# Patient Record
Sex: MALE | Race: WHITE | NOT HISPANIC OR LATINO | Employment: OTHER | URBAN - METROPOLITAN AREA
[De-identification: names, ages, dates, MRNs, and addresses within clinical notes are randomized per-mention and may not be internally consistent; named-entity substitution may affect disease eponyms.]

---

## 2017-01-18 ENCOUNTER — COMMUNICATION - HEALTHEAST (OUTPATIENT)
Dept: NURSING | Facility: CLINIC | Age: 82
End: 2017-01-18

## 2017-01-18 DIAGNOSIS — E11.9 TYPE 2 DIABETES MELLITUS (H): ICD-10-CM

## 2017-01-23 ENCOUNTER — OFFICE VISIT - HEALTHEAST (OUTPATIENT)
Dept: INTERNAL MEDICINE | Facility: CLINIC | Age: 82
End: 2017-01-23

## 2017-01-23 DIAGNOSIS — E78.5 HYPERLIPIDEMIA: ICD-10-CM

## 2017-01-23 DIAGNOSIS — Z00.00 HEALTHCARE MAINTENANCE: ICD-10-CM

## 2017-01-23 DIAGNOSIS — M79.645 THUMB PAIN, LEFT: ICD-10-CM

## 2017-01-23 DIAGNOSIS — E11.9 TYPE 2 DIABETES MELLITUS (H): ICD-10-CM

## 2017-01-23 DIAGNOSIS — I10 ESSENTIAL HYPERTENSION WITH GOAL BLOOD PRESSURE LESS THAN 140/90: ICD-10-CM

## 2017-01-23 LAB
HBA1C MFR BLD: 12.1 % (ref 3.5–6)
LDLC SERPL CALC-MCNC: 127 MG/DL
PSA SERPL-MCNC: 5.8 NG/ML (ref 0–6.5)

## 2017-01-23 ASSESSMENT — MIFFLIN-ST. JEOR: SCORE: 1639.73

## 2017-01-25 ENCOUNTER — COMMUNICATION - HEALTHEAST (OUTPATIENT)
Dept: INTERNAL MEDICINE | Facility: CLINIC | Age: 82
End: 2017-01-25

## 2017-01-30 ENCOUNTER — RECORDS - HEALTHEAST (OUTPATIENT)
Dept: ADMINISTRATIVE | Facility: OTHER | Age: 82
End: 2017-01-30

## 2017-02-17 ENCOUNTER — COMMUNICATION - HEALTHEAST (OUTPATIENT)
Dept: NURSING | Facility: CLINIC | Age: 82
End: 2017-02-17

## 2017-02-27 ENCOUNTER — OFFICE VISIT - HEALTHEAST (OUTPATIENT)
Dept: INTERNAL MEDICINE | Facility: CLINIC | Age: 82
End: 2017-02-27

## 2017-02-27 DIAGNOSIS — L02.91 ABSCESS: ICD-10-CM

## 2017-02-27 DIAGNOSIS — L98.499: ICD-10-CM

## 2017-02-27 ASSESSMENT — MIFFLIN-ST. JEOR: SCORE: 1598.91

## 2017-03-06 ENCOUNTER — RECORDS - HEALTHEAST (OUTPATIENT)
Dept: ADMINISTRATIVE | Facility: OTHER | Age: 82
End: 2017-03-06

## 2017-03-06 ENCOUNTER — OFFICE VISIT - HEALTHEAST (OUTPATIENT)
Dept: VASCULAR SURGERY | Facility: CLINIC | Age: 82
End: 2017-03-06

## 2017-03-06 DIAGNOSIS — E11.622 TYPE 2 DIABETES MELLITUS WITH OTHER SKIN ULCER (H): ICD-10-CM

## 2017-03-06 DIAGNOSIS — R60.0 LOCALIZED EDEMA: ICD-10-CM

## 2017-03-06 DIAGNOSIS — L98.499 TYPE 2 DIABETES MELLITUS WITH OTHER SKIN ULCER (H): ICD-10-CM

## 2017-03-06 DIAGNOSIS — L97.812 ULCER OF RIGHT SHIN WITH FAT LAYER EXPOSED (H): ICD-10-CM

## 2017-03-06 ASSESSMENT — MIFFLIN-ST. JEOR: SCORE: 1598.91

## 2017-03-13 ENCOUNTER — COMMUNICATION - HEALTHEAST (OUTPATIENT)
Dept: NURSING | Facility: CLINIC | Age: 82
End: 2017-03-13

## 2017-03-13 DIAGNOSIS — E11.9 TYPE 2 DIABETES MELLITUS (H): ICD-10-CM

## 2017-03-15 ENCOUNTER — COMMUNICATION - HEALTHEAST (OUTPATIENT)
Dept: INTERNAL MEDICINE | Facility: CLINIC | Age: 82
End: 2017-03-15

## 2017-03-15 DIAGNOSIS — I10 ESSENTIAL HYPERTENSION: ICD-10-CM

## 2017-03-20 ENCOUNTER — OFFICE VISIT - HEALTHEAST (OUTPATIENT)
Dept: VASCULAR SURGERY | Facility: CLINIC | Age: 82
End: 2017-03-20

## 2017-03-20 DIAGNOSIS — L98.499 TYPE 2 DIABETES MELLITUS WITH OTHER SKIN ULCER (H): ICD-10-CM

## 2017-03-20 DIAGNOSIS — L97.812 ULCER OF RIGHT SHIN WITH FAT LAYER EXPOSED (H): ICD-10-CM

## 2017-03-20 DIAGNOSIS — E11.622 TYPE 2 DIABETES MELLITUS WITH OTHER SKIN ULCER (H): ICD-10-CM

## 2017-03-20 DIAGNOSIS — R60.0 LOCALIZED EDEMA: ICD-10-CM

## 2017-03-21 ENCOUNTER — COMMUNICATION - HEALTHEAST (OUTPATIENT)
Dept: INTERNAL MEDICINE | Facility: CLINIC | Age: 82
End: 2017-03-21

## 2017-03-22 ENCOUNTER — COMMUNICATION - HEALTHEAST (OUTPATIENT)
Dept: INTERNAL MEDICINE | Facility: CLINIC | Age: 82
End: 2017-03-22

## 2017-04-03 ENCOUNTER — OFFICE VISIT - HEALTHEAST (OUTPATIENT)
Dept: VASCULAR SURGERY | Facility: CLINIC | Age: 82
End: 2017-04-03

## 2017-04-03 DIAGNOSIS — L98.499 TYPE 2 DIABETES MELLITUS WITH OTHER SKIN ULCER (H): ICD-10-CM

## 2017-04-03 DIAGNOSIS — R60.0 LOCALIZED EDEMA: ICD-10-CM

## 2017-04-03 DIAGNOSIS — L97.812 ULCER OF RIGHT SHIN WITH FAT LAYER EXPOSED (H): ICD-10-CM

## 2017-04-03 DIAGNOSIS — E11.622 TYPE 2 DIABETES MELLITUS WITH OTHER SKIN ULCER (H): ICD-10-CM

## 2017-04-04 ENCOUNTER — COMMUNICATION - HEALTHEAST (OUTPATIENT)
Dept: INTERNAL MEDICINE | Facility: CLINIC | Age: 82
End: 2017-04-04

## 2017-04-05 ENCOUNTER — COMMUNICATION - HEALTHEAST (OUTPATIENT)
Dept: INTERNAL MEDICINE | Facility: CLINIC | Age: 82
End: 2017-04-05

## 2017-04-17 ENCOUNTER — OFFICE VISIT - HEALTHEAST (OUTPATIENT)
Dept: VASCULAR SURGERY | Facility: CLINIC | Age: 82
End: 2017-04-17

## 2017-04-17 DIAGNOSIS — E11.622 TYPE 2 DIABETES MELLITUS WITH OTHER SKIN ULCER (H): ICD-10-CM

## 2017-04-17 DIAGNOSIS — L98.499 TYPE 2 DIABETES MELLITUS WITH OTHER SKIN ULCER (H): ICD-10-CM

## 2017-04-17 DIAGNOSIS — L97.812 ULCER OF RIGHT SHIN WITH FAT LAYER EXPOSED (H): ICD-10-CM

## 2017-04-17 DIAGNOSIS — R60.0 LOCALIZED EDEMA: ICD-10-CM

## 2017-04-17 ASSESSMENT — MIFFLIN-ST. JEOR: SCORE: 1598.91

## 2017-05-01 ENCOUNTER — OFFICE VISIT - HEALTHEAST (OUTPATIENT)
Dept: VASCULAR SURGERY | Facility: CLINIC | Age: 82
End: 2017-05-01

## 2017-05-01 DIAGNOSIS — R60.0 LOCALIZED EDEMA: ICD-10-CM

## 2017-05-01 DIAGNOSIS — E11.622 TYPE 2 DIABETES MELLITUS WITH OTHER SKIN ULCER (H): ICD-10-CM

## 2017-05-01 DIAGNOSIS — L97.812 ULCER OF RIGHT SHIN WITH FAT LAYER EXPOSED (H): ICD-10-CM

## 2017-05-01 DIAGNOSIS — L98.499 TYPE 2 DIABETES MELLITUS WITH OTHER SKIN ULCER (H): ICD-10-CM

## 2017-05-01 ASSESSMENT — MIFFLIN-ST. JEOR: SCORE: 1598.91

## 2017-05-02 ENCOUNTER — COMMUNICATION - HEALTHEAST (OUTPATIENT)
Dept: INTERNAL MEDICINE | Facility: CLINIC | Age: 82
End: 2017-05-02

## 2017-05-03 ENCOUNTER — COMMUNICATION - HEALTHEAST (OUTPATIENT)
Dept: INTERNAL MEDICINE | Facility: CLINIC | Age: 82
End: 2017-05-03

## 2017-05-09 ENCOUNTER — AMBULATORY - HEALTHEAST (OUTPATIENT)
Dept: VASCULAR SURGERY | Facility: CLINIC | Age: 82
End: 2017-05-09

## 2017-05-16 ENCOUNTER — OFFICE VISIT - HEALTHEAST (OUTPATIENT)
Dept: VASCULAR SURGERY | Facility: CLINIC | Age: 82
End: 2017-05-16

## 2017-05-16 ENCOUNTER — RECORDS - HEALTHEAST (OUTPATIENT)
Dept: ADMINISTRATIVE | Facility: OTHER | Age: 82
End: 2017-05-16

## 2017-05-16 DIAGNOSIS — R60.0 LOCALIZED EDEMA: ICD-10-CM

## 2017-05-16 DIAGNOSIS — E11.622 TYPE 2 DIABETES MELLITUS WITH OTHER SKIN ULCER (H): ICD-10-CM

## 2017-05-16 DIAGNOSIS — L98.499 TYPE 2 DIABETES MELLITUS WITH OTHER SKIN ULCER (H): ICD-10-CM

## 2017-05-16 DIAGNOSIS — L97.812 ULCER OF RIGHT SHIN WITH FAT LAYER EXPOSED (H): ICD-10-CM

## 2017-05-17 ENCOUNTER — RECORDS - HEALTHEAST (OUTPATIENT)
Dept: ADMINISTRATIVE | Facility: OTHER | Age: 82
End: 2017-05-17

## 2017-05-17 ENCOUNTER — COMMUNICATION - HEALTHEAST (OUTPATIENT)
Dept: SCHEDULING | Facility: CLINIC | Age: 82
End: 2017-05-17

## 2017-05-18 ENCOUNTER — AMBULATORY - HEALTHEAST (OUTPATIENT)
Dept: VASCULAR SURGERY | Facility: CLINIC | Age: 82
End: 2017-05-18

## 2017-05-18 DIAGNOSIS — L08.9 WOUND INFECTION: ICD-10-CM

## 2017-05-18 DIAGNOSIS — T14.8XXA WOUND INFECTION: ICD-10-CM

## 2017-05-19 ENCOUNTER — COMMUNICATION - HEALTHEAST (OUTPATIENT)
Dept: INTERNAL MEDICINE | Facility: CLINIC | Age: 82
End: 2017-05-19

## 2017-05-19 ENCOUNTER — OFFICE VISIT - HEALTHEAST (OUTPATIENT)
Dept: INTERNAL MEDICINE | Facility: CLINIC | Age: 82
End: 2017-05-19

## 2017-05-19 ENCOUNTER — COMMUNICATION - HEALTHEAST (OUTPATIENT)
Dept: VASCULAR SURGERY | Facility: CLINIC | Age: 82
End: 2017-05-19

## 2017-05-19 DIAGNOSIS — E11.622 TYPE 2 DIABETES MELLITUS WITH OTHER SKIN ULCER (H): ICD-10-CM

## 2017-05-19 DIAGNOSIS — L08.9 TOE INFECTION: ICD-10-CM

## 2017-05-19 DIAGNOSIS — L98.499 TYPE 2 DIABETES MELLITUS WITH OTHER SKIN ULCER (H): ICD-10-CM

## 2017-05-19 LAB — HBA1C MFR BLD: 9.3 % (ref 3.5–6)

## 2017-05-19 ASSESSMENT — MIFFLIN-ST. JEOR: SCORE: 1571.69

## 2017-05-26 ENCOUNTER — OFFICE VISIT - HEALTHEAST (OUTPATIENT)
Dept: INTERNAL MEDICINE | Facility: CLINIC | Age: 82
End: 2017-05-26

## 2017-05-26 DIAGNOSIS — L98.499 TYPE 2 DIABETES MELLITUS WITH OTHER SKIN ULCER (H): ICD-10-CM

## 2017-05-26 DIAGNOSIS — L08.9 TOE INFECTION: ICD-10-CM

## 2017-05-26 DIAGNOSIS — E11.622 TYPE 2 DIABETES MELLITUS WITH OTHER SKIN ULCER (H): ICD-10-CM

## 2017-05-26 ASSESSMENT — MIFFLIN-ST. JEOR: SCORE: 1580.77

## 2017-06-02 ENCOUNTER — OFFICE VISIT - HEALTHEAST (OUTPATIENT)
Dept: VASCULAR SURGERY | Facility: CLINIC | Age: 82
End: 2017-06-02

## 2017-06-02 DIAGNOSIS — L98.499 TYPE 2 DIABETES MELLITUS WITH OTHER SKIN ULCER (H): ICD-10-CM

## 2017-06-02 DIAGNOSIS — L97.812 ULCER OF RIGHT SHIN WITH FAT LAYER EXPOSED (H): ICD-10-CM

## 2017-06-02 DIAGNOSIS — R60.0 LOCALIZED EDEMA: ICD-10-CM

## 2017-06-02 DIAGNOSIS — E11.622 TYPE 2 DIABETES MELLITUS WITH OTHER SKIN ULCER (H): ICD-10-CM

## 2017-06-08 ENCOUNTER — COMMUNICATION - HEALTHEAST (OUTPATIENT)
Dept: INTERNAL MEDICINE | Facility: CLINIC | Age: 82
End: 2017-06-08

## 2017-06-12 ENCOUNTER — COMMUNICATION - HEALTHEAST (OUTPATIENT)
Dept: EDUCATION SERVICES | Facility: CLINIC | Age: 82
End: 2017-06-12

## 2017-06-13 ENCOUNTER — COMMUNICATION - HEALTHEAST (OUTPATIENT)
Dept: EDUCATION SERVICES | Facility: CLINIC | Age: 82
End: 2017-06-13

## 2017-06-19 ENCOUNTER — OFFICE VISIT - HEALTHEAST (OUTPATIENT)
Dept: VASCULAR SURGERY | Facility: CLINIC | Age: 82
End: 2017-06-19

## 2017-06-19 DIAGNOSIS — E11.622 TYPE 2 DIABETES MELLITUS WITH OTHER SKIN ULCER (H): ICD-10-CM

## 2017-06-19 DIAGNOSIS — R60.0 LOCALIZED EDEMA: ICD-10-CM

## 2017-06-19 DIAGNOSIS — L98.499 TYPE 2 DIABETES MELLITUS WITH OTHER SKIN ULCER (H): ICD-10-CM

## 2017-06-19 DIAGNOSIS — L97.812 ULCER OF RIGHT SHIN WITH FAT LAYER EXPOSED (H): ICD-10-CM

## 2017-06-19 ASSESSMENT — MIFFLIN-ST. JEOR: SCORE: 1580.77

## 2017-06-22 ENCOUNTER — COMMUNICATION - HEALTHEAST (OUTPATIENT)
Dept: INTERNAL MEDICINE | Facility: CLINIC | Age: 82
End: 2017-06-22

## 2017-06-22 DIAGNOSIS — E11.9 DIABETES MELLITUS, TYPE 2 (H): ICD-10-CM

## 2017-06-26 ENCOUNTER — OFFICE VISIT - HEALTHEAST (OUTPATIENT)
Dept: VASCULAR SURGERY | Facility: CLINIC | Age: 82
End: 2017-06-26

## 2017-06-26 DIAGNOSIS — E11.622 TYPE 2 DIABETES MELLITUS WITH OTHER SKIN ULCER (H): ICD-10-CM

## 2017-06-26 DIAGNOSIS — R60.0 LOCALIZED EDEMA: ICD-10-CM

## 2017-06-26 DIAGNOSIS — I10 ESSENTIAL HYPERTENSION WITH GOAL BLOOD PRESSURE LESS THAN 140/90: ICD-10-CM

## 2017-06-26 DIAGNOSIS — L08.9 WOUND INFECTION: ICD-10-CM

## 2017-06-26 DIAGNOSIS — L98.499 TYPE 2 DIABETES MELLITUS WITH OTHER SKIN ULCER (H): ICD-10-CM

## 2017-06-26 DIAGNOSIS — L97.812 ULCER OF RIGHT SHIN WITH FAT LAYER EXPOSED (H): ICD-10-CM

## 2017-06-26 DIAGNOSIS — T14.8XXA WOUND INFECTION: ICD-10-CM

## 2017-07-05 ENCOUNTER — OFFICE VISIT - HEALTHEAST (OUTPATIENT)
Dept: VASCULAR SURGERY | Facility: CLINIC | Age: 82
End: 2017-07-05

## 2017-07-05 DIAGNOSIS — L97.812 ULCER OF RIGHT SHIN WITH FAT LAYER EXPOSED (H): ICD-10-CM

## 2017-07-05 DIAGNOSIS — L98.499 TYPE 2 DIABETES MELLITUS WITH OTHER SKIN ULCER (H): ICD-10-CM

## 2017-07-05 DIAGNOSIS — R60.0 LOCALIZED EDEMA: ICD-10-CM

## 2017-07-05 DIAGNOSIS — E11.622 TYPE 2 DIABETES MELLITUS WITH OTHER SKIN ULCER (H): ICD-10-CM

## 2017-07-05 ASSESSMENT — MIFFLIN-ST. JEOR: SCORE: 1580.77

## 2017-07-07 ENCOUNTER — AMBULATORY - HEALTHEAST (OUTPATIENT)
Dept: VASCULAR SURGERY | Facility: CLINIC | Age: 82
End: 2017-07-07

## 2017-07-10 ENCOUNTER — OFFICE VISIT - HEALTHEAST (OUTPATIENT)
Dept: VASCULAR SURGERY | Facility: CLINIC | Age: 82
End: 2017-07-10

## 2017-07-10 DIAGNOSIS — L98.499 TYPE 2 DIABETES MELLITUS WITH OTHER SKIN ULCER (H): ICD-10-CM

## 2017-07-10 DIAGNOSIS — L97.812 ULCER OF RIGHT SHIN WITH FAT LAYER EXPOSED (H): ICD-10-CM

## 2017-07-10 DIAGNOSIS — E11.622 TYPE 2 DIABETES MELLITUS WITH OTHER SKIN ULCER (H): ICD-10-CM

## 2017-07-10 DIAGNOSIS — R60.0 LOCALIZED EDEMA: ICD-10-CM

## 2017-07-10 ASSESSMENT — MIFFLIN-ST. JEOR: SCORE: 1580.77

## 2017-07-13 ENCOUNTER — COMMUNICATION - HEALTHEAST (OUTPATIENT)
Dept: INTERNAL MEDICINE | Facility: CLINIC | Age: 82
End: 2017-07-13

## 2017-07-13 DIAGNOSIS — I10 ESSENTIAL HYPERTENSION: ICD-10-CM

## 2017-07-17 ENCOUNTER — OFFICE VISIT - HEALTHEAST (OUTPATIENT)
Dept: VASCULAR SURGERY | Facility: CLINIC | Age: 82
End: 2017-07-17

## 2017-07-17 DIAGNOSIS — L97.812 ULCER OF RIGHT SHIN WITH FAT LAYER EXPOSED (H): ICD-10-CM

## 2017-07-17 DIAGNOSIS — E11.622 TYPE 2 DIABETES MELLITUS WITH OTHER SKIN ULCER (H): ICD-10-CM

## 2017-07-17 DIAGNOSIS — L98.499 TYPE 2 DIABETES MELLITUS WITH OTHER SKIN ULCER (H): ICD-10-CM

## 2017-07-17 DIAGNOSIS — R60.0 LOCALIZED EDEMA: ICD-10-CM

## 2017-07-17 ASSESSMENT — MIFFLIN-ST. JEOR: SCORE: 1580.77

## 2017-07-24 ENCOUNTER — RECORDS - HEALTHEAST (OUTPATIENT)
Dept: ADMINISTRATIVE | Facility: OTHER | Age: 82
End: 2017-07-24

## 2017-07-24 ENCOUNTER — RECORDS - HEALTHEAST (OUTPATIENT)
Dept: GENERAL RADIOLOGY | Facility: CLINIC | Age: 82
End: 2017-07-24

## 2017-07-24 ENCOUNTER — COMMUNICATION - HEALTHEAST (OUTPATIENT)
Dept: VASCULAR SURGERY | Facility: CLINIC | Age: 82
End: 2017-07-24

## 2017-07-24 ENCOUNTER — OFFICE VISIT - HEALTHEAST (OUTPATIENT)
Dept: VASCULAR SURGERY | Facility: CLINIC | Age: 82
End: 2017-07-24

## 2017-07-24 ENCOUNTER — RECORDS - HEALTHEAST (OUTPATIENT)
Dept: VASCULAR ULTRASOUND | Facility: CLINIC | Age: 82
End: 2017-07-24

## 2017-07-24 DIAGNOSIS — T14.8XXA WOUND INFECTION: ICD-10-CM

## 2017-07-24 DIAGNOSIS — R60.0 LOCALIZED EDEMA: ICD-10-CM

## 2017-07-24 DIAGNOSIS — G58.9 MONONEUROPATHY, UNSPECIFIED: ICD-10-CM

## 2017-07-24 DIAGNOSIS — T14.8XXA OTHER INJURY OF UNSPECIFIED BODY REGION: ICD-10-CM

## 2017-07-24 DIAGNOSIS — G58.9 MONONEUROPATHY: ICD-10-CM

## 2017-07-24 DIAGNOSIS — E11.621 TYPE 2 DIABETES MELLITUS WITH FOOT ULCER (CODE) (H): ICD-10-CM

## 2017-07-24 DIAGNOSIS — L08.9 LOCAL INFECTION OF THE SKIN AND SUBCUTANEOUS TISSUE, UNSPECIFIED: ICD-10-CM

## 2017-07-24 DIAGNOSIS — E11.621 DIABETIC ULCER OF RIGHT FOOT ASSOCIATED WITH TYPE 2 DIABETES MELLITUS (H): ICD-10-CM

## 2017-07-24 DIAGNOSIS — L08.9 WOUND INFECTION: ICD-10-CM

## 2017-07-24 DIAGNOSIS — L97.519 DIABETIC ULCER OF RIGHT FOOT ASSOCIATED WITH TYPE 2 DIABETES MELLITUS (H): ICD-10-CM

## 2017-07-24 DIAGNOSIS — L97.519 NON-PRESSURE CHRONIC ULCER OF OTHER PART OF RIGHT FOOT WITH UNSPECIFIED SEVERITY (H): ICD-10-CM

## 2017-07-24 ASSESSMENT — MIFFLIN-ST. JEOR: SCORE: 1580.77

## 2017-07-28 ENCOUNTER — COMMUNICATION - HEALTHEAST (OUTPATIENT)
Dept: INTERNAL MEDICINE | Facility: CLINIC | Age: 82
End: 2017-07-28

## 2017-07-31 ENCOUNTER — OFFICE VISIT - HEALTHEAST (OUTPATIENT)
Dept: VASCULAR SURGERY | Facility: CLINIC | Age: 82
End: 2017-07-31

## 2017-07-31 DIAGNOSIS — T14.8XXA WOUND INFECTION: ICD-10-CM

## 2017-07-31 DIAGNOSIS — L08.9 WOUND INFECTION: ICD-10-CM

## 2017-07-31 DIAGNOSIS — L97.519 DIABETIC ULCER OF RIGHT FOOT ASSOCIATED WITH TYPE 2 DIABETES MELLITUS (H): ICD-10-CM

## 2017-07-31 DIAGNOSIS — G58.9 MONONEUROPATHY: ICD-10-CM

## 2017-07-31 DIAGNOSIS — E11.621 DIABETIC ULCER OF RIGHT FOOT ASSOCIATED WITH TYPE 2 DIABETES MELLITUS (H): ICD-10-CM

## 2017-07-31 DIAGNOSIS — R60.0 LOCALIZED EDEMA: ICD-10-CM

## 2017-07-31 ASSESSMENT — MIFFLIN-ST. JEOR: SCORE: 1580.77

## 2017-08-07 ENCOUNTER — OFFICE VISIT - HEALTHEAST (OUTPATIENT)
Dept: VASCULAR SURGERY | Facility: CLINIC | Age: 82
End: 2017-08-07

## 2017-08-07 ENCOUNTER — COMMUNICATION - HEALTHEAST (OUTPATIENT)
Dept: VASCULAR SURGERY | Facility: CLINIC | Age: 82
End: 2017-08-07

## 2017-08-07 DIAGNOSIS — T14.8XXA WOUND INFECTION: ICD-10-CM

## 2017-08-07 DIAGNOSIS — G58.9 MONONEUROPATHY: ICD-10-CM

## 2017-08-07 DIAGNOSIS — E11.621 DIABETIC ULCER OF RIGHT FOOT ASSOCIATED WITH TYPE 2 DIABETES MELLITUS (H): ICD-10-CM

## 2017-08-07 DIAGNOSIS — L08.9 WOUND INFECTION: ICD-10-CM

## 2017-08-07 DIAGNOSIS — L97.519 DIABETIC ULCER OF RIGHT FOOT ASSOCIATED WITH TYPE 2 DIABETES MELLITUS (H): ICD-10-CM

## 2017-08-07 DIAGNOSIS — R60.0 LOCALIZED EDEMA: ICD-10-CM

## 2017-08-07 ASSESSMENT — MIFFLIN-ST. JEOR: SCORE: 1580.77

## 2017-08-08 ENCOUNTER — COMMUNICATION - HEALTHEAST (OUTPATIENT)
Dept: INTERNAL MEDICINE | Facility: CLINIC | Age: 82
End: 2017-08-08

## 2017-08-09 ENCOUNTER — COMMUNICATION - HEALTHEAST (OUTPATIENT)
Dept: EDUCATION SERVICES | Facility: CLINIC | Age: 82
End: 2017-08-09

## 2017-08-09 DIAGNOSIS — E11.9 DIABETES (H): ICD-10-CM

## 2017-08-10 ENCOUNTER — OFFICE VISIT - HEALTHEAST (OUTPATIENT)
Dept: VASCULAR SURGERY | Facility: CLINIC | Age: 82
End: 2017-08-10

## 2017-08-10 ENCOUNTER — COMMUNICATION - HEALTHEAST (OUTPATIENT)
Dept: INTERNAL MEDICINE | Facility: CLINIC | Age: 82
End: 2017-08-10

## 2017-08-10 DIAGNOSIS — E11.621 DIABETIC ULCER OF RIGHT FOOT ASSOCIATED WITH TYPE 2 DIABETES MELLITUS (H): ICD-10-CM

## 2017-08-10 DIAGNOSIS — L97.519 DIABETIC ULCER OF RIGHT FOOT ASSOCIATED WITH TYPE 2 DIABETES MELLITUS (H): ICD-10-CM

## 2017-08-21 ENCOUNTER — OFFICE VISIT - HEALTHEAST (OUTPATIENT)
Dept: VASCULAR SURGERY | Facility: CLINIC | Age: 82
End: 2017-08-21

## 2017-08-21 DIAGNOSIS — E11.622 TYPE 2 DIABETES MELLITUS WITH OTHER SKIN ULCER (H): ICD-10-CM

## 2017-08-21 DIAGNOSIS — L98.499 TYPE 2 DIABETES MELLITUS WITH OTHER SKIN ULCER (H): ICD-10-CM

## 2017-08-21 DIAGNOSIS — G58.9 MONONEUROPATHY: ICD-10-CM

## 2017-08-21 DIAGNOSIS — L97.519 DIABETIC ULCER OF RIGHT FOOT ASSOCIATED WITH TYPE 2 DIABETES MELLITUS (H): ICD-10-CM

## 2017-08-21 DIAGNOSIS — R60.0 LOCALIZED EDEMA: ICD-10-CM

## 2017-08-21 DIAGNOSIS — E11.621 DIABETIC ULCER OF RIGHT FOOT ASSOCIATED WITH TYPE 2 DIABETES MELLITUS (H): ICD-10-CM

## 2017-08-21 ASSESSMENT — MIFFLIN-ST. JEOR: SCORE: 1580.77

## 2017-08-28 ENCOUNTER — OFFICE VISIT - HEALTHEAST (OUTPATIENT)
Dept: VASCULAR SURGERY | Facility: CLINIC | Age: 82
End: 2017-08-28

## 2017-08-28 DIAGNOSIS — L97.519 DIABETIC ULCER OF RIGHT FOOT ASSOCIATED WITH TYPE 2 DIABETES MELLITUS (H): ICD-10-CM

## 2017-08-28 DIAGNOSIS — L98.499 TYPE 2 DIABETES MELLITUS WITH OTHER SKIN ULCER (H): ICD-10-CM

## 2017-08-28 DIAGNOSIS — E11.621 DIABETIC ULCER OF RIGHT FOOT ASSOCIATED WITH TYPE 2 DIABETES MELLITUS (H): ICD-10-CM

## 2017-08-28 DIAGNOSIS — E11.622 TYPE 2 DIABETES MELLITUS WITH OTHER SKIN ULCER (H): ICD-10-CM

## 2017-08-28 DIAGNOSIS — R60.0 LOCALIZED EDEMA: ICD-10-CM

## 2017-08-28 DIAGNOSIS — G58.9 MONONEUROPATHY: ICD-10-CM

## 2017-08-28 ASSESSMENT — MIFFLIN-ST. JEOR: SCORE: 1580.77

## 2017-09-05 ENCOUNTER — ANESTHESIA - HEALTHEAST (OUTPATIENT)
Dept: SURGERY | Facility: HOSPITAL | Age: 82
End: 2017-09-05

## 2017-09-05 ENCOUNTER — SURGERY - HEALTHEAST (OUTPATIENT)
Dept: SURGERY | Facility: HOSPITAL | Age: 82
End: 2017-09-05

## 2017-09-05 ASSESSMENT — MIFFLIN-ST. JEOR: SCORE: 1558.09

## 2017-09-07 ENCOUNTER — COMMUNICATION - HEALTHEAST (OUTPATIENT)
Dept: INTERNAL MEDICINE | Facility: CLINIC | Age: 82
End: 2017-09-07

## 2017-09-08 ENCOUNTER — OFFICE VISIT - HEALTHEAST (OUTPATIENT)
Dept: VASCULAR SURGERY | Facility: CLINIC | Age: 82
End: 2017-09-08

## 2017-09-08 ENCOUNTER — HOSPITAL ENCOUNTER (OUTPATIENT)
Dept: ADMINISTRATIVE | Age: 82
Discharge: HOME | End: 2017-09-08

## 2017-09-08 DIAGNOSIS — E11.622 TYPE 2 DIABETES MELLITUS WITH OTHER SKIN ULCER (H): ICD-10-CM

## 2017-09-08 DIAGNOSIS — E11.621 DIABETIC ULCER OF RIGHT FOOT ASSOCIATED WITH TYPE 2 DIABETES MELLITUS (H): ICD-10-CM

## 2017-09-08 DIAGNOSIS — L97.519 DIABETIC ULCER OF RIGHT FOOT ASSOCIATED WITH TYPE 2 DIABETES MELLITUS (H): ICD-10-CM

## 2017-09-08 DIAGNOSIS — R60.0 LOCALIZED EDEMA: ICD-10-CM

## 2017-09-08 DIAGNOSIS — L98.499 TYPE 2 DIABETES MELLITUS WITH OTHER SKIN ULCER (H): ICD-10-CM

## 2017-09-08 DIAGNOSIS — G58.9 MONONEUROPATHY: ICD-10-CM

## 2017-09-18 ENCOUNTER — OFFICE VISIT - HEALTHEAST (OUTPATIENT)
Dept: VASCULAR SURGERY | Facility: CLINIC | Age: 82
End: 2017-09-18

## 2017-09-18 DIAGNOSIS — G58.9 MONONEUROPATHY: ICD-10-CM

## 2017-09-18 DIAGNOSIS — R60.0 LOCALIZED EDEMA: ICD-10-CM

## 2017-09-18 DIAGNOSIS — E11.621 DIABETIC ULCER OF RIGHT FOOT ASSOCIATED WITH TYPE 2 DIABETES MELLITUS (H): ICD-10-CM

## 2017-09-18 DIAGNOSIS — L97.519 DIABETIC ULCER OF RIGHT FOOT ASSOCIATED WITH TYPE 2 DIABETES MELLITUS (H): ICD-10-CM

## 2017-09-18 ASSESSMENT — MIFFLIN-ST. JEOR: SCORE: 1555.82

## 2017-09-21 ENCOUNTER — OFFICE VISIT - HEALTHEAST (OUTPATIENT)
Dept: INTERNAL MEDICINE | Facility: CLINIC | Age: 82
End: 2017-09-21

## 2017-09-21 DIAGNOSIS — E11.622 TYPE 2 DIABETES MELLITUS WITH OTHER SKIN ULCER (H): ICD-10-CM

## 2017-09-21 DIAGNOSIS — K80.50 CHOLEDOCHOLITHIASIS: ICD-10-CM

## 2017-09-21 DIAGNOSIS — L97.519 DIABETIC ULCER OF RIGHT FOOT ASSOCIATED WITH TYPE 2 DIABETES MELLITUS (H): ICD-10-CM

## 2017-09-21 DIAGNOSIS — I10 ESSENTIAL HYPERTENSION WITH GOAL BLOOD PRESSURE LESS THAN 140/90: ICD-10-CM

## 2017-09-21 DIAGNOSIS — L98.499 TYPE 2 DIABETES MELLITUS WITH OTHER SKIN ULCER (H): ICD-10-CM

## 2017-09-21 DIAGNOSIS — E11.621 DIABETIC ULCER OF RIGHT FOOT ASSOCIATED WITH TYPE 2 DIABETES MELLITUS (H): ICD-10-CM

## 2017-09-21 ASSESSMENT — MIFFLIN-ST. JEOR: SCORE: 1573.97

## 2017-09-22 ENCOUNTER — AMBULATORY - HEALTHEAST (OUTPATIENT)
Dept: EDUCATION SERVICES | Facility: CLINIC | Age: 82
End: 2017-09-22

## 2017-09-25 ENCOUNTER — OFFICE VISIT - HEALTHEAST (OUTPATIENT)
Dept: VASCULAR SURGERY | Facility: CLINIC | Age: 82
End: 2017-09-25

## 2017-09-25 DIAGNOSIS — E11.622 TYPE 2 DIABETES MELLITUS WITH OTHER SKIN ULCER (H): ICD-10-CM

## 2017-09-25 DIAGNOSIS — L98.499 TYPE 2 DIABETES MELLITUS WITH OTHER SKIN ULCER (H): ICD-10-CM

## 2017-09-25 DIAGNOSIS — E11.621 DIABETIC ULCER OF RIGHT FOOT ASSOCIATED WITH TYPE 2 DIABETES MELLITUS (H): ICD-10-CM

## 2017-09-25 DIAGNOSIS — L97.519 DIABETIC ULCER OF RIGHT FOOT ASSOCIATED WITH TYPE 2 DIABETES MELLITUS (H): ICD-10-CM

## 2017-09-25 DIAGNOSIS — R60.0 LOCALIZED EDEMA: ICD-10-CM

## 2017-09-25 DIAGNOSIS — G58.9 MONONEUROPATHY: ICD-10-CM

## 2017-09-25 ASSESSMENT — MIFFLIN-ST. JEOR: SCORE: 1560.36

## 2017-10-03 ENCOUNTER — OFFICE VISIT - HEALTHEAST (OUTPATIENT)
Dept: VASCULAR SURGERY | Facility: CLINIC | Age: 82
End: 2017-10-03

## 2017-10-03 DIAGNOSIS — L97.412 DIABETIC ULCER OF RIGHT MIDFOOT ASSOCIATED WITH TYPE 2 DIABETES MELLITUS, WITH FAT LAYER EXPOSED (H): ICD-10-CM

## 2017-10-03 DIAGNOSIS — E11.621 DIABETIC ULCER OF RIGHT MIDFOOT ASSOCIATED WITH TYPE 2 DIABETES MELLITUS, WITH FAT LAYER EXPOSED (H): ICD-10-CM

## 2017-10-03 DIAGNOSIS — G58.9 MONONEUROPATHY: ICD-10-CM

## 2017-10-03 DIAGNOSIS — R60.0 LOCALIZED EDEMA: ICD-10-CM

## 2017-10-06 ENCOUNTER — AMBULATORY - HEALTHEAST (OUTPATIENT)
Dept: EDUCATION SERVICES | Facility: CLINIC | Age: 82
End: 2017-10-06

## 2017-10-09 ENCOUNTER — OFFICE VISIT - HEALTHEAST (OUTPATIENT)
Dept: VASCULAR SURGERY | Facility: CLINIC | Age: 82
End: 2017-10-09

## 2017-10-09 DIAGNOSIS — L97.412 DIABETIC ULCER OF RIGHT MIDFOOT ASSOCIATED WITH TYPE 2 DIABETES MELLITUS, WITH FAT LAYER EXPOSED (H): ICD-10-CM

## 2017-10-09 DIAGNOSIS — E11.621 DIABETIC ULCER OF RIGHT MIDFOOT ASSOCIATED WITH TYPE 2 DIABETES MELLITUS, WITH FAT LAYER EXPOSED (H): ICD-10-CM

## 2017-10-09 DIAGNOSIS — R60.0 LOCALIZED EDEMA: ICD-10-CM

## 2017-10-09 DIAGNOSIS — G58.9 MONONEUROPATHY: ICD-10-CM

## 2017-10-09 ASSESSMENT — MIFFLIN-ST. JEOR: SCORE: 1583.04

## 2017-10-20 ENCOUNTER — AMBULATORY - HEALTHEAST (OUTPATIENT)
Dept: EDUCATION SERVICES | Facility: CLINIC | Age: 82
End: 2017-10-20

## 2017-10-23 ENCOUNTER — OFFICE VISIT - HEALTHEAST (OUTPATIENT)
Dept: VASCULAR SURGERY | Facility: CLINIC | Age: 82
End: 2017-10-23

## 2017-10-23 DIAGNOSIS — E11.622 TYPE 2 DIABETES MELLITUS WITH OTHER SKIN ULCER, WITH LONG-TERM CURRENT USE OF INSULIN (H): ICD-10-CM

## 2017-10-23 DIAGNOSIS — Z79.4 TYPE 2 DIABETES MELLITUS WITH OTHER SKIN ULCER, WITH LONG-TERM CURRENT USE OF INSULIN (H): ICD-10-CM

## 2017-10-23 DIAGNOSIS — E11.621 DIABETIC ULCER OF RIGHT MIDFOOT ASSOCIATED WITH TYPE 2 DIABETES MELLITUS, WITH FAT LAYER EXPOSED (H): ICD-10-CM

## 2017-10-23 DIAGNOSIS — L97.412 DIABETIC ULCER OF RIGHT MIDFOOT ASSOCIATED WITH TYPE 2 DIABETES MELLITUS, WITH FAT LAYER EXPOSED (H): ICD-10-CM

## 2017-10-23 DIAGNOSIS — R60.0 LOCALIZED EDEMA: ICD-10-CM

## 2017-10-23 DIAGNOSIS — G58.9 MONONEUROPATHY: ICD-10-CM

## 2017-10-23 ASSESSMENT — MIFFLIN-ST. JEOR: SCORE: 1610.25

## 2017-10-28 ENCOUNTER — OFFICE VISIT - HEALTHEAST (OUTPATIENT)
Dept: FAMILY MEDICINE | Facility: CLINIC | Age: 82
End: 2017-10-28

## 2017-10-28 DIAGNOSIS — L97.419 DIABETIC ULCER OF RIGHT MIDFOOT ASSOCIATED WITH TYPE 2 DIABETES MELLITUS, UNSPECIFIED ULCER STAGE (H): ICD-10-CM

## 2017-10-28 DIAGNOSIS — E11.621 DIABETIC ULCER OF RIGHT MIDFOOT ASSOCIATED WITH TYPE 2 DIABETES MELLITUS, UNSPECIFIED ULCER STAGE (H): ICD-10-CM

## 2017-10-31 ENCOUNTER — AMBULATORY - HEALTHEAST (OUTPATIENT)
Dept: EDUCATION SERVICES | Facility: CLINIC | Age: 82
End: 2017-10-31

## 2017-11-08 ENCOUNTER — OFFICE VISIT - HEALTHEAST (OUTPATIENT)
Dept: VASCULAR SURGERY | Facility: CLINIC | Age: 82
End: 2017-11-08

## 2017-11-08 DIAGNOSIS — E11.622 TYPE 2 DIABETES MELLITUS WITH OTHER SKIN ULCER, WITH LONG-TERM CURRENT USE OF INSULIN (H): ICD-10-CM

## 2017-11-08 DIAGNOSIS — S90.424A TOE BLISTER WITHOUT INFECTION, RIGHT, INITIAL ENCOUNTER: ICD-10-CM

## 2017-11-08 DIAGNOSIS — R60.0 LOCALIZED EDEMA: ICD-10-CM

## 2017-11-08 DIAGNOSIS — L97.412 DIABETIC ULCER OF RIGHT MIDFOOT ASSOCIATED WITH TYPE 2 DIABETES MELLITUS, WITH FAT LAYER EXPOSED (H): ICD-10-CM

## 2017-11-08 DIAGNOSIS — Z79.4 TYPE 2 DIABETES MELLITUS WITH OTHER SKIN ULCER, WITH LONG-TERM CURRENT USE OF INSULIN (H): ICD-10-CM

## 2017-11-08 DIAGNOSIS — G58.9 MONONEUROPATHY: ICD-10-CM

## 2017-11-08 DIAGNOSIS — E11.621 DIABETIC ULCER OF RIGHT MIDFOOT ASSOCIATED WITH TYPE 2 DIABETES MELLITUS, WITH FAT LAYER EXPOSED (H): ICD-10-CM

## 2017-11-14 ENCOUNTER — OFFICE VISIT - HEALTHEAST (OUTPATIENT)
Dept: VASCULAR SURGERY | Facility: CLINIC | Age: 82
End: 2017-11-14

## 2017-11-14 DIAGNOSIS — E11.621 DIABETIC ULCER OF RIGHT MIDFOOT ASSOCIATED WITH TYPE 2 DIABETES MELLITUS, LIMITED TO BREAKDOWN OF SKIN (H): ICD-10-CM

## 2017-11-14 DIAGNOSIS — L97.411 DIABETIC ULCER OF RIGHT MIDFOOT ASSOCIATED WITH TYPE 2 DIABETES MELLITUS, LIMITED TO BREAKDOWN OF SKIN (H): ICD-10-CM

## 2017-11-15 ENCOUNTER — AMBULATORY - HEALTHEAST (OUTPATIENT)
Dept: OTHER | Facility: CLINIC | Age: 82
End: 2017-11-15

## 2017-11-16 ENCOUNTER — COMMUNICATION - HEALTHEAST (OUTPATIENT)
Dept: OTHER | Facility: CLINIC | Age: 82
End: 2017-11-16

## 2017-11-20 ENCOUNTER — AMBULATORY - HEALTHEAST (OUTPATIENT)
Dept: EDUCATION SERVICES | Facility: CLINIC | Age: 82
End: 2017-11-20

## 2017-11-20 ENCOUNTER — OFFICE VISIT - HEALTHEAST (OUTPATIENT)
Dept: VASCULAR SURGERY | Facility: CLINIC | Age: 82
End: 2017-11-20

## 2017-11-20 DIAGNOSIS — E11.622 TYPE 2 DIABETES MELLITUS WITH OTHER SKIN ULCER, WITH LONG-TERM CURRENT USE OF INSULIN (H): ICD-10-CM

## 2017-11-20 DIAGNOSIS — E11.621 DIABETIC ULCER OF RIGHT MIDFOOT ASSOCIATED WITH TYPE 2 DIABETES MELLITUS, LIMITED TO BREAKDOWN OF SKIN (H): ICD-10-CM

## 2017-11-20 DIAGNOSIS — L97.411 DIABETIC ULCER OF RIGHT MIDFOOT ASSOCIATED WITH TYPE 2 DIABETES MELLITUS, LIMITED TO BREAKDOWN OF SKIN (H): ICD-10-CM

## 2017-11-20 DIAGNOSIS — R60.0 LOCALIZED EDEMA: ICD-10-CM

## 2017-11-20 DIAGNOSIS — Z79.4 TYPE 2 DIABETES MELLITUS WITH OTHER SKIN ULCER, WITH LONG-TERM CURRENT USE OF INSULIN (H): ICD-10-CM

## 2017-11-20 ASSESSMENT — MIFFLIN-ST. JEOR: SCORE: 1605.72

## 2017-11-24 ENCOUNTER — AMBULATORY - HEALTHEAST (OUTPATIENT)
Dept: OTHER | Facility: CLINIC | Age: 82
End: 2017-11-24

## 2017-11-27 ENCOUNTER — OFFICE VISIT - HEALTHEAST (OUTPATIENT)
Dept: VASCULAR SURGERY | Facility: CLINIC | Age: 82
End: 2017-11-27

## 2017-11-27 DIAGNOSIS — G58.9 MONONEUROPATHY: ICD-10-CM

## 2017-11-27 DIAGNOSIS — Z79.4 TYPE 2 DIABETES MELLITUS WITH OTHER SKIN ULCER, WITH LONG-TERM CURRENT USE OF INSULIN (H): ICD-10-CM

## 2017-11-27 DIAGNOSIS — E11.622 TYPE 2 DIABETES MELLITUS WITH OTHER SKIN ULCER, WITH LONG-TERM CURRENT USE OF INSULIN (H): ICD-10-CM

## 2017-11-27 DIAGNOSIS — E11.621 DIABETIC ULCER OF RIGHT MIDFOOT ASSOCIATED WITH TYPE 2 DIABETES MELLITUS, LIMITED TO BREAKDOWN OF SKIN (H): ICD-10-CM

## 2017-11-27 DIAGNOSIS — L97.411 DIABETIC ULCER OF RIGHT MIDFOOT ASSOCIATED WITH TYPE 2 DIABETES MELLITUS, LIMITED TO BREAKDOWN OF SKIN (H): ICD-10-CM

## 2017-11-27 DIAGNOSIS — R60.0 LOCALIZED EDEMA: ICD-10-CM

## 2017-11-27 ASSESSMENT — MIFFLIN-ST. JEOR: SCORE: 1605.72

## 2017-12-04 ENCOUNTER — OFFICE VISIT - HEALTHEAST (OUTPATIENT)
Dept: VASCULAR SURGERY | Facility: CLINIC | Age: 82
End: 2017-12-04

## 2017-12-04 DIAGNOSIS — L97.411 DIABETIC ULCER OF RIGHT MIDFOOT ASSOCIATED WITH TYPE 2 DIABETES MELLITUS, LIMITED TO BREAKDOWN OF SKIN (H): ICD-10-CM

## 2017-12-04 DIAGNOSIS — R60.0 LOCALIZED EDEMA: ICD-10-CM

## 2017-12-04 DIAGNOSIS — Z79.4 TYPE 2 DIABETES MELLITUS WITH OTHER SKIN ULCER, WITH LONG-TERM CURRENT USE OF INSULIN (H): ICD-10-CM

## 2017-12-04 DIAGNOSIS — E11.622 TYPE 2 DIABETES MELLITUS WITH OTHER SKIN ULCER, WITH LONG-TERM CURRENT USE OF INSULIN (H): ICD-10-CM

## 2017-12-04 DIAGNOSIS — E11.621 DIABETIC ULCER OF RIGHT MIDFOOT ASSOCIATED WITH TYPE 2 DIABETES MELLITUS, LIMITED TO BREAKDOWN OF SKIN (H): ICD-10-CM

## 2017-12-04 DIAGNOSIS — G58.9 MONONEUROPATHY: ICD-10-CM

## 2017-12-11 ENCOUNTER — OFFICE VISIT - HEALTHEAST (OUTPATIENT)
Dept: VASCULAR SURGERY | Facility: CLINIC | Age: 82
End: 2017-12-11

## 2017-12-11 DIAGNOSIS — E11.621 DIABETIC ULCER OF RIGHT MIDFOOT ASSOCIATED WITH TYPE 2 DIABETES MELLITUS, LIMITED TO BREAKDOWN OF SKIN (H): ICD-10-CM

## 2017-12-11 DIAGNOSIS — R60.0 LOCALIZED EDEMA: ICD-10-CM

## 2017-12-11 DIAGNOSIS — Z79.4 TYPE 2 DIABETES MELLITUS WITH OTHER SKIN ULCER, WITH LONG-TERM CURRENT USE OF INSULIN (H): ICD-10-CM

## 2017-12-11 DIAGNOSIS — L97.411 DIABETIC ULCER OF RIGHT MIDFOOT ASSOCIATED WITH TYPE 2 DIABETES MELLITUS, LIMITED TO BREAKDOWN OF SKIN (H): ICD-10-CM

## 2017-12-11 DIAGNOSIS — E11.622 TYPE 2 DIABETES MELLITUS WITH OTHER SKIN ULCER, WITH LONG-TERM CURRENT USE OF INSULIN (H): ICD-10-CM

## 2017-12-11 DIAGNOSIS — G58.9 MONONEUROPATHY: ICD-10-CM

## 2017-12-11 ASSESSMENT — MIFFLIN-ST. JEOR: SCORE: 1605.72

## 2018-01-02 ENCOUNTER — OFFICE VISIT - HEALTHEAST (OUTPATIENT)
Dept: VASCULAR SURGERY | Facility: CLINIC | Age: 83
End: 2018-01-02

## 2018-01-02 DIAGNOSIS — E11.621 DIABETIC ULCER OF RIGHT MIDFOOT ASSOCIATED WITH TYPE 2 DIABETES MELLITUS, LIMITED TO BREAKDOWN OF SKIN (H): ICD-10-CM

## 2018-01-02 DIAGNOSIS — L97.411 DIABETIC ULCER OF RIGHT MIDFOOT ASSOCIATED WITH TYPE 2 DIABETES MELLITUS, LIMITED TO BREAKDOWN OF SKIN (H): ICD-10-CM

## 2018-01-09 ENCOUNTER — COMMUNICATION - HEALTHEAST (OUTPATIENT)
Dept: INTERNAL MEDICINE | Facility: CLINIC | Age: 83
End: 2018-01-09

## 2018-01-10 ENCOUNTER — COMMUNICATION - HEALTHEAST (OUTPATIENT)
Dept: INTERNAL MEDICINE | Facility: CLINIC | Age: 83
End: 2018-01-10

## 2018-01-15 ENCOUNTER — OFFICE VISIT - HEALTHEAST (OUTPATIENT)
Dept: VASCULAR SURGERY | Facility: CLINIC | Age: 83
End: 2018-01-15

## 2018-01-15 ENCOUNTER — RECORDS - HEALTHEAST (OUTPATIENT)
Dept: ADMINISTRATIVE | Facility: OTHER | Age: 83
End: 2018-01-15

## 2018-01-15 DIAGNOSIS — E11.621 DIABETIC ULCER OF RIGHT MIDFOOT ASSOCIATED WITH TYPE 2 DIABETES MELLITUS, LIMITED TO BREAKDOWN OF SKIN (H): ICD-10-CM

## 2018-01-15 DIAGNOSIS — Z79.4 TYPE 2 DIABETES MELLITUS WITH OTHER SKIN ULCER, WITH LONG-TERM CURRENT USE OF INSULIN (H): ICD-10-CM

## 2018-01-15 DIAGNOSIS — L97.411 DIABETIC ULCER OF RIGHT MIDFOOT ASSOCIATED WITH TYPE 2 DIABETES MELLITUS, LIMITED TO BREAKDOWN OF SKIN (H): ICD-10-CM

## 2018-01-15 DIAGNOSIS — E11.622 TYPE 2 DIABETES MELLITUS WITH OTHER SKIN ULCER, WITH LONG-TERM CURRENT USE OF INSULIN (H): ICD-10-CM

## 2018-01-15 DIAGNOSIS — G58.9 MONONEUROPATHY: ICD-10-CM

## 2018-01-15 DIAGNOSIS — R60.0 LOCALIZED EDEMA: ICD-10-CM

## 2018-02-01 ENCOUNTER — OFFICE VISIT - HEALTHEAST (OUTPATIENT)
Dept: FAMILY MEDICINE | Facility: CLINIC | Age: 83
End: 2018-02-01

## 2018-02-01 DIAGNOSIS — R94.31 ABNORMAL EKG: ICD-10-CM

## 2018-02-01 DIAGNOSIS — R42 DIZZINESS: ICD-10-CM

## 2018-02-02 LAB
ATRIAL RATE - MUSE: 82 BPM
DIASTOLIC BLOOD PRESSURE - MUSE: NORMAL MMHG
INTERPRETATION ECG - MUSE: NORMAL
P AXIS - MUSE: 40 DEGREES
PR INTERVAL - MUSE: 146 MS
QRS DURATION - MUSE: 84 MS
QT - MUSE: 348 MS
QTC - MUSE: 406 MS
R AXIS - MUSE: 8 DEGREES
SYSTOLIC BLOOD PRESSURE - MUSE: NORMAL MMHG
T AXIS - MUSE: 32 DEGREES
VENTRICULAR RATE- MUSE: 82 BPM

## 2018-02-05 ENCOUNTER — OFFICE VISIT - HEALTHEAST (OUTPATIENT)
Dept: INTERNAL MEDICINE | Facility: CLINIC | Age: 83
End: 2018-02-05

## 2018-02-05 DIAGNOSIS — J18.9 CAP (COMMUNITY ACQUIRED PNEUMONIA): ICD-10-CM

## 2018-02-05 DIAGNOSIS — E11.9 TYPE 2 DIABETES MELLITUS (H): ICD-10-CM

## 2018-02-05 ASSESSMENT — MIFFLIN-ST. JEOR: SCORE: 1583.04

## 2018-02-06 ENCOUNTER — COMMUNICATION - HEALTHEAST (OUTPATIENT)
Dept: INTERNAL MEDICINE | Facility: CLINIC | Age: 83
End: 2018-02-06

## 2018-02-06 DIAGNOSIS — E11.9 TYPE 2 DIABETES MELLITUS (H): ICD-10-CM

## 2018-02-07 ENCOUNTER — COMMUNICATION - HEALTHEAST (OUTPATIENT)
Dept: INTERNAL MEDICINE | Facility: CLINIC | Age: 83
End: 2018-02-07

## 2018-02-12 ENCOUNTER — OFFICE VISIT - HEALTHEAST (OUTPATIENT)
Dept: VASCULAR SURGERY | Facility: CLINIC | Age: 83
End: 2018-02-12

## 2018-02-12 DIAGNOSIS — L97.411 DIABETIC ULCER OF RIGHT MIDFOOT ASSOCIATED WITH TYPE 2 DIABETES MELLITUS, LIMITED TO BREAKDOWN OF SKIN (H): ICD-10-CM

## 2018-02-12 DIAGNOSIS — E11.621 DIABETIC ULCER OF RIGHT MIDFOOT ASSOCIATED WITH TYPE 2 DIABETES MELLITUS, LIMITED TO BREAKDOWN OF SKIN (H): ICD-10-CM

## 2018-02-12 DIAGNOSIS — G58.9 MONONEUROPATHY: ICD-10-CM

## 2018-02-12 DIAGNOSIS — E11.622 TYPE 2 DIABETES MELLITUS WITH OTHER SKIN ULCER, WITH LONG-TERM CURRENT USE OF INSULIN (H): ICD-10-CM

## 2018-02-12 DIAGNOSIS — L84 PRE-ULCERATIVE CORN OR CALLOUS: ICD-10-CM

## 2018-02-12 DIAGNOSIS — R60.0 LOCALIZED EDEMA: ICD-10-CM

## 2018-02-12 DIAGNOSIS — Z79.4 TYPE 2 DIABETES MELLITUS WITH OTHER SKIN ULCER, WITH LONG-TERM CURRENT USE OF INSULIN (H): ICD-10-CM

## 2018-02-19 ENCOUNTER — COMMUNICATION - HEALTHEAST (OUTPATIENT)
Dept: VASCULAR SURGERY | Facility: CLINIC | Age: 83
End: 2018-02-19

## 2018-03-12 ENCOUNTER — COMMUNICATION - HEALTHEAST (OUTPATIENT)
Dept: INTERNAL MEDICINE | Facility: CLINIC | Age: 83
End: 2018-03-12

## 2018-03-12 ENCOUNTER — OFFICE VISIT - HEALTHEAST (OUTPATIENT)
Dept: VASCULAR SURGERY | Facility: CLINIC | Age: 83
End: 2018-03-12

## 2018-03-12 DIAGNOSIS — E11.621 DIABETIC ULCER OF RIGHT MIDFOOT ASSOCIATED WITH TYPE 2 DIABETES MELLITUS, LIMITED TO BREAKDOWN OF SKIN (H): ICD-10-CM

## 2018-03-12 DIAGNOSIS — G58.9 MONONEUROPATHY: ICD-10-CM

## 2018-03-12 DIAGNOSIS — Z79.4 TYPE 2 DIABETES MELLITUS WITH OTHER SKIN ULCER, WITH LONG-TERM CURRENT USE OF INSULIN (H): ICD-10-CM

## 2018-03-12 DIAGNOSIS — L97.411 DIABETIC ULCER OF RIGHT MIDFOOT ASSOCIATED WITH TYPE 2 DIABETES MELLITUS, LIMITED TO BREAKDOWN OF SKIN (H): ICD-10-CM

## 2018-03-12 DIAGNOSIS — L84 PRE-ULCERATIVE CORN OR CALLOUS: ICD-10-CM

## 2018-03-12 DIAGNOSIS — E11.622 TYPE 2 DIABETES MELLITUS WITH OTHER SKIN ULCER, WITH LONG-TERM CURRENT USE OF INSULIN (H): ICD-10-CM

## 2018-03-12 DIAGNOSIS — R60.0 LOCALIZED EDEMA: ICD-10-CM

## 2018-03-12 DIAGNOSIS — E11.9 TYPE 2 DIABETES MELLITUS (H): ICD-10-CM

## 2018-03-19 ENCOUNTER — COMMUNICATION - HEALTHEAST (OUTPATIENT)
Dept: INTERNAL MEDICINE | Facility: CLINIC | Age: 83
End: 2018-03-19

## 2018-04-02 ENCOUNTER — OFFICE VISIT - HEALTHEAST (OUTPATIENT)
Dept: VASCULAR SURGERY | Facility: CLINIC | Age: 83
End: 2018-04-02

## 2018-04-02 DIAGNOSIS — E11.622 TYPE 2 DIABETES MELLITUS WITH OTHER SKIN ULCER, WITH LONG-TERM CURRENT USE OF INSULIN (H): ICD-10-CM

## 2018-04-02 DIAGNOSIS — R60.0 LOCALIZED EDEMA: ICD-10-CM

## 2018-04-02 DIAGNOSIS — G58.9 MONONEUROPATHY: ICD-10-CM

## 2018-04-02 DIAGNOSIS — Z79.4 TYPE 2 DIABETES MELLITUS WITH OTHER SKIN ULCER, WITH LONG-TERM CURRENT USE OF INSULIN (H): ICD-10-CM

## 2018-04-02 DIAGNOSIS — L84 PRE-ULCERATIVE CORN OR CALLOUS: ICD-10-CM

## 2018-05-14 ENCOUNTER — OFFICE VISIT - HEALTHEAST (OUTPATIENT)
Dept: VASCULAR SURGERY | Facility: CLINIC | Age: 83
End: 2018-05-14

## 2018-05-14 ENCOUNTER — RECORDS - HEALTHEAST (OUTPATIENT)
Dept: ADMINISTRATIVE | Facility: OTHER | Age: 83
End: 2018-05-14

## 2018-05-14 DIAGNOSIS — G58.9 MONONEUROPATHY: ICD-10-CM

## 2018-05-14 DIAGNOSIS — R60.0 LOCALIZED EDEMA: ICD-10-CM

## 2018-05-14 DIAGNOSIS — L84 PRE-ULCERATIVE CORN OR CALLOUS: ICD-10-CM

## 2018-05-14 DIAGNOSIS — E11.622 TYPE 2 DIABETES MELLITUS WITH OTHER SKIN ULCER, WITH LONG-TERM CURRENT USE OF INSULIN (H): ICD-10-CM

## 2018-05-14 DIAGNOSIS — Z79.4 TYPE 2 DIABETES MELLITUS WITH OTHER SKIN ULCER, WITH LONG-TERM CURRENT USE OF INSULIN (H): ICD-10-CM

## 2018-05-16 ENCOUNTER — COMMUNICATION - HEALTHEAST (OUTPATIENT)
Dept: INTERNAL MEDICINE | Facility: CLINIC | Age: 83
End: 2018-05-16

## 2018-05-16 DIAGNOSIS — E11.9 TYPE 2 DIABETES MELLITUS (H): ICD-10-CM

## 2018-05-21 ENCOUNTER — OFFICE VISIT - HEALTHEAST (OUTPATIENT)
Dept: VASCULAR SURGERY | Facility: CLINIC | Age: 83
End: 2018-05-21

## 2018-05-21 DIAGNOSIS — R60.0 LOCALIZED EDEMA: ICD-10-CM

## 2018-05-21 DIAGNOSIS — L97.411 DIABETIC ULCER OF RIGHT MIDFOOT ASSOCIATED WITH TYPE 2 DIABETES MELLITUS, LIMITED TO BREAKDOWN OF SKIN (H): ICD-10-CM

## 2018-05-21 DIAGNOSIS — E11.621 DIABETIC ULCER OF RIGHT MIDFOOT ASSOCIATED WITH TYPE 2 DIABETES MELLITUS, LIMITED TO BREAKDOWN OF SKIN (H): ICD-10-CM

## 2018-05-21 DIAGNOSIS — E11.622 TYPE 2 DIABETES MELLITUS WITH OTHER SKIN ULCER, WITH LONG-TERM CURRENT USE OF INSULIN (H): ICD-10-CM

## 2018-05-21 DIAGNOSIS — Z79.4 TYPE 2 DIABETES MELLITUS WITH OTHER SKIN ULCER, WITH LONG-TERM CURRENT USE OF INSULIN (H): ICD-10-CM

## 2018-05-21 DIAGNOSIS — G58.9 MONONEUROPATHY: ICD-10-CM

## 2018-05-21 DIAGNOSIS — L84 PRE-ULCERATIVE CORN OR CALLOUS: ICD-10-CM

## 2018-05-21 DIAGNOSIS — I10 ESSENTIAL HYPERTENSION: ICD-10-CM

## 2018-05-22 ENCOUNTER — COMMUNICATION - HEALTHEAST (OUTPATIENT)
Dept: INTERNAL MEDICINE | Facility: CLINIC | Age: 83
End: 2018-05-22

## 2018-05-22 ENCOUNTER — COMMUNICATION - HEALTHEAST (OUTPATIENT)
Dept: ADMINISTRATIVE | Facility: CLINIC | Age: 83
End: 2018-05-22

## 2018-05-23 ENCOUNTER — COMMUNICATION - HEALTHEAST (OUTPATIENT)
Dept: INTERNAL MEDICINE | Facility: CLINIC | Age: 83
End: 2018-05-23

## 2018-05-23 ENCOUNTER — OFFICE VISIT - HEALTHEAST (OUTPATIENT)
Dept: INTERNAL MEDICINE | Facility: CLINIC | Age: 83
End: 2018-05-23

## 2018-05-23 DIAGNOSIS — Z00.00 HEALTHCARE MAINTENANCE: ICD-10-CM

## 2018-05-23 DIAGNOSIS — I10 ESSENTIAL HYPERTENSION: ICD-10-CM

## 2018-05-23 DIAGNOSIS — E11.9 TYPE 2 DIABETES MELLITUS (H): ICD-10-CM

## 2018-05-23 DIAGNOSIS — L97.411 DIABETIC ULCER OF RIGHT MIDFOOT ASSOCIATED WITH TYPE 2 DIABETES MELLITUS, LIMITED TO BREAKDOWN OF SKIN (H): ICD-10-CM

## 2018-05-23 DIAGNOSIS — E11.621 DIABETIC ULCER OF RIGHT MIDFOOT ASSOCIATED WITH TYPE 2 DIABETES MELLITUS, LIMITED TO BREAKDOWN OF SKIN (H): ICD-10-CM

## 2018-05-23 LAB
ALBUMIN SERPL-MCNC: 3.7 G/DL (ref 3.5–5)
ALP SERPL-CCNC: 88 U/L (ref 45–120)
ALT SERPL W P-5'-P-CCNC: 19 U/L (ref 0–45)
ANION GAP SERPL CALCULATED.3IONS-SCNC: 11 MMOL/L (ref 5–18)
AST SERPL W P-5'-P-CCNC: 19 U/L (ref 0–40)
BILIRUB SERPL-MCNC: 0.8 MG/DL (ref 0–1)
BUN SERPL-MCNC: 23 MG/DL (ref 8–28)
CALCIUM SERPL-MCNC: 9.7 MG/DL (ref 8.5–10.5)
CHLORIDE BLD-SCNC: 100 MMOL/L (ref 98–107)
CHOLEST SERPL-MCNC: 180 MG/DL
CO2 SERPL-SCNC: 26 MMOL/L (ref 22–31)
CREAT SERPL-MCNC: 1.05 MG/DL (ref 0.7–1.3)
FASTING STATUS PATIENT QL REPORTED: YES
GFR SERPL CREATININE-BSD FRML MDRD: >60 ML/MIN/1.73M2
GLUCOSE BLD-MCNC: 223 MG/DL (ref 70–125)
HBA1C MFR BLD: 8.8 % (ref 3.5–6)
HDLC SERPL-MCNC: 35 MG/DL
LDLC SERPL CALC-MCNC: 132 MG/DL
POTASSIUM BLD-SCNC: 5.7 MMOL/L (ref 3.5–5)
PROT SERPL-MCNC: 7.1 G/DL (ref 6–8)
SODIUM SERPL-SCNC: 137 MMOL/L (ref 136–145)
TRIGL SERPL-MCNC: 67 MG/DL

## 2018-05-23 ASSESSMENT — MIFFLIN-ST. JEOR: SCORE: 1583.04

## 2018-05-25 ENCOUNTER — COMMUNICATION - HEALTHEAST (OUTPATIENT)
Dept: ADMINISTRATIVE | Facility: CLINIC | Age: 83
End: 2018-05-25

## 2018-06-04 ENCOUNTER — OFFICE VISIT - HEALTHEAST (OUTPATIENT)
Dept: VASCULAR SURGERY | Facility: CLINIC | Age: 83
End: 2018-06-04

## 2018-06-04 DIAGNOSIS — L84 PRE-ULCERATIVE CORN OR CALLOUS: ICD-10-CM

## 2018-06-04 DIAGNOSIS — E11.622 TYPE 2 DIABETES MELLITUS WITH OTHER SKIN ULCER, WITH LONG-TERM CURRENT USE OF INSULIN (H): ICD-10-CM

## 2018-06-04 DIAGNOSIS — G58.9 MONONEUROPATHY: ICD-10-CM

## 2018-06-04 DIAGNOSIS — Z79.4 TYPE 2 DIABETES MELLITUS WITH OTHER SKIN ULCER, WITH LONG-TERM CURRENT USE OF INSULIN (H): ICD-10-CM

## 2018-06-04 DIAGNOSIS — R60.0 LOCALIZED EDEMA: ICD-10-CM

## 2018-06-18 ENCOUNTER — OFFICE VISIT - HEALTHEAST (OUTPATIENT)
Dept: VASCULAR SURGERY | Facility: CLINIC | Age: 83
End: 2018-06-18

## 2018-06-18 DIAGNOSIS — G58.9 MONONEUROPATHY: ICD-10-CM

## 2018-06-18 DIAGNOSIS — L84 PRE-ULCERATIVE CORN OR CALLOUS: ICD-10-CM

## 2018-06-18 DIAGNOSIS — L97.411 DIABETIC ULCER OF RIGHT MIDFOOT ASSOCIATED WITH TYPE 2 DIABETES MELLITUS, LIMITED TO BREAKDOWN OF SKIN (H): ICD-10-CM

## 2018-06-18 DIAGNOSIS — E11.621 DIABETIC ULCER OF RIGHT MIDFOOT ASSOCIATED WITH TYPE 2 DIABETES MELLITUS, LIMITED TO BREAKDOWN OF SKIN (H): ICD-10-CM

## 2018-06-18 DIAGNOSIS — E11.622 TYPE 2 DIABETES MELLITUS WITH OTHER SKIN ULCER, WITH LONG-TERM CURRENT USE OF INSULIN (H): ICD-10-CM

## 2018-06-18 DIAGNOSIS — Z79.4 TYPE 2 DIABETES MELLITUS WITH OTHER SKIN ULCER, WITH LONG-TERM CURRENT USE OF INSULIN (H): ICD-10-CM

## 2018-06-18 DIAGNOSIS — R60.0 LOCALIZED EDEMA: ICD-10-CM

## 2018-06-25 ENCOUNTER — COMMUNICATION - HEALTHEAST (OUTPATIENT)
Dept: INTERNAL MEDICINE | Facility: CLINIC | Age: 83
End: 2018-06-25

## 2018-06-25 DIAGNOSIS — E11.9 TYPE 2 DIABETES MELLITUS (H): ICD-10-CM

## 2018-07-12 ENCOUNTER — COMMUNICATION - HEALTHEAST (OUTPATIENT)
Dept: INTERNAL MEDICINE | Facility: CLINIC | Age: 83
End: 2018-07-12

## 2018-07-16 ENCOUNTER — COMMUNICATION - HEALTHEAST (OUTPATIENT)
Dept: EDUCATION SERVICES | Facility: CLINIC | Age: 83
End: 2018-07-16

## 2018-07-23 ENCOUNTER — OFFICE VISIT - HEALTHEAST (OUTPATIENT)
Dept: VASCULAR SURGERY | Facility: CLINIC | Age: 83
End: 2018-07-23

## 2018-07-23 DIAGNOSIS — G58.9 MONONEUROPATHY: ICD-10-CM

## 2018-07-23 DIAGNOSIS — I10 ESSENTIAL HYPERTENSION: ICD-10-CM

## 2018-07-23 DIAGNOSIS — L84 PRE-ULCERATIVE CORN OR CALLOUS: ICD-10-CM

## 2018-07-23 DIAGNOSIS — Z79.4 TYPE 2 DIABETES MELLITUS WITH OTHER SKIN ULCER, WITH LONG-TERM CURRENT USE OF INSULIN (H): ICD-10-CM

## 2018-07-23 DIAGNOSIS — E11.622 TYPE 2 DIABETES MELLITUS WITH OTHER SKIN ULCER, WITH LONG-TERM CURRENT USE OF INSULIN (H): ICD-10-CM

## 2018-07-23 DIAGNOSIS — R60.0 LOCALIZED EDEMA: ICD-10-CM

## 2018-08-20 ENCOUNTER — OFFICE VISIT - HEALTHEAST (OUTPATIENT)
Dept: VASCULAR SURGERY | Facility: CLINIC | Age: 83
End: 2018-08-20

## 2018-08-20 ENCOUNTER — COMMUNICATION - HEALTHEAST (OUTPATIENT)
Dept: INTERNAL MEDICINE | Facility: CLINIC | Age: 83
End: 2018-08-20

## 2018-08-20 DIAGNOSIS — E11.622 TYPE 2 DIABETES MELLITUS WITH OTHER SKIN ULCER, WITH LONG-TERM CURRENT USE OF INSULIN (H): ICD-10-CM

## 2018-08-20 DIAGNOSIS — L84 PRE-ULCERATIVE CORN OR CALLOUS: ICD-10-CM

## 2018-08-20 DIAGNOSIS — T78.40XA ALLERGY: ICD-10-CM

## 2018-08-20 DIAGNOSIS — R60.0 LOCALIZED EDEMA: ICD-10-CM

## 2018-08-20 DIAGNOSIS — Z79.4 TYPE 2 DIABETES MELLITUS WITH OTHER SKIN ULCER, WITH LONG-TERM CURRENT USE OF INSULIN (H): ICD-10-CM

## 2018-08-20 DIAGNOSIS — G58.9 MONONEUROPATHY: ICD-10-CM

## 2018-08-20 ASSESSMENT — MIFFLIN-ST. JEOR: SCORE: 1578.5

## 2018-09-24 ENCOUNTER — OFFICE VISIT - HEALTHEAST (OUTPATIENT)
Dept: VASCULAR SURGERY | Facility: CLINIC | Age: 83
End: 2018-09-24

## 2018-09-24 DIAGNOSIS — E11.621 DIABETIC ULCER OF RIGHT MIDFOOT ASSOCIATED WITH TYPE 2 DIABETES MELLITUS, LIMITED TO BREAKDOWN OF SKIN (H): ICD-10-CM

## 2018-09-24 DIAGNOSIS — Z79.4 TYPE 2 DIABETES MELLITUS WITH OTHER SKIN ULCER, WITH LONG-TERM CURRENT USE OF INSULIN (H): ICD-10-CM

## 2018-09-24 DIAGNOSIS — R60.0 LOCALIZED EDEMA: ICD-10-CM

## 2018-09-24 DIAGNOSIS — L97.411 DIABETIC ULCER OF RIGHT MIDFOOT ASSOCIATED WITH TYPE 2 DIABETES MELLITUS, LIMITED TO BREAKDOWN OF SKIN (H): ICD-10-CM

## 2018-09-24 DIAGNOSIS — E11.622 TYPE 2 DIABETES MELLITUS WITH OTHER SKIN ULCER, WITH LONG-TERM CURRENT USE OF INSULIN (H): ICD-10-CM

## 2018-09-24 DIAGNOSIS — G58.9 MONONEUROPATHY: ICD-10-CM

## 2018-09-24 DIAGNOSIS — L84 PRE-ULCERATIVE CORN OR CALLOUS: ICD-10-CM

## 2018-09-24 DIAGNOSIS — I10 ESSENTIAL HYPERTENSION: ICD-10-CM

## 2018-09-24 ASSESSMENT — MIFFLIN-ST. JEOR: SCORE: 1578.5

## 2018-10-08 ENCOUNTER — OFFICE VISIT - HEALTHEAST (OUTPATIENT)
Dept: VASCULAR SURGERY | Facility: CLINIC | Age: 83
End: 2018-10-08

## 2018-10-08 DIAGNOSIS — R60.0 LOCALIZED EDEMA: ICD-10-CM

## 2018-10-08 DIAGNOSIS — L84 PRE-ULCERATIVE CORN OR CALLOUS: ICD-10-CM

## 2018-10-08 DIAGNOSIS — E11.622 TYPE 2 DIABETES MELLITUS WITH OTHER SKIN ULCER, WITH LONG-TERM CURRENT USE OF INSULIN (H): ICD-10-CM

## 2018-10-08 DIAGNOSIS — E11.59 TYPE 2 DIABETES MELLITUS WITH OTHER CIRCULATORY COMPLICATIONS (H): ICD-10-CM

## 2018-10-08 DIAGNOSIS — G58.9 MONONEUROPATHY: ICD-10-CM

## 2018-10-08 DIAGNOSIS — Z79.4 TYPE 2 DIABETES MELLITUS WITH OTHER SKIN ULCER, WITH LONG-TERM CURRENT USE OF INSULIN (H): ICD-10-CM

## 2018-10-11 ENCOUNTER — RECORDS - HEALTHEAST (OUTPATIENT)
Dept: VASCULAR ULTRASOUND | Facility: CLINIC | Age: 83
End: 2018-10-11

## 2018-10-11 DIAGNOSIS — E11.622 TYPE 2 DIABETES MELLITUS WITH OTHER SKIN ULCER (CODE) (H): ICD-10-CM

## 2018-10-11 DIAGNOSIS — R60.0 LOCALIZED EDEMA: ICD-10-CM

## 2018-10-11 DIAGNOSIS — L84 CORNS AND CALLOSITIES: ICD-10-CM

## 2018-10-11 DIAGNOSIS — G58.9 MONONEUROPATHY, UNSPECIFIED: ICD-10-CM

## 2018-10-11 DIAGNOSIS — Z79.4 LONG TERM (CURRENT) USE OF INSULIN (H): ICD-10-CM

## 2018-10-11 DIAGNOSIS — E11.59 TYPE 2 DIABETES MELLITUS WITH OTHER CIRCULATORY COMPLICATIONS (H): ICD-10-CM

## 2018-10-12 ENCOUNTER — COMMUNICATION - HEALTHEAST (OUTPATIENT)
Dept: VASCULAR SURGERY | Facility: CLINIC | Age: 83
End: 2018-10-12

## 2018-10-13 ENCOUNTER — COMMUNICATION - HEALTHEAST (OUTPATIENT)
Dept: INTERNAL MEDICINE | Facility: CLINIC | Age: 83
End: 2018-10-13

## 2018-10-13 DIAGNOSIS — I10 HTN (HYPERTENSION): ICD-10-CM

## 2018-10-15 ENCOUNTER — COMMUNICATION - HEALTHEAST (OUTPATIENT)
Dept: INTERNAL MEDICINE | Facility: CLINIC | Age: 83
End: 2018-10-15

## 2018-10-15 DIAGNOSIS — E11.9 TYPE 2 DIABETES MELLITUS (H): ICD-10-CM

## 2018-10-16 ENCOUNTER — COMMUNICATION - HEALTHEAST (OUTPATIENT)
Dept: INTERNAL MEDICINE | Facility: CLINIC | Age: 83
End: 2018-10-16

## 2018-11-01 ENCOUNTER — COMMUNICATION - HEALTHEAST (OUTPATIENT)
Dept: INTERNAL MEDICINE | Facility: CLINIC | Age: 83
End: 2018-11-01

## 2018-11-02 ENCOUNTER — COMMUNICATION - HEALTHEAST (OUTPATIENT)
Dept: INTERNAL MEDICINE | Facility: CLINIC | Age: 83
End: 2018-11-02

## 2018-11-05 ENCOUNTER — OFFICE VISIT - HEALTHEAST (OUTPATIENT)
Dept: VASCULAR SURGERY | Facility: CLINIC | Age: 83
End: 2018-11-05

## 2018-11-05 DIAGNOSIS — E11.622 TYPE 2 DIABETES MELLITUS WITH OTHER SKIN ULCER, WITH LONG-TERM CURRENT USE OF INSULIN (H): ICD-10-CM

## 2018-11-05 DIAGNOSIS — G58.9 MONONEUROPATHY: ICD-10-CM

## 2018-11-05 DIAGNOSIS — E11.59 TYPE 2 DIABETES MELLITUS WITH OTHER CIRCULATORY COMPLICATIONS (H): ICD-10-CM

## 2018-11-05 DIAGNOSIS — L84 PRE-ULCERATIVE CORN OR CALLOUS: ICD-10-CM

## 2018-11-05 DIAGNOSIS — R60.0 LOCALIZED EDEMA: ICD-10-CM

## 2018-11-05 DIAGNOSIS — Z79.4 TYPE 2 DIABETES MELLITUS WITH OTHER SKIN ULCER, WITH LONG-TERM CURRENT USE OF INSULIN (H): ICD-10-CM

## 2018-11-21 ENCOUNTER — RECORDS - HEALTHEAST (OUTPATIENT)
Dept: ADMINISTRATIVE | Facility: OTHER | Age: 83
End: 2018-11-21

## 2018-11-21 ENCOUNTER — COMMUNICATION - HEALTHEAST (OUTPATIENT)
Dept: INTERNAL MEDICINE | Facility: CLINIC | Age: 83
End: 2018-11-21

## 2018-11-21 ENCOUNTER — OFFICE VISIT - HEALTHEAST (OUTPATIENT)
Dept: INTERNAL MEDICINE | Facility: CLINIC | Age: 83
End: 2018-11-21

## 2018-11-21 DIAGNOSIS — Z79.4 TYPE 2 DIABETES MELLITUS WITH OTHER SKIN ULCER, WITH LONG-TERM CURRENT USE OF INSULIN (H): ICD-10-CM

## 2018-11-21 DIAGNOSIS — I10 ESSENTIAL HYPERTENSION: ICD-10-CM

## 2018-11-21 DIAGNOSIS — E11.622 TYPE 2 DIABETES MELLITUS WITH OTHER SKIN ULCER, WITH LONG-TERM CURRENT USE OF INSULIN (H): ICD-10-CM

## 2018-11-21 DIAGNOSIS — L97.411 DIABETIC ULCER OF RIGHT MIDFOOT ASSOCIATED WITH TYPE 2 DIABETES MELLITUS, LIMITED TO BREAKDOWN OF SKIN (H): ICD-10-CM

## 2018-11-21 DIAGNOSIS — E11.621 DIABETIC ULCER OF RIGHT MIDFOOT ASSOCIATED WITH TYPE 2 DIABETES MELLITUS, LIMITED TO BREAKDOWN OF SKIN (H): ICD-10-CM

## 2018-11-21 LAB
ANION GAP SERPL CALCULATED.3IONS-SCNC: 9 MMOL/L (ref 5–18)
BUN SERPL-MCNC: 21 MG/DL (ref 8–28)
CALCIUM SERPL-MCNC: 9.6 MG/DL (ref 8.5–10.5)
CHLORIDE BLD-SCNC: 103 MMOL/L (ref 98–107)
CO2 SERPL-SCNC: 26 MMOL/L (ref 22–31)
CREAT SERPL-MCNC: 1.32 MG/DL (ref 0.7–1.3)
GFR SERPL CREATININE-BSD FRML MDRD: 52 ML/MIN/1.73M2
GLUCOSE BLD-MCNC: 265 MG/DL (ref 70–125)
HBA1C MFR BLD: 9.3 % (ref 3.5–6)
POTASSIUM BLD-SCNC: 4.6 MMOL/L (ref 3.5–5)
SODIUM SERPL-SCNC: 138 MMOL/L (ref 136–145)

## 2018-11-21 ASSESSMENT — MIFFLIN-ST. JEOR: SCORE: 1596.65

## 2018-11-23 ENCOUNTER — COMMUNICATION - HEALTHEAST (OUTPATIENT)
Dept: INTERNAL MEDICINE | Facility: CLINIC | Age: 83
End: 2018-11-23

## 2018-12-03 ENCOUNTER — OFFICE VISIT - HEALTHEAST (OUTPATIENT)
Dept: VASCULAR SURGERY | Facility: CLINIC | Age: 83
End: 2018-12-03

## 2018-12-03 ENCOUNTER — RECORDS - HEALTHEAST (OUTPATIENT)
Dept: ADMINISTRATIVE | Facility: OTHER | Age: 83
End: 2018-12-03

## 2018-12-03 DIAGNOSIS — I10 ESSENTIAL HYPERTENSION: ICD-10-CM

## 2018-12-03 DIAGNOSIS — R60.0 LOCALIZED EDEMA: ICD-10-CM

## 2018-12-03 DIAGNOSIS — G58.9 MONONEUROPATHY: ICD-10-CM

## 2018-12-03 DIAGNOSIS — L84 PRE-ULCERATIVE CORN OR CALLOUS: ICD-10-CM

## 2018-12-03 DIAGNOSIS — E11.59 TYPE 2 DIABETES MELLITUS WITH OTHER CIRCULATORY COMPLICATIONS (H): ICD-10-CM

## 2018-12-03 DIAGNOSIS — R23.4 FISSURE IN SKIN: ICD-10-CM

## 2018-12-03 DIAGNOSIS — E11.622 TYPE 2 DIABETES MELLITUS WITH OTHER SKIN ULCER, WITH LONG-TERM CURRENT USE OF INSULIN (H): ICD-10-CM

## 2018-12-03 DIAGNOSIS — Z79.4 TYPE 2 DIABETES MELLITUS WITH OTHER SKIN ULCER, WITH LONG-TERM CURRENT USE OF INSULIN (H): ICD-10-CM

## 2018-12-18 ENCOUNTER — COMMUNICATION - HEALTHEAST (OUTPATIENT)
Dept: SCHEDULING | Facility: CLINIC | Age: 83
End: 2018-12-18

## 2018-12-18 ENCOUNTER — COMMUNICATION - HEALTHEAST (OUTPATIENT)
Dept: INTERNAL MEDICINE | Facility: CLINIC | Age: 83
End: 2018-12-18

## 2018-12-20 ENCOUNTER — COMMUNICATION - HEALTHEAST (OUTPATIENT)
Dept: INTERNAL MEDICINE | Facility: CLINIC | Age: 83
End: 2018-12-20

## 2018-12-20 ENCOUNTER — OFFICE VISIT - HEALTHEAST (OUTPATIENT)
Dept: INTERNAL MEDICINE | Facility: CLINIC | Age: 83
End: 2018-12-20

## 2018-12-20 DIAGNOSIS — Z79.4 TYPE 2 DIABETES MELLITUS WITH OTHER SKIN ULCER, WITH LONG-TERM CURRENT USE OF INSULIN (H): ICD-10-CM

## 2018-12-20 DIAGNOSIS — E11.622 TYPE 2 DIABETES MELLITUS WITH OTHER SKIN ULCER, WITH LONG-TERM CURRENT USE OF INSULIN (H): ICD-10-CM

## 2018-12-20 LAB
FASTING STATUS PATIENT QL REPORTED: YES
GLUCOSE BLD-MCNC: 153 MG/DL (ref 70–99)
HBA1C MFR BLD: 9.7 % (ref 3.5–6)

## 2018-12-20 ASSESSMENT — MIFFLIN-ST. JEOR: SCORE: 1592.11

## 2018-12-26 ENCOUNTER — COMMUNICATION - HEALTHEAST (OUTPATIENT)
Dept: EDUCATION SERVICES | Facility: CLINIC | Age: 83
End: 2018-12-26

## 2019-01-07 ENCOUNTER — OFFICE VISIT - HEALTHEAST (OUTPATIENT)
Dept: VASCULAR SURGERY | Facility: CLINIC | Age: 84
End: 2019-01-07

## 2019-01-07 DIAGNOSIS — Z79.4 TYPE 2 DIABETES MELLITUS WITH OTHER SKIN ULCER, WITH LONG-TERM CURRENT USE OF INSULIN (H): ICD-10-CM

## 2019-01-07 DIAGNOSIS — R60.0 LOCALIZED EDEMA: ICD-10-CM

## 2019-01-07 DIAGNOSIS — L84 PRE-ULCERATIVE CORN OR CALLOUS: ICD-10-CM

## 2019-01-07 DIAGNOSIS — I10 ESSENTIAL HYPERTENSION: ICD-10-CM

## 2019-01-07 DIAGNOSIS — G58.9 MONONEUROPATHY: ICD-10-CM

## 2019-01-07 DIAGNOSIS — E11.622 TYPE 2 DIABETES MELLITUS WITH OTHER SKIN ULCER, WITH LONG-TERM CURRENT USE OF INSULIN (H): ICD-10-CM

## 2019-01-28 ENCOUNTER — COMMUNICATION - HEALTHEAST (OUTPATIENT)
Dept: INTERNAL MEDICINE | Facility: CLINIC | Age: 84
End: 2019-01-28

## 2019-01-28 ENCOUNTER — COMMUNICATION - HEALTHEAST (OUTPATIENT)
Dept: EDUCATION SERVICES | Facility: CLINIC | Age: 84
End: 2019-01-28

## 2019-02-04 ENCOUNTER — OFFICE VISIT - HEALTHEAST (OUTPATIENT)
Dept: VASCULAR SURGERY | Facility: CLINIC | Age: 84
End: 2019-02-04

## 2019-02-04 DIAGNOSIS — R60.0 LOCALIZED EDEMA: ICD-10-CM

## 2019-02-04 DIAGNOSIS — Z79.4 TYPE 2 DIABETES MELLITUS WITH OTHER SKIN ULCER, WITH LONG-TERM CURRENT USE OF INSULIN (H): ICD-10-CM

## 2019-02-04 DIAGNOSIS — G58.9 MONONEUROPATHY: ICD-10-CM

## 2019-02-04 DIAGNOSIS — L84 PRE-ULCERATIVE CORN OR CALLOUS: ICD-10-CM

## 2019-02-04 DIAGNOSIS — E11.622 TYPE 2 DIABETES MELLITUS WITH OTHER SKIN ULCER, WITH LONG-TERM CURRENT USE OF INSULIN (H): ICD-10-CM

## 2019-02-04 DIAGNOSIS — I10 ESSENTIAL HYPERTENSION: ICD-10-CM

## 2019-02-05 ENCOUNTER — RECORDS - HEALTHEAST (OUTPATIENT)
Dept: ADMINISTRATIVE | Facility: OTHER | Age: 84
End: 2019-02-05

## 2019-02-25 ENCOUNTER — OFFICE VISIT - HEALTHEAST (OUTPATIENT)
Dept: VASCULAR SURGERY | Facility: CLINIC | Age: 84
End: 2019-02-25

## 2019-02-25 DIAGNOSIS — Z79.4 TYPE 2 DIABETES MELLITUS WITH OTHER SKIN ULCER, WITH LONG-TERM CURRENT USE OF INSULIN (H): ICD-10-CM

## 2019-02-25 DIAGNOSIS — R60.0 LOCALIZED EDEMA: ICD-10-CM

## 2019-02-25 DIAGNOSIS — G58.9 MONONEUROPATHY: ICD-10-CM

## 2019-02-25 DIAGNOSIS — E11.622 TYPE 2 DIABETES MELLITUS WITH OTHER SKIN ULCER, WITH LONG-TERM CURRENT USE OF INSULIN (H): ICD-10-CM

## 2019-02-25 DIAGNOSIS — L84 PRE-ULCERATIVE CORN OR CALLOUS: ICD-10-CM

## 2019-03-01 ENCOUNTER — COMMUNICATION - HEALTHEAST (OUTPATIENT)
Dept: INTERNAL MEDICINE | Facility: CLINIC | Age: 84
End: 2019-03-01

## 2019-03-01 DIAGNOSIS — E11.9 TYPE 2 DIABETES MELLITUS (H): ICD-10-CM

## 2019-03-05 ENCOUNTER — OFFICE VISIT - HEALTHEAST (OUTPATIENT)
Dept: VASCULAR SURGERY | Facility: CLINIC | Age: 84
End: 2019-03-05

## 2019-03-05 DIAGNOSIS — L97.411 DIABETIC ULCER OF RIGHT MIDFOOT ASSOCIATED WITH TYPE 2 DIABETES MELLITUS, LIMITED TO BREAKDOWN OF SKIN (H): ICD-10-CM

## 2019-03-05 DIAGNOSIS — E11.621 DIABETIC ULCER OF RIGHT MIDFOOT ASSOCIATED WITH TYPE 2 DIABETES MELLITUS, LIMITED TO BREAKDOWN OF SKIN (H): ICD-10-CM

## 2019-03-05 ASSESSMENT — MIFFLIN-ST. JEOR: SCORE: 1592.11

## 2019-03-18 ENCOUNTER — COMMUNICATION - HEALTHEAST (OUTPATIENT)
Dept: VASCULAR SURGERY | Facility: CLINIC | Age: 84
End: 2019-03-18

## 2019-04-08 ENCOUNTER — OFFICE VISIT - HEALTHEAST (OUTPATIENT)
Dept: VASCULAR SURGERY | Facility: CLINIC | Age: 84
End: 2019-04-08

## 2019-04-08 DIAGNOSIS — L84 PRE-ULCERATIVE CORN OR CALLOUS: ICD-10-CM

## 2019-04-08 DIAGNOSIS — G58.9 MONONEUROPATHY: ICD-10-CM

## 2019-04-08 DIAGNOSIS — L97.411 DIABETIC ULCER OF RIGHT MIDFOOT ASSOCIATED WITH TYPE 2 DIABETES MELLITUS, LIMITED TO BREAKDOWN OF SKIN (H): ICD-10-CM

## 2019-04-08 DIAGNOSIS — Z79.4 TYPE 2 DIABETES MELLITUS WITH OTHER SKIN ULCER, WITH LONG-TERM CURRENT USE OF INSULIN (H): ICD-10-CM

## 2019-04-08 DIAGNOSIS — R60.0 LOCALIZED EDEMA: ICD-10-CM

## 2019-04-08 DIAGNOSIS — E11.621 DIABETIC ULCER OF RIGHT MIDFOOT ASSOCIATED WITH TYPE 2 DIABETES MELLITUS, LIMITED TO BREAKDOWN OF SKIN (H): ICD-10-CM

## 2019-04-08 DIAGNOSIS — E11.622 TYPE 2 DIABETES MELLITUS WITH OTHER SKIN ULCER, WITH LONG-TERM CURRENT USE OF INSULIN (H): ICD-10-CM

## 2019-04-15 ENCOUNTER — COMMUNICATION - HEALTHEAST (OUTPATIENT)
Dept: INTERNAL MEDICINE | Facility: CLINIC | Age: 84
End: 2019-04-15

## 2019-04-15 DIAGNOSIS — I10 HTN (HYPERTENSION): ICD-10-CM

## 2019-05-06 ENCOUNTER — OFFICE VISIT - HEALTHEAST (OUTPATIENT)
Dept: VASCULAR SURGERY | Facility: CLINIC | Age: 84
End: 2019-05-06

## 2019-05-06 DIAGNOSIS — E11.622 TYPE 2 DIABETES MELLITUS WITH OTHER SKIN ULCER, WITH LONG-TERM CURRENT USE OF INSULIN (H): ICD-10-CM

## 2019-05-06 DIAGNOSIS — G58.9 MONONEUROPATHY: ICD-10-CM

## 2019-05-06 DIAGNOSIS — L84 PRE-ULCERATIVE CORN OR CALLOUS: ICD-10-CM

## 2019-05-06 DIAGNOSIS — R60.0 LOCALIZED EDEMA: ICD-10-CM

## 2019-05-06 DIAGNOSIS — Z79.4 TYPE 2 DIABETES MELLITUS WITH OTHER SKIN ULCER, WITH LONG-TERM CURRENT USE OF INSULIN (H): ICD-10-CM

## 2019-05-06 DIAGNOSIS — L97.411 DIABETIC ULCER OF RIGHT MIDFOOT ASSOCIATED WITH TYPE 2 DIABETES MELLITUS, LIMITED TO BREAKDOWN OF SKIN (H): ICD-10-CM

## 2019-05-06 DIAGNOSIS — E11.621 DIABETIC ULCER OF RIGHT MIDFOOT ASSOCIATED WITH TYPE 2 DIABETES MELLITUS, LIMITED TO BREAKDOWN OF SKIN (H): ICD-10-CM

## 2019-05-06 ASSESSMENT — MIFFLIN-ST. JEOR: SCORE: 1610.25

## 2019-05-28 ENCOUNTER — OFFICE VISIT - HEALTHEAST (OUTPATIENT)
Dept: VASCULAR SURGERY | Facility: CLINIC | Age: 84
End: 2019-05-28

## 2019-05-28 DIAGNOSIS — L97.411 DIABETIC ULCER OF RIGHT MIDFOOT ASSOCIATED WITH TYPE 2 DIABETES MELLITUS, LIMITED TO BREAKDOWN OF SKIN (H): ICD-10-CM

## 2019-05-28 DIAGNOSIS — E11.622 TYPE 2 DIABETES MELLITUS WITH OTHER SKIN ULCER, WITH LONG-TERM CURRENT USE OF INSULIN (H): ICD-10-CM

## 2019-05-28 DIAGNOSIS — Z79.4 TYPE 2 DIABETES MELLITUS WITH OTHER SKIN ULCER, WITH LONG-TERM CURRENT USE OF INSULIN (H): ICD-10-CM

## 2019-05-28 DIAGNOSIS — I10 ESSENTIAL HYPERTENSION: ICD-10-CM

## 2019-05-28 DIAGNOSIS — E11.621 DIABETIC ULCER OF RIGHT MIDFOOT ASSOCIATED WITH TYPE 2 DIABETES MELLITUS, LIMITED TO BREAKDOWN OF SKIN (H): ICD-10-CM

## 2019-05-28 DIAGNOSIS — R60.0 LOCALIZED EDEMA: ICD-10-CM

## 2019-05-28 DIAGNOSIS — G58.9 MONONEUROPATHY: ICD-10-CM

## 2019-05-28 DIAGNOSIS — L84 PRE-ULCERATIVE CORN OR CALLOUS: ICD-10-CM

## 2019-05-28 ASSESSMENT — MIFFLIN-ST. JEOR: SCORE: 1598.91

## 2019-06-21 ENCOUNTER — COMMUNICATION - HEALTHEAST (OUTPATIENT)
Dept: INTERNAL MEDICINE | Facility: CLINIC | Age: 84
End: 2019-06-21

## 2019-06-21 DIAGNOSIS — E11.622 TYPE 2 DIABETES MELLITUS WITH OTHER SKIN ULCER, WITH LONG-TERM CURRENT USE OF INSULIN (H): ICD-10-CM

## 2019-06-21 DIAGNOSIS — Z79.4 TYPE 2 DIABETES MELLITUS WITH OTHER SKIN ULCER, WITH LONG-TERM CURRENT USE OF INSULIN (H): ICD-10-CM

## 2019-06-24 ENCOUNTER — OFFICE VISIT - HEALTHEAST (OUTPATIENT)
Dept: VASCULAR SURGERY | Facility: CLINIC | Age: 84
End: 2019-06-24

## 2019-06-24 DIAGNOSIS — L84 PRE-ULCERATIVE CORN OR CALLOUS: ICD-10-CM

## 2019-06-24 DIAGNOSIS — E11.621 DIABETIC ULCER OF RIGHT MIDFOOT ASSOCIATED WITH TYPE 2 DIABETES MELLITUS, LIMITED TO BREAKDOWN OF SKIN (H): ICD-10-CM

## 2019-06-24 DIAGNOSIS — R60.0 LOCALIZED EDEMA: ICD-10-CM

## 2019-06-24 DIAGNOSIS — E11.622 TYPE 2 DIABETES MELLITUS WITH OTHER SKIN ULCER, WITH LONG-TERM CURRENT USE OF INSULIN (H): ICD-10-CM

## 2019-06-24 DIAGNOSIS — G58.9 MONONEUROPATHY: ICD-10-CM

## 2019-06-24 DIAGNOSIS — I10 ESSENTIAL HYPERTENSION: ICD-10-CM

## 2019-06-24 DIAGNOSIS — Z79.4 TYPE 2 DIABETES MELLITUS WITH OTHER SKIN ULCER, WITH LONG-TERM CURRENT USE OF INSULIN (H): ICD-10-CM

## 2019-06-24 DIAGNOSIS — L97.411 DIABETIC ULCER OF RIGHT MIDFOOT ASSOCIATED WITH TYPE 2 DIABETES MELLITUS, LIMITED TO BREAKDOWN OF SKIN (H): ICD-10-CM

## 2019-07-03 ENCOUNTER — COMMUNICATION - HEALTHEAST (OUTPATIENT)
Dept: INTERNAL MEDICINE | Facility: CLINIC | Age: 84
End: 2019-07-03

## 2019-07-03 DIAGNOSIS — E11.9 TYPE 2 DIABETES MELLITUS (H): ICD-10-CM

## 2019-08-04 ENCOUNTER — COMMUNICATION - HEALTHEAST (OUTPATIENT)
Dept: INTERNAL MEDICINE | Facility: CLINIC | Age: 84
End: 2019-08-04

## 2019-08-04 DIAGNOSIS — E11.9 TYPE 2 DIABETES MELLITUS (H): ICD-10-CM

## 2019-09-09 ENCOUNTER — OFFICE VISIT - HEALTHEAST (OUTPATIENT)
Dept: VASCULAR SURGERY | Facility: CLINIC | Age: 84
End: 2019-09-09

## 2019-09-09 DIAGNOSIS — G58.9 MONONEUROPATHY: ICD-10-CM

## 2019-09-09 DIAGNOSIS — E11.621 DIABETIC ULCER OF RIGHT MIDFOOT ASSOCIATED WITH TYPE 2 DIABETES MELLITUS, LIMITED TO BREAKDOWN OF SKIN (H): ICD-10-CM

## 2019-09-09 DIAGNOSIS — L97.411 DIABETIC ULCER OF RIGHT MIDFOOT ASSOCIATED WITH TYPE 2 DIABETES MELLITUS, LIMITED TO BREAKDOWN OF SKIN (H): ICD-10-CM

## 2019-09-09 DIAGNOSIS — I10 ESSENTIAL HYPERTENSION: ICD-10-CM

## 2019-09-09 DIAGNOSIS — L84 PRE-ULCERATIVE CORN OR CALLOUS: ICD-10-CM

## 2019-09-09 DIAGNOSIS — Z79.4 TYPE 2 DIABETES MELLITUS WITH OTHER SKIN ULCER, WITH LONG-TERM CURRENT USE OF INSULIN (H): ICD-10-CM

## 2019-09-09 DIAGNOSIS — R60.0 LOCALIZED EDEMA: ICD-10-CM

## 2019-09-09 DIAGNOSIS — E11.622 TYPE 2 DIABETES MELLITUS WITH OTHER SKIN ULCER, WITH LONG-TERM CURRENT USE OF INSULIN (H): ICD-10-CM

## 2019-09-26 ENCOUNTER — COMMUNICATION - HEALTHEAST (OUTPATIENT)
Dept: INTERNAL MEDICINE | Facility: CLINIC | Age: 84
End: 2019-09-26

## 2019-09-26 DIAGNOSIS — E11.9 TYPE 2 DIABETES MELLITUS (H): ICD-10-CM

## 2019-10-12 ENCOUNTER — COMMUNICATION - HEALTHEAST (OUTPATIENT)
Dept: INTERNAL MEDICINE | Facility: CLINIC | Age: 84
End: 2019-10-12

## 2019-10-12 DIAGNOSIS — I10 HTN (HYPERTENSION): ICD-10-CM

## 2019-10-25 ENCOUNTER — COMMUNICATION - HEALTHEAST (OUTPATIENT)
Dept: INTERNAL MEDICINE | Facility: CLINIC | Age: 84
End: 2019-10-25

## 2019-10-25 ENCOUNTER — OFFICE VISIT - HEALTHEAST (OUTPATIENT)
Dept: INTERNAL MEDICINE | Facility: CLINIC | Age: 84
End: 2019-10-25

## 2019-10-25 DIAGNOSIS — I10 ESSENTIAL HYPERTENSION: ICD-10-CM

## 2019-10-25 DIAGNOSIS — Z79.4 TYPE 2 DIABETES MELLITUS WITH OTHER SKIN ULCER, WITH LONG-TERM CURRENT USE OF INSULIN (H): ICD-10-CM

## 2019-10-25 DIAGNOSIS — Z00.00 HEALTHCARE MAINTENANCE: ICD-10-CM

## 2019-10-25 DIAGNOSIS — E11.622 TYPE 2 DIABETES MELLITUS WITH OTHER SKIN ULCER, WITH LONG-TERM CURRENT USE OF INSULIN (H): ICD-10-CM

## 2019-10-25 LAB
ALBUMIN SERPL-MCNC: 3.8 G/DL (ref 3.5–5)
ALP SERPL-CCNC: 80 U/L (ref 45–120)
ALT SERPL W P-5'-P-CCNC: 16 U/L (ref 0–45)
ANION GAP SERPL CALCULATED.3IONS-SCNC: 10 MMOL/L (ref 5–18)
AST SERPL W P-5'-P-CCNC: 18 U/L (ref 0–40)
BILIRUB SERPL-MCNC: 0.9 MG/DL (ref 0–1)
BUN SERPL-MCNC: 19 MG/DL (ref 8–28)
CALCIUM SERPL-MCNC: 9.6 MG/DL (ref 8.5–10.5)
CHLORIDE BLD-SCNC: 101 MMOL/L (ref 98–107)
CHOLEST SERPL-MCNC: 200 MG/DL
CO2 SERPL-SCNC: 28 MMOL/L (ref 22–31)
CREAT SERPL-MCNC: 1.14 MG/DL (ref 0.7–1.3)
FASTING STATUS PATIENT QL REPORTED: YES
GFR SERPL CREATININE-BSD FRML MDRD: >60 ML/MIN/1.73M2
GLUCOSE BLD-MCNC: 180 MG/DL (ref 70–125)
HBA1C MFR BLD: 12 % (ref 3.5–6)
HDLC SERPL-MCNC: 43 MG/DL
LDLC SERPL CALC-MCNC: 139 MG/DL
POTASSIUM BLD-SCNC: 4.4 MMOL/L (ref 3.5–5)
PROT SERPL-MCNC: 7.3 G/DL (ref 6–8)
SODIUM SERPL-SCNC: 139 MMOL/L (ref 136–145)
TRIGL SERPL-MCNC: 90 MG/DL

## 2019-10-25 ASSESSMENT — MIFFLIN-ST. JEOR: SCORE: 1628.4

## 2019-12-26 ENCOUNTER — COMMUNICATION - HEALTHEAST (OUTPATIENT)
Dept: SCHEDULING | Facility: CLINIC | Age: 84
End: 2019-12-26

## 2019-12-26 DIAGNOSIS — E11.9 TYPE 2 DIABETES MELLITUS WITHOUT COMPLICATION, WITHOUT LONG-TERM CURRENT USE OF INSULIN (H): ICD-10-CM

## 2019-12-27 ENCOUNTER — COMMUNICATION - HEALTHEAST (OUTPATIENT)
Dept: INTERNAL MEDICINE | Facility: CLINIC | Age: 84
End: 2019-12-27

## 2019-12-27 ENCOUNTER — OFFICE VISIT - HEALTHEAST (OUTPATIENT)
Dept: INTERNAL MEDICINE | Facility: CLINIC | Age: 84
End: 2019-12-27

## 2019-12-27 DIAGNOSIS — I10 ESSENTIAL HYPERTENSION: ICD-10-CM

## 2019-12-27 DIAGNOSIS — E11.622 TYPE 2 DIABETES MELLITUS WITH OTHER SKIN ULCER, WITH LONG-TERM CURRENT USE OF INSULIN (H): ICD-10-CM

## 2019-12-27 DIAGNOSIS — M19.90 OSTEOARTHRITIS, UNSPECIFIED OSTEOARTHRITIS TYPE, UNSPECIFIED SITE: ICD-10-CM

## 2019-12-27 DIAGNOSIS — M79.645 THUMB PAIN, LEFT: ICD-10-CM

## 2019-12-27 DIAGNOSIS — Z79.4 TYPE 2 DIABETES MELLITUS WITH OTHER SKIN ULCER, WITH LONG-TERM CURRENT USE OF INSULIN (H): ICD-10-CM

## 2019-12-27 LAB
ANION GAP SERPL CALCULATED.3IONS-SCNC: 11 MMOL/L (ref 5–18)
BUN SERPL-MCNC: 19 MG/DL (ref 8–28)
CALCIUM SERPL-MCNC: 9.5 MG/DL (ref 8.5–10.5)
CHLORIDE BLD-SCNC: 104 MMOL/L (ref 98–107)
CO2 SERPL-SCNC: 25 MMOL/L (ref 22–31)
CREAT SERPL-MCNC: 1.01 MG/DL (ref 0.7–1.3)
GFR SERPL CREATININE-BSD FRML MDRD: >60 ML/MIN/1.73M2
GLUCOSE BLD-MCNC: 205 MG/DL (ref 70–125)
HBA1C MFR BLD: 12 % (ref 3.5–6)
POTASSIUM BLD-SCNC: 4.3 MMOL/L (ref 3.5–5)
SODIUM SERPL-SCNC: 140 MMOL/L (ref 136–145)

## 2020-01-08 ENCOUNTER — COMMUNICATION - HEALTHEAST (OUTPATIENT)
Dept: INTERNAL MEDICINE | Facility: CLINIC | Age: 85
End: 2020-01-08

## 2020-01-08 DIAGNOSIS — I10 HTN (HYPERTENSION): ICD-10-CM

## 2020-01-13 ENCOUNTER — OFFICE VISIT - HEALTHEAST (OUTPATIENT)
Dept: VASCULAR SURGERY | Facility: CLINIC | Age: 85
End: 2020-01-13

## 2020-01-13 ENCOUNTER — RECORDS - HEALTHEAST (OUTPATIENT)
Dept: VASCULAR ULTRASOUND | Facility: CLINIC | Age: 85
End: 2020-01-13

## 2020-01-13 DIAGNOSIS — G58.9 MONONEUROPATHY, UNSPECIFIED: ICD-10-CM

## 2020-01-13 DIAGNOSIS — G58.9 MONONEUROPATHY: ICD-10-CM

## 2020-01-13 DIAGNOSIS — Z79.4 LONG TERM (CURRENT) USE OF INSULIN (H): ICD-10-CM

## 2020-01-13 DIAGNOSIS — E11.59 TYPE 2 DIABETES MELLITUS WITH OTHER CIRCULATORY COMPLICATIONS (H): ICD-10-CM

## 2020-01-13 DIAGNOSIS — L84 CORNS AND CALLOSITIES: ICD-10-CM

## 2020-01-13 DIAGNOSIS — I10 ESSENTIAL (PRIMARY) HYPERTENSION: ICD-10-CM

## 2020-01-13 DIAGNOSIS — Z79.4 TYPE 2 DIABETES MELLITUS WITH OTHER SKIN ULCER, WITH LONG-TERM CURRENT USE OF INSULIN (H): ICD-10-CM

## 2020-01-13 DIAGNOSIS — E11.622 TYPE 2 DIABETES MELLITUS WITH OTHER SKIN ULCER (CODE) (H): ICD-10-CM

## 2020-01-13 DIAGNOSIS — L84 PRE-ULCERATIVE CORN OR CALLOUS: ICD-10-CM

## 2020-01-13 DIAGNOSIS — R60.0 LOCALIZED EDEMA: ICD-10-CM

## 2020-01-13 DIAGNOSIS — I10 ESSENTIAL HYPERTENSION: ICD-10-CM

## 2020-01-13 DIAGNOSIS — E11.622 TYPE 2 DIABETES MELLITUS WITH OTHER SKIN ULCER, WITH LONG-TERM CURRENT USE OF INSULIN (H): ICD-10-CM

## 2020-01-14 ENCOUNTER — COMMUNICATION - HEALTHEAST (OUTPATIENT)
Dept: VASCULAR SURGERY | Facility: CLINIC | Age: 85
End: 2020-01-14

## 2020-01-15 ENCOUNTER — COMMUNICATION - HEALTHEAST (OUTPATIENT)
Dept: INTERNAL MEDICINE | Facility: CLINIC | Age: 85
End: 2020-01-15

## 2020-01-15 DIAGNOSIS — E11.9 TYPE 2 DIABETES MELLITUS (H): ICD-10-CM

## 2020-01-23 ENCOUNTER — AMBULATORY - HEALTHEAST (OUTPATIENT)
Dept: EDUCATION SERVICES | Facility: CLINIC | Age: 85
End: 2020-01-23

## 2020-01-23 ENCOUNTER — RECORDS - HEALTHEAST (OUTPATIENT)
Dept: ADMINISTRATIVE | Facility: OTHER | Age: 85
End: 2020-01-23

## 2020-01-23 DIAGNOSIS — E11.622 TYPE 2 DIABETES MELLITUS WITH OTHER SKIN ULCER, WITH LONG-TERM CURRENT USE OF INSULIN (H): ICD-10-CM

## 2020-01-23 DIAGNOSIS — Z79.4 TYPE 2 DIABETES MELLITUS WITH OTHER SKIN ULCER, WITH LONG-TERM CURRENT USE OF INSULIN (H): ICD-10-CM

## 2020-01-30 ENCOUNTER — COMMUNICATION - HEALTHEAST (OUTPATIENT)
Dept: EDUCATION SERVICES | Facility: CLINIC | Age: 85
End: 2020-01-30

## 2020-02-05 ENCOUNTER — RECORDS - HEALTHEAST (OUTPATIENT)
Dept: ADMINISTRATIVE | Facility: OTHER | Age: 85
End: 2020-02-05

## 2020-02-10 ENCOUNTER — OFFICE VISIT - HEALTHEAST (OUTPATIENT)
Dept: VASCULAR SURGERY | Facility: CLINIC | Age: 85
End: 2020-02-10

## 2020-02-10 ENCOUNTER — COMMUNICATION - HEALTHEAST (OUTPATIENT)
Dept: VASCULAR SURGERY | Facility: CLINIC | Age: 85
End: 2020-02-10

## 2020-02-10 DIAGNOSIS — G58.9 MONONEUROPATHY: ICD-10-CM

## 2020-02-10 DIAGNOSIS — L84 PRE-ULCERATIVE CORN OR CALLOUS: ICD-10-CM

## 2020-02-10 DIAGNOSIS — Z79.4 TYPE 2 DIABETES MELLITUS WITH OTHER SKIN ULCER, WITH LONG-TERM CURRENT USE OF INSULIN (H): ICD-10-CM

## 2020-02-10 DIAGNOSIS — E11.622 TYPE 2 DIABETES MELLITUS WITH OTHER SKIN ULCER, WITH LONG-TERM CURRENT USE OF INSULIN (H): ICD-10-CM

## 2020-02-11 ENCOUNTER — AMBULATORY - HEALTHEAST (OUTPATIENT)
Dept: VASCULAR SURGERY | Facility: CLINIC | Age: 85
End: 2020-02-11

## 2020-02-11 ENCOUNTER — RECORDS - HEALTHEAST (OUTPATIENT)
Dept: ADMINISTRATIVE | Facility: OTHER | Age: 85
End: 2020-02-11

## 2020-02-24 ENCOUNTER — HOSPITAL ENCOUNTER (OUTPATIENT)
Dept: RESPIRATORY THERAPY | Facility: HOSPITAL | Age: 85
Discharge: HOME | End: 2020-02-24
Attending: NURSE PRACTITIONER

## 2020-02-24 DIAGNOSIS — G58.9 MONONEUROPATHY: ICD-10-CM

## 2020-02-24 DIAGNOSIS — E11.622 TYPE 2 DIABETES MELLITUS WITH OTHER SKIN ULCER, WITH LONG-TERM CURRENT USE OF INSULIN (H): ICD-10-CM

## 2020-02-24 DIAGNOSIS — Z79.4 TYPE 2 DIABETES MELLITUS WITH OTHER SKIN ULCER, WITH LONG-TERM CURRENT USE OF INSULIN (H): ICD-10-CM

## 2020-02-24 DIAGNOSIS — L84 PRE-ULCERATIVE CORN OR CALLOUS: ICD-10-CM

## 2020-03-05 ENCOUNTER — COMMUNICATION - HEALTHEAST (OUTPATIENT)
Dept: INTERNAL MEDICINE | Facility: CLINIC | Age: 85
End: 2020-03-05

## 2020-03-05 DIAGNOSIS — Z79.4 TYPE 2 DIABETES MELLITUS WITH OTHER SKIN ULCER, WITH LONG-TERM CURRENT USE OF INSULIN (H): ICD-10-CM

## 2020-03-05 DIAGNOSIS — E11.622 TYPE 2 DIABETES MELLITUS WITH OTHER SKIN ULCER, WITH LONG-TERM CURRENT USE OF INSULIN (H): ICD-10-CM

## 2020-03-09 ENCOUNTER — OFFICE VISIT - HEALTHEAST (OUTPATIENT)
Dept: VASCULAR SURGERY | Facility: CLINIC | Age: 85
End: 2020-03-09

## 2020-03-09 DIAGNOSIS — Z79.4 TYPE 2 DIABETES MELLITUS WITH OTHER SKIN ULCER, WITH LONG-TERM CURRENT USE OF INSULIN (H): ICD-10-CM

## 2020-03-09 DIAGNOSIS — E11.622 TYPE 2 DIABETES MELLITUS WITH OTHER SKIN ULCER, WITH LONG-TERM CURRENT USE OF INSULIN (H): ICD-10-CM

## 2020-03-09 DIAGNOSIS — E11.621 DIABETIC ULCER OF RIGHT MIDFOOT ASSOCIATED WITH TYPE 2 DIABETES MELLITUS, LIMITED TO BREAKDOWN OF SKIN (H): ICD-10-CM

## 2020-03-09 DIAGNOSIS — L84 PRE-ULCERATIVE CORN OR CALLOUS: ICD-10-CM

## 2020-03-09 DIAGNOSIS — G58.9 MONONEUROPATHY: ICD-10-CM

## 2020-03-09 DIAGNOSIS — L97.411 DIABETIC ULCER OF RIGHT MIDFOOT ASSOCIATED WITH TYPE 2 DIABETES MELLITUS, LIMITED TO BREAKDOWN OF SKIN (H): ICD-10-CM

## 2020-03-25 ENCOUNTER — AMBULATORY - HEALTHEAST (OUTPATIENT)
Dept: EDUCATION SERVICES | Facility: CLINIC | Age: 85
End: 2020-03-25

## 2020-03-25 DIAGNOSIS — E11.622 TYPE 2 DIABETES MELLITUS WITH OTHER SKIN ULCER, WITH LONG-TERM CURRENT USE OF INSULIN (H): ICD-10-CM

## 2020-03-25 DIAGNOSIS — Z79.4 TYPE 2 DIABETES MELLITUS WITH OTHER SKIN ULCER, WITH LONG-TERM CURRENT USE OF INSULIN (H): ICD-10-CM

## 2020-04-02 ENCOUNTER — OFFICE VISIT - HEALTHEAST (OUTPATIENT)
Dept: EDUCATION SERVICES | Facility: CLINIC | Age: 85
End: 2020-04-02

## 2020-04-02 DIAGNOSIS — E11.622 TYPE 2 DIABETES MELLITUS WITH OTHER SKIN ULCER, WITH LONG-TERM CURRENT USE OF INSULIN (H): ICD-10-CM

## 2020-04-02 DIAGNOSIS — Z79.4 TYPE 2 DIABETES MELLITUS WITH OTHER SKIN ULCER, WITH LONG-TERM CURRENT USE OF INSULIN (H): ICD-10-CM

## 2020-04-04 ENCOUNTER — COMMUNICATION - HEALTHEAST (OUTPATIENT)
Dept: EDUCATION SERVICES | Facility: CLINIC | Age: 85
End: 2020-04-04

## 2020-04-04 DIAGNOSIS — Z79.4 TYPE 2 DIABETES MELLITUS WITH OTHER SKIN ULCER, WITH LONG-TERM CURRENT USE OF INSULIN (H): ICD-10-CM

## 2020-04-04 DIAGNOSIS — E11.622 TYPE 2 DIABETES MELLITUS WITH OTHER SKIN ULCER, WITH LONG-TERM CURRENT USE OF INSULIN (H): ICD-10-CM

## 2020-05-07 ENCOUNTER — COMMUNICATION - HEALTHEAST (OUTPATIENT)
Dept: VASCULAR SURGERY | Facility: CLINIC | Age: 85
End: 2020-05-07

## 2020-05-22 ENCOUNTER — COMMUNICATION - HEALTHEAST (OUTPATIENT)
Dept: VASCULAR SURGERY | Facility: CLINIC | Age: 85
End: 2020-05-22

## 2020-05-23 ENCOUNTER — RECORDS - HEALTHEAST (OUTPATIENT)
Dept: ADMINISTRATIVE | Facility: OTHER | Age: 85
End: 2020-05-23

## 2020-05-26 ENCOUNTER — RECORDS - HEALTHEAST (OUTPATIENT)
Dept: ADMINISTRATIVE | Facility: OTHER | Age: 85
End: 2020-05-26

## 2020-05-29 ENCOUNTER — RECORDS - HEALTHEAST (OUTPATIENT)
Dept: ADMINISTRATIVE | Facility: OTHER | Age: 85
End: 2020-05-29

## 2020-05-29 ENCOUNTER — COMMUNICATION - HEALTHEAST (OUTPATIENT)
Dept: INTERNAL MEDICINE | Facility: CLINIC | Age: 85
End: 2020-05-29

## 2020-05-29 ENCOUNTER — OFFICE VISIT - HEALTHEAST (OUTPATIENT)
Dept: INTERNAL MEDICINE | Facility: CLINIC | Age: 85
End: 2020-05-29

## 2020-05-29 DIAGNOSIS — Z79.4 TYPE 2 DIABETES MELLITUS WITH OTHER SKIN ULCER, WITH LONG-TERM CURRENT USE OF INSULIN (H): ICD-10-CM

## 2020-05-29 DIAGNOSIS — I10 ESSENTIAL HYPERTENSION: ICD-10-CM

## 2020-05-29 DIAGNOSIS — I26.99 ACUTE PULMONARY EMBOLISM WITHOUT ACUTE COR PULMONALE, UNSPECIFIED PULMONARY EMBOLISM TYPE (H): ICD-10-CM

## 2020-05-29 DIAGNOSIS — E11.622 TYPE 2 DIABETES MELLITUS WITH OTHER SKIN ULCER, WITH LONG-TERM CURRENT USE OF INSULIN (H): ICD-10-CM

## 2020-06-03 ENCOUNTER — OFFICE VISIT - HEALTHEAST (OUTPATIENT)
Dept: VASCULAR SURGERY | Facility: CLINIC | Age: 85
End: 2020-06-03

## 2020-06-03 ENCOUNTER — COMMUNICATION - HEALTHEAST (OUTPATIENT)
Dept: PODIATRY | Facility: CLINIC | Age: 85
End: 2020-06-03

## 2020-06-03 DIAGNOSIS — M25.371 ANKLE INSTABILITY, RIGHT: ICD-10-CM

## 2020-06-03 DIAGNOSIS — L84 PRE-ULCERATIVE CORN OR CALLOUS: ICD-10-CM

## 2020-06-03 DIAGNOSIS — E11.621 DIABETIC ULCER OF RIGHT MIDFOOT ASSOCIATED WITH TYPE 2 DIABETES MELLITUS, LIMITED TO BREAKDOWN OF SKIN (H): ICD-10-CM

## 2020-06-03 DIAGNOSIS — R60.0 LOCALIZED EDEMA: ICD-10-CM

## 2020-06-03 DIAGNOSIS — L97.411 DIABETIC ULCER OF RIGHT MIDFOOT ASSOCIATED WITH TYPE 2 DIABETES MELLITUS, LIMITED TO BREAKDOWN OF SKIN (H): ICD-10-CM

## 2020-06-12 ENCOUNTER — RECORDS - HEALTHEAST (OUTPATIENT)
Dept: ADMINISTRATIVE | Facility: OTHER | Age: 85
End: 2020-06-12

## 2020-06-17 ENCOUNTER — OFFICE VISIT - HEALTHEAST (OUTPATIENT)
Dept: VASCULAR SURGERY | Facility: CLINIC | Age: 85
End: 2020-06-17

## 2020-06-17 DIAGNOSIS — E11.621 DIABETIC ULCER OF RIGHT MIDFOOT ASSOCIATED WITH TYPE 2 DIABETES MELLITUS, LIMITED TO BREAKDOWN OF SKIN (H): ICD-10-CM

## 2020-06-17 DIAGNOSIS — R60.0 LOCALIZED EDEMA: ICD-10-CM

## 2020-06-17 DIAGNOSIS — L84 PRE-ULCERATIVE CORN OR CALLOUS: ICD-10-CM

## 2020-06-17 DIAGNOSIS — M25.371 ANKLE INSTABILITY, RIGHT: ICD-10-CM

## 2020-06-17 DIAGNOSIS — L97.411 DIABETIC ULCER OF RIGHT MIDFOOT ASSOCIATED WITH TYPE 2 DIABETES MELLITUS, LIMITED TO BREAKDOWN OF SKIN (H): ICD-10-CM

## 2020-06-22 ENCOUNTER — COMMUNICATION - HEALTHEAST (OUTPATIENT)
Dept: INTERNAL MEDICINE | Facility: CLINIC | Age: 85
End: 2020-06-22

## 2020-06-22 DIAGNOSIS — I10 HTN (HYPERTENSION): ICD-10-CM

## 2020-06-26 ENCOUNTER — OFFICE VISIT - HEALTHEAST (OUTPATIENT)
Dept: INTERNAL MEDICINE | Facility: CLINIC | Age: 85
End: 2020-06-26

## 2020-06-26 DIAGNOSIS — E11.621 DIABETIC ULCER OF RIGHT MIDFOOT ASSOCIATED WITH TYPE 2 DIABETES MELLITUS, LIMITED TO BREAKDOWN OF SKIN (H): ICD-10-CM

## 2020-06-26 DIAGNOSIS — L97.411 DIABETIC ULCER OF RIGHT MIDFOOT ASSOCIATED WITH TYPE 2 DIABETES MELLITUS, LIMITED TO BREAKDOWN OF SKIN (H): ICD-10-CM

## 2020-06-26 DIAGNOSIS — E11.622 TYPE 2 DIABETES MELLITUS WITH OTHER SKIN ULCER, WITH LONG-TERM CURRENT USE OF INSULIN (H): ICD-10-CM

## 2020-06-26 DIAGNOSIS — I26.99 PULMONARY EMBOLISM WITHOUT ACUTE COR PULMONALE, UNSPECIFIED CHRONICITY, UNSPECIFIED PULMONARY EMBOLISM TYPE (H): ICD-10-CM

## 2020-06-26 DIAGNOSIS — Z79.4 TYPE 2 DIABETES MELLITUS WITH OTHER SKIN ULCER, WITH LONG-TERM CURRENT USE OF INSULIN (H): ICD-10-CM

## 2020-06-26 DIAGNOSIS — I10 ESSENTIAL HYPERTENSION: ICD-10-CM

## 2020-07-03 ENCOUNTER — OFFICE VISIT - HEALTHEAST (OUTPATIENT)
Dept: VASCULAR SURGERY | Facility: CLINIC | Age: 85
End: 2020-07-03

## 2020-07-03 DIAGNOSIS — M25.371 ANKLE INSTABILITY, RIGHT: ICD-10-CM

## 2020-07-03 DIAGNOSIS — R60.0 LOCALIZED EDEMA: ICD-10-CM

## 2020-07-03 DIAGNOSIS — E11.621 DIABETIC ULCER OF RIGHT MIDFOOT ASSOCIATED WITH TYPE 2 DIABETES MELLITUS, LIMITED TO BREAKDOWN OF SKIN (H): ICD-10-CM

## 2020-07-03 DIAGNOSIS — L84 PRE-ULCERATIVE CORN OR CALLOUS: ICD-10-CM

## 2020-07-03 DIAGNOSIS — L97.411 DIABETIC ULCER OF RIGHT MIDFOOT ASSOCIATED WITH TYPE 2 DIABETES MELLITUS, LIMITED TO BREAKDOWN OF SKIN (H): ICD-10-CM

## 2020-07-17 ENCOUNTER — OFFICE VISIT - HEALTHEAST (OUTPATIENT)
Dept: VASCULAR SURGERY | Facility: CLINIC | Age: 85
End: 2020-07-17

## 2020-07-17 DIAGNOSIS — L84 PRE-ULCERATIVE CORN OR CALLOUS: ICD-10-CM

## 2020-07-17 DIAGNOSIS — L97.411 DIABETIC ULCER OF RIGHT MIDFOOT ASSOCIATED WITH TYPE 2 DIABETES MELLITUS, LIMITED TO BREAKDOWN OF SKIN (H): ICD-10-CM

## 2020-07-17 DIAGNOSIS — E11.621 DIABETIC ULCER OF RIGHT MIDFOOT ASSOCIATED WITH TYPE 2 DIABETES MELLITUS, LIMITED TO BREAKDOWN OF SKIN (H): ICD-10-CM

## 2020-07-17 DIAGNOSIS — M25.371 ANKLE INSTABILITY, RIGHT: ICD-10-CM

## 2020-07-17 DIAGNOSIS — R60.0 LOCALIZED EDEMA: ICD-10-CM

## 2020-07-22 ENCOUNTER — OFFICE VISIT - HEALTHEAST (OUTPATIENT)
Dept: INTERNAL MEDICINE | Facility: CLINIC | Age: 85
End: 2020-07-22

## 2020-07-22 ENCOUNTER — COMMUNICATION - HEALTHEAST (OUTPATIENT)
Dept: INTERNAL MEDICINE | Facility: CLINIC | Age: 85
End: 2020-07-22

## 2020-07-22 DIAGNOSIS — E11.622 TYPE 2 DIABETES MELLITUS WITH OTHER SKIN ULCER, WITH LONG-TERM CURRENT USE OF INSULIN (H): ICD-10-CM

## 2020-07-22 DIAGNOSIS — E78.5 HYPERLIPIDEMIA LDL GOAL <130: ICD-10-CM

## 2020-07-22 DIAGNOSIS — I10 ESSENTIAL HYPERTENSION: ICD-10-CM

## 2020-07-22 DIAGNOSIS — M79.609: ICD-10-CM

## 2020-07-22 DIAGNOSIS — Z79.4 TYPE 2 DIABETES MELLITUS WITH OTHER SKIN ULCER, WITH LONG-TERM CURRENT USE OF INSULIN (H): ICD-10-CM

## 2020-07-22 LAB
ALBUMIN SERPL-MCNC: 3.8 G/DL (ref 3.5–5)
ALP SERPL-CCNC: 88 U/L (ref 45–120)
ALT SERPL W P-5'-P-CCNC: 12 U/L (ref 0–45)
ANION GAP SERPL CALCULATED.3IONS-SCNC: 9 MMOL/L (ref 5–18)
AST SERPL W P-5'-P-CCNC: 17 U/L (ref 0–40)
BILIRUB SERPL-MCNC: 0.8 MG/DL (ref 0–1)
BUN SERPL-MCNC: 19 MG/DL (ref 8–28)
CALCIUM SERPL-MCNC: 9.5 MG/DL (ref 8.5–10.5)
CHLORIDE BLD-SCNC: 99 MMOL/L (ref 98–107)
CHOLEST SERPL-MCNC: 178 MG/DL
CO2 SERPL-SCNC: 28 MMOL/L (ref 22–31)
CREAT SERPL-MCNC: 0.97 MG/DL (ref 0.7–1.3)
FASTING STATUS PATIENT QL REPORTED: NO
GFR SERPL CREATININE-BSD FRML MDRD: >60 ML/MIN/1.73M2
GLUCOSE BLD-MCNC: 121 MG/DL (ref 70–125)
HBA1C MFR BLD: 7.7 % (ref 3.5–6)
HDLC SERPL-MCNC: 40 MG/DL
LDLC SERPL CALC-MCNC: 126 MG/DL
POTASSIUM BLD-SCNC: 4.8 MMOL/L (ref 3.5–5)
PROT SERPL-MCNC: 6.7 G/DL (ref 6–8)
SODIUM SERPL-SCNC: 136 MMOL/L (ref 136–145)
TRIGL SERPL-MCNC: 60 MG/DL

## 2020-07-24 ENCOUNTER — COMMUNICATION - HEALTHEAST (OUTPATIENT)
Dept: INTERNAL MEDICINE | Facility: CLINIC | Age: 85
End: 2020-07-24

## 2020-07-24 ENCOUNTER — COMMUNICATION - HEALTHEAST (OUTPATIENT)
Dept: SCHEDULING | Facility: CLINIC | Age: 85
End: 2020-07-24

## 2020-07-27 ENCOUNTER — OFFICE VISIT - HEALTHEAST (OUTPATIENT)
Dept: INTERNAL MEDICINE | Facility: CLINIC | Age: 85
End: 2020-07-27

## 2020-07-27 DIAGNOSIS — R10.9 FLANK PAIN: ICD-10-CM

## 2020-07-27 LAB
ALBUMIN UR-MCNC: ABNORMAL MG/DL
APPEARANCE UR: ABNORMAL
BACTERIA #/AREA URNS HPF: ABNORMAL HPF
BILIRUB UR QL STRIP: NEGATIVE
COLOR UR AUTO: YELLOW
ERYTHROCYTE [DISTWIDTH] IN BLOOD BY AUTOMATED COUNT: 11.6 % (ref 11–14.5)
GLUCOSE UR STRIP-MCNC: ABNORMAL MG/DL
HCT VFR BLD AUTO: 48.3 % (ref 40–54)
HGB BLD-MCNC: 16.6 G/DL (ref 14–18)
HGB UR QL STRIP: ABNORMAL
KETONES UR STRIP-MCNC: NEGATIVE MG/DL
LEUKOCYTE ESTERASE UR QL STRIP: ABNORMAL
MCH RBC QN AUTO: 32.8 PG (ref 27–34)
MCHC RBC AUTO-ENTMCNC: 34.3 G/DL (ref 32–36)
MCV RBC AUTO: 96 FL (ref 80–100)
MUCOUS THREADS #/AREA URNS LPF: ABNORMAL LPF
NITRATE UR QL: NEGATIVE
PH UR STRIP: 5.5 [PH] (ref 5–8)
PLATELET # BLD AUTO: 280 THOU/UL (ref 140–440)
PMV BLD AUTO: 7.3 FL (ref 7–10)
RBC # BLD AUTO: 5.05 MILL/UL (ref 4.4–6.2)
RBC #/AREA URNS AUTO: ABNORMAL HPF
SP GR UR STRIP: 1.02 (ref 1–1.03)
SQUAMOUS #/AREA URNS AUTO: ABNORMAL LPF
UROBILINOGEN UR STRIP-ACNC: ABNORMAL
WBC #/AREA URNS AUTO: ABNORMAL HPF
WBC: 8.4 THOU/UL (ref 4–11)

## 2020-07-29 ENCOUNTER — RECORDS - HEALTHEAST (OUTPATIENT)
Dept: ADMINISTRATIVE | Facility: OTHER | Age: 85
End: 2020-07-29

## 2020-07-29 LAB — RETINOPATHY: POSITIVE

## 2020-07-30 ENCOUNTER — AMBULATORY - HEALTHEAST (OUTPATIENT)
Dept: INTERNAL MEDICINE | Facility: CLINIC | Age: 85
End: 2020-07-30

## 2020-07-30 DIAGNOSIS — N30.00 ACUTE CYSTITIS WITHOUT HEMATURIA: ICD-10-CM

## 2020-07-30 DIAGNOSIS — N30.01 ACUTE CYSTITIS WITH HEMATURIA: ICD-10-CM

## 2020-07-30 LAB — BACTERIA SPEC CULT: ABNORMAL

## 2020-07-31 ENCOUNTER — OFFICE VISIT - HEALTHEAST (OUTPATIENT)
Dept: VASCULAR SURGERY | Facility: CLINIC | Age: 85
End: 2020-07-31

## 2020-07-31 DIAGNOSIS — L97.411 DIABETIC ULCER OF RIGHT MIDFOOT ASSOCIATED WITH TYPE 2 DIABETES MELLITUS, LIMITED TO BREAKDOWN OF SKIN (H): ICD-10-CM

## 2020-07-31 DIAGNOSIS — E11.621 DIABETIC ULCER OF RIGHT MIDFOOT ASSOCIATED WITH TYPE 2 DIABETES MELLITUS, LIMITED TO BREAKDOWN OF SKIN (H): ICD-10-CM

## 2020-08-07 ENCOUNTER — RECORDS - HEALTHEAST (OUTPATIENT)
Dept: HEALTH INFORMATION MANAGEMENT | Facility: CLINIC | Age: 85
End: 2020-08-07

## 2020-08-13 ENCOUNTER — COMMUNICATION - HEALTHEAST (OUTPATIENT)
Dept: VASCULAR SURGERY | Facility: CLINIC | Age: 85
End: 2020-08-13

## 2020-08-14 ENCOUNTER — OFFICE VISIT - HEALTHEAST (OUTPATIENT)
Dept: VASCULAR SURGERY | Facility: CLINIC | Age: 85
End: 2020-08-14

## 2020-08-14 DIAGNOSIS — E11.621 DIABETIC ULCER OF RIGHT MIDFOOT ASSOCIATED WITH TYPE 2 DIABETES MELLITUS, LIMITED TO BREAKDOWN OF SKIN (H): ICD-10-CM

## 2020-08-14 DIAGNOSIS — L97.411 DIABETIC ULCER OF RIGHT MIDFOOT ASSOCIATED WITH TYPE 2 DIABETES MELLITUS, LIMITED TO BREAKDOWN OF SKIN (H): ICD-10-CM

## 2020-09-03 ENCOUNTER — COMMUNICATION - HEALTHEAST (OUTPATIENT)
Dept: VASCULAR SURGERY | Facility: CLINIC | Age: 85
End: 2020-09-03

## 2020-09-04 ENCOUNTER — OFFICE VISIT - HEALTHEAST (OUTPATIENT)
Dept: VASCULAR SURGERY | Facility: CLINIC | Age: 85
End: 2020-09-04

## 2020-09-04 ENCOUNTER — AMBULATORY - HEALTHEAST (OUTPATIENT)
Dept: LAB | Facility: CLINIC | Age: 85
End: 2020-09-04

## 2020-09-04 DIAGNOSIS — E11.621 DIABETIC ULCER OF RIGHT MIDFOOT ASSOCIATED WITH TYPE 2 DIABETES MELLITUS, LIMITED TO BREAKDOWN OF SKIN (H): ICD-10-CM

## 2020-09-04 DIAGNOSIS — L97.411 DIABETIC ULCER OF RIGHT MIDFOOT ASSOCIATED WITH TYPE 2 DIABETES MELLITUS, LIMITED TO BREAKDOWN OF SKIN (H): ICD-10-CM

## 2020-09-04 LAB — HBA1C MFR BLD: 7.9 %

## 2020-09-18 ENCOUNTER — OFFICE VISIT - HEALTHEAST (OUTPATIENT)
Dept: VASCULAR SURGERY | Facility: CLINIC | Age: 85
End: 2020-09-18

## 2020-09-18 DIAGNOSIS — G58.9 MONONEUROPATHY: ICD-10-CM

## 2020-09-18 DIAGNOSIS — L84 PRE-ULCERATIVE CORN OR CALLOUS: ICD-10-CM

## 2020-09-18 DIAGNOSIS — E11.621 DIABETIC ULCER OF RIGHT MIDFOOT ASSOCIATED WITH TYPE 2 DIABETES MELLITUS, LIMITED TO BREAKDOWN OF SKIN (H): ICD-10-CM

## 2020-09-18 DIAGNOSIS — M25.371 ANKLE INSTABILITY, RIGHT: ICD-10-CM

## 2020-09-18 DIAGNOSIS — L97.411 DIABETIC ULCER OF RIGHT MIDFOOT ASSOCIATED WITH TYPE 2 DIABETES MELLITUS, LIMITED TO BREAKDOWN OF SKIN (H): ICD-10-CM

## 2020-09-18 DIAGNOSIS — Z79.4 TYPE 2 DIABETES MELLITUS WITH OTHER SKIN ULCER, WITH LONG-TERM CURRENT USE OF INSULIN (H): ICD-10-CM

## 2020-09-18 DIAGNOSIS — R60.0 LOCALIZED EDEMA: ICD-10-CM

## 2020-09-18 DIAGNOSIS — E11.622 TYPE 2 DIABETES MELLITUS WITH OTHER SKIN ULCER, WITH LONG-TERM CURRENT USE OF INSULIN (H): ICD-10-CM

## 2020-09-21 ENCOUNTER — AMBULATORY - HEALTHEAST (OUTPATIENT)
Dept: VASCULAR SURGERY | Facility: CLINIC | Age: 85
End: 2020-09-21

## 2020-09-21 DIAGNOSIS — L97.411 DIABETIC ULCER OF RIGHT MIDFOOT ASSOCIATED WITH TYPE 2 DIABETES MELLITUS, LIMITED TO BREAKDOWN OF SKIN (H): ICD-10-CM

## 2020-09-21 DIAGNOSIS — E11.621 DIABETIC ULCER OF RIGHT MIDFOOT ASSOCIATED WITH TYPE 2 DIABETES MELLITUS, LIMITED TO BREAKDOWN OF SKIN (H): ICD-10-CM

## 2020-09-25 ENCOUNTER — OFFICE VISIT - HEALTHEAST (OUTPATIENT)
Dept: VASCULAR SURGERY | Facility: CLINIC | Age: 85
End: 2020-09-25

## 2020-09-25 DIAGNOSIS — E11.621 DIABETIC ULCER OF RIGHT MIDFOOT ASSOCIATED WITH TYPE 2 DIABETES MELLITUS, LIMITED TO BREAKDOWN OF SKIN (H): ICD-10-CM

## 2020-09-25 DIAGNOSIS — L97.411 DIABETIC ULCER OF RIGHT MIDFOOT ASSOCIATED WITH TYPE 2 DIABETES MELLITUS, LIMITED TO BREAKDOWN OF SKIN (H): ICD-10-CM

## 2020-09-28 ENCOUNTER — AMBULATORY - HEALTHEAST (OUTPATIENT)
Dept: VASCULAR SURGERY | Facility: CLINIC | Age: 85
End: 2020-09-28

## 2020-09-28 ENCOUNTER — COMMUNICATION - HEALTHEAST (OUTPATIENT)
Dept: INTERNAL MEDICINE | Facility: CLINIC | Age: 85
End: 2020-09-28

## 2020-09-28 DIAGNOSIS — Z79.4 TYPE 2 DIABETES MELLITUS WITH OTHER SKIN ULCER, WITH LONG-TERM CURRENT USE OF INSULIN (H): ICD-10-CM

## 2020-09-28 DIAGNOSIS — E11.622 TYPE 2 DIABETES MELLITUS WITH OTHER SKIN ULCER, WITH LONG-TERM CURRENT USE OF INSULIN (H): ICD-10-CM

## 2020-10-02 ENCOUNTER — AMBULATORY - HEALTHEAST (OUTPATIENT)
Dept: VASCULAR SURGERY | Facility: CLINIC | Age: 85
End: 2020-10-02

## 2020-10-02 DIAGNOSIS — L97.411 DIABETIC ULCER OF RIGHT MIDFOOT ASSOCIATED WITH TYPE 2 DIABETES MELLITUS, LIMITED TO BREAKDOWN OF SKIN (H): ICD-10-CM

## 2020-10-02 DIAGNOSIS — R60.0 LOCALIZED EDEMA: ICD-10-CM

## 2020-10-02 DIAGNOSIS — M25.371 ANKLE INSTABILITY, RIGHT: ICD-10-CM

## 2020-10-02 DIAGNOSIS — E11.621 DIABETIC ULCER OF RIGHT MIDFOOT ASSOCIATED WITH TYPE 2 DIABETES MELLITUS, LIMITED TO BREAKDOWN OF SKIN (H): ICD-10-CM

## 2020-10-05 ENCOUNTER — AMBULATORY - HEALTHEAST (OUTPATIENT)
Dept: VASCULAR SURGERY | Facility: CLINIC | Age: 85
End: 2020-10-05

## 2020-10-05 DIAGNOSIS — E11.621 DIABETIC ULCER OF RIGHT MIDFOOT ASSOCIATED WITH TYPE 2 DIABETES MELLITUS, LIMITED TO BREAKDOWN OF SKIN (H): ICD-10-CM

## 2020-10-05 DIAGNOSIS — L97.411 DIABETIC ULCER OF RIGHT MIDFOOT ASSOCIATED WITH TYPE 2 DIABETES MELLITUS, LIMITED TO BREAKDOWN OF SKIN (H): ICD-10-CM

## 2020-10-06 ENCOUNTER — COMMUNICATION - HEALTHEAST (OUTPATIENT)
Dept: INTERNAL MEDICINE | Facility: CLINIC | Age: 85
End: 2020-10-06

## 2020-10-09 ENCOUNTER — AMBULATORY - HEALTHEAST (OUTPATIENT)
Dept: VASCULAR SURGERY | Facility: CLINIC | Age: 85
End: 2020-10-09

## 2020-10-13 ENCOUNTER — AMBULATORY - HEALTHEAST (OUTPATIENT)
Dept: LAB | Facility: CLINIC | Age: 85
End: 2020-10-13

## 2020-10-13 ENCOUNTER — RECORDS - HEALTHEAST (OUTPATIENT)
Dept: VASCULAR ULTRASOUND | Facility: CLINIC | Age: 85
End: 2020-10-13

## 2020-10-13 ENCOUNTER — OFFICE VISIT - HEALTHEAST (OUTPATIENT)
Dept: VASCULAR SURGERY | Facility: CLINIC | Age: 85
End: 2020-10-13

## 2020-10-13 ENCOUNTER — AMBULATORY - HEALTHEAST (OUTPATIENT)
Dept: VASCULAR SURGERY | Facility: CLINIC | Age: 85
End: 2020-10-13

## 2020-10-13 DIAGNOSIS — E11.621 TYPE 2 DIABETES MELLITUS WITH FOOT ULCER (CODE) (H): ICD-10-CM

## 2020-10-13 DIAGNOSIS — E11.621 DIABETIC ULCER OF RIGHT MIDFOOT ASSOCIATED WITH TYPE 2 DIABETES MELLITUS, LIMITED TO BREAKDOWN OF SKIN (H): ICD-10-CM

## 2020-10-13 DIAGNOSIS — L97.411 DIABETIC ULCER OF RIGHT MIDFOOT ASSOCIATED WITH TYPE 2 DIABETES MELLITUS, LIMITED TO BREAKDOWN OF SKIN (H): ICD-10-CM

## 2020-10-13 DIAGNOSIS — L97.411 NON-PRESSURE CHRONIC ULCER OF RIGHT HEEL AND MIDFOOT LIMITED TO BREAKDOWN OF SKIN (H): ICD-10-CM

## 2020-10-13 LAB
C REACTIVE PROTEIN LHE: 0.9 MG/DL (ref 0–0.8)
ERYTHROCYTE [SEDIMENTATION RATE] IN BLOOD BY WESTERGREN METHOD: 23 MM/HR (ref 0–15)

## 2020-10-16 ENCOUNTER — AMBULATORY - HEALTHEAST (OUTPATIENT)
Dept: VASCULAR SURGERY | Facility: CLINIC | Age: 85
End: 2020-10-16

## 2020-10-19 ENCOUNTER — AMBULATORY - HEALTHEAST (OUTPATIENT)
Dept: VASCULAR SURGERY | Facility: CLINIC | Age: 85
End: 2020-10-19

## 2020-10-19 DIAGNOSIS — E11.621 DIABETIC ULCER OF RIGHT MIDFOOT ASSOCIATED WITH TYPE 2 DIABETES MELLITUS, LIMITED TO BREAKDOWN OF SKIN (H): ICD-10-CM

## 2020-10-19 DIAGNOSIS — L97.411 DIABETIC ULCER OF RIGHT MIDFOOT ASSOCIATED WITH TYPE 2 DIABETES MELLITUS, LIMITED TO BREAKDOWN OF SKIN (H): ICD-10-CM

## 2020-10-21 ENCOUNTER — AMBULATORY - HEALTHEAST (OUTPATIENT)
Dept: VASCULAR SURGERY | Facility: CLINIC | Age: 85
End: 2020-10-21

## 2020-10-21 ENCOUNTER — OFFICE VISIT - HEALTHEAST (OUTPATIENT)
Dept: VASCULAR SURGERY | Facility: CLINIC | Age: 85
End: 2020-10-21

## 2020-10-21 DIAGNOSIS — L97.413 DIABETIC ULCER OF RIGHT MIDFOOT ASSOCIATED WITH TYPE 2 DIABETES MELLITUS, WITH NECROSIS OF MUSCLE (H): ICD-10-CM

## 2020-10-21 DIAGNOSIS — E11.621 DIABETIC ULCER OF RIGHT MIDFOOT ASSOCIATED WITH TYPE 2 DIABETES MELLITUS, WITH NECROSIS OF MUSCLE (H): ICD-10-CM

## 2020-10-22 ENCOUNTER — AMBULATORY - HEALTHEAST (OUTPATIENT)
Dept: VASCULAR SURGERY | Facility: CLINIC | Age: 85
End: 2020-10-22

## 2020-10-22 ENCOUNTER — OFFICE VISIT - HEALTHEAST (OUTPATIENT)
Dept: INTERNAL MEDICINE | Facility: CLINIC | Age: 85
End: 2020-10-22

## 2020-10-22 DIAGNOSIS — Z78.9 STATIN INTOLERANCE: ICD-10-CM

## 2020-10-22 DIAGNOSIS — Z86.711 PERSONAL HISTORY OF PE (PULMONARY EMBOLISM): ICD-10-CM

## 2020-10-22 DIAGNOSIS — Z79.01 CHRONIC ANTICOAGULATION: ICD-10-CM

## 2020-10-22 DIAGNOSIS — L97.413 DIABETIC ULCER OF RIGHT MIDFOOT ASSOCIATED WITH TYPE 2 DIABETES MELLITUS, WITH NECROSIS OF MUSCLE (H): ICD-10-CM

## 2020-10-22 DIAGNOSIS — E11.621 DIABETIC ULCER OF RIGHT MIDFOOT ASSOCIATED WITH TYPE 2 DIABETES MELLITUS, WITH NECROSIS OF MUSCLE (H): ICD-10-CM

## 2020-10-22 DIAGNOSIS — E66.3 OVERWEIGHT (BMI 25.0-29.9): ICD-10-CM

## 2020-10-22 DIAGNOSIS — I73.9 PERIPHERAL ARTERIAL DISEASE (H): ICD-10-CM

## 2020-10-22 DIAGNOSIS — Z79.4 TYPE 2 DIABETES MELLITUS WITH OTHER SKIN ULCER, WITH LONG-TERM CURRENT USE OF INSULIN (H): ICD-10-CM

## 2020-10-22 DIAGNOSIS — E11.622 TYPE 2 DIABETES MELLITUS WITH OTHER SKIN ULCER, WITH LONG-TERM CURRENT USE OF INSULIN (H): ICD-10-CM

## 2020-10-23 ENCOUNTER — AMBULATORY - HEALTHEAST (OUTPATIENT)
Dept: VASCULAR SURGERY | Facility: CLINIC | Age: 85
End: 2020-10-23

## 2020-10-23 ENCOUNTER — SURGERY - HEALTHEAST (OUTPATIENT)
Dept: VASCULAR SURGERY | Facility: CLINIC | Age: 85
End: 2020-10-23

## 2020-10-23 ENCOUNTER — AMBULATORY - HEALTHEAST (OUTPATIENT)
Dept: SURGERY | Facility: HOSPITAL | Age: 85
End: 2020-10-23

## 2020-10-23 DIAGNOSIS — Z11.59 ENCOUNTER FOR SCREENING FOR OTHER VIRAL DISEASES: ICD-10-CM

## 2020-10-23 DIAGNOSIS — M86.9 OSTEOMYELITIS OF ANKLE AND FOOT (H): ICD-10-CM

## 2020-10-28 ENCOUNTER — OFFICE VISIT - HEALTHEAST (OUTPATIENT)
Dept: INTERNAL MEDICINE | Facility: CLINIC | Age: 85
End: 2020-10-28

## 2020-10-28 DIAGNOSIS — E66.3 OVERWEIGHT (BMI 25.0-29.9): ICD-10-CM

## 2020-10-28 DIAGNOSIS — M86.9 OSTEOMYELITIS OF ANKLE AND FOOT (H): ICD-10-CM

## 2020-10-28 DIAGNOSIS — Z86.711 PERSONAL HISTORY OF PE (PULMONARY EMBOLISM): ICD-10-CM

## 2020-10-28 DIAGNOSIS — Z01.818 PREOP GENERAL PHYSICAL EXAM: ICD-10-CM

## 2020-10-28 DIAGNOSIS — E11.9 TYPE 2 DIABETES MELLITUS (H): ICD-10-CM

## 2020-10-28 DIAGNOSIS — E11.621 DIABETIC ULCER OF RIGHT MIDFOOT ASSOCIATED WITH TYPE 2 DIABETES MELLITUS, WITH NECROSIS OF MUSCLE (H): ICD-10-CM

## 2020-10-28 DIAGNOSIS — E11.622 TYPE 2 DIABETES MELLITUS WITH OTHER SKIN ULCER, WITH LONG-TERM CURRENT USE OF INSULIN (H): ICD-10-CM

## 2020-10-28 DIAGNOSIS — L97.413 DIABETIC ULCER OF RIGHT MIDFOOT ASSOCIATED WITH TYPE 2 DIABETES MELLITUS, WITH NECROSIS OF MUSCLE (H): ICD-10-CM

## 2020-10-28 DIAGNOSIS — Z79.01 CHRONIC ANTICOAGULATION: ICD-10-CM

## 2020-10-28 DIAGNOSIS — I10 ESSENTIAL HYPERTENSION: ICD-10-CM

## 2020-10-28 DIAGNOSIS — Z79.4 TYPE 2 DIABETES MELLITUS WITH OTHER SKIN ULCER, WITH LONG-TERM CURRENT USE OF INSULIN (H): ICD-10-CM

## 2020-10-28 LAB
ANION GAP SERPL CALCULATED.3IONS-SCNC: 12 MMOL/L (ref 5–18)
BUN SERPL-MCNC: 21 MG/DL (ref 8–28)
CALCIUM SERPL-MCNC: 9.4 MG/DL (ref 8.5–10.5)
CHLORIDE BLD-SCNC: 100 MMOL/L (ref 98–107)
CO2 SERPL-SCNC: 26 MMOL/L (ref 22–31)
CREAT SERPL-MCNC: 1.08 MG/DL (ref 0.7–1.3)
ERYTHROCYTE [DISTWIDTH] IN BLOOD BY AUTOMATED COUNT: 11.2 % (ref 11–14.5)
GFR SERPL CREATININE-BSD FRML MDRD: >60 ML/MIN/1.73M2
GLUCOSE BLD-MCNC: 180 MG/DL (ref 70–125)
HCT VFR BLD AUTO: 47.8 % (ref 40–54)
HGB BLD-MCNC: 15.9 G/DL (ref 14–18)
MCH RBC QN AUTO: 31.9 PG (ref 27–34)
MCHC RBC AUTO-ENTMCNC: 33.3 G/DL (ref 32–36)
MCV RBC AUTO: 96 FL (ref 80–100)
PLATELET # BLD AUTO: 292 THOU/UL (ref 140–440)
PMV BLD AUTO: 7.4 FL (ref 7–10)
POTASSIUM BLD-SCNC: 5.7 MMOL/L (ref 3.5–5)
RBC # BLD AUTO: 4.99 MILL/UL (ref 4.4–6.2)
SODIUM SERPL-SCNC: 138 MMOL/L (ref 136–145)
WBC: 11.6 THOU/UL (ref 4–11)

## 2020-10-29 ENCOUNTER — COMMUNICATION - HEALTHEAST (OUTPATIENT)
Dept: VASCULAR SURGERY | Facility: CLINIC | Age: 85
End: 2020-10-29

## 2020-10-29 ENCOUNTER — COMMUNICATION - HEALTHEAST (OUTPATIENT)
Dept: INTERNAL MEDICINE | Facility: CLINIC | Age: 85
End: 2020-10-29

## 2020-10-29 LAB
ATRIAL RATE - MUSE: 73 BPM
DIASTOLIC BLOOD PRESSURE - MUSE: NORMAL
INTERPRETATION ECG - MUSE: NORMAL
P AXIS - MUSE: 34 DEGREES
PR INTERVAL - MUSE: 164 MS
QRS DURATION - MUSE: 88 MS
QT - MUSE: 366 MS
QTC - MUSE: 403 MS
R AXIS - MUSE: -12 DEGREES
SYSTOLIC BLOOD PRESSURE - MUSE: NORMAL
T AXIS - MUSE: 27 DEGREES
VENTRICULAR RATE- MUSE: 73 BPM

## 2020-10-29 ASSESSMENT — MIFFLIN-ST. JEOR: SCORE: 1612.52

## 2020-10-30 ENCOUNTER — AMBULATORY - HEALTHEAST (OUTPATIENT)
Dept: LAB | Facility: CLINIC | Age: 85
End: 2020-10-30

## 2020-10-30 ENCOUNTER — COMMUNICATION - HEALTHEAST (OUTPATIENT)
Dept: INTERNAL MEDICINE | Facility: CLINIC | Age: 85
End: 2020-10-30

## 2020-10-30 DIAGNOSIS — Z11.59 ENCOUNTER FOR SCREENING FOR OTHER VIRAL DISEASES: ICD-10-CM

## 2020-10-30 DIAGNOSIS — I10 HTN (HYPERTENSION): ICD-10-CM

## 2020-10-31 LAB
SARS-COV-2 PCR COMMENT: NORMAL
SARS-COV-2 RNA SPEC QL NAA+PROBE: NEGATIVE
SARS-COV-2 VIRUS SPECIMEN SOURCE: NORMAL

## 2020-11-01 ENCOUNTER — COMMUNICATION - HEALTHEAST (OUTPATIENT)
Dept: SCHEDULING | Facility: CLINIC | Age: 85
End: 2020-11-01

## 2020-11-02 ENCOUNTER — ANESTHESIA - HEALTHEAST (OUTPATIENT)
Dept: SURGERY | Facility: HOSPITAL | Age: 85
End: 2020-11-02

## 2020-11-02 ENCOUNTER — AMBULATORY - HEALTHEAST (OUTPATIENT)
Dept: PODIATRY | Facility: CLINIC | Age: 85
End: 2020-11-02

## 2020-11-02 ENCOUNTER — SURGERY - HEALTHEAST (OUTPATIENT)
Dept: SURGERY | Facility: HOSPITAL | Age: 85
End: 2020-11-02

## 2020-11-02 DIAGNOSIS — M86.9 OSTEOMYELITIS OF ANKLE AND FOOT (H): ICD-10-CM

## 2020-11-03 ENCOUNTER — COMMUNICATION - HEALTHEAST (OUTPATIENT)
Dept: HOME HEALTH SERVICES | Facility: HOME HEALTH | Age: 85
End: 2020-11-03

## 2020-11-03 ENCOUNTER — COMMUNICATION - HEALTHEAST (OUTPATIENT)
Dept: ADMINISTRATIVE | Facility: CLINIC | Age: 85
End: 2020-11-03

## 2020-11-03 ENCOUNTER — COMMUNICATION - HEALTHEAST (OUTPATIENT)
Dept: VASCULAR SURGERY | Facility: CLINIC | Age: 85
End: 2020-11-03

## 2020-11-03 ENCOUNTER — HOME CARE/HOSPICE - HEALTHEAST (OUTPATIENT)
Dept: HOME HEALTH SERVICES | Facility: HOME HEALTH | Age: 85
End: 2020-11-03

## 2020-11-11 ENCOUNTER — SURGERY - HEALTHEAST (OUTPATIENT)
Dept: VASCULAR SURGERY | Facility: CLINIC | Age: 85
End: 2020-11-11

## 2020-11-11 ENCOUNTER — RECORDS - HEALTHEAST (OUTPATIENT)
Dept: ADMINISTRATIVE | Facility: OTHER | Age: 85
End: 2020-11-11

## 2020-11-11 ENCOUNTER — COMMUNICATION - HEALTHEAST (OUTPATIENT)
Dept: VASCULAR SURGERY | Facility: CLINIC | Age: 85
End: 2020-11-11

## 2020-11-11 ENCOUNTER — OFFICE VISIT - HEALTHEAST (OUTPATIENT)
Dept: VASCULAR SURGERY | Facility: CLINIC | Age: 85
End: 2020-11-11

## 2020-11-11 DIAGNOSIS — M86.9 OSTEOMYELITIS OF ANKLE AND FOOT (H): ICD-10-CM

## 2020-11-11 DIAGNOSIS — L97.414 DIABETIC ULCER OF RIGHT MIDFOOT ASSOCIATED WITH TYPE 2 DIABETES MELLITUS, WITH NECROSIS OF BONE (H): ICD-10-CM

## 2020-11-11 DIAGNOSIS — E11.621 DIABETIC ULCER OF RIGHT MIDFOOT ASSOCIATED WITH TYPE 2 DIABETES MELLITUS, WITH NECROSIS OF BONE (H): ICD-10-CM

## 2020-11-12 ENCOUNTER — AMBULATORY - HEALTHEAST (OUTPATIENT)
Dept: LAB | Facility: CLINIC | Age: 85
End: 2020-11-12

## 2020-11-12 DIAGNOSIS — E11.621 DIABETIC ULCER OF RIGHT MIDFOOT ASSOCIATED WITH TYPE 2 DIABETES MELLITUS, WITH NECROSIS OF BONE (H): ICD-10-CM

## 2020-11-12 DIAGNOSIS — L97.414 DIABETIC ULCER OF RIGHT MIDFOOT ASSOCIATED WITH TYPE 2 DIABETES MELLITUS, WITH NECROSIS OF BONE (H): ICD-10-CM

## 2020-11-12 ASSESSMENT — MIFFLIN-ST. JEOR: SCORE: 1612.52

## 2020-11-13 ENCOUNTER — COMMUNICATION - HEALTHEAST (OUTPATIENT)
Dept: VASCULAR SURGERY | Facility: CLINIC | Age: 85
End: 2020-11-13

## 2020-11-15 ENCOUNTER — COMMUNICATION - HEALTHEAST (OUTPATIENT)
Dept: SCHEDULING | Facility: CLINIC | Age: 85
End: 2020-11-15

## 2020-11-16 ENCOUNTER — ANESTHESIA - HEALTHEAST (OUTPATIENT)
Dept: SURGERY | Facility: CLINIC | Age: 85
End: 2020-11-16

## 2020-11-16 ENCOUNTER — COMMUNICATION - HEALTHEAST (OUTPATIENT)
Dept: VASCULAR SURGERY | Facility: CLINIC | Age: 85
End: 2020-11-16

## 2020-11-16 ENCOUNTER — SURGERY - HEALTHEAST (OUTPATIENT)
Dept: SURGERY | Facility: CLINIC | Age: 85
End: 2020-11-16

## 2020-11-16 ASSESSMENT — MIFFLIN-ST. JEOR: SCORE: 1544.48

## 2020-11-18 ENCOUNTER — COMMUNICATION - HEALTHEAST (OUTPATIENT)
Dept: VASCULAR SURGERY | Facility: CLINIC | Age: 85
End: 2020-11-18

## 2020-11-18 ENCOUNTER — AMBULATORY - HEALTHEAST (OUTPATIENT)
Dept: VASCULAR SURGERY | Facility: CLINIC | Age: 85
End: 2020-11-18

## 2020-11-18 DIAGNOSIS — E11.621 DIABETIC ULCER OF RIGHT MIDFOOT ASSOCIATED WITH TYPE 2 DIABETES MELLITUS, WITH NECROSIS OF BONE (H): ICD-10-CM

## 2020-11-18 DIAGNOSIS — L97.414 DIABETIC ULCER OF RIGHT MIDFOOT ASSOCIATED WITH TYPE 2 DIABETES MELLITUS, WITH NECROSIS OF BONE (H): ICD-10-CM

## 2020-11-18 DIAGNOSIS — M86.9 OSTEOMYELITIS OF ANKLE AND FOOT (H): ICD-10-CM

## 2020-11-23 ENCOUNTER — AMBULATORY - HEALTHEAST (OUTPATIENT)
Dept: VASCULAR SURGERY | Facility: CLINIC | Age: 85
End: 2020-11-23

## 2020-11-23 DIAGNOSIS — E11.621 DIABETIC ULCER OF RIGHT MIDFOOT ASSOCIATED WITH TYPE 2 DIABETES MELLITUS, WITH NECROSIS OF BONE (H): ICD-10-CM

## 2020-11-23 DIAGNOSIS — L97.414 DIABETIC ULCER OF RIGHT MIDFOOT ASSOCIATED WITH TYPE 2 DIABETES MELLITUS, WITH NECROSIS OF BONE (H): ICD-10-CM

## 2020-11-23 ASSESSMENT — MIFFLIN-ST. JEOR: SCORE: 1553.09

## 2020-11-24 ENCOUNTER — AMBULATORY - HEALTHEAST (OUTPATIENT)
Dept: VASCULAR SURGERY | Facility: CLINIC | Age: 85
End: 2020-11-24

## 2020-11-24 ENCOUNTER — COMMUNICATION - HEALTHEAST (OUTPATIENT)
Dept: SCHEDULING | Facility: CLINIC | Age: 85
End: 2020-11-24

## 2020-11-24 DIAGNOSIS — E11.59 TYPE 2 DIABETES MELLITUS WITH OTHER CIRCULATORY COMPLICATIONS (H): ICD-10-CM

## 2020-11-25 ENCOUNTER — AMBULATORY - HEALTHEAST (OUTPATIENT)
Dept: OTHER | Facility: CLINIC | Age: 85
End: 2020-11-25

## 2020-11-27 ENCOUNTER — SURGERY - HEALTHEAST (OUTPATIENT)
Dept: PODIATRY | Facility: CLINIC | Age: 85
End: 2020-11-27

## 2020-11-27 ENCOUNTER — SURGERY - HEALTHEAST (OUTPATIENT)
Dept: SURGERY | Facility: HOSPITAL | Age: 85
End: 2020-11-27

## 2020-11-27 ENCOUNTER — ANESTHESIA - HEALTHEAST (OUTPATIENT)
Dept: SURGERY | Facility: HOSPITAL | Age: 85
End: 2020-11-27

## 2020-11-27 DIAGNOSIS — L02.611 ABSCESS OF RIGHT FOOT: ICD-10-CM

## 2020-12-03 ENCOUNTER — COMMUNICATION - HEALTHEAST (OUTPATIENT)
Dept: SCHEDULING | Facility: CLINIC | Age: 85
End: 2020-12-03

## 2020-12-04 ENCOUNTER — COMMUNICATION - HEALTHEAST (OUTPATIENT)
Dept: VASCULAR SURGERY | Facility: CLINIC | Age: 85
End: 2020-12-04

## 2020-12-04 ENCOUNTER — COMMUNICATION - HEALTHEAST (OUTPATIENT)
Dept: GERIATRICS | Facility: CLINIC | Age: 85
End: 2020-12-04

## 2020-12-06 ENCOUNTER — RECORDS - HEALTHEAST (OUTPATIENT)
Dept: LAB | Facility: CLINIC | Age: 85
End: 2020-12-06

## 2020-12-07 ENCOUNTER — RECORDS - HEALTHEAST (OUTPATIENT)
Dept: ADMINISTRATIVE | Facility: OTHER | Age: 85
End: 2020-12-07

## 2020-12-07 ENCOUNTER — COMMUNICATION - HEALTHEAST (OUTPATIENT)
Dept: GERIATRICS | Facility: CLINIC | Age: 85
End: 2020-12-07

## 2020-12-07 ENCOUNTER — COMMUNICATION - HEALTHEAST (OUTPATIENT)
Dept: VASCULAR SURGERY | Facility: CLINIC | Age: 85
End: 2020-12-07

## 2020-12-07 ENCOUNTER — OFFICE VISIT - HEALTHEAST (OUTPATIENT)
Dept: GERIATRICS | Facility: CLINIC | Age: 85
End: 2020-12-07

## 2020-12-07 DIAGNOSIS — Z71.89 ACP (ADVANCE CARE PLANNING): ICD-10-CM

## 2020-12-07 DIAGNOSIS — M86.9 OSTEOMYELITIS OF RIGHT FOOT, UNSPECIFIED TYPE (H): ICD-10-CM

## 2020-12-07 DIAGNOSIS — Z79.4 TYPE 2 DIABETES MELLITUS WITH OTHER SKIN ULCER, WITH LONG-TERM CURRENT USE OF INSULIN (H): ICD-10-CM

## 2020-12-07 DIAGNOSIS — E11.622 TYPE 2 DIABETES MELLITUS WITH OTHER SKIN ULCER, WITH LONG-TERM CURRENT USE OF INSULIN (H): ICD-10-CM

## 2020-12-07 DIAGNOSIS — I48.0 PAROXYSMAL ATRIAL FIBRILLATION (H): ICD-10-CM

## 2020-12-07 LAB
ALBUMIN SERPL-MCNC: 2.3 G/DL (ref 3.5–5)
ALP SERPL-CCNC: 98 U/L (ref 45–120)
ALT SERPL W P-5'-P-CCNC: 20 U/L (ref 0–45)
ANION GAP SERPL CALCULATED.3IONS-SCNC: 9 MMOL/L (ref 5–18)
AST SERPL W P-5'-P-CCNC: 21 U/L (ref 0–40)
BASOPHILS # BLD AUTO: 0.1 THOU/UL (ref 0–0.2)
BASOPHILS NFR BLD AUTO: 1 % (ref 0–2)
BILIRUB SERPL-MCNC: 0.7 MG/DL (ref 0–1)
BUN SERPL-MCNC: 24 MG/DL (ref 8–28)
C REACTIVE PROTEIN LHE: 1.9 MG/DL (ref 0–0.8)
CALCIUM SERPL-MCNC: 9.2 MG/DL (ref 8.5–10.5)
CHLORIDE BLD-SCNC: 99 MMOL/L (ref 98–107)
CO2 SERPL-SCNC: 27 MMOL/L (ref 22–31)
CREAT SERPL-MCNC: 0.71 MG/DL (ref 0.7–1.3)
EOSINOPHIL # BLD AUTO: 0.4 THOU/UL (ref 0–0.4)
EOSINOPHIL NFR BLD AUTO: 5 % (ref 0–6)
ERYTHROCYTE [DISTWIDTH] IN BLOOD BY AUTOMATED COUNT: 12.1 % (ref 11–14.5)
GFR SERPL CREATININE-BSD FRML MDRD: >60 ML/MIN/1.73M2
GLUCOSE BLD-MCNC: 47 MG/DL (ref 70–125)
HCT VFR BLD AUTO: 41.8 % (ref 40–54)
HGB BLD-MCNC: 13.6 G/DL (ref 14–18)
IMM GRANULOCYTES # BLD: 0.1 THOU/UL
IMM GRANULOCYTES NFR BLD: 1 %
LYMPHOCYTES # BLD AUTO: 2 THOU/UL (ref 0.8–4.4)
LYMPHOCYTES NFR BLD AUTO: 25 % (ref 20–40)
MCH RBC QN AUTO: 29.8 PG (ref 27–34)
MCHC RBC AUTO-ENTMCNC: 32.5 G/DL (ref 32–36)
MCV RBC AUTO: 92 FL (ref 80–100)
MONOCYTES # BLD AUTO: 1 THOU/UL (ref 0–0.9)
MONOCYTES NFR BLD AUTO: 12 % (ref 2–10)
NEUTROPHILS # BLD AUTO: 4.6 THOU/UL (ref 2–7.7)
NEUTROPHILS NFR BLD AUTO: 57 % (ref 50–70)
PLATELET # BLD AUTO: 430 THOU/UL (ref 140–440)
PMV BLD AUTO: 9.7 FL (ref 8.5–12.5)
POTASSIUM BLD-SCNC: 4.1 MMOL/L (ref 3.5–5)
PROT SERPL-MCNC: 6.6 G/DL (ref 6–8)
RBC # BLD AUTO: 4.57 MILL/UL (ref 4.4–6.2)
SODIUM SERPL-SCNC: 135 MMOL/L (ref 136–145)
WBC: 8.2 THOU/UL (ref 4–11)

## 2020-12-08 ENCOUNTER — OFFICE VISIT - HEALTHEAST (OUTPATIENT)
Dept: GERIATRICS | Facility: CLINIC | Age: 85
End: 2020-12-08

## 2020-12-08 ENCOUNTER — RECORDS - HEALTHEAST (OUTPATIENT)
Dept: LAB | Facility: CLINIC | Age: 85
End: 2020-12-08

## 2020-12-08 DIAGNOSIS — L97.414 DIABETIC ULCER OF RIGHT MIDFOOT ASSOCIATED WITH TYPE 2 DIABETES MELLITUS, WITH NECROSIS OF BONE (H): ICD-10-CM

## 2020-12-08 DIAGNOSIS — Z79.4 TYPE 2 DIABETES MELLITUS WITH OTHER SKIN ULCER, WITH LONG-TERM CURRENT USE OF INSULIN (H): ICD-10-CM

## 2020-12-08 DIAGNOSIS — I73.9 PVD (PERIPHERAL VASCULAR DISEASE) (H): ICD-10-CM

## 2020-12-08 DIAGNOSIS — L02.611 ABSCESS OF RIGHT FOOT: ICD-10-CM

## 2020-12-08 DIAGNOSIS — R31.0 GROSS HEMATURIA: ICD-10-CM

## 2020-12-08 DIAGNOSIS — E11.621 DIABETIC ULCER OF RIGHT MIDFOOT ASSOCIATED WITH TYPE 2 DIABETES MELLITUS, WITH NECROSIS OF BONE (H): ICD-10-CM

## 2020-12-08 DIAGNOSIS — E11.622 TYPE 2 DIABETES MELLITUS WITH OTHER SKIN ULCER, WITH LONG-TERM CURRENT USE OF INSULIN (H): ICD-10-CM

## 2020-12-08 DIAGNOSIS — M86.9 OSTEOMYELITIS OF RIGHT FOOT, UNSPECIFIED TYPE (H): ICD-10-CM

## 2020-12-09 ENCOUNTER — RECORDS - HEALTHEAST (OUTPATIENT)
Dept: VASCULAR ULTRASOUND | Facility: CLINIC | Age: 85
End: 2020-12-09

## 2020-12-09 ENCOUNTER — OFFICE VISIT - HEALTHEAST (OUTPATIENT)
Dept: VASCULAR SURGERY | Facility: CLINIC | Age: 85
End: 2020-12-09

## 2020-12-09 ENCOUNTER — SURGERY - HEALTHEAST (OUTPATIENT)
Dept: PODIATRY | Facility: CLINIC | Age: 85
End: 2020-12-09

## 2020-12-09 ENCOUNTER — AMBULATORY - HEALTHEAST (OUTPATIENT)
Dept: SURGERY | Facility: HOSPITAL | Age: 85
End: 2020-12-09

## 2020-12-09 ENCOUNTER — COMMUNICATION - HEALTHEAST (OUTPATIENT)
Dept: GERIATRICS | Facility: CLINIC | Age: 85
End: 2020-12-09

## 2020-12-09 ENCOUNTER — OFFICE VISIT - HEALTHEAST (OUTPATIENT)
Dept: GERIATRICS | Facility: CLINIC | Age: 85
End: 2020-12-09

## 2020-12-09 DIAGNOSIS — Z20.822 COVID-19 RULED OUT: ICD-10-CM

## 2020-12-09 DIAGNOSIS — E11.621 DIABETIC ULCER OF RIGHT MIDFOOT ASSOCIATED WITH TYPE 2 DIABETES MELLITUS, WITH NECROSIS OF MUSCLE (H): ICD-10-CM

## 2020-12-09 DIAGNOSIS — E11.59 TYPE 2 DIABETES MELLITUS WITH OTHER CIRCULATORY COMPLICATIONS (H): ICD-10-CM

## 2020-12-09 DIAGNOSIS — L97.413 DIABETIC ULCER OF RIGHT MIDFOOT ASSOCIATED WITH TYPE 2 DIABETES MELLITUS, WITH NECROSIS OF MUSCLE (H): ICD-10-CM

## 2020-12-09 DIAGNOSIS — R31.0 GROSS HEMATURIA: ICD-10-CM

## 2020-12-09 DIAGNOSIS — R33.9 URINARY RETENTION: ICD-10-CM

## 2020-12-09 LAB — HGB BLD-MCNC: 13.4 G/DL (ref 14–18)

## 2020-12-10 ENCOUNTER — COMMUNICATION - HEALTHEAST (OUTPATIENT)
Dept: VASCULAR SURGERY | Facility: CLINIC | Age: 85
End: 2020-12-10

## 2020-12-10 ENCOUNTER — OFFICE VISIT - HEALTHEAST (OUTPATIENT)
Dept: GERIATRICS | Facility: CLINIC | Age: 85
End: 2020-12-10

## 2020-12-10 ENCOUNTER — RECORDS - HEALTHEAST (OUTPATIENT)
Dept: LAB | Facility: CLINIC | Age: 85
End: 2020-12-10

## 2020-12-10 ENCOUNTER — RECORDS - HEALTHEAST (OUTPATIENT)
Dept: ADMINISTRATIVE | Facility: OTHER | Age: 85
End: 2020-12-10

## 2020-12-10 ENCOUNTER — OFFICE VISIT - HEALTHEAST (OUTPATIENT)
Dept: VASCULAR SURGERY | Facility: CLINIC | Age: 85
End: 2020-12-10

## 2020-12-10 DIAGNOSIS — L97.413 DIABETIC ULCER OF RIGHT MIDFOOT ASSOCIATED WITH TYPE 2 DIABETES MELLITUS, WITH NECROSIS OF MUSCLE (H): ICD-10-CM

## 2020-12-10 DIAGNOSIS — I48.0 PAROXYSMAL ATRIAL FIBRILLATION (H): ICD-10-CM

## 2020-12-10 DIAGNOSIS — E11.622 TYPE 2 DIABETES MELLITUS WITH OTHER SKIN ULCER, WITH LONG-TERM CURRENT USE OF INSULIN (H): ICD-10-CM

## 2020-12-10 DIAGNOSIS — Z79.4 TYPE 2 DIABETES MELLITUS WITH OTHER SKIN ULCER, WITH LONG-TERM CURRENT USE OF INSULIN (H): ICD-10-CM

## 2020-12-10 DIAGNOSIS — I10 ESSENTIAL HYPERTENSION: ICD-10-CM

## 2020-12-10 DIAGNOSIS — E11.621 DIABETIC ULCER OF RIGHT MIDFOOT ASSOCIATED WITH TYPE 2 DIABETES MELLITUS, WITH NECROSIS OF MUSCLE (H): ICD-10-CM

## 2020-12-10 DIAGNOSIS — I73.9 PVD (PERIPHERAL VASCULAR DISEASE) (H): ICD-10-CM

## 2020-12-10 LAB
ALBUMIN UR-MCNC: ABNORMAL MG/DL
AMORPH CRY #/AREA URNS HPF: ABNORMAL /[HPF]
APPEARANCE UR: ABNORMAL
BACTERIA #/AREA URNS HPF: ABNORMAL HPF
BILIRUB UR QL STRIP: NEGATIVE
COLOR UR AUTO: ABNORMAL
GLUCOSE UR STRIP-MCNC: NEGATIVE MG/DL
HGB UR QL STRIP: ABNORMAL
HYALINE CASTS #/AREA URNS LPF: ABNORMAL LPF
KETONES UR STRIP-MCNC: NEGATIVE MG/DL
LEUKOCYTE ESTERASE UR QL STRIP: ABNORMAL
MUCOUS THREADS #/AREA URNS LPF: ABNORMAL LPF
NITRATE UR QL: NEGATIVE
PH UR STRIP: 5.5 [PH] (ref 4.5–8)
RBC #/AREA URNS AUTO: >100 HPF
SP GR UR STRIP: 1.03 (ref 1–1.03)
SQUAMOUS #/AREA URNS AUTO: ABNORMAL LPF
UROBILINOGEN UR STRIP-ACNC: ABNORMAL
WBC #/AREA URNS AUTO: ABNORMAL HPF
YEAST #/AREA URNS HPF: ABNORMAL HPF

## 2020-12-13 ENCOUNTER — RECORDS - HEALTHEAST (OUTPATIENT)
Dept: LAB | Facility: CLINIC | Age: 85
End: 2020-12-13

## 2020-12-13 LAB — BACTERIA SPEC CULT: ABNORMAL

## 2020-12-14 ENCOUNTER — COMMUNICATION - HEALTHEAST (OUTPATIENT)
Dept: GERIATRICS | Facility: CLINIC | Age: 85
End: 2020-12-14

## 2020-12-14 ENCOUNTER — OFFICE VISIT - HEALTHEAST (OUTPATIENT)
Dept: GERIATRICS | Facility: CLINIC | Age: 85
End: 2020-12-14

## 2020-12-14 DIAGNOSIS — E11.621 DIABETIC ULCER OF RIGHT MIDFOOT ASSOCIATED WITH TYPE 2 DIABETES MELLITUS, WITH NECROSIS OF MUSCLE (H): ICD-10-CM

## 2020-12-14 DIAGNOSIS — I10 ESSENTIAL HYPERTENSION: ICD-10-CM

## 2020-12-14 DIAGNOSIS — L97.413 DIABETIC ULCER OF RIGHT MIDFOOT ASSOCIATED WITH TYPE 2 DIABETES MELLITUS, WITH NECROSIS OF MUSCLE (H): ICD-10-CM

## 2020-12-14 DIAGNOSIS — I26.09 ACUTE PULMONARY EMBOLISM WITH ACUTE COR PULMONALE, UNSPECIFIED PULMONARY EMBOLISM TYPE (H): ICD-10-CM

## 2020-12-14 LAB
ALBUMIN SERPL-MCNC: 2.5 G/DL (ref 3.5–5)
ALP SERPL-CCNC: 103 U/L (ref 45–120)
ALT SERPL W P-5'-P-CCNC: 16 U/L (ref 0–45)
ANION GAP SERPL CALCULATED.3IONS-SCNC: 10 MMOL/L (ref 5–18)
AST SERPL W P-5'-P-CCNC: 24 U/L (ref 0–40)
BILIRUB SERPL-MCNC: 0.7 MG/DL (ref 0–1)
BUN SERPL-MCNC: 18 MG/DL (ref 8–28)
C REACTIVE PROTEIN LHE: 5.5 MG/DL (ref 0–0.8)
CALCIUM SERPL-MCNC: 9.2 MG/DL (ref 8.5–10.5)
CHLORIDE BLD-SCNC: 100 MMOL/L (ref 98–107)
CO2 SERPL-SCNC: 27 MMOL/L (ref 22–31)
CREAT SERPL-MCNC: 0.93 MG/DL (ref 0.7–1.3)
ERYTHROCYTE [DISTWIDTH] IN BLOOD BY AUTOMATED COUNT: 12.6 % (ref 11–14.5)
GFR SERPL CREATININE-BSD FRML MDRD: >60 ML/MIN/1.73M2
GLUCOSE BLD-MCNC: 118 MG/DL (ref 70–125)
HCT VFR BLD AUTO: 36 % (ref 40–54)
HGB BLD-MCNC: 11.8 G/DL (ref 14–18)
MCH RBC QN AUTO: 29.9 PG (ref 27–34)
MCHC RBC AUTO-ENTMCNC: 32.8 G/DL (ref 32–36)
MCV RBC AUTO: 91 FL (ref 80–100)
PLATELET # BLD AUTO: 317 THOU/UL (ref 140–440)
PMV BLD AUTO: 10.2 FL (ref 8.5–12.5)
POTASSIUM BLD-SCNC: 4.6 MMOL/L (ref 3.5–5)
PROT SERPL-MCNC: 6.4 G/DL (ref 6–8)
RBC # BLD AUTO: 3.95 MILL/UL (ref 4.4–6.2)
SODIUM SERPL-SCNC: 137 MMOL/L (ref 136–145)
WBC: 8.3 THOU/UL (ref 4–11)

## 2020-12-15 ENCOUNTER — RECORDS - HEALTHEAST (OUTPATIENT)
Dept: ADMINISTRATIVE | Facility: OTHER | Age: 85
End: 2020-12-15

## 2020-12-16 ENCOUNTER — COMMUNICATION - HEALTHEAST (OUTPATIENT)
Dept: INFECTIOUS DISEASES | Facility: CLINIC | Age: 85
End: 2020-12-16

## 2020-12-16 ENCOUNTER — COMMUNICATION - HEALTHEAST (OUTPATIENT)
Dept: GERIATRICS | Facility: CLINIC | Age: 85
End: 2020-12-16

## 2020-12-17 ENCOUNTER — SURGERY - HEALTHEAST (OUTPATIENT)
Dept: SURGERY | Facility: HOSPITAL | Age: 85
End: 2020-12-17

## 2020-12-17 ENCOUNTER — ANESTHESIA - HEALTHEAST (OUTPATIENT)
Dept: SURGERY | Facility: HOSPITAL | Age: 85
End: 2020-12-17

## 2020-12-18 ENCOUNTER — OFFICE VISIT - HEALTHEAST (OUTPATIENT)
Dept: GERIATRICS | Facility: CLINIC | Age: 85
End: 2020-12-18

## 2020-12-18 DIAGNOSIS — E11.51 TYPE 2 DIABETES MELLITUS WITH DIABETIC PERIPHERAL ANGIOPATHY WITHOUT GANGRENE, WITH LONG-TERM CURRENT USE OF INSULIN (H): ICD-10-CM

## 2020-12-18 DIAGNOSIS — E11.621 DIABETIC ULCER OF RIGHT MIDFOOT ASSOCIATED WITH TYPE 2 DIABETES MELLITUS, WITH NECROSIS OF MUSCLE (H): ICD-10-CM

## 2020-12-18 DIAGNOSIS — Z79.4 TYPE 2 DIABETES MELLITUS WITH DIABETIC PERIPHERAL ANGIOPATHY WITHOUT GANGRENE, WITH LONG-TERM CURRENT USE OF INSULIN (H): ICD-10-CM

## 2020-12-18 DIAGNOSIS — L97.413 DIABETIC ULCER OF RIGHT MIDFOOT ASSOCIATED WITH TYPE 2 DIABETES MELLITUS, WITH NECROSIS OF MUSCLE (H): ICD-10-CM

## 2020-12-20 ENCOUNTER — RECORDS - HEALTHEAST (OUTPATIENT)
Dept: LAB | Facility: CLINIC | Age: 85
End: 2020-12-20

## 2020-12-21 ENCOUNTER — OFFICE VISIT - HEALTHEAST (OUTPATIENT)
Dept: GERIATRICS | Facility: CLINIC | Age: 85
End: 2020-12-21

## 2020-12-21 DIAGNOSIS — L02.611 ABSCESS OF RIGHT FOOT: ICD-10-CM

## 2020-12-21 DIAGNOSIS — R33.9 URINARY RETENTION: ICD-10-CM

## 2020-12-21 DIAGNOSIS — R31.0 GROSS HEMATURIA: ICD-10-CM

## 2020-12-21 LAB
ANION GAP SERPL CALCULATED.3IONS-SCNC: 7 MMOL/L (ref 5–18)
BUN SERPL-MCNC: 11 MG/DL (ref 8–28)
C REACTIVE PROTEIN LHE: 4.7 MG/DL (ref 0–0.8)
CALCIUM SERPL-MCNC: 8.3 MG/DL (ref 8.5–10.5)
CHLORIDE BLD-SCNC: 104 MMOL/L (ref 98–107)
CO2 SERPL-SCNC: 29 MMOL/L (ref 22–31)
CREAT SERPL-MCNC: 0.77 MG/DL (ref 0.7–1.3)
GFR SERPL CREATININE-BSD FRML MDRD: >60 ML/MIN/1.73M2
GLUCOSE BLD-MCNC: 84 MG/DL (ref 70–125)
POTASSIUM BLD-SCNC: 4.2 MMOL/L (ref 3.5–5)
SODIUM SERPL-SCNC: 140 MMOL/L (ref 136–145)

## 2020-12-23 ENCOUNTER — RECORDS - HEALTHEAST (OUTPATIENT)
Dept: LAB | Facility: CLINIC | Age: 85
End: 2020-12-23

## 2020-12-23 ENCOUNTER — OFFICE VISIT - HEALTHEAST (OUTPATIENT)
Dept: VASCULAR SURGERY | Facility: CLINIC | Age: 85
End: 2020-12-23

## 2020-12-23 DIAGNOSIS — L97.413 DIABETIC ULCER OF RIGHT MIDFOOT ASSOCIATED WITH TYPE 2 DIABETES MELLITUS, WITH NECROSIS OF MUSCLE (H): ICD-10-CM

## 2020-12-23 DIAGNOSIS — E11.621 DIABETIC ULCER OF RIGHT MIDFOOT ASSOCIATED WITH TYPE 2 DIABETES MELLITUS, WITH NECROSIS OF MUSCLE (H): ICD-10-CM

## 2020-12-23 LAB
ALBUMIN UR-MCNC: ABNORMAL MG/DL
APPEARANCE UR: ABNORMAL
BACTERIA #/AREA URNS HPF: ABNORMAL HPF
BILIRUB UR QL STRIP: NEGATIVE
COLOR UR AUTO: YELLOW
GLUCOSE UR STRIP-MCNC: NEGATIVE MG/DL
HGB UR QL STRIP: ABNORMAL
KETONES UR STRIP-MCNC: NEGATIVE MG/DL
LEUKOCYTE ESTERASE UR QL STRIP: ABNORMAL
NITRATE UR QL: NEGATIVE
PH UR STRIP: 7 [PH] (ref 4.5–8)
RBC #/AREA URNS AUTO: ABNORMAL HPF
SP GR UR STRIP: 1.02 (ref 1–1.03)
SQUAMOUS #/AREA URNS AUTO: ABNORMAL LPF
UROBILINOGEN UR STRIP-ACNC: ABNORMAL
WBC #/AREA URNS AUTO: ABNORMAL HPF
WBC CLUMPS #/AREA URNS HPF: PRESENT /[HPF]

## 2020-12-24 LAB — BACTERIA SPEC CULT: NO GROWTH

## 2020-12-27 ENCOUNTER — RECORDS - HEALTHEAST (OUTPATIENT)
Dept: LAB | Facility: CLINIC | Age: 85
End: 2020-12-27

## 2020-12-28 ENCOUNTER — OFFICE VISIT - HEALTHEAST (OUTPATIENT)
Dept: INFECTIOUS DISEASES | Facility: CLINIC | Age: 85
End: 2020-12-28

## 2020-12-28 ENCOUNTER — RECORDS - HEALTHEAST (OUTPATIENT)
Dept: LAB | Facility: CLINIC | Age: 85
End: 2020-12-28

## 2020-12-28 DIAGNOSIS — M86.9 OSTEOMYELITIS OF RIGHT FOOT, UNSPECIFIED TYPE (H): ICD-10-CM

## 2020-12-28 LAB
ALBUMIN SERPL-MCNC: 2.6 G/DL (ref 3.5–5)
ALP SERPL-CCNC: 98 U/L (ref 45–120)
ALT SERPL W P-5'-P-CCNC: 103 U/L (ref 0–45)
ANION GAP SERPL CALCULATED.3IONS-SCNC: 9 MMOL/L (ref 5–18)
AST SERPL W P-5'-P-CCNC: 75 U/L (ref 0–40)
BASOPHILS # BLD AUTO: 0.1 THOU/UL (ref 0–0.2)
BASOPHILS NFR BLD AUTO: 1 % (ref 0–2)
BILIRUB SERPL-MCNC: 0.5 MG/DL (ref 0–1)
BUN SERPL-MCNC: 15 MG/DL (ref 8–28)
C REACTIVE PROTEIN LHE: 4.1 MG/DL (ref 0–0.8)
CALCIUM SERPL-MCNC: 8.8 MG/DL (ref 8.5–10.5)
CHLORIDE BLD-SCNC: 104 MMOL/L (ref 98–107)
CO2 SERPL-SCNC: 27 MMOL/L (ref 22–31)
CREAT SERPL-MCNC: 0.88 MG/DL (ref 0.7–1.3)
EOSINOPHIL # BLD AUTO: 0.4 THOU/UL (ref 0–0.4)
EOSINOPHIL NFR BLD AUTO: 7 % (ref 0–6)
ERYTHROCYTE [DISTWIDTH] IN BLOOD BY AUTOMATED COUNT: 13.5 % (ref 11–14.5)
GFR SERPL CREATININE-BSD FRML MDRD: >60 ML/MIN/1.73M2
GLUCOSE BLD-MCNC: 193 MG/DL (ref 70–125)
HCT VFR BLD AUTO: 40.3 % (ref 40–54)
HGB BLD-MCNC: 12.9 G/DL (ref 14–18)
IMM GRANULOCYTES # BLD: 0 THOU/UL
IMM GRANULOCYTES NFR BLD: 1 %
LYMPHOCYTES # BLD AUTO: 0.9 THOU/UL (ref 0.8–4.4)
LYMPHOCYTES NFR BLD AUTO: 15 % (ref 20–40)
MCH RBC QN AUTO: 29.6 PG (ref 27–34)
MCHC RBC AUTO-ENTMCNC: 32 G/DL (ref 32–36)
MCV RBC AUTO: 92 FL (ref 80–100)
MONOCYTES # BLD AUTO: 0.7 THOU/UL (ref 0–0.9)
MONOCYTES NFR BLD AUTO: 12 % (ref 2–10)
NEUTROPHILS # BLD AUTO: 3.8 THOU/UL (ref 2–7.7)
NEUTROPHILS NFR BLD AUTO: 65 % (ref 50–70)
PLATELET # BLD AUTO: 259 THOU/UL (ref 140–440)
PMV BLD AUTO: 10.1 FL (ref 8.5–12.5)
POTASSIUM BLD-SCNC: 4.9 MMOL/L (ref 3.5–5)
PROT SERPL-MCNC: 6.5 G/DL (ref 6–8)
RBC # BLD AUTO: 4.36 MILL/UL (ref 4.4–6.2)
SODIUM SERPL-SCNC: 140 MMOL/L (ref 136–145)
WBC: 5.8 THOU/UL (ref 4–11)

## 2020-12-29 ENCOUNTER — OFFICE VISIT - HEALTHEAST (OUTPATIENT)
Dept: GERIATRICS | Facility: CLINIC | Age: 85
End: 2020-12-29

## 2020-12-29 ENCOUNTER — COMMUNICATION - HEALTHEAST (OUTPATIENT)
Dept: GERIATRICS | Facility: CLINIC | Age: 85
End: 2020-12-29

## 2020-12-29 DIAGNOSIS — M86.9 OSTEOMYELITIS OF RIGHT FOOT, UNSPECIFIED TYPE (H): ICD-10-CM

## 2020-12-29 DIAGNOSIS — R31.0 GROSS HEMATURIA: ICD-10-CM

## 2020-12-29 LAB
ALBUMIN SERPL-MCNC: 2.6 G/DL (ref 3.5–5)
ALP SERPL-CCNC: 96 U/L (ref 45–120)
ALT SERPL W P-5'-P-CCNC: 116 U/L (ref 0–45)
AST SERPL W P-5'-P-CCNC: 85 U/L (ref 0–40)
BILIRUB DIRECT SERPL-MCNC: 0.2 MG/DL
BILIRUB SERPL-MCNC: 0.7 MG/DL (ref 0–1)
PROT SERPL-MCNC: 6.6 G/DL (ref 6–8)

## 2020-12-30 ENCOUNTER — RECORDS - HEALTHEAST (OUTPATIENT)
Dept: LAB | Facility: CLINIC | Age: 85
End: 2020-12-30

## 2020-12-30 LAB
ALBUMIN UR-MCNC: ABNORMAL MG/DL
APPEARANCE UR: ABNORMAL
BACTERIA #/AREA URNS HPF: ABNORMAL HPF
BILIRUB UR QL STRIP: NEGATIVE
CAOX CRY #/AREA URNS HPF: PRESENT /[HPF]
COLOR UR AUTO: ABNORMAL
GLUCOSE UR STRIP-MCNC: NEGATIVE MG/DL
HGB UR QL STRIP: ABNORMAL
KETONES UR STRIP-MCNC: NEGATIVE MG/DL
LEUKOCYTE ESTERASE UR QL STRIP: ABNORMAL
MUCOUS THREADS #/AREA URNS LPF: ABNORMAL LPF
NITRATE UR QL: NEGATIVE
PH UR STRIP: 6 [PH] (ref 4.5–8)
RBC #/AREA URNS AUTO: >100 HPF
SP GR UR STRIP: 1.03 (ref 1–1.03)
SQUAMOUS #/AREA URNS AUTO: ABNORMAL LPF
UROBILINOGEN UR STRIP-ACNC: ABNORMAL
WBC #/AREA URNS AUTO: >100 HPF
YEAST #/AREA URNS HPF: ABNORMAL HPF

## 2020-12-31 LAB — BACTERIA SPEC CULT: NO GROWTH

## 2021-01-04 ENCOUNTER — OFFICE VISIT - HEALTHEAST (OUTPATIENT)
Dept: GERIATRICS | Facility: CLINIC | Age: 86
End: 2021-01-04

## 2021-01-04 ENCOUNTER — RECORDS - HEALTHEAST (OUTPATIENT)
Dept: LAB | Facility: CLINIC | Age: 86
End: 2021-01-04

## 2021-01-04 DIAGNOSIS — R33.9 URINARY RETENTION: ICD-10-CM

## 2021-01-04 DIAGNOSIS — L97.413 DIABETIC ULCER OF RIGHT MIDFOOT ASSOCIATED WITH TYPE 2 DIABETES MELLITUS, WITH NECROSIS OF MUSCLE (H): ICD-10-CM

## 2021-01-04 DIAGNOSIS — E11.621 DIABETIC ULCER OF RIGHT MIDFOOT ASSOCIATED WITH TYPE 2 DIABETES MELLITUS, WITH NECROSIS OF MUSCLE (H): ICD-10-CM

## 2021-01-05 ENCOUNTER — COMMUNICATION - HEALTHEAST (OUTPATIENT)
Dept: GERIATRICS | Facility: CLINIC | Age: 86
End: 2021-01-05

## 2021-01-05 ENCOUNTER — RECORDS - HEALTHEAST (OUTPATIENT)
Dept: ADMINISTRATIVE | Facility: OTHER | Age: 86
End: 2021-01-05

## 2021-01-05 LAB
ALBUMIN SERPL-MCNC: 2.6 G/DL (ref 3.5–5)
ALP SERPL-CCNC: 100 U/L (ref 45–120)
ALT SERPL W P-5'-P-CCNC: 54 U/L (ref 0–45)
ANION GAP SERPL CALCULATED.3IONS-SCNC: 10 MMOL/L (ref 5–18)
AST SERPL W P-5'-P-CCNC: 32 U/L (ref 0–40)
BASOPHILS # BLD AUTO: 0.1 THOU/UL (ref 0–0.2)
BASOPHILS NFR BLD AUTO: 1 % (ref 0–2)
BILIRUB DIRECT SERPL-MCNC: 0.3 MG/DL
BILIRUB SERPL-MCNC: 0.6 MG/DL (ref 0–1)
BUN SERPL-MCNC: 18 MG/DL (ref 8–28)
C REACTIVE PROTEIN LHE: 1.7 MG/DL (ref 0–0.8)
CALCIUM SERPL-MCNC: 8.5 MG/DL (ref 8.5–10.5)
CHLORIDE BLD-SCNC: 103 MMOL/L (ref 98–107)
CO2 SERPL-SCNC: 27 MMOL/L (ref 22–31)
CREAT SERPL-MCNC: 0.96 MG/DL (ref 0.7–1.3)
EOSINOPHIL # BLD AUTO: 0.3 THOU/UL (ref 0–0.4)
EOSINOPHIL NFR BLD AUTO: 6 % (ref 0–6)
ERYTHROCYTE [DISTWIDTH] IN BLOOD BY AUTOMATED COUNT: 13.9 % (ref 11–14.5)
GFR SERPL CREATININE-BSD FRML MDRD: >60 ML/MIN/1.73M2
GLUCOSE BLD-MCNC: 120 MG/DL (ref 70–125)
HCT VFR BLD AUTO: 38.9 % (ref 40–54)
HGB BLD-MCNC: 12.6 G/DL (ref 14–18)
IMM GRANULOCYTES # BLD: 0 THOU/UL
IMM GRANULOCYTES NFR BLD: 1 %
LYMPHOCYTES # BLD AUTO: 1.1 THOU/UL (ref 0.8–4.4)
LYMPHOCYTES NFR BLD AUTO: 19 % (ref 20–40)
MCH RBC QN AUTO: 29.9 PG (ref 27–34)
MCHC RBC AUTO-ENTMCNC: 32.4 G/DL (ref 32–36)
MCV RBC AUTO: 92 FL (ref 80–100)
MONOCYTES # BLD AUTO: 0.7 THOU/UL (ref 0–0.9)
MONOCYTES NFR BLD AUTO: 12 % (ref 2–10)
NEUTROPHILS # BLD AUTO: 3.6 THOU/UL (ref 2–7.7)
NEUTROPHILS NFR BLD AUTO: 62 % (ref 50–70)
PLATELET # BLD AUTO: 293 THOU/UL (ref 140–440)
PMV BLD AUTO: 9.7 FL (ref 8.5–12.5)
POTASSIUM BLD-SCNC: 4.3 MMOL/L (ref 3.5–5)
PROT SERPL-MCNC: 6.4 G/DL (ref 6–8)
RBC # BLD AUTO: 4.21 MILL/UL (ref 4.4–6.2)
SODIUM SERPL-SCNC: 140 MMOL/L (ref 136–145)
WBC: 5.7 THOU/UL (ref 4–11)

## 2021-01-06 ENCOUNTER — OFFICE VISIT - HEALTHEAST (OUTPATIENT)
Dept: VASCULAR SURGERY | Facility: CLINIC | Age: 86
End: 2021-01-06

## 2021-01-06 DIAGNOSIS — L97.413 DIABETIC ULCER OF RIGHT MIDFOOT ASSOCIATED WITH TYPE 2 DIABETES MELLITUS, WITH NECROSIS OF MUSCLE (H): ICD-10-CM

## 2021-01-06 DIAGNOSIS — E11.621 DIABETIC ULCER OF RIGHT MIDFOOT ASSOCIATED WITH TYPE 2 DIABETES MELLITUS, WITH NECROSIS OF MUSCLE (H): ICD-10-CM

## 2021-01-07 ENCOUNTER — OFFICE VISIT - HEALTHEAST (OUTPATIENT)
Dept: GERIATRICS | Facility: CLINIC | Age: 86
End: 2021-01-07

## 2021-01-07 DIAGNOSIS — E11.621 DIABETIC ULCER OF RIGHT MIDFOOT ASSOCIATED WITH TYPE 2 DIABETES MELLITUS, WITH NECROSIS OF MUSCLE (H): ICD-10-CM

## 2021-01-07 DIAGNOSIS — I10 ESSENTIAL HYPERTENSION: ICD-10-CM

## 2021-01-07 DIAGNOSIS — Z79.4 TYPE 2 DIABETES MELLITUS WITH DIABETIC PERIPHERAL ANGIOPATHY WITHOUT GANGRENE, WITH LONG-TERM CURRENT USE OF INSULIN (H): ICD-10-CM

## 2021-01-07 DIAGNOSIS — I73.9 PVD (PERIPHERAL VASCULAR DISEASE) (H): ICD-10-CM

## 2021-01-07 DIAGNOSIS — E11.622 TYPE 2 DIABETES MELLITUS WITH OTHER SKIN ULCER, WITH LONG-TERM CURRENT USE OF INSULIN (H): ICD-10-CM

## 2021-01-07 DIAGNOSIS — Z79.4 TYPE 2 DIABETES MELLITUS WITH OTHER SKIN ULCER, WITH LONG-TERM CURRENT USE OF INSULIN (H): ICD-10-CM

## 2021-01-07 DIAGNOSIS — L97.413 DIABETIC ULCER OF RIGHT MIDFOOT ASSOCIATED WITH TYPE 2 DIABETES MELLITUS, WITH NECROSIS OF MUSCLE (H): ICD-10-CM

## 2021-01-07 DIAGNOSIS — E11.51 TYPE 2 DIABETES MELLITUS WITH DIABETIC PERIPHERAL ANGIOPATHY WITHOUT GANGRENE, WITH LONG-TERM CURRENT USE OF INSULIN (H): ICD-10-CM

## 2021-01-11 ENCOUNTER — RECORDS - HEALTHEAST (OUTPATIENT)
Dept: LAB | Facility: CLINIC | Age: 86
End: 2021-01-11

## 2021-01-11 ENCOUNTER — OFFICE VISIT - HEALTHEAST (OUTPATIENT)
Dept: INFECTIOUS DISEASES | Facility: CLINIC | Age: 86
End: 2021-01-11

## 2021-01-11 DIAGNOSIS — Z79.2 RECEIVING INTRAVENOUS ANTIBIOTIC TREATMENT AS OUTPATIENT: ICD-10-CM

## 2021-01-11 DIAGNOSIS — M86.271 SUBACUTE OSTEOMYELITIS OF RIGHT FOOT (H): ICD-10-CM

## 2021-01-12 ENCOUNTER — OFFICE VISIT - HEALTHEAST (OUTPATIENT)
Dept: GERIATRICS | Facility: CLINIC | Age: 86
End: 2021-01-12

## 2021-01-12 ENCOUNTER — COMMUNICATION - HEALTHEAST (OUTPATIENT)
Dept: GERIATRICS | Facility: CLINIC | Age: 86
End: 2021-01-12

## 2021-01-12 DIAGNOSIS — R31.0 GROSS HEMATURIA: ICD-10-CM

## 2021-01-12 DIAGNOSIS — Z79.4 TYPE 2 DIABETES MELLITUS WITH OTHER SKIN ULCER, WITH LONG-TERM CURRENT USE OF INSULIN (H): ICD-10-CM

## 2021-01-12 DIAGNOSIS — E11.622 TYPE 2 DIABETES MELLITUS WITH OTHER SKIN ULCER, WITH LONG-TERM CURRENT USE OF INSULIN (H): ICD-10-CM

## 2021-01-12 DIAGNOSIS — R33.9 URINARY RETENTION: ICD-10-CM

## 2021-01-12 DIAGNOSIS — E11.621 DIABETIC ULCER OF RIGHT MIDFOOT ASSOCIATED WITH TYPE 2 DIABETES MELLITUS, WITH NECROSIS OF MUSCLE (H): ICD-10-CM

## 2021-01-12 DIAGNOSIS — R62.7 ADULT FAILURE TO THRIVE: ICD-10-CM

## 2021-01-12 DIAGNOSIS — L97.413 DIABETIC ULCER OF RIGHT MIDFOOT ASSOCIATED WITH TYPE 2 DIABETES MELLITUS, WITH NECROSIS OF MUSCLE (H): ICD-10-CM

## 2021-01-12 LAB
ALBUMIN SERPL-MCNC: 2.7 G/DL (ref 3.5–5)
ALP SERPL-CCNC: 98 U/L (ref 45–120)
ALT SERPL W P-5'-P-CCNC: 21 U/L (ref 0–45)
ANION GAP SERPL CALCULATED.3IONS-SCNC: 7 MMOL/L (ref 5–18)
AST SERPL W P-5'-P-CCNC: 20 U/L (ref 0–40)
BASOPHILS # BLD AUTO: 0.1 THOU/UL (ref 0–0.2)
BASOPHILS NFR BLD AUTO: 1 % (ref 0–2)
BILIRUB DIRECT SERPL-MCNC: 0.2 MG/DL
BILIRUB SERPL-MCNC: 0.6 MG/DL (ref 0–1)
BUN SERPL-MCNC: 18 MG/DL (ref 8–28)
C REACTIVE PROTEIN LHE: 1.7 MG/DL (ref 0–0.8)
CALCIUM SERPL-MCNC: 8.9 MG/DL (ref 8.5–10.5)
CHLORIDE BLD-SCNC: 102 MMOL/L (ref 98–107)
CO2 SERPL-SCNC: 31 MMOL/L (ref 22–31)
CREAT SERPL-MCNC: 0.89 MG/DL (ref 0.7–1.3)
EOSINOPHIL # BLD AUTO: 0.4 THOU/UL (ref 0–0.4)
EOSINOPHIL NFR BLD AUTO: 6 % (ref 0–6)
ERYTHROCYTE [DISTWIDTH] IN BLOOD BY AUTOMATED COUNT: 13.9 % (ref 11–14.5)
GFR SERPL CREATININE-BSD FRML MDRD: >60 ML/MIN/1.73M2
GLUCOSE BLD-MCNC: 116 MG/DL (ref 70–125)
HCT VFR BLD AUTO: 41.6 % (ref 40–54)
HGB BLD-MCNC: 13.3 G/DL (ref 14–18)
IMM GRANULOCYTES # BLD: 0 THOU/UL
IMM GRANULOCYTES NFR BLD: 1 %
LYMPHOCYTES # BLD AUTO: 1.1 THOU/UL (ref 0.8–4.4)
LYMPHOCYTES NFR BLD AUTO: 19 % (ref 20–40)
MCH RBC QN AUTO: 29.7 PG (ref 27–34)
MCHC RBC AUTO-ENTMCNC: 32 G/DL (ref 32–36)
MCV RBC AUTO: 93 FL (ref 80–100)
MONOCYTES # BLD AUTO: 0.8 THOU/UL (ref 0–0.9)
MONOCYTES NFR BLD AUTO: 13 % (ref 2–10)
NEUTROPHILS # BLD AUTO: 3.3 THOU/UL (ref 2–7.7)
NEUTROPHILS NFR BLD AUTO: 60 % (ref 50–70)
PLATELET # BLD AUTO: 301 THOU/UL (ref 140–440)
PMV BLD AUTO: 9.8 FL (ref 8.5–12.5)
POTASSIUM BLD-SCNC: 4.6 MMOL/L (ref 3.5–5)
PROT SERPL-MCNC: 6.5 G/DL (ref 6–8)
RBC # BLD AUTO: 4.48 MILL/UL (ref 4.4–6.2)
SODIUM SERPL-SCNC: 140 MMOL/L (ref 136–145)
WBC: 5.6 THOU/UL (ref 4–11)

## 2021-01-14 ENCOUNTER — OFFICE VISIT - HEALTHEAST (OUTPATIENT)
Dept: GERIATRICS | Facility: CLINIC | Age: 86
End: 2021-01-14

## 2021-01-14 DIAGNOSIS — E11.621 DIABETIC ULCER OF RIGHT MIDFOOT ASSOCIATED WITH TYPE 2 DIABETES MELLITUS, WITH NECROSIS OF MUSCLE (H): ICD-10-CM

## 2021-01-14 DIAGNOSIS — I73.9 PVD (PERIPHERAL VASCULAR DISEASE) (H): ICD-10-CM

## 2021-01-14 DIAGNOSIS — I10 ESSENTIAL HYPERTENSION: ICD-10-CM

## 2021-01-14 DIAGNOSIS — L97.413 DIABETIC ULCER OF RIGHT MIDFOOT ASSOCIATED WITH TYPE 2 DIABETES MELLITUS, WITH NECROSIS OF MUSCLE (H): ICD-10-CM

## 2021-01-14 DIAGNOSIS — R33.9 URINARY RETENTION: ICD-10-CM

## 2021-01-14 DIAGNOSIS — Z79.4 TYPE 2 DIABETES MELLITUS WITH OTHER SKIN ULCER, WITH LONG-TERM CURRENT USE OF INSULIN (H): ICD-10-CM

## 2021-01-14 DIAGNOSIS — E11.622 TYPE 2 DIABETES MELLITUS WITH OTHER SKIN ULCER, WITH LONG-TERM CURRENT USE OF INSULIN (H): ICD-10-CM

## 2021-01-15 ENCOUNTER — RECORDS - HEALTHEAST (OUTPATIENT)
Dept: LAB | Facility: CLINIC | Age: 86
End: 2021-01-15

## 2021-01-16 LAB
ALBUMIN SERPL-MCNC: 2.5 G/DL (ref 3.5–5)
ALP SERPL-CCNC: 79 U/L (ref 45–120)
ALT SERPL W P-5'-P-CCNC: 13 U/L (ref 0–45)
ANION GAP SERPL CALCULATED.3IONS-SCNC: 8 MMOL/L (ref 5–18)
AST SERPL W P-5'-P-CCNC: 15 U/L (ref 0–40)
BASOPHILS # BLD AUTO: 0.1 THOU/UL (ref 0–0.2)
BASOPHILS NFR BLD AUTO: 1 % (ref 0–2)
BILIRUB SERPL-MCNC: 0.6 MG/DL (ref 0–1)
BUN SERPL-MCNC: 18 MG/DL (ref 8–28)
C REACTIVE PROTEIN LHE: 8.8 MG/DL (ref 0–0.8)
CALCIUM SERPL-MCNC: 9.1 MG/DL (ref 8.5–10.5)
CHLORIDE BLD-SCNC: 103 MMOL/L (ref 98–107)
CO2 SERPL-SCNC: 29 MMOL/L (ref 22–31)
CREAT SERPL-MCNC: 0.93 MG/DL (ref 0.7–1.3)
EOSINOPHIL # BLD AUTO: 0.3 THOU/UL (ref 0–0.4)
EOSINOPHIL NFR BLD AUTO: 4 % (ref 0–6)
ERYTHROCYTE [DISTWIDTH] IN BLOOD BY AUTOMATED COUNT: 14 % (ref 11–14.5)
GFR SERPL CREATININE-BSD FRML MDRD: >60 ML/MIN/1.73M2
GLUCOSE BLD-MCNC: 92 MG/DL (ref 70–125)
HCT VFR BLD AUTO: 35 % (ref 40–54)
HGB BLD-MCNC: 11.3 G/DL (ref 14–18)
IMM GRANULOCYTES # BLD: 0 THOU/UL
IMM GRANULOCYTES NFR BLD: 0 %
LYMPHOCYTES # BLD AUTO: 1.3 THOU/UL (ref 0.8–4.4)
LYMPHOCYTES NFR BLD AUTO: 19 % (ref 20–40)
MCH RBC QN AUTO: 29.1 PG (ref 27–34)
MCHC RBC AUTO-ENTMCNC: 32.3 G/DL (ref 32–36)
MCV RBC AUTO: 90 FL (ref 80–100)
MONOCYTES # BLD AUTO: 1.1 THOU/UL (ref 0–0.9)
MONOCYTES NFR BLD AUTO: 16 % (ref 2–10)
NEUTROPHILS # BLD AUTO: 4.2 THOU/UL (ref 2–7.7)
NEUTROPHILS NFR BLD AUTO: 60 % (ref 50–70)
PLATELET # BLD AUTO: 253 THOU/UL (ref 140–440)
PMV BLD AUTO: 10.3 FL (ref 8.5–12.5)
POTASSIUM BLD-SCNC: 4.8 MMOL/L (ref 3.5–5)
PROT SERPL-MCNC: 6.1 G/DL (ref 6–8)
RBC # BLD AUTO: 3.88 MILL/UL (ref 4.4–6.2)
SODIUM SERPL-SCNC: 140 MMOL/L (ref 136–145)
WBC: 7 THOU/UL (ref 4–11)

## 2021-01-18 ENCOUNTER — COMMUNICATION - HEALTHEAST (OUTPATIENT)
Dept: ADMINISTRATIVE | Facility: CLINIC | Age: 86
End: 2021-01-18

## 2021-01-19 ENCOUNTER — OFFICE VISIT - HEALTHEAST (OUTPATIENT)
Dept: GERIATRICS | Facility: CLINIC | Age: 86
End: 2021-01-19

## 2021-01-19 DIAGNOSIS — M86.9 OSTEOMYELITIS OF RIGHT FOOT, UNSPECIFIED TYPE (H): ICD-10-CM

## 2021-01-19 DIAGNOSIS — E11.622 TYPE 2 DIABETES MELLITUS WITH OTHER SKIN ULCER, WITH LONG-TERM CURRENT USE OF INSULIN (H): ICD-10-CM

## 2021-01-19 DIAGNOSIS — I10 ESSENTIAL HYPERTENSION: ICD-10-CM

## 2021-01-19 DIAGNOSIS — I73.9 PVD (PERIPHERAL VASCULAR DISEASE) (H): ICD-10-CM

## 2021-01-19 DIAGNOSIS — R33.9 URINARY RETENTION: ICD-10-CM

## 2021-01-19 DIAGNOSIS — Z79.4 TYPE 2 DIABETES MELLITUS WITH OTHER SKIN ULCER, WITH LONG-TERM CURRENT USE OF INSULIN (H): ICD-10-CM

## 2021-01-19 ASSESSMENT — MIFFLIN-ST. JEOR: SCORE: 1510

## 2021-01-20 ENCOUNTER — OFFICE VISIT - HEALTHEAST (OUTPATIENT)
Dept: VASCULAR SURGERY | Facility: CLINIC | Age: 86
End: 2021-01-20

## 2021-01-20 DIAGNOSIS — L97.413 DIABETIC ULCER OF RIGHT MIDFOOT ASSOCIATED WITH TYPE 2 DIABETES MELLITUS, WITH NECROSIS OF MUSCLE (H): ICD-10-CM

## 2021-01-20 DIAGNOSIS — E11.621 DIABETIC ULCER OF RIGHT MIDFOOT ASSOCIATED WITH TYPE 2 DIABETES MELLITUS, WITH NECROSIS OF MUSCLE (H): ICD-10-CM

## 2021-01-21 ENCOUNTER — OFFICE VISIT - HEALTHEAST (OUTPATIENT)
Dept: GERIATRICS | Facility: CLINIC | Age: 86
End: 2021-01-21

## 2021-01-21 ENCOUNTER — COMMUNICATION - HEALTHEAST (OUTPATIENT)
Dept: VASCULAR SURGERY | Facility: CLINIC | Age: 86
End: 2021-01-21

## 2021-01-21 DIAGNOSIS — E11.51 TYPE 2 DIABETES MELLITUS WITH DIABETIC PERIPHERAL ANGIOPATHY WITHOUT GANGRENE, WITH LONG-TERM CURRENT USE OF INSULIN (H): ICD-10-CM

## 2021-01-21 DIAGNOSIS — E11.621 DIABETIC ULCER OF RIGHT MIDFOOT ASSOCIATED WITH TYPE 2 DIABETES MELLITUS, WITH NECROSIS OF MUSCLE (H): ICD-10-CM

## 2021-01-21 DIAGNOSIS — R31.0 GROSS HEMATURIA: ICD-10-CM

## 2021-01-21 DIAGNOSIS — L97.413 DIABETIC ULCER OF RIGHT MIDFOOT ASSOCIATED WITH TYPE 2 DIABETES MELLITUS, WITH NECROSIS OF MUSCLE (H): ICD-10-CM

## 2021-01-21 DIAGNOSIS — I48.0 PAROXYSMAL ATRIAL FIBRILLATION (H): ICD-10-CM

## 2021-01-21 DIAGNOSIS — Z79.4 TYPE 2 DIABETES MELLITUS WITH DIABETIC PERIPHERAL ANGIOPATHY WITHOUT GANGRENE, WITH LONG-TERM CURRENT USE OF INSULIN (H): ICD-10-CM

## 2021-01-21 ASSESSMENT — MIFFLIN-ST. JEOR: SCORE: 1510

## 2021-01-22 ENCOUNTER — OFFICE VISIT - HEALTHEAST (OUTPATIENT)
Dept: INTERNAL MEDICINE | Facility: CLINIC | Age: 86
End: 2021-01-22

## 2021-01-22 DIAGNOSIS — R33.9 URINARY RETENTION: ICD-10-CM

## 2021-01-22 DIAGNOSIS — M86.9 OSTEOMYELITIS OF RIGHT FOOT, UNSPECIFIED TYPE (H): ICD-10-CM

## 2021-01-22 DIAGNOSIS — R62.7 ADULT FAILURE TO THRIVE: ICD-10-CM

## 2021-01-22 DIAGNOSIS — I70.235 ATHEROSCLEROSIS OF NATIVE ARTERY OF RIGHT LOWER EXTREMITY WITH ULCERATION OF OTHER PART OF FOOT (H): ICD-10-CM

## 2021-01-22 DIAGNOSIS — Z79.01 CHRONIC ANTICOAGULATION: ICD-10-CM

## 2021-01-22 DIAGNOSIS — E11.9 TYPE 2 DIABETES MELLITUS (H): ICD-10-CM

## 2021-01-22 DIAGNOSIS — S98.131A AMPUTATED TOE, RIGHT (H): ICD-10-CM

## 2021-01-26 ENCOUNTER — OFFICE VISIT - HEALTHEAST (OUTPATIENT)
Dept: GERIATRICS | Facility: CLINIC | Age: 86
End: 2021-01-26

## 2021-01-26 DIAGNOSIS — E11.621 DIABETIC ULCER OF RIGHT MIDFOOT ASSOCIATED WITH TYPE 2 DIABETES MELLITUS, WITH NECROSIS OF MUSCLE (H): ICD-10-CM

## 2021-01-26 DIAGNOSIS — R62.7 ADULT FAILURE TO THRIVE: ICD-10-CM

## 2021-01-26 DIAGNOSIS — I73.9 PVD (PERIPHERAL VASCULAR DISEASE) (H): ICD-10-CM

## 2021-01-26 DIAGNOSIS — E11.51 TYPE 2 DIABETES MELLITUS WITH DIABETIC PERIPHERAL ANGIOPATHY WITHOUT GANGRENE, WITH LONG-TERM CURRENT USE OF INSULIN (H): ICD-10-CM

## 2021-01-26 DIAGNOSIS — I10 ESSENTIAL HYPERTENSION: ICD-10-CM

## 2021-01-26 DIAGNOSIS — M86.9 OSTEOMYELITIS OF RIGHT FOOT, UNSPECIFIED TYPE (H): ICD-10-CM

## 2021-01-26 DIAGNOSIS — Z79.4 TYPE 2 DIABETES MELLITUS WITH DIABETIC PERIPHERAL ANGIOPATHY WITHOUT GANGRENE, WITH LONG-TERM CURRENT USE OF INSULIN (H): ICD-10-CM

## 2021-01-26 DIAGNOSIS — I48.0 PAROXYSMAL ATRIAL FIBRILLATION (H): ICD-10-CM

## 2021-01-26 DIAGNOSIS — L97.413 DIABETIC ULCER OF RIGHT MIDFOOT ASSOCIATED WITH TYPE 2 DIABETES MELLITUS, WITH NECROSIS OF MUSCLE (H): ICD-10-CM

## 2021-01-26 ASSESSMENT — MIFFLIN-ST. JEOR: SCORE: 1524.52

## 2021-01-28 ENCOUNTER — OFFICE VISIT - HEALTHEAST (OUTPATIENT)
Dept: GERIATRICS | Facility: CLINIC | Age: 86
End: 2021-01-28

## 2021-01-28 DIAGNOSIS — Z79.4 TYPE 2 DIABETES MELLITUS WITH DIABETIC PERIPHERAL ANGIOPATHY WITHOUT GANGRENE, WITH LONG-TERM CURRENT USE OF INSULIN (H): ICD-10-CM

## 2021-01-28 DIAGNOSIS — I48.0 PAROXYSMAL ATRIAL FIBRILLATION (H): ICD-10-CM

## 2021-01-28 DIAGNOSIS — I10 ESSENTIAL HYPERTENSION: ICD-10-CM

## 2021-01-28 DIAGNOSIS — E11.621 DIABETIC ULCER OF RIGHT MIDFOOT ASSOCIATED WITH TYPE 2 DIABETES MELLITUS, WITH NECROSIS OF MUSCLE (H): ICD-10-CM

## 2021-01-28 DIAGNOSIS — E11.51 TYPE 2 DIABETES MELLITUS WITH DIABETIC PERIPHERAL ANGIOPATHY WITHOUT GANGRENE, WITH LONG-TERM CURRENT USE OF INSULIN (H): ICD-10-CM

## 2021-01-28 DIAGNOSIS — L97.413 DIABETIC ULCER OF RIGHT MIDFOOT ASSOCIATED WITH TYPE 2 DIABETES MELLITUS, WITH NECROSIS OF MUSCLE (H): ICD-10-CM

## 2021-01-28 DIAGNOSIS — I73.9 PVD (PERIPHERAL VASCULAR DISEASE) (H): ICD-10-CM

## 2021-01-28 ASSESSMENT — MIFFLIN-ST. JEOR: SCORE: 1524.52

## 2021-01-29 ENCOUNTER — RECORDS - HEALTHEAST (OUTPATIENT)
Dept: ADMINISTRATIVE | Facility: OTHER | Age: 86
End: 2021-01-29

## 2021-02-01 ENCOUNTER — OFFICE VISIT - HEALTHEAST (OUTPATIENT)
Dept: GERIATRICS | Facility: CLINIC | Age: 86
End: 2021-02-01

## 2021-02-01 DIAGNOSIS — L97.413 DIABETIC ULCER OF RIGHT MIDFOOT ASSOCIATED WITH TYPE 2 DIABETES MELLITUS, WITH NECROSIS OF MUSCLE (H): ICD-10-CM

## 2021-02-01 DIAGNOSIS — Z79.4 TYPE 2 DIABETES MELLITUS WITH DIABETIC PERIPHERAL ANGIOPATHY WITHOUT GANGRENE, WITH LONG-TERM CURRENT USE OF INSULIN (H): ICD-10-CM

## 2021-02-01 DIAGNOSIS — E11.51 TYPE 2 DIABETES MELLITUS WITH DIABETIC PERIPHERAL ANGIOPATHY WITHOUT GANGRENE, WITH LONG-TERM CURRENT USE OF INSULIN (H): ICD-10-CM

## 2021-02-01 DIAGNOSIS — E11.621 DIABETIC ULCER OF RIGHT MIDFOOT ASSOCIATED WITH TYPE 2 DIABETES MELLITUS, WITH NECROSIS OF MUSCLE (H): ICD-10-CM

## 2021-02-02 ENCOUNTER — RECORDS - HEALTHEAST (OUTPATIENT)
Dept: LAB | Facility: CLINIC | Age: 86
End: 2021-02-02

## 2021-02-03 ENCOUNTER — COMMUNICATION - HEALTHEAST (OUTPATIENT)
Dept: GERIATRICS | Facility: CLINIC | Age: 86
End: 2021-02-03

## 2021-02-03 ENCOUNTER — AMBULATORY - HEALTHEAST (OUTPATIENT)
Dept: GERIATRICS | Facility: CLINIC | Age: 86
End: 2021-02-03

## 2021-02-03 ENCOUNTER — COMMUNICATION - HEALTHEAST (OUTPATIENT)
Dept: ADMINISTRATIVE | Facility: CLINIC | Age: 86
End: 2021-02-03

## 2021-02-03 ENCOUNTER — COMMUNICATION - HEALTHEAST (OUTPATIENT)
Dept: VASCULAR SURGERY | Facility: CLINIC | Age: 86
End: 2021-02-03

## 2021-02-03 ENCOUNTER — OFFICE VISIT - HEALTHEAST (OUTPATIENT)
Dept: VASCULAR SURGERY | Facility: CLINIC | Age: 86
End: 2021-02-03

## 2021-02-03 DIAGNOSIS — E11.621 DIABETIC ULCER OF RIGHT MIDFOOT ASSOCIATED WITH TYPE 2 DIABETES MELLITUS, WITH NECROSIS OF MUSCLE (H): ICD-10-CM

## 2021-02-03 DIAGNOSIS — L97.413 DIABETIC ULCER OF RIGHT MIDFOOT ASSOCIATED WITH TYPE 2 DIABETES MELLITUS, WITH NECROSIS OF MUSCLE (H): ICD-10-CM

## 2021-02-04 ENCOUNTER — RECORDS - HEALTHEAST (OUTPATIENT)
Dept: ADMINISTRATIVE | Facility: OTHER | Age: 86
End: 2021-02-04

## 2021-02-05 ENCOUNTER — COMMUNICATION - HEALTHEAST (OUTPATIENT)
Dept: ADMINISTRATIVE | Facility: CLINIC | Age: 86
End: 2021-02-05

## 2021-02-08 ENCOUNTER — COMMUNICATION - HEALTHEAST (OUTPATIENT)
Dept: INTERNAL MEDICINE | Facility: CLINIC | Age: 86
End: 2021-02-08

## 2021-02-13 ENCOUNTER — COMMUNICATION - HEALTHEAST (OUTPATIENT)
Dept: INTERNAL MEDICINE | Facility: CLINIC | Age: 86
End: 2021-02-13

## 2021-02-13 DIAGNOSIS — E11.9 TYPE 2 DIABETES MELLITUS (H): ICD-10-CM

## 2021-02-14 RX ORDER — INSULIN GLARGINE 100 [IU]/ML
INJECTION, SOLUTION SUBCUTANEOUS
Qty: 90 ML | Refills: 5 | Status: SHIPPED | OUTPATIENT
Start: 2021-02-14 | End: 2021-08-25

## 2021-02-19 ENCOUNTER — RECORDS - HEALTHEAST (OUTPATIENT)
Dept: ADMINISTRATIVE | Facility: OTHER | Age: 86
End: 2021-02-19

## 2021-02-22 ENCOUNTER — COMMUNICATION - HEALTHEAST (OUTPATIENT)
Dept: FAMILY MEDICINE | Facility: CLINIC | Age: 86
End: 2021-02-22

## 2021-02-23 ENCOUNTER — OFFICE VISIT - HEALTHEAST (OUTPATIENT)
Dept: INTERNAL MEDICINE | Facility: CLINIC | Age: 86
End: 2021-02-23

## 2021-02-23 DIAGNOSIS — R33.9 URINARY RETENTION: ICD-10-CM

## 2021-02-23 DIAGNOSIS — E11.9 TYPE 2 DIABETES MELLITUS WITHOUT COMPLICATION, WITHOUT LONG-TERM CURRENT USE OF INSULIN (H): ICD-10-CM

## 2021-02-23 DIAGNOSIS — Z79.4 TYPE 2 DIABETES MELLITUS WITH DIABETIC PERIPHERAL ANGIOPATHY WITHOUT GANGRENE, WITH LONG-TERM CURRENT USE OF INSULIN (H): ICD-10-CM

## 2021-02-23 DIAGNOSIS — I10 ESSENTIAL HYPERTENSION: ICD-10-CM

## 2021-02-23 DIAGNOSIS — Z79.01 CHRONIC ANTICOAGULATION: ICD-10-CM

## 2021-02-23 DIAGNOSIS — I70.235 ATHEROSCLEROSIS OF NATIVE ARTERY OF RIGHT LOWER EXTREMITY WITH ULCERATION OF OTHER PART OF FOOT (H): ICD-10-CM

## 2021-02-23 DIAGNOSIS — I73.9 PVD (PERIPHERAL VASCULAR DISEASE) (H): ICD-10-CM

## 2021-02-23 DIAGNOSIS — E11.51 TYPE 2 DIABETES MELLITUS WITH DIABETIC PERIPHERAL ANGIOPATHY WITHOUT GANGRENE, WITH LONG-TERM CURRENT USE OF INSULIN (H): ICD-10-CM

## 2021-02-23 RX ORDER — GLUCOSAMINE HCL/CHONDROITIN SU 500-400 MG
CAPSULE ORAL
Qty: 200 STRIP | Refills: 11 | Status: SHIPPED | OUTPATIENT
Start: 2021-02-23 | End: 2022-07-02

## 2021-02-23 RX ORDER — TAMSULOSIN HYDROCHLORIDE 0.4 MG/1
0.4 CAPSULE ORAL
Qty: 90 CAPSULE | Refills: 3 | Status: SHIPPED | OUTPATIENT
Start: 2021-02-23 | End: 2022-03-11

## 2021-02-24 ENCOUNTER — OFFICE VISIT - HEALTHEAST (OUTPATIENT)
Dept: VASCULAR SURGERY | Facility: CLINIC | Age: 86
End: 2021-02-24

## 2021-02-24 ENCOUNTER — COMMUNICATION - HEALTHEAST (OUTPATIENT)
Dept: VASCULAR SURGERY | Facility: CLINIC | Age: 86
End: 2021-02-24

## 2021-02-24 DIAGNOSIS — L97.413 DIABETIC ULCER OF RIGHT MIDFOOT ASSOCIATED WITH TYPE 2 DIABETES MELLITUS, WITH NECROSIS OF MUSCLE (H): ICD-10-CM

## 2021-02-24 DIAGNOSIS — E11.621 DIABETIC ULCER OF RIGHT MIDFOOT ASSOCIATED WITH TYPE 2 DIABETES MELLITUS, WITH NECROSIS OF MUSCLE (H): ICD-10-CM

## 2021-03-01 ENCOUNTER — COMMUNICATION - HEALTHEAST (OUTPATIENT)
Dept: INTERNAL MEDICINE | Facility: CLINIC | Age: 86
End: 2021-03-01

## 2021-03-01 DIAGNOSIS — I26.99 PULMONARY EMBOLISM WITHOUT ACUTE COR PULMONALE, UNSPECIFIED CHRONICITY, UNSPECIFIED PULMONARY EMBOLISM TYPE (H): ICD-10-CM

## 2021-03-01 RX ORDER — APIXABAN 5 MG/1
TABLET, FILM COATED ORAL
Qty: 60 TABLET | Refills: 3 | Status: SHIPPED | OUTPATIENT
Start: 2021-03-01 | End: 2021-12-14

## 2021-03-03 ENCOUNTER — COMMUNICATION - HEALTHEAST (OUTPATIENT)
Dept: ADMINISTRATIVE | Facility: CLINIC | Age: 86
End: 2021-03-03

## 2021-03-04 ENCOUNTER — RECORDS - HEALTHEAST (OUTPATIENT)
Dept: ADMINISTRATIVE | Facility: OTHER | Age: 86
End: 2021-03-04

## 2021-03-05 ENCOUNTER — COMMUNICATION - HEALTHEAST (OUTPATIENT)
Dept: ADMINISTRATIVE | Facility: CLINIC | Age: 86
End: 2021-03-05

## 2021-03-08 ENCOUNTER — RECORDS - HEALTHEAST (OUTPATIENT)
Dept: ADMINISTRATIVE | Facility: OTHER | Age: 86
End: 2021-03-08

## 2021-03-19 ENCOUNTER — COMMUNICATION - HEALTHEAST (OUTPATIENT)
Dept: ADMINISTRATIVE | Facility: CLINIC | Age: 86
End: 2021-03-19

## 2021-03-24 ENCOUNTER — OFFICE VISIT - HEALTHEAST (OUTPATIENT)
Dept: VASCULAR SURGERY | Facility: CLINIC | Age: 86
End: 2021-03-24

## 2021-03-24 ENCOUNTER — COMMUNICATION - HEALTHEAST (OUTPATIENT)
Dept: ADMINISTRATIVE | Facility: CLINIC | Age: 86
End: 2021-03-24

## 2021-03-24 DIAGNOSIS — L97.413 DIABETIC ULCER OF RIGHT MIDFOOT ASSOCIATED WITH TYPE 2 DIABETES MELLITUS, WITH NECROSIS OF MUSCLE (H): ICD-10-CM

## 2021-03-24 DIAGNOSIS — E11.621 DIABETIC ULCER OF RIGHT MIDFOOT ASSOCIATED WITH TYPE 2 DIABETES MELLITUS, WITH NECROSIS OF MUSCLE (H): ICD-10-CM

## 2021-03-30 ENCOUNTER — RECORDS - HEALTHEAST (OUTPATIENT)
Dept: VASCULAR ULTRASOUND | Facility: CLINIC | Age: 86
End: 2021-03-30

## 2021-03-30 ENCOUNTER — OFFICE VISIT - HEALTHEAST (OUTPATIENT)
Dept: VASCULAR SURGERY | Facility: CLINIC | Age: 86
End: 2021-03-30

## 2021-03-30 DIAGNOSIS — I73.9 PAD (PERIPHERAL ARTERY DISEASE) (H): ICD-10-CM

## 2021-03-30 DIAGNOSIS — E11.621 TYPE 2 DIABETES MELLITUS WITH FOOT ULCER (CODE) (H): ICD-10-CM

## 2021-03-30 DIAGNOSIS — L97.413: ICD-10-CM

## 2021-03-31 ENCOUNTER — COMMUNICATION - HEALTHEAST (OUTPATIENT)
Dept: ADMINISTRATIVE | Facility: CLINIC | Age: 86
End: 2021-03-31

## 2021-04-02 ENCOUNTER — RECORDS - HEALTHEAST (OUTPATIENT)
Dept: ADMINISTRATIVE | Facility: OTHER | Age: 86
End: 2021-04-02

## 2021-04-05 ENCOUNTER — COMMUNICATION - HEALTHEAST (OUTPATIENT)
Dept: ADMINISTRATIVE | Facility: CLINIC | Age: 86
End: 2021-04-05

## 2021-04-06 ENCOUNTER — RECORDS - HEALTHEAST (OUTPATIENT)
Dept: ADMINISTRATIVE | Facility: OTHER | Age: 86
End: 2021-04-06

## 2021-04-08 ENCOUNTER — COMMUNICATION - HEALTHEAST (OUTPATIENT)
Dept: SCHEDULING | Facility: CLINIC | Age: 86
End: 2021-04-08

## 2021-04-14 ENCOUNTER — OFFICE VISIT - HEALTHEAST (OUTPATIENT)
Dept: VASCULAR SURGERY | Facility: CLINIC | Age: 86
End: 2021-04-14

## 2021-04-14 DIAGNOSIS — E11.621 DIABETIC ULCER OF RIGHT MIDFOOT ASSOCIATED WITH TYPE 2 DIABETES MELLITUS, WITH NECROSIS OF MUSCLE (H): ICD-10-CM

## 2021-04-14 DIAGNOSIS — L97.413 DIABETIC ULCER OF RIGHT MIDFOOT ASSOCIATED WITH TYPE 2 DIABETES MELLITUS, WITH NECROSIS OF MUSCLE (H): ICD-10-CM

## 2021-04-16 ENCOUNTER — COMMUNICATION - HEALTHEAST (OUTPATIENT)
Dept: ADMINISTRATIVE | Facility: CLINIC | Age: 86
End: 2021-04-16

## 2021-04-19 ENCOUNTER — RECORDS - HEALTHEAST (OUTPATIENT)
Dept: ADMINISTRATIVE | Facility: OTHER | Age: 86
End: 2021-04-19

## 2021-04-20 ENCOUNTER — RECORDS - HEALTHEAST (OUTPATIENT)
Dept: ADMINISTRATIVE | Facility: OTHER | Age: 86
End: 2021-04-20

## 2021-04-21 ENCOUNTER — COMMUNICATION - HEALTHEAST (OUTPATIENT)
Dept: EDUCATION SERVICES | Facility: CLINIC | Age: 86
End: 2021-04-21

## 2021-04-21 ENCOUNTER — COMMUNICATION - HEALTHEAST (OUTPATIENT)
Dept: INTERNAL MEDICINE | Facility: CLINIC | Age: 86
End: 2021-04-21

## 2021-04-21 DIAGNOSIS — Z79.4 TYPE 2 DIABETES MELLITUS WITH OTHER SKIN ULCER, WITH LONG-TERM CURRENT USE OF INSULIN (H): ICD-10-CM

## 2021-04-21 DIAGNOSIS — E11.622 TYPE 2 DIABETES MELLITUS WITH OTHER SKIN ULCER, WITH LONG-TERM CURRENT USE OF INSULIN (H): ICD-10-CM

## 2021-04-22 RX ORDER — LIRAGLUTIDE 6 MG/ML
INJECTION SUBCUTANEOUS
Qty: 12 ML | Refills: 11 | Status: SHIPPED | OUTPATIENT
Start: 2021-04-22 | End: 2023-01-01

## 2021-05-14 ENCOUNTER — OFFICE VISIT - HEALTHEAST (OUTPATIENT)
Dept: VASCULAR SURGERY | Facility: CLINIC | Age: 86
End: 2021-05-14

## 2021-05-14 DIAGNOSIS — M21.541 EQUINOVARUS ACQUIRED DEFORMITY, RIGHT: ICD-10-CM

## 2021-05-14 DIAGNOSIS — L97.413 DIABETIC ULCER OF RIGHT MIDFOOT ASSOCIATED WITH TYPE 2 DIABETES MELLITUS, WITH NECROSIS OF MUSCLE (H): ICD-10-CM

## 2021-05-14 DIAGNOSIS — I73.9 PAD (PERIPHERAL ARTERY DISEASE) (H): ICD-10-CM

## 2021-05-14 DIAGNOSIS — E11.621 DIABETIC ULCER OF RIGHT MIDFOOT ASSOCIATED WITH TYPE 2 DIABETES MELLITUS, WITH NECROSIS OF MUSCLE (H): ICD-10-CM

## 2021-05-14 DIAGNOSIS — L57.0 KERATOMA: ICD-10-CM

## 2021-05-25 ENCOUNTER — OFFICE VISIT - HEALTHEAST (OUTPATIENT)
Dept: INTERNAL MEDICINE | Facility: CLINIC | Age: 86
End: 2021-05-25

## 2021-05-25 DIAGNOSIS — R62.7 ADULT FAILURE TO THRIVE: ICD-10-CM

## 2021-05-25 DIAGNOSIS — K59.03 DRUG-INDUCED CONSTIPATION: ICD-10-CM

## 2021-05-25 DIAGNOSIS — E11.621 DIABETIC ULCER OF RIGHT MIDFOOT ASSOCIATED WITH TYPE 2 DIABETES MELLITUS, WITH NECROSIS OF MUSCLE (H): ICD-10-CM

## 2021-05-25 DIAGNOSIS — Z79.4 TYPE 2 DIABETES MELLITUS WITH DIABETIC PERIPHERAL ANGIOPATHY WITHOUT GANGRENE, WITH LONG-TERM CURRENT USE OF INSULIN (H): ICD-10-CM

## 2021-05-25 DIAGNOSIS — I70.235 ATHEROSCLEROSIS OF NATIVE ARTERY OF RIGHT LOWER EXTREMITY WITH ULCERATION OF OTHER PART OF FOOT (H): ICD-10-CM

## 2021-05-25 DIAGNOSIS — E11.51 TYPE 2 DIABETES MELLITUS WITH DIABETIC PERIPHERAL ANGIOPATHY WITHOUT GANGRENE, WITH LONG-TERM CURRENT USE OF INSULIN (H): ICD-10-CM

## 2021-05-25 DIAGNOSIS — L97.413 DIABETIC ULCER OF RIGHT MIDFOOT ASSOCIATED WITH TYPE 2 DIABETES MELLITUS, WITH NECROSIS OF MUSCLE (H): ICD-10-CM

## 2021-05-25 DIAGNOSIS — D64.9 NORMOCYTIC ANEMIA: ICD-10-CM

## 2021-05-25 DIAGNOSIS — I48.0 PAROXYSMAL ATRIAL FIBRILLATION (H): ICD-10-CM

## 2021-05-25 LAB
ANION GAP SERPL CALCULATED.3IONS-SCNC: 11 MMOL/L (ref 5–18)
BUN SERPL-MCNC: 18 MG/DL (ref 8–28)
CALCIUM SERPL-MCNC: 8.9 MG/DL (ref 8.5–10.5)
CHLORIDE BLD-SCNC: 99 MMOL/L (ref 98–107)
CO2 SERPL-SCNC: 26 MMOL/L (ref 22–31)
CREAT SERPL-MCNC: 0.94 MG/DL (ref 0.7–1.3)
ERYTHROCYTE [DISTWIDTH] IN BLOOD BY AUTOMATED COUNT: 15.1 % (ref 11–14.5)
FERRITIN SERPL-MCNC: 860 NG/ML (ref 27–300)
GFR SERPL CREATININE-BSD FRML MDRD: >60 ML/MIN/1.73M2
GLUCOSE BLD-MCNC: 111 MG/DL (ref 70–125)
HBA1C MFR BLD: 7.9 %
HCT VFR BLD AUTO: 32.2 % (ref 40–54)
HGB BLD-MCNC: 9.7 G/DL (ref 14–18)
MCH RBC QN AUTO: 25.7 PG (ref 27–34)
MCHC RBC AUTO-ENTMCNC: 30.1 G/DL (ref 32–36)
MCV RBC AUTO: 85 FL (ref 80–100)
PLATELET # BLD AUTO: 451 THOU/UL (ref 140–440)
PMV BLD AUTO: 8.8 FL (ref 7–10)
POTASSIUM BLD-SCNC: 4.7 MMOL/L (ref 3.5–5)
RBC # BLD AUTO: 3.78 MILL/UL (ref 4.4–6.2)
SODIUM SERPL-SCNC: 136 MMOL/L (ref 136–145)
WBC: 7.8 THOU/UL (ref 4–11)

## 2021-05-25 RX ORDER — POLYETHYLENE GLYCOL 3350 17 G/17G
17 POWDER, FOR SOLUTION ORAL DAILY PRN
Qty: 100 PACKET | Refills: 3 | Status: SHIPPED | OUTPATIENT
Start: 2021-05-25

## 2021-05-25 RX ORDER — TRAMADOL HYDROCHLORIDE 50 MG/1
50 TABLET ORAL EVERY 6 HOURS PRN
Qty: 30 TABLET | Refills: 0 | Status: SHIPPED | OUTPATIENT
Start: 2021-05-25 | End: 2021-08-06

## 2021-05-26 ENCOUNTER — COMMUNICATION - HEALTHEAST (OUTPATIENT)
Dept: INTERNAL MEDICINE | Facility: CLINIC | Age: 86
End: 2021-05-26

## 2021-05-26 VITALS
TEMPERATURE: 97.8 F | SYSTOLIC BLOOD PRESSURE: 232 MMHG | RESPIRATION RATE: 20 BRPM | DIASTOLIC BLOOD PRESSURE: 92 MMHG | HEART RATE: 72 BPM

## 2021-05-26 VITALS — SYSTOLIC BLOOD PRESSURE: 140 MMHG | DIASTOLIC BLOOD PRESSURE: 70 MMHG | HEART RATE: 60 BPM | RESPIRATION RATE: 16 BRPM

## 2021-05-26 VITALS
TEMPERATURE: 97.4 F | HEART RATE: 64 BPM | RESPIRATION RATE: 20 BRPM | SYSTOLIC BLOOD PRESSURE: 118 MMHG | DIASTOLIC BLOOD PRESSURE: 60 MMHG

## 2021-05-26 VITALS
DIASTOLIC BLOOD PRESSURE: 68 MMHG | HEART RATE: 68 BPM | TEMPERATURE: 98.5 F | SYSTOLIC BLOOD PRESSURE: 136 MMHG | RESPIRATION RATE: 16 BRPM

## 2021-05-26 VITALS — HEART RATE: 76 BPM | RESPIRATION RATE: 18 BRPM | DIASTOLIC BLOOD PRESSURE: 78 MMHG | SYSTOLIC BLOOD PRESSURE: 150 MMHG

## 2021-05-26 VITALS
HEART RATE: 78 BPM | RESPIRATION RATE: 18 BRPM | SYSTOLIC BLOOD PRESSURE: 128 MMHG | TEMPERATURE: 97.7 F | DIASTOLIC BLOOD PRESSURE: 62 MMHG

## 2021-05-26 VITALS — SYSTOLIC BLOOD PRESSURE: 108 MMHG | HEART RATE: 76 BPM | TEMPERATURE: 98 F | DIASTOLIC BLOOD PRESSURE: 62 MMHG

## 2021-05-26 VITALS — SYSTOLIC BLOOD PRESSURE: 130 MMHG | TEMPERATURE: 98.1 F | DIASTOLIC BLOOD PRESSURE: 72 MMHG | HEART RATE: 76 BPM

## 2021-05-27 VITALS — DIASTOLIC BLOOD PRESSURE: 78 MMHG | TEMPERATURE: 98.3 F | SYSTOLIC BLOOD PRESSURE: 144 MMHG | HEART RATE: 76 BPM

## 2021-05-27 VITALS
SYSTOLIC BLOOD PRESSURE: 128 MMHG | RESPIRATION RATE: 16 BRPM | TEMPERATURE: 98.1 F | DIASTOLIC BLOOD PRESSURE: 64 MMHG | HEART RATE: 80 BPM

## 2021-05-27 VITALS
TEMPERATURE: 98.1 F | RESPIRATION RATE: 15 BRPM | SYSTOLIC BLOOD PRESSURE: 130 MMHG | DIASTOLIC BLOOD PRESSURE: 62 MMHG | HEART RATE: 64 BPM

## 2021-05-27 VITALS
DIASTOLIC BLOOD PRESSURE: 62 MMHG | SYSTOLIC BLOOD PRESSURE: 110 MMHG | RESPIRATION RATE: 18 BRPM | TEMPERATURE: 98 F | HEART RATE: 82 BPM

## 2021-05-27 VITALS
HEART RATE: 76 BPM | SYSTOLIC BLOOD PRESSURE: 110 MMHG | RESPIRATION RATE: 18 BRPM | TEMPERATURE: 97.9 F | DIASTOLIC BLOOD PRESSURE: 66 MMHG

## 2021-05-27 VITALS — SYSTOLIC BLOOD PRESSURE: 118 MMHG | DIASTOLIC BLOOD PRESSURE: 64 MMHG | TEMPERATURE: 98.3 F | HEART RATE: 84 BPM

## 2021-05-27 VITALS — TEMPERATURE: 98.1 F | DIASTOLIC BLOOD PRESSURE: 70 MMHG | HEART RATE: 76 BPM | SYSTOLIC BLOOD PRESSURE: 128 MMHG

## 2021-05-27 VITALS — DIASTOLIC BLOOD PRESSURE: 70 MMHG | HEART RATE: 64 BPM | RESPIRATION RATE: 17 BRPM | SYSTOLIC BLOOD PRESSURE: 134 MMHG

## 2021-05-27 VITALS — SYSTOLIC BLOOD PRESSURE: 150 MMHG | HEART RATE: 80 BPM | RESPIRATION RATE: 18 BRPM | DIASTOLIC BLOOD PRESSURE: 82 MMHG

## 2021-05-27 VITALS
HEART RATE: 72 BPM | TEMPERATURE: 97.6 F | DIASTOLIC BLOOD PRESSURE: 72 MMHG | SYSTOLIC BLOOD PRESSURE: 132 MMHG | RESPIRATION RATE: 16 BRPM

## 2021-05-27 VITALS
HEART RATE: 80 BPM | SYSTOLIC BLOOD PRESSURE: 144 MMHG | RESPIRATION RATE: 24 BRPM | TEMPERATURE: 97.3 F | DIASTOLIC BLOOD PRESSURE: 66 MMHG

## 2021-05-27 VITALS — DIASTOLIC BLOOD PRESSURE: 72 MMHG | HEART RATE: 76 BPM | TEMPERATURE: 98.4 F | SYSTOLIC BLOOD PRESSURE: 130 MMHG

## 2021-05-27 VITALS — HEART RATE: 72 BPM | DIASTOLIC BLOOD PRESSURE: 68 MMHG | SYSTOLIC BLOOD PRESSURE: 120 MMHG | RESPIRATION RATE: 18 BRPM

## 2021-05-27 VITALS
DIASTOLIC BLOOD PRESSURE: 78 MMHG | TEMPERATURE: 97.7 F | HEART RATE: 76 BPM | RESPIRATION RATE: 16 BRPM | SYSTOLIC BLOOD PRESSURE: 138 MMHG

## 2021-05-27 VITALS
DIASTOLIC BLOOD PRESSURE: 62 MMHG | TEMPERATURE: 97.9 F | WEIGHT: 175 LBS | HEART RATE: 88 BPM | SYSTOLIC BLOOD PRESSURE: 126 MMHG

## 2021-05-27 VITALS
SYSTOLIC BLOOD PRESSURE: 138 MMHG | DIASTOLIC BLOOD PRESSURE: 78 MMHG | RESPIRATION RATE: 18 BRPM | HEART RATE: 68 BPM | TEMPERATURE: 97.8 F

## 2021-05-27 VITALS
DIASTOLIC BLOOD PRESSURE: 74 MMHG | RESPIRATION RATE: 20 BRPM | HEART RATE: 60 BPM | TEMPERATURE: 97.9 F | SYSTOLIC BLOOD PRESSURE: 136 MMHG

## 2021-05-27 VITALS — DIASTOLIC BLOOD PRESSURE: 78 MMHG | SYSTOLIC BLOOD PRESSURE: 138 MMHG | TEMPERATURE: 97.9 F | HEART RATE: 72 BPM

## 2021-05-27 VITALS — SYSTOLIC BLOOD PRESSURE: 170 MMHG | DIASTOLIC BLOOD PRESSURE: 78 MMHG | TEMPERATURE: 98 F | HEART RATE: 68 BPM

## 2021-05-27 VITALS
SYSTOLIC BLOOD PRESSURE: 136 MMHG | HEART RATE: 68 BPM | TEMPERATURE: 98.3 F | RESPIRATION RATE: 18 BRPM | DIASTOLIC BLOOD PRESSURE: 70 MMHG

## 2021-05-27 VITALS
RESPIRATION RATE: 18 BRPM | SYSTOLIC BLOOD PRESSURE: 176 MMHG | HEART RATE: 72 BPM | DIASTOLIC BLOOD PRESSURE: 92 MMHG | TEMPERATURE: 97.7 F

## 2021-05-27 VITALS — TEMPERATURE: 98.2 F | SYSTOLIC BLOOD PRESSURE: 118 MMHG | DIASTOLIC BLOOD PRESSURE: 62 MMHG | HEART RATE: 80 BPM

## 2021-05-27 VITALS
HEART RATE: 68 BPM | SYSTOLIC BLOOD PRESSURE: 162 MMHG | TEMPERATURE: 97 F | RESPIRATION RATE: 18 BRPM | DIASTOLIC BLOOD PRESSURE: 84 MMHG

## 2021-05-27 VITALS — HEART RATE: 68 BPM | RESPIRATION RATE: 18 BRPM | DIASTOLIC BLOOD PRESSURE: 78 MMHG | SYSTOLIC BLOOD PRESSURE: 128 MMHG

## 2021-05-27 VITALS
HEART RATE: 60 BPM | TEMPERATURE: 98.1 F | DIASTOLIC BLOOD PRESSURE: 80 MMHG | SYSTOLIC BLOOD PRESSURE: 130 MMHG | RESPIRATION RATE: 17 BRPM

## 2021-05-27 VITALS — HEART RATE: 60 BPM | RESPIRATION RATE: 17 BRPM | DIASTOLIC BLOOD PRESSURE: 70 MMHG | SYSTOLIC BLOOD PRESSURE: 130 MMHG

## 2021-05-27 VITALS
HEART RATE: 76 BPM | SYSTOLIC BLOOD PRESSURE: 118 MMHG | DIASTOLIC BLOOD PRESSURE: 76 MMHG | RESPIRATION RATE: 20 BRPM | TEMPERATURE: 97.8 F

## 2021-05-27 VITALS — RESPIRATION RATE: 20 BRPM | TEMPERATURE: 97 F | DIASTOLIC BLOOD PRESSURE: 70 MMHG | SYSTOLIC BLOOD PRESSURE: 110 MMHG

## 2021-05-27 NOTE — PATIENT INSTRUCTIONS - HE
We will be moving March 4th, 2019 to a different suite. Our address will remain the same. Our new [address] is located on the 1st floor of the [facility] connected with [facility].    [facility]  [address]  [place] [address]           Apply bactroban to the right foot 1-3 times per day  Cover with rolled gauze    When getting up in the middle of the night wear  Your shoes and insoles    Never go barefoot or stocking footed

## 2021-05-27 NOTE — TELEPHONE ENCOUNTER
Refill Approved    Rx renewed per Medication Renewal Policy. Medication was last renewed on 10/14/18.    Leta Lowry, [facility] Triage/Med Refill 4/16/2019     Requested Prescriptions   Pending Prescriptions Disp Refills     lisinopril (PRINIVIL,ZESTRIL) 5 MG tablet [Pharmacy Med Name: LISINOPRIL 5 MG TABLET] 90 tablet 1     Sig: TAKE 1 TABLET BY MOUTH ONCE DAILY       Ace Inhibitors Refill Protocol Passed - 4/15/2019  1:35 AM        Passed - PCP or prescribing provider visit in past 12 months       Last office visit with prescriber/PCP: 12/20/2018 Chaz Machado MD OR same dept: 12/20/2018 Chaz Machado MD OR same specialty: 12/20/2018 Chaz Machado MD  Last physical: 5/23/2018 Last MTM visit: Visit date not found   Next visit within 3 mo: Visit date not found  Next physical within 3 mo: Visit date not found  Prescriber OR PCP: Chaz Machado MD  Last diagnosis associated with med order: 1. HTN (hypertension)  - lisinopril (PRINIVIL,ZESTRIL) 5 MG tablet [Pharmacy Med Name: LISINOPRIL 5 MG TABLET]; TAKE 1 TABLET BY MOUTH ONCE DAILY  Dispense: 90 tablet; Refill: 1    If protocol passes may refill for 12 months if within 3 months of last provider visit (or a total of 15 months).             Passed - Serum Potassium in past 12 months     Lab Results   Component Value Date    Potassium 4.6 11/21/2018             Passed - Blood pressure filed in past 12 months     BP Readings from Last 1 Encounters:   04/08/19 172/78             Passed - Serum Creatinine in past 12 months     Creatinine   Date Value Ref Range Status   11/21/2018 1.32 (H) 0.70 - 1.30 mg/dL Final

## 2021-05-28 NOTE — PATIENT INSTRUCTIONS - HE
Apply bactroban to the right foot 1-3 times per day as needed  Cover with rolled gauze    When getting up in the middle of the night wear  Your shoes and insoles    Never go barefoot or stocking footed

## 2021-05-29 NOTE — TELEPHONE ENCOUNTER
"RN cannot approve Refill Request    RN can NOT refill this medication PCP messaged that patient is overdue for Labs and Office Visit. Last office visit: 12/20/2018 Chaz Machado MD Last Physical: 5/23/2018 Last MTM visit: Visit date not found Last visit same specialty: 12/20/2018 Chaz Machado MD.  Next visit within 3 mo: Visit date not found  Next physical within 3 mo: Visit date not found      Allie Waters, [facility] Triage/Med Refill 6/24/2019    Requested Prescriptions   Pending Prescriptions Disp Refills     pen needle, diabetic 31 gauge x 5/16\" Ndle 90 each 0     Sig: Used to inject insulin daily       Diabetic Supplies Refill Protocol Failed - 6/21/2019  2:38 PM        Failed - Visit with PCP or prescribing provider visit in last 6 months     Last office visit with prescriber/PCP: 12/20/2018 Chaz Machado MD OR same dept: 12/20/2018 Chaz Machado MD OR same specialty: 12/20/2018 Chaz Machado MD  Last physical: 5/23/2018 Last MTM visit: Visit date not found   Next visit within 3 mo: Visit date not found  Next physical within 3 mo: Visit date not found  Prescriber OR PCP: Chaz Machado MD  Last diagnosis associated with med order: There are no diagnoses linked to this encounter.  If protocol passes may refill for 12 months if within 3 months of last provider visit (or a total of 15 months).             Failed - A1C in last 6 months     Hemoglobin A1c   Date Value Ref Range Status   12/20/2018 9.7 (H) 3.5 - 6.0 % Final                  "

## 2021-05-29 NOTE — PATIENT INSTRUCTIONS - HE
Apply bactroban to the right foot 1-3 times per day as needed  Cover with rolled gauze    When getting up in the middle of the night wear  Your shoes and insoles    Never go barefoot or stocking footed    Continue to wear your tubular compression every day; put them on first thing in the morning and remove at bedtime    Elevate your legs periodically throughout the day, 30-60 minutes 1-3 times per day    Apply lotion to your legs 1-2 times per day; some good name brands are Cetaphil, Sarna, Aveeno, VaniCream    Continue to walk and exercise    If you are taking a diuretic continue to do so at the direction of your primary care provider    Make an appointment at the vascular clinic again if you have worsening swelling, need a prescription for new compression garments; and/or develop new wounds

## 2021-05-30 VITALS — HEIGHT: 68 IN | WEIGHT: 206 LBS | BODY MASS INDEX: 31.22 KG/M2

## 2021-05-30 VITALS — BODY MASS INDEX: 31.22 KG/M2 | HEIGHT: 68 IN | WEIGHT: 206 LBS

## 2021-05-30 VITALS — HEIGHT: 68 IN | WEIGHT: 215 LBS | BODY MASS INDEX: 32.58 KG/M2

## 2021-05-30 NOTE — TELEPHONE ENCOUNTER
RN cannot approve Refill Request    RN can NOT refill this medication Protocol failed and NO refill given.       Leta oLwry, [facility] Triage/Med Refill 7/3/2019    Requested Prescriptions   Pending Prescriptions Disp Refills     LANTUS SOLOSTAR U-100 INSULIN 100 unit/mL (3 mL) pen [Pharmacy Med Name: LANTUS SOLOSTAR 100 UNIT/ML]  3     Sig: INJECT 40 UNITS UNDER THE SKIN AT BEDTIME. DO NOT MIX LANTUS WITH ANY OTHER INSULIN       Insulin/GLP-1 Refill Protocol Failed - 7/3/2019  3:23 AM        Failed - Visit with PCP or prescribing provider visit in last 6 months     Last office visit with prescriber/PCP: Visit date not found OR same dept: 12/20/2018 Chaz Machado MD OR same specialty: 12/20/2018 Chaz Machado MD Last physical: Visit date not found Last MTM visit: Visit date not found     Next appt within 3 mo: Visit date not found  Next physical within 3 mo: Visit date not found  Prescriber OR PCP: Chaz Machado MD  Last diagnosis associated with med order: 1. Type 2 diabetes mellitus (H)  - LANTUS SOLOSTAR U-100 INSULIN 100 unit/mL (3 mL) pen [Pharmacy Med Name: LANTUS SOLOSTAR 100 UNIT/ML]; INJECT 40 UNITS UNDER THE SKIN AT BEDTIME. DO NOT MIX LANTUS WITH ANY OTHER INSULIN; Refill: 3    If protocol passes may refill for 6 months if within 3 months of last provider visit (or a total of 9 months).              Failed - A1C in last 6 months     Hemoglobin A1c   Date Value Ref Range Status   12/20/2018 9.7 (H) 3.5 - 6.0 % Final               Failed - Microalbumin in last year     Microalbumin, Random Urine   Date Value Ref Range Status   08/09/2012 15 1 - 20 mg/L Final                  Passed - Blood pressure in last year     BP Readings from Last 1 Encounters:   06/24/19 132/82             Passed - Creatinine done in last year     Creatinine   Date Value Ref Range Status   11/21/2018 1.32 (H) 0.70 - 1.30 mg/dL Final

## 2021-05-31 VITALS — WEIGHT: 193.5 LBS | HEIGHT: 69 IN | BODY MASS INDEX: 28.66 KG/M2

## 2021-05-31 VITALS — HEIGHT: 68 IN | BODY MASS INDEX: 30.62 KG/M2 | WEIGHT: 202 LBS

## 2021-05-31 VITALS — BODY MASS INDEX: 30.62 KG/M2 | WEIGHT: 202 LBS | HEIGHT: 68 IN

## 2021-05-31 VITALS — WEIGHT: 205 LBS | BODY MASS INDEX: 30.27 KG/M2

## 2021-05-31 VITALS — BODY MASS INDEX: 30.47 KG/M2 | WEIGHT: 206.3 LBS

## 2021-05-31 VITALS — WEIGHT: 202 LBS | HEIGHT: 68 IN | BODY MASS INDEX: 30.62 KG/M2

## 2021-05-31 VITALS — WEIGHT: 199 LBS | BODY MASS INDEX: 29.47 KG/M2 | WEIGHT: 199 LBS | BODY MASS INDEX: 29.39 KG/M2 | HEIGHT: 69 IN

## 2021-05-31 VITALS — WEIGHT: 194 LBS | BODY MASS INDEX: 28.65 KG/M2

## 2021-05-31 VITALS — HEIGHT: 68 IN | WEIGHT: 202 LBS | BODY MASS INDEX: 30.62 KG/M2

## 2021-05-31 VITALS — WEIGHT: 204 LBS | BODY MASS INDEX: 30.13 KG/M2

## 2021-05-31 VITALS — WEIGHT: 197 LBS | HEIGHT: 69 IN | BODY MASS INDEX: 29.18 KG/M2

## 2021-05-31 VITALS — HEIGHT: 69 IN | WEIGHT: 204 LBS | BODY MASS INDEX: 30.21 KG/M2

## 2021-05-31 VITALS — WEIGHT: 204 LBS | BODY MASS INDEX: 30.21 KG/M2 | HEIGHT: 69 IN

## 2021-05-31 VITALS — HEIGHT: 68 IN | WEIGHT: 200 LBS | BODY MASS INDEX: 30.31 KG/M2

## 2021-05-31 VITALS — BODY MASS INDEX: 28.73 KG/M2 | WEIGHT: 194 LBS | HEIGHT: 69 IN

## 2021-05-31 VITALS — WEIGHT: 202 LBS | BODY MASS INDEX: 30.62 KG/M2 | HEIGHT: 68 IN

## 2021-05-31 VITALS — HEIGHT: 69 IN | BODY MASS INDEX: 28.58 KG/M2 | WEIGHT: 193 LBS

## 2021-05-31 VITALS — BODY MASS INDEX: 30.36 KG/M2 | WEIGHT: 205 LBS | HEIGHT: 69 IN

## 2021-05-31 NOTE — TELEPHONE ENCOUNTER
dc'd 12/20/2018 Formulary change  RN cannot approve Refill Request: Erica    RN can NOT refill this medication historical medication requested. Last office visit: 12/20/2018 hCaz Machado MD Last Physical: 5/23/2018 Last MTM visit: Visit date not found Last visit same specialty: 12/20/2018 Chaz Machado MD.  Next visit within 3 mo: Visit date not found  Next physical within 3 mo: Visit date not found      Francheska Paredes, [facility] Triage/Med Refill 8/4/2019    Requested Prescriptions   Pending Prescriptions Disp Refills     VICTOZA 3-RUPALI 0.6 mg/0.1 mL (18 mg/3 mL) PnIj injection [Pharmacy Med Name: VICTOZA 3-RUPALI 18 MG/3 ML PEN] 18 Syringe 1     Sig: INJECT 1.2 MG SUBCUTANEOUSLY ONCE DAILY       Insulin/GLP-1 Refill Protocol Failed - 8/4/2019  8:34 AM        Failed - Visit with PCP or prescribing provider visit in last 6 months     Last office visit with prescriber/PCP: Visit date not found OR same dept: 12/20/2018 Chaz Machado MD OR same specialty: 12/20/2018 Chaz Machado MD Last physical: Visit date not found Last MTM visit: Visit date not found     Next appt within 3 mo: Visit date not found  Next physical within 3 mo: Visit date not found  Prescriber OR PCP: Chaz Machado MD  Last diagnosis associated with med order: 1. Type 2 diabetes mellitus (H)  - VICTOZA 3-RUPALI 0.6 mg/0.1 mL (18 mg/3 mL) PnIj injection [Pharmacy Med Name: VICTOZA 3-RUPALI 18 MG/3 ML PEN]; INJECT 1.2 MG SUBCUTANEOUSLY ONCE DAILY  Dispense: 18 Syringe; Refill: 1    If protocol passes may refill for 6 months if within 3 months of last provider visit (or a total of 9 months).              Failed - A1C in last 6 months     Hemoglobin A1c   Date Value Ref Range Status   12/20/2018 9.7 (H) 3.5 - 6.0 % Final               Failed - Microalbumin in last year     Microalbumin, Random Urine   Date Value Ref Range Status   08/09/2012 15 1 - 20 mg/L Final                  Passed - Blood pressure in last year     BP Readings from  Last 1 Encounters:   06/24/19 132/82             Passed - Creatinine done in last year     Creatinine   Date Value Ref Range Status   11/21/2018 1.32 (H) 0.70 - 1.30 mg/dL Final

## 2021-06-01 VITALS — HEIGHT: 69 IN | WEIGHT: 199 LBS | BODY MASS INDEX: 29.47 KG/M2

## 2021-06-01 VITALS — WEIGHT: 197 LBS | BODY MASS INDEX: 29.09 KG/M2

## 2021-06-01 VITALS — HEIGHT: 69 IN | WEIGHT: 198 LBS | BODY MASS INDEX: 29.33 KG/M2

## 2021-06-01 VITALS — BODY MASS INDEX: 29.47 KG/M2 | HEIGHT: 69 IN | WEIGHT: 199 LBS

## 2021-06-01 NOTE — TELEPHONE ENCOUNTER
RN cannot approve Refill Request    RN can NOT refill this medication PCP messaged that patient is overdue for Labs and Office Visit. Last office visit: 12/20/2018 Chaz Machado MD Last Physical: 5/23/2018 Last MTM visit: Visit date not found Last visit same specialty: 12/20/2018 Chaz Machado MD.  Next visit within 3 mo: Visit date not found  Next physical within 3 mo: Visit date not found      Ho Diaz, [facility] Triage/Med Refill 9/26/2019    Requested Prescriptions   Pending Prescriptions Disp Refills     LANTUS SOLOSTAR U-100 INSULIN 100 unit/mL (3 mL) pen [Pharmacy Med Name: LANTUS SOLOSTAR 100 UNIT/ML]  1     Sig: INJECT 40 UNITS UNDER THE SKIN AT BEDTIME. DO NOT MIX LANTUS WITH ANY OTHER INSULIN       Insulin/GLP-1 Refill Protocol Failed - 9/26/2019  1:24 AM        Failed - Visit with PCP or prescribing provider visit in last 6 months     Last office visit with prescriber/PCP: Visit date not found OR same dept: 12/20/2018 Chaz Machado MD OR same specialty: 12/20/2018 Chaz Machado MD Last physical: Visit date not found Last MTM visit: Visit date not found     Next appt within 3 mo: Visit date not found  Next physical within 3 mo: Visit date not found  Prescriber OR PCP: Chaz Machado MD  Last diagnosis associated with med order: 1. Type 2 diabetes mellitus (H)  - LANTUS SOLOSTAR U-100 INSULIN 100 unit/mL (3 mL) pen [Pharmacy Med Name: LANTUS SOLOSTAR 100 UNIT/ML]; INJECT 40 UNITS UNDER THE SKIN AT BEDTIME. DO NOT MIX LANTUS WITH ANY OTHER INSULIN; Refill: 1    If protocol passes may refill for 6 months if within 3 months of last provider visit (or a total of 9 months).              Failed - A1C in last 6 months     Hemoglobin A1c   Date Value Ref Range Status   12/20/2018 9.7 (H) 3.5 - 6.0 % Final               Failed - Microalbumin in last year     Microalbumin, Random Urine   Date Value Ref Range Status   08/09/2012 15 1 - 20 mg/L Final                  Passed - Blood  pressure in last year     BP Readings from Last 1 Encounters:   09/09/19 (!) 232/92             Passed - Creatinine done in last year     Creatinine   Date Value Ref Range Status   11/21/2018 1.32 (H) 0.70 - 1.30 mg/dL Final

## 2021-06-01 NOTE — PATIENT INSTRUCTIONS - HE
Go to [facility] to see if you need new impressions for the right foot    Apply bactroban to the right foot; right toe; right sin 1-2 times per day as needed    When getting up in the middle of the night wear  Your shoes and insoles    Never go barefoot or stocking footed    Continue to wear your tubular compression every day; put them on first thing in the morning and remove at bedtime    Elevate your legs periodically throughout the day, 30-60 minutes 1-3 times per day    Apply lotion to your legs 1-2 times per day; some good name brands are Cetaphil, Sarna, Aveeno, VaniCream    Continue to walk and exercise    If you are taking a diuretic continue to do so at the direction of your primary care provider    Make an appointment at the vascular clinic again if you have worsening swelling, need a prescription for new compression garments; and/or develop new wounds

## 2021-06-02 ENCOUNTER — RECORDS - HEALTHEAST (OUTPATIENT)
Dept: ADMINISTRATIVE | Facility: OTHER | Age: 86
End: 2021-06-02

## 2021-06-02 VITALS — BODY MASS INDEX: 29.92 KG/M2 | WEIGHT: 202 LBS | HEIGHT: 69 IN

## 2021-06-02 VITALS — BODY MASS INDEX: 29.33 KG/M2 | HEIGHT: 69 IN | WEIGHT: 198 LBS

## 2021-06-02 VITALS — HEIGHT: 69 IN | WEIGHT: 201 LBS | BODY MASS INDEX: 29.77 KG/M2

## 2021-06-02 VITALS — WEIGHT: 201 LBS | BODY MASS INDEX: 29.77 KG/M2 | HEIGHT: 69 IN

## 2021-06-02 NOTE — TELEPHONE ENCOUNTER
Refill Approved    Rx renewed per Medication Renewal Policy. Medication was last renewed on 4/16/19.    Barbra Gupta, [facility] Triage/Med Refill 10/12/2019     Requested Prescriptions   Pending Prescriptions Disp Refills     lisinopril (PRINIVIL,ZESTRIL) 5 MG tablet [Pharmacy Med Name: LISINOPRIL 5 MG TABLET] 90 tablet 1     Sig: TAKE 1 TABLET BY MOUTH ONCE DAILY       Ace Inhibitors Refill Protocol Passed - 10/12/2019  8:44 AM        Passed - PCP or prescribing provider visit in past 12 months       Last office visit with prescriber/PCP: 12/20/2018 Chaz Machado MD OR same dept: 12/20/2018 Chaz Machado MD OR same specialty: 12/20/2018 Chaz Machado MD  Last physical: 5/23/2018 Last MTM visit: Visit date not found   Next visit within 3 mo: Visit date not found  Next physical within 3 mo: Visit date not found  Prescriber OR PCP: hCaz Machado MD  Last diagnosis associated with med order: 1. HTN (hypertension)  - lisinopril (PRINIVIL,ZESTRIL) 5 MG tablet [Pharmacy Med Name: LISINOPRIL 5 MG TABLET]; TAKE 1 TABLET BY MOUTH ONCE DAILY  Dispense: 90 tablet; Refill: 1    If protocol passes may refill for 12 months if within 3 months of last provider visit (or a total of 15 months).             Passed - Serum Potassium in past 12 months     Lab Results   Component Value Date    Potassium 4.6 11/21/2018             Passed - Blood pressure filed in past 12 months     BP Readings from Last 1 Encounters:   09/09/19 (!) 232/92             Passed - Serum Creatinine in past 12 months     Creatinine   Date Value Ref Range Status   11/21/2018 1.32 (H) 0.70 - 1.30 mg/dL Final

## 2021-06-02 NOTE — PROGRESS NOTES
Assessment and Plan:   Annual wellness visit.  All materials and information related to the annual wellness visit were reviewed.  No new issues are identified.    Type 2 diabetes.  Blood sugars have been running a little high.  He has retired from his part-time job and so is a little less active than usual and this may be a contributing factor.  The diabetic ulcer on the bottom of his foot seems to be healed and he continues to follow-up with the wound care people.  We will be checking an A1c and lipids today.    Hypertension.  Stable.  Continue current medication.        The patient's current medical problems were reviewed.    I have had an Advance Directives discussion with the patient.  The following health maintenance schedule was reviewed with the patient and provided in printed form in the after visit summary:   Health Maintenance   Topic Date Due     DIABETES OPHTHALMOLOGY EXAM  [birth year]/17/1945     TD 18+ HE  12/17/1954     ZOSTER VACCINES (1 of 2) 12/17/1986     PNEUMOCOCCAL IMMUNIZATION 65+ LOW/MEDIUM RISK (2 of 2 - PCV13) 09/29/2004     DIABETES URINE MICROALBUMIN  08/09/2013     DIABETES FOLLOW-UP  11/23/2018     MEDICARE ANNUAL WELLNESS VISIT  05/23/2019     DIABETES FOOT EXAM  05/23/2019     FALL RISK ASSESSMENT  05/23/2019     DIABETES HEMOGLOBIN A1C  06/20/2019     INFLUENZA VACCINE RULE BASED (1) 08/01/2019     ADVANCE CARE PLANNING  05/23/2023        Subjective:   Chief Complaint: Caleb Hernandez is an 83 y.o. male here for an Annual Wellness visit.   HPI: This is an 83-year-old man who is in for his annual wellness visit.  He has known type 2 diabetes and hypertension.  He has been fairly stable although he tells me his sugars are running in the 190-200 range.  He is less active than he has been because he is quit his part-time job.  Otherwise he tries to remain active and busy.  No recent changes in medication.  He continues follow-up with the wound care clinic because of the diabetic ulcer on the  bottom of his foot.  This is doing well.  He reports no other changes in his medical status since I last saw him.  No intervening problems.    Review of Systems:    Please see above.  The rest of the review of systems are negative for all systems.    Patient Care Team:  Chaz Machado MD as PCP - General  Chaz Machado MD as Assigned PCP     Patient Active Problem List   Diagnosis     Type 2 diabetes mellitus (H)     Hyperlipidemia     Essential hypertension     Compression Fracture Of Thoracic Vertebral Body     Rib Fracture     Diabetic ulcer of right foot associated with type 2 diabetes mellitus (H)     Common bile duct (CBD) obstruction     Biliary colic     Dehydration     MANJIT (acute kidney injury) (H)     Choledocholithiasis     Sepsis due to pneumonia (H)     Bacteremia     CAP (community acquired pneumonia)     Past Medical History:   Diagnosis Date     BPH (benign prostatic hyperplasia)      Cholelithiasis      Diabetes mellitus (H)      Essential hypertension      Hyperlipidemia      Pancreatitis       Past Surgical History:   Procedure Laterality Date     BACK SURGERY      1964 removed a cyst     CHOLECYSTECTOMY  1985     ERCP       ERCP N/A 9/5/2017    Procedure: ENDOSCOPIC RETROGRADE CHOLANGIOPANCREATOGRAPHY SPHINCTEROTOMY AND STONE EXTRACTION;  Surgeon: Hansel Gannon MD;  Location: [facility] [place];  Service:      TONSILLECTOMY  1940      Family History   Problem Relation Age of Onset     Aortic aneurysm Mother      Alcohol abuse Father       Social History     Socioeconomic History     Marital status:      Spouse name: Not on file     Number of children: Not on file     Years of education: Not on file     Highest education level: Not on file   Occupational History     Not on file   Social Needs     Financial resource strain: Not on file     Food insecurity:     Worry: Not on file     Inability: Not on file     Transportation needs:     Medical: Not on file     Non-medical: Not on file    Tobacco Use     Smoking status: Former Smoker     Last attempt to quit: 1991     Years since quittin.9     Smokeless tobacco: Never Used   Substance and Sexual Activity     Alcohol use: No     Drug use: No     Sexual activity: Never   Lifestyle     Physical activity:     Days per week: Not on file     Minutes per session: Not on file     Stress: Not on file   Relationships     Social connections:     Talks on phone: Not on file     Gets together: Not on file     Attends Alevism service: Not on file     Active member of club or organization: Not on file     Attends meetings of clubs or organizations: Not on file     Relationship status: Not on file     Intimate partner violence:     Fear of current or ex partner: Not on file     Emotionally abused: Not on file     Physically abused: Not on file     Forced sexual activity: Not on file   Other Topics Concern     Not on file   Social History Narrative     Not on file      Current Outpatient Medications   Medication Sig Dispense Refill     aspirin 81 MG EC tablet Take 81 mg by mouth daily.       blood glucose test strips Use 1 each As Directed 2 (two) times a day Dispense brand per patient's insurance at pharmacy discretion.. 120 strip 6     blood-glucose meter (ONETOUCH VERIO IQ METER) Misc Use 1 each As Directed 2 (two) times a day. 1 each 0     cholecalciferol, vitamin D3, (VITAMIN D3) 5,000 unit Tab Take 5,000 Units by mouth daily.        insulin detemir U-100 (LEVEMIR FLEXTOUCH U-100 INSULN) 100 unit/mL (3 mL) pen Inject 40 Units under the skin Daily at 5 pm.. 15 adj dose pen 3     LANTUS SOLOSTAR U-100 INSULIN 100 unit/mL (3 mL) pen INJECT 40 UNITS UNDER THE SKIN AT BEDTIME. DO NOT MIX LANTUS WITH ANY OTHER INSULIN 6 adj dose pen 1     lisinopril (PRINIVIL,ZESTRIL) 5 MG tablet Take 1 tablet (5 mg total) by mouth daily. 90 tablet 0     MULTIVITS,CA,MIN/IRON/FA/LYCOP (CENTRUM MEN ORAL) Take 1 tablet by mouth daily.       mupirocin (BACTROBAN) 2 %  "ointment Apply topically to wound every day 60 g 3     naproxen sodium (ALEVE) 220 MG tablet Take 220 mg by mouth daily.       pen needle, diabetic 31 gauge x 5/16\" Ndle Used to inject insulin daily 90 each 0     VICTOZA 3-RUPALI 0.6 mg/0.1 mL (18 mg/3 mL) PnIj injection INJECT 1.2 MG SUBCUTANEOUSLY ONCE DAILY  1     vitamin E 400 unit capsule Take 400 Units by mouth daily.       No current facility-administered medications for this visit.       Objective:   Vital Signs:   Visit Vitals  /80   Pulse 62   Ht 5' 9\" (1.753 m)   Wt 209 lb (94.8 kg)   SpO2 95%   BMI 30.86 kg/m         VisionScreening:  No exam data present     PHYSICAL EXAM  On examination his vital signs are as noted.    Eyes: Pupils are equal.    Ears nose and throat: He is wearing hearing aids.  External ears appear normal.    Neck: Supple with no masses and no neck vein distention.  No thyroid enlargement.    Lungs: Clear.    Cardiovascular: Heart rhythm is stable with rate of 62 and no ectopy.  No gallops or murmurs.  Carotid pulses are full with no bruits.  No peripheral edema.    GI: Abdomen is soft and nontender with no distention.  No masses organomegaly.    Musculoskeletal: Head and neck are normal to inspection with good range of motion.    Range of motion in all 4 extremities is reasonable.    Neurologic: Cranial nerves are intact.  Gait is stable.    Psychiatric: The patient is alert and oriented x3.  Mood and affect seem appropriate.    Assessment Results 10/25/2019   Activities of Daily Living No help needed   Instrumental Activities of Daily Living No help needed   Get Up and Go Score Less than 12 seconds   Mini Cog Total Score 3   Some recent data might be hidden     A Mini-Cog score of 0-2 suggests the possibility of dementia, score of 3-5 suggests no dementia    Identified Health Risks:     He is at risk for lack of exercise and has been provided with information to increase physical activity for the benefit of his well-being.  The " patient was counseled and encouraged to consider modifying their diet and eating habits. He was provided with information on recommended healthy diet options.  The patient was provided with written information regarding signs of hearing loss.  Information regarding advance directives (living nunez), including where he can download the appropriate form, was provided to the patient via the AVS.

## 2021-06-03 VITALS
HEART RATE: 62 BPM | OXYGEN SATURATION: 95 % | SYSTOLIC BLOOD PRESSURE: 148 MMHG | BODY MASS INDEX: 30.96 KG/M2 | HEIGHT: 69 IN | WEIGHT: 209 LBS | DIASTOLIC BLOOD PRESSURE: 80 MMHG

## 2021-06-03 VITALS — WEIGHT: 205 LBS | BODY MASS INDEX: 30.36 KG/M2 | HEIGHT: 69 IN

## 2021-06-03 VITALS — WEIGHT: 202.5 LBS | HEIGHT: 69 IN | BODY MASS INDEX: 29.99 KG/M2

## 2021-06-03 VITALS — WEIGHT: 202 LBS | BODY MASS INDEX: 29.83 KG/M2

## 2021-06-04 VITALS
BODY MASS INDEX: 29.09 KG/M2 | DIASTOLIC BLOOD PRESSURE: 92 MMHG | SYSTOLIC BLOOD PRESSURE: 182 MMHG | WEIGHT: 197 LBS | HEART RATE: 66 BPM | OXYGEN SATURATION: 94 %

## 2021-06-04 VITALS
HEART RATE: 65 BPM | DIASTOLIC BLOOD PRESSURE: 80 MMHG | OXYGEN SATURATION: 97 % | BODY MASS INDEX: 29.68 KG/M2 | SYSTOLIC BLOOD PRESSURE: 138 MMHG | WEIGHT: 201 LBS

## 2021-06-04 VITALS — WEIGHT: 200.1 LBS | BODY MASS INDEX: 29.55 KG/M2

## 2021-06-04 VITALS
DIASTOLIC BLOOD PRESSURE: 76 MMHG | HEART RATE: 76 BPM | SYSTOLIC BLOOD PRESSURE: 150 MMHG | RESPIRATION RATE: 18 BRPM | BODY MASS INDEX: 29.24 KG/M2 | TEMPERATURE: 99.7 F | WEIGHT: 198 LBS

## 2021-06-04 VITALS
SYSTOLIC BLOOD PRESSURE: 132 MMHG | OXYGEN SATURATION: 96 % | BODY MASS INDEX: 29.39 KG/M2 | WEIGHT: 199 LBS | HEART RATE: 72 BPM | DIASTOLIC BLOOD PRESSURE: 70 MMHG

## 2021-06-04 VITALS
HEART RATE: 72 BPM | TEMPERATURE: 98.4 F | BODY MASS INDEX: 29.42 KG/M2 | RESPIRATION RATE: 18 BRPM | WEIGHT: 199.2 LBS | DIASTOLIC BLOOD PRESSURE: 74 MMHG | SYSTOLIC BLOOD PRESSURE: 138 MMHG

## 2021-06-04 NOTE — TELEPHONE ENCOUNTER
Medication Request  Medication name: Accu check  Verio  IQ test strips  Pharmacy Name and Location: [id number]  Reason for request: Needs these test strips for his machine  When did you use medication last?:  today  Patient offered appointment:  [facility]  Okay to leave a detailed message: yes

## 2021-06-04 NOTE — PROGRESS NOTES
[facility] Follow Up Note    Caleb Hernandez   84 y.o. male    Date of Visit: 12/27/2019    Chief Complaint   Patient presents with     Follow-up     hand issue and other stuff     Subjective  This is an 84-year-old man with multiple medical issues include type 2 diabetes, hypertension, and osteoarthritis.  He comes in today for follow-up.  His blood sugars have not been under the best of control.  They are frequently over 200.  We have tried in the past to have him sit down again with 1 of our diabetic educators to adjust medications but this is somehow never happened.  We need to try to do this again as his last A1c was 12.0.  No headaches or dizziness and no chest pain or shortness of breath.  He has had some ongoing issues with his left thumb since a fall it has been almost 2 years.  He and I have discussed this before now he is ready to do something about it as it continues to bother him.  He also complains of some tingling in the fingertips on his right hand.  He reports that the hand feels cold at times.  This lack of sensitivity makes it sometimes difficult for him to do certain tasks with his right hand.  Otherwise he reports no new problems.    ROS A comprehensive review of systems was performed and was otherwise negative    Medications, allergies, and problem list were reviewed and updated    Exam  General Appearance:   On examination his blood pressure was initially 158/80.  After resting it came down to 138/80.  Weight is 201 pounds and BMI is 29.68.    Lungs are clear.    Heart is in sinus rhythm with rate of 65 and no ectopy.    No new edema.    Examination of the left thumb is unremarkable.  He has no specific tenderness and good range of motion.  Examination of the right hand show some cooling to the skin.  I suspect there is some capillary issues in that hand and may be related to the diabetes.    The patient is alert and oriented x3.      Assessment/Plan  1. Type 2 diabetes  mellitus with other skin ulcer, with long-term current use of insulin (H)  Glycosylated Hemoglobin A1c    Ambulatory referral to Diabetes Education Program (CDE)   2. Essential hypertension  Basic Metabolic Panel   3. Osteoarthritis, unspecified osteoarthritis type, unspecified site     4. Thumb pain, left  Ambulatory referral to Orthopedic Surgery     Type 2 diabetes.  Not under the best control.  We will again try to set him up to see the diabetic educator.  Check A1c.    Hypertension.  Stable.  Continue current medication.  Check BMP.    Left thumb pain.  He and I discussed this and we will have him see the orthopedic doctor.    Tingling in the right fingertips.  For now we will watch and pursue if it gets any worse.    We will plan to see him back in follow-up in a few months.  The following high BMI interventions were performed this visit: weight monitoring    Chaz Machado MD      Current Outpatient Medications on File Prior to Visit   Medication Sig     aspirin 81 MG EC tablet Take 81 mg by mouth daily.     blood glucose test strips Use 1 each As Directed 2 (two) times a day. Dispense brand per patient's insurance at pharmacy discretion.     blood-glucose meter (ONETOUCH VERIO IQ METER) Misc Use 1 each As Directed 2 (two) times a day.     cholecalciferol, vitamin D3, (VITAMIN D3) 5,000 unit Tab Take 5,000 Units by mouth daily.      insulin detemir U-100 (LEVEMIR FLEXTOUCH U-100 INSULN) 100 unit/mL (3 mL) pen Inject 40 Units under the skin Daily at 5 pm..     LANTUS SOLOSTAR U-100 INSULIN 100 unit/mL (3 mL) pen INJECT 40 UNITS UNDER THE SKIN AT BEDTIME. DO NOT MIX LANTUS WITH ANY OTHER INSULIN     lisinopril (PRINIVIL,ZESTRIL) 5 MG tablet Take 1 tablet (5 mg total) by mouth daily.     MULTIVITS,CA,MIN/IRON/FA/LYCOP (CENTRUM MEN ORAL) Take 1 tablet by mouth daily.     mupirocin (BACTROBAN) 2 % ointment Apply topically to wound every day     naproxen sodium (ALEVE) 220 MG tablet Take 220 mg by mouth daily.  "    pen needle, diabetic 31 gauge x \" Ndle Used to inject insulin daily     VICTOZA 3-RUPALI 0.6 mg/0.1 mL (18 mg/3 mL) PnIj injection INJECT 1.2 MG SUBCUTANEOUSLY ONCE DAILY     vitamin E 400 unit capsule Take 400 Units by mouth daily.     [DISCONTINUED] blood glucose test strips Use 1 each As Directed 2 (two) times a day Dispense brand per patient's insurance at pharmacy discretion..     No current facility-administered medications on file prior to visit.      Allergies   Allergen Reactions     Cresol      Muscle cramps     Crestor [Rosuvastatin] Myalgia     Demeclocycline Hives     Social History     Tobacco Use     Smoking status: Former Smoker     Last attempt to quit: 1991     Years since quittin.1     Smokeless tobacco: Never Used   Substance Use Topics     Alcohol use: No     Drug use: No           "

## 2021-06-05 VITALS
DIASTOLIC BLOOD PRESSURE: 50 MMHG | TEMPERATURE: 97.9 F | HEART RATE: 80 BPM | SYSTOLIC BLOOD PRESSURE: 116 MMHG | WEIGHT: 181 LBS | BODY MASS INDEX: 25.97 KG/M2

## 2021-06-05 VITALS
SYSTOLIC BLOOD PRESSURE: 109 MMHG | WEIGHT: 188 LBS | BODY MASS INDEX: 26.98 KG/M2 | TEMPERATURE: 98 F | DIASTOLIC BLOOD PRESSURE: 80 MMHG | HEART RATE: 74 BPM

## 2021-06-05 VITALS
DIASTOLIC BLOOD PRESSURE: 68 MMHG | SYSTOLIC BLOOD PRESSURE: 108 MMHG | HEART RATE: 67 BPM | TEMPERATURE: 97.7 F | WEIGHT: 202.8 LBS | OXYGEN SATURATION: 94 % | BODY MASS INDEX: 29.95 KG/M2

## 2021-06-05 VITALS
HEART RATE: 68 BPM | DIASTOLIC BLOOD PRESSURE: 81 MMHG | SYSTOLIC BLOOD PRESSURE: 171 MMHG | TEMPERATURE: 98.6 F | BODY MASS INDEX: 26.98 KG/M2 | WEIGHT: 188 LBS

## 2021-06-05 VITALS
SYSTOLIC BLOOD PRESSURE: 122 MMHG | DIASTOLIC BLOOD PRESSURE: 60 MMHG | HEART RATE: 78 BPM | BODY MASS INDEX: 29.56 KG/M2 | OXYGEN SATURATION: 95 % | WEIGHT: 200.2 LBS | TEMPERATURE: 97.7 F

## 2021-06-05 VITALS
SYSTOLIC BLOOD PRESSURE: 129 MMHG | DIASTOLIC BLOOD PRESSURE: 71 MMHG | HEART RATE: 67 BPM | WEIGHT: 188 LBS | BODY MASS INDEX: 26.98 KG/M2 | TEMPERATURE: 98.3 F

## 2021-06-05 VITALS
TEMPERATURE: 98.8 F | DIASTOLIC BLOOD PRESSURE: 78 MMHG | BODY MASS INDEX: 25.68 KG/M2 | WEIGHT: 179.4 LBS | RESPIRATION RATE: 18 BRPM | HEART RATE: 62 BPM | OXYGEN SATURATION: 92 % | HEIGHT: 70 IN | SYSTOLIC BLOOD PRESSURE: 147 MMHG

## 2021-06-05 VITALS
HEIGHT: 70 IN | OXYGEN SATURATION: 95 % | RESPIRATION RATE: 20 BRPM | BODY MASS INDEX: 26.14 KG/M2 | DIASTOLIC BLOOD PRESSURE: 79 MMHG | SYSTOLIC BLOOD PRESSURE: 164 MMHG | TEMPERATURE: 99 F | HEART RATE: 79 BPM | WEIGHT: 182.6 LBS

## 2021-06-05 VITALS
DIASTOLIC BLOOD PRESSURE: 76 MMHG | TEMPERATURE: 98.7 F | WEIGHT: 188 LBS | HEART RATE: 55 BPM | DIASTOLIC BLOOD PRESSURE: 87 MMHG | WEIGHT: 188 LBS | SYSTOLIC BLOOD PRESSURE: 126 MMHG | HEART RATE: 64 BPM | BODY MASS INDEX: 26.98 KG/M2 | TEMPERATURE: 98.5 F | BODY MASS INDEX: 26.98 KG/M2 | SYSTOLIC BLOOD PRESSURE: 172 MMHG

## 2021-06-05 VITALS
WEIGHT: 182.6 LBS | OXYGEN SATURATION: 94 % | SYSTOLIC BLOOD PRESSURE: 149 MMHG | TEMPERATURE: 98.8 F | DIASTOLIC BLOOD PRESSURE: 75 MMHG | RESPIRATION RATE: 20 BRPM | BODY MASS INDEX: 26.14 KG/M2 | HEART RATE: 62 BPM | HEIGHT: 70 IN

## 2021-06-05 VITALS — HEIGHT: 70 IN | BODY MASS INDEX: 26.77 KG/M2 | WEIGHT: 187 LBS

## 2021-06-05 VITALS
RESPIRATION RATE: 20 BRPM | SYSTOLIC BLOOD PRESSURE: 138 MMHG | TEMPERATURE: 98 F | OXYGEN SATURATION: 95 % | DIASTOLIC BLOOD PRESSURE: 80 MMHG | HEIGHT: 70 IN | WEIGHT: 179.4 LBS | HEART RATE: 86 BPM | BODY MASS INDEX: 25.68 KG/M2

## 2021-06-05 VITALS
TEMPERATURE: 97.1 F | SYSTOLIC BLOOD PRESSURE: 103 MMHG | WEIGHT: 182.8 LBS | DIASTOLIC BLOOD PRESSURE: 62 MMHG | BODY MASS INDEX: 26.23 KG/M2 | HEART RATE: 70 BPM

## 2021-06-05 VITALS
DIASTOLIC BLOOD PRESSURE: 81 MMHG | TEMPERATURE: 98.5 F | SYSTOLIC BLOOD PRESSURE: 128 MMHG | WEIGHT: 181.4 LBS | BODY MASS INDEX: 26.03 KG/M2 | HEART RATE: 62 BPM

## 2021-06-05 VITALS
OXYGEN SATURATION: 94 % | HEART RATE: 76 BPM | DIASTOLIC BLOOD PRESSURE: 50 MMHG | WEIGHT: 182 LBS | BODY MASS INDEX: 26.11 KG/M2 | SYSTOLIC BLOOD PRESSURE: 118 MMHG | TEMPERATURE: 97.8 F

## 2021-06-05 VITALS — BODY MASS INDEX: 28.92 KG/M2 | WEIGHT: 202 LBS | HEIGHT: 70 IN

## 2021-06-05 VITALS — BODY MASS INDEX: 27.04 KG/M2 | WEIGHT: 188.9 LBS | HEIGHT: 70 IN

## 2021-06-05 NOTE — TELEPHONE ENCOUNTER
Refill Approved    Rx renewed per Medication Renewal Policy. Medication was last renewed on 10/12/2019    Brittani Monaco, [facility] Triage/Med Refill 1/8/2020     Requested Prescriptions   Pending Prescriptions Disp Refills     lisinopril (PRINIVIL,ZESTRIL) 5 MG tablet [Pharmacy Med Name: LISINOPRIL 5 MG TABLET] 90 tablet 0     Sig: TAKE 1 TABLET BY MOUTH EVERY DAY       [employer]ce Inhibitors Refill Protocol Passed - 1/8/2020  2:03 AM        Passed - PCP or prescribing provider visit in past 12 months       Last office visit with prescriber/PCP: 12/27/2019 Chaz Machado MD OR same dept: 12/27/2019 Chaz Machado MD OR same specialty: 12/27/2019 Chaz Machado MD  Last physical: 10/25/2019 Last MTM visit: Visit date not found   Next visit within 3 mo: Visit date not found  Next physical within 3 mo: Visit date not found  Prescriber OR PCP: Chaz Machado MD  Last diagnosis associated with med order: 1. HTN (hypertension)  - lisinopril (PRINIVIL,ZESTRIL) 5 MG tablet [Pharmacy Med Name: LISINOPRIL 5 MG TABLET]; TAKE 1 TABLET BY MOUTH EVERY DAY  Dispense: 90 tablet; Refill: 0    If protocol passes may refill for 12 months if within 3 months of last provider visit (or a total of 15 months).             Passed - Serum Potassium in past 12 months     Lab Results   Component Value Date    Potassium 4.3 12/27/2019             Passed - Blood pressure filed in past 12 months     BP Readings from Last 1 Encounters:   12/27/19 138/80             Passed - Serum Creatinine in past 12 months     Creatinine   Date Value Ref Range Status   12/27/2019 1.01 0.70 - 1.30 mg/dL Final

## 2021-06-05 NOTE — PROGRESS NOTES
"Follow up Vascular Visit       Date of Service:2/10/2020    Date Last Seen: 1/13/2020; 1/13/2020    Chief Complaint: right foot pre-ulcerative callous        Pt returns to [facility] with regards to their right foot pre-ulcerative callous.  They arrive today alone. They are currently using bactroban and gauze PRN to the wounds. This is being done by the patient 1-2 times per day prn. They are using compression stockings intermittantly for compression. They are feeling well today. Denies fevers, chills. No shortness of breath. Last visit we repeated the SRIKANTH this was adequate for wound healing right PTA was occluded.  Noted to have elevated bp to day in clinic; he is asymptomatic. He is also noted to have elevated A1C; he has not discussed this with his pcp.     Allergies: Cresol; Crestor [rosuvastatin]; and Demeclocycline    Medications:   Current Outpatient Medications:      aspirin 81 MG EC tablet, Take 81 mg by mouth daily., Disp: , Rfl:      blood glucose test strips, Use 1 each As Directed 2 (two) times a day. Dispense brand per patient's insurance at pharmacy discretion., Disp: 120 strip, Rfl: 6     blood-glucose meter (ONETOUCH VERIO IQ METER) Misc, Use 1 each As Directed 2 (two) times a day., Disp: 1 each, Rfl: 0     cholecalciferol, vitamin D3, (VITAMIN D3) 5,000 unit Tab, Take 5,000 Units by mouth daily. , Disp: , Rfl:      LANTUS SOLOSTAR U-100 INSULIN 100 unit/mL (3 mL) pen, INJECT 40 UNITS UNDER THE SKIN AT BEDTIME. DO NOT MIX LANTUS WITH ANY OTHER INSULIN, Disp: 18 adj dose pen, Rfl: 1     lisinopril (PRINIVIL,ZESTRIL) 5 MG tablet, TAKE 1 TABLET BY MOUTH EVERY DAY, Disp: 90 tablet, Rfl: 0     MULTIVITS,CA,MIN/IRON/FA/LYCOP (CENTRUM MEN ORAL), Take 1 tablet by mouth daily., Disp: , Rfl:      naproxen sodium (ALEVE) 220 MG tablet, Take 220 mg by mouth daily., Disp: , Rfl:      pen needle, diabetic 31 gauge x 5/16\" Ndle, Used to inject insulin daily, Disp: 90 each, Rfl: 0     VICTOZA 3-RUPALI " 0.6 mg/0.1 mL (18 mg/3 mL) PnIj injection, Inject 1.8 mg under the skin daily., Disp: 9 mL, Rfl: 1     vitamin E 400 unit capsule, Take 400 Units by mouth daily., Disp: , Rfl:      mupirocin (BACTROBAN) 2 % ointment, Apply topically to wound every day, Disp: 60 g, Rfl: 3    History:   Past Medical History:   Diagnosis Date     BPH (benign prostatic hyperplasia)      Cholelithiasis      Diabetes mellitus (H)      Essential hypertension      Hyperlipidemia      Pancreatitis        Physical Exam:    BP (!) 176/92   Pulse 72   Temp 97.7  F (36.5  C)   Resp 18     General:  Patient presents to clinic in no apparent distress.  Head: normocephalic atraumatic  Psychiatric:  Alert and oriented x3.   Respiratory: unlabored breathing; no cough  Integumentary:  Skin is uniformly warm, dry and pink.    Wound #1 Location: right 5th met head  Size: 0.3L x 0.1W x 0.1depth.  No sinus tract present, Wound base: discolored callous; red  No undermining present. Wound is partial thickness. There is scant drainage. Periwound: no denudement, erythema, induration, maceration or warmth.      Circumferential volume measures:    Vasc Edema 3/6/2017 4/17/2017 6/2/2017   Right just above MTP 23 24 23   Right Ankle 25 26 23.5   Right Widest Calf 37.5 38.6 37   Left - just above MTP 22 - -   Left Ankle 22 - -   Left Widest Calf 36 - -       Ulceration(s)/Wound(s):     Wound 07/24/17 Right lateral foot (Active)   Pre Size Length 0.3 2/10/2020  7:00 AM   Pre Size Width 0.1 2/10/2020  7:00 AM   Pre Total Sq cm 0 1/13/2020  8:00 AM   Post Size Length 1 1/13/2020  8:00 AM   Post Size Width 1 1/13/2020  8:00 AM   Post Size Depth 0.2 1/13/2020  8:00 AM   Post Total Sq cm 0.01 4/8/2019  7:00 AM   Description callus 1/13/2020  8:00 AM       Wound 09/05/17 Foot Right (Active)        Lab Values    No results found for: SEDRATE  Lab Results   Component Value Date    CREATININE 1.01 12/27/2019     Lab Results   Component Value Date    HGBA1C 12.0 (H)  12/27/2019     Lab Results   Component Value Date    BUN 19 12/27/2019     Lab Results   Component Value Date    ALBUMIN 3.8 10/25/2019     No results found for: [id number]          Impression:  1. Pre-ulcerative corn or callous     2. Mononeuropathy     3. Type 2 diabetes mellitus with other skin ulcer, with long-term current use of insulin (H)              Are any of these wounds new today: No; Location: na    Assessment/Plan:          1. Debridement: After discussion of risk factors and verbal consent was obtained 2% Lidocaine HCL jelly was applied, under clean conditions, the right foot ulceration(s) were debrided using #15 blade scalpel. Devitalized and nonviable tissue, along with any fibrin and slough, was removed to improve granulation tissue formation, stimulate wound healing, decrease overall bacteria load, disrupt biofilm formation and decrease edge senescence.  Total excisional debridement was 0.03 sq cm from the epidermis/dermis area with a depth of 0.1 cm.   Ulcers were improved afterwards and .  Measures were unchanged after debridement.       2.  Wound treatment: wound treatment will include irrigation and dressings to promote autolytic debridement which will include:continue bactroban ointment and gauze prn; will show Dr. Raymond the SRIKANTH results and see if he feels any angiointervention would be helpful for wound healing and prevention of future recurrence Stable wound ADDENDUM: spoke with Dr. Raymond and Dr. Garza they recommended TcPO2s this was ordered we will call the patient to get this scheduled.            3. Edema: compression stockings. The compression wraps were applied today in clinic. Stable swelling           4. Nutrition: diabetic diet; we spoke about his elevated A1C; encouraged him to make appt with pcp to specifically discuss action plan on this           5. Offloading: just went to [facility]; shoes were adjusted and new shoes were ordered; insoles were also adjusted; no  barefoot walking     Patient will follow up with me in 3-4 weeks for reevaluation. They were instructed to call the clinic sooner with any signs or symptoms of infection or any further questions/concerns. Answered all questions.    Ines Zimmerman DNP, RN, CNP, CWOCN, CFCN, CLT  [facility]   [phone]        This note was electronically signed by Ines Zimmerman

## 2021-06-05 NOTE — TELEPHONE ENCOUNTER
RN cannot approve Refill Request    RN can NOT refill this medication Protocol failed and NO refill given.         Leta Lowry, [facility] Triage/Med Refill 1/16/2020    Requested Prescriptions   Pending Prescriptions Disp Refills     LANTUS SOLOSTAR U-100 INSULIN 100 unit/mL (3 mL) pen [Pharmacy Med Name: LANTUS SOLOSTAR 100 UNIT/ML] 18 adj dose pen 1     Sig: INJECT 40 UNITS UNDER THE SKIN AT BEDTIME. DO NOT MIX LANTUS WITH ANY OTHER INSULIN       Insulin/GLP-1 Refill Protocol Failed - 1/15/2020 12:53 PM        Failed - Microalbumin in last year     Microalbumin, Random Urine   Date Value Ref Range Status   08/09/2012 15 1 - 20 mg/L Final                  Passed - Visit with PCP or prescribing provider visit in last 6 months     Last office visit with prescriber/PCP: 12/27/2019 OR same dept: 12/27/2019 Chaz Machado MD OR same specialty: 12/27/2019 Chaz Machado MD Last physical: 10/25/2019 Last MTM visit: Visit date not found     Next appt within 3 mo: Visit date not found  Next physical within 3 mo: Visit date not found  Prescriber OR PCP: Chaz Machado MD  Last diagnosis associated with med order: 1. Type 2 diabetes mellitus (H)  - LANTUS SOLOSTAR U-100 INSULIN 100 unit/mL (3 mL) pen [Pharmacy Med Name: LANTUS SOLOSTAR 100 UNIT/ML]; INJECT 40 UNITS UNDER THE SKIN AT BEDTIME. DO NOT MIX LANTUS WITH ANY OTHER INSULIN  Dispense: 18 adj dose pen; Refill: 1    If protocol passes may refill for 6 months if within 3 months of last provider visit (or a total of 9 months).              Passed - A1C in last 6 months     Hemoglobin A1c   Date Value Ref Range Status   12/27/2019 12.0 (H) 3.5 - 6.0 % Final               Passed - Blood pressure in last year     BP Readings from Last 1 Encounters:   01/13/20 162/84             Passed - Creatinine done in last year     Creatinine   Date Value Ref Range Status   12/27/2019 1.01 0.70 - 1.30 mg/dL Final

## 2021-06-05 NOTE — PROGRESS NOTES
Follow up Vascular Visit       Date of Service:1/13/2020    Date Last Seen: 9/9/2019; 9/9/2019    Chief Complaint: right foot pre-ulcerative callous        Pt returns to [facility] with regards to their right foot pre-ulcerative callous.  They arrive today alone. They are currently using bactroban and gauze to the callous area PRN. This is being done by the patient. They are using nothing for compression. He states his foot care nurse was out last week and was concerned about a bruised area on the foot and wanted him to be seen. They are feeling well today. Denies fevers, chills. No shortness of breath. Has shoes and insoles these are in good repair. Last SRIKANTH done 10/2018 this was mildly reduced but adequate for wound healing.  Checking fs running low 200s.    Allergies: Cresol; Crestor [rosuvastatin]; and Demeclocycline    Medications:   Current Outpatient Medications:      aspirin 81 MG EC tablet, Take 81 mg by mouth daily., Disp: , Rfl:      blood glucose test strips, Use 1 each As Directed 2 (two) times a day. Dispense brand per patient's insurance at pharmacy discretion., Disp: 120 strip, Rfl: 6     blood-glucose meter (ONETOUCH VERIO IQ METER) Misc, Use 1 each As Directed 2 (two) times a day., Disp: 1 each, Rfl: 0     cholecalciferol, vitamin D3, (VITAMIN D3) 5,000 unit Tab, Take 5,000 Units by mouth daily. , Disp: , Rfl:      insulin detemir U-100 (LEVEMIR FLEXTOUCH U-100 INSULN) 100 unit/mL (3 mL) pen, Inject 40 Units under the skin Daily at 5 pm.., Disp: 15 adj dose pen, Rfl: 3     LANTUS SOLOSTAR U-100 INSULIN 100 unit/mL (3 mL) pen, INJECT 40 UNITS UNDER THE SKIN AT BEDTIME. DO NOT MIX LANTUS WITH ANY OTHER INSULIN, Disp: 6 adj dose pen, Rfl: 1     lisinopril (PRINIVIL,ZESTRIL) 5 MG tablet, TAKE 1 TABLET BY MOUTH EVERY DAY, Disp: 90 tablet, Rfl: 0     MULTIVITS,CA,MIN/IRON/FA/LYCOP (CENTRUM MEN ORAL), Take 1 tablet by mouth daily., Disp: , Rfl:      mupirocin (BACTROBAN) 2 % ointment,  "Apply topically to wound every day, Disp: 60 g, Rfl: 3     naproxen sodium (ALEVE) 220 MG tablet, Take 220 mg by mouth daily., Disp: , Rfl:      pen needle, diabetic 31 gauge x 5/16\" Ndle, Used to inject insulin daily, Disp: 90 each, Rfl: 0     VICTOZA 3-RUPALI 0.6 mg/0.1 mL (18 mg/3 mL) PnIj injection, INJECT 1.2 MG SUBCUTANEOUSLY ONCE DAILY, Disp: , Rfl: 1     vitamin E 400 unit capsule, Take 400 Units by mouth daily., Disp: , Rfl:     History:   Past Medical History:   Diagnosis Date     BPH (benign prostatic hyperplasia)      Cholelithiasis      Diabetes mellitus (H)      Essential hypertension      Hyperlipidemia      Pancreatitis        Physical Exam:    /84   Pulse 68   Temp 97  F (36.1  C)   Resp 18     General:  Patient presents to clinic in no apparent distress.  Head: normocephalic atraumatic  Psychiatric:  Alert and oriented x3.   Respiratory: unlabored breathing; no cough  Integumentary:  Skin is uniformly warm, dry and pink.    Wound #1 Location: right 1st met head  Size: 1L x 1W x 0.2cmdepth.  No sinus tract present, Wound base: discolored callous; ulcerated but viable tissue underneath  No undermining present. Wound is full thickness. There is moderate drainage. Periwound: no denudement, erythema, induration, maceration or warmth.      There was a 1x1cm area of deep purple discoloration just distal to the wound 1 this was intact underneath    Circumferential volume measures:    Vasc Edema 3/6/2017 4/17/2017 6/2/2017   Right just above MTP 23 24 23   Right Ankle 25 26 23.5   Right Widest Calf 37.5 38.6 37   Left - just above MTP 22 - -   Left Ankle 22 - -   Left Widest Calf 36 - -       Ulceration(s)/Wound(s):     Wound 07/24/17 Right lateral foot (Active)   Pre Size Length 0 1/13/2020  8:00 AM   Pre Size Width 0 1/13/2020  8:00 AM   Pre Size Depth 0 1/13/2020  8:00 AM   Pre Total Sq cm 0 1/13/2020  8:00 AM   Post Size Length 1 1/13/2020  8:00 AM   Post Size Width 1 1/13/2020  8:00 AM   Post " Size Depth 0.2 1/13/2020  8:00 AM   Post Total Sq cm 0.01 4/8/2019  7:00 AM   Description callus 1/13/2020  8:00 AM       Wound 09/05/17 Foot Right (Active)        Lab Values    No results found for: SEDRATE  Lab Results   Component Value Date    CREATININE 1.01 12/27/2019     Lab Results   Component Value Date    HGBA1C 12.0 (H) 12/27/2019     Lab Results   Component Value Date    BUN 19 12/27/2019     Lab Results   Component Value Date    ALBUMIN 3.8 10/25/2019     No results found for: [id number]          Impression:  1. Pre-ulcerative corn or callous  mupirocin (BACTROBAN) 2 % ointment    US Ankle Brachial Indices   2. Mononeuropathy  mupirocin (BACTROBAN) 2 % ointment    US Ankle Brachial Indices   3. Type 2 diabetes mellitus with other skin ulcer, with long-term current use of insulin (H)  mupirocin (BACTROBAN) 2 % ointment    US Ankle Brachial Indices   4. Localized edema  mupirocin (BACTROBAN) 2 % ointment    US Ankle Brachial Indices   5. Essential hypertension  mupirocin (BACTROBAN) 2 % ointment    US Ankle Brachial Indices   6. Type 2 diabetes mellitus with other circulatory complications (H)   US Ankle Brachial Indices            Are any of these wounds new today: No; Location: na    Assessment/Plan:          1. Debridement: After discussion of risk factors and verbal consent was obtained 2% Lidocaine HCL jelly was applied, under clean conditions, the right foot ulceration(s) were debrided using currette and #15 blade scalpel. Devitalized and nonviable tissue, along with any fibrin and slough, was removed to improve granulation tissue formation, stimulate wound healing, decrease overall bacteria load, disrupt biofilm formation and decrease edge senescence.  Total excisional debridement was 1 sq cm from the epidermis/dermis area with a depth of 0.2 cm.   Ulcers were improved afterwards and .  Measures were unchanged after debridement.       2.  Wound treatment: wound treatment will include  irrigation and dressings to promote autolytic debridement which will include:continue bactroban; gauze; will update SRIKANTH; last one was done over 1 year ago Worsened wound           3. Edema: elevation; compression as tolerated.            4. Nutrition: diabetic diet; sugars remain elevated; running 200s           5. Offloading: will send him back to [facility] for evaluation of his shoes and insoles; never go barefoot     Patient will follow up with me in 4 weeks for reevaluation. They were instructed to call the clinic sooner with any signs or symptoms of infection or any further questions/concerns. Answered all questions.    Ines Zimmerman DNP, RN, CNP, CWOCN, CFCN, CLT  [facility]   [phone]        This note was electronically signed by Ines Zimmerman

## 2021-06-05 NOTE — PROGRESS NOTES
Assessment: Frantz is here alone today for his diabetes education.  He has seen someone in the past and stated this was very helpful in getting his blood sugars back under control.  His last 2 A1c results came back quite elevated at 12.0%.    He is currently taking Lantus 40 units nightly.  Stated eh has missed this occasionally and it is reflective in his blood sugars the next morning.  Discussed taking 20 units in the morning when he forgets the night before then getting back on schedule that evening.  Stated he will try to remember this.  He is also taking Victoza 1.2mg daily in the morning.  It is unclear when, at some point his dose was decreased from 1.8mg to 1.2mg daily.     He checks his blood sugars once daily, in the morning.  This is not always a fasting readings as sometimes he is in a hurry to get out the door and check later in the morning.  His readings are noted below:  181/152/185/188/197/205-no insulin the night before/167/248-no insulin the night before/223/262    He is eating three meals daily and is not interested in changing his eating habits at the time.  We reviewed his current meal plan, which is noted below:  B-instant breakfast with milk  2 chocolate chip waffles with butter  Scrambled eggs  No coffee  L-hamburger with grilled onions  D-italian soup with noodles and vegetables  Wife loves to cook    Frantz tries to walk when he can.  Stated it does hurt his feet after a while.    All additional questions and concerns addressed today.    Plan: Increase Victoza to 1.8mg daily starting tomorrow.  [facility].    Subjective and Objective:      Caleb Hernandez is referred by Dr. Chaz Machado for Diabetes Education.     Lab Results   Component Value Date    HGBA1C 12.0 (H) 12/27/2019       Follow up:   CDE (certified diabetic educator)      Education:     Monitoring   Meter (per above goals): Assessed and Discussed  Monitoring: Assessed and  Discussed  BG goals: Assessed and Discussed    Nutrition Management  Nutrition Management: Assessed and Discussed  Weight: Assessed and Discussed  Portions/Balance: Assessed and Discussed  Carb ID/Count: Assessed and Discussed  Label Reading: Not addressed  Heart Healthy Fats: Not addressed  Menu Planning: Assessed and Discussed  Dining Out: Assessed and Discussed  Physical Activity: Assessed and Discussed  Medications: Assessed and Discussed  Orals: Assessed and Discussed  Injected Medications: Assessed and Discussed   Storage/Exp:Assessed and Discussed   Site Rotation: Assessed and Discussed   Sites Assessed: no    Diabetes Disease Process: Assessed and Discussed    Acute Complications: Prevent, Detect, Treat:  Hypoglycemia: Assessed and Discussed  Hyperglycemia: Assessed and Discussed  Sick Days: Not addressed  Driving: Not addressed    Chronic Complications  Goal Setting and Problem Solving: Assessed and Discussed  Barriers: Assessed and Discussed  Psychosocial Adjustments: Assessed and Discussed      Time spent with the patient: 30 minutes for diabetes education and counseling.   Previous Education: yes  Visit Type:DSMT  Hours Remaining: DSMT 1.5 and MNT 2      Vianca Uribe  1/23/2020

## 2021-06-05 NOTE — TELEPHONE ENCOUNTER
Patient informed of US results, pt stated understanding.    ----- Message from Ines Zimmerman NP sent at 1/13/2020  5:22 PM CST -----  Can you call the patient and let him know that his SRIKANTH was normal; adequate blood flow in the feet for healing

## 2021-06-05 NOTE — PATIENT INSTRUCTIONS - HE
Make appt with Dr. Machado regarding your elevated A1C this was 12.0% we want this between 6-7%    I will show Dr. Raymond your SRIKANTH study and see if he would like to consult with you      Your wound is open on the right foot    Apply bactroban to the right foot and gauze    When getting up in the middle of the night wear  Your shoes and insoles    Never go barefoot or stocking footed    Continue to wear your tubular compression every day; put them on first thing in the morning and remove at bedtime    Elevate your legs periodically throughout the day, 30-60 minutes 1-3 times per day    Apply lotion to your legs 1-2 times per day; some good name brands are Cetaphil, Sarna, Aveeno, VaniCream    Continue to walk and exercise    If you are taking a diuretic continue to do so at the direction of your primary care provider

## 2021-06-05 NOTE — PATIENT INSTRUCTIONS - HE
We will get you scheduled for SRIKANTH to check the arterial blood flow to your feet    Your wound is open again on the right foot    Go to [facility] to see if you need new impressions for the right foot or adjustments made to the insoles    Apply bactroban to the right foot and gauze    When getting up in the middle of the night wear  Your shoes and insoles    Never go barefoot or stocking footed    Continue to wear your tubular compression every day; put them on first thing in the morning and remove at bedtime    Elevate your legs periodically throughout the day, 30-60 minutes 1-3 times per day    Apply lotion to your legs 1-2 times per day; some good name brands are Cetaphil, Sarna, Aveeno, VaniCream    Continue to walk and exercise    If you are taking a diuretic continue to do so at the direction of your primary care provider    Make an appointment at the vascular clinic again if you have worsening swelling, need a prescription for new compression garments; and/or develop new wounds

## 2021-06-06 NOTE — PATIENT INSTRUCTIONS - HE
Make appt with Dr. Machado regarding your elevated A1C this was 12.0% we want this between 6-7%        Your wound is open on the right foot    Apply bactroban to the right foot; silvercel; 3M bandage; rolled gauze    When getting up in the middle of the night wear  Your shoes and insoles    Never go barefoot or stocking footed    Continue to wear your tubular compression every day; put them on first thing in the morning and remove at bedtime    Elevate your legs periodically throughout the day, 30-60 minutes 1-3 times per day    Apply lotion to your legs 1-2 times per day; some good name brands are Cetaphil, Sarna, Aveeno, VaniCream    If you are taking a diuretic continue to do so at the direction of your primary care provider

## 2021-06-06 NOTE — TELEPHONE ENCOUNTER
RN cannot approve Refill Request    RN can NOT refill this medication Protocol failed and NO refill given.       Leta Lowry, [facility] Triage/Med Refill 3/5/2020    Requested Prescriptions   Pending Prescriptions Disp Refills     VICTOZA 3-RUPALI 0.6 mg/0.1 mL (18 mg/3 mL) PnIj injection [Pharmacy Med Name: VICTOZA 3-RUPALI 18 MG/3 ML PEN] 18 Syringe 1     Sig: INJECT 1.2 MG SUBCUTANEOUSLY ONCE DAILY       Insulin/GLP-1 Refill Protocol Failed - 3/5/2020  1:21 AM        Failed - Microalbumin in last year     Microalbumin, Random Urine   Date Value Ref Range Status   08/09/2012 15 1 - 20 mg/L Final                  Passed - Visit with PCP or prescribing provider visit in last 6 months     Last office visit with prescriber/PCP: 12/27/2019 OR same dept: 12/27/2019 Chaz Machado MD OR same specialty: 12/27/2019 Chaz Machado MD Last physical: 10/25/2019 Last MTM visit: Visit date not found     Next appt within 3 mo: Visit date not found  Next physical within 3 mo: Visit date not found  Prescriber OR PCP: Chaz Machado MD  Last diagnosis associated with med order: 1. 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              Passed - A1C in last 6 months     Hemoglobin A1c   Date Value Ref Range Status   12/27/2019 12.0 (H) 3.5 - 6.0 % Final               Passed - Blood pressure in last year     BP Readings from Last 1 Encounters:   02/10/20 (!) 176/92             Passed - Creatinine done in last year     Creatinine   Date Value Ref Range Status   12/27/2019 1.01 0.70 - 1.30 mg/dL Final

## 2021-06-06 NOTE — PROGRESS NOTES
RESPIRATORY CARE NOTE     Patient Name: Caleb Hernandez  Today's Date: 2/24/2020     Pt here today for an OP TCPO2 monitoring of both legs on room air.  Pt lying flat without movement for test.  Pt has a bandage on outside of right foot.  Scar noted on anterior mid right calf.  Pt tolerated well and left in no distress.        Barbra Walker

## 2021-06-07 NOTE — PROGRESS NOTES
"F-101/173/208/191/274  Evening---/--/359/381/285  Continued trouble with foot  No leaking for 4-5 days  Feet fine  Feels tired  R knee pain-new  No known injury  Assessment: Spoke with Frantz via telephone today to follow-up on his blood sugars after increasing his insulin.    He is currently taking Lantus 44 units at bedtime.  Stated his blood sugars have been as follows:  F-101/173/208/191/274  Evening---/359/381/285  Continues to state he is eating the same as he has been for years and his Diabetes was in well control.  He does not know why his fasting reading was 101 this morning as he did not do anything different last night.    Frantz continues to have trouble with his foot, has chosen not to talk to Vascular as he feels it is \"not worth it\".  Stated he feels his foot is healing fine.  Stated he has not had any leaking from his bandage for 4-5 days.    Frantz has a new complaint of right knee pain today.  Stated he does not know of any injury or other cause for pain.    Plan: Due to reading of 101 this morning, will not adjust dose today.  Will follow up with him again next week.    Subjective and Objective:      Caleb Hernandez is referred by Dr. Machado for Diabetes Education.     Lab Results   Component Value Date    HGBA1C 12.0 (H) 12/27/2019       Goals       a1c <8.0.      9/22/17:  Frantz will work on reducing his a1c <8.0 to reduce complications from diabetes.        monitoring      9/22/17:  Frantz will start to check his blood sugars 2x/daily.  10/6/17:  Met and continuing.  He is checking his blood sugars 4x/daily now.            Follow up:   CDE (certified diabetic educator)      Education:     Monitoring   Meter (per above goals): Assessed and Discussed  Monitoring: Assessed and Discussed  BG goals: Assessed and Discussed    Nutrition Management  Nutrition Management: Assessed and Discussed  Weight: Not addressed  Portions/Balance: Assessed and Discussed  Carb ID/Count: Not addressed  Label Reading: Not " addressed  Heart Healthy Fats: Not addressed  Menu Planning: Assessed and Discussed  Dining Out: Assessed and Discussed  Physical Activity: Assessed and Discussed  Medications: Assessed and Discussed  Orals: Assessed  Injected Medications: Assessed and Discussed   Storage/Exp:Assessed and Discussed   Site Rotation: Assessed and Discussed   Sites Assessed: no    Diabetes Disease Process: Assessed and Discussed    Acute Complications: Prevent, Detect, Treat:  Hypoglycemia: Assessed and Discussed  Hyperglycemia: Assessed and Discussed    Chronic Complications  Foot Care:Assessed and Discussed  Goal Setting and Problem Solving: Assessed and Discussed  Barriers: Assessed and Discussed  Psychosocial Adjustments: Assessed and Discussed      Time spent with the patient: 15 minutes for diabetes education and counseling.   Previous Education: yes  Visit Type:KATHARINA Uribe  4/7/2020

## 2021-06-08 NOTE — PATIENT INSTRUCTIONS - HE
Go to [facility] and get offloading boot for the right foot      Stop the bactroban    Minimize your time up on your feet    Stop getting wet in the shower    Wound Care Instructions    daily Cleanse your right foot wound(s) with Dilute hibiclens 30cc in 500cc NS    Pat Dry with non-sterile gauze    Apply skin prep to skin surrounding the wound    Apply silvercel into/onto the wounds    Cover with gauze; rolled gauze    Secure with non-sterile roll gauze and tape as needed; avoid adhesive directly on the skin    Compression: tubular compression; elevation    It is not ok to get your wound wet in the bath or shower    SEEK MEDICAL CARE IF:    You have an increase in swelling, pain, or redness around the wound.    You have an increase in the amount of pus coming from the wound.    There is a bad smell coming from the wound.    The wound appears to be worsening/enlarging    You have a fever greater than 101.5 F        It is ok to continue current wound care treatment/products for the next 2-3 days until new wound care supplies are ordered and arrive. If longer than this please contact our office at [phone].      Ines Zimmerman DNP, RN, CNP, [facility]  [phone]

## 2021-06-08 NOTE — PROGRESS NOTES
"Caleb Hernandez is a 84 y.o. male who is being evaluated via a billable telephone visit.      The patient has been notified of following:     \"This telephone visit will be conducted via a call between you and your physician/provider. We have found that certain health care needs can be provided without the need for a physical exam.  This service lets us provide the care you need with a short phone conversation.  If a prescription is necessary we can send it directly to your pharmacy.  If lab work is needed we can place an order for that and you can then stop by our lab to have the test done at a later time.    Telephone visits are billed at different rates depending on your insurance coverage. During this emergency period, for some insurers they may be billed the same as an in-person visit.  Please reach out to your insurance provider with any questions.    If during the course of the call the physician/provider feels a telephone visit is not appropriate, you will not be charged for this service.\"    Patient has given verbal consent to a Telephone visit? Yes    What phone number would you like to be contacted at? [phone]    Patient would like to receive their AVS by AVS Preference: Mail a copy.    Additional provider notes: See note.   This is an 84 year old man with a history of hypertension and diabetes.  His sugars have not been under the best control although today he reports that the numbers have improved from what it has been.  We have not had a recent A1c because of the viral pandemic.  His primary reason for calling in this visit today is to follow-up on a hospitalization at [facility] this week with an acute pulmonary embolus.  He denies having had any chest pain but was progressively short of breath.  I did review the discharge summary from [facility].  He was seen in consultation by pulmonary.  He was started on Eliquis and is on it at this time.  He reports that his breathing is a little bit " easier and he is still not having any chest pain.  Sugars are as noted above.  No other changes are reported.  He wanted to be sure that I was aware of all this and that we did some ongoing follow-up.    Assessment/Plan:    Acute pulmonary embolus.  History is noted above.  I discussed this with him.  He will continue his Eliquis.  He will be due for follow-up echocardiogram in 2 to 3 months.  I would like to see him in about a month if it is possible to get him into the office.    Type 2 diabetes.  Sugars sound a little better.  Again, it would be good to do an A1c so we will hopefully be able to do that soon.  For now he will continue on his usual medication.  Sometimes his sugars are higher when he is not pain is much attention to his medications as he should be.  He tells me he is working hard to do this at this time.    Hypertension.  Stable at discharge.  Continue current medication.    Phone call duration:  15 minutes    Karlie Callahan CMA

## 2021-06-08 NOTE — TELEPHONE ENCOUNTER
Writer informed him that they both agree that the CAM boot was ordered. He will keep his appt. With Priyank.

## 2021-06-08 NOTE — TELEPHONE ENCOUNTER
His foot was evaluated by a foot nurse because his wife was getting treatment. She stated that he should be seen for the wound and it need to be debrided.     Writer spoke with pt, he still has a R foot ulcer but he stated that it is mostly scabbed. He ran out of supplies so he is applying dry gauze daily. He stated that he has bloody drainage at times. He stated that he is going to try to email photos to the vascular1 email or have his son Elfego send a photo. Once the photo is received it can be sent to Ines for a plan.

## 2021-06-08 NOTE — PATIENT INSTRUCTIONS - HE
[facility] Virtual Check-In Number    [phone]    When you arrive for your IN OFFICE visit. Please call this number from the parking lot.      The staff will then virtually check you in and meet you at the door to escort you to a room.    If you arrive early and a room is not yet ready for you staff may have you wait can call you back when they are ready.     Please remember to wear a mask into your appointment     No Visitors Allowed    If you are having any symptoms- cough, fever, rash, loss of taste and smell or shortness of breath we ask that you please call and reschedule your appointment.         Go to [facility] and get offloading boot checked again for the right foot    Minimize your time up on your feet    Stop getting wet in the shower    Wound Care Instructions    daily Cleanse your right foot wound(s) with Dilute hibiclens 30cc in 500cc NS    Pat Dry with non-sterile gauze    Apply skin prep to skin surrounding the wound    small amount of bactroban ointment    Apply silvercel into/onto the wounds    Cover with gauze; rolled gauze    Secure with non-sterile roll gauze and tape as needed; avoid adhesive directly on the skin    Compression: tubular compression; elevation    It is not ok to get your wound wet in the bath or shower    SEEK MEDICAL CARE IF:    You have an increase in swelling, pain, or redness around the wound.    You have an increase in the amount of pus coming from the wound.    There is a bad smell coming from the wound.    The wound appears to be worsening/enlarging    You have a fever greater than 101.5 F        It is ok to continue current wound care treatment/products for the next 2-3 days until new wound care supplies are ordered and arrive. If longer than this please contact our office at [phone].      Ines Zimmerman DNP, RN, CNP, [facility]  [phone]

## 2021-06-08 NOTE — TELEPHONE ENCOUNTER
Patient has photos printed and is going to drop them off at the [facility] before Friday this week.

## 2021-06-08 NOTE — TELEPHONE ENCOUNTER
Patient wanted to get some clarification of the CAM boot that was ordered. He does not want to have any conflict in what Dr. Garcia advised and what Ines Zimmerman advised.    He was unable to go up to [facility] to get fitted for boot today because they were closed. He is scheduled at [facility] on Friday. I let him know that he would be able to fitted for the same product at either provider but he stated that he wanted reassurance that [facility] and [facility] are on the same page related to the CAM boot.

## 2021-06-08 NOTE — TELEPHONE ENCOUNTER
He was seen by in home nurse for foot care. She stated she is concerned about a hard callus that has developed on his foot ulcer. He is not able to send photos and does not have cell phone for video visit. Please advise.

## 2021-06-08 NOTE — PROGRESS NOTES
[facility] Follow Up Note    Caleb Hernandez   81 y.o. male    Date of Visit: 1/23/2017    Chief Complaint   Patient presents with     Annual Exam     Physical - not fasting      Subjective  This is an 81-year-old patient who comes in pr he has medical problems that include diabetes, hyperlipidemia and hypertension.  His sugars have not been under the best of control and he has been in contact with our pharmacist Dr. Damon.  He recently made an adjustment in his medications which I did review.  He is about to start a new medication this week.  For the most part he has been feeling good.  He had a minor respiratory infection that cleared after a week.  Despite the fact that he is turned 81 years of age she continues to work about a 40 hour week.  He is clearly remaining active and busy.  His only other concern is his ongoing left thumb pain which has been going on now for over one year.  He is wondering if it is possible to see someone about this.  No other complaints today.    Family history is reviewed and is noncontributory.    ROS A comprehensive review of systems was performed and was otherwise negative    Medications, allergies, and problem list were reviewed and updated    Exam  General Appearance:   On examination his blood pressure is 138/64.  Weight is 215 pounds and height is 68 inches.  BMI is 32.69.    Eyes: Pupils are equal and conjunctiva are normal.    Ears nose and throat: External ears canals and tympanic membranes are normal.  He does wear bilateral hearing aids.  Nose and throat are normal.    Neck: Supple with no masses and no neck vein distention.  No thyroid enlargement.    Lungs: Clear.    Cardiovascular: Heart is in a sinus rhythm with a rate of 64 and no ectopy.  No gallops or murmurs.  Carotid pulses are full with no bruits.  No peripheral edema.    GI: Abdomen is soft and nontender with no distention.  No masses or organomegaly.    Musculoskeletal: Head and neck  normal to inspection with normal range of motion.  Good strength and range of motion in all 4 extremities.    Neurologic: Cranial nerves are intact.  Gait is normal.    Psychiatric: The patient is alert and oriented ×3.  Mood and affect are appropriate.      Assessment/Plan  1. Healthcare maintenance  PSA (Prostatic-Specific Antigen), Annual Screen   2. Type 2 diabetes mellitus  Glycosylated Hemoglobin A1c   3. Hyperlipidemia  LDL Cholesterol, Direct   4. Essential hypertension with goal blood pressure less than 140/90  Comprehensive Metabolic Panel   5. Thumb pain, left  Ambulatory referral to Orthopedic Surgery     Overall the patient appears to be doing well.  Blood pressure is stable.  We will see how he does with his new adjustment and diabetic medication.  He will continue to follow-up with Dr. Damon and myself regarding this issue.  We discussed the thumb and I would like to refer him to the hand doctors to take a look at this.  We will draw some blood today to include an LDL, A1c, CMP and PSA.  I will contact him with results of these tests and make appropriate recommendations.  The following high BMI interventions were performed this visit: weight monitoring    Chaz Machado MD      Current Outpatient Prescriptions on File Prior to Visit   Medication Sig     aspirin 81 MG EC tablet Take 81 mg by mouth daily.     blood glucose test (CONTOUR TEST STRIPS) strips Use 1 each As Directed 2 (two) times a day. Dispense brand per patient's insurance at pharmacy discretion.     cholecalciferol, vitamin D3, (VITAMIN D3) 5,000 unit Tab Take 1 tablet by mouth daily.     ibuprofen (ADVIL,MOTRIN) 200 MG tablet Take 200 mg by mouth every morning.     insulin glargine (LANTUS SOLOSTAR) 100 unit/mL (3 mL) pen Inject 18 Units under the skin daily.     liraglutide (VICTOZA) 0.6 mg/0.1 mL (18 mg/3 mL) PnIj injection Inject 0.1 mL (0.6 mg total) under the skin daily. With or without food.     lisinopril (PRINIVIL,ZESTRIL) 5  MG tablet Take 1 tablet (5 mg total) by mouth daily.     MULTIVITS,CA,MIN/IRON/FA/LYCOP (CENTRUM MEN ORAL) Take 1 tablet by mouth daily.     pravastatin (PRAVACHOL) 20 MG tablet Take 1 tablet (20 mg total) by mouth bedtime.     vitamin E 400 unit capsule Take 400 Units by mouth daily.     No current facility-administered medications on file prior to visit.      Allergies   Allergen Reactions     Crestor [Rosuvastatin] Myalgia     Demeclocycline Hives     Social History   Substance Use Topics     Smoking status: Former Smoker     Quit date: 11/11/1991     Smokeless tobacco: Never Used     Alcohol use No

## 2021-06-08 NOTE — TELEPHONE ENCOUNTER
Called and left a message to patient that ada took a look at the photos and would like to schedule an in office visit. The number was left to call the clinic back to schedule this.

## 2021-06-09 NOTE — PROGRESS NOTES
Follow up Vascular Visit       Date of Service:3/20/2017    Date Last Seen: 3/6/2017; Visit date not found    Chief Complaint: right traumatic shin ulcer    History:   Past Medical History:   Diagnosis Date     BPH (benign prostatic hyperplasia)      Cholelithiasis      Diabetes mellitus      Essential hypertension      Hyperlipidemia      Pancreatitis        Pt returns to the Vascular clinic with regards to their right traumatic shin ulcer. Pt arrives along today. Feeling well. His current POC consists of SSD daily to the ulcer; oil immersion, Gauze, roll gauze; tubigrips; he had to modify the tubigrip as it was too uncomfortable on the foot. He is in process of getting a shin guard to protect the shin when going up and down ladders. He continues to work. Denies fevers, chills. Checking fs daily; running 140-190. He has completed his antibiotics; tolerated well.     Allergies: Crestor [rosuvastatin] and Demeclocycline    Physical Exam:    Visit Vitals     /80     Pulse 68     Temp 98  F (36.7  C) (Temporal)     Resp 16       General:  Patient presents to clinic in no apparent distress.  Head: normocephalic atraumatic  Psychiatric:  Alert and oriented x3.   Respiratory: unlabored breathing; no cough  Integumentary:  Skin is uniformly warm, dry and pink.    Wound #1 Location: right shin  Size: 2.3L x 1.5W x 0.4cmdepth.  No sinus tract present, Wound base: continues to be necrotic; attempted to debride this remains necrotic; trace surrounding erythema; no odor; no purulence  No undermining present. Periwound: no denudement, erythema, induration, maceration or warmth.      Circumferential volume measures:    Vasc Edema 3/6/2017   Right just above MTP 23   Right Ankle 25   Right Widest Calf 37.5   Left - just above MTP 22   Left Ankle 22   Left Widest Calf 36       Ulceration(s)/Wound(s):     Wound 03/06/17 Right leg  (Active)   Pre Size Length 2.3 3/20/2017 12:00 PM   Pre Size Width 1.5 3/20/2017 12:00 PM   Pre  Size Depth 0.4 3/20/2017 12:00 PM   Pre Total Sq cm 3.45 3/20/2017 12:00 PM       Lab Values    No results found for: SEDRATE  Lab Results   Component Value Date    CREATININE 1.10 01/23/2017     Lab Results   Component Value Date    HGBA1C 12.1 (H) 01/23/2017     Lab Results   Component Value Date    BUN 24 01/23/2017     Lab Results   Component Value Date    ALBUMIN 3.5 01/23/2017     No results found for: [id number]          Impression:   Right shin traumatic ulcer  Type 2 diabetes poorly controlled  Edema  overweight      Are any of these wounds new today: No; Location: na    Assessment/Plan:          1. Excisional debridement of the ulcer(s) was recommended today, after consent was obtained and 2% Xylocaine was applied using a sterile curet the epidermal, dermal and down into the subcutaneous tissue was sharply debrided for a total square cm of 3.45. The devitalized and necrotic tissue was removed and the wounds appeared much  afterwards. The patient tolerated this well.           2. Edema: will continue with tubigrip; we discussed why the foot needs to be included in the compression; he cannot work like this; will have to modify this and monitor           3.  Wound treatment:continues to have significant necrosis in the wound bed; will switch to medihoney alginate and see if this will clean up the wound further; pt to change every 3 days           4. Nutrition: continue to work on his diabetes; his numbers are coming down           5. Offloading: encouraged him to get shin gaurds to help protect the wound area while he is working     Patient will follow up with me in 2 weeks for reevaluation. They were instructed to call the clinic sooner with any signs or symptoms of infection or any further questions/concerns. Answered all questions.    Ines Zimmerman DNP, RN, CNP, [facility]  [facility]  [phone]

## 2021-06-09 NOTE — PATIENT INSTRUCTIONS - HE
Wound Care Instructions    daily Cleanse your right foot wound(s) with Normal Saline    Pat Dry with non-sterile gauze    Apply endoform into/onto the wounds    Cover with bandage    Compression: tubular compression    It is not ok to get your wound wet in the bath or shower    Wear CAGE boot at all times when up    SEEK MEDICAL CARE IF:    You have an increase in swelling, pain, or redness around the wound.    You have an increase in the amount of pus coming from the wound.    There is a bad smell coming from the wound.    The wound appears to be worsening/enlarging    You have a fever greater than 101.5 F        It is ok to continue current wound care treatment/products for the next 2-3 days until new wound care supplies are ordered and arrive. If longer than this please contact our office at [phone].      Ines Zimmerman DNP, RN, CNP, [facility]  [phone]

## 2021-06-09 NOTE — PROGRESS NOTES
Assessment/Plan:        See note    2 diabetes.  I did review his blood sugars.  Morning sugars are reasonably good and afternoon sugars sometimes are high.  Continue current medication.  Check A1c today.    Hypertension.  Stable.  Check CMP.  Continue current medication.    Hyperlipidemia.  Check lipids today.    Diabetic foot ulcer.  Continue care at the wound care center.    Pulmonary embolus.  This was 2 months ago.  He will continue his Eliquis for at least 2 more months.    Subjective:    Patient ID: Caleb Hernandez is a 84 y.o. male.    HPI  This is an 84-year-old man with multiple medical problems that include type 2 diabetes, hyperlipidemia and hypertension.  He brought his logbook in today and his morning sugars are generally good but there is some elevation in the afternoon.  He has no new symptoms related to the diabetes.  He does have a diabetic foot ulcer which is slowly improving.  He is seen regularly at the wound care clinic.  He denies any headaches or dizziness and has had no chest pain or shortness of breath.  He has finally retired from his part-time job.  2 months ago he had a pulmonary embolus and is still on Eliquis which is going well.  He will need to continue this for at least 2 more months.  No other new problems or issues are reported.  He is due for blood work and this will be done today.      Review of Systems  Negative except as noted above.        Objective:    Physical Exam  On examination his blood pressure is 132/70.  O2 saturation is 96%.  Weight is 199 pounds.    Neck is supple with no masses or no neck vein distention.    Lungs are clear.    Heart is in sinus rhythm with a rate of 72 and no ectopy.    No new edema in the lower extremities.    The patient is alert and oriented x3.

## 2021-06-09 NOTE — TELEPHONE ENCOUNTER
Question following Office Visit  When did you see your provider: Today  What is your question: Patient has called to advise Dr. Machado that the pain medication he is taking is tramadol 50 mg, one tablet by mouth every six hours as needed for pain.  Will Dr. Machado please order this medication for him?  Pharmacy is [id number].  Okay to leave a detailed message: Yes     35

## 2021-06-09 NOTE — PATIENT INSTRUCTIONS - HE
[facility] Virtual Check-In Number    [phone]    When you arrive for your IN OFFICE visit. Please call this number from the parking lot.      The staff will then virtually check you in and meet you at the door to escort you to a room.    If you arrive early and a room is not yet ready for you staff may have you wait can call you back when they are ready.     Please remember to wear a mask into your appointment     No Visitors Allowed    If you are having any symptoms- cough, fever, rash, loss of taste and smell or shortness of breath we ask that you please call and reschedule your appointment.         Minimize your time up on your feet    Stop getting wet in the shower    Wound Care Instructions    daily Cleanse your right foot wound(s) with Dilute hibiclens 30cc in 500cc NS    Pat Dry with non-sterile gauze    Apply skin prep to skin surrounding the wound    small amount of bactroban ointment    Apply silvercel into/onto the wounds    Cover with gauze; rolled gauze    Secure with non-sterile roll gauze and tape as needed; avoid adhesive directly on the skin    Compression: tubular compression; elevation    It is not ok to get your wound wet in the bath or shower    SEEK MEDICAL CARE IF:    You have an increase in swelling, pain, or redness around the wound.    You have an increase in the amount of pus coming from the wound.    There is a bad smell coming from the wound.    The wound appears to be worsening/enlarging    You have a fever greater than 101.5 F        It is ok to continue current wound care treatment/products for the next 2-3 days until new wound care supplies are ordered and arrive. If longer than this please contact our office at [phone].      Ines Zimmerman DNP, RN, CNP, [facility]  [phone]

## 2021-06-09 NOTE — TELEPHONE ENCOUNTER
"Patient reports he saw Dr Ware on 7/22. Patient called to ask what his white blood cell count lab was. Per chart review, advised that a WBC wasn't ordered.     Patient states he has this pain on his right side/back near kidneys for a couple of weeks. Pain is on and off. Currently pain is 2/10. Patient taking Tramadol which works well for the pain. No urinary symptoms. Patient states pain is worse when he \"goes to do anything\" Patient declined triage & instead wanted me to send a message to Dr Ware, and ask what he suggests. Does patient need another appointment?    Erica Funez, RN/M [facility]      Reason for Disposition    [1] Caller requests to speak ONLY to PCP AND [2] NON-URGENT question    Additional Information    Negative: Lab calling with strep throat test results and triager can call in prescription    Negative: Lab calling with urinalysis test results and triager can call in prescription    Negative: Medication questions    Negative: ED call to PCP    Negative: Physician call to PCP    Negative: Call about patient who is currently hospitalized    Negative: Lab or radiology calling with CRITICAL test results    Negative: [1] Prescription not at pharmacy AND [2] was prescribed today by PCP    Negative: [1] Follow-up call from patient regarding patient's clinical status AND [2] information urgent    Negative: [1] Caller requests to speak ONLY to PCP AND [2] URGENT question    Negative: [1] Caller requests to speak to PCP now AND [2] won't tell us reason for call  (Exception: if 10 pm to 6 am, caller must first discuss reason for the call)    Negative: Notification of hospital admission    Negative: Notification of death    Negative: Caller requesting lab results    Negative: Lab or radiology calling with test results    Negative: [1] Follow-up call from patient regarding patient's clinical status AND [2] information NON-URGENT    Protocols used: PCP CALL - NO TRIAGE-A-      "

## 2021-06-09 NOTE — TELEPHONE ENCOUNTER
Spoke with the patient and relayed message below from Dr. Machado.  He verbalized understanding and has scheduled an appointment with Dr. Clancy for Monday morning.  Patient had no further questions at this time.  Deonna BOLAÑOS CMA/JAGRUTI....................3:27 PM

## 2021-06-09 NOTE — TELEPHONE ENCOUNTER
Refill Approved    Rx renewed per Medication Renewal Policy. Medication was last renewed on 1/8/20.    Leta Lowry, [facility] Triage/Med Refill 6/22/2020     Requested Prescriptions   Pending Prescriptions Disp Refills     lisinopriL (PRINIVIL,ZESTRIL) 5 MG tablet [Pharmacy Med Name: LISINOPRIL 5 MG TABLET] 90 tablet 0     Sig: TAKE 1 TABLET BY MOUTH EVERY DAY       [employer] Refill Protocol Passed - 6/22/2020 12:15 AM        Passed - PCP or prescribing provider visit in past 12 months       Last office visit with prescriber/PCP: 12/27/2019 Chaz Machado MD OR same dept: 12/27/2019 Chaz Machado MD OR same specialty: 12/27/2019 Chaz Machado MD  Last physical: 10/25/2019 Last MTM visit: Visit date not found   Next visit within 3 mo: Visit date not found  Next physical within 3 mo: Visit date not found  Prescriber OR PCP: Chaz Machado MD  Last diagnosis associated with med order: 1. HTN (hypertension)  - lisinopriL (PRINIVIL,ZESTRIL) 5 MG tablet [Pharmacy Med Name: LISINOPRIL 5 MG TABLET]; TAKE 1 TABLET BY MOUTH EVERY DAY  Dispense: 90 tablet; Refill: 0    If protocol passes may refill for 12 months if within 3 months of last provider visit (or a total of 15 months).             Passed - Serum Potassium in past 12 months     Lab Results   Component Value Date    Potassium 4.3 12/27/2019             Passed - Blood pressure filed in past 12 months     BP Readings from Last 1 Encounters:   06/17/20 136/70             Passed - Serum Creatinine in past 12 months     Creatinine   Date Value Ref Range Status   12/27/2019 1.01 0.70 - 1.30 mg/dL Final

## 2021-06-09 NOTE — PROGRESS NOTES
Follow up Vascular Visit       Date of Service:4/3/2017    Date Last Seen: 3/6/2017; Visit date not found    Chief Complaint: right traumatic shin ulcer    History:   Past Medical History:   Diagnosis Date     BPH (benign prostatic hyperplasia)      Cholelithiasis      Diabetes mellitus      Essential hypertension      Hyperlipidemia      Pancreatitis        Pt returns to the Vascular clinic with regards to their right traumatic shin ulcer. Pt arrives along today. Feeling well. His current POC consists of medihoney alginate every 3 days to the ulcer; oil immersion, Gauze, roll gauze; tubigrips; he had to modify the tubigrip as it was too uncomfortable on the foot. He is in process of getting a shin guard to protect the shin when going up and down ladders. He continues to work. Denies fevers, chills. Checking fs daily; running 140-190. He has completed his antibiotics; tolerated well.     Allergies: Crestor [rosuvastatin] and Demeclocycline    Physical Exam:    /88 Comment: pt reports not taking BP med today  Pulse 64  Temp 98.8  F (37.1  C) (Temporal)   Resp 16    General:  Patient presents to clinic in no apparent distress.  Head: normocephalic atraumatic  Psychiatric:  Alert and oriented x3.   Respiratory: unlabored breathing; no cough  Integumentary:  Skin is uniformly warm, dry and pink.    Wound #1 Location: right shin  Size: 2L x 1.5W x 0.4cmdepth.  No sinus tract present, Wound base: continues to be necrotic; attempted to debride this remains necrotic; some granulation buds beginning to appear; trace surrounding erythema; no odor; no purulence  No undermining present. Periwound: no denudement, erythema, induration, maceration or warmth.      Circumferential volume measures:    Vasc Edema 3/6/2017   Right just above MTP 23   Right Ankle 25   Right Widest Calf 37.5   Left - just above MTP 22   Left Ankle 22   Left Widest Calf 36       Ulceration(s)/Wound(s):     Wound 03/06/17 Right leg  (Active)   Pre  Size Length 2 4/3/2017  8:00 AM   Pre Size Width 1.5 4/3/2017  8:00 AM   Pre Size Depth 0.3 4/3/2017  8:00 AM   Pre Total Sq cm 3 4/3/2017  8:00 AM         Lab Values    No results found for: SEDRATE  Lab Results   Component Value Date    CREATININE 1.10 01/23/2017     Lab Results   Component Value Date    HGBA1C 12.1 (H) 01/23/2017     Lab Results   Component Value Date    BUN 24 01/23/2017     Lab Results   Component Value Date    ALBUMIN 3.5 01/23/2017     No results found for: [id number]          Impression:   Right shin traumatic ulcer  Type 2 diabetes poorly controlled  Edema  overweight      Are any of these wounds new today: No; Location: na    Assessment/Plan:          1. Excisional debridement of the ulcer(s) was recommended today, after consent was obtained and 2% Xylocaine was applied using a sterile curet the epidermal, dermal and down into the subcutaneous tissue was sharply debrided for a total square cm of 3. The devitalized and necrotic tissue was removed and the wounds appeared much  afterwards. The patient tolerated this well.           2. Edema: will continue with tubigrip; we discussed why the foot needs to be included in the compression; he cannot work like this; will have to modify this and monitor           3.  Wound treatment:continues to have significant necrosis in the wound bed; will  Continue medihoney alginate and see if this will clean up the wound further; pt to change every 3 days           4. Nutrition: continue to work on his diabetes; his numbers are coming down           5. Offloading: encouraged him to get shin gaurds to help protect the wound area while he is working     Patient will follow up with me in 2-3 weeks for reevaluation. They were instructed to call the clinic sooner with any signs or symptoms of infection or any further questions/concerns. Answered all questions.    Ines Zimmerman DNP, RN, CNP, [facility]  [facility]  [phone]

## 2021-06-09 NOTE — PROGRESS NOTES
"Caleb Hernandez is a 84 y.o. male who is being evaluated via a billable telephone visit.      The patient has been notified of following:     \"This telephone visit will be conducted via a call between you and your physician/provider. We have found that certain health care needs can be provided without the need for a physical exam.  This service lets us provide the care you need with a short phone conversation.  If a prescription is necessary we can send it directly to your pharmacy.  If lab work is needed we can place an order for that and you can then stop by our lab to have the test done at a later time.    Telephone visits are billed at different rates depending on your insurance coverage. During this emergency period, for some insurers they may be billed the same as an in-person visit.  Please reach out to your insurance provider with any questions.    If during the course of the call the physician/provider feels a telephone visit is not appropriate, you will not be charged for this service.\"    Patient has given verbal consent to a Telephone visit? Yes    What phone number would you like to be contacted at? [phone]    Patient would like to receive their AVS by AVS Preference: Mail a copy.    Additional provider notes: See note   This is an 84 year old man with multiple medical problems that include type 2 diabetes and hypertension.  He comes in for visit today primarily to follow-up on a pulmonary embolus that occurred about 1 month ago.  I had previously had a visit with him regarding this issue.  In addition he has a problem with a diabetic foot ulcer and has been seen at wound clinic on a regular basis.  I did have an opportunity to review their notes and he does seem to be making good progress.  The patient says he is feeling good about his progress at the wound clinic.  He denies any headaches or dizziness and has had no chest pain or shortness of breath.  He continues on his Eliquis because of the " pulmonary embolus and will need to do so for several months.  He offers no other particular complaints today.  He does not always check his sugars on a regular basis.  He is due for blood work.    Assessment/Plan:    Pulmonary embolus.  This occurred about 1 month ago.  He seems to be doing well clinically and will continue with Eliquis.    Type 2 diabetes.  He is in need of blood work.  I discussed this with him and suggested we needed to see him in the office probably sometime in July.  We will try to set up a face-to-face appointment and to do blood work at the same time.    Hypertension.  Again, check when he is seen in the office.    Diabetic foot ulcer.  Continue care at the wound clinic.    Phone call duration:  10 minutes    Karlie Callahan [facility]

## 2021-06-09 NOTE — PROGRESS NOTES
[facility] Follow Up Note    Caleb Hernandez   81 y.o. male    Date of Visit: 2/27/2017    Chief Complaint   Patient presents with     Follow-up     hole in leg, puss filled     Subjective  This is an 81-year-old man who comes in because of an open lesion on his lower right leg.  He has had some skin discoloration in that area for a number of weeks but over the past 3-4 days he noticed increased discomfort and fluctuance.  After being in the shower he pushed on the skin and it drained considerably.  He is now left with a significant gap in the leg.  He does say that it has been somewhat painful.  The drainage has stopped.  But he was concerned  this morning because he describes this hole in his leg.  The patient has otherwise been feeling his usual self.    ROS A comprehensive review of systems was performed and was otherwise negative    Medications, allergies, and problem list were reviewed and updated    Exam  General Appearance:   On examination his blood pressure is up slightly at 158/70.  Weight is 206 pounds and height is 68 inches.  BMI is 31.32.    Heart is in a stable rhythm with a rate of 72 and no ectopy.    The patient is alert and oriented ×3.    On the lower right leg he has what appears to have been probably an abscess in the skin.  It is about 2 cm in diameter.  There is slight clear drainage from it but no pus.  There is some erythema around the open wound.  No streaking in either direction from the wound.      Assessment/Plan  1. Abscess  Ambulatory referral to Vascular Center   2. Skin ulceration  Ambulatory referral to Vascular Center     Probable lower leg abscess with a significant decubitus ulcer at this point.  I'm going to start him on some Keflex.  I would like him to be seen at the wound clinic for additional evaluation and treatment.  I will follow-up with him as needed.  Body Mass Index was not assessed due to The patient was in with an acute medical  issue..    Chaz Machado MD      Current Outpatient Prescriptions on File Prior to Visit   Medication Sig     aspirin 81 MG EC tablet Take 81 mg by mouth daily.     blood glucose test (CONTOUR TEST STRIPS) strips Use 1 each As Directed 2 (two) times a day. Dispense brand per patient's insurance at pharmacy discretion.     cholecalciferol, vitamin D3, (VITAMIN D3) 5,000 unit Tab Take 1 tablet by mouth daily.     ibuprofen (ADVIL,MOTRIN) 200 MG tablet Take 200 mg by mouth every morning.     insulin glargine (LANTUS SOLOSTAR) 100 unit/mL (3 mL) pen Inject 18 Units under the skin daily.     liraglutide (VICTOZA) 0.6 mg/0.1 mL (18 mg/3 mL) PnIj injection Inject 0.1 mL (0.6 mg total) under the skin daily. With or without food.     lisinopril (PRINIVIL,ZESTRIL) 5 MG tablet Take 1 tablet (5 mg total) by mouth daily.     MULTIVITS,CA,MIN/IRON/FA/LYCOP (CENTRUM MEN ORAL) Take 1 tablet by mouth daily.     pravastatin (PRAVACHOL) 20 MG tablet Take 1 tablet (20 mg total) by mouth bedtime.     vitamin E 400 unit capsule Take 400 Units by mouth daily.     No current facility-administered medications on file prior to visit.      Allergies   Allergen Reactions     Crestor [Rosuvastatin] Myalgia     Demeclocycline Hives     Social History   Substance Use Topics     Smoking status: Former Smoker     Quit date: 11/11/1991     Smokeless tobacco: Never Used     Alcohol use No

## 2021-06-09 NOTE — TELEPHONE ENCOUNTER
Okay to give him tramadol grams every 8 hours as needed.  #30.  Please tell him that he will only be able to get the 10-day supply and should not stay on this beyond that point.

## 2021-06-10 NOTE — PROGRESS NOTES
[facility] Clinic Note  Caleb Hernandez   84 y.o. male    Date of Visit: 7/27/2020  Chief Complaint   Patient presents with     Flank Pain     Lab Result Follow Up     WBC increased     Hypertension     Follow-up     Assessment/Plan  1. Flank pain  Unsure of etiology.  CBC unremarkable with UA indeterminate (cloudy, moderate blood, no squams or WBC or nitrite) with culture pending.  UA findings is a bit concerning and will wait to see what urine culture shows before any further studies/imaging.  Differential includes nephrolithiasis, muscle strain.  - HM2(CBC w/o Differential)  - Urinalysis-UC if Indicated  - Culture, Urine    Much or all of the text in this note was generated through the use of Dragon Dictate voice-to-text software. Errors in spelling or words which seem out of context are unintentional. Sound alike errors, in particular, may have escaped editing  Alonso Clanyc MD    Return if symptoms worsen or fail to improve.    Subjective  This 84 y.o. old male. Complains on and off right flank pain for a couple of weeks. 2-4/10. Takes tramadol for the pain. Concerned that he has a kidney infection, and wants his WBC to be checked. Has had kidney stones in the 1960s. Thinks it could be a pulled muscle as well. Worse with any kind of movement. Better with sitting down, and with tramadol. No dysuria or hematuria, CP/SOB, f/s/c, n/v (but did throw up 1X yesterday am after breakfast-just food contents). Having a 1 BM daily. No unexplained weight loss. Has not tried PT.     ROS A comprehensive review of systems was performed and was otherwise negative    Medications, allergies, and problem list were reviewed and updated    Exam  General appearance: Pleasant, nontoxic-appearing, no acute distress, alert and oriented x4  Vitals:    07/27/20 0850 07/27/20 0854   BP: (!) 188/90 (!) 182/92   Patient Site: Right Arm Right Arm   Patient Position: Sitting Sitting   Cuff Size: Adult Regular Adult  "Regular   Pulse: 66    SpO2: 94%    Weight: 197 lb (89.4 kg)    NECK: Neck appearance was normal.   RESPIRATORY: Bilaterally with no crackles, wheezing or rhonchi  CARDIOVASCULAR: Regular S1 and S2.  Radial pulses intact.  No edema. BP recheck in exam room was 165/80  GASTROINTESTINAL: NABS, soft, nontender, nondistended, no hepatomegaly.  No flank pain or tenderness.  MUSCULOSKELETAL: No tenderness/warmth of the paraspinal muscles.    SKIN/HAIR/NAILS: Skin color was normal.   NEUROLOGIC: Alert and oriented to person, place, time, and circumstance. Speech was normal.   Additional Information   Current Outpatient Medications   Medication Sig Dispense Refill     apixaban ANTICOAGULANT (ELIQUIS) 5 mg Tab tablet Take 1 tablet (5 mg total) by mouth 2 (two) times a day. 60 tablet 3     aspirin 81 MG EC tablet Take 81 mg by mouth daily.       blood glucose test strips Use 1 each As Directed 2 (two) times a day. Dispense brand per patient's insurance at pharmacy discretion. 120 strip 6     blood-glucose meter (ONETOUCH VERIO IQ METER) Misc Use 1 each As Directed 2 (two) times a day. 1 each 0     cholecalciferol, vitamin D3, (VITAMIN D3) 5,000 unit Tab Take 5,000 Units by mouth daily.        LANTUS SOLOSTAR U-100 INSULIN 100 unit/mL (3 mL) pen INJECT 40 UNITS UNDER THE SKIN AT BEDTIME. DO NOT MIX LANTUS WITH ANY OTHER INSULIN (Patient taking differently: 44 Units. ) 18 adj dose pen 1     lisinopriL (PRINIVIL,ZESTRIL) 5 MG tablet TAKE 1 TABLET BY MOUTH EVERY DAY 90 tablet 1     MULTIVITS,CA,MIN/IRON/FA/LYCOP (CENTRUM MEN ORAL) Take 1 tablet by mouth daily.       mupirocin (BACTROBAN) 2 % ointment Apply topically to wound every day 60 g 3     naproxen sodium (ALEVE) 220 MG tablet Take 220 mg by mouth daily.       pen needle, diabetic 31 gauge x 5/16\" Ndle Used to inject insulin daily 90 each 0     traMADoL (ULTRAM) 50 mg tablet Take 1 tablet (50 mg total) by mouth every 8 (eight) hours as needed for pain. 30 tablet 0     " VICTOZA 3-RUPALI 0.6 mg/0.1 mL (18 mg/3 mL) PnIj injection INJECT 1.8 MG UNDER THE SKIN ONCE DAILY 9 Syringe 1     vitamin E 400 unit capsule Take 400 Units by mouth daily.       No current facility-administered medications for this visit.      Allergies   Allergen Reactions     Cresol      Muscle cramps     Crestor [Rosuvastatin] Myalgia     Demeclocycline Hives     Social History     Social History Narrative     Not on file     Social History     Tobacco Use     Smoking status: Former Smoker     Last attempt to quit: 1991     Years since quittin.7     Smokeless tobacco: Never Used   Substance Use Topics     Alcohol use: No     Drug use: No     Family History   Problem Relation Age of Onset     Aortic aneurysm Mother      Alcohol abuse Father      Past Surgical History:   Procedure Laterality Date     BACK SURGERY      1964 removed a cyst     CHOLECYSTECTOMY       ERCP       ERCP N/A 2017    Procedure: ENDOSCOPIC RETROGRADE CHOLANGIOPANCREATOGRAPHY SPHINCTEROTOMY AND STONE EXTRACTION;  Surgeon: Hansel Gannon MD;  Location: [facility];  Service:      TONSILLECTOMY     Time: total time spent with the patient was 15 minutes of which >50% was spent in counseling and coordination of care

## 2021-06-10 NOTE — PROGRESS NOTES
[facility] Clinic Follow Up Note    Caleb Hernandez   81 y.o. male    Date of Visit: 5/19/2017    Chief Complaint   Patient presents with     Toe Pain     went to  for it     Diabetes     pt fasting     Subjective  This is an 81-year-old man with known diabetes.  He also has been dealing with a chronic leg ulcer.  He is being seen in the wound clinic on a regular basis and was last seen there earlier this week.  I have had the opportunity to review those notes.  As far as the diabetes is concerned he tells me that his sugars have been coming down.  He has now added Victoza to his diabetic regimen.  He is having no problems with medication but is due for blood work relative to the diabetes.  Earlier this week he developed an infection under the great toenail on his left foot.  He was seen 2 days ago in urgent care and I have also reviewed those notes.  The nail was easily removed and he was started on an antibiotic.  His primary reason for coming in today is to follow-up on this infection to have me look at the toe.  He has been unable to work this week because of the infection and is hoping to stay off until Monday to give this chance to heal.  He offers no other new complaints.    ROS A comprehensive review of systems was performed and was otherwise negative    Medications, allergies, and problem list were reviewed and updated    Exam  General Appearance:   On examination his blood pressure was 136/72.  Weight is 200 pounds and height is 68 inches.  BMI is 30.41.    Heart is in a sinus rhythm with a rate of 70 and no ectopy.    I did not examine the leg ulceration.    Examination of the toe shows that there is still some erythema involving the toe but no spread of this.  She can temperature is normal.  There are no open lesions or drainage at this point.  No tenderness noted.    The patient is alert and oriented ×3.      Assessment/Plan  1. Type 2 diabetes mellitus with other skin ulcer  Basic  Metabolic Panel    Glycosylated Hemoglobin A1c   2. Toe infection       Diabetes.  We will check a BMP and A1c today.  I will contact him with those results.    Leg ulcer.  He will continue to follow-up with the wound clinic.    Infected toe.  He will complete his course of antibiotics and I would like to see him back in 1 week for follow-up to recheck the toe.    Total time of this office visit was 25 minutes with greater than 50% of the time spent in care coordination and patient counseling.  Body Mass Index was not assessed due to The patient was in with an acute medical issue..    Chaz Machado MD      Current Outpatient Prescriptions on File Prior to Visit   Medication Sig     aspirin 81 MG EC tablet Take 81 mg by mouth daily.     blood glucose test (CONTOUR TEST STRIPS) strips Use 1 each As Directed 2 (two) times a day. Dispense brand per patient's insurance at pharmacy discretion.     cholecalciferol, vitamin D3, (VITAMIN D3) 5,000 unit Tab Take 1 tablet by mouth daily.     clindamycin (CLEOCIN) 300 MG capsule Take 1 capsule (300 mg total) by mouth 3 (three) times a day for 10 days.     ibuprofen (ADVIL,MOTRIN) 200 MG tablet Take 200 mg by mouth every morning.     insulin glargine (LANTUS; BASAGLAR) 100 unit/mL (3 mL) pen Inject 20 Units under the skin daily.     liraglutide (VICTOZA) 0.6 mg/0.1 mL (18 mg/3 mL) PnIj injection Inject 0.2 mL (1.2 mg total) under the skin daily. With or without food.     lisinopril (PRINIVIL,ZESTRIL) 5 MG tablet TAKE 1 TABLET BY MOUTH ONCE DAILY     MULTIVITS,CA,MIN/IRON/FA/LYCOP (CENTRUM MEN ORAL) Take 1 tablet by mouth daily.     pravastatin (PRAVACHOL) 20 MG tablet Take 1 tablet (20 mg total) by mouth bedtime.     silver sulfADIAZINE (SILVADENE, SSD) 1 % cream Apply topically to wound daily     vitamin E 400 unit capsule Take 400 Units by mouth daily.     Current Facility-Administered Medications on File Prior to Visit   Medication     lidocaine 2 % jelly (XYLOCAINE)      Allergies   Allergen Reactions     Crestor [Rosuvastatin] Myalgia     Demeclocycline Hives     Social History   Substance Use Topics     Smoking status: Former Smoker     Quit date: 11/11/1991     Smokeless tobacco: Never Used     Alcohol use No

## 2021-06-10 NOTE — PROGRESS NOTES
"Follow up Vascular Visit       Date of Service:5/1/2017    Date Last Seen: 3/6/2017; Visit date not found    Chief Complaint: right traumatic shin ulcer    History:   Past Medical History:   Diagnosis Date     BPH (benign prostatic hyperplasia)      Cholelithiasis      Diabetes mellitus      Essential hypertension      Hyperlipidemia      Pancreatitis        Pt returns to the Vascular clinic with regards to their right traumatic shin ulcer. Pt arrives along today. Feeling well. His current POC consists of medihoney alginate every 3 days to the ulcer; oil immersion, Gauze, roll gauze; tubigrips; he had to modify the tubigrip as it was too uncomfortable on the foot. He has shin guard to protect the shin when going up and down ladders. He continues to work; they are making him working 10 hour days wondering if he can get a note to indicate max 8 hour shifts. Denies fevers, chills. Checking fs daily; running 140-190. He has completed his antibiotics; tolerated well.  Having some burning in the wound at night; taking tramadol for this with good relief.     Allergies: Crestor [rosuvastatin] and Demeclocycline    Physical Exam:    /74  Pulse 70  Resp 16  Ht 5' 8\" (1.727 m)  Wt 206 lb (93.4 kg) Comment: per pt report  BMI 31.32 kg/m2    General:  Patient presents to clinic in no apparent distress.  Head: normocephalic atraumatic  Psychiatric:  Alert and oriented x3.   Respiratory: unlabored breathing; no cough  Integumentary:  Skin is uniformly warm, dry and pink.    Wound #1 Location: right shin  Size: 2L x 1.5W x 0.3cmdepth. No change No sinus tract present, Wound base: initially covered with 100% yellow slough; this was sharply debrided to reveal 70% viable wound base; down to muscle; no bone exposed, trace surrounding erythema; no odor; no purulence  No undermining present. Periwound: no denudement, erythema, induration, maceration or warmth.      Circumferential volume measures:    Vasc Edema 3/6/2017 " 4/17/2017   Right just above MTP 23 24   Right Ankle 25 26   Right Widest Calf 37.5 38.6   Left - just above MTP 22 -   Left Ankle 22 -   Left Widest Calf 36 -       Ulceration(s)/Wound(s):     Wound 03/06/17 Right leg  (Active)   Pre Size Length 1.5 4/17/2017  3:00 PM   Pre Size Width 2 4/17/2017  3:00 PM   Pre Size Depth 0.3 4/3/2017  8:00 AM   Pre Total Sq cm 3 4/17/2017  3:00 PM           Lab Values    No results found for: SEDRATE  Lab Results   Component Value Date    CREATININE 1.10 01/23/2017     Lab Results   Component Value Date    HGBA1C 12.1 (H) 01/23/2017     Lab Results   Component Value Date    BUN 24 01/23/2017     Lab Results   Component Value Date    ALBUMIN 3.5 01/23/2017     No results found for: [id number]          Impression:   Right shin traumatic ulcer  Type 2 diabetes poorly controlled  Edema  overweight      Are any of these wounds new today: No; Location: na    Assessment/Plan:          1. Excisional debridement of the ulcer(s) was recommended today, after consent was obtained and 2% Xylocaine was applied using a sterile curet the epidermal, dermal, down into the subcutaneous tissue and down into muscle tissue or ligamentous structures was sharply debrided for a total square cm of 3. The devitalized and necrotic tissue was removed and the wounds appeared much  afterwards. The patient tolerated this well.           2. Edema: will continue with tubigrip; we discussed why the foot needs to be included in the compression; he cannot work like this; will have to modify this and monitor           3.  Wound treatment: will  Continue medihoney alginate and see if this will clean up the wound further; pt to change every 3 days; could also consider wound gel and tegaderm; did not respond to ssd; once this gets cleaned up will want it to fill in more before starting epifix or endoform; will write letter to limit shifts to 8 hours            4. Nutrition: continue to work on his diabetes; his  numbers are coming down; Consider drawing nutritional labs at next appt           5. Offloading: encouraged him to wear shin gaurds to help protect the wound area while he is working     Patient will follow up with me in 2-3 weeks for reevaluation. They were instructed to call the clinic sooner with any signs or symptoms of infection or any further questions/concerns. Answered all questions.    Ines Zimmerman DNP, RN, CNP, [facility]  [phone]

## 2021-06-10 NOTE — PROGRESS NOTES
[facility] Clinic Follow Up Note    Caleb Hernandez   81 y.o. male    Date of Visit: 5/26/2017    Chief Complaint   Patient presents with     Follow-up     foot     Subjective  This is an 81-year-old patient with known diabetes.  I saw him one week ago for an infected toe.  I would refer to my notes for the details of the infection and of that visit.  I placed him on an antibiotic and ask him to follow-up with me this week.  The toe has made great improvement since then and he feels it is pretty close to being back to normal.  No further swelling or redness and no drainage from the toe.  He is not experiencing any pain.  We did do blood work last week relative to his diabetes.  Both his sugar and A1c were high but they were definitely improved from the previous tests.  He is trying to monitor his sugars and pay more attention to his diet.  He offers no other new complaints today.    ROS A comprehensive review of systems was performed and was otherwise negative    Medications, allergies, and problem list were reviewed and updated    Exam  General Appearance:   On examination his blood pressure is 130/60.  Weight is 202 pounds and height is 68 inches.  BMI is 30.71.    Heart is in a sinus rhythm with a rate of 70 and no ectopy.    The toe looks good today.  No swelling and no erythema.  No drainage.  No tenderness.    The patient is alert and oriented ×3.      Assessment/Plan  1. Toe infection     2. Type 2 diabetes mellitus with other skin ulcer       Toe infection has responded nicely to the antibiotic.  He will complete this over the weekend.    He will continue his current diabetic medication and we will recheck his A1c in about 3 months.  The following high BMI interventions were performed this visit: weight monitoring    Chaz Machado MD      Current Outpatient Prescriptions on File Prior to Visit   Medication Sig     aspirin 81 MG EC tablet Take 81 mg by mouth daily.     blood glucose test  (CONTOUR TEST STRIPS) strips Use 1 each As Directed 2 (two) times a day. Dispense brand per patient's insurance at pharmacy discretion.     cholecalciferol, vitamin D3, (VITAMIN D3) 5,000 unit Tab Take 1 tablet by mouth daily.     ibuprofen (ADVIL,MOTRIN) 200 MG tablet Take 200 mg by mouth every morning.     insulin glargine (LANTUS; BASAGLAR) 100 unit/mL (3 mL) pen Inject 20 Units under the skin daily.     liraglutide (VICTOZA) 0.6 mg/0.1 mL (18 mg/3 mL) PnIj injection Inject 0.2 mL (1.2 mg total) under the skin daily. With or without food.     lisinopril (PRINIVIL,ZESTRIL) 5 MG tablet TAKE 1 TABLET BY MOUTH ONCE DAILY     MULTIVITS,CA,MIN/IRON/FA/LYCOP (CENTRUM MEN ORAL) Take 1 tablet by mouth daily.     pravastatin (PRAVACHOL) 20 MG tablet Take 1 tablet (20 mg total) by mouth bedtime.     silver sulfADIAZINE (SILVADENE, SSD) 1 % cream Apply topically to wound daily     vitamin E 400 unit capsule Take 400 Units by mouth daily.     [DISCONTINUED] clindamycin (CLEOCIN) 300 MG capsule Take 1 capsule (300 mg total) by mouth 3 (three) times a day for 10 days.     Current Facility-Administered Medications on File Prior to Visit   Medication     lidocaine 2 % jelly (XYLOCAINE)     Allergies   Allergen Reactions     Crestor [Rosuvastatin] Myalgia     Demeclocycline Hives     Social History   Substance Use Topics     Smoking status: Former Smoker     Quit date: 11/11/1991     Smokeless tobacco: Never Used     Alcohol use No

## 2021-06-10 NOTE — PROGRESS NOTES
Follow up Vascular Visit       Date of Service:5/16/2017    Date Last Seen: 3/6/2017; Visit date not found    Chief Complaint: right traumatic shin ulcer    History:   Past Medical History:   Diagnosis Date     BPH (benign prostatic hyperplasia)      Cholelithiasis      Diabetes mellitus      Essential hypertension      Hyperlipidemia      Pancreatitis        Pt returns to the Vascular clinic with regards to their right traumatic shin ulcer. Pt arrives along today. Feeling well. His current POC consists of medihoney alginate every 3 days to the ulcer; oil immersion, Gauze, roll gauze; tubigrips; he had to modify the tubigrip as it was too uncomfortable on the foot. He has shin guard to protect the shin when going up and down ladders. He continues to work; they are making him working 10 hour days wondering if he can get a note to indicate max 8 hour shifts. Denies fevers, chills. Checking fs daily; running 140-190. He has completed his antibiotics; tolerated well.  Having some burning in the wound at night; taking tramadol for this with good relief.     Allergies: Crestor [rosuvastatin] and Demeclocycline    Physical Exam:    /78  Pulse 64  Temp 98.1  F (36.7  C) (Temporal)   Resp 16    General:  Patient presents to clinic in no apparent distress.  Head: normocephalic atraumatic  Psychiatric:  Alert and oriented x3.   Respiratory: unlabored breathing; no cough  Integumentary:  Skin is uniformly warm, dry and pink.    Wound #1 Location: right shin  Size: 2L x 1.5W x 0.2cmdepth. No change; but less depth,  No sinus tract present, Wound base: initially covered with 100% yellow slough; this was sharply debrided to reveal 60% viable wound base; down to muscle; no bone exposed, trace surrounding erythema; no odor; no purulence  No undermining present. Periwound: no denudement, erythema, induration, maceration or warmth.      Circumferential volume measures:    Vasc Edema 3/6/2017 4/17/2017   Right just above MTP  23 24   Right Ankle 25 26   Right Widest Calf 37.5 38.6   Left - just above MTP 22 -   Left Ankle 22 -   Left Widest Calf 36 -       Ulceration(s)/Wound(s):     Wound 03/06/17 Right leg  (Active)   Pre Size Length 2.5 5/16/2017  9:00 AM   Pre Size Width 1.5 5/16/2017  9:00 AM   Pre Size Depth 0.2 5/16/2017  9:00 AM   Pre Total Sq cm 3 5/16/2017  9:00 AM           Lab Values    No results found for: SEDRATE  Lab Results   Component Value Date    CREATININE 1.10 01/23/2017     Lab Results   Component Value Date    HGBA1C 12.1 (H) 01/23/2017     Lab Results   Component Value Date    BUN 24 01/23/2017     Lab Results   Component Value Date    ALBUMIN 3.5 01/23/2017     No results found for: [id number]          Impression:   Right shin traumatic ulcer  Type 2 diabetes poorly controlled  Edema  overweight      Are any of these wounds new today: No; Location: na    Assessment/Plan:          1. Excisional debridement of the ulcer(s) was recommended today, after consent was obtained and 2% Xylocaine was applied using a sterile curet the epidermal, dermal, down into the subcutaneous tissue and down into muscle tissue or ligamentous structures was sharply debrided for a total square cm of 3. The devitalized and necrotic tissue was removed and the wounds appeared much  afterwards. The patient tolerated this well.           2. Edema: will continue with tubigrip; we discussed why the foot needs to be included in the compression; he cannot work like this; will have to modify this and monitor           3.  Wound treatment: will  Continue medihoney alginate and see if this will clean up the wound further; pt to change every 3 days; could also consider wound gel and tegaderm; did not respond to ssd; once this gets cleaned up will want it to fill in more before starting epifix or endoform; obtained culture due to the wound not progressing; will call with results; will wait sensitivities before prescribing antibiotics. He was  approved for epifix 80%; has almost met oop expenses for th eyear           4. Nutrition: continue to work on his diabetes; his numbers are coming down; Consider drawing nutritional labs at next appt (not fasting today)           5. Offloading: encouraged him to wear shin gaurds to help protect the wound area while he is working     Patient will follow up with me in 2-3 weeks for reevaluation. They were instructed to call the clinic sooner with any signs or symptoms of infection or any further questions/concerns. Answered all questions.    Ines Zimmerman DNP, RN, CNP, CWOCN  [facility]  [phone]

## 2021-06-10 NOTE — PROGRESS NOTES
"Follow up Vascular Visit       Date of Service:4/17/2017    Date Last Seen: 3/6/2017; Visit date not found    Chief Complaint: right traumatic shin ulcer    History:   Past Medical History:   Diagnosis Date     BPH (benign prostatic hyperplasia)      Cholelithiasis      Diabetes mellitus      Essential hypertension      Hyperlipidemia      Pancreatitis        Pt returns to the Vascular clinic with regards to their right traumatic shin ulcer. Pt arrives along today. Feeling well. His current POC consists of medihoney alginate every 3 days to the ulcer; oil immersion, Gauze, roll gauze; tubigrips; he had to modify the tubigrip as it was too uncomfortable on the foot. He is in process of getting a shin guard to protect the shin when going up and down ladders. He continues to work. Denies fevers, chills. Checking fs daily; running 140-190. He has completed his antibiotics; tolerated well.     Allergies: Crestor [rosuvastatin] and Demeclocycline    Physical Exam:    /82  Pulse 64  Temp 98.9  F (37.2  C) (Temporal)   Resp 16  Ht 5' 8\" (1.727 m)  Wt 206 lb (93.4 kg) Comment: per pt report  BMI 31.32 kg/m2    General:  Patient presents to clinic in no apparent distress.  Head: normocephalic atraumatic  Psychiatric:  Alert and oriented x3.   Respiratory: unlabored breathing; no cough  Integumentary:  Skin is uniformly warm, dry and pink.    Wound #1 Location: right shin  Size: 2L x 1.5W x 0.3cmdepth.  No sinus tract present, Wound base: initially covered with 100% yellow slough; this was sharply debrided to reveal 60% viable wound base; down to muscle; no bone exposed, trace surrounding erythema; no odor; no purulence  No undermining present. Periwound: no denudement, erythema, induration, maceration or warmth.      Circumferential volume measures:    Vasc Edema 3/6/2017 4/17/2017   Right just above MTP 23 24   Right Ankle 25 26   Right Widest Calf 37.5 38.6   Left - just above MTP 22 -   Left Ankle 22 -   Left " Widest Calf 36 -       Ulceration(s)/Wound(s):     Wound 03/06/17 Right leg  (Active)   Pre Size Length 1.5 4/17/2017  3:00 PM   Pre Size Width 2 4/17/2017  3:00 PM   Pre Size Depth 0.3 4/3/2017  8:00 AM   Pre Total Sq cm 3 4/17/2017  3:00 PM           Lab Values    No results found for: SEDRATE  Lab Results   Component Value Date    CREATININE 1.10 01/23/2017     Lab Results   Component Value Date    HGBA1C 12.1 (H) 01/23/2017     Lab Results   Component Value Date    BUN 24 01/23/2017     Lab Results   Component Value Date    ALBUMIN 3.5 01/23/2017     No results found for: [id number]          Impression:   Right shin traumatic ulcer  Type 2 diabetes poorly controlled  Edema  overweight      Are any of these wounds new today: No; Location: na    Assessment/Plan:          1. Excisional debridement of the ulcer(s) was recommended today, after consent was obtained and 2% Xylocaine was applied using a sterile curet the epidermal, dermal, down into the subcutaneous tissue and down into muscle tissue or ligamentous structures was sharply debrided for a total square cm of 3. The devitalized and necrotic tissue was removed and the wounds appeared much  afterwards. The patient tolerated this well.           2. Edema: will continue with tubigrip; we discussed why the foot needs to be included in the compression; he cannot work like this; will have to modify this and monitor           3.  Wound treatment: will  Continue medihoney alginate and see if this will clean up the wound further; pt to change every 3 days           4. Nutrition: continue to work on his diabetes; his numbers are coming down           5. Offloading: encouraged him to get shin gaurds to help protect the wound area while he is working     Patient will follow up with me in 2-3 weeks for reevaluation. They were instructed to call the clinic sooner with any signs or symptoms of infection or any further questions/concerns. Answered all  questions.    Ines Zimmerman DNP, RN, CNP, [facility]  [phone]

## 2021-06-11 NOTE — PATIENT INSTRUCTIONS - HE
Twice per week at the clinic Cleanse your right foot wound(s) with NS     Pat Dry with non-sterile gauze     Skin prep to the skin surrounding the wound     Apply endoform into/onto the wounds     Cover with bandage     Compression: tubular compression     It is not ok to get your wound wet in the bath or shower     Wear CAGE boot at all times when up

## 2021-06-11 NOTE — PROGRESS NOTES
Follow up Vascular Visit       Date of Service:6/2/2017    Date Last Seen: 3/6/2017; Visit date not found    Chief Complaint: right traumatic shin ulcer    History:   Past Medical History:   Diagnosis Date     BPH (benign prostatic hyperplasia)      Cholelithiasis      Diabetes mellitus      Essential hypertension      Hyperlipidemia      Pancreatitis        Pt returns to the Vascular clinic with regards to their right traumatic shin ulcer. Pt arrives along today. Feeling well. His current POC consists of medihoney alginate every 3 days to the ulcer; oil immersion, Gauze, roll gauze; tubigrips; he had to modify the tubigrip as it was too uncomfortable on the foot. He has shin guard to protect the shin when going up and down ladders. He continues to work; they were making him working 10 hour days we reduced this to max 8 hour shifts. Denies fevers, chills. Checking fs daily; running 140-190; last a1c was 9.3 done 5/2017; this is a trend down. He has completed his antibiotics; tolerated well.  Pain is much improved after the antibiotics.    Allergies: Crestor [rosuvastatin] and Demeclocycline    Physical Exam:    /62  Pulse 60  Temp 98.2  F (36.8  C) (Temporal)   Resp 16    General:  Patient presents to clinic in no apparent distress.  Head: normocephalic atraumatic  Psychiatric:  Alert and oriented x3.   Respiratory: unlabored breathing; no cough  Integumentary:  Skin is uniformly warm, dry and pink.    Wound #1 Location: right shin  Size: 2L x 1.2W x 0.2cmdepth. Slightly smaller; but less depth,  No sinus tract present, Wound base: initially covered with 100% yellow slough; this was sharply debrided to reveal 60% viable wound base; down to muscle; no bone exposed, trace surrounding erythema; no odor; no purulence  No undermining present. Periwound: no denudement, erythema, induration, maceration or warmth.      Circumferential volume measures:    Vasc Edema 3/6/2017 4/17/2017 6/2/2017   Right just above MTP  23 24 23   Right Ankle 25 26 23.5   Right Widest Calf 37.5 38.6 37   Left - just above MTP 22 - -   Left Ankle 22 - -   Left Widest Calf 36 - -       Ulceration(s)/Wound(s):     Wound 03/06/17 Right leg  (Active)   Pre Size Length 2 6/2/2017  8:00 AM   Pre Size Width 1 6/2/2017  8:00 AM   Pre Size Depth 0.1 6/2/2017  8:00 AM   Pre Total Sq cm 2 6/2/2017  8:00 AM   Post Size Length 2 6/2/2017  8:00 AM   Post Size Width 1.2 6/2/2017  8:00 AM   Post Size Depth 0.2 6/2/2017  8:00 AM   Post Total Sq cm 2.4 6/2/2017  8:00 AM             Lab Values    No results found for: SEDRATE  Lab Results   Component Value Date    CREATININE 0.97 05/19/2017     Lab Results   Component Value Date    HGBA1C 9.3 (H) 05/19/2017     Lab Results   Component Value Date    BUN 17 05/19/2017     Lab Results   Component Value Date    ALBUMIN 3.5 01/23/2017     No results found for: [id number]          Impression:   Right shin traumatic ulcer  Type 2 diabetes poorly controlled  Edema  overweight      Are any of these wounds new today: No; Location: na    Assessment/Plan:          1. Excisional debridement of the ulcer(s) was recommended today, after consent was obtained and 2% Xylocaine was applied using a sterile curet the epidermal, dermal, down into the subcutaneous tissue and down into muscle tissue or ligamentous structures was sharply debrided for a total square cm of 3. The devitalized and necrotic tissue was removed and the wounds appeared much  afterwards. The patient tolerated this well.           2. Edema: will continue with tubigrip; we discussed why the foot needs to be included in the compression; he cannot work like this; will have to modify this and monitor           3.  Wound treatment: will  Continue medihoney alginate and see if this will clean up the wound further; pt to change every 3 days; could also consider wound gel and tegaderm; did not respond to ssd; once this gets cleaned up will want it to fill in more  before starting epifix or endoform; completed antibiotic therapy; tolerated well; pain much improved; He was approved for epifix 80%; has almost met oop expenses for th eyear           4. Nutrition: continue to work on his diabetes; his numbers are coming down           5. Offloading: encouraged him to wear shin gaurds to help protect the wound area while he is working     Patient will follow up with me in 2-3 weeks for reevaluation. They were instructed to call the clinic sooner with any signs or symptoms of infection or any further questions/concerns. Answered all questions.    Ines Zimmerman DNP, RN, CNP, [facility]  [facility]  [phone]

## 2021-06-11 NOTE — PATIENT INSTRUCTIONS - HE
Wound Care Instructions    Twice per week at the clinic Cleanse your right foot wound(s) with NS    Pat Dry with non-sterile gauze    Skin prep to the skin surrounding the wound    Apply endoform into/onto the wounds    Cover with bandage    Compression: tubular compression    It is not ok to get your wound wet in the bath or shower    Wear CAGE boot at all times when up    Limit time up on your foot    SEEK MEDICAL CARE IF:    You have an increase in swelling, pain, or redness around the wound.    You have an increase in the amount of pus coming from the wound.    There is a bad smell coming from the wound.    The wound appears to be worsening/enlarging    You have a fever greater than 101.5 F        It is ok to continue current wound care treatment/products for the next 2-3 days until new wound care supplies are ordered and arrive. If longer than this please contact our office at [phone].      Ines Zimmerman DNP, RN, CNP, [facility]  [facility]  [phone]

## 2021-06-11 NOTE — PATIENT INSTRUCTIONS - HE
Continue to wear your boat   Keep your leg elevated while in chair and in bed.  Limited activities with right foot.

## 2021-06-11 NOTE — PROGRESS NOTES
"Follow up Vascular Visit       Date of Service:7/10/2017    Date Last Seen: 3/6/2017; Visit date not found    Chief Complaint: right traumatic shin ulcer    History:   Past Medical History:   Diagnosis Date     BPH (benign prostatic hyperplasia)      Cholelithiasis      Diabetes mellitus      Essential hypertension      Hyperlipidemia      Pancreatitis        Pt returns to the Vascular clinic with regards to their right traumatic shin ulcer. Pt arrives along today. Feeling well. His current POC consists of grafting with epifix; had 3rd graft 5 days ago; using  tubigrips; he had to modify the tubigrip as it was too uncomfortable on the foot. He has shin guard to protect the shin when going up and down ladders. He continues to work; they were making him working 10 hour days we reduced this to max 8 hour shifts. Denies fevers, chills. Checking fs daily; running 200-210; last a1c was 9.3 done 5/2017; this is a trend down. He has completed his antibiotics; tolerated well.  Pain is much improved after the antibiotics.    Allergies: Crestor [rosuvastatin] and Demeclocycline    Physical Exam:    /72  Pulse 76  Resp 16  Ht 5' 8\" (1.727 m)  Wt 202 lb (91.6 kg) Comment: per pt report  BMI 30.71 kg/m2    General:  Patient presents to clinic in no apparent distress.  Head: normocephalic atraumatic  Psychiatric:  Alert and oriented x3.   Respiratory: unlabored breathing; no cough  Integumentary:  Skin is uniformly warm, dry and pink.    Wound #1 Location: right shin  Size: 1L x 0.5W x 0.1cmdepth.  Graft material still present;  No sinus tract present, Wound base: graft material present; left in place;  trace surrounding erythema; no odor; no purulence  No undermining present. Periwound: no denudement, erythema, induration, maceration or warmth.      Circumferential volume measures:    Vasc Edema 3/6/2017 4/17/2017 6/2/2017   Right just above MTP 23 24 23   Right Ankle 25 26 23.5   Right Widest Calf 37.5 38.6 37   Left " - just above MTP 22 - -   Left Ankle 22 - -   Left Widest Calf 36 - -       Ulceration(s)/Wound(s):     Wound 03/06/17 Right leg  (Active)   Pre Size Length 1 7/5/2017  4:00 PM   Pre Size Width 0.5 7/5/2017  4:00 PM   Pre Size Depth 0.1 6/2/2017  8:00 AM   Pre Total Sq cm 0.5 7/5/2017  4:00 PM   Post Size Length 1 7/5/2017  4:00 PM   Post Size Width 0.4 7/5/2017  4:00 PM   Post Size Depth 0 7/5/2017  4:00 PM   Post Total Sq cm 0.4 7/5/2017  4:00 PM   Undermined 0.75 6/26/2017  8:00 AM           Lab Values    No results found for: SEDRATE  Lab Results   Component Value Date    CREATININE 0.97 05/19/2017     Lab Results   Component Value Date    HGBA1C 9.3 (H) 05/19/2017     Lab Results   Component Value Date    BUN 17 05/19/2017     Lab Results   Component Value Date    ALBUMIN 3.5 01/23/2017     No results found for: [id number]          Impression:   Right shin traumatic ulcer  Type 2 diabetes poorly controlled  Edema  Overweight  Elevated bp      Are any of these wounds new today: No; Location: na    Assessment/Plan:          1. Excisional debridement of the ulcer(s) was not recommended today as there was still epifix graft present          2. Edema: will continue with tubigrip; we discussed why the foot needs to be included in the compression; he cannot work like this; will have to modify this and monitor           3.  Wound treatment: graft was still present; left in place; will cover with endoform; oil immersion; gauze; roll gauze; see in 1 week           4. Nutrition: continue to work on his diabetes           5. Offloading: encouraged him to wear shin gaurds to help protect the wound area while he is working          6. Hypertension: told pt to f/u with his PMD     Patient will follow up with me in 1 week for reevaluation. They were instructed to call the clinic sooner with any signs or symptoms of infection or any further questions/concerns. Answered all questions.    Ines Zimmerman DNP, RN, CNP,  CWOCN  [facility] [place]  [phone]

## 2021-06-11 NOTE — PROGRESS NOTES
"Follow up Vascular Visit       Date of Service:7/17/2017    Date Last Seen: 3/6/2017; Visit date not found    Chief Complaint: right traumatic shin ulcer    History:   Past Medical History:   Diagnosis Date     BPH (benign prostatic hyperplasia)      Cholelithiasis      Diabetes mellitus      Essential hypertension      Hyperlipidemia      Pancreatitis        Pt returns to the Vascular clinic with regards to their right traumatic shin ulcer. Pt arrives along today. Feeling well. His current POC consists of grafting with epifix; had 3rd graft 5 days ago; using  tubigrips; he had to modify the tubigrip as it was too uncomfortable on the foot. He has shin guard to protect the shin when going up and down ladders. He continues to work; they were making him working 10 hour days we reduced this to max 8 hour shifts. Denies fevers, chills. Checking fs daily; running 200-210; last a1c was 9.3 done 5/2017; this is a trend down. He has completed his antibiotics; tolerated well.  Pain is much improved after the antibiotics.    Allergies: Crestor [rosuvastatin] and Demeclocycline    Physical Exam:    /68  Pulse 68  Temp 99  F (37.2  C) (Temporal)   Resp 14  Ht 5' 8\" (1.727 m)  Wt 202 lb (91.6 kg) Comment: per pt report  BMI 30.71 kg/m2    General:  Patient presents to clinic in no apparent distress.  Head: normocephalic atraumatic  Psychiatric:  Alert and oriented x3.   Respiratory: unlabored breathing; no cough  Integumentary:  Skin is uniformly warm, dry and pink.    Wound #1 Location: right shin  Size: 1.5L x 0.5W x 0.1cmdepth.   Wound base: eschar covered; this was removed; revealed newly epithelialized tissue underneath; no purulence  No undermining present. Periwound: no denudement, erythema, induration, maceration or warmth.      Circumferential volume measures:    Vasc Edema 3/6/2017 4/17/2017 6/2/2017   Right just above MTP 23 24 23   Right Ankle 25 26 23.5   Right Widest Calf 37.5 38.6 37   Left - just " above MTP 22 - -   Left Ankle 22 - -   Left Widest Calf 36 - -       Ulceration(s)/Wound(s):   Wound 03/06/17 Right leg  (Active)   Pre Size Length 1.5 7/17/2017  8:00 AM   Pre Size Width 0.5 7/17/2017  8:00 AM   Pre Size Depth 0.1 6/2/2017  8:00 AM   Pre Total Sq cm 0.75 7/17/2017  8:00 AM   Post Size Length 1 7/5/2017  4:00 PM   Post Size Width 0.4 7/5/2017  4:00 PM   Post Size Depth 0 7/5/2017  4:00 PM   Post Total Sq cm 0.4 7/5/2017  4:00 PM   Undermined 0.75 6/26/2017  8:00 AM   Description scab 7/17/2017  8:00 AM             Lab Values    No results found for: SEDRATE  Lab Results   Component Value Date    CREATININE 0.97 05/19/2017     Lab Results   Component Value Date    HGBA1C 9.3 (H) 05/19/2017     Lab Results   Component Value Date    BUN 17 05/19/2017     Lab Results   Component Value Date    ALBUMIN 3.5 01/23/2017     No results found for: [id number]          Impression:   Right shin traumatic ulcer  Type 2 diabetes poorly controlled  Edema  Overweight  Elevated bp      Are any of these wounds new today: No; Location: na    Assessment/Plan:          1. Excisional debridement of the ulcer(s) was recommended today, after consent was obtained and 2% Xylocaine was applied using a sterile curet the epidermal and dermal was sharply debrided for a total square cm of 0.75. The non-viable and necrotic tissue was removed and the wounds appeared much  afterwards.            2. Edema: will continue with tubigrip; we discussed why the foot needs to be included in the compression; he cannot work like this; will have to modify this and monitor           3.  Wound treatment: will apply vaseline; mepilex border; this area is very fragile; will recheck in 1 week; pt is very high risk diabetic           4. Nutrition: continue to work on his diabetes; encouraged him to f/u with pmd           5. Offloading: encouraged him to wear shin gaurds to help protect the wound area while he is working          6.  Hypertension: told pt to f/u with his PMD     Patient will follow up with me in 1 week for reevaluation. They were instructed to call the clinic sooner with any signs or symptoms of infection or any further questions/concerns. Answered all questions.    Ines Zimmerman DNP, RN, CNP, [facility]  [phone]

## 2021-06-11 NOTE — PROGRESS NOTES
"Follow up Vascular Visit       Date of Service:7/5/2017    Date Last Seen: 3/6/2017; Visit date not found    Chief Complaint: right traumatic shin ulcer    History:   Past Medical History:   Diagnosis Date     BPH (benign prostatic hyperplasia)      Cholelithiasis      Diabetes mellitus      Essential hypertension      Hyperlipidemia      Pancreatitis        Pt returns to the Vascular clinic with regards to their right traumatic shin ulcer. Pt arrives along today. Feeling well. His current POC consists of grafting with epifix; had first graft 7 days ago; using  tubigrips; he had to modify the tubigrip as it was too uncomfortable on the foot. He has shin guard to protect the shin when going up and down ladders. He continues to work; they were making him working 10 hour days we reduced this to max 8 hour shifts. Denies fevers, chills. Checking fs daily; running 200-210; last a1c was 9.3 done 5/2017; this is a trend down. He has completed his antibiotics; tolerated well.  Pain is much improved after the antibiotics.    Allergies: Crestor [rosuvastatin] and Demeclocycline    Physical Exam:    /78  Pulse 70  Resp 16  Ht 5' 8\" (1.727 m)  Wt 202 lb (91.6 kg) Comment: per pt report  BMI 30.71 kg/m2    General:  Patient presents to clinic in no apparent distress.  Head: normocephalic atraumatic  Psychiatric:  Alert and oriented x3.   Respiratory: unlabored breathing; no cough  Integumentary:  Skin is uniformly warm, dry and pink.    Wound #1 Location: right shin  Size: 1L x 0.4W x 0.1cmdepth.  smaller;  No sinus tract present, Wound base: initially covered with 100% yellow slough; this was sharply debrided to reveal 90% viable wound base;  trace surrounding erythema; no odor; no purulence  No undermining present. Periwound: no denudement, erythema, induration, maceration or warmth.      Circumferential volume measures:    Vasc Edema 3/6/2017 4/17/2017 6/2/2017   Right just above MTP 23 24 23   Right Ankle 25 26 " 23.5   Right Widest Calf 37.5 38.6 37   Left - just above MTP 22 - -   Left Ankle 22 - -   Left Widest Calf 36 - -       Ulceration(s)/Wound(s):     Wound 03/06/17 Right leg  (Active)   Pre Size Length 1 7/5/2017  4:00 PM   Pre Size Width 0.5 7/5/2017  4:00 PM   Pre Size Depth 0.1 6/2/2017  8:00 AM   Pre Total Sq cm 0.5 7/5/2017  4:00 PM   Post Size Length 1 7/5/2017  4:00 PM   Post Size Width 0.4 7/5/2017  4:00 PM   Post Size Depth 0 7/5/2017  4:00 PM   Post Total Sq cm 0.4 7/5/2017  4:00 PM   Undermined 0.75 6/26/2017  8:00 AM           Lab Values    No results found for: SEDRATE  Lab Results   Component Value Date    CREATININE 0.97 05/19/2017     Lab Results   Component Value Date    HGBA1C 9.3 (H) 05/19/2017     Lab Results   Component Value Date    BUN 17 05/19/2017     Lab Results   Component Value Date    ALBUMIN 3.5 01/23/2017     No results found for: [id number]          Impression:   Right shin traumatic ulcer  Type 2 diabetes poorly controlled  Edema  Overweight  Elevated bp      Are any of these wounds new today: No; Location: na    Assessment/Plan:          1. Excisional debridement of the ulcer(s) was recommended today, after consent was obtained and 2% Xylocaine was applied using a sterile curet the epidermal, dermal, down into the subcutaneous tissue and down into muscle tissue or ligamentous structures was sharply debrided for a total square cm of 0.4. The devitalized and necrotic tissue was removed and the wounds appeared much  afterwards. The patient tolerated this well.           2. Edema: will continue with tubigrip; we discussed why the foot needs to be included in the compression; he cannot work like this; will have to modify this and monitor           3.  Wound treatment: wound has cleaned up and filled in nicely he is ready now for epifix; he had 80% coverage; he was agreeable to the copay. He reports his insurance recently changed; he was told that this may not be covered; offered  to re-run his insurance; the patient told me he did not care about the cost and to still go ahead and graft.    Due to the slow healing rate of the ulcer and the diagnosis of traumatic ulceration due to complicated by diabetic peripheral neuropathy and venous hypertension with edema} of the Right shin and is free of infection  Epifix was recommended and consent was obtained for the application.  This is application # 3.   Their is no evidence of osteomyelitis.  There is adequate blood flow for healing.       For the procedure, the patient was placed in a comfortable position with the wound bed exposed. Epifix  application is being performed in a staged procedure in an attempt to achieve rapid wound closure. The Tissue Identification Number is [id number] with an expiration date of 2022-03-01 as indicated on the consent form dated 7/5/2017     The graft size is 14mm and the estimated wastage was 0 as this was applied in a layered fashion.   The graft was prepared and applied following the  guidelines.  The graft was covered with a non-adherent contact layer and secured with wound veil.  Then,  a ABD dressing was applied.  The patient will continue to utilize tubigrip for compression.  Patient tolerated the procedure without any complications and was educated and expressed an understanding of the proper wound treatment until their follow up.               4. Nutrition: continue to work on his diabetes           5. Offloading: encouraged him to wear shin gaurds to help protect the wound area while he is working          6. Hypertension: told pt to f/u with his PMD     Patient will follow up with me in 1 week for reevaluation. They were instructed to call the clinic sooner with any signs or symptoms of infection or any further questions/concerns. Answered all questions.    Ines Zimmerman DNP, RN, CNP, [facility]  [facility]  [phone]

## 2021-06-11 NOTE — PROGRESS NOTES
"Follow up Vascular Visit       Date of Service:6/19/2017    Date Last Seen: 3/6/2017; Visit date not found    Chief Complaint: right traumatic shin ulcer    History:   Past Medical History:   Diagnosis Date     BPH (benign prostatic hyperplasia)      Cholelithiasis      Diabetes mellitus      Essential hypertension      Hyperlipidemia      Pancreatitis        Pt returns to the Vascular clinic with regards to their right traumatic shin ulcer. Pt arrives along today. Feeling well. His current POC consists of medihoney alginate every 3 days to the ulcer; oil immersion, Gauze, roll gauze; tubigrips; he had to modify the tubigrip as it was too uncomfortable on the foot. He has shin guard to protect the shin when going up and down ladders. He continues to work; they were making him working 10 hour days we reduced this to max 8 hour shifts. Denies fevers, chills. Checking fs daily; running 140-190; last a1c was 9.3 done 5/2017; this is a trend down. He has completed his antibiotics; tolerated well.  Pain is much improved after the antibiotics.    Allergies: Crestor [rosuvastatin] and Demeclocycline    Physical Exam:    /80  Resp 16  Ht 5' 8\" (1.727 m)  Wt 202 lb (91.6 kg) Comment: per pt report  BMI 30.71 kg/m2    General:  Patient presents to clinic in no apparent distress.  Head: normocephalic atraumatic  Psychiatric:  Alert and oriented x3.   Respiratory: unlabored breathing; no cough  Integumentary:  Skin is uniformly warm, dry and pink.    Wound #1 Location: right shin  Size: 1.5L x 1W x 0.2cmdepth.  smaller;  No sinus tract present, Wound base: initially covered with 100% yellow slough; this was sharply debrided to reveal 80% viable wound base;  trace surrounding erythema; no odor; no purulence  No undermining present. Periwound: no denudement, erythema, induration, maceration or warmth.      Circumferential volume measures:    Vasc Edema 3/6/2017 4/17/2017 6/2/2017   Right just above MTP 23 24 23   Right " Ankle 25 26 23.5   Right Widest Calf 37.5 38.6 37   Left - just above MTP 22 - -   Left Ankle 22 - -   Left Widest Calf 36 - -       Ulceration(s)/Wound(s):     Wound 03/06/17 Right leg  (Active)   Pre Size Length 1.5 6/19/2017  8:00 AM   Pre Size Width 1 6/19/2017  8:00 AM   Pre Size Depth 0.1 6/2/2017  8:00 AM   Pre Total Sq cm 1.5 6/19/2017  8:00 AM   Post Size Length 2 6/2/2017  8:00 AM   Post Size Width 1.2 6/2/2017  8:00 AM   Post Size Depth 0.2 6/2/2017  8:00 AM   Post Total Sq cm 2.4 6/2/2017  8:00 AM           Lab Values    No results found for: SEDRATE  Lab Results   Component Value Date    CREATININE 0.97 05/19/2017     Lab Results   Component Value Date    HGBA1C 9.3 (H) 05/19/2017     Lab Results   Component Value Date    BUN 17 05/19/2017     Lab Results   Component Value Date    ALBUMIN 3.5 01/23/2017     No results found for: [id number]          Impression:   Right shin traumatic ulcer  Type 2 diabetes poorly controlled  Edema  overweight      Are any of these wounds new today: No; Location: na    Assessment/Plan:          1. Excisional debridement of the ulcer(s) was recommended today, after consent was obtained and 2% Xylocaine was applied using a sterile curet the epidermal, dermal, down into the subcutaneous tissue and down into muscle tissue or ligamentous structures was sharply debrided for a total square cm of 1.5. The devitalized and necrotic tissue was removed and the wounds appeared much  afterwards. The patient tolerated this well.           2. Edema: will continue with tubigrip; we discussed why the foot needs to be included in the compression; he cannot work like this; will have to modify this and monitor           3.  Wound treatment: wound has cleaned up and filled in nicely he is ready now for epifix; he had 80% coverage; he was agreeable to the copay.    Due to the slow healing rate of the ulcer and the diagnosis of traumatic ulceration due to complicated by diabetic  peripheral neuropathy and venous hypertension with edema} of the Right shin and is free of infection  Epifix was recommended and consent was obtained for the application.  This is application # 1.   Their is no evidence of osteomyelitis.  There is adequate blood flow for healing.       For the procedure, the patient was placed in a comfortable position with the wound bed exposed. Epifix  application is being performed in a staged procedure in an attempt to achieve rapid wound closure. The Tissue Identification Number is [id number] with an expiration date of 2021-05-01 as indicated on the consent form dated 6/19/2017     The graft size is 14mm and the estimated wastage was 0.   The graft was prepared and applied following the  guidelines.  The graft was covered with a non-adherent contact layer and secured with wound veil.  Then,  a ABD dressing was applied.  The patient will continue to utilize tubigrip for compression.  Patient tolerated the procedure without any complications and was educated and expressed an understanding of the proper wound treatment until their follow up.               4. Nutrition: continue to work on his diabetes; his numbers are coming down           5. Offloading: encouraged him to wear shin gaurds to help protect the wound area while he is working     Patient will follow up with me in 1 week for reevaluation. They were instructed to call the clinic sooner with any signs or symptoms of infection or any further questions/concerns. Answered all questions.    Ines Zimmerman DNP, RN, CNP, CWN  [facility]  [phone]

## 2021-06-11 NOTE — TELEPHONE ENCOUNTER
RN cannot approve Refill Request    RN can NOT refill this medication Protocol failed and NO refill given. Last office visit: 7/22/2020 Chaz Machado MD Last Physical: 10/25/2019 Last MTM visit: Visit date not found Last visit same specialty: 7/27/2020 Alonso Clancy MD.  Next visit within 3 mo: Visit date not found  Next physical within 3 mo: Visit date not found      Leta Lowry, [facility] Triage/Med Refill 10/1/2020    Requested Prescriptions   Pending Prescriptions Disp Refills     VICTOZA 3-RUPALI 0.6 mg/0.1 mL (18 mg/3 mL) injection [Pharmacy Med Name: VICTOZA 3-RUPALI 18 MG/3 ML PEN]  1     Sig: INJECT 1.2 MG SUBCUTANEOUSLY ONCE DAILY       Insulin/GLP-1 Refill Protocol Failed - 9/28/2020  9:22 AM        Failed - Microalbumin in last year     Microalbumin, Random Urine   Date Value Ref Range Status   08/09/2012 15 1 - 20 mg/L Final                  Passed - Visit with PCP or prescribing provider visit in last 6 months     Last office visit with prescriber/PCP: 7/22/2020 OR same dept: 7/27/2020 Alonso Clancy MD OR same specialty: 7/27/2020 Alonso Clancy MD Last physical: Visit date not found Last MTM visit: Visit date not found     Next appt within 3 mo: Visit date not found  Next physical within 3 mo: Visit date not found  Prescriber OR PCP: Chaz Machado MD  Last diagnosis associated with med order: 1. Type 2 diabetes mellitus with other skin ulcer, with long-term current use of insulin (H)  - VICTOZA 3-RUPALI 0.6 mg/0.1 mL (18 mg/3 mL) injection [Pharmacy Med Name: VICTOZA 3-RUPALI 18 MG/3 ML PEN]; INJECT 1.2 MG SUBCUTANEOUSLY ONCE DAILY; Refill: 1    If protocol passes may refill for 6 months if within 3 months of last provider visit (or a total of 9 months).              Passed - A1C in last 6 months     Hemoglobin A1c   Date Value Ref Range Status   09/04/2020 7.9 (H) <=5.6 % Final     Comment:     Normal <5.7% Prediabete 5.7-6.4% Diabletes 6.5% or higher -  adopted from ADA consensus guidelines               Passed - Blood pressure in last year     BP Readings from Last 1 Encounters:   09/28/20 136/74             Passed - Creatinine done in last year     Creatinine   Date Value Ref Range Status   07/22/2020 0.97 0.70 - 1.30 mg/dL Final

## 2021-06-11 NOTE — PROGRESS NOTES
Follow up Vascular Visit       Date of Service:6/26/2017    Date Last Seen: 3/6/2017; Visit date not found    Chief Complaint: right traumatic shin ulcer    History:   Past Medical History:   Diagnosis Date     BPH (benign prostatic hyperplasia)      Cholelithiasis      Diabetes mellitus      Essential hypertension      Hyperlipidemia      Pancreatitis        Pt returns to the Vascular clinic with regards to their right traumatic shin ulcer. Pt arrives along today. Feeling well. Noted to have significantly elevated bp in clinic today 200/100; he is asymptomatic; no cp, no sob, no arm pain, no n/t in his extremities; no vision changes; no headache; take bp meds at noon; so has not taken them yet; denies missing any recent doses. No stressful events of recent. His current POC consists of grafting with epifix; had first graft 7 days ago; using  tubigrips; he had to modify the tubigrip as it was too uncomfortable on the foot. He has shin guard to protect the shin when going up and down ladders. He continues to work; they were making him working 10 hour days we reduced this to max 8 hour shifts. Denies fevers, chills. Checking fs daily; running 200-210; last a1c was 9.3 done 5/2017; this is a trend down. He has completed his antibiotics; tolerated well.  Pain is much improved after the antibiotics.    Allergies: Crestor [rosuvastatin] and Demeclocycline    Physical Exam:    BP (!) 200/100 Comment: pt reports not taking BP medication today  Pulse 68  Temp 97.9  F (36.6  C) (Temporal)   Resp 16    General:  Patient presents to clinic in no apparent distress.  Head: normocephalic atraumatic  Psychiatric:  Alert and oriented x3.   Respiratory: unlabored breathing; no cough  Integumentary:  Skin is uniformly warm, dry and pink.    Wound #1 Location: right shin  Size: 1.5L x 0.5W x 0.1cmdepth.  smaller;  No sinus tract present, Wound base: initially covered with 100% yellow slough; this was sharply debrided to reveal 90%  viable wound base;  trace surrounding erythema; no odor; no purulence  No undermining present. Periwound: no denudement, erythema, induration, maceration or warmth.      Circumferential volume measures:    Vasc Edema 3/6/2017 4/17/2017 6/2/2017   Right just above MTP 23 24 23   Right Ankle 25 26 23.5   Right Widest Calf 37.5 38.6 37   Left - just above MTP 22 - -   Left Ankle 22 - -   Left Widest Calf 36 - -       Ulceration(s)/Wound(s):     Wound 03/06/17 Right leg  (Active)   Pre Size Length 1.2 6/26/2017  8:00 AM   Pre Size Width 1.1 6/26/2017  8:00 AM   Pre Size Depth 0.1 6/2/2017  8:00 AM   Pre Total Sq cm 1.32 6/26/2017  8:00 AM   Post Size Length 2 6/2/2017  8:00 AM   Post Size Width 1.5 6/26/2017  8:00 AM   Post Size Depth 0.5 6/26/2017  8:00 AM   Post Total Sq cm 0.1 6/26/2017  8:00 AM   Undermined 0.75 6/26/2017  8:00 AM           Lab Values    No results found for: SEDRATE  Lab Results   Component Value Date    CREATININE 0.97 05/19/2017     Lab Results   Component Value Date    HGBA1C 9.3 (H) 05/19/2017     Lab Results   Component Value Date    BUN 17 05/19/2017     Lab Results   Component Value Date    ALBUMIN 3.5 01/23/2017     No results found for: [id number]          Impression:   Right shin traumatic ulcer  Type 2 diabetes poorly controlled  Edema  Overweight  Elevated bp      Are any of these wounds new today: No; Location: na    Assessment/Plan:          1. Excisional debridement of the ulcer(s) was recommended today, after consent was obtained and 2% Xylocaine was applied using a sterile curet the epidermal, dermal, down into the subcutaneous tissue and down into muscle tissue or ligamentous structures was sharply debrided for a total square cm of 0.75. The devitalized and necrotic tissue was removed and the wounds appeared much  afterwards. The patient tolerated this well.           2. Edema: will continue with tubigrip; we discussed why the foot needs to be included in the compression;  he cannot work like this; will have to modify this and monitor           3.  Wound treatment: wound has cleaned up and filled in nicely he is ready now for epifix; he had 80% coverage; he was agreeable to the copay.    Due to the slow healing rate of the ulcer and the diagnosis of traumatic ulceration due to complicated by diabetic peripheral neuropathy and venous hypertension with edema} of the Right shin and is free of infection  Epifix was recommended and consent was obtained for the application.  This is application # 2.   Their is no evidence of osteomyelitis.  There is adequate blood flow for healing.       For the procedure, the patient was placed in a comfortable position with the wound bed exposed. Epifix  application is being performed in a staged procedure in an attempt to achieve rapid wound closure. The Tissue Identification Number is [id number] with an expiration date of 2022-03-01 as indicated on the consent form dated 6/26/2017     The graft size is 14mm and the estimated wastage was 0 as this was applied in a layered fashion.   The graft was prepared and applied following the  guidelines.  The graft was covered with a non-adherent contact layer and secured with wound veil.  Then,  a ABD dressing was applied.  The patient will continue to utilize tubigrip for compression.  Patient tolerated the procedure without any complications and was educated and expressed an understanding of the proper wound treatment until their follow up.               4. Nutrition: continue to work on his diabetes           5. Offloading: encouraged him to wear shin gaurds to help protect the wound area while he is working          6. Hypertension; we rechecked his bp this had gone down to 160/98; he is asymptomatic; we discussed the symptoms of high bp; stroke and heart attack and to go to the ED if any of these occur ; advised him to check his bp every day and record numbers for one week; will contact his  pmd to let him know our finding and if he would like to see him sooner.     Patient will follow up with me in 1 week for reevaluation. They were instructed to call the clinic sooner with any signs or symptoms of infection or any further questions/concerns. Answered all questions.    Ines Zimmerman DNP, RN, CNP, [facility]  [phone]

## 2021-06-12 NOTE — PATIENT INSTRUCTIONS - HE
Wear tubular compression  Elevate foot when able.      Wound Care:     Continue to wear cage splint when up walking or standing.       Take cage splint off when not up walking or standing.     Start Medihoney on right foot ulcer:  1. Remove old Medihoney packing.  2. Cleanse with normal saline, pat dry.   3. Apply Medihoney alginate into the wound. Try to avoid medihoney on the intact skin.   4. Cover with a gauze dressing and secure with Roll Gauze and paper tape.  5. Change 3 times per week.     Wear tubular compression on right leg.

## 2021-06-12 NOTE — PATIENT INSTRUCTIONS - HE
Thank you for choosing [facility] as partners in your care.  Please read the following information about your treatment.    Treatment:  Layered Compression Bandaging (Two  -layer)    What is it?  The layered compression bandaging has a layer of absorbent material that will soak up drainage.     Why we do it.   This is done to treat swelling, wounds, or both.  This will in turn help circulation and healing.    How to care for your bandages.  The wraps need to be kept dry. If  the wraps become wet, remove them and call the clinic to have another wrap applied.    What to expect.  It is common for the wraps to be uncomfortable at the beginning. The first two days are usually the hardest; then they will become more comfortable.       Elevating your legs will help the discomfort. Try to elevate your legs as much as possible.    If rest and elevation does not help your discomfort, call your provider.  If your provider is not available you can remove the wrap and leave a message for further instructions.      If you have any questions, please contact [facility] at [phone].

## 2021-06-12 NOTE — ANESTHESIA PREPROCEDURE EVALUATION
Anesthesia Evaluation      Patient summary reviewed   No history of anesthetic complications     Airway   Mallampati: II  Neck ROM: full   Pulmonary - negative ROS and normal exam    breath sounds clear to auscultation                         Cardiovascular - normal exam  Exercise tolerance: > or = 4 METS  (+) hypertension, , hypercholesterolemia,     ECG reviewed  Rhythm: regular  Rate: normal,         Neuro/Psych - negative ROS     Endo/Other    (+) diabetes mellitus poorly controlled,      GI/Hepatic/Renal    (+)   chronic renal disease ARF,     Comments: Possible common bile duct stone with ascending cholangitis     Other findings: Short chin      Dental - normal exam                        Anesthesia Plan  Planned anesthetic: general endotracheal  - glidescope available  ASA 3   Induction: intravenous   Anesthetic plan and risks discussed with: patient  Anesthesia plan special considerations: video-assisted,   Post-op plan: routine recovery

## 2021-06-12 NOTE — TELEPHONE ENCOUNTER
"Pt called wondering what he is to be doing with his dressing. Pt states Jose mentioned a wound vac and has an appt next week. Pt wondering if wound vac is to go on now. He says the nurse discharging him said he would be starting it soon, and they got a call from \"[place]\" about the wound vac.        "

## 2021-06-12 NOTE — PROGRESS NOTES
Office Visit - Follow Up   Caleb Hernandez   84 y.o. 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 Elfego Machado, and he is also working with the vascular medicine team and podiatry Dr. John Garcia.  Issues are as follows: Right foot ulcer which is been present for over 1 year, which was debrided and probed and is being further investigated with a MRI of the foot performed today October 22 to look for any signs of osteomyelitis, currently not on antibiotics.  Peripheral vascular disease with reduced SRIKANTH indices in both feet, but no focal stenosis on arterial ultrasound study.  Type 2 diabetes with suboptimal control, last A1c 7.9 measured September 4, 2020, currently on insulin and Victoza but no metformin, normal kidney function.  History of acute pulmonary embolism and cor pulmonale, was unprovoked, diagnosed May 2020, has been on anticoagulation ever since with Eliquis which he will continue long-term.  Hyperlipidemia in the context of diabetes, with history of intolerance to statins, LDL cholesterol was 126 when measured July 22, 2020.  Hypertension in the context of diabetes and peripheral vascular disease, on a small dose of lisinopril 5 mg a day with good blood pressure recorded today in clinic 122/60.  Overweight with body mass index 29.6, would benefit from losing 15 pounds.  He does not take flu shots.    He does not need any laboratory work today.    Going issue by issue:    Right foot ulcer which is been present for over 1 year, which was debrided and probed and is being further investigated with a MRI of the foot performed today October 22 to look for any signs of osteomyelitis, currently not  on antibiotics.  The MRI report is still pending.  He did have inflammatory markers checked on October 13 and it was a mild elevation of C-reactive protein and sedimentation rate.  He is currently not on antibiotics.  I did not take down his dressing today, but he is being followed very closely in the vascular clinic and has appointments tomorrow October 23, then October 26, 28, and 30th.    Peripheral vascular disease with reduced SRIKANTH indices in both feet, but no focal stenosis on arterial ultrasound study.      Type 2 diabetes with suboptimal control, last A1c 7.9 measured September 4, 2020, currently on insulin and Victoza but no metformin, normal kidney function.  He needs better control of his diabetes.  He showed me his home blood sugar readings that tend to run around 200 in the morning but more like 300 in the evening.  Rather than escalating his insulin dose, I want him to tighten up the dietary discipline, reducing carbohydrates (starchy foods and sweets).  Alcohol consumption he told me is practically 0.  Again the focuses on reduced carbohydrates and reduced overall calories.  Losing 15 pounds will help control his diabetes.  He is currently not on metformin, and I am guessing there was some sort of side effect historically.  Goal A1c should be 7 or less.  A1c should be checked again in January or February 2021.    History of acute pulmonary embolism and cor pulmonale, was unprovoked, diagnosed May 2020, has been on anticoagulation ever since with Eliquis which he will continue long-term.  He seems to be tolerating the Eliquis just fine.  He is on concomitant baby aspirin which I told him does increase the risk for bleeding when combined with Eliquis.  But I think the combination is appropriate for him since he has peripheral vascular disease.    Hyperlipidemia in the context of diabetes, with history of intolerance to statins, LDL cholesterol was 126 when measured July 22, 2020.      Hypertension in the  context of diabetes and peripheral vascular disease, on a small dose of lisinopril 5 mg a day with good blood pressure recorded today in clinic 122/60.      Overweight with body mass index 29.6, would benefit from losing 15 pounds.      He does not take flu shots. But he is willing to get a immunization against pneumococcal pneumonia in the form of Prevnar 13.    I would like to see him back in 3 months, which would be a good time to recheck his A1c.      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 Elfego Machado, and he is also working with the vascular medicine team and podiatry Dr. John Garcia.  Issues are as follows: Right foot ulcer which is been present for over 1 year, which was debrided and probed and is being further investigated with a MRI of the foot performed today October 22 to look for any signs of osteomyelitis, currently not on antibiotics.  Peripheral vascular disease with reduced SRIKANTH indices in both feet, but no focal stenosis on arterial ultrasound study.  Type 2 diabetes with suboptimal control, last A1c 7.9 measured September 4, 2020, currently on insulin and Victoza but no metformin, normal kidney function.  History of acute pulmonary embolism and cor pulmonale, was unprovoked, diagnosed May 2020, has been on anticoagulation ever since with Eliquis which he will continue long-term.  Hyperlipidemia in the context of diabetes, with history of intolerance to statins, LDL cholesterol was 126 when measured July 22, 2020.  Hypertension in the context of diabetes and peripheral vascular disease, on a small dose of lisinopril 5 mg a day with good blood pressure recorded today in clinic 122/60.  Overweight with body m    Right foot ulcer for about 1 year  10/13/2020  [facility]  [facility]  JoseJohn sinha, DPMOISES  TREATMENT:  -The right foot ulceration has increased in size after developing a blister. After removing the dried blister, the wound does not probe to deep tissues but there is now fibrotic tissue. Will hold endoform and begin medihoney. Recommend he continue with limited walking in the cage splint, and I have asked him to remove the cage splint during the day to reduce chance for maceration which may have contributed to blister formation. Also discussed importance of limited walking.    Lab Results   Component Value Date    CRP 0.9 (H) 10/13/2020     Lab Results   Component Value Date    SEDRATE 23 (H) 10/13/2020     DM2  Lab Results   Component Value Date    HGBA1C 7.9 (H) 09/04/2020     Lab Results   Component Value Date    CREATININE 0.97 07/22/2020    BUN 19 07/22/2020     07/22/2020    K 4.8 07/22/2020    CL 99 07/22/2020    CO2 28 07/22/2020       Bilaeral SRIKANTH's 10-13-20  Right SRIKANTH is  Abnormal with an SRIKANTH of 0.83 and Toe Pressures of 54.  Left SRIKANTH is Abnormal with an SRIKANTH of 0.89 and Toe Pressures of 87.    Right Lower Extremity: Patent lower extremity vasculature with transition to low velocity, monophasic flow in the infrapopliteal vessels.  No focal stenosis identified.  Left Lower Extremity: Patent lower extremity vasculature.  Monophasic flow in the posterior tibial and dorsalis pedis arteries.  No focal stenosis identified.     Intolerant of statins  Lab Results   Component Value Date    CHOL 178 07/22/2020    CHOL 200 (H) 10/25/2019    CHOL 180 05/23/2018     Lab Results   Component Value Date    HDL 40 07/22/2020    HDL 43 10/25/2019    HDL 35 (L) 05/23/2018     Lab Results   Component Value Date    LDLCALC 126 07/22/2020    LDLCALC 139 (H) 10/25/2019    LDLCALC 132 (H) 05/23/2018     Lab Results   Component Value Date    TRIG 60 07/22/2020    TRIG 90 10/25/2019    TRIG 67 05/23/2018     No components found for: CHOLHDL    CT  abdom 9-2017  IMPRESSION:   CONCLUSION:  1.  Prior cholecystectomy with mild diffuse intrahepatic bile duct dilatation but no significant extrahepatic duct dilatation, there are some air present within the CBD. Can't exclude an obstructing intraductal stone or lesion within the proximal portion   of the extrahepatic bile ducts.  2.  There is no pancreatic head or hepatic mass.  3.  5 mm nonobstructing upper pole stone right kidney.    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 male with a history of HTN, HLD, and diabetes type II who was admitted 5/23/2020 for Acute pulmonary embolism with evidence of right heart strain on CT.   Echo shows decreased RV systolic function, pulmonary is consulted- recommend continuing with anticoagulation, repeat echo in 2-3 months to assess recovery of RV function.     Immunization History   Administered Date(s) Administered     Pneumo Polysac 23-V 09/29/2003       Wt Readings from Last 3 Encounters:   10/22/20 200 lb 3.2 oz (90.8 kg)   07/27/20 197 lb (89.4 kg)   07/22/20 199 lb (90.3 kg)     BP Readings from Last 3 Encounters:   10/22/20 122/60   10/21/20 108/62   10/19/20 128/62       Lab Results   Component Value Date    WBC 8.4 07/27/2020    HGB 16.6 07/27/2020    HCT 48.3 07/27/2020     07/27/2020    CHOL 178 07/22/2020    TRIG 60 07/22/2020    HDL 40 07/22/2020    LDLDIRECT 127 01/23/2017    ALT 12 07/22/2020    AST 17 07/22/2020     07/22/2020    K  "4.8 07/22/2020    CL 99 07/22/2020    CREATININE 0.97 07/22/2020    BUN 19 07/22/2020    CO2 28 07/22/2020    PSA 5.8 01/23/2017    INR 1.27 (H) 09/05/2017    HGBA1C 7.9 (H) 09/04/2020    MICROALBUR 15 08/09/2012     ---------------------------------------------------------------------------------------------------------------------------    Medications, Allergies, Social, and Problem List   Current Outpatient Medications   Medication Sig Dispense Refill     apixaban ANTICOAGULANT (ELIQUIS) 5 mg Tab tablet Take 1 tablet (5 mg total) by mouth 2 (two) times a day. 60 tablet 3     aspirin 81 MG EC tablet Take 81 mg by mouth daily.       blood glucose test strips Use 1 each As Directed 2 (two) times a day. Dispense brand per patient's insurance at pharmacy discretion. 120 strip 6     blood-glucose meter (ONETOUCH VERIO IQ METER) Misc Use 1 each As Directed 2 (two) times a day. 1 each 0     cholecalciferol, vitamin D3, (VITAMIN D3) 5,000 unit Tab Take 5,000 Units by mouth daily.        LANTUS SOLOSTAR U-100 INSULIN 100 unit/mL (3 mL) pen INJECT 40 UNITS UNDER THE SKIN AT BEDTIME. DO NOT MIX LANTUS WITH ANY OTHER INSULIN (Patient taking differently: 44 Units. ) 18 adj dose pen 1     lisinopriL (PRINIVIL,ZESTRIL) 5 MG tablet TAKE 1 TABLET BY MOUTH EVERY DAY 90 tablet 1     MULTIVITS,CA,MIN/IRON/FA/LYCOP (CENTRUM MEN ORAL) Take 1 tablet by mouth daily.       mupirocin (BACTROBAN) 2 % ointment Apply topically to wound every day 60 g 3     naproxen sodium (ALEVE) 220 MG tablet Take 220 mg by mouth daily.       pen needle, diabetic 31 gauge x 5/16\" Ndle Used to inject insulin daily 90 each 0     traMADoL (ULTRAM) 50 mg tablet Take 1 tablet (50 mg total) by mouth every 8 (eight) hours as needed for pain. 30 tablet 0     VICTOZA 3-RUPALI 0.6 mg/0.1 mL (18 mg/3 mL) injection INJECT 1.2 MG SUBCUTANEOUSLY ONCE DAILY 18 mL 1     vitamin E 400 unit capsule Take 400 Units by mouth daily.       No current facility-administered medications " for this visit.      Allergies   Allergen Reactions     Cresol      Muscle cramps     Crestor [Rosuvastatin] Myalgia     Demeclocycline Hives     Social History     Tobacco Use     Smoking status: Former Smoker     Quit date: 1991     Years since quittin.9     Smokeless tobacco: Never Used   Substance Use Topics     Alcohol use: No     Drug use: No     Patient Active Problem List   Diagnosis     Type 2 diabetes mellitus (H)     Hyperlipidemia     Essential hypertension     Diabetic ulcer of right foot associated with type 2 diabetes mellitus (H)     Acute pulmonary embolism with acute cor pulmonale (H)     Statin intolerance     Personal history of PE (pulmonary embolism)-- May 2020, unprovoked, with right heart strain     Peripheral arterial disease (H)     Overweight (BMI 25.0-29.9)        Reviewed, reconciled and updated       Physical Exam   General Appearance: Very pleasant, normal mental status, cognitively quite intact, breathing comfortably, rises bit slowly from seated to standing position because of his right foot ulcer which is in a dressing, and he is wearing an ankle orthosis..    /60 (Patient Site: Left Arm, Patient Position: Sitting, Cuff Size: Adult Large)   Pulse 78   Temp 97.7  F (36.5  C)   Wt 200 lb 3.2 oz (90.8 kg)   SpO2 95%   BMI 29.56 kg/m      Oropharynx clear  No cervical adenopathy  Lungs clear  Heart regular rate and rhythm  Abdomen nontender  Trace ankle edema, right foot is in an ankle orthosis, and ulcer is dressed.       Additional Information   I spent 45 minutes face time with the patient, with > 50% counseling, explaining and discussing with the patient the issues enumerated in the Assessment and Plan section of this note and answering questions. Afterwards, the patient was given a printout of the AVS, with attention to the content in the Patient Instruction section.       Otis Lozano MD

## 2021-06-12 NOTE — ANESTHESIA CARE TRANSFER NOTE
Last vitals:   Vitals:    09/05/17 1836   BP: 125/58   Pulse: 77   Resp: 16   Temp: 37  C (98.6  F)   SpO2: 97%     Patient's level of consciousness is drowsy  Spontaneous respirations: yes  Maintains airway independently: yes  Dentition unchanged: yes  Oropharynx: oropharynx clear of all foreign objects    QCDR Measures:  ASA# 20 - Surgical Safety Checklist: WHO surgical safety checklist completed prior to induction  PQRS# 430 - Adult PONV Prevention: 4558F - Pt received => 2 anti-emetic agents (different classes) preop & intraop  ASA# 8 - Peds PONV Prevention: NA - Not pediatric patient, not GA or 2 or more risk factors NOT present  PQRS# 424 - Renu-op Temp Management: 4559F - At least one body temp DOCUMENTED => 35.5C or 95.9F within required timeframe  PQRS# 426 - PACU Transfer Protocol: - Transfer of care checklist used  ASA# 14 - Acute Post-op Pain: ASA14B - Patient did NOT experience pain >= 7 out of 10

## 2021-06-12 NOTE — PATIENT INSTRUCTIONS - HE
84-year-old man establishing primary care with me today, previously work with Dr. Elfego Rob, and he is also working with the vascular medicine team and podiatrist Dr. John Garcia.  Issues are as follows: Right foot ulcer which is been present for over 1 year, which was debrided and is being further investigated with a MRI of the foot performed today October 22 to look for any signs of osteomyelitis, currently not on antibiotics.  Peripheral vascular disease with reduced SRIKANTH indices in both feet, but no focal stenosis on arterial ultrasound study.  Type 2 diabetes with suboptimal control, last A1c 7.9 measured September 4, 2020, currently on insulin and Victoza but no metformin, normal kidney function.  History of acute pulmonary embolism and cor pulmonale, was unprovoked, diagnosed May 2020, has been on anticoagulation ever since with Eliquis which he will continue long-term.  Hyperlipidemia in the context of diabetes, with history of intolerance to statins, LDL cholesterol was 126 when measured July 22, 2020.  Hypertension in the context of diabetes and peripheral vascular disease, on a small dose of lisinopril 5 mg a day with good blood pressure recorded today in clinic 122/60.  Overweight with body mass index 29.6, would benefit from losing 15 pounds.  He does not take flu shots.    He does not need any laboratory work today.    Going issue by issue:    Right foot ulcer which is been present for over 1 year, which was debrided and is being further investigated with a MRI of the foot performed today October 22 to look for any signs of osteomyelitis, currently not on antibiotics.  The MRI report is still pending.  He did have inflammatory markers checked on October 13 and it was a mild elevation of C-reactive protein and sedimentation rate.  He is currently not on antibiotics.  I did not take down his dressing today, but he is being followed very closely in the vascular clinic and has appointments tomorrow  October 23, then October 26, 2028, and 30th.    Peripheral vascular disease with reduced SRIKANTH indices in both feet, but no focal stenosis on arterial ultrasound study.      Type 2 diabetes with suboptimal control, last A1c 7.9 measured September 4, 2020, currently on insulin and Victoza but no metformin, normal kidney function.  He needs better control of his diabetes.  He showed me his home blood sugar readings that tend to run around 200 in the morning but more like 300 in the evening.  Rather than escalating his insulin dose, I want him to tighten up the dietary discipline, reducing carbohydrates (starchy foods and sweets).  Alcohol consumption he told me is practically 0.  Again the focuses on reduced carbohydrates and reduced overall calories.  Losing 15 pounds will help control his diabetes.  He is currently not on metformin, and I am guessing there was some sort of side effect historically.  Goal A1c should be 7 or less.  A1c should be checked again in January or February 2021.    History of acute pulmonary embolism and cor pulmonale, was unprovoked, diagnosed May 2020, has been on anticoagulation ever since with Eliquis which he will continue long-term.  He seems to be tolerating the Eliquis just fine.  He is on concomitant baby aspirin which I told him does increase the risk for bleeding when combined with Eliquis.  But I think the combination is appropriate for him since he has peripheral vascular disease.    Hyperlipidemia in the context of diabetes, with history of intolerance to statins, LDL cholesterol was 126 when measured July 22, 2020.      Hypertension in the context of diabetes and peripheral vascular disease, on a small dose of lisinopril 5 mg a day with good blood pressure recorded today in clinic 122/60.      Overweight with body mass index 29.6, would benefit from losing 15 pounds.      He does not take flu shots.  But he is willing to get a immunization against pneumococcal pneumonia in the  form of Prevnar 13.    I would like to see him back

## 2021-06-12 NOTE — TELEPHONE ENCOUNTER
Who is calling:  patient  Reason for Call:  Wants to know if Dr Johnson is still taking new patients as patients primary Dr Machado has retired.  Would like to schedule establish care appointment if so. Also is going to need refill of his Victoza.   Date of last appointment with primary care: 7/22/2020  Okay to leave a detailed message: Yes.  Patient states he has contacted his pharmacy regarding refill of Victoza, they have not received request as of yet.

## 2021-06-12 NOTE — PATIENT INSTRUCTIONS - HE
Continue Endoform on right foot ulcer:  Endoform Dressing Application      1. Endoform should be cut to the size of the wound.  It should touch the edges of the ulcer.  It can overlap the edges just very small amount.  Then, moisten with saline. (If it is easier for you, you can moisten it before laying it in the wound)     2. Cover the top of the wound with dry gauze.  Secure with roll gauze and paper tape.  No tape should be directly onto skin.     3. Endoform is naturally used by the body over time so you may not find it in the wound when you change your dressing.  If you do find some, gently cleanse the wound with saline. Do not remove the remaining Endoform, which may appear as an off-white to lanza gel, just add Endoform on top.  Usually, more Endoform will need to be added every 5-7 days.      4.  Change your top dressing every 1-2 days depending on drainage.      -Endoform is a collagen dressing created from ovine (sheep) fore-stomach tissue. The collagen extracellular matrix transforms into a soft conforming sheet, which is naturally incorporated into the wound over time.  Collagen dressings are used to stimulate wound healing.

## 2021-06-12 NOTE — TELEPHONE ENCOUNTER
We received a home care referral for this patient and due to our capacity, this referral was vended to [facility]. They are requesting the wound care orders. Could you please fax the wound care orders to them? Their fax number is [phone]. They are also wondering when the patient was going to receive the wound vac. Thank you!!!

## 2021-06-12 NOTE — PROGRESS NOTES
"Follow up Vascular Visit       Date of Service:8/21/2017    Date Last Seen: 7/17/2017; 7/17/2017    Chief Complaint: right foot ulcer; healed right shin ulcer    History:   Past Medical History:   Diagnosis Date     BPH (benign prostatic hyperplasia)      Cholelithiasis      Diabetes mellitus      Essential hypertension      Hyperlipidemia      Pancreatitis        Pt returns to the Vascular clinic with regards to their right foot ulcer; healed right shin ulcer. Pt arrives alone today. We were previously seeing him for a right shin ulcer. He reports that he purchased new shoes 1 month ago from [employer] he was previously having pain in the toes from his old shoes so he purchased shoes with larger toe box. His fs have been running at baseline 170-250; tolerated the PCN VK, he has since completed this and course of clindamycin. Had xray done of the foot this was negative for osteomyelitis. He had ABIs completed this indicated adequate blood flow for healing with the toe pressures. We took him out of work. We had him sized for a CAGE boot; he is wearing this; broke the bottom strap once; had this repaired; is not having any hip or knee pain. Trying to stay off the foot as much possible. Had him see Dr. Garcia; had CRP done this was normal; no MRI recommended; no surgery recommended at this time; will reconsider if the wound does not heal.    Allergies: Crestor [rosuvastatin] and Demeclocycline    Physical Exam:    Resp 16  Ht 5' 8\" (1.727 m)  Wt 202 lb (91.6 kg) Comment: per pt report  BMI 30.71 kg/m2    General:  Patient presents to clinic in no apparent distress.  Head: normocephalic atraumatic  Psychiatric:  Alert and oriented x3.   Respiratory: unlabored breathing; no cough  Integumentary:  Skin is uniformly warm, dry and pink.    Wound #1 Location: right shin  Size:healed. Periwound: no denudement, erythema, induration, maceration or warmth.    Wound #2 Location: right plantar foot 5th met head  Size: " 0.5x1 x 0.1cmdepth.  No sinus tract present, Wound base: initially covered with fibrinous material; this was sharply debrided; revealed 80% viable wound bed;good bleeding; no bone exposed; 5th met head is very prominent; forefoot varus deformity present; I was not able to express pus today; there is no streaking or erythema; there is not edema in the foot  No undermining present. Periwound: no denudement, erythema, induration, maceration or warmth.      Circumferential volume measures:    Vasc Edema 3/6/2017 4/17/2017 6/2/2017   Right just above MTP 23 24 23   Right Ankle 25 26 23.5   Right Widest Calf 37.5 38.6 37   Left - just above MTP 22 - -   Left Ankle 22 - -   Left Widest Calf 36 - -         Ulceration(s)/Wound(s):     Wound 07/24/17 Right lateral foot (Active)   Pre Size Length 0.5 8/21/2017  7:00 AM   Pre Size Width 1 8/21/2017  7:00 AM   Pre Total Sq cm 0.5 8/21/2017  7:00 AM   Prodcut Used Alginate 8/10/2017 11:00 AM           Lab Values  No results found for: SEDRATE  Lab Results   Component Value Date    CREATININE 0.97 05/19/2017     Lab Results   Component Value Date    HGBA1C 9.3 (H) 05/19/2017     Lab Results   Component Value Date    BUN 17 05/19/2017     Lab Results   Component Value Date    ALBUMIN 3.5 01/23/2017     No results found for: HMOWWBVH15IB          Impression:   Right shin traumatic wound healed  New right diabetic foot ulcer  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 Excisional debridement of the ulcer(s) was recommended today, after consent was obtained and 2% Xylocaine was applied using a sterile curet the epidermal, dermal and down into the subcutaneous tissue was sharply debrided for a total square cm of 0.5. The devitalized and necrotic tissue was removed and the wounds appeared much  afterwards. The patient tolerated this  well.           2. Edema: will apply tubigrip           3.  Wound treatment: ABIs indicated he had adequate blood flow for wound healing;  Culture grew out strep he completed the clindamycin and  penicillin VK. We used epifix previously for his shin; this worked very well; he is covered for this with a copay; he was agreeable to having this applied today, roll gauze; xray 3 view standing was negative for osteomyelitis    Due to the slow healing rate of the ulcer and the diagnosis of Diabetic Ulcer due to peripheral neuropathy} of the Right foot and is free of infection  Epifix was recommended and consent was obtained for the application.  This is application # 4 (the first for this ulcer).   Their is no evidence of osteomyelitis.  There is adequate blood flow for healing.       For the procedure, the patient was placed in a comfortable position with the wound bed exposed. Epifix  application is being performed in a staged procedure in an attempt to achieve rapid wound closure. The Tissue Identification Number is [id number] with an expiration date of 2022-05-01 as indicated on the consent form dated 8/21/2017     The graft size is 14mm and the estimated wastage was 0.   The graft was prepared and applied following the  guidelines.  The graft was covered with a non-adherent contact layer and secured with wound veil.  Then,  a gauze dressing was applied.  The patient will continue to utilize CAGE for off loading.  Patient tolerated the procedure without any complications and was educated and expressed an understanding of the proper wound treatment until their follow up.             4. Nutrition: diabetic diet; needs tighter control; spoke to him about this again today; he is refusing to see his PMD about this           5. Offloading: this is his driving foot; did not want to do cast; continue CAGE boot; will have him see [facility] again to see if any modifications are needed; will eventually need  diabetic shoes and inserts after we get him healed; will take him out of work; needs to stay off the foot; filled out paperwork today     Patient will follow up with me in 1 weeks for reevaluation. They were instructed to call the clinic sooner with any signs or symptoms of infection or any further questions/concerns. Answered all questions.    Ines Zimmerman DNP, RN, CNP, [facility]  [phone]

## 2021-06-12 NOTE — ANESTHESIA CARE TRANSFER NOTE
Last vitals:   Vitals:    11/02/20 1612   BP: 121/85   Pulse: 68   Resp: 16   Temp: 36.7  C (98.1  F)   SpO2: 99     Patient's level of consciousness is drowsy  Spontaneous respirations: yes  Maintains airway independently: yes  Dentition unchanged: yes  Oropharynx: oropharynx clear of all foreign objects    QCDR Measures:  ASA# 20 - Surgical No data recorded  PQRS# 430 - Adult PONV Prevention: 4558F - Pt received => 2 anti-emetic agents (different classes) preop & intraop  ASA# 8 - Peds PONV Prevention: NA - Not pediatric patient, not GA or 2 or more risk factors NOT present  PQRS# 424 - Renu-op Temp Management: 4559F - At least one body temp DOCUMENTED => 35.5C or 95.9F within required timeframe  PQRS# 426 - PACU Transfer Protocol: - Transfer of care checklist used  ASA# 14 - Acute Post-op Pain: ASA14B - Patient did NOT experience pain >= 7 out of 10

## 2021-06-12 NOTE — TELEPHONE ENCOUNTER
I called pt and relayed message to pt offered Dr. delgado and Dr. Clancy, there will be a letter coming out in November.

## 2021-06-12 NOTE — ANESTHESIA PREPROCEDURE EVALUATION
Anesthesia Evaluation        Airway   Mallampati: II  Neck ROM: full   Pulmonary     breath sounds clear to auscultation    ROS comment: Hx of PE on apixaban. Last took on 10/29                         Cardiovascular - normal exam  (+) hypertension, , hypercholesterolemia, PVD    Rhythm: regular  Rate: normal,         Neuro/Psych - negative ROS     Endo/Other    (+) diabetes mellitus type 2 using insulin,      Comments: Osteomyelitis of R foot    GI/Hepatic/Renal      Comments: BPH          Dental    (+) upper dentures and lower dentures                       Anesthesia Plan  Planned anesthetic: general mask and peripheral nerve block  General with a mask  Pre op popliteal and adductor canal block   ASA 3   Induction: intravenous   Anesthetic plan and risks discussed with: patient  Anesthesia plan special considerations: antiemetics,   Post-op plan: routine recovery           caffeine

## 2021-06-12 NOTE — PATIENT INSTRUCTIONS - HE
Surgery Information    Surgery Date 11/2    Surgery Time 250 pm  ( Arrive at the hospital two hours prior to your surgery and 1 hour prior at the surgery center. Check in at the . The only exception is if you are scheduled for surgery at 7am, you should arrive at 5:30am)    IF YOU NEED TO RESCHEDULE OR CANCEL YOUR SURGERY FOR ANY REASON PLEASE CALL THE CLINIC AS SOON AS POSSIBLE -[phone].    Admission Type: Outpatient    Surgeon: Dr. Garcia    Surgery Procedure: Incision and Drainage    Surgery Location: [facility],[address] [place] [address], Main Admitting      Anticoagulation Schedule      1. Plavix: n/a   2. Aspirin: hold   3. Other: Eliquis will need to be held- we will contact you to discuss coverage and holding of this.     Additional Information: If you use a CPAP machine for sleep apnea please bring it with you for surgery. We will want to monitor your breathing using your normal equipment.     HOSPITAL AND SURGERY CENTER INFORMATION    We need to know a lot of information about you before surgery.     1-5 Days Before:     A nurse will call you for a pre-screening interview. The phone call with the nurse will be much faster and easier if you  Have organized your information prior to the call.     Please have the following information available:    Preoperative Exam completed and faxed to our office    Primary care provider's and any specialty providers' contact information available. .    If requested by your primary care provider, have any heart and lung exams at least 3 days before surgery.    Have a complete and accurate list of medications available.     Have a list of your allergies/sensitivities and reactions    Know your health history, surgical history, and medical problems    Know any anesthesia issues with you or within your family.     BE SURE TO NOTIFY US IF YOU ARE TAKING ANY BLOOD THINNING AGENTS: Coumadin, Plavix, Aspirin, Xarelto, Eliquis    Someone planned to bring you and stay  with you after the surgery    Day Before Surgery    1. STOP Smoking and Drinking: It is important to stop smoking and drinking at least 24 hours before surgery. Smoking and drinking may cause complications in your recovery from anesthesia and may lengthen the healing process.    2. Pack for your hospital stay: If it will be required for you to stay at the hospital after surgery, bring personal items such as a robe, slippers, pajamas, additional clothing and toiletries. Don't forget:    List of medication    Eyeglass case or contact case with solution. You cannot wear contacts during surgery    Copies of your physical exam , lab results and EKG    Copy of Health Care Directives, Living Will or Power of     Insurance Cards    Photo ID    CPAP machine    3. NOTHING BY MOUTH 8 HOURS BEFORE. This includes gum, hard candy, water and mints. The only exception is if you have been instructed by your doctor to take your medications with sips of water. You may rinse your mouth or brush your teeth, but do not swallow water.     4. Remove Nail Polish  Day of Surgery    1. Medications- Take as indicated with sips of water     2. Wear comfortable loose fitting clothes. Wear your glasses-Not contacts. Do not wear jewelry and remove body piercing's. Surgery may be cancelled if they are not removed.     3. If same day surgery-Have a someone come with you to surgery that can help you understand the surgeon's instructions, drive you home and stay with you overnight the first night.     4. A nurse will call you at home within 72 hours following surgery to see how you are doing. Our nurses and doctors will discuss recovery with you.

## 2021-06-12 NOTE — ANESTHESIA POSTPROCEDURE EVALUATION
Patient: Caleb Hernandez  Procedure(s):  INCISION AND DRAINAGE, right foot with removal of bone 5th metatarsal (Right)  Anesthesia type: general    Patient location: Phase II Recovery  Last vitals:   Vitals Value Taken Time   /67 11/02/20 1700   Temp 36.7  C (98.1  F) 11/02/20 1612   Pulse 68 11/02/20 1706   Resp 14 11/02/20 1700   SpO2 93 % 11/02/20 1707   Vitals shown include unvalidated device data.  Post vital signs: stable  Level of consciousness: awake and responds to simple questions  Post-anesthesia pain: pain controlled  Post-anesthesia nausea and vomiting: no  Pulmonary: unassisted, return to baseline  Cardiovascular: stable and blood pressure at baseline  Hydration: adequate  Anesthetic events: no    QCDR Measures:  ASA# 11 - Renu-op Cardiac Arrest: ASA11B - Patient did NOT experience unanticipated cardiac arrest  ASA# 12 - Renu-op Mortality Rate: ASA12B - Patient did NOT die  ASA# 13 - PACU Re-Intubation Rate: ASA13B - Patient did NOT require a new airway mgmt  ASA# 10 - Composite Anes Safety: ASA10A - No serious adverse event    Additional Notes:

## 2021-06-12 NOTE — PATIENT INSTRUCTIONS - HE
Elevate your feet this weekend to help reduce the swelling. Please limit on walking on that foot to help with healing.

## 2021-06-12 NOTE — PROGRESS NOTES
I reviewed the MRI report with the patient's wife including treatment options. The patient was not home. I asked that he return my call to review the above.

## 2021-06-12 NOTE — TELEPHONE ENCOUNTER
Please advise on when and how to schedule.    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

## 2021-06-12 NOTE — PROGRESS NOTES
Surgery Scheduled      Surgery/Procedure: Incision and Drainage    Special Equipment: pulse lavage    Location: [facility]    Date: 11/2    Time: 250pm    Surgeon: Dr. Garcia    OR Confirmed/ :  Yes with Cheryl on 10/23    Orders In:  Yes    Entered on Gabstr / Moov cc. Calendar:  Yes    Post Op: 11/11 @120    Covid Scheduled: Friday 10/30@ 1:45    Blood Thinners Addressed:  Yes- pt on eliquis- message sent to primary regarding bridging options

## 2021-06-12 NOTE — TELEPHONE ENCOUNTER
Refill Approved    Rx renewed per Medication Renewal Policy. Medication was last renewed on 6/22/20, last OV 10/28/20.    Allyssa Hunter, [facility] Triage/Med Refill 11/1/2020     Requested Prescriptions   Pending Prescriptions Disp Refills     lisinopriL (PRINIVIL,ZESTRIL) 5 MG tablet [Pharmacy Med Name: LISINOPRIL 5 MG TABLET] 90 tablet 1     Sig: TAKE 1 TABLET BY MOUTH EVERY DAY       Ace Inhibitors Refill Protocol Passed - 10/30/2020  3:18 PM        Passed - PCP or prescribing provider visit in past 12 months       Last office visit with prescriber/PCP: 7/22/2020 Chaz Machado MD OR same dept: 7/27/2020 Alonso Clancy MD OR same specialty: 10/22/2020 Otis Lozano MD  Last physical: 10/25/2019 Last MTM visit: Visit date not found   Next visit within 3 mo: Visit date not found  Next physical within 3 mo: Visit date not found  Prescriber OR PCP: Chaz Machado MD  Last diagnosis associated with med order: 1. HTN (hypertension)  - lisinopriL (PRINIVIL,ZESTRIL) 5 MG tablet [Pharmacy Med Name: LISINOPRIL 5 MG TABLET]; TAKE 1 TABLET BY MOUTH EVERY DAY  Dispense: 90 tablet; Refill: 1    If protocol passes may refill for 12 months if within 3 months of last provider visit (or a total of 15 months).             Passed - Serum Potassium in past 12 months     Lab Results   Component Value Date    Potassium 5.7 (H) 10/28/2020             Passed - Blood pressure filed in past 12 months     BP Readings from Last 1 Encounters:   10/28/20 108/68             Passed - Serum Creatinine in past 12 months     Creatinine   Date Value Ref Range Status   10/28/2020 1.08 0.70 - 1.30 mg/dL Final

## 2021-06-12 NOTE — PROGRESS NOTES
Preoperative Exam    Scheduled Procedure: INCISION AND DRAINAGE, right foot with removal of bone 5th metatarsal  Surgery Date:  11/2/2020  Surgery Location: [facility], fax [phone]    Surgeon:  Dr. Garcia    Assessment/Plan:     Satisfactory preoperative medical exam for planned incision and drainage with removal of bone from the fifth metatarsal because of chronic right foot ulcer and radiographic findings of osteomyelitis.  Surgery scheduled for November 2, 2020 at [facility] by Dr. Garcia    He is going to have a Covid test done on Friday, October 30.    Today lets get a CBC and basic metabolic panel.  LATER: Basic metabolic panel has an elevated potassium level of 5.7, but I do not think that is cause for concern since his BUN and creatinine are normal, he is not taking any supplemental potassium, and is on only a modest dose of lisinopril 5 mg a day.  The elevated potassium could be a isolated spurious reading, or could be transient phenomenon related to fluctuating glucose levels, and his glucose was 180..  Remainder electrolytes normal.  Potassium concentrations were normal when measured in July 2020 and through the year of 2019.  Elevated potassium level similar to 5.7 were seen in 2017 and 2018 associated with high serum glucoses.    CBC has a mildly elevated white count of 11.6, but normal hemoglobin and platelets.    Laboratory results satisfactory for preop purposes.    His EKG today October 28 shows normal sinus rhythm with a PAC, rate 73, inferior infarct pattern with a Q wave in lead III and aVF, not new.  Otherwise normal EKG.    Detailed results of the bottom of this document.    With regards to his medications:    Eliquis, take usual dosing on Friday, October 30.  No Eliquis on Saturday the 31st and Sunday, November 1, and of course no Eliquis on the day of surgery Monday, November 2.  Can resume Eliquis on Tuesday, November 3 as long as Dr. Garcia approves.    Baby aspirin stop  taking right now, meaning no aspirin on the 29th, 30th, 31st, November 1, and November 2.  Resume on Tuesday, November 3.    Lantus insulin, reduce dose by 50% at bedtime on Sunday, November 1, meaning take 22 units which is half of the usual dose of 44 units.    Victoza skip the morning dose on Monday, November 2.  Resume with the morning dose on Tuesday, November 3    Lisinopril skip the morning of surgery Monday, November 2.  Resume the day after surgery Tuesday, November 3    Medical issues are as follows:    Right foot ulcer which is been present for over 1 year, which was debrided and probed and is being further investigated with a MRI of the foot performed October 22 showing signs of osteomyelitis, currently not on antibiotics.  He did have inflammatory markers checked on October 13 and it was a mild elevation of C-reactive protein and sedimentation rate.  He is currently not on antibiotics.      Peripheral vascular disease with reduced SRIKANTH indices in both feet, but no focal stenosis on arterial ultrasound study.       Type 2 diabetes with suboptimal control, last A1c 7.9 measured September 4, 2020, currently on insulin and Victoza but no metformin, normal kidney function.  He needs better control of his diabetes.  He showed me his home blood sugar readings that tend to run around 200 in the morning but more like 300 in the evening.  Rather than escalating his insulin dose, I want him to tighten up the dietary discipline, reducing carbohydrates (starchy foods and sweets).  Alcohol consumption he told me is practically 0.  Again the focuses on reduced carbohydrates and reduced overall calories.  Losing 15 pounds will help control his diabetes.  He is currently not on metformin, and I am guessing there was some sort of side effect historically.  Goal A1c should be 7 or less.  A1c should be checked again in January or February 2021.     History of acute pulmonary embolism and cor pulmonale, was unprovoked,  diagnosed May 2020, has been on anticoagulation ever since with Eliquis which he will continue long-term.  He seems to be tolerating the Eliquis just fine.  He is on concomitant baby aspirin which I told him does increase the risk for bleeding when combined with Eliquis.  But I think the combination is appropriate for him since he has peripheral vascular disease.     Hyperlipidemia in the context of diabetes, with history of intolerance to statins, LDL cholesterol was 126 when measured July 22, 2020.       Hypertension in the context of diabetes and peripheral vascular disease, on a small dose of lisinopril 5 mg a day with good blood pressure recorded today in clinic 122/60.       Overweight with body mass index 29.6, would benefit from losing 15 pounds.       He does not take flu shots. But he is willing to get a immunization against pneumococcal pneumonia in the form of Prevnar 13.     I would like to see him back in 3 months, which would be a good time to recheck his A1c.      Surgical Procedure Risk: Low (reported cardiac risk generally < 1%)  Have you had prior anesthesia?: Yes  Have you or any family members had a previous anesthesia reaction:  No  Do you or any family members have a history of a clotting or bleeding disorder?: Yes: Personal hx of blood clot in lung  Cardiac Risk Assessment: no increased risk for major cardiac complications    APPROVAL GIVEN to proceed with proposed procedure, without further diagnostic evaluation    Functional Status: Independent  Patient plans to recover at home with family.     Subjective:      Caleb Hernandez is a 84 y.o. male who presents for a preoperative consultation.    preoperative medical exam for planned incision and drainage with removal of bone from the fifth metatarsal because of chronic right foot ulcer and radiographic findings of osteomyelitis.  Surgery scheduled for November 2, 2020 at [facility] by Dr. Garcia    84-year-old man, retired auto parts store  owner, establishing primary care with me today, previously work with Dr. Elfego Machado, and he is also working with the vascular medicine team and podiatry Dr. John Garcia.  Issues are as follows: Right foot ulcer which is been present for over 1 year, which was debrided and probed and is being further investigated with a MRI of the foot performed today October 22 to look for any signs of osteomyelitis, currently not on antibiotics.  Peripheral vascular disease with reduced SRIKANTH indices in both feet, but no focal stenosis on arterial ultrasound study.  Type 2 diabetes with suboptimal control, last A1c 7.9 measured September 4, 2020, currently on insulin and Victoza but no metformin, normal kidney function.  History of acute pulmonary embolism and cor pulmonale, was unprovoked, diagnosed May 2020, has been on anticoagulation ever since with Eliquis which he will continue long-term.  Hyperlipidemia in the context of diabetes, with history of intolerance to statins, LDL cholesterol was 126 when measured July 22, 2020.  Hypertension in the context of diabetes and peripheral vascular disease, on a small dose of lisinopril 5 mg a day with good blood pressure recorded today in clinic 122/60.  Overweight with body mass index 29.6, would benefit from losing 15 pounds.  He does not take flu shots      All other systems reviewed and are negative, other than those listed in the HPI.    Pertinent History  Do you have difficulty breathing or chest pain after walking up a flight of stairs: No  History of obstructive sleep apnea: No  Steroid use in the last 6 months: No  Frequent Aspirin/NSAID use: Yes: daily aspirin  Prior Blood Transfusion: No  Prior Blood Transfusion Reaction: No  If for some reason prior to, during or after the procedure, if it is medically indicated, would you be willing to have a blood transfusion?:  There is no transfusion refusal.    Current Outpatient Medications   Medication Sig Dispense Refill      "apixaban ANTICOAGULANT (ELIQUIS) 5 mg Tab tablet Take 1 tablet (5 mg total) by mouth 2 (two) times a day. 60 tablet 3     aspirin 81 MG EC tablet Take 81 mg by mouth daily.       blood glucose test strips Use 1 each As Directed 2 (two) times a day. Dispense brand per patient's insurance at pharmacy discretion. 120 strip 6     blood-glucose meter (ONETOUCH VERIO IQ METER) Misc Use 1 each As Directed 2 (two) times a day. 1 each 0     cholecalciferol, vitamin D3, (VITAMIN D3) 5,000 unit Tab Take 5,000 Units by mouth daily.        LANTUS SOLOSTAR U-100 INSULIN 100 unit/mL (3 mL) pen INJECT 40 UNITS UNDER THE SKIN AT BEDTIME. DO NOT MIX LANTUS WITH ANY OTHER INSULIN (Patient taking differently: 44 Units. ) 18 adj dose pen 1     liraglutide (VICTOZA 3-RUPALI) 0.6 mg/0.1 mL (18 mg/3 mL) injection Inject 0.3 mL (1.8 mg total) under the skin daily.       lisinopriL (PRINIVIL,ZESTRIL) 5 MG tablet TAKE 1 TABLET BY MOUTH EVERY DAY 90 tablet 1     MULTIVITS,CA,MIN/IRON/FA/LYCOP (CENTRUM MEN ORAL) Take 1 tablet by mouth daily.       mupirocin (BACTROBAN) 2 % ointment Apply topically to wound every day 60 g 3     naproxen sodium (ALEVE) 220 MG tablet Take 220 mg by mouth daily.       pen needle, diabetic 31 gauge x 5/16\" Ndle Used to inject insulin daily 90 each 0     traMADoL (ULTRAM) 50 mg tablet Take 1 tablet (50 mg total) by mouth every 8 (eight) hours as needed for pain. 30 tablet 0     vitamin E 400 unit capsule Take 400 Units by mouth daily.       No current facility-administered medications for this visit.         Allergies   Allergen Reactions     Cresol      Muscle cramps     Crestor [Rosuvastatin] Myalgia     Demeclocycline Hives       Patient Active Problem List   Diagnosis     Type 2 diabetes mellitus (H)     Hyperlipidemia     Essential hypertension     Diabetic ulcer of right foot associated with type 2 diabetes mellitus (H)     Acute pulmonary embolism with acute cor pulmonale (H)     Statin intolerance     Personal " history of PE (pulmonary embolism)-- May 2020, unprovoked, with right heart strain     Peripheral arterial disease (H)     Overweight (BMI 25.0-29.9)     Chronic anticoagulation     Osteomyelitis of ankle and foot (H)       Past Medical History:   Diagnosis Date     BPH (benign prostatic hyperplasia)      Cholelithiasis      Common bile duct (CBD) obstruction 2017     Compression Fracture Of Thoracic Vertebral Body     Created by Conversion  Replacement Utility updated for latest IMO load     Diabetes mellitus (H)      Essential hypertension      Hyperlipidemia      Pancreatitis      Rib Fracture     Created by Conversion  Replacement Utility updated for latest IMO load     Sepsis due to pneumonia (H) 2017       Past Surgical History:   Procedure Laterality Date     BACK SURGERY       removed a cyst     CHOLECYSTECTOMY       ERCP       ERCP N/A 2017    Procedure: ENDOSCOPIC RETROGRADE CHOLANGIOPANCREATOGRAPHY SPHINCTEROTOMY AND STONE EXTRACTION;  Surgeon: Hansel Gannon MD;  Location: [facility];  Service:      TONSILLECTOMY         Social History     Socioeconomic History     Marital status:      Spouse name: Not on file     Number of children: Not on file     Years of education: Not on file     Highest education level: Not on file   Occupational History     Not on file   Social Needs     Financial resource strain: Not on file     Food insecurity     Worry: Not on file     Inability: Not on file     Transportation needs     Medical: Not on file     Non-medical: Not on file   Tobacco Use     Smoking status: Former Smoker     Quit date: 1991     Years since quittin.9     Smokeless tobacco: Never Used   Substance and Sexual Activity     Alcohol use: No     Drug use: No     Sexual activity: Never   Lifestyle     Physical activity     Days per week: Not on file     Minutes per session: Not on file     Stress: Not on file   Relationships     Social connections     Talks on  phone: Not on file     Gets together: Not on file     Attends Sikhism service: Not on file     Active member of club or organization: Not on file     Attends meetings of clubs or organizations: Not on file     Relationship status: Not on file     Intimate partner violence     Fear of current or ex partner: Not on file     Emotionally abused: Not on file     Physically abused: Not on file     Forced sexual activity: Not on file   Other Topics Concern     Not on file   Social History Narrative     Not on file       Objective:     Vitals:    10/28/20 1127   BP: 108/68   Pulse: 67   Temp: 97.7  F (36.5  C)   TempSrc: Oral   SpO2: 94%   Weight: 202 lb 12.8 oz (92 kg)       Physical Exam:    Oropharynx clear  + Slightly reduced neck extension range of motion.  No cervical adenopathy  Lungs clear  Heart regular rate and rhythm  Abdomen nontender  Trace ankle edema, right foot is in an ankle orthosis, and ulcer is dressed.    Labs:  Recent Results (from the past 48 hour(s))   Electrocardiogram Perform and Read    Collection Time: 10/28/20 11:39 AM   Result Value Ref Range    SYSTOLIC BLOOD PRESSURE      DIASTOLIC BLOOD PRESSURE      VENTRICULAR RATE 73 BPM    ATRIAL RATE 73 BPM    P-R INTERVAL 164 ms    QRS DURATION 88 ms    Q-T INTERVAL 366 ms    QTC CALCULATION (BEZET) 403 ms    P Axis 34 degrees    R AXIS -12 degrees    T AXIS 27 degrees    MUSE DIAGNOSIS       Sinus rhythm with Premature atrial complexes  Inferior infarct (cited on or before 04-SEP-2017)  Abnormal ECG  When compared with ECG of 01-FEB-2018 17:27,  Premature atrial complexes are now Present     Basic Metabolic Panel    Collection Time: 10/28/20 12:25 PM   Result Value Ref Range    Sodium 138 136 - 145 mmol/L    Potassium 5.7 (H) 3.5 - 5.0 mmol/L    Chloride 100 98 - 107 mmol/L    CO2 26 22 - 31 mmol/L    Anion Gap, Calculation 12 5 - 18 mmol/L    Glucose 180 (H) 70 - 125 mg/dL    Calcium 9.4 8.5 - 10.5 mg/dL    BUN 21 8 - 28 mg/dL    Creatinine 1.08  0.70 - 1.30 mg/dL    GFR MDRD Af Amer >60 >60 mL/min/1.73m2    GFR MDRD Non Af Amer >60 >60 mL/min/1.73m2   HM2(CBC w/o Differential)    Collection Time: 10/28/20 12:25 PM   Result Value Ref Range    WBC 11.6 (H) 4.0 - 11.0 thou/uL    RBC 4.99 4.40 - 6.20 mill/uL    Hemoglobin 15.9 14.0 - 18.0 g/dL    Hematocrit 47.8 40.0 - 54.0 %    MCV 96 80 - 100 fL    MCH 31.9 27.0 - 34.0 pg    MCHC 33.3 32.0 - 36.0 g/dL    RDW 11.2 11.0 - 14.5 %    Platelets 292 140 - 440 thou/uL    MPV 7.4 7.0 - 10.0 fL        Immunization History   Administered Date(s) Administered     Pneumo Conj 13-V (2010&after) 10/22/2020     Pneumo Polysac 23-V 09/29/2003       Electronically signed by Otis Lozano MD 10/28/20 11:23 AM

## 2021-06-12 NOTE — TELEPHONE ENCOUNTER
Surgery scheduled with Dr. Garcia on 11/02/20. Pre-op nurse called re: lab result. Potassium is 5.7. PCP did not see the need to repeat lab prior to surgery. [facility] wanted you to be aware.    Call Liane with questions/concerns [phone].

## 2021-06-12 NOTE — PATIENT INSTRUCTIONS - HE
- Please call us if your compression wraps fall more than 1-2 inches below the bend of the knee. Remove the wraps if you experience any shortness of breathe or notice your toes turning blue/purple and then give us a call. Call if they are too painful. Call if they get wet. If it is a weekend and the wraps fall down, are too painful, or get wet take the wraps off and put on another form of compression. Compression such as velcro wraps, compression stockings, short stretch bandages, or tubular compression. Apply one of these until you can be seen in clinic. Please call us if you have any questions [phone].    - Treatment:  Layered Compression Bandaging (4-layer)    What is it?  The layered compression bandaging has a layer of absorbent material that will soak up drainage.     Why we do it.   This is done to treat swelling, wounds, or both.  This will in turn help circulation and healing.    How to care for your bandages.  The wraps need to be kept dry. If  the wraps become wet, remove them and call the clinic to have another wrap applied.    What to expect.  It is common for the wraps to be uncomfortable at the beginning. The first two days are usually the hardest; then they will become more comfortable.       Elevating your legs will help the discomfort. Try to elevate your legs as much as possible.    If rest and elevation does not help your discomfort, call your provider.  If your provider is not available you can remove the wrap and leave a message for further instructions.    - Swelling and Compression Therapy    Swelling in the legs can be caused by many reasons. No matter what the reason, treatment usually includes some type of compression. This may be done with a support sock, dressing, ace wrap, or layered wraps.     It is important to treat the swelling for many reasons. If the swelling is not treated you may develop blisters that can lead to ulcerations. This is caused when extra fluid goes into tissue  causing damage and blocking blood flow to the tissue.     It is important that you wear your compression every day, including days that you will be seen in clinic.     Compression is often the most important part of treating leg wounds. Without controlling the swelling it is often not possible to heal wounds.     Going without compression for even brief periods of time can be damaging to your legs and your health.  Your compression should be put on first thing in the morning. Take the compression off at night only when instructed by your care provider to do so. Sometimes wearing compression 24 hours a day will be recommended.       If you are having difficulty wearing your compression it is important to notify your care provider so that other options may be reviewed.          Wound Care Instructions    every 2 days Cleanse right foot  Wound with dilute hibiclens    Pat dry with non-sterile gauze    Apply Lotion to the intact skin surrounding your wound and other dry skin locations. Some good lotions include: Remedy Skin Repair Cream or Cetaphil    Apply silvercel into/onto the wounds    Cover with foam    Secure with roll gauze as needed    Compression: 2 layer lite    It is not ok to get your wound wet in the bath or shower    It is ok to continue current wound care treatment/products for the next 2-3 days until new wound care supplies are ordered and arrive. If longer than this please contact our office at [phone].    SEEK MEDICAL CARE IF:    You have an increase in swelling, pain, or redness around the wound.    You have an increase in the amount of pus coming from the wound.    There is a bad smell coming from the wound.    The wound appears to be worsening/enlarging    You have a fever greater than 101.5 F    Thank you for choosing [facility]. Please call us if you have any questions [phone].

## 2021-06-12 NOTE — ANESTHESIA PROCEDURE NOTES
Peripheral Block    Patient location during procedure: pre-op  Start time: 11/2/2020 1:52 PM  End time: 11/2/2020 1:57 PM  post-op analgesia per surgeon order as noted in medical record  Staffing:  Performing  Anesthesiologist: Bob Bowman MD  Preanesthetic Checklist  Completed: patient identified, site marked, risks, benefits, and alternatives discussed, timeout performed, consent obtained, airway assessed, oxygen available, suction available, emergency drugs available and hand hygiene performed  Peripheral Block  Block type: saphenous, adductor canal block  Prep: ChloraPrep  Patient position: supine  Patient monitoring: cardiac monitor, continuous pulse oximetry, heart rate and blood pressure  Laterality: right  Injection technique: ultrasound guided    Ultrasound used to visualize needle placement in proximity to nerve being blocked: yes   US used to visualize anesthetic spread  Visualized anatomic structures normal  No Pathological Findings  Permanent ultrasound image captured for medical record  Sterile gel and probe cover used for ultrasound.  Needle  Needle type: echogenic   Needle gauge: 20G  Needle length: 4 in  no peripheral nerve catheter placed  Assessment  Injection assessment: no difficulty with injection, negative aspiration for heme, no paresthesia on injection and incremental injection

## 2021-06-12 NOTE — PROGRESS NOTES
"Follow up Vascular Visit       Date of Service:7/24/2017    Date Last Seen: 7/17/2017; 7/17/2017    Chief Complaint: new right foot ulcer; healed right shin ulcer    History:   Past Medical History:   Diagnosis Date     BPH (benign prostatic hyperplasia)      Cholelithiasis      Diabetes mellitus      Essential hypertension      Hyperlipidemia      Pancreatitis        Pt returns to the Vascular clinic with regards to their new right foot ulcer; healed right shin ulcer. Pt arrives alone today. We were previously seeing him for a right shin ulcer; we were able to get this healed; we were having him seen in clinic today to ensure this wound remained closed however he arrives today with a new wound. He reports that he purchased new shoes 1 month ago from [employer] he was previously having pain in the toes from his old shoes so he purchased shoes with larger toe box. His fs have been running at baseline 170-200; he is having less appetite. Noted a right foot blister about 1 week ago this has progressively gotten worse over the past week; covering with bandaid. He continues to work 40-50 hours per week; walking 9 miles per day.     Allergies: Crestor [rosuvastatin] and Demeclocycline    Physical Exam:    /78  Pulse 70  Resp 16  Ht 5' 8\" (1.727 m)  Wt 202 lb (91.6 kg) Comment: per pt reprot  BMI 30.71 kg/m2    General:  Patient presents to clinic in no apparent distress.  Head: normocephalic atraumatic  Psychiatric:  Alert and oriented x3.   Respiratory: unlabored breathing; no cough  Integumentary:  Skin is uniformly warm, dry and pink.    Wound #1 Location: right shin  Size:healed. Periwound: no denudement, erythema, induration, maceration or warmth.    Wound #2 Location: right plantar foot 5th met head  Size: 5L x 3W x 0.1cmdepth.  No sinus tract present, Wound base: initially this was covered with a draining callous; there was maceration; this was fluctant; this was all debrided; to reveal full " thickness ulcer; the edges were not attached this was all debrided away; the center of the wound is necrotic; unsure of how deep this will go; no bone exposed; 5th met head is very prominent; there is no streaking or erythema; there is edema in the foot  No undermining present. Periwound: no denudement, erythema, induration, maceration or warmth.      Circumferential volume measures:    Vasc Edema 3/6/2017 4/17/2017 6/2/2017   Right just above MTP 23 24 23   Right Ankle 25 26 23.5   Right Widest Calf 37.5 38.6 37   Left - just above MTP 22 - -   Left Ankle 22 - -   Left Widest Calf 36 - -         Ulceration(s)/Wound(s):     Wound 07/24/17 Right lateral foot (Active)   Pre Size Length 5 7/24/2017  7:00 AM   Pre Size Width 3 7/24/2017  7:00 AM   Pre Total Sq cm 15 7/24/2017  7:00 AM       Lab Values  No results found for: SEDRATE  Lab Results   Component Value Date    CREATININE 0.97 05/19/2017     Lab Results   Component Value Date    HGBA1C 9.3 (H) 05/19/2017     Lab Results   Component Value Date    BUN 17 05/19/2017     Lab Results   Component Value Date    ALBUMIN 3.5 01/23/2017     No results found for: [id number]          Impression:   Right shin traumatic wound healed  New right diabetic foot ulcer  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 Excisional debridement of the ulcer(s) was recommended today, after consent was obtained and 2% Xylocaine was applied using a sterile curet the epidermal, dermal and down into the subcutaneous tissue was sharply debrided for a total square cm of 15. The devitalized and necrotic tissue was removed and the wounds appeared much  afterwards. The patient tolerated this well.           2. Edema: will apply tubigrip           3.  Wound treatment:will obtain ABIs to ensure he had adequate blood flow for wound healing; will  obtain culture; will start on clindamycin empirically; will apply silvercel alginate to the wound change every 2-3 days; cover with gauze; roll gauze; will obtain xray 3 view standing to check for osteomyelitis; we discussed the severity of this wound; the potential for infection; limb loss; amputation; he did not seem to grasp the severity of the situation; may need to send to Dr. Garcia for evaluation. ADDENDUM: ABIs were completed today; this revealed adequate blood flow for healing.           4. Nutrition: diabetic diet; needs tighter control           5. Offloading: this is his driving foot; did not want to do cast; will get him sized for custom cage; will eventually need diabetic shoes and inserts after we get him healed; will take him out of work; needs to stay off the foot     Patient will follow up with me in 1-2 weeks for reevaluation. They were instructed to call the clinic sooner with any signs or symptoms of infection or any further questions/concerns. Answered all questions.    Ines Zimmerman DNP, RN, CNP, CWOCN  [facility]  [phone]

## 2021-06-12 NOTE — PROGRESS NOTES
Nurse Visit     Chief Complaint: Patient presents to clinic for assessment and treatment of right foot  ulcer and and swelling    Dressing on Arrival: 80% saturated with serosanguinous drainage. Double tubular compression    Patient came in today for dressing change and 2layer rewrap per order from NP, Ines Zimmerman. Patient rated pain 0 out of 10. Removed dressings and cleansed skin with soap and cleaned wound bed with diluted hibiclens. Applied lotion to intact skin.  Applied medihoney to wound and covered with foam. Rewrapped 2 layer compression wrap to right leg. Patient tolerated dressing change well.     Allergies:   Allergies   Allergen Reactions     Cresol      Muscle cramps     Crestor [Rosuvastatin] Myalgia     Demeclocycline Hives       Medications:   Current Outpatient Medications:      apixaban ANTICOAGULANT (ELIQUIS) 5 mg Tab tablet, Take 1 tablet (5 mg total) by mouth 2 (two) times a day., Disp: 60 tablet, Rfl: 3     aspirin 81 MG EC tablet, Take 81 mg by mouth daily., Disp: , Rfl:      blood glucose test strips, Use 1 each As Directed 2 (two) times a day. Dispense brand per patient's insurance at pharmacy discretion., Disp: 120 strip, Rfl: 6     blood-glucose meter (ONETOUCH VERIO IQ METER) Misc, Use 1 each As Directed 2 (two) times a day., Disp: 1 each, Rfl: 0     cholecalciferol, vitamin D3, (VITAMIN D3) 5,000 unit Tab, Take 5,000 Units by mouth daily. , Disp: , Rfl:      LANTUS SOLOSTAR U-100 INSULIN 100 unit/mL (3 mL) pen, INJECT 40 UNITS UNDER THE SKIN AT BEDTIME. DO NOT MIX LANTUS WITH ANY OTHER INSULIN (Patient taking differently: 44 Units. ), Disp: 18 adj dose pen, Rfl: 1     lisinopriL (PRINIVIL,ZESTRIL) 5 MG tablet, TAKE 1 TABLET BY MOUTH EVERY DAY, Disp: 90 tablet, Rfl: 1     MULTIVITS,CA,MIN/IRON/FA/LYCOP (CENTRUM MEN ORAL), Take 1 tablet by mouth daily., Disp: , Rfl:      mupirocin (BACTROBAN) 2 % ointment, Apply topically to wound every day, Disp: 60 g, Rfl: 3     naproxen sodium (ALEVE)  "220 MG tablet, Take 220 mg by mouth daily., Disp: , Rfl:      pen needle, diabetic 31 gauge x 5/16\" Ndle, Used to inject insulin daily, Disp: 90 each, Rfl: 0     traMADoL (ULTRAM) 50 mg tablet, Take 1 tablet (50 mg total) by mouth every 8 (eight) hours as needed for pain., Disp: 30 tablet, Rfl: 0     VICTOZA 3-RUPALI 0.6 mg/0.1 mL (18 mg/3 mL) injection, INJECT 1.2 MG SUBCUTANEOUSLY ONCE DAILY, Disp: 18 mL, Rfl: 1     vitamin E 400 unit capsule, Take 400 Units by mouth daily., Disp: , Rfl:     Vital Signs: /72 (Patient Site: Left Arm, Patient Position: Sitting, Cuff Size: Adult Regular)   Pulse 76   Temp 98.4  F (36.9  C) (Oral)       Assessment:    General:  Patient presents to clinic in no apparent distress.  Psychiatric:  Alert and oriented .   Lower extremity:  edema is present.    Integumentary:  Skin is uniformly warm, dry and pink.    Wound size:   Wound 17 Right lateral foot (Active)   Pre Size Length 2.3 10/16/20 1300   Pre Size Width 2 10/16/20 1300   Pre Size Depth 0.2 10/16/20 1300   Pre Total Sq cm 4.6 10/16/20 1300   Post Size Length 1 20 0900   Post Size Width 1 20 0900   Post Size Depth 0.1 20 0900   Post Total Sq cm 1 20 0900   Undermined no 10/16/20 1300   Tunneling no 10/16/20 1300   Description pink 10/16/20 1300   Prodcut Used Medihoney;Gauze 10/16/20 1300       Wound 17 Foot Right (Active)      Undermining is not present.    The periwoundskin is WNL      Plan:         1. Patient will follow up on with nurse visit         2. Treatment provided will include irrigation and dressings to promote autolytic debridement and will be as listed below     Cleansed with: Dilute Hibiclens    Protected skin with: Remedy Skin Repair    Dressings Applied: foam non adhesive and Medihoney      Compression Applied to the Right Le-Layer Coban    2-Layer Coban:Applied the inner foam layer with the foot dorsiflexed and starting atthe base of the fifth metatarsal head. " Leaving the bottom of the heel exposed, proceed by winding the foam up the leg using minimaloverlap to just below the fibular head. Apply the compression layer with the foot dorsiflexed and startingat the base of the fifth metatarsal head. Apply at full stretch and proceed up the leg using 50% overlap. The bottom of the heel is covered with the compression layer. The end at the fibular head or just below the back of the knee and levelwith the top edge of the foam layer.  Gently pressed and conformed the entire surface of the system to ensurethat the two layers are firmly bound together    Offloading applied: cage splint  Trial Products: no  Provider notified regarding concerns: no  Treatment Changes: no    Educational Barriers: none  Taught Regarding: Activity, Compression and Dressing  Teaching Method: Explanation and DC sheet

## 2021-06-12 NOTE — PROGRESS NOTES
"Follow up Vascular Visit       Date of Service:8/28/2017    Date Last Seen: 7/17/2017; 7/17/2017    Chief Complaint: right foot ulcer; healed right shin ulcer    History:   Past Medical History:   Diagnosis Date     BPH (benign prostatic hyperplasia)      Cholelithiasis      Diabetes mellitus      Essential hypertension      Hyperlipidemia      Pancreatitis        Pt returns to the Vascular clinic with regards to their right foot ulcer; healed right shin ulcer. Pt arrives alone today. We were previously seeing him for a right shin ulcer. He reports that he purchased new shoes 1 month ago from [employer] he was previously having pain in the toes from his old shoes so he purchased shoes with larger toe box. His fs have been running at baseline 170-250; tolerated the PCN VK, he has since completed this and course of clindamycin. Had xray done of the foot this was negative for osteomyelitis. He had ABIs completed this indicated adequate blood flow for healing with the toe pressures. We took him out of work. We had him sized for a CAGE boot; he is wearing this; broke the bottom strap once; had this repaired; is not having any hip or knee pain. Trying to stay off the foot as much possible. Had him see Dr. Garcia; had CRP done this was normal; no MRI recommended; no surgery recommended at this time; will reconsider if the wound does not heal.    Allergies: Crestor [rosuvastatin] and Demeclocycline    Physical Exam:    Resp 14  Ht 5' 8\" (1.727 m)  Wt 202 lb (91.6 kg)  BMI 30.71 kg/m2    General:  Patient presents to clinic in no apparent distress.  Head: normocephalic atraumatic  Psychiatric:  Alert and oriented x3.   Respiratory: unlabored breathing; no cough  Integumentary:  Skin is uniformly warm, dry and pink.    Wound #1 Location: right shin  Size:healed. Periwound: no denudement, erythema, induration, maceration or warmth.    Wound #2 Location: right plantar foot 5th met head  Size: 0.5x0.4 x 0cmdepth.  No " sinus tract present, Wound base: initially covered with fibrinous material; this was sharply debrided; revealed 100% viable wound bed;good bleeding; no bone exposed; 5th met head is very prominent; forefoot varus deformity present; I was not able to express pus today; there is no streaking or erythema; there is not edema in the foot  No undermining present. Periwound: no denudement, erythema, induration, maceration or warmth.    Right shin remains intact; scar tissue; hyperpigmentation of the skin  Hyperkeratosis noted on the toes    Circumferential volume measures:    Vasc Edema 3/6/2017 4/17/2017 6/2/2017   Right just above MTP 23 24 23   Right Ankle 25 26 23.5   Right Widest Calf 37.5 38.6 37   Left - just above MTP 22 - -   Left Ankle 22 - -   Left Widest Calf 36 - -         Ulceration(s)/Wound(s):     Wound 07/24/17 Right lateral foot (Active)   Pre Size Length 0.5 8/28/2017  7:00 AM   Pre Size Width 0.5 8/28/2017  7:00 AM   Pre Total Sq cm 0.25 8/28/2017  7:00 AM   Post Size Length 0.5 8/28/2017  7:00 AM   Post Size Width 0.4 8/28/2017  7:00 AM   Post Size Depth 0 8/28/2017  7:00 AM   Post Total Sq cm 0.2 8/28/2017  7:00 AM   Prodcut Used Alginate 8/10/2017 11:00 AM           Lab Values  No results found for: SEDRATE  Lab Results   Component Value Date    CREATININE 0.97 05/19/2017     Lab Results   Component Value Date    HGBA1C 9.3 (H) 05/19/2017     Lab Results   Component Value Date    BUN 17 05/19/2017     Lab Results   Component Value Date    ALBUMIN 3.5 01/23/2017     No results found for: [id number]          Impression:   Right shin traumatic wound healed  New right diabetic foot ulcer  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 Excisional debridement of the ulcer(s) was recommended today, after consent was obtained and 2% Xylocaine was applied using a  sterile curet the epidermal, dermal and down into the subcutaneous tissue was sharply debrided for a total square cm of 0.20. The devitalized and necrotic tissue was removed and the wounds appeared much  afterwards. The patient tolerated this well.           2. Edema: will apply tubigrip           3.  Wound treatment: ABIs indicated he had adequate blood flow for wound healing;  Culture grew out strep he completed the clindamycin and  penicillin VK. We used epifix previously for his shin; this worked very well; he is covered for this with a copay; he was agreeable to having this applied today, roll gauze; xray 3 view standing was negative for osteomyelitis    Due to the slow healing rate of the ulcer and the diagnosis of Diabetic Ulcer due to peripheral neuropathy} of the Right foot and is free of infection  Epifix was recommended and consent was obtained for the application.  This is application # 5 (the 2nd for this ulcer).   Their is no evidence of osteomyelitis.  There is adequate blood flow for healing.       For the procedure, the patient was placed in a comfortable position with the wound bed exposed. Epifix  application is being performed in a staged procedure in an attempt to achieve rapid wound closure. The Tissue Identification Number is [id number] with an expiration date of 2022-05-01 as indicated on the consent form dated 8/28/2017     The graft size is 18mm and the estimated wastage was 0; this was the smallest size we had in clinic.   The graft was prepared and applied following the  guidelines.  The graft was covered with a non-adherent contact layer and secured with wound veil.  Then,  a gauze dressing was applied.  The patient will continue to utilize CAGE for off loading.  Patient tolerated the procedure without any complications and was educated and expressed an understanding of the proper wound treatment until their follow up.             4. Nutrition: diabetic diet;  needs tighter control; spoke to him about this again today; he is refusing to see his PMD about this           5. Offloading: this is his driving foot; did not want to do cast; continue CAGE boot; will have him see [facility] again to see if any modifications are needed; will eventually need diabetic shoes and inserts after we get him healed; will take him out of work; needs to stay off the foot; filled out paperwork today     Patient will follow up with me in 1 weeks for reevaluation. They were instructed to call the clinic sooner with any signs or symptoms of infection or any further questions/concerns. Answered all questions.    Ines Zimmerman DNP, RN, CNP, [facility]  [facility]  [phone]

## 2021-06-12 NOTE — PROGRESS NOTES
Follow up Vascular Visit       Date of Service:9/8/2017    Date Last Seen: 7/17/2017; 7/17/2017    Chief Complaint: right foot ulcer; healed right shin ulcer    History:   Past Medical History:   Diagnosis Date     BPH (benign prostatic hyperplasia)      Cholelithiasis      Diabetes mellitus      Essential hypertension      Hyperlipidemia      Pancreatitis        Pt returns to the Vascular clinic with regards to their right foot ulcer; healed right shin ulcer. Pt arrives alone today. We were previously seeing him for a right shin ulcer. He reports that he purchased new shoes 1 month ago from [email or web address] he was previously having pain in the toes from his old shoes so he purchased shoes with larger toe box. His fs have been running at baseline 300-450; tolerated the PCN VK, he has since completed this and course of clindamycin. Had xray done of the foot this was negative for osteomyelitis. He had ABIs completed this indicated adequate blood flow for healing with the toe pressures. We took him out of work. We had him sized for a CAGE boot; he is wearing this; broke the bottom strap once; had this repaired; is not having any hip or knee pain. Trying to stay off the foot as much possible. Had him see Dr. Garcia; had CRP done this was normal; no MRI recommended; no surgery recommended at this time; will reconsider if the wound does not heal. Was hospitalized last week for gall stones; feeling slightly better today; has lost 15 pounds over the past week; appetite slowly returning.    Allergies: Crestor [rosuvastatin] and Demeclocycline    Physical Exam:    /74  Pulse 68  Temp 98.9  F (37.2  C) (Temporal)   Resp 14    General:  Patient presents to clinic in no apparent distress.  Head: normocephalic atraumatic  Psychiatric:  Alert and oriented x3.   Respiratory: unlabored breathing; no cough  Integumentary:  Skin is uniformly warm, dry and pink.    Wound #1 Location: right shin  Size:healed. Periwound: no  denudement, erythema, induration, maceration or warmth.    Wound #2 Location: right plantar foot 5th met head  Size: [id number].  Post debridement this is worse. No sinus tract present, Wound base: initially covered with fibrinous material; this was sharply debrided; revealed 100% viable wound bed;good bleeding; no bone exposed; 5th met head is very prominent; forefoot varus deformity present; I was not able to express pus today; there is no streaking or erythema; there is not edema in the foot  No undermining present. Periwound: no denudement, erythema, induration, maceration or warmth.    Callous on the right great toe and right 2nd toe were pared down; intact underneath  Right shin remains intact; scar tissue; hyperpigmentation of the skin      Circumferential volume measures:    Vasc Edema 3/6/2017 4/17/2017 6/2/2017   Right just above MTP 23 24 23   Right Ankle 25 26 23.5   Right Widest Calf 37.5 38.6 37   Left - just above MTP 22 - -   Left Ankle 22 - -   Left Widest Calf 36 - -         Ulceration(s)/Wound(s):     Wound 07/24/17 Right lateral foot (Active)   Pre Size Length 0.8 9/8/2017 11:00 AM   Pre Size Width 0.7 9/8/2017 11:00 AM   Pre Total Sq cm 0.56 9/8/2017 11:00 AM   Post Size Length 0.5 9/8/2017 11:00 AM   Post Size Width 1.5 9/8/2017 11:00 AM   Post Size Depth 0 9/8/2017 11:00 AM   Post Total Sq cm 0 9/8/2017 11:00 AM   Undermined 0.75 9/8/2017 11:00 AM   Prodcut Used Alginate 8/10/2017 11:00 AM       Wound 09/05/17 Foot Right (Active)           Lab Values  No results found for: SEDRATE  Lab Results   Component Value Date    CREATININE 1.33 (H) 09/06/2017     Lab Results   Component Value Date    HGBA1C 10.5 (H) 09/05/2017     Lab Results   Component Value Date    BUN 44 (H) 09/06/2017     Lab Results   Component Value Date    ALBUMIN 2.3 (L) 09/06/2017     No results found for: [id number]          Impression:   Right shin traumatic wound healed   right diabetic foot ulcer  Type 2 diabetes  with peripheral neuropathy and foot ulcer-poorly controlled diabetes  Edema      Are any of these wounds new today: Yes; Location: right foot    Assessment/Plan:          1. Excisional debridement of the ulcer(s) was recommended today, after consent was obtained and 2% Xylocaine was applied using a sterile curet the epidermal, dermal and down into the subcutaneous tissue was sharply debrided for a total square cm of 0.75. The devitalized and necrotic tissue was removed and the wounds appeared much  afterwards. The patient tolerated this well.           2. Edema: will apply tubigrip           3.  Wound treatment: ABIs indicated he had adequate blood flow for wound healing;  Culture grew out strep he completed the clindamycin and  penicillin VK. We used epifix previously for his shin; this worked very well; he is covered for this with a copay; he was agreeable to having this applied today, roll gauze; xray 3 view standing was negative for osteomyelitis    Due to the slow healing rate of the ulcer and the diagnosis of Diabetic Ulcer due to peripheral neuropathy} of the Right foot and is free of infection  Epifix was recommended and consent was obtained for the application.  This is application # 6 (the 3rd for this ulcer).   Their is no evidence of osteomyelitis.  There is adequate blood flow for healing.       For the procedure, the patient was placed in a comfortable position with the wound bed exposed. Epifix  application is being performed in a staged procedure in an attempt to achieve rapid wound closure. The Tissue Identification Number is [id number] with an expiration date of 2022-06-01 as indicated on the consent form dated 9/8/2017     The graft size is 14mm and the estimated wastage was 0.   The graft was prepared and applied following the  guidelines.  The graft was covered with a non-adherent contact layer and secured with wound veil.  Then,  a gauze dressing was applied.  The  patient will continue to utilize CAGE for off loading.  Patient tolerated the procedure without any complications and was educated and expressed an understanding of the proper wound treatment until their follow up.             4. Nutrition: diabetic diet; needs tighter control; spoke to him about this again today; he now has appt to see his PMD about this           5. Offloading: this is his driving foot; did not want to do cast; continue CAGE boot; will have him see [facility] again to see if any modifications are needed; will eventually need diabetic shoes and inserts after we get him healed; will take him out of work; needs to stay off the foot; filled out paperwork today     Patient will follow up with me in 1 weeks for reevaluation. They were instructed to call the clinic sooner with any signs or symptoms of infection or any further questions/concerns. Answered all questions.    Ines Zimmerman DNP, RN, CNP, [facility]  [phone]

## 2021-06-12 NOTE — ANESTHESIA PROCEDURE NOTES
Peripheral Block    Patient location during procedure: pre-op  Start time: 11/2/2020 1:45 PM  End time: 11/2/2020 1:52 PM  post-op analgesia per surgeon order as noted in medical record  Staffing:  Performing  Anesthesiologist: Bob Bowman MD  Preanesthetic Checklist  Completed: patient identified, site marked, risks, benefits, and alternatives discussed, timeout performed, consent obtained, airway assessed, oxygen available, suction available, emergency drugs available and hand hygiene performed  Peripheral Block  Block type: sciatic, popliteal  Prep: ChloraPrep  Patient position: supine  Patient monitoring: blood pressure, heart rate, continuous pulse oximetry and cardiac monitor  Laterality: right  Injection technique: ultrasound guided    Ultrasound used to visualize needle placement in proximity to nerve being blocked: yes   US used to visualize anesthetic spread  Visualized anatomic structures normal  No Pathological Findings  Permanent ultrasound image captured for medical record  Sterile gel and probe cover used for ultrasound.  Needle  Needle type: echogenic   Needle gauge: 20G  Needle length: 4 in  no peripheral nerve catheter placed  Assessment  Injection assessment: no difficulty with injection, negative aspiration for heme, no paresthesia on injection and incremental injection

## 2021-06-12 NOTE — PROGRESS NOTES
"Follow up Vascular Visit       Date of Service:8/7/2017    Date Last Seen: 7/17/2017; 7/17/2017    Chief Complaint: right foot ulcer; healed right shin ulcer    History:   Past Medical History:   Diagnosis Date     BPH (benign prostatic hyperplasia)      Cholelithiasis      Diabetes mellitus      Essential hypertension      Hyperlipidemia      Pancreatitis        Pt returns to the Vascular clinic with regards to their right foot ulcer; healed right shin ulcer. Pt arrives alone today. We were previously seeing him for a right shin ulcer. He reports that he purchased new shoes 1 month ago from [employer] shoes he was previously having pain in the toes from his old shoes so he purchased shoes with larger toe box. His fs have been running at baseline 170-250; he is having less appetite; feeling tired since starting the clindamycin; he has since completed this. Had xray done of the foot this was negative for osteomyelitis. He had ABIs completed this indicated adequate blood flow for healing with the toe pressures. We took him out of work. We had him sized for a CAGE boot; he is wearing this; broke the bottom strap once; had this repaired; is not having any hip or knee pain. Trying to stay off the foot as much possible.     Allergies: Crestor [rosuvastatin] and Demeclocycline    Physical Exam:    BP (!) 142/94  Pulse 72  Resp 14  Ht 5' 8\" (1.727 m)  Wt 202 lb (91.6 kg)  BMI 30.71 kg/m2    General:  Patient presents to clinic in no apparent distress.  Head: normocephalic atraumatic  Psychiatric:  Alert and oriented x3.   Respiratory: unlabored breathing; no cough  Integumentary:  Skin is uniformly warm, dry and pink.    Wound #1 Location: right shin  Size:healed. Periwound: no denudement, erythema, induration, maceration or warmth.    Wound #2 Location: right plantar foot 5th met head  Size: 1.5x1.4 x 0.1cmdepth.  No sinus tract present, Wound base: initially covered with fibrinous material; this was sharply " debrided;unsure of how deep this will go; no bone exposed; 5th met head is very prominent; forefoot varus deformity present; I was able to express pus today; there is no streaking or erythema; there is edema in the foot  No undermining present. Periwound: no denudement, erythema, induration, maceration or warmth.      Circumferential volume measures:    Vasc Edema 3/6/2017 4/17/2017 6/2/2017   Right just above MTP 23 24 23   Right Ankle 25 26 23.5   Right Widest Calf 37.5 38.6 37   Left - just above MTP 22 - -   Left Ankle 22 - -   Left Widest Calf 36 - -         Ulceration(s)/Wound(s):     Wound 07/24/17 Right lateral foot (Active)   Pre Size Length 1.5 8/7/2017  7:00 AM   Pre Size Width 1.4 8/7/2017  7:00 AM   Pre Total Sq cm 2.1 8/7/2017  7:00 AM         Lab Values  No results found for: SEDRATE  Lab Results   Component Value Date    CREATININE 0.97 05/19/2017     Lab Results   Component Value Date    HGBA1C 9.3 (H) 05/19/2017     Lab Results   Component Value Date    BUN 17 05/19/2017     Lab Results   Component Value Date    ALBUMIN 3.5 01/23/2017     No results found for: [id number]          Impression:   Right shin traumatic wound healed  New right diabetic foot ulcer  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 Excisional debridement of the ulcer(s) was recommended today, after consent was obtained and 2% Xylocaine was applied using a sterile curet the epidermal, dermal and down into the subcutaneous tissue was sharply debrided for a total square cm of 2.1 The devitalized and necrotic tissue was removed and the wounds appeared much  afterwards. The patient tolerated this well.           2. Edema: will apply tubigrip           3.  Wound treatment: ABIs indicated he had adequate blood flow for wound healing;  Culture grew out strep he completed the  clindamycin will now give course of  penicillin v 500mg every 6 hours for 10 days; will apply silvercel alginate to the wound change every 2-3 days; cover with gauze; roll gauze; xray 3 view standing was negative for osteomyelitis; will have him see Dr. Garcia to evaluate and see if further imaging is warranted; we discussed the severity of this wound; the potential for continued infection; limb loss; amputation; he did not seem to grasp the severity of the situation           4. Nutrition: diabetic diet; needs tighter control           5. Offloading: this is his driving foot; did not want to do cast; continue CAGE boot; will have him see [employer] again to see if any modifications are needed; will eventually need diabetic shoes and inserts after we get him healed; will take him out of work; needs to stay off the foot; filled out paperwork today     Patient will follow up with Dr. Garcia in  1-2 weeks for reevaluation. They were instructed to call the clinic sooner with any signs or symptoms of infection or any further questions/concerns. Answered all questions.    Ines Zimmerman DNP, RN, CNP, CWOCN  [facility]  [phone]

## 2021-06-12 NOTE — PROGRESS NOTES
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 Due to the presence of osteomyelitis, I reviewed the treatment options to include either 6 weeks IV antibiotics with Infectious Disease vs amputation of the involved bone. He would like to proceed with antibiotic therapy. I will ask my office to schedule I&D of the right foot with bone resection for culture. He will require a wound vac post-op with non-weight bearing. Discussed that amputation may be necessary depending on wound progression. Pre-op physical with Dr. Lozano's office.

## 2021-06-12 NOTE — PROGRESS NOTES
"Follow up Vascular Visit       Date of Service:7/31/2017    Date Last Seen: 7/17/2017; 7/17/2017    Chief Complaint: right foot ulcer; healed right shin ulcer    History:   Past Medical History:   Diagnosis Date     BPH (benign prostatic hyperplasia)      Cholelithiasis      Diabetes mellitus      Essential hypertension      Hyperlipidemia      Pancreatitis        Pt returns to the Vascular clinic with regards to their right foot ulcer; healed right shin ulcer. Pt arrives alone today. We were previously seeing him for a right shin ulcer. He reports that he purchased new shoes 1 month ago from [employer] shoes he was previously having pain in the toes from his old shoes so he purchased shoes with larger toe box. His fs have been running at baseline 170-250; he is having less appetite; feeling tired since starting the clindamycin. Had xray done of the foot this was negative for osteomyelitis. He had ABIs completed this indicated adequate blood flow for healing with the toe pressures. We took him out of work. We had him sized for a CAGE boot this will be ready on Thursday. Trying to stay off the foot as much possible.     Allergies: Crestor [rosuvastatin] and Demeclocycline    Physical Exam:    /80  Pulse 72  Resp 14  Ht 5' 8\" (1.727 m)  Wt 202 lb (91.6 kg) Comment: per pt report  BMI 30.71 kg/m2    General:  Patient presents to clinic in no apparent distress.  Head: normocephalic atraumatic  Psychiatric:  Alert and oriented x3.   Respiratory: unlabored breathing; no cough  Integumentary:  Skin is uniformly warm, dry and pink.    Wound #1 Location: right shin  Size:healed. Periwound: no denudement, erythema, induration, maceration or warmth.    Wound #2 Location: right plantar foot 5th met head  Size: 1.5x1.2 x 0.1cmdepth.  No sinus tract present, Wound base: initially covered with fibrinous material; this was sharply debrided;unsure of how deep this will go; no bone exposed; 5th met head is very prominent; " there is no streaking or erythema; there is edema in the foot  No undermining present. Periwound: no denudement, erythema, induration, maceration or warmth.      Circumferential volume measures:    Vasc Edema 3/6/2017 4/17/2017 6/2/2017   Right just above MTP 23 24 23   Right Ankle 25 26 23.5   Right Widest Calf 37.5 38.6 37   Left - just above MTP 22 - -   Left Ankle 22 - -   Left Widest Calf 36 - -         Ulceration(s)/Wound(s):     Wound 07/24/17 Right lateral foot (Active)   Pre Size Length 1.5 7/31/2017  7:00 AM   Pre Size Width 1.2 7/31/2017  7:00 AM   Pre Total Sq cm 1.8 7/31/2017  7:00 AM       Lab Values  No results found for: SEDRATE  Lab Results   Component Value Date    CREATININE 0.97 05/19/2017     Lab Results   Component Value Date    HGBA1C 9.3 (H) 05/19/2017     Lab Results   Component Value Date    BUN 17 05/19/2017     Lab Results   Component Value Date    ALBUMIN 3.5 01/23/2017     No results found for: [id number]          Impression:   Right shin traumatic wound healed  New right diabetic foot ulcer  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 Excisional debridement of the ulcer(s) was recommended today, after consent was obtained and 2% Xylocaine was applied using a sterile curet the epidermal, dermal and down into the subcutaneous tissue was sharply debrided for a total square cm of 1.8 The devitalized and necrotic tissue was removed and the wounds appeared much  afterwards. The patient tolerated this well.           2. Edema: will apply tubigrip           3.  Wound treatment: ABIs indicated he had adequate blood flow for wound healing;  Culture grew out strep will have him complete the clindamycin and then give course of amoxicillin or penicillin v; will apply silvercel alginate to the wound change every 2-3 days; cover with gauze; roll  gauze; xray 3 view standing was negative  for osteomyelitis; we discussed the severity of this wound; the potential for infection; limb loss; amputation; he did not seem to grasp the severity of the situation; may need to send to Dr. Garcia for evaluation.           4. Nutrition: diabetic diet; needs tighter control           5. Offloading: this is his driving foot; did not want to do cast; sized for custom cage will be ready on Thursday; will eventually need diabetic shoes and inserts after we get him healed; will take him out of work; needs to stay off the foot     Patient will follow up with me in 1-2 weeks for reevaluation. They were instructed to call the clinic sooner with any signs or symptoms of infection or any further questions/concerns. Answered all questions.    Ines Zimmerman DNP, RN, CNP, [facility]  [phone]

## 2021-06-12 NOTE — PATIENT INSTRUCTIONS - HE
Continue to wear cage splint when up walking or standing.      Take cage splint off when not up walking or standing.    Start Medihoney on right foot ulcer:  1. Remove old Medihoney packing.  2. Cleanse with normal saline, pat dry.   3. Apply Medihoney alginate into the wound. Try to avoid medihoney on the intact skin.   4. Cover with a gauze dressing and secure with Roll Gauze and paper tape.  5. Change 3 times per week.

## 2021-06-13 NOTE — ANESTHESIA PROCEDURE NOTES
Peripheral Block    Patient location during procedure: pre-op  Start time: 11/16/2020 12:35 PM  End time: 11/16/2020 12:45 PM  post-op analgesia per surgeon order as noted in medical record  Staffing:  Performing  Anesthesiologist: Chaz Pineda MD  Preanesthetic Checklist  Completed: patient identified, site marked, risks, benefits, and alternatives discussed, timeout performed, consent obtained, at patient's request, airway assessed, oxygen available, suction available, emergency drugs available and hand hygiene performed  Peripheral Block  Block type: saphenous, adductor canal block  Prep: ChloraPrep  Patient position: supine  Patient monitoring: cardiac monitor, continuous pulse oximetry, blood pressure and heart rate  Laterality: right  Injection technique: ultrasound guided    Ultrasound used to visualize needle placement in proximity to nerve being blocked: yes   US used to visualize anesthetic spread  Visualized anatomic structures normal  No Pathological Findings  Permanent ultrasound image captured for medical record  Sterile gel and probe cover used for ultrasound.  Needle  Needle type: Stimuplex   Needle gauge: 21 G  Needle length: 4 in  no peripheral nerve catheter placed  Assessment  Injection assessment: negative aspiration for heme, no difficulty with injection, no paresthesia on injection and incremental injection

## 2021-06-13 NOTE — TELEPHONE ENCOUNTER
Not enough consult duration this week with any providers.  In person provider schedule already booked til Dec. Please advise.

## 2021-06-13 NOTE — TELEPHONE ENCOUNTER
Mavis from [facility] would like a call back at [phone] to discuss plan for patient. Writer updated her on new wound care orders from today and faxed them to [phone].

## 2021-06-13 NOTE — PROGRESS NOTES
Patient was seen at [place] room [facility] for the fitting and delivery of a right [employer]irc[employer] CAM boot size Large.  Patient was fit while supine in bed. No issues noted.  Patient educated on the fit and function of the CAM boot.

## 2021-06-13 NOTE — TELEPHONE ENCOUNTER
Date: 11/24/2020 Status: [facility]   Time: 10:00 AM Length: 60   Visit Type: VIDEO VISIT NEW [4902] Copay: $20.00   Provider: Mayra Babcock MD

## 2021-06-13 NOTE — PROGRESS NOTES
Follow up Vascular Visit       Date of Service:10/9/2017    Date Last Seen: 7/17/2017; 7/17/2017    Chief Complaint: right foot ulcer; healed right shin ulcer    History:   Past Medical History:   Diagnosis Date     BPH (benign prostatic hyperplasia)      Cholelithiasis      Diabetes mellitus      Essential hypertension      Hyperlipidemia      Pancreatitis        Pt returns to the Vascular clinic with regards to their right foot ulcer; healed right shin ulcer. Pt arrives alone today. We were previously seeing him for a right shin ulcer. He reports that he purchased new shoes 2 months ago from [employer] shoes he was previously having pain in the toes from his old shoes so he purchased shoes with larger toe box. His fs have been running at baseline 300-450; tolerated the PCN VK, he has since completed this and course of clindamycin. Had xray done of the foot this was negative for osteomyelitis. He had ABIs completed this indicated adequate blood flow for healing with the toe pressures. We took him out of work. We had him sized for a CAGE boot; he is wearing this; broke the bottom strap once; had this repaired; is not having any hip or knee pain. Trying to stay off the foot as much possible. Had him see Dr. Garcia; had CRP done this was normal; no MRI recommended; no surgery recommended at this time; will reconsider if the wound does not heal. Was hospitalized recently for gall stones; feeling slightly better today; has lost 15 pounds in total from this; appetite slowly returning. He saw his PMD; saw diabetes educator; states he learned so much from them; really enjoyed this; his fs are under better control, however running 250 this am. We previously were grafting the foot ulcer with epifix; last week his wound was healed; we kept covered with mepilex border; this stayed in place. He went to [facility] and had impressions made; his shoes and insoles should be ready in 1 week    Allergies: Crestor [rosuvastatin] and  "Demeclocycline    Physical Exam:    /82  Pulse 90  Resp 16  Ht 5' 9\" (1.753 m)  Wt 199 lb (90.3 kg) Comment: per pt report  BMI 29.39 kg/m2    General:  Patient presents to clinic in no apparent distress.  Head: normocephalic atraumatic  Psychiatric:  Alert and oriented x3.   Respiratory: unlabored breathing; no cough  Integumentary:  Skin is uniformly warm, dry and pink.    Wound #1 Location: right shin  Size:healed. Periwound: no denudement, erythema, induration, maceration or warmth.    Wound #2 Location: right plantar foot 5th met head  Size: healed there is no edema in the foot  No undermining present. Periwound: no denudement, erythema, induration, maceration or warmth.        Circumferential volume measures:    Vasc Edema 3/6/2017 4/17/2017 6/2/2017   Right just above MTP 23 24 23   Right Ankle 25 26 23.5   Right Widest Calf 37.5 38.6 37   Left - just above MTP 22 - -   Left Ankle 22 - -   Left Widest Calf 36 - -         Ulceration(s)/Wound(s):   none        Lab Values  No results found for: SEDRATE  Lab Results   Component Value Date    CREATININE 1.33 (H) 09/06/2017     Lab Results   Component Value Date    HGBA1C 10.5 (H) 09/05/2017     Lab Results   Component Value Date    BUN 44 (H) 09/06/2017     Lab Results   Component Value Date    ALBUMIN 2.3 (L) 09/06/2017     No results found for: [id number]          Impression:   Right shin traumatic wound healed   right diabetic foot ulcer-healed  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 Excisional debridement of the ulcer(s) was not recommended today as the skin was intact          2. Edema: will apply tubigrip           3.  Wound treatment: ABIs indicated he had adequate blood flow for wound healing;  Culture grew out strep he completed the clindamycin and  penicillin VK. We used epifix previously for his shin; this worked " very well; he is covered for this with a copay;  xray 3 view standing was negative for osteomyelitis; wound appears healed; will cover with mepilex border to protect the fragile new scar tissue; his home visiting nurse can change this in 1 week           4. Nutrition: diabetic diet; needs tighter control; saw his pmd about this; met with diabetic educator; is doing much better already with this           5. Offloading:  continue CAGE boot; work with [employer] for diabetic shoes and inserts, will take him out of work; needs to stay off the foot; filled out paperwork previously; will slowly transition into shoes when these are available; this was discussed today at length; handout provided     Patient will follow up with me in 2-3 weeks for reevaluation. They were instructed to call the clinic sooner with any signs or symptoms of infection or any further questions/concerns. Answered all questions.    Ines Zimmerman DNP, RN, CNP, [facility]  [facility] [place]  [phone]

## 2021-06-13 NOTE — PROGRESS NOTES
"Follow up Vascular Visit       Date of Service:9/18/2017    Date Last Seen: 7/17/2017; 7/17/2017    Chief Complaint: right foot ulcer; healed right shin ulcer    History:   Past Medical History:   Diagnosis Date     BPH (benign prostatic hyperplasia)      Cholelithiasis      Diabetes mellitus      Essential hypertension      Hyperlipidemia      Pancreatitis        Pt returns to the Vascular clinic with regards to their right foot ulcer; healed right shin ulcer. Pt arrives alone today. We were previously seeing him for a right shin ulcer. He reports that he purchased new shoes 1 month ago from [employer] he was previously having pain in the toes from his old shoes so he purchased shoes with larger toe box. His fs have been running at baseline 300-450; tolerated the PCN VK, he has since completed this and course of clindamycin. Had xray done of the foot this was negative for osteomyelitis. He had ABIs completed this indicated adequate blood flow for healing with the toe pressures. We took him out of work. We had him sized for a CAGE boot; he is wearing this; broke the bottom strap once; had this repaired; is not having any hip or knee pain. Trying to stay off the foot as much possible. Had him see Dr. Garcia; had CRP done this was normal; no MRI recommended; no surgery recommended at this time; will reconsider if the wound does not heal. Was hospitalized recently for gall stones; feeling slightly better today; has lost 15 pounds over the past week; appetite slowly returning. Has appt with PMD this week. We have been grafting the foot ulcer with epifix; this has been going well.     Allergies: Crestor [rosuvastatin] and Demeclocycline    Physical Exam:    BP (!) 140/92  Pulse 76  Temp 97.7  F (36.5  C) (Temporal)   Resp 16  Ht 5' 9\" (1.753 m)  Wt 193 lb (87.5 kg) Comment: per pt report  BMI 28.5 kg/m2    General:  Patient presents to clinic in no apparent distress.  Head: normocephalic " atraumatic  Psychiatric:  Alert and oriented x3.   Respiratory: unlabored breathing; no cough  Integumentary:  Skin is uniformly warm, dry and pink.    Wound #1 Location: right shin  Size:healed. Periwound: no denudement, erythema, induration, maceration or warmth.    Wound #2 Location: right plantar foot 5th met head  Size: [id number].  Post debridement this is improved. No sinus tract present, Wound base: initially covered with fibrinous material; this was sharply debrided; revealed 100% viable wound bed;good bleeding; no bone exposed; 5th met head is very prominent; forefoot varus deformity present; I was not able to express pus today; there is no streaking or erythema; there is not edema in the foot  No undermining present. Periwound: no denudement, erythema, induration, maceration or warmth.    Callous on the right great toe and right 2nd toe  Right shin remains intact; scar tissue; hyperpigmentation of the skin      Circumferential volume measures:    Vasc Edema 3/6/2017 4/17/2017 6/2/2017   Right just above MTP 23 24 23   Right Ankle 25 26 23.5   Right Widest Calf 37.5 38.6 37   Left - just above MTP 22 - -   Left Ankle 22 - -   Left Widest Calf 36 - -         Ulceration(s)/Wound(s):   Wound 07/24/17 Right lateral foot (Active)   Pre Size Length 0.5 9/18/2017  9:00 AM   Pre Size Width 1.5 9/18/2017  9:00 AM   Pre Total Sq cm 0.75 9/18/2017  9:00 AM   Post Size Length 0.2 9/18/2017  9:00 AM   Post Size Width 1 9/18/2017  9:00 AM   Post Size Depth 0 9/18/2017  9:00 AM   Post Total Sq cm 0.2 9/18/2017  9:00 AM   Undermined 0.75 9/8/2017 11:00 AM   Description scab/callous 9/18/2017  9:00 AM   Prodcut Used Alginate 8/10/2017 11:00 AM       Wound 09/05/17 Foot Right (Active)         Lab Values  No results found for: SEDRATE  Lab Results   Component Value Date    CREATININE 1.33 (H) 09/06/2017     Lab Results   Component Value Date    HGBA1C 10.5 (H) 09/05/2017     Lab Results   Component Value Date    BUN 44  (H) 09/06/2017     Lab Results   Component Value Date    ALBUMIN 2.3 (L) 09/06/2017     No results found for: [id number]          Impression:   Right shin traumatic wound healed   right diabetic foot ulcer  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 Excisional debridement of the ulcer(s) was recommended today, after consent was obtained and 2% Xylocaine was applied using a sterile curet the epidermal, dermal and down into the subcutaneous tissue was sharply debrided for a total square cm of 0.2. The devitalized and necrotic tissue was removed and the wounds appeared much  afterwards. The patient tolerated this well.           2. Edema: will apply tubigrip           3.  Wound treatment: ABIs indicated he had adequate blood flow for wound healing;  Culture grew out strep he completed the clindamycin and  penicillin VK. We used epifix previously for his shin; this worked very well; he is covered for this with a copay; he was agreeable to having this applied today, roll gauze; xray 3 view standing was negative for osteomyelitis    Due to the slow healing rate of the ulcer and the diagnosis of Diabetic Ulcer due to peripheral neuropathy of the Right foot and is free of infection  Epifix was recommended and consent was obtained for the application.  This is application # 7 (the 4th for this ulcer).   Their is no evidence of osteomyelitis.  There is adequate blood flow for healing.       For the procedure, the patient was placed in a comfortable position with the wound bed exposed. Epifix  application is being performed in a staged procedure in an attempt to achieve rapid wound closure. The Tissue Identification Number is [id number] with an expiration date of 2022-05-01 as indicated on the consent form dated 9/18/2017     The graft size is 14mm and the estimated  wastage was 0.   The graft was prepared and applied following the  guidelines.  The graft was covered with a non-adherent contact layer and secured with wound veil.  Then,  a gauze dressing was applied.  The patient will continue to utilize CAGE for off loading.  Patient tolerated the procedure without any complications and was educated and expressed an understanding of the proper wound treatment until their follow up.             4. Nutrition: diabetic diet; needs tighter control; spoke to him about this again today; he now has appt to see his PMD about this           5. Offloading: this is his driving foot; did not want to do cast; continue CAGE boot; will have him see [facility] again to see if any modifications are needed; will eventually need diabetic shoes and inserts after we get him healed; will take him out of work; needs to stay off the foot; filled out paperwork today     Patient will follow up with me in 1 weeks for reevaluation. They were instructed to call the clinic sooner with any signs or symptoms of infection or any further questions/concerns. Answered all questions.    Ines Zimmerman DNP, RN, CNP, [facility]  [facility]  [phone]

## 2021-06-13 NOTE — TELEPHONE ENCOUNTER
Surgery Scheduled      Surgery/Procedure: irrigation and debridement right foot with application of theraskin     Special Equipment: theraskin     Location: [facility]    Date: 12/17/2020    Time: 3:00pm     Surgeon: Dr. Garcia    OR Confirmed/ :  Yes with montez on 12/10    Orders In:  Yes    Entered on [employer] Calendar:  Yes    Post Op: scheduled     Covid Scheduled: to be done @ [facility] on 12/14    Blood Thinners Addressed:  Yes eloquis and insulin

## 2021-06-13 NOTE — PROGRESS NOTES
[facility] Admission note      Patient: Caleb Hernandez  MRN: [id number]  Date of Service: 12/10/2020      [facility] [[id number]]  Reason for Visit     Chief Complaint   Patient presents with     Review Of Multiple Medical Conditions   follow-up hypoglycemia and hypotension    Code Status     Full code    Assessment     -Insulin-dependent diabetes on high doses of insulin with early morning hypoglycemia blood sugar of 63  Random blood sugar noted to be 47 and prior blood sugar in the morning noted to be 71  -Hypertension with persistent low BP .  Blood pressure of 85/51 noted today in the TCU  -Patient scheduled for irrigation and debridement of the right foot with application of TheraSkin on 12/17/2020; needs medication evaluation  -Right foot cellulitis with abscess work-up positive for osteomyelitis with septic arthritis of 2nd-4th tarsometatarsal joint  -Status post I&D of abscess on 11/27/2020  -Postoperative urinary retention with hematuria  -History of right 5th metatarsal excision on 11/2/2020  -Severe peripheral vascular disease.  -Status post right lower extremity angiogram with balloon angioplasty of anterior tibial and peroneal arteries on 11/24/2020  -- DM-2   - FTT  -Generalized weakness with deconditioning    Plan     Patient has been admitted to the TCU for strengthening and rehab.  He had a prolonged and lengthy stay in the hospital and was a readmission with multiple procedures done.  Initially was admitted after right 5th MT amputation on 11/2/2020.  Readmitted to the hospital with worsening infection.  He underwent balloon angioplasty of his right lower extremity on 11/24/2020 of the anterior tibial and peroneal arteries.  Postoperative ABIs did not show any improvement however vascular feels this could be related to spasm.  He is now rescheduled for irrigation and debridement of the right foot with application of TheraSkin by his podiatrist on 12/17/2020.  His orders  were reviewed and he will be not taking his Eliquis 3 days prior to surgery.  Aspirin has been held.  Further orders to be reviewed with podiatry by staff.  In addition he was seen because of urgently because of hypoglycemia concerns blood sugar noted to be 63 this morning.  Prior to this yesterday he was 71 random blood sugar was 47.  These were corrected  Discontinue his Lantus regimen.  Lantus a.m. dose to be reduced from 44 units to 40 units.  Lantus at at bedtime to be reduced from 22 units to 15 units  Discontinue NovoLog scheduled with breakfast  Hold all scheduled insulin if patient is not eating her blood sugars are under 140  Reassess blood sugar trends next week  Ensure patient has adequate oral intake and at bedtime snack  Continue with sliding scale coverage.  Due to profound hypotension push fluids  Discontinue his lisinopril  Administer Flomax at bedtime  Closely monitor blood pressures  Wound to be monitored closely wound VAC has been recommended by vascular surgery      History     Patient is a very pleasant 84 y.o. male who is admitted to TCU  Patient presented to the hospital with right lower extremity cellulitis.  He was admitted with concerning infection.  MRI confirmed new synovitis with osteomyelitis of the 2nd-4th tarsometatarsal joint with septic arthritis.  He underwent I&D of the abscess on 11/27/2020.  Postoperatively he has been discharged nonweightbearing with outpatient ertapenem for 4 weeks  He did have a follow-up with vascular surgery and they feel that his wound is not healing well they have recommended a wound VAC.  He has been rescheduled now by podiatry to have irrigation debridement on 12/17/2020 to be scheduled in the hospital with application of TheraSkin.  At the recommendation his Eliquis will be held for 3 days prior to surgery and his aspirin has been held since then.  He also has a history of severe peripheral vascular disease and underwent right lower extremity  angiogram with angioplasty of anterior tibial and peroneal arteries on 11/24/2020 with vascular surgery.  He will require follow-up with vascular.  It was done in the TCU.  They have recommended further follow-up with podiatry for further work-up they recommend seeing him back in 3 months for ABIs  Postoperatively had urinary retention with bladder wall thickening suggestive of cystitis.  He was noted to have a right upper pole kidney stone but no hydronephrosis.  Urology saw him.  He will continue with Flomax at discharge and at the recommendation he has resumed his Eliquis and aspirin.  He has diabetes and his Lantus dose was adjusted to get tighter control of his blood sugars.  Fortunately having frequent episodes of early morning hypoglycemia with low blood sugar of 63 and 71 noted in the TCU in the morning postprandials are all under 180.  Overall control is very tight and patient is at risk of severe hypoglycemia noted to be quite hypotensive today to    Past Medical History     Active Ambulatory (Non-Hospital) Problems    Diagnosis     Hematuria     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 2 diabetes mellitus, with necrosis of muscle (H)     ACP (advance care planning)     Septic arthritis of right foot (H)     Gross hematuria     Urinary retention     Type 2 diabetes mellitus with diabetic peripheral angiopathy without gangrene, with long-term current use of insulin (H)     Adult failure to thrive     Normocytic anemia     Paroxysmal atrial fibrillation (H)     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 (H)     Cellulitis of right lower extremity     Abscess of right foot     Cellulitis and abscess of foot excluding toe     Osteomyelitis of right foot (H)     Statin intolerance     Personal history of PE (pulmonary embolism)-- May 2020, unprovoked, with right heart strain     PVD (peripheral vascular disease) (H)     Overweight (BMI 25.0-29.9)      Chronic anticoagulation     Acute pulmonary embolism with acute cor pulmonale (H)     Diabetic ulcer of right foot associated with type 2 diabetes mellitus (H)     Type 2 diabetes mellitus (H)     Hyperlipidemia     Essential hypertension     Past Medical History:   Diagnosis Date     BPH (benign prostatic hyperplasia)      Cholelithiasis      Common bile duct (CBD) obstruction 9/4/2017     Compression Fracture Of Thoracic Vertebral Body      Diabetes mellitus (H)      Essential hypertension      Hyperlipidemia      Pancreatitis      Rib Fracture      Sepsis due to pneumonia (H) 9/8/2017       Past Social History     Reviewed, and he  reports that he quit smoking about 29 years ago. He has never used smokeless tobacco. He reports that he does not drink alcohol or use drugs.    Family History     Reviewed, and family history includes Alcohol abuse in his father; Aortic aneurysm in his mother.    Medication List   Post Discharge Medication Reconciliation Status: discharge medications reconciled, continue medications without change   Current Outpatient Medications on File Prior to Visit   Medication Sig Dispense Refill     acetaminophen (TYLENOL) 500 MG tablet Take 1-2 tablets (500-1,000 mg total) by mouth every 4 (four) hours as needed.  0     apixaban ANTICOAGULANT (ELIQUIS) 5 mg Tab tablet Take 1 tablet (5 mg total) by mouth 2 (two) times a day. 60 tablet 3     aspirin 81 MG EC tablet Take 81 mg by mouth daily.       blood glucose test strips Use 1 each As Directed 2 (two) times a day. Dispense brand per patient's insurance at pharmacy discretion. 120 strip 6     blood-glucose meter (ONETOUCH VERIO IQ METER) Misc Use 1 each As Directed 2 (two) times a day. 1 each 0     cholecalciferol, vitamin D3, (VITAMIN D3) 5,000 unit Tab Take 5,000 Units by mouth daily.        ertapenem 1 g in NaCl 0.9 % 0.9 % 50 mL IVPB Infuse 1 g into a venous catheter daily. 1 each 0     insulin glargine (LANTUS SOLOSTAR U-100 INSULIN)  "100 unit/mL (3 mL) pen Inject 44 Units under the skin at bedtime.       liraglutide (VICTOZA 3-RUPALI) 0.6 mg/0.1 mL (18 mg/3 mL) injection Inject 0.3 mL (1.8 mg total) under the skin daily.       lisinopriL (PRINIVIL,ZESTRIL) 5 MG tablet Take 1 tablet (5 mg total) by mouth daily. 90 tablet 3     multivit-min/FA/lycopen/lutein (CENTRUM SILVER MEN ORAL) Take 1 tablet by mouth daily.       mupirocin (BACTROBAN) 2 % ointment Apply topically daily as needed. Apply to wound.       neomycin-bacitracin-polymyxin (NEOSPORIN) ointment Apply to penis at catheter insertion site three times a day while catheter is in place. 15 g 0     NOVOLOG FLEXPEN U-100 INSULIN 100 unit/mL (3 mL) injection pen Inject 3 Units under the skin 3 (three) times a day before meals. 2.7 mL 0     pen needle, diabetic 31 gauge x 5/16\" Ndle Used to inject insulin daily 90 each 0     polyethylene glycol (MIRALAX) 17 gram packet Take 1 packet (17 g total) by mouth daily as needed.  0     tamsulosin (FLOMAX) 0.4 mg cap Take 1 capsule (0.4 mg total) by mouth daily after supper.  0     traMADoL (ULTRAM) 50 mg tablet Take 1 tablet (50 mg total) by mouth every 6 (six) hours as needed for pain. 28 tablet 0     vitamin E 400 unit capsule Take 400 Units by mouth daily.       No current facility-administered medications on file prior to visit.        Allergies     Allergies   Allergen Reactions     Cresol      Muscle cramps     Crestor [Rosuvastatin] Myalgia     Declomycin [Demeclocycline] Hives       Review of Systems   A comprehensive review of 14 systems was done. Pertinent findings noted here and in history of present illness. All the rest negative.  Constitutional: Negative.  Negative for fever, chills, he has activity change, appetite change and fatigue.   HENT: Negative for congestion and facial swelling.    Eyes: Negative for photophobia, redness and visual disturbance.   Respiratory: Negative for cough and chest tightness.    Cardiovascular: Negative for " chest pain, palpitations and  leg swelling.   Gastrointestinal: Negative for nausea, diarrhea, constipation, blood in stool and abdominal distention.   Genitourinary: Negative.  Has  A mai  Musculoskeletal: Negative.  Denies any pain pin his foot .  Skin: Negative.    Neurological: Negative for dizziness, tremors, syncope, weakness, light-headedness and headaches.   Hematological: Does not bruise/bleed easily.   Psychiatric/Behavioral: Negative.  Some confusion noted he just wants to go home as per him      Physical Exam     Recent Vitals 12/10/2020   Height -   Weight 188 lbs   BSA (m2) 2.05 m2   /80   Pulse 74   Temp 98   Temp src -   SpO2 -   Some recent data might be hidden   RC BP 89/51    Constitutional: Oriented to person, place, and time and appears well-developed.   HEENT:  Normocephalic and atraumatic.  Eyes: Conjunctivae and EOM are normal. Pupils are equal, round, and reactive to light. No discharge.  No scleral icterus. Nose normal. Mouth/Throat: Oropharynx is clear and moist. No oropharyngeal exudate.    NECK: Normal range of motion. Neck supple. No JVD present. No tracheal deviation present. No thyromegaly present.   CARDIOVASCULAR: Normal rate, regular rhythm and intact distal pulses.  Exam reveals no gallop and no friction rub.  Systolic murmur present.  PULMONARY: Effort normal and breath sounds normal. No respiratory distress.No Wheezing or rales.  ABDOMEN: Soft. Bowel sounds are normal. No distension and no mass.  There is no tenderness. There is no rebound and no guarding. No HSM.  MUSCULOSKELETAL: Normal range of motion. No edema and no tenderness. Mild kyphosis, no tenderness.  Rt foot missing 5th toe  Wound dry and not draining  LYMPH NODES: Has no cervical, supraclavicular, axillary and groin adenopathy.   NEUROLOGICAL: Alert and oriented to person, place, and time. No cranial nerve deficit.  Normal muscle tone. Coordination normal.   GENITOURINARY: Deferred exam.  Mai  present  SKIN: Skin is warm and dry. No rash noted. No erythema. No pallor.   EXTREMITIES: No cyanosis, no clubbing, no edema. No Deformity.  PSYCHIATRIC: Normal mood, affect and behavior.      Lab Results     Recent Results (from the past 240 hour(s))   POCT Glucose    Collection Time: 11/30/20  4:58 PM    Specimen: Blood   Result Value Ref Range    Glucose 204 (H) 70 - 139 mg/dL   POCT Glucose    Collection Time: 11/30/20  9:16 PM    Specimen: Blood   Result Value Ref Range    Glucose 238 (H) 70 - 139 mg/dL   POCT Glucose    Collection Time: 12/01/20  6:33 AM    Specimen: Blood   Result Value Ref Range    Glucose 120 70 - 139 mg/dL   HM2(CBC W/O DIFF)    Collection Time: 12/01/20 11:22 AM   Result Value Ref Range    WBC 7.8 4.0 - 11.0 thou/uL    RBC 4.17 (L) 4.40 - 6.20 mill/uL    Hemoglobin 12.5 (L) 14.0 - 18.0 g/dL    Hematocrit 38.1 (L) 40.0 - 54.0 %    MCV 91 80 - 100 fL    MCH 30.0 27.0 - 34.0 pg    MCHC 32.8 32.0 - 36.0 g/dL    RDW 11.7 11.0 - 14.5 %    Platelets 443 (H) 140 - 440 thou/uL    MPV 9.2 8.5 - 12.5 fL   Urinalysis    Collection Time: 12/01/20 12:13 PM   Result Value Ref Range    Color, UA Red (!) Colorless, Yellow, Straw, Light Yellow    Clarity, UA Cloudy (!) Clear    Glucose, UA Negative Negative    Bilirubin, UA Negative Negative    Ketones, UA Trace (!) Negative, 60 mg/dL    Specific Gravity, UA 1.020 1.001 - 1.030    Blood, UA Large (!) Negative    pH, UA 6.0 4.5 - 8.0    Protein, UA 50 mg/dL (!) Negative mg/dL    Urobilinogen, UA <2.0 E.U./dL <2.0 E.U./dL, 2.0 E.U./dL    Nitrite, UA Negative Negative    Leukocytes, UA Small (!) Negative    Bacteria, UA Few (!) None Seen hpf    RBC, UA >100 (!) None Seen, 0-2 hpf    WBC, UA  (!) None Seen, 0-5 hpf    Squam Epithel, UA 0-5 None Seen, 0-5 lpf    Mucus, UA Moderate (!) None Seen lpf    Ca Oxalate Meliza, UA Present (!) None Seen   Urine culture    Collection Time: 12/01/20 12:13 PM    Specimen: Urine, Catheter   Result Value Ref Range     Culture No Growth    POCT Glucose    Collection Time: 12/01/20 12:38 PM    Specimen: Blood   Result Value Ref Range    Glucose 180 (H) 70 - 139 mg/dL   Vancomycin (Vancocin )    Collection Time: 12/01/20  2:26 PM   Result Value Ref Range    Vancomycin 13.7 <=25.0 ug/mL   POCT Glucose    Collection Time: 12/01/20  5:05 PM    Specimen: Blood   Result Value Ref Range    Glucose 228 (H) 70 - 139 mg/dL   POCT Glucose    Collection Time: 12/01/20  9:01 PM    Specimen: Blood   Result Value Ref Range    Glucose 208 (H) 70 - 139 mg/dL   Basic Metabolic Panel    Collection Time: 12/02/20  5:32 AM   Result Value Ref Range    Sodium 139 136 - 145 mmol/L    Potassium 4.5 3.5 - 5.0 mmol/L    Chloride 103 98 - 107 mmol/L    CO2 30 22 - 31 mmol/L    Anion Gap, Calculation 6 5 - 18 mmol/L    Glucose 178 (H) 70 - 125 mg/dL    Calcium 8.3 (L) 8.5 - 10.5 mg/dL    BUN 18 8 - 28 mg/dL    Creatinine 0.88 0.70 - 1.30 mg/dL    GFR MDRD Af Amer >60 >60 mL/min/1.73m2    GFR MDRD Non Af Amer >60 >60 mL/min/1.73m2   HM2(CBC w/o Differential)    Collection Time: 12/02/20  5:33 AM   Result Value Ref Range    WBC 7.3 4.0 - 11.0 thou/uL    RBC 4.07 (L) 4.40 - 6.20 mill/uL    Hemoglobin 12.2 (L) 14.0 - 18.0 g/dL    Hematocrit 37.5 (L) 40.0 - 54.0 %    MCV 92 80 - 100 fL    MCH 30.0 27.0 - 34.0 pg    MCHC 32.5 32.0 - 36.0 g/dL    RDW 11.9 11.0 - 14.5 %    Platelets 447 (H) 140 - 440 thou/uL    MPV 9.5 8.5 - 12.5 fL   COVID-19 Virus PCR MRF    Collection Time: 12/02/20  5:38 AM    Specimen: Respiratory   Result Value Ref Range    COVID-19 VIRUS SPECIMEN SOURCE Nasopharyngeal     2019-nCOV Not Detected    POCT Glucose    Collection Time: 12/02/20  6:04 AM    Specimen: Blood   Result Value Ref Range    Glucose 189 (H) 70 - 139 mg/dL   POCT Glucose    Collection Time: 12/02/20  8:08 AM    Specimen: Capillary; Blood   Result Value Ref Range    Glucose 162 (H) 70 - 139 mg/dL   POCT Glucose    Collection Time: 12/02/20 11:22 AM    Specimen: Blood   Result  Value Ref Range    Glucose 173 (H) 70 - 139 mg/dL   POCT Glucose    Collection Time: 12/02/20  5:08 PM    Specimen: Blood   Result Value Ref Range    Glucose 172 (H) 70 - 139 mg/dL   POCT Glucose    Collection Time: 12/02/20  9:02 PM    Specimen: Blood   Result Value Ref Range    Glucose 198 (H) 70 - 139 mg/dL   POCT Glucose    Collection Time: 12/03/20  8:22 AM    Specimen: Blood   Result Value Ref Range    Glucose 236 (H) 70 - 139 mg/dL   POCT Glucose    Collection Time: 12/03/20 11:57 AM    Specimen: Blood   Result Value Ref Range    Glucose 292 (H) 70 - 139 mg/dL   C-Reactive Protein    Collection Time: 12/07/20  5:49 AM   Result Value Ref Range    CRP 1.9 (H) 0.0 - 0.8 mg/dL   Comprehensive Metabolic Panel    Collection Time: 12/07/20  5:49 AM   Result Value Ref Range    Sodium 135 (L) 136 - 145 mmol/L    Potassium 4.1 3.5 - 5.0 mmol/L    Chloride 99 98 - 107 mmol/L    CO2 27 22 - 31 mmol/L    Anion Gap, Calculation 9 5 - 18 mmol/L    Glucose 47 (LL) 70 - 125 mg/dL    BUN 24 8 - 28 mg/dL    Creatinine 0.71 0.70 - 1.30 mg/dL    GFR MDRD Af Amer >60 >60 mL/min/1.73m2    GFR MDRD Non Af Amer >60 >60 mL/min/1.73m2    Bilirubin, Total 0.7 0.0 - 1.0 mg/dL    Calcium 9.2 8.5 - 10.5 mg/dL    Protein, Total 6.6 6.0 - 8.0 g/dL    Albumin 2.3 (L) 3.5 - 5.0 g/dL    Alkaline Phosphatase 98 45 - 120 U/L    AST 21 0 - 40 U/L    ALT 20 0 - 45 U/L   HM1 (CBC with Diff)    Collection Time: 12/07/20  5:49 AM   Result Value Ref Range    WBC 8.2 4.0 - 11.0 thou/uL    RBC 4.57 4.40 - 6.20 mill/uL    Hemoglobin 13.6 (L) 14.0 - 18.0 g/dL    Hematocrit 41.8 40.0 - 54.0 %    MCV 92 80 - 100 fL    MCH 29.8 27.0 - 34.0 pg    MCHC 32.5 32.0 - 36.0 g/dL    RDW 12.1 11.0 - 14.5 %    Platelets 430 140 - 440 thou/uL    MPV 9.7 8.5 - 12.5 fL    Neutrophils % 57 50 - 70 %    Lymphocytes % 25 20 - 40 %    Monocytes % 12 (H) 2 - 10 %    Eosinophils % 5 0 - 6 %    Basophils % 1 0 - 2 %    Immature Granulocyte % 1 (H) <=0 %    Neutrophils Absolute  4.6 2.0 - 7.7 thou/uL    Lymphocytes Absolute 2.0 0.8 - 4.4 thou/uL    Monocytes Absolute 1.0 (H) 0.0 - 0.9 thou/uL    Eosinophils Absolute 0.4 0.0 - 0.4 thou/uL    Basophils Absolute 0.1 0.0 - 0.2 thou/uL    Immature Granulocyte Absolute 0.1 (H) <=0.0 thou/uL   Hemoglobin    Collection Time: 12/09/20  9:46 AM   Result Value Ref Range    Hemoglobin 13.4 (L) 14.0 - 18.0 g/dL                DONNY Kwan

## 2021-06-13 NOTE — TELEPHONE ENCOUNTER
[facility] -[phone]    Pt needs schedule f/u with Dr Brannon(in clinic only) or Dr Hammond (in clinic is possible, video ok if pt is in TCU during visit) in 1-2 weeks before 12/31/20

## 2021-06-13 NOTE — PROGRESS NOTES
"  [facility]      NAME:  Caleb Hernandez             :  1935    MRN: [id number]    CODE STATUS:  FULL CODE    FACILITY: [facility] [[id number]]       CHIEF COMPLAIN/REASON FOR VISIT:  Chief Complaint   Patient presents with     Problem Visit     hematuria       HISTORY OF PRESENT ILLNESS: Caleb Hernandez is a 84 y.o. male being seen at the request of the nurse as he had gross hematuria this am. I did drain his cath and had 400 ml of maroon colored urine. Throughout day lessened in color to straw colored.   Caleb is a pleasant elderly gentleman who was first conversation was he would like to go home.  He does state he lives at home with his wife adult son and granddaughter.  He is currently on the TCU status post acute care due to an I&D of his right lower foot with cellulitis and is followed by Dr. Garcia podiatry.  He will be on daily IV antibiotics on the TCU.  As per his EMR at [facility], \"with DM2, PVD, HTN presented with right lower extremity cellulitis, osteomyelitis, septic arthritis.     Right foot cellulitis with abscess, osteomyelitis, septic arthritis  -Patient with a recent excision of the right fifth metatarsal on 2020 with podiatry.  Wound cultures with Bacteroides and Enterobacter cloaca.  Patient directly admitted from vascular clinic with concerns for worsening postoperative infection.  MRI with new synovitis, osteomyelitis of the 2nd-4th tarsometatarsal joint and likely septic arthritis.  Patient underwent I&D of abscess on , this wound culture now with Corynebacterium.  Patient initially on vancomycin and Zosyn, currently on meropenem with plan for outpatient ertapenem for 4 weeks.  -Nonweightbearing  -Patient received vancomycin and meropenem while inpatient and this will be switched to ertapenem daily dose x4 weeks with ID to follow as outpatient     Urinary retention with hematuria  -Patient had Dietrich catheter placed after angiogram for " "acute urinary retention.  Patient developed hematuria on 11/30.  CT abdomen and pelvis with significant irregular bladder wall thickening, more suggestive of cystitis.  Patient also has a nonobstructing 11 mm right upper pole kidney stone with no hydro-.  Urine culture negative, likely Mai causing irritation  -Patient evaluated daily by urology team  -Continue Flomax at discharge  -Able to restart Eliquis and aspirin daily  -Patient will be discharged on Mai catheter and will follow up with urology for definitive treatment     PVD  -Patient underwent right lower extremity angiogram with balloon angioplasty of anterior tibial and peroneal arteries 11/24/2020 with vascular surgery.  Postoperative ABIs without significant improvement, vascular thought this was likely secondary to spasm.  -will need repeat ultrasound outpatient  -Follow-up with vascular clinic outpatient     Adult failure to thrive  -Secondary to surgical interventions, prolonged hospital stays and infection  PT/OT recommending TCU, discharge plan 12/3     DM2  -A1c 7.9  -Held home Victoza, and was started on 3 units of mealtime insulin and increase to glargine 48 units at bedtime   -We will restart home Victoza and continue glargine at bedtime as well as mealtime insulin at discharge     Paroxysmal atrial fibrillation  -Currently rate controlled.    -Restart home Eliquis     HTN  -Lisinopril 5 mg p.o. daily     Normocytic anemia  -Hemoglobin 12.2 with MCV of 92.  Likely anemia of chronic disease, but with component of blood loss anemia from recent surgeries, hematuria.\"    He is to receive IV antibiotics weekly labs PT OT on the rehab unit.  He is on eliquis and now hematuria, he is unsure if mai was pulled. We will monitor and check HBG.     Allergies   Allergen Reactions     Cresol      Muscle cramps     Crestor [Rosuvastatin] Myalgia     Declomycin [Demeclocycline] Hives   :     Current Outpatient Medications   Medication Sig     acetaminophen " "(TYLENOL) 500 MG tablet Take 1-2 tablets (500-1,000 mg total) by mouth every 4 (four) hours as needed.     apixaban ANTICOAGULANT (ELIQUIS) 5 mg Tab tablet Take 1 tablet (5 mg total) by mouth 2 (two) times a day.     aspirin 81 MG EC tablet Take 81 mg by mouth daily.     blood glucose test strips Use 1 each As Directed 2 (two) times a day. Dispense brand per patient's insurance at pharmacy discretion.     blood-glucose meter (ONETOUCH VERIO IQ METER) Misc Use 1 each As Directed 2 (two) times a day.     cholecalciferol, vitamin D3, (VITAMIN D3) 5,000 unit Tab Take 5,000 Units by mouth daily.      ertapenem 1 g in NaCl 0.9 % 0.9 % 50 mL IVPB Infuse 1 g into a venous catheter daily.     insulin glargine (LANTUS SOLOSTAR U-100 INSULIN) 100 unit/mL (3 mL) pen Inject 44 Units under the skin at bedtime.     liraglutide (VICTOZA 3-RUPALI) 0.6 mg/0.1 mL (18 mg/3 mL) injection Inject 0.3 mL (1.8 mg total) under the skin daily.     lisinopriL (PRINIVIL,ZESTRIL) 5 MG tablet Take 1 tablet (5 mg total) by mouth daily.     multivit-min/FA/lycopen/lutein (CENTRUM SILVER MEN ORAL) Take 1 tablet by mouth daily.     mupirocin (BACTROBAN) 2 % ointment Apply topically daily as needed. Apply to wound.     neomycin-bacitracin-polymyxin (NEOSPORIN) ointment Apply to penis at catheter insertion site three times a day while catheter is in place.     NOVOLOG FLEXPEN U-100 INSULIN 100 unit/mL (3 mL) injection pen Inject 3 Units under the skin 3 (three) times a day before meals.     pen needle, diabetic 31 gauge x 5/16\" Ndle Used to inject insulin daily     polyethylene glycol (MIRALAX) 17 gram packet Take 1 packet (17 g total) by mouth daily as needed.     tamsulosin (FLOMAX) 0.4 mg cap Take 1 capsule (0.4 mg total) by mouth daily after supper.     traMADoL (ULTRAM) 50 mg tablet Take 1 tablet (50 mg total) by mouth every 6 (six) hours as needed for pain.     vitamin E 400 unit capsule Take 400 Units by mouth daily.         REVIEW OF " SYSTEMS:    Currently, no fever, chills, or rigors. Does not have any visual or hearing problems. Denies any chest pain, headaches, palpitations, lightheadedness, dizziness, shortness of breath, or cough. Appetite is good. Denies any GERD symptoms. Denies any difficulty with swallowing, nausea, or vomiting.  Denies any abdominal pain, diarrhea or constipation. Denies any urinary symptoms. No insomnia. No active bleeding. No rash.       PHYSICAL EXAMINATION:  Vitals:    12/10/20 0603   BP: 109/80   Pulse: 74   Temp: 98  F (36.7  C)   Weight: 188 lb (85.3 kg)         GENERAL: Awake, Alert, oriented x3, not in any form of acute distress, answers questions appropriately, follows simple commands, conversant  HEENT: Head is normocephalic with normal hair distribution. No evidence of trauma. Ears: No acute purulent discharge. Eyes: Conjunctivae pink with no scleral jaundice. Nose: Normal mucosa and septum. NECK: Supple with no cervical or supraclavicular lymphadenopathy. Trachea is midline.   ABDOMEN: Globular and soft, nontender to palpation, non distended, no masses, no organomegaly, good bowel sounds, no rebound or guarding, no peritoneal signs.   : Dietrich, hematuria, fading wwent from maroon to huey color urine throughout day,  EXTREMITIES: Full range of motion does have a PICC in right upper arm.  He is to be nonweightbearing to the right foot until cleared by Dr. Middleton and podiatry.  SKIN: Warm and dry, no erythema noted, no rashes or lesions.  NEUROLOGICAL: The patient is oriented to person, place and time. Strength and sensation are grossly intact. Face is symmetric.        Social distancing with PPE in place during assessment due to COVID-19 precautions.            LABS:    Lab Results   Component Value Date    WBC 8.2 12/07/2020    HGB 13.4 (L) 12/09/2020    HCT 41.8 12/07/2020    MCV 92 12/07/2020     12/07/2020       Results for orders placed or performed during the hospital encounter of 11/23/20    Basic Metabolic Panel   Result Value Ref Range    Sodium 139 136 - 145 mmol/L    Potassium 4.5 3.5 - 5.0 mmol/L    Chloride 103 98 - 107 mmol/L    CO2 30 22 - 31 mmol/L    Anion Gap, Calculation 6 5 - 18 mmol/L    Glucose 178 (H) 70 - 125 mg/dL    Calcium 8.3 (L) 8.5 - 10.5 mg/dL    BUN 18 8 - 28 mg/dL    Creatinine 0.88 0.70 - 1.30 mg/dL    GFR MDRD Af Amer >60 >60 mL/min/1.73m2    GFR MDRD Non Af Amer >60 >60 mL/min/1.73m2           Lab Results   Component Value Date    HGBA1C 7.9 (H) 09/04/2020     No results found for: BCVVHGXE02RU  No results found for: [id number]    ASSESSMENT/PLAN:  1. 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 2 diabetes mellitus, with necrosis of muscle (H)    2. Urinary retention    3. Gross hematuria      1.  Osteomyelitis right foot, patient will have daily wound care done by skilled nursing staff.  He is to be nonweightbearing until further notice.  Weekly labs and sent to ID, Dr. Middleton will also follow as he remains nonweightbearing on his right foot.  PT and OT for strengthening and safety. SN to manage chronic medical conditions, he has DM as well as urinary retention, see dx list above for all comorbidity.    2/3.  Urinary retention, indwelling cath with hematuria: Obtain stats HBG this am, UA cs. He is on eliquis as well and may of pulled cath, he is not sure.  HBG was stable when back and urine is decreasing in gross hematuia.        Electronically signed by:  Marva Moore CNP  This progress note was completed using Dragon software and there may be grammatical errors.

## 2021-06-13 NOTE — ANESTHESIA PROCEDURE NOTES
Peripheral Block    Patient location during procedure: pre-op  Start time: 11/16/2020 12:35 PM  End time: 11/16/2020 12:45 PM  post-op analgesia per surgeon order as noted in medical record  Staffing:  Performing  Anesthesiologist: Chaz Pineda MD  Preanesthetic Checklist  Completed: patient identified, site marked, risks, benefits, and alternatives discussed, timeout performed, consent obtained, at patient's request, airway assessed, oxygen available, suction available, emergency drugs available and hand hygiene performed  Peripheral Block  Block type: sciatic, posterior  Prep: ChloraPrep  Patient position: supine  Patient monitoring: cardiac monitor, continuous pulse oximetry, blood pressure and heart rate  Laterality: right  Injection technique: ultrasound guided    Ultrasound used to visualize needle placement in proximity to nerve being blocked: yes   US used to visualize anesthetic spread  Visualized anatomic structures normal  No Pathological Findings  Permanent ultrasound image captured for medical record  Sterile gel and probe cover used for ultrasound.  Needle  Needle type: Stimuplex   Needle gauge: 21 G  Needle length: 4 in  no peripheral nerve catheter placed  Assessment  Injection assessment: negative aspiration for heme, no difficulty with injection, no paresthesia on injection and incremental injection

## 2021-06-13 NOTE — ANESTHESIA PREPROCEDURE EVALUATION
Anesthesia Evaluation      Patient summary reviewed     Airway   Mallampati: II  Neck ROM: full   Pulmonary - negative ROS and normal exam                          Cardiovascular - normal exam  (+) hypertension, , hypercholesterolemia,      Neuro/Psych - negative ROS     Endo/Other    (+) diabetes mellitus,      Comments: Diabetic foot ulcer    GI/Hepatic/Renal - negative ROS           Dental    (+) lower dentures and upper dentures                       Anesthesia Plan  Planned anesthetic: peripheral nerve block and MAC    ASA 3   Induction: intravenous   Anesthetic plan and risks discussed with: patient    Post-op plan: routine recovery

## 2021-06-13 NOTE — ANESTHESIA CARE TRANSFER NOTE
Last vitals:   Vitals:    12/17/20 1528   BP: 118/58   Pulse: 64   Resp: 16   Temp: 36.6  C (97.9  F)   SpO2: 97%     Patient's level of consciousness is drowsy  Spontaneous respirations: yes  Maintains airway independently: yes  Dentition unchanged: yes  Oropharynx: oropharynx clear of all foreign objects    QCDR Measures:  ASA# 20 - Surgical Safety Checklist: WHO surgical safety checklist completed prior to induction    PQRS# 430 - Adult PONV Prevention: 4558F - Pt received => 2 anti-emetic agents (different classes) preop & intraop (zofran and propofol)  ASA# 8 - Peds PONV Prevention: NA - Not pediatric patient, not GA or 2 or more risk factors NOT present  PQRS# 424 - Renu-op Temp Management: 4559F - At least one body temp DOCUMENTED => 35.5C or 95.9F within required timeframe  PQRS# 426 - PACU Transfer Protocol: - Transfer of care checklist used  ASA# 14 - Acute Post-op Pain: ASA14B - Patient did NOT experience pain >= 7 out of 10

## 2021-06-13 NOTE — ANESTHESIA POSTPROCEDURE EVALUATION
Patient: Caleb Hernandez  Procedure(s):  INCISION AND DRAINAGE, LOWER EXTREMITY (Right)  IRRIGATION AND DEBRIDEMENT, LOWER EXTREMITY (Right)  Anesthesia type: regional    Patient location: PACU  Last vitals:   Vitals Value Taken Time   /73 11/27/20 1330   Temp 36.1  C (97  F) 11/27/20 1330   Pulse 62 11/27/20 1330   Resp 16 11/27/20 1330   SpO2 93 % 11/27/20 1330     Post vital signs: stable  Level of consciousness: awake and responds to simple questions  Post-anesthesia pain: pain controlled  Post-anesthesia nausea and vomiting: no  Pulmonary: unassisted, return to baseline  Cardiovascular: stable and blood pressure at baseline  Hydration: adequate  Anesthetic events: no    QCDR Measures:  ASA# 11 - Renu-op Cardiac Arrest: ASA11B - Patient did NOT experience unanticipated cardiac arrest  ASA# 12 - Renu-op Mortality Rate: ASA12B - Patient did NOT die  ASA# 13 - PACU Re-Intubation Rate: ASA13B - Patient did NOT require a new airway mgmt  ASA# 10 - Composite Anes Safety: ASA10A - No serious adverse event    Additional Notes:

## 2021-06-13 NOTE — ANESTHESIA PROCEDURE NOTES
Peripheral Block    Patient location during procedure: pre-op  Start time: 12/17/2020 1:58 PM  End time: 12/17/2020 2:03 PM  post-op analgesia per surgeon order as noted in medical record  Staffing:  Performing  Anesthesiologist: Юлия Yo MD  Preanesthetic Checklist  Completed: patient identified, site marked, risks, benefits, and alternatives discussed, timeout performed, consent obtained, airway assessed, oxygen available, suction available, emergency drugs available and hand hygiene performed  Peripheral Block  Block type: sciatic, popliteal  Prep: ChloraPrep  Patient position: supine  Patient monitoring: cardiac monitor, continuous pulse oximetry, heart rate and blood pressure  Laterality: right  Injection technique: ultrasound guided    Ultrasound used to visualize needle placement in proximity to nerve being blocked: yes   US used to visualize anesthetic spread  Visualized anatomic structures normal  No Pathological Findings  Permanent ultrasound image captured for medical record  Sterile gel and probe cover used for ultrasound.  Needle  Needle type: echogenic   Needle gauge: 20G  Needle length: 4 in  no peripheral nerve catheter placed  Assessment  Injection assessment: no difficulty with injection, negative aspiration for heme, no paresthesia on injection and incremental injection

## 2021-06-13 NOTE — TELEPHONE ENCOUNTER
Spouse, jayjay would like to know on next steps, if referral for ID is needed. They have a post-op next week and a couple appointments the week after. They are not sure if they should wait until after meeting with Dr Garcia.

## 2021-06-13 NOTE — TELEPHONE ENCOUNTER
Tereza from [facility] called back to get all appt details for pts upcoming appts. Pt will arrive just before 230 for the USs, no preps, and see Dr. Garcia for f/u on 12/9/20. Address provided. Tereza stated she may be setting up transportation for this appt. Tereza provided her personal cell phone for the VV with Dr. Raymond for 12/10/20 at 0940.

## 2021-06-13 NOTE — PROGRESS NOTES
"  [facility]      NAME:  Caleb Hernandez             :  1935    MRN: [id number]    CODE STATUS:  FULL CODE    FACILITY: [facility] [[id number]]       CHIEF COMPLAIN/REASON FOR VISIT:  Chief Complaint   Patient presents with     Review Of Multiple Medical Conditions     IVAB/Osteomylitis       HISTORY OF PRESENT ILLNESS: Caleb Hernandez is a 84 y.o. male being seen at the request of the nurse as he is a new admit from [facility] to the TCU.  Caleb is a pleasant elderly gentleman who was first conversation was he would like to go home.  He does state he lives at home with his wife adult son and granddaughter.  He is currently on the TCU status post acute care due to an I&D of his right lower foot with cellulitis and is followed by Dr. Garcia podiatry.  He will be on daily IV antibiotics on the TCU.  As per his EMR at [facility], \"with DM2, PVD, HTN presented with right lower extremity cellulitis, osteomyelitis, septic arthritis.     Right foot cellulitis with abscess, osteomyelitis, septic arthritis  -Patient with a recent excision of the right fifth metatarsal on 2020 with podiatry.  Wound cultures with Bacteroides and Enterobacter cloaca.  Patient directly admitted from vascular clinic with concerns for worsening postoperative infection.  MRI with new synovitis, osteomyelitis of the 2nd-4th tarsometatarsal joint and likely septic arthritis.  Patient underwent I&D of abscess on , this wound culture now with Corynebacterium.  Patient initially on vancomycin and Zosyn, currently on meropenem with plan for outpatient ertapenem for 4 weeks.  -Nonweightbearing  -Patient received vancomycin and meropenem while inpatient and this will be switched to ertapenem daily dose x4 weeks with ID to follow as outpatient     Urinary retention with hematuria  -Patient had Dietrich catheter placed after angiogram for acute urinary retention.  Patient developed hematuria on " "11/30.  CT abdomen and pelvis with significant irregular bladder wall thickening, more suggestive of cystitis.  Patient also has a nonobstructing 11 mm right upper pole kidney stone with no hydro-.  Urine culture negative, likely Dietrich causing irritation  -Patient evaluated daily by urology team  -Continue Flomax at discharge  -Able to restart Eliquis and aspirin daily  -Patient will be discharged on Dietrich catheter and will follow up with urology for definitive treatment     PVD  -Patient underwent right lower extremity angiogram with balloon angioplasty of anterior tibial and peroneal arteries 11/24/2020 with vascular surgery.  Postoperative ABIs without significant improvement, vascular thought this was likely secondary to spasm.  -will need repeat ultrasound outpatient  -Follow-up with vascular clinic outpatient     Adult failure to thrive  -Secondary to surgical interventions, prolonged hospital stays and infection  PT/OT recommending TCU, discharge plan 12/3     DM2  -A1c 7.9  -Held home Victoza, and was started on 3 units of mealtime insulin and increase to glargine 48 units at bedtime   -We will restart home Victoza and continue glargine at bedtime as well as mealtime insulin at discharge     Paroxysmal atrial fibrillation  -Currently rate controlled.    -Restart home Eliquis     HTN  -Lisinopril 5 mg p.o. daily     Normocytic anemia  -Hemoglobin 12.2 with MCV of 92.  Likely anemia of chronic disease, but with component of blood loss anemia from recent surgeries, hematuria.\"    He is to receive IV antibiotics weekly labs PT OT on the rehab unit.  We reviewed medical care for seniors role in his care, TCU routines as well as discussion of his ACP today during the visit.  His right foot is loaded and wrapped unable to assess incisional site.  He does have a PIC line intact to the right upper arm.    Allergies   Allergen Reactions     Cresol      Muscle cramps     Crestor [Rosuvastatin] Myalgia     Declomycin " "[Demeclocycline] Hives   :     Current Outpatient Medications   Medication Sig     acetaminophen (TYLENOL) 500 MG tablet Take 1-2 tablets (500-1,000 mg total) by mouth every 4 (four) hours as needed.     apixaban ANTICOAGULANT (ELIQUIS) 5 mg Tab tablet Take 1 tablet (5 mg total) by mouth 2 (two) times a day.     aspirin 81 MG EC tablet Take 81 mg by mouth daily.     blood glucose test strips Use 1 each As Directed 2 (two) times a day. Dispense brand per patient's insurance at pharmacy discretion.     blood-glucose meter (ONETOUCH VERIO IQ METER) Misc Use 1 each As Directed 2 (two) times a day.     cholecalciferol, vitamin D3, (VITAMIN D3) 5,000 unit Tab Take 5,000 Units by mouth daily.      ertapenem 1 g in NaCl 0.9 % 0.9 % 50 mL IVPB Infuse 1 g into a venous catheter daily.     insulin glargine (LANTUS SOLOSTAR U-100 INSULIN) 100 unit/mL (3 mL) pen Inject 44 Units under the skin at bedtime.     liraglutide (VICTOZA 3-RUPALI) 0.6 mg/0.1 mL (18 mg/3 mL) injection Inject 0.3 mL (1.8 mg total) under the skin daily.     lisinopriL (PRINIVIL,ZESTRIL) 5 MG tablet Take 1 tablet (5 mg total) by mouth daily.     multivit-min/FA/lycopen/lutein (CENTRUM SILVER MEN ORAL) Take 1 tablet by mouth daily.     mupirocin (BACTROBAN) 2 % ointment Apply topically daily as needed. Apply to wound.     neomycin-bacitracin-polymyxin (NEOSPORIN) ointment Apply to penis at catheter insertion site three times a day while catheter is in place.     NOVOLOG FLEXPEN U-100 INSULIN 100 unit/mL (3 mL) injection pen Inject 3 Units under the skin 3 (three) times a day before meals.     pen needle, diabetic 31 gauge x 5/16\" Ndle Used to inject insulin daily     polyethylene glycol (MIRALAX) 17 gram packet Take 1 packet (17 g total) by mouth daily as needed.     tamsulosin (FLOMAX) 0.4 mg cap Take 1 capsule (0.4 mg total) by mouth daily after supper.     traMADoL (ULTRAM) 50 mg tablet Take 1 tablet (50 mg total) by mouth every 6 (six) hours as needed for " pain.     vitamin E 400 unit capsule Take 400 Units by mouth daily.         REVIEW OF SYSTEMS:    Currently, no fever, chills, or rigors. Does not have any visual or hearing problems. Denies any chest pain, headaches, palpitations, lightheadedness, dizziness, shortness of breath, or cough. Appetite is good. Denies any GERD symptoms. Denies any difficulty with swallowing, nausea, or vomiting.  Denies any abdominal pain, diarrhea or constipation. Denies any urinary symptoms. No insomnia. No active bleeding. No rash.       PHYSICAL EXAMINATION:  Vitals:    12/07/20 1533   BP: 129/71   Pulse: 67   Temp: 98.3  F (36.8  C)   Weight: 188 lb (85.3 kg)         GENERAL: Awake, Alert, oriented x3, not in any form of acute distress, answers questions appropriately, follows simple commands, conversant  HEENT: Head is normocephalic with normal hair distribution. No evidence of trauma. Ears: No acute purulent discharge. Eyes: Conjunctivae pink with no scleral jaundice. Nose: Normal mucosa and septum. NECK: Supple with no cervical or supraclavicular lymphadenopathy. Trachea is midline.   ABDOMEN: Globular and soft, nontender to palpation, non distended, no masses, no organomegaly, good bowel sounds, no rebound or guarding, no peritoneal signs.   EXTREMITIES: Full range of motion does have a PICC in right upper arm.  He is to be nonweightbearing to the right foot until cleared by Dr. Middleton and podiatry.  SKIN: Warm and dry, no erythema noted, no rashes or lesions.  NEUROLOGICAL: The patient is oriented to person, place and time. Strength and sensation are grossly intact. Face is symmetric.        Social distancing with PPE in place during assessment due to COVID-19 precautions.            LABS:    Lab Results   Component Value Date    WBC 8.2 12/07/2020    HGB 13.6 (L) 12/07/2020    HCT 41.8 12/07/2020    MCV 92 12/07/2020     12/07/2020       Results for orders placed or performed during the hospital encounter of 11/23/20    Basic Metabolic Panel   Result Value Ref Range    Sodium 139 136 - 145 mmol/L    Potassium 4.5 3.5 - 5.0 mmol/L    Chloride 103 98 - 107 mmol/L    CO2 30 22 - 31 mmol/L    Anion Gap, Calculation 6 5 - 18 mmol/L    Glucose 178 (H) 70 - 125 mg/dL    Calcium 8.3 (L) 8.5 - 10.5 mg/dL    BUN 18 8 - 28 mg/dL    Creatinine 0.88 0.70 - 1.30 mg/dL    GFR MDRD Af Amer >60 >60 mL/min/1.73m2    GFR MDRD Non Af Amer >60 >60 mL/min/1.73m2           Lab Results   Component Value Date    HGBA1C 7.9 (H) 09/04/2020     No results found for: [id number]  No results found for: [id number]    ASSESSMENT/PLAN:  1. Osteomyelitis of right foot, unspecified type (H)    2. 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 Paroxysmal atrial fibrillation (H)    4. ACP (advance care planning)      1.  Osteomyelitis right foot, patient will have daily wound care done by skilled nursing staff.  He is to be nonweightbearing until further notice.  Weekly labs and sent to ID, Dr. Middleton will also follow as he remains nonweightbearing on his right foot.  PT and OT for strengthening and safety.   referral made as patient wishes to DC home.    2.  Type 2 diabetes, blood sugar this a.m. was stable at 83, please see HPI as it discusses he has been placed back on Victoza and full med list is complete above.  Skilled nurse to manage chronic medical conditions.    3.  A. fib, patient remains on Eliquis 5 mg p.o. twice daily.  No excessive bleeding or bruising is observed.    4. ACP: Patient desires full CODE STATUS, I reviewed his POLST with him and we signed per patient wishes and desires.  This is a facility requirement and is done on all patients.      Electronically signed by:  Marva Moore CNP  This progress note was completed using Dragon software and there may be grammatical errors.       50 minutes spent of which greater than 45% was face to face communication with the patient about  above plan of care noted medical care for seniors role in patient's care, duration of IV antibiotic therapy nonweightbearing status TCU routines as well as advanced care plan which patient chooses to be a full code and his POLST was signed and reviewed today.

## 2021-06-13 NOTE — PROGRESS NOTES
Assessment: Frantz is in for education on diabetes management.  He states it has been about 10 years since he went to see an educator.  He is currently taking 1.2mg of victoza and 20 units of tresiba.  His blood sugars range from 210-290s in the morning.  He took one BG before lunch and it was 267.  He is checking once daily and does not forget his medications no matter what.  His last a1c was 10.5.  Today I went over diabetes pathophysiology,  A1C & bg goals, preventing future complications, hypo/hyperglycemia, portion sizing,  and when to test in a day.  I advised him to check his blood sugars 3x/daily before breakfast, dinner, & bedtime to see if victoza is helping his after meal blood sugars stay in range.  Today I discussed titrating his insulin so that his morning blood sugars are <160.  I suggested increasing his dose to 30 and titrating up every 2 days until they are consistently <160.  If he has any blood sugars <100 he will decrease his dose by 2 units.  His wound on his foot is still healing, but he is eager to get back to work once it does.  He says his blood sugars are much better when he is running around [employer] 40 hours per week.       Plan: Frantz will take 30 units today and adjust his dose every 2 days until his morning blood sugars are <160.  We will follow up in 2 weeks to see how he his blood sugars are doing.  He is eager to get back to work so I will discuss titrating his dose down when his activity level returns to normal to prevent lows.  He will call with any questions or concerns in the meantime.    Subjective and Objective:      Caleb SELENE Hernandez is referred by Dr. Machado for Diabetes Education.     Lab Results   Component Value Date    HGBA1C 10.5 (H) 09/05/2017         Current diabetes medications:  tresiba 20 units, victoza 1.2mg    Goals       a1c <8.0.            9/22/17:  Frantz will work on reducing his a1c <8.0 to reduce complications from diabetes.        monitoring             9/22/17:  Frantz will start to check his blood sugars 2x/daily.            Follow up:   Primary care visit  CDE (certified diabetic educator)      Education:     Monitoring   Meter (per above goals): Assessed, Discussed and Literature provided  Monitoring: Assessed, Discussed and Literature provided  BG goals: Assessed, Discussed and Literature provided    Nutrition Management  Nutrition Management: Assessed, Discussed and Literature provided  Weight: Assessed and Discussed  Portions/Balance: Assessed and Discussed  Carb ID/Count: Not addressed  Label Reading: Not addressed  Heart Healthy Fats: Not addressed  Menu Planning: Not addressed  Dining Out: Not addressed  Physical Activity: Assessed and Discussed  Medications: Assessed and Discussed  Orals: Not addressed  Injected Medications: Assessed and Discussed   Storage/Exp:Assessed and Discussed   Site Rotation: Assessed and Discussed   Sites Assessed: yes    Diabetes Disease Process: Assessed, Discussed and Literature provided    Acute Complications: Prevent, Detect, Treat:  Hypoglycemia: Assessed and Discussed  Hyperglycemia: Assessed and Discussed  Sick Days: Not addressed  Driving: Not addressed    Chronic Complications  Foot Care:Assessed and Discussed  Skin Care: Not addressed  Eye: Assessed and Discussed  ABC: Assessed and Discussed  Teeth:Not addressed  Goal Setting and Problem Solving: Assessed and Discussed  Barriers: Assessed and Discussed  Psychosocial Adjustments: Assessed and Discussed      Time spent with the patient: 60 minutes for diabetes education and counseling.   Previous Education: no  Visit Type:KATHARINA Vale  9/22/2017

## 2021-06-13 NOTE — PROGRESS NOTES
"Follow up Vascular Visit       Date of Service:9/25/2017    Date Last Seen: 7/17/2017; 7/17/2017    Chief Complaint: right foot ulcer; healed right shin ulcer    History:   Past Medical History:   Diagnosis Date     BPH (benign prostatic hyperplasia)      Cholelithiasis      Diabetes mellitus      Essential hypertension      Hyperlipidemia      Pancreatitis        Pt returns to the Vascular clinic with regards to their right foot ulcer; healed right shin ulcer. Pt arrives alone today. We were previously seeing him for a right shin ulcer. He reports that he purchased new shoes 2 months ago from [employer] shoes he was previously having pain in the toes from his old shoes so he purchased shoes with larger toe box. His fs have been running at baseline 300-450; tolerated the PCN VK, he has since completed this and course of clindamycin. Had xray done of the foot this was negative for osteomyelitis. He had ABIs completed this indicated adequate blood flow for healing with the toe pressures. We took him out of work. We had him sized for a CAGE boot; he is wearing this; broke the bottom strap once; had this repaired; is not having any hip or knee pain. Trying to stay off the foot as much possible. Had him see Dr. Garcia; had CRP done this was normal; no MRI recommended; no surgery recommended at this time; will reconsider if the wound does not heal. Was hospitalized recently for gall stones; feeling slightly better today; has lost 15 pounds in total from this; appetite slowly returning. He saw his PMD last week; saw diabetes educator; states he learned so much from them; really enjoyed this; his fs are under better control, yesterday after he mowed it was 110. We have been grafting the foot ulcer with epifix; this has been going well.     Allergies: Crestor [rosuvastatin] and Demeclocycline    Physical Exam:    /82  Pulse 76  Resp 16  Ht 5' 9\" (1.753 m)  Wt 194 lb (88 kg) Comment: per pt report  BMI 28.65 " kg/m2    General:  Patient presents to clinic in no apparent distress.  Head: normocephalic atraumatic  Psychiatric:  Alert and oriented x3.   Respiratory: unlabored breathing; no cough  Integumentary:  Skin is uniformly warm, dry and pink.    Wound #1 Location: right shin  Size:healed. Periwound: no denudement, erythema, induration, maceration or warmth.    Wound #2 Location: right plantar foot 5th met head  Size: remaining graft still present  Did not debride left in place; I was not able to express pus today; there is no streaking or erythema; there is not edema in the foot  No undermining present. Periwound: no denudement, erythema, induration, maceration or warmth.    Callous on the right great toe and right 2nd toe  Right shin remains intact; scar tissue; hyperpigmentation of the skin      Circumferential volume measures:    Vasc Edema 3/6/2017 4/17/2017 6/2/2017   Right just above MTP 23 24 23   Right Ankle 25 26 23.5   Right Widest Calf 37.5 38.6 37   Left - just above MTP 22 - -   Left Ankle 22 - -   Left Widest Calf 36 - -         Ulceration(s)/Wound(s):   Wound 07/24/17 Right lateral foot (Active)   Pre Size Length 0.5 9/25/2017  8:00 AM   Pre Size Width 1 9/25/2017  8:00 AM   Pre Total Sq cm 0.5 9/25/2017  8:00 AM   Post Size Length 0.2 9/18/2017  9:00 AM   Post Size Width 1 9/18/2017  9:00 AM   Post Size Depth 0 9/18/2017  9:00 AM   Post Total Sq cm 0.2 9/18/2017  9:00 AM   Undermined 0.75 9/8/2017 11:00 AM   Description scab/callous 9/25/2017  8:00 AM   Prodcut Used Alginate 8/10/2017 11:00 AM       Wound 09/05/17 Foot Right (Active)           Lab Values  No results found for: SEDRATE  Lab Results   Component Value Date    CREATININE 1.33 (H) 09/06/2017     Lab Results   Component Value Date    HGBA1C 10.5 (H) 09/05/2017     Lab Results   Component Value Date    BUN 44 (H) 09/06/2017     Lab Results   Component Value Date    ALBUMIN 2.3 (L) 09/06/2017     No results found for:  [id number]          Impression:   Right shin traumatic wound healed   right diabetic foot ulcer  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 Excisional debridement of the ulcer(s) was not recommended today as there was graft material still present.          2. Edema: will apply tubigrip           3.  Wound treatment: ABIs indicated he had adequate blood flow for wound healing;  Culture grew out strep he completed the clindamycin and  penicillin VK. We used epifix previously for his shin; this worked very well; he is covered for this with a copay;  xray 3 view standing was negative for osteomyelitis; since there is still remaining graft material will cover with silvercel; then roll gauze; will recheck next week             4. Nutrition: diabetic diet; needs tighter control; saw his pmd about this; met with diabetic educator; is doing much better already with this           5. Offloading: this is his driving foot; did not want to do cast; continue CAGE boot; will have him see [facility] again to see if any modifications are needed; will eventually need diabetic shoes and inserts after we get him healed; will take him out of work; needs to stay off the foot; filled out paperwork today     Patient will follow up with me in 1 weeks for reevaluation. They were instructed to call the clinic sooner with any signs or symptoms of infection or any further questions/concerns. Answered all questions.    Ines Zimmerman DNP, RN, CNP, CWN  [facility]  [phone]

## 2021-06-13 NOTE — PROGRESS NOTES
VASCULAR SURGERY OUTPATIENT VIRTUAL CONSULT OR VISIT   VASCULAR SURGEON: Lourdes Raymond MD    LOCATION:  Virtual visit done using video    Caleb Hernandez   Medical Record #:  [id number]  YOB: 1935  Age:  84 y.o.     Date of Service: 12/10/2020    PRIMARY CARE PROVIDER: Otis Lozano MD      Reason for consultation: Follow-up of peripheral vascular disease    IMPRESSION: Patient underwent extensive angiointervention of the right leg with SFA popliteal anterior tibial and peroneal angioplasties to try to help with wound healing of a 5th ray amputation.  Overall patient doing well.  On noninvasive studies has biphasic flow now all the way through to the foot on duplex imaging.  ABIs are normal.  Toe pressures are still only 50 but this should be adequate for wound healing.  Compliant with aspirin and Eliquis.  Not having too much pain, but does occasionally require tramadol.  Wound apparently needs further debridement and he is scheduled for this next week.    RECOMMENDATION: Overall doing well.  Prognosis continues to be somewhat guarded given his overall picture.  Would continue Eliquis and aspirin.  Can hold Eliquis for 3 days prior to upcoming foot debridement but should continue on aspirin.  Discussed with Dr. Garcia who is supportive of this plan.  I will see him back in 3 months time with repeat ABIs and toe pressures as well as arterial duplex.    HPI:  Caleb Hernandez is a 84 y.o. male who was evaluated today for his peripheral vascular disease.  Is a gentleman who had undergone ray amputation of the right fifth toe for osteomyelitis.  This is not healing and actually deteriorating despite appearing to have adequate toe pressures before.  Taken for an intervention will be found advanced disease of the anterior tibial artery popliteal artery and peroneal artery.  Treated with angioplasty successfully.  No complications from the procedure.  Fairly good pain control although requiring tramadol  occasionally.  Not weightbearing.  According to Dr. Garcia's latest visit he will need additional debridement of the foot.  No other new health issues.    PHH:    Past Medical History:   Diagnosis Date     BPH (benign prostatic hyperplasia)      Cholelithiasis      Common bile duct (CBD) obstruction 9/4/2017     Compression Fracture Of Thoracic Vertebral Body     Created by Conversion  Replacement Utility updated for latest IMO load     Diabetes mellitus (H)      Essential hypertension      Hyperlipidemia      Pancreatitis      Rib Fracture     Created by Conversion  Replacement Utility updated for latest IMO load     Sepsis due to pneumonia (H) 9/8/2017        Past Surgical History:   Procedure Laterality Date     BACK SURGERY      1964 removed a cyst     CHOLECYSTECTOMY  1985     ERCP       ERCP N/A 9/5/2017    Procedure: ENDOSCOPIC RETROGRADE CHOLANGIOPANCREATOGRAPHY SPHINCTEROTOMY AND STONE EXTRACTION;  Surgeon: Hansel Gannon MD;  Location: [facility];  Service:      INCISION AND DRAINAGE OF WOUND Right 11/2/2020    Procedure: INCISION AND DRAINAGE, right foot with removal of bone 5th metatarsal;  Surgeon: John Garcia DPM;  Location: [facility] OR;  Service: Podiatry     INCISION AND DRAINAGE OF WOUND Right 11/16/2020    Procedure: INCISION AND DRAINAGE, right foot;  Surgeon: John Garcia DPM;  Location: [facility] OR;  Service: Podiatry     INCISION AND DRAINAGE OF WOUND Right 11/27/2020    Procedure: INCISION AND DRAINAGE, LOWER EXTREMITY;  Surgeon: Aleksandr Hdez DPM;  Location: [facility] OR;  Service: Podiatry     IR EXTREMITY ANGIOGRAM RIGHT  11/24/2020     PICC  11/30/2020          TOE AMPUTATION Right 11/16/2020    Procedure: with amputation of the fifth ray, peroneal brevis tendon transfer;  Surgeon: John Garcia DPM;  Location: [facility];  Service: Podiatry     TONSILLECTOMY  1940       ALLERGIES:  Cresol, Crestor [rosuvastatin], and Declomycin  "[demeclocycline]    MEDS:    Current Outpatient Medications:      acetaminophen (TYLENOL) 500 MG tablet, Take 1-2 tablets (500-1,000 mg total) by mouth every 4 (four) hours as needed., Disp: , Rfl: 0     apixaban ANTICOAGULANT (ELIQUIS) 5 mg Tab tablet, Take 1 tablet (5 mg total) by mouth 2 (two) times a day., Disp: 60 tablet, Rfl: 3     aspirin 81 MG EC tablet, Take 81 mg by mouth daily., Disp: , Rfl:      blood glucose test strips, Use 1 each As Directed 2 (two) times a day. Dispense brand per patient's insurance at pharmacy discretion., Disp: 120 strip, Rfl: 6     blood-glucose meter (ONETOUCH VERIO IQ METER) Misc, Use 1 each As Directed 2 (two) times a day., Disp: 1 each, Rfl: 0     cholecalciferol, vitamin D3, (VITAMIN D3) 5,000 unit Tab, Take 5,000 Units by mouth daily. , Disp: , Rfl:      ertapenem 1 g in NaCl 0.9 % 0.9 % 50 mL IVPB, Infuse 1 g into a venous catheter daily., Disp: 1 each, Rfl: 0     insulin glargine (LANTUS SOLOSTAR U-100 INSULIN) 100 unit/mL (3 mL) pen, Inject 44 Units under the skin at bedtime., Disp: , Rfl:      liraglutide (VICTOZA 3-RUPALI) 0.6 mg/0.1 mL (18 mg/3 mL) injection, Inject 0.3 mL (1.8 mg total) under the skin daily., Disp: , Rfl:      lisinopriL (PRINIVIL,ZESTRIL) 5 MG tablet, Take 1 tablet (5 mg total) by mouth daily., Disp: 90 tablet, Rfl: 3     multivit-min/FA/lycopen/lutein (CENTRUM SILVER MEN ORAL), Take 1 tablet by mouth daily., Disp: , Rfl:      mupirocin (BACTROBAN) 2 % ointment, Apply topically daily as needed. Apply to wound., Disp: , Rfl:      neomycin-bacitracin-polymyxin (NEOSPORIN) ointment, Apply to penis at catheter insertion site three times a day while catheter is in place., Disp: 15 g, Rfl: 0     NOVOLOG FLEXPEN U-100 INSULIN 100 unit/mL (3 mL) injection pen, Inject 3 Units under the skin 3 (three) times a day before meals., Disp: 2.7 mL, Rfl: 0     pen needle, diabetic 31 gauge x 5/16\" Ndle, Used to inject insulin daily, Disp: 90 each, Rfl: 0     polyethylene " glycol (MIRALAX) 17 gram packet, Take 1 packet (17 g total) by mouth daily as needed., Disp: , Rfl: 0     tamsulosin (FLOMAX) 0.4 mg cap, Take 1 capsule (0.4 mg total) by mouth daily after supper., Disp: , Rfl: 0     traMADoL (ULTRAM) 50 mg tablet, Take 1 tablet (50 mg total) by mouth every 6 (six) hours as needed for pain., Disp: 28 tablet, Rfl: 0     vitamin E 400 unit capsule, Take 400 Units by mouth daily., Disp: , Rfl:     SOCIAL HABITS:    Social History     Tobacco Use   Smoking Status Former Smoker     Quit date: 1991     Years since quittin.1   Smokeless Tobacco Never Used       Social History     Substance and Sexual Activity   Alcohol Use No       Social History     Substance and Sexual Activity   Drug Use No       FAMILY HISTORY:    Family History   Problem Relation Age of Onset     Aortic aneurysm Mother      Alcohol abuse Father        REVIEW OF SYSTEMS:    A 12 point ROS was reviewed and except for what is listed in the HPI above, all others are negative    PE:  There were no vitals taken for this visit.  Wt Readings from Last 1 Encounters:   12/10/20 188 lb (85.3 kg)     There is no height or weight on file to calculate BMI.    EXAM:  EXAM LIMITED AS THIS IS A VIRTUAL VISIT with video  GENERAL: This is a well-developed 84 y.o. male who appears his stated age  EYES: Grossly normal.  MOUTH: Buccal mucosa normal   MUSCULOSKELETAL: Grossly normal and both lower extremities are intact.  HEME/LYMPH: No lymphedema  NEUROLOGIC: Focally intact, Alert and oriented x 3.   PSYCH: appropriate affect  INTEGUMENT: No open lesions or ulcers            DIAGNOSTIC STUDIES:     Images:  Mr Forefoot With Without Contrast Right    Result Date: 2020  EXAM: MR FOREFOOT W WO CONTRAST RIGHT LOCATION: [facility] DATE/TIME: 2020 5:28 PM INDICATION: Recent fifth ray amputation. Positive cultures. Wound. COMPARISON: 10/22/2020 MRI. TECHNIQUE: Routine. Additional postgadolinium T1  sequences were obtained. IV CONTRAST: Gadavist 7ml FINDINGS:  SOFT TISSUES JOINTS AND BONES: Interval resection of the fifth ray level of the cuboid. There is ulceration at the proximal margin of the lateral midfoot at the level of the fourth TMT joint. There is a subcutaneous fluid collection tracking along the lateral and dorsal margin of the fourth metatarsal worrisome for an abscess that measures 3.4 cm and RL dimension by 0.9 cm in AP dimension and 6.5 cm in length. There are multiple punctate areas of subcutaneous emphysema lateral to the cuboid and fourth  metatarsal. The ulceration and soft tissue thickening is contiguous with the cuboid and fourth tarsometatarsal joint. There are new areas of synovitis and irregularity in the second, third and fourth tarsometatarsal joints and the navicular medial cuneiform joint contiguous between the medial and middle cuneiform. Although there was osteoarthritis in this distribution on the prior examination the synovitis and osseous changes are worrisome for septic arthritis which has developed along multiple joints of the midfoot. TENDONS: No evidence for proximal tenosynovitis. MUSCLES: Diffuse muscular atrophy and edema.     1.  Interval amputation of the fifth ray. 2.  Ulceration and deep tracking fluid collection along the fourth metatarsal consistent with an abscess. 3.  Additional areas of subcutaneous emphysema along the postoperative bed worrisome for additional soft tissue infection given the postoperative time course. 4.  New synovitis and osseous abnormalities in the second through fourth tarsometatarsal joints and the navicular cuneiform joint worrisome for septic arthritis and osteomyelitis.     Us Srikanth Doppler No Exercise, 1-2 Levels, Bilateral    Result Date: 12/9/2020  BILATERAL RESTING ANKLE-BRACHIAL INDICES (SRIKANTH'S) Indication: Surveillance of right angio 11/24/2020 Previous: 11/24/2020 History: Previous Smoker, Hypertension, Diabetic, Hyperlipidemia,  PAD, Angioplasty, Vascular Surgery and Vascular Ulcers  Resting SRIKANTH's          Right: mmHg Index     Brachial: 113  Ankle-(PT): 164 1.45 Ankle-(DP): 168 1.49           Digit: 50 0.44               Left: mmHg Index     Brachial: 111  Ankle-(PT): 72 0.64 Ankle-(DP): 83 0.73           Digit: 0 0.00 Resting ankle-brachial index of 1.49 on the right. Toe Pressures of 50 mmHg and TBI of 0.44  Resting ankle-brachial index of 0.73 on the left. Toe Pressures of 0 mmHg and TBI of 0.00  WAVEFORMS: The right dorsalis pedis and posterior tibial arteries show monophasic waveforms. The left dorsalis pedis and posterior tibial arteries show monophasic waveforms. Comments:   Impression:  Right SRIKANTH is  Normal with an SRIKANTH of 1.49 and Toe Pressures of 50 mmHg which is mildly abnormal but adequate for wound healing. Left SRIKANTH is Abnormal with an SRIKANTH of 0.73 and Toe Pressures of 0 which is critically poor and high risk for wound issues.     Us Srikanth Doppler No Exercise, 1-2 Levels, Bilateral    Result Date: 11/24/2020  EXAM: RESTING ANKLE-BRACHIAL INDICES (ABIs) LOCATION: [facility] DATE/TIME: 11/24/2020 5:24 PM INDICATION: Follow-up post angiogram and intervention, diabetic foot ulcer, critical limb ischemia. COMPARISON: 10/13/2020. FINDINGS: RIGHT                                               Brachial: 141 Ankle (PT): 0 Index: 0.00  Ankle (DP): 122 Index: 0.87 Digit: 34 Index: 0.24   LEFT Brachial: -- Ankle (PT): 83 Index: 0.59 Ankle (DP): 90 Index: 0.64 Digit: 0 Index: 0.00 Resting ABIs are 0.87 on the right. Resting ABIs are 0.64 on the left. WAVEFORMS: The dorsalis pedis and posterior tibial arteries are monophasic. DUPLEX ARTERIAL ULTRASOUND FINDINGS: RIGHT LOWER EXTREMITY VELOCITIES (cm/s): EIA: 115 CFA: 101 PFA: 63 SFA (proximal): 101 SFA (mid): 90 SFA (distal): 73 Popliteal: 99 PTA: 0 at the ankle; trickle flow mid calf 20 BREE: 69 DPA: 37 (M=monophasic, B=biphasic, T=triphasic) LEFT LOWER EXTREMITY VELOCITIES  (cm/s): EIA: 74 CFA: 98 PFA: 42 SFA (proximal): 57 SFA (mid): 102 SFA (distal): 75 Popliteal: 126 PTA: 16 BREE: 27 DPA: 10 (M=monophasic, B=biphasic, T=triphasic)     1.  RIGHT LOWER EXTREMITY: No significant change in ABIs at the dorsalis pedis artery from prior. No flow demonstrated in posterior tibial artery at the ankle on current study where there was flow noted on prior. Waveforms otherwise unchanged with evidence for trifurcation disease with no inflow stenosis. 2.  LEFT LOWER EXTREMITY: Interval worsening of ABIs when compared to prior. Postobstructive or post high-grade stenotic waveform noted in dorsalis pedis artery and posterior tibial arteries at the ankle, worse when compared to prior. No evidence for an inflow stenosis.    Us Arterial Legs Bilateral Complete    Result Date: 11/24/2020  EXAM: RESTING ANKLE-BRACHIAL INDICES (ABIs) LOCATION: [facility] DATE/TIME: 11/24/2020 5:24 PM INDICATION: Follow-up post angiogram and intervention, diabetic foot ulcer, critical limb ischemia. COMPARISON: 10/13/2020. FINDINGS: RIGHT                                               Brachial: 141 Ankle (PT): 0 Index: 0.00  Ankle (DP): 122 Index: 0.87 Digit: 34 Index: 0.24   LEFT Brachial: -- Ankle (PT): 83 Index: 0.59 Ankle (DP): 90 Index: 0.64 Digit: 0 Index: 0.00 Resting ABIs are 0.87 on the right. Resting ABIs are 0.64 on the left. WAVEFORMS: The dorsalis pedis and posterior tibial arteries are monophasic. DUPLEX ARTERIAL ULTRASOUND FINDINGS: RIGHT LOWER EXTREMITY VELOCITIES (cm/s): EIA: 115 CFA: 101 PFA: 63 SFA (proximal): 101 SFA (mid): 90 SFA (distal): 73 Popliteal: 99 PTA: 0 at the ankle; trickle flow mid calf 20 BREE: 69 DPA: 37 (M=monophasic, B=biphasic, T=triphasic) LEFT LOWER EXTREMITY VELOCITIES (cm/s): EIA: 74 CFA: 98 PFA: 42 SFA (proximal): 57 SFA (mid): 102 SFA (distal): 75 Popliteal: 126 PTA: 16 BREE: 27 DPA: 10 (M=monophasic, B=biphasic, T=triphasic)     1.  RIGHT LOWER EXTREMITY: No  "significant change in ABIs at the dorsalis pedis artery from prior. No flow demonstrated in posterior tibial artery at the ankle on current study where there was flow noted on prior. Waveforms otherwise unchanged with evidence for trifurcation disease with no inflow stenosis. 2.  LEFT LOWER EXTREMITY: Interval worsening of ABIs when compared to prior. Postobstructive or post high-grade stenotic waveform noted in dorsalis pedis artery and posterior tibial arteries at the ankle, worse when compared to prior. No evidence for an inflow stenosis.    Poc Us Guidance Needle Placement    Result Date: 11/16/2020  Ultrasound was performed as guidance to an anesthesia procedure.  Click \"PACS images\" hyperlink below to view any stored images.  For specific procedure details, view procedure note authored by anesthesia.    Ct Abdomen Pelvis Without Oral With Without Iv Contrast    Result Date: 12/1/2020  EXAM: CT ABDOMEN PELVIS WO ORAL W WO IV CONTRAST LOCATION: [facility] DATE/TIME: 12/1/2020 7:22 PM INDICATION: Hematuria, unknown cause Gross hematuria COMPARISON: Abdominal CT 9 05/07/2020. Chest CT 05/23/2020 TECHNIQUE: CT scan of the abdomen and pelvis was performed before and after injection of IV contrast. Multiplanar reformats were obtained. Dose reduction techniques were used. CONTRAST: Iohexol (Omni) 100 mL FINDINGS: LOWER CHEST: Significantly elevated right hemidiaphragm with atelectasis at right lung base similar to chest CT. Mild dependent atelectasis also present left lung base. HEPATOBILIARY: Pneumobilia unchanged compared to most recent CT. Prior cholecystectomy. No liver lesion. PANCREAS: Normal. SPLEEN: Normal. ADRENAL GLANDS: Normal. KIDNEYS/BLADDER: Stable benign left renal cysts. 11 x 9 mm stone right upper pole. No hydronephrosis. Dietrich catheter present with bladder collapsed. However, there is prominent diffuse bladder wall thickening suggesting cystitis. BOWEL: No obstruction or " inflammatory change. LYMPH NODES: Normal. VASCULATURE: Unremarkable. PELVIC ORGANS: Normal. MUSCULOSKELETAL: Focal area of soft tissue stranding within subcutaneous tissues anterior of left common iliac artery and vein likely relates to recent vascular catheterization procedure.     1.  Significant irregular bladder wall thickening suggests cystitis. 2.  There is a 11 mm right upper pole kidney stone. No hydronephrosis. 3.  Stable benign left renal cysts.     Ir Lower Extremity Angiogram Right    Result Date: 11/25/2020  [facility] Interventional Radiology vascular surgery procedure Date: 11/25/20 Procedures Performed: Ir Lower Extremity Angiogram Right from the aorta level via left groin approach   Ultrasound-guided vascular access   Angioplasty right anterior tibial artery   Angioplasty right tibioperoneal trunk   Angioplasty right peroneal artery   Selective right leg angiogram with distal most catheter position in greater than third order anterior tibial and peroneal arteries.  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  This was performed by podiatry with our support given known toe pressures in January of 112 mmHg and has recently has October at greater than 50 mmHg suggesting adequate blood supply for healing.  Wound has not however healed and he is now readmitted for failure with signs of infection of the foot.  Plan for an intervention to see if blood supply can be improved to allow for wound healing. Sedation: Moderate Sedation: The procedure was performed with administration of intravenous conscious sedation with appropriate preoperative, intraoperative, and postoperative evaluation.  120 minutes of supervised face to face intraservice  time was provided by a radiology nurse under my direct supervision.  Versed 3.5 mg and fentanyl 175 mcg given. Antibiotics: Patient already on IV Zosyn Fluoroscopic Time: 28.4 Minutes Radiation Dose: 283 mGy Contrast: 45 cc of Visipaque given Procedure:   Ultrasound was used to evaluate the left groin.  The selected vessel was the left common femoral artery which was widely patent and without significant anterior wall plaque. Ultrasound was then used for real-time ultrasound guided needle entry of the common femoral artery. Permanent images were recorded and saved to the patient's medical record.   Micropuncture technique was used to deliver a 4 [place] sheath.  An Omni Flush catheter was advanced to the lower abdominal aorta and an initial aortogram was performed   Findings: Patent lower abdominal aorta, common iliac arteries, internal iliac arteries, and external iliac arteries without disease.  Patent common femoral arteries without disease.   This point wire catheter advanced over the aortic bifurcation and down to the common femoral level on the right side.  Selective right leg angiography was performed from this level.   Findings: Patent profunda femoris artery and superficial femoral artery.  Trivial stenosis at Jagjit's canal of less than 20%.  Patent popliteal artery without disease.  Single-vessel peroneal runoff to the foot with significant areas of disease in focal stenosis of likely greater than 80% of the tibioperoneal trunk.  The peroneal artery has multiple areas of diffuse stenoses with some areas as much is 70% stenosed.  Posterior tibial artery is occluded from its origin.  Anterior tibial artery has a very short stump but occludes before the elbow in the vessel.  Appears to reconstitute through a peroneal artery collateral at the ankle.   At this point the patient was heparinized.  Wire was advanced down the SFA and was used to exchange the short 4 [place] sheath for a 90 cm 4 [place] sheath which  we advanced the wire down to the popliteal level.  Selective angiography from this level allowed us to roadmap identify the origin of the anterior tibial artery.  Wire catheter were used to engage this stump and we able to advance a wire and catheter around the elbow in the vessel and down the vessel to the ankle.  At this point we exchanged out the wire perform an angiogram.   Findings: True lumen location of a catheter in the distal anterior tibial artery.  There is a small stenosis beyond the catheter.    Nitroglycerin 200 mcg was injected down the anterior tibial artery   We advanced an 014 wire down and through this and then exchanged in a 2 mm x 150 mm angioplasty balloon.  Simple balloon angioplasty with 2 separate inflations was used to treat the entire anterior tibial artery all the way to the popliteal artery.  The first inflation was taken only to 8 mandi of pressure in the second 10 mandi of pressure.  Each inflation was held up for 2 minutes.  An angiogram was then performed.   Findings: Inline flow through the anterior tibial artery.  Persistent significant disease in the more proximal anterior tibial artery which appeared to be secondary to undersizing of the balloon angioplasty.  We exchanged and a 2-1/2 mm x 150 mm angioplasty balloon performed simple balloon angioplasty of the more proximal segment of anterior tibial artery up to the popliteal artery.  This is another 10 mandi inflation held up for 2 minutes.  A completion angiogram was performed.   Findings: Widely patent intertibial artery with filling into the foot.  It does appear that the peroneal artery continues to be the dominant flow to the majority of the foot despite having stenosis within it.   We now readvanced a wire and balloon catheter down the tibioperoneal trunk and peroneal artery positioning it in the distal third of the peroneal artery and moving upwards to treat all the area of disease.  The wire was removed from the balloon and  "angiogram performed through the balloon catheter to confirm that we were in the true lumen   Findings: True lumen location of a catheter in the peroneal artery distally beyond all stenoses.   We now reintroduced a wire performed balloon angioplasty with 2 separate inflations to treat the entire tibioperoneal trunk and peroneal artery.  The first inflation was to 8 mandi of pressure and the more proximal inflation to 10 mandi of pressure.  Each time the balloon was held up for 2 minutes.  A completion angiogram was performed.   Findings: Widely patent flow through the peroneal artery to peroneal trunk with no residual significant stenosis.  Imaging of the foot demonstrates a both the antitibial artery and peroneal artery contribute to feeling of the foot and that the lateral aspect of the foot at the area of surgery feels very well   At this point wires and catheters of pulled back in the long sheath exchanged for short 4 [place] sheath.  Patient was given 10 units of protamine with the plan of removing the sheath once ACT had dropped to below 160. 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 Lourdes Raymond Vascular Surgery    Poc Us 3cg Picc Placement Guidance    Result Date: 11/30/2020  Exam was performed as guidance for PICC line insertion.  Click \"PACS images\" hyperlink below to view any stored images.  For specific procedure details, view procedure note authored by PICC/Vascular Access Nurse.    Us Arterial Leg Right    Result Date: 12/9/2020  Arterial Duplex Ultrasound Lower Extremity Artery Evaluation Indication: Surveillance of right angio 11/24/2020 Previous: 11/24/2020 History: Previous Smoker, Hypertension, Diabetic, Hyperlipidemia, PAD, Angioplasty, Vascular Surgery and Vascular Ulcers Technique: Duplex imaging is performed utilizing gray-scale, two-dimensional images, and color-flow imaging. " Doppler waveform analysis and spectral Doppler imaging is also performed. LOWER EXTREMITY ARTERIAL DUPLEX EXAM WITH WAVEFORMS Right Leg:(cm/s) Location: [facility] Waveforms EIA:   83  T CFA:   73  T PFA:   73  T SFA Proximal:   68  T SFA Mid:   160  T SFA Distal:   76  T Popliteal Artery:   36  B PTA:  OCC   - BREE:   87 B DPA:   55  B Waveforms: T=Triphasic, M=Monophasic, B=Biphasic Left Leg:(cm/s) Location: [facility] Waveforms PTA:   22   M BREE:   29  M DPA:   38  M Waveforms: T=Triphasic, M=Monophasic, B=Biphasic Comments:   Impression: Right Lower Extremity: Triphasic and biphasic waveforms through the right lower extremity suggesting no hemodynamically significant stenosis.  No flow noted in the posterior tibial artery Left Lower Extremity: Not formally evaluated.  Monophasic waveforms at the ankle level suggest significant disease Reference: Category Normal 1-19% 20-49% 50-99% Occluded PSV <160 cm/sec without spectral broadening <160 cm/sec with spectral broadening Increased Increased Absent Flow Ratio N/A N/A < 2.0 >2.0 N/A Post-Stenotic Turbulence No No No Yes N/A       I personally reviewed the images and my interpretation is that his arterial duplex shows significant improvement in waveforms all the way down to the dorsalis pedis artery.  ABIs also normalized now.  Toe pressures interestingly, while at 50, have not increased to the high numbers seen at the beginning of the year.  Toe pressures also of 0 on the other side associated with flat waveforms.  I do not trust these think they likely represent for positioning of the probe..    LABS:      Sodium   Date Value Ref Range Status   12/07/2020 135 (L) 136 - 145 mmol/L Final   12/02/2020 139 136 - 145 mmol/L Final   11/28/2020 137 136 - 145 mmol/L Final     Potassium   Date Value Ref Range Status   12/07/2020 4.1 3.5 - 5.0 mmol/L Final   12/02/2020 4.5 3.5 - 5.0 mmol/L Final   11/28/2020 4.4 3.5 - 5.0 mmol/L Final     Chloride   Date Value Ref Range  Status   12/07/2020 99 98 - 107 mmol/L Final   12/02/2020 103 98 - 107 mmol/L Final   11/28/2020 102 98 - 107 mmol/L Final     BUN   Date Value Ref Range Status   12/07/2020 24 8 - 28 mg/dL Final   12/02/2020 18 8 - 28 mg/dL Final   11/28/2020 15 8 - 28 mg/dL Final     Creatinine   Date Value Ref Range Status   12/07/2020 0.71 0.70 - 1.30 mg/dL Final   12/02/2020 0.88 0.70 - 1.30 mg/dL Final   11/30/2020 0.82 0.70 - 1.30 mg/dL Final     Hemoglobin   Date Value Ref Range Status   12/09/2020 13.4 (L) 14.0 - 18.0 g/dL Final   12/07/2020 13.6 (L) 14.0 - 18.0 g/dL Final   12/02/2020 12.2 (L) 14.0 - 18.0 g/dL Final     Platelets   Date Value Ref Range Status   12/07/2020 430 140 - 440 thou/uL Final   12/02/2020 447 (H) 140 - 440 thou/uL Final   12/01/2020 443 (H) 140 - 440 thou/uL Final     BNP   Date Value Ref Range Status   02/01/2018 86 0 - 88 pg/mL Final     INR   Date Value Ref Range Status   11/27/2020 1.23 (H) 0.90 - 1.10 Final   11/23/2020 1.26 (H) 0.90 - 1.10 Final   09/05/2017 1.27 (H) 0.90 - 1.10 Final       This was a video based visit with start time of 9:33 AM and end time of 9:48 AM    Lourdes Raymond MD  VASCULAR SURGERY

## 2021-06-13 NOTE — PROGRESS NOTES
Follow up Vascular Visit       Date of Service:10/3/2017    Date Last Seen: 7/17/2017; 7/17/2017    Chief Complaint: right foot ulcer; healed right shin ulcer    History:   Past Medical History:   Diagnosis Date     BPH (benign prostatic hyperplasia)      Cholelithiasis      Diabetes mellitus      Essential hypertension      Hyperlipidemia      Pancreatitis        Pt returns to the Vascular clinic with regards to their right foot ulcer; healed right shin ulcer. Pt arrives alone today. We were previously seeing him for a right shin ulcer. He reports that he purchased new shoes 2 months ago from [employer] shoes he was previously having pain in the toes from his old shoes so he purchased shoes with larger toe box. His fs have been running at baseline 300-450; tolerated the PCN VK, he has since completed this and course of clindamycin. Had xray done of the foot this was negative for osteomyelitis. He had ABIs completed this indicated adequate blood flow for healing with the toe pressures. We took him out of work. We had him sized for a CAGE boot; he is wearing this; broke the bottom strap once; had this repaired; is not having any hip or knee pain. Trying to stay off the foot as much possible. Had him see Dr. Garcia; had CRP done this was normal; no MRI recommended; no surgery recommended at this time; will reconsider if the wound does not heal. Was hospitalized recently for gall stones; feeling slightly better today; has lost 15 pounds in total from this; appetite slowly returning. He saw his PMD; saw diabetes educator; states he learned so much from them; really enjoyed this; his fs are under better control, however running 250 this am. We have been grafting the foot ulcer with epifix; this has been going well.     Allergies: Crestor [rosuvastatin] and Demeclocycline    Physical Exam:    /78  Pulse 72  Temp 97.7  F (36.5  C) (Temporal)   Resp 16    General:  Patient presents to clinic in no apparent  distress.  Head: normocephalic atraumatic  Psychiatric:  Alert and oriented x3.   Respiratory: unlabored breathing; no cough  Integumentary:  Skin is uniformly warm, dry and pink.    Wound #1 Location: right shin  Size:healed. Periwound: no denudement, erythema, induration, maceration or warmth.    Wound #2 Location: right plantar foot 5th met head  Size: discolored callous which measures 2x2cm; this was pared down to reveal intact skin; there is not edema in the foot  No undermining present. Periwound: no denudement, erythema, induration, maceration or warmth.        Circumferential volume measures:    Vasc Edema 3/6/2017 4/17/2017 6/2/2017   Right just above MTP 23 24 23   Right Ankle 25 26 23.5   Right Widest Calf 37.5 38.6 37   Left - just above MTP 22 - -   Left Ankle 22 - -   Left Widest Calf 36 - -         Ulceration(s)/Wound(s):   Wound 07/24/17 Right lateral foot (Active)   Pre Size Length 0.5 9/25/2017  8:00 AM   Pre Size Width 1 9/25/2017  8:00 AM   Pre Total Sq cm 0.5 9/25/2017  8:00 AM   Post Size Length 0.2 9/18/2017  9:00 AM   Post Size Width 1 9/18/2017  9:00 AM   Post Size Depth 0 9/18/2017  9:00 AM   Post Total Sq cm 0.2 9/18/2017  9:00 AM   Undermined 0.75 9/8/2017 11:00 AM   Description scab/callous 9/25/2017  8:00 AM   Prodcut Used Alginate 8/10/2017 11:00 AM       Wound 09/05/17 Foot Right (Active)           Lab Values  No results found for: SEDRATE  Lab Results   Component Value Date    CREATININE 1.33 (H) 09/06/2017     Lab Results   Component Value Date    HGBA1C 10.5 (H) 09/05/2017     Lab Results   Component Value Date    BUN 44 (H) 09/06/2017     Lab Results   Component Value Date    ALBUMIN 2.3 (L) 09/06/2017     No results found for: [id number]          Impression:   Right shin traumatic wound healed   right diabetic foot ulcer  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 Excisional debridement of the ulcer(s) was recommended today, after consent was obtained and 2% Xylocaine was applied using a 15 blade scalpel the epidermal and dermal was pared down for a total square cm of 4. The non-viable and necrotic tissue was removed and the wounds appeared healed underneath          2. Edema: will apply tubigrip           3.  Wound treatment: ABIs indicated he had adequate blood flow for wound healing;  Culture grew out strep he completed the clindamycin and  penicillin VK. We used epifix previously for his shin; this worked very well; he is covered for this with a copay;  xray 3 view standing was negative for osteomyelitis; wound appears healed; will cover with mepilex border to protect the fragile new scar tissue           4. Nutrition: diabetic diet; needs tighter control; saw his pmd about this; met with diabetic educator; is doing much better already with this           5. Offloading: this is his driving foot; did not want to do cast; continue CAGE boot; will have him see [facility] for diabetic shoes and inserts, will take him out of work; needs to stay off the foot; filled out paperwork previously     Patient will follow up with me in 1 weeks for reevaluation. They were instructed to call the clinic sooner with any signs or symptoms of infection or any further questions/concerns. Answered all questions.    Ines Zimmerman DNP, RN, CNP, [facility]  [facility] [place]  [phone]

## 2021-06-13 NOTE — PROGRESS NOTES
Follow up Vascular Visit       Date of Service:10/23/2017    Date Last Seen: 7/17/2017; 7/17/2017    Chief Complaint: right foot ulcer    History:   Past Medical History:   Diagnosis Date     BPH (benign prostatic hyperplasia)      Cholelithiasis      Diabetes mellitus      Essential hypertension      Hyperlipidemia      Pancreatitis        Pt returns to the Vascular clinic with regards to their right foot ulcer; healed right shin ulcer. Pt arrives alone today. We were previously seeing him for a right shin ulcer. He reports that he purchased new shoes 3 months ago from [employer] shoes he was previously having pain in the toes from his old shoes so he purchased shoes with larger toe box. His fs have been running at baseline 300-450; tolerated the PCN VK, he has since completed this and course of clindamycin. Had xray done of the foot this was negative for osteomyelitis. He had ABIs completed this indicated adequate blood flow for healing with the toe pressures. We took him out of work. We had him sized for a CAGE boot; he is wearing this; broke the bottom strap once; had this repaired; is not having any hip or knee pain. Trying to stay off the foot as much possible. Had him see Dr. Garcia; had CRP done this was normal; no MRI recommended; no surgery recommended at this time; will reconsider if the wound does not heal. Was hospitalized recently for gall stones; feeling slightly better today; has lost 15 pounds in total from this; appetite slowly returning. He saw his PMD; saw diabetes educator; states he learned so much from them; really enjoyed this; his fs are under better control, however running 250 this am. We previously were grafting the foot ulcer with epifix; wound was healed the past 2 visits; we kept covered with mepilex border; this stayed in place. He went to [employer] and had impressions made; he has his shoes now; has slowly broken these in no issues; he purchased a lighted mirror and is doing foot  "inspections daily    Allergies: Crestor [rosuvastatin] and Demeclocycline    Physical Exam:    Resp 16  Ht 5' 9\" (1.753 m)  Wt 205 lb (93 kg) Comment: per pt report  BMI 30.27 kg/m2    General:  Patient presents to clinic in no apparent distress.  Head: normocephalic atraumatic  Psychiatric:  Alert and oriented x3.   Respiratory: unlabored breathing; no cough  Integumentary:  Skin is uniformly warm, dry and pink.    Wound #1 Location: right shin  Size:healed. Periwound: no denudement, erythema, induration, maceration or warmth.    Wound #2 Location: right plantar foot 5th met head  Size: healed there is no edema in the foot  No undermining present. Periwound: no denudement, erythema, induration, maceration or warmth.        Circumferential volume measures:    Vasc Edema 3/6/2017 4/17/2017 6/2/2017   Right just above MTP 23 24 23   Right Ankle 25 26 23.5   Right Widest Calf 37.5 38.6 37   Left - just above MTP 22 - -   Left Ankle 22 - -   Left Widest Calf 36 - -         Ulceration(s)/Wound(s):   none        Lab Values  No results found for: SEDRATE  Lab Results   Component Value Date    CREATININE 1.33 (H) 09/06/2017     Lab Results   Component Value Date    HGBA1C 10.5 (H) 09/05/2017     Lab Results   Component Value Date    BUN 44 (H) 09/06/2017     Lab Results   Component Value Date    ALBUMIN 2.3 (L) 09/06/2017     No results found for: [id number]          Impression:   Right shin traumatic wound healed   right diabetic foot ulcer-healed  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 Excisional debridement of the ulcer(s) was not recommended today as the skin was intact          2. Edema: will apply tubigrip           3.  Wound treatment: ABIs indicated he had adequate blood flow for wound healing;  Culture grew out strep he completed the clindamycin and  penicillin VK. We used epifix " previously for his shin; this worked very well; he is covered for this with a copay;  xray 3 view standing was negative for osteomyelitis; wound appears healed; will cover with mepilex border to protect the fragile new scar tissue; his home visiting nurse can change this in 1 week; still having some tenderness In the area will send message to Dr. Garcia and see if any further testing or imaging is required at this time. ADDENDUM: I spoke with Dr. Garcia and he recommended the need for custom AFO with custom foot plate to hold the foot in a rectus position; will speak with patient at next visit if his pain persists           4. Nutrition: diabetic diet; needs tighter control; saw his pmd about this; met with diabetic educator; is doing much better already with this           5. Offloading:   diabetic shoes and inserts, continue daily skin checks; will resume work 11/1/17; note written     Patient will follow up with me in 2-3 weeks for reevaluation. They were instructed to call the clinic sooner with any signs or symptoms of infection or any further questions/concerns. Answered all questions.    Ines Zimmerman DNP, RN, CNP, CWN  [facility]  [phone]

## 2021-06-13 NOTE — ANESTHESIA CARE TRANSFER NOTE
Last vitals: see anesthesia flowsheet for latest set of vitals    Patient's level of consciousness is drowsy, responding to questions  Spontaneous respirations: yes  Maintains airway independently: yes  Dentition unchanged: yes  Oropharynx: oropharynx clear of all foreign objects    QCDR Measures:  ASA# 20 - Surgical Safety Checklist: WHO surgical safety checklist completed prior to induction    PQRS# 430 - Adult PONV Prevention: 4558F - Pt received => 2 anti-emetic agents (different classes) preop & intraop  ASA# 8 - Peds PONV Prevention: NA - Not pediatric patient, not GA or 2 or more risk factors NOT present  PQRS# 424 - Renu-op Temp Management: 4559F - At least one body temp DOCUMENTED => 35.5C or 95.9F within required timeframe  PQRS# 426 - PACU Transfer Protocol: - Transfer of care checklist used  ASA# 14 - Acute Post-op Pain: ASA14B - Patient did NOT experience pain >= 7 out of 10     Report given to floor RN via phone. Patient transported to floor per CNA, pt on RA, stable upon transfer, will monitor closely per primary team

## 2021-06-13 NOTE — ANESTHESIA PROCEDURE NOTES
Peripheral Block    Patient location during procedure: pre-op  Start time: 11/27/2020 11:01 AM  End time: 11/27/2020 11:03 AM  post-op analgesia per surgeon order as noted in medical record  Staffing:  Performing  Anesthesiologist: Nissa Whipple MD  Preanesthetic Checklist  Completed: patient identified, site marked, risks, benefits, and alternatives discussed, timeout performed, consent obtained, airway assessed, oxygen available, suction available, emergency drugs available and hand hygiene performed  Peripheral Block  Block type: sciatic, popliteal  Prep: ChloraPrep  Patient position: supine  Patient monitoring: blood pressure, heart rate, continuous pulse oximetry and cardiac monitor  Laterality: right  Injection technique: ultrasound guided    Ultrasound used to visualize needle placement in proximity to nerve being blocked: yes   US used to visualize anesthetic spread  Visualized anatomic structures normal  No Pathological Findings  Permanent ultrasound image captured for medical record  Sterile gel and probe cover used for ultrasound.  Needle  Needle type: Stimuplex   Needle gauge: 20G  Needle length: 4 in  no peripheral nerve catheter placed  Assessment  Injection assessment: no difficulty with injection, negative aspiration for heme, no paresthesia on injection and incremental injection

## 2021-06-13 NOTE — TELEPHONE ENCOUNTER
11.16 - called x 1 - spoke with spouse Virginia. appt cancelled today, because pt having surgery per dr bhakta. they have our number to call and reschedule when he is out of surgery and better.

## 2021-06-13 NOTE — PROGRESS NOTES
Assessment: Frantz is in for a follow up on his diabetes management.  His blood sugars have come down about 100mg/dl on average since I first saw him.  He is currentlly taking 36 units of tresiba & 1.2mg of victoza.  His morning blood sugars for the past 4 days have been in the high 100s.  Frantz states he has felt lows in the past when he hits the 80-90s.  I discussed how his body will become more tolerant of these normal blood sugars as we slowly lower his BG's.  His lowest reading was 105 before dinner when he was out cutting the grass on a rider mower.  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  I discussed how mental or physical exercise will drop his blood sugars.  He still has many blood sugars 200-300 after meals so we discussed carb counting briefly today.  I suggested increasing his tresiba to 42 units and titrating upwards until he starts to have a couple numbers <100.  When he is able to start walking and be active, I advised him to drop his dose by 6 units.  Today I went over diabetes pathophysiology,  A1C & bg goals, preventing future complications, portion sizing, & carb counting.  He has been eating some biscuits or bagels late at night which have been spiking his bedtime blood sugars.  He is starting to see patterns of higher blood sugars after eating desserts or drinking juice.  I found out he has been injecting his insulin through his clothes & re-using his needles afterwards.  I highly advised him not to inject through his clothes and to reuse needles if they have only touched his skin injection site.  He now knows to change them at least every 2-3 days if they are straight and have not touched any of his clothes.        Plan: Frantz will continue to check his blood sugars 3-4x/daily.  He will take 42 units every morning along with his victoza 1.2mg.  He will adjust his dose + or - 2 units if he is consistently above 130 or starts to have  numbers <100.  His new goal range is .  Once he is cleared to be more active, he will decrease his dose by 6 units to prevent low blood sugars.  He will carry spare glucose with him for any lows.  He agreed to work towards an a1c goal of <8.0 today, but feels 7.0 might be too tough.  We will follow up in 2 weeks.    Subjective and Objective:      Caleb Hernandez is referred by Dr. Chaz Machado for Diabetes Education.     Lab Results   Component Value Date    HGBA1C 10.5 (H) 09/05/2017         Current diabetes medications:  victoza 1.2mg, tresiba 36 units    Goals       a1c <8.0.            9/22/17:  Frantz will work on reducing his a1c <8.0 to reduce complications from diabetes.        monitoring            9/22/17:  Frantz will start to check his blood sugars 2x/daily.  10/6/17:  Met and continuing.  He is checking his blood sugars 4x/daily now.            Follow up:   Primary care visit  CDE (certified diabetic educator)      Education:     Monitoring   Meter (per above goals): Assessed, Discussed and Literature provided  Monitoring: Assessed, Discussed and Literature provided  BG goals: Assessed, Discussed and Literature provided    Nutrition Management  Nutrition Management: Assessed and Discussed  Weight: Assessed and Discussed  Portions/Balance: Assessed and Discussed  Carb ID/Count: Assessed and Discussed  Label Reading: Not addressed  Heart Healthy Fats: Not addressed  Menu Planning: Discussed  Dining Out: Not addressed  Physical Activity: Assessed and Discussed  Medications: Assessed and Discussed  Orals: Not addressed  Injected Medications: Assessed and Discussed   Storage/Exp:Assessed and Discussed   Site Rotation: Assessed and Discussed   Sites Assessed: yes    Diabetes Disease Process: Assessed and Discussed    Acute Complications: Prevent, Detect, Treat:  Hypoglycemia: Assessed and Discussed  Hyperglycemia: Assessed and Discussed  Sick Days: Not addressed  Driving: Not addressed    Chronic  Complications  Foot Care:Assessed and Discussed  Skin Care: Not addressed  Eye: Assessed and Discussed  ABC: Assessed and Discussed  Teeth:Not addressed  Goal Setting and Problem Solving: Assessed and Discussed  Barriers: Assessed and Discussed  Psychosocial Adjustments: Assessed and Discussed      Time spent with the patient: 60 minutes for diabetes education and counseling.   Previous Education: yes  Visit Type:KATHARINA Vale  10/6/2017

## 2021-06-13 NOTE — PROGRESS NOTES
[facility] Follow Up Note    Caleb Hernandez   81 y.o. male    Date of Visit: 9/21/2017    Chief Complaint   Patient presents with     Follow-up     Diabetes     Subjective  This is an 81-year-old man with known hypertension and diabetes.  Sugars have not been under the best of control.  He has been dealing with some lower extremity ulcerations 1 of which is healed but he now has one on his foot which is still seeing the wound clinic.  He was hospitalized earlier this month with abdominal pain resulting from bile duct obstruction.  He did undergo an ERCP at [facility] and has been doing relatively well since then.  His biggest concern other than the ulcer is been that his diabetes is not well controlled.  Sugars are consistently running in the 250-260 range.  No other new issues at this time.    ROS A comprehensive review of systems was performed and was otherwise negative    Medications, allergies, and problem list were reviewed and updated    Exam  General Appearance:   On examination his blood pressure is 136/60.  Weight is 197 pounds and height is 69 inches.  BMI is 29.09.    Heart is in sinus rhythm with a rate of 68 and no ectopy.    No new peripheral edema.    He is in a boot on the right foot from the vascular clinic and I did not undo to look at his ulceration.    Patient is alert and oriented ×3.      Assessment/Plan  1. Type 2 diabetes mellitus with other skin ulcer  Ambulatory referral to Diabetes Education (Existing Diagnosis)   2. Essential hypertension with goal blood pressure less than 140/90     3. Choledocholithiasis     4. Diabetic ulcer of right foot associated with type 2 diabetes mellitus       Diabetes.  Not particularly well controlled at this time.  We will set him up to see diabetic education and then follow-up with him after that.    Hypertension.  Stable.  Continue current medication.    Foot ulceration.  Continue to see the vascular clinic.    I will see  "him back within the next 2-3 weeks.  Total time of this office visit was 25 minutes with greater than 50% of the time spent in care coordination and patient counseling.  The following high BMI interventions were performed this visit: weight monitoring    Chaz Machado MD      Current Outpatient Prescriptions on File Prior to Visit   Medication Sig     aspirin 81 MG EC tablet Take 81 mg by mouth daily.     blood glucose test (CONTOUR TEST STRIPS) strips Use 1 each As Directed 2 (two) times a day. Dispense brand per patient's insurance at pharmacy discretion.     cholecalciferol, vitamin D3, (VITAMIN D3) 5,000 unit Tab Take 1 tablet by mouth daily.     ibuprofen (ADVIL,MOTRIN) 200 MG tablet Take 200 mg by mouth every 6 (six) hours as needed for pain.      insulin degludec (TRESIBA FLEXTOUCH U-100) 100 unit/mL (3 mL) InPn Inject 20 Units under the skin every morning.     liraglutide (VICTOZA) 0.6 mg/0.1 mL (18 mg/3 mL) PnIj injection Inject 0.2 mL (1.2 mg total) under the skin daily. With or without food.     lisinopril (PRINIVIL,ZESTRIL) 5 MG tablet TAKE 1 TABLET BY MOUTH ONCE DAILY (Patient taking differently: Take 5 mg by mouth daily. TAKE 1 TABLET BY MOUTH ONCE DAILY)     MULTIVITS,CA,MIN/IRON/FA/LYCOP (CENTRUM MEN ORAL) Take 1 tablet by mouth daily.     naproxen sodium (ALEVE) 220 MG tablet Take 220 mg by mouth daily.     pen needle, diabetic 31 gauge x 5/16\" Ndle Used to inject insulin daily     traMADol (ULTRAM) 50 mg tablet Take 50 mg by mouth every 6 (six) hours as needed for pain.     vitamin E 400 unit capsule Take 400 Units by mouth daily.     Current Facility-Administered Medications on File Prior to Visit   Medication     lidocaine 2 % jelly (XYLOCAINE)     Allergies   Allergen Reactions     Crestor [Rosuvastatin] Myalgia     Demeclocycline Hives     Social History   Substance Use Topics     Smoking status: Former Smoker     Quit date: 11/11/1991     Smokeless tobacco: Never Used     Alcohol use No "

## 2021-06-13 NOTE — TELEPHONE ENCOUNTER
He completed his 10 day round of antibiotics. She would like to know if he is supposed to continue? If so, refill needed.

## 2021-06-13 NOTE — ANESTHESIA PROCEDURE NOTES
Peripheral Block    Patient location during procedure: pre-op  Start time: 12/17/2020 2:04 PM  End time: 12/17/2020 2:05 PM  post-op analgesia per surgeon order as noted in medical record  Staffing:  Performing  Anesthesiologist: Юлия Yo MD  Preanesthetic Checklist  Completed: patient identified, site marked, risks, benefits, and alternatives discussed, timeout performed, consent obtained, airway assessed, oxygen available, suction available, emergency drugs available and hand hygiene performed  Peripheral Block  Block type: saphenous, adductor canal block  Prep: ChloraPrep  Patient position: supine  Patient monitoring: continuous pulse oximetry, heart rate, blood pressure and cardiac monitor  Laterality: right  Injection technique: ultrasound guided    Ultrasound used to visualize needle placement in proximity to nerve being blocked: yes   US used to visualize anesthetic spread  Visualized anatomic structures normal  No Pathological Findings  Permanent ultrasound image captured for medical record  Sterile gel and probe cover used for ultrasound.  Needle  Needle type: echogenic   Needle gauge: 20G  Needle length: 4 in  no peripheral nerve catheter placed  Assessment  Injection assessment: no difficulty with injection, negative aspiration for heme, no paresthesia on injection and incremental injection

## 2021-06-13 NOTE — TELEPHONE ENCOUNTER
Surgery Scheduled      Surgery/Procedure: Incsion and drainage right foot with amputation of the fifth ray poss peroneal brevis tendon     Special Equipment: TBD    Location: [facility]     Date: 11/16/2020    Time: 13:00    Surgeon: Dr. Garcia    OR Confirmed/ :  Yes with Rhonda  on 11/11    Orders In:  Yes    Entered on [employer] / [employer] Calendar:  Yes    Post Op: 12/2 & 12/9 @ [facility]    Covid Scheduled: 11/12 [facility]    Blood Thinners Addressed:  Yes fred

## 2021-06-13 NOTE — TELEPHONE ENCOUNTER
[facility] Nurse Triage Telephone Note      Provider: YOUSUF Jamil  Facility: [facility]    Facility Type: TCU    Caller: Tonie  Call Back Number:  [phone]    Allergies: Cresol, Crestor [rosuvastatin], and Declomycin [demeclocycline]    Reason for call: Nurse calling to report Hgb results.  NP states that patient had some hematuria, but has since cleared.  Patient does have a mai catheter.       Verbal Order/Direction given by Provider: No new orders.      Provider giving order: YOUSUF Jamil    Verbal order given to: Tonie Orr RN

## 2021-06-13 NOTE — PATIENT INSTRUCTIONS - HE
Start antibiotic as ordered.    Right foot ulcer: Dry gauze, change every other day.    Restart wound vac after surgery per instructions below:        - Wound Vac Instructions    1. 3x weekly and as needed cleanse the wound with NS    2. Pat dry    3. Apply Cavilon no sting barrier wipe to the skin surrounding the wound to protect from drainage/maceration    4. Apply drape to doug wound. Cut strip of drape and apply to skin if bridging is needed; plan this area in advance; should not be over bony prominence     5. Cut the foam to fit the size of the wound    6. Apply foam to wound    7. Cut narrow strip of foam if bridging if needed    8. Cover foam with drape to obtain air tight seal    9. Cut opening the size of a quarter for where the suction pad will be applied    10. Apply Suction pad    11. 125mmHG suction continuous     [employer] can be reached at [phone], 24 hours a day 7 days a week     - If you do not have a back up plan in place:     If the negative pressure wound therapy malfunctions or unable to maintain seal: dressing must be removed and reapplied within 2 hours of the incident. If unable to reapply negative pressure wound dressing, place Normal Saline moistened gauze in wound bed and cover with appropriate dressing to keep wound bed moist.  Change wet-to-dry dressing two times a day until healthcare staff can re-implement negative pressure therapy. Change canister at least weekly.  [employer] can be reached at [phone], 24 hours a day 7 days a week    - Information on Vacuum-Assisted Closure of a Wound  Vacuum-assisted closure (VAC) of a wound is a type of treatment to help wounds heal. It s also known as negative pressure wound therapy. During the treatment, a device lowers air pressure on the wound. This can help the wound heal more quickly.  - Understanding the wound VAC system  A wound VAC system has several parts. A foam or gauze dressing is put directly on the  wound. The dressing is changed every 24 to 72 hours. An adhesive film covers and seals the dressing and wound. A drainage tube leads from under the adhesive film and connects to a portable vacuum pump. This pump removes air pressure over the wound. It may do this constantly. Or it may do it in cycles. During the treatment, you ll need to carry the portable pump everywhere you go.  - Why wound VAC is used  You might need this therapy for a recent traumatic wound. Or you may need it for a chronic wound. This is a wound that does not heal the way it should over time. This can happen with wounds in people who have diabetes. You may need a wound VAC if you ve had a recent skin graft. And you may need a wound VAC for a large wound. Large wounds can take a longer time to heal.  A wound vacuum system may help your wound heal more quickly by:    Draining extra fluid from the wound    Reducing swelling    Reducing bacteria in the wound    Keeping your wound moist and warm    Helping draw together wound edges    Increasing blood flow to your wound    Decreasing inflammation  Wound VAC offers some other advantages over other types of wound care. It may decrease your overall discomfort. The dressings usually need to be changed less often. And they may be easier to keep in position.  - Risks of wound VAC  Wound VAC has some rare risks, such as:    Bleeding (which may be severe)    Wound infection    An abnormal connection between the intestinal tract and the skin (enteric fistula)  Proper training in dressing changes can help reduce the risk for these complications. Also, your doctor will carefully evaluate you to make sure you are a good candidate for the therapy. Certain problems can increase your risk for complications. These include:    Exposed organs or blood vessels    High risk of bleeding from another medical problem    Wound infection    Nearby bone infection    Dead wound tissue    Cancer tissue    Fragile skin, such as  from aging or longtime use of topical steroids    Allergy to adhesive    Very poor blood flow to your wound    Wounds close to joints that may reopen because of movement  Your doctor will discuss the risks that apply to you. Make sure to talk with him or her about all of your questions and concerns.  - Getting ready for wound VAC  You likely won t need to do much to get ready for wound VAC. In some cases, you may need to wait a while before having this therapy. For example, your doctor may first need to treat an infection in your wound. Dead or damaged tissue may also need to be removed from your wound.  You or a caregiver may need training on how to use the wound VAC device. This is done if you will be able to have your wound vacuum therapy at home. In other cases, you may need to have your wound vacuum therapy in a health care facility.  - On the day of your procedure  A health care provider will cover your wound with foam or gauze wound dressing. An adhesive film will be put over the dressing and wound. This seals the wound. The foam connects to a drainage tube, which leads to a vacuum pump. This pump is portable. When the pump is turned on, it draws fluid through the foam and out the drainage tubing. The pump may run constantly, or it may cycle off and on. Your exact setup will depend on the specific type of wound vacuum system that you use.  - Managing your wound  You may need the dressing changed about once a day. You may need it changed more or less often, depending on your wound. You or your caregiver may be trained to do this at home. Or it may be done by a visiting health care provider. Your doctor may prescribe a pain medicine. This is to prevent or reduce pain during the dressing change.  You will likely need to use the wound VAC system for several weeks or months. During this time, you ll carry the portable pump everywhere you go.  - Nutrition for wound healing  During this time, make sure you follow a  healthy diet. This is needed so the wound can heal and to prevent infection. Your doctor can tell you more about what to include in your diet during this time.  follow up with your doctor if you have a medical condition that led to your wound, such as diabetes. He or she can help you prevent future wounds.  - Follow-up care  Your doctor will carefully keep track of your healing. Make sure to keep all follow-up appointments.  - When to call your health care provider  Call your health care provider right away if you have any of these:    Fever of 100.4 F (38.0 C) or higher    Increased redness, swelling, or warmth around wound    Increased pain    Bright red blood or blood clots in tubing or the collection chamber of the vacuum             Surgery Date Monday, November 16, 2020    Surgery Time 1:00 PM, ARRIVAL 11:00 AM  ( Arrive at the hospital two hours prior to your surgery and 1 hour prior at the surgery center. Check in at the . The only exception is if you are scheduled for surgery at 7am, you should arrive at 5:30am)    IF YOU NEED TO RESCHEDULE OR CANCEL YOUR SURGERY FOR ANY REASON PLEASE CALL THE CLINIC AS SOON AS POSSIBLE -[phone].    Admission Type: Outpatient    Surgeon: Dr. Garcia    Surgery Procedure: Incision and drainage with amputation    Surgery Location: [facility]    Anticoagulation Schedule      1. Eliquis: do not take Saturday, Sunday, or Monday (the day of your surgery).   2. Aspirin: do not take any aspirin until the day after surgery    Lantus insulin, reduce dose by 50% at bedtime the night before surgery.     Victoza skip the morning dose on the day of surgery.  Resume with the morning dose the day after surgery.     Lisinopril skip the morning of surgery.  Resume the day after surgery.    Additional Information: If you use a CPAP machine for sleep apnea please bring it with you for surgery. We will want to monitor your breathing using your normal equipment.      HOSPITAL AND SURGERY CENTER INFORMATION    We need to know a lot of information about you before surgery.     1-5 Days Before:     A nurse will call you for a pre-screening interview. The phone call with the nurse will be much faster and easier if you  Have organized your information prior to the call.     Please have the following information available:    Preoperative Exam completed and faxed to our office    Primary care provider's and any specialty providers' contact information available. .    If requested by your primary care provider, have any heart and lung exams at least 3 days before surgery.    Have a complete and accurate list of medications available.     Have a list of your allergies/sensitivities and reactions    Know your health history, surgical history, and medical problems    Know any anesthesia issues with you or within your family.     BE SURE TO NOTIFY US IF YOU ARE TAKING ANY BLOOD THINNING AGENTS: Coumadin, Plavix, Aspirin, Xarelto, Eliquis    Someone planned to bring you and stay with you after the surgery    Day Before Surgery    1. STOP Smoking and Drinking: It is important to stop smoking and drinking at least 24 hours before surgery. Smoking and drinking may cause complications in your recovery from anesthesia and may lengthen the healing process.    2. Pack for your hospital stay: If it will be required for you to stay at the hospital after surgery, bring personal items such as a robe, slippers, pajamas, additional clothing and toiletries. Don't forget:    List of medication    Eyeglass case or contact case with solution. You cannot wear contacts during surgery    Copies of your physical exam , lab results and EKG    Copy of Health Care Directives, Living Will or Power of     Insurance Cards    Photo ID    CPAP machine    3. NOTHING BY MOUTH 8 HOURS BEFORE. This includes gum, hard candy, water and mints. The only exception is if you have been instructed by your doctor to  take your medications with sips of water. You may rinse your mouth or brush your teeth, but do not swallow water.     4. Remove Nail Polish  Day of Surgery    1. Medications- Take as indicated with sips of water     2. Wear comfortable loose fitting clothes. Wear your glasses-Not contacts. Do not wear jewelry and remove body piercing's. Surgery may be cancelled if they are not removed.     3. If same day surgery-Have a someone come with you to surgery that can help you understand the surgeon's instructions, drive you home and stay with you overnight the first night.     4. A nurse will call you at home within 72 hours following surgery to see how you are doing. Our nurses and doctors will discuss recovery with you.        The surgery scheduler Elvia Pena at [phone] will contact you to schedule if you were not scheduled today.     You will need a preop physical within 30 days before surgery with your primary care provider. Please note if you do not get a complete History and Physical your surgery will be cancelled.   Please contact your primary to schedule.     A nurse should contact you from the hospital 1-2 days before surgery to review with you.    If you would like a [place] Estimate for your upcoming service/procedure contact Cost of Care Estimates at [phone], advocates are available Monday through Friday 8am - 5pm. You may also submit a request online at [email or web address]    [facility]  [address]  [place]  [address]     [phone] for facility charge    Anesthesiology charge  [phone]          Please don't hesitate to call us with any additional question or problems  Our number is [phone]     Instructions for Patients Scheduled for Vascular or Podiatry Procedures During the COVID 19 Pandemic    Your Provider has determined that your condition warrants going ahead with your procedure at this  time.  You will need to be tested for COVID19 within 72 hours of your procedure. We highly encourage patients to get tested for COVID-19 at one of our designated [facility] testing sites. We process the tests in our lab, which allows us to get the results quickly. If you choose to get tested at a non-[facility] location, you will need to contact your primary care provider to make those arrangements and ensure the results are available to your surgeon before you are arriving for your procedure. If we do not receive the results in time, your procedure may be postponed or canceled.  Please make sure your test is collected 3-days prior to your procedure date.  The results will need to get faxed to [phone].  After testing, you will need to remain in self-quarantine until your procedure.  If you are notified of a positive COVID-19 result; you will need to call your provider for further recommendations.    Please call [phone] and press option 2 to speak to a nurse.  If the test is positive; DO NOT PRESENT FOR YOUR PROCEDURE until you have been given further instructions.  You will not be called with negative results so arrive as instructed unless otherwise notified.  Don't:    Don't invite visitors or friends into your home.    Don't leave your home unless absolutely necessary.    Don't share utensils and other household items with others in the home.  Do:    Wash your hands regularly with soap and water and use hand  with at least 60% alcohol if you don't have easy access to soap and water.     Disinfect surface areas daily, including doorknobs, electronics - especially phones, laptops and other devices.     Wash utensils and other items thoroughly.     Separate yourself from others in the household as best you can, including pets.    Keep your hands away from your face.     Practice all other prevention tips the CDC recommends, including covering your coughs and sneezes with a tissue or  your sleeve and immediately throwing the tissue into the trash and washing your hands.    Call your hospital if you develop the following signs before your surgery:    Fever.    Cough.    Shortness of breath.    Sore throat.    Runny or stuffy nose.    Muscle or body aches.    Headaches.    Fatigue.    Vomiting and diarrhea.    These steps will help keep you & your family, other patients, and hospital staff safe from COVID19.         [facility] Virtual Check-In Number    [phone]    When you arrive for your IN OFFICE visit. Please call this number from the parking lot.      The staff will then virtually check you in and meet you at the door to escort you to a room.    If you arrive early and a room is not yet ready for you staff may have you wait can call you back when they are ready.     Please remember to wear a mask into your appointment     No Visitors Allowed    If you are having any symptoms- cough, fever, rash, loss of taste and smell or shortness of breath we ask that you please call and reschedule your appointment.

## 2021-06-13 NOTE — ANESTHESIA CARE TRANSFER NOTE
Last vitals:   Vitals:    11/16/20 1418   BP: 120/63   Pulse: 61   Resp: 16   Temp: 36.5  C (97.7  F)   SpO2: 95%     Patient's level of consciousness is awake  Spontaneous respirations: yes  Maintains airway independently: yes  Dentition unchanged: yes  Oropharynx: oropharynx clear of all foreign objects    QCDR Measures:  ASA# 20 - Surgical Safety Checklist: WHO surgical safety checklist completed prior to induction    PQRS# 430 - Adult PONV Prevention: 4558F - Pt received => 2 anti-emetic agents (different classes) preop & intraop  ASA# 8 - Peds PONV Prevention: NA - Not pediatric patient, not GA or 2 or more risk factors NOT present  PQRS# 424 - Renu-op Temp Management: 4559F - At least one body temp DOCUMENTED => 35.5C or 95.9F within required timeframe  PQRS# 426 - PACU Transfer Protocol: - Transfer of care checklist used  ASA# 14 - Acute Post-op Pain: ASA14B - Patient did NOT experience pain >= 7 out of 10

## 2021-06-13 NOTE — ANESTHESIA PREPROCEDURE EVALUATION
Anesthesia Evaluation        Airway   Mallampati: II  Neck ROM: full   Pulmonary - negative ROS and normal exam                          Cardiovascular - normal exam  (+) hypertension, , hypercholesterolemia, PVD    ECG reviewed        Neuro/Psych    (+) neuromuscular disease,      Endo/Other    (+) diabetes mellitus,      Comments: Diabetic foot ulcer    GI/Hepatic/Renal - negative ROS           Dental    (+) lower dentures and upper dentures                         Anesthesia Plan  Planned anesthetic: general mask and total IV anesthesia  Right pop/saph nerve for post operative pain  ASA 3   Induction: intravenous   Anesthetic plan and risks discussed with: patient    Post-op plan: routine recovery

## 2021-06-13 NOTE — PROGRESS NOTES
Assessment: Frantz is in for a follow up on his diabetes management.  He is checking his blood sugars 4x/daily before meals and bedtime.  At breakfast, his averages are 120-190, lunch are 170-220, dinner are 120-200, & 150-250 at bedtime.  His lowest number was 83 before dinner time when he was mowing the lawn and doing more activity.  He is hoping to start work again on November 1st.  We will meet on Halloween to see if this is still going to happen and adjust his doses for increased exercise.  He occasionally has a blood sugar in the 300s, but has nearly eliminated them since we started.  He is taking 42 units of tresiba and 1.2mg of victoza.  He numbers vary 120 points at meals so I discussed counting carbohydrates with him today.  I highlighted the importance of the a1c test and what it means to his foot healing, preventing stroke/MI/infection, & preserving his kidneys/eyes/nerves.  We agreed on a goal of 8.0 or just below to reduce his risks & help heal his foot.  I believe Frantz can take his diabetes management to the next level and needs to if he wants to see age 89 like he states.  Because his mealtime numbers are widely variable, I would like to see if increasing his victoza to 1.8mg would stabilize these numbers.  I asked him to call me if he has any side effects from increasing this dose.    Plan: Frantz will increase his tresiba to 44 units and his victoza to 1.8mg.  He will continue to check his blood sugar 4x/day and be cautious on the days he does more activity.  I asked him to carry a protein/granola bar for in case he goes low or to eat before more exercise.  He will call me if he is having lows or if he feels differently on the higher dose of victoza.  He will start to make more notes on why he believes his numbers are too low or too high based on his meals/stress/exercise.  We will follow up again in 2 weeks before he is scheduled to go back to work.      Subjective and Objective:      Caleb Hernandez is  referred by Dr. Machado for Diabetes Education.     Lab Results   Component Value Date    HGBA1C 10.5 (H) 09/05/2017         Current diabetes medications:  victoza 1.2mg, tresiba 42 units    Goals       a1c <8.0.            9/22/17:  Frantz will work on reducing his a1c <8.0 to reduce complications from diabetes.        monitoring            9/22/17:  Frantz will start to check his blood sugars 2x/daily.  10/6/17:  Met and continuing.  He is checking his blood sugars 4x/daily now.            Follow up:   Primary care visit  CDE (certified diabetic educator)      Education:     Monitoring   Meter (per above goals): Assessed, Discussed and Literature provided  Monitoring: Assessed and Discussed  BG goals: Assessed, Discussed and Literature provided    Nutrition Management  Nutrition Management: Assessed and Discussed  Weight: Assessed and Discussed  Portions/Balance: Assessed and Discussed  Carb ID/Count: Assessed and Discussed  Label Reading: Not addressed  Heart Healthy Fats: Not addressed  Menu Planning: Assessed and Discussed  Dining Out: Not addressed  Physical Activity: Assessed and Discussed  Medications: Assessed and Discussed  Orals: Not addressed  Injected Medications: Assessed and Discussed   Storage/Exp:Assessed and Discussed   Site Rotation: Assessed and Discussed   Sites Assessed: no    Diabetes Disease Process: Assessed, Discussed and Literature provided    Acute Complications: Prevent, Detect, Treat:  Hypoglycemia: Assessed and Discussed  Hyperglycemia: Assessed and Discussed  Sick Days: Not addressed  Driving: Not addressed    Chronic Complications  Foot Care:Assessed and Discussed  Skin Care: Not addressed  Eye: Assessed and Discussed  ABC: Assessed and Discussed  Teeth:Not addressed  Goal Setting and Problem Solving: Assessed and Discussed  Barriers: Assessed and Discussed  Psychosocial Adjustments: Assessed and Discussed      Time spent with the patient: 60 minutes for diabetes education and counseling.    Previous Education: yes  Visit Type:DSMT  Hours Remaining: DSMT 7 and MNT 3      Miguel Vale  10/20/2017

## 2021-06-13 NOTE — PROGRESS NOTES
[facility] Admission note      Patient: Caleb Hernandez  MRN: [id number]  Date of Service: 12/8/2020      [facility] [[id number]]  Reason for Visit     Chief Complaint   Patient presents with     H & P       Code Status     Full code    Assessment     -Right foot cellulitis with abscess work-up positive for osteomyelitis with septic arthritis of 2nd-4th tarsometatarsal joint  -Status post I&D of abscess on 11/27/2020  -Postoperative urinary retention with hematuria  -History of right 5th metatarsal excision on 11/2/2020  -Severe peripheral vascular disease.  -Status post right lower extremity angiogram with balloon angioplasty of anterior tibial and peroneal arteries on 11/24/2020  -- DM-2   - FTT  -Generalized weakness with deconditioning    Plan     Patient has been admitted to the TCU for strengthening and rehab.  He had a prolonged and lengthy stay in the hospital and was a readmission with multiple procedures done.  Initially was admitted after right 5th MT amputation on 11/2/2020.  Readmitted to the hospital with worsening infection.  He underwent balloon angioplasty of his right lower extremity on 11/24/2020 of the anterior tibial and peroneal arteries.  Postoperative ABIs did not show any improvement however vascular feels this could be related to spasm.  He underwent I&D of abscess on 11/27/2020.  Currently discharged to the TCU on IV ertapenem for 4 weeks.  Weekly labs as ordered.  Monitor wounds  Appears to be healing well  Pain concern are minimal and patient denies any pain  He has peripheral neuropathy.  However per chart review patient appears to be asking for tramadol at least 3 times a day.  Monitoring his intake.  On questioning he told me that it is because of his back and not because of his foot.  Diabetic control was reviewed.  Most of his blood sugars appear to be optimally controlled on his current regimen of insulin and Victoza.  Low blood sugar around 80 and highs  more than 200 also documented.  Monitor trends before adjusting insulin further.  The hospitalist discharge summary does state that his insulin was increased to 48 units of Lantus but discharge medications are still at 44 units.  Monitor trends before adjusting.  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  They feel that he should stay in the TCU and finish his antibiotics before coming home and get the appropriate wound care that he needs.   to arrange for a care conference between patient and family to discuss goals of going home  rc labs weekly  Follow-up with urology for indwelling Dietrich catheter he did fail a voiding trial in the hospital.  In addition he was noted to have an upper pole nonobstructing kidney stone and will need follow-up for that  BIMS 11/15    History     Patient is a very pleasant 84 y.o. male who is admitted to TCU  Patient presented to the hospital with right lower extremity cellulitis.  He was admitted with concerning infection.  MRI confirmed new synovitis with osteomyelitis of the 2nd-4th tarsometatarsal joint with septic arthritis.  He underwent I&D of the abscess on 11/27/2020.  Postoperatively he has been discharged nonweightbearing with outpatient ertapenem for 4 weeks  He also has a history of severe peripheral vascular disease and underwent right lower extremity angiogram with angioplasty of anterior tibial and peroneal arteries on 11/24/2020 with vascular surgery.  He will require follow-up with vascular.  Postoperatively had urinary retention with bladder wall thickening suggestive of cystitis.  He was noted to have a right upper pole kidney stone but no hydronephrosis.  Urology saw him.  He will  continue with Flomax at discharge and at the recommendation he has resumed his Eliquis and aspirin.  He has diabetes and his Lantus dose was adjusted to get tighter control of his blood sugars.  However on review he is on 44 units and not 48 units at discharge.  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 (H)     Cellulitis of right lower extremity     Abscess of right foot     Cellulitis and abscess of foot excluding toe     Osteomyelitis of right foot (H)     Statin intolerance     Personal history of PE (pulmonary embolism)-- May 2020, unprovoked, with right heart strain     PVD (peripheral vascular disease) (H)     Overweight (BMI 25.0-29.9)     Chronic anticoagulation     Acute pulmonary embolism with acute cor pulmonale (H)     Diabetic ulcer of right foot associated with type 2 diabetes mellitus (H)     Type 2 diabetes mellitus (H)     Hyperlipidemia     Essential hypertension     Past Medical History:   Diagnosis Date     BPH (benign prostatic hyperplasia)      Cholelithiasis      Common bile duct (CBD) obstruction 9/4/2017     Compression Fracture Of Thoracic Vertebral Body      Diabetes mellitus (H)      Essential hypertension      Hyperlipidemia      Pancreatitis      Rib Fracture      Sepsis due to pneumonia (H) 9/8/2017       Past Social History     Reviewed, and he  reports that he quit smoking about 29  years ago. He has never used smokeless tobacco. He reports that he does not drink alcohol or use drugs.    Family History     Reviewed, and family history includes Alcohol abuse in his father; Aortic aneurysm in his mother.    Medication List   Post Discharge Medication Reconciliation Status: discharge medications reconciled, continue medications without change   Current Outpatient Medications on File Prior to Visit   Medication Sig Dispense Refill     acetaminophen (TYLENOL) 500 MG tablet Take 1-2 tablets (500-1,000 mg total) by mouth every 4 (four) hours as needed.  0     apixaban ANTICOAGULANT (ELIQUIS) 5 mg Tab tablet Take 1 tablet (5 mg total) by mouth 2 (two) times a day. 60 tablet 3     aspirin 81 MG EC tablet Take 81 mg by mouth daily.       blood glucose test strips Use 1 each As Directed 2 (two) times a day. Dispense brand per patient's insurance at pharmacy discretion. 120 strip 6     blood-glucose meter (ONETOUCH VERIO IQ METER) Misc Use 1 each As Directed 2 (two) times a day. 1 each 0     cholecalciferol, vitamin D3, (VITAMIN D3) 5,000 unit Tab Take 5,000 Units by mouth daily.        ertapenem 1 g in NaCl 0.9 % 0.9 % 50 mL IVPB Infuse 1 g into a venous catheter daily. 1 each 0     insulin glargine (LANTUS SOLOSTAR U-100 INSULIN) 100 unit/mL (3 mL) pen Inject 44 Units under the skin at bedtime.       liraglutide (VICTOZA 3-RUPALI) 0.6 mg/0.1 mL (18 mg/3 mL) injection Inject 0.3 mL (1.8 mg total) under the skin daily.       lisinopriL (PRINIVIL,ZESTRIL) 5 MG tablet Take 1 tablet (5 mg total) by mouth daily. 90 tablet 3     multivit-min/FA/lycopen/lutein (CENTRUM SILVER MEN ORAL) Take 1 tablet by mouth daily.       mupirocin (BACTROBAN) 2 % ointment Apply topically daily as needed. Apply to wound.       neomycin-bacitracin-polymyxin (NEOSPORIN) ointment Apply to penis at catheter insertion site three times a day while catheter is in place. 15 g 0     NOVOLOG FLEXPEN U-100 INSULIN 100 unit/mL (3 mL) injection  "pen Inject 3 Units under the skin 3 (three) times a day before meals. 2.7 mL 0     pen needle, diabetic 31 gauge x 5/16\" Ndle Used to inject insulin daily 90 each 0     polyethylene glycol (MIRALAX) 17 gram packet Take 1 packet (17 g total) by mouth daily as needed.  0     tamsulosin (FLOMAX) 0.4 mg cap Take 1 capsule (0.4 mg total) by mouth daily after supper.  0     traMADoL (ULTRAM) 50 mg tablet Take 1 tablet (50 mg total) by mouth every 6 (six) hours as needed for pain. 28 tablet 0     vitamin E 400 unit capsule Take 400 Units by mouth daily.       No current facility-administered medications on file prior to visit.        Allergies     Allergies   Allergen Reactions     Cresol      Muscle cramps     Crestor [Rosuvastatin] Myalgia     Declomycin [Demeclocycline] Hives       Review of Systems   A comprehensive review of 14 systems was done. Pertinent findings noted here and in history of present illness. All the rest negative.  Constitutional: Negative.  Negative for fever, chills, he has activity change, appetite change and fatigue.   HENT: Negative for congestion and facial swelling.    Eyes: Negative for photophobia, redness and visual disturbance.   Respiratory: Negative for cough and chest tightness.    Cardiovascular: Negative for chest pain, palpitations and  leg swelling.   Gastrointestinal: Negative for nausea, diarrhea, constipation, blood in stool and abdominal distention.   Genitourinary: Negative.  Has  A mai  Musculoskeletal: Negative.  Denies any pain pin his foot .  Skin: Negative.    Neurological: Negative for dizziness, tremors, syncope, weakness, light-headedness and headaches.   Hematological: Does not bruise/bleed easily.   Psychiatric/Behavioral: Negative.  Some confusion noted he just wants to go home as per him      Physical Exam     Recent Vitals 12/7/2020   Height -   Weight 188 lbs   BSA (m2) 2.05 m2   /71   Pulse 67   Temp 98.3   Temp src -   SpO2 -   Some recent data might be " hidden       Constitutional: Oriented to person, place, and time and appears well-developed.   HEENT:  Normocephalic and atraumatic.  Eyes: Conjunctivae and EOM are normal. Pupils are equal, round, and reactive to light. No discharge.  No scleral icterus. Nose normal. Mouth/Throat: Oropharynx is clear and moist. No oropharyngeal exudate.    NECK: Normal range of motion. Neck supple. No JVD present. No tracheal deviation present. No thyromegaly present.   CARDIOVASCULAR: Normal rate, regular rhythm and intact distal pulses.  Exam reveals no gallop and no friction rub.  Systolic murmur present.  PULMONARY: Effort normal and breath sounds normal. No respiratory distress.No Wheezing or rales.  ABDOMEN: Soft. Bowel sounds are normal. No distension and no mass.  There is no tenderness. There is no rebound and no guarding. No HSM.  MUSCULOSKELETAL: Normal range of motion. No edema and no tenderness. Mild kyphosis, no tenderness.  Rt foot missing 5th toe  Wound dry and not draining  LYMPH NODES: Has no cervical, supraclavicular, axillary and groin adenopathy.   NEUROLOGICAL: Alert and oriented to person, place, and time. No cranial nerve deficit.  Normal muscle tone. Coordination normal.   GENITOURINARY: Deferred exam.  Dietrich present  SKIN: Skin is warm and dry. No rash noted. No erythema. No pallor.   EXTREMITIES: No cyanosis, no clubbing, no edema. No Deformity.  PSYCHIATRIC: Normal mood, affect and behavior.      Lab Results     Recent Results (from the past 240 hour(s))   POCT Glucose    Collection Time: 11/28/20  8:43 AM    Specimen: Blood   Result Value Ref Range    Glucose 192 (H) 70 - 139 mg/dL   POCT Glucose    Collection Time: 11/28/20 12:14 PM    Specimen: Blood   Result Value Ref Range    Glucose 269 (H) 70 - 139 mg/dL   POCT Glucose    Collection Time: 11/28/20  4:28 PM    Specimen: Blood   Result Value Ref Range    Glucose 373 (H) 70 - 139 mg/dL   POCT Glucose    Collection Time: 11/28/20  8:58 PM    Specimen:  Blood   Result Value Ref Range    Glucose 169 (H) 70 - 139 mg/dL   Platelet Count - every other day x 3    Collection Time: 11/29/20  7:31 AM   Result Value Ref Range    Platelets 355 140 - 440 thou/uL   POCT Glucose    Collection Time: 11/29/20  7:35 AM    Specimen: Blood   Result Value Ref Range    Glucose 100 70 - 139 mg/dL   POCT Glucose    Collection Time: 11/29/20 12:10 PM    Specimen: Blood   Result Value Ref Range    Glucose 158 (H) 70 - 139 mg/dL   POCT Glucose    Collection Time: 11/29/20  5:24 PM    Specimen: Blood   Result Value Ref Range    Glucose 143 (H) 70 - 139 mg/dL   POCT Glucose    Collection Time: 11/29/20  8:58 PM    Specimen: Blood   Result Value Ref Range    Glucose 279 (H) 70 - 139 mg/dL   Creatinine    Collection Time: 11/30/20  6:39 AM   Result Value Ref Range    Creatinine 0.82 0.70 - 1.30 mg/dL    GFR MDRD Af Amer >60 >60 mL/min/1.73m2    GFR MDRD Non Af Amer >60 >60 mL/min/1.73m2   POCT Glucose    Collection Time: 11/30/20  8:50 AM    Specimen: Blood   Result Value Ref Range    Glucose 71 70 - 139 mg/dL   POCT Glucose    Collection Time: 11/30/20 11:47 AM    Specimen: Blood   Result Value Ref Range    Glucose 216 (H) 70 - 139 mg/dL   POCT Glucose    Collection Time: 11/30/20  4:58 PM    Specimen: Blood   Result Value Ref Range    Glucose 204 (H) 70 - 139 mg/dL   POCT Glucose    Collection Time: 11/30/20  9:16 PM    Specimen: Blood   Result Value Ref Range    Glucose 238 (H) 70 - 139 mg/dL   POCT Glucose    Collection Time: 12/01/20  6:33 AM    Specimen: Blood   Result Value Ref Range    Glucose 120 70 - 139 mg/dL   HM2(CBC W/O DIFF)    Collection Time: 12/01/20 11:22 AM   Result Value Ref Range    WBC 7.8 4.0 - 11.0 thou/uL    RBC 4.17 (L) 4.40 - 6.20 mill/uL    Hemoglobin 12.5 (L) 14.0 - 18.0 g/dL    Hematocrit 38.1 (L) 40.0 - 54.0 %    MCV 91 80 - 100 fL    MCH 30.0 27.0 - 34.0 pg    MCHC 32.8 32.0 - 36.0 g/dL    RDW 11.7 11.0 - 14.5 %    Platelets 443 (H) 140 - 440 thou/uL    MPV 9.2  8.5 - 12.5 fL   Urinalysis    Collection Time: 12/01/20 12:13 PM   Result Value Ref Range    Color, UA Red (!) Colorless, Yellow, Straw, Light Yellow    Clarity, UA Cloudy (!) Clear    Glucose, UA Negative Negative    Bilirubin, UA Negative Negative    Ketones, UA Trace (!) Negative, 60 mg/dL    Specific Gravity, UA 1.020 1.001 - 1.030    Blood, UA Large (!) Negative    pH, UA 6.0 4.5 - 8.0    Protein, UA 50 mg/dL (!) Negative mg/dL    Urobilinogen, UA <2.0 E.U./dL <2.0 E.U./dL, 2.0 E.U./dL    Nitrite, UA Negative Negative    Leukocytes, UA Small (!) Negative    Bacteria, UA Few (!) None Seen hpf    RBC, UA >100 (!) None Seen, 0-2 hpf    WBC, UA  (!) None Seen, 0-5 hpf    Squam Epithel, UA 0-5 None Seen, 0-5 lpf    Mucus, UA Moderate (!) None Seen lpf    Ca Oxalate Meliza, UA Present (!) None Seen   Urine culture    Collection Time: 12/01/20 12:13 PM    Specimen: Urine, Catheter   Result Value Ref Range    Culture No Growth    POCT Glucose    Collection Time: 12/01/20 12:38 PM    Specimen: Blood   Result Value Ref Range    Glucose 180 (H) 70 - 139 mg/dL   Vancomycin (Vancocin )    Collection Time: 12/01/20  2:26 PM   Result Value Ref Range    Vancomycin 13.7 <=25.0 ug/mL   POCT Glucose    Collection Time: 12/01/20  5:05 PM    Specimen: Blood   Result Value Ref Range    Glucose 228 (H) 70 - 139 mg/dL   POCT Glucose    Collection Time: 12/01/20  9:01 PM    Specimen: Blood   Result Value Ref Range    Glucose 208 (H) 70 - 139 mg/dL   Basic Metabolic Panel    Collection Time: 12/02/20  5:32 AM   Result Value Ref Range    Sodium 139 136 - 145 mmol/L    Potassium 4.5 3.5 - 5.0 mmol/L    Chloride 103 98 - 107 mmol/L    CO2 30 22 - 31 mmol/L    Anion Gap, Calculation 6 5 - 18 mmol/L    Glucose 178 (H) 70 - 125 mg/dL    Calcium 8.3 (L) 8.5 - 10.5 mg/dL    BUN 18 8 - 28 mg/dL    Creatinine 0.88 0.70 - 1.30 mg/dL    GFR MDRD Af Amer >60 >60 mL/min/1.73m2    GFR MDRD Non Af Amer >60 >60 mL/min/1.73m2   HM2(CBC w/o  Differential)    Collection Time: 12/02/20  5:33 AM   Result Value Ref Range    WBC 7.3 4.0 - 11.0 thou/uL    RBC 4.07 (L) 4.40 - 6.20 mill/uL    Hemoglobin 12.2 (L) 14.0 - 18.0 g/dL    Hematocrit 37.5 (L) 40.0 - 54.0 %    MCV 92 80 - 100 fL    MCH 30.0 27.0 - 34.0 pg    MCHC 32.5 32.0 - 36.0 g/dL    RDW 11.9 11.0 - 14.5 %    Platelets 447 (H) 140 - 440 thou/uL    MPV 9.5 8.5 - 12.5 fL   COVID-19 Virus PCR MRF    Collection Time: 12/02/20  5:38 AM    Specimen: Respiratory   Result Value Ref Range    COVID-19 VIRUS SPECIMEN SOURCE Nasopharyngeal     2019-nCOV Not Detected    POCT Glucose    Collection Time: 12/02/20  6:04 AM    Specimen: Blood   Result Value Ref Range    Glucose 189 (H) 70 - 139 mg/dL   POCT Glucose    Collection Time: 12/02/20  8:08 AM    Specimen: Capillary; Blood   Result Value Ref Range    Glucose 162 (H) 70 - 139 mg/dL   POCT Glucose    Collection Time: 12/02/20 11:22 AM    Specimen: Blood   Result Value Ref Range    Glucose 173 (H) 70 - 139 mg/dL   POCT Glucose    Collection Time: 12/02/20  5:08 PM    Specimen: Blood   Result Value Ref Range    Glucose 172 (H) 70 - 139 mg/dL   POCT Glucose    Collection Time: 12/02/20  9:02 PM    Specimen: Blood   Result Value Ref Range    Glucose 198 (H) 70 - 139 mg/dL   POCT Glucose    Collection Time: 12/03/20  8:22 AM    Specimen: Blood   Result Value Ref Range    Glucose 236 (H) 70 - 139 mg/dL   POCT Glucose    Collection Time: 12/03/20 11:57 AM    Specimen: Blood   Result Value Ref Range    Glucose 292 (H) 70 - 139 mg/dL   C-Reactive Protein    Collection Time: 12/07/20  5:49 AM   Result Value Ref Range    CRP 1.9 (H) 0.0 - 0.8 mg/dL   Comprehensive Metabolic Panel    Collection Time: 12/07/20  5:49 AM   Result Value Ref Range    Sodium 135 (L) 136 - 145 mmol/L    Potassium 4.1 3.5 - 5.0 mmol/L    Chloride 99 98 - 107 mmol/L    CO2 27 22 - 31 mmol/L    Anion Gap, Calculation 9 5 - 18 mmol/L    Glucose 47 (LL) 70 - 125 mg/dL    BUN 24 8 - 28 mg/dL     Creatinine 0.71 0.70 - 1.30 mg/dL    GFR MDRD Af Amer >60 >60 mL/min/1.73m2    GFR MDRD Non Af Amer >60 >60 mL/min/1.73m2    Bilirubin, Total 0.7 0.0 - 1.0 mg/dL    Calcium 9.2 8.5 - 10.5 mg/dL    Protein, Total 6.6 6.0 - 8.0 g/dL    Albumin 2.3 (L) 3.5 - 5.0 g/dL    Alkaline Phosphatase 98 45 - 120 U/L    AST 21 0 - 40 U/L    ALT 20 0 - 45 U/L   HM1 (CBC with Diff)    Collection Time: 12/07/20  5:49 AM   Result Value Ref Range    WBC 8.2 4.0 - 11.0 thou/uL    RBC 4.57 4.40 - 6.20 mill/uL    Hemoglobin 13.6 (L) 14.0 - 18.0 g/dL    Hematocrit 41.8 40.0 - 54.0 %    MCV 92 80 - 100 fL    MCH 29.8 27.0 - 34.0 pg    MCHC 32.5 32.0 - 36.0 g/dL    RDW 12.1 11.0 - 14.5 %    Platelets 430 140 - 440 thou/uL    MPV 9.7 8.5 - 12.5 fL    Neutrophils % 57 50 - 70 %    Lymphocytes % 25 20 - 40 %    Monocytes % 12 (H) 2 - 10 %    Eosinophils % 5 0 - 6 %    Basophils % 1 0 - 2 %    Immature Granulocyte % 1 (H) <=0 %    Neutrophils Absolute 4.6 2.0 - 7.7 thou/uL    Lymphocytes Absolute 2.0 0.8 - 4.4 thou/uL    Monocytes Absolute 1.0 (H) 0.0 - 0.9 thou/uL    Eosinophils Absolute 0.4 0.0 - 0.4 thou/uL    Basophils Absolute 0.1 0.0 - 0.2 thou/uL    Immature Granulocyte Absolute 0.1 (H) <=0.0 thou/uL            Imaging Results     Mr Forefoot With Without Contrast Right    Result Date: 11/26/2020  EXAM: MR FOREFOOT W WO CONTRAST RIGHT LOCATION: [facility] DATE/TIME: 11/26/2020 5:28 PM INDICATION: Recent fifth ray amputation. Positive cultures. Wound. COMPARISON: 10/22/2020 MRI. TECHNIQUE: Routine. Additional postgadolinium T1 sequences were obtained. IV CONTRAST: Gadavist 7ml FINDINGS:  SOFT TISSUES JOINTS AND BONES: Interval resection of the fifth ray level of the cuboid. There is ulceration at the proximal margin of the lateral midfoot at the level of the fourth TMT joint. There is a subcutaneous fluid collection tracking along the lateral and dorsal margin of the fourth metatarsal worrisome for an abscess that  measures 3.4 cm and RL dimension by 0.9 cm in AP dimension and 6.5 cm in length. There are multiple punctate areas of subcutaneous emphysema lateral to the cuboid and fourth  metatarsal. The ulceration and soft tissue thickening is contiguous with the cuboid and fourth tarsometatarsal joint. There are new areas of synovitis and irregularity in the second, third and fourth tarsometatarsal joints and the navicular medial cuneiform joint contiguous between the medial and middle cuneiform. Although there was osteoarthritis in this distribution on the prior examination the synovitis and osseous changes are worrisome for septic arthritis which has developed along multiple joints of the midfoot. TENDONS: No evidence for proximal tenosynovitis. MUSCLES: Diffuse muscular atrophy and edema.     1.  Interval amputation of the fifth ray. 2.  Ulceration and deep tracking fluid collection along the fourth metatarsal consistent with an abscess. 3.  Additional areas of subcutaneous emphysema along the postoperative bed worrisome for additional soft tissue infection given the postoperative time course. 4.  New synovitis and osseous abnormalities in the second through fourth tarsometatarsal joints and the navicular cuneiform joint worrisome for septic arthritis and osteomyelitis.     Us Naila Doppler No Exercise, 1-2 Levels, Bilateral    Result Date: 11/24/2020  EXAM: RESTING ANKLE-BRACHIAL INDICES (ABIs) LOCATION: [facility] DATE/TIME: 11/24/2020 5:24 PM INDICATION: Follow-up post angiogram and intervention, diabetic foot ulcer, critical limb ischemia. COMPARISON: 10/13/2020. FINDINGS: RIGHT                                               Brachial: 141 Ankle (PT): 0 Index: 0.00  Ankle (DP): 122 Index: 0.87 Digit: 34 Index: 0.24   LEFT Brachial: -- Ankle (PT): 83 Index: 0.59 Ankle (DP): 90 Index: 0.64 Digit: 0 Index: 0.00 Resting ABIs are 0.87 on the right. Resting ABIs are 0.64 on the left. WAVEFORMS: The dorsalis  pedis and posterior tibial arteries are monophasic. DUPLEX ARTERIAL ULTRASOUND FINDINGS: RIGHT LOWER EXTREMITY VELOCITIES (cm/s): EIA: 115 CFA: 101 PFA: 63 SFA (proximal): 101 SFA (mid): 90 SFA (distal): 73 Popliteal: 99 PTA: 0 at the ankle; trickle flow mid calf 20 BREE: 69 DPA: 37 (M=monophasic, B=biphasic, T=triphasic) LEFT LOWER EXTREMITY VELOCITIES (cm/s): EIA: 74 CFA: 98 PFA: 42 SFA (proximal): 57 SFA (mid): 102 SFA (distal): 75 Popliteal: 126 PTA: 16 BREE: 27 DPA: 10 (M=monophasic, B=biphasic, T=triphasic)     1.  RIGHT LOWER EXTREMITY: No significant change in ABIs at the dorsalis pedis artery from prior. No flow demonstrated in posterior tibial artery at the ankle on current study where there was flow noted on prior. Waveforms otherwise unchanged with evidence for trifurcation disease with no inflow stenosis. 2.  LEFT LOWER EXTREMITY: Interval worsening of ABIs when compared to prior. Postobstructive or post high-grade stenotic waveform noted in dorsalis pedis artery and posterior tibial arteries at the ankle, worse when compared to prior. No evidence for an inflow stenosis.    Us Arterial Legs Bilateral Complete    Result Date: 11/24/2020  EXAM: RESTING ANKLE-BRACHIAL INDICES (ABIs) LOCATION: [facility] DATE/TIME: 11/24/2020 5:24 PM INDICATION: Follow-up post angiogram and intervention, diabetic foot ulcer, critical limb ischemia. COMPARISON: 10/13/2020. FINDINGS: RIGHT                                               Brachial: 141 Ankle (PT): 0 Index: 0.00  Ankle (DP): 122 Index: 0.87 Digit: 34 Index: 0.24   LEFT Brachial: -- Ankle (PT): 83 Index: 0.59 Ankle (DP): 90 Index: 0.64 Digit: 0 Index: 0.00 Resting ABIs are 0.87 on the right. Resting ABIs are 0.64 on the left. WAVEFORMS: The dorsalis pedis and posterior tibial arteries are monophasic. DUPLEX ARTERIAL ULTRASOUND FINDINGS: RIGHT LOWER EXTREMITY VELOCITIES (cm/s): EIA: 115 CFA: 101 PFA: 63 SFA (proximal): 101 SFA (mid): 90 SFA  "(distal): 73 Popliteal: 99 PTA: 0 at the ankle; trickle flow mid calf 20 BREE: 69 DPA: 37 (M=monophasic, B=biphasic, T=triphasic) LEFT LOWER EXTREMITY VELOCITIES (cm/s): EIA: 74 CFA: 98 PFA: 42 SFA (proximal): 57 SFA (mid): 102 SFA (distal): 75 Popliteal: 126 PTA: 16 BREE: 27 DPA: 10 (M=monophasic, B=biphasic, T=triphasic)     1.  RIGHT LOWER EXTREMITY: No significant change in ABIs at the dorsalis pedis artery from prior. No flow demonstrated in posterior tibial artery at the ankle on current study where there was flow noted on prior. Waveforms otherwise unchanged with evidence for trifurcation disease with no inflow stenosis. 2.  LEFT LOWER EXTREMITY: Interval worsening of ABIs when compared to prior. Postobstructive or post high-grade stenotic waveform noted in dorsalis pedis artery and posterior tibial arteries at the ankle, worse when compared to prior. No evidence for an inflow stenosis.    Poc Us Guidance Needle Placement    Result Date: 11/16/2020  Ultrasound was performed as guidance to an anesthesia procedure.  Click \"PACS images\" hyperlink below to view any stored images.  For specific procedure details, view procedure note authored by anesthesia.    Ct Abdomen Pelvis Without Oral With Without Iv Contrast    Result Date: 12/1/2020  EXAM: CT ABDOMEN PELVIS WO ORAL W WO IV CONTRAST LOCATION: [facility] DATE/TIME: 12/1/2020 7:22 PM INDICATION: Hematuria, unknown cause Gross hematuria COMPARISON: Abdominal CT 9 05/07/2020. Chest CT 05/23/2020 TECHNIQUE: CT scan of the abdomen and pelvis was performed before and after injection of IV contrast. Multiplanar reformats were obtained. Dose reduction techniques were used. CONTRAST: Iohexol (Omni) 100 mL FINDINGS: LOWER CHEST: Significantly elevated right hemidiaphragm with atelectasis at right lung base similar to chest CT. Mild dependent atelectasis also present left lung base. HEPATOBILIARY: Pneumobilia unchanged compared to most recent CT. " Prior cholecystectomy. No liver lesion. PANCREAS: Normal. SPLEEN: Normal. ADRENAL GLANDS: Normal. KIDNEYS/BLADDER: Stable benign left renal cysts. 11 x 9 mm stone right upper pole. No hydronephrosis. Dietrich catheter present with bladder collapsed. However, there is prominent diffuse bladder wall thickening suggesting cystitis. BOWEL: No obstruction or inflammatory change. LYMPH NODES: Normal. VASCULATURE: Unremarkable. PELVIC ORGANS: Normal. MUSCULOSKELETAL: Focal area of soft tissue stranding within subcutaneous tissues anterior of left common iliac artery and vein likely relates to recent vascular catheterization procedure.     1.  Significant irregular bladder wall thickening suggests cystitis. 2.  There is a 11 mm right upper pole kidney stone. No hydronephrosis. 3.  Stable benign left renal cysts.     Ir Lower Extremity Angiogram Right    Result Date: 11/25/2020  [facility] Interventional Radiology vascular surgery procedure Date: 11/25/20 Procedures Performed: Ir Lower Extremity Angiogram Right from the aorta level via left groin approach   Ultrasound-guided vascular access   Angioplasty right anterior tibial artery   Angioplasty right tibioperoneal trunk   Angioplasty right peroneal artery   Selective right leg angiogram with distal most catheter position in greater than third order anterior tibial and peroneal arteries.  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  This was performed by podiatry with our support given known toe pressures in January of 112 mmHg and has recently has October at greater than 50 mmHg suggesting adequate blood supply for healing.  Wound has not however healed and he is now readmitted for failure with signs of  infection of the foot.  Plan for an intervention to see if blood supply can be improved to allow for wound healing. Sedation: Moderate Sedation: The procedure was performed with administration of intravenous conscious sedation with appropriate preoperative, intraoperative, and postoperative evaluation.  120 minutes of supervised face to face intraservice time was provided by a radiology nurse under my direct supervision.  Versed 3.5 mg and fentanyl 175 mcg given. Antibiotics: Patient already on IV Zosyn Fluoroscopic Time: 28.4 Minutes Radiation Dose: 283 mGy Contrast: 45 cc of Visipaque given Procedure:   Ultrasound was used to evaluate the left groin.  The selected vessel was the left common femoral artery which was widely patent and without significant anterior wall plaque. Ultrasound was then used for real-time ultrasound guided needle entry of the common femoral artery. Permanent images were recorded and saved to the patient's medical record.   Micropuncture technique was used to deliver a 4 Spanish sheath.  An Omni Flush catheter was advanced to the lower abdominal aorta and an initial aortogram was performed   Findings: Patent lower abdominal aorta, common iliac arteries, internal iliac arteries, and external iliac arteries without disease.  Patent common femoral arteries without disease.   This point wire catheter advanced over the aortic bifurcation and down to the common femoral level on the right side.  Selective right leg angiography was performed from this level.   Findings: Patent profunda femoris artery and superficial femoral artery.  Trivial stenosis at Jagjit's canal of less than 20%.  Patent popliteal artery without disease.  Single-vessel peroneal runoff to the foot with significant areas of disease in focal stenosis of likely greater than 80% of the tibioperoneal trunk.  The peroneal artery has multiple areas of diffuse stenoses with some areas as much is 70% stenosed.  Posterior tibial artery is  occluded from its origin.  Anterior tibial artery has a very short stump but occludes before the elbow in the vessel.  Appears to reconstitute through a peroneal artery collateral at the ankle.   At this point the patient was heparinized.  Wire was advanced down the SFA and was used to exchange the short 4 Estonian sheath for a 90 cm 4 Estonian sheath which we advanced the wire down to the popliteal level.  Selective angiography from this level allowed us to roadmap identify the origin of the anterior tibial artery.  Wire catheter were used to engage this stump and we able to advance a wire and catheter around the elbow in the vessel and down the vessel to the ankle.  At this point we exchanged out the wire perform an angiogram.   Findings: True lumen location of a catheter in the distal anterior tibial artery.  There is a small stenosis beyond the catheter.    Nitroglycerin 200 mcg was injected down the anterior tibial artery   We advanced an 014 wire down and through this and then exchanged in a 2 mm x 150 mm angioplasty balloon.  Simple balloon angioplasty with 2 separate inflations was used to treat the entire anterior tibial artery all the way to the popliteal artery.  The first inflation was taken only to 8 mandi of pressure in the second 10 mandi of pressure.  Each inflation was held up for 2 minutes.  An angiogram was then performed.   Findings: Inline flow through the anterior tibial artery.  Persistent significant disease in the more proximal anterior tibial artery which appeared to be secondary to undersizing of the balloon angioplasty.  We exchanged and a 2-1/2 mm x 150 mm angioplasty balloon performed simple balloon angioplasty of the more proximal segment of anterior tibial artery up to the popliteal artery.  This is another 10 mandi inflation held up for 2 minutes.  A completion angiogram was performed.   Findings: Widely patent intertibial artery with filling into the foot.  It does appear that the peroneal  "artery continues to be the dominant flow to the majority of the foot despite having stenosis within it.   We now readvanced a wire and balloon catheter down the tibioperoneal trunk and peroneal artery positioning it in the distal third of the peroneal artery and moving upwards to treat all the area of disease.  The wire was removed from the balloon and angiogram performed through the balloon catheter to confirm that we were in the true lumen   Findings: True lumen location of a catheter in the peroneal artery distally beyond all stenoses.   We now reintroduced a wire performed balloon angioplasty with 2 separate inflations to treat the entire tibioperoneal trunk and peroneal artery.  The first inflation was to 8 mandi of pressure and the more proximal inflation to 10 mandi of pressure.  Each time the balloon was held up for 2 minutes.  A completion angiogram was performed.   Findings: Widely patent flow through the peroneal artery to peroneal trunk with no residual significant stenosis.  Imaging of the foot demonstrates a both the antitibial artery and peroneal artery contribute to feeling of the foot and that the lateral aspect of the foot at the area of surgery feels very well   At this point wires and catheters of pulled back in the long sheath exchanged for short 4 Kinyarwanda sheath.  Patient was given 10 units of protamine with the plan of removing the sheath once ACT had dropped to below 160. 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 Lourdes Raymond Vascular Surgery    Poc Us 3cg Picc Placement Guidance    Result Date: 11/30/2020  Exam was performed as guidance for PICC line insertion.  Click \"PACS images\" hyperlink below to view any stored images.  For specific procedure details, view procedure note authored by PICC/Vascular Access Nurse.            DONNY Kwan   "

## 2021-06-13 NOTE — PROGRESS NOTES
[facility]    Facility:   [facility] [[id number]]   Code Status: FULL CODE    [facility] GERIATRIC SERVICES  [address] [address]  Dept: [phone]  Dept Fax: [phone]  Primary Provider: Otis Lozano MD  Pre-op Performing Provider: ISABELLA WINTER      PREOPERATIVE EVALUATION:  Today's date: 12/14/2020    Caleb Hernandez is a 84 y.o. male who presents for a preoperative evaluation.    Surgical Information:    Proposed Surgery/ Procedure: Debriding and irrigation of RIGHT foot with wound VAC application  Date of Surgery/ Procedure: 12/17/20:   Time of Surgery/ Procedure: 3:30 PM  Hospital/Surgical Facility: [facility]  Surgery Fax Number: Unknown  Primary Physician: Provider, No Primary Care  Type of Anesthesia Anticipated: Unknown    HPI related to upcoming procedure:   Have you ever had a heart attack or stroke? No  Have you ever had surgery on your heart or blood vessels, such as a stent, coronary (heart) bypass, or surgery on an artery in the head, neck, heart, or legs? No  Do you have chest pain when you are physically active? No  Do you have a history of heart failure? No  Do you currently have a cold, bronchitis, or symptoms of other respiratory (head and chest) infections? No  Do you have a cough, shortness of breath, or wheezing? No  Do you or anyone in your family have a history of blood clots? YES  HO PE  Do you or anyone in your family have a serious bleeding problem, such as long-lasting bleeding after surgeries or cuts? YES: ON ANTIGOAGULANTS  Have you ever had anemia or been told to take iron pills  Yes per [facility]  Have you had any abnormal blood loss such as black, tarry or bloody stools, or abnormal vaginal bleeding?No  Have you ever had a blood transfusion? No   Are you willing to have a blood transfusion if it is medically needed before, during, or after your surgery? Yes  Have you or anyone in your family ever  had problems with anesthesia (sedation for surgery)? No  Do you have sleep apnea, excessive snoring, or daytime drowsiness? No  Do you have any artifical heart valves or other implanted medical devices, such as a pacemaker, defibrillator, or continuous glucose monitor?NO  Do you have any artifical joints? no  Are you allergic to latex? NO  Is there any chance that you may be pregnant?no    Patient has a Health Care Directive or Living Will:  Yes POLST  Fax number for surgical facility: [facility]  Type of Anesthesia Anticipated: to be determined    Patient does not have a Health Care Directive or Living Will: POLST    4595}  A-FIB - Patient has a longstanding history of chronic A-fib currently on rate control. Current treatment regimen includes Apixaban for stroke prevention and denies significant symptoms of lightheadedness, palpitations or dyspnea. On hold 12/15.      Review of Systems         Currently, no fever, chills, or rigors. Does not have any visual or hearing problems. Denies any chest pain, headaches, palpitations, lightheadedness, dizziness, shortness of breath, or cough. Appetite is good. Denies any GERD symptoms. Denies any difficulty with swallowing, nausea, or vomiting.  Denies any abdominal pain, diarrhea or constipation. Denies any urinary symptoms. No insomnia. No active bleeding. No rash.      Patient Active Problem List    Diagnosis Date Noted     Hematuria 12/10/2020     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 2 diabetes mellitus, with necrosis of muscle (H) 12/09/2020     ACP (advance care planning) 12/07/2020     Septic arthritis of right foot (H) 12/02/2020     Gross hematuria 12/02/2020     Urinary retention 12/02/2020     Type 2 diabetes mellitus with diabetic peripheral angiopathy without gangrene, with long-term current use of insulin (H) 12/02/2020     Adult failure to thrive 12/02/2020     Normocytic anemia 12/02/2020     Paroxysmal atrial fibrillation (H) 12/02/2020      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 (H)      Cellulitis of right lower extremity 11/23/2020     Abscess of right foot 11/23/2020     Cellulitis and abscess of foot excluding toe      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     Statin intolerance 10/22/2020     Personal history of PE (pulmonary embolism)-- May 2020, unprovoked, with right heart strain 10/22/2020     PVD (peripheral vascular disease) (H) 10/22/2020     Overweight (BMI 25.0-29.9) 10/22/2020     Chronic anticoagulation 10/22/2020     Acute pulmonary embolism with acute cor pulmonale (H) 05/23/2020     Diabetic ulcer of right foot associated with type 2 diabetes mellitus (H) 08/10/2017     Type 2 diabetes mellitus (H)      Hyperlipidemia      Essential hypertension      Past Medical History:   Diagnosis Date     BPH (benign prostatic hyperplasia)      Cholelithiasis      Common bile duct (CBD) obstruction 9/4/2017     Compression Fracture Of Thoracic Vertebral Body     Created by Conversion  Replacement Utility updated for latest IMO load     Diabetes mellitus (H)      Essential hypertension      Hyperlipidemia      Pancreatitis      Rib Fracture     Created by Conversion  Replacement Utility updated for latest IMO load     Sepsis due to pneumonia (H) 9/8/2017     Past Surgical History:   Procedure Laterality Date     BACK SURGERY      1964 removed a cyst     CHOLECYSTECTOMY  1985     ERCP       ERCP N/A 9/5/2017    Procedure: ENDOSCOPIC RETROGRADE CHOLANGIOPANCREATOGRAPHY SPHINCTEROTOMY AND STONE EXTRACTION;  Surgeon: Hansel Gannon MD;  Location: [facility];  Service:      INCISION AND DRAINAGE OF WOUND Right 11/2/2020    Procedure: INCISION AND DRAINAGE, right foot with removal of bone 5th metatarsal;  Surgeon: John Garcia DPM;  Location: [facility] OR;  Service: Podiatry     INCISION AND DRAINAGE OF WOUND Right 11/16/2020    Procedure: INCISION AND DRAINAGE, right foot;   Surgeon: John Garcia DPM;  Location: [facility] OR;  Service: Podiatry     INCISION AND DRAINAGE OF WOUND Right 11/27/2020    Procedure: INCISION AND DRAINAGE, LOWER EXTREMITY;  Surgeon: Aleksandr Hdez DPM;  Location: [facility] OR;  Service: Podiatry     IR EXTREMITY ANGIOGRAM RIGHT  11/24/2020     PICC  11/30/2020          TOE AMPUTATION Right 11/16/2020    Procedure: with amputation of the fifth ray, peroneal brevis tendon transfer;  Surgeon: John Garcia DPM;  Location: [facility] OR;  Service: Podiatry     TONSILLECTOMY  1940     Current Outpatient Medications   Medication Sig Dispense Refill     acetaminophen (TYLENOL) 500 MG tablet Take 1-2 tablets (500-1,000 mg total) by mouth every 4 (four) hours as needed.  0     apixaban ANTICOAGULANT (ELIQUIS) 5 mg Tab tablet Take 1 tablet (5 mg total) by mouth 2 (two) times a day. 60 tablet 3     aspirin 81 MG EC tablet Take 81 mg by mouth daily.       blood glucose test strips Use 1 each As Directed 2 (two) times a day. Dispense brand per patient's insurance at pharmacy discretion. 120 strip 6     blood-glucose meter (ONETOUCH VERIO IQ METER) Misc Use 1 each As Directed 2 (two) times a day. 1 each 0     cholecalciferol, vitamin D3, (VITAMIN D3) 5,000 unit Tab Take 5,000 Units by mouth daily.        ertapenem 1 g in NaCl 0.9 % 0.9 % 50 mL IVPB Infuse 1 g into a venous catheter daily. 1 each 0     insulin glargine (LANTUS SOLOSTAR U-100 INSULIN) 100 unit/mL (3 mL) pen Inject 44 Units under the skin at bedtime. (Patient taking differently: Inject 30 Units under the skin at bedtime. Takes 15 units q am and 30 units Q HS)       liraglutide (VICTOZA 3-RUPALI) 0.6 mg/0.1 mL (18 mg/3 mL) injection Inject 0.3 mL (1.8 mg total) under the skin daily.       lisinopriL (PRINIVIL,ZESTRIL) 5 MG tablet Take 1 tablet (5 mg total) by mouth daily. 90 tablet 3     multivit-min/FA/lycopen/lutein (CENTRUM SILVER MEN ORAL) Take 1 tablet by mouth daily.        "mupirocin (BACTROBAN) 2 % ointment Apply topically daily as needed. Apply to wound.       neomycin-bacitracin-polymyxin (NEOSPORIN) ointment Apply to penis at catheter insertion site three times a day while catheter is in place. 15 g 0     NOVOLOG FLEXPEN U-100 INSULIN 100 unit/mL (3 mL) injection pen Inject 3 Units under the skin 3 (three) times a day before meals. 2.7 mL 0     pen needle, diabetic 31 gauge x 5/16\" Ndle Used to inject insulin daily 90 each 0     polyethylene glycol (MIRALAX) 17 gram packet Take 1 packet (17 g total) by mouth daily as needed.  0     tamsulosin (FLOMAX) 0.4 mg cap Take 1 capsule (0.4 mg total) by mouth daily after supper.  0     traMADoL (ULTRAM) 50 mg tablet Take 1 tablet (50 mg total) by mouth every 6 (six) hours as needed for pain. 28 tablet 0     vitamin E 400 unit capsule Take 400 Units by mouth daily.       No current facility-administered medications for this visit.        Allergies   Allergen Reactions     Cresol      Muscle cramps     Crestor [Rosuvastatin] Myalgia     Declomycin [Demeclocycline] Hives       Social History     Tobacco Use     Smoking status: Former Smoker     Quit date: 1991     Years since quittin.1     Smokeless tobacco: Never Used   Substance Use Topics     Alcohol use: No        Social History     Substance and Sexual Activity   Drug Use No           Objective   /81   Pulse 68   Temp 98.6  F (37  C)   Wt 188 lb (85.3 kg)   BMI 26.98 kg/m    Physical Exam  GENERAL: Awake, Alert, oriented x3, not in any form of acute distress, answers questions appropriately, follows simple commands, conversant  HEENT: Head is normocephalic with normal hair distribution. No evidence of trauma. Ears: No acute purulent discharge. Eyes: Conjunctivae pink with no scleral jaundice. Nose: Normal mucosa and septum. NECK: Supple with no cervical or supraclavicular lymphadenopathy. Trachea is midline.   ABDOMEN: Globular and soft, nontender to palpation, non " "distended, no masses, no organomegaly, good bowel sounds, no rebound or guarding, no peritoneal signs.   : Dietrich, hematuria, fading wwent from maroon to huey color urine throughout day,  EXTREMITIES: Full range of motion does have a PICC in right upper arm.  He is to be nonweightbearing to the right foot, daily dressing change to site   SKIN: Warm and dry, no erythema noted, no rashes or lesions.  NEUROLOGICAL: The patient is oriented to person, place and time. Strength and sensation are grossly intact. Face is symmetric.     Recent Labs   Lab Test 12/14/20  0616 12/09/20  0946 12/07/20  0549 11/27/20  0713 11/27/20  0713 11/23/20  1407 11/23/20  1407   HGB 11.8* 13.4* 13.6*   < > 11.9*   < > 12.5*     --  430   < > 313   < > 355   INR  --   --   --   --  1.23*  --  1.26*     --  135*   < > 138   < > 137   K 4.6  --  4.1   < > 4.2   < > 4.8   CREATININE 0.93  --  0.71   < > 0.93   < > 0.92    < > = values in this interval not displayed.        PRE-OP Diagnostics:     Weekly Labs, in [facility]: see above    Last EKG, 11/23 IN EPIC    Covid in process from 12/14         Assessment & Plan       The proposed surgical procedure is considered INTERMEDIATE risk.    REVISED CARDIAC RISK INDEX   The patient has the following serious cardiovascular risks for perioperative complications:   - Diabetes Mellitus (on Insulin) = 1 point    INTERPRETATION: low to moderate, HO PE/DM      MEDICATION INSTRUCTIONS:  Night prior to surgery:   Lantus decreased to 6 unit.  Eliquis on hold from 12/15  ASA on hold 12/14   No victoza am 12/17.  May have clear liquid in am than NPO. Only Oral ABX and Pain med oral with sips of water in am.       RECOMMENDATION:  {IMPORTANT - Approval:[id number]::\"APPROVAL GIVEN to proceed with proposed procedure, without further diagnostic evaluation.\"    No follow-ups on file.    Signed Electronically by: Marva Moore CNP    Copy of this evaluation report is provided to requesting " physician.    Preop [facility] Preop Guidelines    Revised Cardiac Risk Index

## 2021-06-13 NOTE — TELEPHONE ENCOUNTER
Homehealth wondering if pt will have new dressing change orders. Informed pt having surgery today and pt is instructed to leave bandage on until his follow up normally and that is scheduled 12/2. No further questions

## 2021-06-13 NOTE — PROGRESS NOTES
"Caleb Hernandez is a 84 y.o. male who is being evaluated via a billable video visit.      The patient has been notified of following:     \"This video visit will be conducted via a call between you and your physician/provider. We have found that certain health care needs can be provided without the need for an in-person physical exam.  This service lets us provide the care you need with a video conversation.  If a prescription is necessary we can send it directly to your pharmacy.  If lab work is needed we can place an order for that and you can then stop by our lab to have the test done at a later time.    Video visits are billed at different rates depending on your insurance coverage. Please reach out to your insurance provider with any questions.    If during the course of the call the physician/provider feels a video visit is not appropriate, you will not be charged for this service.\"    Patient has given verbal consent to a Video visit? Yes  How would you like to obtain your AVS? AVS Preference: Mail a copy.  Will anyone else be joining your video visit? Yes, Tereza at [facility]            Video-Visit Details    Type of service:  Video Visit    Originating Location (pt. Location): U    Distant Location (provider location):  [facility]     Platform used for Video Visit: [employer]    US COMP 12/9. See'nancy Garcia for R foot amp, was seen yesterday. R leg angio 11/24/20.     Maite Sahu RN  "

## 2021-06-13 NOTE — PROGRESS NOTES
"  [facility]      NAME:  Caleb Hernandez             :  1935    MRN: [id number]    CODE STATUS:  FULL CODE    FACILITY: [facility] [[id number]]       CHIEF COMPLAIN/REASON FOR VISIT:  Chief Complaint   Patient presents with     Problem Visit     hospital return       HISTORY OF PRESENT ILLNESS: Caleb Hernandez is a 85 y.o. male being seen at the request of the nurse as he returned from hospital for an ID right ft. Pt seen in room. Today we will place wound vac in when it arrives. Foot currently in soft cast s/p surgery. Toes warm and wigle. Frantz denies pain.   He is currently on the TCU status post acute care due to an I&D of his right lower foot with cellulitis and is followed by Dr. Garcia podiatry.  He will be on daily IV antibiotics on the TCU.  As per his EMR at [facility], \"with DM2, PVD, HTN presented with right lower extremity cellulitis, osteomyelitis, septic arthritis.     Right foot cellulitis with abscess, osteomyelitis, septic arthritis  -Patient with a recent excision of the right fifth metatarsal on 2020 with podiatry.  Wound cultures with Bacteroides and Enterobacter cloaca.  Patient directly admitted from vascular clinic with concerns for worsening postoperative infection.  MRI with new synovitis, osteomyelitis of the 2nd-4th tarsometatarsal joint and likely septic arthritis.  Patient underwent I&D of abscess on , this wound culture now with Corynebacterium.  Patient initially on vancomycin and Zosyn, currently on meropenem with plan for outpatient ertapenem for 4 weeks.  -Nonweightbearing  -Patient received vancomycin and meropenem while inpatient and this will be switched to ertapenem daily dose x4 weeks with ID to follow as outpatient     Urinary retention with hematuria  -Patient had Dietrich catheter placed after angiogram for acute urinary retention.  Patient developed hematuria on .  CT abdomen and pelvis with significant irregular " "bladder wall thickening, more suggestive of cystitis.  Patient also has a nonobstructing 11 mm right upper pole kidney stone with no hydro-.  Urine culture negative, likely Dietrich causing irritation  -Patient evaluated daily by urology team  -Continue Flomax at discharge  -Able to restart Eliquis and aspirin daily  -Patient will be discharged on Dietrich catheter and will follow up with urology for definitive treatment     PVD  -Patient underwent right lower extremity angiogram with balloon angioplasty of anterior tibial and peroneal arteries 11/24/2020 with vascular surgery.  Postoperative ABIs without significant improvement, vascular thought this was likely secondary to spasm.  -will need repeat ultrasound outpatient  -Follow-up with vascular clinic outpatient     Adult failure to thrive  -Secondary to surgical interventions, prolonged hospital stays and infection  PT/OT recommending TCU, discharge plan 12/3     DM2  -A1c 7.9  -Held home Victoza, and was started on 3 units of mealtime insulin and increase to glargine 48 units at bedtime   -We will restart home Victoza and continue glargine at bedtime as well as mealtime insulin at discharge     Paroxysmal atrial fibrillation  -Currently rate controlled.    -Restart home Eliquis     HTN  -Lisinopril 5 mg p.o. daily     Normocytic anemia  -Hemoglobin 12.2 with MCV of 92.  Likely anemia of chronic disease, but with component of blood loss anemia from recent surgeries, hematuria.\"    He is to receive IV antibioticsand  weekly labs PT OT on the rehab unit.  He is on eliquis and ASA and I informed nursing to resume sp surgical procedure. I did decrease his am Lantus from 40 to 30. BS this am is at 174.    Allergies   Allergen Reactions     Cresol      Muscle cramps     Crestor [Rosuvastatin] Myalgia     Declomycin [Demeclocycline] Hives   :     Current Outpatient Medications   Medication Sig     acetaminophen (TYLENOL) 500 MG tablet Take 1-2 tablets (500-1,000 mg total) by " "mouth every 4 (four) hours as needed.     apixaban ANTICOAGULANT (ELIQUIS) 5 mg Tab tablet Take 1 tablet (5 mg total) by mouth 2 (two) times a day.     aspirin 81 MG EC tablet Take 81 mg by mouth daily.     blood glucose test strips Use 1 each As Directed 2 (two) times a day. Dispense brand per patient's insurance at pharmacy discretion.     blood-glucose meter (ONETOUCH VERIO IQ METER) Misc Use 1 each As Directed 2 (two) times a day.     cholecalciferol, vitamin D3, (VITAMIN D3) 5,000 unit Tab Take 5,000 Units by mouth daily.      ertapenem 1 g in NaCl 0.9 % 0.9 % 50 mL IVPB Infuse 1 g into a venous catheter daily.     insulin glargine (LANTUS SOLOSTAR U-100 INSULIN) 100 unit/mL (3 mL) pen Inject 44 Units under the skin at bedtime. (Patient taking differently: Inject 30 Units under the skin at bedtime. Takes 15 units q am and 30 units Q HS)     liraglutide (VICTOZA 3-RUPALI) 0.6 mg/0.1 mL (18 mg/3 mL) injection Inject 0.3 mL (1.8 mg total) under the skin daily.     lisinopriL (PRINIVIL,ZESTRIL) 5 MG tablet Take 1 tablet (5 mg total) by mouth daily.     multivit-min/FA/lycopen/lutein (CENTRUM SILVER MEN ORAL) Take 1 tablet by mouth daily.     mupirocin (BACTROBAN) 2 % ointment Apply topically daily as needed. Apply to wound.     neomycin-bacitracin-polymyxin (NEOSPORIN) ointment Apply to penis at catheter insertion site three times a day while catheter is in place.     NOVOLOG FLEXPEN U-100 INSULIN 100 unit/mL (3 mL) injection pen Inject 3 Units under the skin 3 (three) times a day before meals.     oxyCODONE (ROXICODONE) 5 MG immediate release tablet Take 1 tablet (5 mg total) by mouth every 6 (six) hours as needed for pain.     pen needle, diabetic 31 gauge x 5/16\" Ndle Used to inject insulin daily     polyethylene glycol (MIRALAX) 17 gram packet Take 1 packet (17 g total) by mouth daily as needed.     tamsulosin (FLOMAX) 0.4 mg cap Take 1 capsule (0.4 mg total) by mouth daily after supper.     vitamin E 400 unit " capsule Take 400 Units by mouth daily.         REVIEW OF SYSTEMS:    Currently, no fever, chills, or rigors. Does not have any visual or hearing problems. Denies any chest pain, headaches, palpitations, lightheadedness, dizziness, shortness of breath, or cough. Appetite is good. Denies any GERD symptoms. Denies any difficulty with swallowing, nausea, or vomiting.  Denies any abdominal pain, diarrhea or constipation. Denies any urinary symptoms. No insomnia. No active bleeding. No rash.       PHYSICAL EXAMINATION:  Vitals:    12/18/20 2044   BP: 126/87   Pulse: 64   Temp: 98.7  F (37.1  C)   Weight: 188 lb (85.3 kg)         GENERAL: Awake, Alert, oriented x3, not in any form of acute distress, answers questions appropriately, follows simple commands, conversant  HEENT: Head is normocephalic with normal hair distribution. No evidence of trauma. Ears: No acute purulent discharge. Eyes: Conjunctivae pink with no scleral jaundice. Nose: Normal mucosa and septum. NECK: Supple with no cervical or supraclavicular lymphadenopathy. Trachea is midline.   ABDOMEN: Globular and soft, nontender to palpation, non distended, no masses, no organomegaly, good bowel sounds, no rebound or guarding, no peritoneal signs.   : Dietrich, hematuria, fading wwent from maroon to huey color urine throughout day,  EXTREMITIES: Full range of motion does have a PICC in right upper arm.  He is to be nonweightbearing to the right foot until cleared by Dr. Middleton and podiatry.  SKIN: Warm and dry, no erythema noted, no rashes or lesions.  NEUROLOGICAL: The patient is oriented to person, place and time. Strength and sensation are grossly intact. Face is symmetric.        Social distancing with PPE in place during assessment due to COVID-19 precautions.            LABS:    Lab Results   Component Value Date    WBC 8.3 12/14/2020    HGB 11.8 (L) 12/14/2020    HCT 36.0 (L) 12/14/2020    MCV 91 12/14/2020     12/14/2020       Results for orders  placed or performed during the hospital encounter of 11/23/20   Basic Metabolic Panel   Result Value Ref Range    Sodium 139 136 - 145 mmol/L    Potassium 4.5 3.5 - 5.0 mmol/L    Chloride 103 98 - 107 mmol/L    CO2 30 22 - 31 mmol/L    Anion Gap, Calculation 6 5 - 18 mmol/L    Glucose 178 (H) 70 - 125 mg/dL    Calcium 8.3 (L) 8.5 - 10.5 mg/dL    BUN 18 8 - 28 mg/dL    Creatinine 0.88 0.70 - 1.30 mg/dL    GFR MDRD Af Amer >60 >60 mL/min/1.73m2    GFR MDRD Non Af Amer >60 >60 mL/min/1.73m2           Lab Results   Component Value Date    HGBA1C 7.9 (H) 09/04/2020     No results found for: [id number]  No results found for: [id number]    ASSESSMENT/PLAN:  1. 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 2 diabetes mellitus, with necrosis of muscle (H)    2. Type 2 diabetes mellitus with diabetic peripheral angiopathy without gangrene, with long-term current use of insulin (H)      1.  Osteomyelitis right foot, patient will have daily wound care done by skilled nursing staff, we will place wound vac on and change Q 3 days , start today.  He is to be nonweightbearing until further notice.  Weekly labs and sent to ID, Dr. Middleton will also follow as he remains nonweightbearing on his right foot.  PT and OT for strengthening and safety. SN to manage chronic medical conditions, he has DM as well as urinary retention, see dx list above for all comorbidity.    2. DM: He is on victoza daily I did decrease am Lantus from 40 to 30. He will also  get 12 units at HS.       Electronically signed by:  Marva Moore CNP  This progress note was completed using Dragon software and there may be grammatical errors.

## 2021-06-13 NOTE — PROGRESS NOTES
"Chief Complaint   Patient presents with     Wound Check     on bottom of R/t foot         History of Present Illness: Nursing notes reviewed.   Patient reports that he has a chronic diabetic foot ulcer on his right foot.  He has been going to the vascular service for wound care.  This morning he woke up and he so \"too much white\".  He came to the urgent care for an opinion and evaluation.  He denies any fever denies any chills.  Review of systems: See history of present illness, otherwise negative.     Current Outpatient Prescriptions   Medication Sig Dispense Refill     aspirin 81 MG EC tablet Take 81 mg by mouth daily.       blood glucose test (CONTOUR TEST STRIPS) strips Use 1 each As Directed 2 (two) times a day. Dispense brand per patient's insurance at pharmacy discretion. 100 each 11     cholecalciferol, vitamin D3, (VITAMIN D3) 5,000 unit Tab Take 1 tablet by mouth daily.       ibuprofen (ADVIL,MOTRIN) 200 MG tablet Take 200 mg by mouth every 6 (six) hours as needed for pain.        insulin degludec (TRESIBA FLEXTOUCH U-200) 200 unit/mL (3 mL) InPn Inject 40-50 Units under the skin daily. 9 mL 3     liraglutide (VICTOZA) 0.6 mg/0.1 mL (18 mg/3 mL) PnIj injection Inject 0.2 mL (1.2 mg total) under the skin daily. With or without food. 18 mL 3     lisinopril (PRINIVIL,ZESTRIL) 5 MG tablet TAKE 1 TABLET BY MOUTH ONCE DAILY (Patient taking differently: Take 5 mg by mouth daily. TAKE 1 TABLET BY MOUTH ONCE DAILY) 90 tablet 3     MULTIVITS,CA,MIN/IRON/FA/LYCOP (CENTRUM MEN ORAL) Take 1 tablet by mouth daily.       naproxen sodium (ALEVE) 220 MG tablet Take 220 mg by mouth daily.       pen needle, diabetic 31 gauge x 5/16\" Ndle Used to inject insulin daily 90 each 0     traMADol (ULTRAM) 50 mg tablet Take 50 mg by mouth every 6 (six) hours as needed for pain.       vitamin E 400 unit capsule Take 400 Units by mouth daily.       clindamycin (CLEOCIN HCL) 150 MG capsule Take 1 capsule (150 mg total) by mouth 3 " (three) times a day for 10 days. 30 capsule 0     Current Facility-Administered Medications   Medication Dose Route Frequency Provider Last Rate Last Dose     lidocaine 2 % jelly (XYLOCAINE)   Topical PRN Ines Zimmerman NP   1 application at 10/23/17 0718       Past Medical History:   Diagnosis Date     BPH (benign prostatic hyperplasia)      Cholelithiasis      Diabetes mellitus      Essential hypertension      Hyperlipidemia      Pancreatitis       Past Surgical History:   Procedure Laterality Date     BACK SURGERY      1964 removed a cyst     CHOLECYSTECTOMY  1985     ERCP       ERCP N/A 9/5/2017    Procedure: ENDOSCOPIC RETROGRADE CHOLANGIOPANCREATOGRAPHY SPHINCTEROTOMY AND STONE EXTRACTION;  Surgeon: Hansel Gannon MD;  Location: [facility];  Service:      TONSILLECTOMY  1940      Social History     Social History     Marital status:      Spouse name: N/A     Number of children: N/A     Years of education: N/A     Social History Main Topics     Smoking status: Former Smoker     Quit date: 11/11/1991     Smokeless tobacco: Never Used     Alcohol use No     Drug use: No     Sexual activity: No     Other Topics Concern     None     Social History Narrative       History   Smoking Status     Former Smoker     Quit date: 11/11/1991   Smokeless Tobacco     Never Used      Exam:   Blood pressure 177/80, pulse 76, temperature 97.5  F (36.4  C), temperature source Oral, resp. rate 14, weight 206 lb 4.8 oz (93.6 kg), SpO2 97 %.    EXAM:   General: Well grown adult male in no acute distress.  Lungs he has a normal respiratory effort and auscultation breath sounds are clear.  Heart he has a good S1 and S2 no obvious murmurs noted no JVD.  Musculoskeletal he does have an ulcer on his right foot on the lateral plantar aspect.  No obvious drainage noted.  Slightly tender on palpation.  Skin on inspection lesions are as described above in muscular skeletal otherwise is warm to touch skin turgor appear  normal.    No results found for this or any previous visit (from the past 24 hour(s)).     Assessment/Plan   1. 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 2 diabetes mellitus, unspecified ulcer stage  clindamycin (CLEOCIN HCL) 150 MG capsule   Advised the patient to follow-up with wound care on Monday, October 30, 2017.  Continue with clindamycin.  May use Tylenol as needed for fever or chills.  Return to clinic or go to the nearest emergency room or hospital in case of any acute changes.  He voiced understanding and was agreeable to this discharge planning.  There are no Patient Instructions on file for this visit.   Waldo Medrano MD

## 2021-06-13 NOTE — ANESTHESIA PREPROCEDURE EVALUATION
Anesthesia Evaluation      Patient summary reviewed   No history of anesthetic complications     Airway   Mallampati: II  Neck ROM: full   Pulmonary     breath sounds clear to auscultation  (+) pneumonia, a smoker    ROS comment: Hx of PE on apixaban. Last took on 10/29                         Cardiovascular - normal exam  Exercise tolerance: > or = 4 METS  (+) hypertension, , hypercholesterolemia, PVD    Rhythm: regular  Rate: normal,      ROS comment: DVT/PE with cor pulmonale.     Neuro/Psych - negative ROS     Endo/Other    (+) diabetes mellitus type 2 poorly controlled using insulin, arthritis, obesity,      Comments: Osteomyelitis of R foot    GI/Hepatic/Renal      Comments: BPH          Dental    (+) upper dentures and lower dentures                         Anesthesia Plan  Planned anesthetic: general mask and peripheral nerve block  General with a mask  Pre op popliteal and adductor canal block   ASA 3   Induction: intravenous   Anesthetic plan and risks discussed with: patient  Anesthesia plan special considerations: antiemetics,   Post-op plan: routine recovery

## 2021-06-13 NOTE — TELEPHONE ENCOUNTER
[facility] Nurse Triage Telephone Note      Provider: YOUSUF Jamil  Facility: [facility]    Facility Type: TCU    Caller: Coco   Call Back Number:  [phone]    Allergies: Cresol, Crestor [rosuvastatin], and Declomycin [demeclocycline]    Reason for call:   UC results- currently on cipro 250mg two times a day   Culture >100,000 col/ml Candida glabrataAbnormal          Also, the pt had a CBC, BMP and mag that was missed today        Verbal Order/Direction given by Provider: discontinue cipro, give diflucan 150mg x1 tonight.   Discontinue the CBC, BMP and Mag d/t labs drawn on 12/14    Provider giving order: YOUSUF Jamil    Verbal order given to: Coco Hurst RN

## 2021-06-13 NOTE — ANESTHESIA POSTPROCEDURE EVALUATION
Patient: Caleb Hernandez  Procedure(s):  INCISION AND DRAINAGE, right foot (Right)  with amputation of the fifth ray, peroneal brevis tendon transfer (Right)  Anesthesia type: general    Patient location: PACU  Last vitals:   Vitals Value Taken Time   /62 11/16/20 1431   Temp 36.5  C (97.7  F) 11/16/20 1418   Pulse 53 11/16/20 1455   Resp 18 11/16/20 1430   SpO2 91 % 11/16/20 1455   Vitals shown include unvalidated device data.  Post vital signs: stable  Level of consciousness: awake and responds to simple questions  Post-anesthesia pain: pain controlled  Post-anesthesia nausea and vomiting: no  Pulmonary: unassisted, return to baseline  Cardiovascular: stable and blood pressure at baseline  Hydration: adequate  Anesthetic events: no

## 2021-06-13 NOTE — TELEPHONE ENCOUNTER
[facility] Nurse Triage Telephone Note      Provider: YOUSUF Jamil  Facility: [facility]    Facility Type: TCU    Caller: Tonie  Call Back Number:  [phone]    Allergies: Cresol, Crestor [rosuvastatin], and Declomycin [demeclocycline]    Reason for call: Nurse calling to report Heme 1, CMP, and CRP results.  Patient is currently on IV Ertapenem and is following with ID.  Of note, patient will be having an I&D on 12/17/20.  TCU NP started Cipro today for a UTI, however sensitivities are pending.       Verbal Order/Direction given by Provider: Fax lab results to ID.      Provider giving order: YOUSUF Jamil    Verbal order given to: Tonie Orr RN

## 2021-06-13 NOTE — TELEPHONE ENCOUNTER
ROSA with Tereza at [facility] to inform of upcoming appointments with Vascular.   Pt scheduled for US + OV w/ Jose Abarca 12/9/20 at 2:30PM in [facility], VV w/ Segundo 12/10 at 9:40AM.   Need to confirm appointments and need contact information for VV. [phone]

## 2021-06-13 NOTE — PROGRESS NOTES
Discussed with Dr Raymond- Recommended pt be admitted. Updated pt. Called bed control for admission.     I spoke with Hospitalist and they accepted pt for P2. Updated pt and wife. Updated Dr Raymond and he will meet pt at [facility] for assessment.

## 2021-06-13 NOTE — ANESTHESIA PROCEDURE NOTES
Peripheral Block    Patient location during procedure: pre-op  Start time: 11/27/2020 10:58 AM  End time: 11/27/2020 10:59 AM  post-op analgesia per surgeon order as noted in medical record  Staffing:  Performing  Anesthesiologist: Nissa Whipple MD  Preanesthetic Checklist  Completed: patient identified, site marked, risks, benefits, and alternatives discussed, timeout performed, consent obtained, airway assessed, oxygen available, suction available, emergency drugs available and hand hygiene performed  Peripheral Block  Block type: saphenous, adductor canal block  Prep: ChloraPrep  Patient position: supine  Patient monitoring: blood pressure, heart rate, continuous pulse oximetry and cardiac monitor  Laterality: right  Injection technique: ultrasound guided    Ultrasound used to visualize needle placement in proximity to nerve being blocked: yes   US used to visualize anesthetic spread  Visualized anatomic structures normal  No Pathological Findings  Permanent ultrasound image captured for medical record  Sterile gel and probe cover used for ultrasound.  Needle  Needle type: Stimuplex   Needle gauge: 20G  Needle length: 4 in  no peripheral nerve catheter placed  Assessment  Injection assessment: no difficulty with injection, negative aspiration for heme, no paresthesia on injection and incremental injection

## 2021-06-13 NOTE — TELEPHONE ENCOUNTER
11/18 - called x 2 - spoke with spouse jayjay. Not sure on next steps. Would like referring provider to call and confirm.    They can be reached @ [phone]

## 2021-06-13 NOTE — TELEPHONE ENCOUNTER
Left messsage for patient and wife that Dr. Garcia does want the ID referral, asked them to call with questions.

## 2021-06-13 NOTE — TELEPHONE ENCOUNTER
[facility] Nurse Triage Telephone Note      Provider: Charlotte Mckenna MD  Facility: [facility]    Facility Type: TCU    Caller: Coco  Call Back Number:  [phone]    Allergies: Cresol, Crestor [rosuvastatin], and Declomycin [demeclocycline]    Reason for call: New admit yesterday, missed having labs drawn today. Are to be done weekly, when do them? Also, urine output has been only 300cc dark yellow from mai.    Verbal Order/Direction given by Provider: Weekly on Mondays. Enc fluids, if problems with mai not draining-irrigate with NS.    Provider giving order: Charlotte Mckenna MD    Verbal order given to: Coco Scruggs RN

## 2021-06-13 NOTE — ANESTHESIA POSTPROCEDURE EVALUATION
Patient: Caleb Hernandez  Procedure(s):  IRRIGATION AND DEBRIDEMENT, right foot with application of Theraskin (Right)  Anesthesia type: general    Patient location: Phase II Recovery  Last vitals:   Vitals Value Taken Time   /59 12/17/20 1600   Temp 36.6  C (97.9  F) 12/17/20 1528   Pulse 62 12/17/20 1607   Resp 16 12/17/20 1545   SpO2 91 % 12/17/20 1607   Vitals shown include unvalidated device data.  Post vital signs: stable  Level of consciousness: awake, alert and responds to simple questions  Post-anesthesia pain: pain controlled  Post-anesthesia nausea and vomiting: no  Pulmonary: unassisted, return to baseline  Cardiovascular: stable and blood pressure at baseline  Hydration: adequate  Anesthetic events: no    QCDR Measures:  ASA# 11 - Renu-op Cardiac Arrest: ASA11B - Patient did NOT experience unanticipated cardiac arrest  ASA# 12 - Renu-op Mortality Rate: ASA12B - Patient did NOT die  ASA# 13 - PACU Re-Intubation Rate: NA - No ETT / LMA used for case  ASA# 10 - Composite Anes Safety: ASA10A - No serious adverse event    Additional Notes:

## 2021-06-13 NOTE — PATIENT INSTRUCTIONS - HE
Leave gauze in place until surgery.    Surgery Date December 17, 2020    Surgery Time 3:00 PM, ARRIVAL 1pm  ( Arrive at the hospital two hours prior to your surgery and 1 hour prior at the surgery center. Check in at the . The only exception is if you are scheduled for surgery at 7am, you should arrive at 5:30am)    IF YOU NEED TO RESCHEDULE OR CANCEL YOUR SURGERY FOR ANY REASON PLEASE CALL THE CLINIC AS SOON AS POSSIBLE -[phone].    Admission Type: Outpatient    Surgeon: Dr. Garcia    Surgery Procedure: I&D with theraskin graft application    Surgery Location: [facility],[address] [place] [address], Main Admitting      Anticoagulation Schedule     1. Eliquis: do not take 2 days prior or the day of surgery     2. Aspirin: do not take any aspirin until the day after surgery    Lantus insulin, reduce dose by 50% at bedtime the night before surgery.    Victoza skip the morning dose on the day of surgery. Resume with the morning dose the day after surgery.    Lisinopril skip the morning of surgery. Resume the day after surgery.      Additional Information: If you use a CPAP machine for sleep apnea please bring it with you for surgery. We will want to monitor your breathing using your normal equipment.     HOSPITAL AND SURGERY CENTER INFORMATION    We need to know a lot of information about you before surgery.     1-5 Days Before:     A nurse will call you for a pre-screening interview. The phone call with the nurse will be much faster and easier if you  Have organized your information prior to the call.     Please have the following information available:    Preoperative Exam completed and faxed to our office    Primary care provider's and any specialty providers' contact information available. .    If requested by your primary care provider, have any heart and lung exams at least 3 days before surgery.    Have a complete and accurate list of medications available.     Have a list of your  allergies/sensitivities and reactions    Know your health history, surgical history, and medical problems    Know any anesthesia issues with you or within your family.     BE SURE TO NOTIFY US IF YOU ARE TAKING ANY BLOOD THINNING AGENTS: Coumadin, Plavix, Aspirin, Xarelto, Eliquis    Someone planned to bring you and stay with you after the surgery    Day Before Surgery    1. STOP Smoking and Drinking: It is important to stop smoking and drinking at least 24 hours before surgery. Smoking and drinking may cause complications in your recovery from anesthesia and may lengthen the healing process.    2. Pack for your hospital stay: If it will be required for you to stay at the hospital after surgery, bring personal items such as a robe, slippers, pajamas, additional clothing and toiletries. Don't forget:    List of medication    Eyeglass case or contact case with solution. You cannot wear contacts during surgery    Copies of your physical exam , lab results and EKG    Copy of Health Care Directives, Living Will or Power of     Insurance Cards    Photo ID    CPAP machine    3. NOTHING BY MOUTH 8 HOURS BEFORE. This includes gum, hard candy, water and mints. The only exception is if you have been instructed by your doctor to take your medications with sips of water. You may rinse your mouth or brush your teeth, but do not swallow water.     4. Remove Nail Polish  Day of Surgery    1. Medications- Take as indicated with sips of water     2. Wear comfortable loose fitting clothes. Wear your glasses-Not contacts. Do not wear jewelry and remove body piercing's. Surgery may be cancelled if they are not removed.     3. If same day surgery-Have a someone come with you to surgery that can help you understand the surgeon's instructions, drive you home and stay with you overnight the first night.     4. A nurse will call you at home within 72 hours following surgery to see how you are doing. Our nurses and doctors will  discuss recovery with you.        The surgery scheduler Elvia Pena at [phone] will contact you to schedule if you were not scheduled today.     You will need a preop physical within 30 days before surgery with your primary care provider. Please note if you do not get a complete History and Physical your surgery will be cancelled.   Please contact your primary to schedule.     A nurse should contact you from the hospital 1-2 days before surgery to review with you.    If you would like a [place] Estimate for your upcoming service/procedure contact Cost of Care Estimates at [phone], advocates are available Monday through Friday 8am - 5pm. You may also submit a request online at [email or web address]    [facility]  [address]  [place]  [address]     [phone] for facility charge    Anesthesiology charge  [phone]          Please don't hesitate to call us with any additional question or problems  Our number is [phone]     Instructions for Patients Scheduled for Vascular or Podiatry Procedures During the COVID 19 Pandemic    Your Provider has determined that your condition warrants going ahead with your procedure at this time.  You will need to be tested for COVID19 within 72 hours of your procedure. We highly encourage patients to get tested for COVID-19 at one of our designated [facility] testing sites. We process the tests in our lab, which allows us to get the results quickly. If you choose to get tested at a non-[facility] location, you will need to contact your primary care provider to make those arrangements and ensure the results are available to your surgeon before you are arriving for your procedure. If we do not receive the results in time, your procedure may be postponed or canceled.  Please make sure your test is collected 3-days prior to your procedure date.  The results will need to  get faxed to [phone].  After testing, you will need to remain in self-quarantine until your procedure.  If you are notified of a positive COVID-19 result; you will need to call your provider for further recommendations.    Please call [phone] and press option 2 to speak to a nurse.  If the test is positive; DO NOT PRESENT FOR YOUR PROCEDURE until you have been given further instructions.  You will not be called with negative results so arrive as instructed unless otherwise notified.  Don't:    Don't invite visitors or friends into your home.    Don't leave your home unless absolutely necessary.    Don't share utensils and other household items with others in the home.  Do:    Wash your hands regularly with soap and water and use hand  with at least 60% alcohol if you don't have easy access to soap and water.     Disinfect surface areas daily, including doorknobs, electronics - especially phones, laptops and other devices.     Wash utensils and other items thoroughly.     Separate yourself from others in the household as best you can, including pets.    Keep your hands away from your face.     Practice all other prevention tips the CDC recommends, including covering your coughs and sneezes with a tissue or your sleeve and immediately throwing the tissue into the trash and washing your hands.    Call your hospital if you develop the following signs before your surgery:    Fever.    Cough.    Shortness of breath.    Sore throat.    Runny or stuffy nose.    Muscle or body aches.    Headaches.    Fatigue.    Vomiting and diarrhea.    These steps will help keep you & your family, other patients, and hospital staff safe from COVID19.

## 2021-06-13 NOTE — TELEPHONE ENCOUNTER
[facility] Nurse Triage Telephone Note      Provider: YOUSUF Jamil  Facility: [facility]    Facility Type: TCU    Caller: Anuradha  Call Back Number:  [phone]    Allergies: Cresol, Crestor [rosuvastatin], and Declomycin [demeclocycline]    Reason for call: CBC,CMP and CRP today.      Verbal Order/Direction given by Provider: Send these labs to ID    Provider giving order: YOUSUF Jamil    Verbal order given to: Anuradha Hurst RN

## 2021-06-13 NOTE — PROGRESS NOTES
"  [facility]      NAME:  Caleb Hernandez             :  1935    MRN: [id number]    CODE STATUS:  FULL CODE    FACILITY: [facility] [[id number]]       CHIEF COMPLAIN/REASON FOR VISIT:  Chief Complaint   Patient presents with     Problem Visit     hematuria       HISTORY OF PRESENT ILLNESS: Caleb Hernandez is a 85 y.o. male being seen at the request of the nurse as he has hematuria. Dietrich cath in place.He is currently on the TCU status post acute care due to an I&D of his right lower foot with cellulitis and is followed by Dr. Garcia podiatry.  He will be on daily IV antibiotics on the TCU.  As per his EMR at [facility], \"with DM2, PVD, HTN presented with right lower extremity cellulitis, osteomyelitis, septic arthritis.     Right foot cellulitis with abscess, osteomyelitis, septic arthritis  -Patient with a recent excision of the right fifth metatarsal on 2020 with podiatry.  Wound cultures with Bacteroides and Enterobacter cloaca.  Patient directly admitted from vascular clinic with concerns for worsening postoperative infection.  MRI with new synovitis, osteomyelitis of the 2nd-4th tarsometatarsal joint and likely septic arthritis.  Patient underwent I&D of abscess on , this wound culture now with Corynebacterium.  Patient initially on vancomycin and Zosyn, currently on meropenem with plan for outpatient ertapenem for 4 weeks.  -Nonweightbearing  -Patient received vancomycin and meropenem while inpatient and this will be switched to ertapenem daily dose x4 weeks with ID to follow as outpatient     Urinary retention with hematuria  -Patient had Dietrich catheter placed after angiogram for acute urinary retention.  Patient developed hematuria on .  CT abdomen and pelvis with significant irregular bladder wall thickening, more suggestive of cystitis.  Patient also has a nonobstructing 11 mm right upper pole kidney stone with no hydro-.  Urine culture negative, " "likely Dietrich causing irritation  -Patient evaluated daily by urology team  -Continue Flomax at discharge  -Able to restart Eliquis and aspirin daily  -Patient will be discharged on Dietrich catheter and will follow up with urology for definitive treatment     PVD  -Patient underwent right lower extremity angiogram with balloon angioplasty of anterior tibial and peroneal arteries 11/24/2020 with vascular surgery.  Postoperative ABIs without significant improvement, vascular thought this was likely secondary to spasm.  -will need repeat ultrasound outpatient  -Follow-up with vascular clinic outpatient     Adult failure to thrive  -Secondary to surgical interventions, prolonged hospital stays and infection  PT/OT recommending TCU, discharge plan 12/3     DM2  -A1c 7.9  -Held home Victoza, and was started on 3 units of mealtime insulin and increase to glargine 48 units at bedtime   -We will restart home Victoza and continue glargine at bedtime as well as mealtime insulin at discharge     Paroxysmal atrial fibrillation  -Currently rate controlled.    -Restart home Eliquis     HTN  -Lisinopril 5 mg p.o. daily     Normocytic anemia  -Hemoglobin 12.2 with MCV of 92.  Likely anemia of chronic disease, but with component of blood loss anemia from recent surgeries, hematuria.\"    He is to receive IV antibiotics and  weekly labs PT OT on the rehab unit.  He is on eliquis and ASA and I informed nursing to resume sp surgical procedure on 12/18. He is now with hematuria, we will see if rt a uti , he has CBC drawn today.    Allergies   Allergen Reactions     Cresol      Muscle cramps     Crestor [Rosuvastatin] Myalgia     Declomycin [Demeclocycline] Hives   :     Current Outpatient Medications   Medication Sig     acetaminophen (TYLENOL) 500 MG tablet Take 1-2 tablets (500-1,000 mg total) by mouth every 4 (four) hours as needed.     apixaban ANTICOAGULANT (ELIQUIS) 5 mg Tab tablet Take 1 tablet (5 mg total) by mouth 2 (two) times a " "day.     aspirin 81 MG EC tablet Take 81 mg by mouth daily.     blood glucose test strips Use 1 each As Directed 2 (two) times a day. Dispense brand per patient's insurance at pharmacy discretion.     blood-glucose meter (ONETOUCH VERIO IQ METER) Misc Use 1 each As Directed 2 (two) times a day.     cholecalciferol, vitamin D3, (VITAMIN D3) 5,000 unit Tab Take 5,000 Units by mouth daily.      ertapenem 1 g in NaCl 0.9 % 0.9 % 50 mL IVPB Infuse 1 g into a venous catheter daily.     insulin glargine (LANTUS SOLOSTAR U-100 INSULIN) 100 unit/mL (3 mL) pen Inject 44 Units under the skin at bedtime. (Patient taking differently: Inject 30 Units under the skin at bedtime. Takes 15 units q am and 30 units Q HS)     liraglutide (VICTOZA 3-RUPALI) 0.6 mg/0.1 mL (18 mg/3 mL) injection Inject 0.3 mL (1.8 mg total) under the skin daily.     lisinopriL (PRINIVIL,ZESTRIL) 5 MG tablet Take 1 tablet (5 mg total) by mouth daily.     multivit-min/FA/lycopen/lutein (CENTRUM SILVER MEN ORAL) Take 1 tablet by mouth daily.     mupirocin (BACTROBAN) 2 % ointment Apply topically daily as needed. Apply to wound.     neomycin-bacitracin-polymyxin (NEOSPORIN) ointment Apply to penis at catheter insertion site three times a day while catheter is in place.     NOVOLOG FLEXPEN U-100 INSULIN 100 unit/mL (3 mL) injection pen Inject 3 Units under the skin 3 (three) times a day before meals.     oxyCODONE (ROXICODONE) 5 MG immediate release tablet Take 1 tablet (5 mg total) by mouth every 6 (six) hours as needed for pain.     pen needle, diabetic 31 gauge x 5/16\" Ndle Used to inject insulin daily     polyethylene glycol (MIRALAX) 17 gram packet Take 1 packet (17 g total) by mouth daily as needed.     tamsulosin (FLOMAX) 0.4 mg cap Take 1 capsule (0.4 mg total) by mouth daily after supper.     vitamin E 400 unit capsule Take 400 Units by mouth daily.         REVIEW OF SYSTEMS:    Currently, no fever, chills, or rigors. Does not have any visual or hearing " problems. Denies any chest pain, headaches, palpitations, lightheadedness, dizziness, shortness of breath, or cough. Appetite is good. Denies any GERD symptoms. Denies any difficulty with swallowing, nausea, or vomiting.  Denies any abdominal pain, diarrhea or constipation. Denies any urinary symptoms. No insomnia. No active bleeding other than hematuria. No rash.       PHYSICAL EXAMINATION:  Vitals:    12/21/20 1848   BP: 172/76   Pulse: (!) 55   Temp: 98.5  F (36.9  C)   Weight: 188 lb (85.3 kg)         GENERAL: Awake, Alert, oriented x3, not in any form of acute distress, answers questions appropriately, follows simple commands, conversant  HEENT: Head is normocephalic with normal hair distribution. No evidence of trauma. Ears: No acute purulent discharge. Eyes: Conjunctivae pink with no scleral jaundice. Nose: Normal mucosa and septum. NECK: Supple with no cervical or supraclavicular lymphadenopathy. Trachea is midline.   ABDOMEN: Globular and soft, nontender to palpation, non distended, no masses, no organomegaly, good bowel sounds, no rebound or guarding, no peritoneal signs.   : Dietrich, hematuria, fading went from maroon to huey color urine throughout day, new onset  EXTREMITIES: Full range of motion does have a PICC in right upper arm.  He is to be nonweightbearing to the right foot until cleared by Dr. Middleton and podiatry.  SKIN: Warm and dry, no erythema noted, no rashes or lesions.  NEUROLOGICAL: The patient is oriented to person, place and time. Strength and sensation are grossly intact. Face is symmetric.        Social distancing with PPE in place during assessment due to COVID-19 precautions.            LABS:    Lab Results   Component Value Date    WBC 8.3 12/14/2020    HGB 11.8 (L) 12/14/2020    HCT 36.0 (L) 12/14/2020    MCV 91 12/14/2020     12/14/2020       Results for orders placed or performed in visit on 12/21/20   Basic Metabolic Panel   Result Value Ref Range    Sodium 140 136 - 145  mmol/L    Potassium 4.2 3.5 - 5.0 mmol/L    Chloride 104 98 - 107 mmol/L    CO2 29 22 - 31 mmol/L    Anion Gap, Calculation 7 5 - 18 mmol/L    Glucose 84 70 - 125 mg/dL    Calcium 8.3 (L) 8.5 - 10.5 mg/dL    BUN 11 8 - 28 mg/dL    Creatinine 0.77 0.70 - 1.30 mg/dL    GFR MDRD Af Amer >60 >60 mL/min/1.73m2    GFR MDRD Non Af Amer >60 >60 mL/min/1.73m2           Lab Results   Component Value Date    HGBA1C 7.9 (H) 09/04/2020     No results found for: [id number]  No results found for: ESJXBCPG90    ASSESSMENT/PLAN:  1. Urinary retention    2. Gross hematuria    3. Abscess of right foot          1/2 . Urinary retention and hematuria: Cath in place due to retention. Hematuria. On duac, but had UTI recently and nursing reports due to surgery he never finished abx course. I request new UA and we will see if active UTI in place.    3.  Abscess right ft and Osteomyelitis right foot,with wound vac on and change Q 3 days .  He is to be nonweightbearing until further notice.  Weekly labs and sent to ID, Dr. Middleton will also follow as he remains nonweightbearing on his right foot.  PT and OT for strengthening and safety. SN to manage chronic medical conditions, he has DM as well as urinary retention, see dx list above for all comorbidity.        Electronically signed by:  Marva Moore CNP  This progress note was completed using Dragon software and there may be grammatical errors.

## 2021-06-13 NOTE — PROGRESS NOTES
Assessment: Frantz is in for a follow up on his diabetes management.  He is getting ready to head back to work in 2 days when he will be much more active.  He brought in a nutritional label of his [employer] fruit today so we dove into carb counting and nutritional label reading.  Last night he had meatloaf, hashbrowns, 3 wafer cookies, 1 cup of fruit, & 2 scoops of peas.  We broke this down and estimated it was 90-100g of carbs.  We discussed sandwiches which he thought were 150g of carbs each.  His peanut butter and jelly sandwich is around 60g and his turkey sandwich is about 30-40g.  Frantz appears to average 90-100g of carbs per meal.  I suggested that he work on reducing that to 50-70g to prevent higher blood sugars & the need for mealtime insulin.  He is taking 44 units of insulin and 1.8mg of victoza.  His fasting blood sugars have been 140-180, before lunch: 170-240, before dinner: 130-180, & bedtime are 190-250.  Normally, I would suggest slightly increasing his long acting insulin and introduce mealtime insulin for dinner and breakfast, but he will start increasing his exercise in 2 days.  I asked him to reduce his insulin dose to 40 units before he starts walking an extra 4-5 hours a day at work.      Plan: Frantz will continue to check his blood sugars 4x/daily.  He will reduce his insulin dose to 40 units once he starts work again.  If he has any blood sugars in the 60s, he will reduce his dose by 4 units and by 2 units if he is in the 70s.  He will continue his victoza at 1.8mg, but he may not need that high of a dose when he starts working again.  I will follow up with him by phone in 1 week to see how he is doing while back to work.  He will reduce his carb portions at meals to 50-70g instead of .      Subjective and Objective:      Caleb Hernandez is referred by Dr. Machado for Diabetes Education.     Lab Results   Component Value Date    HGBA1C 10.5 (H) 09/05/2017         Current diabetes medications:   victoza 1.8mg, basal insulin 44 units.    Goals       a1c <8.0.            9/22/17:  Frantz will work on reducing his a1c <8.0 to reduce complications from diabetes.        monitoring            9/22/17:  Frantz will start to check his blood sugars 2x/daily.  10/6/17:  Met and continuing.  He is checking his blood sugars 4x/daily now.            Follow up:   Primary care visit  CDE (certified diabetic educator)      Education:     Monitoring   Meter (per above goals): Assessed and Discussed  Monitoring: Assessed and Discussed  BG goals: Assessed and Discussed    Nutrition Management  Nutrition Management: Assessed, Discussed and Literature provided  Weight: Assessed and Discussed  Portions/Balance: Assessed, Discussed and Literature provided  Carb ID/Count: Assessed, Discussed and Literature provided  Label Reading: Assessed, Discussed and Literature provided  Heart Healthy Fats: Assessed and Discussed  Menu Planning: Assessed, Discussed and Literature provided  Dining Out: Assessed and Discussed  Physical Activity: Assessed and Discussed  Medications: Assessed and Discussed  Orals: Not addressed  Injected Medications: Assessed and Discussed   Storage/Exp:Assessed and Discussed   Site Rotation: Assessed and Discussed   Sites Assessed: no    Diabetes Disease Process: Assessed and Discussed    Acute Complications: Prevent, Detect, Treat:  Hypoglycemia: Assessed, Discussed and Literature provided  Hyperglycemia: Assessed, Discussed and Literature provided  Sick Days: Not addressed  Driving: Not addressed    Chronic Complications  Foot Care:Not addressed  Skin Care: Not addressed  Eye: Not addressed  ABC: Not addressed  Teeth:Not addressed  Goal Setting and Problem Solving: Assessed and Discussed  Barriers: Assessed and Discussed  Psychosocial Adjustments: Assessed and Discussed      Time spent with the patient: 60 minutes for diabetes education and counseling.   Previous Education: yes  Visit Type:DSMT  Hours Remaining: DSMT  6 and MNT 3      Miguel Vale  10/31/2017

## 2021-06-13 NOTE — TELEPHONE ENCOUNTER
Pt has surgery scheduled with Dr. Garcia on 11/16. Pt is wondering if he needs the gauze dressing to be changed on 11/15- OK given to not change.

## 2021-06-14 NOTE — PATIENT INSTRUCTIONS - HE
DRINK ENSURE TWICE DAILY.    Remain non-weightbearing on right foot.    Continue wound vac on right foot per instructions below:        - Wound Vac Instructions    1. 2x weekly and as needed cleanse the wound with NS    2. Pat dry    3. Apply Cavilon no sting barrier wipe to the skin surrounding the wound to protect from drainage/maceration    4. Apply drape to doug wound. Cut strip of drape and apply to skin if bridging is needed; plan this area in advance; should not be over bony prominence     5. Cut the foam to fit the size of the wound.  APPLY ADAPTIC TOUCH OVER THE WOUND/GRAFT SITE, then    6. Apply foam to wound    7. Cut narrow strip of foam if bridging if needed    8. Cover foam with drape to obtain air tight seal    9. Cut opening the size of a quarter for where the suction pad will be applied    10. Apply Suction pad    11. 100mmHG suction continuous     [employer] can be reached at [phone], 24 hours a day 7 days a week     - If you do not have a back up plan in place:     If the negative pressure wound therapy malfunctions or unable to maintain seal: dressing must be removed and reapplied within 2 hours of the incident. If unable to reapply negative pressure wound dressing, place Normal Saline moistened gauze in wound bed and cover with appropriate dressing to keep wound bed moist.  Change wet-to-dry dressing two times a day until healthcare staff can re-implement negative pressure therapy. Change canister at least weekly.  [employer] [facility] can be reached at [phone], 24 hours a day 7 days a week    - Information on Vacuum-Assisted Closure of a Wound  Vacuum-assisted closure (VAC) of a wound is a type of treatment to help wounds heal. It s also known as negative pressure wound therapy. During the treatment, a device lowers air pressure on the wound. This can help the wound heal more quickly.  - Understanding the wound VAC system  A wound VAC system has several parts. A foam or  gauze dressing is put directly on the wound. The dressing is changed every 24 to 72 hours. An adhesive film covers and seals the dressing and wound. A drainage tube leads from under the adhesive film and connects to a portable vacuum pump. This pump removes air pressure over the wound. It may do this constantly. Or it may do it in cycles. During the treatment, you ll need to carry the portable pump everywhere you go.  - Why wound VAC is used  You might need this therapy for a recent traumatic wound. Or you may need it for a chronic wound. This is a wound that does not heal the way it should over time. This can happen with wounds in people who have diabetes. You may need a wound VAC if you ve had a recent skin graft. And you may need a wound VAC for a large wound. Large wounds can take a longer time to heal.  A wound vacuum system may help your wound heal more quickly by:    Draining extra fluid from the wound    Reducing swelling    Reducing bacteria in the wound    Keeping your wound moist and warm    Helping draw together wound edges    Increasing blood flow to your wound    Decreasing inflammation  Wound VAC offers some other advantages over other types of wound care. It may decrease your overall discomfort. The dressings usually need to be changed less often. And they may be easier to keep in position.  - Risks of wound VAC  Wound VAC has some rare risks, such as:    Bleeding (which may be severe)    Wound infection    An abnormal connection between the intestinal tract and the skin (enteric fistula)  Proper training in dressing changes can help reduce the risk for these complications. Also, your doctor will carefully evaluate you to make sure you are a good candidate for the therapy. Certain problems can increase your risk for complications. These include:    Exposed organs or blood vessels    High risk of bleeding from another medical problem    Wound infection    Nearby bone infection    Dead wound  tissue    Cancer tissue    Fragile skin, such as from aging or longtime use of topical steroids    Allergy to adhesive    Very poor blood flow to your wound    Wounds close to joints that may reopen because of movement  Your doctor will discuss the risks that apply to you. Make sure to talk with him or her about all of your questions and concerns.  - Getting ready for wound VAC  You likely won t need to do much to get ready for wound VAC. In some cases, you may need to wait a while before having this therapy. For example, your doctor may first need to treat an infection in your wound. Dead or damaged tissue may also need to be removed from your wound.  You or a caregiver may need training on how to use the wound VAC device. This is done if you will be able to have your wound vacuum therapy at home. In other cases, you may need to have your wound vacuum therapy in a health care facility.  - On the day of your procedure  A health care provider will cover your wound with foam or gauze wound dressing. An adhesive film will be put over the dressing and wound. This seals the wound. The foam connects to a drainage tube, which leads to a vacuum pump. This pump is portable. When the pump is turned on, it draws fluid through the foam and out the drainage tubing. The pump may run constantly, or it may cycle off and on. Your exact setup will depend on the specific type of wound vacuum system that you use.  - Managing your wound  You may need the dressing changed about once a day. You may need it changed more or less often, depending on your wound. You or your caregiver may be trained to do this at home. Or it may be done by a visiting health care provider. Your doctor may prescribe a pain medicine. This is to prevent or reduce pain during the dressing change.  You will likely need to use the wound VAC system for several weeks or months. During this time, you ll carry the portable pump everywhere you go.  - Nutrition for wound  healing  During this time, make sure you follow a healthy diet. This is needed so the wound can heal and to prevent infection. Your doctor can tell you more about what to include in your diet during this time.  follow up with your doctor if you have a medical condition that led to your wound, such as diabetes. He or she can help you prevent future wounds.  - Follow-up care  Your doctor will carefully keep track of your healing. Make sure to keep all follow-up appointments.  - When to call your health care provider  Call your health care provider right away if you have any of these:    Fever of 100.4 F (38.0 C) or higher    Increased redness, swelling, or warmth around wound    Increased pain    Bright red blood or blood clots in tubing or the collection chamber of the [facility] Virtual Check-In Number    [phone]    When you arrive for your IN OFFICE visit. Please call this number from the parking lot.      The staff will then virtually check you in and meet you at the door to escort you to a room.    If you arrive early and a room is not yet ready for you staff may have you wait can call you back when they are ready.     Please remember to wear a mask into your appointment     No Visitors Allowed    If you are having any symptoms- cough, fever, rash, loss of taste and smell or shortness of breath we ask that you please call and reschedule your appointment.

## 2021-06-14 NOTE — PROGRESS NOTES
S)  Patient returns in good spirits and stated he was very pleased with the adjustment's made at his last appointment.  Furthermore, he stated the his ulcer is healing, but very slowly.  Patient also requested pink plastazote for his Lt FO top cover.    O)  Size of ulcer measured 3x3 mm at today's appointment.  Did not observe drainage on band aid today.,    A)  Using 1/8th inch thick pink plastazote I covered the Lt foot orthotic.    P)  Patient would like to have his next pair of inserts fabricated with [employer].  He will be ok for next set in late February of 2018.    Pardeep Lane CPO.

## 2021-06-14 NOTE — PATIENT INSTRUCTIONS - HE
Post hospital follow-up visit, and currently at the TCU at [facility] in [place], receiving PT, OT, and wound care, completed outpatient ertapenem antibiotics as of January 11, 2021, and has been following up with surgeon Dr. Garcia who he saw this past Wednesday, January 20, status post skin grafting to the lateral right foot.    Status post excision of right fifth metatarsal on November 2, 2020, wound cultures with Bacteroides and Enterobacter.  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  Underwent incision and drainage of abscess November 27, 2020, wound culture growing corynebacterium.  He was treated with vancomycin and Zosyn, then meropenem, then transition to outpatient intravenous ertapenem for week course, was completed January 11, 2020.    Urinary retention with hematuria, Dietrich catheter is out as of today January 22, 2021, and he is getting post void residual checks, and straight cathing if PVR greater than 250, and needs to follow-up with Dr. Villa at [facility].  The Dietrich catheter was placed after angiogram because of acute urinary retention.  He developed hematuria November 30.  CT scan abdomen pelvis with irregular bladder wall thickening suggestive of cystitis.  Noted to have nonobstructing 11 mm right upper pole kidney stone but no hydronephrosis.  He is currently on Flomax (tamsulosin) and I want him to stay on that.    Peripheral vascular disease.  November 24, 2020 he had right lower extremity angiogram with balloon angioplasty of anterior tibial and peroneal arteries.  However postoperative ankle-brachial index did not improve significantly, although vascular surgery thought that was because of vasospasm.  He needs to follow-up with vascular surgery, and will have a repeat arterial Doppler ultrasound.    Adult failure to thrive.  He is try to get  "his strength back, and is receiving PT and OT at [facility].  Wt Readings from Last 3 Encounters:   01/22/21 182 lb (82.6 kg)   01/21/21 179 lb 6.4 oz (81.4 kg)   01/19/21 179 lb 6.4 oz (81.4 kg)     I told him that if [facility] can give him a Covid shot, the correct answer is \"yes\":  Pfizer or Moderna, either would be fine.    Type 2 diabetes, currently on Lantus 30 units and 15 units, and also NovoLog 3 units before meals.  A1c was 7.9 check September 4, 2020.  He is also on Victoza.  I am sure his A1c's are worse now, but is going to be a project over the next several months to get his blood sugars under better control.  He told me that blood sugars are checked probably 3 times a day at [facility].  I do not have those numbers in front of me, and I will instruct [facility] to send me that data by fax, so perhaps we could better adjust his insulin regimen.  He told me that the food a [facility] is crummy, so may be eating less.    Essential hypertension, he had been taking lisinopril 5 mg a day as an outpatient, but lisinopril is not listed on his [facility] medication profile.  I think it is just as well that we pause the lisinopril for now, since I note that his blood pressure here in the clinic today January 22, 2021 is 118/50.  Let's see how his blood pressure does over the next several weeks, maybe he will start eating better once he graduates from [facility] [employer].     Normocytic anemia hemoglobin 11.3 checked January 16, 2021, this is surely anemia of chronic disease and postinflammatory effects, and he is nowhere needing transfusion.    Peripheral vascular disease with reduced SRIKANTH indices in both feet, but no focal stenosis on arterial ultrasound study.       Type 2 diabetes with suboptimal control, last A1c 7.9 measured September 4, 2020, currently on insulin and Victoza but no metformin     History of acute pulmonary embolism and cor pulmonale, was unprovoked, diagnosed May 2020, " has been on anticoagulation ever since with Eliquis which he will continue long-term.  He seems to be tolerating the Eliquis just fine.  He is on concomitant baby aspirin which I told him does increase the risk for bleeding when combined with Eliquis.  But I think the combination is appropriate for him since he has peripheral vascular disease.    Paroxysmal atrial fibrillation, and history of pulmonary embolism, on anticoagulation with Eliquis for those reasons.     Hyperlipidemia in the context of diabetes, with history of intolerance to statins, LDL cholesterol was 126 when measured July 22, 2020.

## 2021-06-14 NOTE — TELEPHONE ENCOUNTER
Frantz called from [facility] wondering about his dressing change that is to be done tomorrow.He was in clinic to see Dr. Garcia 1/20. He is to be doing a medihoney dressing. Called the TCU and left a message due to no answer stating that Frantz had called our clinic here wondering about the dressing and that they should call back here if they have questions or need the orders faxed.

## 2021-06-14 NOTE — TELEPHONE ENCOUNTER
[facility] Nurse Triage Telephone Note      Provider: Charlotte Mckenna MD  Facility: [facility] Type: TCU    Caller: Adrien  Call Back Number:  [phone]    Allergies: Cresol, Crestor [rosuvastatin], and Declomycin [demeclocycline]    Reason for call: Nurse calling to report Heme 1, hepatic profile, BMP, and CRP results.  Notable meds:  Ertapenem 1g daily, Eliquis 5mg two times a day, ASA 81mg daily.       Verbal Order/Direction given by Provider: Fax results to ID.      Provider giving order: Charlotte Mckenna MD    Verbal order given to: Adrien Orr RN

## 2021-06-14 NOTE — PROGRESS NOTES
[facility] Patient Outreach:     Discharge Date::  2/2/21      Reason for TCU stay (discharge diagnosis)::  Right foot ulcer with cellulitis s/p debridement      Are you feeling better, the same or worse since your discharge?:  Patient is feeling better          As part of your discharge plan, did they discuss home care with you?: Yes        Have your seen them yet, or are they scheduled to visit?: Yes                Do you have any follow up visits scheduled with your PCP or Specialist?:  Yes, with Specialist      Who are you seeing and when is it scheduled?:  Dr. Garcia 2/3/21.        I'm glad to hear you're doing well and we want you to continue to do well. Your PCP would like to see you for a follow-up visit. Can we help set that up for your today?: No        (RN) Provided patient the PCP's phone number to call if they have any questions or concerns?: No (Patient's family knows PCP's number and will be seeing PCP on 2/23/21.  )

## 2021-06-14 NOTE — PROGRESS NOTES
FOOT AND ANKLE SURGERY/PODIATRY Progress Note      ASSESSMENT:   Ulceration right foot   DM2      TREATMENT:  -The Theraskin graft along the lateral right foot has continued to incorporate into the wound. We will leave the staples intact as the graft incorporates. I recommend he continue with the wound vac at 100 mmHg, adaptic with each vac change with adaptic. Continue non-weight bearing right foot. Protein supplements two times a day.     -He will follow-up with me in 2 weeks.    John Garcia DPM  [facility]      HPI: Caleb Hernandez was seen again today for a right foot ulceration s/p application of theraskin. Doing well today. He has remained non-weight bearing on the right foot. Using wound vac as directed.      Past Medical History:   Diagnosis Date     BPH (benign prostatic hyperplasia)      Cholelithiasis      Common bile duct (CBD) obstruction 9/4/2017     Compression Fracture Of Thoracic Vertebral Body     Created by Conversion  Replacement Utility updated for latest IMO load     Diabetes mellitus (H)      Essential hypertension      Hyperlipidemia      Pancreatitis      Rib Fracture     Created by Conversion  Replacement Utility updated for latest IMO load     Sepsis due to pneumonia (H) 9/8/2017       Past Surgical History:   Procedure Laterality Date     BACK SURGERY      1964 removed a cyst     CHOLECYSTECTOMY  1985     ERCP       ERCP N/A 9/5/2017    Procedure: ENDOSCOPIC RETROGRADE CHOLANGIOPANCREATOGRAPHY SPHINCTEROTOMY AND STONE EXTRACTION;  Surgeon: Hansel Gannon MD;  Location: [facility];  Service:      INCISION AND DRAINAGE OF WOUND Right 11/2/2020    Procedure: INCISION AND DRAINAGE, right foot with removal of bone 5th metatarsal;  Surgeon: John Garcia DPM;  Location: [facility];  Service: Podiatry     INCISION AND DRAINAGE OF WOUND Right 11/16/2020    Procedure: INCISION AND DRAINAGE, right foot;  Surgeon: John Garcia DPM;  Location:  [facility] Main OR;  Service: Podiatry     INCISION AND DRAINAGE OF WOUND Right 11/27/2020    Procedure: INCISION AND DRAINAGE, LOWER EXTREMITY;  Surgeon: Aleksandr Hdez DPM;  Location: [facility] Main OR;  Service: Podiatry     IR EXTREMITY ANGIOGRAM RIGHT  11/24/2020     PICC  11/30/2020          TOE AMPUTATION Right 11/16/2020    Procedure: with amputation of the fifth ray, peroneal brevis tendon transfer;  Surgeon: John Garcia DPM;  Location: [facility] OR;  Service: Podiatry     TONSILLECTOMY  1940       Allergies   Allergen Reactions     Cresol      Muscle cramps     Crestor [Rosuvastatin] Myalgia     Declomycin [Demeclocycline] Hives         Current Outpatient Medications:      acetaminophen (TYLENOL) 500 MG tablet, Take 1-2 tablets (500-1,000 mg total) by mouth every 4 (four) hours as needed., Disp: , Rfl: 0     apixaban ANTICOAGULANT (ELIQUIS) 5 mg Tab tablet, Take 1 tablet (5 mg total) by mouth 2 (two) times a day., Disp: 60 tablet, Rfl: 3     aspirin 81 MG EC tablet, Take 81 mg by mouth daily., Disp: , Rfl:      blood glucose test strips, Use 1 each As Directed 2 (two) times a day. Dispense brand per patient's insurance at pharmacy discretion., Disp: 120 strip, Rfl: 6     blood-glucose meter (ONETOUCH VERIO IQ METER) Misc, Use 1 each As Directed 2 (two) times a day., Disp: 1 each, Rfl: 0     cholecalciferol, vitamin D3, (VITAMIN D3) 5,000 unit Tab, Take 5,000 Units by mouth daily. , Disp: , Rfl:      insulin glargine (LANTUS SOLOSTAR U-100 INSULIN) 100 unit/mL (3 mL) pen, Inject 44 Units under the skin at bedtime. (Patient taking differently: Inject 30 Units under the skin at bedtime. Takes 15 units q am and 30 units Q HS), Disp: , Rfl:      liraglutide (VICTOZA 3-RUPALI) 0.6 mg/0.1 mL (18 mg/3 mL) injection, Inject 0.3 mL (1.8 mg total) under the skin daily., Disp: , Rfl:      lisinopriL (PRINIVIL,ZESTRIL) 5 MG tablet, Take 1 tablet (5 mg total) by mouth daily., Disp: 90 tablet, Rfl: 3      "multivit-min/FA/lycopen/lutein (CENTRUM SILVER MEN ORAL), Take 1 tablet by mouth daily., Disp: , Rfl:      mupirocin (BACTROBAN) 2 % ointment, Apply topically daily as needed. Apply to wound., Disp: , Rfl:      neomycin-bacitracin-polymyxin (NEOSPORIN) ointment, Apply to penis at catheter insertion site three times a day while catheter is in place., Disp: 15 g, Rfl: 0     oxyCODONE (ROXICODONE) 5 MG immediate release tablet, Take 1 tablet (5 mg total) by mouth every 6 (six) hours as needed for pain., Disp: 30 tablet, Rfl: 0     pen needle, diabetic 31 gauge x 5/16\" Ndle, Used to inject insulin daily, Disp: 90 each, Rfl: 0     polyethylene glycol (MIRALAX) 17 gram packet, Take 1 packet (17 g total) by mouth daily as needed., Disp: , Rfl: 0     tamsulosin (FLOMAX) 0.4 mg cap, Take 1 capsule (0.4 mg total) by mouth daily after supper., Disp: , Rfl: 0     vitamin E 400 unit capsule, Take 400 Units by mouth daily., Disp: , Rfl:      NOVOLOG FLEXPEN U-100 INSULIN 100 unit/mL (3 mL) injection pen, Inject 3 Units under the skin 3 (three) times a day before meals., Disp: 2.7 mL, Rfl: 0    Review of Systems - 10 point Review of Systems is negative except for left foot ulcer which is noted in HPI.      OBJECTIVE:  Vitals:    01/06/21 1338   BP: 138/78   Pulse: 76   Resp: 16   Temp: 97.7  F (36.5  C)     General appearance: Patient is alert and fully cooperative with history & exam.  No sign of distress is noted during the visit.   Vascular: Dorsalis pedis non-palpableRight.  Dermatologic:    Urethral Catheter Coude 16 Fr. (Active)       Wound 07/24/17 Right lateral foot (Active)   Pre Size Length 2 10/23/20 1300   Pre Size Width 3 10/23/20 1300   Pre Size Depth 0.2 10/23/20 1300   Pre Total Sq cm 6 10/23/20 1300   Post Size Length 2 10/21/20 1400   Post Size Width 1.8 10/21/20 1400   Post Size Depth 0.6 10/21/20 1400   Post Total Sq cm 3.6 10/21/20 1400   Undermined n 10/19/20 1300   Tunneling n 10/19/20 1300   Description " pink 10/16/20 1300   Prodcut Used Gauze 10/21/20 1400       VASC Wound 12/09/20 right foot (Active)   Pre Size Length 9 12/09/20 1700   Pre Size Width 1 12/09/20 1700   Pre Size Depth 0.4 12/09/20 1700   Pre Total Sq cm 9 12/09/20 1700       Wound 09/05/17 Foot Right (Active)       Incision 11/02/20 Surgical Foot Right (Active)       Incision 11/16/20 Surgical Foot Right (Active)       Incision 11/27/20 Surgical Foot Right (Active)       Incision 12/17/20 Surgical Foot Right (Active)   Theraskin intact lateral right foot, intact staples. No erythema right foot.   Neurologic: Diminished to light touch Right.  Musculoskeletal: Contracted digits noted Right.    Imaging:     Mr Forefoot With Without Contrast Right    Result Date: 11/26/2020  EXAM: MR FOREFOOT W WO CONTRAST RIGHT LOCATION: [facility] DATE/TIME: 11/26/2020 5:28 PM INDICATION: Recent fifth ray amputation. Positive cultures. Wound. COMPARISON: 10/22/2020 MRI. TECHNIQUE: Routine. Additional postgadolinium T1 sequences were obtained. IV CONTRAST: Gadavist 7ml FINDINGS:  SOFT TISSUES JOINTS AND BONES: Interval resection of the fifth ray level of the cuboid. There is ulceration at the proximal margin of the lateral midfoot at the level of the fourth TMT joint. There is a subcutaneous fluid collection tracking along the lateral and dorsal margin of the fourth metatarsal worrisome for an abscess that measures 3.4 cm and RL dimension by 0.9 cm in AP dimension and 6.5 cm in length. There are multiple punctate areas of subcutaneous emphysema lateral to the cuboid and fourth  metatarsal. The ulceration and soft tissue thickening is contiguous with the cuboid and fourth tarsometatarsal joint. There are new areas of synovitis and irregularity in the second, third and fourth tarsometatarsal joints and the navicular medial cuneiform joint contiguous between the medial and middle cuneiform. Although there was osteoarthritis in this distribution on  the prior examination the synovitis and osseous changes are worrisome for septic arthritis which has developed along multiple joints of the midfoot. TENDONS: No evidence for proximal tenosynovitis. MUSCLES: Diffuse muscular atrophy and edema.     1.  Interval amputation of the fifth ray. 2.  Ulceration and deep tracking fluid collection along the fourth metatarsal consistent with an abscess. 3.  Additional areas of subcutaneous emphysema along the postoperative bed worrisome for additional soft tissue infection given the postoperative time course. 4.  New synovitis and osseous abnormalities in the second through fourth tarsometatarsal joints and the navicular cuneiform joint worrisome for septic arthritis and osteomyelitis.     Us Srikanth Doppler No Exercise, 1-2 Levels, Bilateral    Result Date: 12/9/2020  BILATERAL RESTING ANKLE-BRACHIAL INDICES (SRIKANTH'S) Indication: Surveillance of right angio 11/24/2020 Previous: 11/24/2020 History: Previous Smoker, Hypertension, Diabetic, Hyperlipidemia, PAD, Angioplasty, Vascular Surgery and Vascular Ulcers  Resting SRIKANTH's          Right: mmHg Index     Brachial: 113  Ankle-(PT): 164 1.45 Ankle-(DP): 168 1.49           Digit: 50 0.44               Left: mmHg Index     Brachial: 111  Ankle-(PT): 72 0.64 Ankle-(DP): 83 0.73           Digit: 0 0.00 Resting ankle-brachial index of 1.49 on the right. Toe Pressures of 50 mmHg and TBI of 0.44  Resting ankle-brachial index of 0.73 on the left. Toe Pressures of 0 mmHg and TBI of 0.00  WAVEFORMS: The right dorsalis pedis and posterior tibial arteries show monophasic waveforms. The left dorsalis pedis and posterior tibial arteries show monophasic waveforms. Comments:   Impression:  Right SRIKANTH is  Normal with an SRIKANTH of 1.49 and Toe Pressures of 50 mmHg which is mildly abnormal but adequate for wound healing. Left SRIKANTH is Abnormal with an SRIKANTH of 0.73 and Toe Pressures of 0 which is critically poor and high risk for wound issues.     Us Srikanth Doppler  No Exercise, 1-2 Levels, Bilateral    Result Date: 11/24/2020  EXAM: RESTING ANKLE-BRACHIAL INDICES (ABIs) LOCATION: [facility] DATE/TIME: 11/24/2020 5:24 PM INDICATION: Follow-up post angiogram and intervention, diabetic foot ulcer, critical limb ischemia. COMPARISON: 10/13/2020. FINDINGS: RIGHT                                               Brachial: 141 Ankle (PT): 0 Index: 0.00  Ankle (DP): 122 Index: 0.87 Digit: 34 Index: 0.24   LEFT Brachial: -- Ankle (PT): 83 Index: 0.59 Ankle (DP): 90 Index: 0.64 Digit: 0 Index: 0.00 Resting ABIs are 0.87 on the right. Resting ABIs are 0.64 on the left. WAVEFORMS: The dorsalis pedis and posterior tibial arteries are monophasic. DUPLEX ARTERIAL ULTRASOUND FINDINGS: RIGHT LOWER EXTREMITY VELOCITIES (cm/s): EIA: 115 CFA: 101 PFA: 63 SFA (proximal): 101 SFA (mid): 90 SFA (distal): 73 Popliteal: 99 PTA: 0 at the ankle; trickle flow mid calf 20 BERE: 69 DPA: 37 (M=monophasic, B=biphasic, T=triphasic) LEFT LOWER EXTREMITY VELOCITIES (cm/s): EIA: 74 CFA: 98 PFA: 42 SFA (proximal): 57 SFA (mid): 102 SFA (distal): 75 Popliteal: 126 PTA: 16 BREE: 27 DPA: 10 (M=monophasic, B=biphasic, T=triphasic)     1.  RIGHT LOWER EXTREMITY: No significant change in ABIs at the dorsalis pedis artery from prior. No flow demonstrated in posterior tibial artery at the ankle on current study where there was flow noted on prior. Waveforms otherwise unchanged with evidence for trifurcation disease with no inflow stenosis. 2.  LEFT LOWER EXTREMITY: Interval worsening of ABIs when compared to prior. Postobstructive or post high-grade stenotic waveform noted in dorsalis pedis artery and posterior tibial arteries at the ankle, worse when compared to prior. No evidence for an inflow stenosis.    Us Arterial Legs Bilateral Complete    Result Date: 11/24/2020  EXAM: RESTING ANKLE-BRACHIAL INDICES (ABIs) LOCATION: [facility] DATE/TIME: 11/24/2020 5:24 PM INDICATION: Follow-up  "post angiogram and intervention, diabetic foot ulcer, critical limb ischemia. COMPARISON: 10/13/2020. FINDINGS: RIGHT                                               Brachial: 141 Ankle (PT): 0 Index: 0.00  Ankle (DP): 122 Index: 0.87 Digit: 34 Index: 0.24   LEFT Brachial: -- Ankle (PT): 83 Index: 0.59 Ankle (DP): 90 Index: 0.64 Digit: 0 Index: 0.00 Resting ABIs are 0.87 on the right. Resting ABIs are 0.64 on the left. WAVEFORMS: The dorsalis pedis and posterior tibial arteries are monophasic. DUPLEX ARTERIAL ULTRASOUND FINDINGS: RIGHT LOWER EXTREMITY VELOCITIES (cm/s): EIA: 115 CFA: 101 PFA: 63 SFA (proximal): 101 SFA (mid): 90 SFA (distal): 73 Popliteal: 99 PTA: 0 at the ankle; trickle flow mid calf 20 BREE: 69 DPA: 37 (M=monophasic, B=biphasic, T=triphasic) LEFT LOWER EXTREMITY VELOCITIES (cm/s): EIA: 74 CFA: 98 PFA: 42 SFA (proximal): 57 SFA (mid): 102 SFA (distal): 75 Popliteal: 126 PTA: 16 BREE: 27 DPA: 10 (M=monophasic, B=biphasic, T=triphasic)     1.  RIGHT LOWER EXTREMITY: No significant change in ABIs at the dorsalis pedis artery from prior. No flow demonstrated in posterior tibial artery at the ankle on current study where there was flow noted on prior. Waveforms otherwise unchanged with evidence for trifurcation disease with no inflow stenosis. 2.  LEFT LOWER EXTREMITY: Interval worsening of ABIs when compared to prior. Postobstructive or post high-grade stenotic waveform noted in dorsalis pedis artery and posterior tibial arteries at the ankle, worse when compared to prior. No evidence for an inflow stenosis.    Poc Us Guidance Needle Placement    Result Date: 12/17/2020  Ultrasound was performed as guidance to an anesthesia procedure.  Click \"PACS images\" hyperlink below to view any stored images.  For specific procedure details, view procedure note authored by anesthesia.    Ct Abdomen Pelvis Without Oral With Without Iv Contrast    Result Date: 12/1/2020  EXAM: CT ABDOMEN PELVIS WO ORAL W WO IV CONTRAST " LOCATION: [facility] DATE/TIME: 12/1/2020 7:22 PM INDICATION: Hematuria, unknown cause Gross hematuria COMPARISON: Abdominal CT 9 05/07/2020. Chest CT 05/23/2020 TECHNIQUE: CT scan of the abdomen and pelvis was performed before and after injection of IV contrast. Multiplanar reformats were obtained. Dose reduction techniques were used. CONTRAST: Iohexol (Omni) 100 mL FINDINGS: LOWER CHEST: Significantly elevated right hemidiaphragm with atelectasis at right lung base similar to chest CT. Mild dependent atelectasis also present left lung base. HEPATOBILIARY: Pneumobilia unchanged compared to most recent CT. Prior cholecystectomy. No liver lesion. PANCREAS: Normal. SPLEEN: Normal. ADRENAL GLANDS: Normal. KIDNEYS/BLADDER: Stable benign left renal cysts. 11 x 9 mm stone right upper pole. No hydronephrosis. Dietrich catheter present with bladder collapsed. However, there is prominent diffuse bladder wall thickening suggesting cystitis. BOWEL: No obstruction or inflammatory change. LYMPH NODES: Normal. VASCULATURE: Unremarkable. PELVIC ORGANS: Normal. MUSCULOSKELETAL: Focal area of soft tissue stranding within subcutaneous tissues anterior of left common iliac artery and vein likely relates to recent vascular catheterization procedure.     1.  Significant irregular bladder wall thickening suggests cystitis. 2.  There is a 11 mm right upper pole kidney stone. No hydronephrosis. 3.  Stable benign left renal cysts.     Ir Lower Extremity Angiogram Right    Result Date: 11/25/2020  [facility] Interventional Radiology vascular surgery procedure Date: 11/25/20 Procedures Performed: Ir Lower Extremity Angiogram Right from the aorta level via left groin approach   Ultrasound-guided vascular access   Angioplasty right anterior tibial artery   Angioplasty right tibioperoneal trunk   Angioplasty right peroneal artery   Selective right leg angiogram with distal most catheter position  in greater than third order anterior tibial and peroneal arteries.  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  This was performed by podiatry with our support given known toe pressures in January of 112 mmHg and has recently has October at greater than 50 mmHg suggesting adequate blood supply for healing.  Wound has not however healed and he is now readmitted for failure with signs of infection of the foot.  Plan for an intervention to see if blood supply can be improved to allow for wound healing. Sedation: Moderate Sedation: The procedure was performed with administration of intravenous conscious sedation with appropriate preoperative, intraoperative, and postoperative evaluation.  120 minutes of supervised face to face intraservice time was provided by a radiology nurse under my direct supervision.  Versed 3.5 mg and fentanyl 175 mcg given. Antibiotics: Patient already on IV Zosyn Fluoroscopic Time: 28.4 Minutes Radiation Dose: 283 mGy Contrast: 45 cc of Visipaque given Procedure:   Ultrasound was used to evaluate the left groin.  The selected vessel was the left common femoral artery which was widely patent and without significant anterior wall plaque. Ultrasound was then used for real-time ultrasound guided needle entry of the common femoral artery. Permanent images were recorded and saved to the patient's medical record.   Micropuncture technique was used to deliver a 4 Irish sheath.  An Omni Flush catheter was advanced to the lower abdominal aorta and an initial aortogram was performed   Findings: Patent lower abdominal aorta, common iliac arteries, internal iliac arteries, and external iliac arteries without disease.  Patent common femoral arteries without  disease.   This point wire catheter advanced over the aortic bifurcation and down to the common femoral level on the right side.  Selective right leg angiography was performed from this level.   Findings: Patent profunda femoris artery and superficial femoral artery.  Trivial stenosis at Jagjit's canal of less than 20%.  Patent popliteal artery without disease.  Single-vessel peroneal runoff to the foot with significant areas of disease in focal stenosis of likely greater than 80% of the tibioperoneal trunk.  The peroneal artery has multiple areas of diffuse stenoses with some areas as much is 70% stenosed.  Posterior tibial artery is occluded from its origin.  Anterior tibial artery has a very short stump but occludes before the elbow in the vessel.  Appears to reconstitute through a peroneal artery collateral at the ankle.   At this point the patient was heparinized.  Wire was advanced down the SFA and was used to exchange the short 4 Monegasque sheath for a 90 cm 4 Monegasque sheath which we advanced the wire down to the popliteal level.  Selective angiography from this level allowed us to roadmap identify the origin of the anterior tibial artery.  Wire catheter were used to engage this stump and we able to advance a wire and catheter around the elbow in the vessel and down the vessel to the ankle.  At this point we exchanged out the wire perform an angiogram.   Findings: True lumen location of a catheter in the distal anterior tibial artery.  There is a small stenosis beyond the catheter.    Nitroglycerin 200 mcg was injected down the anterior tibial artery   We advanced an 014 wire down and through this and then exchanged in a 2 mm x 150 mm angioplasty balloon.  Simple balloon angioplasty with 2 separate inflations was used to treat the entire anterior tibial artery all the way to the popliteal artery.  The first inflation was taken only to 8 mandi of pressure in the second 10 mandi of pressure.  Each inflation was held up  for 2 minutes.  An angiogram was then performed.   Findings: Inline flow through the anterior tibial artery.  Persistent significant disease in the more proximal anterior tibial artery which appeared to be secondary to undersizing of the balloon angioplasty.  We exchanged and a 2-1/2 mm x 150 mm angioplasty balloon performed simple balloon angioplasty of the more proximal segment of anterior tibial artery up to the popliteal artery.  This is another 10 mandi inflation held up for 2 minutes.  A completion angiogram was performed.   Findings: Widely patent intertibial artery with filling into the foot.  It does appear that the peroneal artery continues to be the dominant flow to the majority of the foot despite having stenosis within it.   We now readvanced a wire and balloon catheter down the tibioperoneal trunk and peroneal artery positioning it in the distal third of the peroneal artery and moving upwards to treat all the area of disease.  The wire was removed from the balloon and angiogram performed through the balloon catheter to confirm that we were in the true lumen   Findings: True lumen location of a catheter in the peroneal artery distally beyond all stenoses.   We now reintroduced a wire performed balloon angioplasty with 2 separate inflations to treat the entire tibioperoneal trunk and peroneal artery.  The first inflation was to 8 mandi of pressure and the more proximal inflation to 10 mandi of pressure.  Each time the balloon was held up for 2 minutes.  A completion angiogram was performed.   Findings: Widely patent flow through the peroneal artery to peroneal trunk with no residual significant stenosis.  Imaging of the foot demonstrates a both the antitibial artery and peroneal artery contribute to feeling of the foot and that the lateral aspect of the foot at the area of surgery feels very well   At this point wires and catheters of pulled back in the long sheath exchanged for short 4 Faroese sheath.  Patient  "was given 10 units of protamine with the plan of removing the sheath once ACT had dropped to below 160. 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 Lourdes Raymond Vascular Surgery    Poc Us 3cg Picc Placement Guidance    Result Date: 11/30/2020  Exam was performed as guidance for PICC line insertion.  Click \"PACS images\" hyperlink below to view any stored images.  For specific procedure details, view procedure note authored by PICC/Vascular Access Nurse.    Us Arterial Leg Right    Result Date: 12/9/2020  Arterial Duplex Ultrasound Lower Extremity Artery Evaluation Indication: Surveillance of right angio 11/24/2020 Previous: 11/24/2020 History: Previous Smoker, Hypertension, Diabetic, Hyperlipidemia, PAD, Angioplasty, Vascular Surgery and Vascular Ulcers Technique: Duplex imaging is performed utilizing gray-scale, two-dimensional images, and color-flow imaging. Doppler waveform analysis and spectral Doppler imaging is also performed. LOWER EXTREMITY ARTERIAL DUPLEX EXAM WITH WAVEFORMS Right Leg:(cm/s) Location: Vel[facility] Waveforms EIA:   83  T CFA:   73  T PFA:   73  T SFA Proximal:   68  T SFA Mid:   160  T SFA Distal:   76  T Popliteal Artery:   36  B PTA:  OCC   - BREE:   87 B DPA:   55  B Waveforms: T=Triphasic, M=Monophasic, B=Biphasic Left Leg:(cm/s) Location: Veloc[facility] Waveforms PTA:   22   M BREE:   29  M DPA:   38  M Waveforms: T=Triphasic, M=Monophasic, B=Biphasic Comments:   Impression: Right Lower Extremity: Triphasic and biphasic waveforms through the right lower extremity suggesting no hemodynamically significant stenosis.  No flow noted in the posterior tibial artery Left Lower Extremity: Not formally evaluated.  Monophasic waveforms at the ankle level suggest significant disease Reference: Category Normal 1-19% 20-49% 50-99% Occluded PSV <160 cm/sec without spectral broadening <160 " cm/sec with spectral broadening Increased Increased Absent Flow Ratio N/A N/A < 2.0 >2.0 N/A Post-Stenotic Turbulence No No No Yes N/A          Picture: None

## 2021-06-14 NOTE — PROGRESS NOTES
S)  Patient was sent to us from [facility] to adjust his Rt FO.  Patient stated that he received his current ED shoes and inserts about one month ago from an outside vendor.  Patient has developed an ulcer on his Rt foot 5th mpj on the plantar aspect with drainage present.      O)  Bandage was covering the ulcer, which is currently measuring 6x6 mm.  Current diabetic FO is a blue puff top cover (this material isn't normally used for diabetics).  Patient ambulates on the outside edge of his feet.  Varus foot type observed.      A)  I excavated the plantar surface of the Rt FO under the 5th methead to reduce pressure against the ulcer.  Additionally, I removed the metpad which was only unloading toes 2,3,4 and replaced this with a metbar pad which unloads toes 1-5.  Furthermore,  the metatarsal bar acts to transfer force off of the metatarsal heads by increasing force under the metatarsal necks and shafts, which should further help unload the 5th mpj.  Finally, the FO was covered with 1/8th inch pink plastazote which helps to reduce shear forces against insensate foot type.      P)  Scheduled patient for a follow up appointment in two weeks to check retro-fit FO and determine if we should make new FO's at [facility].  Patient was provided a business card should he have any questions or comments.    Pardeep Lane CPO.

## 2021-06-14 NOTE — PROGRESS NOTES
Follow up Vascular Visit       Date of Service:11/8/2017    Date Last Seen: 7/17/2017; 7/17/2017    Chief Complaint: right foot pain and callous; new right toe blisters    History:   Past Medical History:   Diagnosis Date     BPH (benign prostatic hyperplasia)      Cholelithiasis      Diabetes mellitus      Essential hypertension      Hyperlipidemia      Pancreatitis        Pt returns to the Vascular clinic with regards to their right foot ulcer; healed right shin ulcer; now with continued right foot pain and new blisters to the toes. Pt arrives alone today. We were previously seeing him for a right shin ulcer; we were able to get this healed. He reports that he purchased new shoes 5 months ago from [employer] shoes he was previously having pain in the toes from his old shoes so he purchased shoes with larger toe box.  Previously tolerated the PCN VK, he has since completed this and course of clindamycin. Had xray done of the foot this was negative for osteomyelitis. He had ABIs completed this indicated adequate blood flow for healing with the toe pressures.We had him sized for a CAGE boot; we were able to get him healed. He went to [employer] and had impressions made; he has his shoes now; has slowly broken these, he purchased a lighted mirror and is doing foot inspections daily. He is still having foot pain and is noting new blisters on the toes since returning back to work last Thursday; he saw [employer] and they added non-skid tape to the front of his insoles to help prevent sliding.    Allergies: Crestor [rosuvastatin] and Demeclocycline    Physical Exam:    There were no vitals taken for this visit.    General:  Patient presents to clinic in no apparent distress.  Head: normocephalic atraumatic  Psychiatric:  Alert and oriented x3.   Respiratory: unlabored breathing; no cough  Integumentary:  Skin is uniformly warm, dry and pink.    Wound #1 Location: right shin  Size:healed. Periwound: no denudement, erythema,  induration, maceration or warmth.    Wound #2 Location: right plantar foot 5th met head  Size: healed; callous; softened; there is no edema in the foot  No undermining present. Periwound: no denudement, erythema, induration, maceration or warmth.    Right toes plantar surface 1,2 with desiccated blisters; 3rd toe with serum filled blister this was ruptured under sterile conditions; the blister covering was presevered      Circumferential volume measures:    Vasc Edema 3/6/2017 4/17/2017 6/2/2017   Right just above MTP 23 24 23   Right Ankle 25 26 23.5   Right Widest Calf 37.5 38.6 37   Left - just above MTP 22 - -   Left Ankle 22 - -   Left Widest Calf 36 - -         Ulceration(s)/Wound(s):   none        Lab Values  No results found for: SEDRATE  Lab Results   Component Value Date    CREATININE 1.33 (H) 09/06/2017     Lab Results   Component Value Date    HGBA1C 10.5 (H) 09/05/2017     Lab Results   Component Value Date    BUN 44 (H) 09/06/2017     Lab Results   Component Value Date    ALBUMIN 2.3 (L) 09/06/2017     No results found for: [id number]          Impression:   Right shin traumatic wound healed   right diabetic foot ulcer-healed  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 Excisional debridement of the ulcer(s) was not recommended today as the skin was intact          2. Edema: will apply tubigrip           3.  Wound treatment: ABIs indicated he had adequate blood flow for wound healing;   will cover with mepilex border to protect the fragile new scar tissue; his home visiting nurse can change this in 1 week; will paint the blistered toes with betadine daily leave raffy; saw [facility] already and they have addressed the insoles; will take out of work again for 2 weeks to allow this to fully heal, still having some tenderness In the area will have him see Dr. Garcia and see if any further  testing or imaging is required at this time I spoke with Dr. Garcia and he will consider the need for custom AFO with custom foot plate to hold the foot in a rectus position           4. Nutrition: diabetic diet; needs tighter control; saw his pmd about this; met with diabetic educator; is doing much better already with this           5. Offloading:   diabetic shoes and inserts, continue daily skin checks; out of work x2 weeks; note written     Patient will follow up with Dr. Garcia in 1-2 weeks for reevaluation. They were instructed to call the clinic sooner with any signs or symptoms of infection or any further questions/concerns. Answered all questions.    Ines Zimmerman DNP, RN, CNP, [facility]  [phone]

## 2021-06-14 NOTE — PROGRESS NOTES
[facility] Admission note      Patient: Caleb Hernandez  MRN: [id number]  Date of Service: 1/12/2021      [facility] [[id number]]  Reason for Visit     Chief Complaint   Patient presents with     Review Of Multiple Medical Conditions   follow-up hypoglycemia and hypotension    Code Status     Full code    Assessment       -Right foot cellulitis with abscess work-up positive for osteomyelitis with septic arthritis of 2nd-4th tarsometatarsal joint  -Status post I&D of abscess on 11/27/2020  -Postoperative urinary retention with hematuria  -History of right 5th metatarsal excision on 11/2/2020  -Severe peripheral vascular disease.  -Status post right lower extremity angiogram with balloon angioplasty of anterior tibial and peroneal arteries on 11/24/2020  -- IDDM-2   - HTN with hypotension noted today  - FTT  -Generalized weakness with deconditioning    Plan     Patient has been admitted to the TCU for strengthening and rehab.  He has been admitted with ulceration of his right foot status post TheraSkin graft.  He currently has a wound VAC in place  He remains nonweightbearing on his right lower extremity.  He will do close follow-ups with his vascular as well as podiatrist as scheduled.  Seen by infectious disease yesterday and his central line has been removed and his antibiotics have been discontinued.  We will discontinue weekly labs from then on.  .  He continues with the Dietrich catheter because of hematuria and retention concerns.  I did offer to have him get a voiding trial in the TCU he refuses one currently.  Advised him that he may benefit from seeing a urologist if he does not want a voiding trial.  Blood sugar control appears to be adequate.  Continue with his PT OT  Recheck liver functions are normal; BMP is completely normal  CRP 1.7  CBC shows a white count of 5.6  .      History     Patient is a very pleasant 85 y.o. male who is admitted to TCU  Patient presented to the  hospital with right lower extremity cellulitis.  He was admitted with concerning infection.  MRI confirmed new synovitis with osteomyelitis of the 2nd-4th tarsometatarsal joint with septic arthritis.  He underwent I&D of the abscess on 11/27/2020.  Postoperatively he has been discharged nonweightbearing with outpatient ertapenem for 4 weeks  Subsequently he was readmitted on 12/17/2024 application of TheraSkin with redebridement of his wound.  Currently he has a wound VAC on.  He was seen by infectious disease yesterday.  Based on the recommendation his IV antibiotics have been discontinued and his central line has been removed.  He will have close follow-up with his podiatrist next week  Recheck labs were reviewed and his CRP is 1.7 his white count is 5.6 today and his other electrolytes and kidney functions as well as his liver functions are completely normal  He has a history of malnutrition with low albumin.  He is on protein supplementation secondary to that    He also has a history of severe peripheral vascular disease and underwent right lower extremity angiogram with angioplasty of anterior tibial and peroneal arteries on 11/24/2020 with vascular surgery.  He will require follow-up with vascular.  It was done in the TCU.  They have recommended further follow-up with podiatry for further work-up they recommend seeing him back in 3 months for ABIs  He does report some occasional discomfort in his leg but no significant pain however he did tell me that he has been taking her tramadol at nighttime to help him with sleeping because of pain issues    Postoperatively had urinary retention with bladder wall thickening suggestive of cystitis.  He was noted to have a right upper pole kidney stone but no hydronephrosis.  Urology saw him.  He will continue with Flomax at discharge and at the recommendation he has resumed his Eliquis and aspirin.  I did talk to him getting a voiding trial in the TCU he refuses one.  I  did advise him that he may need to see a urologist  He has diabetes and his Lantus dose was adjusted to get tighter control of his blood sugars.  Blood sugar still continue to be running on the low side most of the days with blood sugars ranging from 90-1 50 with occasional high greater than 200 noted.  He is on a much lower dose of insulin.  Blood pressure also low today but prior to that he has had stable blood pressures.  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 (H)     Cellulitis of right lower extremity     Abscess of right foot     Cellulitis and abscess of foot excluding toe     Osteomyelitis of right foot (H)     Statin intolerance     Personal history of PE (pulmonary embolism)-- May 2020, unprovoked, with right heart strain     PVD (peripheral vascular disease) (H)     Overweight (BMI 25.0-29.9)     Chronic anticoagulation     Acute pulmonary embolism with acute cor pulmonale (H)     Diabetic ulcer of right foot associated with type 2 diabetes mellitus (H)     Type 2 diabetes mellitus (H)     Hyperlipidemia     Essential hypertension     Past Medical History:   Diagnosis Date     BPH (benign prostatic hyperplasia)      Cholelithiasis      Common bile duct (CBD) obstruction 9/4/2017     Compression Fracture Of Thoracic Vertebral Body      Diabetes mellitus (H)      Essential hypertension       Hyperlipidemia      Pancreatitis      Rib Fracture      Sepsis due to pneumonia (H) 9/8/2017       Past Social History     Reviewed, and he  reports that he quit smoking about 29 years ago. He has never used smokeless tobacco. He reports that he does not drink alcohol or use drugs.    Family History     Reviewed, and family history includes Alcohol abuse in his father; Aortic aneurysm in his mother.    Medication List   Post Discharge Medication Reconciliation Status: discharge medications reconciled, continue medications without change   Current Outpatient Medications on File Prior to Visit   Medication Sig Dispense Refill     acetaminophen (TYLENOL) 500 MG tablet Take 1-2 tablets (500-1,000 mg total) by mouth every 4 (four) hours as needed.  0     apixaban ANTICOAGULANT (ELIQUIS) 5 mg Tab tablet Take 1 tablet (5 mg total) by mouth 2 (two) times a day. 60 tablet 3     aspirin 81 MG EC tablet Take 81 mg by mouth daily.       blood glucose test strips Use 1 each As Directed 2 (two) times a day. Dispense brand per patient's insurance at pharmacy discretion. 120 strip 6     blood-glucose meter (ONETOUCH VERIO IQ METER) Misc Use 1 each As Directed 2 (two) times a day. 1 each 0     cholecalciferol, vitamin D3, (VITAMIN D3) 5,000 unit Tab Take 5,000 Units by mouth daily.        insulin glargine (LANTUS SOLOSTAR U-100 INSULIN) 100 unit/mL (3 mL) pen Inject 44 Units under the skin at bedtime. (Patient taking differently: Inject 30 Units under the skin at bedtime. Takes 15 units q am and 30 units Q HS)       liraglutide (VICTOZA 3-RUPALI) 0.6 mg/0.1 mL (18 mg/3 mL) injection Inject 0.3 mL (1.8 mg total) under the skin daily.       lisinopriL (PRINIVIL,ZESTRIL) 5 MG tablet Take 1 tablet (5 mg total) by mouth daily. 90 tablet 3     multivit-min/FA/lycopen/lutein (CENTRUM SILVER MEN ORAL) Take 1 tablet by mouth daily.       mupirocin (BACTROBAN) 2 % ointment Apply topically daily as needed. Apply to wound.        "neomycin-bacitracin-polymyxin (NEOSPORIN) ointment Apply to penis at catheter insertion site three times a day while catheter is in place. 15 g 0     NOVOLOG FLEXPEN U-100 INSULIN 100 unit/mL (3 mL) injection pen Inject 3 Units under the skin 3 (three) times a day before meals. 2.7 mL 0     oxyCODONE (ROXICODONE) 5 MG immediate release tablet Take 1 tablet (5 mg total) by mouth every 6 (six) hours as needed for pain. 30 tablet 0     pen needle, diabetic 31 gauge x 5/16\" Ndle Used to inject insulin daily 90 each 0     polyethylene glycol (MIRALAX) 17 gram packet Take 1 packet (17 g total) by mouth daily as needed.  0     tamsulosin (FLOMAX) 0.4 mg cap Take 1 capsule (0.4 mg total) by mouth daily after supper.  0     traMADoL (ULTRAM) 50 mg tablet Take 50 mg by mouth every 6 (six) hours as needed for pain.       vitamin E 400 unit capsule Take 400 Units by mouth daily.       No current facility-administered medications on file prior to visit.        Allergies     Allergies   Allergen Reactions     Cresol      Muscle cramps     Crestor [Rosuvastatin] Myalgia     Declomycin [Demeclocycline] Hives       Review of Systems   A comprehensive review of 14 systems was done. Pertinent findings noted here and in history of present illness. All the rest negative.  Constitutional: Negative.  Negative for fever, chills, he has activity change, appetite change and fatigue.   HENT: Negative for congestion and facial swelling.    Eyes: Negative for photophobia, redness and visual disturbance.   Respiratory: Negative for cough and chest tightness.    Cardiovascular: Negative for chest pain, palpitations and  leg swelling.   Gastrointestinal: Negative for nausea, diarrhea, constipation, blood in stool and abdominal distention.   Genitourinary: Negative.  Has  A mai  Musculoskeletal: Negative.  Denies any pain pin his foot .  Skin: Negative.    Neurological: Negative for dizziness, tremors, syncope, weakness, light-headedness and " headaches.   Hematological: Does not bruise/bleed easily.   Psychiatric/Behavioral: Negative.  Some confusion noted he just wants to go home as per him      Physical Exam     Recent Vitals 1/11/2021   Weight -   BSA (m2) -   /62   Pulse 80   Temp 98.2   Some recent data might be hidden   RC /54    Constitutional: Oriented to person, place, and time and appears well-developed.   HEENT:  Normocephalic and atraumatic.  Eyes: Conjunctivae and EOM are normal. Pupils are equal, round, and reactive to light. No discharge.  No scleral icterus. Nose normal. Mouth/Throat: Oropharynx is clear and moist. No oropharyngeal exudate.    NECK: Normal range of motion. Neck supple. No JVD present. No tracheal deviation present. No thyromegaly present.   CARDIOVASCULAR: Normal rate, regular rhythm and intact distal pulses.  Exam reveals no gallop and no friction rub.  Systolic murmur present.  PULMONARY: Effort normal and breath sounds normal. No respiratory distress.No Wheezing or rales.  ABDOMEN: Soft. Bowel sounds are normal. No distension and no mass.  There is no tenderness. There is no rebound and no guarding. No HSM.  MUSCULOSKELETAL: Normal range of motion. No edema and no tenderness. Mild kyphosis, no tenderness.  Rt foot missing 5th toe  He has a wound VAC in place  LYMPH NODES: Has no cervical, supraclavicular, axillary and groin adenopathy.   NEUROLOGICAL: Alert and oriented to person, place, and time. No cranial nerve deficit.  Normal muscle tone. Coordination normal.   GENITOURINARY: Deferred exam.  Dietrich present  SKIN: Skin is warm and dry. No rash noted. No erythema. No pallor.   EXTREMITIES: No cyanosis, no clubbing, no edema. No Deformity.  PSYCHIATRIC: Normal mood, affect and behavior.      Lab Results     Recent Results (from the past 240 hour(s))   COVID-19 Virus PCR MRF    Collection Time: 01/04/21 11:00 AM    Specimen: Respiratory   Result Value Ref Range    COVID-19 VIRUS SPECIMEN SOURCE Nares      2019-nCOV Not Detected    C-Reactive Protein    Collection Time: 01/05/21  9:26 AM   Result Value Ref Range    CRP 1.7 (H) 0.0 - 0.8 mg/dL   Basic Metabolic Panel    Collection Time: 01/05/21  9:26 AM   Result Value Ref Range    Sodium 140 136 - 145 mmol/L    Potassium 4.3 3.5 - 5.0 mmol/L    Chloride 103 98 - 107 mmol/L    CO2 27 22 - 31 mmol/L    Anion Gap, Calculation 10 5 - 18 mmol/L    Glucose 120 70 - 125 mg/dL    Calcium 8.5 8.5 - 10.5 mg/dL    BUN 18 8 - 28 mg/dL    Creatinine 0.96 0.70 - 1.30 mg/dL    GFR MDRD Af Amer >60 >60 mL/min/1.73m2    GFR MDRD Non Af Amer >60 >60 mL/min/1.73m2   Hepatic Profile    Collection Time: 01/05/21  9:26 AM   Result Value Ref Range    Bilirubin, Total 0.6 0.0 - 1.0 mg/dL    Bilirubin, Direct 0.3 <=0.5 mg/dL    Protein, Total 6.4 6.0 - 8.0 g/dL    Albumin 2.6 (L) 3.5 - 5.0 g/dL    Alkaline Phosphatase 100 45 - 120 U/L    AST 32 0 - 40 U/L    ALT 54 (H) 0 - 45 U/L   HM1 (CBC with Diff)    Collection Time: 01/05/21  9:26 AM   Result Value Ref Range    WBC 5.7 4.0 - 11.0 thou/uL    RBC 4.21 (L) 4.40 - 6.20 mill/uL    Hemoglobin 12.6 (L) 14.0 - 18.0 g/dL    Hematocrit 38.9 (L) 40.0 - 54.0 %    MCV 92 80 - 100 fL    MCH 29.9 27.0 - 34.0 pg    MCHC 32.4 32.0 - 36.0 g/dL    RDW 13.9 11.0 - 14.5 %    Platelets 293 140 - 440 thou/uL    MPV 9.7 8.5 - 12.5 fL    Neutrophils % 62 50 - 70 %    Lymphocytes % 19 (L) 20 - 40 %    Monocytes % 12 (H) 2 - 10 %    Eosinophils % 6 0 - 6 %    Basophils % 1 0 - 2 %    Immature Granulocyte % 1 (H) <=0 %    Neutrophils Absolute 3.6 2.0 - 7.7 thou/uL    Lymphocytes Absolute 1.1 0.8 - 4.4 thou/uL    Monocytes Absolute 0.7 0.0 - 0.9 thou/uL    Eosinophils Absolute 0.3 0.0 - 0.4 thou/uL    Basophils Absolute 0.1 0.0 - 0.2 thou/uL    Immature Granulocyte Absolute 0.0 <=0.0 thou/uL   COVID-19 Virus PCR MRF    Collection Time: 01/11/21 12:00 PM    Specimen: Respiratory   Result Value Ref Range    COVID-19 VIRUS SPECIMEN SOURCE Nares     2019-nCOV Not  Detected    Basic Metabolic Panel    Collection Time: 01/12/21  8:21 AM   Result Value Ref Range    Sodium 140 136 - 145 mmol/L    Potassium 4.6 3.5 - 5.0 mmol/L    Chloride 102 98 - 107 mmol/L    CO2 31 22 - 31 mmol/L    Anion Gap, Calculation 7 5 - 18 mmol/L    Glucose 116 70 - 125 mg/dL    Calcium 8.9 8.5 - 10.5 mg/dL    BUN 18 8 - 28 mg/dL    Creatinine 0.89 0.70 - 1.30 mg/dL    GFR MDRD Af Amer >60 >60 mL/min/1.73m2    GFR MDRD Non Af Amer >60 >60 mL/min/1.73m2   C-Reactive Protein    Collection Time: 01/12/21  8:21 AM   Result Value Ref Range    CRP 1.7 (H) 0.0 - 0.8 mg/dL   Hepatic Profile    Collection Time: 01/12/21  8:21 AM   Result Value Ref Range    Bilirubin, Total 0.6 0.0 - 1.0 mg/dL    Bilirubin, Direct 0.2 <=0.5 mg/dL    Protein, Total 6.5 6.0 - 8.0 g/dL    Albumin 2.7 (L) 3.5 - 5.0 g/dL    Alkaline Phosphatase 98 45 - 120 U/L    AST 20 0 - 40 U/L    ALT 21 0 - 45 U/L   HM1 (CBC with Diff)    Collection Time: 01/12/21  8:21 AM   Result Value Ref Range    WBC 5.6 4.0 - 11.0 thou/uL    RBC 4.48 4.40 - 6.20 mill/uL    Hemoglobin 13.3 (L) 14.0 - 18.0 g/dL    Hematocrit 41.6 40.0 - 54.0 %    MCV 93 80 - 100 fL    MCH 29.7 27.0 - 34.0 pg    MCHC 32.0 32.0 - 36.0 g/dL    RDW 13.9 11.0 - 14.5 %    Platelets 301 140 - 440 thou/uL    MPV 9.8 8.5 - 12.5 fL    Neutrophils % 60 50 - 70 %    Lymphocytes % 19 (L) 20 - 40 %    Monocytes % 13 (H) 2 - 10 %    Eosinophils % 6 0 - 6 %    Basophils % 1 0 - 2 %    Immature Granulocyte % 1 (H) <=0 %    Neutrophils Absolute 3.3 2.0 - 7.7 thou/uL    Lymphocytes Absolute 1.1 0.8 - 4.4 thou/uL    Monocytes Absolute 0.8 0.0 - 0.9 thou/uL    Eosinophils Absolute 0.4 0.0 - 0.4 thou/uL    Basophils Absolute 0.1 0.0 - 0.2 thou/uL    Immature Granulocyte Absolute 0.0 <=0.0 thou/uL                DONNY Kwan

## 2021-06-14 NOTE — TELEPHONE ENCOUNTER
Spoke to nurse.  PVR greater than 250 by bladder scan    I instructed to place new catheter, 14 Indonesian, balloon inflation volume according to the label on the catheter    Leave in place 1 week.  Then remove catheter and measure PVR again.

## 2021-06-14 NOTE — PROGRESS NOTES
"  [facility]      NAME:  Caleb Hernandez             :  1935    MRN: [id number]    CODE STATUS:  FULL CODE    FACILITY: [facility] [[id number]]       CHIEF COMPLAIN/REASON FOR VISIT:  Chief Complaint   Patient presents with     Review Of Multiple Medical Conditions     wound to ft review       HISTORY OF PRESENT ILLNESS: Caleb Hernandez is a 85 y.o. male being seen today for review of mUltiple medial conditions. He has urinary retention and . Dietrich cath in place. TCU status post acute care due to an I&D of his right lower foot with cellulitis and is followed by Dr. Garcia podiatry.  He will be on daily IV antibiotics on the TCU.  As per his EMR at [facility], \"with DM2, PVD, HTN presented with right lower extremity cellulitis, osteomyelitis, septic arthritis.     Right foot cellulitis with abscess, osteomyelitis, septic arthritis  -Patient with a recent excision of the right fifth metatarsal on 2020 with podiatry.  Wound cultures with Bacteroides and Enterobacter cloaca.  Patient directly admitted from vascular clinic with concerns for worsening postoperative infection.  MRI with new synovitis, osteomyelitis of the 2nd-4th tarsometatarsal joint and likely septic arthritis.  Patient underwent I&D of abscess on , this wound culture now with Corynebacterium.  Patient initially on vancomycin and Zosyn, currently on meropenem with plan for outpatient ertapenem for 4 weeks.  -Nonweightbearing  -Patient received vancomycin and meropenem while inpatient and this will be switched to ertapenem daily dose x4 weeks with ID to follow as outpatient     Urinary retention with hematuria  -Patient had Dietrich catheter placed after angiogram for acute urinary retention.  Patient developed hematuria on .  CT abdomen and pelvis with significant irregular bladder wall thickening, more suggestive of cystitis.  Patient also has a nonobstructing 11 mm right upper pole kidney stone " "with no hydro-.  Urine culture negative, likely Dietrich causing irritation  -Patient evaluated daily by urology team  -Continue Flomax at discharge  -Able to restart Eliquis and aspirin daily  -Patient will be discharged on Dietrich catheter and will follow up with urology for definitive treatment     PVD  -Patient underwent right lower extremity angiogram with balloon angioplasty of anterior tibial and peroneal arteries 11/24/2020 with vascular surgery.  Postoperative ABIs without significant improvement, vascular thought this was likely secondary to spasm.  -will need repeat ultrasound outpatient  -Follow-up with vascular clinic outpatient     Adult failure to thrive  -Secondary to surgical interventions, prolonged hospital stays and infection  PT/OT recommending TCU, discharge plan 12/3     DM2  -A1c 7.9  -Held home Victoza, and was started on 3 units of mealtime insulin and increase to glargine 48 units at bedtime   -We will restart home Victoza and continue glargine at bedtime as well as mealtime insulin at discharge     Paroxysmal atrial fibrillation  -Currently rate controlled.    -Restart home Eliquis     HTN  -Lisinopril 5 mg p.o. daily     Normocytic anemia  -Hemoglobin 12.2 with MCV of 92.  Likely anemia of chronic disease, but with component of blood loss anemia from recent surgeries, hematuria.\"    He is to receive IV antibiotics thru 1/11 at this point and has weekly labs drawn for ID. PT OT on the rehab unit.  He is on eliquis and ASA and I informed nursing to resume. He is having trouble with the wound vac staying in place and WOC RN consulted.    Allergies   Allergen Reactions     Cresol      Muscle cramps     Crestor [Rosuvastatin] Myalgia     Declomycin [Demeclocycline] Hives   :     Current Outpatient Medications   Medication Sig     acetaminophen (TYLENOL) 500 MG tablet Take 1-2 tablets (500-1,000 mg total) by mouth every 4 (four) hours as needed.     apixaban ANTICOAGULANT (ELIQUIS) 5 mg Tab tablet " "Take 1 tablet (5 mg total) by mouth 2 (two) times a day.     aspirin 81 MG EC tablet Take 81 mg by mouth daily.     blood glucose test strips Use 1 each As Directed 2 (two) times a day. Dispense brand per patient's insurance at pharmacy discretion.     blood-glucose meter (ONETOUCH VERIO IQ METER) Misc Use 1 each As Directed 2 (two) times a day.     cholecalciferol, vitamin D3, (VITAMIN D3) 5,000 unit Tab Take 5,000 Units by mouth daily.      insulin glargine (LANTUS SOLOSTAR U-100 INSULIN) 100 unit/mL (3 mL) pen Inject 44 Units under the skin at bedtime. (Patient taking differently: Inject 30 Units under the skin at bedtime. Takes 15 units q am and 30 units Q HS)     liraglutide (VICTOZA 3-RUPALI) 0.6 mg/0.1 mL (18 mg/3 mL) injection Inject 0.3 mL (1.8 mg total) under the skin daily.     lisinopriL (PRINIVIL,ZESTRIL) 5 MG tablet Take 1 tablet (5 mg total) by mouth daily.     multivit-min/FA/lycopen/lutein (CENTRUM SILVER MEN ORAL) Take 1 tablet by mouth daily.     mupirocin (BACTROBAN) 2 % ointment Apply topically daily as needed. Apply to wound.     neomycin-bacitracin-polymyxin (NEOSPORIN) ointment Apply to penis at catheter insertion site three times a day while catheter is in place.     NOVOLOG FLEXPEN U-100 INSULIN 100 unit/mL (3 mL) injection pen Inject 3 Units under the skin 3 (three) times a day before meals.     oxyCODONE (ROXICODONE) 5 MG immediate release tablet Take 1 tablet (5 mg total) by mouth every 6 (six) hours as needed for pain.     pen needle, diabetic 31 gauge x 5/16\" Ndle Used to inject insulin daily     polyethylene glycol (MIRALAX) 17 gram packet Take 1 packet (17 g total) by mouth daily as needed.     tamsulosin (FLOMAX) 0.4 mg cap Take 1 capsule (0.4 mg total) by mouth daily after supper.     vitamin E 400 unit capsule Take 400 Units by mouth daily.         REVIEW OF SYSTEMS:    Currently, no fever, chills, or rigors. Does not have any visual or hearing problems. Denies any chest pain, " headaches, palpitations, lightheadedness, dizziness, shortness of breath, or cough. Appetite is good. Denies any GERD symptoms. Denies any difficulty with swallowing, nausea, or vomiting.  Denies any abdominal pain, diarrhea or constipation. Denies any urinary symptoms. No insomnia. No active bleeding  No rash.       PHYSICAL EXAMINATION:  Vitals:    01/05/21 1000   BP: 128/70   Pulse: 76   Temp: 98.1  F (36.7  C)         GENERAL: Awake, Alert, oriented x3, not in any form of acute distress, answers questions appropriately, follows simple commands, conversant  HEENT: Head is normocephalic with normal hair distribution. No evidence of trauma. Ears: No acute purulent discharge. Eyes: Conjunctivae pink with no scleral jaundice. Nose: Normal mucosa and septum. NECK: Supple with no cervical or supraclavicular lymphadenopathy. Trachea is midline.   ABDOMEN: Globular and soft, nontender to palpation, non distended, no masses, no organomegaly, good bowel sounds, no rebound or guarding, no peritoneal signs.   : Dietrich, huey color urine throughout day, clear no sediment  EXTREMITIES: Full range of motion does have a PICC in right upper arm.  He is to be nonweightbearing to the right foot until cleared by Dr. Middleton and podiatry.  SKIN: Warm and dry, no erythema noted, no rashes or lesions.  NEUROLOGICAL: The patient is oriented to person, place and time. Strength and sensation are grossly intact. Face is symmetric.        Social distancing with PPE in place during assessment due to COVID-19 precautions.            LABS:    Lab Results   Component Value Date    WBC 5.8 12/28/2020    HGB 12.9 (L) 12/28/2020    HCT 40.3 12/28/2020    MCV 92 12/28/2020     12/28/2020       Results for orders placed or performed in visit on 12/21/20   Basic Metabolic Panel   Result Value Ref Range    Sodium 140 136 - 145 mmol/L    Potassium 4.2 3.5 - 5.0 mmol/L    Chloride 104 98 - 107 mmol/L    CO2 29 22 - 31 mmol/L    Anion Gap,  Calculation 7 5 - 18 mmol/L    Glucose 84 70 - 125 mg/dL    Calcium 8.3 (L) 8.5 - 10.5 mg/dL    BUN 11 8 - 28 mg/dL    Creatinine 0.77 0.70 - 1.30 mg/dL    GFR MDRD Af Amer >60 >60 mL/min/1.73m2    GFR MDRD Non Af Amer >60 >60 mL/min/1.73m2           Lab Results   Component Value Date    HGBA1C 7.9 (H) 09/04/2020     No results found for: [id number]  No results found for: [id number]    ASSESSMENT/PLAN:  1. Urinary retention    2. 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 2 diabetes mellitus, with necrosis of muscle (H)          1 . Urinary retention  Dietrich in place, no hematuria today. UA obtained was negative  2. DM ft ulcer Abscess right ft and Osteomyelitis right foot,with wound vac on and change Q 3 days . Weekly labs due today. To continue IVAB until 1/11/20 per ID.        Electronically signed by:  Marva Moore CNP  This progress note was completed using Dragon software and there may be grammatical errors.

## 2021-06-14 NOTE — PROGRESS NOTES
[facility] Admission note      Patient: Caleb Hernandez  MRN: [id number]  Date of Service: 1/7/2021      [facility] [[id number]]  Reason for Visit     Chief Complaint   Patient presents with     Review Of Multiple Medical Conditions   follow-up hypoglycemia and hypotension    Code Status     Full code    Assessment       -Right foot cellulitis with abscess work-up positive for osteomyelitis with septic arthritis of 2nd-4th tarsometatarsal joint  -Status post I&D of abscess on 11/27/2020  -Postoperative urinary retention with hematuria  -History of right 5th metatarsal excision on 11/2/2020  -Severe peripheral vascular disease.  -Status post right lower extremity angiogram with balloon angioplasty of anterior tibial and peroneal arteries on 11/24/2020  -- IDDM-2   - HTN with hypotension noted today  - FTT  -Generalized weakness with deconditioning    Plan     Patient has been admitted to the TCU for strengthening and rehab.  He has been admitted with ulceration of his right foot status post TheraSkin graft.  He currently has a wound VAC in place  He had a scheduled follow-up with vascular surgery and was advised to continue with his nonweightbearing status on his right lower extremity.  He also continues with protein supplementation.  Outpatient follow-up will be done as scheduled.  He is also diabetic and blood sugar control was reviewed it appears to be adequate with an occasional high greater than 200.  He has been taken off his high doses of insulin and is on a much lower dose now.  Blood sugars are being monitored clinically.  Blood pressures were low today however he has been running stable blood pressures before that so we will monitor trends before adjusting any medications.  Oral intake reviewed with patient and he is reporting enough adequate intake.  Pain concerns are minimal and he has been asking for tramadol as needed.  Continue with his PT and OT  Recheck labs done in the  TCU yesterday reviewed stable with a CRP of 1.7 now  Continue with his IV ertapenem till 1/11/2020    History     Patient is a very pleasant 85 y.o. male who is admitted to TCU  Patient presented to the hospital with right lower extremity cellulitis.  He was admitted with concerning infection.  MRI confirmed new synovitis with osteomyelitis of the 2nd-4th tarsometatarsal joint with septic arthritis.  He underwent I&D of the abscess on 11/27/2020.  Postoperatively he has been discharged nonweightbearing with outpatient ertapenem for 4 weeks  Subsequently he was readmitted on 12/17/2024 application of TheraSkin with redebridement of his wound.  Currently he has a wound VAC on.  He had a follow-up scheduled with vascular surgery yesterday and was discharged continuation with nonweightbearing and protein supplementation.  He will do a close follow-up with them.    He also has a history of severe peripheral vascular disease and underwent right lower extremity angiogram with angioplasty of anterior tibial and peroneal arteries on 11/24/2020 with vascular surgery.  He will require follow-up with vascular.  It was done in the TCU.  They have recommended further follow-up with podiatry for further work-up they recommend seeing him back in 3 months for ABIs  Currently denies any leg pain or ischemia concerns he does have occasionally foot pain which he is doctoring using tramadol as per him  Postoperatively had urinary retention with bladder wall thickening suggestive of cystitis.  He was noted to have a right upper pole kidney stone but no hydronephrosis.  Urology saw him.  He will continue with Flomax at discharge and at the recommendation he has resumed his Eliquis and aspirin.  He has diabetes and his Lantus dose was adjusted to get tighter control of his blood sugars.  Blood sugar still continue to be running on the low side most of the days with blood sugars ranging from 90-1 50 with occasional high greater than 200  noted.  He is on a much lower dose of insulin.  Blood pressure also low today but prior to that he has had stable blood pressures.  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 (H)     Cellulitis of right lower extremity     Abscess of right foot     Cellulitis and abscess of foot excluding toe     Osteomyelitis of right foot (H)     Statin intolerance     Personal history of PE (pulmonary embolism)-- May 2020, unprovoked, with right heart strain     PVD (peripheral vascular disease) (H)     Overweight (BMI 25.0-29.9)     Chronic anticoagulation     Acute pulmonary embolism with acute cor pulmonale (H)     Diabetic ulcer of right foot associated with type 2 diabetes mellitus (H)     Type 2 diabetes mellitus (H)     Hyperlipidemia     Essential hypertension     Past Medical History:   Diagnosis Date     BPH (benign prostatic hyperplasia)      Cholelithiasis      Common bile duct (CBD) obstruction 9/4/2017     Compression Fracture Of Thoracic Vertebral Body      Diabetes mellitus (H)      Essential hypertension      Hyperlipidemia      Pancreatitis      Rib Fracture      Sepsis due to pneumonia (H) 9/8/2017       Past Social History     Reviewed, and he  reports that he quit smoking about 29 years ago. He has never used smokeless tobacco. He reports that he does not drink alcohol or use drugs.    Family  History     Reviewed, and family history includes Alcohol abuse in his father; Aortic aneurysm in his mother.    Medication List   Post Discharge Medication Reconciliation Status: discharge medications reconciled, continue medications without change   Current Outpatient Medications on File Prior to Visit   Medication Sig Dispense Refill     acetaminophen (TYLENOL) 500 MG tablet Take 1-2 tablets (500-1,000 mg total) by mouth every 4 (four) hours as needed.  0     apixaban ANTICOAGULANT (ELIQUIS) 5 mg Tab tablet Take 1 tablet (5 mg total) by mouth 2 (two) times a day. 60 tablet 3     aspirin 81 MG EC tablet Take 81 mg by mouth daily.       blood glucose test strips Use 1 each As Directed 2 (two) times a day. Dispense brand per patient's insurance at pharmacy discretion. 120 strip 6     blood-glucose meter (ONETOUCH VERIO IQ METER) Misc Use 1 each As Directed 2 (two) times a day. 1 each 0     cholecalciferol, vitamin D3, (VITAMIN D3) 5,000 unit Tab Take 5,000 Units by mouth daily.        insulin glargine (LANTUS SOLOSTAR U-100 INSULIN) 100 unit/mL (3 mL) pen Inject 44 Units under the skin at bedtime. (Patient taking differently: Inject 30 Units under the skin at bedtime. Takes 15 units q am and 30 units Q HS)       liraglutide (VICTOZA 3-RUPALI) 0.6 mg/0.1 mL (18 mg/3 mL) injection Inject 0.3 mL (1.8 mg total) under the skin daily.       lisinopriL (PRINIVIL,ZESTRIL) 5 MG tablet Take 1 tablet (5 mg total) by mouth daily. 90 tablet 3     multivit-min/FA/lycopen/lutein (CENTRUM SILVER MEN ORAL) Take 1 tablet by mouth daily.       mupirocin (BACTROBAN) 2 % ointment Apply topically daily as needed. Apply to wound.       neomycin-bacitracin-polymyxin (NEOSPORIN) ointment Apply to penis at catheter insertion site three times a day while catheter is in place. 15 g 0     NOVOLOG FLEXPEN U-100 INSULIN 100 unit/mL (3 mL) injection pen Inject 3 Units under the skin 3 (three) times a day before meals. 2.7 mL 0     oxyCODONE  "(ROXICODONE) 5 MG immediate release tablet Take 1 tablet (5 mg total) by mouth every 6 (six) hours as needed for pain. 30 tablet 0     pen needle, diabetic 31 gauge x 5/16\" Ndle Used to inject insulin daily 90 each 0     polyethylene glycol (MIRALAX) 17 gram packet Take 1 packet (17 g total) by mouth daily as needed.  0     tamsulosin (FLOMAX) 0.4 mg cap Take 1 capsule (0.4 mg total) by mouth daily after supper.  0     vitamin E 400 unit capsule Take 400 Units by mouth daily.       No current facility-administered medications on file prior to visit.        Allergies     Allergies   Allergen Reactions     Cresol      Muscle cramps     Crestor [Rosuvastatin] Myalgia     Declomycin [Demeclocycline] Hives       Review of Systems   A comprehensive review of 14 systems was done. Pertinent findings noted here and in history of present illness. All the rest negative.  Constitutional: Negative.  Negative for fever, chills, he has activity change, appetite change and fatigue.   HENT: Negative for congestion and facial swelling.    Eyes: Negative for photophobia, redness and visual disturbance.   Respiratory: Negative for cough and chest tightness.    Cardiovascular: Negative for chest pain, palpitations and  leg swelling.   Gastrointestinal: Negative for nausea, diarrhea, constipation, blood in stool and abdominal distention.   Genitourinary: Negative.  Has  A mai  Musculoskeletal: Negative.  Denies any pain pin his foot .  Skin: Negative.    Neurological: Negative for dizziness, tremors, syncope, weakness, light-headedness and headaches.   Hematological: Does not bruise/bleed easily.   Psychiatric/Behavioral: Negative.  Some confusion noted he just wants to go home as per him      Physical Exam     Recent Vitals 1/6/2021   Weight -   BSA (m2) -   /78   Pulse 76   Temp 97.7   Temp src -   SpO2 -   Some recent data might be hidden   RC /54    Constitutional: Oriented to person, place, and time and appears " well-developed.   HEENT:  Normocephalic and atraumatic.  Eyes: Conjunctivae and EOM are normal. Pupils are equal, round, and reactive to light. No discharge.  No scleral icterus. Nose normal. Mouth/Throat: Oropharynx is clear and moist. No oropharyngeal exudate.    NECK: Normal range of motion. Neck supple. No JVD present. No tracheal deviation present. No thyromegaly present.   CARDIOVASCULAR: Normal rate, regular rhythm and intact distal pulses.  Exam reveals no gallop and no friction rub.  Systolic murmur present.  PULMONARY: Effort normal and breath sounds normal. No respiratory distress.No Wheezing or rales.  ABDOMEN: Soft. Bowel sounds are normal. No distension and no mass.  There is no tenderness. There is no rebound and no guarding. No HSM.  MUSCULOSKELETAL: Normal range of motion. No edema and no tenderness. Mild kyphosis, no tenderness.  Rt foot missing 5th toe  He has a wound VAC in place  LYMPH NODES: Has no cervical, supraclavicular, axillary and groin adenopathy.   NEUROLOGICAL: Alert and oriented to person, place, and time. No cranial nerve deficit.  Normal muscle tone. Coordination normal.   GENITOURINARY: Deferred exam.  Dietrich present  SKIN: Skin is warm and dry. No rash noted. No erythema. No pallor.   EXTREMITIES: No cyanosis, no clubbing, no edema. No Deformity.  PSYCHIATRIC: Normal mood, affect and behavior.      Lab Results     Recent Results (from the past 240 hour(s))   C-Reactive Protein    Collection Time: 12/28/20 12:00 PM   Result Value Ref Range    CRP 4.1 (H) 0.0 - 0.8 mg/dL   Comprehensive Metabolic Panel    Collection Time: 12/28/20 12:00 PM   Result Value Ref Range    Sodium 140 136 - 145 mmol/L    Potassium 4.9 3.5 - 5.0 mmol/L    Chloride 104 98 - 107 mmol/L    CO2 27 22 - 31 mmol/L    Anion Gap, Calculation 9 5 - 18 mmol/L    Glucose 193 (H) 70 - 125 mg/dL    BUN 15 8 - 28 mg/dL    Creatinine 0.88 0.70 - 1.30 mg/dL    GFR MDRD Af Amer >60 >60 mL/min/1.73m2    GFR MDRD Non Af Amer  >60 >60 mL/min/1.73m2    Bilirubin, Total 0.5 0.0 - 1.0 mg/dL    Calcium 8.8 8.5 - 10.5 mg/dL    Protein, Total 6.5 6.0 - 8.0 g/dL    Albumin 2.6 (L) 3.5 - 5.0 g/dL    Alkaline Phosphatase 98 45 - 120 U/L    AST 75 (H) 0 - 40 U/L     (H) 0 - 45 U/L   HM1 (CBC with Diff)    Collection Time: 12/28/20 12:00 PM   Result Value Ref Range    WBC 5.8 4.0 - 11.0 thou/uL    RBC 4.36 (L) 4.40 - 6.20 mill/uL    Hemoglobin 12.9 (L) 14.0 - 18.0 g/dL    Hematocrit 40.3 40.0 - 54.0 %    MCV 92 80 - 100 fL    MCH 29.6 27.0 - 34.0 pg    MCHC 32.0 32.0 - 36.0 g/dL    RDW 13.5 11.0 - 14.5 %    Platelets 259 140 - 440 thou/uL    MPV 10.1 8.5 - 12.5 fL    Neutrophils % 65 50 - 70 %    Lymphocytes % 15 (L) 20 - 40 %    Monocytes % 12 (H) 2 - 10 %    Eosinophils % 7 (H) 0 - 6 %    Basophils % 1 0 - 2 %    Immature Granulocyte % 1 (H) <=0 %    Neutrophils Absolute 3.8 2.0 - 7.7 thou/uL    Lymphocytes Absolute 0.9 0.8 - 4.4 thou/uL    Monocytes Absolute 0.7 0.0 - 0.9 thou/uL    Eosinophils Absolute 0.4 0.0 - 0.4 thou/uL    Basophils Absolute 0.1 0.0 - 0.2 thou/uL    Immature Granulocyte Absolute 0.0 <=0.0 thou/uL   Hepatic Profile    Collection Time: 12/29/20  7:34 AM   Result Value Ref Range    Bilirubin, Total 0.7 0.0 - 1.0 mg/dL    Bilirubin, Direct 0.2 <=0.5 mg/dL    Protein, Total 6.6 6.0 - 8.0 g/dL    Albumin 2.6 (L) 3.5 - 5.0 g/dL    Alkaline Phosphatase 96 45 - 120 U/L    AST 85 (H) 0 - 40 U/L     (H) 0 - 45 U/L   Urinalysis-UC if Indicated    Collection Time: 12/30/20 11:00 AM   Result Value Ref Range    Color, UA Shellie (!) Colorless, Yellow, Straw, Light Yellow    Clarity, UA Turbid (!) Clear    Glucose, UA Negative Negative    Bilirubin, UA Negative Negative    Ketones, UA Negative Negative    Specific Gravity, UA 1.030 1.001 - 1.030    Blood, UA Large (!) Negative    pH, UA 6.0 4.5 - 8.0    Protein,  mg/dL (!) Negative mg/dL    Urobilinogen, UA <2.0 E.U./dL <2.0 E.U./dL, 2.0 E.U./dL    Nitrite, UA Negative  Negative    Leukocytes, UA Moderate (!) Negative    Bacteria, UA Moderate (!) None Seen hpf    RBC, UA >100 (!) None Seen, 0-2 hpf    WBC, UA >100 (!) None Seen, 0-5 hpf    Squam Epithel, UA 0-5 None Seen, 0-5 lpf    Yeast, UA Many (!) None Seen hpf    Mucus, UA Few (!) None Seen lpf    Ca Oxalate Meliza, UA Present (!) None Seen   Culture, Urine    Collection Time: 12/30/20 11:00 AM    Specimen: Urine   Result Value Ref Range    Culture No Growth    COVID-19 Virus PCR MRF    Collection Time: 01/04/21 11:00 AM    Specimen: Respiratory   Result Value Ref Range    COVID-19 VIRUS SPECIMEN SOURCE Nares     2019-nCOV Not Detected    C-Reactive Protein    Collection Time: 01/05/21  9:26 AM   Result Value Ref Range    CRP 1.7 (H) 0.0 - 0.8 mg/dL   Basic Metabolic Panel    Collection Time: 01/05/21  9:26 AM   Result Value Ref Range    Sodium 140 136 - 145 mmol/L    Potassium 4.3 3.5 - 5.0 mmol/L    Chloride 103 98 - 107 mmol/L    CO2 27 22 - 31 mmol/L    Anion Gap, Calculation 10 5 - 18 mmol/L    Glucose 120 70 - 125 mg/dL    Calcium 8.5 8.5 - 10.5 mg/dL    BUN 18 8 - 28 mg/dL    Creatinine 0.96 0.70 - 1.30 mg/dL    GFR MDRD Af Amer >60 >60 mL/min/1.73m2    GFR MDRD Non Af Amer >60 >60 mL/min/1.73m2   Hepatic Profile    Collection Time: 01/05/21  9:26 AM   Result Value Ref Range    Bilirubin, Total 0.6 0.0 - 1.0 mg/dL    Bilirubin, Direct 0.3 <=0.5 mg/dL    Protein, Total 6.4 6.0 - 8.0 g/dL    Albumin 2.6 (L) 3.5 - 5.0 g/dL    Alkaline Phosphatase 100 45 - 120 U/L    AST 32 0 - 40 U/L    ALT 54 (H) 0 - 45 U/L   HM1 (CBC with Diff)    Collection Time: 01/05/21  9:26 AM   Result Value Ref Range    WBC 5.7 4.0 - 11.0 thou/uL    RBC 4.21 (L) 4.40 - 6.20 mill/uL    Hemoglobin 12.6 (L) 14.0 - 18.0 g/dL    Hematocrit 38.9 (L) 40.0 - 54.0 %    MCV 92 80 - 100 fL    MCH 29.9 27.0 - 34.0 pg    MCHC 32.4 32.0 - 36.0 g/dL    RDW 13.9 11.0 - 14.5 %    Platelets 293 140 - 440 thou/uL    MPV 9.7 8.5 - 12.5 fL    Neutrophils % 62 50 - 70 %     Lymphocytes % 19 (L) 20 - 40 %    Monocytes % 12 (H) 2 - 10 %    Eosinophils % 6 0 - 6 %    Basophils % 1 0 - 2 %    Immature Granulocyte % 1 (H) <=0 %    Neutrophils Absolute 3.6 2.0 - 7.7 thou/uL    Lymphocytes Absolute 1.1 0.8 - 4.4 thou/uL    Monocytes Absolute 0.7 0.0 - 0.9 thou/uL    Eosinophils Absolute 0.3 0.0 - 0.4 thou/uL    Basophils Absolute 0.1 0.0 - 0.2 thou/uL    Immature Granulocyte Absolute 0.0 <=0.0 thou/uL                DONNY Kwan

## 2021-06-14 NOTE — PATIENT INSTRUCTIONS - HE
"    Important Instructions                       How to care for your wound    Cleanse wound with saline sent to you with your supplies or a wound wash found at the pharmacy.      Pat dry the wound with 4\"x4\" gauze      Cut to fit the Medihoney and place in or on the wound.        Cover the wound with [id number] dry gauze.      Wrap the affected area with 4 inch roll gauze.      Secure dressings in place with paper tape.      Change dressing: every other day      Do not get your ulcer wet when you shower.  You can cover it with a cast protector. Cast protectors are available for purchase at [facility] [employer]. You may also purchase a cast protector at your local pharmacy.      Good nutrition is important for wound healing.  I recommend increasing your protein.  You can do this through your diet, nutritional supplements, and/or protein powder.    It is ok to continue current wound care treatment/products for the next 2-3 days until new wound care supplies are ordered and arrive. If longer than this please contact our office.    Please call the [facility] before substituting any product    Call our clinic nurse at [phone] if there is an increase in drainage, pain, redness, or the wound size increases.  This maybe a sign of infection and require attention prior to the next appointment        "

## 2021-06-14 NOTE — PROGRESS NOTES
[facility] Admission note      Patient: Caleb Hernandez  MRN: [id number]  Date of Service: 1/14/2021      [facility] [[id number]]  Reason for Visit     Chief Complaint   Patient presents with     Review Of Multiple Medical Conditions   follow-up hypoglycemia and hypotension    Code Status     Full code    Assessment       -Right foot cellulitis with abscess work-up positive for osteomyelitis with septic arthritis of 2nd-4th tarsometatarsal joint  -Status post I&D of abscess on 11/27/2020  -Postoperative urinary retention with hematuria  -History of right 5th metatarsal excision on 11/2/2020  -Severe peripheral vascular disease.  -Status post right lower extremity angiogram with balloon angioplasty of anterior tibial and peroneal arteries on 11/24/2020  -- IDDM-2   - HTN with hypotension noted today  - FTT  -Generalized weakness with deconditioning    Plan     Patient has been admitted to the TCU for strengthening and rehab.  He has been admitted with ulceration of his right foot status post TheraSkin graft.  He currently has a wound VAC in place  He remains nonweightbearing on his right lower extremity.  He will do close follow-ups with his vascular as well as podiatrist as scheduleD-  Patient has appointments next week.  He is hopeful that his wound VAC can be discontinued and he can discharge home.  Blood sugars were reviewed and his blood sugars have shown excellent control but today they have been greater than 200.  Encourage dietary compliance.  He is on a low-dose of his insulin because of persistent low blood sugars in the TCU.  Blood pressures are stable.  Again on a low-dose of medications.  IV antibiotics has been discontinued.  He was seen by infectious disease.  CRP last check was 1.7 and a white count was 5.6.  He continues with an indwelling Dietrich catheter and refuses a voiding trial.  He will follow-up with urology.  Continue with his PT and OT.      History     Patient is  a very pleasant 85 y.o. male who is admitted to TCU  Patient presented to the hospital with right lower extremity cellulitis.  He was admitted with concerning infection.  MRI confirmed new synovitis with osteomyelitis of the 2nd-4th tarsometatarsal joint with septic arthritis.  He underwent I&D of the abscess on 11/27/2020.  Postoperatively he has been discharged nonweightbearing with outpatient ertapenem for 4 weeks  Subsequently he was readmitted on 12/17/2024 application of TheraSkin with redebridement of his wound.  Wound is healing slowly.  He has a wound VAC in place and remains nonweightbearing..  He will have close follow-up with his podiatrist next week  Recheck labs were reviewed and his CRP is 1.7 his white count is 5.6 today and his other electrolytes and kidney functions as well as his liver functions are completely normal  He has a history of malnutrition with low albumin.  He is on protein supplementation secondary to that    He also has a history of severe peripheral vascular disease and underwent right lower extremity angiogram with angioplasty of anterior tibial and peroneal arteries on 11/24/2020 with vascular surgery.  He will require follow-up with vascular.  It was done in the TCU.  They have recommended further follow-up with podiatry for further work-up they recommend seeing him back in 3 months for ABIs  He does report some occasional discomfort in his leg but no significant pain however he did tell me that he has been taking her tramadol at nighttime to help him with sleeping because of pain issues    Postoperatively had urinary retention with bladder wall thickening suggestive of cystitis.  He has continued with an indwelling Dietrich catheter.  I did offer a voiding trial in the TCU.  He is quite hesitant and does not want to have that done in the TCU he agrees to see urology.  In addition he is a diabetic blood sugars are over 200 today he has had low blood sugars due to which she is on a  lower dose of insulin he was advised dietary compliance will monitor trends before adjusting    Past Medical History     Active Ambulatory (Non-Hospital) Problems    Diagnosis     Hematuria     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 2 diabetes mellitus, with necrosis of muscle (H)     ACP (advance care planning)     Septic arthritis of right foot (H)     Gross hematuria     Urinary retention     Type 2 diabetes mellitus with diabetic peripheral angiopathy without gangrene, with long-term current use of insulin (H)     Adult failure to thrive     Normocytic anemia     Paroxysmal atrial fibrillation (H)     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 (H)     Cellulitis of right lower extremity     Abscess of right foot     Cellulitis and abscess of foot excluding toe     Osteomyelitis of right foot (H)     Statin intolerance     Personal history of PE (pulmonary embolism)-- May 2020, unprovoked, with right heart strain     PVD (peripheral vascular disease) (H)     Overweight (BMI 25.0-29.9)     Chronic anticoagulation     Acute pulmonary embolism with acute cor pulmonale (H)     Diabetic ulcer of right foot associated with type 2 diabetes mellitus (H)     Type 2 diabetes mellitus (H)     Hyperlipidemia     Essential hypertension     Past Medical History:   Diagnosis Date     BPH (benign prostatic hyperplasia)      Cholelithiasis      Common bile duct (CBD) obstruction 9/4/2017     Compression Fracture Of Thoracic Vertebral Body      Diabetes mellitus (H)      Essential hypertension      Hyperlipidemia      Pancreatitis      Rib Fracture      Sepsis due to pneumonia (H) 9/8/2017       Past Social History     Reviewed, and he  reports that he quit smoking about 29 years ago. He has never used smokeless tobacco. He reports that he does not drink alcohol or use drugs.    Family History     Reviewed, and family history includes Alcohol abuse in his father;  Aortic aneurysm in his mother.    Medication List   Post Discharge Medication Reconciliation Status: discharge medications reconciled, continue medications without change   Current Outpatient Medications on File Prior to Visit   Medication Sig Dispense Refill     acetaminophen (TYLENOL) 500 MG tablet Take 1-2 tablets (500-1,000 mg total) by mouth every 4 (four) hours as needed.  0     apixaban ANTICOAGULANT (ELIQUIS) 5 mg Tab tablet Take 1 tablet (5 mg total) by mouth 2 (two) times a day. 60 tablet 3     aspirin 81 MG EC tablet Take 81 mg by mouth daily.       blood glucose test strips Use 1 each As Directed 2 (two) times a day. Dispense brand per patient's insurance at pharmacy discretion. 120 strip 6     blood-glucose meter (ONETOUCH VERIO IQ METER) Misc Use 1 each As Directed 2 (two) times a day. 1 each 0     cholecalciferol, vitamin D3, (VITAMIN D3) 5,000 unit Tab Take 5,000 Units by mouth daily.        insulin glargine (LANTUS SOLOSTAR U-100 INSULIN) 100 unit/mL (3 mL) pen Inject 44 Units under the skin at bedtime. (Patient taking differently: Inject 30 Units under the skin at bedtime. Takes 15 units q am and 30 units Q HS)       liraglutide (VICTOZA 3-RUPALI) 0.6 mg/0.1 mL (18 mg/3 mL) injection Inject 0.3 mL (1.8 mg total) under the skin daily.       lisinopriL (PRINIVIL,ZESTRIL) 5 MG tablet Take 1 tablet (5 mg total) by mouth daily. 90 tablet 3     multivit-min/FA/lycopen/lutein (CENTRUM SILVER MEN ORAL) Take 1 tablet by mouth daily.       mupirocin (BACTROBAN) 2 % ointment Apply topically daily as needed. Apply to wound.       neomycin-bacitracin-polymyxin (NEOSPORIN) ointment Apply to penis at catheter insertion site three times a day while catheter is in place. 15 g 0     NOVOLOG FLEXPEN U-100 INSULIN 100 unit/mL (3 mL) injection pen Inject 3 Units under the skin 3 (three) times a day before meals. 2.7 mL 0     oxyCODONE (ROXICODONE) 5 MG immediate release tablet Take 1 tablet (5 mg total) by mouth every 6  "(six) hours as needed for pain. 30 tablet 0     pen needle, diabetic 31 gauge x 5/16\" Ndle Used to inject insulin daily 90 each 0     polyethylene glycol (MIRALAX) 17 gram packet Take 1 packet (17 g total) by mouth daily as needed.  0     tamsulosin (FLOMAX) 0.4 mg cap Take 1 capsule (0.4 mg total) by mouth daily after supper.  0     traMADoL (ULTRAM) 50 mg tablet Take 50 mg by mouth every 6 (six) hours as needed for pain.       vitamin E 400 unit capsule Take 400 Units by mouth daily.       No current facility-administered medications on file prior to visit.        Allergies     Allergies   Allergen Reactions     Cresol      Muscle cramps     Crestor [Rosuvastatin] Myalgia     Declomycin [Demeclocycline] Hives       Review of Systems   A comprehensive review of 14 systems was done. Pertinent findings noted here and in history of present illness. All the rest negative.  Constitutional: Negative.  Negative for fever, chills, he has activity change, appetite change and fatigue.   HENT: Negative for congestion and facial swelling.    Eyes: Negative for photophobia, redness and visual disturbance.   Respiratory: Negative for cough and chest tightness.    Cardiovascular: Negative for chest pain, palpitations and  leg swelling.   Gastrointestinal: Negative for nausea, diarrhea, constipation, blood in stool and abdominal distention.   Genitourinary: Negative.  Has  A mai  Musculoskeletal: Negative.  Denies any pain pin his foot .  Skin: Negative.    Neurological: Negative for dizziness, tremors, syncope, weakness, light-headedness and headaches.   Hematological: Does not bruise/bleed easily.   Psychiatric/Behavioral: Negative.  Some confusion noted he just wants to go home as per him      Physical Exam     Recent Vitals 1/11/2021   Weight -   BSA (m2) -   /62   Pulse 80   Temp 98.2   Some recent data might be hidden   RC /54    Constitutional: Oriented to person, place, and time and appears well-developed. "   HEENT:  Normocephalic and atraumatic.  Eyes: Conjunctivae and EOM are normal. Pupils are equal, round, and reactive to light. No discharge.  No scleral icterus. Nose normal. Mouth/Throat: Oropharynx is clear and moist. No oropharyngeal exudate.    NECK: Normal range of motion. Neck supple. No JVD present. No tracheal deviation present. No thyromegaly present.   CARDIOVASCULAR: Normal rate, regular rhythm and intact distal pulses.  Exam reveals no gallop and no friction rub.  Systolic murmur present.  PULMONARY: Effort normal and breath sounds normal. No respiratory distress.No Wheezing or rales.  ABDOMEN: Soft. Bowel sounds are normal. No distension and no mass.  There is no tenderness. There is no rebound and no guarding. No HSM.  MUSCULOSKELETAL: Normal range of motion. No edema and no tenderness. Mild kyphosis, no tenderness.  Rt foot missing 5th toe  He has a wound VAC in place  LYMPH NODES: Has no cervical, supraclavicular, axillary and groin adenopathy.   NEUROLOGICAL: Alert and oriented to person, place, and time. No cranial nerve deficit.  Normal muscle tone. Coordination normal.   GENITOURINARY: Deferred exam.  Dietrich present  SKIN: Skin is warm and dry. No rash noted. No erythema. No pallor.   EXTREMITIES: No cyanosis, no clubbing, no edema. No Deformity.  PSYCHIATRIC: Normal mood, affect and behavior.      Lab Results     Recent Results (from the past 240 hour(s))   C-Reactive Protein    Collection Time: 01/05/21  9:26 AM   Result Value Ref Range    CRP 1.7 (H) 0.0 - 0.8 mg/dL   Basic Metabolic Panel    Collection Time: 01/05/21  9:26 AM   Result Value Ref Range    Sodium 140 136 - 145 mmol/L    Potassium 4.3 3.5 - 5.0 mmol/L    Chloride 103 98 - 107 mmol/L    CO2 27 22 - 31 mmol/L    Anion Gap, Calculation 10 5 - 18 mmol/L    Glucose 120 70 - 125 mg/dL    Calcium 8.5 8.5 - 10.5 mg/dL    BUN 18 8 - 28 mg/dL    Creatinine 0.96 0.70 - 1.30 mg/dL    GFR MDRD Af Amer >60 >60 mL/min/1.73m2    GFR MDRD Non Af  Amer >60 >60 mL/min/1.73m2   Hepatic Profile    Collection Time: 01/05/21  9:26 AM   Result Value Ref Range    Bilirubin, Total 0.6 0.0 - 1.0 mg/dL    Bilirubin, Direct 0.3 <=0.5 mg/dL    Protein, Total 6.4 6.0 - 8.0 g/dL    Albumin 2.6 (L) 3.5 - 5.0 g/dL    Alkaline Phosphatase 100 45 - 120 U/L    AST 32 0 - 40 U/L    ALT 54 (H) 0 - 45 U/L   HM1 (CBC with Diff)    Collection Time: 01/05/21  9:26 AM   Result Value Ref Range    WBC 5.7 4.0 - 11.0 thou/uL    RBC 4.21 (L) 4.40 - 6.20 mill/uL    Hemoglobin 12.6 (L) 14.0 - 18.0 g/dL    Hematocrit 38.9 (L) 40.0 - 54.0 %    MCV 92 80 - 100 fL    MCH 29.9 27.0 - 34.0 pg    MCHC 32.4 32.0 - 36.0 g/dL    RDW 13.9 11.0 - 14.5 %    Platelets 293 140 - 440 thou/uL    MPV 9.7 8.5 - 12.5 fL    Neutrophils % 62 50 - 70 %    Lymphocytes % 19 (L) 20 - 40 %    Monocytes % 12 (H) 2 - 10 %    Eosinophils % 6 0 - 6 %    Basophils % 1 0 - 2 %    Immature Granulocyte % 1 (H) <=0 %    Neutrophils Absolute 3.6 2.0 - 7.7 thou/uL    Lymphocytes Absolute 1.1 0.8 - 4.4 thou/uL    Monocytes Absolute 0.7 0.0 - 0.9 thou/uL    Eosinophils Absolute 0.3 0.0 - 0.4 thou/uL    Basophils Absolute 0.1 0.0 - 0.2 thou/uL    Immature Granulocyte Absolute 0.0 <=0.0 thou/uL   COVID-19 Virus PCR MRF    Collection Time: 01/11/21 12:00 PM    Specimen: Respiratory   Result Value Ref Range    COVID-19 VIRUS SPECIMEN SOURCE Nares     2019-nCOV Not Detected    Basic Metabolic Panel    Collection Time: 01/12/21  8:21 AM   Result Value Ref Range    Sodium 140 136 - 145 mmol/L    Potassium 4.6 3.5 - 5.0 mmol/L    Chloride 102 98 - 107 mmol/L    CO2 31 22 - 31 mmol/L    Anion Gap, Calculation 7 5 - 18 mmol/L    Glucose 116 70 - 125 mg/dL    Calcium 8.9 8.5 - 10.5 mg/dL    BUN 18 8 - 28 mg/dL    Creatinine 0.89 0.70 - 1.30 mg/dL    GFR MDRD Af Amer >60 >60 mL/min/1.73m2    GFR MDRD Non Af Amer >60 >60 mL/min/1.73m2   C-Reactive Protein    Collection Time: 01/12/21  8:21 AM   Result Value Ref Range    CRP 1.7 (H) 0.0 - 0.8  mg/dL   Hepatic Profile    Collection Time: 01/12/21  8:21 AM   Result Value Ref Range    Bilirubin, Total 0.6 0.0 - 1.0 mg/dL    Bilirubin, Direct 0.2 <=0.5 mg/dL    Protein, Total 6.5 6.0 - 8.0 g/dL    Albumin 2.7 (L) 3.5 - 5.0 g/dL    Alkaline Phosphatase 98 45 - 120 U/L    AST 20 0 - 40 U/L    ALT 21 0 - 45 U/L   HM1 (CBC with Diff)    Collection Time: 01/12/21  8:21 AM   Result Value Ref Range    WBC 5.6 4.0 - 11.0 thou/uL    RBC 4.48 4.40 - 6.20 mill/uL    Hemoglobin 13.3 (L) 14.0 - 18.0 g/dL    Hematocrit 41.6 40.0 - 54.0 %    MCV 93 80 - 100 fL    MCH 29.7 27.0 - 34.0 pg    MCHC 32.0 32.0 - 36.0 g/dL    RDW 13.9 11.0 - 14.5 %    Platelets 301 140 - 440 thou/uL    MPV 9.8 8.5 - 12.5 fL    Neutrophils % 60 50 - 70 %    Lymphocytes % 19 (L) 20 - 40 %    Monocytes % 13 (H) 2 - 10 %    Eosinophils % 6 0 - 6 %    Basophils % 1 0 - 2 %    Immature Granulocyte % 1 (H) <=0 %    Neutrophils Absolute 3.3 2.0 - 7.7 thou/uL    Lymphocytes Absolute 1.1 0.8 - 4.4 thou/uL    Monocytes Absolute 0.8 0.0 - 0.9 thou/uL    Eosinophils Absolute 0.4 0.0 - 0.4 thou/uL    Basophils Absolute 0.1 0.0 - 0.2 thou/uL    Immature Granulocyte Absolute 0.0 <=0.0 thou/uL                DONNY Kwan

## 2021-06-14 NOTE — PROGRESS NOTES
[facility] Admission note      Patient: Caleb Hernandez  MRN: [id number]  Date of Service: 1/26/2021      [facility] [[id number]]  Reason for Visit     Chief Complaint   Patient presents with     Review Of Multiple Medical Conditions   follow-up hypoglycemia and hypotension/ non healing wound    Code Status     Full code    Assessment     - ulceration rt foot s/p debridement  -Right foot cellulitis with abscess work-up positive for osteomyelitis with septic arthritis of 2nd-4th tarsometatarsal joint  -Status post I&D of abscess on 11/27/2020  -Postoperative urinary retention with hematuria  -History of right 5th metatarsal excision on 11/2/2020  -Severe peripheral vascular disease.  -Status post right lower extremity angiogram with balloon angioplasty of anterior tibial and peroneal arteries on 11/24/2020  -- IDDM-2   - HTN with hypotension noted today  - FTT  -Generalized weakness with deconditioning    Plan     Patient has been admitted to the TCU for strengthening and rehab.  He has been admitted with ulceration of his right foot status post TheraSkin graft.  He currently has a wound VAC in place  He remains nonweightbearing on his right lower extremity.  He is compliant with his nonweightbearing and no change in his weightbearing status has been made  Wound vac has been discontinued  His wound orders were reviewed with his podiatrist and he has Medihoney orders.  Wound is extensive on the lateral margin of his foot.  This has been nonhealing in his wound is debrided regularly by the podiatrist.  Weightbearing status to be determined by the podiatrist he is trying to use a scooter currently for mobility  Diabetic control is worsening with blood sugars postprandially now greater than 200 noted for the last 48 hours.  He also had a blood sugar of 90 in the morning.  Monitor trends before adjusting continue with his sliding scale insulin.  Blood pressures are stable but low  Continue with  his PT OT he remains adamant that he would like to discharge home by early next week advised him to follow-up with   PVR CHECKS BEING DONE-VOIDING WELL HE will follow-up with urology he did have occasional in and out catheterization done in the TCU    History     Patient is a very pleasant 85 y.o. male who is admitted to TCU  Patient presented to the hospital with right lower extremity cellulitis.  He was admitted with concerning infection.  MRI confirmed new synovitis with osteomyelitis of the 2nd-4th tarsometatarsal joint with septic arthritis.  He underwent I&D of the abscess on 11/27/2020.  Postoperatively he has been discharged nonweightbearing with outpatient ertapenem for 4 weeks  Subsequently he was readmitted on 12/17/2024 application of TheraSkin with redebridement of his wound.  Wound is healing slowly.    He recently had his IV antibiotics discontinued.  Unfortunately recheck labs had shown his CRP is elevated at 8.8  Follow-up done with podiatry yesterday.  His wound VAC has been discontinued.  His wound was debrided.  We have recommended that he may need additional surgery if his wound fails to improve.  He will continue with his nonweightbearing status  Currently he has Medihoney ordered for his wound.  This is being applied nursing has no concern for secondary infection  He will continue with his weekly follow-up with up with his podiatrist    He also has a history of severe peripheral vascular disease and underwent right lower extremity angiogram with angioplasty of anterior tibial and peroneal arteries on 11/24/2020 with vascular surgery.  He will require follow-up with vascular.  It was done in the TCU.  They have recommended further follow-up with podiatry for further work-up they recommend seeing him back in 3 months for ABIs  He does report some occasional discomfort in his leg   He has been reporting no pain but on questioning he does routinely take a tramadol at bedtime he told  me that helps him sleep better because he has some dull ache in his foot    Postoperatively had urinary retention with bladder wall thickening suggestive of cystitis.  He accidentally removed his Dietrich catheter.  He follows with urology as a result and had a voiding cystoscopy urogram done.  Currently discharged without a Dietrich and advised to monitor urinary retention concern  This was reviewed with staff he has required an occasional PVR check of more than 300 necessitating in and out cath which she has tolerated well  For the past 24 hours he has voided well    In addition he is a diabetic  Currently on a much lower dose of insulin blood sugars have improved significantly.  However now blood sugars are improving and increasing postprandially to 200 and more  He reports a good appetite  Blood pressures are stable to    Past Medical History     Active Ambulatory (Non-Hospital) Problems    Diagnosis     Amputated toe, right (H)     Hematuria     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 2 diabetes mellitus, with necrosis of muscle (H)     ACP (advance care planning)     Septic arthritis of right foot (H)     Gross hematuria     Urinary retention     Type 2 diabetes mellitus with diabetic peripheral angiopathy without gangrene, with long-term current use of insulin (H)     Adult failure to thrive     Normocytic anemia     Paroxysmal atrial fibrillation (H)     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 (H)     Osteomyelitis of right foot (H)     Statin intolerance     Personal history of PE (pulmonary embolism)-- May 2020, unprovoked, with right heart strain     PVD (peripheral vascular disease) (H)     Overweight (BMI 25.0-29.9)     Chronic anticoagulation     Hyperlipidemia     Essential hypertension     Past Medical History:   Diagnosis Date     Abscess of right foot 11/23/2020     Acute pulmonary embolism with acute cor pulmonale (H) 5/23/2020     BPH  (benign prostatic hyperplasia)      Cholelithiasis      Common bile duct (CBD) obstruction 9/4/2017     Compression Fracture Of Thoracic Vertebral Body      Diabetes mellitus (H)      Essential hypertension      Hyperlipidemia      Pancreatitis      Rib Fracture      Sepsis due to pneumonia (H) 9/8/2017       Past Social History     Reviewed, and he  reports that he quit smoking about 29 years ago. He has never used smokeless tobacco. He reports that he does not drink alcohol or use drugs.    Family History     Reviewed, and family history includes Alcohol abuse in his father; Aortic aneurysm in his mother.    Medication List   Post Discharge Medication Reconciliation Status: discharge medications reconciled, continue medications without change   Current Outpatient Medications on File Prior to Visit   Medication Sig Dispense Refill     acetaminophen (TYLENOL) 500 MG tablet Take 1-2 tablets (500-1,000 mg total) by mouth every 4 (four) hours as needed.  0     apixaban ANTICOAGULANT (ELIQUIS) 5 mg Tab tablet Take 1 tablet (5 mg total) by mouth 2 (two) times a day. 60 tablet 3     aspirin 81 MG EC tablet Take 81 mg by mouth daily.       blood glucose test strips Use 1 each As Directed 2 (two) times a day. Dispense brand per patient's insurance at pharmacy discretion. 120 strip 6     blood-glucose meter (ONETOUCH VERIO IQ METER) Misc Use 1 each As Directed 2 (two) times a day. 1 each 0     insulin glargine (LANTUS SOLOSTAR U-100 INSULIN) 100 unit/mL (3 mL) pen Inject 30 Units under the skin at bedtime. Takes 15 units q am and 30 units Q HS       liraglutide (VICTOZA 3-RUPALI) 0.6 mg/0.1 mL (18 mg/3 mL) injection Inject 0.3 mL (1.8 mg total) under the skin daily.       multivit-min/FA/lycopen/lutein (CENTRUM SILVER MEN ORAL) Take 1 tablet by mouth daily.       mupirocin (BACTROBAN) 2 % ointment Apply topically daily as needed. Apply to wound.       neomycin-bacitracin-polymyxin (NEOSPORIN) ointment Apply to penis at catheter  "insertion site three times a day while catheter is in place. 15 g 0     NOVOLOG FLEXPEN U-100 INSULIN 100 unit/mL (3 mL) injection pen Inject 3 Units under the skin 3 (three) times a day before meals. 2.7 mL 0     oxyCODONE (ROXICODONE) 5 MG immediate release tablet Take 1 tablet (5 mg total) by mouth every 6 (six) hours as needed for pain. 30 tablet 0     pen needle, diabetic 31 gauge x 5/16\" Ndle Used to inject insulin daily 90 each 0     polyethylene glycol (MIRALAX) 17 gram packet Take 1 packet (17 g total) by mouth daily as needed.  0     tamsulosin (FLOMAX) 0.4 mg cap Take 1 capsule (0.4 mg total) by mouth daily after supper.  0     traMADoL (ULTRAM) 50 mg tablet Take 50 mg by mouth every 6 (six) hours as needed for pain.       vitamin E 400 unit capsule Take 400 Units by mouth daily.       No current facility-administered medications on file prior to visit.        Allergies     Allergies   Allergen Reactions     Cresol      Muscle cramps     Crestor [Rosuvastatin] Myalgia     Declomycin [Demeclocycline] Hives       Review of Systems   A comprehensive review of 14 systems was done. Pertinent findings noted here and in history of present illness. All the rest negative.  Constitutional: Negative.  Negative for fever, chills, he has activity change, appetite change and fatigue.   HENT: Negative for congestion and facial swelling.    Eyes: Negative for photophobia, redness and visual disturbance.   Respiratory: Negative for cough and chest tightness.    Cardiovascular: Negative for chest pain, palpitations and  leg swelling.   Gastrointestinal: Negative for nausea, diarrhea, constipation, blood in stool and abdominal distention.   Genitourinary: Negative.    Musculoskeletal: Negative.  Denies any pain pin his foot .using a scooter for ambulation  Skin: Negative.    Neurological: Negative for dizziness, tremors, syncope, weakness, light-headedness and headaches.   Hematological: Does not bruise/bleed easily. " "  Psychiatric/Behavioral: Negative.  Some confusion noted he just wants to go home as per him      Physical Exam     Recent Vitals 1/26/2021   Height 5' 10\"   Weight 182 lbs 10 oz   BSA (m2) 2.02 m2   /75   Pulse 62   Temp 98.8   Temp src -   SpO2 94   Some recent data might be hidden       Constitutional: Oriented to person, place, and time and appears well-developed.   HEENT:  Normocephalic and atraumatic.  Eyes: Conjunctivae and EOM are normal. Pupils are equal, round, and reactive to light. No discharge.  No scleral icterus. Nose normal. Mouth/Throat: Oropharynx is clear and moist. No oropharyngeal exudate.    NECK: Normal range of motion. Neck supple. No JVD present. No tracheal deviation present. No thyromegaly present.   CARDIOVASCULAR: Normal rate, regular rhythm and intact distal pulses.  Exam reveals no gallop and no friction rub.  Systolic murmur present.  PULMONARY: Effort normal and breath sounds normal. No respiratory distress.No Wheezing or rales.  ABDOMEN: Soft. Bowel sounds are normal. No distension and no mass.  There is no tenderness. There is no rebound and no guarding. No HSM.  MUSCULOSKELETAL: Normal range of motion. No edema and no tenderness. Mild kyphosis, no tenderness.  Rt foot missing 5th toe  Extensive ulceration noted on the lateral margin of his foot extending from the base of his missing fifth toe extending to his midfoot with necrotic skin and serosanguineous drainage noted  Nonhealing wound  LYMPH NODES: Has no cervical, supraclavicular, axillary and groin adenopathy.   NEUROLOGICAL: Alert and oriented to person, place, and time. No cranial nerve deficit.  Normal muscle tone. Coordination normal.   GENITOURINARY: Deferred exam.  SKIN: Skin is warm and dry. No rash noted. No erythema. No pallor.   EXTREMITIES: No cyanosis, no clubbing, no edema. No Deformity.  PSYCHIATRIC: Normal mood, affect and behavior.      Lab Results     Recent Results (from the past 240 hour(s)) "   COVID-19 Virus PCR MRF    Collection Time: 01/18/21 12:00 PM    Specimen: Respiratory   Result Value Ref Range    COVID-19 VIRUS SPECIMEN SOURCE Nares     2019-nCOV Not Detected                 DONNY Kwan

## 2021-06-14 NOTE — PROGRESS NOTES
[facility] Infectious Disease  Followup Visit        Assessment:   Right abscess with possible septic arthritis and osteomyelitis-status post wound debridement on 11/27/2020  Diabetic ulcer, type 2 diabetes, peripheral arterial disease, status post angioplasty cultures from 11/16 Enterobacter coagulase-negative staph and diphtheroids  Repeat cultures Diphteroids           Plan:   Patient was discharged on ertapenem which she is still on.  Completed 6 weeks and his labs look much better  Discontinue PICC line in the clinic  Wrote orders to DC ertapenem for the TCU [facility]    Follow with podiatry  Culture anything abnormal    Pablo Hammond M.D.          Present Illness:   Per prior note:  This is an follow-up infectious Disease visit for Caleb Hernandez, who is a 85 y.o.  referred for evaluation of foot osteomyelitis.      Patient is an 85-year-old gentleman I met briefly at  after he had been admitted for osteomyelitis in the right foot.  He had been taken to surgery by Dr. Aleksandr Hdez and had cultures pending at the time that I saw him.     Follow-up was performed by my colleagues, Andree Serra and Pablo Hammond.  Based on the culture reports, Dr. Hammond determined the patient would benefit from a prolonged course of intravenous ertapenem.     Patient has been at the care facility on daily ertapenem for the last 4 weeks.  In the interim, he did have another surgical procedure in his right foot with placement of a skin graft.     He also had some difficulties with his Dietrich catheter and had to be removed.  He did have a culture in early January that had Candida glabrata, but follow-up urine culture is sterile.     The time of visit today, patient states he is doing fairly well.  He is tolerating the ertapenem well.  His wife thinks he is confused because he has trouble  day from night, but patient says that is because the days are short and the nights are long.  Patient is  quite oriented to person, place, activities.     He denies any diarrhea or nausea.     He has had weekly blood tests which show stable hemogram, renal and liver functions.     Initially, his CRP did drop from 17 down to 1.9 but then it did go up to 5 and then down to 4 following recent surgical procedures.     He has longstanding abnormal pain sensation in the right foot.  He is cannot tell me if it is any better or worse over the last 4 weeks.     The following portions of the patient's history were reviewed and updated as appropriate: allergies, current medications, past family history, past medical history, past social history, past surgical history and problem list.      INTERIM   patient has been at TCU  He denies fever nausea vomiting rash or diarrhea  He is anxious to leave the TCU  He has been getting ertapenem    Labs reviewed CRP is down  Results for JOSHUA SEGOVIA (MRN [id number]) as of 1/11/2021 10:47   Ref. Range 11/23/2020 14:07 12/7/2020 05:49 12/14/2020 06:16 12/21/2020 06:05 12/28/2020 12:00 1/5/2021 09:26   CRP Latest Ref Range: 0.0 - 0.8 mg/dL 17.3 (H) 1.9 (H) 5.5 (H) 4.7 (H) 4.1 (H) 1.7 (H)         Review of Systems  Performed and all negative except as mentioned above.    Past medical, family, and social history reviewed, unchanged from before.        Physical Exam:     Gen. appearance nontoxic  Eyes no conjunctivitis or icterus  Neck no stiffness   Heart  No edema  Lungs breathing comfortably    Extremities right foot examined.  Wound VAC in place.  Staples.  No cellulitis no tenderness  Skin  no rash or emboli  Neurologic alert oriented in [place]      DATA     Results for orders placed or performed in visit on 01/05/21   C-Reactive Protein   Result Value Ref Range    CRP 1.7 (H) 0.0 - 0.8 mg/dL   Basic Metabolic Panel   Result Value Ref Range    Sodium 140 136 - 145 mmol/L    Potassium 4.3 3.5 - 5.0 mmol/L    Chloride 103 98 - 107 mmol/L    CO2 27 22 - 31 mmol/L    Anion Gap, Calculation 10 5  - 18 mmol/L    Glucose 120 70 - 125 mg/dL    Calcium 8.5 8.5 - 10.5 mg/dL    BUN 18 8 - 28 mg/dL    Creatinine 0.96 0.70 - 1.30 mg/dL    GFR MDRD Af Amer >60 >60 mL/min/1.73m2    GFR MDRD Non Af Amer >60 >60 mL/min/1.73m2   Hepatic Profile   Result Value Ref Range    Bilirubin, Total 0.6 0.0 - 1.0 mg/dL    Bilirubin, Direct 0.3 <=0.5 mg/dL    Protein, Total 6.4 6.0 - 8.0 g/dL    Albumin 2.6 (L) 3.5 - 5.0 g/dL    Alkaline Phosphatase 100 45 - 120 U/L    AST 32 0 - 40 U/L    ALT 54 (H) 0 - 45 U/L   HM1 (CBC with Diff)   Result Value Ref Range    WBC 5.7 4.0 - 11.0 thou/uL    RBC 4.21 (L) 4.40 - 6.20 mill/uL    Hemoglobin 12.6 (L) 14.0 - 18.0 g/dL    Hematocrit 38.9 (L) 40.0 - 54.0 %    MCV 92 80 - 100 fL    MCH 29.9 27.0 - 34.0 pg    MCHC 32.4 32.0 - 36.0 g/dL    RDW 13.9 11.0 - 14.5 %    Platelets 293 140 - 440 thou/uL    MPV 9.7 8.5 - 12.5 fL    Neutrophils % 62 50 - 70 %    Lymphocytes % 19 (L) 20 - 40 %    Monocytes % 12 (H) 2 - 10 %    Eosinophils % 6 0 - 6 %    Basophils % 1 0 - 2 %    Immature Granulocyte % 1 (H) <=0 %    Neutrophils Absolute 3.6 2.0 - 7.7 thou/uL    Lymphocytes Absolute 1.1 0.8 - 4.4 thou/uL    Monocytes Absolute 0.7 0.0 - 0.9 thou/uL    Eosinophils Absolute 0.3 0.0 - 0.4 thou/uL    Basophils Absolute 0.1 0.0 - 0.2 thou/uL    Immature Granulocyte Absolute 0.0 <=0.0 thou/uL         Pablo Hammond MD

## 2021-06-14 NOTE — TELEPHONE ENCOUNTER
Patient called stating there was bleeding through the dressing after the appointment today. After speaking with Dr. Garcia they were instructed to call homecare to come out and change the dressing.

## 2021-06-14 NOTE — PROGRESS NOTES
Assessment: Frantz is in for a follow up on diabetes management.  He returned to work on 11/2/17, but had to stop a week later after an ulcer developed on his foot.  He has gotten new inserts to help prevent blisters & ulcers.  While back at work, his lunchtime & dinnertime blood sugars improved by 60-100mg/dl.  Since he stopped working again he has gone back up to 44 units of tresiba from 40 units.  He is taking 1.8mg of victoza daily.  His morning blood sugars are 140-200, 180-260 at lunch ( while working), 140-200 at dinner ( while working), & 150-250 before bedtime.  He had question today about why his blood sugar would go up after not eating breakfast at lunchtime.  I discussed how this may be due to stress, exercise, or possibly even the victoza.  I discussed future medication and management he may need as his victoza is maxed out at 1.8mg.  If Frantz returns to work his blood sugars should be well managed, but if not he may need a meal-time insulin at 1-2 meals to help him reach an a1c <8.0.  He is happy with the improvement in his blood sugars, but would like to know more about diabetes management to do even better.  He checks his blood sugars with only 4 of his fingers.  I discussed rotating sites to let common sites heal and prevent scar tissue formation.  Frantz had a blood sugar of 77 and 79 when he was working so we discussed what to do with low blood sugars.      Plan: I suggest that Frantz increase his tresiba to 48 units until he returns to work.  I suggested he reduce his dose by 4-6 units when he starts to work again to avoid low blood sugars. He will continue victoza 1.8mg daily.  He will call me in 2-4 weeks to schedule another appointment.  I recommended seeing his doctor and having labs drawn in late December or January.      Subjective and Objective:      Caleb Hernandez is referred by Dr. Machado for Diabetes Education.     Lab Results   Component Value Date    HGBA1C 10.5 (H) 09/05/2017          Current diabetes medications:  victoza 1.8mg, tresiba 44 units    Goals       a1c <8.0.            9/22/17:  Frantz will work on reducing his a1c <8.0 to reduce complications from diabetes.        monitoring            9/22/17:  Frantz will start to check his blood sugars 2x/daily.  10/6/17:  Met and continuing.  He is checking his blood sugars 4x/daily now.            Follow up:   Primary care visit  CDE (certified diabetic educator)      Education:     Monitoring   Meter (per above goals): Assessed and Discussed  Monitoring: Assessed and Discussed  BG goals: Assessed and Discussed    Nutrition Management  Nutrition Management: Assessed and Discussed  Weight: Assessed and Discussed  Portions/Balance: Assessed and Discussed  Carb ID/Count: Assessed and Discussed  Label Reading: Not addressed  Heart Healthy Fats: Not addressed  Menu Planning: Assessed and Discussed  Dining Out: Not addressed  Physical Activity: Assessed and Discussed  Medications: Assessed and Discussed  Orals: Not addressed  Injected Medications: Assessed and Discussed   Storage/Exp:Assessed and Discussed   Site Rotation: Assessed and Discussed   Sites Assessed: yes    Diabetes Disease Process: Assessed, Discussed and Literature provided    Acute Complications: Prevent, Detect, Treat:  Hypoglycemia: Assessed and Discussed  Hyperglycemia: Assessed and Discussed  Sick Days: Not addressed  Driving: Not addressed    Chronic Complications  Foot Care:Assessed and Discussed  Skin Care: Not addressed  Eye: Assessed and Discussed  ABC: Assessed and Discussed  Teeth:Not addressed  Goal Setting and Problem Solving: Assessed and Discussed  Barriers: Assessed and Discussed  Psychosocial Adjustments: Assessed and Discussed      Time spent with the patient: 60 minutes for diabetes education and counseling.   Previous Education: yes  Visit Type:DSMT  Hours Remaining: DSMT 6 and MNT 3      Miguel Vale  11/20/2017

## 2021-06-14 NOTE — PATIENT INSTRUCTIONS - HE
Remain non weight bearing to the right foot.  We are placing the vac on a hold for 2 weeks.         Wound Care Instructions    Every other day  Cleanse your right foot wound(s) with Normal Saline or Wound Cleanser    Pat dry with non-sterile gauze    Apply Lotion to the intact skin surrounding your wound and other dry skin locations. Some good lotions include: Remedy Skin Repair Cream or Cetaphil    Apply medihoney into/onto the wounds    Cover with gauze    Secure with roll gauze as needed    It is not ok to get your wound wet in the bath or shower    It is ok to continue current wound care treatment/products for the next 2-3 days until new wound care supplies are ordered and arrive. If longer than this please contact our office at [phone].    SEEK MEDICAL CARE IF:    You have an increase in swelling, pain, or redness around the wound.    You have an increase in the amount of pus coming from the wound.    There is a bad smell coming from the wound.    The wound appears to be worsening/enlarging    You have a fever greater than 101.5 F

## 2021-06-14 NOTE — TELEPHONE ENCOUNTER
Reason for Call:  Other CATHETER     Detailed comments: Patient's catheter was removed and the nurse states the patient would like to discuss with his provider before having it put back in. Nurse is needing an order to put the catheter back and how much saline water to inflate through the catheter balloon. Pls call back nurse to discuss.     Phone Number Patient can be reached at: [phone]    Best Time: anytime    Can we leave a detailed message on this number?: Yes    Call taken on 1/18/2021 at 11:16 AM by Alanis Arambula

## 2021-06-14 NOTE — PROGRESS NOTES
"  [facility]      NAME:  Caleb Hernandez             :  1935    MRN: [id number]    CODE STATUS:  FULL CODE    FACILITY: [facility] [[id number]]       CHIEF COMPLAIN/REASON FOR VISIT:  Chief Complaint   Patient presents with     Problem Visit     hematuria       HISTORY OF PRESENT ILLNESS: Caleb Hernandez is a 85 y.o. male being seen at the request of the nurse as he has new onset gross  hematuria. Dietrich cath in place.Reports discomfort to sp area.He is currently on the TCU status post acute care due to an I&D of his right lower foot with cellulitis and is followed by Dr. Garcia podiatry.  He will be on daily IV antibiotics on the TCU.  As per his EMR at [facility], \"with DM2, PVD, HTN presented with right lower extremity cellulitis, osteomyelitis, septic arthritis.     Right foot cellulitis with abscess, osteomyelitis, septic arthritis  -Patient with a recent excision of the right fifth metatarsal on 2020 with podiatry.  Wound cultures with Bacteroides and Enterobacter cloaca.  Patient directly admitted from vascular clinic with concerns for worsening postoperative infection.  MRI with new synovitis, osteomyelitis of the 2nd-4th tarsometatarsal joint and likely septic arthritis.  Patient underwent I&D of abscess on , this wound culture now with Corynebacterium.  Patient initially on vancomycin and Zosyn, currently on meropenem with plan for outpatient ertapenem for 4 weeks.  -Nonweightbearing  -Patient received vancomycin and meropenem while inpatient and this will be switched to ertapenem daily dose x4 weeks with ID to follow as outpatient     Urinary retention with hematuria  -Patient had Dietrich catheter placed after angiogram for acute urinary retention.  Patient developed hematuria on .  CT abdomen and pelvis with significant irregular bladder wall thickening, more suggestive of cystitis.  Patient also has a nonobstructing 11 mm right upper pole kidney " "stone with no hydro-.  Urine culture negative, likely Dietrich causing irritation  -Patient evaluated daily by urology team  -Continue Flomax at discharge  -Able to restart Eliquis and aspirin daily  -Patient will be discharged on Dietrich catheter and will follow up with urology for definitive treatment     PVD  -Patient underwent right lower extremity angiogram with balloon angioplasty of anterior tibial and peroneal arteries 11/24/2020 with vascular surgery.  Postoperative ABIs without significant improvement, vascular thought this was likely secondary to spasm.  -will need repeat ultrasound outpatient  -Follow-up with vascular clinic outpatient     Adult failure to thrive  -Secondary to surgical interventions, prolonged hospital stays and infection  PT/OT recommending TCU, discharge plan 12/3     DM2  -A1c 7.9  -Held home Victoza, and was started on 3 units of mealtime insulin and increase to glargine 48 units at bedtime   -We will restart home Victoza and continue glargine at bedtime as well as mealtime insulin at discharge     Paroxysmal atrial fibrillation  -Currently rate controlled.    -Restart home Eliquis     HTN  -Lisinopril 5 mg p.o. daily     Normocytic anemia  -Hemoglobin 12.2 with MCV of 92.  Likely anemia of chronic disease, but with component of blood loss anemia from recent surgeries, hematuria.\"    He is to receive IV antibiotics and  weekly labs PT OT on the rehab unit.  He is on eliquis and ASA and I informed nursing to resume sp surgical procedure on 12/18. He is now with hematuria, we will see if rt a uti , he has CBC drawn today.    Allergies   Allergen Reactions     Cresol      Muscle cramps     Crestor [Rosuvastatin] Myalgia     Declomycin [Demeclocycline] Hives   :     Current Outpatient Medications   Medication Sig     acetaminophen (TYLENOL) 500 MG tablet Take 1-2 tablets (500-1,000 mg total) by mouth every 4 (four) hours as needed.     apixaban ANTICOAGULANT (ELIQUIS) 5 mg Tab tablet Take 1 " "tablet (5 mg total) by mouth 2 (two) times a day.     aspirin 81 MG EC tablet Take 81 mg by mouth daily.     blood glucose test strips Use 1 each As Directed 2 (two) times a day. Dispense brand per patient's insurance at pharmacy discretion.     blood-glucose meter (ONETOUCH VERIO IQ METER) Misc Use 1 each As Directed 2 (two) times a day.     cholecalciferol, vitamin D3, (VITAMIN D3) 5,000 unit Tab Take 5,000 Units by mouth daily.      ertapenem 1 g in NaCl 0.9 % 0.9 % 50 mL IVPB Infuse 1 g into a venous catheter daily.     insulin glargine (LANTUS SOLOSTAR U-100 INSULIN) 100 unit/mL (3 mL) pen Inject 44 Units under the skin at bedtime. (Patient taking differently: Inject 30 Units under the skin at bedtime. Takes 15 units q am and 30 units Q HS)     liraglutide (VICTOZA 3-RUPALI) 0.6 mg/0.1 mL (18 mg/3 mL) injection Inject 0.3 mL (1.8 mg total) under the skin daily.     lisinopriL (PRINIVIL,ZESTRIL) 5 MG tablet Take 1 tablet (5 mg total) by mouth daily.     multivit-min/FA/lycopen/lutein (CENTRUM SILVER MEN ORAL) Take 1 tablet by mouth daily.     mupirocin (BACTROBAN) 2 % ointment Apply topically daily as needed. Apply to wound.     neomycin-bacitracin-polymyxin (NEOSPORIN) ointment Apply to penis at catheter insertion site three times a day while catheter is in place.     NOVOLOG FLEXPEN U-100 INSULIN 100 unit/mL (3 mL) injection pen Inject 3 Units under the skin 3 (three) times a day before meals.     oxyCODONE (ROXICODONE) 5 MG immediate release tablet Take 1 tablet (5 mg total) by mouth every 6 (six) hours as needed for pain.     pen needle, diabetic 31 gauge x 5/16\" Ndle Used to inject insulin daily     polyethylene glycol (MIRALAX) 17 gram packet Take 1 packet (17 g total) by mouth daily as needed.     tamsulosin (FLOMAX) 0.4 mg cap Take 1 capsule (0.4 mg total) by mouth daily after supper.     vitamin E 400 unit capsule Take 400 Units by mouth daily.         REVIEW OF SYSTEMS:    Currently, no fever, chills, or " rigors. Does not have any visual or hearing problems. Denies any chest pain, headaches, palpitations, lightheadedness, dizziness, shortness of breath, or cough. Appetite is good. Denies any GERD symptoms. Denies any difficulty with swallowing, nausea, or vomiting.  Denies any abdominal pain, diarrhea or constipation. Denies any urinary symptoms. No insomnia. No active bleeding other than hematuria. No rash.       PHYSICAL EXAMINATION:  Vitals:    12/29/20 1512   BP: 128/81   Pulse: 62   Temp: 98.5  F (36.9  C)   Weight: 181 lb 6.4 oz (82.3 kg)         GENERAL: Awake, Alert, oriented x3, not in any form of acute distress, answers questions appropriately, follows simple commands, conversant  HEENT: Head is normocephalic with normal hair distribution. No evidence of trauma. Ears: No acute purulent discharge. Eyes: Conjunctivae pink with no scleral jaundice. Nose: Normal mucosa and septum. NECK: Supple with no cervical or supraclavicular lymphadenopathy. Trachea is midline.   ABDOMEN: Globular and soft, nontender to palpation, non distended, no masses, no organomegaly, good bowel sounds, no rebound or guarding, no peritoneal signs.   : Dietrich, hematuria, fading went from maroon to huey color urine throughout day, new onset  EXTREMITIES: Full range of motion does have a PICC in right upper arm.  He is to be nonweightbearing to the right foot until cleared by Dr. Middleton and podiatry.  SKIN: Warm and dry, no erythema noted, no rashes or lesions.  NEUROLOGICAL: The patient is oriented to person, place and time. Strength and sensation are grossly intact. Face is symmetric.        Social distancing with PPE in place during assessment due to COVID-19 precautions.            LABS:    Lab Results   Component Value Date    WBC 5.8 12/28/2020    HGB 12.9 (L) 12/28/2020    HCT 40.3 12/28/2020    MCV 92 12/28/2020     12/28/2020       Results for orders placed or performed in visit on 12/21/20   Basic Metabolic Panel    Result Value Ref Range    Sodium 140 136 - 145 mmol/L    Potassium 4.2 3.5 - 5.0 mmol/L    Chloride 104 98 - 107 mmol/L    CO2 29 22 - 31 mmol/L    Anion Gap, Calculation 7 5 - 18 mmol/L    Glucose 84 70 - 125 mg/dL    Calcium 8.3 (L) 8.5 - 10.5 mg/dL    BUN 11 8 - 28 mg/dL    Creatinine 0.77 0.70 - 1.30 mg/dL    GFR MDRD Af Amer >60 >60 mL/min/1.73m2    GFR MDRD Non Af Amer >60 >60 mL/min/1.73m2           Lab Results   Component Value Date    HGBA1C 7.9 (H) 09/04/2020     No results found for: RIXGRQOH42TK  No results found for: [id number]    ASSESSMENT/PLAN:  1. Gross hematuria    2. Osteomyelitis of right foot, unspecified type (H)          1 . Urinary retention and hematuria: Cath in place due to retention. Hematuria. On duac, but had UTI recently and nursing reports due to surgery he never finished abx course.They are to obtain a UA. Start a prn pyridium for discomfort. I also request  CONSULT AS HE HAS HAD FREQUESNT HEMATURIA    2.  Abscess right ft and Osteomyelitis right foot,with wound vac on and change Q 3 days .  He is to be nonweightbearing until further notice.  Weekly labs and sent to ID, Dr. Middleton will also follow as he remains nonweightbearing on his right foot.  PT and OT for strengthening and safety. SN to manage chronic medical conditions, he has DM as well as urinary retention, see dx list above for all comorbidity. To continue IVAB until 1/11/20 per ID.        Electronically signed by:  Marva Moore CNP  This progress note was completed using Dragon software and there may be grammatical errors.

## 2021-06-14 NOTE — PROGRESS NOTES
[facility] Admission note      Patient: Caleb Hernandez  MRN: [id number]  Date of Service: 1/28/2021      [facility] [[id number]]  Reason for Visit     Chief Complaint   Patient presents with     Review Of Multiple Medical Conditions   follow-up hyperglycemia / non healing wound    Code Status     Full code    Assessment     - ulceration rt foot s/p debridement  -Right foot cellulitis with abscess work-up positive for osteomyelitis with septic arthritis of 2nd-4th tarsometatarsal joint  -Status post I&D of abscess on 11/27/2020  -Postoperative urinary retention with hematuria  -History of right 5th metatarsal excision on 11/2/2020  -Severe peripheral vascular disease.  -Status post right lower extremity angiogram with balloon angioplasty of anterior tibial and peroneal arteries on 11/24/2020  -- IDDM-2   - HTN with hypotension noted today  - FTT  -Generalized weakness with deconditioning    Plan     Patient has been admitted to the TCU for strengthening and rehab.  He has been admitted with ulceration of his right foot status post TheraSkin graft.  He currently has a wound VAC in place which was discontinued during his last visit  He remains nonweightbearing on his right lower extremity.  He has had a follow-up with podiatry weekly and had wound debridement done.  Weightbearing status unfortunately was not restored.  He is pushing for an early discharge home and now wants to go home next week  He has all the adaptive equipment including wheelchair and a scooter as per him  Blood pressures have been stable with no hypotensive episode reported.  His oral intake has improved significantly which is reflected in his blood sugars which are now consistently greater than 200  Plan is to increase his Lantus to 35 units in the morning  Increase Lantus to 18 units at bedtime  Continue to monitor blood sugar trends  Dietrich discontinued      History     Patient is a very pleasant 85 y.o. male who is  admitted to TCU  Patient presented to the hospital with right lower extremity cellulitis.  He was admitted with concerning infection.  MRI confirmed new synovitis with osteomyelitis of the 2nd-4th tarsometatarsal joint with septic arthritis.  He underwent I&D of the abscess on 11/27/2020.  Postoperatively he has been discharged nonweightbearing with outpatient ertapenem for 4 weeks  Subsequently he was readmitted on 12/17/2024 application of TheraSkin with redebridement of his wound.  Wound is healing slowly.    He recently had his IV antibiotics discontinued.  Unfortunately recheck labs had shown his CRP is elevated at 8.8  Follow-up done with podiatry yesterday.  His wound VAC has been discontinued.  His wound was debrided.  We have recommended that he may need additional surgery if his wound fails to improve.  No improvement in his nonweightbearing status and he continues to be nonweightbearing for now.  Wound is not healing and he currently has Medihoney ordered for it.  Staff is not reporting any worsening    He also has a history of severe peripheral vascular disease and underwent right lower extremity angiogram with angioplasty of anterior tibial and peroneal arteries on 11/24/2020 with vascular surgery.  He will require follow-up with vascular.  It was done in the TCU.  They have recommended further follow-up with podiatry for further work-up they recommend seeing him back in 3 months for ABIs  He reports no concerns during daytime but at nighttime he does report some pain in his foot.  He has been using her tramadol quite regularly    Postoperatively had urinary retention with bladder wall thickening suggestive of cystitis.  He accidentally removed his Dietrich catheter.  He follows with urology as a result and had a voiding cystoscopy urogram done.  Currently discharged without a Dietrich and advised to monitor urinary retention concern  This was reviewed with staff he has required an occasional PVR check of  more than 300 necessitating in and out cath which she has tolerated well  For the past 24 hours he has voided well  Denies any dysuria    In addition he is a diabetic  Recently has insulin dosage was reduced because of persistent hypoglycemia.  He is now feeling better and reporting that his intake has increased.  He is requesting results.  This is reflected in his blood sugars postprandially which are greater than 200 consistently    Past Medical History     Active Ambulatory (Non-Hospital) Problems    Diagnosis     Amputated toe, right (H)     Hematuria     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 2 diabetes mellitus, with necrosis of muscle (H)     ACP (advance care planning)     Septic arthritis of right foot (H)     Gross hematuria     Urinary retention     Type 2 diabetes mellitus with diabetic peripheral angiopathy without gangrene, with long-term current use of insulin (H)     Adult failure to thrive     Normocytic anemia     Paroxysmal atrial fibrillation (H)     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 (H)     Osteomyelitis of right foot (H)     Statin intolerance     Personal history of PE (pulmonary embolism)-- May 2020, unprovoked, with right heart strain     PVD (peripheral vascular disease) (H)     Overweight (BMI 25.0-29.9)     Chronic anticoagulation     Hyperlipidemia     Essential hypertension     Past Medical History:   Diagnosis Date     Abscess of right foot 11/23/2020     Acute pulmonary embolism with acute cor pulmonale (H) 5/23/2020     BPH (benign prostatic hyperplasia)      Cholelithiasis      Common bile duct (CBD) obstruction 9/4/2017     Compression Fracture Of Thoracic Vertebral Body      Diabetes mellitus (H)      Essential hypertension      Hyperlipidemia      Pancreatitis      Rib Fracture      Sepsis due to pneumonia (H) 9/8/2017       Past Social History     Reviewed, and he  reports that he quit smoking about 29  years ago. He has never used smokeless tobacco. He reports that he does not drink alcohol or use drugs.    Family History     Reviewed, and family history includes Alcohol abuse in his father; Aortic aneurysm in his mother.    Medication List   Post Discharge Medication Reconciliation Status: discharge medications reconciled, continue medications without change   Current Outpatient Medications on File Prior to Visit   Medication Sig Dispense Refill     acetaminophen (TYLENOL) 500 MG tablet Take 1-2 tablets (500-1,000 mg total) by mouth every 4 (four) hours as needed.  0     apixaban ANTICOAGULANT (ELIQUIS) 5 mg Tab tablet Take 1 tablet (5 mg total) by mouth 2 (two) times a day. 60 tablet 3     aspirin 81 MG EC tablet Take 81 mg by mouth daily.       blood glucose test strips Use 1 each As Directed 2 (two) times a day. Dispense brand per patient's insurance at pharmacy discretion. 120 strip 6     blood-glucose meter (ONETOUCH VERIO IQ METER) Misc Use 1 each As Directed 2 (two) times a day. 1 each 0     insulin glargine (LANTUS SOLOSTAR U-100 INSULIN) 100 unit/mL (3 mL) pen Inject 30 Units under the skin at bedtime. Takes 15 units q am and 30 units Q HS       liraglutide (VICTOZA 3-RUPALI) 0.6 mg/0.1 mL (18 mg/3 mL) injection Inject 0.3 mL (1.8 mg total) under the skin daily.       multivit-min/FA/lycopen/lutein (CENTRUM SILVER MEN ORAL) Take 1 tablet by mouth daily.       mupirocin (BACTROBAN) 2 % ointment Apply topically daily as needed. Apply to wound.       neomycin-bacitracin-polymyxin (NEOSPORIN) ointment Apply to penis at catheter insertion site three times a day while catheter is in place. 15 g 0     NOVOLOG FLEXPEN U-100 INSULIN 100 unit/mL (3 mL) injection pen Inject 3 Units under the skin 3 (three) times a day before meals. 2.7 mL 0     oxyCODONE (ROXICODONE) 5 MG immediate release tablet Take 1 tablet (5 mg total) by mouth every 6 (six) hours as needed for pain. 30 tablet 0     pen needle, diabetic 31 gauge x  "5/16\" Ndle Used to inject insulin daily 90 each 0     polyethylene glycol (MIRALAX) 17 gram packet Take 1 packet (17 g total) by mouth daily as needed.  0     tamsulosin (FLOMAX) 0.4 mg cap Take 1 capsule (0.4 mg total) by mouth daily after supper.  0     traMADoL (ULTRAM) 50 mg tablet Take 50 mg by mouth every 6 (six) hours as needed for pain.       vitamin E 400 unit capsule Take 400 Units by mouth daily.       No current facility-administered medications on file prior to visit.        Allergies     Allergies   Allergen Reactions     Cresol      Muscle cramps     Crestor [Rosuvastatin] Myalgia     Declomycin [Demeclocycline] Hives       Review of Systems   A comprehensive review of 14 systems was done. Pertinent findings noted here and in history of present illness. All the rest negative.  Constitutional: Negative.  Negative for fever, chills, he has activity change, appetite change and fatigue.   HENT: Negative for congestion and facial swelling.    Eyes: Negative for photophobia, redness and visual disturbance.   Respiratory: Negative for cough and chest tightness.    Cardiovascular: Negative for chest pain, palpitations and  leg swelling.   Gastrointestinal: Negative for nausea, diarrhea, constipation, blood in stool and abdominal distention.   Genitourinary: Negative.    Musculoskeletal: Negative.  Denies any pain pin his foot .using a scooter for ambulation  Skin: Negative.    Neurological: Negative for dizziness, tremors, syncope, weakness, light-headedness and headaches.   Hematological: Does not bruise/bleed easily.   Psychiatric/Behavioral: Negative.  Some confusion noted he just wants to go home as per him      Physical Exam     Recent Vitals 1/28/2021   Height 5' 10\"   Weight 182 lbs 10 oz   BSA (m2) 2.02 m2   /79   Pulse 79   Temp 99   Temp src -   SpO2 95   Some recent data might be hidden       Constitutional: Oriented to person, place, and time and appears well-developed.   HEENT:  " Normocephalic and atraumatic.  Eyes: Conjunctivae and EOM are normal. Pupils are equal, round, and reactive to light. No discharge.  No scleral icterus. Nose normal. Mouth/Throat: Oropharynx is clear and moist. No oropharyngeal exudate.    NECK: Normal range of motion. Neck supple. No JVD present. No tracheal deviation present. No thyromegaly present.   CARDIOVASCULAR: Normal rate, regular rhythm and intact distal pulses.  Exam reveals no gallop and no friction rub.  Systolic murmur present.  PULMONARY: Effort normal and breath sounds normal. No respiratory distress.No Wheezing or rales.  ABDOMEN: Soft. Bowel sounds are normal. No distension and no mass.  There is no tenderness. There is no rebound and no guarding. No HSM.  MUSCULOSKELETAL: Normal range of motion. No edema and no tenderness. Mild kyphosis, no tenderness.  Rt foot missing 5th toe  Extensive ulceration noted on the lateral margin of his foot extending from the base of his missing fifth toe extending to his midfoot with necrotic skin and serosanguineous drainage noted  Nonhealing wound  LYMPH NODES: Has no cervical, supraclavicular, axillary and groin adenopathy.   NEUROLOGICAL: Alert and oriented to person, place, and time. No cranial nerve deficit.  Normal muscle tone. Coordination normal.   GENITOURINARY: Deferred exam.  SKIN: Skin is warm and dry. No rash noted. No erythema. No pallor.   EXTREMITIES: No cyanosis, no clubbing, no edema. No Deformity.  PSYCHIATRIC: Normal mood, affect and behavior.      Lab Results     Recent Results (from the past 240 hour(s))   COVID-19 Virus PCR MRF    Collection Time: 01/18/21 12:00 PM    Specimen: Respiratory   Result Value Ref Range    COVID-19 VIRUS SPECIMEN SOURCE [facility]     2019-nCOV Not Detected    COVID-19 Virus PCR MRF    Collection Time: 01/25/21 12:00 PM    Specimen: Respiratory   Result Value Ref Range    COVID-19 VIRUS SPECIMEN SOURCE [facility]     2019-nCOV Not Detected                 DONNY Kwan

## 2021-06-14 NOTE — TELEPHONE ENCOUNTER
Reason for Call: Request for an order or referral:    Order or referral being requested: delay of service order    Date needed: as soon as possible    Has the patient been seen by the PCP for this problem? N/A    Additional comments: Delay of service for RN, PT and OT. Starting date of 2/5.     Phone number Patient can be reached at:  [phone]    Best Time:  anytime    Can we leave a detailed message on this number?  Yes    Call taken on 2/3/2021 at 10:45 AM by Alanis Arambula

## 2021-06-14 NOTE — PROGRESS NOTES
Office Visit - Follow Up   Caleb Hernandez   85 y.o. male    Date of Visit: 1/22/2021    Chief Complaint   Patient presents with     Follow-up        -------------------------------------------------------------------------------------------------------------------------  Assessment and Plan    Post hospital follow-up visit, and currently at the TCU at [facility] in [place], receiving PT, OT, and wound care, completed outpatient ertapenem antibiotics as of January 11, 2021, and has been following up with surgeon Dr. Garcia who he saw this past Wednesday, January 20, status post skin grafting to the lateral right foot.    Status post excision of right fifth metatarsal on November 2, 2020, wound cultures with Bacteroides and Enterobacter.  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  Underwent incision and drainage of abscess November 27, 2020, wound culture growing corynebacterium.  He was treated with vancomycin and Zosyn, then meropenem, then transition to outpatient intravenous ertapenem for week course, was completed January 11, 2020.    Urinary retention with hematuria, Dietrich catheter is out as of today January 22, 2021, and he is getting post void residual checks, and straight cathing if PVR greater than 250, and needs to follow-up with Dr. Villa at [facility].  The Dietrich catheter was placed after angiogram because of acute urinary retention.  He developed hematuria November 30.  CT scan abdomen pelvis with irregular bladder wall thickening suggestive of cystitis.  Noted to have nonobstructing 11 mm right upper pole kidney stone but no hydronephrosis.  He is currently on Flomax (tamsulosin) and I want him to stay on that.    Peripheral vascular disease.  November 24, 2020 he had right lower extremity angiogram with balloon angioplasty of anterior tibial and peroneal  "arteries.  However postoperative ankle-brachial index did not improve significantly, although vascular surgery thought that was because of vasospasm.  He needs to follow-up with vascular surgery, and will have a repeat arterial Doppler ultrasound.    Adult failure to thrive.  He is try to get his strength back, and is receiving PT and OT at [facility].  Wt Readings from Last 3 Encounters:   01/22/21 182 lb (82.6 kg)   01/21/21 179 lb 6.4 oz (81.4 kg)   01/19/21 179 lb 6.4 oz (81.4 kg)     I told him that if [facility] can give him a Covid shot, the correct answer is \"yes\":  Pfizer or Moderna, either would be fine.    Type 2 diabetes, currently on Lantus 30 units and 15 units, and also NovoLog 3 units before meals.  A1c was 7.9 check September 4, 2020.  He is also on Victoza.  I am sure his A1c's are worse now, but is going to be a project over the next several months to get his blood sugars under better control.  He told me that blood sugars are checked probably 3 times a day at [facility].  I do not have those numbers in front of me, and I will instruct [facility] to send me that data by fax, so perhaps we could better adjust his insulin regimen.  He told me that the food a [facility] is crummy, so may be eating less.    Essential hypertension, he had been taking lisinopril 5 mg a day as an outpatient, but lisinopril is not listed on his [facility] medication profile.  I think it is just as well that we pause the lisinopril for now, since I note that his blood pressure here in the clinic today January 22, 2021 is 118/50.  Let's see how his blood pressure does over the next several weeks, maybe he will start eating better once he graduates from [facility] cuisine.     Normocytic anemia hemoglobin 11.3 checked January 16, 2021, this is surely anemia of chronic disease and postinflammatory effects, and he is nowhere needing transfusion.    Peripheral vascular disease with reduced SRIKANTH indices in " both feet, but no focal stenosis on arterial ultrasound study.       Type 2 diabetes with suboptimal control, last A1c 7.9 measured September 4, 2020, currently on insulin and Victoza but no metformin     History of acute pulmonary embolism and cor pulmonale, was unprovoked, diagnosed May 2020, has been on anticoagulation ever since with Eliquis which he will continue long-term.  He seems to be tolerating the Eliquis just fine.  He is on concomitant baby aspirin which I told him does increase the risk for bleeding when combined with Eliquis.  But I think the combination is appropriate for him since he has peripheral vascular disease.    Paroxysmal atrial fibrillation, and history of pulmonary embolism, on anticoagulation with Eliquis for those reasons.     Hyperlipidemia in the context of diabetes, with history of intolerance to statins, LDL cholesterol was 126 when measured July 22, 2020.        --------------------------------------------------------------------------------------------------------------------------  History of Present Illness  This 85 y.o. old man comes to clinic accompanied by his wife.    Post hospital follow-up visit, and currently at the [facility]CU at [facility] in [place], receiving PT, OT, and wound care, completed outpatient ertapenem antibiotics as of January 11, 2021, and has been following up with surgeon Dr. Garcia who he saw this past Wednesday, January 20, status post skin grafting to the lateral right foot.    Status post excision of right fifth metatarsal on November 2, 2020, wound cultures with Bacteroides and Enterobacter.  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  Underwent incision and drainage of abscess November 27, 2020, wound culture growing corynebacterium.  He was treated with vancomycin and Zosyn, then meropenem, then transition to  "outpatient intravenous ertapenem for week course, was completed January 11, 2020.    Urinary retention with hematuria, Dietrich catheter is out as of today January 22, 2021, and he is getting post void residual checks, and straight cathing if PVR greater than 250, and needs to follow-up with Dr. Villa at [facility].  The Dietrich catheter was placed after angiogram because of acute urinary retention.  He developed hematuria November 30.  CT scan abdomen pelvis with irregular bladder wall thickening suggestive of cystitis.  Noted to have nonobstructing 11 mm right upper pole kidney stone but no hydronephrosis.  He is currently on Flomax (tamsulosin) and I want him to stay on that.      right foot ulceration s/p application of theraskin. Doing well today. He has remained non-weight bearing on the right foot. Using wound vac as directed.       INCISION AND DRAINAGE OF WOUND Right 11/2/2020    Procedure: INCISION AND DRAINAGE, right foot with removal of bone 5th metatarsal;  Surgeon: John Garcia DPM;  Location: [facility];  Service: Podiatry    INCISION AND DRAINAGE OF WOUND Right 11/16/2020    Procedure: INCISION AND DRAINAGE, right foot;  Surgeon: John Garcia DPM;  Location: [facility] OR;  Service: Podiatry    INCISION AND DRAINAGE OF WOUND Right 11/27/2020    Procedure: INCISION AND DRAINAGE, LOWER EXTREMITY;  Surgeon: Aleksandr Hdez DPM;  Location: [facility] OR;  Service: Podiatry    IR EXTREMITY ANGIOGRAM RIGHT   11/24/2020     Had he had a  Normocytic anemia  -Hemoglobin 12.2 with MCV of 92.  Likely anemia of chronic disease, but with component of blood loss anemia from recent surgeries, hematuria.\"  Lab Results   Component Value Date    WBC 7.0 01/16/2021    HGB 11.3 (L) 01/16/2021    HCT 35.0 (L) 01/16/2021    MCV 90 01/16/2021     01/16/2021       He is to receive IV antibiotics thru 1/11 at this point and has weekly labs drawn for ID. PT OT on the rehab unit.  "     Right foot osteomyelitis with various microbes isolated in the past including Enterobacter, coagulase-negative staph, diphtheroids.  prolonged course of intravenous ertapenem.    Dietrich catheter and had to be removed.  He did have a culture in early January that had Candida glabrata, but follow-up urine culture is sterile.      Wt Readings from Last 3 Encounters:   01/22/21 182 lb (82.6 kg)   01/21/21 179 lb 6.4 oz (81.4 kg)   01/19/21 179 lb 6.4 oz (81.4 kg)     BP Readings from Last 3 Encounters:   01/22/21 118/50   01/21/21 138/80   01/20/21 144/78       Lab Results   Component Value Date    WBC 7.0 01/16/2021    HGB 11.3 (L) 01/16/2021    HCT 35.0 (L) 01/16/2021     01/16/2021    CHOL 178 07/22/2020    TRIG 60 07/22/2020    HDL 40 07/22/2020    LDLDIRECT 127 01/23/2017    ALT 13 01/16/2021    AST 15 01/16/2021     01/16/2021    K 4.8 01/16/2021     01/16/2021    CREATININE 0.93 01/16/2021    BUN 18 01/16/2021    CO2 29 01/16/2021    PSA 5.8 01/23/2017    INR 1.23 (H) 11/27/2020    HGBA1C 7.9 (H) 09/04/2020    MICROALBUR 15 08/09/2012        Review of Systems: A comprehensive review of systems was negative except as noted.  ---------------------------------------------------------------------------------------------------------------------------    Medications, Allergies, Social, and Problem List   Current Outpatient Medications   Medication Sig Dispense Refill     acetaminophen (TYLENOL) 500 MG tablet Take 1-2 tablets (500-1,000 mg total) by mouth every 4 (four) hours as needed.  0     apixaban ANTICOAGULANT (ELIQUIS) 5 mg Tab tablet Take 1 tablet (5 mg total) by mouth 2 (two) times a day. 60 tablet 3     aspirin 81 MG EC tablet Take 81 mg by mouth daily.       blood glucose test strips Use 1 each As Directed 2 (two) times a day. Dispense brand per patient's insurance at pharmacy discretion. 120 strip 6     blood-glucose meter (ONETOUCH VERIO IQ METER) Misc Use 1 each As Directed 2 (two)  "times a day. 1 each 0     insulin glargine (LANTUS SOLOSTAR U-100 INSULIN) 100 unit/mL (3 mL) pen Inject 30 Units under the skin at bedtime. Takes 15 units q am and 30 units Q HS       liraglutide (VICTOZA 3-RUPALI) 0.6 mg/0.1 mL (18 mg/3 mL) injection Inject 0.3 mL (1.8 mg total) under the skin daily.       multivit-min/FA/lycopen/lutein (CENTRUM SILVER MEN ORAL) Take 1 tablet by mouth daily.       mupirocin (BACTROBAN) 2 % ointment Apply topically daily as needed. Apply to wound.       neomycin-bacitracin-polymyxin (NEOSPORIN) ointment Apply to penis at catheter insertion site three times a day while catheter is in place. 15 g 0     oxyCODONE (ROXICODONE) 5 MG immediate release tablet Take 1 tablet (5 mg total) by mouth every 6 (six) hours as needed for pain. 30 tablet 0     pen needle, diabetic 31 gauge x 5/16\" Ndle Used to inject insulin daily 90 each 0     polyethylene glycol (MIRALAX) 17 gram packet Take 1 packet (17 g total) by mouth daily as needed.  0     tamsulosin (FLOMAX) 0.4 mg cap Take 1 capsule (0.4 mg total) by mouth daily after supper.  0     traMADoL (ULTRAM) 50 mg tablet Take 50 mg by mouth every 6 (six) hours as needed for pain.       vitamin E 400 unit capsule Take 400 Units by mouth daily.       NOVOLOG FLEXPEN U-100 INSULIN 100 unit/mL (3 mL) injection pen Inject 3 Units under the skin 3 (three) times a day before meals. 2.7 mL 0     No current facility-administered medications for this visit.      Allergies   Allergen Reactions     Cresol      Muscle cramps     Crestor [Rosuvastatin] Myalgia     Declomycin [Demeclocycline] Hives     Social History     Tobacco Use     Smoking status: Former Smoker     Quit date: 1991     Years since quittin.2     Smokeless tobacco: Never Used   Substance Use Topics     Alcohol use: No     Drug use: No     Patient Active Problem List   Diagnosis     Type 2 diabetes mellitus (H)     Hyperlipidemia     Essential hypertension     Diabetic ulcer of right " foot associated with type 2 diabetes mellitus (H)     Acute pulmonary embolism with acute cor pulmonale (H)     Statin intolerance     Personal history of PE (pulmonary embolism)-- May 2020, unprovoked, with right heart strain     PVD (peripheral vascular disease) (H)     Overweight (BMI 25.0-29.9)     Chronic anticoagulation     Osteomyelitis of right foot (H)     Cellulitis and abscess of foot excluding toe     Cellulitis of right lower extremity     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 (H)     Abscess of right foot     Septic arthritis of right foot (H)     Gross hematuria     Urinary retention     Type 2 diabetes mellitus with diabetic peripheral angiopathy without gangrene, with long-term current use of insulin (H)     Adult failure to thrive     Normocytic anemia     Paroxysmal atrial fibrillation (H)     ACP (advance care planning)     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 2 diabetes mellitus, with necrosis of muscle (H)     Hematuria        Reviewed, reconciled and updated       Physical Exam   General Appearance: Very pleasant, normal mental status, cognitively seems quite intact, breathing comfortably, seated in wheelchair.    /50 (Patient Site: Right Arm, Patient Position: Sitting, Cuff Size: Adult Regular)   Pulse 76   Temp 97.8  F (36.6  C) (Oral)   Wt 182 lb (82.6 kg)   SpO2 94%   BMI 26.11 kg/m      Right foot is wrapped in gauze dressing, he is status post amputation fifth toe and metatarsal.  No erythema or streaking  No leg edema    Lungs clear  Heart regular rate and rhythm  Abdomen nontender  Dietrich catheter is out.     Additional Information   I spent 45 minutes face time with the patient, with > 50% counseling, explaining and discussing with the patient the issues enumerated in the Assessment and Plan section of this note and answering questions. Afterwards, the patient was given a printout of the AVS, with  attention to the content in the Patient Instruction section.       Otis Lozano MD

## 2021-06-14 NOTE — TELEPHONE ENCOUNTER
[facility] Nurse Triage Telephone Note      Provider: YOUSUF Jamil  Facility: [facility] Type: TCU    Caller: Adrien  Call Back Number:  [phone]    Allergies: Cresol, Crestor [rosuvastatin], and Declomycin [demeclocycline]    Reason for call: Nurse calling to report hepatic profile results.  Patient continues on IV Ertapenem 1g daily.       Verbal Order/Direction given by Provider: Fax labs to ID.      Provider giving order: YOUSUF Jamil    Verbal order given to: Adrien Orr RN

## 2021-06-14 NOTE — PROGRESS NOTES
Assessment:      Impression: Right foot osteomyelitis with various microbes isolated in the past including Enterobacter, coagulase-negative staph, diphtheroids.    Patient has been receiving daily ertapenem under the direction of Dr. Pablo Hammond.       Plan:     No orders of the defined types were placed in this encounter.       I would like him to continue ertapenem for at least another 2 weeks to trend his CRP.  Follow-up with Dr. Hammond in about 2 weeks.     Subjective:      This is an follow-up infectious Disease visit for Caleb Hernandez, who is a 85 y.o.  referred for evaluation of foot osteomyelitis.     Patient is an 85-year-old gentleman I met briefly at [facility] after he had been admitted for osteomyelitis in the right foot.  He had been taken to surgery by Dr. Aleksandr Hdez and had cultures pending at the time that I saw him.    Follow-up was performed by my colleagues, Andree Serra and Pablo Hammond.  Based on the culture reports, Dr. Hammond determined the patient would benefit from a prolonged course of intravenous ertapenem.    Patient has been at the care facility on daily ertapenem for the last 4 weeks.  In the interim, he did have another surgical procedure in his right foot with placement of a skin graft.    He also had some difficulties with his Dietrich catheter and had to be removed.  He did have a culture in early January that had Candida glabrata, but follow-up urine culture is sterile.    The time of visit today, patient states he is doing fairly well.  He is tolerating the ertapenem well.  His wife thinks he is confused because he has trouble  day from night, but patient says that is because the days are short and the nights are long.  Patient is quite oriented to person, place, activities.    He denies any diarrhea or nausea.    He has had weekly blood tests which show stable hemogram, renal and liver functions.    Initially, his CRP did drop from 17 down to 1.9 but  then it did go up to 5 and then down to 4 following recent surgical procedures.    He has longstanding abnormal pain sensation in the right foot.  He is cannot tell me if it is any better or worse over the last 4 weeks.    The following portions of the patient's history were reviewed and updated as appropriate: allergies, current medications, past family history, past medical history, past social history, past surgical history and problem list.      Review of Systems  Performed and all negative except as mentioned above.      Objective:      /64   Pulse 84   Temp 98.3  F (36.8  C)   General:   alert, appears stated age and cooperative   Oropharynx:  lips, mucosa, and tongue normal; teeth and gums normal    Eyes:   Extraocular muscles intact, no icterus    Ears:   Deferred   Neck:  no adenopathy and supple, symmetrical, trachea midline   Thyroid:   Deferred   Lung:  Normal respiratory pattern   Heart:   Deferred   Abdomen:  Sitting at time of exam   Extremities:  Seen was taken down on the right foot.  He has a incision along the lateral aspect of the foot.  There is no drainage.  There is a skin graft in place.  There are staples in place.  Toes are warm.   Skin:  Normal except as described above in the right foot   CVA:   Deferred   Genitourinary:  defer exam   Neurological:   Grossly normal   Psychiatric:   normal mood, behavior, speech, dress, and thought processes         Khris Brannon MD

## 2021-06-14 NOTE — TELEPHONE ENCOUNTER
[facility] Nurse Triage Telephone Note      Provider: Charlotte Mckenna MD  Facility: [facility]    Facility Type: TCU    Caller: Allyson  Call Back Number:  [phone]    Allergies: Cresol, Crestor [rosuvastatin], and Declomycin [demeclocycline]    Reason for call: Nurse calling to report Heme 1, BMP, hepatic, and CRP results.  Of note, patient is no longer on antibiotics.  Other notable meds:  Eliquis 5mg two times a day, ASA 81mg daily.       Verbal Order/Direction given by Provider: Fax results to ID and cancel weekly labs.      Provider giving order: Charlotte Mckenna MD    Verbal order given to: Allyson Orr RN

## 2021-06-14 NOTE — PATIENT INSTRUCTIONS - HE
Please remain non weigh bearing on the right foot.     Take ensure protein supplements 2 times daily        - Wound Vac Instructions    1. 2x weekly and as needed cleanse the wound with NS    2. Pat dry    3. Apply Cavilon no sting barrier wipe to the skin surrounding the wound to protect from drainage/maceration    4. Apply drape to doug wound. Cut strip of drape and apply to skin if bridging is needed; plan this area in advance; should not be over bony prominence     5. Cut the foam to fit the size of the wound    6. PLACE ADAPTIC OVER THE WOUND. Apply foam to wound    7. Cut narrow strip of foam if bridging if needed    8. Cover foam with drape to obtain air tight seal    9. Cut opening the size of a quarter for where the suction pad will be applied    10. Apply Suction pad    11. 100mmHG suction continuous     [employer] [facility] can be reached at [phone], 24 hours a day 7 days a week     - If you do not have a back up plan in place:     If the negative pressure wound therapy malfunctions or unable to maintain seal: dressing must be removed and reapplied within 2 hours of the incident. If unable to reapply negative pressure wound dressing, place Normal Saline moistened gauze in wound bed and cover with appropriate dressing to keep wound bed moist.  Change wet-to-dry dressing two times a day until healthcare staff can re-implement negative pressure therapy. Change canister at least weekly.  [employer] [facility] can be reached at [phone], 24 hours a day 7 days a week    - Information on Vacuum-Assisted Closure of a Wound  Vacuum-assisted closure (VAC) of a wound is a type of treatment to help wounds heal. It s also known as negative pressure wound therapy. During the treatment, a device lowers air pressure on the wound. This can help the wound heal more quickly.  - Understanding the wound VAC system  A wound VAC system has several parts. A foam or gauze dressing is put directly on the wound. The  dressing is changed every 24 to 72 hours. An adhesive film covers and seals the dressing and wound. A drainage tube leads from under the adhesive film and connects to a portable vacuum pump. This pump removes air pressure over the wound. It may do this constantly. Or it may do it in cycles. During the treatment, you ll need to carry the portable pump everywhere you go.  - Why wound VAC is used  You might need this therapy for a recent traumatic wound. Or you may need it for a chronic wound. This is a wound that does not heal the way it should over time. This can happen with wounds in people who have diabetes. You may need a wound VAC if you ve had a recent skin graft. And you may need a wound VAC for a large wound. Large wounds can take a longer time to heal.  A wound vacuum system may help your wound heal more quickly by:    Draining extra fluid from the wound    Reducing swelling    Reducing bacteria in the wound    Keeping your wound moist and warm    Helping draw together wound edges    Increasing blood flow to your wound    Decreasing inflammation  Wound VAC offers some other advantages over other types of wound care. It may decrease your overall discomfort. The dressings usually need to be changed less often. And they may be easier to keep in position.  - Risks of wound VAC  Wound VAC has some rare risks, such as:    Bleeding (which may be severe)    Wound infection    An abnormal connection between the intestinal tract and the skin (enteric fistula)  Proper training in dressing changes can help reduce the risk for these complications. Also, your doctor will carefully evaluate you to make sure you are a good candidate for the therapy. Certain problems can increase your risk for complications. These include:    Exposed organs or blood vessels    High risk of bleeding from another medical problem    Wound infection    Nearby bone infection    Dead wound tissue    Cancer tissue    Fragile skin, such as from aging  or longtime use of topical steroids    Allergy to adhesive    Very poor blood flow to your wound    Wounds close to joints that may reopen because of movement  Your doctor will discuss the risks that apply to you. Make sure to talk with him or her about all of your questions and concerns.  - Getting ready for wound VAC  You likely won t need to do much to get ready for wound VAC. In some cases, you may need to wait a while before having this therapy. For example, your doctor may first need to treat an infection in your wound. Dead or damaged tissue may also need to be removed from your wound.  You or a caregiver may need training on how to use the wound VAC device. This is done if you will be able to have your wound vacuum therapy at home. In other cases, you may need to have your wound vacuum therapy in a health care facility.  - On the day of your procedure  A health care provider will cover your wound with foam or gauze wound dressing. An adhesive film will be put over the dressing and wound. This seals the wound. The foam connects to a drainage tube, which leads to a vacuum pump. This pump is portable. When the pump is turned on, it draws fluid through the foam and out the drainage tubing. The pump may run constantly, or it may cycle off and on. Your exact setup will depend on the specific type of wound vacuum system that you use.  - Managing your wound  You may need the dressing changed about once a day. You may need it changed more or less often, depending on your wound. You or your caregiver may be trained to do this at home. Or it may be done by a visiting health care provider. Your doctor may prescribe a pain medicine. This is to prevent or reduce pain during the dressing change.  You will likely need to use the wound VAC system for several weeks or months. During this time, you ll carry the portable pump everywhere you go.  - Nutrition for wound healing  During this time, make sure you follow a healthy  diet. This is needed so the wound can heal and to prevent infection. Your doctor can tell you more about what to include in your diet during this time.  follow up with your doctor if you have a medical condition that led to your wound, such as diabetes. He or she can help you prevent future wounds.  - Follow-up care  Your doctor will carefully keep track of your healing. Make sure to keep all follow-up appointments.  - When to call your health care provider  Call your health care provider right away if you have any of these:    Fever of 100.4 F (38.0 C) or higher    Increased redness, swelling, or warmth around wound    Increased pain    Bright red blood or blood clots in tubing or the collection chamber of the vacuum

## 2021-06-14 NOTE — PROGRESS NOTES
[facility] Admission note      Patient: Caleb Hernandez  MRN: [id number]  Date of Service: 1/21/2021      [facility] [[id number]]  Reason for Visit     Chief Complaint   Patient presents with     Follow Up     Review Of Multiple Medical Conditions   follow-up hypoglycemia and hypotension/ non healing wound    Code Status     Full code    Assessment     - ulceration rt foot s/p debridement  -Right foot cellulitis with abscess work-up positive for osteomyelitis with septic arthritis of 2nd-4th tarsometatarsal joint  -Status post I&D of abscess on 11/27/2020  -Postoperative urinary retention with hematuria  -History of right 5th metatarsal excision on 11/2/2020  -Severe peripheral vascular disease.  -Status post right lower extremity angiogram with balloon angioplasty of anterior tibial and peroneal arteries on 11/24/2020  -- IDDM-2   - HTN with hypotension noted today  - FTT  -Generalized weakness with deconditioning    Plan     Patient has been admitted to the TCU for strengthening and rehab.  He has been admitted with ulceration of his right foot status post TheraSkin graft.  He currently has a wound VAC in place  He remains nonweightbearing on his right lower extremity.  He is compliant with his nonweightbearing and no change in his weightbearing status has been made  Wound vac has been discontinued  He had a follow-up done with vascular and had wound debridement done  Vascular feels that if his wound fails to improve he may need additional surgery and patient is not very happy about that  Unfortunately he ended up self removing his Dietrich catheter  Follow-up done with urology  Voiding trial and cytogram done with urology  Dietrich has been removed  Monitored through PVRs and in and out cath as needed for urinary retention.  So far as per patient he is voiding well with no hematuria concerns.  Blood sugars are stable under 180 with an occasional high noted.  He is on a low-dose of  insulin.  Blood pressures are stable.  Patient is working in therapy but expresses repeatedly a desire not to go home        History     Patient is a very pleasant 85 y.o. male who is admitted to TCU  Patient presented to the hospital with right lower extremity cellulitis.  He was admitted with concerning infection.  MRI confirmed new synovitis with osteomyelitis of the 2nd-4th tarsometatarsal joint with septic arthritis.  He underwent I&D of the abscess on 11/27/2020.  Postoperatively he has been discharged nonweightbearing with outpatient ertapenem for 4 weeks  Subsequently he was readmitted on 12/17/2024 application of TheraSkin with redebridement of his wound.  Wound is healing slowly.    He recently had his IV antibiotics discontinued.  Unfortunately recheck labs had shown his CRP is elevated at 8.8  Follow-up done with podiatry yesterday.  His wound VAC has been discontinued.  His wound was debrided.  We have recommended that he may need additional surgery if his wound fails to improve.  He will continue with his nonweightbearing status    He also has a history of severe peripheral vascular disease and underwent right lower extremity angiogram with angioplasty of anterior tibial and peroneal arteries on 11/24/2020 with vascular surgery.  He will require follow-up with vascular.  It was done in the TCU.  They have recommended further follow-up with podiatry for further work-up they recommend seeing him back in 3 months for ABIs  He does report some occasional discomfort in his leg   However he does report that he will take a tramadol at bedtime because of some cramping and pain in his leg and foot.  No change reported by the patient    Postoperatively had urinary retention with bladder wall thickening suggestive of cystitis.  He accidentally removed his Dietrich catheter.  He follows with urology as a result and had a voiding cystoscopy urogram done.  Currently discharged without a Dietrich and advised to monitor  urinary retention concerns and in and out catheterization as needed  He is reporting he is voiding well without hematuria    In addition he is a diabetic  Currently on a much lower dose of insulin blood sugars have improved significantly.  Other than occasional high greater than 200 almost all his blood sugars are under 180.  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 (H)     Cellulitis of right lower extremity     Abscess of right foot     Cellulitis and abscess of foot excluding toe     Osteomyelitis of right foot (H)     Statin intolerance     Personal history of PE (pulmonary embolism)-- May 2020, unprovoked, with right heart strain     PVD (peripheral vascular disease) (H)     Overweight (BMI 25.0-29.9)     Chronic anticoagulation     Acute pulmonary embolism with acute cor pulmonale (H)     Diabetic ulcer of right foot associated with type 2 diabetes mellitus (H)     Type 2 diabetes mellitus (H)     Hyperlipidemia     Essential hypertension     Past Medical History:   Diagnosis Date     BPH (benign prostatic hyperplasia)      Cholelithiasis      Common bile duct (CBD) obstruction 9/4/2017     Compression Fracture Of Thoracic Vertebral Body      Diabetes mellitus (H)      Essential hypertension      Hyperlipidemia      Pancreatitis      Rib Fracture      Sepsis due to pneumonia  (H) 9/8/2017       Past Social History     Reviewed, and he  reports that he quit smoking about 29 years ago. He has never used smokeless tobacco. He reports that he does not drink alcohol or use drugs.    Family History     Reviewed, and family history includes Alcohol abuse in his father; Aortic aneurysm in his mother.    Medication List   Post Discharge Medication Reconciliation Status: discharge medications reconciled, continue medications without change   Current Outpatient Medications on File Prior to Visit   Medication Sig Dispense Refill     acetaminophen (TYLENOL) 500 MG tablet Take 1-2 tablets (500-1,000 mg total) by mouth every 4 (four) hours as needed.  0     apixaban ANTICOAGULANT (ELIQUIS) 5 mg Tab tablet Take 1 tablet (5 mg total) by mouth 2 (two) times a day. 60 tablet 3     aspirin 81 MG EC tablet Take 81 mg by mouth daily.       blood glucose test strips Use 1 each As Directed 2 (two) times a day. Dispense brand per patient's insurance at pharmacy discretion. 120 strip 6     blood-glucose meter (ONETOUCH VERIO IQ METER) Misc Use 1 each As Directed 2 (two) times a day. 1 each 0     cholecalciferol, vitamin D3, (VITAMIN D3) 5,000 unit Tab Take 5,000 Units by mouth daily.        insulin glargine (LANTUS SOLOSTAR U-100 INSULIN) 100 unit/mL (3 mL) pen Inject 44 Units under the skin at bedtime. (Patient taking differently: Inject 30 Units under the skin at bedtime. Takes 15 units q am and 30 units Q HS)       liraglutide (VICTOZA 3-RUPALI) 0.6 mg/0.1 mL (18 mg/3 mL) injection Inject 0.3 mL (1.8 mg total) under the skin daily.       lisinopriL (PRINIVIL,ZESTRIL) 5 MG tablet Take 1 tablet (5 mg total) by mouth daily. 90 tablet 3     multivit-min/FA/lycopen/lutein (CENTRUM SILVER MEN ORAL) Take 1 tablet by mouth daily.       mupirocin (BACTROBAN) 2 % ointment Apply topically daily as needed. Apply to wound.       neomycin-bacitracin-polymyxin (NEOSPORIN) ointment Apply to penis at catheter insertion site three  "times a day while catheter is in place. 15 g 0     NOVOLOG FLEXPEN U-100 INSULIN 100 unit/mL (3 mL) injection pen Inject 3 Units under the skin 3 (three) times a day before meals. 2.7 mL 0     oxyCODONE (ROXICODONE) 5 MG immediate release tablet Take 1 tablet (5 mg total) by mouth every 6 (six) hours as needed for pain. 30 tablet 0     pen needle, diabetic 31 gauge x 5/16\" Ndle Used to inject insulin daily 90 each 0     polyethylene glycol (MIRALAX) 17 gram packet Take 1 packet (17 g total) by mouth daily as needed.  0     tamsulosin (FLOMAX) 0.4 mg cap Take 1 capsule (0.4 mg total) by mouth daily after supper.  0     traMADoL (ULTRAM) 50 mg tablet Take 50 mg by mouth every 6 (six) hours as needed for pain.       vitamin E 400 unit capsule Take 400 Units by mouth daily.       No current facility-administered medications on file prior to visit.        Allergies     Allergies   Allergen Reactions     Cresol      Muscle cramps     Crestor [Rosuvastatin] Myalgia     Declomycin [Demeclocycline] Hives       Review of Systems   A comprehensive review of 14 systems was done. Pertinent findings noted here and in history of present illness. All the rest negative.  Constitutional: Negative.  Negative for fever, chills, he has activity change, appetite change and fatigue.   HENT: Negative for congestion and facial swelling.    Eyes: Negative for photophobia, redness and visual disturbance.   Respiratory: Negative for cough and chest tightness.    Cardiovascular: Negative for chest pain, palpitations and  leg swelling.   Gastrointestinal: Negative for nausea, diarrhea, constipation, blood in stool and abdominal distention.   Genitourinary: Negative.    Musculoskeletal: Negative.  Denies any pain pin his foot .using a scooter for ambulation  Skin: Negative.    Neurological: Negative for dizziness, tremors, syncope, weakness, light-headedness and headaches.   Hematological: Does not bruise/bleed easily.   Psychiatric/Behavioral: " "Negative.  Some confusion noted he just wants to go home as per him      Physical Exam     Recent Vitals 1/21/2021   Height 5' 10\"   Weight 179 lbs 6 oz   BSA (m2) 2.01 m2   /80   Pulse 86   Temp 98   Temp src -   SpO2 95   Some recent data might be hidden       Constitutional: Oriented to person, place, and time and appears well-developed.   HEENT:  Normocephalic and atraumatic.  Eyes: Conjunctivae and EOM are normal. Pupils are equal, round, and reactive to light. No discharge.  No scleral icterus. Nose normal. Mouth/Throat: Oropharynx is clear and moist. No oropharyngeal exudate.    NECK: Normal range of motion. Neck supple. No JVD present. No tracheal deviation present. No thyromegaly present.   CARDIOVASCULAR: Normal rate, regular rhythm and intact distal pulses.  Exam reveals no gallop and no friction rub.  Systolic murmur present.  PULMONARY: Effort normal and breath sounds normal. No respiratory distress.No Wheezing or rales.  ABDOMEN: Soft. Bowel sounds are normal. No distension and no mass.  There is no tenderness. There is no rebound and no guarding. No HSM.  MUSCULOSKELETAL: Normal range of motion. No edema and no tenderness. Mild kyphosis, no tenderness.  Rt foot missing 5th toe  Extensive ulceration noted on the lateral margin of his foot extending from the base of his missing fifth toe extending to his midfoot with necrotic skin and serosanguineous drainage noted  Nonhealing wound  LYMPH NODES: Has no cervical, supraclavicular, axillary and groin adenopathy.   NEUROLOGICAL: Alert and oriented to person, place, and time. No cranial nerve deficit.  Normal muscle tone. Coordination normal.   GENITOURINARY: Deferred exam.  SKIN: Skin is warm and dry. No rash noted. No erythema. No pallor.   EXTREMITIES: No cyanosis, no clubbing, no edema. No Deformity.  PSYCHIATRIC: Normal mood, affect and behavior.      Lab Results     Recent Results (from the past 240 hour(s))   COVID-19 Virus PCR MRF    " Collection Time: 01/11/21 12:00 PM    Specimen: Respiratory   Result Value Ref Range    COVID-19 VIRUS SPECIMEN SOURCE [facility]     2019-nCOV Not Detected    Basic Metabolic Panel    Collection Time: 01/12/21  8:21 AM   Result Value Ref Range    Sodium 140 136 - 145 mmol/L    Potassium 4.6 3.5 - 5.0 mmol/L    Chloride 102 98 - 107 mmol/L    CO2 31 22 - 31 mmol/L    Anion Gap, Calculation 7 5 - 18 mmol/L    Glucose 116 70 - 125 mg/dL    Calcium 8.9 8.5 - 10.5 mg/dL    BUN 18 8 - 28 mg/dL    Creatinine 0.89 0.70 - 1.30 mg/dL    GFR MDRD Af Amer >60 >60 mL/min/1.73m2    GFR MDRD Non Af Amer >60 >60 mL/min/1.73m2   C-Reactive Protein    Collection Time: 01/12/21  8:21 AM   Result Value Ref Range    CRP 1.7 (H) 0.0 - 0.8 mg/dL   Hepatic Profile    Collection Time: 01/12/21  8:21 AM   Result Value Ref Range    Bilirubin, Total 0.6 0.0 - 1.0 mg/dL    Bilirubin, Direct 0.2 <=0.5 mg/dL    Protein, Total 6.5 6.0 - 8.0 g/dL    Albumin 2.7 (L) 3.5 - 5.0 g/dL    Alkaline Phosphatase 98 45 - 120 U/L    AST 20 0 - 40 U/L    ALT 21 0 - 45 U/L   HM1 (CBC with Diff)    Collection Time: 01/12/21  8:21 AM   Result Value Ref Range    WBC 5.6 4.0 - 11.0 thou/uL    RBC 4.48 4.40 - 6.20 mill/uL    Hemoglobin 13.3 (L) 14.0 - 18.0 g/dL    Hematocrit 41.6 40.0 - 54.0 %    MCV 93 80 - 100 fL    MCH 29.7 27.0 - 34.0 pg    MCHC 32.0 32.0 - 36.0 g/dL    RDW 13.9 11.0 - 14.5 %    Platelets 301 140 - 440 thou/uL    MPV 9.8 8.5 - 12.5 fL    Neutrophils % 60 50 - 70 %    Lymphocytes % 19 (L) 20 - 40 %    Monocytes % 13 (H) 2 - 10 %    Eosinophils % 6 0 - 6 %    Basophils % 1 0 - 2 %    Immature Granulocyte % 1 (H) <=0 %    Neutrophils Absolute 3.3 2.0 - 7.7 thou/uL    Lymphocytes Absolute 1.1 0.8 - 4.4 thou/uL    Monocytes Absolute 0.8 0.0 - 0.9 thou/uL    Eosinophils Absolute 0.4 0.0 - 0.4 thou/uL    Basophils Absolute 0.1 0.0 - 0.2 thou/uL    Immature Granulocyte Absolute 0.0 <=0.0 thou/uL   Comprehensive Metabolic Panel    Collection Time: 01/16/21   7:24 AM   Result Value Ref Range    Sodium 140 136 - 145 mmol/L    Potassium 4.8 3.5 - 5.0 mmol/L    Chloride 103 98 - 107 mmol/L    CO2 29 22 - 31 mmol/L    Anion Gap, Calculation 8 5 - 18 mmol/L    Glucose 92 70 - 125 mg/dL    BUN 18 8 - 28 mg/dL    Creatinine 0.93 0.70 - 1.30 mg/dL    GFR MDRD Af Amer >60 >60 mL/min/1.73m2    GFR MDRD Non Af Amer >60 >60 mL/min/1.73m2    Bilirubin, Total 0.6 0.0 - 1.0 mg/dL    Calcium 9.1 8.5 - 10.5 mg/dL    Protein, Total 6.1 6.0 - 8.0 g/dL    Albumin 2.5 (L) 3.5 - 5.0 g/dL    Alkaline Phosphatase 79 45 - 120 U/L    AST 15 0 - 40 U/L    ALT 13 0 - 45 U/L   C-Reactive Protein    Collection Time: 01/16/21  7:24 AM   Result Value Ref Range    CRP 8.8 (H) 0.0 - 0.8 mg/dL   HM1 (CBC with Diff)    Collection Time: 01/16/21  7:24 AM   Result Value Ref Range    WBC 7.0 4.0 - 11.0 thou/uL    RBC 3.88 (L) 4.40 - 6.20 mill/uL    Hemoglobin 11.3 (L) 14.0 - 18.0 g/dL    Hematocrit 35.0 (L) 40.0 - 54.0 %    MCV 90 80 - 100 fL    MCH 29.1 27.0 - 34.0 pg    MCHC 32.3 32.0 - 36.0 g/dL    RDW 14.0 11.0 - 14.5 %    Platelets 253 140 - 440 thou/uL    MPV 10.3 8.5 - 12.5 fL    Neutrophils % 60 50 - 70 %    Lymphocytes % 19 (L) 20 - 40 %    Monocytes % 16 (H) 2 - 10 %    Eosinophils % 4 0 - 6 %    Basophils % 1 0 - 2 %    Immature Granulocyte % 0 <=0 %    Neutrophils Absolute 4.2 2.0 - 7.7 thou/uL    Lymphocytes Absolute 1.3 0.8 - 4.4 thou/uL    Monocytes Absolute 1.1 (H) 0.0 - 0.9 thou/uL    Eosinophils Absolute 0.3 0.0 - 0.4 thou/uL    Basophils Absolute 0.1 0.0 - 0.2 thou/uL    Immature Granulocyte Absolute 0.0 <=0.0 thou/uL   COVID-19 Virus PCR MRF    Collection Time: 01/18/21 12:00 PM    Specimen: Respiratory   Result Value Ref Range    COVID-19 VIRUS SPECIMEN SOURCE [facility]ares     2019-nCOV Not Detected                 DONNY Kwan

## 2021-06-14 NOTE — PROGRESS NOTES
FOOT AND ANKLE SURGERY/PODIATRY Progress Note      ASSESSMENT:   Ulceration right foot   DM2      TREATMENT:  -The Theraskin graft along the lateral right foot is stable, intact. I recommend he continue with the wound vac at 100 mmHg, adaptic with each vac change with adaptic. Continue non-weight bearing right foot.    -He will follow-up with me in 2 weeks.    John Garcia DPM  [facility]      HPI: Caleb Hernandez was seen again today for a right foot ulceration s/p application of theraskin. Doing well today. He has remained non-weight bearing on the right foot. Using wound vac as directed.      Past Medical History:   Diagnosis Date     BPH (benign prostatic hyperplasia)      Cholelithiasis      Common bile duct (CBD) obstruction 9/4/2017     Compression Fracture Of Thoracic Vertebral Body     Created by Conversion  Replacement Utility updated for latest IMO load     Diabetes mellitus (H)      Essential hypertension      Hyperlipidemia      Pancreatitis      Rib Fracture     Created by Conversion  Replacement Utility updated for latest IMO load     Sepsis due to pneumonia (H) 9/8/2017       Past Surgical History:   Procedure Laterality Date     BACK SURGERY      1964 removed a cyst     CHOLECYSTECTOMY  1985     ERCP       ERCP N/A 9/5/2017    Procedure: ENDOSCOPIC RETROGRADE CHOLANGIOPANCREATOGRAPHY SPHINCTEROTOMY AND STONE EXTRACTION;  Surgeon: Hansel Gannon MD;  [place] [facility] [place]  Service:      INCISION AND DRAINAGE OF WOUND Right 11/2/2020    Procedure: INCISION AND DRAINAGE, right foot with removal of bone 5th metatarsal;  Surgeon: John Garcia DPM;  Location: [facility] OR;  Service: Podiatry     INCISION AND DRAINAGE OF WOUND Right 11/16/2020    Procedure: INCISION AND DRAINAGE, right foot;  Surgeon: John Garcia DPM;  Location: [facility];  Service: Podiatry     INCISION AND DRAINAGE OF WOUND Right 11/27/2020    Procedure: INCISION AND DRAINAGE,  LOWER EXTREMITY;  Surgeon: Aleksandr Hdez DPM;  Location: [facility] OR;  Service: Podiatry     IR EXTREMITY ANGIOGRAM RIGHT  11/24/2020     PICC  11/30/2020          TOE AMPUTATION Right 11/16/2020    Procedure: with amputation of the fifth ray, peroneal brevis tendon transfer;  Surgeon: John Garcia DPM;  Location: [facility] OR;  Service: Podiatry     TONSILLECTOMY  1940       Allergies   Allergen Reactions     Cresol      Muscle cramps     Crestor [Rosuvastatin] Myalgia     Declomycin [Demeclocycline] Hives         Current Outpatient Medications:      acetaminophen (TYLENOL) 500 MG tablet, Take 1-2 tablets (500-1,000 mg total) by mouth every 4 (four) hours as needed., Disp: , Rfl: 0     apixaban ANTICOAGULANT (ELIQUIS) 5 mg Tab tablet, Take 1 tablet (5 mg total) by mouth 2 (two) times a day., Disp: 60 tablet, Rfl: 3     aspirin 81 MG EC tablet, Take 81 mg by mouth daily., Disp: , Rfl:      blood glucose test strips, Use 1 each As Directed 2 (two) times a day. Dispense brand per patient's insurance at pharmacy discretion., Disp: 120 strip, Rfl: 6     blood-glucose meter (ONETOUCH VERIO IQ METER) Misc, Use 1 each As Directed 2 (two) times a day., Disp: 1 each, Rfl: 0     cholecalciferol, vitamin D3, (VITAMIN D3) 5,000 unit Tab, Take 5,000 Units by mouth daily. , Disp: , Rfl:      ertapenem 1 g in NaCl 0.9 % 0.9 % 50 mL IVPB, Infuse 1 g into a venous catheter daily., Disp: 1 each, Rfl: 0     insulin glargine (LANTUS SOLOSTAR U-100 INSULIN) 100 unit/mL (3 mL) pen, Inject 44 Units under the skin at bedtime. (Patient taking differently: Inject 30 Units under the skin at bedtime. Takes 15 units q am and 30 units Q HS), Disp: , Rfl:      liraglutide (VICTOZA 3-RUPALI) 0.6 mg/0.1 mL (18 mg/3 mL) injection, Inject 0.3 mL (1.8 mg total) under the skin daily., Disp: , Rfl:      lisinopriL (PRINIVIL,ZESTRIL) 5 MG tablet, Take 1 tablet (5 mg total) by mouth daily., Disp: 90 tablet, Rfl: 3      "multivit-min/FA/lycopen/lutein (CENTRUM SILVER MEN ORAL), Take 1 tablet by mouth daily., Disp: , Rfl:      mupirocin (BACTROBAN) 2 % ointment, Apply topically daily as needed. Apply to wound., Disp: , Rfl:      neomycin-bacitracin-polymyxin (NEOSPORIN) ointment, Apply to penis at catheter insertion site three times a day while catheter is in place., Disp: 15 g, Rfl: 0     NOVOLOG FLEXPEN U-100 INSULIN 100 unit/mL (3 mL) injection pen, Inject 3 Units under the skin 3 (three) times a day before meals., Disp: 2.7 mL, Rfl: 0     oxyCODONE (ROXICODONE) 5 MG immediate release tablet, Take 1 tablet (5 mg total) by mouth every 6 (six) hours as needed for pain., Disp: 30 tablet, Rfl: 0     pen needle, diabetic 31 gauge x 5/16\" Ndle, Used to inject insulin daily, Disp: 90 each, Rfl: 0     polyethylene glycol (MIRALAX) 17 gram packet, Take 1 packet (17 g total) by mouth daily as needed., Disp: , Rfl: 0     tamsulosin (FLOMAX) 0.4 mg cap, Take 1 capsule (0.4 mg total) by mouth daily after supper., Disp: , Rfl: 0     vitamin E 400 unit capsule, Take 400 Units by mouth daily., Disp: , Rfl:     Review of Systems - 10 point Review of Systems is negative except for left foot ulcer which is noted in HPI.      OBJECTIVE:  Vitals:    12/23/20 0930   BP: 118/76   Pulse: 76   Resp: 20   Temp: 97.8  F (36.6  C)     General appearance: Patient is alert and fully cooperative with history & exam.  No sign of distress is noted during the visit.   Vascular: Dorsalis pedis non-palpableRight.  Dermatologic:    Urethral Catheter Coude 16 Fr. (Active)       Wound 07/24/17 Right lateral foot (Active)   Pre Size Length 2 10/23/20 1300   Pre Size Width 3 10/23/20 1300   Pre Size Depth 0.2 10/23/20 1300   Pre Total Sq cm 6 10/23/20 1300   Post Size Length 2 10/21/20 1400   Post Size Width 1.8 10/21/20 1400   Post Size Depth 0.6 10/21/20 1400   Post Total Sq cm 3.6 10/21/20 1400   Undermined n 10/19/20 1300   Tunneling n 10/19/20 1300   Description " pink 10/16/20 1300   Prodcut Used Gauze 10/21/20 1400       VASC Wound 12/09/20 right foot (Active)   Pre Size Length 9 12/09/20 1700   Pre Size Width 1 12/09/20 1700   Pre Size Depth 0.4 12/09/20 1700   Pre Total Sq cm 9 12/09/20 1700       Wound 09/05/17 Foot Right (Active)       Incision 11/02/20 Surgical Foot Right (Active)       Incision 11/16/20 Surgical Foot Right (Active)       Incision 11/27/20 Surgical Foot Right (Active)       Incision 12/17/20 Surgical Foot Right (Active)   Theraskin intact lateral right foot, intact staples. No erythema right foot.   Neurologic: Diminished to light touch Right.  Musculoskeletal: Contracted digits noted Right.    Imaging:     Mr Forefoot With Without Contrast Right    Result Date: 11/26/2020  EXAM: MR FOREFOOT W WO CONTRAST RIGHT LOCATION: [facility] DATE/TIME: 11/26/2020 5:28 PM INDICATION: Recent fifth ray amputation. Positive cultures. Wound. COMPARISON: 10/22/2020 MRI. TECHNIQUE: Routine. Additional postgadolinium T1 sequences were obtained. IV CONTRAST: Gadavist 7ml FINDINGS:  SOFT TISSUES JOINTS AND BONES: Interval resection of the fifth ray level of the cuboid. There is ulceration at the proximal margin of the lateral midfoot at the level of the fourth TMT joint. There is a subcutaneous fluid collection tracking along the lateral and dorsal margin of the fourth metatarsal worrisome for an abscess that measures 3.4 cm and RL dimension by 0.9 cm in AP dimension and 6.5 cm in length. There are multiple punctate areas of subcutaneous emphysema lateral to the cuboid and fourth  metatarsal. The ulceration and soft tissue thickening is contiguous with the cuboid and fourth tarsometatarsal joint. There are new areas of synovitis and irregularity in the second, third and fourth tarsometatarsal joints and the navicular medial cuneiform joint contiguous between the medial and middle cuneiform. Although there was osteoarthritis in this distribution on  the prior examination the synovitis and osseous changes are worrisome for septic arthritis which has developed along multiple joints of the midfoot. TENDONS: No evidence for proximal tenosynovitis. MUSCLES: Diffuse muscular atrophy and edema.     1.  Interval amputation of the fifth ray. 2.  Ulceration and deep tracking fluid collection along the fourth metatarsal consistent with an abscess. 3.  Additional areas of subcutaneous emphysema along the postoperative bed worrisome for additional soft tissue infection given the postoperative time course. 4.  New synovitis and osseous abnormalities in the second through fourth tarsometatarsal joints and the navicular cuneiform joint worrisome for septic arthritis and osteomyelitis.     Us Srikanth Doppler No Exercise, 1-2 Levels, Bilateral    Result Date: 12/9/2020  BILATERAL RESTING ANKLE-BRACHIAL INDICES (SRIKANTH'S) Indication: Surveillance of right angio 11/24/2020 Previous: 11/24/2020 History: Previous Smoker, Hypertension, Diabetic, Hyperlipidemia, PAD, Angioplasty, Vascular Surgery and Vascular Ulcers  Resting SRIKANTH's          Right: mmHg Index     Brachial: 113  Ankle-(PT): 164 1.45 Ankle-(DP): 168 1.49           Digit: 50 0.44               Left: mmHg Index     Brachial: 111  Ankle-(PT): 72 0.64 Ankle-(DP): 83 0.73           Digit: 0 0.00 Resting ankle-brachial index of 1.49 on the right. Toe Pressures of 50 mmHg and TBI of 0.44  Resting ankle-brachial index of 0.73 on the left. Toe Pressures of 0 mmHg and TBI of 0.00  WAVEFORMS: The right dorsalis pedis and posterior tibial arteries show monophasic waveforms. The left dorsalis pedis and posterior tibial arteries show monophasic waveforms. Comments:   Impression:  Right SRIKANTH is  Normal with an SRIKANTH of 1.49 and Toe Pressures of 50 mmHg which is mildly abnormal but adequate for wound healing. Left SRIKANTH is Abnormal with an SRIKANTH of 0.73 and Toe Pressures of 0 which is critically poor and high risk for wound issues.     Us Srikanth Doppler  No Exercise, 1-2 Levels, Bilateral    Result Date: 11/24/2020  EXAM: RESTING ANKLE-BRACHIAL INDICES (ABIs) LOCATION: [facility] DATE/TIME: 11/24/2020 5:24 PM INDICATION: Follow-up post angiogram and intervention, diabetic foot ulcer, critical limb ischemia. COMPARISON: 10/13/2020. FINDINGS: RIGHT                                               Brachial: 141 Ankle (PT): 0 Index: 0.00  Ankle (DP): 122 Index: 0.87 Digit: 34 Index: 0.24   LEFT Brachial: -- Ankle (PT): 83 Index: 0.59 Ankle (DP): 90 Index: 0.64 Digit: 0 Index: 0.00 Resting ABIs are 0.87 on the right. Resting ABIs are 0.64 on the left. WAVEFORMS: The dorsalis pedis and posterior tibial arteries are monophasic. DUPLEX ARTERIAL ULTRASOUND FINDINGS: RIGHT LOWER EXTREMITY VELOCITIES (cm/s): EIA: 115 CFA: 101 PFA: 63 SFA (proximal): 101 SFA (mid): 90 SFA (distal): 73 Popliteal: 99 PTA: 0 at the ankle; trickle flow mid calf 20 BREE: 69 DPA: 37 (M=monophasic, B=biphasic, T=triphasic) LEFT LOWER EXTREMITY VELOCITIES (cm/s): EIA: 74 CFA: 98 PFA: 42 SFA (proximal): 57 SFA (mid): 102 SFA (distal): 75 Popliteal: 126 PTA: 16 BREE: 27 DPA: 10 (M=monophasic, B=biphasic, T=triphasic)     1.  RIGHT LOWER EXTREMITY: No significant change in ABIs at the dorsalis pedis artery from prior. No flow demonstrated in posterior tibial artery at the ankle on current study where there was flow noted on prior. Waveforms otherwise unchanged with evidence for trifurcation disease with no inflow stenosis. 2.  LEFT LOWER EXTREMITY: Interval worsening of ABIs when compared to prior. Postobstructive or post high-grade stenotic waveform noted in dorsalis pedis artery and posterior tibial arteries at the ankle, worse when compared to prior. No evidence for an inflow stenosis.    Us Arterial Legs Bilateral Complete    Result Date: 11/24/2020  EXAM: RESTING ANKLE-BRACHIAL INDICES (ABIs) LOCATION: [facility] DATE/TIME: 11/24/2020 5:24 PM INDICATION: Follow-up  "post angiogram and intervention, diabetic foot ulcer, critical limb ischemia. COMPARISON: 10/13/2020. FINDINGS: RIGHT                                               Brachial: 141 Ankle (PT): 0 Index: 0.00  Ankle (DP): 122 Index: 0.87 Digit: 34 Index: 0.24   LEFT Brachial: -- Ankle (PT): 83 Index: 0.59 Ankle (DP): 90 Index: 0.64 Digit: 0 Index: 0.00 Resting ABIs are 0.87 on the right. Resting ABIs are 0.64 on the left. WAVEFORMS: The dorsalis pedis and posterior tibial arteries are monophasic. DUPLEX ARTERIAL ULTRASOUND FINDINGS: RIGHT LOWER EXTREMITY VELOCITIES (cm/s): EIA: 115 CFA: 101 PFA: 63 SFA (proximal): 101 SFA (mid): 90 SFA (distal): 73 Popliteal: 99 PTA: 0 at the ankle; trickle flow mid calf 20 BREE: 69 DPA: 37 (M=monophasic, B=biphasic, T=triphasic) LEFT LOWER EXTREMITY VELOCITIES (cm/s): EIA: 74 CFA: 98 PFA: 42 SFA (proximal): 57 SFA (mid): 102 SFA (distal): 75 Popliteal: 126 PTA: 16 BREE: 27 DPA: 10 (M=monophasic, B=biphasic, T=triphasic)     1.  RIGHT LOWER EXTREMITY: No significant change in ABIs at the dorsalis pedis artery from prior. No flow demonstrated in posterior tibial artery at the ankle on current study where there was flow noted on prior. Waveforms otherwise unchanged with evidence for trifurcation disease with no inflow stenosis. 2.  LEFT LOWER EXTREMITY: Interval worsening of ABIs when compared to prior. Postobstructive or post high-grade stenotic waveform noted in dorsalis pedis artery and posterior tibial arteries at the ankle, worse when compared to prior. No evidence for an inflow stenosis.    Poc Us Guidance Needle Placement    Result Date: 12/17/2020  Ultrasound was performed as guidance to an anesthesia procedure.  Click \"PACS images\" hyperlink below to view any stored images.  For specific procedure details, view procedure note authored by anesthesia.    Ct Abdomen Pelvis Without Oral With Without Iv Contrast    Result Date: 12/1/2020  EXAM: CT ABDOMEN PELVIS WO ORAL W WO IV CONTRAST " LOCATION: [facility] DATE/TIME: 12/1/2020 7:22 PM INDICATION: Hematuria, unknown cause Gross hematuria COMPARISON: Abdominal CT 9 05/07/2020. Chest CT 05/23/2020 TECHNIQUE: CT scan of the abdomen and pelvis was performed before and after injection of IV contrast. Multiplanar reformats were obtained. Dose reduction techniques were used. CONTRAST: Iohexol (Omni) 100 mL FINDINGS: LOWER CHEST: Significantly elevated right hemidiaphragm with atelectasis at right lung base similar to chest CT. Mild dependent atelectasis also present left lung base. HEPATOBILIARY: Pneumobilia unchanged compared to most recent CT. Prior cholecystectomy. No liver lesion. PANCREAS: Normal. SPLEEN: Normal. ADRENAL GLANDS: Normal. KIDNEYS/BLADDER: Stable benign left renal cysts. 11 x 9 mm stone right upper pole. No hydronephrosis. Dietrich catheter present with bladder collapsed. However, there is prominent diffuse bladder wall thickening suggesting cystitis. BOWEL: No obstruction or inflammatory change. LYMPH NODES: Normal. VASCULATURE: Unremarkable. PELVIC ORGANS: Normal. MUSCULOSKELETAL: Focal area of soft tissue stranding within subcutaneous tissues anterior of left common iliac artery and vein likely relates to recent vascular catheterization procedure.     1.  Significant irregular bladder wall thickening suggests cystitis. 2.  There is a 11 mm right upper pole kidney stone. No hydronephrosis. 3.  Stable benign left renal cysts.     Ir Lower Extremity Angiogram Right    Result Date: 11/25/2020  [facility] Interventional Radiology vascular surgery procedure Date: 11/25/20 Procedures Performed: Ir Lower Extremity Angiogram Right from the aorta level via left groin approach   Ultrasound-guided vascular access   Angioplasty right anterior tibial artery   Angioplasty right tibioperoneal trunk   Angioplasty right peroneal artery   Selective right leg angiogram with distal most catheter position  in greater than third order anterior tibial and peroneal arteries.  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  This was performed by podiatry with our support given known toe pressures in January of 112 mmHg and has recently has October at greater than 50 mmHg suggesting adequate blood supply for healing.  Wound has not however healed and he is now readmitted for failure with signs of infection of the foot.  Plan for an intervention to see if blood supply can be improved to allow for wound healing. Sedation: Moderate Sedation: The procedure was performed with administration of intravenous conscious sedation with appropriate preoperative, intraoperative, and postoperative evaluation.  120 minutes of supervised face to face intraservice time was provided by a radiology nurse under my direct supervision.  Versed 3.5 mg and fentanyl 175 mcg given. Antibiotics: Patient already on IV Zosyn Fluoroscopic Time: 28.4 Minutes Radiation Dose: 283 mGy Contrast: 45 cc of Visipaque given Procedure:   Ultrasound was used to evaluate the left groin.  The selected vessel was the left common femoral artery which was widely patent and without significant anterior wall plaque. Ultrasound was then used for real-time ultrasound guided needle entry of the common femoral artery. Permanent images were recorded and saved to the patient's medical record.   Micropuncture technique was used to deliver a 4 Maori sheath.  An Omni Flush catheter was advanced to the lower abdominal aorta and an initial aortogram was performed   Findings: Patent lower abdominal aorta, common iliac arteries, internal iliac arteries, and external iliac arteries without disease.  Patent common femoral arteries without  disease.   This point wire catheter advanced over the aortic bifurcation and down to the common femoral level on the right side.  Selective right leg angiography was performed from this level.   Findings: Patent profunda femoris artery and superficial femoral artery.  Trivial stenosis at Jagjit's canal of less than 20%.  Patent popliteal artery without disease.  Single-vessel peroneal runoff to the foot with significant areas of disease in focal stenosis of likely greater than 80% of the tibioperoneal trunk.  The peroneal artery has multiple areas of diffuse stenoses with some areas as much is 70% stenosed.  Posterior tibial artery is occluded from its origin.  Anterior tibial artery has a very short stump but occludes before the elbow in the vessel.  Appears to reconstitute through a peroneal artery collateral at the ankle.   At this point the patient was heparinized.  Wire was advanced down the SFA and was used to exchange the short 4 Taiwanese sheath for a 90 cm 4 Taiwanese sheath which we advanced the wire down to the popliteal level.  Selective angiography from this level allowed us to roadmap identify the origin of the anterior tibial artery.  Wire catheter were used to engage this stump and we able to advance a wire and catheter around the elbow in the vessel and down the vessel to the ankle.  At this point we exchanged out the wire perform an angiogram.   Findings: True lumen location of a catheter in the distal anterior tibial artery.  There is a small stenosis beyond the catheter.    Nitroglycerin 200 mcg was injected down the anterior tibial artery   We advanced an 014 wire down and through this and then exchanged in a 2 mm x 150 mm angioplasty balloon.  Simple balloon angioplasty with 2 separate inflations was used to treat the entire anterior tibial artery all the way to the popliteal artery.  The first inflation was taken only to 8 mandi of pressure in the second 10 mandi of pressure.  Each inflation was held up  for 2 minutes.  An angiogram was then performed.   Findings: Inline flow through the anterior tibial artery.  Persistent significant disease in the more proximal anterior tibial artery which appeared to be secondary to undersizing of the balloon angioplasty.  We exchanged and a 2-1/2 mm x 150 mm angioplasty balloon performed simple balloon angioplasty of the more proximal segment of anterior tibial artery up to the popliteal artery.  This is another 10 mandi inflation held up for 2 minutes.  A completion angiogram was performed.   Findings: Widely patent intertibial artery with filling into the foot.  It does appear that the peroneal artery continues to be the dominant flow to the majority of the foot despite having stenosis within it.   We now readvanced a wire and balloon catheter down the tibioperoneal trunk and peroneal artery positioning it in the distal third of the peroneal artery and moving upwards to treat all the area of disease.  The wire was removed from the balloon and angiogram performed through the balloon catheter to confirm that we were in the true lumen   Findings: True lumen location of a catheter in the peroneal artery distally beyond all stenoses.   We now reintroduced a wire performed balloon angioplasty with 2 separate inflations to treat the entire tibioperoneal trunk and peroneal artery.  The first inflation was to 8 mandi of pressure and the more proximal inflation to 10 mandi of pressure.  Each time the balloon was held up for 2 minutes.  A completion angiogram was performed.   Findings: Widely patent flow through the peroneal artery to peroneal trunk with no residual significant stenosis.  Imaging of the foot demonstrates a both the antitibial artery and peroneal artery contribute to feeling of the foot and that the lateral aspect of the foot at the area of surgery feels very well   At this point wires and catheters of pulled back in the long sheath exchanged for short 4 Gambian sheath.  Patient  "was given 10 units of protamine with the plan of removing the sheath once ACT had dropped to below 160. 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 Lourdes Raymond Vascular Surgery    Poc Us 3cg Picc Placement Guidance    Result Date: 11/30/2020  Exam was performed as guidance for PICC line insertion.  Click \"PACS images\" hyperlink below to view any stored images.  For specific procedure details, view procedure note authored by PICC/Vascular Access Nurse.    Us Arterial Leg Right    Result Date: 12/9/2020  Arterial Duplex Ultrasound Lower Extremity Artery Evaluation Indication: Surveillance of right angio 11/24/2020 Previous: 11/24/2020 History: Previous Smoker, Hypertension, Diabetic, Hyperlipidemia, PAD, Angioplasty, Vascular Surgery and Vascular Ulcers Technique: Duplex imaging is performed utilizing gray-scale, two-dimensional images, and color-flow imaging. Doppler waveform analysis and spectral Doppler imaging is also performed. LOWER EXTREMITY ARTERIAL DUPLEX EXAM WITH WAVEFORMS Right Leg:(cm/s) Location: Vel[facility] Waveforms EIA:   83  T CFA:   73  T PFA:   73  T SFA Proximal:   68  T SFA Mid:   160  T SFA Distal:   76  T Popliteal Artery:   36  B PTA:  OCC   - BREE:   87 B DPA:   55  B Waveforms: T=Triphasic, M=Monophasic, B=Biphasic Left Leg:(cm/s) Location: Veloc[facility] Waveforms PTA:   22   M BREE:   29  M DPA:   38  M Waveforms: T=Triphasic, M=Monophasic, B=Biphasic Comments:   Impression: Right Lower Extremity: Triphasic and biphasic waveforms through the right lower extremity suggesting no hemodynamically significant stenosis.  No flow noted in the posterior tibial artery Left Lower Extremity: Not formally evaluated.  Monophasic waveforms at the ankle level suggest significant disease Reference: Category Normal 1-19% 20-49% 50-99% Occluded PSV <160 cm/sec without spectral broadening <160 " cm/sec with spectral broadening Increased Increased Absent Flow Ratio N/A N/A < 2.0 >2.0 N/A Post-Stenotic Turbulence No No No Yes N/A          Picture: None

## 2021-06-15 NOTE — PROGRESS NOTES
FOOT AND ANKLE SURGERY/PODIATRY Progress Note        ASSESSMENT:   Resolved Ulceration right foot  Gastrosoleus Equinus right       TREATMENT:  -The right foot ulceration has resolved, thin top cover present on exam today. The patient appears to be progressing very well. I inspected his current insert which is an improvement from his previous insert. However, I would recommend he return to [facility] to further offload the 5th metatarsal head with cork placed on the plantar surface, possible excavation of the shoe.   -Because he continues to improve with conservative treatment, I do not recommend foot surgery at this time. I would begin to lean towards surgery if the sore re-develops 1-2 more times. This would likely involve a 5th metatarsal head resection with achilles tendon lengthening. As he continues to increase ambulation, callus build-up is likely. Regular visits for callus debridement may be necessary.   -He will continue to apply AmLactin cream bid to the right foot and follow-up with Dr. Zimmerman in two weeks for continued cares. I am happy to see him again if I can of assistance.       John Garcia, [facility] [place]      HPI: Mr. Hernandez returns today to discuss questions he has regarding the sore on his right foot. Since his last visit with me the patient states the sore on the right foot has been progressing well, and he has not noticed drainage in recent days. He recently had a new insert fabricated at [facility] for the right foot. He reports that this insert is comfortable. The patient would like to know if surgery is indicated at this time and if his current insert adequately reduces pressure on the lateral right foot. He is currently using 3 different types of creams on the right foot, but believes he is getting the best results with AmLactin.     Past Medical History:   Diagnosis Date     BPH (benign prostatic hyperplasia)      Cholelithiasis      Diabetes mellitus      Essential  "hypertension      Hyperlipidemia      Pancreatitis        Allergies   Allergen Reactions     Crestor [Rosuvastatin] Myalgia     Demeclocycline Hives         Current Outpatient Prescriptions:      aspirin 81 MG EC tablet, Take 81 mg by mouth daily., Disp: , Rfl:      blood glucose test (CONTOUR TEST STRIPS) strips, Use 1 each As Directed 2 (two) times a day. Dispense brand per patient's insurance at pharmacy discretion., Disp: 100 each, Rfl: 11     cholecalciferol, vitamin D3, (VITAMIN D3) 5,000 unit Tab, Take 1 tablet by mouth daily., Disp: , Rfl:      ibuprofen (ADVIL,MOTRIN) 200 MG tablet, Take 200 mg by mouth every 6 (six) hours as needed for pain. , Disp: , Rfl:      insulin degludec (TRESIBA FLEXTOUCH U-200) 200 unit/mL (3 mL) InPn, Inject 40-50 Units under the skin daily., Disp: 9 mL, Rfl: 3     liraglutide (VICTOZA) 0.6 mg/0.1 mL (18 mg/3 mL) PnIj injection, Inject 0.2 mL (1.2 mg total) under the skin daily. With or without food., Disp: 18 mL, Rfl: 3     lisinopril (PRINIVIL,ZESTRIL) 5 MG tablet, TAKE 1 TABLET BY MOUTH ONCE DAILY (Patient taking differently: Take 5 mg by mouth daily. TAKE 1 TABLET BY MOUTH ONCE DAILY), Disp: 90 tablet, Rfl: 3     MULTIVITS,CA,MIN/IRON/FA/LYCOP (CENTRUM MEN ORAL), Take 1 tablet by mouth daily., Disp: , Rfl:      naproxen sodium (ALEVE) 220 MG tablet, Take 220 mg by mouth daily., Disp: , Rfl:      pen needle, diabetic 31 gauge x 5/16\" Ndle, Used to inject insulin daily, Disp: 90 each, Rfl: 0     traMADol (ULTRAM) 50 mg tablet, Take 50 mg by mouth every 6 (six) hours as needed for pain., Disp: , Rfl:      vitamin E 400 unit capsule, Take 400 Units by mouth daily., Disp: , Rfl:     Current Facility-Administered Medications:      lidocaine 2 % jelly (XYLOCAINE), , Topical, PRN, Ines Zimmerman NP, 1 application at 12/11/17 2421    Review of Systems - Negative      OBJECTIVE:  Appearance: alert, well appearing, and in no distress.    Vitals:    01/02/18 0858   BP: 140/70   Resp: " 16       Vascular: Dorsalis pedis palpableRight.  Dermatologic:    Wound 07/24/17 Right lateral foot (Active)   Pre Size Length 0 1/2/2018  9:00 AM   Pre Size Width 0 1/2/2018  9:00 AM   Pre Size Depth 0 1/2/2018  9:00 AM   Pre Total Sq cm 0 1/2/2018  9:00 AM   Thin top cover present plantar 5th MPJ right foot. No erythema noted right.   Neurologic: Diminished to light touch Right.  Musculoskeletal: Mild tailor's bunion right foot.     Imaging: None    Picture: none

## 2021-06-15 NOTE — TELEPHONE ENCOUNTER
I approve       PT: extension of PT  1x week for 1 week  For PT discharge visit per clients request

## 2021-06-15 NOTE — TELEPHONE ENCOUNTER
I looked at the faxed report, and most of the evening readings are between 200-250.  No change to therapy.

## 2021-06-15 NOTE — TELEPHONE ENCOUNTER
I approve  Skilled Nursinx week for 1 week 3 x week for 6 weeks 4x week for 2 weeks  For dressing change, cleanse with normal saline, medihoney gauze add and cerlix wrap.   Left foot wound

## 2021-06-15 NOTE — PATIENT INSTRUCTIONS - HE
Follow-up visit, doing really well since our previous meeting of January 22, 2021, and he has returned home after being discharged from the TCU at [facility] on February 2, 2021, and he received his first Covid vaccine there, then got his second Covid shot yesterday February 23.      On examination today his blood pressures great, he has normal temperature, oxygen saturation is 95%.  He had a follow-up visit with Dr. Garcia on February 3, 2021, and his foot is healing slowly but steadily.  The home care skilled nurse is doing dressing changes every other day, and he is getting physical therapy twice a week.  He is getting around the house using his walker.  I reminded him about the importance of not falling.  He continues on Eliquis.  He is off all antibiotics.    He told me that he got a morning blood sugar of 91 which is right where I want him to be.     Status post excision of right fifth metatarsal on November 2, 2020, wound cultures with Bacteroides and Enterobacter.  Status post skin grafting to the lateral right foot.  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  Underwent incision and drainage of abscess November 27, 2020, wound culture growing corynebacterium.  He was treated with vancomycin and Zosyn, then meropenem, then transition to outpatient intravenous ertapenem for week course, was completed January 11, 2020.     Urinary retention with hematuria, Dietrich catheter is out as of January 22, 2021, he told me that his bladder is working better.  I encouraged him to try to void and empty the bladder about every 2 hours, and if there are ongoing problems with the bladder, then he could follow-up with Dr. Villa at [facility].  The Dietrich catheter was placed after angiogram because of acute urinary retention.  He developed hematuria November 30.  CT scan abdomen  pelvis with irregular bladder wall thickening suggestive of cystitis.  Noted to have nonobstructing 11 mm right upper pole kidney stone but no hydronephrosis.  He is currently on Flomax (tamsulosin) and I want him to stay on that.     Peripheral vascular disease.  November 24, 2020 he had right lower extremity angiogram with balloon angioplasty of anterior tibial and peroneal arteries.  However postoperative ankle-brachial index did not improve significantly, although vascular surgery thought that was because of vasospasm.  He needs to follow-up with vascular surgery, and will have a repeat arterial Doppler ultrasound.     Adult failure to thrive.  He is try to get his strength back, and is receiving PT and OT at [facility].  Wt Readings from Last 3 Encounters:   02/23/21 181 lb (82.1 kg)   02/01/21 182 lb 12.8 oz (82.9 kg)   01/28/21 182 lb 9.6 oz (82.8 kg)     Type 2 diabetes with suboptimal control, last A1c 7.9 measured September 4, 2020, currently on insulin and Victoza but no metformin  Currently on Lantus 44 units at bedtime, at the moment he is not using any mealtime NovoLog.  Also Victoza 1.8 mg injected once a day    liraglutide (VICTOZA 3-RUPALI) 0.6 mg/0.1 mL (18 mg/3 mL) injection 1.8 mg, DAILY       A1c was 7.9 check September 4, 2020.     Lab Results   Component Value Date    HGBA1C 7.9 (H) 09/04/2020      Essential hypertension, he had been taking lisinopril 5 mg a day as an outpatient, but lisinopril is not listed on his [facility] medication profile.  I think it is just as well that we pause the lisinopril for now, since I note that his blood pressure here in the clinic today January 22, 2021 is 118/50.     Lab Results   Component Value Date    CREATININE 0.93 01/16/2021    BUN 18 01/16/2021     01/16/2021    K 4.8 01/16/2021     01/16/2021    CO2 29 01/16/2021      Normocytic anemia hemoglobin 11.3 checked January 16, 2021, this is surely anemia of chronic disease and  postinflammatory effects, and he is nowhere needing transfusion.  Lab Results   Component Value Date    HGB 11.3 (L) 01/16/2021      Peripheral vascular disease with reduced SRIKANTH indices in both feet, but no focal stenosis on arterial ultrasound study.       History of acute pulmonary embolism and cor pulmonale, was unprovoked, diagnosed May 2020, has been on anticoagulation ever since with Eliquis which he will continue long-term.  He seems to be tolerating the Eliquis just fine.  He is on concomitant baby aspirin which I told him does increase the risk for bleeding when combined with Eliquis.  But I think the combination is appropriate for him since he has peripheral vascular disease.     Paroxysmal atrial fibrillation, and history of pulmonary embolism, on anticoagulation with Eliquis for those reasons.     Hyperlipidemia in the context of diabetes, with history of intolerance to statins, LDL cholesterol was 126 when measured July 22, 2020.      Constipation, I told him its okay to use MiraLAX 1 capful even daily, dissolved in liquid.

## 2021-06-15 NOTE — PROGRESS NOTES
FOOT AND ANKLE SURGERY/PODIATRY Progress Note      ASSESSMENT:   Ulceration right foot   DM2      TREATMENT:  -The right foot ulceration continues to slowly improve. I recommend he continue with non-weight bearing and medihoney.    -After discussion of risk factors and consent obtained 2% Lidocaine HCL jelly was applied, under clean conditions, the right and foot ulceration(s) were debrided using #15 blade scalpel.  Devitalized and nonviable tissue, along with any fibrin and slough, was removed to improve granulation tissue formation, stimulate wound healing, decrease overall bacteria load, disrupt biofilm formation and decrease edge senescence.  Total excisional debridement was 17.25 sq cm into the subcutaneous tissue with a depth of 0.2 cm.   Ulcers were improved afterwards and .  Measures were as noted on the flow sheet. Patient tolerated this well. Medi-honey with a gauze dressing was applied. He will continue to apply Medi-honey with a gauze dressing as directed.    -He will follow-up in 4 weeks.    John Garcia DPM  [facility] [place]      HPI: Caleb Hernandez was seen again today right foot ulceration. He has been using medihoney as directed. He has returned home and is using the knee walker to remain non-weight bearing.       Past Medical History:   Diagnosis Date     Abscess of right foot 11/23/2020    Added automatically from request for surgery [id number]     Acute pulmonary embolism with acute cor pulmonale (H) 5/23/2020     BPH (benign prostatic hyperplasia)      Cholelithiasis      Common bile duct (CBD) obstruction 9/4/2017     Compression Fracture Of Thoracic Vertebral Body     Created by Conversion  Replacement Utility updated for latest IMO load     Diabetes mellitus (H)      Essential hypertension      Hyperlipidemia      Pancreatitis      Rib Fracture     Created by Conversion  Replacement Utility updated for latest IMO load     Sepsis due to pneumonia (H) 9/8/2017       Past  Surgical History:   Procedure Laterality Date     BACK SURGERY      1964 removed a cyst     CHOLECYSTECTOMY  1985     ERCP       ERCP N/A 9/5/2017    Procedure: ENDOSCOPIC RETROGRADE CHOLANGIOPANCREATOGRAPHY SPHINCTEROTOMY AND STONE EXTRACTION;  Surgeon: Hansel Gannon MD;  Location: [facility];  Service:      INCISION AND DRAINAGE OF WOUND Right 11/2/2020    Procedure: INCISION AND DRAINAGE, right foot with removal of bone 5th metatarsal;  Surgeon: John Garcia DPM;  Location: [facility] OR;  Service: Podiatry     INCISION AND DRAINAGE OF WOUND Right 11/16/2020    Procedure: INCISION AND DRAINAGE, right foot;  Surgeon: John Garcia DPM;  Location: [facility] [place] OR;  Service: Podiatry     INCISION AND DRAINAGE OF WOUND Right 11/27/2020    Procedure: INCISION AND DRAINAGE, LOWER EXTREMITY;  Surgeon: Aleksandr Hdez DPM;  Location: [facility] OR;  Service: Podiatry     IR EXTREMITY ANGIOGRAM RIGHT  11/24/2020     PICC  11/30/2020          TOE AMPUTATION Right 11/16/2020    Procedure: with amputation of the fifth ray, peroneal brevis tendon transfer;  Surgeon: John Garcia DPM;  Location: [facility];  Service: Podiatry     TONSILLECTOMY  1940       Allergies   Allergen Reactions     Cresol      Muscle cramps     Crestor [Rosuvastatin] Myalgia     Declomycin [Demeclocycline] Hives         Current Outpatient Medications:      acetaminophen (TYLENOL) 500 MG tablet, Take 1-2 tablets (500-1,000 mg total) by mouth every 4 (four) hours as needed., Disp: , Rfl: 0     apixaban ANTICOAGULANT (ELIQUIS) 5 mg Tab tablet, Take 1 tablet (5 mg total) by mouth 2 (two) times a day., Disp: 60 tablet, Rfl: 3     aspirin 81 MG EC tablet, Take 81 mg by mouth daily., Disp: , Rfl:      blood glucose test strips, Test two times daily. Dispense brand per patient's insurance at pharmacy discretion., Disp: 200 strip, Rfl: 11     blood-glucose meter (ONETOUCH VERIO IQ METER) Misc, Use 1 each As  "Directed 2 (two) times a day., Disp: 1 each, Rfl: 0     cholecalciferol, vitamin D3, 5,000 unit Tab, Take by mouth., Disp: , Rfl:      insulin glargine (LANTUS SOLOSTAR U-100 INSULIN) 100 unit/mL (3 mL) pen, Inject 18 units in the morning and 35 units at bedtime (Patient taking differently: Inject 44 Units under the skin at bedtime. ), Disp: 90 mL, Rfl: 5     liraglutide (VICTOZA 3-RUPALI) 0.6 mg/0.1 mL (18 mg/3 mL) injection, Inject 0.3 mL (1.8 mg total) under the skin daily., Disp: , Rfl:      multivit-min/FA/lycopen/lutein (CENTRUM SILVER MEN ORAL), Take 1 tablet by mouth daily., Disp: , Rfl:      mupirocin (BACTROBAN) 2 % ointment, Apply topically daily as needed. Apply to wound., Disp: , Rfl:      pen needle, diabetic 31 gauge x 5/16\" Ndle, Used to inject insulin daily, Disp: 90 each, Rfl: 0     polyethylene glycol (MIRALAX) 17 gram packet, Take 1 packet (17 g total) by mouth daily as needed., Disp: , Rfl: 0     tamsulosin (FLOMAX) 0.4 mg cap, Take 1 capsule (0.4 mg total) by mouth daily after supper., Disp: 90 capsule, Rfl: 3     traMADoL (ULTRAM) 50 mg tablet, Take 50 mg by mouth every 6 (six) hours as needed for pain., Disp: , Rfl:      vitamin E 200 UNIT capsule, Take 400 Units by mouth daily., Disp: , Rfl:      vitamin E 400 unit capsule, Take 400 Units by mouth daily., Disp: , Rfl:     Review of Systems - 10 point Review of Systems is negative except for right foot ulceration which is noted in HPI.      OBJECTIVE:  Vitals:    02/24/21 0905   BP: 128/64   Pulse: 80   Resp: 16   Temp: 98.1  F (36.7  C)     General appearance: Patient is alert and fully cooperative with history & exam.  No sign of distress is noted during the visit.   Vascular: Dorsalis pedis non-palpableRight.  Dermatologic:    Urethral Catheter Coude 16 Fr. (Active)       Wound 07/24/17 Right lateral foot (Active)   Pre Size Length 2 10/23/20 1300   Pre Size Width 3 10/23/20 1300   Pre Size Depth 0.2 10/23/20 1300   Pre Total Sq cm 6 10/23/20 " 1300   Post Size Length 2 10/21/20 1400   Post Size Width 1.8 10/21/20 1400   Post Size Depth 0.6 10/21/20 1400   Post Total Sq cm 3.6 10/21/20 1400   Undermined n 10/19/20 1300   Tunneling n 10/19/20 1300   Description pink 10/16/20 1300   Prodcut Used Gauze 10/21/20 1400       VASC Wound 12/09/20 right foot (Active)   Pre Size Length 1.5 02/24/21 0900   Pre Size Width 11.5 02/24/21 0900   Pre Size Depth 0.2 02/24/21 0900   Pre Total Sq cm 17.25 02/24/21 0900       Wound 09/05/17 Foot Right (Active)       Incision 11/02/20 Surgical Foot Right (Active)       Incision 11/16/20 Surgical Foot Right (Active)       Incision 11/27/20 Surgical Foot Right (Active)       Incision 12/17/20 Surgical Foot Right (Active)   Granular/fibrotic tissue lateral right foot, post-debridement there is increased granular tissue. No erythema right foot.  Neurologic: Diminished to light touch Right.  Musculoskeletal: Contracted digits noted Right.

## 2021-06-15 NOTE — TELEPHONE ENCOUNTER
I called Autumn and gave her verbal orders for patient.  Nissa Narayanan CMA ............... 3:55 PM, 02/03/21

## 2021-06-15 NOTE — PROGRESS NOTES
Subjective:      Patient ID: Caleb Hernandez is a 82 y.o. male.    Chief Complaint:    HPI Caleb Hernandez is a 82 y.o. male with PMHx of DM, Hyperlipidemia, and HTN who presents today complaining of loss of balance x 1 week. Patient also was having an upset stomach at 1:00AM last night (16 hours ago), he vomited at that time. He is also complaining of right shoulder pain that started within the last day. He has not been taking daily asprin as he should.       Past Medical History:   Diagnosis Date     BPH (benign prostatic hyperplasia)      Cholelithiasis      Diabetes mellitus      Essential hypertension      Hyperlipidemia      Pancreatitis        Past Surgical History:   Procedure Laterality Date     BACK SURGERY      1964 removed a cyst     CHOLECYSTECTOMY  1985     ERCP       ERCP N/A 9/5/2017    Procedure: ENDOSCOPIC RETROGRADE CHOLANGIOPANCREATOGRAPHY SPHINCTEROTOMY AND STONE EXTRACTION;  Surgeon: Hansel Gannon MD;  Location: [facility];  Service:      TONSILLECTOMY  1940       Family History   Problem Relation Age of Onset     Aortic aneurysm Mother      Alcohol abuse Father        Social History   Substance Use Topics     Smoking status: Former Smoker     Quit date: 11/11/1991     Smokeless tobacco: Never Used     Alcohol use No       Review of Systems   Constitutional: Negative for fever.   Respiratory: Positive for cough. Negative for shortness of breath and wheezing.    Cardiovascular: Negative for chest pain.   Gastrointestinal: Positive for nausea and vomiting.   Musculoskeletal: Positive for arthralgias (right shoulder pain).   Neurological: Negative for syncope and headaches.        Positive for loss of balance       Objective:     /64 (Patient Site: Right Arm, Patient Position: Sitting, Cuff Size: Adult Regular)  Pulse 84  Temp 98.7  F (37.1  C) (Oral)   Resp 18  Wt 197 lb (89.4 kg)  SpO2 92%  BMI 29.09 kg/m2    Physical Exam   Constitutional: He appears well-developed and  well-nourished. No distress.   HENT:   Head: Normocephalic and atraumatic.   Right Ear: External ear normal.   Left Ear: External ear normal.   Eyes: Conjunctivae are normal.   Cardiovascular: Normal rate, regular rhythm and normal heart sounds.  Exam reveals no gallop and no friction rub.    No murmur heard.  Pulmonary/Chest: Effort normal and breath sounds normal. No respiratory distress. He has no wheezes. He has no rales.   Skin: He is not diaphoretic.   Psychiatric: He has a normal mood and affect. His behavior is normal. Judgment and thought content normal.   Nursing note and vitals reviewed.    Labs:  Recent Results (from the past 24 hour(s))   Electrocardiogram Perform - Clinic   Result Value Ref Range    SYSTOLIC BLOOD PRESSURE  mmHg    DIASTOLIC BLOOD PRESSURE  mmHg    VENTRICULAR RATE 82 BPM    ATRIAL RATE 82 BPM    P-R INTERVAL 146 ms    QRS DURATION 84 ms    Q-T INTERVAL 348 ms    QTC CALCULATION (BEZET) 406 ms    P Axis 40 degrees    R AXIS 8 degrees    T AXIS 32 degrees    MUSE DIAGNOSIS       Normal sinus rhythm  Possible Inferior infarct (cited on or before 04-SEP-2017)  Abnormal ECG  When compared with ECG of 04-SEP-2017 21:16,  No significant change was found       Clinical Decision Making:  Considering the patients age, risk factors, abnormal EKG, and symptoms of possible MI or CVA I felt that the patient should be seen [facility] in the ED. I recommended that the patient go via EMS, but the patient refused and signed an AMA form. He was given one 81mg ASA while he was in the clinic. The patient was transported to [facility] ED by his son. Report was called to ED provider.     Assessment:     Procedures    1. Dizziness  Electrocardiogram Perform - Clinic   2. Abnormal EKG  aspirin EC tablet 81 mg         Patient Instructions   1. Complete workup at [facility] ED.

## 2021-06-15 NOTE — TELEPHONE ENCOUNTER
Reason for Call:  Home Health Care    Josie with [facility] called regarding (reason for call): Verbal order    Orders are needed for this patient. Skilled Nursing    PT: na    OT: na    Skilled Nursinx week for 1 week 3 x week for 6 weeks 4x week for 2 weeks  For dressing change, cleanse with normal saline, medihoney gauze add and cerlix wrap.   Left foot wound    Pt Provider: Dr Otis Lozano    Phone Number Homecare Nurse can be reached at: [phone]    Can we leave a detailed message on this number? Yes     Best Time: any    Call taken on 2021 at 3:33 PM by Laura L Goldberg'

## 2021-06-15 NOTE — PROGRESS NOTES
[facility] Clinic Follow Up Note    Caleb Hernandez   82 y.o. male    Date of Visit: 2/5/2018    Chief Complaint   Patient presents with     Hospital Visit Follow Up     Subjective  This is an 82-year-old man who is in primarily to follow-up on a brief hospitalization at the end of last week for pneumonia.  I have had an opportunity to review the hospital records.  He seems to be doing well since discharge.  He is not short of breath and is gradually resuming his normal activity.  He is a diabetic and has been dealing with the diabetic foot ulcer for the past several months.  He continues to see the vascular people and is making progress.  He is still working nearly full-time but is currently on restrictions from the vascular people.  He did not bring his logbook today but tells me his sugars have been fairly stable.  There is been no recent change in his diabetic medication.  He is currently on antibiotics for the pneumonia and will finish those as directed.    ROS A comprehensive review of systems was performed and was otherwise negative    Medications, allergies, and problem list were reviewed and updated    Exam  General Appearance:   On examination his blood pressure is 138/60.  Weight is 199 pounds and height is 69 inches.  BMI is 29.39.    Lungs are clear today with good breath sounds.    Heart is in a sinus rhythm with rate of 72 and no ectopy.    No peripheral edema.    Patient is alert and oriented ×3.      Assessment/Plan  1. CAP (community acquired pneumonia)     2. Type 2 diabetes mellitus       Recent pneumonia.  He seems to be improving and his lungs are clear.  He will finish his antibiotic.    Diabetes.  Stable at this time.  Continue current medications.  Continue to monitor sugars.  I did complete a form for him for the [employer].    He will continue to see the vascular doctors for his ulcers.    I will see him back in 1 month for follow-up.  Total time of this  "office visit was 25 minutes with greater than 50% of the time spent in care coordination of patient counseling.  Body Mass Index was not assessed due to The patient was in for hospital follow-up..    Chaz Machado MD      Current Outpatient Prescriptions on File Prior to Visit   Medication Sig     aspirin 81 MG EC tablet Take 81 mg by mouth daily.     azithromycin (ZITHROMAX) 250 MG tablet Take 1 tablet (250 mg total) by mouth daily for 3 days.     cefdinir (OMNICEF) 300 MG capsule Take 1 capsule (300 mg total) by mouth 2 (two) times a day for 10 days.     cholecalciferol, vitamin D3, (VITAMIN D3) 5,000 unit Tab Take 5,000 Units by mouth daily.      insulin degludec (TRESIBA FLEXTOUCH U-200) 200 unit/mL (3 mL) InPn Inject 40 Units under the skin daily before breakfast.     liraglutide (VICTOZA) 0.6 mg/0.1 mL (18 mg/3 mL) PnIj injection Inject 0.2 mL (1.2 mg total) under the skin daily. With or without food.     lisinopril (PRINIVIL,ZESTRIL) 5 MG tablet Take 5 mg by mouth daily.     MULTIVITS,CA,MIN/IRON/FA/LYCOP (CENTRUM MEN ORAL) Take 1 tablet by mouth daily.     naproxen sodium (ALEVE) 220 MG tablet Take 220 mg by mouth daily.     pen needle, diabetic 31 gauge x 5/16\" Ndle Used to inject insulin daily     vitamin E 400 unit capsule Take 400 Units by mouth daily.     No current facility-administered medications on file prior to visit.      Allergies   Allergen Reactions     Crestor [Rosuvastatin] Myalgia     Demeclocycline Hives     Social History   Substance Use Topics     Smoking status: Former Smoker     Quit date: 11/11/1991     Smokeless tobacco: Never Used     Alcohol use No           "

## 2021-06-15 NOTE — TELEPHONE ENCOUNTER
RN cannot approve Refill Request    RN can NOT refill this medication PCP messaged that patient is overdue for Labs. Last office visit: 7/22/2020 Chaz Machado MD Last Physical: 10/25/2019 Last MTM visit: Visit date not found Last visit same specialty: 1/22/2021 Otis Lozano MD.  Next visit within 3 mo: Visit date not found  Next physical within 3 mo: Visit date not found      Britany I Nancy, [facility] Triage/Med Refill 2/13/2021    Requested Prescriptions   Pending Prescriptions Disp Refills     LANTUS SOLOSTAR U-100 INSULIN 100 unit/mL (3 mL) pen [Pharmacy Med Name: LANTUS SOLOSTAR 100 UNIT/ML] 15 mL PRN     Sig: INJECT 40 UNITS UNDER THE SKIN AT BEDTIME. DO NOT MIX LANTUS WITH ANY OTHER INSULIN       Insulin/GLP-1 Refill Protocol Failed - 2/13/2021  7:23 PM        Failed - Microalbumin in last year     Microalbumin, Random Urine   Date Value Ref Range Status   08/09/2012 15 1 - 20 mg/L Final                  Passed - Visit with PCP or prescribing provider visit in last 6 months     Last office visit with prescriber/PCP: Visit date not found OR same dept: 7/27/2020 Alonso Clancy MD OR same specialty: 1/22/2021 Otis oLzano MD Last physical: Visit date not found Last MTM visit: Visit date not found     Next appt within 3 mo: Visit date not found  Next physical within 3 mo: Visit date not found  Prescriber OR PCP: Chaz Machado MD  Last diagnosis associated with med order: 1. Type 2 diabetes mellitus (H)  - LANTUS SOLOSTAR U-100 INSULIN 100 unit/mL (3 mL) pen [Pharmacy Med Name: LANTUS SOLOSTAR 100 UNIT/ML]; INJECT 40 UNITS UNDER THE SKIN AT BEDTIME. DO NOT MIX LANTUS WITH ANY OTHER INSULIN  Dispense: 15 mL; Refill: PRN    If protocol passes may refill for 6 months if within 3 months of last provider visit (or a total of 9 months).              Passed - A1C in last 6 months     Hemoglobin A1c   Date Value Ref Range Status   09/04/2020 7.9 (H) <=5.6 % Final     Comment:      Normal <5.7% Prediabete 5.7-6.4% Diabletes 6.5% or higher - adopted from ADA consensus guidelines               Passed - Blood pressure in last year     BP Readings from Last 1 Encounters:   02/03/21 144/66             Passed - Creatinine done in last year     Creatinine   Date Value Ref Range Status   01/16/2021 0.93 0.70 - 1.30 mg/dL Final

## 2021-06-15 NOTE — PROGRESS NOTES
Office Visit - Follow Up   Caleb Hernandez   85 y.o. male    Date of Visit: 2/23/2021    Chief Complaint   Patient presents with     Follow-up        -------------------------------------------------------------------------------------------------------------------------  Assessment and Plan    Follow-up visit, doing really well since our previous meeting of January 22, 2021, and he has returned home after being discharged from the TCU at [facility] on February 2, 2021, and he received his first Covid vaccine there, then got his second Covid shot yesterday February 23.      On examination today his blood pressures great, he has normal temperature, oxygen saturation is 95%.  He had a follow-up visit with Dr. Garcia on February 3, 2021, and his foot is healing slowly but steadily.  The home care skilled nurse is doing dressing changes every other day, and he is getting physical therapy twice a week.  He is getting around the house using his walker.  I reminded him about the importance of not falling.  He continues on Eliquis.  He is off all antibiotics.    He told me that he got a morning blood sugar of 91 which is right where I want him to be.     Status post excision of right fifth metatarsal on November 2, 2020, wound cultures with Bacteroides and Enterobacter.  Status post skin grafting to the lateral right foot.  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  Underwent incision and drainage of abscess November 27, 2020, wound culture growing corynebacterium.  He was treated with vancomycin and Zosyn, then meropenem, then transition to outpatient intravenous ertapenem for week course, was completed January 11, 2020.     Urinary retention with hematuria, Dietrich catheter is out as of January 22, 2021, he told me that his bladder is working better.  I encouraged him to try to void  and empty the bladder about every 2 hours, and if there are ongoing problems with the bladder, then he could follow-up with Dr. Villa at [facility].  The Dietrich catheter was placed after angiogram because of acute urinary retention.  He developed hematuria November 30.  CT scan abdomen pelvis with irregular bladder wall thickening suggestive of cystitis.  Noted to have nonobstructing 11 mm right upper pole kidney stone but no hydronephrosis.  He is currently on Flomax (tamsulosin) and I want him to stay on that.     Peripheral vascular disease.  November 24, 2020 he had right lower extremity angiogram with balloon angioplasty of anterior tibial and peroneal arteries.  However postoperative ankle-brachial index did not improve significantly, although vascular surgery thought that was because of vasospasm.  He needs to follow-up with vascular surgery, and will have a repeat arterial Doppler ultrasound.     Adult failure to thrive.  He is try to get his strength back, and is receiving PT and OT at [facility].  Wt Readings from Last 3 Encounters:   02/23/21 181 lb (82.1 kg)   02/01/21 182 lb 12.8 oz (82.9 kg)   01/28/21 182 lb 9.6 oz (82.8 kg)     Type 2 diabetes with suboptimal control, last A1c 7.9 measured September 4, 2020, currently on insulin and Victoza but no metformin  Currently on Lantus 44 units at bedtime, at the moment he is not using any mealtime NovoLog.  Also Victoza 1.8 mg injected once a day    liraglutide (VICTOZA 3-RUPALI) 0.6 mg/0.1 mL (18 mg/3 mL) injection 1.8 mg, DAILY       A1c was 7.9 check September 4, 2020.     Lab Results   Component Value Date    HGBA1C 7.9 (H) 09/04/2020      Essential hypertension, he had been taking lisinopril 5 mg a day as an outpatient, but lisinopril is not listed on his [facility] medication profile.  I think it is just as well that we pause the lisinopril for now, since I note that his blood pressure here in the clinic today January 22, 2021 is 118/50.      Lab Results   Component Value Date    CREATININE 0.93 01/16/2021    BUN 18 01/16/2021     01/16/2021    K 4.8 01/16/2021     01/16/2021    CO2 29 01/16/2021     Normocytic anemia hemoglobin 11.3 checked January 16, 2021, this is surely anemia of chronic disease and postinflammatory effects, and he is nowhere needing transfusion.  Lab Results   Component Value Date    HGB 11.3 (L) 01/16/2021      Peripheral vascular disease with reduced SRIKANTH indices in both feet, but no focal stenosis on arterial ultrasound study.       History of acute pulmonary embolism and cor pulmonale, was unprovoked, diagnosed May 2020, has been on anticoagulation ever since with Eliquis which he will continue long-term.  He seems to be tolerating the Eliquis just fine.  He is on concomitant baby aspirin which I told him does increase the risk for bleeding when combined with Eliquis.  But I think the combination is appropriate for him since he has peripheral vascular disease.     Paroxysmal atrial fibrillation, and history of pulmonary embolism, on anticoagulation with Eliquis for those reasons.     Hyperlipidemia in the context of diabetes, with history of intolerance to statins, LDL cholesterol was 126 when measured July 22, 2020.      Constipation, I told him its okay to use MiraLAX 1 capful even daily, dissolved in liquid.      --------------------------------------------------------------------------------------------------------------------------  History of Present Illness  This 85 y.o. old    Follow-up visit, doing really well since our previous meeting of January 22, 2021, and he has returned home after being discharged from the TCU at [facility] on February 2, 2021, and he received his first Covid vaccine there, then got his second Covid shot yesterday February 23.      On examination today his blood pressures great, he has normal temperature, oxygen saturation is 95%.  He had a follow-up visit with Dr. Garcia on  February 3, 2021, and his foot is healing slowly but steadily.  The home care skilled nurse is doing dressing changes every other day, and he is getting physical therapy twice a week.  He is getting around the house using his walker.  I reminded him about the importance of not falling.  He continues on Eliquis.  He is off all antibiotics.    He told me that he got a morning blood sugar of 91 which is right where I want him to be.      Wt Readings from Last 3 Encounters:   02/23/21 181 lb (82.1 kg)   02/01/21 182 lb 12.8 oz (82.9 kg)   01/28/21 182 lb 9.6 oz (82.8 kg)     BP Readings from Last 3 Encounters:   02/23/21 116/50   02/03/21 144/66   02/01/21 103/62       Lab Results   Component Value Date    WBC 7.0 01/16/2021    HGB 11.3 (L) 01/16/2021    HCT 35.0 (L) 01/16/2021     01/16/2021    CHOL 178 07/22/2020    TRIG 60 07/22/2020    HDL 40 07/22/2020    LDLDIRECT 127 01/23/2017    ALT 13 01/16/2021    AST 15 01/16/2021     01/16/2021    K 4.8 01/16/2021     01/16/2021    CREATININE 0.93 01/16/2021    BUN 18 01/16/2021    CO2 29 01/16/2021    PSA 5.8 01/23/2017    INR 1.23 (H) 11/27/2020    HGBA1C 7.9 (H) 09/04/2020    MICROALBUR 15 08/09/2012     ---------------------------------------------------------------------------------------------------------------------------    Medications, Allergies, Social, and Problem List   Current Outpatient Medications   Medication Sig Dispense Refill     acetaminophen (TYLENOL) 500 MG tablet Take 1-2 tablets (500-1,000 mg total) by mouth every 4 (four) hours as needed.  0     apixaban ANTICOAGULANT (ELIQUIS) 5 mg Tab tablet Take 1 tablet (5 mg total) by mouth 2 (two) times a day. 60 tablet 3     aspirin 81 MG EC tablet Take 81 mg by mouth daily.       blood glucose test strips Use 1 each As Directed 2 (two) times a day. Dispense brand per patient's insurance at pharmacy discretion. 120 strip 6     blood-glucose meter (ONETOUCH VERIO IQ METER) Misc Use 1 each As  "Directed 2 (two) times a day. 1 each 0     cholecalciferol, vitamin D3, 5,000 unit Tab Take by mouth.       insulin glargine (LANTUS SOLOSTAR U-100 INSULIN) 100 unit/mL (3 mL) pen Inject 18 units in the morning and 35 units at bedtime (Patient taking differently: Inject 44 Units under the skin at bedtime. ) 90 mL 5     liraglutide (VICTOZA 3-RUPALI) 0.6 mg/0.1 mL (18 mg/3 mL) injection Inject 0.3 mL (1.8 mg total) under the skin daily.       multivit-min/FA/lycopen/lutein (CENTRUM SILVER MEN ORAL) Take 1 tablet by mouth daily.       mupirocin (BACTROBAN) 2 % ointment Apply topically daily as needed. Apply to wound.       pen needle, diabetic 31 gauge x 5/16\" Ndle Used to inject insulin daily 90 each 0     polyethylene glycol (MIRALAX) 17 gram packet Take 1 packet (17 g total) by mouth daily as needed.  0     tamsulosin (FLOMAX) 0.4 mg cap Take 1 capsule (0.4 mg total) by mouth daily after supper.  0     traMADoL (ULTRAM) 50 mg tablet Take 50 mg by mouth every 6 (six) hours as needed for pain.       vitamin E 200 UNIT capsule Take 400 Units by mouth daily.       vitamin E 400 unit capsule Take 400 Units by mouth daily.       No current facility-administered medications for this visit.      Allergies   Allergen Reactions     Cresol      Muscle cramps     Crestor [Rosuvastatin] Myalgia     Declomycin [Demeclocycline] Hives     Social History     Tobacco Use     Smoking status: Former Smoker     Quit date: 1991     Years since quittin.3     Smokeless tobacco: Never Used   Substance Use Topics     Alcohol use: No     Drug use: No     Patient Active Problem List   Diagnosis     Hyperlipidemia     Essential hypertension     Statin intolerance     Personal history of PE (pulmonary embolism)-- May 2020, unprovoked, with right heart strain     PVD (peripheral vascular disease) (H)     Overweight (BMI 25.0-29.9)     Chronic anticoagulation     Osteomyelitis of right foot (H)     Atherosclerosis of native artery of " right lower extremity with ulceration of other part of foot (H)     Septic arthritis of right foot (H)     Gross hematuria     Urinary retention     Type 2 diabetes mellitus with diabetic peripheral angiopathy without gangrene, with long-term current use of insulin (H)     Adult failure to thrive     Normocytic anemia     Paroxysmal atrial fibrillation (H)     ACP (advance care planning)     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 2 diabetes mellitus, with necrosis of muscle (H)     Hematuria     Amputated toe, right (H)        Reviewed, reconciled and updated       Physical Exam   General Appearance: Seated in wheelchair, breathing comfortably, normal mental status, in great spirits, excellent blood pressure    /50 (Patient Site: Right Arm, Patient Position: Sitting, Cuff Size: Adult Regular)   Pulse 80   Temp 97.9  F (36.6  C) (Oral)   Wt 181 lb (82.1 kg)   BMI 25.97 kg/m      Lungs clear  Heart regular rate and rhythm  Abdomen nontender  Right foot is wrapped in dressing     Additional Information   I spent 20 minutes on this encounter, including reviewing interval history since our last visit, examining the patient, explaining and counseling the issues enumerated in the Assessment and Plan (patient given a copy)     Otis Lozano MD

## 2021-06-15 NOTE — TELEPHONE ENCOUNTER
Reason for Call:  Home Health Care    Tong  with [facility] called regarding (reason for call): Verbal order    Orders are needed for this patient. PT    PT: extension of PT  1x week for 1 week  For PT discharge visit per clients request     OT: na    Skilled Nursing: na    Pt Provider: Dr Otis Lozano    Phone Number Homecare Nurse can be reached at: [phone]    Can we leave a detailed message on this number? Yes     Best Time: any    Call taken on 3/5/2021 at 10:40 AM by Laura L Goldberg

## 2021-06-15 NOTE — TELEPHONE ENCOUNTER
Reason for Call: Request for an order or referral:    Order or referral being requested: PT    Date needed: as soon as possible    Has the patient been seen by the PCP for this problem? YES and Not Applicable    Additional comments: PT orders for once a week x 1 week.  Twice week x 3 weeks for strengthening both legs, gait, balance and transfers.    Phone number Patient can be reached at:  Other phone number:  [phone] (Иван)    Best Time:  Any time    Can we leave a detailed message on this number?  Yes    Call taken on 2/8/2021 at 2:48 PM by Ines Pacheco

## 2021-06-15 NOTE — TELEPHONE ENCOUNTER
RN cannot approve Refill Request    RN can NOT refill this medication Route to ordering provider per protocol. Last office visit: 7/22/2020 Chaz Machado MD Last Physical: 10/25/2019 Last MTM visit: Visit date not found Last visit same specialty: 2/23/2021 Otis Lozano MD.  Next visit within 3 mo: Visit date not found  Next physical within 3 mo: Visit date not found      Britany Cruz, [facility] Triage/Med Refill 3/1/2021    Requested Prescriptions   Pending Prescriptions Disp Refills     ELIQUIS 5 mg Tab tablet [Pharmacy Med Name: ELIQUIS 5 MG TABLET] 60 tablet 3     Sig: TAKE 1 TABLET BY MOUTH TWICE A DAY       Apixaban/Rivaroxaban/Dabigatran Refill Protocol Failed - 3/1/2021  1:31 PM        Failed - Route to appropriate pool/provider     Last Anticoagulation Summary:           Passed - Renal function test in last year     Creatinine   Date Value Ref Range Status   01/16/2021 0.93 0.70 - 1.30 mg/dL Final             Passed - PCP or prescribing provider visit in past 12 months       Last office visit with prescriber/PCP: 7/22/2020 Chaz Machado MD OR same dept: 7/27/2020 Alonso Clancy MD OR same specialty: 2/23/2021 Otis Lozano MD  Last physical: 10/25/2019 Last MTM visit: Visit date not found   Next visit within 3 mo: Visit date not found  Next physical within 3 mo: Visit date not found  Prescriber OR PCP: Chaz Machado MD  Last diagnosis associated with med order: 1. Pulmonary embolism without acute cor pulmonale, unspecified chronicity, unspecified pulmonary embolism type (H)  - ELIQUIS 5 mg Tab tablet [Pharmacy Med Name: ELIQUIS 5 MG TABLET]; TAKE 1 TABLET BY MOUTH TWICE A DAY  Dispense: 60 tablet; Refill: 3    If protocol passes may refill for 12 months if within 3 months of last provider visit (or a total of 15 months).

## 2021-06-15 NOTE — TELEPHONE ENCOUNTER
I approve    PT orders for once a week x 1 week.  Twice week x 3 weeks for strengthening both legs, gait, balance and transfers.

## 2021-06-15 NOTE — TELEPHONE ENCOUNTER
Left detailed voicemail for Ghassan.  Nissa Narayanan [facility] ............... 2:18 PM, 03/05/21

## 2021-06-15 NOTE — TELEPHONE ENCOUNTER
Reason for Call:  Other      Detailed comments: Per Felicia @ [email or web address] patients blood surgar levels in evening are trending higher.  She will fax  copy of his blood sugar levels for your review    Felicia @ [email or web address]  [phone] for any orders    Requesting copy of patient current medication list be faxed to [phone]    Can we leave a detailed message on this number?: Yes    Call taken on 3/3/2021 at 1:48 PM by Laura L Goldberg

## 2021-06-15 NOTE — PATIENT INSTRUCTIONS - HE
Remain non-weightbearing on right foot with surgical shoe on.    Wear tubular compression on right leg.    Continue medihoney on right foot ulcer per instructions below:    1. Remove old Medihoney packing.  2. Cleanse with normal saline, pat dry.  3. Apply Medihoney alginate into the wound. Try to avoid medihoney on the intact skin.   4. Cover with a gauze dressing and secure with Roll Gauze and paper tape.  5. Change every other day.      It is not ok to get your wound wet     Call the clinic for any questions regarding your wound or cares and if you experience signs or symptoms of infection including: fevers, chills, increased redness, increased drainage, foul odor, increased pain at [phone].

## 2021-06-16 PROBLEM — Z71.89 ACP (ADVANCE CARE PLANNING): Status: ACTIVE | Noted: 2020-12-07

## 2021-06-16 PROBLEM — D64.9 NORMOCYTIC ANEMIA: Status: ACTIVE | Noted: 2020-12-02

## 2021-06-16 PROBLEM — L97.413 DIABETIC ULCER OF RIGHT MIDFOOT ASSOCIATED WITH TYPE 2 DIABETES MELLITUS, WITH NECROSIS OF MUSCLE (H): Status: ACTIVE | Noted: 2020-12-09

## 2021-06-16 PROBLEM — R31.0 GROSS HEMATURIA: Status: ACTIVE | Noted: 2020-12-02

## 2021-06-16 PROBLEM — E66.3 OVERWEIGHT (BMI 25.0-29.9): Status: ACTIVE | Noted: 2020-10-22

## 2021-06-16 PROBLEM — R31.9 HEMATURIA: Status: ACTIVE | Noted: 2020-12-10

## 2021-06-16 PROBLEM — M86.9 OSTEOMYELITIS OF RIGHT FOOT (H): Status: ACTIVE | Noted: 2020-10-23

## 2021-06-16 PROBLEM — Z78.9 STATIN INTOLERANCE: Status: ACTIVE | Noted: 2020-10-22

## 2021-06-16 PROBLEM — E11.621 DIABETIC ULCER OF RIGHT MIDFOOT ASSOCIATED WITH TYPE 2 DIABETES MELLITUS, WITH NECROSIS OF MUSCLE (H): Status: ACTIVE | Noted: 2020-12-09

## 2021-06-16 PROBLEM — S98.131A: Status: ACTIVE | Noted: 2021-01-22

## 2021-06-16 PROBLEM — K59.03 DRUG-INDUCED CONSTIPATION: Status: ACTIVE | Noted: 2021-05-25

## 2021-06-16 PROBLEM — R33.9 URINARY RETENTION: Status: ACTIVE | Noted: 2020-12-02

## 2021-06-16 PROBLEM — M00.9: Status: ACTIVE | Noted: 2020-12-02

## 2021-06-16 PROBLEM — M21.541: Status: ACTIVE | Noted: 2021-05-14

## 2021-06-16 NOTE — PROGRESS NOTES
VASCULAR SURGERY OUTPATIENT CONSULT OR VISIT   VASCULAR SURGEON: Lourdes Raymond MD    LOCATION:  [facility]    Caleb Hernandez   Medical Record #:  [id number]  YOB: 1935  Age:  85 y.o.     Date of Service: 3/30/2021    PRIMARY CARE PROVIDER: Otis Lozano MD      Reason for consultation: Follow-up of critical limb ischemia    IMPRESSION: Patient almost completely healed from his right foot wound.  Toe pressures remain concerning at 33 mmHg which is actually lower than it has been.  That said the arterial duplex shows sharp biphasic waveforms at the ankle level to the posterior tibial artery.  Left foot has critically poor toe pressures at only 6 mmHg on today's study.  This is far far lower than they have ever been.  No symptoms or wounds on this limb.  Patient on Plavix and statin.    In this particular patient's case it is hard to know what his toe pressures mean.  His toe pressures have been 122 mmHg on the right and 144 mmHg on the left in January 2024 he developed wounds and for things deteriorated.  It may well be that those numbers were completely erroneous.  After intervention his numbers have also never really been good but his wounds have healed and his duplexes have looked better.  It is certainly possible that there has been some microembolization but this does not fit well with the appearance of his skin and his lack of pain and is healing of wounds.  At any regard he is clearly better than his numbers.  It is also clear that based on his angiography he was much worse than his numbers prior to intervention.    RECOMMENDATION: Overall doing very well.  Concerned particularly about the left foot being at risk for wounds in the future and discussed careful foot care with him.  I will plan on seeing him back in 3 months time with repeat ABIs and right leg arterial duplex.    HPI:  Caleb Hernandez is a 85 y.o. male who was seen today in follow-up of his PAD.  This is a  gentleman who had nonhealing wounds of his right base of foot despite reasonable toe pressures 54 mmHg.  We took him for angiogram and found disease in all 3 tibial vessels.  Successfully treated but toe pressures really did not improve.  Importantly duplex and waveforms appeared much better and patient did dramatically go on to improve his wounds.  Seen just last week by Dr. Garcia who says things are essentially healed.  Patient clinically doing well and looking forward to being able to wear normal shoe in a couple of weeks.  No foot pains.  No wounds on his other foot.        No other new health issues.    PHH:    Past Medical History:   Diagnosis Date     Abscess of right foot 11/23/2020    Added automatically from request for surgery [id number]     Acute pulmonary embolism with acute cor pulmonale (H) 5/23/2020     BPH (benign prostatic hyperplasia)      Cholelithiasis      Common bile duct (CBD) obstruction 9/4/2017     Compression Fracture Of Thoracic Vertebral Body     Created by Conversion  Replacement Utility updated for latest IMO load     Diabetes mellitus (H)      Essential hypertension      Hyperlipidemia      Pancreatitis      Rib Fracture     Created by Conversion  Replacement Utility updated for latest IMO load     Sepsis due to pneumonia (H) 9/8/2017        Past Surgical History:   Procedure Laterality Date     BACK SURGERY      1964 removed a cyst     CHOLECYSTECTOMY  1985     ERCP       ERCP N/A 9/5/2017    Procedure: ENDOSCOPIC RETROGRADE CHOLANGIOPANCREATOGRAPHY SPHINCTEROTOMY AND STONE EXTRACTION;  Surgeon: Hansel Gannon MD;  Location: [facility];  Service:      INCISION AND DRAINAGE OF WOUND Right 11/2/2020    Procedure: INCISION AND DRAINAGE, right foot with removal of bone 5th metatarsal;  Surgeon: John Garcia DPM;  Location: [facility] OR;  Service: Podiatry     INCISION AND DRAINAGE OF WOUND Right 11/16/2020    Procedure: INCISION AND DRAINAGE, right foot;  Surgeon:  "John Garcia DPM;  Location: [facility] OR;  Service: Podiatry     INCISION AND DRAINAGE OF WOUND Right 11/27/2020    Procedure: INCISION AND DRAINAGE, LOWER EXTREMITY;  Surgeon: Aleksandr Hdez DPM;  Location: [facility] OR;  Service: Podiatry     IR EXTREMITY ANGIOGRAM RIGHT  11/24/2020     PICC  11/30/2020          TOE AMPUTATION Right 11/16/2020    Procedure: with amputation of the fifth ray, peroneal brevis tendon transfer;  Surgeon: John Garcia DPM;  Location: [facility] OR;  Service: Podiatry     TONSILLECTOMY  1940       ALLERGIES:  Cresol, Crestor [rosuvastatin], and Declomycin [demeclocycline]    MEDS:    Current Outpatient Medications:      acetaminophen (TYLENOL) 500 MG tablet, Take 1-2 tablets (500-1,000 mg total) by mouth every 4 (four) hours as needed., Disp: , Rfl: 0     aspirin 81 MG EC tablet, Take 81 mg by mouth daily., Disp: , Rfl:      blood glucose test strips, Test two times daily. Dispense brand per patient's insurance at pharmacy discretion., Disp: 200 strip, Rfl: 11     blood-glucose meter (ONETOUCH VERIO IQ METER) Misc, Use 1 each As Directed 2 (two) times a day., Disp: 1 each, Rfl: 0     cholecalciferol, vitamin D3, 5,000 unit Tab, Take by mouth., Disp: , Rfl:      ELIQUIS 5 mg Tab tablet, TAKE 1 TABLET BY MOUTH TWICE A DAY, Disp: 60 tablet, Rfl: 3     insulin glargine (LANTUS SOLOSTAR U-100 INSULIN) 100 unit/mL (3 mL) pen, Inject 18 units in the morning and 35 units at bedtime (Patient taking differently: Inject 44 Units under the skin at bedtime. ), Disp: 90 mL, Rfl: 5     liraglutide (VICTOZA 3-RUPALI) 0.6 mg/0.1 mL (18 mg/3 mL) injection, Inject 0.3 mL (1.8 mg total) under the skin daily., Disp: , Rfl:      multivit-min/FA/lycopen/lutein (CENTRUM SILVER MEN ORAL), Take 1 tablet by mouth daily., Disp: , Rfl:      mupirocin (BACTROBAN) 2 % ointment, Apply topically daily as needed. Apply to wound., Disp: , Rfl:      pen needle, diabetic 31 gauge x 5/16\" Ndle, " Used to inject insulin daily, Disp: 90 each, Rfl: 0     polyethylene glycol (MIRALAX) 17 gram packet, Take 1 packet (17 g total) by mouth daily as needed., Disp: , Rfl: 0     tamsulosin (FLOMAX) 0.4 mg cap, Take 1 capsule (0.4 mg total) by mouth daily after supper., Disp: 90 capsule, Rfl: 3     traMADoL (ULTRAM) 50 mg tablet, Take 50 mg by mouth every 6 (six) hours as needed for pain., Disp: , Rfl:      vitamin E 200 UNIT capsule, Take 400 Units by mouth daily., Disp: , Rfl:      vitamin E 400 unit capsule, Take 400 Units by mouth daily., Disp: , Rfl:     SOCIAL HABITS:    Social History     Tobacco Use   Smoking Status Former Smoker     Quit date: 1991     Years since quittin.4   Smokeless Tobacco Never Used       Social History     Substance and Sexual Activity   Alcohol Use No       Social History     Substance and Sexual Activity   Drug Use No       FAMILY HISTORY:    Family History   Problem Relation Age of Onset     Aortic aneurysm Mother      Alcohol abuse Father        REVIEW OF SYSTEMS:    A 12 point ROS was reviewed and except for what is listed in the HPI above, all others are negative    PE:  There were no vitals taken for this visit.  Wt Readings from Last 1 Encounters:   21 181 lb (82.1 kg)     There is no height or weight on file to calculate BMI.    EXAM:  GENERAL: This is a well-developed 85 y.o. male who appears his stated age  EYES: Grossly normal.  MOUTH: Buccal mucosa normal   MUSCULOSKELETAL: Grossly normal and both lower extremities are intact.  HEME/LYMPH: No lymphedema  NEUROLOGIC: Focally intact, Alert and oriented x 3.   PSYCH: appropriate affect  INTEGUMENT: No open lesions or ulcers        DIAGNOSTIC STUDIES:     Images:  No results found.    I personally reviewed the images and my interpretation is that his toe pressures are concerning difficulty with wound healing at 39 mmHg on the right and only 6 mmHg on the left.  Arterial duplex on the right does show nice biphasic  waveforms all the way down to the ankle suggesting ongoing patency of his tibial vessels..    LABS:      Sodium   Date Value Ref Range Status   01/16/2021 140 136 - 145 mmol/L Final   01/12/2021 140 136 - 145 mmol/L Final   01/05/2021 140 136 - 145 mmol/L Final     Potassium   Date Value Ref Range Status   01/16/2021 4.8 3.5 - 5.0 mmol/L Final   01/12/2021 4.6 3.5 - 5.0 mmol/L Final   01/05/2021 4.3 3.5 - 5.0 mmol/L Final     Chloride   Date Value Ref Range Status   01/16/2021 103 98 - 107 mmol/L Final   01/12/2021 102 98 - 107 mmol/L Final   01/05/2021 103 98 - 107 mmol/L Final     BUN   Date Value Ref Range Status   01/16/2021 18 8 - 28 mg/dL Final   01/12/2021 18 8 - 28 mg/dL Final   01/05/2021 18 8 - 28 mg/dL Final     Creatinine   Date Value Ref Range Status   01/16/2021 0.93 0.70 - 1.30 mg/dL Final   01/12/2021 0.89 0.70 - 1.30 mg/dL Final   01/05/2021 0.96 0.70 - 1.30 mg/dL Final     Hemoglobin   Date Value Ref Range Status   01/16/2021 11.3 (L) 14.0 - 18.0 g/dL Final   01/12/2021 13.3 (L) 14.0 - 18.0 g/dL Final   01/05/2021 12.6 (L) 14.0 - 18.0 g/dL Final     Platelets   Date Value Ref Range Status   01/16/2021 253 140 - 440 thou/uL Final   01/12/2021 301 140 - 440 thou/uL Final   01/05/2021 293 140 - 440 thou/uL Final     BNP   Date Value Ref Range Status   02/01/2018 86 0 - 88 pg/mL Final     INR   Date Value Ref Range Status   11/27/2020 1.23 (H) 0.90 - 1.10 Final   11/23/2020 1.26 (H) 0.90 - 1.10 Final   09/05/2017 1.27 (H) 0.90 - 1.10 Final       32 minutes spent on the day of encounter doing chart review, history and exam, documentation, and further activities as noted.     Lourdes Raymond MD  VASCULAR SURGERY

## 2021-06-16 NOTE — TELEPHONE ENCOUNTER
Reason for Call:  Other FYI     Detailed comments: Patient was not seen by HomeCare today as patient was seen in clinic by Vascular for wound care.  Per Meg @ [facility]  Phone cut off before phone number could be provided.    Call taken on 3/24/2021 at 3:20 PM by Laura L Goldberg '

## 2021-06-16 NOTE — TELEPHONE ENCOUNTER
Reason for Call:  Home Health Care    Meg RN with [facility] called regarding (reason for call): Verbal Order Request    Orders are needed for this patient. Skilled Nursing    PT: na    OT: na    [facility] for 1 week for this week, 1x for 1 wk next week for wound care.     Pt Provider: Dr. Lozano    Phone Number Homecare Nurse can be reached at: [phone]    Can we leave a detailed message on this number? Yes     Phone number patient can be reached at: Home number on file [phone] (home)    Best Time: anytime    Call taken on 3/31/2021 at 11:46 AM by Janice Curran

## 2021-06-16 NOTE — PROGRESS NOTES
FOOT AND ANKLE SURGERY/PODIATRY Progress Note      ASSESSMENT:   Ulceration right foot   DM2      TREATMENT:  -The right foot ulceration has considerably improved since his last visit. I recommend he continue with medihoney and limited to non-weight bearing with surgical shoe/CAM boot, protein supplements two times a day.     -After discussion of risk factors and consent obtained 2% Lidocaine HCL jelly was applied, under clean conditions, the right and foot ulceration(s) were debrided using #15 blade scalpel.  Devitalized and nonviable tissue, along with any fibrin and slough, was removed to improve granulation tissue formation, stimulate wound healing, decrease overall bacteria load, disrupt biofilm formation and decrease edge senescence.  Total excisional debridement was 0.12 sq cm into the subcutaneous tissue with a depth of 0.2 cm.   Ulcers were improved afterwards and .  Measures were as noted on the flow sheet. Patient tolerated this well. Medi-honey with a gauze dressing was applied. He will continue to apply Medi-honey with a gauze dressing as directed.    -He will follow-up in 3 weeks.    John Garcia DPM  [facility] [place]      HPI: Caleb SELENE Hernandez was seen again today for a right foot ulceration. The patient has remained mostly non-weight bearing and has used protein supplements two times a day as directed.      Past Medical History:   Diagnosis Date     Abscess of right foot 11/23/2020    Added automatically from request for surgery [id number]     Acute pulmonary embolism with acute cor pulmonale (H) 5/23/2020     BPH (benign prostatic hyperplasia)      Cholelithiasis      Common bile duct (CBD) obstruction 9/4/2017     Compression Fracture Of Thoracic Vertebral Body     Created by Conversion  Replacement Utility updated for latest IMO load     Diabetes mellitus (H)      Essential hypertension      Hyperlipidemia      Pancreatitis      Rib Fracture     Created by Conversion   Replacement Utility updated for latest IMO load     Sepsis due to pneumonia (H) 9/8/2017       Past Surgical History:   Procedure Laterality Date     BACK SURGERY      1964 removed a cyst     CHOLECYSTECTOMY  1985     ERCP       ERCP N/A 9/5/2017    Procedure: ENDOSCOPIC RETROGRADE CHOLANGIOPANCREATOGRAPHY SPHINCTEROTOMY AND STONE EXTRACTION;  Surgeon: Hansel Gannon MD;  [place] [facility]:      INCISION AND DRAINAGE OF WOUND Right 11/2/2020    Procedure: INCISION AND DRAINAGE, right foot with removal of bone 5th metatarsal;  Surgeon: John Garcia DPM;  Location: [facility] OR;  Service: Podiatry     INCISION AND DRAINAGE OF WOUND Right 11/16/2020    Procedure: INCISION AND DRAINAGE, right foot;  Surgeon: John Garcia DPM;  Location: [facility] OR;  Service: Podiatry     INCISION AND DRAINAGE OF WOUND Right 11/27/2020    Procedure: INCISION AND DRAINAGE, LOWER EXTREMITY;  Surgeon: Aleksandr Hdez DPM;  Location: [facility] OR;  Service: Podiatry     IR EXTREMITY ANGIOGRAM RIGHT  11/24/2020     PICC  11/30/2020          TOE AMPUTATION Right 11/16/2020    Procedure: with amputation of the fifth ray, peroneal brevis tendon transfer;  Surgeon: John Garcia DPM;  Location: [facility] OR;  Service: Podiatry     TONSILLECTOMY  1940       Allergies   Allergen Reactions     Cresol      Muscle cramps     Crestor [Rosuvastatin] Myalgia     Declomycin [Demeclocycline] Hives         Current Outpatient Medications:      acetaminophen (TYLENOL) 500 MG tablet, Take 1-2 tablets (500-1,000 mg total) by mouth every 4 (four) hours as needed., Disp: , Rfl: 0     aspirin 81 MG EC tablet, Take 81 mg by mouth daily., Disp: , Rfl:      blood glucose test strips, Test two times daily. Dispense brand per patient's insurance at pharmacy discretion., Disp: 200 strip, Rfl: 11     blood-glucose meter (ONETOUCH VERIO IQ METER) Misc, Use 1 each As Directed 2 (two) times a day., Disp: 1  "each, Rfl: 0     cholecalciferol, vitamin D3, 5,000 unit Tab, Take by mouth., Disp: , Rfl:      ELIQUIS 5 mg Tab tablet, TAKE 1 TABLET BY MOUTH TWICE A DAY, Disp: 60 tablet, Rfl: 3     insulin glargine (LANTUS SOLOSTAR U-100 INSULIN) 100 unit/mL (3 mL) pen, Inject 18 units in the morning and 35 units at bedtime (Patient taking differently: Inject 44 Units under the skin at bedtime. ), Disp: 90 mL, Rfl: 5     liraglutide (VICTOZA 3-RUPALI) 0.6 mg/0.1 mL (18 mg/3 mL) injection, Inject 0.3 mL (1.8 mg total) under the skin daily., Disp: , Rfl:      multivit-min/FA/lycopen/lutein (CENTRUM SILVER MEN ORAL), Take 1 tablet by mouth daily., Disp: , Rfl:      mupirocin (BACTROBAN) 2 % ointment, Apply topically daily as needed. Apply to wound., Disp: , Rfl:      pen needle, diabetic 31 gauge x 5/16\" Ndle, Used to inject insulin daily, Disp: 90 each, Rfl: 0     polyethylene glycol (MIRALAX) 17 gram packet, Take 1 packet (17 g total) by mouth daily as needed., Disp: , Rfl: 0     tamsulosin (FLOMAX) 0.4 mg cap, Take 1 capsule (0.4 mg total) by mouth daily after supper., Disp: 90 capsule, Rfl: 3     traMADoL (ULTRAM) 50 mg tablet, Take 50 mg by mouth every 6 (six) hours as needed for pain., Disp: , Rfl:      vitamin E 200 UNIT capsule, Take 400 Units by mouth daily., Disp: , Rfl:      vitamin E 400 unit capsule, Take 400 Units by mouth daily., Disp: , Rfl:     Review of Systems - 10 point Review of Systems is negative except for right foot ulcer which is noted in HPI.      OBJECTIVE:  Vitals:    03/24/21 1040   BP: 110/66   Pulse: 76   Resp: 18   Temp: 97.9  F (36.6  C)     General appearance: Patient is alert and fully cooperative with history & exam.  No sign of distress is noted during the visit.   Vascular: Dorsalis pedis non-palpableRight.  Dermatologic:    Urethral Catheter Coude 16 Fr. (Active)       Wound 07/24/17 Right lateral foot (Active)   Pre Size Length 2 10/23/20 1300   Pre Size Width 3 10/23/20 1300   Pre Size Depth " 0.2 10/23/20 1300   Pre Total Sq cm 6 10/23/20 1300   Post Size Length 2 10/21/20 1400   Post Size Width 1.8 10/21/20 1400   Post Size Depth 0.6 10/21/20 1400   Post Total Sq cm 3.6 10/21/20 1400   Undermined n 10/19/20 1300   Tunneling n 10/19/20 1300   Description pink 10/16/20 1300   Prodcut Used Gauze 10/21/20 1400       VASC Wound 12/09/20 right foot (Active)   Pre Size Length 1.5 02/24/21 0900   Pre Size Width 11.5 02/24/21 0900   Pre Size Depth 0.2 02/24/21 0900   Pre Total Sq cm 17.25 02/24/21 0900   Post Size Length 2 03/24/21 1000   Post Size Width 0.6 03/24/21 1000   Post Size Depth 0.2 03/24/21 1000   Post Total Sq cm 0.12 03/24/21 1000       Wound 09/05/17 Foot Right (Active)       Incision 11/02/20 Surgical Foot Right (Active)       Incision 11/16/20 Surgical Foot Right (Active)       Incision 11/27/20 Surgical Foot Right (Active)       Incision 12/17/20 Surgical Foot Right (Active)   Mixed granular/fibrotic base along distal lateral right foot ulceration. No erythema right foot.   Neurologic: Diminished to light touch Right.  Musculoskeletal: Contracted digits noted Right.        Picture: None

## 2021-06-16 NOTE — PROGRESS NOTES
FOOT AND ANKLE SURGERY/PODIATRY Progress Note      ASSESSMENT:   Ulceration right foot   DM2      TREATMENT:  -The right foot ulceration has resolved. I am pleased with his progress. I have referred him to [facility] for diabetic shoes and insets with toe filler for the lateral right foot.    -I recommend he continue to remain limited walking in the surgical shoe until the DM shoes are available.     -All questions invited and answered. He is discharged from my care at this time and will follow-up with me as concerns develop.    John Garcia DPM  [facility]      HPI: Caleb Hernandez was seen again today for a right foot ulceration. He has not noticed drainage in recent days. No new concerns.       Past Medical History:   Diagnosis Date     Abscess of right foot 11/23/2020    Added automatically from request for surgery [id number]     Acute pulmonary embolism with acute cor pulmonale (H) 5/23/2020     BPH (benign prostatic hyperplasia)      Cholelithiasis      Common bile duct (CBD) obstruction 9/4/2017     Compression Fracture Of Thoracic Vertebral Body     Created by Conversion  Replacement Utility updated for latest IMO load     Diabetes mellitus (H)      Essential hypertension      Hyperlipidemia      Pancreatitis      Rib Fracture     Created by Conversion  Replacement Utility updated for latest IMO load     Sepsis due to pneumonia (H) 9/8/2017       Past Surgical History:   Procedure Laterality Date     BACK SURGERY      1964 removed a cyst     CHOLECYSTECTOMY  1985     ERCP       ERCP N/A 9/5/2017    Procedure: ENDOSCOPIC RETROGRADE CHOLANGIOPANCREATOGRAPHY SPHINCTEROTOMY AND STONE EXTRACTION;  Surgeon: Hansel Gannon MD;  Location: [facility];  Service:      INCISION AND DRAINAGE OF WOUND Right 11/2/2020    Procedure: INCISION AND DRAINAGE, right foot with removal of bone 5th metatarsal;  Surgeon: John Garcia DPM;  Location: [facility];  Service: Podiatry      INCISION AND DRAINAGE OF WOUND Right 11/16/2020    Procedure: INCISION AND DRAINAGE, right foot;  Surgeon: John Garcia DPM;  Location: [facility] Main OR;  Service: Podiatry     INCISION AND DRAINAGE OF WOUND Right 11/27/2020    Procedure: INCISION AND DRAINAGE, LOWER EXTREMITY;  Surgeon: Aleksandr Hdez DPM;  Location: [facility] Main OR;  Service: Podiatry     IR EXTREMITY ANGIOGRAM RIGHT  11/24/2020     PICC  11/30/2020          TOE AMPUTATION Right 11/16/2020    Procedure: with amputation of the fifth ray, peroneal brevis tendon transfer;  Surgeon: John Garcia DPM;  Location: [facility] Main OR;  Service: Podiatry     TONSILLECTOMY  1940       Allergies   Allergen Reactions     Cresol      Muscle cramps     Crestor [Rosuvastatin] Myalgia     Declomycin [Demeclocycline] Hives         Current Outpatient Medications:      acetaminophen (TYLENOL) 500 MG tablet, Take 1-2 tablets (500-1,000 mg total) by mouth every 4 (four) hours as needed., Disp: , Rfl: 0     aspirin 81 MG EC tablet, Take 81 mg by mouth daily., Disp: , Rfl:      blood glucose test strips, Test two times daily. Dispense brand per patient's insurance at pharmacy discretion., Disp: 200 strip, Rfl: 11     blood-glucose meter (ONETOUCH VERIO IQ METER) Misc, Use 1 each As Directed 2 (two) times a day., Disp: 1 each, Rfl: 0     cholecalciferol, vitamin D3, 5,000 unit Tab, Take by mouth., Disp: , Rfl:      ELIQUIS 5 mg Tab tablet, TAKE 1 TABLET BY MOUTH TWICE A DAY, Disp: 60 tablet, Rfl: 3     insulin glargine (LANTUS SOLOSTAR U-100 INSULIN) 100 unit/mL (3 mL) pen, Inject 18 units in the morning and 35 units at bedtime (Patient taking differently: Inject 44 Units under the skin at bedtime. ), Disp: 90 mL, Rfl: 5     liraglutide (VICTOZA 3-RUPALI) 0.6 mg/0.1 mL (18 mg/3 mL) injection, Inject 0.3 mL (1.8 mg total) under the skin daily., Disp: , Rfl:      multivit-min/FA/lycopen/lutein (CENTRUM SILVER MEN ORAL), Take 1 tablet by mouth daily., Disp:  ", Rfl:      mupirocin (BACTROBAN) 2 % ointment, Apply topically daily as needed. Apply to wound., Disp: , Rfl:      pen needle, diabetic 31 gauge x 5/16\" Ndle, Used to inject insulin daily, Disp: 90 each, Rfl: 0     polyethylene glycol (MIRALAX) 17 gram packet, Take 1 packet (17 g total) by mouth daily as needed., Disp: , Rfl: 0     tamsulosin (FLOMAX) 0.4 mg cap, Take 1 capsule (0.4 mg total) by mouth daily after supper., Disp: 90 capsule, Rfl: 3     traMADoL (ULTRAM) 50 mg tablet, Take 50 mg by mouth every 6 (six) hours as needed for pain., Disp: , Rfl:      vitamin E 200 UNIT capsule, Take 400 Units by mouth daily., Disp: , Rfl:      vitamin E 400 unit capsule, Take 400 Units by mouth daily., Disp: , Rfl:     Review of Systems - 10 point Review of Systems is negative except for right foot ulcer which is noted in HPI.      OBJECTIVE:  Vitals:    04/14/21 1045   BP: 130/70   Pulse: 60   Resp: 17     General appearance: Patient is alert and fully cooperative with history & exam.  No sign of distress is noted during the visit.   Vascular: Dorsalis pedis non-palpableRight.  Dermatologic:    Urethral Catheter Coude 16 Fr. (Active)       Wound 07/24/17 Right lateral foot (Active)   Pre Size Length 2 10/23/20 1300   Pre Size Width 3 10/23/20 1300   Pre Size Depth 0.2 10/23/20 1300   Pre Total Sq cm 6 10/23/20 1300   Post Size Length 2 10/21/20 1400   Post Size Width 1.8 10/21/20 1400   Post Size Depth 0.6 10/21/20 1400   Post Total Sq cm 3.6 10/21/20 1400   Undermined n 10/19/20 1300   Tunneling n 10/19/20 1300   Description pink 10/16/20 1300   Prodcut Used Gauze 10/21/20 1400       VASC Wound 12/09/20 right foot (Active)   Pre Size Length 1.5 02/24/21 0900   Pre Size Width 11.5 02/24/21 0900   Pre Size Depth 0.2 02/24/21 0900   Pre Total Sq cm 17.25 02/24/21 0900   Post Size Length 0 04/14/21 1000   Post Size Width 0 04/14/21 1000   Post Size Depth 0 04/14/21 1000   Post Total Sq cm 0 04/14/21 1000       Wound 09/05/17 " Foot Right (Active)       Incision 11/02/20 Surgical Foot Right (Active)       Incision 11/16/20 Surgical Foot Right (Active)       Incision 11/27/20 Surgical Foot Right (Active)       Incision 12/17/20 Surgical Foot Right (Active)   No open lesions noted lateral right foot. No erythema right foot.   Neurologic: Diminished to light touch Right.  Musculoskeletal: Contracted digits noted Right.    Imaging:     Us Srikanth Doppler No Exercise, 1-2 Levels, Bilateral    Result Date: 3/30/2021  BILATERAL RESTING ANKLE-BRACHIAL INDICES (SRIKANTH'S) (03/30/21) Indication: Surveillance of right leg angio 11/24/2020 Previous: 12/9/20 History: Previous Smoker, Hypertension, Diabetic, Hyperlipidemia, PAD, Angioplasty and Vascular Ulcers  Resting SRIKANTH's          Right: mmHg Index     Brachial: 122  Ankle-(PT): 127 0.98 Ankle-(DP): 184 1.42           Digit: 39 0.30               Left: mmHg Index     Brachial: 130  Ankle-(PT): 124 0.95 Ankle-(DP): 132 1.02           Digit: 6 0.05 Resting ankle-brachial index of 1.42 on the right. Toe Pressures of 39 mmHg and TBI of 0.30  Resting ankle-brachial index of 1.02 on the left. Toe Pressures of 6 mmHg and TBI of 0.05  WAVEFORMS: The right dorsalis pedis and posterior tibial arteries show monophasic waveforms. The left dorsalis pedis and posterior tibial arteries show monophasic waveforms.  Impression:  1. RIGHT LOWER EXTREMITY: SRIKANTH is Uninterpretable due to incompressible vessels. and Mildly abnormal, but adequate for healing toe pressures of 39 mmHg. 2. LEFT LOWER EXTREMITY: SRIKANTH is likely artifactual secondary to vessel calcification (1.02) and Abnormal toe pressures that are concerning for wound healing of 6 mmHg. Absent great toe waveform. Reference: Wound classification Grade SRIKANTH Ankle Systolic Pressure Toe Pressures 0 > 0.80 > 100 mmHg > 60 mmHg 1 0.6 - 0.79 70 - 100 mmHg 40 - 59 mmHg 2 0.4 - 0.59 50-70 mmHg 30 - 39 mmHg 3 < 0.39 < 50 mmHg < 30 mmHg Digit Pressures DBI Disease Category > 0.70  Normal < 0.70 Abnormal > 30 mmHg Potential wound healing < 30 mmHg Impaired wound healing Ankle Brachial Pressures SRIKANTH Disease Category > 1.3  Likely vessel calcification with monophasic waveforms, non-diagnostic 0.95-1.30 Normal with multiphasic waveforms 0.50-0.95 Single level disease 0.30-0.50 Multilevel disease < 0.30 Critical limb ischema Volume Plethysmography Recording (VPR) at all levels Normal Sharp systolic peak, fast upstroke, prominent dicrotic notch in wave Mild Sharp systolic peak, fast upstroke, absent dicrotic notch in wave Moderate Flattened systolic peak, slowed upstroke, absent dicrotic notch in wave Severe Low-amplitude wave with = upslope and down slope Occluded Flat Line Multiple Level Segmental Pressures  Normal Abnormal Upper Thigh > 1.2 pressure indices, <30 mmHg between lateral and horizontal cuffs < 0.8 pressure indices suggest proximal occlusion, 0.8-1.2 pressure indices suggest inflow disease, > 30 mmHg between lateral and horizontal cuffs  Lower Thigh < 30 mmHg between lateral and horizontal cuffs > 30 mmHg between lateral and horizontal cuffs Upper Calf < 30 mmHg between lateral and horizontal cuffs > 30 mmHg between lateral and horizontal cuffs Note: As limb diameter increases, pressure measurements also increase.    Us Arterial Leg Right    Result Date: 3/30/2021  Arterial Duplex Ultrasound (03/30/21) Lower Extremity Artery Evaluation Indication: Surveillance of right leg angio 11/24/2020 Previous: 12/9/20 History: Previous Smoker, Hypertension, Diabetic, Hyperlipidemia, PAD, Angioplasty and Vascular Ulcers Technique: Duplex imaging is performed utilizing gray-scale, two-dimensional images, and color-flow imaging. Doppler waveform analysis and spectral Doppler imaging is also performed. LOWER EXTREMITY ARTERIAL DUPLEX EXAM WITH WAVEFORMS Right Leg:(cm/s) Location: Velocities Waveforms EIA:   101  T CFA:   106  T PFA:   66  B SFA Proximal:   96  T SFA Mid:   173/228 T/B SFA Distal:    145  T Popliteal Artery:   55  B PTA:   17   M BREE:   102  M DPA:   61  M Waveforms: T=Triphasic, M=Monophasic, B=Biphasic Left Leg:(cm/s) Location: Velocities Waveforms PTA:   30   M BREE:   74  M DPA:   15  M Waveforms: T=Triphasic, M=Monophasic, B=Biphasic Impression: Right Lower Extremity: Patent lower extremity vasculature with transition to monophasic flow in the infrapopliteal vessels.  No focal stenosis identified. Left Lower Extremity: Patent infrapopliteal vessels with monophasic flow.  Diminished peak systolic velocities in the dorsalis pedis artery. Reference: Category Normal 1-19% 20-49% 50-99% Occluded PSV <160 cm/sec without spectral broadening <160 cm/sec with spectral broadening Increased Increased Absent Flow Ratio N/A N/A < 2.0 >2.0 N/A Post-Stenotic Turbulence No No No Yes N/A          Picture: None

## 2021-06-16 NOTE — TELEPHONE ENCOUNTER
Provider Communication  Who is calling:  GOLDEN Adamson  Facility in which provider is associated:  [facility]  Reason for call:  Patient was not home for his nurse visit today. Nurse will have another visit on Monday for wound care.   Urgency for return call:  fyi, for provider no return call needed   Okay to leave detailed message?:  Yes

## 2021-06-16 NOTE — TELEPHONE ENCOUNTER
Reason for Call:  Home Health Care    Meg with [facility] called regarding (reason for call):   Verbal    Orders are needed for this patient. Homecare    PT: na    OT: na    Skilled Nursing:    Requesting discharge from homecare  -   Wound is healed     Pt Provider: Dr Otis Lozano    Phone Number Homecare Nurse can be reached at:   [phone]    Can we leave a detailed message on this number? Yes       Best Time: any     Call taken on 4/16/2021 at 10:43 AM by Laura L Goldberg

## 2021-06-16 NOTE — TELEPHONE ENCOUNTER
"Telephone Encounter by Em Loo RD, KEKE at 1/28/2019  2:42 PM     Author: Em Loo RD, ADITIE Service: -- Author Type: Diabetes Ed    Filed: 1/28/2019  3:01 PM Encounter Date: 1/28/2019 Status: Signed    : Em Loo RD, KEKE (Diabetes Ed)       My chart message:  Frantz Hernandez sent to  Chaz Machado MD 5 hours ago (9:14 AM)         Good morning.  Somehow I received the Victoza pens back. I would like to use them because they worked. The Victoza that I had taken for years was replaced by Levemir and didn't do much for My blood control because I was in the 200 range. I have 2 questions. Is  there anything special to do changing from Levemir back to Victoza and can I go back to taking to Victoza shot in the morning? I was told to take the Levemir shot at 5 pm and that was a problem. Thank you.  Frantz            Called Caleb Hernandez to discuss Victoza and Levemir.  Pt stated that he is working with a \"\" Brisa, who was able to obtain Victoza through [employer]. After much detail and confusion, pt retracted his statement and stated he did not mean Victoza, but meant Tresiba.  He detailed cost for each.  Cost for Tresiba  was $70 for 90 days. Levemir was for 30 days $150.  Now picked up two boxes of Levemir.  When asked what his question was, pt stated his current BG readings were:  , 273, 205, 263, 163, 203, 271    He has not had sx of hypoglycemia or BG < 80 mg/dl when he tests.    Based on the Clinical Guidelines for Adult Diabetes Management instructed pt to Split Levemir dose to 20 Units starting tonight and 12 hrs later inject 20 Units of Levemir.  Pt verbalized understanding.  He is agreeable to call back with BG readings in two days to  [phone].       "

## 2021-06-16 NOTE — PATIENT INSTRUCTIONS - HE
Remain non-weightbearing as much as possible on right foot with CAM boot or surgical shoe on when out of bed.    Continue tubigrip for compression.      Continue medihoney on right foot:  1. Remove old Medihoney packing.  2. Cleanse with normal saline, pat dry.  3. Apply Medihoney alginate into the wound. Try to avoid medihoney on the intact skin.   4. Cover with a gauze dressing and secure with Roll Gauze and paper tape.  5. Change every other day.      It is not ok to get your wound wet     Call the clinic for any questions regarding your wound or cares and if you experience signs or symptoms of infection including: fevers, chills, increased redness, increased drainage, foul odor, increased pain at [phone].

## 2021-06-16 NOTE — TELEPHONE ENCOUNTER
RN cannot approve Refill Request    RN can NOT refill this medication PCP messaged that patient is overdue for Labs. Last office visit: Visit date not found Last Physical: Visit date not found Last MTM visit: Visit date not found Last visit same specialty: 2/23/2021 Otis Lozano MD.  Next visit within 3 mo: Visit date not found  Next physical within 3 mo: Visit date not found      Allyssa Hunter, [facility] Triage/Med Refill 4/21/2021    Requested Prescriptions   Pending Prescriptions Disp Refills     VICTOZA 3-RUPALI 0.6 mg/0.1 mL (18 mg/3 mL) injection [Pharmacy Med Name: VICTOZA 3-RUPALI 18 MG/3 ML PEN]  1     Sig: INJECT 1.2 MG UNDER THE SKIN ONCE DAILY       Insulin/GLP-1 Refill Protocol Failed - 4/21/2021  1:23 PM        Failed - A1C in last 6 months     Hemoglobin A1c   Date Value Ref Range Status   09/04/2020 7.9 (H) <=5.6 % Final     Comment:     Normal <5.7% Prediabete 5.7-6.4% Diabletes 6.5% or higher - adopted from ADA consensus guidelines               Failed - Microalbumin in last year     Microalbumin, Random Urine   Date Value Ref Range Status   08/09/2012 15 1.00 - 20.00 mg/L Final                  Passed - Visit with PCP or prescribing provider visit in last 6 months     Last office visit with prescriber/PCP: Visit date not found OR same dept: 7/27/2020 Alonso Clancy MD OR same specialty: 2/23/2021 Otis Lozano MD Last physical: Visit date not found Last MTM visit: Visit date not found     Next appt within 3 mo: Visit date not found  Next physical within 3 mo: Visit date not found  Prescriber OR PCP: Oliverio Veras MD  Last diagnosis associated with med order: 1. Type 2 diabetes mellitus with other skin ulcer, with long-term current use of insulin (H)  - VICTOZA 3-RUPALI 0.6 mg/0.1 mL (18 mg/3 mL) injection [Pharmacy Med Name: VICTOZA 3-RUPALI 18 MG/3 ML PEN]; INJECT 1.2 MG UNDER THE SKIN ONCE DAILY; Refill: 1    If protocol passes may refill for 6 months if within 3  months of last provider visit (or a total of 9 months).              Passed - Blood pressure in last year     BP Readings from Last 1 Encounters:   04/14/21 130/70             Passed - Creatinine done in last year     Creatinine   Date Value Ref Range Status   01/16/2021 0.93 0.70 - 1.30 mg/dL Final

## 2021-06-16 NOTE — TELEPHONE ENCOUNTER
GUS Landry slid off his bed yesterday morning, ems helped him up off the ground, didn't find any concerns, he denied hitting his head, no concerns from RN

## 2021-06-16 NOTE — TELEPHONE ENCOUNTER
Reason for Call:  Home Health Care    BELINDA with [facility] called regarding (reason for call): VERBAL ORDER REQUEST    Orders are needed for this patient. SKILLED NURSING RECERTIFIED FOR HOMECARE    PT: NA    OT: NA    Skilled NursinX A WK FOR 1 WK, 2X FOR 3WK, 1X FOR 2WK, 3 PRN FOR WOUND CARE    Pt Provider: DR CORTEZ    Phone Number Homecare Nurse can be reached at: [phone]    Can we leave a detailed message on this number? Yes     Phone number patient can be reached at: Home number on file [phone] (home)    Best Time: ANYTIME    Call taken on 2021 at 11:56 AM by Janice Curran

## 2021-06-16 NOTE — PATIENT INSTRUCTIONS - HE
Please call the [place] location below to schedule an appointment. If you received a prescription please bring it with you to your appointment.     [facility]  [facility] [place] [address]  Home Medical Equipment, [address]   Phone: [phone]   Orthotics and Prosthetics, [address]   Phone: [phone]

## 2021-06-17 ENCOUNTER — COMMUNICATION - HEALTHEAST (OUTPATIENT)
Dept: INTERNAL MEDICINE | Facility: CLINIC | Age: 86
End: 2021-06-17

## 2021-06-17 DIAGNOSIS — I26.99 PULMONARY EMBOLISM WITHOUT ACUTE COR PULMONALE, UNSPECIFIED CHRONICITY, UNSPECIFIED PULMONARY EMBOLISM TYPE (H): ICD-10-CM

## 2021-06-17 RX ORDER — APIXABAN 5 MG/1
TABLET, FILM COATED ORAL
Qty: 60 TABLET | Refills: 3 | Status: SHIPPED | OUTPATIENT
Start: 2021-06-17 | End: 2021-08-25

## 2021-06-17 NOTE — PATIENT INSTRUCTIONS - HE
Remain limited walking in surgical shoe until you receive your diabetic shoes and inserts.    Gauze applied today, you can remove it later tonight or tomorrow.

## 2021-06-17 NOTE — TELEPHONE ENCOUNTER
Telephone Encounter by Pamela Duron LPN at 5/22/2020 11:07 AM     Author: Pamela Duron LPN Service: -- Author Type: Licensed Nurse    Filed: 5/22/2020 11:09 AM Encounter Date: 5/22/2020 Status: Signed    : Pamela Duron LPN (Licensed Nurse)

## 2021-06-17 NOTE — PATIENT INSTRUCTIONS - HE
Patient Instructions by Chaz Machado MD at 10/25/2019  9:40 AM     Author: Chaz Machado MD Service: -- Author Type: Physician    Filed: 10/25/2019 10:29 AM Encounter Date: 10/25/2019 Status: Signed    : Chaz Machado MD (Physician)         Patient Education     Exercise for a Healthier Heart  You may wonder how you can improve the health of your heart. If youre thinking about exercise, youre on the right track. You dont need to become an athlete, but you do need a certain amount of brisk exercise to help strengthen your heart. If you have been diagnosed with a heart condition, your doctor may recommend exercise to help stabilize your condition. To help make exercise a habit, choose safe, fun activities.       Be sure to check with your health care provider before starting an exercise program.    Why exercise?  Exercising regularly offers many healthy rewards. It can help you do all of the following:    Improve your blood cholesterol levels to help prevent further heart trouble    Lower your blood pressure to help prevent a stroke or heart attack    Control diabetes, or reduce your risk of getting this disease    Improve your heart and lung function    Reach and maintain a healthy weight    Make your muscles stronger and more limber so you can stay active    Prevent falls and fractures by slowing the loss of bone mass (osteoporosis)    Manage stress better  Exercise tips  Ease into your routine. Set small goals. Then build on them.  Exercise on most days. Aim for a total of 150 or more minutes of moderate to  vigorous intensity activity each week. Consider 40 minutes, 3 to 4 times a week. For best results, activity should last for 40 minutes on average. It is OK to work up to the 40 minute period over time. Examples of moderate-intensity activity is walking one mile in 15 minutes or 30 to 45 minutes of yard work.  Step up your daily activity level. Along with your exercise program, try being more  active throughout the day. Walk instead of drive. Do more household tasks or yard work.  Choose one or more activities you enjoy. Walking is one of the easiest things you can do. You can also try swimming, riding a bike, or taking an exercise class.  Stop exercising and call your doctor if you:    Have chest pain or feel dizzy or lightheaded    Feel burning, tightness, pressure, or heaviness in your chest, neck, shoulders, back, or arms    Have unusual shortness of breath    Have increased joint or muscle pain    Have palpitations or an irregular heartbeat      6213-6415 [employer] [address] [place] [address]. All rights reserved. This information is not intended as a substitute for professional medical care. Always follow your healthcare professional's instructions.         Patient Education   Understanding MoPix MyPlate  The USDA (US Department of Agriculture) has guidelines to help you make healthy food choices. These are called MyPlate. MyPlate shows the food groups that make up healthy meals using the image of a place setting. Before you eat, think about the healthiest choices for what to put onto your plate or into your cup or bowl. To learn more about building a healthy plate, visit www.choosemyplate.gov.       The Food Groups    Fruits: Any fruit or 100% fruit juice counts as part of the Fruit Group. Fruits may be fresh, canned, frozen, or dried, and may be whole, cut-up, or pureed. Make half your plate fruits and vegetables.    Vegetables: Any vegetable or 100% vegetable juice counts as a member of the Vegetable Group. Vegetables may be fresh, frozen, canned, or dried. They can be served raw or cooked and may be whole, cut-up, or mashed. Make half your plate fruits and vegetables.     Grains: All foods made from grains are part of the Grains Group. These include wheat, rice, oats, cornmeal, and barley such as bread, pasta, oatmeal, cereal, tortillas, and grits. Grains should be  no more than a quarter of your plate. At least half of your grains should be whole grains.    Protein: This group includes meat, poultry, seafood, beans and peas, eggs, processed soy products (like tofu), nuts (including nut butters), and seeds. Make protein choices no more than a quarter of your plate. Meat and poultry choices should be lean or low fat.    Dairy: All fluid milk products and foods made from milk that contain calcium, like yogurt and cheese are part of the Dairy Group. (Foods that have little calcium, such as cream, butter, and cream cheese, are not part of the group.) Most dairy choices should be low-fat or fat-free.    Oils: These are fats that are liquid at room temperature. They include canola, corn, olive, soybean, and sunflower oil. Foods that are mainly oil include mayonnaise, certain salad dressings, and soft margarines. You should have only 5 to 7 teaspoons of oils a day. You probably already get this much from the food you eat.  Use [employer] to Help Build Your Meals  The WeVideo.Itcker can help you plan and track your meals and activity. You can look up individual foods to see or compare their nutritional value. You can get guidelines for what and how much you should eat. You can compare your food choices. And you can assess personal physical activities and see ways you can improve. Go to www.BioAnalytix.gov/supertracker/.    0328-4026 The [employer]. [address] [place] [address]. All rights reserved. This information is not intended as a substitute for professional medical care. Always follow your healthcare professional's instructions.           Patient Education   Signs of Hearing Loss  Hearing loss is a problem shared by many people. In fact, it is one of the most common health conditions, particularly as people age. Most people over age 65 have some hearing loss, and by age 80, almost everyone does. Because hearing loss usually occurs slowly over the years,  you may not realize your hearing ability has gotten worse.       Have your hearing checked  Contact your [facility] care provider if you:    Have to strain to hear normal conversation.    Have to watch other peoples faces very carefully to follow what theyre saying.    Need to ask people to repeat what theyve said.    Often misunderstand what people are saying.    Turn the volume of the television or radio up so high that others complain.    Feel that people are mumbling when theyre talking to you.    Find that the effort to hear leaves you feeling tired and irritated.    Notice, when using the phone, that you hear better with 1 ear than the other.    5828-0756 The [employer]. [address] [place] [address], [place] [address]. All rights reserved. This information is not intended as a substitute for professional medical care. Always follow your healthcare professional's instructions.         Patient Education   Understanding Advance Care Planning  Advance care planning is the process of deciding ones own future medical care. It helps ensure that if you cant speak for yourself, your wishes can still be carried out. The plan is a series of legal documents that note a persons wishes. The documents vary by state. Advance care planning may be done when a person has a serious illness that is expected to get worse. It may be done before major surgery. And it can help you and your family be prepared in case of a major illness or injury. Advance care planning helps with making decisions at these times.       A health care proxy is a person who acts as the voice of a patient when the patient cant speak for himself or herself. The name of this role varies by state. It may be called a Durable Medical Power of  or Durable Power of  for Healthcare. It may be called an agent, surrogate, or advocate. Or it may be called a representative or decision maker. It is an official duty that is identified by a legal document.  The document also varies by state.    Why Is Advance Care Planning Important?  If a person communicates their healthcare wishes:    They will be given medical care that matches their values and goals.    Their family members will not be forced to make decisions in a crisis with no guidance.  Creating a Plan  Making an advance care plan is often done in 3 steps:    Thinking about ones wishes. To create an advance care plan, you should think about what kind of medical treatment you would want if you lose the ability to communicate. Are there any situations in which you would refuse or stop treatment? Are there therapies you would want or not want? And whom do you want to make decisions for you? There are many places to learn more about how to plan for your care. Ask your doctor or  for resources.    Picking a health care proxy. This means choosing a trusted person to speak for you only when you cant speak for yourself. When you cannot make medical decisions, your proxy makes sure the instructions in your advance care plan are followed. A proxy does not make decisions based on his or her own opinions. They must put aside those opinions and values if needed, and carry out your wishes.    Filling out the legal documents. There are several kinds of legal documents for advance care planning. Each one tells health care providers your wishes. The documents may vary by state. They must be signed and may need to be witnessed or notarized. You can cancel or change them whenever you wish. Depending on your state, the documents may include a Healthcare Proxy form, Living Will, Durable Medical Power of , Advance Directive, or others.  The Familys Role  The best help a family can give is to support their loved ones wishes. Open and honest communication is vital. Family should express any concerns they have about the patients choices while the patient can still make decisions.    9615-0034 [employer],  [employer]. [address] [place] [address]. All rights reserved. This information is not intended as a substitute for professional medical care. Always follow your healthcare professional's instructions.         Also, [facility] offers a free, downloadable health care directive that allows you to share your treatment choices and personal preferences if you cannot communicate your wishes. It also allows you to appoint another person (called a health care agent) to make health care decisions if you are unable to do so. You can download an advance directive by going here: [email or web address]     Patient Education   Personalized Prevention Plan  You are due for the preventive services outlined below.  Your care team is available to assist you in scheduling these services.  If you have already completed any of these items, please share that information with your care team to update in your medical record.  Health Maintenance   Topic Date Due   ? DIABETES OPHTHALMOLOGY EXAM  12/17/1945   ? TD 18+ HE  12/17/1954   ? ZOSTER VACCINES (1 of 2) 12/17/1986   ? PNEUMOCOCCAL IMMUNIZATION 65+ LOW/MEDIUM RISK (2 of 2 - PCV13) 09/29/2004   ? DIABETES URINE MICROALBUMIN  08/09/2013   ? DIABETES FOLLOW-UP  11/23/2018   ? MEDICARE ANNUAL WELLNESS VISIT  05/23/2019   ? DIABETES FOOT EXAM  05/23/2019   ? FALL RISK ASSESSMENT  05/23/2019   ? DIABETES HEMOGLOBIN A1C  06/20/2019   ? INFLUENZA VACCINE RULE BASED (1) 08/01/2019   ? ADVANCE CARE PLANNING  05/23/2023

## 2021-06-17 NOTE — TELEPHONE ENCOUNTER
Telephone Encounter by Pamela Duron LPN at 5/22/2020 11:05 AM     Author: Pamela Duron LPN Service: -- Author Type: Licensed Nurse    Filed: 5/22/2020 11:09 AM Encounter Date: 5/22/2020 Status: Signed    : Pamela Duron LPN (Licensed Nurse)

## 2021-06-17 NOTE — PROGRESS NOTES
FOOT AND ANKLE SURGERY/PODIATRY Progress Note        ASSESSMENT:   Equinovarus deformity right foot  Keratoma  PAD  DM2      TREATMENT:  Keratoma: There is hyperkeratotic tissue x5 plantar lateral 5th metatarsal base right foot, no open lesions or erythema noted. No fluctuance right foot.     -I trimmed callus tissue x5 plantar 5th metatarsal base right foot with a #15 blade.     -I discussed with the patient and his wife that I recommend continued limited walking with the surgical shoes until the diabetic shoes/inserts are available.     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  Also discussed that serial trimming of callus tissue may be necessary.     All questions invited and answered. He will follow-up with me as concerns develop.     John Garcia DPM  [facility]      HPI: Caleb Hernandez was seen again today for concerns for a new sore on the right foot. The patient's routine foot care nurse recently noticed discoloration along the plantar lateral right foot and recommended that he follow-up with me. He denies pain. Currently walking in a surgical shoe and is scheduled to obtain diabetic inserts and shoes later this month.       Past Medical History:   Diagnosis Date     Abscess of right foot 11/23/2020    Added automatically from request for surgery [id number]     Acute pulmonary embolism with acute cor pulmonale (H) 5/23/2020     BPH (benign prostatic hyperplasia)      Cholelithiasis      Common bile duct (CBD) obstruction 9/4/2017     Compression Fracture Of Thoracic Vertebral Body     Created by Conversion  Replacement Utility updated for latest IMO load     Diabetes mellitus (H)      Essential hypertension      Hyperlipidemia      Pancreatitis      Rib Fracture     Created by Conversion  Replacement Utility updated for latest IMO load     Sepsis due to pneumonia (H)  9/8/2017       Past Surgical History:   Procedure Laterality Date     BACK SURGERY      1964 removed a cyst     CHOLECYSTECTOMY  1985     ERCP       ERCP N/A 9/5/2017    Procedure: ENDOSCOPIC RETROGRADE CHOLANGIOPANCREATOGRAPHY SPHINCTEROTOMY AND STONE EXTRACTION;  Surgeon: Hansel Gannon MD;  Location: [facility] OR;  Service:      INCISION AND DRAINAGE OF WOUND Right 11/2/2020    Procedure: INCISION AND DRAINAGE, right foot with removal of bone 5th metatarsal;  Surgeon: John Garcia DPM;  Location: [facility] OR;  Service: Podiatry     INCISION AND DRAINAGE OF WOUND Right 11/16/2020    Procedure: INCISION AND DRAINAGE, right foot;  Surgeon: John Garcia DPM;  Location: [facility] OR;  Service: Podiatry     INCISION AND DRAINAGE OF WOUND Right 11/27/2020    Procedure: INCISION AND DRAINAGE, LOWER EXTREMITY;  Surgeon: Aleksandr Hdez DPM;  Location: [facility] OR;  Service: Podiatry     IR EXTREMITY ANGIOGRAM RIGHT  11/24/2020     PICC  11/30/2020          TOE AMPUTATION Right 11/16/2020    Procedure: with amputation of the fifth ray, peroneal brevis tendon transfer;  Surgeon: John Garcia DPM;  Location: [facility] OR;  Service: Podiatry     TONSILLECTOMY  1940       Allergies   Allergen Reactions     Cresol      Muscle cramps     Crestor [Rosuvastatin] Myalgia     Declomycin [Demeclocycline] Hives         Current Outpatient Medications:      acetaminophen (TYLENOL) 500 MG tablet, Take 1-2 tablets (500-1,000 mg total) by mouth every 4 (four) hours as needed., Disp: , Rfl: 0     aspirin 81 MG EC tablet, Take 81 mg by mouth daily., Disp: , Rfl:      blood glucose test strips, Test two times daily. Dispense brand per patient's insurance at pharmacy discretion., Disp: 200 strip, Rfl: 11     blood-glucose meter (ONETOUCH VERIO IQ METER) Misc, Use 1 each As Directed 2 (two) times a day., Disp: 1 each, Rfl: 0     cholecalciferol, vitamin D3, 5,000 unit Tab, Take by mouth., Disp:  ", Rfl:      ELIQUIS 5 mg Tab tablet, TAKE 1 TABLET BY MOUTH TWICE A DAY, Disp: 60 tablet, Rfl: 3     insulin glargine (LANTUS SOLOSTAR U-100 INSULIN) 100 unit/mL (3 mL) pen, Inject 18 units in the morning and 35 units at bedtime (Patient taking differently: Inject 44 Units under the skin at bedtime. ), Disp: 90 mL, Rfl: 5     multivit-min/FA/lycopen/lutein (CENTRUM SILVER MEN ORAL), Take 1 tablet by mouth daily., Disp: , Rfl:      mupirocin (BACTROBAN) 2 % ointment, Apply topically daily as needed. Apply to wound., Disp: , Rfl:      pen needle, diabetic 31 gauge x 5/16\" Ndle, Used to inject insulin daily, Disp: 90 each, Rfl: 0     polyethylene glycol (MIRALAX) 17 gram packet, Take 1 packet (17 g total) by mouth daily as needed., Disp: , Rfl: 0     tamsulosin (FLOMAX) 0.4 mg cap, Take 1 capsule (0.4 mg total) by mouth daily after supper., Disp: 90 capsule, Rfl: 3     traMADoL (ULTRAM) 50 mg tablet, Take 50 mg by mouth every 6 (six) hours as needed for pain., Disp: , Rfl:      VICTOZA 3-RUPALI 0.6 mg/0.1 mL (18 mg/3 mL) injection, INJECT 1.2 MG UNDER THE SKIN ONCE DAILY, Disp: 12 mL, Rfl: 11     vitamin E 200 UNIT capsule, Take 400 Units by mouth daily., Disp: , Rfl:      vitamin E 400 unit capsule, Take 400 Units by mouth daily., Disp: , Rfl:     Family History   Problem Relation Age of Onset     Aortic aneurysm Mother      Alcohol abuse Father        Social History     Socioeconomic History     Marital status:      Spouse name: Not on file     Number of children: Not on file     Years of education: Not on file     Highest education level: Not on file   Occupational History     Not on file   Social Needs     Financial resource strain: Not on file     Food insecurity     Worry: Not on file     Inability: Not on file     Transportation needs     Medical: Not on file     Non-medical: Not on file   Tobacco Use     Smoking status: Former Smoker     Quit date: 1991     Years since quittin.5     Smokeless " tobacco: Never Used   Substance and Sexual Activity     Alcohol use: No     Drug use: No     Sexual activity: Never   Lifestyle     Physical activity     Days per week: Not on file     Minutes per session: Not on file     Stress: Not on file   Relationships     Social connections     Talks on phone: Not on file     Gets together: Not on file     Attends [employer]ism service: Not on file     Active member of club or organization: Not on file     Attends meetings of clubs or organizations: Not on file     Relationship status: Not on file     Intimate partner violence     Fear of current or ex partner: Not on file     Emotionally abused: Not on file     Physically abused: Not on file     Forced sexual activity: Not on file   Other Topics Concern     Not on file   Social History Narrative     Not on file       10 point Review of Systems is negative except for right foot callus which is noted in HPI.       Vitals:    05/14/21 1303   BP: 126/62   Pulse: 88   Temp: 97.9  F (36.6  C)       BMI= Body mass index is 25.11 kg/m .    OBJECTIVE:  General appearance: Patient is alert and fully cooperative with history & exam.  No sign of distress is noted during the visit.  Vascular: Dorsalis pedis and posterior tibial pulses are non-palpable. There is no appreciable edema noted.  Dermatologic: Hyperkeratotic tissue x5 plantar lateral 5th metatarsal base right foot, no open lesions or erythema noted. No fluctuance right foot.   Neurologic: Diminished to light touch right foot.  Musculoskeletal: Mild, semi-reducible equinovarus deformity right foot.     Imaging:     No results found.

## 2021-06-17 NOTE — PROGRESS NOTES
Office Visit - Follow Up   Caleb Hernandez   85 y.o. male    Date of Visit: 5/25/2021    Chief Complaint   Patient presents with     Follow-up     Concerns regarding eating/constipation     Foot Pain        -------------------------------------------------------------------------------------------------------------------------  Assessment and Plan    Follow-up for multiple issues, overall continues to do well since our previous visit of February 23, 2021.    Recently followed up with Dr. Garcia of podiatry May 14, 2021 and had some more debridement of the lateral aspect of his right foot.  Today May 25, 2021 it looks like he continues to heal steadily.  Tissue therapy looks good and is right toes are warm.  He is going to be receiving his custom molded shoes within the next week or 2.    As of the end of April 2021, he graduated from home care that was being delivered by [facility] (PT and OT)    I would like to see Mr. Hernandez be more mobile, using his walker, avoid falling because he is on Eliquis anticoagulation.    I told him to focus on eating healthy food, fish would be good, also lean poultry, also plenty of vegetables.  Avoid starches and sweets.    He told me that his morning blood sugar was about 111 which sounds good.  He occasionally gets some lower readings in the 80s.  We will check A1c today.  Would specify an A1c goal of about 7.5.  If he is running less than 7 and seen occasional low fingersticks, we might back down on his Lantus dose.  For now, continue on Lantus 44 units a day, Victoza, no short acting insulin.    Pain control  Will continue to use tramadol tablets, which he averages maybe 1 a day.  I told the tramadol, being an opioid, can be constipating.  Therefore he needs to use his MiraLAX powder to keep stools soft.     Status post excision of right fifth metatarsal on November 2, 2020, wound cultures with Bacteroides and Enterobacter.  Status post skin grafting to the lateral right  foot.  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  Underwent incision and drainage of abscess November 27, 2020, wound culture growing corynebacterium.  He was treated with vancomycin and Zosyn, then meropenem, then transition to outpatient intravenous ertapenem for week course, was completed January 11, 2020.  returned home after being discharged from the TCU at [facility] on February 2, 2021     Urinary retention with hematuria, Dietrich catheter out January 22, 2021, he told me that his bladder is working better.  I encouraged him to try to void and empty the bladder about every 2 hours while awake, and if there are ongoing problems with the bladder, then he could follow-up with Dr. Villa at [facility].    The Dietrich catheter was placed after angiogram because of acute urinary retention.  He developed hematuria November 30, 2020  CT scan abdomen pelvis with irregular bladder wall thickening suggestive of cystitis.  Noted to have nonobstructing 11 mm right upper pole kidney stone but no hydronephrosis.  He is currently on Flomax (tamsulosin) and I want him to stay on that.     Peripheral vascular disease.  November 24, 2020 he had right lower extremity angiogram with balloon angioplasty of anterior tibial and peroneal arteries.  However postoperative ankle-brachial index did not improve significantly, although vascular surgery thought that was because of vasospasm.  He needs to follow-up with vascular surgery, and will have a repeat arterial Doppler ultrasound.     Adult failure to thrive.  He is try to get his strength back.      Wt Readings from Last 3 Encounters:   02/23/21 181 lb (82.1 kg)   02/01/21 182 lb 12.8 oz (82.9 kg)   01/28/21 182 lb 9.6 oz (82.8 kg)      Type 2 diabetes with suboptimal control, last A1c 7.9 measured September 4, 2020, currently on insulin  and Victoza but no metformin  Currently on Lantus 44 units at bedtime, at the moment he is not using any mealtime NovoLog.  Also Victoza 1.8 mg injected once a day    Lab Results   Component Value Date    HGBA1C 7.9 (H) 09/04/2020      liraglutide (VICTOZA 3-RUPALI) 0.6 mg/0.1 mL (18 mg/3 mL) injection 1.8 mg, DAILY      Essential hypertension, apparently no longer taking lisinopril 5 mg a day.  BP Readings from Last 3 Encounters:   05/25/21 128/60   05/14/21 126/62   04/14/21 130/70           Lab Results   Component Value Date     CREATININE 0.93 01/16/2021     BUN 18 01/16/2021      01/16/2021     K 4.8 01/16/2021      01/16/2021     CO2 29 01/16/2021      Normocytic anemia hemoglobin 11.3 checked January 16, 2021, this is surely anemia of chronic disease and postinflammatory effects        Lab Results   Component Value Date     HGB 11.3 (L) 01/16/2021     Peripheral vascular disease with reduced SRIKANTH indices in both feet, but no focal stenosis on arterial ultrasound study.       History of acute pulmonary embolism and cor pulmonale, was unprovoked, diagnosed May 2020, has been on anticoagulation ever since with Eliquis which he will continue long-term.  He seems to be tolerating the Eliquis just fine.  He is on concomitant baby aspirin which I told him does increase the risk for bleeding when combined with Eliquis.  But I think the combination is appropriate for him since he has peripheral vascular disease.     Paroxysmal atrial fibrillation, and history of pulmonary embolism, on anticoagulation with Eliquis for those reasons.     Hyperlipidemia in the context of diabetes, with history of intolerance to statins, LDL cholesterol was 126 when measured July 22, 2020.       Constipation, component of drug-induced from tramadol, I told him its okay to use MiraLAX 1 capful even daily, dissolved in liquid.    Second Covid shot February 22, 2021      Immunization History   Administered Date(s) Administered      COVID-19,PF,Moderna 01/25/2021, 02/22/2021     Pneumo Conj 13-V (2010&after) 01/01/2016, 10/22/2020     Pneumo Polysac 23-V 09/29/2003         --------------------------------------------------------------------------------------------------------------------------  History of Present Illness  This 85 y.o. old   Follow-up for multiple issues, overall continues to do well since our previous visit of February 23, 2021.    Recently followed up with Dr. Garcia of podiatry May 14, 2021 and had some more debridement of the lateral aspect of his right foot.  Today May 25, 2021 it looks like he continues to heal steadily.  Tissue therapy looks good and is right toes are warm.  He is going to be receiving his custom molded shoes within the next week or 2.    As of the end of April 2021, he graduated from home care that was being delivered by [facility] (PT and OT)        5/14/2021  [facility] [place]  Keratoma: There is hyperkeratotic tissue x5 plantar lateral 5th metatarsal base right foot, no open lesions or erythema noted. No fluctuance right foot.   recommend continued limited walking with the surgical shoes until the diabetic shoes/inserts are available.   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  Also discussed that serial trimming of callus tissue may be necessary.   The right foot ulceration has resolved. I am pleased with his progress. I  referred him to [facility] for diabetic shoes and insets with toe filler for the lateral right foot.     3/30/2021  [facility] [place] Bravo MD  Vascular   Reason for consultation: Follow-up of critical limb ischemia  IMPRESSION: Patient almost completely healed from his right foot wound.  RECOMMENDATION: Overall doing very well.  Concerned particularly about the left  foot being at risk for wounds in the future and discussed careful foot care with him.  I will plan on seeing him back in 3 months time with repeat ABIs and right leg arterial duplex.        Wt Readings from Last 3 Encounters:   05/25/21 172 lb 11.2 oz (78.3 kg)   05/14/21 175 lb (79.4 kg)   02/23/21 181 lb (82.1 kg)     BP Readings from Last 3 Encounters:   05/25/21 128/60   05/14/21 126/62   04/14/21 130/70       Lab Results   Component Value Date    WBC 7.0 01/16/2021    HGB 11.3 (L) 01/16/2021    HCT 35.0 (L) 01/16/2021     01/16/2021    CHOL 178 07/22/2020    TRIG 60 07/22/2020    HDL 40 07/22/2020    LDLDIRECT 127 01/23/2017    ALT 13 01/16/2021    AST 15 01/16/2021     01/16/2021    K 4.8 01/16/2021     01/16/2021    CREATININE 0.93 01/16/2021    BUN 18 01/16/2021    CO2 29 01/16/2021    PSA 5.8 01/23/2017    INR 1.23 (H) 11/27/2020    HGBA1C 7.9 (H) 09/04/2020    MICROALBUR 15 08/09/2012        ---------------------------------------------------------------------------------------------------------------------------    Medications, Allergies, Social, and Problem List   Current Outpatient Medications   Medication Sig Dispense Refill     acetaminophen (TYLENOL) 500 MG tablet Take 1-2 tablets (500-1,000 mg total) by mouth every 4 (four) hours as needed.  0     aspirin 81 MG EC tablet Take 81 mg by mouth daily.       blood glucose test strips Test two times daily. Dispense brand per patient's insurance at pharmacy discretion. 200 strip 11     blood-glucose meter (ONETOUCH VERIO IQ METER) Misc Use 1 each As Directed 2 (two) times a day. 1 each 0     cholecalciferol, vitamin D3, 5,000 unit Tab Take by mouth.       ELIQUIS 5 mg Tab tablet TAKE 1 TABLET BY MOUTH TWICE A DAY 60 tablet 3     insulin glargine (LANTUS SOLOSTAR U-100 INSULIN) 100 unit/mL (3 mL) pen Inject 18 units in the morning and 35 units at bedtime (Patient taking differently: Inject 44 Units under the skin at bedtime. ) 90 mL 5      "multivit-min/FA/lycopen/lutein (CENTRUM SILVER MEN ORAL) Take 1 tablet by mouth daily.       mupirocin (BACTROBAN) 2 % ointment Apply topically daily as needed. Apply to wound.       pen needle, diabetic 31 gauge x 5/16\" Ndle Used to inject insulin daily 90 each 0     tamsulosin (FLOMAX) 0.4 mg cap Take 1 capsule (0.4 mg total) by mouth daily after supper. 90 capsule 3     VICTOZA 3-RUPALI 0.6 mg/0.1 mL (18 mg/3 mL) injection INJECT 1.2 MG UNDER THE SKIN ONCE DAILY 12 mL 11     vitamin E 400 unit capsule Take 400 Units by mouth daily.       polyethylene glycol (MIRALAX) 17 gram packet Take 1 packet (17 g total) by mouth daily as needed.  0     traMADoL (ULTRAM) 50 mg tablet Take 50 mg by mouth every 6 (six) hours as needed for pain.       No current facility-administered medications for this visit.      Allergies   Allergen Reactions     Cresol      Muscle cramps     Crestor [Rosuvastatin] Myalgia     Declomycin [Demeclocycline] Hives     Social History     Tobacco Use     Smoking status: Former Smoker     Quit date: 1991     Years since quittin.5     Smokeless tobacco: Never Used   Substance Use Topics     Alcohol use: No     Drug use: No     Patient Active Problem List   Diagnosis     Hyperlipidemia     Essential hypertension     Statin intolerance     Personal history of PE (pulmonary embolism)-- May 2020, unprovoked, with right heart strain     PVD (peripheral vascular disease) (H)     Overweight (BMI 25.0-29.9)     Chronic anticoagulation     Osteomyelitis of right foot (H)     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 (H)     Septic arthritis of right foot (H)     Gross hematuria     Urinary retention     Type 2 diabetes mellitus with diabetic peripheral angiopathy without gangrene, with long-term current use of insulin (H)     Adult failure to thrive     Normocytic anemia     Paroxysmal atrial fibrillation (H)     ACP (advance care planning)     " 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 2 diabetes mellitus, with necrosis of muscle (H)     Hematuria     Amputated toe, right (H)     Equinovarus acquired deformity, right        Reviewed, reconciled and updated       Physical Exam   General Appearance: He looks good today, cognitively seems completely intact, actually cracked a few jokes.  Blood pressure looks good    /60 (Patient Site: Right Arm, Patient Position: Sitting, Cuff Size: Adult Regular)   Pulse 80   Temp 97.8  F (36.6  C) (Oral)   Wt 172 lb 11.2 oz (78.3 kg)   SpO2 96%   BMI 24.78 kg/m      Lungs clear  Heart regular rate and rhythm  Abdomen nontender  Right foot shows that he still has a hyperkeratotic area lateral right margin, with some scab, probably Dr. Garcia is good to debride at some more.  There is a little bit of there is a little bit of of chronic appearing erythema over the right side of his foot but I do not think there is any active infection going on.  His toes on the right were all warm.    Left foot toes are little cooler, with delayed capillary refill, but tissue integrity looks good.     Additional Information   I spent 30 minutes on this encounter, including reviewing interval history since last visit, examining the patient, explaining and counseling the issues enumerated in the Assessment and Plan (patient given a copy), ordering indicated tests       Otis Lozano MD

## 2021-06-17 NOTE — PATIENT INSTRUCTIONS - HE
Follow-up for multiple issues, overall continues to do well since our previous visit of February 23, 2021.    Recently followed up with Dr. Garcia of podiatry May 14, 2021 and had some more debridement of the lateral aspect of his right foot.  Today May 25, 2021 it looks like he continues to heal steadily.  Tissue therapy looks good and is right toes are warm.  He is going to be receiving his custom molded shoes within the next week or 2.    As of the end of April 2021, he graduated from home care that was being delivered by [facility] (PT and OT)    I would like to see Mr. Hernandez be more mobile, using his walker, avoid falling because he is on Eliquis anticoagulation.    I told him to focus on eating healthy food, fish would be good, also lean poultry, also plenty of vegetables.  Avoid starches and sweets.    He told me that his morning blood sugar was about 111 which sounds good.  He occasionally gets some lower readings in the 80s.  We will check A1c today.  Would specify an A1c goal of about 7.5.  If he is running less than 7 and seen occasional low fingersticks, we might back down on his Lantus dose.  For now, continue on Lantus 44 units a day, Victoza, no short acting insulin.    Pain control  Will continue to use tramadol tablets, which he averages maybe 1 a day.  I told the tramadol, being an opioid, can be constipating.  Therefore he needs to use his MiraLAX powder to keep stools soft.     Status post excision of right fifth metatarsal on November 2, 2020, wound cultures with Bacteroides and Enterobacter.  Status post skin grafting to the lateral right foot.  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  Underwent incision and drainage of abscess November 27, 2020, wound culture growing corynebacterium.  He was treated with vancomycin and Zosyn, then meropenem,  then transition to outpatient intravenous ertapenem for week course, was completed January 11, 2020.  returned home after being discharged from the TCU at [facility] on February 2, 2021     Urinary retention with hematuria, Dietrich catheter out January 22, 2021, he told me that his bladder is working better.  I encouraged him to try to void and empty the bladder about every 2 hours while awake, and if there are ongoing problems with the bladder, then he could follow-up with Dr. Villa at [facility].    The Dietrich catheter was placed after angiogram because of acute urinary retention.  He developed hematuria November 30, 2020  CT scan abdomen pelvis with irregular bladder wall thickening suggestive of cystitis.  Noted to have nonobstructing 11 mm right upper pole kidney stone but no hydronephrosis.  He is currently on Flomax (tamsulosin) and I want him to stay on that.     Peripheral vascular disease.  November 24, 2020 he had right lower extremity angiogram with balloon angioplasty of anterior tibial and peroneal arteries.  However postoperative ankle-brachial index did not improve significantly, although vascular surgery thought that was because of vasospasm.  He needs to follow-up with vascular surgery, and will have a repeat arterial Doppler ultrasound.     Adult failure to thrive.  He is try to get his strength back.      Wt Readings from Last 3 Encounters:   02/23/21 181 lb (82.1 kg)   02/01/21 182 lb 12.8 oz (82.9 kg)   01/28/21 182 lb 9.6 oz (82.8 kg)      Type 2 diabetes with suboptimal control, last A1c 7.9 measured September 4, 2020, currently on insulin and Victoza but no metformin  Currently on Lantus 44 units at bedtime, at the moment he is not using any mealtime NovoLog.  Also Victoza 1.8 mg injected once a day    Lab Results   Component Value Date    HGBA1C 7.9 (H) 09/04/2020      liraglutide (VICTOZA 3-RUPALI) 0.6 mg/0.1 mL (18 mg/3 mL) injection 1.8 mg, DAILY      Essential hypertension,  apparently no longer taking lisinopril 5 mg a day.  BP Readings from Last 3 Encounters:   05/25/21 128/60   05/14/21 126/62   04/14/21 130/70           Lab Results   Component Value Date     CREATININE 0.93 01/16/2021     BUN 18 01/16/2021      01/16/2021     K 4.8 01/16/2021      01/16/2021     CO2 29 01/16/2021      Normocytic anemia hemoglobin 11.3 checked January 16, 2021, this is surely anemia of chronic disease and postinflammatory effects        Lab Results   Component Value Date     HGB 11.3 (L) 01/16/2021     Peripheral vascular disease with reduced SRIKANTH indices in both feet, but no focal stenosis on arterial ultrasound study.       History of acute pulmonary embolism and cor pulmonale, was unprovoked, diagnosed May 2020, has been on anticoagulation ever since with Eliquis which he will continue long-term.  He seems to be tolerating the Eliquis just fine.  He is on concomitant baby aspirin which I told him does increase the risk for bleeding when combined with Eliquis.  But I think the combination is appropriate for him since he has peripheral vascular disease.     Paroxysmal atrial fibrillation, and history of pulmonary embolism, on anticoagulation with Eliquis for those reasons.     Hyperlipidemia in the context of diabetes, with history of intolerance to statins, LDL cholesterol was 126 when measured July 22, 2020.       Constipation, component of drug-induced from tramadol, I told him its okay to use MiraLAX 1 capful even daily, dissolved in liquid.    Second Covid shot February 22, 2021

## 2021-06-17 NOTE — TELEPHONE ENCOUNTER
Telephone Encounter by Pamela Duron LPN at 5/22/2020 11:08 AM     Author: Pamela Duron LPN Service: -- Author Type: Licensed Nurse    Filed: 5/22/2020 11:09 AM Encounter Date: 5/22/2020 Status: Signed    : Pamela Duron LPN (Licensed Nurse)

## 2021-06-18 NOTE — PROGRESS NOTES
Assessment and Plan:   Annual wellness visit.  All materials related to the annual wellness visit were reviewed.  No new issues.  The patient is planning to retire do some traveling.    Hypertension.  Stable.  Continue current medication.    Diabetes.  Sugars have been a little high in the past week because of a respiratory infection.  He continues to see the endocrinologist.  He may need to change insulins for financial reasons.  He is developing some sensory changes in the fingers of both hands which may be related to diabetic neuropathy.  I discussed EMGs with him and he would like to wait on doing this.    Diabetic foot ulcer.  Continue follow-up with the vascular clinic.        The patient's current medical problems were reviewed.    I have had an Advance Directives discussion with the patient.  The following health maintenance schedule was reviewed with the patient and provided in printed form in the after visit summary:   Health Maintenance   Topic Date Due     DIABETES OPHTHALMOLOGY EXAM  12/17/1946     TD 18+ HE  12/17/1954     ZOSTER VACCINE  12/17/1996     PNEUMOCOCCAL CONJUGATE VACCINE FOR ADULTS (PCV13 OR PREVNAR)  12/17/2001     DIABETES URINE MICROALBUMIN  08/09/2013     DIABETES HEMOGLOBIN A1C  03/05/2018     INFLUENZA VACCINE RULE BASED (Season Ended) 08/01/2018     DIABETES FOLLOW-UP  11/23/2018     DIABETES FOOT EXAM  05/23/2019     FALL RISK ASSESSMENT  05/23/2019     ADVANCE DIRECTIVES DISCUSSED WITH PATIENT  11/11/2020     PNEUMOCOCCAL POLYSACCHARIDE VACCINE AGE 65 AND OVER  Completed        Subjective:   Chief Complaint: Caleb Hernandez is an 82 y.o. male here for an Annual Wellness visit.   HPI: This is an 82-year-old man in for his annual wellness visit.  He has a history of hypertension and diabetes with diabetic foot ulcers.  He has been seen in the vascular clinic and continues to be followed there.  Currently the ulcer is doing well and is closed and healing continues.  His blood sugars  been somewhat elevated in the past week or so that he thinks is due to a respiratory infection which is now starting to clear.  He will continue to follow the sugars.  He does have some sensory changes in the fingers of both hands which is been about the same for a number of months.  This could certainly be related to the diabetes.  We did talk about options for evaluation.  He is otherwise feeling good.  He remains active and busy and has no other specific complaints.  He is still working at [employer] but has decided he wants to retire.  Recently he had a very bad weekend which 6 friends or relatives passed away in 1 week.  He has decided he would like to spend some time traveling about the country visiting friends.  Medications are as listed.    Review of Systems:    Please see above.  The rest of the review of systems are negative for all systems.    Patient Care Team:  Chaz Machado MD as PCP - General     Patient Active Problem List   Diagnosis     Type 2 diabetes mellitus     Hyperlipidemia     Essential hypertension     Compression Fracture Of Thoracic Vertebral Body     Rib Fracture     Diabetic ulcer of right foot associated with type 2 diabetes mellitus     Common bile duct (CBD) obstruction     Biliary colic     Dehydration     MANJIT (acute kidney injury)     Choledocholithiasis     Sepsis due to pneumonia     Bacteremia     CAP (community acquired pneumonia)     Past Medical History:   Diagnosis Date     BPH (benign prostatic hyperplasia)      Cholelithiasis      Diabetes mellitus      Essential hypertension      Hyperlipidemia      Pancreatitis       Past Surgical History:   Procedure Laterality Date     BACK SURGERY      1964 removed a cyst     CHOLECYSTECTOMY  1985     ERCP       ERCP N/A 9/5/2017    Procedure: ENDOSCOPIC RETROGRADE CHOLANGIOPANCREATOGRAPHY SPHINCTEROTOMY AND STONE EXTRACTION;  Surgeon: Hansel Gannon MD;  Location: [facility];  Service:      TONSILLECTOMY  1940     "  Family History   Problem Relation Age of Onset     Aortic aneurysm Mother      Alcohol abuse Father       Social History     Social History     Marital status:      Spouse name: N/A     Number of children: N/A     Years of education: N/A     Occupational History     Not on file.     Social History Main Topics     Smoking status: Former Smoker     Quit date: 11/11/1991     Smokeless tobacco: Never Used     Alcohol use No     Drug use: No     Sexual activity: No     Other Topics Concern     Not on file     Social History Narrative      Current Outpatient Prescriptions   Medication Sig Dispense Refill     aspirin 81 MG EC tablet Take 81 mg by mouth daily.       blood glucose test strips Use to test twice a day Dispense brand per patient's insurance at pharmacy discretion. 200 strip 3     cholecalciferol, vitamin D3, (VITAMIN D3) 5,000 unit Tab Take 5,000 Units by mouth daily.        insulin degludec (TRESIBA FLEXTOUCH U-200) 200 unit/mL (3 mL) InPn Inject 40 Units under the skin daily before breakfast.       liraglutide (VICTOZA 3-RUPALI) 0.6 mg/0.1 mL (18 mg/3 mL) PnIj injection INJECT 1.2 MG SUBCUTANEOUSLY ONCE DAILY 18 mL 1     lisinopril (PRINIVIL,ZESTRIL) 5 MG tablet Take 1 tablet (5 mg total) by mouth daily. 90 tablet 2     MULTIVITS,CA,MIN/IRON/FA/LYCOP (CENTRUM MEN ORAL) Take 1 tablet by mouth daily.       naproxen sodium (ALEVE) 220 MG tablet Take 220 mg by mouth daily.       pen needle, diabetic 31 gauge x 5/16\" Ndle Used to inject insulin daily 90 each 0     traMADol (ULTRAM) 50 mg tablet Take 1 tablet (50 mg total) by mouth every 6 (six) hours as needed for pain. 30 tablet 0     vitamin E 400 unit capsule Take 400 Units by mouth daily.       No current facility-administered medications for this visit.       Objective:   Vital Signs:   Visit Vitals     /80     Pulse 70     Ht 5' 9\" (1.753 m)     Wt 199 lb (90.3 kg)     SpO2 94%     BMI 29.39 kg/m2        VisionScreening:  No exam data present "     PHYSICAL EXAM  On examination today his blood pressure is 132/80.  Weight is 199 pounds and height is 69 inches.  BMI is 29.39.    Eyes: Pupils are equal and conjunctiva are normal.    Ears nose and throat: External ears are normal.  He does have bilateral hearing aids.  With these removed he does show little cerumen bilaterally and I am unable to visualize his tympanic membranes.  Nose and throat are normal.    Neck: Supple with no masses and no neck vein distention.  No thyroid enlargement.    Lungs: Clear.    Cardiovascular: Heart is in a sinus rhythm with a rate of 70 and no ectopy.  No gallops or murmurs.  Carotid pulses are full with no bruits.  No peripheral edema.    GI: Abdomen is soft and nontender with no distention.  No masses or organomegaly.    Musculoskeletal: Head and neck are normal to inspection with good range of motion.  Good strength and range of motion in all 4 extremities.    Neurologic: Cranial nerves are intact.  Gait is normal.    Skin: I did not evaluate the area of ulceration as he is just seen the wound care clinic and will be following up with him soon.    Psychiatric: The patient is alert and oriented ×3.  Mood and affect are appropriate    Assessment Results 5/23/2018   Activities of Daily Living No help needed   Instrumental Activities of Daily Living No help needed   Get Up and Go Score Less than 12 seconds   Mini Cog Total Score 5   Some recent data might be hidden     A Mini-Cog score of 0-2 suggests the possibility of dementia, score of 3-5 suggests no dementia    Identified Health Risks:     Information regarding advance directives (living nunez), including where he can download the appropriate form, was provided to the patient via the AVS.

## 2021-06-18 NOTE — PATIENT INSTRUCTIONS - HE
Patient Instructions by Lisa Ibrahim RN at 9/25/2020  9:00 AM     Author: Lisa Ibrahim RN Service: -- Author Type: Registered Nurse    Filed: 9/25/2020 10:09 AM Encounter Date: 9/25/2020 Status: Addendum    : Lisa Ibrahim RN (Registered Nurse)    Related Notes: Original Note by Lisa Ibrahim RN (Registered Nurse) filed at 9/25/2020  9:43 AM       Continue limited walking on right foot with cage splint.      Continue Endoform on right foot ulcer:  Endoform Dressing Application     1. Endoform should be cut to the size of the wound.  It should touch the edges of the ulcer.  It can overlap the edges just very small amount.  Then, moisten with saline. (If it is easier for you, you can moisten it before laying it in the wound)    2. Cover the top of the wound with dry gauze.  Secure with roll gauze and paper tape.  No tape should be directly onto skin.    3. Endoform is naturally used by the body over time so you may not find it in the wound when you change your dressing.  If you do find some, gently cleanse the wound with saline. Do not remove the remaining Endoform, which may appear as an off-white to lanza gel, just add Endoform on top.  Usually, more Endoform will need to be added every 5-7 days.     4.  Change your top dressing every 1-2 days depending on drainage.     -Endoform is a collagen dressing created from ovine (sheep) fore-stomach tissue. The collagen extracellular matrix transforms into a soft conforming sheet, which is naturally incorporated into the wound over time.  Collagen dressings are used to stimulate wound healing.         [facility] Virtual Check-In Number    [phone]    When you arrive for your IN OFFICE visit. Please call this number from the parking lot.      The staff will then virtually check you in and meet you at the door to escort you to a room.    If you arrive early and a room is not yet ready for you staff may have you  wait can call you back when they are ready.     Please remember to wear a mask into your appointment     No Visitors Allowed    If you are having any symptoms- cough, fever, rash, loss of taste and smell or shortness of breath we ask that you please call and reschedule your appointment.

## 2021-06-18 NOTE — PATIENT INSTRUCTIONS - HE
Patient Instructions by Em Broderick RN at 10/21/2020  2:00 PM     Author: Em Broderick RN Service: -- Author Type: Registered Nurse    Filed: 10/21/2020  2:21 PM Encounter Date: 10/21/2020 Status: Signed    : Em Broderick RN (Registered Nurse)       Patient Education     Magnetic Resonance Imaging (MRI)    Magnetic resonance imaging (MRI) is a test that lets your doctor see detailed pictures of the inside of your body. MRI combines the use of strong magnets and radio waves to form an MRI image.  How do I get ready for an MRI?    Follow any directions you are given for not eating or drinking before the test.    Ask your provider if you should stop taking any medicine before the test.    Follow your normal daily routine unless your provider tells you otherwise.    You'll be asked to remove your watch, jewelry, hearing aids, credit cards, pens, pocket knives, eyeglasses, and other metal objects.    You may be asked to remove your makeup. Makeup may contain some metal.    Most MRI tests take 30 to 60 minutes. Depending on the type of MRI you are having, the test may take longer. Give yourself extra time to check in.    What happens during an MRI?    You may be asked to wear a hospital gown.    You may be given earplugs to wear if you need them.    You may be injected with a special dye (contrast) that improves the MRI image.     Youll lie down on a platform that slides into the magnet.    Tell your healthcare provider and the technologist if you:    Have ever had an imaging test such as MRI or CT with contrast dye    Are allergic to contrast dye, iodine, shellfish, or any medicines    Have a serious health problem. This includes diabetes or kidney disease, or a liver transplant.    Are pregnant or may be pregnant, or are breastfeeding    Have any implanted device or metal clips or pins in your body  What happens after an MRI?    You can get back to normal activities right away. If you were given  contrast, it will pass naturally through your body within a day. You may be told to drink more water or other fluids during this time.     Your doctor will discuss the test results with you during a follow-up appointment or over the phone.    Your next appointment is: __________________     Date Last Reviewed: 6/1/2017 2000-2019 [employer]. [address] [place] [address], [place] [address]. All rights reserved. This information is not intended as a substitute for professional medical care. Always follow your healthcare professional's instructions.    Please call one of the [place] locations below to schedule an appointment. If you received a prescription please bring it with you to your appointment. Some locations are limited to what they carry.    Office Locations    [facility]  [address] [place] [id number]  Home Medical Equipment, [address]   Phone: [phone]   Orthotics and Prosthetics, [address]   Phone: [phone]

## 2021-06-18 NOTE — PATIENT INSTRUCTIONS - HE
Patient Instructions by Maite Caro RN at 10/9/2020  8:00 AM     Author: Maite Caro RN Service: -- Author Type: Registered Nurse    Filed: 10/9/2020  8:21 AM Encounter Date: 10/9/2020 Status: Signed    : Maite Caro RN (Registered Nurse)       Patient Education     Wound Care  Taking proper care of your wound will help it heal. Your healthcare provider may show you how to clean and dress the wound. He or she will also explain how to tell if the wound is healing normally. If you are unsure of how to take care of the wound, be sure to clarify what dressing to use and how often you should change the bandages. Here are the basic steps.     A wound that's not healing normally may be dark in color or have white streaks.   Wash your hands  Tips for washing your hands include:    Use liquid soap and lather for 2 minutes. Scrub between your fingers and under your nails.    Rinse with warm water, keeping your fingers pointing down.    Use a paper towel to dry your hands and to turn off the faucet.  Remove the used dressing  Here are suggestions for removing the dressing:    If dressing changes cause you pain, be sure to take your pain medicine as prescribed by your healthcare provider 30 minutes before dressing changes.    Set up your supplies.    Put on disposable gloves if youre dressing a wound for someone else or your wound is infected.    Loosen the tape by pulling gently toward the wound.    Gently take off the old dressing. If the dressing is stuck to the wound, moisten it with saline (if available) or clean water.    If you have a drain or tube in the wound, be careful not to pull on it.    Remove the dressing 1 layer at a time and put it in a plastic bag. Seal the bag and put it in the trash.    Remove your gloves.  Inspect and dress the wound  Check the wound carefully:    Each time you change the dressing, check the wound carefully to be sure its healing normally by making sure your wound  appears to be pink and moist, and is free of infection.      Wash your hands again. Put on a new pair of gloves.    Clean and dress the wound as directed by your healthcare provider or nurse. Don't put anything in the wound that is not prescribed or directed by your healthcare provider. If you have a drain or tube, be careful not to pull on it. Make sure to secure the drain or tube as well.    Put all unused supplies in a clean plastic bag. Seal the bag and store it in a clean, dry area between dressing changes.     Be sure to wash your hands again.  Call your healthcare provider  Call your healthcare provider if you see any of the following signs of a problem:    Bleeding that soaks the dressing    Pink fluid weeping from the wound    Increased drainage or drainage that is yellow, yellow-green, or foul-smelling    Increased swelling or pain, or redness or swelling in the skin around the wound    A change in the color of the wound, or if streaks develop in a direction away from the wound    The area between any stitches opens up    An increase in the size of the wound    A fever of 100.4 F (38 C) or higher, or as directed by your healthcare provider    Chills, increased fatigue, or a loss of appetite      Date Last Reviewed: 7/1/2017 2000-2019 [employer]. [address] [place] [address], [place] [address]. All rights reserved. This information is not intended as a substitute for professional medical care. Always follow your healthcare professional's instructions.

## 2021-06-18 NOTE — PATIENT INSTRUCTIONS - HE
Patient Instructions by Maite Caro RN at 12/10/2020  9:40 AM     Author: Maite Caro RN Service: -- Author Type: Registered Nurse    Filed: 12/10/2020 10:03 AM Encounter Date: 12/10/2020 Status: Signed    : Maite Caro RN (Registered Nurse)       Ankle-Brachial Index (SRIKANTH) or Physiologic Test    Description  An ankle-brachial index test is relatively pain free. Blood pressure cuffs of various sizes are placed on your thigh, calf, foot and toes.  Similar to having your blood pressure checked with an arm cuff, as the technician inflates the cuffs, they progressively tighten and are then quickly released.  You may feel some discomfort, but generally for less than 60 seconds for each measurement. You will be asked to remove your socks and shoes and possibly your pants or shorts. Gowns will be provided. It usually takes about 30-60 minutes.   Depending on the initial readings and patient symptoms, you may be asked to perform a light walk on a treadmill.  The technician will apply ultrasound gel, usually warmed for your comfort, to your ankles and wrists. Through the gel, the technician will use a small hand-held device that emits sound waves.  Risks  There are typically no side effects or complications associated with a physiologic study.  How to Prepare  Eat and take medications as usual.  There is no preparation required for an ankle-brachial index (SRIKANTH) or physiologic exam.  What Can I Expect After the Test?  The technician will send the ultrasound images to your vascular surgeon for evaluation. Typically, a report is available in 2-3 days. If anything critical is found, it is standard practice to notify the vascular surgeon immediately.  Reference: https://vascular.org/patient-resources/vascular-tests    Lower Extremity Arterial Ultrasound    Description  Ultrasound examinations are painless and easy for the patient. The vascular laboratory will contain a bed and just two or three pieces of  equipment. You will be asked to remove pants or shorts and gowns will be provided. It usually takes about 30 minutes.  The technician will tuck a towel under your underpants in the groin. The gel is water-soluble and will not stain your skin or clothes.  Ultrasound gel, usually warmed for your comfort, will be placed on the inner side of your legs.    Through the gel, the technician will apply to your legs a small hand-held device that emits sound waves.  When the test is completed, the technician will remove excess gel from your legs.    Risks  There are typically no side effects or complications associated with a lower extremity arterial duplex ultrasound.  How to Prepare  Eat and take medications as usual.  There is no preparation required for a lower extremity arterial duplex ultrasound.  What Can I Expect After the Test?  The technician will send the ultrasound images to your vascular surgeon for evaluation. Typically, a report is available in 2-3 days. If anything critical is found, it is standard practice to notify the vascular surgeon immediately.  Reference: https://vascular.org/patient-resources/vascular-tests

## 2021-06-18 NOTE — PATIENT INSTRUCTIONS - HE
Patient Instructions by Lisa Ibrahim RN at 7/31/2020  2:00 PM     Author: Lisa Ibrahim RN Service: -- Author Type: Registered Nurse    Filed: 7/31/2020  2:47 PM Encounter Date: 7/31/2020 Status: Signed    : Lisa Ibrahim RN (Registered Nurse)       Limited walking in cage splint.    Dressing Application     1. Endoform should be cut to the size of the wound.  It should touch the edges of the ulcer.  It can overlap the edges just very small amount.  Then, moisten with saline. (If it is easier for you, you can moisten it before laying it in the wound)    2. Cover the top of the wound with medi-pore.    3. Endoform is naturally used by the body over time so you may not find it in the wound when you change your dressing.  If you do find some, gently cleanse the wound with saline. Do not remove the remaining Endoform, which may appear as an off-white to lanza gel, just add Endoform on top.  Usually, more Endoform will need to be added every 5-7 days.     4.  Change your top dressing every 1-2 days depending on drainage.     -Endoform is a collagen dressing created from ovine (sheep) fore-stomach tissue. The collagen extracellular matrix transforms into a soft conforming sheet, which is naturally incorporated into the wound over time.  Collagen dressings are used to stimulate wound healing.           [facility] Virtual Check-In Number    [phone]    When you arrive for your IN OFFICE visit. Please call this number from the parking lot.      The staff will then virtually check you in and meet you at the door to escort you to a room.    If you arrive early and a room is not yet ready for you staff may have you wait can call you back when they are ready.     Please remember to wear a mask into your appointment     No Visitors Allowed    If you are having any symptoms- cough, fever, rash, loss of taste and smell or shortness of breath we ask that you please call and  reschedule your appointment.

## 2021-06-18 NOTE — PATIENT INSTRUCTIONS - HE
Patient Instructions by Em Broderick RN at 10/21/2020  2:00 PM     Author: Em Broderick RN Service: -- Author Type: Registered Nurse    Filed: 10/21/2020  2:42 PM Encounter Date: 10/21/2020 Status: Addendum    : Em Broderick RN (Registered Nurse)    Related Notes: Original Note by Em Broderick RN (Registered Nurse) filed at 10/21/2020  2:26 PM         We would like you to get an MRI of your right foot.  Today we will just pack your wound with gauze and secure with roll gauze. No need to remove dressing until follow up on Friday.   Continue to offload and use Cage Splint.  You had some bleeding at your visit today.  If the dressing is saturating through put pressure on the area and elevate your foot. If the bleeding still does not stop please call our office [phone] or if after hours go to urgent care.         Magnetic Resonance Imaging (MRI)    Magnetic resonance imaging (MRI) is a test that lets your doctor see detailed pictures of the inside of your body. MRI combines the use of strong magnets and radio waves to form an MRI image.  How do I get ready for an MRI?    Follow any directions you are given for not eating or drinking before the test.    Ask your provider if you should stop taking any medicine before the test.    Follow your normal daily routine unless your provider tells you otherwise.    You'll be asked to remove your watch, jewelry, hearing aids, credit cards, pens, pocket knives, eyeglasses, and other metal objects.    You may be asked to remove your makeup. Makeup may contain some metal.    Most MRI tests take 30 to 60 minutes. Depending on the type of MRI you are having, the test may take longer. Give yourself extra time to check in.    What happens during an MRI?    You may be asked to wear a hospital gown.    You may be given earplugs to wear if you need them.    You may be injected with a special dye (contrast) that improves the MRI image.     Youll lie down on a platform  that slides into the magnet.    Tell your healthcare provider and the technologist if you:    Have ever had an imaging test such as MRI or CT with contrast dye    Are allergic to contrast dye, iodine, shellfish, or any medicines    Have a serious health problem. This includes diabetes or kidney disease, or a liver transplant.    Are pregnant or may be pregnant, or are breastfeeding    Have any implanted device or metal clips or pins in your body  What happens after an MRI?    You can get back to normal activities right away. If you were given contrast, it will pass naturally through your body within a day. You may be told to drink more water or other fluids during this time.     Your doctor will discuss the test results with you during a follow-up appointment or over the phone.    Your next appointment is: _October 22nd at 8:15 am_________________     Date Last Reviewed: 6/1/2017 2000-2019 [employer]. [address] [place] [address]. All rights reserved. This information is not intended as a substitute for professional medical care. Always follow your healthcare professional's instructions.    Please call one of the [place] locations below to schedule an appointment. If you received a prescription please bring it with you to your appointment. Some locations are limited to what they carry.    Office Locations    [facility]  [address] [place] [id number]  [facility] Medical Equipment, [address]   Phone: [phone]   Orthotics and Prosthetics, [address]   Phone: [phone]

## 2021-06-18 NOTE — PATIENT INSTRUCTIONS - HE
"Patient Instructions by Damaris Conteh LPN at 8/14/2020  9:15 AM     Author: Damaris Conteh LPN Service: -- Author Type: Licensed Nurse    Filed: 8/14/2020  9:47 AM Encounter Date: 8/14/2020 Status: Signed    : Damaris Conteh LPN (Licensed Nurse)       How to care for your wound    Cleanse wound with saline sent to you with your supplies or a wound wash found at the pharmacy.      Pat dry the wound with 4\"x4\" gauze      Cut to fit the endoform and place in or on the wound.        Cover the wound with 6l9mtyp dry gauze.      Wrap the affected area with 4 inch roll gauze.      Secure dressings in place with paper tape.      Change dressing: every other day      Do not get your ulcer wet when you shower.  You can cover it with a cast protector. Cast protectors are available for purchase at [facility] [employer]. You may also purchase a cast protector at your local pharmacy.      Good nutrition is important for wound healing.  I recommend increasing your protein.  You can do this through your diet, nutritional supplements, and/or protein powder.    It is ok to continue current wound care treatment/products for the next 2-3 days until new wound care supplies are ordered and arrive. If longer than this please contact our office.    Please call the [facility] before substituting any product    Call our clinic nurse at [phone] if there is an increase in drainage, pain, redness, or the wound size increases.  This maybe a sign of infection and require attention prior to the next appointment      - Dressing Application of  Endoform    1. Endoform should be cut to the size of the wound.  It should touch the edges of the ulcer. It is okay if it overlap the edges a small amount.  Then, moisten the Endoform with saline.(If it is easier for you, you can moisten it before laying it in the wound)    2. Cover the wound with Endoform, followed by dry gauze, and secure " with roll gauze if needed.     3. Endoform is naturally used by the body over time so you may not find it in the wound when you change your dressing.  If you do find some, gently cleanse the wound with saline. Do not remove the remaining Endoform, which may appear as an off-white to lanza gel, just add Endoform on top.  Usually, more Endoform will need to be added every 5-7 days.     4. Change your top dressing every 1-2 days or as needed depending on drainage.    -Endoform is a collagen dressing created from ovine (sheep) fore-stomach tissue. The collagen extracellular matrix transforms into a soft conforming sheet, which is naturally incorporated into the wound over time.  Collagen dressings are used to stimulate wound healing.   ,

## 2021-06-18 NOTE — PATIENT INSTRUCTIONS - HE
Patient Instructions by Lisa Ibrahim RN at 9/4/2020  9:00 AM     Author: Lisa Ibrahim RN Service: -- Author Type: Registered Nurse    Filed: 9/4/2020  9:45 AM Encounter Date: 9/4/2020 Status: Signed    : Lisa Ibrahim RN (Registered Nurse)       NON-WEIGHTBEARING right foot, wear cage splint.    Continue endoform right foot:       Dressing Application     1. Endoform should be cut to the size of the wound.  It should touch the edges of the ulcer.  It can overlap the edges just very small amount.  Then, moisten with saline. (If it is easier for you, you can moisten it before laying it in the wound)    2. Cover the top of the wound with dry gauze.    3. Endoform is naturally used by the body over time so you may not find it in the wound when you change your dressing.  If you do find some, gently cleanse the wound with saline. Do not remove the remaining Endoform, which may appear as an off-white to lanza gel, just add Endoform on top.  Usually, more Endoform will need to be added every 5-7 days.     4.  Change your top dressing every 1-2 days depending on drainage.     -Endoform is a collagen dressing created from ovine (sheep) fore-stomach tissue. The collagen extracellular matrix transforms into a soft conforming sheet, which is naturally incorporated into the wound over time.  Collagen dressings are used to stimulate wound healing.         [facility] Virtual Check-In Number    [phone]    When you arrive for your IN OFFICE visit. Please call this number from the parking lot.      The staff will then virtually check you in and meet you at the door to escort you to a room.    If you arrive early and a room is not yet ready for you staff may have you wait can call you back when they are ready.     Please remember to wear a mask into your appointment     No Visitors Allowed    If you are having any symptoms- cough, fever, rash, loss of taste and smell or shortness  of breath we ask that you please call and reschedule your appointment.

## 2021-06-19 NOTE — LETTER
Letter by Chaz Machado MD at      Author: Chaz Machado MD Service: -- Author Type: --    Filed:  Encounter Date: 10/25/2019 Status: Signed         Caleb Hernandez  [address]  [place] [address]             October 25, 2019         Dear Mr. Hernandez,    Below are the results from your recent visit:    Resulted Orders   Comprehensive Metabolic Panel   Result Value Ref Range    Sodium 139 136 - 145 mmol/L    Potassium 4.4 3.5 - 5.0 mmol/L    Chloride 101 98 - 107 mmol/L    CO2 28 22 - 31 mmol/L    Anion Gap, Calculation 10 5 - 18 mmol/L    Glucose 180 (H) 70 - 125 mg/dL    BUN 19 8 - 28 mg/dL    Creatinine 1.14 0.70 - 1.30 mg/dL    GFR MDRD Af Amer >60 >60 mL/min/1.73m2    GFR MDRD Non Af Amer >60 >60 mL/min/1.73m2    Bilirubin, Total 0.9 0.0 - 1.0 mg/dL    Calcium 9.6 8.5 - 10.5 mg/dL    Protein, Total 7.3 6.0 - 8.0 g/dL    Albumin 3.8 3.5 - 5.0 g/dL    Alkaline Phosphatase 80 45 - 120 U/L    AST 18 0 - 40 U/L    ALT 16 0 - 45 U/L    Narrative    Fasting Glucose reference range is 70-99 mg/dL per  American Diabetes Association (ADA) guidelines.   Lipid Cascade   Result Value Ref Range    Cholesterol 200 (H) <=199 mg/dL    Triglycerides 90 <=149 mg/dL    HDL Cholesterol 43 >=40 mg/dL    LDL Calculated 139 (H) <=129 mg/dL    Patient Fasting > 8hrs? Yes    Glycosylated Hemoglobin A1c   Result Value Ref Range    Hemoglobin A1c 12.0 (H) 3.5 - 6.0 %       Your blood sugar and A1c are still quite high.  We should probably have you sit down again with the diabetic educator to review your medications and make some recommendations.  If you are okay with this let me know and we will set up the appointment.    Please call with questions or contact us using [employer].    Sincerely,        Electronically signed by Chaz Machado MD

## 2021-06-20 NOTE — LETTER
Letter by Chaz Machado MD at      Author: Cahz Machado MD Service: -- Author Type: --    Filed:  Encounter Date: 7/22/2020 Status: (Other)         Caleb Hernandez  [address]  [place] [address]             July 24, 2020         Dear Mr. Hernandez,    Below are the results from your recent visit:    Resulted Orders   Comprehensive Metabolic Panel   Result Value Ref Range    Sodium 136 136 - 145 mmol/L    Potassium 4.8 3.5 - 5.0 mmol/L    Chloride 99 98 - 107 mmol/L    CO2 28 22 - 31 mmol/L    Anion Gap, Calculation 9 5 - 18 mmol/L    Glucose 121 70 - 125 mg/dL    BUN 19 8 - 28 mg/dL    Creatinine 0.97 0.70 - 1.30 mg/dL    GFR MDRD Af Amer >60 >60 mL/min/1.73m2    GFR MDRD Non Af Amer >60 >60 mL/min/1.73m2    Bilirubin, Total 0.8 0.0 - 1.0 mg/dL    Calcium 9.5 8.5 - 10.5 mg/dL    Protein, Total 6.7 6.0 - 8.0 g/dL    Albumin 3.8 3.5 - 5.0 g/dL    Alkaline Phosphatase 88 45 - 120 U/L    AST 17 0 - 40 U/L    ALT 12 0 - 45 U/L    Narrative    Fasting Glucose reference range is 70-99 mg/dL per  American Diabetes Association (ADA) guidelines.   Lipid Cascade   Result Value Ref Range    Cholesterol 178 <=199 mg/dL    Triglycerides 60 <=149 mg/dL    HDL Cholesterol 40 >=40 mg/dL    LDL Calculated 126 <=129 mg/dL    Patient Fasting > 8hrs? No    Glycosylated Hemoglobin A1c   Result Value Ref Range    Hemoglobin A1c 7.7 (H) 3.5 - 6.0 %       Your blood tests look good.    Please call with questions or contact us using [employer].    Sincerely,        Electronically signed by Chaz Machado MD

## 2021-06-20 NOTE — LETTER
Letter by Chaz Machado MD at      Author: Chaz Machado MD Service: -- Author Type: --    Filed:  Encounter Date: 12/27/2019 Status: Signed         Caleb Hernandez  [address]  [place] [address]             December 27, 2019         Dear Mr. Hernandez,    Below are the results from your recent visit:    Resulted Orders   Glycosylated Hemoglobin A1c   Result Value Ref Range    Hemoglobin A1c 12.0 (H) 3.5 - 6.0 %   Basic Metabolic Panel   Result Value Ref Range    Sodium 140 136 - 145 mmol/L    Potassium 4.3 3.5 - 5.0 mmol/L    Chloride 104 98 - 107 mmol/L    CO2 25 22 - 31 mmol/L    Anion Gap, Calculation 11 5 - 18 mmol/L    Glucose 205 (H) 70 - 125 mg/dL    Calcium 9.5 8.5 - 10.5 mg/dL    BUN 19 8 - 28 mg/dL    Creatinine 1.01 0.70 - 1.30 mg/dL    GFR MDRD Af Amer >60 >60 mL/min/1.73m2    GFR MDRD Non Af Amer >60 >60 mL/min/1.73m2    Narrative    Fasting Glucose reference range is 70-99 mg/dL per  American Diabetes Association (ADA) guidelines.       Your sugar and A1c are still quite high.  We will see how the consultation with diabetic education goes.    Please call with questions or contact us using [employer].    Sincerely,        Electronically signed by Chaz Machado MD

## 2021-06-20 NOTE — LETTER
Letter by Chaz Machado MD at      Author: Chaz Machado MD Service: -- Author Type: --    Filed:  Encounter Date: 7/22/2020 Status: (Other)         Caleb Hernandez  [address] [place] [address]             July 22, 2020         Dear Mr. Hernandez,    Below are the results from your recent visit:    Resulted Orders   Glycosylated Hemoglobin A1c   Result Value Ref Range    Hemoglobin A1c 7.7 (H) 3.5 - 6.0 %       Your A1c, the diabetic test, is much better.    Please call with questions or contact us using [employer].    Sincerely,        Electronically signed by Chaz Machado MD

## 2021-06-21 NOTE — LETTER
Letter by Charlotte Mckenna MBBS at      Author: Charlotte Mckenna MBBS Service: -- Author Type: --    Filed:  Encounter Date: 1/12/2021 Status: (Other)         Patient: Caleb Hernandez   MR Number: [id number]   YOB: 1935   Date of Visit: 1/12/2021       [facility] Admission note      Patient: Caleb Hernandez  MRN: [id number]  Date of Service: 1/12/2021      [facility] [[id number]]  Reason for Visit     Chief Complaint   Patient presents with   ? Review Of Multiple Medical Conditions   follow-up hypoglycemia and hypotension    Code Status     Full code    Assessment       -Right foot cellulitis with abscess work-up positive for osteomyelitis with septic arthritis of 2nd-4th tarsometatarsal joint  -Status post I&D of abscess on 11/27/2020  -Postoperative urinary retention with hematuria  -History of right 5th metatarsal excision on 11/2/2020  -Severe peripheral vascular disease.  -Status post right lower extremity angiogram with balloon angioplasty of anterior tibial and peroneal arteries on 11/24/2020  -- IDDM-2   - HTN with hypotension noted today  - FTT  -Generalized weakness with deconditioning    Plan     Patient has been admitted to the TCU for strengthening and rehab.  He has been admitted with ulceration of his right foot status post TheraSkin graft.  He currently has a wound VAC in place  He remains nonweightbearing on his right lower extremity.  He will do close follow-ups with his vascular as well as podiatrist as scheduled.  Seen by infectious disease yesterday and his central line has been removed and his antibiotics have been discontinued.  We will discontinue weekly labs from then on.  .  He continues with the Dietrich catheter because of hematuria and retention concerns.  I did offer to have him get a voiding trial in the TCU he refuses one currently.  Advised him that he may benefit from seeing a urologist if he does not want a voiding trial.  Blood sugar control appears  to be adequate.  Continue with his PT OT  Recheck liver functions are normal; BMP is completely normal  CRP 1.7  CBC shows a white count of 5.6  .      History     Patient is a very pleasant 85 y.o. male who is admitted to TCU  Patient presented to the hospital with right lower extremity cellulitis.  He was admitted with concerning infection.  MRI confirmed new synovitis with osteomyelitis of the 2nd-4th tarsometatarsal joint with septic arthritis.  He underwent I&D of the abscess on 11/27/2020.  Postoperatively he has been discharged nonweightbearing with outpatient ertapenem for 4 weeks  Subsequently he was readmitted on 12/17/2024 application of TheraSkin with redebridement of his wound.  Currently he has a wound VAC on.  He was seen by infectious disease yesterday.  Based on the recommendation his IV antibiotics have been discontinued and his central line has been removed.  He will have close follow-up with his podiatrist next week  Recheck labs were reviewed and his CRP is 1.7 his white count is 5.6 today and his other electrolytes and kidney functions as well as his liver functions are completely normal  He has a history of malnutrition with low albumin.  He is on protein supplementation secondary to that    He also has a history of severe peripheral vascular disease and underwent right lower extremity angiogram with angioplasty of anterior tibial and peroneal arteries on 11/24/2020 with vascular surgery.  He will require follow-up with vascular.  It was done in the TCU.  They have recommended further follow-up with podiatry for further work-up they recommend seeing him back in 3 months for ABIs  He does report some occasional discomfort in his leg but no significant pain however he did tell me that he has been taking her tramadol at nighttime to help him with sleeping because of pain issues    Postoperatively had urinary retention with bladder wall thickening suggestive of cystitis.  He was noted to have a  right upper pole kidney stone but no hydronephrosis.  Urology saw him.  He will continue with Flomax at discharge and at the recommendation he has resumed his Eliquis and aspirin.  I did talk to him getting a voiding trial in the TCU he refuses one.  I did advise him that he may need to see a urologist  He has diabetes and his Lantus dose was adjusted to get tighter control of his blood sugars.  Blood sugar still continue to be running on the low side most of the days with blood sugars ranging from 90-1 50 with occasional high greater than 200 noted.  He is on a much lower dose of insulin.  Blood pressure also low today but prior to that he has had stable blood pressures.  Oral intake appears to be adequate    Past Medical History     Active Ambulatory (Non-Hospital) Problems    Diagnosis   ? Hematuria   ? 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 2 diabetes mellitus, with necrosis of muscle (H)   ? ACP (advance care planning)   ? Septic arthritis of right foot (H)   ? Gross hematuria   ? Urinary retention   ? Type 2 diabetes mellitus with diabetic peripheral angiopathy without gangrene, with long-term current use of insulin (H)   ? Adult failure to thrive   ? Normocytic anemia   ? Paroxysmal atrial fibrillation (H)   ? 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 Cellulitis of right lower extremity   ? Abscess of right foot   ? Cellulitis and abscess of foot excluding toe   ? Osteomyelitis of right foot (H)   ? Statin intolerance   ? Personal history of PE (pulmonary embolism)-- May 2020, unprovoked, with right heart strain   ? PVD (peripheral vascular disease) (H)   ? Overweight (BMI 25.0-29.9)   ? Chronic anticoagulation   ? Acute pulmonary embolism with acute cor pulmonale (H)   ? Diabetic ulcer of right foot associated with type 2 diabetes mellitus (H)   ? Type 2 diabetes mellitus (H)   ? Hyperlipidemia   ? Essential hypertension     Past  Medical History:   Diagnosis Date   ? BPH (benign prostatic hyperplasia)    ? Cholelithiasis    ? Common bile duct (CBD) obstruction 9/4/2017   ? Compression Fracture Of Thoracic Vertebral Body    ? Diabetes mellitus (H)    ? Essential hypertension    ? Hyperlipidemia    ? Pancreatitis    ? Rib Fracture    ? Sepsis due to pneumonia (H) 9/8/2017       Past Social History     Reviewed, and he  reports that he quit smoking about 29 years ago. He has never used smokeless tobacco. He reports that he does not drink alcohol or use drugs.    Family History     Reviewed, and family history includes Alcohol abuse in his father; Aortic aneurysm in his mother.    Medication List   Post Discharge Medication Reconciliation Status: discharge medications reconciled, continue medications without change   Current Outpatient Medications on File Prior to Visit   Medication Sig Dispense Refill   ? acetaminophen (TYLENOL) 500 MG tablet Take 1-2 tablets (500-1,000 mg total) by mouth every 4 (four) hours as needed.  0   ? apixaban ANTICOAGULANT (ELIQUIS) 5 mg Tab tablet Take 1 tablet (5 mg total) by mouth 2 (two) times a day. 60 tablet 3   ? aspirin 81 MG EC tablet Take 81 mg by mouth daily.     ? blood glucose test strips Use 1 each As Directed 2 (two) times a day. Dispense brand per patient's insurance at pharmacy discretion. 120 strip 6   ? blood-glucose meter (ONETOUCH VERIO IQ METER) Misc Use 1 each As Directed 2 (two) times a day. 1 each 0   ? cholecalciferol, vitamin D3, (VITAMIN D3) 5,000 unit Tab Take 5,000 Units by mouth daily.      ? insulin glargine (LANTUS SOLOSTAR U-100 INSULIN) 100 unit/mL (3 mL) pen Inject 44 Units under the skin at bedtime. (Patient taking differently: Inject 30 Units under the skin at bedtime. Takes 15 units q am and 30 units Q HS)     ? liraglutide (VICTOZA 3-RUPALI) 0.6 mg/0.1 mL (18 mg/3 mL) injection Inject 0.3 mL (1.8 mg total) under the skin daily.     ? lisinopriL (PRINIVIL,ZESTRIL) 5 MG tablet Take 1  "tablet (5 mg total) by mouth daily. 90 tablet 3   ? multivit-min/FA/lycopen/lutein (CENTRUM SILVER MEN ORAL) Take 1 tablet by mouth daily.     ? mupirocin (BACTROBAN) 2 % ointment Apply topically daily as needed. Apply to wound.     ? neomycin-bacitracin-polymyxin (NEOSPORIN) ointment Apply to penis at catheter insertion site three times a day while catheter is in place. 15 g 0   ? NOVOLOG FLEXPEN U-100 INSULIN 100 unit/mL (3 mL) injection pen Inject 3 Units under the skin 3 (three) times a day before meals. 2.7 mL 0   ? oxyCODONE (ROXICODONE) 5 MG immediate release tablet Take 1 tablet (5 mg total) by mouth every 6 (six) hours as needed for pain. 30 tablet 0   ? pen needle, diabetic 31 gauge x 5/16\" Ndle Used to inject insulin daily 90 each 0   ? polyethylene glycol (MIRALAX) 17 gram packet Take 1 packet (17 g total) by mouth daily as needed.  0   ? tamsulosin (FLOMAX) 0.4 mg cap Take 1 capsule (0.4 mg total) by mouth daily after supper.  0   ? traMADoL (ULTRAM) 50 mg tablet Take 50 mg by mouth every 6 (six) hours as needed for pain.     ? vitamin E 400 unit capsule Take 400 Units by mouth daily.       No current facility-administered medications on file prior to visit.        Allergies     Allergies   Allergen Reactions   ? Cresol      Muscle cramps   ? Crestor [Rosuvastatin] Myalgia   ? Declomycin [Demeclocycline] Hives       Review of Systems   A comprehensive review of 14 systems was done. Pertinent findings noted here and in history of present illness. All the rest negative.  Constitutional: Negative.  Negative for fever, chills, he has activity change, appetite change and fatigue.   HENT: Negative for congestion and facial swelling.    Eyes: Negative for photophobia, redness and visual disturbance.   Respiratory: Negative for cough and chest tightness.    Cardiovascular: Negative for chest pain, palpitations and  leg swelling.   Gastrointestinal: Negative for nausea, diarrhea, constipation, blood in stool and " abdominal distention.   Genitourinary: Negative.  Has  A mai  Musculoskeletal: Negative.  Denies any pain pin his foot .  Skin: Negative.    Neurological: Negative for dizziness, tremors, syncope, weakness, light-headedness and headaches.   Hematological: Does not bruise/bleed easily.   Psychiatric/Behavioral: Negative.  Some confusion noted he just wants to go home as per him      Physical Exam     Recent Vitals 1/11/2021   Weight -   BSA (m2) -   /62   Pulse 80   Temp 98.2   Some recent data might be hidden   RC /54    Constitutional: Oriented to person, place, and time and appears well-developed.   HEENT:  Normocephalic and atraumatic.  Eyes: Conjunctivae and EOM are normal. Pupils are equal, round, and reactive to light. No discharge.  No scleral icterus. Nose normal. Mouth/Throat: Oropharynx is clear and moist. No oropharyngeal exudate.    NECK: Normal range of motion. Neck supple. No JVD present. No tracheal deviation present. No thyromegaly present.   CARDIOVASCULAR: Normal rate, regular rhythm and intact distal pulses.  Exam reveals no gallop and no friction rub.  Systolic murmur present.  PULMONARY: Effort normal and breath sounds normal. No respiratory distress.No Wheezing or rales.  ABDOMEN: Soft. Bowel sounds are normal. No distension and no mass.  There is no tenderness. There is no rebound and no guarding. No HSM.  MUSCULOSKELETAL: Normal range of motion. No edema and no tenderness. Mild kyphosis, no tenderness.  Rt foot missing 5th toe  He has a wound VAC in place  LYMPH NODES: Has no cervical, supraclavicular, axillary and groin adenopathy.   NEUROLOGICAL: Alert and oriented to person, place, and time. No cranial nerve deficit.  Normal muscle tone. Coordination normal.   GENITOURINARY: Deferred exam.  Mai present  SKIN: Skin is warm and dry. No rash noted. No erythema. No pallor.   EXTREMITIES: No cyanosis, no clubbing, no edema. No Deformity.  PSYCHIATRIC: Normal mood, affect and  behavior.      Lab Results     Recent Results (from the past 240 hour(s))   COVID-19 Virus PCR MRF    Collection Time: 01/04/21 11:00 AM    Specimen: Respiratory   Result Value Ref Range    COVID-19 VIRUS SPECIMEN SOURCE [place]     2019-nCOV Not Detected    C-Reactive Protein    Collection Time: 01/05/21  9:26 AM   Result Value Ref Range    CRP 1.7 (H) 0.0 - 0.8 mg/dL   Basic Metabolic Panel    Collection Time: 01/05/21  9:26 AM   Result Value Ref Range    Sodium 140 136 - 145 mmol/L    Potassium 4.3 3.5 - 5.0 mmol/L    Chloride 103 98 - 107 mmol/L    CO2 27 22 - 31 mmol/L    Anion Gap, Calculation 10 5 - 18 mmol/L    Glucose 120 70 - 125 mg/dL    Calcium 8.5 8.5 - 10.5 mg/dL    BUN 18 8 - 28 mg/dL    Creatinine 0.96 0.70 - 1.30 mg/dL    GFR MDRD Af Amer >60 >60 mL/min/1.73m2    GFR MDRD Non Af Amer >60 >60 mL/min/1.73m2   Hepatic Profile    Collection Time: 01/05/21  9:26 AM   Result Value Ref Range    Bilirubin, Total 0.6 0.0 - 1.0 mg/dL    Bilirubin, Direct 0.3 <=0.5 mg/dL    Protein, Total 6.4 6.0 - 8.0 g/dL    Albumin 2.6 (L) 3.5 - 5.0 g/dL    Alkaline Phosphatase 100 45 - 120 U/L    AST 32 0 - 40 U/L    ALT 54 (H) 0 - 45 U/L   HM1 (CBC with Diff)    Collection Time: 01/05/21  9:26 AM   Result Value Ref Range    WBC 5.7 4.0 - 11.0 thou/uL    RBC 4.21 (L) 4.40 - 6.20 mill/uL    Hemoglobin 12.6 (L) 14.0 - 18.0 g/dL    Hematocrit 38.9 (L) 40.0 - 54.0 %    MCV 92 80 - 100 fL    MCH 29.9 27.0 - 34.0 pg    MCHC 32.4 32.0 - 36.0 g/dL    RDW 13.9 11.0 - 14.5 %    Platelets 293 140 - 440 thou/uL    MPV 9.7 8.5 - 12.5 fL    Neutrophils % 62 50 - 70 %    Lymphocytes % 19 (L) 20 - 40 %    Monocytes % 12 (H) 2 - 10 %    Eosinophils % 6 0 - 6 %    Basophils % 1 0 - 2 %    Immature Granulocyte % 1 (H) <=0 %    Neutrophils Absolute 3.6 2.0 - 7.7 thou/uL    Lymphocytes Absolute 1.1 0.8 - 4.4 thou/uL    Monocytes Absolute 0.7 0.0 - 0.9 thou/uL    Eosinophils Absolute 0.3 0.0 - 0.4 thou/uL    Basophils Absolute 0.1 0.0 - 0.2  thou/uL    Immature Granulocyte Absolute 0.0 <=0.0 thou/uL   COVID-19 Virus PCR MRF    Collection Time: 01/11/21 12:00 PM    Specimen: Respiratory   Result Value Ref Range    COVID-19 VIRUS SPECIMEN SOURCE [place]     2019-nCOV Not Detected    Basic Metabolic Panel    Collection Time: 01/12/21  8:21 AM   Result Value Ref Range    Sodium 140 136 - 145 mmol/L    Potassium 4.6 3.5 - 5.0 mmol/L    Chloride 102 98 - 107 mmol/L    CO2 31 22 - 31 mmol/L    Anion Gap, Calculation 7 5 - 18 mmol/L    Glucose 116 70 - 125 mg/dL    Calcium 8.9 8.5 - 10.5 mg/dL    BUN 18 8 - 28 mg/dL    Creatinine 0.89 0.70 - 1.30 mg/dL    GFR MDRD Af Amer >60 >60 mL/min/1.73m2    GFR MDRD Non Af Amer >60 >60 mL/min/1.73m2   C-Reactive Protein    Collection Time: 01/12/21  8:21 AM   Result Value Ref Range    CRP 1.7 (H) 0.0 - 0.8 mg/dL   Hepatic Profile    Collection Time: 01/12/21  8:21 AM   Result Value Ref Range    Bilirubin, Total 0.6 0.0 - 1.0 mg/dL    Bilirubin, Direct 0.2 <=0.5 mg/dL    Protein, Total 6.5 6.0 - 8.0 g/dL    Albumin 2.7 (L) 3.5 - 5.0 g/dL    Alkaline Phosphatase 98 45 - 120 U/L    AST 20 0 - 40 U/L    ALT 21 0 - 45 U/L   HM1 (CBC with Diff)    Collection Time: 01/12/21  8:21 AM   Result Value Ref Range    WBC 5.6 4.0 - 11.0 thou/uL    RBC 4.48 4.40 - 6.20 mill/uL    Hemoglobin 13.3 (L) 14.0 - 18.0 g/dL    Hematocrit 41.6 40.0 - 54.0 %    MCV 93 80 - 100 fL    MCH 29.7 27.0 - 34.0 pg    MCHC 32.0 32.0 - 36.0 g/dL    RDW 13.9 11.0 - 14.5 %    Platelets 301 140 - 440 thou/uL    MPV 9.8 8.5 - 12.5 fL    Neutrophils % 60 50 - 70 %    Lymphocytes % 19 (L) 20 - 40 %    Monocytes % 13 (H) 2 - 10 %    Eosinophils % 6 0 - 6 %    Basophils % 1 0 - 2 %    Immature Granulocyte % 1 (H) <=0 %    Neutrophils Absolute 3.3 2.0 - 7.7 thou/uL    Lymphocytes Absolute 1.1 0.8 - 4.4 thou/uL    Monocytes Absolute 0.8 0.0 - 0.9 thou/uL    Eosinophils Absolute 0.4 0.0 - 0.4 thou/uL    Basophils Absolute 0.1 0.0 - 0.2 thou/uL    Immature Granulocyte  Absolute 0.0 <=0.0 thou/uL                DONNY Kwan

## 2021-06-21 NOTE — LETTER
Letter by Charlotte Mckenna MBBS at      Author: Charlotte Mckenna MBBS Service: -- Author Type: --    Filed:  Encounter Date: 1/21/2021 Status: (Other)         [facility]  [id number] [id number]                                  January 21, 2021    Patient: Caleb Hernandez   MR Number: [id number]   YOB: 1935   Date of Visit: 1/21/2021     Dear Dr. Mccann:    Thank you for referring Caleb Hernandez to me for evaluation. Below are the relevant portions of my assessment and plan of care.    If you have questions, please do not hesitate to call me. I look forward to following Caleb along with you.    Sincerely,        DONNY Kwan          CC  No Recipients  Charlotte Mckenna MBBS  1/21/2021  3:06 PM  [facility] Admission note      Patient: Caleb Hernandez  MRN: [id number]  Date of Service: 1/21/2021      [facility] (formerly [facility]) [[id number]]  Reason for Visit     Chief Complaint   Patient presents with   ? Follow Up   ? Review Of Multiple Medical Conditions   follow-up hypoglycemia and hypotension/ non healing wound    Code Status     Full code    Assessment     - ulceration rt foot s/p debridement  -Right foot cellulitis with abscess work-up positive for osteomyelitis with septic arthritis of 2nd-4th tarsometatarsal joint  -Status post I&D of abscess on 11/27/2020  -Postoperative urinary retention with hematuria  -History of right 5th metatarsal excision on 11/2/2020  -Severe peripheral vascular disease.  -Status post right lower extremity angiogram with balloon angioplasty of anterior tibial and peroneal arteries on 11/24/2020  -- IDDM-2   - HTN with hypotension noted today  - FTT  -Generalized weakness with deconditioning    Plan     Patient has been admitted to the TCU for strengthening and rehab.  He has been admitted with ulceration of his right foot status post TheraSkin graft.  He currently has a wound VAC in place  He remains nonweightbearing on his  right lower extremity.  He is compliant with his nonweightbearing and no change in his weightbearing status has been made  Wound vac has been discontinued  He had a follow-up done with vascular and had wound debridement done  Vascular feels that if his wound fails to improve he may need additional surgery and patient is not very happy about that  Unfortunately he ended up self removing his Dietrich catheter  Follow-up done with urology  Voiding trial and cytogram done with urology  Dietrich has been removed  Monitored through PVRs and in and out cath as needed for urinary retention.  So far as per patient he is voiding well with no hematuria concerns.  Blood sugars are stable under 180 with an occasional high noted.  He is on a low-dose of insulin.  Blood pressures are stable.  Patient is working in therapy but expresses repeatedly a desire not to go home        History     Patient is a very pleasant 85 y.o. male who is admitted to TCU  Patient presented to the hospital with right lower extremity cellulitis.  He was admitted with concerning infection.  MRI confirmed new synovitis with osteomyelitis of the 2nd-4th tarsometatarsal joint with septic arthritis.  He underwent I&D of the abscess on 11/27/2020.  Postoperatively he has been discharged nonweightbearing with outpatient ertapenem for 4 weeks  Subsequently he was readmitted on 12/17/2024 application of TheraSkin with redebridement of his wound.  Wound is healing slowly.    He recently had his IV antibiotics discontinued.  Unfortunately recheck labs had shown his CRP is elevated at 8.8  Follow-up done with podiatry yesterday.  His wound VAC has been discontinued.  His wound was debrided.  We have recommended that he may need additional surgery if his wound fails to improve.  He will continue with his nonweightbearing status    He also has a history of severe peripheral vascular disease and underwent right lower extremity angiogram with angioplasty of anterior tibial  and peroneal arteries on 11/24/2020 with vascular surgery.  He will require follow-up with vascular.  It was done in the TCU.  They have recommended further follow-up with podiatry for further work-up they recommend seeing him back in 3 months for ABIs  He does report some occasional discomfort in his leg   However he does report that he will take a tramadol at bedtime because of some cramping and pain in his leg and foot.  No change reported by the patient    Postoperatively had urinary retention with bladder wall thickening suggestive of cystitis.  He accidentally removed his Dietrich catheter.  He follows with urology as a result and had a voiding cystoscopy urogram done.  Currently discharged without a Dietrich and advised to monitor urinary retention concerns and in and out catheterization as needed  He is reporting he is voiding well without hematuria    In addition he is a diabetic  Currently on a much lower dose of insulin blood sugars have improved significantly.  Other than occasional high greater than 200 almost all his blood sugars are under 180.  He reports a good appetite  Blood pressures are stable to    Past Medical History     Active Ambulatory (Non-Hospital) Problems    Diagnosis   ? Hematuria   ? 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 2 diabetes mellitus, with necrosis of muscle (H)   ? ACP (advance care planning)   ? Septic arthritis of right foot (H)   ? Gross hematuria   ? Urinary retention   ? Type 2 diabetes mellitus with diabetic peripheral angiopathy without gangrene, with long-term current use of insulin (H)   ? Adult failure to thrive   ? Normocytic anemia   ? Paroxysmal atrial fibrillation (H)   ? 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 Cellulitis of right lower extremity   ? Abscess of right foot   ? Cellulitis and abscess of foot excluding toe   ? Osteomyelitis of right foot (H)   ? Statin intolerance   ? Personal  history of PE (pulmonary embolism)-- May 2020, unprovoked, with right heart strain   ? PVD (peripheral vascular disease) (H)   ? Overweight (BMI 25.0-29.9)   ? Chronic anticoagulation   ? Acute pulmonary embolism with acute cor pulmonale (H)   ? Diabetic ulcer of right foot associated with type 2 diabetes mellitus (H)   ? Type 2 diabetes mellitus (H)   ? Hyperlipidemia   ? Essential hypertension     Past Medical History:   Diagnosis Date   ? BPH (benign prostatic hyperplasia)    ? Cholelithiasis    ? Common bile duct (CBD) obstruction 9/4/2017   ? Compression Fracture Of Thoracic Vertebral Body    ? Diabetes mellitus (H)    ? Essential hypertension    ? Hyperlipidemia    ? Pancreatitis    ? Rib Fracture    ? Sepsis due to pneumonia (H) 9/8/2017       Past Social History     Reviewed, and he  reports that he quit smoking about 29 years ago. He has never used smokeless tobacco. He reports that he does not drink alcohol or use drugs.    Family History     Reviewed, and family history includes Alcohol abuse in his father; Aortic aneurysm in his mother.    Medication List   Post Discharge Medication Reconciliation Status: discharge medications reconciled, continue medications without change   Current Outpatient Medications on File Prior to Visit   Medication Sig Dispense Refill   ? acetaminophen (TYLENOL) 500 MG tablet Take 1-2 tablets (500-1,000 mg total) by mouth every 4 (four) hours as needed.  0   ? apixaban ANTICOAGULANT (ELIQUIS) 5 mg Tab tablet Take 1 tablet (5 mg total) by mouth 2 (two) times a day. 60 tablet 3   ? aspirin 81 MG EC tablet Take 81 mg by mouth daily.     ? blood glucose test strips Use 1 each As Directed 2 (two) times a day. Dispense brand per patient's insurance at pharmacy discretion. 120 strip 6   ? blood-glucose meter (ONETOUCH VERIO IQ METER) Misc Use 1 each As Directed 2 (two) times a day. 1 each 0   ? cholecalciferol, vitamin D3, (VITAMIN D3) 5,000 unit Tab Take 5,000 Units by mouth daily.    "   ? insulin glargine (LANTUS SOLOSTAR U-100 INSULIN) 100 unit/mL (3 mL) pen Inject 44 Units under the skin at bedtime. (Patient taking differently: Inject 30 Units under the skin at bedtime. Takes 15 units q am and 30 units Q HS)     ? liraglutide (VICTOZA 3-RUPALI) 0.6 mg/0.1 mL (18 mg/3 mL) injection Inject 0.3 mL (1.8 mg total) under the skin daily.     ? lisinopriL (PRINIVIL,ZESTRIL) 5 MG tablet Take 1 tablet (5 mg total) by mouth daily. 90 tablet 3   ? multivit-min/FA/lycopen/lutein (CENTRUM SILVER MEN ORAL) Take 1 tablet by mouth daily.     ? mupirocin (BACTROBAN) 2 % ointment Apply topically daily as needed. Apply to wound.     ? neomycin-bacitracin-polymyxin (NEOSPORIN) ointment Apply to penis at catheter insertion site three times a day while catheter is in place. 15 g 0   ? NOVOLOG FLEXPEN U-100 INSULIN 100 unit/mL (3 mL) injection pen Inject 3 Units under the skin 3 (three) times a day before meals. 2.7 mL 0   ? oxyCODONE (ROXICODONE) 5 MG immediate release tablet Take 1 tablet (5 mg total) by mouth every 6 (six) hours as needed for pain. 30 tablet 0   ? pen needle, diabetic 31 gauge x 5/16\" Ndle Used to inject insulin daily 90 each 0   ? polyethylene glycol (MIRALAX) 17 gram packet Take 1 packet (17 g total) by mouth daily as needed.  0   ? tamsulosin (FLOMAX) 0.4 mg cap Take 1 capsule (0.4 mg total) by mouth daily after supper.  0   ? traMADoL (ULTRAM) 50 mg tablet Take 50 mg by mouth every 6 (six) hours as needed for pain.     ? vitamin E 400 unit capsule Take 400 Units by mouth daily.       No current facility-administered medications on file prior to visit.        Allergies     Allergies   Allergen Reactions   ? Cresol      Muscle cramps   ? Crestor [Rosuvastatin] Myalgia   ? Declomycin [Demeclocycline] Hives       Review of Systems   A comprehensive review of 14 systems was done. Pertinent findings noted here and in history of present illness. All the rest negative.  Constitutional: Negative.  " "Negative for fever, chills, he has activity change, appetite change and fatigue.   HENT: Negative for congestion and facial swelling.    Eyes: Negative for photophobia, redness and visual disturbance.   Respiratory: Negative for cough and chest tightness.    Cardiovascular: Negative for chest pain, palpitations and  leg swelling.   Gastrointestinal: Negative for nausea, diarrhea, constipation, blood in stool and abdominal distention.   Genitourinary: Negative.    Musculoskeletal: Negative.  Denies any pain pin his foot .using a scooter for ambulation  Skin: Negative.    Neurological: Negative for dizziness, tremors, syncope, weakness, light-headedness and headaches.   Hematological: Does not bruise/bleed easily.   Psychiatric/Behavioral: Negative.  Some confusion noted he just wants to go home as per him      Physical Exam     Recent Vitals 1/21/2021   Height 5' 10\"   Weight 179 lbs 6 oz   BSA (m2) 2.01 m2   /80   Pulse 86   Temp 98   Temp src -   SpO2 95   Some recent data might be hidden       Constitutional: Oriented to person, place, and time and appears well-developed.   HEENT:  Normocephalic and atraumatic.  Eyes: Conjunctivae and EOM are normal. Pupils are equal, round, and reactive to light. No discharge.  No scleral icterus. Nose normal. Mouth/Throat: Oropharynx is clear and moist. No oropharyngeal exudate.    NECK: Normal range of motion. Neck supple. No JVD present. No tracheal deviation present. No thyromegaly present.   CARDIOVASCULAR: Normal rate, regular rhythm and intact distal pulses.  Exam reveals no gallop and no friction rub.  Systolic murmur present.  PULMONARY: Effort normal and breath sounds normal. No respiratory distress.No Wheezing or rales.  ABDOMEN: Soft. Bowel sounds are normal. No distension and no mass.  There is no tenderness. There is no rebound and no guarding. No HSM.  MUSCULOSKELETAL: Normal range of motion. No edema and no tenderness. Mild kyphosis, no tenderness.  Rt foot " missing 5th toe  Extensive ulceration noted on the lateral margin of his foot extending from the base of his missing fifth toe extending to his midfoot with necrotic skin and serosanguineous drainage noted  Nonhealing wound  LYMPH NODES: Has no cervical, supraclavicular, axillary and groin adenopathy.   NEUROLOGICAL: Alert and oriented to person, place, and time. No cranial nerve deficit.  Normal muscle tone. Coordination normal.   GENITOURINARY: Deferred exam.  SKIN: Skin is warm and dry. No rash noted. No erythema. No pallor.   EXTREMITIES: No cyanosis, no clubbing, no edema. No Deformity.  PSYCHIATRIC: Normal mood, affect and behavior.      Lab Results     Recent Results (from the past 240 hour(s))   COVID-19 Virus PCR MRF    Collection Time: 01/11/21 12:00 PM    Specimen: Respiratory   Result Value Ref Range    COVID-19 VIRUS SPECIMEN SOURCE Carolyn     2019-nCOV Not Detected    Basic Metabolic Panel    Collection Time: 01/12/21  8:21 AM   Result Value Ref Range    Sodium 140 136 - 145 mmol/L    Potassium 4.6 3.5 - 5.0 mmol/L    Chloride 102 98 - 107 mmol/L    CO2 31 22 - 31 mmol/L    Anion Gap, Calculation 7 5 - 18 mmol/L    Glucose 116 70 - 125 mg/dL    Calcium 8.9 8.5 - 10.5 mg/dL    BUN 18 8 - 28 mg/dL    Creatinine 0.89 0.70 - 1.30 mg/dL    GFR MDRD Af Amer >60 >60 mL/min/1.73m2    GFR MDRD Non Af Amer >60 >60 mL/min/1.73m2   C-Reactive Protein    Collection Time: 01/12/21  8:21 AM   Result Value Ref Range    CRP 1.7 (H) 0.0 - 0.8 mg/dL   Hepatic Profile    Collection Time: 01/12/21  8:21 AM   Result Value Ref Range    Bilirubin, Total 0.6 0.0 - 1.0 mg/dL    Bilirubin, Direct 0.2 <=0.5 mg/dL    Protein, Total 6.5 6.0 - 8.0 g/dL    Albumin 2.7 (L) 3.5 - 5.0 g/dL    Alkaline Phosphatase 98 45 - 120 U/L    AST 20 0 - 40 U/L    ALT 21 0 - 45 U/L   HM1 (CBC with Diff)    Collection Time: 01/12/21  8:21 AM   Result Value Ref Range    WBC 5.6 4.0 - 11.0 thou/uL    RBC 4.48 4.40 - 6.20 mill/uL    Hemoglobin 13.3 (L)  14.0 - 18.0 g/dL    Hematocrit 41.6 40.0 - 54.0 %    MCV 93 80 - 100 fL    MCH 29.7 27.0 - 34.0 pg    MCHC 32.0 32.0 - 36.0 g/dL    RDW 13.9 11.0 - 14.5 %    Platelets 301 140 - 440 thou/uL    MPV 9.8 8.5 - 12.5 fL    Neutrophils % 60 50 - 70 %    Lymphocytes % 19 (L) 20 - 40 %    Monocytes % 13 (H) 2 - 10 %    Eosinophils % 6 0 - 6 %    Basophils % 1 0 - 2 %    Immature Granulocyte % 1 (H) <=0 %    Neutrophils Absolute 3.3 2.0 - 7.7 thou/uL    Lymphocytes Absolute 1.1 0.8 - 4.4 thou/uL    Monocytes Absolute 0.8 0.0 - 0.9 thou/uL    Eosinophils Absolute 0.4 0.0 - 0.4 thou/uL    Basophils Absolute 0.1 0.0 - 0.2 thou/uL    Immature Granulocyte Absolute 0.0 <=0.0 thou/uL   Comprehensive Metabolic Panel    Collection Time: 01/16/21  7:24 AM   Result Value Ref Range    Sodium 140 136 - 145 mmol/L    Potassium 4.8 3.5 - 5.0 mmol/L    Chloride 103 98 - 107 mmol/L    CO2 29 22 - 31 mmol/L    Anion Gap, Calculation 8 5 - 18 mmol/L    Glucose 92 70 - 125 mg/dL    BUN 18 8 - 28 mg/dL    Creatinine 0.93 0.70 - 1.30 mg/dL    GFR MDRD Af Amer >60 >60 mL/min/1.73m2    GFR MDRD Non Af Amer >60 >60 mL/min/1.73m2    Bilirubin, Total 0.6 0.0 - 1.0 mg/dL    Calcium 9.1 8.5 - 10.5 mg/dL    Protein, Total 6.1 6.0 - 8.0 g/dL    Albumin 2.5 (L) 3.5 - 5.0 g/dL    Alkaline Phosphatase 79 45 - 120 U/L    AST 15 0 - 40 U/L    ALT 13 0 - 45 U/L   C-Reactive Protein    Collection Time: 01/16/21  7:24 AM   Result Value Ref Range    CRP 8.8 (H) 0.0 - 0.8 mg/dL   HM1 (CBC with Diff)    Collection Time: 01/16/21  7:24 AM   Result Value Ref Range    WBC 7.0 4.0 - 11.0 thou/uL    RBC 3.88 (L) 4.40 - 6.20 mill/uL    Hemoglobin 11.3 (L) 14.0 - 18.0 g/dL    Hematocrit 35.0 (L) 40.0 - 54.0 %    MCV 90 80 - 100 fL    MCH 29.1 27.0 - 34.0 pg    MCHC 32.3 32.0 - 36.0 g/dL    RDW 14.0 11.0 - 14.5 %    Platelets 253 140 - 440 thou/uL    MPV 10.3 8.5 - 12.5 fL    Neutrophils % 60 50 - 70 %    Lymphocytes % 19 (L) 20 - 40 %    Monocytes % 16 (H) 2 - 10 %     Eosinophils % 4 0 - 6 %    Basophils % 1 0 - 2 %    Immature Granulocyte % 0 <=0 %    Neutrophils Absolute 4.2 2.0 - 7.7 thou/uL    Lymphocytes Absolute 1.3 0.8 - 4.4 thou/uL    Monocytes Absolute 1.1 (H) 0.0 - 0.9 thou/uL    Eosinophils Absolute 0.3 0.0 - 0.4 thou/uL    Basophils Absolute 0.1 0.0 - 0.2 thou/uL    Immature Granulocyte Absolute 0.0 <=0.0 thou/uL   COVID-19 Virus PCR MRF    Collection Time: 01/18/21 12:00 PM    Specimen: Respiratory   Result Value Ref Range    COVID-19 VIRUS SPECIMEN SOURCE Nares     2019-nCOV Not Detected                 DONNY Kwan Mona, MBBS  1/21/2021 11:55 AM  Sign when Signing Visit    [facility] Admission note      Patient: Caleb Hernandez  MRN: [id number]  Date of Service: 1/21/2021      [facility] [[id number]]  Reason for Visit     Chief Complaint   Patient presents with   ? Follow Up   ? Review Of Multiple Medical Conditions   follow-up hypoglycemia and hypotension    Code Status     Full code    Assessment       -Right foot cellulitis with abscess work-up positive for osteomyelitis with septic arthritis of 2nd-4th tarsometatarsal joint  -Status post I&D of abscess on 11/27/2020  -Postoperative urinary retention with hematuria  -History of right 5th metatarsal excision on 11/2/2020  -Severe peripheral vascular disease.  -Status post right lower extremity angiogram with balloon angioplasty of anterior tibial and peroneal arteries on 11/24/2020  -- IDDM-2   - HTN with hypotension noted today  - FTT  -Generalized weakness with deconditioning    Plan     Patient has been admitted to the TCU for strengthening and rehab.  He has been admitted with ulceration of his right foot status post TheraSkin graft.  He currently has a wound VAC in place  He remains nonweightbearing on his right lower extremity.  He is compliant with his nonweightbearing  Wound vac has been discontinued  Voiding trial and cytogram done with urology  Dietrich has been  removed        History     Patient is a very pleasant 85 y.o. male who is admitted to TCU  Patient presented to the hospital with right lower extremity cellulitis.  He was admitted with concerning infection.  MRI confirmed new synovitis with osteomyelitis of the 2nd-4th tarsometatarsal joint with septic arthritis.  He underwent I&D of the abscess on 11/27/2020.  Postoperatively he has been discharged nonweightbearing with outpatient ertapenem for 4 weeks  Subsequently he was readmitted on 12/17/2024 application of TheraSkin with redebridement of his wound.  Wound is healing slowly.  He has a wound VAC in place and remains nonweightbearing..  He has a follow-up with podiatry tomorrow and hoping he can discharge home if his wound VAC can be discontinued.  He recently had his IV antibiotics discontinued.  Recheck labs done in the TCU show a markedly elevated CRP of 8.8.  White count is normal and he is afebrile.  Will await his wound inspection results from his podiatry whether he really needs to be started on IV antibiotics    He also has a history of severe peripheral vascular disease and underwent right lower extremity angiogram with angioplasty of anterior tibial and peroneal arteries on 11/24/2020 with vascular surgery.  He will require follow-up with vascular.  It was done in the TCU.  They have recommended further follow-up with podiatry for further work-up they recommend seeing him back in 3 months for ABIs  He does report some occasional discomfort in his leg   However he does report that he will take a tramadol at bedtime because of some cramping and pain in his leg and foot.    Postoperatively had urinary retention with bladder wall thickening suggestive of cystitis.  He has continued with an indwelling Dietrich catheter.    In addition he is a diabetic  Currently on a much lower dose of insulin blood sugars have improved significantly.  Other than occasional high greater than 200 almost all his blood sugars  are under 180.  He reports a good appetite    Past Medical History     Active Ambulatory (Non-Hospital) Problems    Diagnosis   ? Hematuria   ? 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 2 diabetes mellitus, with necrosis of muscle (H)   ? ACP (advance care planning)   ? Septic arthritis of right foot (H)   ? Gross hematuria   ? Urinary retention   ? Type 2 diabetes mellitus with diabetic peripheral angiopathy without gangrene, with long-term current use of insulin (H)   ? Adult failure to thrive   ? Normocytic anemia   ? Paroxysmal atrial fibrillation (H)   ? 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 Cellulitis of right lower extremity   ? Abscess of right foot   ? Cellulitis and abscess of foot excluding toe   ? Osteomyelitis of right foot (H)   ? Statin intolerance   ? Personal history of PE (pulmonary embolism)-- May 2020, unprovoked, with right heart strain   ? PVD (peripheral vascular disease) (H)   ? Overweight (BMI 25.0-29.9)   ? Chronic anticoagulation   ? Acute pulmonary embolism with acute cor pulmonale (H)   ? Diabetic ulcer of right foot associated with type 2 diabetes mellitus (H)   ? Type 2 diabetes mellitus (H)   ? Hyperlipidemia   ? Essential hypertension     Past Medical History:   Diagnosis Date   ? BPH (benign prostatic hyperplasia)    ? Cholelithiasis    ? Common bile duct (CBD) obstruction 9/4/2017   ? Compression Fracture Of Thoracic Vertebral Body    ? Diabetes mellitus (H)    ? Essential hypertension    ? Hyperlipidemia    ? Pancreatitis    ? Rib Fracture    ? Sepsis due to pneumonia (H) 9/8/2017       Past Social History     Reviewed, and he  reports that he quit smoking about 29 years ago. He has never used smokeless tobacco. He reports that he does not drink alcohol or use drugs.    Family History     Reviewed, and family history includes Alcohol abuse in his father; Aortic aneurysm in his mother.    Medication List    Post Discharge Medication Reconciliation Status: discharge medications reconciled, continue medications without change   Current Outpatient Medications on File Prior to Visit   Medication Sig Dispense Refill   ? acetaminophen (TYLENOL) 500 MG tablet Take 1-2 tablets (500-1,000 mg total) by mouth every 4 (four) hours as needed.  0   ? apixaban ANTICOAGULANT (ELIQUIS) 5 mg Tab tablet Take 1 tablet (5 mg total) by mouth 2 (two) times a day. 60 tablet 3   ? aspirin 81 MG EC tablet Take 81 mg by mouth daily.     ? blood glucose test strips Use 1 each As Directed 2 (two) times a day. Dispense brand per patient's insurance at pharmacy discretion. 120 strip 6   ? blood-glucose meter (ONETOUCH VERIO IQ METER) Misc Use 1 each As Directed 2 (two) times a day. 1 each 0   ? cholecalciferol, vitamin D3, (VITAMIN D3) 5,000 unit Tab Take 5,000 Units by mouth daily.      ? insulin glargine (LANTUS SOLOSTAR U-100 INSULIN) 100 unit/mL (3 mL) pen Inject 44 Units under the skin at bedtime. (Patient taking differently: Inject 30 Units under the skin at bedtime. Takes 15 units q am and 30 units Q HS)     ? liraglutide (VICTOZA 3-RUPALI) 0.6 mg/0.1 mL (18 mg/3 mL) injection Inject 0.3 mL (1.8 mg total) under the skin daily.     ? lisinopriL (PRINIVIL,ZESTRIL) 5 MG tablet Take 1 tablet (5 mg total) by mouth daily. 90 tablet 3   ? multivit-min/FA/lycopen/lutein (CENTRUM SILVER MEN ORAL) Take 1 tablet by mouth daily.     ? mupirocin (BACTROBAN) 2 % ointment Apply topically daily as needed. Apply to wound.     ? neomycin-bacitracin-polymyxin (NEOSPORIN) ointment Apply to penis at catheter insertion site three times a day while catheter is in place. 15 g 0   ? NOVOLOG FLEXPEN U-100 INSULIN 100 unit/mL (3 mL) injection pen Inject 3 Units under the skin 3 (three) times a day before meals. 2.7 mL 0   ? oxyCODONE (ROXICODONE) 5 MG immediate release tablet Take 1 tablet (5 mg total) by mouth every 6 (six) hours as needed for pain. 30 tablet 0   ? pen  "needle, diabetic 31 gauge x 5/16\" Ndle Used to inject insulin daily 90 each 0   ? polyethylene glycol (MIRALAX) 17 gram packet Take 1 packet (17 g total) by mouth daily as needed.  0   ? tamsulosin (FLOMAX) 0.4 mg cap Take 1 capsule (0.4 mg total) by mouth daily after supper.  0   ? traMADoL (ULTRAM) 50 mg tablet Take 50 mg by mouth every 6 (six) hours as needed for pain.     ? vitamin E 400 unit capsule Take 400 Units by mouth daily.       No current facility-administered medications on file prior to visit.        Allergies     Allergies   Allergen Reactions   ? Cresol      Muscle cramps   ? Crestor [Rosuvastatin] Myalgia   ? Declomycin [Demeclocycline] Hives       Review of Systems   A comprehensive review of 14 systems was done. Pertinent findings noted here and in history of present illness. All the rest negative.  Constitutional: Negative.  Negative for fever, chills, he has activity change, appetite change and fatigue.   HENT: Negative for congestion and facial swelling.    Eyes: Negative for photophobia, redness and visual disturbance.   Respiratory: Negative for cough and chest tightness.    Cardiovascular: Negative for chest pain, palpitations and  leg swelling.   Gastrointestinal: Negative for nausea, diarrhea, constipation, blood in stool and abdominal distention.   Genitourinary: Negative.  Has  A mai  Musculoskeletal: Negative.  Denies any pain pin his foot .using a scooter for ambulation  Skin: Negative.    Neurological: Negative for dizziness, tremors, syncope, weakness, light-headedness and headaches.   Hematological: Does not bruise/bleed easily.   Psychiatric/Behavioral: Negative.  Some confusion noted he just wants to go home as per him      Physical Exam     Recent Vitals 1/21/2021   Height 5' 10\"   Weight 179 lbs 6 oz   BSA (m2) 2.01 m2   /80   Pulse 86   Temp 98   Temp src -   SpO2 95   Some recent data might be hidden       Constitutional: Oriented to person, place, and time and " appears well-developed.   HEENT:  Normocephalic and atraumatic.  Eyes: Conjunctivae and EOM are normal. Pupils are equal, round, and reactive to light. No discharge.  No scleral icterus. Nose normal. Mouth/Throat: Oropharynx is clear and moist. No oropharyngeal exudate.    NECK: Normal range of motion. Neck supple. No JVD present. No tracheal deviation present. No thyromegaly present.   CARDIOVASCULAR: Normal rate, regular rhythm and intact distal pulses.  Exam reveals no gallop and no friction rub.  Systolic murmur present.  PULMONARY: Effort normal and breath sounds normal. No respiratory distress.No Wheezing or rales.  ABDOMEN: Soft. Bowel sounds are normal. No distension and no mass.  There is no tenderness. There is no rebound and no guarding. No HSM.  MUSCULOSKELETAL: Normal range of motion. No edema and no tenderness. Mild kyphosis, no tenderness.  Rt foot missing 5th toe  He has a wound VAC in place  LYMPH NODES: Has no cervical, supraclavicular, axillary and groin adenopathy.   NEUROLOGICAL: Alert and oriented to person, place, and time. No cranial nerve deficit.  Normal muscle tone. Coordination normal.   GENITOURINARY: Deferred exam.  Dietrich present  SKIN: Skin is warm and dry. No rash noted. No erythema. No pallor.   EXTREMITIES: No cyanosis, no clubbing, no edema. No Deformity.  PSYCHIATRIC: Normal mood, affect and behavior.      Lab Results     Recent Results (from the past 240 hour(s))   COVID-19 Virus PCR MRF    Collection Time: 01/11/21 12:00 PM    Specimen: Respiratory   Result Value Ref Range    COVID-19 VIRUS SPECIMEN SOURCE [facility]     2019-nCOV Not Detected    Basic Metabolic Panel    Collection Time: 01/12/21  8:21 AM   Result Value Ref Range    Sodium 140 136 - 145 mmol/L    Potassium 4.6 3.5 - 5.0 mmol/L    Chloride 102 98 - 107 mmol/L    CO2 31 22 - 31 mmol/L    Anion Gap, Calculation 7 5 - 18 mmol/L    Glucose 116 70 - 125 mg/dL    Calcium 8.9 8.5 - 10.5 mg/dL    BUN 18 8 - 28 mg/dL     Creatinine 0.89 0.70 - 1.30 mg/dL    GFR MDRD Af Amer >60 >60 mL/min/1.73m2    GFR MDRD Non Af Amer >60 >60 mL/min/1.73m2   C-Reactive Protein    Collection Time: 01/12/21  8:21 AM   Result Value Ref Range    CRP 1.7 (H) 0.0 - 0.8 mg/dL   Hepatic Profile    Collection Time: 01/12/21  8:21 AM   Result Value Ref Range    Bilirubin, Total 0.6 0.0 - 1.0 mg/dL    Bilirubin, Direct 0.2 <=0.5 mg/dL    Protein, Total 6.5 6.0 - 8.0 g/dL    Albumin 2.7 (L) 3.5 - 5.0 g/dL    Alkaline Phosphatase 98 45 - 120 U/L    AST 20 0 - 40 U/L    ALT 21 0 - 45 U/L   HM1 (CBC with Diff)    Collection Time: 01/12/21  8:21 AM   Result Value Ref Range    WBC 5.6 4.0 - 11.0 thou/uL    RBC 4.48 4.40 - 6.20 mill/uL    Hemoglobin 13.3 (L) 14.0 - 18.0 g/dL    Hematocrit 41.6 40.0 - 54.0 %    MCV 93 80 - 100 fL    MCH 29.7 27.0 - 34.0 pg    MCHC 32.0 32.0 - 36.0 g/dL    RDW 13.9 11.0 - 14.5 %    Platelets 301 140 - 440 thou/uL    MPV 9.8 8.5 - 12.5 fL    Neutrophils % 60 50 - 70 %    Lymphocytes % 19 (L) 20 - 40 %    Monocytes % 13 (H) 2 - 10 %    Eosinophils % 6 0 - 6 %    Basophils % 1 0 - 2 %    Immature Granulocyte % 1 (H) <=0 %    Neutrophils Absolute 3.3 2.0 - 7.7 thou/uL    Lymphocytes Absolute 1.1 0.8 - 4.4 thou/uL    Monocytes Absolute 0.8 0.0 - 0.9 thou/uL    Eosinophils Absolute 0.4 0.0 - 0.4 thou/uL    Basophils Absolute 0.1 0.0 - 0.2 thou/uL    Immature Granulocyte Absolute 0.0 <=0.0 thou/uL   Comprehensive Metabolic Panel    Collection Time: 01/16/21  7:24 AM   Result Value Ref Range    Sodium 140 136 - 145 mmol/L    Potassium 4.8 3.5 - 5.0 mmol/L    Chloride 103 98 - 107 mmol/L    CO2 29 22 - 31 mmol/L    Anion Gap, Calculation 8 5 - 18 mmol/L    Glucose 92 70 - 125 mg/dL    BUN 18 8 - 28 mg/dL    Creatinine 0.93 0.70 - 1.30 mg/dL    GFR MDRD Af Amer >60 >60 mL/min/1.73m2    GFR MDRD Non Af Amer >60 >60 mL/min/1.73m2    Bilirubin, Total 0.6 0.0 - 1.0 mg/dL    Calcium 9.1 8.5 - 10.5 mg/dL    Protein, Total 6.1 6.0 - 8.0 g/dL     Albumin 2.5 (L) 3.5 - 5.0 g/dL    Alkaline Phosphatase 79 45 - 120 U/L    AST 15 0 - 40 U/L    ALT 13 0 - 45 U/L   C-Reactive Protein    Collection Time: 01/16/21  7:24 AM   Result Value Ref Range    CRP 8.8 (H) 0.0 - 0.8 mg/dL   HM1 (CBC with Diff)    Collection Time: 01/16/21  7:24 AM   Result Value Ref Range    WBC 7.0 4.0 - 11.0 thou/uL    RBC 3.88 (L) 4.40 - 6.20 mill/uL    Hemoglobin 11.3 (L) 14.0 - 18.0 g/dL    Hematocrit 35.0 (L) 40.0 - 54.0 %    MCV 90 80 - 100 fL    MCH 29.1 27.0 - 34.0 pg    MCHC 32.3 32.0 - 36.0 g/dL    RDW 14.0 11.0 - 14.5 %    Platelets 253 140 - 440 thou/uL    MPV 10.3 8.5 - 12.5 fL    Neutrophils % 60 50 - 70 %    Lymphocytes % 19 (L) 20 - 40 %    Monocytes % 16 (H) 2 - 10 %    Eosinophils % 4 0 - 6 %    Basophils % 1 0 - 2 %    Immature Granulocyte % 0 <=0 %    Neutrophils Absolute 4.2 2.0 - 7.7 thou/uL    Lymphocytes Absolute 1.3 0.8 - 4.4 thou/uL    Monocytes Absolute 1.1 (H) 0.0 - 0.9 thou/uL    Eosinophils Absolute 0.3 0.0 - 0.4 thou/uL    Basophils Absolute 0.1 0.0 - 0.2 thou/uL    Immature Granulocyte Absolute 0.0 <=0.0 thou/uL   COVID-19 Virus PCR MRF    Collection Time: 01/18/21 12:00 PM    Specimen: Respiratory   Result Value Ref Range    COVID-19 VIRUS SPECIMEN SOURCE [facility]     2019-nCOV Not Detected                 DONNY Kwan

## 2021-06-21 NOTE — LETTER
"Letter by Marva Moore CNP at      Author: Marva Moore CNP Service: -- Author Type: --    Filed:  Encounter Date: 2020 Status: (Other)         Patient: Caleb Hernandez   MR Number: [id number]   YOB: 1935   Date of Visit: 2020       [facility] [place] [facility]      NAME:  Caleb Hernandez             :  1935    MRN: [id number]    CODE STATUS:  FULL CODE    FACILITY: [facility] [[id number]]       CHIEF COMPLAIN/REASON FOR VISIT:  Chief Complaint   Patient presents with   ? Review Of Multiple Medical Conditions     IVAB/Osteomylitis       HISTORY OF PRESENT ILLNESS: Caleb Hernandez is a 84 y.o. male being seen at the request of the nurse as he is a new admit from [facility] to the TCU.  Caleb is a pleasant elderly gentleman who was first conversation was he would like to go home.  He does state he lives at home with his wife adult son and granddaughter.  He is currently on the TCU status post acute care due to an I&D of his right lower foot with cellulitis and is followed by Dr. Garcia podiatry.  He will be on daily IV antibiotics on the TCU.  As per his EMR at [facility], \"with DM2, PVD, HTN presented with right lower extremity cellulitis, osteomyelitis, septic arthritis.     Right foot cellulitis with abscess, osteomyelitis, septic arthritis  -Patient with a recent excision of the right fifth metatarsal on 2020 with podiatry.  Wound cultures with Bacteroides and Enterobacter cloaca.  Patient directly admitted from vascular clinic with concerns for worsening postoperative infection.  MRI with new synovitis, osteomyelitis of the 2nd-4th tarsometatarsal joint and likely septic arthritis.  Patient underwent I&D of abscess on , this wound culture now with Corynebacterium.  Patient initially on vancomycin and Zosyn, currently on meropenem with plan for outpatient ertapenem for 4 weeks.  -Nonweightbearing  -Patient received vancomycin and " "meropenem while inpatient and this will be switched to ertapenem daily dose x4 weeks with ID to follow as outpatient     Urinary retention with hematuria  -Patient had Dietrich catheter placed after angiogram for acute urinary retention.  Patient developed hematuria on 11/30.  CT abdomen and pelvis with significant irregular bladder wall thickening, more suggestive of cystitis.  Patient also has a nonobstructing 11 mm right upper pole kidney stone with no hydro-.  Urine culture negative, likely Dietrich causing irritation  -Patient evaluated daily by urology team  -Continue Flomax at discharge  -Able to restart Eliquis and aspirin daily  -Patient will be discharged on Dietrich catheter and will follow up with urology for definitive treatment     PVD  -Patient underwent right lower extremity angiogram with balloon angioplasty of anterior tibial and peroneal arteries 11/24/2020 with vascular surgery.  Postoperative ABIs without significant improvement, vascular thought this was likely secondary to spasm.  -will need repeat ultrasound outpatient  -Follow-up with vascular clinic outpatient     Adult failure to thrive  -Secondary to surgical interventions, prolonged hospital stays and infection  PT/OT recommending TCU, discharge plan 12/3     DM2  -A1c 7.9  -Held home Victoza, and was started on 3 units of mealtime insulin and increase to glargine 48 units at bedtime   -We will restart home Victoza and continue glargine at bedtime as well as mealtime insulin at discharge     Paroxysmal atrial fibrillation  -Currently rate controlled.    -Restart home Eliquis     HTN  -Lisinopril 5 mg p.o. daily     Normocytic anemia  -Hemoglobin 12.2 with MCV of 92.  Likely anemia of chronic disease, but with component of blood loss anemia from recent surgeries, hematuria.\"    He is to receive IV antibiotics weekly labs PT OT on the rehab unit.  We reviewed medical care for seniors role in his care, TCU routines as well as discussion of his ACP " "today during the visit.  His right foot is loaded and wrapped unable to assess incisional site.  He does have a PIC line intact to the right upper arm.    Allergies   Allergen Reactions   ? Cresol      Muscle cramps   ? Crestor [Rosuvastatin] Myalgia   ? Declomycin [Demeclocycline] Hives   :     Current Outpatient Medications   Medication Sig   ? acetaminophen (TYLENOL) 500 MG tablet Take 1-2 tablets (500-1,000 mg total) by mouth every 4 (four) hours as needed.   ? apixaban ANTICOAGULANT (ELIQUIS) 5 mg Tab tablet Take 1 tablet (5 mg total) by mouth 2 (two) times a day.   ? aspirin 81 MG EC tablet Take 81 mg by mouth daily.   ? blood glucose test strips Use 1 each As Directed 2 (two) times a day. Dispense brand per patient's insurance at pharmacy discretion.   ? blood-glucose meter (ONETOUCH VERIO IQ METER) Misc Use 1 each As Directed 2 (two) times a day.   ? cholecalciferol, vitamin D3, (VITAMIN D3) 5,000 unit Tab Take 5,000 Units by mouth daily.    ? ertapenem 1 g in NaCl 0.9 % 0.9 % 50 mL IVPB Infuse 1 g into a venous catheter daily.   ? insulin glargine (LANTUS SOLOSTAR U-100 INSULIN) 100 unit/mL (3 mL) pen Inject 44 Units under the skin at bedtime.   ? liraglutide (VICTOZA 3-RUPALI) 0.6 mg/0.1 mL (18 mg/3 mL) injection Inject 0.3 mL (1.8 mg total) under the skin daily.   ? lisinopriL (PRINIVIL,ZESTRIL) 5 MG tablet Take 1 tablet (5 mg total) by mouth daily.   ? multivit-min/FA/lycopen/lutein (CENTRUM SILVER MEN ORAL) Take 1 tablet by mouth daily.   ? mupirocin (BACTROBAN) 2 % ointment Apply topically daily as needed. Apply to wound.   ? neomycin-bacitracin-polymyxin (NEOSPORIN) ointment Apply to penis at catheter insertion site three times a day while catheter is in place.   ? NOVOLOG FLEXPEN U-100 INSULIN 100 unit/mL (3 mL) injection pen Inject 3 Units under the skin 3 (three) times a day before meals.   ? pen needle, diabetic 31 gauge x 5/16\" Ndle Used to inject insulin daily   ? polyethylene glycol (MIRALAX) 17 " gram packet Take 1 packet (17 g total) by mouth daily as needed.   ? tamsulosin (FLOMAX) 0.4 mg cap Take 1 capsule (0.4 mg total) by mouth daily after supper.   ? traMADoL (ULTRAM) 50 mg tablet Take 1 tablet (50 mg total) by mouth every 6 (six) hours as needed for pain.   ? vitamin E 400 unit capsule Take 400 Units by mouth daily.         REVIEW OF SYSTEMS:    Currently, no fever, chills, or rigors. Does not have any visual or hearing problems. Denies any chest pain, headaches, palpitations, lightheadedness, dizziness, shortness of breath, or cough. Appetite is good. Denies any GERD symptoms. Denies any difficulty with swallowing, nausea, or vomiting.  Denies any abdominal pain, diarrhea or constipation. Denies any urinary symptoms. No insomnia. No active bleeding. No rash.       PHYSICAL EXAMINATION:  Vitals:    12/07/20 1533   BP: 129/71   Pulse: 67   Temp: 98.3  F (36.8  C)   Weight: 188 lb (85.3 kg)         GENERAL: Awake, Alert, oriented x3, not in any form of acute distress, answers questions appropriately, follows simple commands, conversant  HEENT: Head is normocephalic with normal hair distribution. No evidence of trauma. Ears: No acute purulent discharge. Eyes: Conjunctivae pink with no scleral jaundice. Nose: Normal mucosa and septum. NECK: Supple with no cervical or supraclavicular lymphadenopathy. Trachea is midline.   ABDOMEN: Globular and soft, nontender to palpation, non distended, no masses, no organomegaly, good bowel sounds, no rebound or guarding, no peritoneal signs.   EXTREMITIES: Full range of motion does have a PICC in right upper arm.  He is to be nonweightbearing to the right foot until cleared by Dr. Middleton and podiatry.  SKIN: Warm and dry, no erythema noted, no rashes or lesions.  NEUROLOGICAL: The patient is oriented to person, place and time. Strength and sensation are grossly intact. Face is symmetric.        Social distancing with PPE in place during assessment due to COVID-19  precautions.            LABS:    Lab Results   Component Value Date    WBC 8.2 12/07/2020    HGB 13.6 (L) 12/07/2020    HCT 41.8 12/07/2020    MCV 92 12/07/2020     12/07/2020       Results for orders placed or performed during the hospital encounter of 11/23/20   Basic Metabolic Panel   Result Value Ref Range    Sodium 139 136 - 145 mmol/L    Potassium 4.5 3.5 - 5.0 mmol/L    Chloride 103 98 - 107 mmol/L    CO2 30 22 - 31 mmol/L    Anion Gap, Calculation 6 5 - 18 mmol/L    Glucose 178 (H) 70 - 125 mg/dL    Calcium 8.3 (L) 8.5 - 10.5 mg/dL    BUN 18 8 - 28 mg/dL    Creatinine 0.88 0.70 - 1.30 mg/dL    GFR MDRD Af Amer >60 >60 mL/min/1.73m2    GFR MDRD Non Af Amer >60 >60 mL/min/1.73m2           Lab Results   Component Value Date    HGBA1C 7.9 (H) 09/04/2020     No results found for: [id number]  No results found for: [id number]    ASSESSMENT/PLAN:  1. Osteomyelitis of right foot, unspecified type (H)    2. 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 Paroxysmal atrial fibrillation (H)    4. ACP (advance care planning)      1.  Osteomyelitis right foot, patient will have daily wound care done by skilled nursing staff.  He is to be nonweightbearing until further notice.  Weekly labs and sent to ID, Dr. Middleton will also follow as he remains nonweightbearing on his right foot.  PT and OT for strengthening and safety.   referral made as patient wishes to DC home.    2.  Type 2 diabetes, blood sugar this a.m. was stable at 83, please see HPI as it discusses he has been placed back on Victoza and full med list is complete above.  Skilled nurse to manage chronic medical conditions.    3.  A. fib, patient remains on Eliquis 5 mg p.o. twice daily.  No excessive bleeding or bruising is observed.    4. ACP: Patient desires full CODE STATUS, I reviewed his POLST with him and we signed per patient wishes and desires.  This is a facility requirement and is done  on all patients.      Electronically signed by:  Marva Moore CNP  This progress note was completed using Dragon software and there may be grammatical errors.       50 minutes spent of which greater than 45% was face to face communication with the patient about above plan of care noted medical care for seniors role in patient's care, duration of IV antibiotic therapy nonweightbearing status TCU routines as well as advanced care plan which patient chooses to be a full code and his POLST was signed and reviewed today.

## 2021-06-21 NOTE — LETTER
Letter by Charlotte Mckenna MBBS at      Author: Charlotte Mckenna MBBS Service: -- Author Type: --    Filed:  Encounter Date: 1/19/2021 Status: (Other)         [facility]  [address] [facility] [address]                                  January 19, 2021    Patient: Caleb Hernandez   MR Number: [id number]   YOB: 1935   Date of Visit: 1/19/2021     Dear Dr. Mccnan:    Thank you for referring Caleb Hernandez to me for evaluation. Below are the relevant portions of my assessment and plan of care.    If you have questions, please do not hesitate to call me. I look forward to following Caleb along with you.    Sincerely,        DONNY Kwan          CC  No Recipients  Charlotte Mckenna MBBS  1/19/2021 12:23 PM  [facility] Admission note      Patient: Caleb Hernandez  MRN: [id number]  Date of Service: 1/19/2021      [facility] [[id number]]  Reason for Visit     Chief Complaint   Patient presents with   ? Follow Up   follow-up hypoglycemia and hypotension    Code Status     Full code    Assessment       -Right foot cellulitis with abscess work-up positive for osteomyelitis with septic arthritis of 2nd-4th tarsometatarsal joint  -Status post I&D of abscess on 11/27/2020  -Postoperative urinary retention with hematuria  -History of right 5th metatarsal excision on 11/2/2020  -Severe peripheral vascular disease.  -Status post right lower extremity angiogram with balloon angioplasty of anterior tibial and peroneal arteries on 11/24/2020  -- IDDM-2   - HTN with hypotension noted today  - FTT  -Generalized weakness with deconditioning    Plan     Patient has been admitted to the TCU for strengthening and rehab.  He has been admitted with ulceration of his right foot status post TheraSkin graft.  He currently has a wound VAC in place  He remains nonweightbearing on his right lower extremity.  He is compliant with his nonweightbearing  He currently has a wound VAC on  on his wound  He has a follow-up appointment with podiatry tomorrow and hoping for its discontinuation.  Recheck labs reviewed he is no longer on antibiotics and his CRP has jumped to 8.8  However his white count electrolytes and kidney function are stable.  Will await his wound inspection tomorrow.  Diabetic control reviewed he has had an occasional high blood sugar greater than 200 but most of his blood sugars are under 180 indicating excellent control.  Blood pressures are stable on a low-dose of medication.  He is pushing for discharge home stating that he would like to go home he is trying a scooter but having difficulty putting weight on it.  No voiding trial in the TCU at patient's request.he will follow up with urology      History     Patient is a very pleasant 85 y.o. male who is admitted to TCU  Patient presented to the hospital with right lower extremity cellulitis.  He was admitted with concerning infection.  MRI confirmed new synovitis with osteomyelitis of the 2nd-4th tarsometatarsal joint with septic arthritis.  He underwent I&D of the abscess on 11/27/2020.  Postoperatively he has been discharged nonweightbearing with outpatient ertapenem for 4 weeks  Subsequently he was readmitted on 12/17/2024 application of TheraSkin with redebridement of his wound.  Wound is healing slowly.  He has a wound VAC in place and remains nonweightbearing..  He has a follow-up with podiatry tomorrow and hoping he can discharge home if his wound VAC can be discontinued.  He recently had his IV antibiotics discontinued.  Recheck labs done in the TCU show a markedly elevated CRP of 8.8.  White count is normal and he is afebrile.  Will await his wound inspection results from his podiatry whether he really needs to be started on IV antibiotics    He also has a history of severe peripheral vascular disease and underwent right lower extremity angiogram with angioplasty of anterior tibial and peroneal arteries on 11/24/2020  with vascular surgery.  He will require follow-up with vascular.  It was done in the TCU.  They have recommended further follow-up with podiatry for further work-up they recommend seeing him back in 3 months for ABIs  He does report some occasional discomfort in his leg   However he does report that he will take a tramadol at bedtime because of some cramping and pain in his leg and foot.    Postoperatively had urinary retention with bladder wall thickening suggestive of cystitis.  He has continued with an indwelling Dietrich catheter.  I did offer a voiding trial in the TCU.  He is quite hesitant and does not want to have that done in the TCU he agrees to see urology.  In addition he is a diabetic  Currently on a much lower dose of insulin blood sugars have improved significantly.  Other than occasional high greater than 200 almost all his blood sugars are under 180.  He reports a good appetite    Past Medical History     Active Ambulatory (Non-Hospital) Problems    Diagnosis   ? Hematuria   ? 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 2 diabetes mellitus, with necrosis of muscle (H)   ? ACP (advance care planning)   ? Septic arthritis of right foot (H)   ? Gross hematuria   ? Urinary retention   ? Type 2 diabetes mellitus with diabetic peripheral angiopathy without gangrene, with long-term current use of insulin (H)   ? Adult failure to thrive   ? Normocytic anemia   ? Paroxysmal atrial fibrillation (H)   ? 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 Cellulitis of right lower extremity   ? Abscess of right foot   ? Cellulitis and abscess of foot excluding toe   ? Osteomyelitis of right foot (H)   ? Statin intolerance   ? Personal history of PE (pulmonary embolism)-- May 2020, unprovoked, with right heart strain   ? PVD (peripheral vascular disease) (H)   ? Overweight (BMI 25.0-29.9)   ? Chronic anticoagulation   ? Acute pulmonary embolism with acute  cor pulmonale (H)   ? Diabetic ulcer of right foot associated with type 2 diabetes mellitus (H)   ? Type 2 diabetes mellitus (H)   ? Hyperlipidemia   ? Essential hypertension     Past Medical History:   Diagnosis Date   ? BPH (benign prostatic hyperplasia)    ? Cholelithiasis    ? Common bile duct (CBD) obstruction 9/4/2017   ? Compression Fracture Of Thoracic Vertebral Body    ? Diabetes mellitus (H)    ? Essential hypertension    ? Hyperlipidemia    ? Pancreatitis    ? Rib Fracture    ? Sepsis due to pneumonia (H) 9/8/2017       Past Social History     Reviewed, and he  reports that he quit smoking about 29 years ago. He has never used smokeless tobacco. He reports that he does not drink alcohol or use drugs.    Family History     Reviewed, and family history includes Alcohol abuse in his father; Aortic aneurysm in his mother.    Medication List   Post Discharge Medication Reconciliation Status: discharge medications reconciled, continue medications without change   Current Outpatient Medications on File Prior to Visit   Medication Sig Dispense Refill   ? acetaminophen (TYLENOL) 500 MG tablet Take 1-2 tablets (500-1,000 mg total) by mouth every 4 (four) hours as needed.  0   ? apixaban ANTICOAGULANT (ELIQUIS) 5 mg Tab tablet Take 1 tablet (5 mg total) by mouth 2 (two) times a day. 60 tablet 3   ? aspirin 81 MG EC tablet Take 81 mg by mouth daily.     ? blood glucose test strips Use 1 each As Directed 2 (two) times a day. Dispense brand per patient's insurance at pharmacy discretion. 120 strip 6   ? blood-glucose meter (ONETOUCH VERIO IQ METER) Misc Use 1 each As Directed 2 (two) times a day. 1 each 0   ? cholecalciferol, vitamin D3, (VITAMIN D3) 5,000 unit Tab Take 5,000 Units by mouth daily.      ? insulin glargine (LANTUS SOLOSTAR U-100 INSULIN) 100 unit/mL (3 mL) pen Inject 44 Units under the skin at bedtime. (Patient taking differently: Inject 30 Units under the skin at bedtime. Takes 15 units q am and 30  "units Q HS)     ? liraglutide (VICTOZA 3-RUPALI) 0.6 mg/0.1 mL (18 mg/3 mL) injection Inject 0.3 mL (1.8 mg total) under the skin daily.     ? lisinopriL (PRINIVIL,ZESTRIL) 5 MG tablet Take 1 tablet (5 mg total) by mouth daily. 90 tablet 3   ? multivit-min/FA/lycopen/lutein (CENTRUM SILVER MEN ORAL) Take 1 tablet by mouth daily.     ? mupirocin (BACTROBAN) 2 % ointment Apply topically daily as needed. Apply to wound.     ? neomycin-bacitracin-polymyxin (NEOSPORIN) ointment Apply to penis at catheter insertion site three times a day while catheter is in place. 15 g 0   ? NOVOLOG FLEXPEN U-100 INSULIN 100 unit/mL (3 mL) injection pen Inject 3 Units under the skin 3 (three) times a day before meals. 2.7 mL 0   ? oxyCODONE (ROXICODONE) 5 MG immediate release tablet Take 1 tablet (5 mg total) by mouth every 6 (six) hours as needed for pain. 30 tablet 0   ? pen needle, diabetic 31 gauge x 5/16\" Ndle Used to inject insulin daily 90 each 0   ? polyethylene glycol (MIRALAX) 17 gram packet Take 1 packet (17 g total) by mouth daily as needed.  0   ? tamsulosin (FLOMAX) 0.4 mg cap Take 1 capsule (0.4 mg total) by mouth daily after supper.  0   ? traMADoL (ULTRAM) 50 mg tablet Take 50 mg by mouth every 6 (six) hours as needed for pain.     ? vitamin E 400 unit capsule Take 400 Units by mouth daily.       No current facility-administered medications on file prior to visit.        Allergies     Allergies   Allergen Reactions   ? Cresol      Muscle cramps   ? Crestor [Rosuvastatin] Myalgia   ? Declomycin [Demeclocycline] Hives       Review of Systems   A comprehensive review of 14 systems was done. Pertinent findings noted here and in history of present illness. All the rest negative.  Constitutional: Negative.  Negative for fever, chills, he has activity change, appetite change and fatigue.   HENT: Negative for congestion and facial swelling.    Eyes: Negative for photophobia, redness and visual disturbance.   Respiratory: Negative " "for cough and chest tightness.    Cardiovascular: Negative for chest pain, palpitations and  leg swelling.   Gastrointestinal: Negative for nausea, diarrhea, constipation, blood in stool and abdominal distention.   Genitourinary: Negative.  Has  A mai  Musculoskeletal: Negative.  Denies any pain pin his foot .using a scooter for ambulation  Skin: Negative.    Neurological: Negative for dizziness, tremors, syncope, weakness, light-headedness and headaches.   Hematological: Does not bruise/bleed easily.   Psychiatric/Behavioral: Negative.  Some confusion noted he just wants to go home as per him      Physical Exam     Recent Vitals 1/19/2021   Height 5' 10\"   Weight 179 lbs 6 oz   BSA (m2) 2.01 m2   /78   Pulse 62   Temp 98.8   SpO2 92   Some recent data might be hidden       Constitutional: Oriented to person, place, and time and appears well-developed.   HEENT:  Normocephalic and atraumatic.  Eyes: Conjunctivae and EOM are normal. Pupils are equal, round, and reactive to light. No discharge.  No scleral icterus. Nose normal. Mouth/Throat: Oropharynx is clear and moist. No oropharyngeal exudate.    NECK: Normal range of motion. Neck supple. No JVD present. No tracheal deviation present. No thyromegaly present.   CARDIOVASCULAR: Normal rate, regular rhythm and intact distal pulses.  Exam reveals no gallop and no friction rub.  Systolic murmur present.  PULMONARY: Effort normal and breath sounds normal. No respiratory distress.No Wheezing or rales.  ABDOMEN: Soft. Bowel sounds are normal. No distension and no mass.  There is no tenderness. There is no rebound and no guarding. No HSM.  MUSCULOSKELETAL: Normal range of motion. No edema and no tenderness. Mild kyphosis, no tenderness.  Rt foot missing 5th toe  He has a wound VAC in place  LYMPH NODES: Has no cervical, supraclavicular, axillary and groin adenopathy.   NEUROLOGICAL: Alert and oriented to person, place, and time. No cranial nerve deficit.  Normal " muscle tone. Coordination normal.   GENITOURINARY: Deferred exam.  Dietrich present  SKIN: Skin is warm and dry. No rash noted. No erythema. No pallor.   EXTREMITIES: No cyanosis, no clubbing, no edema. No Deformity.  PSYCHIATRIC: Normal mood, affect and behavior.      Lab Results     Recent Results (from the past 240 hour(s))   COVID-19 Virus PCR MRF    Collection Time: 01/11/21 12:00 PM    Specimen: Respiratory   Result Value Ref Range    COVID-19 VIRUS SPECIMEN SOURCE Nares     2019-nCOV Not Detected    Basic Metabolic Panel    Collection Time: 01/12/21  8:21 AM   Result Value Ref Range    Sodium 140 136 - 145 mmol/L    Potassium 4.6 3.5 - 5.0 mmol/L    Chloride 102 98 - 107 mmol/L    CO2 31 22 - 31 mmol/L    Anion Gap, Calculation 7 5 - 18 mmol/L    Glucose 116 70 - 125 mg/dL    Calcium 8.9 8.5 - 10.5 mg/dL    BUN 18 8 - 28 mg/dL    Creatinine 0.89 0.70 - 1.30 mg/dL    GFR MDRD Af Amer >60 >60 mL/min/1.73m2    GFR MDRD Non Af Amer >60 >60 mL/min/1.73m2   C-Reactive Protein    Collection Time: 01/12/21  8:21 AM   Result Value Ref Range    CRP 1.7 (H) 0.0 - 0.8 mg/dL   Hepatic Profile    Collection Time: 01/12/21  8:21 AM   Result Value Ref Range    Bilirubin, Total 0.6 0.0 - 1.0 mg/dL    Bilirubin, Direct 0.2 <=0.5 mg/dL    Protein, Total 6.5 6.0 - 8.0 g/dL    Albumin 2.7 (L) 3.5 - 5.0 g/dL    Alkaline Phosphatase 98 45 - 120 U/L    AST 20 0 - 40 U/L    ALT 21 0 - 45 U/L   HM1 (CBC with Diff)    Collection Time: 01/12/21  8:21 AM   Result Value Ref Range    WBC 5.6 4.0 - 11.0 thou/uL    RBC 4.48 4.40 - 6.20 mill/uL    Hemoglobin 13.3 (L) 14.0 - 18.0 g/dL    Hematocrit 41.6 40.0 - 54.0 %    MCV 93 80 - 100 fL    MCH 29.7 27.0 - 34.0 pg    MCHC 32.0 32.0 - 36.0 g/dL    RDW 13.9 11.0 - 14.5 %    Platelets 301 140 - 440 thou/uL    MPV 9.8 8.5 - 12.5 fL    Neutrophils % 60 50 - 70 %    Lymphocytes % 19 (L) 20 - 40 %    Monocytes % 13 (H) 2 - 10 %    Eosinophils % 6 0 - 6 %    Basophils % 1 0 - 2 %    Immature Granulocyte  % 1 (H) <=0 %    Neutrophils Absolute 3.3 2.0 - 7.7 thou/uL    Lymphocytes Absolute 1.1 0.8 - 4.4 thou/uL    Monocytes Absolute 0.8 0.0 - 0.9 thou/uL    Eosinophils Absolute 0.4 0.0 - 0.4 thou/uL    Basophils Absolute 0.1 0.0 - 0.2 thou/uL    Immature Granulocyte Absolute 0.0 <=0.0 thou/uL   Comprehensive Metabolic Panel    Collection Time: 01/16/21  7:24 AM   Result Value Ref Range    Sodium 140 136 - 145 mmol/L    Potassium 4.8 3.5 - 5.0 mmol/L    Chloride 103 98 - 107 mmol/L    CO2 29 22 - 31 mmol/L    Anion Gap, Calculation 8 5 - 18 mmol/L    Glucose 92 70 - 125 mg/dL    BUN 18 8 - 28 mg/dL    Creatinine 0.93 0.70 - 1.30 mg/dL    GFR MDRD Af Amer >60 >60 mL/min/1.73m2    GFR MDRD Non Af Amer >60 >60 mL/min/1.73m2    Bilirubin, Total 0.6 0.0 - 1.0 mg/dL    Calcium 9.1 8.5 - 10.5 mg/dL    Protein, Total 6.1 6.0 - 8.0 g/dL    Albumin 2.5 (L) 3.5 - 5.0 g/dL    Alkaline Phosphatase 79 45 - 120 U/L    AST 15 0 - 40 U/L    ALT 13 0 - 45 U/L   C-Reactive Protein    Collection Time: 01/16/21  7:24 AM   Result Value Ref Range    CRP 8.8 (H) 0.0 - 0.8 mg/dL   HM1 (CBC with Diff)    Collection Time: 01/16/21  7:24 AM   Result Value Ref Range    WBC 7.0 4.0 - 11.0 thou/uL    RBC 3.88 (L) 4.40 - 6.20 mill/uL    Hemoglobin 11.3 (L) 14.0 - 18.0 g/dL    Hematocrit 35.0 (L) 40.0 - 54.0 %    MCV 90 80 - 100 fL    MCH 29.1 27.0 - 34.0 pg    MCHC 32.3 32.0 - 36.0 g/dL    RDW 14.0 11.0 - 14.5 %    Platelets 253 140 - 440 thou/uL    MPV 10.3 8.5 - 12.5 fL    Neutrophils % 60 50 - 70 %    Lymphocytes % 19 (L) 20 - 40 %    Monocytes % 16 (H) 2 - 10 %    Eosinophils % 4 0 - 6 %    Basophils % 1 0 - 2 %    Immature Granulocyte % 0 <=0 %    Neutrophils Absolute 4.2 2.0 - 7.7 thou/uL    Lymphocytes Absolute 1.3 0.8 - 4.4 thou/uL    Monocytes Absolute 1.1 (H) 0.0 - 0.9 thou/uL    Eosinophils Absolute 0.3 0.0 - 0.4 thou/uL    Basophils Absolute 0.1 0.0 - 0.2 thou/uL    Immature Granulocyte Absolute 0.0 <=0.0 thou/uL                Charlotte  DONNY Mckenna

## 2021-06-21 NOTE — LETTER
Letter by Charlotte Mckenna MBBS at      Author: Charlotte Mckenna MBBS Service: -- Author Type: --    Filed:  Encounter Date: 12/10/2020 Status: (Other)         Patient: Caleb Hernandez   MR Number: [id number]   YOB: 1935   Date of Visit: 12/10/2020       [facility] Admission note      Patient: Caleb Hernandez  MRN: [id number]  Date of Service: 12/10/2020      [facility] [[id number]]  Reason for Visit     Chief Complaint   Patient presents with   ? Review Of Multiple Medical Conditions   follow-up hypoglycemia and hypotension    Code Status     Full code    Assessment     -Insulin-dependent diabetes on high doses of insulin with early morning hypoglycemia blood sugar of 63  Random blood sugar noted to be 47 and prior blood sugar in the morning noted to be 71  -Hypertension with persistent low BP .  Blood pressure of 85/51 noted today in the TCU  -Patient scheduled for irrigation and debridement of the right foot with application of TheraSkin on 12/17/2020; needs medication evaluation  -Right foot cellulitis with abscess work-up positive for osteomyelitis with septic arthritis of 2nd-4th tarsometatarsal joint  -Status post I&D of abscess on 11/27/2020  -Postoperative urinary retention with hematuria  -History of right 5th metatarsal excision on 11/2/2020  -Severe peripheral vascular disease.  -Status post right lower extremity angiogram with balloon angioplasty of anterior tibial and peroneal arteries on 11/24/2020  -- DM-2   - FTT  -Generalized weakness with deconditioning    Plan     Patient has been admitted to the TCU for strengthening and rehab.  He had a prolonged and lengthy stay in the hospital and was a readmission with multiple procedures done.  Initially was admitted after right 5th MT amputation on 11/2/2020.  Readmitted to the hospital with worsening infection.  He underwent balloon angioplasty of his right lower extremity on 11/24/2020 of the anterior tibial and peroneal  arteries.  Postoperative ABIs did not show any improvement however vascular feels this could be related to spasm.  He is now rescheduled for irrigation and debridement of the right foot with application of TheraSkin by his podiatrist on 12/17/2020.  His orders were reviewed and he will be not taking his Eliquis 3 days prior to surgery.  Aspirin has been held.  Further orders to be reviewed with podiatry by staff.  In addition he was seen because of urgently because of hypoglycemia concerns blood sugar noted to be 63 this morning.  Prior to this yesterday he was 71 random blood sugar was 47.  These were corrected  Discontinue his Lantus regimen.  Lantus a.m. dose to be reduced from 44 units to 40 units.  Lantus at at bedtime to be reduced from 22 units to 15 units  Discontinue NovoLog scheduled with breakfast  Hold all scheduled insulin if patient is not eating her blood sugars are under 140  Reassess blood sugar trends next week  Ensure patient has adequate oral intake and at bedtime snack  Continue with sliding scale coverage.  Due to profound hypotension push fluids  Discontinue his lisinopril  Administer Flomax at bedtime  Closely monitor blood pressures  Wound to be monitored closely wound VAC has been recommended by vascular surgery      History     Patient is a very pleasant 84 y.o. male who is admitted to TCU  Patient presented to the hospital with right lower extremity cellulitis.  He was admitted with concerning infection.  MRI confirmed new synovitis with osteomyelitis of the 2nd-4th tarsometatarsal joint with septic arthritis.  He underwent I&D of the abscess on 11/27/2020.  Postoperatively he has been discharged nonweightbearing with outpatient ertapenem for 4 weeks  He did have a follow-up with vascular surgery and they feel that his wound is not healing well they have recommended a wound VAC.  He has been rescheduled now by podiatry to have irrigation debridement on 12/17/2020 to be scheduled in the  hospital with application of TheraSkin.  At the recommendation his Eliquis will be held for 3 days prior to surgery and his aspirin has been held since then.  He also has a history of severe peripheral vascular disease and underwent right lower extremity angiogram with angioplasty of anterior tibial and peroneal arteries on 11/24/2020 with vascular surgery.  He will require follow-up with vascular.  It was done in the TCU.  They have recommended further follow-up with podiatry for further work-up they recommend seeing him back in 3 months for ABIs  Postoperatively had urinary retention with bladder wall thickening suggestive of cystitis.  He was noted to have a right upper pole kidney stone but no hydronephrosis.  Urology saw him.  He will continue with Flomax at discharge and at the recommendation he has resumed his Eliquis and aspirin.  He has diabetes and his Lantus dose was adjusted to get tighter control of his blood sugars.  Fortunately having frequent episodes of early morning hypoglycemia with low blood sugar of 63 and 71 noted in the TCU in the morning postprandials are all under 180.  Overall control is very tight and patient is at risk of severe hypoglycemia noted to be quite hypotensive today to    Past Medical History     Active Ambulatory (Non-Hospital) Problems    Diagnosis   ? Hematuria   ? 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 2 diabetes mellitus, with necrosis of muscle (H)   ? ACP (advance care planning)   ? Septic arthritis of right foot (H)   ? Gross hematuria   ? Urinary retention   ? Type 2 diabetes mellitus with diabetic peripheral angiopathy without gangrene, with long-term current use of insulin (H)   ? Adult failure to thrive   ? Normocytic anemia   ? Paroxysmal atrial fibrillation (H)   ? 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 Cellulitis of right lower extremity   ? Abscess of right foot   ? Cellulitis and  abscess of foot excluding toe   ? Osteomyelitis of right foot (H)   ? Statin intolerance   ? Personal history of PE (pulmonary embolism)-- May 2020, unprovoked, with right heart strain   ? PVD (peripheral vascular disease) (H)   ? Overweight (BMI 25.0-29.9)   ? Chronic anticoagulation   ? Acute pulmonary embolism with acute cor pulmonale (H)   ? Diabetic ulcer of right foot associated with type 2 diabetes mellitus (H)   ? Type 2 diabetes mellitus (H)   ? Hyperlipidemia   ? Essential hypertension     Past Medical History:   Diagnosis Date   ? BPH (benign prostatic hyperplasia)    ? Cholelithiasis    ? Common bile duct (CBD) obstruction 9/4/2017   ? Compression Fracture Of Thoracic Vertebral Body    ? Diabetes mellitus (H)    ? Essential hypertension    ? Hyperlipidemia    ? Pancreatitis    ? Rib Fracture    ? Sepsis due to pneumonia (H) 9/8/2017       Past Social History     Reviewed, and he  reports that he quit smoking about 29 years ago. He has never used smokeless tobacco. He reports that he does not drink alcohol or use drugs.    Family History     Reviewed, and family history includes Alcohol abuse in his father; Aortic aneurysm in his mother.    Medication List   Post Discharge Medication Reconciliation Status: discharge medications reconciled, continue medications without change   Current Outpatient Medications on File Prior to Visit   Medication Sig Dispense Refill   ? acetaminophen (TYLENOL) 500 MG tablet Take 1-2 tablets (500-1,000 mg total) by mouth every 4 (four) hours as needed.  0   ? apixaban ANTICOAGULANT (ELIQUIS) 5 mg Tab tablet Take 1 tablet (5 mg total) by mouth 2 (two) times a day. 60 tablet 3   ? aspirin 81 MG EC tablet Take 81 mg by mouth daily.     ? blood glucose test strips Use 1 each As Directed 2 (two) times a day. Dispense brand per patient's insurance at pharmacy discretion. 120 strip 6   ? blood-glucose meter (ONETOUCH VERIO IQ METER) Misc Use 1 each As Directed 2 (two) times a day. 1  "each 0   ? cholecalciferol, vitamin D3, (VITAMIN D3) 5,000 unit Tab Take 5,000 Units by mouth daily.      ? ertapenem 1 g in NaCl 0.9 % 0.9 % 50 mL IVPB Infuse 1 g into a venous catheter daily. 1 each 0   ? insulin glargine (LANTUS SOLOSTAR U-100 INSULIN) 100 unit/mL (3 mL) pen Inject 44 Units under the skin at bedtime.     ? liraglutide (VICTOZA 3-RUPALI) 0.6 mg/0.1 mL (18 mg/3 mL) injection Inject 0.3 mL (1.8 mg total) under the skin daily.     ? lisinopriL (PRINIVIL,ZESTRIL) 5 MG tablet Take 1 tablet (5 mg total) by mouth daily. 90 tablet 3   ? multivit-min/FA/lycopen/lutein (CENTRUM SILVER MEN ORAL) Take 1 tablet by mouth daily.     ? mupirocin (BACTROBAN) 2 % ointment Apply topically daily as needed. Apply to wound.     ? neomycin-bacitracin-polymyxin (NEOSPORIN) ointment Apply to penis at catheter insertion site three times a day while catheter is in place. 15 g 0   ? NOVOLOG FLEXPEN U-100 INSULIN 100 unit/mL (3 mL) injection pen Inject 3 Units under the skin 3 (three) times a day before meals. 2.7 mL 0   ? pen needle, diabetic 31 gauge x 5/16\" Ndle Used to inject insulin daily 90 each 0   ? polyethylene glycol (MIRALAX) 17 gram packet Take 1 packet (17 g total) by mouth daily as needed.  0   ? tamsulosin (FLOMAX) 0.4 mg cap Take 1 capsule (0.4 mg total) by mouth daily after supper.  0   ? traMADoL (ULTRAM) 50 mg tablet Take 1 tablet (50 mg total) by mouth every 6 (six) hours as needed for pain. 28 tablet 0   ? vitamin E 400 unit capsule Take 400 Units by mouth daily.       No current facility-administered medications on file prior to visit.        Allergies     Allergies   Allergen Reactions   ? Cresol      Muscle cramps   ? Crestor [Rosuvastatin] Myalgia   ? Declomycin [Demeclocycline] Hives       Review of Systems   A comprehensive review of 14 systems was done. Pertinent findings noted here and in history of present illness. All the rest negative.  Constitutional: Negative.  Negative for fever, chills, he has " activity change, appetite change and fatigue.   HENT: Negative for congestion and facial swelling.    Eyes: Negative for photophobia, redness and visual disturbance.   Respiratory: Negative for cough and chest tightness.    Cardiovascular: Negative for chest pain, palpitations and  leg swelling.   Gastrointestinal: Negative for nausea, diarrhea, constipation, blood in stool and abdominal distention.   Genitourinary: Negative.  Has  A mai  Musculoskeletal: Negative.  Denies any pain pin his foot .  Skin: Negative.    Neurological: Negative for dizziness, tremors, syncope, weakness, light-headedness and headaches.   Hematological: Does not bruise/bleed easily.   Psychiatric/Behavioral: Negative.  Some confusion noted he just wants to go home as per him      Physical Exam     Recent Vitals 12/10/2020   Height -   Weight 188 lbs   BSA (m2) 2.05 m2   /80   Pulse 74   Temp 98   Temp src -   SpO2 -   Some recent data might be hidden   RC BP 89/51    Constitutional: Oriented to person, place, and time and appears well-developed.   HEENT:  Normocephalic and atraumatic.  Eyes: Conjunctivae and EOM are normal. Pupils are equal, round, and reactive to light. No discharge.  No scleral icterus. Nose normal. Mouth/Throat: Oropharynx is clear and moist. No oropharyngeal exudate.    NECK: Normal range of motion. Neck supple. No JVD present. No tracheal deviation present. No thyromegaly present.   CARDIOVASCULAR: Normal rate, regular rhythm and intact distal pulses.  Exam reveals no gallop and no friction rub.  Systolic murmur present.  PULMONARY: Effort normal and breath sounds normal. No respiratory distress.No Wheezing or rales.  ABDOMEN: Soft. Bowel sounds are normal. No distension and no mass.  There is no tenderness. There is no rebound and no guarding. No HSM.  MUSCULOSKELETAL: Normal range of motion. No edema and no tenderness. Mild kyphosis, no tenderness.  Rt foot missing 5th toe  Wound dry and not draining  LYMPH  NODES: Has no cervical, supraclavicular, axillary and groin adenopathy.   NEUROLOGICAL: Alert and oriented to person, place, and time. No cranial nerve deficit.  Normal muscle tone. Coordination normal.   GENITOURINARY: Deferred exam.  Dietrich present  SKIN: Skin is warm and dry. No rash noted. No erythema. No pallor.   EXTREMITIES: No cyanosis, no clubbing, no edema. No Deformity.  PSYCHIATRIC: Normal mood, affect and behavior.      Lab Results     Recent Results (from the past 240 hour(s))   POCT Glucose    Collection Time: 11/30/20  4:58 PM    Specimen: Blood   Result Value Ref Range    Glucose 204 (H) 70 - 139 mg/dL   POCT Glucose    Collection Time: 11/30/20  9:16 PM    Specimen: Blood   Result Value Ref Range    Glucose 238 (H) 70 - 139 mg/dL   POCT Glucose    Collection Time: 12/01/20  6:33 AM    Specimen: Blood   Result Value Ref Range    Glucose 120 70 - 139 mg/dL   HM2(CBC W/O DIFF)    Collection Time: 12/01/20 11:22 AM   Result Value Ref Range    WBC 7.8 4.0 - 11.0 thou/uL    RBC 4.17 (L) 4.40 - 6.20 mill/uL    Hemoglobin 12.5 (L) 14.0 - 18.0 g/dL    Hematocrit 38.1 (L) 40.0 - 54.0 %    MCV 91 80 - 100 fL    MCH 30.0 27.0 - 34.0 pg    MCHC 32.8 32.0 - 36.0 g/dL    RDW 11.7 11.0 - 14.5 %    Platelets 443 (H) 140 - 440 thou/uL    MPV 9.2 8.5 - 12.5 fL   Urinalysis    Collection Time: 12/01/20 12:13 PM   Result Value Ref Range    Color, UA Red (!) Colorless, Yellow, Straw, Light Yellow    Clarity, UA Cloudy (!) Clear    Glucose, UA Negative Negative    Bilirubin, UA Negative Negative    Ketones, UA Trace (!) Negative, 60 mg/dL    Specific Gravity, UA 1.020 1.001 - 1.030    Blood, UA Large (!) Negative    pH, UA 6.0 4.5 - 8.0    Protein, UA 50 mg/dL (!) Negative mg/dL    Urobilinogen, UA <2.0 E.U./dL <2.0 E.U./dL, 2.0 E.U./dL    Nitrite, UA Negative Negative    Leukocytes, UA Small (!) Negative    Bacteria, UA Few (!) None Seen hpf    RBC, UA >100 (!) None Seen, 0-2 hpf    WBC, UA  (!) None Seen, 0-5 hpf     Squam Epithel, UA 0-5 None Seen, 0-5 lpf    Mucus, UA Moderate (!) None Seen lpf    Ca Oxalate Meliza, UA Present (!) None Seen   Urine culture    Collection Time: 12/01/20 12:13 PM    Specimen: Urine, Catheter   Result Value Ref Range    Culture No Growth    POCT Glucose    Collection Time: 12/01/20 12:38 PM    Specimen: Blood   Result Value Ref Range    Glucose 180 (H) 70 - 139 mg/dL   Vancomycin (Vancocin )    Collection Time: 12/01/20  2:26 PM   Result Value Ref Range    Vancomycin 13.7 <=25.0 ug/mL   POCT Glucose    Collection Time: 12/01/20  5:05 PM    Specimen: Blood   Result Value Ref Range    Glucose 228 (H) 70 - 139 mg/dL   POCT Glucose    Collection Time: 12/01/20  9:01 PM    Specimen: Blood   Result Value Ref Range    Glucose 208 (H) 70 - 139 mg/dL   Basic Metabolic Panel    Collection Time: 12/02/20  5:32 AM   Result Value Ref Range    Sodium 139 136 - 145 mmol/L    Potassium 4.5 3.5 - 5.0 mmol/L    Chloride 103 98 - 107 mmol/L    CO2 30 22 - 31 mmol/L    Anion Gap, Calculation 6 5 - 18 mmol/L    Glucose 178 (H) 70 - 125 mg/dL    Calcium 8.3 (L) 8.5 - 10.5 mg/dL    BUN 18 8 - 28 mg/dL    Creatinine 0.88 0.70 - 1.30 mg/dL    GFR MDRD Af Amer >60 >60 mL/min/1.73m2    GFR MDRD Non Af Amer >60 >60 mL/min/1.73m2   HM2(CBC w/o Differential)    Collection Time: 12/02/20  5:33 AM   Result Value Ref Range    WBC 7.3 4.0 - 11.0 thou/uL    RBC 4.07 (L) 4.40 - 6.20 mill/uL    Hemoglobin 12.2 (L) 14.0 - 18.0 g/dL    Hematocrit 37.5 (L) 40.0 - 54.0 %    MCV 92 80 - 100 fL    MCH 30.0 27.0 - 34.0 pg    MCHC 32.5 32.0 - 36.0 g/dL    RDW 11.9 11.0 - 14.5 %    Platelets 447 (H) 140 - 440 thou/uL    MPV 9.5 8.5 - 12.5 fL   COVID-19 Virus PCR MRF    Collection Time: 12/02/20  5:38 AM    Specimen: Respiratory   Result Value Ref Range    COVID-19 VIRUS SPECIMEN SOURCE Nasopharyngeal     2019-nCOV Not Detected    POCT Glucose    Collection Time: 12/02/20  6:04 AM    Specimen: Blood   Result Value Ref Range    Glucose 189 (H)  70 - 139 mg/dL   POCT Glucose    Collection Time: 12/02/20  8:08 AM    Specimen: Capillary; Blood   Result Value Ref Range    Glucose 162 (H) 70 - 139 mg/dL   POCT Glucose    Collection Time: 12/02/20 11:22 AM    Specimen: Blood   Result Value Ref Range    Glucose 173 (H) 70 - 139 mg/dL   POCT Glucose    Collection Time: 12/02/20  5:08 PM    Specimen: Blood   Result Value Ref Range    Glucose 172 (H) 70 - 139 mg/dL   POCT Glucose    Collection Time: 12/02/20  9:02 PM    Specimen: Blood   Result Value Ref Range    Glucose 198 (H) 70 - 139 mg/dL   POCT Glucose    Collection Time: 12/03/20  8:22 AM    Specimen: Blood   Result Value Ref Range    Glucose 236 (H) 70 - 139 mg/dL   POCT Glucose    Collection Time: 12/03/20 11:57 AM    Specimen: Blood   Result Value Ref Range    Glucose 292 (H) 70 - 139 mg/dL   C-Reactive Protein    Collection Time: 12/07/20  5:49 AM   Result Value Ref Range    CRP 1.9 (H) 0.0 - 0.8 mg/dL   Comprehensive Metabolic Panel    Collection Time: 12/07/20  5:49 AM   Result Value Ref Range    Sodium 135 (L) 136 - 145 mmol/L    Potassium 4.1 3.5 - 5.0 mmol/L    Chloride 99 98 - 107 mmol/L    CO2 27 22 - 31 mmol/L    Anion Gap, Calculation 9 5 - 18 mmol/L    Glucose 47 (LL) 70 - 125 mg/dL    BUN 24 8 - 28 mg/dL    Creatinine 0.71 0.70 - 1.30 mg/dL    GFR MDRD Af Amer >60 >60 mL/min/1.73m2    GFR MDRD Non Af Amer >60 >60 mL/min/1.73m2    Bilirubin, Total 0.7 0.0 - 1.0 mg/dL    Calcium 9.2 8.5 - 10.5 mg/dL    Protein, Total 6.6 6.0 - 8.0 g/dL    Albumin 2.3 (L) 3.5 - 5.0 g/dL    Alkaline Phosphatase 98 45 - 120 U/L    AST 21 0 - 40 U/L    ALT 20 0 - 45 U/L   HM1 (CBC with Diff)    Collection Time: 12/07/20  5:49 AM   Result Value Ref Range    WBC 8.2 4.0 - 11.0 thou/uL    RBC 4.57 4.40 - 6.20 mill/uL    Hemoglobin 13.6 (L) 14.0 - 18.0 g/dL    Hematocrit 41.8 40.0 - 54.0 %    MCV 92 80 - 100 fL    MCH 29.8 27.0 - 34.0 pg    MCHC 32.5 32.0 - 36.0 g/dL    RDW 12.1 11.0 - 14.5 %    Platelets 430 140 - 440  thou/uL    MPV 9.7 8.5 - 12.5 fL    Neutrophils % 57 50 - 70 %    Lymphocytes % 25 20 - 40 %    Monocytes % 12 (H) 2 - 10 %    Eosinophils % 5 0 - 6 %    Basophils % 1 0 - 2 %    Immature Granulocyte % 1 (H) <=0 %    Neutrophils Absolute 4.6 2.0 - 7.7 thou/uL    Lymphocytes Absolute 2.0 0.8 - 4.4 thou/uL    Monocytes Absolute 1.0 (H) 0.0 - 0.9 thou/uL    Eosinophils Absolute 0.4 0.0 - 0.4 thou/uL    Basophils Absolute 0.1 0.0 - 0.2 thou/uL    Immature Granulocyte Absolute 0.1 (H) <=0.0 thou/uL   Hemoglobin    Collection Time: 12/09/20  9:46 AM   Result Value Ref Range    Hemoglobin 13.4 (L) 14.0 - 18.0 g/dL                DONNY Kwan

## 2021-06-21 NOTE — LETTER
Letter by Charlotte Mckenna MBBS at      Author: Charlotte Mckenna MBBS Service: -- Author Type: --    Filed:  Encounter Date: 1/26/2021 Status: (Other)         [facility]  [address] [address]                                  January 26, 2021    Patient: Caleb Hernandez   MR Number: [id number]   YOB: 1935   Date of Visit: 1/26/2021     Dear Dr. Mccann:    Thank you for referring Caleb Hernandez to me for evaluation. Below are the relevant portions of my assessment and plan of care.    If you have questions, please do not hesitate to call me. I look forward to following Caleb along with you.    Sincerely,        DONNY Kwan          CC  No Recipients  Charlotte Mckenna MBBS  1/26/2021  3:47 PM  [facility] Admission note      Patient: Caleb Hernandez  MRN: [id number]  Date of Service: 1/26/2021      [facility] [[id number]]  Reason for Visit     Chief Complaint   Patient presents with   ? Review Of Multiple Medical Conditions   follow-up hypoglycemia and hypotension/ non healing wound    Code Status     Full code    Assessment     - ulceration rt foot s/p debridement  -Right foot cellulitis with abscess work-up positive for osteomyelitis with septic arthritis of 2nd-4th tarsometatarsal joint  -Status post I&D of abscess on 11/27/2020  -Postoperative urinary retention with hematuria  -History of right 5th metatarsal excision on 11/2/2020  -Severe peripheral vascular disease.  -Status post right lower extremity angiogram with balloon angioplasty of anterior tibial and peroneal arteries on 11/24/2020  -- IDDM-2   - HTN with hypotension noted today  - FTT  -Generalized weakness with deconditioning    Plan     Patient has been admitted to the TCU for strengthening and rehab.  He has been admitted with ulceration of his right foot status post TheraSkin graft.  He currently has a wound VAC in place  He remains nonweightbearing on his right lower  extremity.  He is compliant with his nonweightbearing and no change in his weightbearing status has been made  Wound vac has been discontinued  His wound orders were reviewed with his podiatrist and he has [facility] orders.  Wound is extensive on the lateral margin of his foot.  This has been nonhealing in his wound is debrided regularly by the podiatrist.  Weightbearing status to be determined by the podiatrist he is trying to use a scooter currently for mobility  Diabetic control is worsening with blood sugars postprandially now greater than 200 noted for the last 48 hours.  He also had a blood sugar of 90 in the morning.  Monitor trends before adjusting continue with his sliding scale insulin.  Blood pressures are stable but low  Continue with his PT OT he remains adamant that he would like to discharge home by early next week advised him to follow-up with   PVR CHECKS BEING DONE-VOIDING WELL HE will follow-up with urology he did have occasional in and out catheterization done in the TCU    History     Patient is a very pleasant 85 y.o. male who is admitted to TCU  Patient presented to the hospital with right lower extremity cellulitis.  He was admitted with concerning infection.  MRI confirmed new synovitis with osteomyelitis of the 2nd-4th tarsometatarsal joint with septic arthritis.  He underwent I&D of the abscess on 11/27/2020.  Postoperatively he has been discharged nonweightbearing with outpatient ertapenem for 4 weeks  Subsequently he was readmitted on 12/17/2024 application of TheraSkin with redebridement of his wound.  Wound is healing slowly.    He recently had his IV antibiotics discontinued.  Unfortunately recheck labs had shown his CRP is elevated at 8.8  Follow-up done with podiatry yesterday.  His wound VAC has been discontinued.  His wound was debrided.  We have recommended that he may need additional surgery if his wound fails to improve.  He will continue with his nonweightbearing  status  Currently he has Medihoney ordered for his wound.  This is being applied nursing has no concern for secondary infection  He will continue with his weekly follow-up with up with his podiatrist    He also has a history of severe peripheral vascular disease and underwent right lower extremity angiogram with angioplasty of anterior tibial and peroneal arteries on 11/24/2020 with vascular surgery.  He will require follow-up with vascular.  It was done in the TCU.  They have recommended further follow-up with podiatry for further work-up they recommend seeing him back in 3 months for ABIs  He does report some occasional discomfort in his leg   He has been reporting no pain but on questioning he does routinely take a tramadol at bedtime he told me that helps him sleep better because he has some dull ache in his foot    Postoperatively had urinary retention with bladder wall thickening suggestive of cystitis.  He accidentally removed his Dietrich catheter.  He follows with urology as a result and had a voiding cystoscopy urogram done.  Currently discharged without a Dietrich and advised to monitor urinary retention concern  This was reviewed with staff he has required an occasional PVR check of more than 300 necessitating in and out cath which she has tolerated well  For the past 24 hours he has voided well    In addition he is a diabetic  Currently on a much lower dose of insulin blood sugars have improved significantly.  However now blood sugars are improving and increasing postprandially to 200 and more  He reports a good appetite  Blood pressures are stable to    Past Medical History     Active Ambulatory (Non-Hospital) Problems    Diagnosis   ? Amputated toe, right (H)   ? Hematuria   ? 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 2 diabetes mellitus, with necrosis of muscle (H)   ? ACP (advance care planning)   ? Septic arthritis of right foot (H)   ? Gross hematuria   ? Urinary retention   ? Type 2 diabetes  mellitus with diabetic peripheral angiopathy without gangrene, with long-term current use of insulin (H)   ? Adult failure to thrive   ? Normocytic anemia   ? Paroxysmal atrial fibrillation (H)   ? 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 Osteomyelitis of right foot (H)   ? Statin intolerance   ? Personal history of PE (pulmonary embolism)-- May 2020, unprovoked, with right heart strain   ? PVD (peripheral vascular disease) (H)   ? Overweight (BMI 25.0-29.9)   ? Chronic anticoagulation   ? Hyperlipidemia   ? Essential hypertension     Past Medical History:   Diagnosis Date   ? Abscess of right foot 11/23/2020   ? Acute pulmonary embolism with acute cor pulmonale (H) 5/23/2020   ? BPH (benign prostatic hyperplasia)    ? Cholelithiasis    ? Common bile duct (CBD) obstruction 9/4/2017   ? Compression Fracture Of Thoracic Vertebral Body    ? Diabetes mellitus (H)    ? Essential hypertension    ? Hyperlipidemia    ? Pancreatitis    ? Rib Fracture    ? Sepsis due to pneumonia (H) 9/8/2017       Past Social History     Reviewed, and he  reports that he quit smoking about 29 years ago. He has never used smokeless tobacco. He reports that he does not drink alcohol or use drugs.    Family History     Reviewed, and family history includes Alcohol abuse in his father; Aortic aneurysm in his mother.    Medication List   Post Discharge Medication Reconciliation Status: discharge medications reconciled, continue medications without change   Current Outpatient Medications on File Prior to Visit   Medication Sig Dispense Refill   ? acetaminophen (TYLENOL) 500 MG tablet Take 1-2 tablets (500-1,000 mg total) by mouth every 4 (four) hours as needed.  0   ? apixaban ANTICOAGULANT (ELIQUIS) 5 mg Tab tablet Take 1 tablet (5 mg total) by mouth 2 (two) times a day. 60 tablet 3   ? aspirin 81 MG EC tablet Take 81 mg by mouth daily.     ? blood glucose test strips Use 1 each As  "Directed 2 (two) times a day. Dispense brand per patient's insurance at pharmacy discretion. 120 strip 6   ? blood-glucose meter (ONETOUCH VERIO IQ METER) Misc Use 1 each As Directed 2 (two) times a day. 1 each 0   ? insulin glargine (LANTUS SOLOSTAR U-100 INSULIN) 100 unit/mL (3 mL) pen Inject 30 Units under the skin at bedtime. Takes 15 units q am and 30 units Q HS     ? liraglutide (VICTOZA 3-RUPALI) 0.6 mg/0.1 mL (18 mg/3 mL) injection Inject 0.3 mL (1.8 mg total) under the skin daily.     ? multivit-min/FA/lycopen/lutein (CENTRUM SILVER MEN ORAL) Take 1 tablet by mouth daily.     ? mupirocin (BACTROBAN) 2 % ointment Apply topically daily as needed. Apply to wound.     ? neomycin-bacitracin-polymyxin (NEOSPORIN) ointment Apply to penis at catheter insertion site three times a day while catheter is in place. 15 g 0   ? NOVOLOG FLEXPEN U-100 INSULIN 100 unit/mL (3 mL) injection pen Inject 3 Units under the skin 3 (three) times a day before meals. 2.7 mL 0   ? oxyCODONE (ROXICODONE) 5 MG immediate release tablet Take 1 tablet (5 mg total) by mouth every 6 (six) hours as needed for pain. 30 tablet 0   ? pen needle, diabetic 31 gauge x 5/16\" Ndle Used to inject insulin daily 90 each 0   ? polyethylene glycol (MIRALAX) 17 gram packet Take 1 packet (17 g total) by mouth daily as needed.  0   ? tamsulosin (FLOMAX) 0.4 mg cap Take 1 capsule (0.4 mg total) by mouth daily after supper.  0   ? traMADoL (ULTRAM) 50 mg tablet Take 50 mg by mouth every 6 (six) hours as needed for pain.     ? vitamin E 400 unit capsule Take 400 Units by mouth daily.       No current facility-administered medications on file prior to visit.        Allergies     Allergies   Allergen Reactions   ? Cresol      Muscle cramps   ? Crestor [Rosuvastatin] Myalgia   ? Declomycin [Demeclocycline] Hives       Review of Systems   A comprehensive review of 14 systems was done. Pertinent findings noted here and in history of present illness. All the rest " "negative.  Constitutional: Negative.  Negative for fever, chills, he has activity change, appetite change and fatigue.   HENT: Negative for congestion and facial swelling.    Eyes: Negative for photophobia, redness and visual disturbance.   Respiratory: Negative for cough and chest tightness.    Cardiovascular: Negative for chest pain, palpitations and  leg swelling.   Gastrointestinal: Negative for nausea, diarrhea, constipation, blood in stool and abdominal distention.   Genitourinary: Negative.    Musculoskeletal: Negative.  Denies any pain pin his foot .using a scooter for ambulation  Skin: Negative.    Neurological: Negative for dizziness, tremors, syncope, weakness, light-headedness and headaches.   Hematological: Does not bruise/bleed easily.   Psychiatric/Behavioral: Negative.  Some confusion noted he just wants to go home as per him      Physical Exam     Recent Vitals 1/26/2021   Height 5' 10\"   Weight 182 lbs 10 oz   BSA (m2) 2.02 m2   /75   Pulse 62   Temp 98.8   Temp src -   SpO2 94   Some recent data might be hidden       Constitutional: Oriented to person, place, and time and appears well-developed.   HEENT:  Normocephalic and atraumatic.  Eyes: Conjunctivae and EOM are normal. Pupils are equal, round, and reactive to light. No discharge.  No scleral icterus. Nose normal. Mouth/Throat: Oropharynx is clear and moist. No oropharyngeal exudate.    NECK: Normal range of motion. Neck supple. No JVD present. No tracheal deviation present. No thyromegaly present.   CARDIOVASCULAR: Normal rate, regular rhythm and intact distal pulses.  Exam reveals no gallop and no friction rub.  Systolic murmur present.  PULMONARY: Effort normal and breath sounds normal. No respiratory distress.No Wheezing or rales.  ABDOMEN: Soft. Bowel sounds are normal. No distension and no mass.  There is no tenderness. There is no rebound and no guarding. No HSM.  MUSCULOSKELETAL: Normal range of motion. No edema and no " tenderness. Mild kyphosis, no tenderness.  Rt foot missing 5th toe  Extensive ulceration noted on the lateral margin of his foot extending from the base of his missing fifth toe extending to his midfoot with necrotic skin and serosanguineous drainage noted  Nonhealing wound  LYMPH NODES: Has no cervical, supraclavicular, axillary and groin adenopathy.   NEUROLOGICAL: Alert and oriented to person, place, and time. No cranial nerve deficit.  Normal muscle tone. Coordination normal.   GENITOURINARY: Deferred exam.  SKIN: Skin is warm and dry. No rash noted. No erythema. No pallor.   EXTREMITIES: No cyanosis, no clubbing, no edema. No Deformity.  PSYCHIATRIC: Normal mood, affect and behavior.      Lab Results     Recent Results (from the past 240 hour(s))   COVID-19 Virus PCR MRF    Collection Time: 01/18/21 12:00 PM    Specimen: Respiratory   Result Value Ref Range    COVID-19 VIRUS SPECIMEN SOURCE [facility]     2019-nCOV Not Detected                 DONNY Kwan Mona, MBBS  1/26/2021  3:37 PM  Sign when Signing Visit    [facility] Admission note      Patient: Caleb Hernandez  MRN: [id number]  Date of Service: 1/26/2021      [facility] [[id number]]  Reason for Visit     Chief Complaint   Patient presents with   ? Review Of Multiple Medical Conditions   follow-up hypoglycemia and hypotension/ non healing wound    Code Status     Full code    Assessment     - ulceration rt foot s/p debridement  -Right foot cellulitis with abscess work-up positive for osteomyelitis with septic arthritis of 2nd-4th tarsometatarsal joint  -Status post I&D of abscess on 11/27/2020  -Postoperative urinary retention with hematuria  -History of right 5th metatarsal excision on 11/2/2020  -Severe peripheral vascular disease.  -Status post right lower extremity angiogram with balloon angioplasty of anterior tibial and peroneal arteries on 11/24/2020  -- IDDM-2   - HTN with hypotension noted today  -  FTT  -Generalized weakness with deconditioning    Plan     Patient has been admitted to the TCU for strengthening and rehab.  He has been admitted with ulceration of his right foot status post TheraSkin graft.  He currently has a wound VAC in place  He remains nonweightbearing on his right lower extremity.  He is compliant with his nonweightbearing and no change in his weightbearing status has been made  Wound vac has been discontinued  His wound orders were reviewed with his podiatrist and he has Medihoney orders.  Wound is extensive on the lateral margin of his foot.  This has been nonhealing in his wound is debrided regularly by the podiatrist.  Weightbearing status to be determined by the podiatrist he is trying to use a scooter currently for mobility  Diabetic control is worsening with blood sugars postprandially now greater than 200 noted for the last 48 hours.  He also had a blood sugar of 90 in the morning.  Monitor trends before adjusting continue with his sliding scale insulin.  Blood pressures are stable but low  Continue with his PT OT he remains adamant that he would like to discharge home by early next week advised him to follow-up with   PVR CHECKS BEING DONE-VOIDING WELL    History     Patient is a very pleasant 85 y.o. male who is admitted to TCU  Patient presented to the hospital with right lower extremity cellulitis.  He was admitted with concerning infection.  MRI confirmed new synovitis with osteomyelitis of the 2nd-4th tarsometatarsal joint with septic arthritis.  He underwent I&D of the abscess on 11/27/2020.  Postoperatively he has been discharged nonweightbearing with outpatient ertapenem for 4 weeks  Subsequently he was readmitted on 12/17/2024 application of TheraSkin with redebridement of his wound.  Wound is healing slowly.    He recently had his IV antibiotics discontinued.  Unfortunately recheck labs had shown his CRP is elevated at 8.8  Follow-up done with podiatry  yesterday.  His wound VAC has been discontinued.  His wound was debrided.  We have recommended that he may need additional surgery if his wound fails to improve.  He will continue with his nonweightbearing status    He also has a history of severe peripheral vascular disease and underwent right lower extremity angiogram with angioplasty of anterior tibial and peroneal arteries on 11/24/2020 with vascular surgery.  He will require follow-up with vascular.  It was done in the TCU.  They have recommended further follow-up with podiatry for further work-up they recommend seeing him back in 3 months for ABIs  He does report some occasional discomfort in his leg   However he does report that he will take a tramadol at bedtime because of some cramping and pain in his leg and foot.  No change reported by the patient    Postoperatively had urinary retention with bladder wall thickening suggestive of cystitis.  He accidentally removed his Dietrich catheter.  He follows with urology as a result and had a voiding cystoscopy urogram done.  Currently discharged without a Dietrich and advised to monitor urinary retention concerns and in and out catheterization as needed  He is reporting he is voiding well without hematuria    In addition he is a diabetic  Currently on a much lower dose of insulin blood sugars have improved significantly.  Other than occasional high greater than 200 almost all his blood sugars are under 180.  He reports a good appetite  Blood pressures are stable to    Past Medical History     Active Ambulatory (Non-Hospital) Problems    Diagnosis   ? Amputated toe, right (H)   ? Hematuria   ? 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 2 diabetes mellitus, with necrosis of muscle (H)   ? ACP (advance care planning)   ? Septic arthritis of right foot (H)   ? Gross hematuria   ? Urinary retention   ? Type 2 diabetes mellitus with diabetic peripheral angiopathy without gangrene, with long-term current use of  insulin (H)   ? Adult failure to thrive   ? Normocytic anemia   ? Paroxysmal atrial fibrillation (H)   ? 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 Osteomyelitis of right foot (H)   ? Statin intolerance   ? Personal history of PE (pulmonary embolism)-- May 2020, unprovoked, with right heart strain   ? PVD (peripheral vascular disease) (H)   ? Overweight (BMI 25.0-29.9)   ? Chronic anticoagulation   ? Hyperlipidemia   ? Essential hypertension     Past Medical History:   Diagnosis Date   ? Abscess of right foot 11/23/2020   ? Acute pulmonary embolism with acute cor pulmonale (H) 5/23/2020   ? BPH (benign prostatic hyperplasia)    ? Cholelithiasis    ? Common bile duct (CBD) obstruction 9/4/2017   ? Compression Fracture Of Thoracic Vertebral Body    ? Diabetes mellitus (H)    ? Essential hypertension    ? Hyperlipidemia    ? Pancreatitis    ? Rib Fracture    ? Sepsis due to pneumonia (H) 9/8/2017       Past Social History     Reviewed, and he  reports that he quit smoking about 29 years ago. He has never used smokeless tobacco. He reports that he does not drink alcohol or use drugs.    Family History     Reviewed, and family history includes Alcohol abuse in his father; Aortic aneurysm in his mother.    Medication List   Post Discharge Medication Reconciliation Status: discharge medications reconciled, continue medications without change   Current Outpatient Medications on File Prior to Visit   Medication Sig Dispense Refill   ? acetaminophen (TYLENOL) 500 MG tablet Take 1-2 tablets (500-1,000 mg total) by mouth every 4 (four) hours as needed.  0   ? apixaban ANTICOAGULANT (ELIQUIS) 5 mg Tab tablet Take 1 tablet (5 mg total) by mouth 2 (two) times a day. 60 tablet 3   ? aspirin 81 MG EC tablet Take 81 mg by mouth daily.     ? blood glucose test strips Use 1 each As Directed 2 (two) times a day. Dispense brand per patient's insurance at pharmacy discretion.  "120 strip 6   ? blood-glucose meter (ONETOUCH VERIO IQ METER) Misc Use 1 each As Directed 2 (two) times a day. 1 each 0   ? insulin glargine (LANTUS SOLOSTAR U-100 INSULIN) 100 unit/mL (3 mL) pen Inject 30 Units under the skin at bedtime. Takes 15 units q am and 30 units Q HS     ? liraglutide (VICTOZA 3-RUPALI) 0.6 mg/0.1 mL (18 mg/3 mL) injection Inject 0.3 mL (1.8 mg total) under the skin daily.     ? multivit-min/FA/lycopen/lutein (CENTRUM SILVER MEN ORAL) Take 1 tablet by mouth daily.     ? mupirocin (BACTROBAN) 2 % ointment Apply topically daily as needed. Apply to wound.     ? neomycin-bacitracin-polymyxin (NEOSPORIN) ointment Apply to penis at catheter insertion site three times a day while catheter is in place. 15 g 0   ? NOVOLOG FLEXPEN U-100 INSULIN 100 unit/mL (3 mL) injection pen Inject 3 Units under the skin 3 (three) times a day before meals. 2.7 mL 0   ? oxyCODONE (ROXICODONE) 5 MG immediate release tablet Take 1 tablet (5 mg total) by mouth every 6 (six) hours as needed for pain. 30 tablet 0   ? pen needle, diabetic 31 gauge x 5/16\" Ndle Used to inject insulin daily 90 each 0   ? polyethylene glycol (MIRALAX) 17 gram packet Take 1 packet (17 g total) by mouth daily as needed.  0   ? tamsulosin (FLOMAX) 0.4 mg cap Take 1 capsule (0.4 mg total) by mouth daily after supper.  0   ? traMADoL (ULTRAM) 50 mg tablet Take 50 mg by mouth every 6 (six) hours as needed for pain.     ? vitamin E 400 unit capsule Take 400 Units by mouth daily.       No current facility-administered medications on file prior to visit.        Allergies     Allergies   Allergen Reactions   ? Cresol      Muscle cramps   ? Crestor [Rosuvastatin] Myalgia   ? Declomycin [Demeclocycline] Hives       Review of Systems   A comprehensive review of 14 systems was done. Pertinent findings noted here and in history of present illness. All the rest negative.  Constitutional: Negative.  Negative for fever, chills, he has activity change, appetite " "change and fatigue.   HENT: Negative for congestion and facial swelling.    Eyes: Negative for photophobia, redness and visual disturbance.   Respiratory: Negative for cough and chest tightness.    Cardiovascular: Negative for chest pain, palpitations and  leg swelling.   Gastrointestinal: Negative for nausea, diarrhea, constipation, blood in stool and abdominal distention.   Genitourinary: Negative.    Musculoskeletal: Negative.  Denies any pain pin his foot .using a scooter for ambulation  Skin: Negative.    Neurological: Negative for dizziness, tremors, syncope, weakness, light-headedness and headaches.   Hematological: Does not bruise/bleed easily.   Psychiatric/Behavioral: Negative.  Some confusion noted he just wants to go home as per him      Physical Exam     Recent Vitals 1/26/2021   Height 5' 10\"   Weight 182 lbs 10 oz   BSA (m2) 2.02 m2   /75   Pulse 62   Temp 98.8   Temp src -   SpO2 94   Some recent data might be hidden       Constitutional: Oriented to person, place, and time and appears well-developed.   HEENT:  Normocephalic and atraumatic.  Eyes: Conjunctivae and EOM are normal. Pupils are equal, round, and reactive to light. No discharge.  No scleral icterus. Nose normal. Mouth/Throat: Oropharynx is clear and moist. No oropharyngeal exudate.    NECK: Normal range of motion. Neck supple. No JVD present. No tracheal deviation present. No thyromegaly present.   CARDIOVASCULAR: Normal rate, regular rhythm and intact distal pulses.  Exam reveals no gallop and no friction rub.  Systolic murmur present.  PULMONARY: Effort normal and breath sounds normal. No respiratory distress.No Wheezing or rales.  ABDOMEN: Soft. Bowel sounds are normal. No distension and no mass.  There is no tenderness. There is no rebound and no guarding. No HSM.  MUSCULOSKELETAL: Normal range of motion. No edema and no tenderness. Mild kyphosis, no tenderness.  Rt foot missing 5th toe  Extensive ulceration noted on the lateral " margin of his foot extending from the base of his missing fifth toe extending to his midfoot with necrotic skin and serosanguineous drainage noted  Nonhealing wound  LYMPH NODES: Has no cervical, supraclavicular, axillary and groin adenopathy.   NEUROLOGICAL: Alert and oriented to person, place, and time. No cranial nerve deficit.  Normal muscle tone. Coordination normal.   GENITOURINARY: Deferred exam.  SKIN: Skin is warm and dry. No rash noted. No erythema. No pallor.   EXTREMITIES: No cyanosis, no clubbing, no edema. No Deformity.  PSYCHIATRIC: Normal mood, affect and behavior.      Lab Results     Recent Results (from the past 240 hour(s))   COVID-19 Virus PCR MRF    Collection Time: 01/18/21 12:00 PM    Specimen: Respiratory   Result Value Ref Range    COVID-19 VIRUS SPECIMEN SOURCE [facility]     2019-nCOV Not Detected                 DONNY Kwan

## 2021-06-21 NOTE — LETTER
Letter by Charlotte Mckenna MBBS at      Author: Charlotte Mckenna MBBS Service: -- Author Type: --    Filed:  Encounter Date: 12/8/2020 Status: (Other)         Patient: Caleb Hernandez   MR Number: [id number]   YOB: 1935   Date of Visit: 12/8/2020       [facility] Admission note      Patient: Caleb Hernandez  MRN: [id number]  Date of Service: 12/8/2020      [facility] [[id number]]  Reason for Visit     Chief Complaint   Patient presents with   ? H & P       Code Status     Full code    Assessment     -Right foot cellulitis with abscess work-up positive for osteomyelitis with septic arthritis of 2nd-4th tarsometatarsal joint  -Status post I&D of abscess on 11/27/2020  -Postoperative urinary retention with hematuria  -History of right 5th metatarsal excision on 11/2/2020  -Severe peripheral vascular disease.  -Status post right lower extremity angiogram with balloon angioplasty of anterior tibial and peroneal arteries on 11/24/2020  -- DM-2   - FTT  -Generalized weakness with deconditioning    Plan     Patient has been admitted to the TCU for strengthening and rehab.  He had a prolonged and lengthy stay in the hospital and was a readmission with multiple procedures done.  Initially was admitted after right 5th MT amputation on 11/2/2020.  Readmitted to the hospital with worsening infection.  He underwent balloon angioplasty of his right lower extremity on 11/24/2020 of the anterior tibial and peroneal arteries.  Postoperative ABIs did not show any improvement however vascular feels this could be related to spasm.  He underwent I&D of abscess on 11/27/2020.  Currently discharged to the TCU on IV ertapenem for 4 weeks.  Weekly labs as ordered.  Monitor wounds  Appears to be healing well  Pain concern are minimal and patient denies any pain  He has peripheral neuropathy.  However per chart review patient appears to be asking for tramadol at least 3 times a day.  Monitoring his intake.  On  questioning he told me that it is because of his back and not because of his foot.  Diabetic control was reviewed.  Most of his blood sugars appear to be optimally controlled on his current regimen of insulin and Victoza.  Low blood sugar around 80 and highs more than 200 also documented.  Monitor trends before adjusting insulin further.  The hospitalist discharge summary does state that his insulin was increased to 48 units of Lantus but discharge medications are still at 44 units.  Monitor trends before adjusting.  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  They feel that he should stay in the TCU and finish his antibiotics before coming home and get the appropriate wound care that he needs.   to arrange for a care conference between patient and family to discuss goals of going home  rc labs weekly  Follow-up with urology for indwelling Dietrich catheter he did fail a voiding trial in the hospital.  In addition he was noted to have an upper pole nonobstructing kidney stone and will need follow-up for that  BIMS 11/15    History     Patient is a very pleasant 84 y.o. male who is admitted to TCU  Patient presented to the hospital with right lower extremity cellulitis.  He was admitted with concerning infection.  MRI confirmed new synovitis with osteomyelitis of the 2nd-4th tarsometatarsal joint with septic arthritis.  He underwent I&D of the abscess on 11/27/2020.  Postoperatively he has been discharged nonweightbearing with outpatient ertapenem for 4 weeks  He also has a history of severe peripheral vascular disease and underwent right lower extremity angiogram with angioplasty of anterior tibial and peroneal arteries on 11/24/2020 with  vascular surgery.  He will require follow-up with vascular.  Postoperatively had urinary retention with bladder wall thickening suggestive of cystitis.  He was noted to have a right upper pole kidney stone but no hydronephrosis.  Urology saw him.  He will continue with Flomax at discharge and at the recommendation he has resumed his Eliquis and aspirin.  He has diabetes and his Lantus dose was adjusted to get tighter control of his blood sugars.  However on review he is on 44 units and not 48 units at discharge.  He reports no pain however has been asking for tramadol quite often  Due to profound debilitation discharged to the TCU  He wants to go home    Past Medical History     Active Ambulatory (Non-Hospital) Problems    Diagnosis   ? ACP (advance care planning)   ? Septic arthritis of right foot (H)   ? Gross hematuria   ? Urinary retention   ? Type 2 diabetes mellitus with diabetic peripheral angiopathy without gangrene, with long-term current use of insulin (H)   ? Adult failure to thrive   ? Normocytic anemia   ? Paroxysmal atrial fibrillation (H)   ? 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 Cellulitis of right lower extremity   ? Abscess of right foot   ? Cellulitis and abscess of foot excluding toe   ? Osteomyelitis of right foot (H)   ? Statin intolerance   ? Personal history of PE (pulmonary embolism)-- May 2020, unprovoked, with right heart strain   ? PVD (peripheral vascular disease) (H)   ? Overweight (BMI 25.0-29.9)   ? Chronic anticoagulation   ? Acute pulmonary embolism with acute cor pulmonale (H)   ? Diabetic ulcer of right foot associated with type 2 diabetes mellitus (H)   ? Type 2 diabetes mellitus (H)   ? Hyperlipidemia   ? Essential hypertension     Past Medical History:   Diagnosis Date   ? BPH (benign prostatic hyperplasia)    ? Cholelithiasis    ? Common bile duct (CBD) obstruction 9/4/2017   ? Compression Fracture Of Thoracic  Vertebral Body    ? Diabetes mellitus (H)    ? Essential hypertension    ? Hyperlipidemia    ? Pancreatitis    ? Rib Fracture    ? Sepsis due to pneumonia (H) 9/8/2017       Past Social History     Reviewed, and he  reports that he quit smoking about 29 years ago. He has never used smokeless tobacco. He reports that he does not drink alcohol or use drugs.    Family History     Reviewed, and family history includes Alcohol abuse in his father; Aortic aneurysm in his mother.    Medication List   Post Discharge Medication Reconciliation Status: discharge medications reconciled, continue medications without change   Current Outpatient Medications on File Prior to Visit   Medication Sig Dispense Refill   ? acetaminophen (TYLENOL) 500 MG tablet Take 1-2 tablets (500-1,000 mg total) by mouth every 4 (four) hours as needed.  0   ? apixaban ANTICOAGULANT (ELIQUIS) 5 mg Tab tablet Take 1 tablet (5 mg total) by mouth 2 (two) times a day. 60 tablet 3   ? aspirin 81 MG EC tablet Take 81 mg by mouth daily.     ? blood glucose test strips Use 1 each As Directed 2 (two) times a day. Dispense brand per patient's insurance at pharmacy discretion. 120 strip 6   ? blood-glucose meter (ONETOUCH VERIO IQ METER) Misc Use 1 each As Directed 2 (two) times a day. 1 each 0   ? cholecalciferol, vitamin D3, (VITAMIN D3) 5,000 unit Tab Take 5,000 Units by mouth daily.      ? ertapenem 1 g in NaCl 0.9 % 0.9 % 50 mL IVPB Infuse 1 g into a venous catheter daily. 1 each 0   ? insulin glargine (LANTUS SOLOSTAR U-100 INSULIN) 100 unit/mL (3 mL) pen Inject 44 Units under the skin at bedtime.     ? liraglutide (VICTOZA 3-RUPALI) 0.6 mg/0.1 mL (18 mg/3 mL) injection Inject 0.3 mL (1.8 mg total) under the skin daily.     ? lisinopriL (PRINIVIL,ZESTRIL) 5 MG tablet Take 1 tablet (5 mg total) by mouth daily. 90 tablet 3   ? multivit-min/FA/lycopen/lutein (CENTRUM SILVER MEN ORAL) Take 1 tablet by mouth daily.     ? mupirocin (BACTROBAN) 2 % ointment Apply  "topically daily as needed. Apply to wound.     ? neomycin-bacitracin-polymyxin (NEOSPORIN) ointment Apply to penis at catheter insertion site three times a day while catheter is in place. 15 g 0   ? NOVOLOG FLEXPEN U-100 INSULIN 100 unit/mL (3 mL) injection pen Inject 3 Units under the skin 3 (three) times a day before meals. 2.7 mL 0   ? pen needle, diabetic 31 gauge x 5/16\" Ndle Used to inject insulin daily 90 each 0   ? polyethylene glycol (MIRALAX) 17 gram packet Take 1 packet (17 g total) by mouth daily as needed.  0   ? tamsulosin (FLOMAX) 0.4 mg cap Take 1 capsule (0.4 mg total) by mouth daily after supper.  0   ? traMADoL (ULTRAM) 50 mg tablet Take 1 tablet (50 mg total) by mouth every 6 (six) hours as needed for pain. 28 tablet 0   ? vitamin E 400 unit capsule Take 400 Units by mouth daily.       No current facility-administered medications on file prior to visit.        Allergies     Allergies   Allergen Reactions   ? Cresol      Muscle cramps   ? Crestor [Rosuvastatin] Myalgia   ? Declomycin [Demeclocycline] Hives       Review of Systems   A comprehensive review of 14 systems was done. Pertinent findings noted here and in history of present illness. All the rest negative.  Constitutional: Negative.  Negative for fever, chills, he has activity change, appetite change and fatigue.   HENT: Negative for congestion and facial swelling.    Eyes: Negative for photophobia, redness and visual disturbance.   Respiratory: Negative for cough and chest tightness.    Cardiovascular: Negative for chest pain, palpitations and  leg swelling.   Gastrointestinal: Negative for nausea, diarrhea, constipation, blood in stool and abdominal distention.   Genitourinary: Negative.  Has  A mai  Musculoskeletal: Negative.  Denies any pain pin his foot .  Skin: Negative.    Neurological: Negative for dizziness, tremors, syncope, weakness, light-headedness and headaches.   Hematological: Does not bruise/bleed easily. "   Psychiatric/Behavioral: Negative.  Some confusion noted he just wants to go home as per him      Physical Exam     Recent Vitals 12/7/2020   Height -   Weight 188 lbs   BSA (m2) 2.05 m2   /71   Pulse 67   Temp 98.3   Temp src -   SpO2 -   Some recent data might be hidden       Constitutional: Oriented to person, place, and time and appears well-developed.   HEENT:  Normocephalic and atraumatic.  Eyes: Conjunctivae and EOM are normal. Pupils are equal, round, and reactive to light. No discharge.  No scleral icterus. Nose normal. Mouth/Throat: Oropharynx is clear and moist. No oropharyngeal exudate.    NECK: Normal range of motion. Neck supple. No JVD present. No tracheal deviation present. No thyromegaly present.   CARDIOVASCULAR: Normal rate, regular rhythm and intact distal pulses.  Exam reveals no gallop and no friction rub.  Systolic murmur present.  PULMONARY: Effort normal and breath sounds normal. No respiratory distress.No Wheezing or rales.  ABDOMEN: Soft. Bowel sounds are normal. No distension and no mass.  There is no tenderness. There is no rebound and no guarding. No HSM.  MUSCULOSKELETAL: Normal range of motion. No edema and no tenderness. Mild kyphosis, no tenderness.  Rt foot missing 5th toe  Wound dry and not draining  LYMPH NODES: Has no cervical, supraclavicular, axillary and groin adenopathy.   NEUROLOGICAL: Alert and oriented to person, place, and time. No cranial nerve deficit.  Normal muscle tone. Coordination normal.   GENITOURINARY: Deferred exam.  Dietrich present  SKIN: Skin is warm and dry. No rash noted. No erythema. No pallor.   EXTREMITIES: No cyanosis, no clubbing, no edema. No Deformity.  PSYCHIATRIC: Normal mood, affect and behavior.      Lab Results     Recent Results (from the past 240 hour(s))   POCT Glucose    Collection Time: 11/28/20  8:43 AM    Specimen: Blood   Result Value Ref Range    Glucose 192 (H) 70 - 139 mg/dL   POCT Glucose    Collection Time: 11/28/20 12:14 PM     Specimen: Blood   Result Value Ref Range    Glucose 269 (H) 70 - 139 mg/dL   POCT Glucose    Collection Time: 11/28/20  4:28 PM    Specimen: Blood   Result Value Ref Range    Glucose 373 (H) 70 - 139 mg/dL   POCT Glucose    Collection Time: 11/28/20  8:58 PM    Specimen: Blood   Result Value Ref Range    Glucose 169 (H) 70 - 139 mg/dL   Platelet Count - every other day x 3    Collection Time: 11/29/20  7:31 AM   Result Value Ref Range    Platelets 355 140 - 440 thou/uL   POCT Glucose    Collection Time: 11/29/20  7:35 AM    Specimen: Blood   Result Value Ref Range    Glucose 100 70 - 139 mg/dL   POCT Glucose    Collection Time: 11/29/20 12:10 PM    Specimen: Blood   Result Value Ref Range    Glucose 158 (H) 70 - 139 mg/dL   POCT Glucose    Collection Time: 11/29/20  5:24 PM    Specimen: Blood   Result Value Ref Range    Glucose 143 (H) 70 - 139 mg/dL   POCT Glucose    Collection Time: 11/29/20  8:58 PM    Specimen: Blood   Result Value Ref Range    Glucose 279 (H) 70 - 139 mg/dL   Creatinine    Collection Time: 11/30/20  6:39 AM   Result Value Ref Range    Creatinine 0.82 0.70 - 1.30 mg/dL    GFR MDRD Af Amer >60 >60 mL/min/1.73m2    GFR MDRD Non Af Amer >60 >60 mL/min/1.73m2   POCT Glucose    Collection Time: 11/30/20  8:50 AM    Specimen: Blood   Result Value Ref Range    Glucose 71 70 - 139 mg/dL   POCT Glucose    Collection Time: 11/30/20 11:47 AM    Specimen: Blood   Result Value Ref Range    Glucose 216 (H) 70 - 139 mg/dL   POCT Glucose    Collection Time: 11/30/20  4:58 PM    Specimen: Blood   Result Value Ref Range    Glucose 204 (H) 70 - 139 mg/dL   POCT Glucose    Collection Time: 11/30/20  9:16 PM    Specimen: Blood   Result Value Ref Range    Glucose 238 (H) 70 - 139 mg/dL   POCT Glucose    Collection Time: 12/01/20  6:33 AM    Specimen: Blood   Result Value Ref Range    Glucose 120 70 - 139 mg/dL   HM2(CBC W/O DIFF)    Collection Time: 12/01/20 11:22 AM   Result Value Ref Range    WBC 7.8 4.0 - 11.0  thou/uL    RBC 4.17 (L) 4.40 - 6.20 mill/uL    Hemoglobin 12.5 (L) 14.0 - 18.0 g/dL    Hematocrit 38.1 (L) 40.0 - 54.0 %    MCV 91 80 - 100 fL    MCH 30.0 27.0 - 34.0 pg    MCHC 32.8 32.0 - 36.0 g/dL    RDW 11.7 11.0 - 14.5 %    Platelets 443 (H) 140 - 440 thou/uL    MPV 9.2 8.5 - 12.5 fL   Urinalysis    Collection Time: 12/01/20 12:13 PM   Result Value Ref Range    Color, UA Red (!) Colorless, Yellow, Straw, Light Yellow    Clarity, UA Cloudy (!) Clear    Glucose, UA Negative Negative    Bilirubin, UA Negative Negative    Ketones, UA Trace (!) Negative, 60 mg/dL    Specific Gravity, UA 1.020 1.001 - 1.030    Blood, UA Large (!) Negative    pH, UA 6.0 4.5 - 8.0    Protein, UA 50 mg/dL (!) Negative mg/dL    Urobilinogen, UA <2.0 E.U./dL <2.0 E.U./dL, 2.0 E.U./dL    Nitrite, UA Negative Negative    Leukocytes, UA Small (!) Negative    Bacteria, UA Few (!) None Seen hpf    RBC, UA >100 (!) None Seen, 0-2 hpf    WBC, UA  (!) None Seen, 0-5 hpf    Squam Epithel, UA 0-5 None Seen, 0-5 lpf    Mucus, UA Moderate (!) None Seen lpf    Ca Oxalate Meliza, UA Present (!) None Seen   Urine culture    Collection Time: 12/01/20 12:13 PM    Specimen: Urine, Catheter   Result Value Ref Range    Culture No Growth    POCT Glucose    Collection Time: 12/01/20 12:38 PM    Specimen: Blood   Result Value Ref Range    Glucose 180 (H) 70 - 139 mg/dL   Vancomycin (Vancocin )    Collection Time: 12/01/20  2:26 PM   Result Value Ref Range    Vancomycin 13.7 <=25.0 ug/mL   POCT Glucose    Collection Time: 12/01/20  5:05 PM    Specimen: Blood   Result Value Ref Range    Glucose 228 (H) 70 - 139 mg/dL   POCT Glucose    Collection Time: 12/01/20  9:01 PM    Specimen: Blood   Result Value Ref Range    Glucose 208 (H) 70 - 139 mg/dL   Basic Metabolic Panel    Collection Time: 12/02/20  5:32 AM   Result Value Ref Range    Sodium 139 136 - 145 mmol/L    Potassium 4.5 3.5 - 5.0 mmol/L    Chloride 103 98 - 107 mmol/L    CO2 30 22 - 31 mmol/L    Anion  Gap, Calculation 6 5 - 18 mmol/L    Glucose 178 (H) 70 - 125 mg/dL    Calcium 8.3 (L) 8.5 - 10.5 mg/dL    BUN 18 8 - 28 mg/dL    Creatinine 0.88 0.70 - 1.30 mg/dL    GFR MDRD Af Amer >60 >60 mL/min/1.73m2    GFR MDRD Non Af Amer >60 >60 mL/min/1.73m2   HM2(CBC w/o Differential)    Collection Time: 12/02/20  5:33 AM   Result Value Ref Range    WBC 7.3 4.0 - 11.0 thou/uL    RBC 4.07 (L) 4.40 - 6.20 mill/uL    Hemoglobin 12.2 (L) 14.0 - 18.0 g/dL    Hematocrit 37.5 (L) 40.0 - 54.0 %    MCV 92 80 - 100 fL    MCH 30.0 27.0 - 34.0 pg    MCHC 32.5 32.0 - 36.0 g/dL    RDW 11.9 11.0 - 14.5 %    Platelets 447 (H) 140 - 440 thou/uL    MPV 9.5 8.5 - 12.5 fL   COVID-19 Virus PCR MRF    Collection Time: 12/02/20  5:38 AM    Specimen: Respiratory   Result Value Ref Range    COVID-19 VIRUS SPECIMEN SOURCE Nasopharyngeal     2019-nCOV Not Detected    POCT Glucose    Collection Time: 12/02/20  6:04 AM    Specimen: Blood   Result Value Ref Range    Glucose 189 (H) 70 - 139 mg/dL   POCT Glucose    Collection Time: 12/02/20  8:08 AM    Specimen: Capillary; Blood   Result Value Ref Range    Glucose 162 (H) 70 - 139 mg/dL   POCT Glucose    Collection Time: 12/02/20 11:22 AM    Specimen: Blood   Result Value Ref Range    Glucose 173 (H) 70 - 139 mg/dL   POCT Glucose    Collection Time: 12/02/20  5:08 PM    Specimen: Blood   Result Value Ref Range    Glucose 172 (H) 70 - 139 mg/dL   POCT Glucose    Collection Time: 12/02/20  9:02 PM    Specimen: Blood   Result Value Ref Range    Glucose 198 (H) 70 - 139 mg/dL   POCT Glucose    Collection Time: 12/03/20  8:22 AM    Specimen: Blood   Result Value Ref Range    Glucose 236 (H) 70 - 139 mg/dL   POCT Glucose    Collection Time: 12/03/20 11:57 AM    Specimen: Blood   Result Value Ref Range    Glucose 292 (H) 70 - 139 mg/dL   C-Reactive Protein    Collection Time: 12/07/20  5:49 AM   Result Value Ref Range    CRP 1.9 (H) 0.0 - 0.8 mg/dL   Comprehensive Metabolic Panel    Collection Time: 12/07/20   5:49 AM   Result Value Ref Range    Sodium 135 (L) 136 - 145 mmol/L    Potassium 4.1 3.5 - 5.0 mmol/L    Chloride 99 98 - 107 mmol/L    CO2 27 22 - 31 mmol/L    Anion Gap, Calculation 9 5 - 18 mmol/L    Glucose 47 (LL) 70 - 125 mg/dL    BUN 24 8 - 28 mg/dL    Creatinine 0.71 0.70 - 1.30 mg/dL    GFR MDRD Af Amer >60 >60 mL/min/1.73m2    GFR MDRD Non Af Amer >60 >60 mL/min/1.73m2    Bilirubin, Total 0.7 0.0 - 1.0 mg/dL    Calcium 9.2 8.5 - 10.5 mg/dL    Protein, Total 6.6 6.0 - 8.0 g/dL    Albumin 2.3 (L) 3.5 - 5.0 g/dL    Alkaline Phosphatase 98 45 - 120 U/L    AST 21 0 - 40 U/L    ALT 20 0 - 45 U/L   HM1 (CBC with Diff)    Collection Time: 12/07/20  5:49 AM   Result Value Ref Range    WBC 8.2 4.0 - 11.0 thou/uL    RBC 4.57 4.40 - 6.20 mill/uL    Hemoglobin 13.6 (L) 14.0 - 18.0 g/dL    Hematocrit 41.8 40.0 - 54.0 %    MCV 92 80 - 100 fL    MCH 29.8 27.0 - 34.0 pg    MCHC 32.5 32.0 - 36.0 g/dL    RDW 12.1 11.0 - 14.5 %    Platelets 430 140 - 440 thou/uL    MPV 9.7 8.5 - 12.5 fL    Neutrophils % 57 50 - 70 %    Lymphocytes % 25 20 - 40 %    Monocytes % 12 (H) 2 - 10 %    Eosinophils % 5 0 - 6 %    Basophils % 1 0 - 2 %    Immature Granulocyte % 1 (H) <=0 %    Neutrophils Absolute 4.6 2.0 - 7.7 thou/uL    Lymphocytes Absolute 2.0 0.8 - 4.4 thou/uL    Monocytes Absolute 1.0 (H) 0.0 - 0.9 thou/uL    Eosinophils Absolute 0.4 0.0 - 0.4 thou/uL    Basophils Absolute 0.1 0.0 - 0.2 thou/uL    Immature Granulocyte Absolute 0.1 (H) <=0.0 thou/uL            Imaging Results     Mr Forefoot With Without Contrast Right    Result Date: 11/26/2020  EXAM: MR FOREFOOT W WO CONTRAST RIGHT LOCATION: [facility] DATE/TIME: 11/26/2020 5:28 PM INDICATION: Recent fifth ray amputation. Positive cultures. Wound. COMPARISON: 10/22/2020 MRI. TECHNIQUE: Routine. Additional postgadolinium T1 sequences were obtained. IV CONTRAST: Gadavist 7ml FINDINGS:  SOFT TISSUES JOINTS AND BONES: Interval resection of the fifth ray level  of the cuboid. There is ulceration at the proximal margin of the lateral midfoot at the level of the fourth TMT joint. There is a subcutaneous fluid collection tracking along the lateral and dorsal margin of the fourth metatarsal worrisome for an abscess that measures 3.4 cm and RL dimension by 0.9 cm in AP dimension and 6.5 cm in length. There are multiple punctate areas of subcutaneous emphysema lateral to the cuboid and fourth  metatarsal. The ulceration and soft tissue thickening is contiguous with the cuboid and fourth tarsometatarsal joint. There are new areas of synovitis and irregularity in the second, third and fourth tarsometatarsal joints and the navicular medial cuneiform joint contiguous between the medial and middle cuneiform. Although there was osteoarthritis in this distribution on the prior examination the synovitis and osseous changes are worrisome for septic arthritis which has developed along multiple joints of the midfoot. TENDONS: No evidence for proximal tenosynovitis. MUSCLES: Diffuse muscular atrophy and edema.     1.  Interval amputation of the fifth ray. 2.  Ulceration and deep tracking fluid collection along the fourth metatarsal consistent with an abscess. 3.  Additional areas of subcutaneous emphysema along the postoperative bed worrisome for additional soft tissue infection given the postoperative time course. 4.  New synovitis and osseous abnormalities in the second through fourth tarsometatarsal joints and the navicular cuneiform joint worrisome for septic arthritis and osteomyelitis.     Us Naila Doppler No Exercise, 1-2 Levels, Bilateral    Result Date: 11/24/2020  EXAM: RESTING ANKLE-BRACHIAL INDICES (ABIs) LOCATION: [facility] DATE/TIME: 11/24/2020 5:24 PM INDICATION: Follow-up post angiogram and intervention, diabetic foot ulcer, critical limb ischemia. COMPARISON: 10/13/2020. FINDINGS: RIGHT                                               Brachial: 141 Ankle  (PT): 0 Index: 0.00  Ankle (DP): 122 Index: 0.87 Digit: 34 Index: 0.24   LEFT Brachial: -- Ankle (PT): 83 Index: 0.59 Ankle (DP): 90 Index: 0.64 Digit: 0 Index: 0.00 Resting ABIs are 0.87 on the right. Resting ABIs are 0.64 on the left. WAVEFORMS: The dorsalis pedis and posterior tibial arteries are monophasic. DUPLEX ARTERIAL ULTRASOUND FINDINGS: RIGHT LOWER EXTREMITY VELOCITIES (cm/s): EIA: 115 CFA: 101 PFA: 63 SFA (proximal): 101 SFA (mid): 90 SFA (distal): 73 Popliteal: 99 PTA: 0 at the ankle; trickle flow mid calf 20 BREE: 69 DPA: 37 (M=monophasic, B=biphasic, T=triphasic) LEFT LOWER EXTREMITY VELOCITIES (cm/s): EIA: 74 CFA: 98 PFA: 42 SFA (proximal): 57 SFA (mid): 102 SFA (distal): 75 Popliteal: 126 PTA: 16 BREE: 27 DPA: 10 (M=monophasic, B=biphasic, T=triphasic)     1.  RIGHT LOWER EXTREMITY: No significant change in ABIs at the dorsalis pedis artery from prior. No flow demonstrated in posterior tibial artery at the ankle on current study where there was flow noted on prior. Waveforms otherwise unchanged with evidence for trifurcation disease with no inflow stenosis. 2.  LEFT LOWER EXTREMITY: Interval worsening of ABIs when compared to prior. Postobstructive or post high-grade stenotic waveform noted in dorsalis pedis artery and posterior tibial arteries at the ankle, worse when compared to prior. No evidence for an inflow stenosis.    Us Arterial Legs Bilateral Complete    Result Date: 11/24/2020  EXAM: RESTING ANKLE-BRACHIAL INDICES (ABIs) LOCATION: [facility] DATE/TIME: 11/24/2020 5:24 PM INDICATION: Follow-up post angiogram and intervention, diabetic foot ulcer, critical limb ischemia. COMPARISON: 10/13/2020. FINDINGS: RIGHT                                               Brachial: 141 Ankle (PT): 0 Index: 0.00  Ankle (DP): 122 Index: 0.87 Digit: 34 Index: 0.24   LEFT Brachial: -- Ankle (PT): 83 Index: 0.59 Ankle (DP): 90 Index: 0.64 Digit: 0 Index: 0.00 Resting ABIs are 0.87 on the right.  "Resting ABIs are 0.64 on the left. WAVEFORMS: The dorsalis pedis and posterior tibial arteries are monophasic. DUPLEX ARTERIAL ULTRASOUND FINDINGS: RIGHT LOWER EXTREMITY VELOCITIES (cm/s): EIA: 115 CFA: 101 PFA: 63 SFA (proximal): 101 SFA (mid): 90 SFA (distal): 73 Popliteal: 99 PTA: 0 at the ankle; trickle flow mid calf 20 BREE: 69 DPA: 37 (M=monophasic, B=biphasic, T=triphasic) LEFT LOWER EXTREMITY VELOCITIES (cm/s): EIA: 74 CFA: 98 PFA: 42 SFA (proximal): 57 SFA (mid): 102 SFA (distal): 75 Popliteal: 126 PTA: 16 BREE: 27 DPA: 10 (M=monophasic, B=biphasic, T=triphasic)     1.  RIGHT LOWER EXTREMITY: No significant change in ABIs at the dorsalis pedis artery from prior. No flow demonstrated in posterior tibial artery at the ankle on current study where there was flow noted on prior. Waveforms otherwise unchanged with evidence for trifurcation disease with no inflow stenosis. 2.  LEFT LOWER EXTREMITY: Interval worsening of ABIs when compared to prior. Postobstructive or post high-grade stenotic waveform noted in dorsalis pedis artery and posterior tibial arteries at the ankle, worse when compared to prior. No evidence for an inflow stenosis.    Poc Us Guidance Needle Placement    Result Date: 11/16/2020  Ultrasound was performed as guidance to an anesthesia procedure.  Click \"PACS images\" hyperlink below to view any stored images.  For specific procedure details, view procedure note authored by anesthesia.    Ct Abdomen Pelvis Without Oral With Without Iv Contrast    Result Date: 12/1/2020  EXAM: CT ABDOMEN PELVIS WO ORAL W WO IV CONTRAST LOCATION: [facility] DATE/TIME: 12/1/2020 7:22 PM INDICATION: Hematuria, unknown cause Gross hematuria COMPARISON: Abdominal CT 9 05/07/2020. Chest CT 05/23/2020 TECHNIQUE: CT scan of the abdomen and pelvis was performed before and after injection of IV contrast. Multiplanar reformats were obtained. Dose reduction techniques were used. CONTRAST: Iohexol " (Omni) 100 mL FINDINGS: LOWER CHEST: Significantly elevated right hemidiaphragm with atelectasis at right lung base similar to chest CT. Mild dependent atelectasis also present left lung base. HEPATOBILIARY: Pneumobilia unchanged compared to most recent CT. Prior cholecystectomy. No liver lesion. PANCREAS: Normal. SPLEEN: Normal. ADRENAL GLANDS: Normal. KIDNEYS/BLADDER: Stable benign left renal cysts. 11 x 9 mm stone right upper pole. No hydronephrosis. Dietrich catheter present with bladder collapsed. However, there is prominent diffuse bladder wall thickening suggesting cystitis. BOWEL: No obstruction or inflammatory change. LYMPH NODES: Normal. VASCULATURE: Unremarkable. PELVIC ORGANS: Normal. MUSCULOSKELETAL: Focal area of soft tissue stranding within subcutaneous tissues anterior of left common iliac artery and vein likely relates to recent vascular catheterization procedure.     1.  Significant irregular bladder wall thickening suggests cystitis. 2.  There is a 11 mm right upper pole kidney stone. No hydronephrosis. 3.  Stable benign left renal cysts.     Ir Lower Extremity Angiogram Right    Result Date: 11/25/2020  [facility] Interventional Radiology vascular surgery procedure Date: 11/25/20 Procedures Performed: Ir Lower Extremity Angiogram Right from the aorta level via left groin approach   Ultrasound-guided vascular access   Angioplasty right anterior tibial artery   Angioplasty right tibioperoneal trunk   Angioplasty right peroneal artery   Selective right leg angiogram with distal most catheter position in greater than third order anterior tibial and peroneal arteries.  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  This was performed by  podiatry with our support given known toe pressures in January of 112 mmHg and has recently has October at greater than 50 mmHg suggesting adequate blood supply for healing.  Wound has not however healed and he is now readmitted for failure with signs of infection of the foot.  Plan for an intervention to see if blood supply can be improved to allow for wound healing. Sedation: Moderate Sedation: The procedure was performed with administration of intravenous conscious sedation with appropriate preoperative, intraoperative, and postoperative evaluation.  120 minutes of supervised face to face intraservice time was provided by a radiology nurse under my direct supervision.  Versed 3.5 mg and fentanyl 175 mcg given. Antibiotics: Patient already on IV Zosyn Fluoroscopic Time: 28.4 Minutes Radiation Dose: 283 mGy Contrast: 45 cc of Visipaque given Procedure:   Ultrasound was used to evaluate the left groin.  The selected vessel was the left common femoral artery which was widely patent and without significant anterior wall plaque. Ultrasound was then used for real-time ultrasound guided needle entry of the common femoral artery. Permanent images were recorded and saved to the patient's medical record.   Micropuncture technique was used to deliver a 4 Arabic sheath.  An Omni Flush catheter was advanced to the lower abdominal aorta and an initial aortogram was performed   Findings: Patent lower abdominal aorta, common iliac arteries, internal iliac arteries, and external iliac arteries without disease.  Patent common femoral arteries without disease.   This point wire catheter advanced over the aortic bifurcation and down to the common femoral level on the right side.  Selective right leg angiography was performed from this level.   Findings: Patent profunda femoris artery and superficial femoral artery.  Trivial stenosis at Jagjit's canal of less than 20%.  Patent popliteal artery without disease.  Single-vessel peroneal  runoff to the foot with significant areas of disease in focal stenosis of likely greater than 80% of the tibioperoneal trunk.  The peroneal artery has multiple areas of diffuse stenoses with some areas as much is 70% stenosed.  Posterior tibial artery is occluded from its origin.  Anterior tibial artery has a very short stump but occludes before the elbow in the vessel.  Appears to reconstitute through a peroneal artery collateral at the ankle.   At this point the patient was heparinized.  Wire was advanced down the SFA and was used to exchange the short 4 Tongan sheath for a 90 cm 4 Tongan sheath which we advanced the wire down to the popliteal level.  Selective angiography from this level allowed us to roadmap identify the origin of the anterior tibial artery.  Wire catheter were used to engage this stump and we able to advance a wire and catheter around the elbow in the vessel and down the vessel to the ankle.  At this point we exchanged out the wire perform an angiogram.   Findings: True lumen location of a catheter in the distal anterior tibial artery.  There is a small stenosis beyond the catheter.    Nitroglycerin 200 mcg was injected down the anterior tibial artery   We advanced an 014 wire down and through this and then exchanged in a 2 mm x 150 mm angioplasty balloon.  Simple balloon angioplasty with 2 separate inflations was used to treat the entire anterior tibial artery all the way to the popliteal artery.  The first inflation was taken only to 8 mandi of pressure in the second 10 mandi of pressure.  Each inflation was held up for 2 minutes.  An angiogram was then performed.   Findings: Inline flow through the anterior tibial artery.  Persistent significant disease in the more proximal anterior tibial artery which appeared to be secondary to undersizing of the balloon angioplasty.  We exchanged and a 2-1/2 mm x 150 mm angioplasty balloon performed simple balloon angioplasty of the more proximal segment of  anterior tibial artery up to the popliteal artery.  This is another 10 mandi inflation held up for 2 minutes.  A completion angiogram was performed.   Findings: Widely patent intertibial artery with filling into the foot.  It does appear that the peroneal artery continues to be the dominant flow to the majority of the foot despite having stenosis within it.   We now readvanced a wire and balloon catheter down the tibioperoneal trunk and peroneal artery positioning it in the distal third of the peroneal artery and moving upwards to treat all the area of disease.  The wire was removed from the balloon and angiogram performed through the balloon catheter to confirm that we were in the true lumen   Findings: True lumen location of a catheter in the peroneal artery distally beyond all stenoses.   We now reintroduced a wire performed balloon angioplasty with 2 separate inflations to treat the entire tibioperoneal trunk and peroneal artery.  The first inflation was to 8 mandi of pressure and the more proximal inflation to 10 mandi of pressure.  Each time the balloon was held up for 2 minutes.  A completion angiogram was performed.   Findings: Widely patent flow through the peroneal artery to peroneal trunk with no residual significant stenosis.  Imaging of the foot demonstrates a both the antitibial artery and peroneal artery contribute to feeling of the foot and that the lateral aspect of the foot at the area of surgery feels very well   At this point wires and catheters of pulled back in the long sheath exchanged for short 4 Ghanaian sheath.  Patient was given 10 units of protamine with the plan of removing the sheath once ACT had dropped to below 160. 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 Lourdes Raymond Vascular Surgery    Poc Us 3cg Picc Placement Guidance    Result Date: 11/30/2020  Exam was performed as  "guidance for PICC line insertion.  Click \"PACS images\" hyperlink below to view any stored images.  For specific procedure details, view procedure note authored by PICC/Vascular Access Nurse.            DONNY Kwan            "

## 2021-06-21 NOTE — LETTER
Letter by Charlotte Mckenna MBBS at      Author: Charlotte Mckenna MBBS Service: -- Author Type: --    Filed:  Encounter Date: 1/14/2021 Status: (Other)         Patient: Caleb Hernandez   MR Number: [id number]   YOB: 1935   Date of Visit: 1/14/2021       [facility] Admission note      Patient: Caleb Hernandez  MRN: [id number]  Date of Service: 1/14/2021      [facility] [[id number]]  Reason for Visit     Chief Complaint   Patient presents with   ? Review Of Multiple Medical Conditions   follow-up hypoglycemia and hypotension    Code Status     Full code    Assessment       -Right foot cellulitis with abscess work-up positive for osteomyelitis with septic arthritis of 2nd-4th tarsometatarsal joint  -Status post I&D of abscess on 11/27/2020  -Postoperative urinary retention with hematuria  -History of right 5th metatarsal excision on 11/2/2020  -Severe peripheral vascular disease.  -Status post right lower extremity angiogram with balloon angioplasty of anterior tibial and peroneal arteries on 11/24/2020  -- IDDM-2   - HTN with hypotension noted today  - FTT  -Generalized weakness with deconditioning    Plan     Patient has been admitted to the TCU for strengthening and rehab.  He has been admitted with ulceration of his right foot status post TheraSkin graft.  He currently has a wound VAC in place  He remains nonweightbearing on his right lower extremity.  He will do close follow-ups with his vascular as well as podiatrist as scheduleD-  Patient has appointments next week.  He is hopeful that his wound VAC can be discontinued and he can discharge home.  Blood sugars were reviewed and his blood sugars have shown excellent control but today they have been greater than 200.  Encourage dietary compliance.  He is on a low-dose of his insulin because of persistent low blood sugars in the TCU.  Blood pressures are stable.  Again on a low-dose of medications.  IV antibiotics has been  discontinued.  He was seen by infectious disease.  CRP last check was 1.7 and a white count was 5.6.  He continues with an indwelling Dietrich catheter and refuses a voiding trial.  He will follow-up with urology.  Continue with his PT and OT.      History     Patient is a very pleasant 85 y.o. male who is admitted to TCU  Patient presented to the hospital with right lower extremity cellulitis.  He was admitted with concerning infection.  MRI confirmed new synovitis with osteomyelitis of the 2nd-4th tarsometatarsal joint with septic arthritis.  He underwent I&D of the abscess on 11/27/2020.  Postoperatively he has been discharged nonweightbearing with outpatient ertapenem for 4 weeks  Subsequently he was readmitted on 12/17/2024 application of TheraSkin with redebridement of his wound.  Wound is healing slowly.  He has a wound VAC in place and remains nonweightbearing..  He will have close follow-up with his podiatrist next week  Recheck labs were reviewed and his CRP is 1.7 his white count is 5.6 today and his other electrolytes and kidney functions as well as his liver functions are completely normal  He has a history of malnutrition with low albumin.  He is on protein supplementation secondary to that    He also has a history of severe peripheral vascular disease and underwent right lower extremity angiogram with angioplasty of anterior tibial and peroneal arteries on 11/24/2020 with vascular surgery.  He will require follow-up with vascular.  It was done in the TCU.  They have recommended further follow-up with podiatry for further work-up they recommend seeing him back in 3 months for ABIs  He does report some occasional discomfort in his leg but no significant pain however he did tell me that he has been taking her tramadol at nighttime to help him with sleeping because of pain issues    Postoperatively had urinary retention with bladder wall thickening suggestive of cystitis.  He has continued with an indwelling  Dietrich catheter.  I did offer a voiding trial in the TCU.  He is quite hesitant and does not want to have that done in the TCU he agrees to see urology.  In addition he is a diabetic blood sugars are over 200 today he has had low blood sugars due to which she is on a lower dose of insulin he was advised dietary compliance will monitor trends before adjusting    Past Medical History     Active Ambulatory (Non-Hospital) Problems    Diagnosis   ? Hematuria   ? 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 2 diabetes mellitus, with necrosis of muscle (H)   ? ACP (advance care planning)   ? Septic arthritis of right foot (H)   ? Gross hematuria   ? Urinary retention   ? Type 2 diabetes mellitus with diabetic peripheral angiopathy without gangrene, with long-term current use of insulin (H)   ? Adult failure to thrive   ? Normocytic anemia   ? Paroxysmal atrial fibrillation (H)   ? 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 Cellulitis of right lower extremity   ? Abscess of right foot   ? Cellulitis and abscess of foot excluding toe   ? Osteomyelitis of right foot (H)   ? Statin intolerance   ? Personal history of PE (pulmonary embolism)-- May 2020, unprovoked, with right heart strain   ? PVD (peripheral vascular disease) (H)   ? Overweight (BMI 25.0-29.9)   ? Chronic anticoagulation   ? Acute pulmonary embolism with acute cor pulmonale (H)   ? Diabetic ulcer of right foot associated with type 2 diabetes mellitus (H)   ? Type 2 diabetes mellitus (H)   ? Hyperlipidemia   ? Essential hypertension     Past Medical History:   Diagnosis Date   ? BPH (benign prostatic hyperplasia)    ? Cholelithiasis    ? Common bile duct (CBD) obstruction 9/4/2017   ? Compression Fracture Of Thoracic Vertebral Body    ? Diabetes mellitus (H)    ? Essential hypertension    ? Hyperlipidemia    ? Pancreatitis    ? Rib Fracture    ? Sepsis due to pneumonia (H) 9/8/2017       Past  Social History     Reviewed, and he  reports that he quit smoking about 29 years ago. He has never used smokeless tobacco. He reports that he does not drink alcohol or use drugs.    Family History     Reviewed, and family history includes Alcohol abuse in his father; Aortic aneurysm in his mother.    Medication List   Post Discharge Medication Reconciliation Status: discharge medications reconciled, continue medications without change   Current Outpatient Medications on File Prior to Visit   Medication Sig Dispense Refill   ? acetaminophen (TYLENOL) 500 MG tablet Take 1-2 tablets (500-1,000 mg total) by mouth every 4 (four) hours as needed.  0   ? apixaban ANTICOAGULANT (ELIQUIS) 5 mg Tab tablet Take 1 tablet (5 mg total) by mouth 2 (two) times a day. 60 tablet 3   ? aspirin 81 MG EC tablet Take 81 mg by mouth daily.     ? blood glucose test strips Use 1 each As Directed 2 (two) times a day. Dispense brand per patient's insurance at pharmacy discretion. 120 strip 6   ? blood-glucose meter (ONETOUCH VERIO IQ METER) Misc Use 1 each As Directed 2 (two) times a day. 1 each 0   ? cholecalciferol, vitamin D3, (VITAMIN D3) 5,000 unit Tab Take 5,000 Units by mouth daily.      ? insulin glargine (LANTUS SOLOSTAR U-100 INSULIN) 100 unit/mL (3 mL) pen Inject 44 Units under the skin at bedtime. (Patient taking differently: Inject 30 Units under the skin at bedtime. Takes 15 units q am and 30 units Q HS)     ? liraglutide (VICTOZA 3-RUPALI) 0.6 mg/0.1 mL (18 mg/3 mL) injection Inject 0.3 mL (1.8 mg total) under the skin daily.     ? lisinopriL (PRINIVIL,ZESTRIL) 5 MG tablet Take 1 tablet (5 mg total) by mouth daily. 90 tablet 3   ? multivit-min/FA/lycopen/lutein (CENTRUM SILVER MEN ORAL) Take 1 tablet by mouth daily.     ? mupirocin (BACTROBAN) 2 % ointment Apply topically daily as needed. Apply to wound.     ? neomycin-bacitracin-polymyxin (NEOSPORIN) ointment Apply to penis at catheter insertion site three times a day while  "catheter is in place. 15 g 0   ? NOVOLOG FLEXPEN U-100 INSULIN 100 unit/mL (3 mL) injection pen Inject 3 Units under the skin 3 (three) times a day before meals. 2.7 mL 0   ? oxyCODONE (ROXICODONE) 5 MG immediate release tablet Take 1 tablet (5 mg total) by mouth every 6 (six) hours as needed for pain. 30 tablet 0   ? pen needle, diabetic 31 gauge x 5/16\" Ndle Used to inject insulin daily 90 each 0   ? polyethylene glycol (MIRALAX) 17 gram packet Take 1 packet (17 g total) by mouth daily as needed.  0   ? tamsulosin (FLOMAX) 0.4 mg cap Take 1 capsule (0.4 mg total) by mouth daily after supper.  0   ? traMADoL (ULTRAM) 50 mg tablet Take 50 mg by mouth every 6 (six) hours as needed for pain.     ? vitamin E 400 unit capsule Take 400 Units by mouth daily.       No current facility-administered medications on file prior to visit.        Allergies     Allergies   Allergen Reactions   ? Cresol      Muscle cramps   ? Crestor [Rosuvastatin] Myalgia   ? Declomycin [Demeclocycline] Hives       Review of Systems   A comprehensive review of 14 systems was done. Pertinent findings noted here and in history of present illness. All the rest negative.  Constitutional: Negative.  Negative for fever, chills, he has activity change, appetite change and fatigue.   HENT: Negative for congestion and facial swelling.    Eyes: Negative for photophobia, redness and visual disturbance.   Respiratory: Negative for cough and chest tightness.    Cardiovascular: Negative for chest pain, palpitations and  leg swelling.   Gastrointestinal: Negative for nausea, diarrhea, constipation, blood in stool and abdominal distention.   Genitourinary: Negative.  Has  A mai  Musculoskeletal: Negative.  Denies any pain pin his foot .  Skin: Negative.    Neurological: Negative for dizziness, tremors, syncope, weakness, light-headedness and headaches.   Hematological: Does not bruise/bleed easily.   Psychiatric/Behavioral: Negative.  Some confusion noted he " just wants to go home as per him      Physical Exam     Recent Vitals 1/11/2021   Weight -   BSA (m2) -   /62   Pulse 80   Temp 98.2   Some recent data might be hidden   RC /54    Constitutional: Oriented to person, place, and time and appears well-developed.   HEENT:  Normocephalic and atraumatic.  Eyes: Conjunctivae and EOM are normal. Pupils are equal, round, and reactive to light. No discharge.  No scleral icterus. Nose normal. Mouth/Throat: Oropharynx is clear and moist. No oropharyngeal exudate.    NECK: Normal range of motion. Neck supple. No JVD present. No tracheal deviation present. No thyromegaly present.   CARDIOVASCULAR: Normal rate, regular rhythm and intact distal pulses.  Exam reveals no gallop and no friction rub.  Systolic murmur present.  PULMONARY: Effort normal and breath sounds normal. No respiratory distress.No Wheezing or rales.  ABDOMEN: Soft. Bowel sounds are normal. No distension and no mass.  There is no tenderness. There is no rebound and no guarding. No HSM.  MUSCULOSKELETAL: Normal range of motion. No edema and no tenderness. Mild kyphosis, no tenderness.  Rt foot missing 5th toe  He has a wound VAC in place  LYMPH NODES: Has no cervical, supraclavicular, axillary and groin adenopathy.   NEUROLOGICAL: Alert and oriented to person, place, and time. No cranial nerve deficit.  Normal muscle tone. Coordination normal.   GENITOURINARY: Deferred exam.  Dietrich present  SKIN: Skin is warm and dry. No rash noted. No erythema. No pallor.   EXTREMITIES: No cyanosis, no clubbing, no edema. No Deformity.  PSYCHIATRIC: Normal mood, affect and behavior.      Lab Results     Recent Results (from the past 240 hour(s))   C-Reactive Protein    Collection Time: 01/05/21  9:26 AM   Result Value Ref Range    CRP 1.7 (H) 0.0 - 0.8 mg/dL   Basic Metabolic Panel    Collection Time: 01/05/21  9:26 AM   Result Value Ref Range    Sodium 140 136 - 145 mmol/L    Potassium 4.3 3.5 - 5.0 mmol/L    Chloride  103 98 - 107 mmol/L    CO2 27 22 - 31 mmol/L    Anion Gap, Calculation 10 5 - 18 mmol/L    Glucose 120 70 - 125 mg/dL    Calcium 8.5 8.5 - 10.5 mg/dL    BUN 18 8 - 28 mg/dL    Creatinine 0.96 0.70 - 1.30 mg/dL    GFR MDRD Af Amer >60 >60 mL/min/1.73m2    GFR MDRD Non Af Amer >60 >60 mL/min/1.73m2   Hepatic Profile    Collection Time: 01/05/21  9:26 AM   Result Value Ref Range    Bilirubin, Total 0.6 0.0 - 1.0 mg/dL    Bilirubin, Direct 0.3 <=0.5 mg/dL    Protein, Total 6.4 6.0 - 8.0 g/dL    Albumin 2.6 (L) 3.5 - 5.0 g/dL    Alkaline Phosphatase 100 45 - 120 U/L    AST 32 0 - 40 U/L    ALT 54 (H) 0 - 45 U/L   HM1 (CBC with Diff)    Collection Time: 01/05/21  9:26 AM   Result Value Ref Range    WBC 5.7 4.0 - 11.0 thou/uL    RBC 4.21 (L) 4.40 - 6.20 mill/uL    Hemoglobin 12.6 (L) 14.0 - 18.0 g/dL    Hematocrit 38.9 (L) 40.0 - 54.0 %    MCV 92 80 - 100 fL    MCH 29.9 27.0 - 34.0 pg    MCHC 32.4 32.0 - 36.0 g/dL    RDW 13.9 11.0 - 14.5 %    Platelets 293 140 - 440 thou/uL    MPV 9.7 8.5 - 12.5 fL    Neutrophils % 62 50 - 70 %    Lymphocytes % 19 (L) 20 - 40 %    Monocytes % 12 (H) 2 - 10 %    Eosinophils % 6 0 - 6 %    Basophils % 1 0 - 2 %    Immature Granulocyte % 1 (H) <=0 %    Neutrophils Absolute 3.6 2.0 - 7.7 thou/uL    Lymphocytes Absolute 1.1 0.8 - 4.4 thou/uL    Monocytes Absolute 0.7 0.0 - 0.9 thou/uL    Eosinophils Absolute 0.3 0.0 - 0.4 thou/uL    Basophils Absolute 0.1 0.0 - 0.2 thou/uL    Immature Granulocyte Absolute 0.0 <=0.0 thou/uL   COVID-19 Virus PCR MRF    Collection Time: 01/11/21 12:00 PM    Specimen: Respiratory   Result Value Ref Range    COVID-19 VIRUS SPECIMEN SOURCE Nares     2019-nCOV Not Detected    Basic Metabolic Panel    Collection Time: 01/12/21  8:21 AM   Result Value Ref Range    Sodium 140 136 - 145 mmol/L    Potassium 4.6 3.5 - 5.0 mmol/L    Chloride 102 98 - 107 mmol/L    CO2 31 22 - 31 mmol/L    Anion Gap, Calculation 7 5 - 18 mmol/L    Glucose 116 70 - 125 mg/dL    Calcium 8.9 8.5  - 10.5 mg/dL    BUN 18 8 - 28 mg/dL    Creatinine 0.89 0.70 - 1.30 mg/dL    GFR MDRD Af Amer >60 >60 mL/min/1.73m2    GFR MDRD Non Af Amer >60 >60 mL/min/1.73m2   C-Reactive Protein    Collection Time: 01/12/21  8:21 AM   Result Value Ref Range    CRP 1.7 (H) 0.0 - 0.8 mg/dL   Hepatic Profile    Collection Time: 01/12/21  8:21 AM   Result Value Ref Range    Bilirubin, Total 0.6 0.0 - 1.0 mg/dL    Bilirubin, Direct 0.2 <=0.5 mg/dL    Protein, Total 6.5 6.0 - 8.0 g/dL    Albumin 2.7 (L) 3.5 - 5.0 g/dL    Alkaline Phosphatase 98 45 - 120 U/L    AST 20 0 - 40 U/L    ALT 21 0 - 45 U/L   HM1 (CBC with Diff)    Collection Time: 01/12/21  8:21 AM   Result Value Ref Range    WBC 5.6 4.0 - 11.0 thou/uL    RBC 4.48 4.40 - 6.20 mill/uL    Hemoglobin 13.3 (L) 14.0 - 18.0 g/dL    Hematocrit 41.6 40.0 - 54.0 %    MCV 93 80 - 100 fL    MCH 29.7 27.0 - 34.0 pg    MCHC 32.0 32.0 - 36.0 g/dL    RDW 13.9 11.0 - 14.5 %    Platelets 301 140 - 440 thou/uL    MPV 9.8 8.5 - 12.5 fL    Neutrophils % 60 50 - 70 %    Lymphocytes % 19 (L) 20 - 40 %    Monocytes % 13 (H) 2 - 10 %    Eosinophils % 6 0 - 6 %    Basophils % 1 0 - 2 %    Immature Granulocyte % 1 (H) <=0 %    Neutrophils Absolute 3.3 2.0 - 7.7 thou/uL    Lymphocytes Absolute 1.1 0.8 - 4.4 thou/uL    Monocytes Absolute 0.8 0.0 - 0.9 thou/uL    Eosinophils Absolute 0.4 0.0 - 0.4 thou/uL    Basophils Absolute 0.1 0.0 - 0.2 thou/uL    Immature Granulocyte Absolute 0.0 <=0.0 thou/uL                FADUMO KwanBS

## 2021-06-21 NOTE — LETTER
"Letter by Marva Moore CNP at      Author: Marva Moore CNP Service: -- Author Type: --    Filed:  Encounter Date: 2020 Status: (Other)         Patient: Caleb Hernandez   MR Number: [id number]   YOB: 1935   Date of Visit: 2020       [facility]      NAME:  Caleb Hernandez             :  1935    MRN: [id number]    CODE STATUS:  FULL CODE    FACILITY: [facility] [[id number]]       CHIEF COMPLAIN/REASON FOR VISIT:  Chief Complaint   Patient presents with   ? Problem Visit     hospital return       HISTORY OF PRESENT ILLNESS: Caleb Hernandez is a 85 y.o. male being seen at the request of the nurse as he returned from hospital for an ID right ft. Pt seen in room. Today we will place wound vac in when it arrives. Foot currently in soft cast s/p surgery. Toes warm and wigle. Frantz denies pain.   He is currently on the TCU status post acute care due to an I&D of his right lower foot with cellulitis and is followed by Dr. Garcia podiatry.  He will be on daily IV antibiotics on the TCU.  As per his EMR at [facility], \"with DM2, PVD, HTN presented with right lower extremity cellulitis, osteomyelitis, septic arthritis.     Right foot cellulitis with abscess, osteomyelitis, septic arthritis  -Patient with a recent excision of the right fifth metatarsal on 2020 with podiatry.  Wound cultures with Bacteroides and Enterobacter cloaca.  Patient directly admitted from vascular clinic with concerns for worsening postoperative infection.  MRI with new synovitis, osteomyelitis of the 2nd-4th tarsometatarsal joint and likely septic arthritis.  Patient underwent I&D of abscess on , this wound culture now with Corynebacterium.  Patient initially on vancomycin and Zosyn, currently on meropenem with plan for outpatient ertapenem for 4 weeks.  -Nonweightbearing  -Patient received vancomycin and meropenem while inpatient and this will be switched to " "ertapenem daily dose x4 weeks with ID to follow as outpatient     Urinary retention with hematuria  -Patient had Dietrich catheter placed after angiogram for acute urinary retention.  Patient developed hematuria on 11/30.  CT abdomen and pelvis with significant irregular bladder wall thickening, more suggestive of cystitis.  Patient also has a nonobstructing 11 mm right upper pole kidney stone with no hydro-.  Urine culture negative, likely Dietrich causing irritation  -Patient evaluated daily by urology team  -Continue Flomax at discharge  -Able to restart Eliquis and aspirin daily  -Patient will be discharged on Dietrich catheter and will follow up with urology for definitive treatment     PVD  -Patient underwent right lower extremity angiogram with balloon angioplasty of anterior tibial and peroneal arteries 11/24/2020 with vascular surgery.  Postoperative ABIs without significant improvement, vascular thought this was likely secondary to spasm.  -will need repeat ultrasound outpatient  -Follow-up with vascular clinic outpatient     Adult failure to thrive  -Secondary to surgical interventions, prolonged hospital stays and infection  PT/OT recommending TCU, discharge plan 12/3     DM2  -A1c 7.9  -Held home Victoza, and was started on 3 units of mealtime insulin and increase to glargine 48 units at bedtime   -We will restart home Victoza and continue glargine at bedtime as well as mealtime insulin at discharge     Paroxysmal atrial fibrillation  -Currently rate controlled.    -Restart home Eliquis     HTN  -Lisinopril 5 mg p.o. daily     Normocytic anemia  -Hemoglobin 12.2 with MCV of 92.  Likely anemia of chronic disease, but with component of blood loss anemia from recent surgeries, hematuria.\"    He is to receive IV antibioticsand  weekly labs PT OT on the rehab unit.  He is on eliquis and ASA and I informed nursing to resume sp surgical procedure. I did decrease his am Lantus from 40 to 30. BS this am is at " 174.    Allergies   Allergen Reactions   ? Cresol      Muscle cramps   ? Crestor [Rosuvastatin] Myalgia   ? Declomycin [Demeclocycline] Hives   :     Current Outpatient Medications   Medication Sig   ? acetaminophen (TYLENOL) 500 MG tablet Take 1-2 tablets (500-1,000 mg total) by mouth every 4 (four) hours as needed.   ? apixaban ANTICOAGULANT (ELIQUIS) 5 mg Tab tablet Take 1 tablet (5 mg total) by mouth 2 (two) times a day.   ? aspirin 81 MG EC tablet Take 81 mg by mouth daily.   ? blood glucose test strips Use 1 each As Directed 2 (two) times a day. Dispense brand per patient's insurance at pharmacy discretion.   ? blood-glucose meter (ONETOUCH VERIO IQ METER) Misc Use 1 each As Directed 2 (two) times a day.   ? cholecalciferol, vitamin D3, (VITAMIN D3) 5,000 unit Tab Take 5,000 Units by mouth daily.    ? ertapenem 1 g in NaCl 0.9 % 0.9 % 50 mL IVPB Infuse 1 g into a venous catheter daily.   ? insulin glargine (LANTUS SOLOSTAR U-100 INSULIN) 100 unit/mL (3 mL) pen Inject 44 Units under the skin at bedtime. (Patient taking differently: Inject 30 Units under the skin at bedtime. Takes 15 units q am and 30 units Q HS)   ? liraglutide (VICTOZA 3-RUPALI) 0.6 mg/0.1 mL (18 mg/3 mL) injection Inject 0.3 mL (1.8 mg total) under the skin daily.   ? lisinopriL (PRINIVIL,ZESTRIL) 5 MG tablet Take 1 tablet (5 mg total) by mouth daily.   ? multivit-min/FA/lycopen/lutein (CENTRUM SILVER MEN ORAL) Take 1 tablet by mouth daily.   ? mupirocin (BACTROBAN) 2 % ointment Apply topically daily as needed. Apply to wound.   ? neomycin-bacitracin-polymyxin (NEOSPORIN) ointment Apply to penis at catheter insertion site three times a day while catheter is in place.   ? NOVOLOG FLEXPEN U-100 INSULIN 100 unit/mL (3 mL) injection pen Inject 3 Units under the skin 3 (three) times a day before meals.   ? oxyCODONE (ROXICODONE) 5 MG immediate release tablet Take 1 tablet (5 mg total) by mouth every 6 (six) hours as needed for pain.   ? pen needle,  "diabetic 31 gauge x 5/16\" Ndle Used to inject insulin daily   ? polyethylene glycol (MIRALAX) 17 gram packet Take 1 packet (17 g total) by mouth daily as needed.   ? tamsulosin (FLOMAX) 0.4 mg cap Take 1 capsule (0.4 mg total) by mouth daily after supper.   ? vitamin E 400 unit capsule Take 400 Units by mouth daily.         REVIEW OF SYSTEMS:    Currently, no fever, chills, or rigors. Does not have any visual or hearing problems. Denies any chest pain, headaches, palpitations, lightheadedness, dizziness, shortness of breath, or cough. Appetite is good. Denies any GERD symptoms. Denies any difficulty with swallowing, nausea, or vomiting.  Denies any abdominal pain, diarrhea or constipation. Denies any urinary symptoms. No insomnia. No active bleeding. No rash.       PHYSICAL EXAMINATION:  Vitals:    12/18/20 2044   BP: 126/87   Pulse: 64   Temp: 98.7  F (37.1  C)   Weight: 188 lb (85.3 kg)         GENERAL: Awake, Alert, oriented x3, not in any form of acute distress, answers questions appropriately, follows simple commands, conversant  HEENT: Head is normocephalic with normal hair distribution. No evidence of trauma. Ears: No acute purulent discharge. Eyes: Conjunctivae pink with no scleral jaundice. Nose: Normal mucosa and septum. NECK: Supple with no cervical or supraclavicular lymphadenopathy. Trachea is midline.   ABDOMEN: Globular and soft, nontender to palpation, non distended, no masses, no organomegaly, good bowel sounds, no rebound or guarding, no peritoneal signs.   : Dietrich, hematuria, fading wwent from maroon to huey color urine throughout day,  EXTREMITIES: Full range of motion does have a PICC in right upper arm.  He is to be nonweightbearing to the right foot until cleared by Dr. Middleton and podiatry.  SKIN: Warm and dry, no erythema noted, no rashes or lesions.  NEUROLOGICAL: The patient is oriented to person, place and time. Strength and sensation are grossly intact. Face is symmetric.        Social " distancing with PPE in place during assessment due to COVID-19 precautions.            LABS:    Lab Results   Component Value Date    WBC 8.3 12/14/2020    HGB 11.8 (L) 12/14/2020    HCT 36.0 (L) 12/14/2020    MCV 91 12/14/2020     12/14/2020       Results for orders placed or performed during the hospital encounter of 11/23/20   Basic Metabolic Panel   Result Value Ref Range    Sodium 139 136 - 145 mmol/L    Potassium 4.5 3.5 - 5.0 mmol/L    Chloride 103 98 - 107 mmol/L    CO2 30 22 - 31 mmol/L    Anion Gap, Calculation 6 5 - 18 mmol/L    Glucose 178 (H) 70 - 125 mg/dL    Calcium 8.3 (L) 8.5 - 10.5 mg/dL    BUN 18 8 - 28 mg/dL    Creatinine 0.88 0.70 - 1.30 mg/dL    GFR MDRD Af Amer >60 >60 mL/min/1.73m2    GFR MDRD Non Af Amer >60 >60 mL/min/1.73m2           Lab Results   Component Value Date    HGBA1C 7.9 (H) 09/04/2020     No results found for: [id number]  No results found for: [id number]    ASSESSMENT/PLAN:  1. 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 2 diabetes mellitus, with necrosis of muscle (H)    2. Type 2 diabetes mellitus with diabetic peripheral angiopathy without gangrene, with long-term current use of insulin (H)      1.  Osteomyelitis right foot, patient will have daily wound care done by skilled nursing staff, we will place wound vac on and change Q 3 days , start today.  He is to be nonweightbearing until further notice.  Weekly labs and sent to ID, Dr. Middleton will also follow as he remains nonweightbearing on his right foot.  PT and OT for strengthening and safety. SN to manage chronic medical conditions, he has DM as well as urinary retention, see dx list above for all comorbidity.    2. DM: He is on victoza daily I did decrease am Lantus from 40 to 30. He will also  get 12 units at HS.       Electronically signed by:  Marva Moore CNP  This progress note was completed using Dragon software and there may be grammatical errors.

## 2021-06-21 NOTE — LETTER
"Letter by Marva Moore CNP at      Author: Marva Moore CNP Service: -- Author Type: --    Filed:  Encounter Date: 2020 Status: (Other)         Patient: Caleb Hernandez   MR Number: [id number]   YOB: 1935   Date of Visit: 2020       [facility]      NAME:  Caleb Hernandez             :  1935    MRN: [id number]    CODE STATUS:  FULL CODE    FACILITY: [facility] [[id number]]       CHIEF COMPLAIN/REASON FOR VISIT:  Chief Complaint   Patient presents with   ? Problem Visit     hematuria       HISTORY OF PRESENT ILLNESS: Caleb Hernandez is a 85 y.o. male being seen at the request of the nurse as he has hematuria. Dietrich cath in place.He is currently on the TCU status post acute care due to an I&D of his right lower foot with cellulitis and is followed by Dr. Garcia podiatry.  He will be on daily IV antibiotics on the TCU.  As per his EMR at [facility], \"with DM2, PVD, HTN presented with right lower extremity cellulitis, osteomyelitis, septic arthritis.     Right foot cellulitis with abscess, osteomyelitis, septic arthritis  -Patient with a recent excision of the right fifth metatarsal on 2020 with podiatry.  Wound cultures with Bacteroides and Enterobacter cloaca.  Patient directly admitted from vascular clinic with concerns for worsening postoperative infection.  MRI with new synovitis, osteomyelitis of the 2nd-4th tarsometatarsal joint and likely septic arthritis.  Patient underwent I&D of abscess on , this wound culture now with Corynebacterium.  Patient initially on vancomycin and Zosyn, currently on meropenem with plan for outpatient ertapenem for 4 weeks.  -Nonweightbearing  -Patient received vancomycin and meropenem while inpatient and this will be switched to ertapenem daily dose x4 weeks with ID to follow as outpatient     Urinary retention with hematuria  -Patient had Dietrich catheter placed after angiogram for acute urinary " "retention.  Patient developed hematuria on 11/30.  CT abdomen and pelvis with significant irregular bladder wall thickening, more suggestive of cystitis.  Patient also has a nonobstructing 11 mm right upper pole kidney stone with no hydro-.  Urine culture negative, likely Dietrich causing irritation  -Patient evaluated daily by urology team  -Continue Flomax at discharge  -Able to restart Eliquis and aspirin daily  -Patient will be discharged on Dietrich catheter and will follow up with urology for definitive treatment     PVD  -Patient underwent right lower extremity angiogram with balloon angioplasty of anterior tibial and peroneal arteries 11/24/2020 with vascular surgery.  Postoperative ABIs without significant improvement, vascular thought this was likely secondary to spasm.  -will need repeat ultrasound outpatient  -Follow-up with vascular clinic outpatient     Adult failure to thrive  -Secondary to surgical interventions, prolonged hospital stays and infection  PT/OT recommending TCU, discharge plan 12/3     DM2  -A1c 7.9  -Held home Victoza, and was started on 3 units of mealtime insulin and increase to glargine 48 units at bedtime   -We will restart home Victoza and continue glargine at bedtime as well as mealtime insulin at discharge     Paroxysmal atrial fibrillation  -Currently rate controlled.    -Restart home Eliquis     HTN  -Lisinopril 5 mg p.o. daily     Normocytic anemia  -Hemoglobin 12.2 with MCV of 92.  Likely anemia of chronic disease, but with component of blood loss anemia from recent surgeries, hematuria.\"    He is to receive IV antibiotics and  weekly labs PT OT on the rehab unit.  He is on eliquis and ASA and I informed nursing to resume sp surgical procedure on 12/18. He is now with hematuria, we will see if rt a uti , he has CBC drawn today.    Allergies   Allergen Reactions   ? Cresol      Muscle cramps   ? Crestor [Rosuvastatin] Myalgia   ? Declomycin [Demeclocycline] Hives   :     Current " "Outpatient Medications   Medication Sig   ? acetaminophen (TYLENOL) 500 MG tablet Take 1-2 tablets (500-1,000 mg total) by mouth every 4 (four) hours as needed.   ? apixaban ANTICOAGULANT (ELIQUIS) 5 mg Tab tablet Take 1 tablet (5 mg total) by mouth 2 (two) times a day.   ? aspirin 81 MG EC tablet Take 81 mg by mouth daily.   ? blood glucose test strips Use 1 each As Directed 2 (two) times a day. Dispense brand per patient's insurance at pharmacy discretion.   ? blood-glucose meter (ONETOUCH VERIO IQ METER) Misc Use 1 each As Directed 2 (two) times a day.   ? cholecalciferol, vitamin D3, (VITAMIN D3) 5,000 unit Tab Take 5,000 Units by mouth daily.    ? ertapenem 1 g in NaCl 0.9 % 0.9 % 50 mL IVPB Infuse 1 g into a venous catheter daily.   ? insulin glargine (LANTUS SOLOSTAR U-100 INSULIN) 100 unit/mL (3 mL) pen Inject 44 Units under the skin at bedtime. (Patient taking differently: Inject 30 Units under the skin at bedtime. Takes 15 units q am and 30 units Q HS)   ? liraglutide (VICTOZA 3-RUPALI) 0.6 mg/0.1 mL (18 mg/3 mL) injection Inject 0.3 mL (1.8 mg total) under the skin daily.   ? lisinopriL (PRINIVIL,ZESTRIL) 5 MG tablet Take 1 tablet (5 mg total) by mouth daily.   ? multivit-min/FA/lycopen/lutein (CENTRUM SILVER MEN ORAL) Take 1 tablet by mouth daily.   ? mupirocin (BACTROBAN) 2 % ointment Apply topically daily as needed. Apply to wound.   ? neomycin-bacitracin-polymyxin (NEOSPORIN) ointment Apply to penis at catheter insertion site three times a day while catheter is in place.   ? NOVOLOG FLEXPEN U-100 INSULIN 100 unit/mL (3 mL) injection pen Inject 3 Units under the skin 3 (three) times a day before meals.   ? oxyCODONE (ROXICODONE) 5 MG immediate release tablet Take 1 tablet (5 mg total) by mouth every 6 (six) hours as needed for pain.   ? pen needle, diabetic 31 gauge x 5/16\" Ndle Used to inject insulin daily   ? polyethylene glycol (MIRALAX) 17 gram packet Take 1 packet (17 g total) by mouth daily as " needed.   ? tamsulosin (FLOMAX) 0.4 mg cap Take 1 capsule (0.4 mg total) by mouth daily after supper.   ? vitamin E 400 unit capsule Take 400 Units by mouth daily.         REVIEW OF SYSTEMS:    Currently, no fever, chills, or rigors. Does not have any visual or hearing problems. Denies any chest pain, headaches, palpitations, lightheadedness, dizziness, shortness of breath, or cough. Appetite is good. Denies any GERD symptoms. Denies any difficulty with swallowing, nausea, or vomiting.  Denies any abdominal pain, diarrhea or constipation. Denies any urinary symptoms. No insomnia. No active bleeding other than hematuria. No rash.       PHYSICAL EXAMINATION:  Vitals:    12/21/20 1848   BP: 172/76   Pulse: (!) 55   Temp: 98.5  F (36.9  C)   Weight: 188 lb (85.3 kg)         GENERAL: Awake, Alert, oriented x3, not in any form of acute distress, answers questions appropriately, follows simple commands, conversant  HEENT: Head is normocephalic with normal hair distribution. No evidence of trauma. Ears: No acute purulent discharge. Eyes: Conjunctivae pink with no scleral jaundice. Nose: Normal mucosa and septum. NECK: Supple with no cervical or supraclavicular lymphadenopathy. Trachea is midline.   ABDOMEN: Globular and soft, nontender to palpation, non distended, no masses, no organomegaly, good bowel sounds, no rebound or guarding, no peritoneal signs.   : Dietrich, hematuria, fading went from maroon to huey color urine throughout day, new onset  EXTREMITIES: Full range of motion does have a PICC in right upper arm.  He is to be nonweightbearing to the right foot until cleared by Dr. Middleton and podiatry.  SKIN: Warm and dry, no erythema noted, no rashes or lesions.  NEUROLOGICAL: The patient is oriented to person, place and time. Strength and sensation are grossly intact. Face is symmetric.        Social distancing with PPE in place during assessment due to COVID-19 precautions.            LABS:    Lab Results   Component  Value Date    WBC 8.3 12/14/2020    HGB 11.8 (L) 12/14/2020    HCT 36.0 (L) 12/14/2020    MCV 91 12/14/2020     12/14/2020       Results for orders placed or performed in visit on 12/21/20   Basic Metabolic Panel   Result Value Ref Range    Sodium 140 136 - 145 mmol/L    Potassium 4.2 3.5 - 5.0 mmol/L    Chloride 104 98 - 107 mmol/L    CO2 29 22 - 31 mmol/L    Anion Gap, Calculation 7 5 - 18 mmol/L    Glucose 84 70 - 125 mg/dL    Calcium 8.3 (L) 8.5 - 10.5 mg/dL    BUN 11 8 - 28 mg/dL    Creatinine 0.77 0.70 - 1.30 mg/dL    GFR MDRD Af Amer >60 >60 mL/min/1.73m2    GFR MDRD Non Af Amer >60 >60 mL/min/1.73m2           Lab Results   Component Value Date    HGBA1C 7.9 (H) 09/04/2020     No results found for: [id number]  No results found for: [id number]    ASSESSMENT/PLAN:  1. Urinary retention    2. Gross hematuria    3. Abscess of right foot          1/2 . Urinary retention and hematuria: Cath in place due to retention. Hematuria. On duac, but had UTI recently and nursing reports due to surgery he never finished abx course. I request new UA and we will see if active UTI in place.    3.  Abscess right ft and Osteomyelitis right foot,with wound vac on and change Q 3 days .  He is to be nonweightbearing until further notice.  Weekly labs and sent to ID, Dr. Middleton will also follow as he remains nonweightbearing on his right foot.  PT and OT for strengthening and safety. SN to manage chronic medical conditions, he has DM as well as urinary retention, see dx list above for all comorbidity.        Electronically signed by:  Marva Moore CNP  This progress note was completed using Dragon software and there may be grammatical errors.

## 2021-06-21 NOTE — PROGRESS NOTES
[facility] Follow Up Note    Caleb Hernandez   82 y.o. male    Date of Visit: 11/21/2018    Chief Complaint   Patient presents with     Diabetes     FOOT CHECK     Subjective  This is an 82-year-old patient with known type 2 diabetes and hypertension.  He comes in today to follow-up on these issues.  His sugars have been under pretty good control but in the last few weeks they have taken a jump.  It was at that point there was a change in medication dictated by his insurance.  We will need to do some lab work today to see what the situation is.  He has been dealing with a diabetic foot ulcer on the right foot.  He has been seen at the vascular clinic and has been responding well to treatment.  He has had some new insoles recommended for his shoes which seem to be helping considerably.  He has been dealing with foot ulcers now for a number of months and has been followed in the vascular clinic.  With their help the ulcers have now started to heal and the left foot looks good.  The right foot shows a small callus but no open lesions.  There is definitely been improvement with the insoles.  He has now been retired for several months and is keeping busy and active.  He offers no other complaints.  No headaches or dizziness and no chest pain or shortness of breath.    ROS A comprehensive review of systems was performed and was otherwise negative    Medications, allergies, and problem list were reviewed and updated    Exam  General Appearance:   On examination his blood pressure is 122/70.  Weight is 202 pounds and height is 69 inches.  BMI is 29.83.    Heart is in a sinus rhythm with a rate of 80 and no ectopy.    Examination of the left foot at this time shows healing of all lesions.  Examination of the right foot shows callus on the bottom of the foot at the base of the great toe.  This is not open and does appear to be improved from the last time I saw it.  There are no other open lesions and  swelling is down.  Skin color and temperature are normal over the feet.    The patient is alert and oriented x3.      Assessment/Plan  1. Type 2 diabetes mellitus with other skin ulcer, with long-term current use of insulin (H)  Glycosylated Hemoglobin A1c    Basic Metabolic Panel   2. 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 2 diabetes mellitus, limited to breakdown of skin (H)     3. Essential hypertension       Diabetes with foot ulceration.  The patient's sugars are a little out of control and I would like to check an A1c and BMP today.  We may need to adjust his medication.  The ulcerations are healing up nicely and he is doing well with his current treatment program.  The inserts for his shoes have been recommended by vascular and I would agree with that recommendation.  I will put in the order.    Hypertension.  Blood pressure is controlled.  Continue current medication.    I will see him back in 1 month.  Total time of this office visit was 25 minutes with greater than 50% of the time spent in care coordination to patient counseling.  The following high BMI interventions were performed this visit: weight monitoring    Chaz Machado MD      Current Outpatient Medications on File Prior to Visit   Medication Sig     aspirin 81 MG EC tablet Take 81 mg by mouth daily.     blood glucose test strips Use to test twice a day Dispense brand per patient's insurance at pharmacy discretion.     cholecalciferol, vitamin D3, (VITAMIN D3) 5,000 unit Tab Take 5,000 Units by mouth daily.      EPINEPHrine (EPIPEN/ADRENACLICK/AUVI-Q) 0.3 mg/0.3 mL injection Inject 0.3 mL (0.3 mg total) as directed as needed for anaphylaxis. Inject into thigh.     insulin detemir U-100 (LEVEMIR FLEXTOUCH U-100 INSULN) 100 unit/mL (3 mL) pen Inject 40 Units under the skin Daily at 5 pm..     liraglutide (VICTOZA 3-RUPALI) 0.6 mg/0.1 mL (18 mg/3 mL) PnIj injection INJECT 1.2 MG SUBCUTANEOUSLY ONCE DAILY     lisinopril  "(PRINIVIL,ZESTRIL) 5 MG tablet TAKE 1 TABLET BY MOUTH ONCE DAILY     MULTIVITS,CA,MIN/IRON/FA/LYCOP (CENTRUM MEN ORAL) Take 1 tablet by mouth daily.     mupirocin (BACTROBAN) 2 % ointment Apply topically to wound every day     naproxen sodium (ALEVE) 220 MG tablet Take 220 mg by mouth daily.     pen needle, diabetic 31 gauge x 5/16\" Ndle Used to inject insulin daily     vitamin E 400 unit capsule Take 400 Units by mouth daily.     Current Facility-Administered Medications on File Prior to Visit   Medication     lidocaine 2 % jelly (XYLOCAINE)     Allergies   Allergen Reactions     Cresol      Muscle cramps     Crestor [Rosuvastatin] Myalgia     Demeclocycline Hives     Social History     Tobacco Use     Smoking status: Former Smoker     Last attempt to quit: 1991     Years since quittin.0     Smokeless tobacco: Never Used   Substance Use Topics     Alcohol use: No     Drug use: No           "

## 2021-06-21 NOTE — LETTER
"Letter by Marva Moore CNP at      Author: Marva Moore CNP Service: -- Author Type: --    Filed:  Encounter Date: 2021 Status: (Other)         Patient: Caleb Hernandez   MR Number: [id number]   YOB: 1935   Date of Visit: 2021       [facility]      NAME:  Caleb Hernandez             :  1935    MRN: [id number]    CODE STATUS:  FULL CODE    FACILITY: [facility] [[id number]]       CHIEF COMPLAIN/REASON FOR VISIT:  Chief Complaint   Patient presents with   ? Review Of Multiple Medical Conditions     wound to ft review       HISTORY OF PRESENT ILLNESS: Caleb Hernandez is a 85 y.o. male being seen today for review of mUltiple medial conditions. He has urinary retention and . Dietrich cath in place. TCU status post acute care due to an I&D of his right lower foot with cellulitis and is followed by Dr. Garcia podiatry.  He will be on daily IV antibiotics on the TCU.  As per his EMR at [facility], \"with DM2, PVD, HTN presented with right lower extremity cellulitis, osteomyelitis, septic arthritis.     Right foot cellulitis with abscess, osteomyelitis, septic arthritis  -Patient with a recent excision of the right fifth metatarsal on 2020 with podiatry.  Wound cultures with Bacteroides and Enterobacter cloaca.  Patient directly admitted from vascular clinic with concerns for worsening postoperative infection.  MRI with new synovitis, osteomyelitis of the 2nd-4th tarsometatarsal joint and likely septic arthritis.  Patient underwent I&D of abscess on , this wound culture now with Corynebacterium.  Patient initially on vancomycin and Zosyn, currently on meropenem with plan for outpatient ertapenem for 4 weeks.  -Nonweightbearing  -Patient received vancomycin and meropenem while inpatient and this will be switched to ertapenem daily dose x4 weeks with ID to follow as outpatient     Urinary retention with hematuria  -Patient had Dietrich catheter " "placed after angiogram for acute urinary retention.  Patient developed hematuria on 11/30.  CT abdomen and pelvis with significant irregular bladder wall thickening, more suggestive of cystitis.  Patient also has a nonobstructing 11 mm right upper pole kidney stone with no hydro-.  Urine culture negative, likely Dietrich causing irritation  -Patient evaluated daily by urology team  -Continue Flomax at discharge  -Able to restart Eliquis and aspirin daily  -Patient will be discharged on Dietrich catheter and will follow up with urology for definitive treatment     PVD  -Patient underwent right lower extremity angiogram with balloon angioplasty of anterior tibial and peroneal arteries 11/24/2020 with vascular surgery.  Postoperative ABIs without significant improvement, vascular thought this was likely secondary to spasm.  -will need repeat ultrasound outpatient  -Follow-up with vascular clinic outpatient     Adult failure to thrive  -Secondary to surgical interventions, prolonged hospital stays and infection  PT/OT recommending TCU, discharge plan 12/3     DM2  -A1c 7.9  -Held home Victoza, and was started on 3 units of mealtime insulin and increase to glargine 48 units at bedtime   -We will restart home Victoza and continue glargine at bedtime as well as mealtime insulin at discharge     Paroxysmal atrial fibrillation  -Currently rate controlled.    -Restart home Eliquis     HTN  -Lisinopril 5 mg p.o. daily     Normocytic anemia  -Hemoglobin 12.2 with MCV of 92.  Likely anemia of chronic disease, but with component of blood loss anemia from recent surgeries, hematuria.\"    He is to receive IV antibiotics thru 1/11 at this point and has weekly labs drawn for ID. PT OT on the rehab unit.  He is on eliquis and ASA and I informed nursing to resume. He is having trouble with the wound vac staying in place and WOC RN consulted.    Allergies   Allergen Reactions   ? Cresol      Muscle cramps   ? Crestor [Rosuvastatin] Myalgia " "  ? Declomycin [Demeclocycline] Hives   :     Current Outpatient Medications   Medication Sig   ? acetaminophen (TYLENOL) 500 MG tablet Take 1-2 tablets (500-1,000 mg total) by mouth every 4 (four) hours as needed.   ? apixaban ANTICOAGULANT (ELIQUIS) 5 mg Tab tablet Take 1 tablet (5 mg total) by mouth 2 (two) times a day.   ? aspirin 81 MG EC tablet Take 81 mg by mouth daily.   ? blood glucose test strips Use 1 each As Directed 2 (two) times a day. Dispense brand per patient's insurance at pharmacy discretion.   ? blood-glucose meter (ONETOUCH VERIO IQ METER) Misc Use 1 each As Directed 2 (two) times a day.   ? cholecalciferol, vitamin D3, (VITAMIN D3) 5,000 unit Tab Take 5,000 Units by mouth daily.    ? insulin glargine (LANTUS SOLOSTAR U-100 INSULIN) 100 unit/mL (3 mL) pen Inject 44 Units under the skin at bedtime. (Patient taking differently: Inject 30 Units under the skin at bedtime. Takes 15 units q am and 30 units Q HS)   ? liraglutide (VICTOZA 3-RUPALI) 0.6 mg/0.1 mL (18 mg/3 mL) injection Inject 0.3 mL (1.8 mg total) under the skin daily.   ? lisinopriL (PRINIVIL,ZESTRIL) 5 MG tablet Take 1 tablet (5 mg total) by mouth daily.   ? multivit-min/FA/lycopen/lutein (CENTRUM SILVER MEN ORAL) Take 1 tablet by mouth daily.   ? mupirocin (BACTROBAN) 2 % ointment Apply topically daily as needed. Apply to wound.   ? neomycin-bacitracin-polymyxin (NEOSPORIN) ointment Apply to penis at catheter insertion site three times a day while catheter is in place.   ? NOVOLOG FLEXPEN U-100 INSULIN 100 unit/mL (3 mL) injection pen Inject 3 Units under the skin 3 (three) times a day before meals.   ? oxyCODONE (ROXICODONE) 5 MG immediate release tablet Take 1 tablet (5 mg total) by mouth every 6 (six) hours as needed for pain.   ? pen needle, diabetic 31 gauge x 5/16\" Ndle Used to inject insulin daily   ? polyethylene glycol (MIRALAX) 17 gram packet Take 1 packet (17 g total) by mouth daily as needed.   ? tamsulosin (FLOMAX) 0.4 mg " cap Take 1 capsule (0.4 mg total) by mouth daily after supper.   ? vitamin E 400 unit capsule Take 400 Units by mouth daily.         REVIEW OF SYSTEMS:    Currently, no fever, chills, or rigors. Does not have any visual or hearing problems. Denies any chest pain, headaches, palpitations, lightheadedness, dizziness, shortness of breath, or cough. Appetite is good. Denies any GERD symptoms. Denies any difficulty with swallowing, nausea, or vomiting.  Denies any abdominal pain, diarrhea or constipation. Denies any urinary symptoms. No insomnia. No active bleeding  No rash.       PHYSICAL EXAMINATION:  Vitals:    01/05/21 1000   BP: 128/70   Pulse: 76   Temp: 98.1  F (36.7  C)         GENERAL: Awake, Alert, oriented x3, not in any form of acute distress, answers questions appropriately, follows simple commands, conversant  HEENT: Head is normocephalic with normal hair distribution. No evidence of trauma. Ears: No acute purulent discharge. Eyes: Conjunctivae pink with no scleral jaundice. Nose: Normal mucosa and septum. NECK: Supple with no cervical or supraclavicular lymphadenopathy. Trachea is midline.   ABDOMEN: Globular and soft, nontender to palpation, non distended, no masses, no organomegaly, good bowel sounds, no rebound or guarding, no peritoneal signs.   : Dietrich, huey color urine throughout day, clear no sediment  EXTREMITIES: Full range of motion does have a PICC in right upper arm.  He is to be nonweightbearing to the right foot until cleared by Dr. Middleton and podiatry.  SKIN: Warm and dry, no erythema noted, no rashes or lesions.  NEUROLOGICAL: The patient is oriented to person, place and time. Strength and sensation are grossly intact. Face is symmetric.        Social distancing with PPE in place during assessment due to COVID-19 precautions.            LABS:    Lab Results   Component Value Date    WBC 5.8 12/28/2020    HGB 12.9 (L) 12/28/2020    HCT 40.3 12/28/2020    MCV 92 12/28/2020      12/28/2020       Results for orders placed or performed in visit on 12/21/20   Basic Metabolic Panel   Result Value Ref Range    Sodium 140 136 - 145 mmol/L    Potassium 4.2 3.5 - 5.0 mmol/L    Chloride 104 98 - 107 mmol/L    CO2 29 22 - 31 mmol/L    Anion Gap, Calculation 7 5 - 18 mmol/L    Glucose 84 70 - 125 mg/dL    Calcium 8.3 (L) 8.5 - 10.5 mg/dL    BUN 11 8 - 28 mg/dL    Creatinine 0.77 0.70 - 1.30 mg/dL    GFR MDRD Af Amer >60 >60 mL/min/1.73m2    GFR MDRD Non Af Amer >60 >60 mL/min/1.73m2           Lab Results   Component Value Date    HGBA1C 7.9 (H) 09/04/2020     No results found for: [id number]  No results found for: [id number]    ASSESSMENT/PLAN:  1. Urinary retention    2. 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 2 diabetes mellitus, with necrosis of muscle (H)          1 . Urinary retention  Dietrich in place, no hematuria today. UA obtained was negative  2. DM ft ulcer Abscess right ft and Osteomyelitis right foot,with wound vac on and change Q 3 days . Weekly labs due today. To continue IVAB until 1/11/20 per ID.        Electronically signed by:  Marva Moore CNP  This progress note was completed using Dragon software and there may be grammatical errors.

## 2021-06-21 NOTE — LETTER
"Letter by Marva Moore CNP at      Author: Marva Moore CNP Service: -- Author Type: --    Filed:  Encounter Date: 2020 Status: (Other)         Patient: Caleb Hernandez   MR Number: [id number]   YOB: 1935   Date of Visit: 2020       [facility]      NAME:  Caleb Hernandez             :  1935    MRN: [id number]    CODE STATUS:  FULL CODE    FACILITY: [facility] [[id number]]       CHIEF COMPLAIN/REASON FOR VISIT:  Chief Complaint   Patient presents with   ? Problem Visit     hematuria       HISTORY OF PRESENT ILLNESS: Caleb Hernandez is a 84 y.o. male being seen at the request of the nurse as he had gross hematuria this am. I did drain his cath and had 400 ml of maroon colored urine. Throughout day lessened in color to straw colored.   Caleb is a pleasant elderly gentleman who was first conversation was he would like to go home.  He does state he lives at home with his wife adult son and granddaughter.  He is currently on the TCU status post acute care due to an I&D of his right lower foot with cellulitis and is followed by Dr. Garcia podiatry.  He will be on daily IV antibiotics on the TCU.  As per his EMR at [facility], \"with DM2, PVD, HTN presented with right lower extremity cellulitis, osteomyelitis, septic arthritis.     Right foot cellulitis with abscess, osteomyelitis, septic arthritis  -Patient with a recent excision of the right fifth metatarsal on 2020 with podiatry.  Wound cultures with Bacteroides and Enterobacter cloaca.  Patient directly admitted from vascular clinic with concerns for worsening postoperative infection.  MRI with new synovitis, osteomyelitis of the 2nd-4th tarsometatarsal joint and likely septic arthritis.  Patient underwent I&D of abscess on , this wound culture now with Corynebacterium.  Patient initially on vancomycin and Zosyn, currently on meropenem with plan for outpatient ertapenem for 4 " "weeks.  -Nonweightbearing  -Patient received vancomycin and meropenem while inpatient and this will be switched to ertapenem daily dose x4 weeks with ID to follow as outpatient     Urinary retention with hematuria  -Patient had Dietrich catheter placed after angiogram for acute urinary retention.  Patient developed hematuria on 11/30.  CT abdomen and pelvis with significant irregular bladder wall thickening, more suggestive of cystitis.  Patient also has a nonobstructing 11 mm right upper pole kidney stone with no hydro-.  Urine culture negative, likely Dietrich causing irritation  -Patient evaluated daily by urology team  -Continue Flomax at discharge  -Able to restart Eliquis and aspirin daily  -Patient will be discharged on Dietrich catheter and will follow up with urology for definitive treatment     PVD  -Patient underwent right lower extremity angiogram with balloon angioplasty of anterior tibial and peroneal arteries 11/24/2020 with vascular surgery.  Postoperative ABIs without significant improvement, vascular thought this was likely secondary to spasm.  -will need repeat ultrasound outpatient  -Follow-up with vascular clinic outpatient     Adult failure to thrive  -Secondary to surgical interventions, prolonged hospital stays and infection  PT/OT recommending TCU, discharge plan 12/3     DM2  -A1c 7.9  -Held home Victoza, and was started on 3 units of mealtime insulin and increase to glargine 48 units at bedtime   -We will restart home Victoza and continue glargine at bedtime as well as mealtime insulin at discharge     Paroxysmal atrial fibrillation  -Currently rate controlled.    -Restart home Eliquis     HTN  -Lisinopril 5 mg p.o. daily     Normocytic anemia  -Hemoglobin 12.2 with MCV of 92.  Likely anemia of chronic disease, but with component of blood loss anemia from recent surgeries, hematuria.\"    He is to receive IV antibiotics weekly labs PT OT on the rehab unit.  He is on eliquis and now hematuria, he is " "unsure if mai was pulled. We will monitor and check HBG.     Allergies   Allergen Reactions   ? Cresol      Muscle cramps   ? Crestor [Rosuvastatin] Myalgia   ? Declomycin [Demeclocycline] Hives   :     Current Outpatient Medications   Medication Sig   ? acetaminophen (TYLENOL) 500 MG tablet Take 1-2 tablets (500-1,000 mg total) by mouth every 4 (four) hours as needed.   ? apixaban ANTICOAGULANT (ELIQUIS) 5 mg Tab tablet Take 1 tablet (5 mg total) by mouth 2 (two) times a day.   ? aspirin 81 MG EC tablet Take 81 mg by mouth daily.   ? blood glucose test strips Use 1 each As Directed 2 (two) times a day. Dispense brand per patient's insurance at pharmacy discretion.   ? blood-glucose meter (ONETOUCH VERIO IQ METER) Misc Use 1 each As Directed 2 (two) times a day.   ? cholecalciferol, vitamin D3, (VITAMIN D3) 5,000 unit Tab Take 5,000 Units by mouth daily.    ? ertapenem 1 g in NaCl 0.9 % 0.9 % 50 mL IVPB Infuse 1 g into a venous catheter daily.   ? insulin glargine (LANTUS SOLOSTAR U-100 INSULIN) 100 unit/mL (3 mL) pen Inject 44 Units under the skin at bedtime.   ? liraglutide (VICTOZA 3-RUPALI) 0.6 mg/0.1 mL (18 mg/3 mL) injection Inject 0.3 mL (1.8 mg total) under the skin daily.   ? lisinopriL (PRINIVIL,ZESTRIL) 5 MG tablet Take 1 tablet (5 mg total) by mouth daily.   ? multivit-min/FA/lycopen/lutein (CENTRUM SILVER MEN ORAL) Take 1 tablet by mouth daily.   ? mupirocin (BACTROBAN) 2 % ointment Apply topically daily as needed. Apply to wound.   ? neomycin-bacitracin-polymyxin (NEOSPORIN) ointment Apply to penis at catheter insertion site three times a day while catheter is in place.   ? NOVOLOG FLEXPEN U-100 INSULIN 100 unit/mL (3 mL) injection pen Inject 3 Units under the skin 3 (three) times a day before meals.   ? pen needle, diabetic 31 gauge x 5/16\" Ndle Used to inject insulin daily   ? polyethylene glycol (MIRALAX) 17 gram packet Take 1 packet (17 g total) by mouth daily as needed.   ? tamsulosin (FLOMAX) 0.4 " mg cap Take 1 capsule (0.4 mg total) by mouth daily after supper.   ? traMADoL (ULTRAM) 50 mg tablet Take 1 tablet (50 mg total) by mouth every 6 (six) hours as needed for pain.   ? vitamin E 400 unit capsule Take 400 Units by mouth daily.         REVIEW OF SYSTEMS:    Currently, no fever, chills, or rigors. Does not have any visual or hearing problems. Denies any chest pain, headaches, palpitations, lightheadedness, dizziness, shortness of breath, or cough. Appetite is good. Denies any GERD symptoms. Denies any difficulty with swallowing, nausea, or vomiting.  Denies any abdominal pain, diarrhea or constipation. Denies any urinary symptoms. No insomnia. No active bleeding. No rash.       PHYSICAL EXAMINATION:  Vitals:    12/10/20 0603   BP: 109/80   Pulse: 74   Temp: 98  F (36.7  C)   Weight: 188 lb (85.3 kg)         GENERAL: Awake, Alert, oriented x3, not in any form of acute distress, answers questions appropriately, follows simple commands, conversant  HEENT: Head is normocephalic with normal hair distribution. No evidence of trauma. Ears: No acute purulent discharge. Eyes: Conjunctivae pink with no scleral jaundice. Nose: Normal mucosa and septum. NECK: Supple with no cervical or supraclavicular lymphadenopathy. Trachea is midline.   ABDOMEN: Globular and soft, nontender to palpation, non distended, no masses, no organomegaly, good bowel sounds, no rebound or guarding, no peritoneal signs.   : Dietrich, hematuria, fading wwent from maroon to huey color urine throughout day,  EXTREMITIES: Full range of motion does have a PICC in right upper arm.  He is to be nonweightbearing to the right foot until cleared by Dr. Middleton and podiatry.  SKIN: Warm and dry, no erythema noted, no rashes or lesions.  NEUROLOGICAL: The patient is oriented to person, place and time. Strength and sensation are grossly intact. Face is symmetric.        Social distancing with PPE in place during assessment due to COVID-19  precautions.            LABS:    Lab Results   Component Value Date    WBC 8.2 12/07/2020    HGB 13.4 (L) 12/09/2020    HCT 41.8 12/07/2020    MCV 92 12/07/2020     12/07/2020       Results for orders placed or performed during the hospital encounter of 11/23/20   Basic Metabolic Panel   Result Value Ref Range    Sodium 139 136 - 145 mmol/L    Potassium 4.5 3.5 - 5.0 mmol/L    Chloride 103 98 - 107 mmol/L    CO2 30 22 - 31 mmol/L    Anion Gap, Calculation 6 5 - 18 mmol/L    Glucose 178 (H) 70 - 125 mg/dL    Calcium 8.3 (L) 8.5 - 10.5 mg/dL    BUN 18 8 - 28 mg/dL    Creatinine 0.88 0.70 - 1.30 mg/dL    GFR MDRD Af Amer >60 >60 mL/min/1.73m2    GFR MDRD Non Af Amer >60 >60 mL/min/1.73m2           Lab Results   Component Value Date    HGBA1C 7.9 (H) 09/04/2020     No results found for: [id number]  No results found for: [id number]    ASSESSMENT/PLAN:  1. 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 2 diabetes mellitus, with necrosis of muscle (H)    2. Urinary retention    3. Gross hematuria      1.  Osteomyelitis right foot, patient will have daily wound care done by skilled nursing staff.  He is to be nonweightbearing until further notice.  Weekly labs and sent to ID, Dr. Middleton will also follow as he remains nonweightbearing on his right foot.  PT and OT for strengthening and safety. SN to manage chronic medical conditions, he has DM as well as urinary retention, see dx list above for all comorbidity.    2/3.  Urinary retention, indwelling cath with hematuria: Obtain stats HBG this am, UA cs. He is on eliquis as well and may of pulled cath, he is not sure.  HBG was stable when back and urine is decreasing in gross hematuia.        Electronically signed by:  Marva Moore CNP  This progress note was completed using Dragon software and there may be grammatical errors.

## 2021-06-21 NOTE — LETTER
Letter by Charlotte Mckenna MBBS at      Author: Charlotte Mckenna MBBS Service: -- Author Type: --    Filed:  Encounter Date: 1/7/2021 Status: (Other)         Patient: Caleb Hernandez   MR Number: [id number]   YOB: 1935   Date of Visit: 1/7/2021       [facility] Admission note      Patient: Caleb Hernandez  MRN: [id number]  Date of Service: 1/7/2021      [facility] [[id number]]  Reason for Visit     Chief Complaint   Patient presents with   ? Review Of Multiple Medical Conditions   follow-up hypoglycemia and hypotension    Code Status     Full code    Assessment       -Right foot cellulitis with abscess work-up positive for osteomyelitis with septic arthritis of 2nd-4th tarsometatarsal joint  -Status post I&D of abscess on 11/27/2020  -Postoperative urinary retention with hematuria  -History of right 5th metatarsal excision on 11/2/2020  -Severe peripheral vascular disease.  -Status post right lower extremity angiogram with balloon angioplasty of anterior tibial and peroneal arteries on 11/24/2020  -- IDDM-2   - HTN with hypotension noted today  - FTT  -Generalized weakness with deconditioning    Plan     Patient has been admitted to the TCU for strengthening and rehab.  He has been admitted with ulceration of his right foot status post TheraSkin graft.  He currently has a wound VAC in place  He had a scheduled follow-up with vascular surgery and was advised to continue with his nonweightbearing status on his right lower extremity.  He also continues with protein supplementation.  Outpatient follow-up will be done as scheduled.  He is also diabetic and blood sugar control was reviewed it appears to be adequate with an occasional high greater than 200.  He has been taken off his high doses of insulin and is on a much lower dose now.  Blood sugars are being monitored clinically.  Blood pressures were low today however he has been running stable blood pressures before that so we will  monitor trends before adjusting any medications.  Oral intake reviewed with patient and he is reporting enough adequate intake.  Pain concerns are minimal and he has been asking for tramadol as needed.  Continue with his PT and OT  Recheck labs done in the TCU yesterday reviewed stable with a CRP of 1.7 now  Continue with his IV ertapenem till 1/11/2020    History     Patient is a very pleasant 85 y.o. male who is admitted to TCU  Patient presented to the hospital with right lower extremity cellulitis.  He was admitted with concerning infection.  MRI confirmed new synovitis with osteomyelitis of the 2nd-4th tarsometatarsal joint with septic arthritis.  He underwent I&D of the abscess on 11/27/2020.  Postoperatively he has been discharged nonweightbearing with outpatient ertapenem for 4 weeks  Subsequently he was readmitted on 12/17/2024 application of TheraSkin with redebridement of his wound.  Currently he has a wound VAC on.  He had a follow-up scheduled with vascular surgery yesterday and was discharged continuation with nonweightbearing and protein supplementation.  He will do a close follow-up with them.    He also has a history of severe peripheral vascular disease and underwent right lower extremity angiogram with angioplasty of anterior tibial and peroneal arteries on 11/24/2020 with vascular surgery.  He will require follow-up with vascular.  It was done in the TCU.  They have recommended further follow-up with podiatry for further work-up they recommend seeing him back in 3 months for ABIs  Currently denies any leg pain or ischemia concerns he does have occasionally foot pain which he is doctoring using tramadol as per him  Postoperatively had urinary retention with bladder wall thickening suggestive of cystitis.  He was noted to have a right upper pole kidney stone but no hydronephrosis.  Urology saw him.  He will continue with Flomax at discharge and at the recommendation he has resumed his Eliquis and  aspirin.  He has diabetes and his Lantus dose was adjusted to get tighter control of his blood sugars.  Blood sugar still continue to be running on the low side most of the days with blood sugars ranging from 90-1 50 with occasional high greater than 200 noted.  He is on a much lower dose of insulin.  Blood pressure also low today but prior to that he has had stable blood pressures.  Oral intake appears to be adequate    Past Medical History     Active Ambulatory (Non-Hospital) Problems    Diagnosis   ? Hematuria   ? 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 2 diabetes mellitus, with necrosis of muscle (H)   ? ACP (advance care planning)   ? Septic arthritis of right foot (H)   ? Gross hematuria   ? Urinary retention   ? Type 2 diabetes mellitus with diabetic peripheral angiopathy without gangrene, with long-term current use of insulin (H)   ? Adult failure to thrive   ? Normocytic anemia   ? Paroxysmal atrial fibrillation (H)   ? 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 Cellulitis of right lower extremity   ? Abscess of right foot   ? Cellulitis and abscess of foot excluding toe   ? Osteomyelitis of right foot (H)   ? Statin intolerance   ? Personal history of PE (pulmonary embolism)-- May 2020, unprovoked, with right heart strain   ? PVD (peripheral vascular disease) (H)   ? Overweight (BMI 25.0-29.9)   ? Chronic anticoagulation   ? Acute pulmonary embolism with acute cor pulmonale (H)   ? Diabetic ulcer of right foot associated with type 2 diabetes mellitus (H)   ? Type 2 diabetes mellitus (H)   ? Hyperlipidemia   ? Essential hypertension     Past Medical History:   Diagnosis Date   ? BPH (benign prostatic hyperplasia)    ? Cholelithiasis    ? Common bile duct (CBD) obstruction 9/4/2017   ? Compression Fracture Of Thoracic Vertebral Body    ? Diabetes mellitus (H)    ? Essential hypertension    ? Hyperlipidemia    ? Pancreatitis    ? Rib  Fracture    ? Sepsis due to pneumonia (H) 9/8/2017       Past Social History     Reviewed, and he  reports that he quit smoking about 29 years ago. He has never used smokeless tobacco. He reports that he does not drink alcohol or use drugs.    Family History     Reviewed, and family history includes Alcohol abuse in his father; Aortic aneurysm in his mother.    Medication List   Post Discharge Medication Reconciliation Status: discharge medications reconciled, continue medications without change   Current Outpatient Medications on File Prior to Visit   Medication Sig Dispense Refill   ? acetaminophen (TYLENOL) 500 MG tablet Take 1-2 tablets (500-1,000 mg total) by mouth every 4 (four) hours as needed.  0   ? apixaban ANTICOAGULANT (ELIQUIS) 5 mg Tab tablet Take 1 tablet (5 mg total) by mouth 2 (two) times a day. 60 tablet 3   ? aspirin 81 MG EC tablet Take 81 mg by mouth daily.     ? blood glucose test strips Use 1 each As Directed 2 (two) times a day. Dispense brand per patient's insurance at pharmacy discretion. 120 strip 6   ? blood-glucose meter (ONETOUCH VERIO IQ METER) Misc Use 1 each As Directed 2 (two) times a day. 1 each 0   ? cholecalciferol, vitamin D3, (VITAMIN D3) 5,000 unit Tab Take 5,000 Units by mouth daily.      ? insulin glargine (LANTUS SOLOSTAR U-100 INSULIN) 100 unit/mL (3 mL) pen Inject 44 Units under the skin at bedtime. (Patient taking differently: Inject 30 Units under the skin at bedtime. Takes 15 units q am and 30 units Q HS)     ? liraglutide (VICTOZA 3-RUPALI) 0.6 mg/0.1 mL (18 mg/3 mL) injection Inject 0.3 mL (1.8 mg total) under the skin daily.     ? lisinopriL (PRINIVIL,ZESTRIL) 5 MG tablet Take 1 tablet (5 mg total) by mouth daily. 90 tablet 3   ? multivit-min/FA/lycopen/lutein (CENTRUM SILVER MEN ORAL) Take 1 tablet by mouth daily.     ? mupirocin (BACTROBAN) 2 % ointment Apply topically daily as needed. Apply to wound.     ? neomycin-bacitracin-polymyxin (NEOSPORIN) ointment Apply to  "penis at catheter insertion site three times a day while catheter is in place. 15 g 0   ? NOVOLOG FLEXPEN U-100 INSULIN 100 unit/mL (3 mL) injection pen Inject 3 Units under the skin 3 (three) times a day before meals. 2.7 mL 0   ? oxyCODONE (ROXICODONE) 5 MG immediate release tablet Take 1 tablet (5 mg total) by mouth every 6 (six) hours as needed for pain. 30 tablet 0   ? pen needle, diabetic 31 gauge x 5/16\" Ndle Used to inject insulin daily 90 each 0   ? polyethylene glycol (MIRALAX) 17 gram packet Take 1 packet (17 g total) by mouth daily as needed.  0   ? tamsulosin (FLOMAX) 0.4 mg cap Take 1 capsule (0.4 mg total) by mouth daily after supper.  0   ? vitamin E 400 unit capsule Take 400 Units by mouth daily.       No current facility-administered medications on file prior to visit.        Allergies     Allergies   Allergen Reactions   ? Cresol      Muscle cramps   ? Crestor [Rosuvastatin] Myalgia   ? Declomycin [Demeclocycline] Hives       Review of Systems   A comprehensive review of 14 systems was done. Pertinent findings noted here and in history of present illness. All the rest negative.  Constitutional: Negative.  Negative for fever, chills, he has activity change, appetite change and fatigue.   HENT: Negative for congestion and facial swelling.    Eyes: Negative for photophobia, redness and visual disturbance.   Respiratory: Negative for cough and chest tightness.    Cardiovascular: Negative for chest pain, palpitations and  leg swelling.   Gastrointestinal: Negative for nausea, diarrhea, constipation, blood in stool and abdominal distention.   Genitourinary: Negative.  Has  A mai  Musculoskeletal: Negative.  Denies any pain pin his foot .  Skin: Negative.    Neurological: Negative for dizziness, tremors, syncope, weakness, light-headedness and headaches.   Hematological: Does not bruise/bleed easily.   Psychiatric/Behavioral: Negative.  Some confusion noted he just wants to go home as per " him      Physical Exam     Recent Vitals 1/6/2021   Weight -   BSA (m2) -   /78   Pulse 76   Temp 97.7   Temp src -   SpO2 -   Some recent data might be hidden   RC /54    Constitutional: Oriented to person, place, and time and appears well-developed.   HEENT:  Normocephalic and atraumatic.  Eyes: Conjunctivae and EOM are normal. Pupils are equal, round, and reactive to light. No discharge.  No scleral icterus. Nose normal. Mouth/Throat: Oropharynx is clear and moist. No oropharyngeal exudate.    NECK: Normal range of motion. Neck supple. No JVD present. No tracheal deviation present. No thyromegaly present.   CARDIOVASCULAR: Normal rate, regular rhythm and intact distal pulses.  Exam reveals no gallop and no friction rub.  Systolic murmur present.  PULMONARY: Effort normal and breath sounds normal. No respiratory distress.No Wheezing or rales.  ABDOMEN: Soft. Bowel sounds are normal. No distension and no mass.  There is no tenderness. There is no rebound and no guarding. No HSM.  MUSCULOSKELETAL: Normal range of motion. No edema and no tenderness. Mild kyphosis, no tenderness.  Rt foot missing 5th toe  He has a wound VAC in place  LYMPH NODES: Has no cervical, supraclavicular, axillary and groin adenopathy.   NEUROLOGICAL: Alert and oriented to person, place, and time. No cranial nerve deficit.  Normal muscle tone. Coordination normal.   GENITOURINARY: Deferred exam.  Dietrich present  SKIN: Skin is warm and dry. No rash noted. No erythema. No pallor.   EXTREMITIES: No cyanosis, no clubbing, no edema. No Deformity.  PSYCHIATRIC: Normal mood, affect and behavior.      Lab Results     Recent Results (from the past 240 hour(s))   C-Reactive Protein    Collection Time: 12/28/20 12:00 PM   Result Value Ref Range    CRP 4.1 (H) 0.0 - 0.8 mg/dL   Comprehensive Metabolic Panel    Collection Time: 12/28/20 12:00 PM   Result Value Ref Range    Sodium 140 136 - 145 mmol/L    Potassium 4.9 3.5 - 5.0 mmol/L    Chloride  104 98 - 107 mmol/L    CO2 27 22 - 31 mmol/L    Anion Gap, Calculation 9 5 - 18 mmol/L    Glucose 193 (H) 70 - 125 mg/dL    BUN 15 8 - 28 mg/dL    Creatinine 0.88 0.70 - 1.30 mg/dL    GFR MDRD Af Amer >60 >60 mL/min/1.73m2    GFR MDRD Non Af Amer >60 >60 mL/min/1.73m2    Bilirubin, Total 0.5 0.0 - 1.0 mg/dL    Calcium 8.8 8.5 - 10.5 mg/dL    Protein, Total 6.5 6.0 - 8.0 g/dL    Albumin 2.6 (L) 3.5 - 5.0 g/dL    Alkaline Phosphatase 98 45 - 120 U/L    AST 75 (H) 0 - 40 U/L     (H) 0 - 45 U/L   HM1 (CBC with Diff)    Collection Time: 12/28/20 12:00 PM   Result Value Ref Range    WBC 5.8 4.0 - 11.0 thou/uL    RBC 4.36 (L) 4.40 - 6.20 mill/uL    Hemoglobin 12.9 (L) 14.0 - 18.0 g/dL    Hematocrit 40.3 40.0 - 54.0 %    MCV 92 80 - 100 fL    MCH 29.6 27.0 - 34.0 pg    MCHC 32.0 32.0 - 36.0 g/dL    RDW 13.5 11.0 - 14.5 %    Platelets 259 140 - 440 thou/uL    MPV 10.1 8.5 - 12.5 fL    Neutrophils % 65 50 - 70 %    Lymphocytes % 15 (L) 20 - 40 %    Monocytes % 12 (H) 2 - 10 %    Eosinophils % 7 (H) 0 - 6 %    Basophils % 1 0 - 2 %    Immature Granulocyte % 1 (H) <=0 %    Neutrophils Absolute 3.8 2.0 - 7.7 thou/uL    Lymphocytes Absolute 0.9 0.8 - 4.4 thou/uL    Monocytes Absolute 0.7 0.0 - 0.9 thou/uL    Eosinophils Absolute 0.4 0.0 - 0.4 thou/uL    Basophils Absolute 0.1 0.0 - 0.2 thou/uL    Immature Granulocyte Absolute 0.0 <=0.0 thou/uL   Hepatic Profile    Collection Time: 12/29/20  7:34 AM   Result Value Ref Range    Bilirubin, Total 0.7 0.0 - 1.0 mg/dL    Bilirubin, Direct 0.2 <=0.5 mg/dL    Protein, Total 6.6 6.0 - 8.0 g/dL    Albumin 2.6 (L) 3.5 - 5.0 g/dL    Alkaline Phosphatase 96 45 - 120 U/L    AST 85 (H) 0 - 40 U/L     (H) 0 - 45 U/L   Urinalysis-UC if Indicated    Collection Time: 12/30/20 11:00 AM   Result Value Ref Range    Color, UA Shellie (!) Colorless, Yellow, Straw, Light Yellow    Clarity, UA Turbid (!) Clear    Glucose, UA Negative Negative    Bilirubin, UA Negative Negative    Ketones, UA  Negative Negative    Specific Gravity, UA 1.030 1.001 - 1.030    Blood, UA Large (!) Negative    pH, UA 6.0 4.5 - 8.0    Protein,  mg/dL (!) Negative mg/dL    Urobilinogen, UA <2.0 E.U./dL <2.0 E.U./dL, 2.0 E.U./dL    Nitrite, UA Negative Negative    Leukocytes, UA Moderate (!) Negative    Bacteria, UA Moderate (!) None Seen hpf    RBC, UA >100 (!) None Seen, 0-2 hpf    WBC, UA >100 (!) None Seen, 0-5 hpf    Squam Epithel, UA 0-5 None Seen, 0-5 lpf    Yeast, UA Many (!) None Seen hpf    Mucus, UA Few (!) None Seen lpf    Ca Oxalate Meliza, UA Present (!) None Seen   Culture, Urine    Collection Time: 12/30/20 11:00 AM    Specimen: Urine   Result Value Ref Range    Culture No Growth    COVID-19 Virus PCR MRF    Collection Time: 01/04/21 11:00 AM    Specimen: Respiratory   Result Value Ref Range    COVID-19 VIRUS SPECIMEN SOURCE Nares     2019-nCOV Not Detected    C-Reactive Protein    Collection Time: 01/05/21  9:26 AM   Result Value Ref Range    CRP 1.7 (H) 0.0 - 0.8 mg/dL   Basic Metabolic Panel    Collection Time: 01/05/21  9:26 AM   Result Value Ref Range    Sodium 140 136 - 145 mmol/L    Potassium 4.3 3.5 - 5.0 mmol/L    Chloride 103 98 - 107 mmol/L    CO2 27 22 - 31 mmol/L    Anion Gap, Calculation 10 5 - 18 mmol/L    Glucose 120 70 - 125 mg/dL    Calcium 8.5 8.5 - 10.5 mg/dL    BUN 18 8 - 28 mg/dL    Creatinine 0.96 0.70 - 1.30 mg/dL    GFR MDRD Af Amer >60 >60 mL/min/1.73m2    GFR MDRD Non Af Amer >60 >60 mL/min/1.73m2   Hepatic Profile    Collection Time: 01/05/21  9:26 AM   Result Value Ref Range    Bilirubin, Total 0.6 0.0 - 1.0 mg/dL    Bilirubin, Direct 0.3 <=0.5 mg/dL    Protein, Total 6.4 6.0 - 8.0 g/dL    Albumin 2.6 (L) 3.5 - 5.0 g/dL    Alkaline Phosphatase 100 45 - 120 U/L    AST 32 0 - 40 U/L    ALT 54 (H) 0 - 45 U/L   HM1 (CBC with Diff)    Collection Time: 01/05/21  9:26 AM   Result Value Ref Range    WBC 5.7 4.0 - 11.0 thou/uL    RBC 4.21 (L) 4.40 - 6.20 mill/uL    Hemoglobin 12.6 (L) 14.0  - 18.0 g/dL    Hematocrit 38.9 (L) 40.0 - 54.0 %    MCV 92 80 - 100 fL    MCH 29.9 27.0 - 34.0 pg    MCHC 32.4 32.0 - 36.0 g/dL    RDW 13.9 11.0 - 14.5 %    Platelets 293 140 - 440 thou/uL    MPV 9.7 8.5 - 12.5 fL    Neutrophils % 62 50 - 70 %    Lymphocytes % 19 (L) 20 - 40 %    Monocytes % 12 (H) 2 - 10 %    Eosinophils % 6 0 - 6 %    Basophils % 1 0 - 2 %    Immature Granulocyte % 1 (H) <=0 %    Neutrophils Absolute 3.6 2.0 - 7.7 thou/uL    Lymphocytes Absolute 1.1 0.8 - 4.4 thou/uL    Monocytes Absolute 0.7 0.0 - 0.9 thou/uL    Eosinophils Absolute 0.3 0.0 - 0.4 thou/uL    Basophils Absolute 0.1 0.0 - 0.2 thou/uL    Immature Granulocyte Absolute 0.0 <=0.0 thou/uL                DONNY Kwan

## 2021-06-21 NOTE — LETTER
Letter by Marva Moore CNP at      Author: Marva Moore CNP Service: -- Author Type: --    Filed:  Encounter Date: 2021 Status: (Other)         Patient: Caleb Hernandez   MR Number: [id number]   YOB: 1935   Date of Visit: 2021     [facility]      NAME:  Caleb Hernandez             :  1935  MRN: [id number]  CODE STATUS:  FULL CODE    VISIT TYPE: DISCHARGE SUMMARY  FACILYTY: [facility] [[id number]]                    PRIMARY CARE PROVIDER: Otis Lozano MD    DISCHARGE DIAGNOSIS:      1. Type 2 diabetes mellitus with diabetic peripheral angiopathy without gangrene, with long-term current use of insulin (H)    2. 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 2 diabetes mellitus, with necrosis of muscle (H)         DISCHARGE MEDICATIONS:         Medication List          Accurate as of 2021  6:33 PM. If you have any questions, ask your nurse or doctor.            CHANGE how you take these medications    Lantus Solostar U-100 Insulin 100 unit/mL (3 mL) pen  Generic drug: insulin glargine  Inject 30 Units under the skin at bedtime. Takes 15 units q am and 30 units Q HS  What changed: additional instructions        CONTINUE taking these medications    acetaminophen 500 MG tablet  Commonly known as: TYLENOL  Take 1-2 tablets (500-1,000 mg total) by mouth every 4 (four) hours as needed.     apixaban ANTICOAGULANT 5 mg Tab tablet  Commonly known as: ELIQUIS  Take 1 tablet (5 mg total) by mouth 2 (two) times a day.     aspirin 81 MG EC tablet     blood glucose test strips  Use 1 each As Directed 2 (two) times a day. Dispense brand per patient's insurance at pharmacy discretion.     blood-glucose meter Misc  Commonly known as: OneTouch Verio IQ Meter  Use 1 each As Directed 2 (two) times a day.     CENTRUM SILVER MEN ORAL     cholecalciferol (vitamin D3) 5,000 unit Tab     mupirocin 2 % ointment  Commonly known as: BACTROBAN    "  neomycin-bacitracin-polymyxin ointment  Commonly known as: NEOSPORIN  Apply to penis at catheter insertion site three times a day while catheter is in place.     NovoLOG Flexpen U-100 Insulin 100 unit/mL (3 mL) injection pen  Generic drug: insulin aspart U-100  Inject 3 Units under the skin 3 (three) times a day before meals.     oxyCODONE 5 MG immediate release tablet  Commonly known as: ROXICODONE  Take 1 tablet (5 mg total) by mouth every 6 (six) hours as needed for pain.     pen needle, diabetic 31 gauge x 5/16\" Ndle  Used to inject insulin daily     phenazopyridine 100 MG tablet  Commonly known as: PYRIDIUM     polyethylene glycol 17 gram packet  Commonly known as: MIRALAX  Take 1 packet (17 g total) by mouth daily as needed.     tamsulosin 0.4 mg Cap  Commonly known as: FLOMAX  Take 1 capsule (0.4 mg total) by mouth daily after supper.     traMADoL 50 mg tablet  Commonly known as: ULTRAM     Victoza 3-Ghulam 0.6 mg/0.1 mL (18 mg/3 mL) injection  Generic drug: liraglutide  Inject 0.3 mL (1.8 mg total) under the skin daily.     vitamin E 200 UNIT capsule     vitamin E 400 unit capsule            HISTORY OF PRESENT ILLNESS: Caleb Hernandez is a 85 y.o. male seen for a face to face discontinue home in am. He will require ongoing A services for PT/OT. He lives with his wife and reports he is very happy to be going home and plans to be successful. As per his EMR at [facility], \"with DM2, PVD, HTN presented with right lower extremity cellulitis, osteomyelitis, septic arthritis.     Right foot cellulitis with abscess, osteomyelitis, septic arthritis  -Patient with a recent excision of the right fifth metatarsal on 11/2/2020 with podiatry.  Wound cultures with Bacteroides and Enterobacter cloaca.  Patient directly admitted from vascular clinic with concerns for worsening postoperative infection.  MRI with new synovitis, osteomyelitis of the 2nd-4th tarsometatarsal joint and likely septic arthritis.  Patient underwent " I&D of abscess on 11/27, this wound culture now with Corynebacterium.  Patient initially on vancomycin and Zosyn, currently on meropenem with plan for outpatient ertapenem for 4 weeks.  -Nonweightbearing  -Patient received vancomycin and meropenem while inpatient and this will be switched to ertapenem daily dose x4 weeks with ID to follow as outpatient     Urinary retention with hematuria  -Patient had Dietrich catheter placed after angiogram for acute urinary retention.  Patient developed hematuria on 11/30.  CT abdomen and pelvis with significant irregular bladder wall thickening, more suggestive of cystitis.  Patient also has a nonobstructing 11 mm right upper pole kidney stone with no hydro-.  Urine culture negative, likely Dietrich causing irritation  -Patient evaluated daily by urology team  -Continue Flomax at discharge  -Able to restart Eliquis and aspirin daily  -Patient will be discharged on Dietrich catheter and will follow up with urology for definitive treatment     PVD  -Patient underwent right lower extremity angiogram with balloon angioplasty of anterior tibial and peroneal arteries 11/24/2020 with vascular surgery.  Postoperative ABIs without significant improvement, vascular thought this was likely secondary to spasm.  -will need repeat ultrasound outpatient  -Follow-up with vascular clinic outpatient     Adult failure to thrive  -Secondary to surgical interventions, prolonged hospital stays and infection  PT/OT recommending TCU, discharge plan 12/3     DM2  -A1c 7.9  -Held home Victoza, and was started on 3 units of mealtime insulin and increase to glargine 48 units at bedtime   -We will restart home Victoza and continue glargine at bedtime as well as mealtime insulin at discharge     Paroxysmal atrial fibrillation  -Currently rate controlled.    -Restart home Eliquis     HTN  -Lisinopril 5 mg p.o. daily     Normocytic anemia  -Hemoglobin 12.2 with MCV of 92.  Likely anemia of chronic disease, but with  "component of blood loss anemia from recent surgeries, hematuria.\"     He did see ID and his IVAB were completed and his mai is out. Will require ongoing wound care with HH services as well as therapies.He states he is ready to discontinue home and have his wifes curly pasta salad.    SKILLED NURSING FACILITY COURSE:  During this TCU stay, patient completed all anticipated goals of therapy.      PHYSICAL EXAMINATION:    Vitals:    02/01/21 1809   BP: 103/62   Pulse: 70   Temp: 97.1  F (36.2  C)   Weight: 182 lb 12.8 oz (82.9 kg)         GENERAL: Awake, Alert, oriented x3, not in any form of acute distress, answers questions appropriately, follows simple commands, conversant  HEENT: Head is normocephalic with normal hair distribution. No evidence of trauma. Ears: No acute purulent discharge. Eyes: Conjunctivae pink with no scleral jaundice. Nose: Normal mucosa and septum. NECK: Supple with no cervical or supraclavicular lymphadenopathy. Trachea is midline.   EXTREMITIES: Atraumatic. Full range of motion on both upper and lower extremities, there is no tenderness to palpation, no pedal edema, no cyanosis or clubbing, no calf tenderness, normal cap refill, no joint swelling.  SKIN: Warm and dry, no erythema noted, no rashes or lesions.See HPI  NEUROLOGICAL: The patient is oriented to person, place and time. Strength and sensation are grossly intact. Face is symmetric.    PPE and social distancing provided with assessment  For covid 19 precautions.  LABS:  All labs reviewed in the nursing home record.        DISCHARGE PLAN:  The encounter was in whole, or part related to the primary reason for home health.  The Patient is homebound due to:  it is  taxing and it will take a considerable amount of effort for patient to leave the home.  She is dependent on others for transportation.  The patient is confined to her home and needs intermittent skilled nursing, PT,OT, RN.       Patient to be followed by home care for " physical therapy to eval and treat for strengthening, balance, endurance, and safety with mobility, and ambulation.  Patient to be followed by home care for occupational therapy to eval and treat for strengthening, ADL needs, adaptive equipment, and safety.  Patient to be followed by home care for nursing services for medication set up and teaching, symptom and disease processes monitoring and education.    Patient to be followed by home care for home health aid services for bathing and ADL needs.  Planned discharge.  All therapy goals have been met.  Family will assist with discharge and transportation.        Patient will follow up with PCP within 7- days after discharge for medication mangagment and appropriate lab studies.          Electronically signed by:  Marva Moore CNP  This progress note was completed using Dragon software and there may be grammatical errors.      For documentation purposes, chart review, medication management, and discharge coordination of care was greater than 35 minutes

## 2021-06-21 NOTE — LETTER
"Letter by Marva Moore CNP at      Author: Marva Moore CNP Service: -- Author Type: --    Filed:  Encounter Date: 2020 Status: (Other)         Patient: Caleb Hernandez   MR Number: [id number]   YOB: 1935   Date of Visit: 2020       [facility]      NAME:  Caleb Hernandez             :  1935    MRN: [id number]    CODE STATUS:  FULL CODE    FACILITY: [facility] [[id number]]       CHIEF COMPLAIN/REASON FOR VISIT:  Chief Complaint   Patient presents with   ? Problem Visit     hematuria       HISTORY OF PRESENT ILLNESS: Caleb Hernandez is a 85 y.o. male being seen at the request of the nurse as he has new onset gross  hematuria. Dietrich cath in place.Reports discomfort to sp area.He is currently on the TCU status post acute care due to an I&D of his right lower foot with cellulitis and is followed by Dr. Garcia podiatry.  He will be on daily IV antibiotics on the TCU.  As per his EMR at [facility], \"with DM2, PVD, HTN presented with right lower extremity cellulitis, osteomyelitis, septic arthritis.     Right foot cellulitis with abscess, osteomyelitis, septic arthritis  -Patient with a recent excision of the right fifth metatarsal on 2020 with podiatry.  Wound cultures with Bacteroides and Enterobacter cloaca.  Patient directly admitted from vascular clinic with concerns for worsening postoperative infection.  MRI with new synovitis, osteomyelitis of the 2nd-4th tarsometatarsal joint and likely septic arthritis.  Patient underwent I&D of abscess on , this wound culture now with Corynebacterium.  Patient initially on vancomycin and Zosyn, currently on meropenem with plan for outpatient ertapenem for 4 weeks.  -Nonweightbearing  -Patient received vancomycin and meropenem while inpatient and this will be switched to ertapenem daily dose x4 weeks with ID to follow as outpatient     Urinary retention with hematuria  -Patient had Dietrich " "catheter placed after angiogram for acute urinary retention.  Patient developed hematuria on 11/30.  CT abdomen and pelvis with significant irregular bladder wall thickening, more suggestive of cystitis.  Patient also has a nonobstructing 11 mm right upper pole kidney stone with no hydro-.  Urine culture negative, likely Dietrich causing irritation  -Patient evaluated daily by urology team  -Continue Flomax at discharge  -Able to restart Eliquis and aspirin daily  -Patient will be discharged on Dietrich catheter and will follow up with urology for definitive treatment     PVD  -Patient underwent right lower extremity angiogram with balloon angioplasty of anterior tibial and peroneal arteries 11/24/2020 with vascular surgery.  Postoperative ABIs without significant improvement, vascular thought this was likely secondary to spasm.  -will need repeat ultrasound outpatient  -Follow-up with vascular clinic outpatient     Adult failure to thrive  -Secondary to surgical interventions, prolonged hospital stays and infection  PT/OT recommending TCU, discharge plan 12/3     DM2  -A1c 7.9  -Held home Victoza, and was started on 3 units of mealtime insulin and increase to glargine 48 units at bedtime   -We will restart home Victoza and continue glargine at bedtime as well as mealtime insulin at discharge     Paroxysmal atrial fibrillation  -Currently rate controlled.    -Restart home Eliquis     HTN  -Lisinopril 5 mg p.o. daily     Normocytic anemia  -Hemoglobin 12.2 with MCV of 92.  Likely anemia of chronic disease, but with component of blood loss anemia from recent surgeries, hematuria.\"    He is to receive IV antibiotics and  weekly labs PT OT on the rehab unit.  He is on eliquis and ASA and I informed nursing to resume sp surgical procedure on 12/18. He is now with hematuria, we will see if rt a uti , he has CBC drawn today.    Allergies   Allergen Reactions   ? Cresol      Muscle cramps   ? Crestor [Rosuvastatin] Myalgia   ? " "Declomycin [Demeclocycline] Hives   :     Current Outpatient Medications   Medication Sig   ? acetaminophen (TYLENOL) 500 MG tablet Take 1-2 tablets (500-1,000 mg total) by mouth every 4 (four) hours as needed.   ? apixaban ANTICOAGULANT (ELIQUIS) 5 mg Tab tablet Take 1 tablet (5 mg total) by mouth 2 (two) times a day.   ? aspirin 81 MG EC tablet Take 81 mg by mouth daily.   ? blood glucose test strips Use 1 each As Directed 2 (two) times a day. Dispense brand per patient's insurance at pharmacy discretion.   ? blood-glucose meter (ONETOUCH VERIO IQ METER) Misc Use 1 each As Directed 2 (two) times a day.   ? cholecalciferol, vitamin D3, (VITAMIN D3) 5,000 unit Tab Take 5,000 Units by mouth daily.    ? ertapenem 1 g in NaCl 0.9 % 0.9 % 50 mL IVPB Infuse 1 g into a venous catheter daily.   ? insulin glargine (LANTUS SOLOSTAR U-100 INSULIN) 100 unit/mL (3 mL) pen Inject 44 Units under the skin at bedtime. (Patient taking differently: Inject 30 Units under the skin at bedtime. Takes 15 units q am and 30 units Q HS)   ? liraglutide (VICTOZA 3-RUPALI) 0.6 mg/0.1 mL (18 mg/3 mL) injection Inject 0.3 mL (1.8 mg total) under the skin daily.   ? lisinopriL (PRINIVIL,ZESTRIL) 5 MG tablet Take 1 tablet (5 mg total) by mouth daily.   ? multivit-min/FA/lycopen/lutein (CENTRUM SILVER MEN ORAL) Take 1 tablet by mouth daily.   ? mupirocin (BACTROBAN) 2 % ointment Apply topically daily as needed. Apply to wound.   ? neomycin-bacitracin-polymyxin (NEOSPORIN) ointment Apply to penis at catheter insertion site three times a day while catheter is in place.   ? NOVOLOG FLEXPEN U-100 INSULIN 100 unit/mL (3 mL) injection pen Inject 3 Units under the skin 3 (three) times a day before meals.   ? oxyCODONE (ROXICODONE) 5 MG immediate release tablet Take 1 tablet (5 mg total) by mouth every 6 (six) hours as needed for pain.   ? pen needle, diabetic 31 gauge x 5/16\" Ndle Used to inject insulin daily   ? polyethylene glycol (MIRALAX) 17 gram packet " Take 1 packet (17 g total) by mouth daily as needed.   ? tamsulosin (FLOMAX) 0.4 mg cap Take 1 capsule (0.4 mg total) by mouth daily after supper.   ? vitamin E 400 unit capsule Take 400 Units by mouth daily.         REVIEW OF SYSTEMS:    Currently, no fever, chills, or rigors. Does not have any visual or hearing problems. Denies any chest pain, headaches, palpitations, lightheadedness, dizziness, shortness of breath, or cough. Appetite is good. Denies any GERD symptoms. Denies any difficulty with swallowing, nausea, or vomiting.  Denies any abdominal pain, diarrhea or constipation. Denies any urinary symptoms. No insomnia. No active bleeding other than hematuria. No rash.       PHYSICAL EXAMINATION:  Vitals:    12/29/20 1512   BP: 128/81   Pulse: 62   Temp: 98.5  F (36.9  C)   Weight: 181 lb 6.4 oz (82.3 kg)         GENERAL: Awake, Alert, oriented x3, not in any form of acute distress, answers questions appropriately, follows simple commands, conversant  HEENT: Head is normocephalic with normal hair distribution. No evidence of trauma. Ears: No acute purulent discharge. Eyes: Conjunctivae pink with no scleral jaundice. Nose: Normal mucosa and septum. NECK: Supple with no cervical or supraclavicular lymphadenopathy. Trachea is midline.   ABDOMEN: Globular and soft, nontender to palpation, non distended, no masses, no organomegaly, good bowel sounds, no rebound or guarding, no peritoneal signs.   : Dietrich, hematuria, fading went from maroon to huey color urine throughout day, new onset  EXTREMITIES: Full range of motion does have a PICC in right upper arm.  He is to be nonweightbearing to the right foot until cleared by Dr. Middleton and podiatry.  SKIN: Warm and dry, no erythema noted, no rashes or lesions.  NEUROLOGICAL: The patient is oriented to person, place and time. Strength and sensation are grossly intact. Face is symmetric.        Social distancing with PPE in place during assessment due to COVID-19  precautions.            LABS:    Lab Results   Component Value Date    WBC 5.8 12/28/2020    HGB 12.9 (L) 12/28/2020    HCT 40.3 12/28/2020    MCV 92 12/28/2020     12/28/2020       Results for orders placed or performed in visit on 12/21/20   Basic Metabolic Panel   Result Value Ref Range    Sodium 140 136 - 145 mmol/L    Potassium 4.2 3.5 - 5.0 mmol/L    Chloride 104 98 - 107 mmol/L    CO2 29 22 - 31 mmol/L    Anion Gap, Calculation 7 5 - 18 mmol/L    Glucose 84 70 - 125 mg/dL    Calcium 8.3 (L) 8.5 - 10.5 mg/dL    BUN 11 8 - 28 mg/dL    Creatinine 0.77 0.70 - 1.30 mg/dL    GFR MDRD Af Amer >60 >60 mL/min/1.73m2    GFR MDRD Non Af Amer >60 >60 mL/min/1.73m2           Lab Results   Component Value Date    HGBA1C 7.9 (H) 09/04/2020     No results found for: [id number]  No results found for: [id number]    ASSESSMENT/PLAN:  1. Gross hematuria    2. Osteomyelitis of right foot, unspecified type (H)          1 . Urinary retention and hematuria: Cath in place due to retention. Hematuria. On duac, but had UTI recently and nursing reports due to surgery he never finished abx course.They are to obtain a UA. Start a prn pyridium for discomfort. I also request  CONSULT AS HE HAS HAD FREQUESNT HEMATURIA    2.  Abscess right ft and Osteomyelitis right foot,with wound vac on and change Q 3 days .  He is to be nonweightbearing until further notice.  Weekly labs and sent to ID, Dr. Middleton will also follow as he remains nonweightbearing on his right foot.  PT and OT for strengthening and safety. SN to manage chronic medical conditions, he has DM as well as urinary retention, see dx list above for all comorbidity. To continue IVAB until 1/11/20 per ID.        Electronically signed by:  Marva Moore CNP  This progress note was completed using Dragon software and there may be grammatical errors.

## 2021-06-21 NOTE — LETTER
Letter by Charlotte Mckenna MBBS at      Author: Charlotte Mckenna MBBS Service: -- Author Type: --    Filed:  Encounter Date: 1/28/2021 Status: (Other)         [facility]  [address] [address] [address]                                  January 28, 2021    Patient: Caleb Hernandez   MR Number: [id number]   YOB: 1935   Date of Visit: 1/28/2021     Dear Dr. Mccann:    Thank you for referring Caleb Hernandez to me for evaluation. Below are the relevant portions of my assessment and plan of care.    If you have questions, please do not hesitate to call me. I look forward to following Caleb along with you.    Sincerely,        DONNY Kwan          CC  No Recipients  Charlotte Mckenna MBBS  1/28/2021  4:00 PM  [facility] Admission note      Patient: Caleb Hernandez  MRN: [id number]  Date of Service: 1/28/2021      [facility] [[id number]]  Reason for Visit     Chief Complaint   Patient presents with   ? Review Of Multiple Medical Conditions   follow-up hyperglycemia / non healing wound    Code Status     Full code    Assessment     - ulceration rt foot s/p debridement  -Right foot cellulitis with abscess work-up positive for osteomyelitis with septic arthritis of 2nd-4th tarsometatarsal joint  -Status post I&D of abscess on 11/27/2020  -Postoperative urinary retention with hematuria  -History of right 5th metatarsal excision on 11/2/2020  -Severe peripheral vascular disease.  -Status post right lower extremity angiogram with balloon angioplasty of anterior tibial and peroneal arteries on 11/24/2020  -- IDDM-2   - HTN with hypotension noted today  - FTT  -Generalized weakness with deconditioning    Plan     Patient has been admitted to the TCU for strengthening and rehab.  He has been admitted with ulceration of his right foot status post TheraSkin graft.  He currently has a wound VAC in place which was discontinued during his last visit  He remains  nonweightbearing on his right lower extremity.  He has had a follow-up with podiatry weekly and had wound debridement done.  Weightbearing status unfortunately was not restored.  He is pushing for an early discharge home and now wants to go home next week  He has all the adaptive equipment including wheelchair and a scooter as per him  Blood pressures have been stable with no hypotensive episode reported.  His oral intake has improved significantly which is reflected in his blood sugars which are now consistently greater than 200  Plan is to increase his Lantus to 35 units in the morning  Increase Lantus to 18 units at bedtime  Continue to monitor blood sugar trends  Dietrich discontinued      History     Patient is a very pleasant 85 y.o. male who is admitted to TCU  Patient presented to the hospital with right lower extremity cellulitis.  He was admitted with concerning infection.  MRI confirmed new synovitis with osteomyelitis of the 2nd-4th tarsometatarsal joint with septic arthritis.  He underwent I&D of the abscess on 11/27/2020.  Postoperatively he has been discharged nonweightbearing with outpatient ertapenem for 4 weeks  Subsequently he was readmitted on 12/17/2024 application of TheraSkin with redebridement of his wound.  Wound is healing slowly.    He recently had his IV antibiotics discontinued.  Unfortunately recheck labs had shown his CRP is elevated at 8.8  Follow-up done with podiatry yesterday.  His wound VAC has been discontinued.  His wound was debrided.  We have recommended that he may need additional surgery if his wound fails to improve.  No improvement in his nonweightbearing status and he continues to be nonweightbearing for now.  Wound is not healing and he currently has [facility] ordered for it.  Staff is not reporting any worsening    He also has a history of severe peripheral vascular disease and underwent right lower extremity angiogram with angioplasty of anterior tibial and peroneal  arteries on 11/24/2020 with vascular surgery.  He will require follow-up with vascular.  It was done in the TCU.  They have recommended further follow-up with podiatry for further work-up they recommend seeing him back in 3 months for ABIs  He reports no concerns during daytime but at nighttime he does report some pain in his foot.  He has been using her tramadol quite regularly    Postoperatively had urinary retention with bladder wall thickening suggestive of cystitis.  He accidentally removed his Dietrich catheter.  He follows with urology as a result and had a voiding cystoscopy urogram done.  Currently discharged without a Dietrich and advised to monitor urinary retention concern  This was reviewed with staff he has required an occasional PVR check of more than 300 necessitating in and out cath which she has tolerated well  For the past 24 hours he has voided well  Denies any dysuria    In addition he is a diabetic  Recently has insulin dosage was reduced because of persistent hypoglycemia.  He is now feeling better and reporting that his intake has increased.  He is requesting results.  This is reflected in his blood sugars postprandially which are greater than 200 consistently    Past Medical History     Active Ambulatory (Non-Hospital) Problems    Diagnosis   ? Amputated toe, right (H)   ? Hematuria   ? 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 2 diabetes mellitus, with necrosis of muscle (H)   ? ACP (advance care planning)   ? Septic arthritis of right foot (H)   ? Gross hematuria   ? Urinary retention   ? Type 2 diabetes mellitus with diabetic peripheral angiopathy without gangrene, with long-term current use of insulin (H)   ? Adult failure to thrive   ? Normocytic anemia   ? Paroxysmal atrial fibrillation (H)   ? 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 Osteomyelitis of right foot (H)   ? Statin intolerance   ? Personal history of PE  (pulmonary embolism)-- May 2020, unprovoked, with right heart strain   ? PVD (peripheral vascular disease) (H)   ? Overweight (BMI 25.0-29.9)   ? Chronic anticoagulation   ? Hyperlipidemia   ? Essential hypertension     Past Medical History:   Diagnosis Date   ? Abscess of right foot 11/23/2020   ? Acute pulmonary embolism with acute cor pulmonale (H) 5/23/2020   ? BPH (benign prostatic hyperplasia)    ? Cholelithiasis    ? Common bile duct (CBD) obstruction 9/4/2017   ? Compression Fracture Of Thoracic Vertebral Body    ? Diabetes mellitus (H)    ? Essential hypertension    ? Hyperlipidemia    ? Pancreatitis    ? Rib Fracture    ? Sepsis due to pneumonia (H) 9/8/2017       Past Social History     Reviewed, and he  reports that he quit smoking about 29 years ago. He has never used smokeless tobacco. He reports that he does not drink alcohol or use drugs.    Family History     Reviewed, and family history includes Alcohol abuse in his father; Aortic aneurysm in his mother.    Medication List   Post Discharge Medication Reconciliation Status: discharge medications reconciled, continue medications without change   Current Outpatient Medications on File Prior to Visit   Medication Sig Dispense Refill   ? acetaminophen (TYLENOL) 500 MG tablet Take 1-2 tablets (500-1,000 mg total) by mouth every 4 (four) hours as needed.  0   ? apixaban ANTICOAGULANT (ELIQUIS) 5 mg Tab tablet Take 1 tablet (5 mg total) by mouth 2 (two) times a day. 60 tablet 3   ? aspirin 81 MG EC tablet Take 81 mg by mouth daily.     ? blood glucose test strips Use 1 each As Directed 2 (two) times a day. Dispense brand per patient's insurance at pharmacy discretion. 120 strip 6   ? blood-glucose meter (ONETOUCH VERIO IQ METER) Misc Use 1 each As Directed 2 (two) times a day. 1 each 0   ? insulin glargine (LANTUS SOLOSTAR U-100 INSULIN) 100 unit/mL (3 mL) pen Inject 30 Units under the skin at bedtime. Takes 15 units q am and 30 units Q HS     ?  "liraglutide (VICTOZA 3-RUPALI) 0.6 mg/0.1 mL (18 mg/3 mL) injection Inject 0.3 mL (1.8 mg total) under the skin daily.     ? multivit-min/FA/lycopen/lutein (CENTRUM SILVER MEN ORAL) Take 1 tablet by mouth daily.     ? mupirocin (BACTROBAN) 2 % ointment Apply topically daily as needed. Apply to wound.     ? neomycin-bacitracin-polymyxin (NEOSPORIN) ointment Apply to penis at catheter insertion site three times a day while catheter is in place. 15 g 0   ? NOVOLOG FLEXPEN U-100 INSULIN 100 unit/mL (3 mL) injection pen Inject 3 Units under the skin 3 (three) times a day before meals. 2.7 mL 0   ? oxyCODONE (ROXICODONE) 5 MG immediate release tablet Take 1 tablet (5 mg total) by mouth every 6 (six) hours as needed for pain. 30 tablet 0   ? pen needle, diabetic 31 gauge x 5/16\" Ndle Used to inject insulin daily 90 each 0   ? polyethylene glycol (MIRALAX) 17 gram packet Take 1 packet (17 g total) by mouth daily as needed.  0   ? tamsulosin (FLOMAX) 0.4 mg cap Take 1 capsule (0.4 mg total) by mouth daily after supper.  0   ? traMADoL (ULTRAM) 50 mg tablet Take 50 mg by mouth every 6 (six) hours as needed for pain.     ? vitamin E 400 unit capsule Take 400 Units by mouth daily.       No current facility-administered medications on file prior to visit.        Allergies     Allergies   Allergen Reactions   ? Cresol      Muscle cramps   ? Crestor [Rosuvastatin] Myalgia   ? Declomycin [Demeclocycline] Hives       Review of Systems   A comprehensive review of 14 systems was done. Pertinent findings noted here and in history of present illness. All the rest negative.  Constitutional: Negative.  Negative for fever, chills, he has activity change, appetite change and fatigue.   HENT: Negative for congestion and facial swelling.    Eyes: Negative for photophobia, redness and visual disturbance.   Respiratory: Negative for cough and chest tightness.    Cardiovascular: Negative for chest pain, palpitations and  leg swelling. " "  Gastrointestinal: Negative for nausea, diarrhea, constipation, blood in stool and abdominal distention.   Genitourinary: Negative.    Musculoskeletal: Negative.  Denies any pain pin his foot .using a scooter for ambulation  Skin: Negative.    Neurological: Negative for dizziness, tremors, syncope, weakness, light-headedness and headaches.   Hematological: Does not bruise/bleed easily.   Psychiatric/Behavioral: Negative.  Some confusion noted he just wants to go home as per him      Physical Exam     Recent Vitals 1/28/2021   Height 5' 10\"   Weight 182 lbs 10 oz   BSA (m2) 2.02 m2   /79   Pulse 79   Temp 99   Temp src -   SpO2 95   Some recent data might be hidden       Constitutional: Oriented to person, place, and time and appears well-developed.   HEENT:  Normocephalic and atraumatic.  Eyes: Conjunctivae and EOM are normal. Pupils are equal, round, and reactive to light. No discharge.  No scleral icterus. Nose normal. Mouth/Throat: Oropharynx is clear and moist. No oropharyngeal exudate.    NECK: Normal range of motion. Neck supple. No JVD present. No tracheal deviation present. No thyromegaly present.   CARDIOVASCULAR: Normal rate, regular rhythm and intact distal pulses.  Exam reveals no gallop and no friction rub.  Systolic murmur present.  PULMONARY: Effort normal and breath sounds normal. No respiratory distress.No Wheezing or rales.  ABDOMEN: Soft. Bowel sounds are normal. No distension and no mass.  There is no tenderness. There is no rebound and no guarding. No HSM.  MUSCULOSKELETAL: Normal range of motion. No edema and no tenderness. Mild kyphosis, no tenderness.  Rt foot missing 5th toe  Extensive ulceration noted on the lateral margin of his foot extending from the base of his missing fifth toe extending to his midfoot with necrotic skin and serosanguineous drainage noted  Nonhealing wound  LYMPH NODES: Has no cervical, supraclavicular, axillary and groin adenopathy.   NEUROLOGICAL: Alert and " oriented to person, place, and time. No cranial nerve deficit.  Normal muscle tone. Coordination normal.   GENITOURINARY: Deferred exam.  SKIN: Skin is warm and dry. No rash noted. No erythema. No pallor.   EXTREMITIES: No cyanosis, no clubbing, no edema. No Deformity.  PSYCHIATRIC: Normal mood, affect and behavior.      Lab Results     Recent Results (from the past 240 hour(s))   COVID-19 Virus PCR MRF    Collection Time: 01/18/21 12:00 PM    Specimen: Respiratory   Result Value Ref Range    COVID-19 VIRUS SPECIMEN SOURCE [facility]     2019-nCOV Not Detected    COVID-19 Virus PCR MRF    Collection Time: 01/25/21 12:00 PM    Specimen: Respiratory   Result Value Ref Range    COVID-19 VIRUS SPECIMEN SOURCE [facility]     2019-nCOV Not Detected                 DONNY Kwan

## 2021-06-21 NOTE — LETTER
Letter by Marva Moore CNP at      Author: Marva Moore CNP Service: -- Author Type: --    Filed:  Encounter Date: 12/14/2020 Status: (Other)         Patient: Caleb Hernandez   MR Number: [id number]   YOB: 1935   Date of Visit: 12/14/2020       [facility]    Facility:   [facility] [[id number]]   Code Status: FULL CODE    [facility] SERVICES  [address] [facility] [address] [facility] [address]  Dept: [phone]  Dept Fax: [phone]  Primary Provider: Otis Lozano MD  Pre-op Performing Provider: MARVA MOORE      PREOPERATIVE EVALUATION:  Today's date: 12/14/2020    Caleb Hernandez is a 84 y.o. male who presents for a preoperative evaluation.    Surgical Information:    Proposed Surgery/ Procedure: Debriding and irrigation of RIGHT foot with wound VAC application  Date of Surgery/ Procedure: 12/17/20:   Time of Surgery/ Procedure: 3:30 PM  Hospital/Surgical Facility: [facility]  Surgery Fax Number: Unknown  Primary Physician: Provider, No Primary Care  Type of Anesthesia Anticipated: Unknown    HPI related to upcoming procedure:   Have you ever had a heart attack or stroke? No  Have you ever had surgery on your heart or blood vessels, such as a stent, coronary (heart) bypass, or surgery on an artery in the head, neck, heart, or legs? No  Do you have chest pain when you are physically active? No  Do you have a history of heart failure? No  Do you currently have a cold, bronchitis, or symptoms of other respiratory (head and chest) infections? No  Do you have a cough, shortness of breath, or wheezing? No  Do you or anyone in your family have a history of blood clots? YES  HO PE  Do you or anyone in your family have a serious bleeding problem, such as long-lasting bleeding after surgeries or cuts? YES: ON ANTIGOAGULANTS  Have you ever had anemia or been told to take iron pills  Yes per HP  Have you had any abnormal blood loss such  as black, tarry or bloody stools, or abnormal vaginal bleeding?No  Have you ever had a blood transfusion? No   Are you willing to have a blood transfusion if it is medically needed before, during, or after your surgery? Yes  Have you or anyone in your family ever had problems with anesthesia (sedation for surgery)? No  Do you have sleep apnea, excessive snoring, or daytime drowsiness? No  Do you have any artifical heart valves or other implanted medical devices, such as a pacemaker, defibrillator, or continuous glucose monitor?NO  Do you have any artifical joints? no  Are you allergic to latex? NO  Is there any chance that you may be pregnant?no    Patient has a Health Care Directive or Living Will:  Yes POLST  F[facility] number for surgical facility: [facility]  Type of Anesthesia Anticipated: to be determined    Patient does not have a Health Care Directive or Living Will: [employer]    0020}  A-FIB - Patient has a longstanding history of chronic A-fib currently on rate control. Current treatment regimen includes Apixaban for stroke prevention and denies significant symptoms of lightheadedness, palpitations or dyspnea. On hold 12/15.      Review of Systems         Currently, no fever, chills, or rigors. Does not have any visual or hearing problems. Denies any chest pain, headaches, palpitations, lightheadedness, dizziness, shortness of breath, or cough. Appetite is good. Denies any GERD symptoms. Denies any difficulty with swallowing, nausea, or vomiting.  Denies any abdominal pain, diarrhea or constipation. Denies any urinary symptoms. No insomnia. No active bleeding. No rash.      Patient Active Problem List    Diagnosis Date Noted   ? Hematuria 12/10/2020   ? 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 2 diabetes mellitus, with necrosis of muscle (H) 12/09/2020   ? ACP (advance care planning) 12/07/2020   ? Septic arthritis of right foot (H) 12/02/2020   ? Gross hematuria 12/02/2020   ? Urinary retention 12/02/2020   ?  Type 2 diabetes mellitus with diabetic peripheral angiopathy without gangrene, with long-term current use of insulin (H) 12/02/2020   ? Adult failure to thrive 12/02/2020   ? Normocytic anemia 12/02/2020   ? Paroxysmal atrial fibrillation (H) 12/02/2020   ? 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 (H)    ? Cellulitis of right lower extremity 11/23/2020   ? Abscess of right foot 11/23/2020   ? Cellulitis and abscess of foot excluding toe    ? 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   ? Statin intolerance 10/22/2020   ? Personal history of PE (pulmonary embolism)-- May 2020, unprovoked, with right heart strain 10/22/2020   ? PVD (peripheral vascular disease) (H) 10/22/2020   ? Overweight (BMI 25.0-29.9) 10/22/2020   ? Chronic anticoagulation 10/22/2020   ? Acute pulmonary embolism with acute cor pulmonale (H) 05/23/2020   ? Diabetic ulcer of right foot associated with type 2 diabetes mellitus (H) 08/10/2017   ? Type 2 diabetes mellitus (H)    ? Hyperlipidemia    ? Essential hypertension      Past Medical History:   Diagnosis Date   ? BPH (benign prostatic hyperplasia)    ? Cholelithiasis    ? Common bile duct (CBD) obstruction 9/4/2017   ? Compression Fracture Of Thoracic Vertebral Body     Created by Conversion  Replacement Utility updated for latest IMO load   ? Diabetes mellitus (H)    ? Essential hypertension    ? Hyperlipidemia    ? Pancreatitis    ? Rib Fracture     Created by Conversion  Replacement Utility updated for latest IMO load   ? Sepsis due to pneumonia (H) 9/8/2017     Past Surgical History:   Procedure Laterality Date   ? BACK SURGERY      1964 removed a cyst   ? CHOLECYSTECTOMY  1985   ? ERCP     ? ERCP N/A 9/5/2017    Procedure: ENDOSCOPIC RETROGRADE CHOLANGIOPANCREATOGRAPHY SPHINCTEROTOMY AND STONE EXTRACTION;  Surgeon: Hansel Gannon MD;  Location: [facility] OR;  Service:    ? INCISION AND DRAINAGE OF WOUND Right 11/2/2020     Procedure: INCISION AND DRAINAGE, right foot with removal of bone 5th metatarsal;  Surgeon: John Garcia DPM;  Location: [facility] OR;  Service: Podiatry   ? INCISION AND DRAINAGE OF WOUND Right 11/16/2020    Procedure: INCISION AND DRAINAGE, right foot;  Surgeon: John Garcia DPM;  Location: [facility] OR;  Service: Podiatry   ? INCISION AND DRAINAGE OF WOUND Right 11/27/2020    Procedure: INCISION AND DRAINAGE, LOWER EXTREMITY;  Surgeon: Aleksandr Hdez DPM;  Location: [facility] OR;  Service: Podiatry   ? IR EXTREMITY ANGIOGRAM RIGHT  11/24/2020   ? PICC  11/30/2020        ? TOE AMPUTATION Right 11/16/2020    Procedure: with amputation of the fifth ray, peroneal brevis tendon transfer;  Surgeon: John Garcia DPM;  Location: [facility];  Service: Podiatry   ? TONSILLECTOMY  1940     Current Outpatient Medications   Medication Sig Dispense Refill   ? acetaminophen (TYLENOL) 500 MG tablet Take 1-2 tablets (500-1,000 mg total) by mouth every 4 (four) hours as needed.  0   ? apixaban ANTICOAGULANT (ELIQUIS) 5 mg Tab tablet Take 1 tablet (5 mg total) by mouth 2 (two) times a day. 60 tablet 3   ? aspirin 81 MG EC tablet Take 81 mg by mouth daily.     ? blood glucose test strips Use 1 each As Directed 2 (two) times a day. Dispense brand per patient's insurance at pharmacy discretion. 120 strip 6   ? blood-glucose meter (ONETOUCH VERIO IQ METER) Misc Use 1 each As Directed 2 (two) times a day. 1 each 0   ? cholecalciferol, vitamin D3, (VITAMIN D3) 5,000 unit Tab Take 5,000 Units by mouth daily.      ? ertapenem 1 g in NaCl 0.9 % 0.9 % 50 mL IVPB Infuse 1 g into a venous catheter daily. 1 each 0   ? insulin glargine (LANTUS SOLOSTAR U-100 INSULIN) 100 unit/mL (3 mL) pen Inject 44 Units under the skin at bedtime. (Patient taking differently: Inject 30 Units under the skin at bedtime. Takes 15 units q am and 30 units Q HS)     ? liraglutide (VICTOZA 3-RUPALI) 0.6 mg/0.1 mL (18 mg/3  "mL) injection Inject 0.3 mL (1.8 mg total) under the skin daily.     ? lisinopriL (PRINIVIL,ZESTRIL) 5 MG tablet Take 1 tablet (5 mg total) by mouth daily. 90 tablet 3   ? multivit-min/FA/lycopen/lutein (CENTRUM SILVER MEN ORAL) Take 1 tablet by mouth daily.     ? mupirocin (BACTROBAN) 2 % ointment Apply topically daily as needed. Apply to wound.     ? neomycin-bacitracin-polymyxin (NEOSPORIN) ointment Apply to penis at catheter insertion site three times a day while catheter is in place. 15 g 0   ? NOVOLOG FLEXPEN U-100 INSULIN 100 unit/mL (3 mL) injection pen Inject 3 Units under the skin 3 (three) times a day before meals. 2.7 mL 0   ? pen needle, diabetic 31 gauge x 5/16\" Ndle Used to inject insulin daily 90 each 0   ? polyethylene glycol (MIRALAX) 17 gram packet Take 1 packet (17 g total) by mouth daily as needed.  0   ? tamsulosin (FLOMAX) 0.4 mg cap Take 1 capsule (0.4 mg total) by mouth daily after supper.  0   ? traMADoL (ULTRAM) 50 mg tablet Take 1 tablet (50 mg total) by mouth every 6 (six) hours as needed for pain. 28 tablet 0   ? vitamin E 400 unit capsule Take 400 Units by mouth daily.       No current facility-administered medications for this visit.        Allergies   Allergen Reactions   ? Cresol      Muscle cramps   ? Crestor [Rosuvastatin] Myalgia   ? Declomycin [Demeclocycline] Hives       Social History     Tobacco Use   ? Smoking status: Former Smoker     Quit date: 1991     Years since quittin.1   ? Smokeless tobacco: Never Used   Substance Use Topics   ? Alcohol use: No        Social History     Substance and Sexual Activity   Drug Use No           Objective   /81   Pulse 68   Temp 98.6  F (37  C)   Wt 188 lb (85.3 kg)   BMI 26.98 kg/m    Physical Exam  GENERAL: Awake, Alert, oriented x3, not in any form of acute distress, answers questions appropriately, follows simple commands, conversant  HEENT: Head is normocephalic with normal hair distribution. No evidence of " trauma. Ears: No acute purulent discharge. Eyes: Conjunctivae pink with no scleral jaundice. Nose: Normal mucosa and septum. NECK: Supple with no cervical or supraclavicular lymphadenopathy. Trachea is midline.   ABDOMEN: Globular and soft, nontender to palpation, non distended, no masses, no organomegaly, good bowel sounds, no rebound or guarding, no peritoneal signs.   : Dietrich, hematuria, fading wwent from maroon to huey color urine throughout day,  EXTREMITIES: Full range of motion does have a PICC in right upper arm.  He is to be nonweightbearing to the right foot, daily dressing change to site   SKIN: Warm and dry, no erythema noted, no rashes or lesions.  NEUROLOGICAL: The patient is oriented to person, place and time. Strength and sensation are grossly intact. Face is symmetric.     Recent Labs   Lab Test 12/14/20  0616 12/09/20  0946 12/07/20  0549 11/27/20  0713 11/27/20  0713 11/23/20  1407 11/23/20  1407   HGB 11.8* 13.4* 13.6*   < > 11.9*   < > 12.5*     --  430   < > 313   < > 355   INR  --   --   --   --  1.23*  --  1.26*     --  135*   < > 138   < > 137   K 4.6  --  4.1   < > 4.2   < > 4.8   CREATININE 0.93  --  0.71   < > 0.93   < > 0.92    < > = values in this interval not displayed.        PRE-OP Diagnostics:     Weekly Labs, in [facility]: see above    Last EKG, 11/23 IN EPIC    Covid in process from 12/14         Assessment & Plan       The proposed surgical procedure is considered INTERMEDIATE risk.    REVISED CARDIAC RISK INDEX   The patient has the following serious cardiovascular risks for perioperative complications:   - Diabetes Mellitus (on Insulin) = 1 point    INTERPRETATION: low to moderate, HO PE/DM      MEDICATION INSTRUCTIONS:  Night prior to surgery:   Lantus decreased to 6 unit.  Eliquis on hold from 12/15  ASA on hold 12/14   No victoza am 12/17.  May have clear liquid in am than NPO. Only Oral ABX and Pain med oral with sips of water in am.  "      RECOMMENDATION:  {IMPORTANT - Approval:[id number]::\"APPROVAL GIVEN to proceed with proposed procedure, without further diagnostic evaluation.\"    No follow-ups on file.    Signed Electronically by: Marva Moore CNP    Copy of this evaluation report is provided to requesting physician.    [facility] Guidelines    Revised Cardiac Risk Index           "

## 2021-06-22 NOTE — PATIENT INSTRUCTIONS - HE
Your SRIKANTH study to check the blood flow in the arteries in the legs feet and toes was slightly diminished but adequate for wound healing    No dressings needed; OK for a bandaid to the right foot if it makes you more comfortable        Follow up in 4 weeks    Continue to check your feet twice per day    Continue to apply lotion to feet 1-2 times per day    Apply Bactroban to the specified areas; sent refill to your pharmacy    Continue to use diabetic shoes and insoles    Do not go barefoot

## 2021-06-22 NOTE — PROGRESS NOTES
[facility] Follow Up Note    Caleb Hernandez   83 y.o. male    Date of Visit: 12/20/2018    Chief Complaint   Patient presents with     Diabetes     dicuss blood sugars- patient is fasting. Fluctuating blood sugars since switching to levemir      Subjective  This is an 83-year-old man with known type 2 diabetes.  He recently changed medications due to insurance.  Since then his blood sugars have not been under as good control.  He tells me his sugars are frequently running 270- 290.  He has been under some stress recently secondary to some surgery that his wife was having.  He does not think this is enough to cause his sugar to be this elevated.  He is not having any new symptoms relative to the diabetes.  His last A1c actually had climbed a little bit after changing medications.  No other new problems.    ROS A comprehensive review of systems was performed and was otherwise negative    Medications, allergies, and problem list were reviewed and updated    Exam  General Appearance:   On examination his blood pressure is 140/84.  Weight is 201 pounds and height is 69 inches.  BMI is 29.68.    Heart is in a sinus rhythm with a rate of 70 and no ectopy.    No new edema.    The patient is alert and oriented x3.      Assessment/Plan  1. Type 2 diabetes mellitus with other skin ulcer, with long-term current use of insulin (H)  Glycosylated Hemoglobin A1c    Glucose     Type 2 diabetes.  Not currently under good control.  It is certainly worsened since we were forced to change medication.  We discussed this at length and I suggested we recheck his A1c and sugar today.  Based upon those results I will probably consult with her diabetic educator and we will see if we have any new ideas relative to his medication.  The following high BMI interventions were performed this visit: weight monitoring    Chaz Machado MD      Current Outpatient Medications on File Prior to Visit   Medication Sig      "aspirin 81 MG EC tablet Take 81 mg by mouth daily.     blood glucose test strips Use to test twice a day Dispense brand per patient's insurance at pharmacy discretion.     blood glucose test strips Use 1 each As Directed 2 (two) times a day Dispense brand per patient's insurance at pharmacy discretion..     blood-glucose meter (ONETOUCH VERIO IQ METER) Misc Use 1 each As Directed 2 (two) times a day.     cholecalciferol, vitamin D3, (VITAMIN D3) 5,000 unit Tab Take 5,000 Units by mouth daily.      EPINEPHrine (EPIPEN/ADRENACLICK/AUVI-Q) 0.3 mg/0.3 mL injection Inject 0.3 mL (0.3 mg total) as directed as needed for anaphylaxis. Inject into thigh.     insulin detemir U-100 (LEVEMIR FLEXTOUCH U-100 INSULN) 100 unit/mL (3 mL) pen Inject 40 Units under the skin Daily at 5 pm..     lisinopril (PRINIVIL,ZESTRIL) 5 MG tablet TAKE 1 TABLET BY MOUTH ONCE DAILY     MULTIVITS,CA,MIN/IRON/FA/LYCOP (CENTRUM MEN ORAL) Take 1 tablet by mouth daily.     mupirocin (BACTROBAN) 2 % ointment Apply topically to wound every day.     naproxen sodium (ALEVE) 220 MG tablet Take 220 mg by mouth daily.     pen needle, diabetic 31 gauge x 5/16\" Ndle Used to inject insulin daily     vitamin E 400 unit capsule Take 400 Units by mouth daily.     [DISCONTINUED] liraglutide (VICTOZA 3-RUPALI) 0.6 mg/0.1 mL (18 mg/3 mL) PnIj injection INJECT 1.2 MG SUBCUTANEOUSLY ONCE DAILY     Current Facility-Administered Medications on File Prior to Visit   Medication     lidocaine 2 % jelly (XYLOCAINE)     Allergies   Allergen Reactions     Cresol      Muscle cramps     Crestor [Rosuvastatin] Myalgia     Demeclocycline Hives     Social History     Tobacco Use     Smoking status: Former Smoker     Last attempt to quit: 1991     Years since quittin.1     Smokeless tobacco: Never Used   Substance Use Topics     Alcohol use: No     Drug use: No           "

## 2021-06-23 NOTE — PATIENT INSTRUCTIONS - HE
Sent refill for bactroban      Apply bactroban to the right foot 1-3 times per day  Cover with band aid    When getting up in the middle of the night wear  Your shoes and insoles

## 2021-06-23 NOTE — TELEPHONE ENCOUNTER
Called Caleb Hernandez who was unavailable.  Person who answered stated he would be available around 3PM.  Will try back at that time.

## 2021-06-24 NOTE — TELEPHONE ENCOUNTER
Medication Question or Clarification  Who is calling: Pharmacy: [facility]  What medication are you calling about?: Levemir Flextouch   What dose do you take?: 40 units  How often are you taking the medication?: daily at 5pm  Who prescribed the medication?: Chaz Machado MD  What is your question/concern?: Fax received from pharmacy stating that the insurance company no longer covers the above medication. Will the provider send a covered alternative?  Covered Options: Lantus Solostar & Toujeo Solostar  Pharmacy: [facility] to leave a detailed message?: No-speak to pharmacy staff  Site CMT - Please call the pharmacy to obtain any additional needed information.

## 2021-06-24 NOTE — TELEPHONE ENCOUNTER
Dr. Machado,  Please advise on which alternative and dose you would like for the patient and I can set that up for you to review.  Thank you.  Deonna BOLAÑOS, VOLODYMYR/CMT....................12:06 PM

## 2021-06-24 NOTE — PROGRESS NOTES
FOOT AND ANKLE SURGERY/PODIATRY Progress Note        ASSESSMENT:   Diabetic Ulceration right foot  Kelvin'nancy hood      TREATMENT:  -The right foot ulceration is open today, but has been closed over the preceding weeks. I discussed with the patient that he essentially has a pressure sore under the 5th metatarsal head. I have asked him to stop using 3-4 different types of diabetic shoes and inserts per week in an attempt to have one control shoe/insert.   -I recommend and have referred him to Mac Yost to customize one insert and shoe that effectively offloads the 5th metatarsal head. I have asked that Mac contact me directly with any questions.  -Briefly discussed excision of the 5th metatarsal head if necessary. But both the patient and I would like to avoid surgery if possible.  -Sharp, excisional debridement of the wound into subcutaneous tissue was performed using #15 blade for a total of 0.25 square centimeters. Debridement was done to reduce pressure, remove non-viable, devitalized tissue and promote wound healing. Patient tolerated this well. A gauze dressing was applied. He wound like to continue to apply muperocin with a self adhesive dressing.  -He will follow-up in 2-3 weeks with Dr. Zimmerman. I am happy to see this patient again if I can of assistance.       John Garcia, [facility]      HPI: Caleb SELENE David was seen today at the request of Dr. Zimmerman for a recurrent right foot ulceration. The patient is known to me having seen him 1/2/18 for a similar concern. According to the patient, the right foot sore has been closed for some time and he has not noticed bleeding in several weeks. He admits wearing 3-4 pairs of diabetic shoes and inserts per week. The most recent pair of diabetic inserts he received from [facility] in January of this year.     Past Medical History:   Diagnosis Date     BPH (benign prostatic hyperplasia)      Cholelithiasis      Diabetes mellitus (H)       Essential hypertension      Hyperlipidemia      Pancreatitis        Past Surgical History:   Procedure Laterality Date     BACK SURGERY      1964 removed a cyst     CHOLECYSTECTOMY  1985     ERCP       ERCP N/A 9/5/2017    Procedure: ENDOSCOPIC RETROGRADE CHOLANGIOPANCREATOGRAPHY SPHINCTEROTOMY AND STONE EXTRACTION;  Surgeon: Hansel Gannon MD;  Location: [facility];  Service:      TONSILLECTOMY  1940       Allergies   Allergen Reactions     Cresol      Muscle cramps     Crestor [Rosuvastatin] Myalgia     Demeclocycline Hives         Current Outpatient Medications:      aspirin 81 MG EC tablet, Take 81 mg by mouth daily., Disp: , Rfl:      blood glucose test strips, Use to test twice a day Dispense brand per patient's insurance at pharmacy discretion., Disp: 200 strip, Rfl: 3     blood glucose test strips, Use 1 each As Directed 2 (two) times a day Dispense brand per patient's insurance at pharmacy discretion.., Disp: 120 strip, Rfl: 6     blood-glucose meter (ONETOUCH VERIO IQ METER) Misc, Use 1 each As Directed 2 (two) times a day., Disp: 1 each, Rfl: 0     cholecalciferol, vitamin D3, (VITAMIN D3) 5,000 unit Tab, Take 5,000 Units by mouth daily. , Disp: , Rfl:      EPINEPHrine (EPIPEN/ADRENACLICK/AUVI-Q) 0.3 mg/0.3 mL injection, Inject 0.3 mL (0.3 mg total) as directed as needed for anaphylaxis. Inject into thigh., Disp: 2 Pre-filled Pen Syringe, Rfl: 0     insulin detemir U-100 (LEVEMIR FLEXTOUCH U-100 INSULN) 100 unit/mL (3 mL) pen, Inject 40 Units under the skin Daily at 5 pm.., Disp: 15 adj dose pen, Rfl: 3     insulin glargine (LANTUS SOLOSTAR U-100 INSULIN) 100 unit/mL (3 mL) pen, Inject 40 Units under the skin at bedtime. Do not mix Lantus with any other insulin, Disp: 5 adj dose pen, Rfl: 3     lisinopril (PRINIVIL,ZESTRIL) 5 MG tablet, TAKE 1 TABLET BY MOUTH ONCE DAILY, Disp: 90 tablet, Rfl: 1     MULTIVITS,CA,MIN/IRON/FA/LYCOP (CENTRUM MEN ORAL), Take 1 tablet by mouth daily., Disp: , Rfl:       "mupirocin (BACTROBAN) 2 % ointment, Apply topically to wound every day, Disp: 44 g, Rfl: 3     naproxen sodium (ALEVE) 220 MG tablet, Take 220 mg by mouth daily., Disp: , Rfl:      pen needle, diabetic 31 gauge x 5/16\" Ndle, Used to inject insulin daily, Disp: 90 each, Rfl: 0     VICTOZA 3-RUPALI 0.6 mg/0.1 mL (18 mg/3 mL) PnIj injection, INJECT 1.2 MG SUBCUTANEOUSLY ONCE DAILY, Disp: , Rfl: 1     vitamin E 400 unit capsule, Take 400 Units by mouth daily., Disp: , Rfl:     Current Facility-Administered Medications:      lidocaine 2 % jelly (XYLOCAINE), , Topical, PRN, Ines Zimmerman NP, 1 application at 02/25/19 0809    Review of Systems - per HPI, all others negative      OBJECTIVE:  Vitals:    03/05/19 1311   BP: 122/70   Pulse: 60   Resp: 16   Temp: 97.8  F (36.6  C)     General appearance: Patient is alert and fully cooperative with history & exam.  No sign of distress is noted during the visit.   Vascular: Dorsalis pedis palpable right.  Dermatologic:    Wound 07/24/17 Right lateral foot (Active)   Pre Size Length 0 2/25/2019  8:00 AM   Pre Size Width 0 2/25/2019  8:00 AM   Pre Size Depth 0 2/25/2019  8:00 AM   Pre Total Sq cm 0 2/25/2019  8:00 AM   Post Size Length 0.3 9/24/2018  7:00 AM   Post Size Width 0.1 9/24/2018  7:00 AM   Post Size Depth 0.2 9/24/2018  7:00 AM   Post Total Sq cm 0.03 9/24/2018  7:00 AM   Undermined 0.75 9/8/2017 11:00 AM   Description callous 2/25/2019  8:00 AM   Prodcut Used Alginate 8/10/2017 11:00 AM       Wound 09/05/17 Foot Right (Active)   Granular base with macerated doug-wound. No erythema right foot.  Neurologic: Diminished to light touch Right.  Musculoskeletal: Contracted digits noted Right. Mild tailor's bunion right.     Imaging:   none    No results found.       Picture: None    "

## 2021-06-24 NOTE — TELEPHONE ENCOUNTER
Spoke with the patient's wife and relayed message below from Dr. Machado.  She verbalized understanding and had no further questions at this time.    Refill request has been set up in a separate encounter for Dr. Machado to review.  Deonna BOLAÑOS CMA/JAGRUTI....................1:21 PM

## 2021-06-24 NOTE — PATIENT INSTRUCTIONS - HE
We will be moving March 4th, 2019 to a different suite. Our address will remain the same. Our new [address] is located on the 1st floor of the [facility] connected with [facility].    [facility]  [address]  [place] [address]         Sent refill for bactroban      Apply bactroban to the right foot 1-3 times per day  Cover with band aid    When getting up in the middle of the night wear  Your shoes and insoles    Never go barefoot or stocking footed

## 2021-06-24 NOTE — TELEPHONE ENCOUNTER
I am okay with the Lantus 40 units daily.  I am not sure the patient is going to be very happy her understanding.

## 2021-06-25 NOTE — PROGRESS NOTES
Progress Notes by Ines Zimmerman NP at 3/6/2017  9:20 AM     Author: Ines Zimmerman NP Service: -- Author Type: Nurse Practitioner    Filed: 3/6/2017 10:15 AM Encounter Date: 3/6/2017 Status: Signed    : Ines Zimmerman NP (Nurse Practitioner)       [facility] Consult Note    Date of Service:  3/6/2017    Requesting Provider: Dr Chaz Machado    Chief Complaint: right shin ulcer    History of Present Illness: Caleb Hernandez is being seen at the Vascular Clinic today regarding right shin ulcer. They arrive to the clinic today alone. The patient reports that the ulcer began in December 2016; struck it on a ladder; it formed a scab; this was getting smaller; last Thursday he squeezed pus out of this; continued on Friday; the scab fell off and there was a hole. He sought medical treatment; was started on Keflex; he is tolerating this well. Was previously using neosporin covering with bandaid. Reports pain of 3/10; burning pain; intermittantly; currently using apap for pain. Has used nothing as compression in the past. Denies any fevers, chills, or generalized ill feeling. Denies history of cancer. Sleeps in a bed with legs elevated. He does have diabetes; was previously running 150-200; now running in the 300s. Lives with wife; son; and granddaughter; x2 cats in the home; they leave the wound alone. He continues to work; works at [employer]; does 10 hour shifts; walk an average of 9 miles per day. Former smoker quit in the 1990s.    Review of Systems:   Constitutional:  negative  for fever, chills or night sweats  EENTM: positive for glasses;  positive [place]  GI:  negative for nausea/vomiting;  negative for constipation  negative diarrhea;  negative for fecal incontinence negative weight loss  :  negative dysuria, incontinence  MS: patient is ambulatory;  does not use assistive devices  Cardiac:  negative   Respiratory:  negative SOB  Endocrine:  + diabetes  Psych:  negative  depression/anxiety    Past Medical History:    Past Medical History:   Diagnosis Date   ? Diabetes mellitus    ? Essential hypertension    ? Hyperlipidemia        Surgical History:   Past Surgical History:   Procedure Laterality Date   ? CHOLECYSTECTOMY  1985   ? TONSILLECTOMY  1940       Medications:    Current Outpatient Prescriptions:   ?  aspirin 81 MG EC tablet, Take 81 mg by mouth daily., Disp: , Rfl:   ?  blood glucose test (CONTOUR TEST STRIPS) strips, Use 1 each As Directed 2 (two) times a day. Dispense brand per patient's insurance at pharmacy discretion., Disp: 100 each, Rfl: 11  ?  cephalexin (KEFLEX) 500 MG capsule, Take 1 capsule (500 mg total) by mouth 4 (four) times a day for 10 days., Disp: 40 capsule, Rfl: 0  ?  cholecalciferol, vitamin D3, (VITAMIN D3) 5,000 unit Tab, Take 1 tablet by mouth daily., Disp: , Rfl:   ?  ibuprofen (ADVIL,MOTRIN) 200 MG tablet, Take 200 mg by mouth every morning., Disp: , Rfl:   ?  insulin glargine (LANTUS SOLOSTAR) 100 unit/mL (3 mL) pen, Inject 18 Units under the skin daily., Disp: 5 adj dose pen, Rfl: 3  ?  liraglutide (VICTOZA) 0.6 mg/0.1 mL (18 mg/3 mL) PnIj injection, Inject 0.1 mL (0.6 mg total) under the skin daily. With or without food., Disp: 9 mL, Rfl: 11  ?  lisinopril (PRINIVIL,ZESTRIL) 5 MG tablet, Take 1 tablet (5 mg total) by mouth daily., Disp: 90 tablet, Rfl: 0  ?  MULTIVITS,CA,MIN/IRON/FA/LYCOP (CENTRUM MEN ORAL), Take 1 tablet by mouth daily., Disp: , Rfl:   ?  pravastatin (PRAVACHOL) 20 MG tablet, Take 1 tablet (20 mg total) by mouth bedtime., Disp: 30 tablet, Rfl: 2  ?  vitamin E 400 unit capsule, Take 400 Units by mouth daily., Disp: , Rfl:     Current Facility-Administered Medications:   ?  lidocaine 2 % jelly (XYLOCAINE), , Topical, PRN, Ines Zimmerman, NP    Allergies:   Allergies   Allergen Reactions   ? Crestor [Rosuvastatin] Myalgia   ? Demeclocycline Hives       Family history:   Family History   Problem Relation Age of Onset   ? Aortic  "aneurysm Mother        Social History:   Social History     Social History   ? Marital status:      Spouse name: N/A   ? Number of children: N/A   ? Years of education: N/A     Occupational History   ? Not on file.     Social History Main Topics   ? Smoking status: Former Smoker     Quit date: 11/11/1991   ? Smokeless tobacco: Never Used   ? Alcohol use No   ? Drug use: No   ? Sexual activity: Not on file     Other Topics Concern   ? Not on file     Social History Narrative        Physical Exam  Vitals: Blood pressure 124/74, pulse 78, resp. rate 14, height 5' 8\" (1.727 m), weight 206 lb (93.4 kg).  General: This is a 81 y.o. male who appears their reported age, not in acute distress  Head: normocephalic, Atraumatic; wearing glasses; non-icteric sclera; no exudate; mild hearing loss   Respiratory: Clear throughout all lung fields; unlabored breathing; no cough   Cardiac: Regular, Rate and Rhythm, no murmurs appreciated   Skin: Uniformly warm and dry  Psych: Alert and oriented x4; calm and cooperative throughout exam  Abdomen: Normal bowel sounds. Soft, symmetric, no guarding or rigidity, nontender with palpation.  No organomegaly or masses palpated.   Extremities: There is +2 edema in the right leg; worse in the ankle; right shin with full thickness ulcer; this measures 2x1.5x0.5cm; this is 100% yellow; gray black; necrotic tissue covered; this was sharply debrided; the necrotic tissue was too adherent to be removed; this was then cross hatched with a 15# blade scalpel to allow the SSD to penetrate the slough more readily strength testing revealed 4/4 to BLEs.    Sensation: Decreased to pinprick and light touch in a stocking distribution bilaterally     Peripheral Vascular: Normal dorsalis pedis, posterior tibial pulses bilaterally, using a handheld doppler these were biphasic; easy to find; in nature.  Good capillary refill. No unusual venous distention. Negative for spider veins, telangiectasias, " hemosiderin deposition or hyperpigmentation and fibrosis or scarring     Circumferential volume measures:    Vasc Edema 3/6/2017   Right just above MTP 23   Right Ankle 25   Right Widest Calf 37.5   Left - just above MTP 22   Left Ankle 22   Left Widest Calf 36     3/6/17 right shin post debridement        3/3/17 right shin pre-debridement          Ulceration(s)/Wound(s):     Wound 03/06/17 Right leg  (Active)   Pre Size Length 2 3/6/2017  9:00 AM   Pre Size Width 1.5 3/6/2017  9:00 AM   Pre Size Depth 0.5 3/6/2017  9:00 AM   Pre Total Sq cm 3 3/6/2017  9:00 AM       Laboratory studies:   No results found for: SEDRATE  Lab Results   Component Value Date    CREATININE 1.10 01/23/2017     Lab Results   Component Value Date    HGBA1C 12.1 (H) 01/23/2017     Lab Results   Component Value Date    BUN 24 01/23/2017     Lab Results   Component Value Date    ALBUMIN 3.5 01/23/2017     No results found for: [id number]          Impression:   Right shin traumatic ulcer  Type 2 diabetes poorly controlled  Edema  overweight    Assessment/Plan:  1. Excisional debridement of all the ulcer(s) was recommended today. After consent was obtained and 2% Lidocaine HCL jelly was applied all of the ulcers were excisionally debrided using a sterile curet under clean condition the epidermal, dermal and down into the subcutaneous tissue  were sharply debrided for a total square cm of 3. Devitalized and non viable tissue was removed to improve granulation tissue formation, stimulate wound healing, decrease overall bacteria load, disrupt biofilm formation and decrease edge senescence. Patient tolerated this well and the ulcers appeared much  afterwards.    2. Edema. Will apply double tubigrips today    3. Treatment will have him apply SSD daily to the ulcer; cover with oil immersion; gauze; roll gauze; keep dry in the shower    4. Offloading: na    5. Nutrition: his a1c was 12.1 done 1/2017; discussed that having elevated blood sugars  will impede wound healing process; his last albumin was normal    Patient to return to clinic in 2 week(s) for re-evaluation. They were instructed to call the clinic sooner with any further questions or concerns. Answered all questions.    Ines Zimmerman DNP, RN, CNP, [facility]  [phone]

## 2021-06-25 NOTE — PROGRESS NOTES
"Progress Notes by Ines Zimmerman NP at 11/27/2017  8:40 AM     Author: Ines Zimmerman NP Service: -- Author Type: Nurse Practitioner    Filed: 11/27/2017  9:50 AM Encounter Date: 11/27/2017 Status: Signed    : Ines Zimmerman NP (Nurse Practitioner)       Follow up Vascular Visit       Date of Service:11/27/2017    Date Last Seen: 11/8/2017; 10/23/2017    Chief Complaint: reopening of right diabetic foot ulcer    History:   Past Medical History:   Diagnosis Date   ? BPH (benign prostatic hyperplasia)    ? Cholelithiasis    ? Diabetes mellitus    ? Essential hypertension    ? Hyperlipidemia    ? Pancreatitis        Pt returns to the Vascular clinic with regards to their reopening of the right diabetic foot ulcer. Pt saw Dr. Garcia 2 weeks ago; was recommended that he get different inserts. He was seen at [facility] and these were created; he is very happy with these. He has been taken out of work. Stopped using the mepilex border as it was felt this was trapping too much moisture; possibly causing blistering went to a gauze pad instead and feels that the drainage is less. He purchased compression stockings he does not like these; too hard to get on/off; does not want to go up in size as this will be too big on the foot. He prefers tubigrips instead and has been wearing these instead. Denies fevers, chills. His fs are running 150-200; he is seeing diabetic educator. His appetite is back to normal after having issues with gall stones. We have been applying endoform to the ulcer on the right foot; covering with gauze; changing every 2-3 days.     Allergies: Crestor [rosuvastatin] and Demeclocycline    Physical Exam:    /80  Pulse 64  Temp 97.9  F (36.6  C) (Oral)   Resp 16  Ht 5' 9\" (1.753 m)  Wt 204 lb (92.5 kg)  BMI 30.13 kg/m2    General:  Patient presents to clinic in no apparent distress.  Head: normocephalic atraumatic  Psychiatric:  Alert and oriented x3.   Respiratory: unlabored " breathing; no cough  Integumentary:  Skin is uniformly warm, dry and pink.    Wound #1 Location: right plantar foot 5th met head  Size: 0.3L x 0.5W x 0.1depth.  This is smaller; no sinus tract present, Wound base: on the drier side; fibrinous material covering this was debrided good bleeding  No undermining present. Periwound: no denudement, erythema, induration, maceration or warmth.      Circumferential volume measures:    Vasc Edema 3/6/2017 4/17/2017 6/2/2017   Right just above MTP 23 24 23   Right Ankle 25 26 23.5   Right Widest Calf 37.5 38.6 37   Left - just above MTP 22 - -   Left Ankle 22 - -   Left Widest Calf 36 - -         Ulceration(s)/Wound(s):   Wound 07/24/17 Right lateral foot (Active)   Pre Size Length 0 11/27/2017  8:00 AM   Pre Size Width 0 11/27/2017  8:00 AM   Pre Size Depth 0 11/27/2017  8:00 AM   Pre Total Sq cm 0 11/27/2017  8:00 AM   Post Size Length 0.3 11/27/2017  8:00 AM   Post Size Width 0.5 11/27/2017  8:00 AM   Post Size Depth 0.1 11/27/2017  8:00 AM   Post Total Sq cm 0.15 11/27/2017  8:00 AM   Undermined 0.75 9/8/2017 11:00 AM   Description callous 10/23/2017  7:00 AM   Prodcut Used Alginate 8/10/2017 11:00 AM       Wound 09/05/17 Foot Right (Active)        Lab Values    No results found for: SEDRATE  Lab Results   Component Value Date    CREATININE 1.33 (H) 09/06/2017     Lab Results   Component Value Date    HGBA1C 10.5 (H) 09/05/2017     Lab Results   Component Value Date    BUN 44 (H) 09/06/2017     Lab Results   Component Value Date    ALBUMIN 2.3 (L) 09/06/2017     No results found for: [id number]    11/20/17 right plantar foot          Impression:   diabetic foot ulcer; right  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 Excisional debridement of the ulcer(s) was recommended today, after consent was obtained and 2% Xylocaine  was applied using a sterile curet the epidermal, dermal, down into the subcutaneous tissue and down to bone was sharply debrided for a total square cm of 0.15. The devitalized and necrotic tissue was removed and the wounds appeared much  afterwards. The patient tolerated this well.           2. Edema: continue with tubigrips; we spoke about getting a different size sock he did not want to do this; tubigrips are working well will just need to be replaced more frequently and he was fine with this           3.  Wound treatment: pt has responded to the epifix the best in the past; he does have a copay with this; he is fine with this; and agreeable to having this applied today, ok for him to continue using his gauze dressing; change as needed; secure with rolled gauze      Due to the slow healing rate of the ulcer and the diagnosis of Diabetic Ulcer due to peripheral neuropathy} of the Right foot and is free of infection  Epifix was recommended and consent was obtained for the application.  This is application # 8 (5th for this ulcer).   Their is no evidence of osteomyelitis.  There is adequate blood flow for healing.       For the procedure, the patient was placed in a comfortable position with the wound bed exposed. Epifix  application is being performed in a staged procedure in an attempt to achieve rapid wound closure. The Tissue Identification Number is [id number] with an expiration date of 2022-07-01 as indicated on the consent form dated 11/27/2017     The graft size is 14mm and the estimated wastage was 0 as this was applied in a layered fashion.   The graft was prepared and applied following the  guidelines.  The graft was covered with a non-adherent contact layer and secured with wound veil.  Then,  a gaue dressing was applied.  The patient will continue to utilize diabetic shoes and insoles for off loading.  Patient tolerated the procedure without any complications and was educated  and expressed an understanding of the proper wound treatment until their follow up.             4. Nutrition: diabetic diet; we spoke again about tight glucose control; continue seeing the diabetic educatory           5. Offloading: new insoles now; will keep out of work; note written; f/u with tracey hess on Friday     Patient will follow up with me in 1 weeks for reevaluation. They were instructed to call the clinic sooner with any signs or symptoms of infection or any further questions/concerns. Answered all questions.    Ines Zimmerman DNP, RN, CNP, CWN  [facility] [place]  [phone]

## 2021-06-25 NOTE — TELEPHONE ENCOUNTER
"Mac Yost called requesting to speak to Dr. Garcia. Writer stated the provider is out this week. Mac wanted Dr. Garcia to know he reached out to the pt \"to make adjustments to the shoes and inserts to offload the 5th metatarsal.\" Pt was in Mac's clinic at the time this phone call was made. Mac stated if Dr. Garcia had any questions he can be reached on his cell [phone].  "

## 2021-06-25 NOTE — TELEPHONE ENCOUNTER
RN cannot approve Refill Request    RN can NOT refill this medication Route to provider per protocol. Last office visit: 5/25/2021 Otis Lozano MD Last Physical: 10/28/2020 Last MTM visit: Visit date not found Last visit same specialty: 5/25/2021 Otis Lozano MD.  Next visit within 3 mo: Visit date not found  Next physical within 3 mo: Visit date not found      Britany Cruz, [facility] Triage/Med Refill 6/17/2021    Requested Prescriptions   Pending Prescriptions Disp Refills     ELIQUIS 5 mg Tab tablet [Pharmacy Med Name: ELIQUIS 5 MG TABLET] 60 tablet 3     Sig: TAKE 1 TABLET BY MOUTH TWICE A DAY       Apixaban/Rivaroxaban/Dabigatran Refill Protocol Failed - 6/17/2021 12:14 AM        Failed - Route to appropriate pool/provider     Last Anticoagulation Summary:           Passed - Renal function test in last year     Creatinine   Date Value Ref Range Status   05/25/2021 0.94 0.70 - 1.30 mg/dL Final             Passed - PCP or prescribing provider visit in past 12 months       Last office visit with prescriber/PCP: 5/25/2021 Otis Lozano MD OR same dept: 7/27/2020 Alonso Clancy MD OR same specialty: 5/25/2021 Otis Lozano MD  Last physical: 10/28/2020 Last MTM visit: Visit date not found   Next visit within 3 mo: Visit date not found  Next physical within 3 mo: Visit date not found  Prescriber OR PCP: Otis Lozano MD  Last diagnosis associated with med order: 1. Pulmonary embolism without acute cor pulmonale, unspecified chronicity, unspecified pulmonary embolism type (H)  - ELIQUIS 5 mg Tab tablet [Pharmacy Med Name: ELIQUIS 5 MG TABLET]; TAKE 1 TABLET BY MOUTH TWICE A DAY  Dispense: 60 tablet; Refill: 3    If protocol passes may refill for 12 months if within 3 months of last provider visit (or a total of 15 months).

## 2021-06-25 NOTE — PROGRESS NOTES
"Progress Notes by Ines Zimmerman NP at 11/20/2017  7:20 AM     Author: Ines Zimmerman NP Service: -- Author Type: Nurse Practitioner    Filed: 11/20/2017  8:02 AM Encounter Date: 11/20/2017 Status: Signed    : Ines Zimmerman NP (Nurse Practitioner)       Follow up Vascular Visit       Date of Service:11/20/2017    Date Last Seen: 11/8/2017; 10/23/2017    Chief Complaint: reopening of right diabetic foot ulcer    History:   Past Medical History:   Diagnosis Date   ? BPH (benign prostatic hyperplasia)    ? Cholelithiasis    ? Diabetes mellitus    ? Essential hypertension    ? Hyperlipidemia    ? Pancreatitis        Pt returns to the Vascular clinic with regards to their reopening of the right diabetic foot ulcer. Pt saw Dr. Garcia last week; was recommended that he get different inserts. He was seen at [facility] and these were created; he is very happy with these. He has been taken out of work. Stopped using the mepilex border as it was felt this was trapping too much moisture; possibly causing blistering went to a gauze pad instead and feels that the drainage is less. He purchased compression stockings he does not like these; too hard to get on/off; does not want to go up in size as this will be too big on the foot. He prefers tubigrips instead and has been wearing these instead. Denies fevers, chills. His fs are running 110-160; he is seeing diabetic educator. His appetite is back to normal after having issues with gall stones.     Allergies: Crestor [rosuvastatin] and Demeclocycline    Physical Exam:    /70  Pulse 78  Resp 16  Ht 5' 9\" (1.753 m)  Wt 204 lb (92.5 kg) Comment: per pt report  BMI 30.13 kg/m2    General:  Patient presents to clinic in no apparent distress.  Head: normocephalic atraumatic  Psychiatric:  Alert and oriented x3.   Respiratory: unlabored breathing; no cough  Integumentary:  Skin is uniformly warm, dry and pink.    Wound #1 Location: right plantar foot 5th met " head  Size: 0.L x 0.5W x 0.1depth.  This is smaller; no sinus tract present, Wound base: on the drier side; fibrinous material covering this was debrided good bleeding  No undermining present. Periwound: no denudement, erythema, induration, maceration or warmth.      Circumferential volume measures:    Vasc Edema 3/6/2017 4/17/2017 6/2/2017   Right just above MTP 23 24 23   Right Ankle 25 26 23.5   Right Widest Calf 37.5 38.6 37   Left - just above MTP 22 - -   Left Ankle 22 - -   Left Widest Calf 36 - -         Ulceration(s)/Wound(s):   Wound 07/24/17 Right lateral foot (Active)   Pre Size Length 0.5 11/20/2017  7:00 AM   Pre Size Width 0.5 11/20/2017  7:00 AM   Pre Size Depth 0.2 11/14/2017  8:00 AM   Pre Total Sq cm 0.25 11/20/2017  7:00 AM   Post Size Length 0.2 9/18/2017  9:00 AM   Post Size Width 1 9/18/2017  9:00 AM   Post Size Depth 0 9/18/2017  9:00 AM   Post Total Sq cm 0.2 9/18/2017  9:00 AM   Undermined 0.75 9/8/2017 11:00 AM   Description callous 10/23/2017  7:00 AM   Prodcut Used Alginate 8/10/2017 11:00 AM       Wound 09/05/17 Foot Right (Active)        Lab Values    No results found for: SEDRATE  Lab Results   Component Value Date    CREATININE 1.33 (H) 09/06/2017     Lab Results   Component Value Date    HGBA1C 10.5 (H) 09/05/2017     Lab Results   Component Value Date    BUN 44 (H) 09/06/2017     Lab Results   Component Value Date    ALBUMIN 2.3 (L) 09/06/2017     No results found for: [id number]    11/20/17 right plantar foot          Impression:   diabetic foot ulcer; right  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 Excisional debridement of the ulcer(s) was recommended today, after consent was obtained and 2% Xylocaine was applied using a sterile curet the epidermal, dermal, down into the subcutaneous tissue and down to bone was sharply  debrided for a total square cm of 0.25. The devitalized and necrotic tissue was removed and the wounds appeared much  afterwards. The patient tolerated this well.           2. Edema: continue with tubigrips; we spoke about getting a different size sock he did not want to do this; tubigrips are working well will just need to be replaced more frequently and he was fine with this           3.  Wound treatment:will add endoform collagen; ok for him to continue using his gauze dressing; change as needed; secure with rolled gauze           4. Nutrition: diabetic diet; we spoke again about tight glucose control; continue seeing the diabetic educatory           5. Offloading: new insoles now; will keep out of work; note written; f/u with tracey hess on Friday     Patient will follow up with me in 1 weeks for reevaluation. They were instructed to call the clinic sooner with any signs or symptoms of infection or any further questions/concerns. Answered all questions.    Ines Zimmerman DNP, RN, CNP, CWN  [facility]  [phone]

## 2021-06-25 NOTE — PROGRESS NOTES
Progress Notes by John Garcia DPM at 8/10/2017 11:40 AM     Author: John Garcia DPM Service: -- Author Type: Physician    Filed: 8/10/2017  1:29 PM Encounter Date: 8/10/2017 Status: Signed    : John Garcia DPM (Physician)       FOOT AND ANKLE SURGERY/PODIATRY Progress Note        ASSESSMENT:   Ulceration right foot  Abrasion right leg   Gastrocnemius Equinus right       TREATMENT:  -The right foot ulceration appears stable, does not probe to deep tissues or the 5th MPJ. I reviewed recent x-rays of the right foot which are negative for osteomyelitis. Accessory bone at the 5th MPJ. On exam, he does have a gastrocnemius equinus deformity. Slight varus attitude.   -Based on this information, I believe osteomyelitis is not likely present. However, the patient relates that the wound has measured to deeper tissues in prior visits. I have referred him for a CRP. I discussed with the patient that if the CRP is elevated, I would recommend an MRI for further evaluation.   -He does have limited ankle dorsiflexion of the right foot in the presence of gastrocnemius equinus with very mild varus rotation. This deformity can lead to increased midfoot and forefoot pressure which may be contributing to the foot ulcer. He may benefit from a gastrocnemius recession, however, I would only recommend this procedure if the wound fails to heal, or reoccurs.   -He will finish current course of penvk. Abrasion right leg is likely due to cage splint which has been recently adjusted.   -Sharp, excisional debridement of the wound into subcutaneous tissue was performed using currette for a total of 1.3 square centimeters. Debridement was done to reduce pressure, remove non-viable, devitalized tissue and promote wound healing. Patient tolerated this well. Collagen with a gauze dresssing was applied.   -He will continue to stay limited walking in the cage splint and follow-up with Dr. Zimmerman in 1-2 weeks for continued  cares. I am happy to see this patient again if I can be of assistance.        John Garcia, SAURAV  [facility]      HPI: Caleb Hernandez was seen today at the request of Dr. Zimmerman for a right foot ulceration. The patient states he first noticed the right foot sore around July 24th after a callus/blister had developed. He was recently placed on an antibiotic for a right foot infection and believes the foot looks better. Currently walking in a cage splint that he received last week. The patient has developed an abrasion on the anterior right leg which he believes was caused by the cage splint. He was seen at [facility] to have the straps adjusted. He works on his feet at [employer]. Admits being a poorly controlled diabetic. He is a former smoker, quit in 1991.     Past Medical History:   Diagnosis Date   ? BPH (benign prostatic hyperplasia)    ? Cholelithiasis    ? Diabetes mellitus    ? Essential hypertension    ? Hyperlipidemia    ? Pancreatitis        Allergies   Allergen Reactions   ? Crestor [Rosuvastatin] Myalgia   ? Demeclocycline Hives         Current Outpatient Prescriptions:   ?  aspirin 81 MG EC tablet, Take 81 mg by mouth daily., Disp: , Rfl:   ?  blood glucose test (CONTOUR TEST STRIPS) strips, Use 1 each As Directed 2 (two) times a day. Dispense brand per patient's insurance at pharmacy discretion., Disp: 100 each, Rfl: 11  ?  cholecalciferol, vitamin D3, (VITAMIN D3) 5,000 unit Tab, Take 1 tablet by mouth daily., Disp: , Rfl:   ?  ibuprofen (ADVIL,MOTRIN) 200 MG tablet, Take 200 mg by mouth every morning., Disp: , Rfl:   ?  insulin degludec (TRESIBA FLEXTOUCH U-100) 100 unit/mL (3 mL) InPn, Inject 20 Units under the skin every morning., Disp: 6 mL, Rfl: 6  ?  liraglutide (VICTOZA) 0.6 mg/0.1 mL (18 mg/3 mL) PnIj injection, Inject 0.2 mL (1.2 mg total) under the skin daily. With or without food., Disp: 18 mL, Rfl: 3  ?  lisinopril (PRINIVIL,ZESTRIL) 5 MG tablet, TAKE 1 TABLET BY MOUTH ONCE  DAILY, Disp: 90 tablet, Rfl: 3  ?  MULTIVITS,CA,MIN/IRON/FA/LYCOP (CENTRUM MEN ORAL), Take 1 tablet by mouth daily., Disp: , Rfl:   ?  naproxen sodium (ALEVE) 220 MG tablet, Take 220 mg by mouth daily., Disp: , Rfl:   ?  penicillin VK (PEN VK) 250 MG tablet, Take 1 tablet (250 mg total) by mouth 4 (four) times a day for 10 days., Disp: 40 tablet, Rfl: 0  ?  traMADol (ULTRAM) 50 mg tablet, Take 50 mg by mouth every 6 (six) hours as needed for pain., Disp: , Rfl:   ?  vitamin E 400 unit capsule, Take 400 Units by mouth daily., Disp: , Rfl:     Current Facility-Administered Medications:   ?  lidocaine 2 % jelly (XYLOCAINE), , Topical, PRN, Ines Zimmerman, PAXTON, 1 application at 08/10/17 1146    Past Surgical History:   Procedure Laterality Date   ? CHOLECYSTECTOMY  1985   ? ERCP     ? TONSILLECTOMY  1940       Social History     Social History Narrative       Family History   Problem Relation Age of Onset   ? Aortic aneurysm Mother    ? Alcohol abuse Father        Review of Systems - Negative       OBJECTIVE:  Appearance: alert, well appearing, and in no distress.    Vitals:    08/10/17 1145   BP: 148/80   Pulse: 72   Resp: 16   Temp: 97.8  F (36.6  C)       Vascular: Dorsalis pedis non-palpableRight.  Dermatologic:   Wound 07/24/17 Right lateral foot (Active)   Pre Size Length 1.3 8/10/2017 11:00 AM   Pre Size Width 1 8/10/2017 11:00 AM   Pre Total Sq cm 1.3 8/10/2017 11:00 AM   Prodcut Used Alginate 8/10/2017 11:00 AM   Granular base after debridement plantar 5th MPJ right foot. Abrasion noted anterior right leg. No erythema noted right lower extremity.   Neurologic: Diminished to light touch Right.  Musculoskeletal: Contracted digits noted Right. Prominent 5th metatarsal head right. Limited ankle dorsiflexion right with knee extended, increased dorsiflexion with knee flexed. Slight varus attitude of the right foot.     Imaging: None    Picture:

## 2021-06-25 NOTE — PROGRESS NOTES
"Progress Notes by Ines Zimmerman NP at 12/11/2017  7:20 AM     Author: Ines Zimmerman NP Service: -- Author Type: Nurse Practitioner    Filed: 12/11/2017  8:30 AM Encounter Date: 12/11/2017 Status: Signed    : Ines Zimmerman NP (Nurse Practitioner)       Follow up Vascular Visit       Date of Service:12/11/2017    Date Last Seen: 11/8/2017; 10/23/2017    Chief Complaint: reopening of right diabetic foot ulcer    History:   Past Medical History:   Diagnosis Date   ? BPH (benign prostatic hyperplasia)    ? Cholelithiasis    ? Diabetes mellitus    ? Essential hypertension    ? Hyperlipidemia    ? Pancreatitis        Pt returns to the Vascular clinic with regards to their reopening of the right diabetic foot ulcer. Pt saw Dr. Garcia 5 weeks ago; was recommended that he get different inserts. He was seen at [facility] and these were created; he is very happy with these. He also followed up with [employer] and they are working on a rocker bottom shoe. He is having some difficult issues at home; 5yo granddaughter with brain mass having surgery tomorrow and he just found out his insurance will be dropped on the 15th. He is not sleeping. His bp is elevated. He has been taken out of work. Stopped using the mepilex border as it was felt this was trapping too much moisture; possibly causing blistering went to a gauze pad instead and feels that the drainage is less. He purchased compression stockings he does not like these; too hard to get on/off; does not want to go up in size as this will be too big on the foot. He prefers tubigrips instead and has been wearing these instead. Denies fevers, chills. His fs are running 150-200; he is seeing diabetic educator. His appetite is back to normal after having issues with gall stones.     Allergies: Crestor [rosuvastatin] and Demeclocycline    Physical Exam:    BP (!) 200/88  Pulse 76  Temp 97.6  F (36.4  C) (Oral)   Resp 16  Ht 5' 9\" (1.753 m)  Wt 204 lb (92.5 kg) " Comment: per pt report  BMI 30.13 kg/m2    General:  Patient presents to clinic in no apparent distress.  Head: normocephalic atraumatic  Psychiatric:  Alert and oriented x3.   Respiratory: unlabored breathing; no cough  Integumentary:  Skin is uniformly warm, dry and pink.    Wound #1 Location: right plantar foot 5th met head  Size: 0.3x0.3x0.1cm No undermining present. This was all debrided good bleeding. Periwound: no denudement, erythema, induration, maceration or warmth.      Circumferential volume measures:    Vasc Edema 3/6/2017 4/17/2017 6/2/2017   Right just above MTP 23 24 23   Right Ankle 25 26 23.5   Right Widest Calf 37.5 38.6 37   Left - just above MTP 22 - -   Left Ankle 22 - -   Left Widest Calf 36 - -         Ulceration(s)/Wound(s):   Wound 07/24/17 Right lateral foot (Active)   Pre Size Length 0 12/11/2017  7:00 AM   Pre Size Width 0 12/11/2017  7:00 AM   Pre Size Depth 0 12/11/2017  7:00 AM   Pre Total Sq cm 0 12/11/2017  7:00 AM   Post Size Length 0.3 12/11/2017  7:00 AM   Post Size Width 0.3 12/11/2017  7:00 AM   Post Size Depth 0.1 12/11/2017  7:00 AM   Post Total Sq cm 0.09 12/11/2017  7:00 AM   Undermined 0.75 9/8/2017 11:00 AM   Description callous 10/23/2017  7:00 AM   Prodcut Used Alginate 8/10/2017 11:00 AM       Wound 09/05/17 Foot Right (Active)        Lab Values    No results found for: SEDRATE  Lab Results   Component Value Date    CREATININE 1.33 (H) 09/06/2017     Lab Results   Component Value Date    HGBA1C 10.5 (H) 09/05/2017     Lab Results   Component Value Date    BUN 44 (H) 09/06/2017     Lab Results   Component Value Date    ALBUMIN 2.3 (L) 09/06/2017     No results found for: [id number]    11/20/17 right plantar foot          Impression:   diabetic foot ulcer; right  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 Excisional  debridement of the ulcer(s) was recommended today, after consent was obtained and 2% Xylocaine was applied using a sterile curet the epidermal, dermal and down into the subcutaneous tissue was sharply debrided for a total square cm of 0.09. The non-viable and necrotic tissue was removed and the wounds appeared much  afterwards.           2. Edema: continue with tubigrips; we spoke about getting a different size sock he did not want to do this; tubigrips are working well will just need to be replaced more frequently and he was fine with this           3.  Wound treatment:   a gaue dressing was applied.  The patient will continue to utilize diabetic shoes and insoles for off loading. AmLactin to the thickened skin areas           4. Nutrition: diabetic diet; we spoke again about tight glucose control; continue seeing the diabetic educatory           5. Offloading: new insoles now; will keep out of work; note written previously; getting rocker bottom shoe at the end of this week          6. HTN: his bp is up today; he is asymptomatic; will notify his PMD again.     Patient will follow up with me in 1-3 weeks needs to figure out his insurance issues first; he will call us. They were instructed to call the clinic sooner with any signs or symptoms of infection or any further questions/concerns. Answered all questions.    Ines Zimmerman DNP, RN, CNP, CWN  [facility]  [phone]

## 2021-06-25 NOTE — PROGRESS NOTES
Progress Notes by Ines Zimmerman NP at 12/4/2017  7:20 AM     Author: Ines Zimmerman NP Service: -- Author Type: Nurse Practitioner    Filed: 12/4/2017  7:47 AM Encounter Date: 12/4/2017 Status: Signed    : Ines Zimmerman NP (Nurse Practitioner)       Follow up Vascular Visit       Date of Service:12/4/2017    Date Last Seen: 11/8/2017; 10/23/2017    Chief Complaint: reopening of right diabetic foot ulcer    History:   Past Medical History:   Diagnosis Date   ? BPH (benign prostatic hyperplasia)    ? Cholelithiasis    ? Diabetes mellitus    ? Essential hypertension    ? Hyperlipidemia    ? Pancreatitis        Pt returns to the Vascular clinic with regards to their reopening of the right diabetic foot ulcer. Pt saw Dr. Garcia 3 weeks ago; was recommended that he get different inserts. He was seen at [facility] and these were created; he is very happy with these. He has been taken out of work. Stopped using the mepilex border as it was felt this was trapping too much moisture; possibly causing blistering went to a gauze pad instead and feels that the drainage is less. He purchased compression stockings he does not like these; too hard to get on/off; does not want to go up in size as this will be too big on the foot. He prefers tubigrips instead and has been wearing these instead. Denies fevers, chills. His fs are running 150-200; he is seeing diabetic educator. His appetite is back to normal after having issues with gall stones. We applied epifix graft last week to the ulcer on the right foot; covering with gauze; changing every 2-3 days.     Allergies: Crestor [rosuvastatin] and Demeclocycline    Physical Exam:    /80  Pulse 68  Temp 97.6  F (36.4  C) (Oral)   Resp 16    General:  Patient presents to clinic in no apparent distress.  Head: normocephalic atraumatic  Psychiatric:  Alert and oriented x3.   Respiratory: unlabored breathing; no cough  Integumentary:  Skin is uniformly warm, dry  and pink.    Wound #1 Location: right plantar foot 5th met head  Size: healed; very fragile No undermining present. Periwound: no denudement, erythema, induration, maceration or warmth.      Circumferential volume measures:    Vasc Edema 3/6/2017 4/17/2017 6/2/2017   Right just above MTP 23 24 23   Right Ankle 25 26 23.5   Right Widest Calf 37.5 38.6 37   Left - just above MTP 22 - -   Left Ankle 22 - -   Left Widest Calf 36 - -         Ulceration(s)/Wound(s):   Wound 07/24/17 Right lateral foot (Active)   Pre Size Length 0 12/4/2017  7:00 AM   Pre Size Width 0 12/4/2017  7:00 AM   Pre Size Depth 0 12/4/2017  7:00 AM   Pre Total Sq cm 0 12/4/2017  7:00 AM   Post Size Length 0.3 11/27/2017  8:00 AM   Post Size Width 0.5 11/27/2017  8:00 AM   Post Size Depth 0.1 11/27/2017  8:00 AM   Post Total Sq cm 0.15 11/27/2017  8:00 AM   Undermined 0.75 9/8/2017 11:00 AM   Description callous 10/23/2017  7:00 AM   Prodcut Used Alginate 8/10/2017 11:00 AM       Wound 09/05/17 Foot Right (Active)        Lab Values    No results found for: SEDRATE  Lab Results   Component Value Date    CREATININE 1.33 (H) 09/06/2017     Lab Results   Component Value Date    HGBA1C 10.5 (H) 09/05/2017     Lab Results   Component Value Date    BUN 44 (H) 09/06/2017     Lab Results   Component Value Date    ALBUMIN 2.3 (L) 09/06/2017     No results found for: [id number]    11/20/17 right plantar foot          Impression:   diabetic foot ulcer; right  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 Excisional debridement of the ulcer(s) was not recommended today as the wound is now healed           2. Edema: continue with tubigrips; we spoke about getting a different size sock he did not want to do this; tubigrips are working well will just need to be replaced more frequently and he was fine with this            3.  Wound treatment:   a gaue dressing was applied.  The patient will continue to utilize diabetic shoes and insoles for off loading.            4. Nutrition: diabetic diet; we spoke again about tight glucose control; continue seeing the diabetic educatory           5. Offloading: new insoles now; will keep out of work; note written previously     Patient will follow up with me in 1 weeks for reevaluation. They were instructed to call the clinic sooner with any signs or symptoms of infection or any further questions/concerns. Answered all questions.    Ines Zimmerman DNP, RN, CNP, [facility]  [phone]

## 2021-06-25 NOTE — PROGRESS NOTES
Progress Notes by John Garcia DPM at 11/14/2017  8:20 AM     Author: John Garcia DPM Service: -- Author Type: Physician    Filed: 11/14/2017  1:05 PM Encounter Date: 11/14/2017 Status: Signed    : John Garcia DPM (Physician)       FOOT AND ANKLE SURGERY/PODIATRY Progress Note        ASSESSMENT:   Ulceration right foot  Gastrosoleus Equinus right        TREATMENT:  -The patient has a recurrent ulceration sub 5th metatarsal head right foot. On exam, he has a mild flexible varus deformity with a gastrosoleus equinus. Both deformities are leading to increased pressure sub 5th MPJ right foot. He has been walking in diabetic inserts and shoes which consist of a multi-density insert without additional cork material on the plantar or dorsal surface. In my opinion, the insert does not off-load the 5th metatarsal head sufficiently to prevent skin breakdown. I recommend he have the existing insert modified with cork or similar product applied proximal to the 5th metatarsal head. Excavation of the shoe at the 5th metatarsal head may also be of benefit. I have referred him to [facility] for these modifications.  -I discussed with the patient that the gastrosoleus equinus is also a contributing to an increase in forefoot pressure. Although I do not recommend surgery at this time, an achilles tendon lengthening should be considered if conservative treatment fails to resolve the sore long term. He also has a mild varus deformity which can be addressed with a split tibialis anterior tendon transfer.   -Sharp, excisional debridement of the wound into subcutaneous tissue was performed using #15 blade for a total of 0.9 square centimeters. Debridement was done to reduce pressure, remove non-viable, devitalized tissue and promote wound healing. Patient tolerated this well. A gauze dressing was applied. He will continue to apply existing dressing per Dr. Zimmerman.   -The patient will follow-up with Ines Zimmerman  in two weeks for continued cares. I am happy to see this pleasant patient again if I can be assistance.       John Garcia, SAURAV  [facility]      HPI: Caleb Hernandez was seen again today at the request of Ines Zimmerman for a recurrent right foot ulceration. I last saw this patient in August of this year for a sore in the same location on the right foot. Since my last visit with the patient, he states that the lateral right foot ulcer resolved approximately 4 weeks ago. He has been walking in diabetic shoes and inserts. He would like to avoid surgery if possible.     Past Medical History:   Diagnosis Date   ? BPH (benign prostatic hyperplasia)    ? Cholelithiasis    ? Diabetes mellitus    ? Essential hypertension    ? Hyperlipidemia    ? Pancreatitis        Allergies   Allergen Reactions   ? Crestor [Rosuvastatin] Myalgia   ? Demeclocycline Hives         Current Outpatient Prescriptions:   ?  aspirin 81 MG EC tablet, Take 81 mg by mouth daily., Disp: , Rfl:   ?  blood glucose test (CONTOUR TEST STRIPS) strips, Use 1 each As Directed 2 (two) times a day. Dispense brand per patient's insurance at pharmacy discretion., Disp: 100 each, Rfl: 11  ?  cholecalciferol, vitamin D3, (VITAMIN D3) 5,000 unit Tab, Take 1 tablet by mouth daily., Disp: , Rfl:   ?  ibuprofen (ADVIL,MOTRIN) 200 MG tablet, Take 200 mg by mouth every 6 (six) hours as needed for pain. , Disp: , Rfl:   ?  insulin degludec (TRESIBA FLEXTOUCH U-200) 200 unit/mL (3 mL) InPn, Inject 40-50 Units under the skin daily., Disp: 9 mL, Rfl: 3  ?  liraglutide (VICTOZA) 0.6 mg/0.1 mL (18 mg/3 mL) PnIj injection, Inject 0.2 mL (1.2 mg total) under the skin daily. With or without food., Disp: 18 mL, Rfl: 3  ?  lisinopril (PRINIVIL,ZESTRIL) 5 MG tablet, TAKE 1 TABLET BY MOUTH ONCE DAILY (Patient taking differently: Take 5 mg by mouth daily. TAKE 1 TABLET BY MOUTH ONCE DAILY), Disp: 90 tablet, Rfl: 3  ?  MULTIVITS,CA,MIN/IRON/FA/LYCOP (CENTRUM MEN ORAL),  "Take 1 tablet by mouth daily., Disp: , Rfl:   ?  naproxen sodium (ALEVE) 220 MG tablet, Take 220 mg by mouth daily., Disp: , Rfl:   ?  pen needle, diabetic 31 gauge x 5/16\" Ndle, Used to inject insulin daily, Disp: 90 each, Rfl: 0  ?  traMADol (ULTRAM) 50 mg tablet, Take 50 mg by mouth every 6 (six) hours as needed for pain., Disp: , Rfl:   ?  vitamin E 400 unit capsule, Take 400 Units by mouth daily., Disp: , Rfl:     Current Facility-Administered Medications:   ?  lidocaine 2 % jelly (XYLOCAINE), , Topical, PRN, Ines Zimmerman NP, 1 application at 11/08/17 0934    Review of Systems - Negative      OBJECTIVE:  Appearance: alert, well appearing, and in no distress.    Vitals:    11/14/17 0700   BP: 144/82   Pulse: 80   Resp: 16       Vascular: Dorsalis pedis palpableRight.  Dermatologic:    Wound 07/24/17 Right lateral foot (Active)   Pre Size Length 1 11/14/2017  8:00 AM   Pre Size Width 0.9 11/14/2017  8:00 AM   Pre Size Depth 0.2 11/14/2017  8:00 AM   Pre Total Sq cm 0.9 11/14/2017  8:00 AM   Post Size Length 0.2 9/18/2017  9:00 AM   Post Size Width 1 9/18/2017  9:00 AM   Post Size Depth 0 9/18/2017  9:00 AM   Post Total Sq cm 0.2 9/18/2017  9:00 AM   Undermined 0.75 9/8/2017 11:00 AM   Description callous 10/23/2017  7:00 AM   Prodcut Used Alginate 8/10/2017 11:00 AM   Granular base right foot sub 5th MPJ, does not probe to deep tissues. No erythema noted.   Neurologic: Diminished to light touch Right.  Musculoskeletal: Contracted digits noted Right. Unable to dorsiflex right foot past 90 degrees relative to the right leg with knee extended, slight increase in dorsiflexion with knee flexed. Reducible mild varus deformity right foot.     Imaging: None    Picture:                "

## 2021-06-26 ENCOUNTER — HEALTH MAINTENANCE LETTER (OUTPATIENT)
Age: 86
End: 2021-06-26
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
## 2021-06-26 NOTE — PROGRESS NOTES
Progress Notes by Ines Zimmerman NP at 5/14/2018  7:20 AM     Author: Ines Zimmerman NP Service: -- Author Type: Nurse Practitioner    Filed: 5/14/2018  7:43 AM Encounter Date: 5/14/2018 Status: Signed    : Ines Zimmerman NP (Nurse Practitioner)       Follow up Vascular Visit       Date of Service:5/14/2018    Date Last Seen: 11/8/2017; 10/23/2017    Chief Complaint:  right diabetic foot ulcer    History:   Past Medical History:   Diagnosis Date   ? BPH (benign prostatic hyperplasia)    ? Cholelithiasis    ? Diabetes mellitus    ? Essential hypertension    ? Hyperlipidemia    ? Pancreatitis        Pt returns to the Vascular clinic with regards to their  right diabetic foot ulcer. Pt was seen 1.5  months ago; he was healed at that time. He was seen at [facility] and [facility] for new insoles; he is very happy with these; obtaining 3 more. They have decided to not go with the rocker bottom shoe at this time.  He has returned to work part time; this is going well.   Stopped using the mepilex border as it was felt this was trapping too much moisture; possibly causing blistering currently using nothing to the area. He is applying am lactin or cetaphil lotion to the area and surrounding dry skin areas. He purchased compression stockings he is wearing these or tubigrips.   Denies fevers, chills. His fs are running ; he is seeing diabetic educator checking fs 2-4 times per day.     Allergies: Crestor [rosuvastatin] and Demeclocycline    Physical Exam:    /80  Pulse 76  Temp 97.8  F (36.6  C)    General:  Patient presents to clinic in no apparent distress.  Head: normocephalic atraumatic  Psychiatric:  Alert and oriented x3.   Respiratory: unlabored breathing; no cough  Integumentary:  Skin is uniformly warm, dry and pink.    Wound #1 Location: right plantar foot 5th met head  Size: 1x1. Callous; slightly discolored; this was pared down using a 15 blade scalpel.  Pinhead sized area of red  discoloration in the center of the callous. Periwound: no denudement, erythema, induration, maceration or warmth.      Circumferential volume measures:    Vasc Edema 3/6/2017 4/17/2017 6/2/2017   Right just above MTP 23 24 23   Right Ankle 25 26 23.5   Right Widest Calf 37.5 38.6 37   Left - just above MTP 22 - -   Left Ankle 22 - -   Left Widest Calf 36 - -         Ulceration(s)/Wound(s):   Wound 07/24/17 Right lateral foot (Active)   Pre Size Length 1 5/14/2018  7:27 AM   Pre Size Width 1 5/14/2018  7:27 AM   Pre Size Depth 0 1/15/2018  8:00 AM   Pre Total Sq cm 1 5/14/2018  7:27 AM   Post Size Length 0.3 12/11/2017  7:00 AM   Post Size Width 0.3 12/11/2017  7:00 AM   Post Size Depth 0.1 12/11/2017  7:00 AM   Post Total Sq cm 0.09 12/11/2017  7:00 AM   Undermined 0.75 9/8/2017 11:00 AM   Description callous/scab 5/14/2018  7:27 AM   Prodcut Used Alginate 8/10/2017 11:00 AM       Wound 09/05/17 Foot Right (Active)        Lab Values    No results found for: SEDRATE  Lab Results   Component Value Date    CREATININE 1.01 02/02/2018     Lab Results   Component Value Date    HGBA1C 10.5 (H) 09/05/2017     Lab Results   Component Value Date    BUN 26 02/02/2018     Lab Results   Component Value Date    ALBUMIN 2.3 (L) 09/06/2017     No results found for: [id number]        11/20/17 right plantar foot          Impression:   diabetic foot ulcer; right  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 A callous on the right 5th met head was pared down using a 15 blade scalpel; tolerated well; skin intact underneath; no complications           2. Edema: continue with tubigrips or compression stockings           3.  Wound treatment:   Left raffy. The patient will continue to utilize diabetic shoes and insoles for off loading. AmLactin to the thickened skin areas; use cetaphil to other  dry skin locations; continue current work restrictions; no soaking of the feet; will take out of work for 1 week due to the area of discoloration to ensure that this does not re-open           4. Nutrition: diabetic diet; we spoke again about tight glucose control; continue seeing the diabetic educatory           5. Offloading: new insoles now; continue work part time; note written; paper work filled out; 5 hour per day 4 days per week; will re-evaluate in 7 weeks         Patient will follow up with me in 1 week for re-evaluation. They were instructed to call the clinic sooner with any signs or symptoms of infection or any further questions/concerns. Answered all questions.    Ines Zimmerman DNP, RN, CNP, [facility]  [phone]

## 2021-06-26 NOTE — PROGRESS NOTES
"Progress Notes by Ines Zimmerman NP at 7/23/2018  7:20 AM     Author: Ines Zimmerman NP Service: -- Author Type: Nurse Practitioner    Filed: 7/24/2018 10:10 AM Encounter Date: 7/23/2018 Status: Addendum    : Ines Zimmerman NP (Nurse Practitioner)    Related Notes: Original Note by Ines Zimmerman NP (Nurse Practitioner) filed at 7/23/2018  7:47 AM       Follow up Vascular Visit       Date of Service:7/23/2018    Date Last Seen: 11/8/2017; 10/23/2017    Chief Complaint:  right diabetic foot ulcer    History:   Past Medical History:   Diagnosis Date   ? BPH (benign prostatic hyperplasia)    ? Cholelithiasis    ? Diabetes mellitus (H)    ? Essential hypertension    ? Hyperlipidemia    ? Pancreatitis        Pt returns to the Vascular clinic with regards to their  right diabetic foot ulcer. Pt was seen 5 weeks ago; he was healed at that time. He was seen at [facility] and [facility] for new insoles; he is very happy with these; obtained 3 more. They have decided to not go with the rocker bottom shoe at this time.  We kept him out of work last visit due to persistent discoloration in the callous region he is now fully retired.  Stopped using the mepilex border as it was felt this was trapping too much moisture; possibly causing blistering currently using nothing to the area. He is applying am lactin or cetaphil lotion to the area and surrounding dry skin areas. He purchased compression stockings he is wearing these or tubigrips.   Denies fevers, chills. His fs are running ; he recently saw a diabetic educator, checking fs 2-4 times per day. He met with Mac Yost again and recommend Glidewear stockings to reduce friction/shear; he purchased these; had tried these a few times and did not like this \"felt weird\". This was reviewed with the patient; no changes since last visit 6/18/18.    Allergies: Crestor [rosuvastatin] and Demeclocycline    Physical Exam:    Pulse 72  Temp 97  F (36.1  C) (Oral)  "  Resp 18    General:  Patient presents to clinic in no apparent distress.  Head: normocephalic atraumatic  Psychiatric:  Alert and oriented x3.   Respiratory: unlabored breathing; no cough  Integumentary:  Skin is uniformly warm, dry and pink.    Wound #1 Location: right plantar foot 5th met head  Size: 1x1. Callous; discolored; this was pared down and Intact underneath Periwound: no denudement, erythema, induration, maceration or warmth.      Circumferential volume measures:    Vasc Edema 3/6/2017 4/17/2017 6/2/2017   Right just above MTP 23 24 23   Right Ankle 25 26 23.5   Right Widest Calf 37.5 38.6 37   Left - just above MTP 22 - -   Left Ankle 22 - -   Left Widest Calf 36 - -         Ulceration(s)/Wound(s):   Wound 07/24/17 Right lateral foot (Active)   Pre Size Length 0 6/18/2018  7:00 AM   Pre Size Width 0 6/18/2018  7:00 AM   Pre Size Depth 0 6/18/2018  7:00 AM   Pre Total Sq cm 0 6/18/2018  7:00 AM   Post Size Length 0.3 12/11/2017  7:00 AM   Post Size Width 0.3 12/11/2017  7:00 AM   Post Size Depth 0.1 12/11/2017  7:00 AM   Post Total Sq cm 0.09 12/11/2017  7:00 AM   Undermined 0.75 9/8/2017 11:00 AM   Description callous/scab 5/14/2018  7:27 AM   Prodcut Used Alginate 8/10/2017 11:00 AM       Wound 09/05/17 Foot Right (Active)        Lab Values    No results found for: SEDRATE  Lab Results   Component Value Date    CREATININE 1.05 05/23/2018     Lab Results   Component Value Date    HGBA1C 8.8 (H) 05/23/2018     Lab Results   Component Value Date    BUN 23 05/23/2018     Lab Results   Component Value Date    ALBUMIN 3.7 05/23/2018     No results found for: [id number]      6/18/18 right 5th met head          11/20/17 right plantar foot          Impression:   diabetic foot ulcer; right-stable  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  Debridement: Paring of the callous was done today on the right plantar foot using a sterile scalpel the epidermal and dermal was sharply debrided for a total square cm of 1. Intact underneath           2. Edema: continue with tubigrips or compression stockings           3.  Wound treatment:   Left raffy. The patient will continue to utilize diabetic shoes and insoles for off loading. AmLactin to the thickened skin areas; use cetaphil to other dry skin locations; now retired; no soaking of the feet; I would really like him to try  the glidewear stockings see if this reduces the friction and shear component; instructed to slowly transition into these with frequent foot inspection; will send Dr. Garcia a message regarding this patient and see if he would like anything further.           4. Nutrition: diabetic diet; we spoke again about tight glucose control; continue seeing the diabetic educatory           5. Offloading: new insoles now; we spoke about importance of staying off his feet more; never going barefoot''           6. HTN: bp was initially high in clinic; rechecked this and it was improved; asymptomatic; continue to follow with PMD re: his htn; continue on current regimen              Patient will follow up with me in 4 week for re-evaluation. They were instructed to call the clinic sooner with any signs or symptoms of infection or any further questions/concerns. Answered all questions.    Ines Zimmerman DNP, RN, CNP, [facility]  [phone]

## 2021-06-26 NOTE — PROGRESS NOTES
Progress Notes by Ines Zimmerman NP at 6/4/2018  7:20 AM     Author: Ines Zimmerman NP Service: -- Author Type: Nurse Practitioner    Filed: 6/4/2018  7:50 AM Encounter Date: 6/4/2018 Status: Signed    : Ines Zimmerman NP (Nurse Practitioner)       Follow up Vascular Visit       Date of Service:6/4/2018    Date Last Seen: 11/8/2017; 10/23/2017    Chief Complaint:  right diabetic foot ulcer    History:   Past Medical History:   Diagnosis Date   ? BPH (benign prostatic hyperplasia)    ? Cholelithiasis    ? Diabetes mellitus    ? Essential hypertension    ? Hyperlipidemia    ? Pancreatitis        Pt returns to the Vascular clinic with regards to their  right diabetic foot ulcer. Pt was seen 2 weeks ago; he was healed at that time. He was seen at [facility] and [facility] for new insoles; he is very happy with these; obtained 3 more. They have decided to not go with the rocker bottom shoe at this time.  We took him out of work last visit due to increased discoloration in the callous region.  Stopped using the mepilex border as it was felt this was trapping too much moisture; possibly causing blistering currently using nothing to the area. He is applying am lactin or cetaphil lotion to the area and surrounding dry skin areas. He purchased compression stockings he is wearing these or tubigrips.   Denies fevers, chills. His fs are running ; he is seeing diabetic educator checking fs 2-4 times per day. This was reviewed with the patient; no changes since last visit 5/21/18.    Allergies: Crestor [rosuvastatin] and Demeclocycline    Physical Exam:    Pulse 72  Temp 98.1  F (36.7  C) (Oral)   Resp 16    General:  Patient presents to clinic in no apparent distress.  Head: normocephalic atraumatic  Psychiatric:  Alert and oriented x3.   Respiratory: unlabored breathing; no cough  Integumentary:  Skin is uniformly warm, dry and pink.    Wound #1 Location: right plantar foot 5th met head  Size: 1x1. Callous;  discolored; I pared this down with a 15 blade scalpal  Intact underneath. Periwound: no denudement, erythema, induration, maceration or warmth.      Circumferential volume measures:    Vasc Edema 3/6/2017 4/17/2017 6/2/2017   Right just above MTP 23 24 23   Right Ankle 25 26 23.5   Right Widest Calf 37.5 38.6 37   Left - just above MTP 22 - -   Left Ankle 22 - -   Left Widest Calf 36 - -         Ulceration(s)/Wound(s):   Wound 07/24/17 Right lateral foot (Active)   Pre Size Length 0 6/4/2018  7:00 AM   Pre Size Width 0 6/4/2018  7:00 AM   Pre Size Depth 0 6/4/2018  7:00 AM   Pre Total Sq cm 0 6/4/2018  7:00 AM   Post Size Length 0.3 12/11/2017  7:00 AM   Post Size Width 0.3 12/11/2017  7:00 AM   Post Size Depth 0.1 12/11/2017  7:00 AM   Post Total Sq cm 0.09 12/11/2017  7:00 AM   Undermined 0.75 9/8/2017 11:00 AM   Description callous/scab 5/14/2018  7:27 AM   Prodcut Used Alginate 8/10/2017 11:00 AM       Wound 09/05/17 Foot Right (Active)        Lab Values    No results found for: SEDRATE  Lab Results   Component Value Date    CREATININE 1.05 05/23/2018     Lab Results   Component Value Date    HGBA1C 8.8 (H) 05/23/2018     Lab Results   Component Value Date    BUN 23 05/23/2018     Lab Results   Component Value Date    ALBUMIN 3.7 05/23/2018     No results found for: [id number]        11/20/17 right plantar foot          Impression:   diabetic foot ulcer; right-stable  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 Debridement: using a 15 blade scalpel the callous on the right 5th met head was pared down; intact underneath           2. Edema: continue with tubigrips or compression stockings           3.  Wound treatment:   Left raffy. The patient will continue to utilize diabetic shoes and insoles for off loading. AmLactin to the thickened skin areas; use  cetaphil to other dry skin locations; continue current work restrictions; no soaking of the feet; will take out of work for another 2 weeks due to the area of discoloration to ensure that this does not re-open; letter written           4. Nutrition: diabetic diet; we spoke again about tight glucose control; continue seeing the diabetic educatory           5. Offloading: new insoles now; we spoke about importance of staying off his feet more; never going barefoot              Patient will follow up with me in 2 week for re-evaluation. They were instructed to call the clinic sooner with any signs or symptoms of infection or any further questions/concerns. Answered all questions.    Ines Zimmerman DNP, RN, CNP, CWN  [facility]  [phone]

## 2021-06-26 NOTE — PROGRESS NOTES
Progress Notes by Ines Zimmerman NP at 6/18/2018  7:20 AM     Author: Ines Zimmerman NP Service: -- Author Type: Nurse Practitioner    Filed: 6/18/2018  7:53 AM Encounter Date: 6/18/2018 Status: Signed    : Ines Zmimerman NP (Nurse Practitioner)       Follow up Vascular Visit       Date of Service:6/18/2018    Date Last Seen: 11/8/2017; 10/23/2017    Chief Complaint:  right diabetic foot ulcer    History:   Past Medical History:   Diagnosis Date   ? BPH (benign prostatic hyperplasia)    ? Cholelithiasis    ? Diabetes mellitus (H)    ? Essential hypertension    ? Hyperlipidemia    ? Pancreatitis        Pt returns to the Vascular clinic with regards to their  right diabetic foot ulcer. Pt was seen 2 weeks ago; he was healed at that time. He was seen at [facility] and [facility] for new insoles; he is very happy with these; obtained 3 more. They have decided to not go with the rocker bottom shoe at this time.  We kept him out of work last visit due to persistent discoloration in the callous region.  Stopped using the mepilex border as it was felt this was trapping too much moisture; possibly causing blistering currently using nothing to the area. He is applying am lactin or cetaphil lotion to the area and surrounding dry skin areas. He purchased compression stockings he is wearing these or tubigrips.   Denies fevers, chills. His fs are running ; he is seeing diabetic educator, checking fs 2-4 times per day. He met with Mac Yost again and recommend Glidewear stockings to reduce friction/shear; he purchased these; has not started wearing these yet. This was reviewed with the patient; no changes since last visit 6/4/18.    Allergies: Crestor [rosuvastatin] and Demeclocycline    Physical Exam:    /90  Pulse 72  Temp 97.7  F (36.5  C) (Oral)   Resp 16    General:  Patient presents to clinic in no apparent distress.  Head: normocephalic atraumatic  Psychiatric:  Alert and oriented x3.    Respiratory: unlabored breathing; no cough  Integumentary:  Skin is uniformly warm, dry and pink.    Wound #1 Location: right plantar foot 5th met head  Size: 1x1. Callous; discolored; well pared, Intact Periwound: no denudement, erythema, induration, maceration or warmth.      Circumferential volume measures:    Vasc Edema 3/6/2017 4/17/2017 6/2/2017   Right just above MTP 23 24 23   Right Ankle 25 26 23.5   Right Widest Calf 37.5 38.6 37   Left - just above MTP 22 - -   Left Ankle 22 - -   Left Widest Calf 36 - -         Ulceration(s)/Wound(s):   Wound 07/24/17 Right lateral foot (Active)   Pre Size Length 0 6/18/2018  7:00 AM   Pre Size Width 0 6/18/2018  7:00 AM   Pre Size Depth 0 6/18/2018  7:00 AM   Pre Total Sq cm 0 6/18/2018  7:00 AM   Post Size Length 0.3 12/11/2017  7:00 AM   Post Size Width 0.3 12/11/2017  7:00 AM   Post Size Depth 0.1 12/11/2017  7:00 AM   Post Total Sq cm 0.09 12/11/2017  7:00 AM   Undermined 0.75 9/8/2017 11:00 AM   Description callous/scab 5/14/2018  7:27 AM   Prodcut Used Alginate 8/10/2017 11:00 AM       Wound 09/05/17 Foot Right (Active)        Lab Values    No results found for: SEDRATE  Lab Results   Component Value Date    CREATININE 1.05 05/23/2018     Lab Results   Component Value Date    HGBA1C 8.8 (H) 05/23/2018     Lab Results   Component Value Date    BUN 23 05/23/2018     Lab Results   Component Value Date    ALBUMIN 3.7 05/23/2018     No results found for: [id number]        11/20/17 right plantar foot          Impression:   diabetic foot ulcer; right-stable  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 Debridement: not recommended today; well pared           2. Edema: continue with tubigrips or compression stockings           3.  Wound treatment:   Left raffy. The patient will continue to utilize diabetic shoes  and insoles for off loading. AmLactin to the thickened skin areas; use cetaphil to other dry skin locations; continue current work restrictions; no soaking of the feet; will take out of work for another 4 weeks due to the area of discoloration to ensure that this does not re-open; letter written; can trial the glidewear stockings see if this reduces the friction and shear component; instructed to slowly transition into these with frequent foot inspection           4. Nutrition: diabetic diet; we spoke again about tight glucose control; continue seeing the diabetic educatory           5. Offloading: new insoles now; we spoke about importance of staying off his feet more; never going barefoot              Patient will follow up with me in 4 week for re-evaluation. They were instructed to call the clinic sooner with any signs or symptoms of infection or any further questions/concerns. Answered all questions.    Ines Zimmerman DNP, RN, CNP, [facility]  [phone]

## 2021-06-26 NOTE — PROGRESS NOTES
Progress Notes by Ines Zimmerman NP at 4/2/2018  7:20 AM     Author: Ines Zimmerman NP Service: -- Author Type: Nurse Practitioner    Filed: 4/2/2018  7:54 AM Encounter Date: 4/2/2018 Status: Signed    : Ines Zimmerman NP (Nurse Practitioner)       Follow up Vascular Visit       Date of Service:4/2/2018    Date Last Seen: 11/8/2017; 10/23/2017    Chief Complaint:  right diabetic foot ulcer    History:   Past Medical History:   Diagnosis Date   ? BPH (benign prostatic hyperplasia)    ? Cholelithiasis    ? Diabetes mellitus    ? Essential hypertension    ? Hyperlipidemia    ? Pancreatitis        Pt returns to the Vascular clinic with regards to their  right diabetic foot ulcer. Pt was seen 1 month ago; he was healed at that time. He was seen at [facility] for new insoles; he is very happy with these; obtaining 3 more. They have decided to not go with the rocker bottom shoe at this time.  He has returned to work part time; this is going well.   Stopped using the mepilex border as it was felt this was trapping too much moisture; possibly causing blistering currently using nothing to the area. He purchased compression stockings he is wearing these or tubigrips.   Denies fevers, chills. His fs are running ; he is seeing diabetic educator checking fs 2-4 times per day.     Allergies: Crestor [rosuvastatin] and Demeclocycline    Physical Exam:    /72  Pulse 76  Temp 97.8  F (36.6  C)    General:  Patient presents to clinic in no apparent distress.  Head: normocephalic atraumatic  Psychiatric:  Alert and oriented x3.   Respiratory: unlabored breathing; no cough  Integumentary:  Skin is uniformly warm, dry and pink.    Wound #1 Location: right plantar foot 5th met head  Size: healed. Callous; thin and well pared. Pinhead sized area of red discoloration in the center of the well pared callous. Periwound: no denudement, erythema, induration, maceration or warmth.      Circumferential  volume measures:    Vasc Edema 3/6/2017 4/17/2017 6/2/2017   Right just above MTP 23 24 23   Right Ankle 25 26 23.5   Right Widest Calf 37.5 38.6 37   Left - just above MTP 22 - -   Left Ankle 22 - -   Left Widest Calf 36 - -         Ulceration(s)/Wound(s):   Wound 07/24/17 Right lateral foot (Active)   Pre Size Length 0 1/15/2018  8:00 AM   Pre Size Width 0 1/15/2018  8:00 AM   Pre Size Depth 0 1/15/2018  8:00 AM   Pre Total Sq cm 0 1/15/2018  8:00 AM   Post Size Length 0.3 12/11/2017  7:00 AM   Post Size Width 0.3 12/11/2017  7:00 AM   Post Size Depth 0.1 12/11/2017  7:00 AM   Post Total Sq cm 0.09 12/11/2017  7:00 AM   Undermined 0.75 9/8/2017 11:00 AM   Description callous/scab 4/2/2018  7:26 AM   Prodcut Used Alginate 8/10/2017 11:00 AM       Wound 09/05/17 Foot Right (Active)        Lab Values    No results found for: SEDRATE  Lab Results   Component Value Date    CREATININE 1.01 02/02/2018     Lab Results   Component Value Date    HGBA1C 10.5 (H) 09/05/2017     Lab Results   Component Value Date    BUN 26 02/02/2018     Lab Results   Component Value Date    ALBUMIN 2.3 (L) 09/06/2017     No results found for: [id number]        11/20/17 right plantar foot          Impression:   diabetic foot ulcer; right  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 Debridement was not done today; as the wound was healed           2. Edema: continue with tubigrips or compression stockings           3.  Wound treatment:   Left raffy. The patient will continue to utilize diabetic shoes and insoles for off loading. AmLactin to the thickened skin areas; use cetaphil to other dry skin locations; continue current work restrictions           4. Nutrition: diabetic diet; we spoke again about tight glucose control; continue seeing the diabetic educatory           5. Offloading: new insoles  now; continue work part time; note written; paper work filled out; 5 hour per day 4 days per week; will re-evaluate in 7 weeks         Patient will follow up with me in 7 weeks for re-evaluation. They were instructed to call the clinic sooner with any signs or symptoms of infection or any further questions/concerns. Answered all questions.    Ines Zimmerman DNP, RN, CNP, [facility]  [phone]

## 2021-06-26 NOTE — PROGRESS NOTES
Progress Notes by Ines Zimmerman NP at 5/21/2018  7:40 AM     Author: Ines Zimmerman NP Service: -- Author Type: Nurse Practitioner    Filed: 5/21/2018  8:20 AM Encounter Date: 5/21/2018 Status: Signed    : Ines Zimmerman NP (Nurse Practitioner)       Follow up Vascular Visit       Date of Service:5/21/2018    Date Last Seen: 11/8/2017; 10/23/2017    Chief Complaint:  right diabetic foot ulcer    History:   Past Medical History:   Diagnosis Date   ? BPH (benign prostatic hyperplasia)    ? Cholelithiasis    ? Diabetes mellitus    ? Essential hypertension    ? Hyperlipidemia    ? Pancreatitis        Pt returns to the Vascular clinic with regards to their  right diabetic foot ulcer. Pt was seen 1 week ago; he was healed at that time. He was seen at [facility] and [facility] for new insoles; he is very happy with these; obtained 3 more. They have decided to not go with the rocker bottom shoe at this time.  We took him out of work last visit due to increased discoloration in the callous region.  Stopped using the mepilex border as it was felt this was trapping too much moisture; possibly causing blistering currently using nothing to the area. He is applying am lactin or cetaphil lotion to the area and surrounding dry skin areas. He purchased compression stockings he is wearing these or tubigrips.   Denies fevers, chills. His fs are running ; he is seeing diabetic educator checking fs 2-4 times per day. He has a cold that started 5 days ago. Has appt with PMD in 2 days.    Allergies: Crestor [rosuvastatin] and Demeclocycline    Physical Exam:    BP (!) 198/98  Pulse 76  Temp 98.5  F (36.9  C) (Oral)   Resp 16    General:  Patient presents to clinic in no apparent distress.  Head: normocephalic atraumatic  Psychiatric:  Alert and oriented x3.   Respiratory: unlabored breathing; no cough  Integumentary:  Skin is uniformly warm, dry and pink.    Wound #1 Location: right plantar foot 5th met head  Size:  1x1. Callous; slightly discolored; this was well pared.  Pinhead sized area of red discoloration in the center of the callous. Periwound: no denudement, erythema, induration, maceration or warmth.      Circumferential volume measures:    Vasc Edema 3/6/2017 4/17/2017 6/2/2017   Right just above MTP 23 24 23   Right Ankle 25 26 23.5   Right Widest Calf 37.5 38.6 37   Left - just above MTP 22 - -   Left Ankle 22 - -   Left Widest Calf 36 - -         Ulceration(s)/Wound(s):   Wound 07/24/17 Right lateral foot (Active)   Pre Size Length 0 5/21/2018  7:00 AM   Pre Size Width 0 5/21/2018  7:00 AM   Pre Size Depth 0 5/21/2018  7:00 AM   Pre Total Sq cm 0 5/21/2018  7:00 AM   Post Size Length 0.3 12/11/2017  7:00 AM   Post Size Width 0.3 12/11/2017  7:00 AM   Post Size Depth 0.1 12/11/2017  7:00 AM   Post Total Sq cm 0.09 12/11/2017  7:00 AM   Undermined 0.75 9/8/2017 11:00 AM   Description callous/scab 5/14/2018  7:27 AM   Prodcut Used Alginate 8/10/2017 11:00 AM       Wound 09/05/17 Foot Right (Active)        Lab Values    No results found for: SEDRATE  Lab Results   Component Value Date    CREATININE 1.01 02/02/2018     Lab Results   Component Value Date    HGBA1C 10.5 (H) 09/05/2017     Lab Results   Component Value Date    BUN 26 02/02/2018     Lab Results   Component Value Date    ALBUMIN 2.3 (L) 09/06/2017     No results found for: [id number]        11/20/17 right plantar foot          Impression:   diabetic foot ulcer; right-stable  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 Debridement was not indicated today; callous was well pared and skin was intact           2. Edema: continue with tubigrips or compression stockings           3.  Wound treatment:   Left raffy. The patient will continue to utilize diabetic shoes and insoles for off loading. AmLactin to  the thickened skin areas; use cetaphil to other dry skin locations; continue current work restrictions; no soaking of the feet; will take out of work for another 2 weeks due to the area of discoloration to ensure that this does not re-open; letter written           4. Nutrition: diabetic diet; we spoke again about tight glucose control; continue seeing the diabetic educatory           5. Offloading: new insoles now; we spoke about importance of staying off his feet more; never going barefoot           6. HTN: pt bp elevated today in clinic; he was asymptomatic; no headache; no visual changes; no cp; he had taken his bp meds just 20 mintues prior to his appt; he has appt with PMD on Wednesday; he was advised to go to ED if he develops symptoms; sent message to his PMD Dr. Machado.        Patient will follow up with me in 2 week for re-evaluation. They were instructed to call the clinic sooner with any signs or symptoms of infection or any further questions/concerns. Answered all questions.    Ines Zimmerman DNP, RN, CNP, [facility]  [facility]  [phone]

## 2021-06-26 NOTE — PROGRESS NOTES
"Progress Notes by Ines Zimmerman NP at 8/20/2018  8:00 AM     Author: Ines Zimmerman NP Service: -- Author Type: Nurse Practitioner    Filed: 8/20/2018  8:54 AM Encounter Date: 8/20/2018 Status: Signed    : Ines Zimmerman NP (Nurse Practitioner)       Follow up Vascular Visit       Date of Service:8/20/2018    Date Last Seen: 11/8/2017; 10/23/2017    Chief Complaint:  right diabetic foot ulcer    History:   Past Medical History:   Diagnosis Date   ? BPH (benign prostatic hyperplasia)    ? Cholelithiasis    ? Diabetes mellitus (H)    ? Essential hypertension    ? Hyperlipidemia    ? Pancreatitis        Pt returns to the Vascular clinic with regards to their  right diabetic foot ulcer. Pt was seen 5 weeks ago; he was healed at that time. He was seen at [facility] and [facility] for new insoles; he is very happy with these; obtained 3 more. They have decided to not go with the rocker bottom shoe at this time.   He is now fully retired.  Stopped using the mepilex border as it was felt this was trapping too much moisture; possibly causing blistering currently using nothing to the area. He is applying am lactin or cetaphil lotion to the area and surrounding dry skin areas. He purchased compression stockings he is wearing these or tubigrips.   Denies fevers, chills. His fs are running ; he recently saw a diabetic educator, checking fs 2-4 times per day. He met with Mac Yost again and recommend Glidewear stockings to reduce friction/shear; he purchased these; had tried these a few times and did not like this \"felt weird\". This was reviewed with the patient; no changes since last visit 7/23/18.    Allergies: Crestor [rosuvastatin] and Demeclocycline    Physical Exam:    /90  Pulse 80  Temp 98  F (36.7  C) (Oral)   Resp 16  Ht 5' 9\" (1.753 m) Comment: per pt report  Wt 198 lb (89.8 kg) Comment: per pt report  BMI 29.24 kg/m2    General:  Patient presents to clinic in no apparent " distress.  Head: normocephalic atraumatic  Psychiatric:  Alert and oriented x3.   Respiratory: unlabored breathing; no cough  Integumentary:  Skin is uniformly warm, dry and pink.    Wound #1 Location: right plantar foot 5th met head  Size: 1x1. Callous; discolored; this was pared down and Intact underneath Periwound: no denudement, erythema, induration, maceration or warmth.      Circumferential volume measures:    Vasc Edema 3/6/2017 4/17/2017 6/2/2017   Right just above MTP 23 24 23   Right Ankle 25 26 23.5   Right Widest Calf 37.5 38.6 37   Left - just above MTP 22 - -   Left Ankle 22 - -   Left Widest Calf 36 - -         Ulceration(s)/Wound(s):   Wound 07/24/17 Right lateral foot (Active)   Pre Size Length 0 8/20/2018  8:00 AM   Pre Size Width 0 8/20/2018  8:00 AM   Pre Size Depth 0 8/20/2018  8:00 AM   Pre Total Sq cm 0 8/20/2018  8:00 AM   Post Size Length 0.3 12/11/2017  7:00 AM   Post Size Width 0.3 12/11/2017  7:00 AM   Post Size Depth 0.1 12/11/2017  7:00 AM   Post Total Sq cm 0.09 12/11/2017  7:00 AM   Undermined 0.75 9/8/2017 11:00 AM   Description callous/scab 5/14/2018  7:27 AM   Prodcut Used Alginate 8/10/2017 11:00 AM       Wound 09/05/17 Foot Right (Active)        Lab Values    No results found for: SEDRATE  Lab Results   Component Value Date    CREATININE 1.05 05/23/2018     Lab Results   Component Value Date    HGBA1C 8.8 (H) 05/23/2018     Lab Results   Component Value Date    BUN 23 05/23/2018     Lab Results   Component Value Date    ALBUMIN 3.7 05/23/2018     No results found for: [id number]      6/18/18 right 5th met head          11/20/17 right plantar foot          Impression:   diabetic foot ulcer; right-stable  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 Debridement: Paring of the callous was done today on the right plantar  foot using a sterile scalpel the epidermal and dermal was sharply debrided for a total square cm of 1. Intact underneath           2. Edema: continue with tubigrips or compression stockings           3.  Wound treatment:   Left raffy. The patient will continue to utilize diabetic shoes and insoles for off loading. AmLactin to the thickened skin areas; use cetaphil to other dry skin locations; now retired; no soaking of the feet; I would really like him to try  the glidewear stockings see if this reduces the friction and shear component; instructed to slowly transition into these with frequent foot inspection for the new small red area on the right leg can apply neosporin for 5 days; no bandage; appears as a folliculitis           4. Nutrition: diabetic diet; we spoke again about tight glucose control; continue seeing the diabetic educator           5. Offloading: new insoles now; we spoke about importance of staying off his feet more; never going barefoot''           6. HTN: bp was initially high in clinic; asymptomatic; continue to follow with PMD re: his htn; continue on current regimen              Patient will follow up with me in 4 week for re-evaluation. They were instructed to call the clinic sooner with any signs or symptoms of infection or any further questions/concerns. Answered all questions.    Ines Zimmerman DNP, RN, CNP, [facility]  [phone]

## 2021-06-26 NOTE — PROGRESS NOTES
Progress Notes by Ines Zimmerman NP at 3/12/2018  8:00 AM     Author: Ines Zimmerman NP Service: -- Author Type: Nurse Practitioner    Filed: 3/12/2018  8:43 AM Encounter Date: 3/12/2018 Status: Signed    : Ines Zimmerman NP (Nurse Practitioner)       Follow up Vascular Visit       Date of Service:3/12/2018    Date Last Seen: 11/8/2017; 10/23/2017    Chief Complaint:  right diabetic foot ulcer    History:   Past Medical History:   Diagnosis Date   ? BPH (benign prostatic hyperplasia)    ? Cholelithiasis    ? Diabetes mellitus    ? Essential hypertension    ? Hyperlipidemia    ? Pancreatitis        Pt returns to the Vascular clinic with regards to their  right diabetic foot ulcer. Pt was seen 1 month ago; he was healed at that time. He was seen at [facility] and these were created; he is very happy with these. He also followed up with [employer] and they also provided him with an insole; they have decided to not go with the rocker bottom shoe at this time.  He has returned to work part time; this is going well.   Stopped using the mepilex border as it was felt this was trapping too much moisture; possibly causing blistering currently using nothing to the area. He purchased compression stockings he is wearing these or tubigrips.   Denies fevers, chills. His fs are running 150-200; he is seeing diabetic educator.     Allergies: Crestor [rosuvastatin] and Demeclocycline    Physical Exam:    /80  Pulse 68  Temp 97.6  F (36.4  C) (Oral)   Resp 16    General:  Patient presents to clinic in no apparent distress.  Head: normocephalic atraumatic  Psychiatric:  Alert and oriented x3.   Respiratory: unlabored breathing; no cough  Integumentary:  Skin is uniformly warm, dry and pink.    Wound #1 Location: right plantar foot 5th met head  Size: healed. Callous; thin and well pared. Periwound: no denudement, erythema, induration, maceration or warmth.      Circumferential volume measures:    Vasc Edema  3/6/2017 4/17/2017 6/2/2017   Right just above MTP 23 24 23   Right Ankle 25 26 23.5   Right Widest Calf 37.5 38.6 37   Left - just above MTP 22 - -   Left Ankle 22 - -   Left Widest Calf 36 - -         Ulceration(s)/Wound(s):   Wound 07/24/17 Right lateral foot (Active)   Pre Size Length 0 1/15/2018  8:00 AM   Pre Size Width 0 1/15/2018  8:00 AM   Pre Size Depth 0 1/15/2018  8:00 AM   Pre Total Sq cm 0 1/15/2018  8:00 AM   Post Size Length 0.3 12/11/2017  7:00 AM   Post Size Width 0.3 12/11/2017  7:00 AM   Post Size Depth 0.1 12/11/2017  7:00 AM   Post Total Sq cm 0.09 12/11/2017  7:00 AM   Undermined 0.75 9/8/2017 11:00 AM   Description callous 10/23/2017  7:00 AM   Prodcut Used Alginate 8/10/2017 11:00 AM       Wound 09/05/17 Foot Right (Active)        Lab Values    No results found for: SEDRATE  Lab Results   Component Value Date    CREATININE 1.01 02/02/2018     Lab Results   Component Value Date    HGBA1C 10.5 (H) 09/05/2017     Lab Results   Component Value Date    BUN 26 02/02/2018     Lab Results   Component Value Date    ALBUMIN 2.3 (L) 09/06/2017     No results found for: [id number]        11/20/17 right plantar foot          Impression:   diabetic foot ulcer; right  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 Debridement was not done today; as the wound was healed           2. Edema: continue with tubigrips or compression stockings           3.  Wound treatment:   Left raffy. The patient will continue to utilize diabetic shoes and insoles for off loading. AmLactin to the thickened skin areas; use cetaphil to other dry skin locations; continue current work restrictions           4. Nutrition: diabetic diet; we spoke again about tight glucose control; continue seeing the diabetic educatory           5. Offloading: new insoles now; continue work part time;  note written; paper work filled out; 4 hour per day 3-4 days per week; will re-evaluate in 1 month         Patient will follow up with me in 3- 4 weeks for re-evaluation. They were instructed to call the clinic sooner with any signs or symptoms of infection or any further questions/concerns. Answered all questions.    Ines Zimmerman DNP, RN, CNP, [facility]  [phone]

## 2021-06-26 NOTE — PROGRESS NOTES
Progress Notes by Ines Zimmerman NP at 1/15/2018  8:20 AM     Author: Ines Zimmerman NP Service: -- Author Type: Nurse Practitioner    Filed: 1/15/2018 11:12 AM Encounter Date: 1/15/2018 Status: Signed    : Ines Zimmerman NP (Nurse Practitioner)       Follow up Vascular Visit       Date of Service:1/15/2018    Date Last Seen: 11/8/2017; 10/23/2017    Chief Complaint:  right diabetic foot ulcer    History:   Past Medical History:   Diagnosis Date   ? BPH (benign prostatic hyperplasia)    ? Cholelithiasis    ? Diabetes mellitus    ? Essential hypertension    ? Hyperlipidemia    ? Pancreatitis        Pt returns to the Vascular clinic with regards to their  right diabetic foot ulcer. Pt saw Dr. Garcia 2 weeks ago; he was healed at that time. He was seen at [facility] and these were created; he is very happy with these. He also followed up with [employer] and they also provided him with an insole; they have decided to not go with the rocker bottom shoe at this time.  His bp is elevated. He has been taken out of work. Stopped using the mepilex border as it was felt this was trapping too much moisture; possibly causing blistering currently using nothing to the area. He purchased compression stockings he does not like these; too hard to get on/off; does not want to go up in size as this will be too big on the foot. He prefers tubigrips instead and has been wearing these instead. Denies fevers, chills. His fs are running 150-200; he is seeing diabetic educator. His appetite is back to normal after having issues with gall stones.     Allergies: Crestor [rosuvastatin] and Demeclocycline    Physical Exam:    /64  Pulse 84  Resp 16    General:  Patient presents to clinic in no apparent distress.  Head: normocephalic atraumatic  Psychiatric:  Alert and oriented x3.   Respiratory: unlabored breathing; no cough  Integumentary:  Skin is uniformly warm, dry and pink.    Wound #1 Location: right plantar foot 5th  met head  Size: healed. Periwound: no denudement, erythema, induration, maceration or warmth.      Circumferential volume measures:    Vasc Edema 3/6/2017 4/17/2017 6/2/2017   Right just above MTP 23 24 23   Right Ankle 25 26 23.5   Right Widest Calf 37.5 38.6 37   Left - just above MTP 22 - -   Left Ankle 22 - -   Left Widest Calf 36 - -         Ulceration(s)/Wound(s):   Wound 07/24/17 Right lateral foot (Active)   Pre Size Length 0 1/15/2018  8:00 AM   Pre Size Width 0 1/15/2018  8:00 AM   Pre Size Depth 0 1/15/2018  8:00 AM   Pre Total Sq cm 0 1/15/2018  8:00 AM   Post Size Length 0.3 12/11/2017  7:00 AM   Post Size Width 0.3 12/11/2017  7:00 AM   Post Size Depth 0.1 12/11/2017  7:00 AM   Post Total Sq cm 0.09 12/11/2017  7:00 AM   Undermined 0.75 9/8/2017 11:00 AM   Description callous 10/23/2017  7:00 AM   Prodcut Used Alginate 8/10/2017 11:00 AM       Wound 09/05/17 Foot Right (Active)        Lab Values    No results found for: SEDRATE  Lab Results   Component Value Date    CREATININE 1.33 (H) 09/06/2017     Lab Results   Component Value Date    HGBA1C 10.5 (H) 09/05/2017     Lab Results   Component Value Date    BUN 44 (H) 09/06/2017     Lab Results   Component Value Date    ALBUMIN 2.3 (L) 09/06/2017     No results found for: [id number]    11/20/17 right plantar foot          Impression:   diabetic foot ulcer; right  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 Excisional debridement of the ulcer(s) was not recommended today as the wound remains closed           2. Edema: continue with tubigrips; we spoke about getting a different size sock he did not want to do this; tubigrips are working well will just need to be replaced more frequently and he was fine with this           3.  Wound treatment:   Left raffy. The patient will continue to utilize diabetic shoes  and insoles for off loading. AmLactin to the thickened skin areas; use cetaphil to other dry skin locations           4. Nutrition: diabetic diet; we spoke again about tight glucose control; continue seeing the diabetic educatory           5. Offloading: new insoles now; will let him go back to work part time; note written; paper work filled out; 4 hour per day 3-4 days per week; will re-evaluate in 1 month         Patient will follow up with me in 4 weeks for re-evaluation. They were instructed to call the clinic sooner with any signs or symptoms of infection or any further questions/concerns. Answered all questions.    Ines Zimmerman DNP, RN, CNP, [facility]  [phone]

## 2021-06-26 NOTE — PROGRESS NOTES
"Progress Notes by Ines Zimmerman NP at 11/5/2018  8:20 AM     Author: Ines Zimmerman NP Service: -- Author Type: Nurse Practitioner    Filed: 11/5/2018  9:11 AM Encounter Date: 11/5/2018 Status: Signed    : Ines Zimmerman NP (Nurse Practitioner)       Follow up Vascular Visit       Date of Service:11/5/2018    Date Last Seen: 11/8/2017; 10/23/2017    Chief Complaint:  right diabetic foot ulcer    History:   Past Medical History:   Diagnosis Date   ? BPH (benign prostatic hyperplasia)    ? Cholelithiasis    ? Diabetes mellitus (H)    ? Essential hypertension    ? Hyperlipidemia    ? Pancreatitis        Pt returns to the Vascular clinic with regards to their  right diabetic foot ulcer. Pt was seen 4 weeks ago;  at that time the wound was healed. He was seen at [facility] and [employer] for new insoles; he is very happy with these; obtained 3 more. They have decided to not go with the rocker bottom shoe at this time.   He is now fully retired.  Stopped using the mepilex border as it was felt this was trapping too much moisture; possibly causing blistering. He is applying am lactin or cetaphil lotion to the area and surrounding dry skin areas. He purchased compression stockings he is wearing these or tubigrips.   Denies fevers, chills. His fs are running ; he recently saw a diabetic educator, checking fs 2-4 times per day. He met with [facility] again and recommend Glidewear stockings to reduce friction/shear; he purchased these; had tried these a few times and did not like this \"felt weird\". We had him repeat SRIKANTH this was reduced but adequate for wound healing. This was reviewed with the patient; no changes since last visit 9/5/18.    Allergies: Crestor [rosuvastatin] and Demeclocycline    Physical Exam:    /88  Pulse 68  Temp 97.4  F (36.3  C) (Oral)   Resp 16    General:  Patient presents to clinic in no apparent distress.  Head: normocephalic atraumatic  Psychiatric:  Alert and " oriented x3.   Respiratory: unlabored breathing; no cough  Integumentary:  Skin is uniformly warm, dry and pink.    Wound #1 Location: right plantar foot 5th met head  Size: 0.3x0.3cm area of fading discoloration; no callous buildup; skin well moisturized; skin intact    Circumferential volume measures:    Vasc Edema 3/6/2017 4/17/2017 6/2/2017   Right just above MTP 23 24 23   Right Ankle 25 26 23.5   Right Widest Calf 37.5 38.6 37   Left - just above MTP 22 - -   Left Ankle 22 - -   Left Widest Calf 36 - -         Ulceration(s)/Wound(s):   Wound 07/24/17 Right lateral foot (Active)   Pre Size Length 0 11/5/2018  8:00 AM   Pre Size Width 0 11/5/2018  8:00 AM   Pre Size Depth 0 11/5/2018  8:00 AM   Pre Total Sq cm 0 11/5/2018  8:00 AM   Post Size Length 0.3 9/24/2018  7:00 AM   Post Size Width 0.1 9/24/2018  7:00 AM   Post Size Depth 0.2 9/24/2018  7:00 AM   Post Total Sq cm 0.03 9/24/2018  7:00 AM   Undermined 0.75 9/8/2017 11:00 AM   Description callous/scab 5/14/2018  7:27 AM   Prodcut Used Alginate 8/10/2017 11:00 AM       Wound 09/05/17 Foot Right (Active)        Lab Values    No results found for: SEDRATE  Lab Results   Component Value Date    CREATININE 1.05 05/23/2018     Lab Results   Component Value Date    HGBA1C 8.8 (H) 05/23/2018     Lab Results   Component Value Date    BUN 23 05/23/2018     Lab Results   Component Value Date    ALBUMIN 3.7 05/23/2018     No results found for: [id number]    11/5/18 right plantar foot          9/24/18 right 5th met head          6/18/18 right 5th met head            Impression:   diabetic foot ulcer; resolved  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 Debridement: intact; no debridement today          2. Edema: continue with tubigrips or compression stockings           3.  Wound treatment:   Wound remains  healed, no dressings needed; ok to use a bandaid if this makes him feel more comfortable; cetaphil to other dry skin locations; now retired; no soaking of the feet.  SRIKANTH was done last week; this was reduced but above the threshold for wound healed; he does not have s/sx of claudication; no referral to vascular surgeon at this time; will repeat annually; next due 10/2019.           4. Nutrition: diabetic diet; we spoke again about tight glucose control; continue seeing the diabetic educator           5. Offloading: working again with [employer] to get new insoles           6. HTN: bp was initially high in clinic; asymptomatic; continue to follow with PMD re: his htn; continue on current regimen               Patient will follow up with me in 4 week for re-evaluation. They were instructed to call the clinic sooner with any signs or symptoms of infection or any further questions/concerns. Answered all questions.    Ines Zimmerman DNP, RN, CNP, [facility]  [facility]  [phone]

## 2021-06-26 NOTE — PROGRESS NOTES
"Progress Notes by Ines Zimmerman NP at 9/24/2018  7:20 AM     Author: Ines Zimmerman NP Service: -- Author Type: Nurse Practitioner    Filed: 9/24/2018  7:47 AM Encounter Date: 9/24/2018 Status: Signed    : Ines Zimmerman NP (Nurse Practitioner)       Follow up Vascular Visit       Date of Service:9/24/2018    Date Last Seen: 11/8/2017; 10/23/2017    Chief Complaint:  right diabetic foot ulcer    History:   Past Medical History:   Diagnosis Date   ? BPH (benign prostatic hyperplasia)    ? Cholelithiasis    ? Diabetes mellitus (H)    ? Essential hypertension    ? Hyperlipidemia    ? Pancreatitis        Pt returns to the Vascular clinic with regards to their  right diabetic foot ulcer. Pt was seen 4 weeks ago; he was healed at that time. He was seen at [facility] and [facility] for new insoles; he is very happy with these; obtained 3 more. They have decided to not go with the rocker bottom shoe at this time.   He is now fully retired.  Stopped using the mepilex border as it was felt this was trapping too much moisture; possibly causing blistering currently using nothing to the area. He is applying am lactin or cetaphil lotion to the area and surrounding dry skin areas. He purchased compression stockings he is wearing these or tubigrips.   Denies fevers, chills. His fs are running ; he recently saw a diabetic educator, checking fs 2-4 times per day. He met with [facility] again and recommend Glidewear stockings to reduce friction/shear; he purchased these; had tried these a few times and did not like this \"felt weird\". This was reviewed with the patient; no changes since last visit 8/20/18.    Allergies: Crestor [rosuvastatin] and Demeclocycline    Physical Exam:    /80  Pulse 70  Temp 97.6  F (36.4  C)  Ht 5' 9\" (1.753 m)  Wt 198 lb (89.8 kg)  BMI 29.24 kg/m2    General:  Patient presents to clinic in no apparent distress.  Head: normocephalic atraumatic  Psychiatric:  Alert and " oriented x3.   Respiratory: unlabored breathing; no cough  Integumentary:  Skin is uniformly warm, dry and pink.    Wound #1 Location: right plantar foot 5th met head  Size: 1x1. Callous; with opening in the center;  discolored; this was pared down and with full thickness ulcer in the central portion which measures 0.3x0.1x0.2cm post debridement Periwound: no denudement, erythema, induration, maceration or warmth.      Circumferential volume measures:    Vasc Edema 3/6/2017 4/17/2017 6/2/2017   Right just above MTP 23 24 23   Right Ankle 25 26 23.5   Right Widest Calf 37.5 38.6 37   Left - just above MTP 22 - -   Left Ankle 22 - -   Left Widest Calf 36 - -         Ulceration(s)/Wound(s):   Wound 07/24/17 Right lateral foot (Active)   Pre Size Length 0.1 9/24/2018  7:00 AM   Pre Size Width 0.1 9/24/2018  7:00 AM   Pre Size Depth 0 8/20/2018  8:00 AM   Pre Total Sq cm 0.1 9/24/2018  7:00 AM   Post Size Length 0.3 9/24/2018  7:00 AM   Post Size Width 0.1 9/24/2018  7:00 AM   Post Size Depth 0.2 9/24/2018  7:00 AM   Post Total Sq cm 0.03 9/24/2018  7:00 AM   Undermined 0.75 9/8/2017 11:00 AM   Description callous/scab 5/14/2018  7:27 AM   Prodcut Used Alginate 8/10/2017 11:00 AM       Wound 09/05/17 Foot Right (Active)        Lab Values    No results found for: SEDRATE  Lab Results   Component Value Date    CREATININE 1.05 05/23/2018     Lab Results   Component Value Date    HGBA1C 8.8 (H) 05/23/2018     Lab Results   Component Value Date    BUN 23 05/23/2018     Lab Results   Component Value Date    ALBUMIN 3.7 05/23/2018     No results found for: [id number]      9/24/18 right 5th met head          6/18/18 right 5th met head            Impression:   diabetic foot ulcer; right-re-opened  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 Debridement: Paring of the  callous and debridement of the wound was done today on the right plantar foot using a sterile scalpel the epidermal and dermal and down into the subcutaneous tissues was sharply debrided for a total square cm of 1.            2. Edema: continue with tubigrips or compression stockings           3.  Wound treatment:   Sent rx for bactroban to his pharmacy; apply daily to the open area; cover with medipore +pad; he did not do well with the mepilex border in the past, use cetaphil to other dry skin locations; now retired; no soaking of the feet.           4. Nutrition: diabetic diet; we spoke again about tight glucose control; continue seeing the diabetic educator           5. Offloading: new insoles now; we spoke about importance of staying off his feet more; never going barefoot; will send him back to [facility] to get insoles checked ASAP           6. HTN: bp was initially high in clinic; asymptomatic; continue to follow with PMD re: his htn; continue on current regimen               Patient will follow up with me in 2 week for re-evaluation. They were instructed to call the clinic sooner with any signs or symptoms of infection or any further questions/concerns. Answered all questions.    Ines Zimmerman DNP, RN, CNP, [facility]  [facility]  [phone]

## 2021-06-26 NOTE — PROGRESS NOTES
"Progress Notes by Ines Zimmerman NP at 10/8/2018  7:20 AM     Author: Ines Zimmerman NP Service: -- Author Type: Nurse Practitioner    Filed: 10/8/2018  7:45 AM Encounter Date: 10/8/2018 Status: Signed    : Ines Zimmerman NP (Nurse Practitioner)       Follow up Vascular Visit       Date of Service:10/8/2018    Date Last Seen: 11/8/2017; 10/23/2017    Chief Complaint:  right diabetic foot ulcer    History:   Past Medical History:   Diagnosis Date   ? BPH (benign prostatic hyperplasia)    ? Cholelithiasis    ? Diabetes mellitus (H)    ? Essential hypertension    ? Hyperlipidemia    ? Pancreatitis        Pt returns to the Vascular clinic with regards to their  right diabetic foot ulcer. Pt was seen 2 weeks ago; his wound had reopened at that time. He was seen at [facility] and [facility] for new insoles; he is very happy with these; obtained 3 more. They have decided to not go with the rocker bottom shoe at this time.   He is now fully retired.  Stopped using the mepilex border as it was felt this was trapping too much moisture; possibly causing blistering currently using bactroban and medipore + pad dressing; this is going well. He is applying am lactin or cetaphil lotion to the area and surrounding dry skin areas. He purchased compression stockings he is wearing these or tubigrips.   Denies fevers, chills. His fs are running ; he recently saw a diabetic educator, checking fs 2-4 times per day. He met with [facility] again and recommend Glidewear stockings to reduce friction/shear; he purchased these; had tried these a few times and did not like this \"felt weird\". This was reviewed with the patient; no changes since last visit 8/20/18.    Allergies: Crestor [rosuvastatin] and Demeclocycline    Physical Exam:    /78  Pulse 78  Temp 97.5  F (36.4  C)    General:  Patient presents to clinic in no apparent distress.  Head: normocephalic atraumatic  Psychiatric:  Alert and oriented x3. "   Respiratory: unlabored breathing; no cough  Integumentary:  Skin is uniformly warm, dry and pink.    Wound #1 Location: right plantar foot 5th met head  Size: 0.3x0.3cm area of fading discoloration; no callous buildup; skin well moisturized; skin intact    Circumferential volume measures:    Vasc Edema 3/6/2017 4/17/2017 6/2/2017   Right just above MTP 23 24 23   Right Ankle 25 26 23.5   Right Widest Calf 37.5 38.6 37   Left - just above MTP 22 - -   Left Ankle 22 - -   Left Widest Calf 36 - -         Ulceration(s)/Wound(s):   Wound 07/24/17 Right lateral foot (Active)   Pre Size Length 0.3 10/8/2018  7:30 AM   Pre Size Width 0.3 10/8/2018  7:30 AM   Pre Size Depth 0 8/20/2018  8:00 AM   Pre Total Sq cm 0.09 10/8/2018  7:30 AM   Post Size Length 0.3 9/24/2018  7:00 AM   Post Size Width 0.1 9/24/2018  7:00 AM   Post Size Depth 0.2 9/24/2018  7:00 AM   Post Total Sq cm 0.03 9/24/2018  7:00 AM   Undermined 0.75 9/8/2017 11:00 AM   Description callous/scab 5/14/2018  7:27 AM   Prodcut Used Alginate 8/10/2017 11:00 AM       Wound 09/05/17 Foot Right (Active)        Lab Values    No results found for: SEDRATE  Lab Results   Component Value Date    CREATININE 1.05 05/23/2018     Lab Results   Component Value Date    HGBA1C 8.8 (H) 05/23/2018     Lab Results   Component Value Date    BUN 23 05/23/2018     Lab Results   Component Value Date    ALBUMIN 3.7 05/23/2018     No results found for: [id number]      9/24/18 right 5th met head          6/18/18 right 5th met head            Impression:   diabetic foot ulcer; resolved  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 Debridement: intact; no debridement today          2. Edema: continue with tubigrips or compression stockings           3.  Wound treatment:   Wound healed again; no dressings needed; ok to use a  bandaid if this makes him feel more comfortable; cetaphil to other dry skin locations; now retired; no soaking of the feet. His las SRIKANTH was done 1 year ago; will update this           4. Nutrition: diabetic diet; we spoke again about tight glucose control; continue seeing the diabetic educator           5. Offloading: working again with [employer] to get new insoles           6. HTN: bp was initially high in clinic; asymptomatic; continue to follow with PMD re: his htn; continue on current regimen               Patient will follow up with me in 4 week for re-evaluation. They were instructed to call the clinic sooner with any signs or symptoms of infection or any further questions/concerns. Answered all questions.    Ines Zimmerman DNP, RN, CNP, [facility]  [phone]

## 2021-06-26 NOTE — PROGRESS NOTES
"Progress Notes by Ines Zimmerman NP at 12/3/2018  8:00 AM     Author: Ines Zimmerman NP Service: -- Author Type: Nurse Practitioner    Filed: 12/3/2018  8:38 AM Encounter Date: 12/3/2018 Status: Signed    : Ines Zimmerman NP (Nurse Practitioner)       Follow up Vascular Visit       Date of Service:12/3/2018    Date Last Seen: 11/8/2017; 10/23/2017    Chief Complaint:  right diabetic foot ulcer    History:   Past Medical History:   Diagnosis Date   ? BPH (benign prostatic hyperplasia)    ? Cholelithiasis    ? Diabetes mellitus (H)    ? Essential hypertension    ? Hyperlipidemia    ? Pancreatitis        Pt returns to the Vascular clinic with regards to their  right diabetic foot ulcer. Pt was seen 4 weeks ago;  at that time the wound was healed. He was seen at [facility] and [facility] for new insoles; he is very happy with these; obtained 3 more. They have decided to not go with the rocker bottom shoe at this time.   He is now fully retired.  Stopped using the mepilex border as it was felt this was trapping too much moisture; possibly causing blistering. He is applying am lactin or cetaphil lotion to the area and surrounding dry skin areas. He purchased compression stockings he is wearing these or tubigrips.   Denies fevers, chills. His fs are running higher since his insurance will no longer cover the Lantus and Novolog; he is using Victoza and Levimir now; does not like this; fs running 200-220. He met with Mac Yost again and recommend Glidewear stockings to reduce friction/shear; he purchased these; had tried these a few times and did not like this \"felt weird\". We had him repeat SRIKANTH this was reduced but adequate for wound healing. He had bp medication changes and today in clinic noted to be wnl; this is a significant improvement. His wife is having hip replacement in 3 days; he will be the primary care giver for her at home.     Allergies: Cresol; Crestor [rosuvastatin]; and " Demeclocycline    Physical Exam:    /82 (Patient Site: Left Arm, Patient Position: Sitting)   Pulse 76   Temp 97.8  F (36.6  C)     General:  Patient presents to clinic in no apparent distress.  Head: normocephalic atraumatic  Psychiatric:  Alert and oriented x3.   Respiratory: unlabored breathing; no cough  Integumentary:  Skin is uniformly warm, dry and pink.    Wound #1 Location: right plantar foot 5th met head  Size:1x1cm area of fading discoloration; moderate callous buildup; this was pared down using a 15 blade scalpel; skin well moisturized; skin intact  There was a small nearly resolved fissure on the right medial heel  The right great toe nail with subungual ecchymoses; nail was well attached; there is a mild medial paronychia with no purulence    Circumferential volume measures:    Vasc Edema 3/6/2017 4/17/2017 6/2/2017   Right just above MTP 23 24 23   Right Ankle 25 26 23.5   Right Widest Calf 37.5 38.6 37   Left - just above MTP 22 - -   Left Ankle 22 - -   Left Widest Calf 36 - -         Ulceration(s)/Wound(s):   Wound 07/24/17 Right lateral foot (Active)   Pre Size Length 0 12/3/2018  8:00 AM   Pre Size Width 0 12/3/2018  8:00 AM   Pre Size Depth 0 12/3/2018  8:00 AM   Pre Total Sq cm 0 11/5/2018  8:00 AM   Post Size Length 0.3 9/24/2018  7:00 AM   Post Size Width 0.1 9/24/2018  7:00 AM   Post Size Depth 0.2 9/24/2018  7:00 AM   Post Total Sq cm 0.03 9/24/2018  7:00 AM   Undermined 0.75 9/8/2017 11:00 AM   Description callous/scab 5/14/2018  7:27 AM   Prodcut Used Alginate 8/10/2017 11:00 AM       Wound 09/05/17 Foot Right (Active)        Lab Values    No results found for: SEDRATE  Lab Results   Component Value Date    CREATININE 1.32 (H) 11/21/2018     Lab Results   Component Value Date    HGBA1C 9.3 (H) 11/21/2018     Lab Results   Component Value Date    BUN 21 11/21/2018     Lab Results   Component Value Date    ALBUMIN 3.7 05/23/2018     No results found for: [id number]    11/5/18  right plantar foot          9/24/18 right 5th met head          6/18/18 right 5th met head            Impression:   diabetic foot ulcer; resolved  Type 2 diabetes with peripheral neuropathy and foot ulcer  gastrosoleus equinus deformity on the right  HTN-stable  Right hallux paronychia  Right heel fissure  Right hallux subungual ecchymosis       Are any of these wounds new today: No; Location: na    Assessment/Plan:          1. Debridement: the callous on the right plantar 1st met head was pared down and intact underneath; tolerated well; no complications          2. Edema: continue with tubigrips or compression stockings           3.  Wound treatment:   Wound remains healed, no dressings needed; ok to use a bandaid if this makes him feel more comfortable; cetaphil to other dry skin locations; now retired; no soaking of the feet.  SRIKANTH was done recently; this was reduced but above the threshold for wound healed; he does not have s/sx of claudication; no referral to vascular surgeon at this time; will repeat annually; next due 10/2019. 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 Nutrition: diabetic diet; we spoke again about tight glucose control; continue seeing the diabetic educator; may need additional tweaks with his medications           5. Offloading: has adequate shoes and insoles at this time           6. HTN: bp was significantly improved today; continue on current regimen               Patient will follow up with me in 4 week for re-evaluation. They were instructed to call the clinic sooner with any signs or symptoms of infection or any further questions/concerns. Answered all questions.    Ines Zimmerman DNP, RN, CNP, CWOCN  [facility]  [phone]

## 2021-06-27 NOTE — PROGRESS NOTES
Progress Notes by Ines Zimmerman NP at 6/24/2019  8:00 AM     Author: Ines Zimmerman NP Service: -- Author Type: Nurse Practitioner    Filed: 6/24/2019  8:33 AM Encounter Date: 6/24/2019 Status: Signed    : Ines Zimmerman NP (Nurse Practitioner)       Follow up Vascular Visit       Date of Service:2/25/2019    Date Last Seen: 11/8/2017; 10/23/2017    Chief Complaint:  right diabetic foot ulcer    History:   Past Medical History:   Diagnosis Date   ? BPH (benign prostatic hyperplasia)    ? Cholelithiasis    ? Diabetes mellitus (H)    ? Essential hypertension    ? Hyperlipidemia    ? Pancreatitis        Pt returns to the Vascular clinic with regards to their  right diabetic foot ulcer. Pt was seen 4 weeks ago, at that time the wound was healed. He has new shoes and insoles; obtained these through [employer]. They have decided to not go with the rocker bottom shoe at this time.   He is now retired.  He is applying bactroban and rolled gauze to the to the callous area as needed.  He purchased compression stockings he stopped wearing these does not like these. Denies fevers, chills.Repeat ABIs were reduced but adequate for wound healing done 10/2018. I had him see Dr. Garcia again to ensure he did not require surgery; he would like to try to avoid surgery.      Allergies: Cresol; Crestor [rosuvastatin]; and Demeclocycline    Physical Exam:    /88   Pulse 68   Temp 97.3  F (36.3  C) (Oral)   Resp 16     General:  Patient presents to clinic in no apparent distress.  Head: normocephalic atraumatic  Psychiatric:  Alert and oriented x3.   Respiratory: unlabored breathing; no cough  Integumentary:  Skin is uniformly warm, dry and pink.    Wound #1 Location: right plantar foot 5th met head  Size:no callous buildup; no discoloration  +2 pitting edema bilaterally; skin intact on the legs; no rash; no erythema    Circumferential volume measures:    Vasc Edema 3/6/2017 4/17/2017 6/2/2017   Right just above MTP  23 24 23   Right Ankle 25 26 23.5   Right Widest Calf 37.5 38.6 37   Left - just above MTP 22 - -   Left Ankle 22 - -   Left Widest Calf 36 - -         Ulceration(s)/Wound(s):   Wound 07/24/17 Right lateral foot (Active)   Pre Size Length 0 2/25/2019  8:00 AM   Pre Size Width 0 2/25/2019  8:00 AM   Pre Size Depth 0 2/25/2019  8:00 AM   Pre Total Sq cm 0 2/25/2019  8:00 AM   Post Size Length 0.1 4/8/2019  7:00 AM   Post Size Width 0.1 4/8/2019  7:00 AM   Post Size Depth 0.1 4/8/2019  7:00 AM   Post Total Sq cm 0.01 4/8/2019  7:00 AM   Undermined 0.75 9/8/2017 11:00 AM   Description callous 2/25/2019  8:00 AM   Prodcut Used Alginate 8/10/2017 11:00 AM       Wound 09/05/17 Foot Right (Active)         Lab Values    No results found for: SEDRATE  Lab Results   Component Value Date    CREATININE 1.32 (H) 11/21/2018     Lab Results   Component Value Date    HGBA1C 9.7 (H) 12/20/2018     Lab Results   Component Value Date    BUN 21 11/21/2018     Lab Results   Component Value Date    ALBUMIN 3.7 05/23/2018     No results found for: [id number]      4/8/19 right plantar foot            Impression:  diabetic foot ulcer-resolved  Right pre-ulcerative callous -stable  Type 2 diabetes with peripheral neuropathy   gastrosoleus equinus deformity on the right  HTN-improved today    Are any of these wounds new today: No; Location: na    Assessment/Plan         1. Debridement: n/a intact     2. Edema: we spoke about his swelling; causes and treatment options; he thought the compression was for his diabetic foot ulcer; he would benefit from life long compression stockings; he does not want to wear these; he did best with tubular compression; while this is not ideal it is better than no compression; will provide him with several lengths of this; encouraged elevation; we discussed the potential consequences of not wearing compression including cellulitis; blistering and ulcerations.           3.  Wound treatment:   Wound remains  healed, no dressings needed; ok to use a bactroban and rolled gauze as needed; cetaphil to other dry skin locations, now retired; no soaking of the feet.  SRIKANTH was done recently; this was reduced but above the threshold for wound healed; he does not have s/sx of claudication; no referral to vascular surgeon at this time; will repeat annually; next due 10/2019           4. Nutrition: diabetic diet           5. Offloading: has adequate shoes and insoles at this time; reminded him to never to stocking footed or bare foot; he reports that he cannot put shoes on in the middle of the night to use the restroom; we discussed using a urinal he declined; repeated to him that if he is up helping his wife in the middle of the night he needs to put his shoes on.                     Patient will follow up with me in 8 weeks for re-evaluation. They were instructed to call the clinic sooner with any signs or symptoms of infection or any further questions/concerns. Answered all questions.    Ines Zimmerman DNP, RN, CNP, [facility]  [facility]  [phone]

## 2021-06-27 NOTE — PROGRESS NOTES
"Progress Notes by Ines Zimmerman NP at 5/6/2019  8:20 AM     Author: Ines Zimmerman NP Service: -- Author Type: Nurse Practitioner    Filed: 5/6/2019 10:14 AM Encounter Date: 5/6/2019 Status: Signed    : Ines Zimmerman NP (Nurse Practitioner)       Follow up Vascular Visit       Date of Service:2/25/2019    Date Last Seen: 11/8/2017; 10/23/2017    Chief Complaint:  right diabetic foot ulcer    History:   Past Medical History:   Diagnosis Date   ? BPH (benign prostatic hyperplasia)    ? Cholelithiasis    ? Diabetes mellitus (H)    ? Essential hypertension    ? Hyperlipidemia    ? Pancreatitis        Pt returns to the Vascular clinic with regards to their  right diabetic foot ulcer. Pt was seen 4 weeks ago, at that time the wound was healed but had worsening discoloration and bogginess to the callous region. He reports that 2-3 weeks ago he was near falling and landed hard on the right foot. He was seen at [facility] and [facility] for new insoles; he is very happy with these; obtained several pairs. They have decided to not go with the rocker bottom shoe at this time.   He is now fully retired.  Stopped using the mepilex border as it was felt this was trapping too much moisture; possibly causing blistering. He is applying am lactin or cetaphil lotion to the area and surrounding dry skin areas. He is applying bactroban and rolled gauze to the discolored callous area.  He purchased compression stockings he is wearing these or tubular compression.   Denies fevers, chills. His fs are running higher since his insurance will no longer cover the Lantus and Novolog; he is using Victoza and Levimir now; does not like this; fs running 200-220. He met with Mac Yost again and recommend Glidewear stockings to reduce friction/shear; he purchased these; had tried these a few times and did not like this \"felt weird\". We had him repeat SRIKANTH this was reduced but adequate for wound healing. His wife is recovering from " hip replacement surgery and he is her Primary care giver; this has been stressful; 3 months ago he was up with her for 4 hours at night with no shoes on and struck the area repeatedly. I had him see Dr. Garcia again to ensure he did not require surgery; he would like to try to avoid surgery. Had him see Mac Yost again and the shoe was excavated further. Reviewed with patient no changes since last visit dated 4/8/19.    Allergies: Cresol; Crestor [rosuvastatin]; and Demeclocycline    Physical Exam:    /88   Pulse 68   Temp 97.3  F (36.3  C) (Oral)   Resp 16     General:  Patient presents to clinic in no apparent distress.  Head: normocephalic atraumatic  Psychiatric:  Alert and oriented x3.   Respiratory: unlabored breathing; no cough  Integumentary:  Skin is uniformly warm, dry and pink.    Wound #1 Location: right plantar foot 5th met head  Size:2x2cm area of  discoloration; moderate callous buildup; this was pared down using a 15 blade scalpel; skin well moisturized; there was a 1x2cm area of purple boggy discoloration just lateral to the callous area; this was cleansed with rubbing alcohol and then nicked with 15 blade scalpel; purulent old bloody drainage was expressed    Circumferential volume measures:    Vasc Edema 3/6/2017 4/17/2017 6/2/2017   Right just above MTP 23 24 23   Right Ankle 25 26 23.5   Right Widest Calf 37.5 38.6 37   Left - just above MTP 22 - -   Left Ankle 22 - -   Left Widest Calf 36 - -         Ulceration(s)/Wound(s):   Wound 07/24/17 Right lateral foot (Active)   Pre Size Length 0 2/25/2019  8:00 AM   Pre Size Width 0 2/25/2019  8:00 AM   Pre Size Depth 0 2/25/2019  8:00 AM   Pre Total Sq cm 0 2/25/2019  8:00 AM   Post Size Length 0.1 4/8/2019  7:00 AM   Post Size Width 0.1 4/8/2019  7:00 AM   Post Size Depth 0.1 4/8/2019  7:00 AM   Post Total Sq cm 0.01 4/8/2019  7:00 AM   Undermined 0.75 9/8/2017 11:00 AM   Description callous 2/25/2019  8:00 AM   Prodcut Used Alginate  8/10/2017 11:00 AM       Wound 09/05/17 Foot Right (Active)         Lab Values    No results found for: SEDRATE  Lab Results   Component Value Date    CREATININE 1.32 (H) 11/21/2018     Lab Results   Component Value Date    HGBA1C 9.7 (H) 12/20/2018     Lab Results   Component Value Date    BUN 21 11/21/2018     Lab Results   Component Value Date    ALBUMIN 3.7 05/23/2018     No results found for: [id number]      4/8/19 right plantar foot            Impression:  diabetic foot ulcer  Right pre-ulcerative callous   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 After discussion of risk factors and verbal consent was obtained 2% Lidocaine HCL jelly was applied, under clean conditions, the right foot callous was debrided using #15 blade scalpel. Devitalized and nonviable tissue, along with any fibrin and slough, was removed to improve granulation tissue formation, stimulate wound healing, decrease overall bacteria load, disrupt biofilm formation and decrease edge senescence.  Total excisional debridement was 2 sq cm from the epidermis/dermis area with a depth of 0 cm.   Skin was intact underneath. The blister was also ruptured under clean conditions; skin covering was left in place.             2. Edema: continue with tubular compression or compression stockings           3.  Wound treatment:   Wound remains healed but tenuous, no dressings needed; ok to use a bactroban and rolled gauze; cetaphil to other dry skin locations; now retired; no soaking of the feet.  SRIKANTH was done recently; this was reduced but above the threshold for wound healed; he does not have s/sx of claudication; no referral to vascular surgeon at this time; will repeat annually; next due  10/2019           4. Nutrition: diabetic diet; we spoke again about tight glucose control; continue seeing the diabetic educator; may need additional tweaks with his medications           5. Offloading: has adequate shoes and insoles at this time; reminded him to never to stocking footed or bare foot; he reports that he cannot put shoes on in the middle of the night to use the restroom; we discussed using a urinal he declined; repeated to him that if he is up helping his wife in the middle of the night he needs to put his shoes on.                     Patient will follow up with me in 3 week for re-evaluation. They were instructed to call the clinic sooner with any signs or symptoms of infection or any further questions/concerns. Answered all questions.    Ines Zimmerman DNP, RN, CNP, [facility]  [phone]

## 2021-06-27 NOTE — PROGRESS NOTES
"Progress Notes by Ines Zimmerman NP at 4/8/2019  8:00 AM     Author: Ines Zimmerman NP Service: -- Author Type: Nurse Practitioner    Filed: 4/8/2019  9:32 AM Encounter Date: 4/8/2019 Status: Signed    : Ines Zimmerman NP (Nurse Practitioner)       Follow up Vascular Visit       Date of Service:2/25/2019    Date Last Seen: 11/8/2017; 10/23/2017    Chief Complaint:  right diabetic foot ulcer    History:   Past Medical History:   Diagnosis Date   ? BPH (benign prostatic hyperplasia)    ? Cholelithiasis    ? Diabetes mellitus (H)    ? Essential hypertension    ? Hyperlipidemia    ? Pancreatitis        Pt returns to the Vascular clinic with regards to their  right diabetic foot ulcer. Pt was seen 2 weeks ago, at that time the wound was healed but had worsening discoloration and bogginess to the callous region. He was seen at [facility] and [facility] for new insoles; he is very happy with these; obtained several pairs. They have decided to not go with the rocker bottom shoe at this time.   He is now fully retired.  Stopped using the mepilex border as it was felt this was trapping too much moisture; possibly causing blistering. He is applying am lactin or cetaphil lotion to the area and surrounding dry skin areas. He is applying bactroban and rolled gauze to the discolored callous area.  He purchased compression stockings he is wearing these or tubular compression.   Denies fevers, chills. His fs are running higher since his insurance will no longer cover the Lantus and Novolog; he is using Victoza and Levimir now; does not like this; fs running 200-220. He met with Mac Yost again and recommend Glidewear stockings to reduce friction/shear; he purchased these; had tried these a few times and did not like this \"felt weird\". We had him repeat SRIKANTH this was reduced but adequate for wound healing. His wife is recovering from hip replacement surgery and he is her Primary care giver; this has been stressful; 2 " months ago he was up with her for 4 hours at night with no shoes on and struck the area repeatedly. I had him see Dr. Garcia again to ensure he did not require surgery; he would like to try to avoid surgery. Had him see Mac Yost again and the shoe was excavated further.     Allergies: Cresol; Crestor [rosuvastatin]; and Demeclocycline    Physical Exam:    /88   Pulse 68   Temp 97.3  F (36.3  C) (Oral)   Resp 16     General:  Patient presents to clinic in no apparent distress.  Head: normocephalic atraumatic  Psychiatric:  Alert and oriented x3.   Respiratory: unlabored breathing; no cough  Integumentary:  Skin is uniformly warm, dry and pink.    Wound #1 Location: right plantar foot 5th met head  Size:2x2cm area of  discoloration; moderate callous buildup; this was pared down using a 15 blade scalpel; skin well moisturized; there was a pinpoint opening present underneath the callous; I was able to pop out a small rock from this area.     Circumferential volume measures:    Vasc Edema 3/6/2017 4/17/2017 6/2/2017   Right just above MTP 23 24 23   Right Ankle 25 26 23.5   Right Widest Calf 37.5 38.6 37   Left - just above MTP 22 - -   Left Ankle 22 - -   Left Widest Calf 36 - -         Ulceration(s)/Wound(s):   Wound 07/24/17 Right lateral foot (Active)   Pre Size Length 0 2/25/2019  8:00 AM   Pre Size Width 0 2/25/2019  8:00 AM   Pre Size Depth 0 2/25/2019  8:00 AM   Pre Total Sq cm 0 2/25/2019  8:00 AM   Post Size Length 0.1 4/8/2019  7:00 AM   Post Size Width 0.1 4/8/2019  7:00 AM   Post Size Depth 0.1 4/8/2019  7:00 AM   Post Total Sq cm 0.01 4/8/2019  7:00 AM   Undermined 0.75 9/8/2017 11:00 AM   Description callous 2/25/2019  8:00 AM   Prodcut Used Alginate 8/10/2017 11:00 AM       Wound 09/05/17 Foot Right (Active)         Lab Values    No results found for: SEDRATE  Lab Results   Component Value Date    CREATININE 1.32 (H) 11/21/2018     Lab Results   Component Value Date    HGBA1C 9.7 (H)  12/20/2018     Lab Results   Component Value Date    BUN 21 11/21/2018     Lab Results   Component Value Date    ALBUMIN 3.7 05/23/2018     No results found for: [id number]      4/8/19 right plantar foot            Impression:  diabetic foot ulcer  Right pre-ulcerative callous   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 Debridement: Excisional debridement of the ulcer(s) was recommended today, after consent was obtained and 2% Xylocaine was applied using a sterile curet the epidermal and dermal was sharply debrided for a total square cm of 2 The non-viable and necrotic tissue was removed and the wounds appeared much  afterwards.          2. Edema: continue with tubular compression or compression stockings           3.  Wound treatment:   Wound remains healed but tenuous, no dressings needed; ok to use a bactroban and rolled gauze; cetaphil to other dry skin locations; now retired; no soaking of the feet.  SRIKANTH was done recently; this was reduced but above the threshold for wound healed; he does not have s/sx of claudication; no referral to vascular surgeon at this time; will repeat annually; next due 10/2019           4. Nutrition: diabetic diet; we spoke again about tight glucose control; continue seeing the diabetic educator; may need additional tweaks with his medications           5. Offloading: has adequate shoes and insoles at this time; reminded him to never to stocking footed or bare foot; he reports that he cannot put shoes on in the middle of the night to use the restroom; we discussed using a urinal he declined; repeated to him that if he is up helping his wife in the middle of the night he needs to put his shoes on.                      Patient will follow up with me in 4 week for re-evaluation. They were instructed to call the clinic sooner with any signs or symptoms of infection or any further questions/concerns. Answered all questions.    Ines Zimmerman DNP, RN, CNP, [facility]  [phone]

## 2021-06-27 NOTE — PROGRESS NOTES
"Progress Notes by Ines Zimmerman NP at 2/4/2019  9:20 AM     Author: Ines Zimmerman NP Service: -- Author Type: Nurse Practitioner    Filed: 2/4/2019 11:14 AM Encounter Date: 2/4/2019 Status: Signed    : Ines Zimmerman NP (Nurse Practitioner)       Follow up Vascular Visit       Date of Service:2/4/2019    Date Last Seen: 11/8/2017; 10/23/2017    Chief Complaint:  right diabetic foot ulcer    History:   Past Medical History:   Diagnosis Date   ? BPH (benign prostatic hyperplasia)    ? Cholelithiasis    ? Diabetes mellitus (H)    ? Essential hypertension    ? Hyperlipidemia    ? Pancreatitis        Pt returns to the Vascular clinic with regards to their  right diabetic foot ulcer. Pt was seen 4 weeks ago, at that time the wound was healed. He was seen at [facility] and [facility] for new insoles; he is very happy with these; obtained 3 more. They have decided to not go with the rocker bottom shoe at this time.   He is now fully retired.  Stopped using the mepilex border as it was felt this was trapping too much moisture; possibly causing blistering. He is applying am lactin or cetaphil lotion to the area and surrounding dry skin areas. He is applying bactroban and bandaid to the discolored callous area.  He purchased compression stockings he is wearing these or tubigrips.   Denies fevers, chills. His fs are running higher since his insurance will no longer cover the Lantus and Novolog; he is using Victoza and Levimir now; does not like this; fs running 200-220. He met with Mac Yost again and recommend Glidewear stockings to reduce friction/shear; he purchased these; had tried these a few times and did not like this \"felt weird\". We had him repeat SRIKANTH this was reduced but adequate for wound healing. His wife is recovering from hip replacement surgery and he is her Primary care giver; this has been stressful; she is not doing well; he was up with her for 4 hours the other night helping her in the " restroom and he was not wearing his shoes or insoles; he struck his foot on the floor board; had some bleeding.        Allergies: Cresol; Crestor [rosuvastatin]; and Demeclocycline    Physical Exam:    BP (!) 188/90   Pulse 72   Temp 97.8  F (36.6  C) (Oral)   Resp 16     General:  Patient presents to clinic in no apparent distress.  Head: normocephalic atraumatic  Psychiatric:  Alert and oriented x3.   Respiratory: unlabored breathing; no cough  Integumentary:  Skin is uniformly warm, dry and pink.    Wound #1 Location: right plantar foot 5th met head  Size:2x2cm area of  discoloration; moderate callous buildup; this was pared down using a 15 blade scalpel; skin well moisturized; skin intact  The right great toe nail with subungual ecchymoses; nail was loosening paronychia resolved with no purulence    Circumferential volume measures:    Vasc Edema 3/6/2017 4/17/2017 6/2/2017   Right just above MTP 23 24 23   Right Ankle 25 26 23.5   Right Widest Calf 37.5 38.6 37   Left - just above MTP 22 - -   Left Ankle 22 - -   Left Widest Calf 36 - -         Ulceration(s)/Wound(s):   Wound 07/24/17 Right lateral foot (Active)   Pre Size Length 0 2/4/2019  9:00 AM   Pre Size Width 0 2/4/2019  9:00 AM   Pre Size Depth 0 2/4/2019  9:00 AM   Pre Total Sq cm 0 2/4/2019  9:00 AM   Post Size Length 0.3 9/24/2018  7:00 AM   Post Size Width 0.1 9/24/2018  7:00 AM   Post Size Depth 0.2 9/24/2018  7:00 AM   Post Total Sq cm 0.03 9/24/2018  7:00 AM   Undermined 0.75 9/8/2017 11:00 AM   Description callous 2/4/2019  9:00 AM   Prodcut Used Alginate 8/10/2017 11:00 AM       Wound 09/05/17 Foot Right (Active)        Lab Values    No results found for: SEDRATE  Lab Results   Component Value Date    CREATININE 1.32 (H) 11/21/2018     Lab Results   Component Value Date    HGBA1C 9.7 (H) 12/20/2018     Lab Results   Component Value Date    BUN 21 11/21/2018     Lab Results   Component Value Date    ALBUMIN 3.7 05/23/2018     No results found  for: [id number]    2/4/19 right plantar 5th met head              11/5/18 right plantar foot          9/24/18 right 5th met head          6/18/18 right 5th met head            Impression:   diabetic foot ulcer; resolved  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 Debridement: the callous on the right plantar 1st met head was pared down and intact underneath; tolerated well; no complications          2. Edema: continue with tubigrips or compression stockings           3.  Wound treatment:   Wound remains healed but tenuous, no dressings needed; ok to use a bactroban and bandaid if this makes him feel more comfortable; cetaphil to other dry skin locations; now retired; no soaking of the feet.  SRIKANTH was done recently; this was reduced but above the threshold for wound healed; he does not have s/sx of claudication; no referral to vascular surgeon at this time; will repeat annually; next due 10/2019. Right hallux nail will more than likely fall off; he has a home nail care nurse who is monitoring           4. Nutrition: diabetic diet; we spoke again about tight glucose control; continue seeing the diabetic educator; may need additional tweaks with his medications           5. Offloading: has adequate shoes and insoles at this time; reminded him to never to stocking footed or bare foot; he reports that he cannot put shoes on in the middle of the night to use the restroom; we discussed using a urinal he declined; repeated to him that if he is up helping his wife in the middle of the night he needs to put his shoes on.           6. HTN: bp was up again today; continue on current regimen; continue to work with PCP; continue to monitor for s/sx of hypertensive  crisis such as headache; visual disturbances; chest pain; this was all d/w the patient              Patient will follow up with me in 3-4 week for re-evaluation. They were instructed to call the clinic sooner with any signs or symptoms of infection or any further questions/concerns. Answered all questions.    Ines Zimmerman DNP, RN, CNP, [facility]  [phone]

## 2021-06-27 NOTE — PROGRESS NOTES
"Progress Notes by Ines Zimmerman NP at 5/28/2019  8:40 AM     Author: Ines Zimmerman NP Service: -- Author Type: Nurse Practitioner    Filed: 5/28/2019 10:26 AM Encounter Date: 5/28/2019 Status: Signed    : Ines Zimmerman NP (Nurse Practitioner)       Follow up Vascular Visit       Date of Service:2/25/2019    Date Last Seen: 11/8/2017; 10/23/2017    Chief Complaint:  right diabetic foot ulcer    History:   Past Medical History:   Diagnosis Date   ? BPH (benign prostatic hyperplasia)    ? Cholelithiasis    ? Diabetes mellitus (H)    ? Essential hypertension    ? Hyperlipidemia    ? Pancreatitis        Pt returns to the Vascular clinic with regards to their  right diabetic foot ulcer. Pt was seen 4 weeks ago, at that time the wound was healed but had worsening discoloration and bogginess to the callous region. He reports that 4-5 weeks ago he was near falling and landed hard on the right foot. He was seen at [facility] and [facility] for new insoles; he is very happy with these; obtained several pairs. They have decided to not go with the rocker bottom shoe at this time.   He is now fully retired.  Stopped using the mepilex border as it was felt this was trapping too much moisture; possibly causing blistering. He is applying am lactin or cetaphil lotion to the area and surrounding dry skin areas. He is applying bactroban and rolled gauze to the discolored callous area.  He purchased compression stockings he is wearing these or tubular compression.   Denies fevers, chills. His fs are running higher since his insurance will no longer cover the Lantus and Novolog; he is using Victoza and Levimir now; does not like this; fs running 200-220. He met with Mac Yost again and recommend Glidewear stockings to reduce friction/shear; he purchased these; had tried these a few times and did not like this \"felt weird\". We had him repeat SRIKANTH this was reduced but adequate for wound healing. His wife is recovering " from hip replacement surgery and he is her Primary care giver; this has been stressful; 3 months ago he was up with her for 4 hours at night with no shoes on and struck the area repeatedly. I had him see Dr. Garcia again to ensure he did not require surgery; he would like to try to avoid surgery. Had him see Mac Yost again and the shoe was excavated further. Reviewed with patient no changes since last visit dated 5/6/19.    Allergies: Cresol; Crestor [rosuvastatin]; and Demeclocycline    Physical Exam:    /88   Pulse 68   Temp 97.3  F (36.3  C) (Oral)   Resp 16     General:  Patient presents to clinic in no apparent distress.  Head: normocephalic atraumatic  Psychiatric:  Alert and oriented x3.   Respiratory: unlabored breathing; no cough  Integumentary:  Skin is uniformly warm, dry and pink.    Wound #1 Location: right plantar foot 5th met head  Size:2x2cm area of  discoloration; moderate callous buildup; this was pared down using a 15 blade scalpel; skin well moisturized; all intact underneath; appears much improved    Circumferential volume measures:    Vasc Edema 3/6/2017 4/17/2017 6/2/2017   Right just above MTP 23 24 23   Right Ankle 25 26 23.5   Right Widest Calf 37.5 38.6 37   Left - just above MTP 22 - -   Left Ankle 22 - -   Left Widest Calf 36 - -         Ulceration(s)/Wound(s):   Wound 07/24/17 Right lateral foot (Active)   Pre Size Length 0 2/25/2019  8:00 AM   Pre Size Width 0 2/25/2019  8:00 AM   Pre Size Depth 0 2/25/2019  8:00 AM   Pre Total Sq cm 0 2/25/2019  8:00 AM   Post Size Length 0.1 4/8/2019  7:00 AM   Post Size Width 0.1 4/8/2019  7:00 AM   Post Size Depth 0.1 4/8/2019  7:00 AM   Post Total Sq cm 0.01 4/8/2019  7:00 AM   Undermined 0.75 9/8/2017 11:00 AM   Description callous 2/25/2019  8:00 AM   Prodcut Used Alginate 8/10/2017 11:00 AM       Wound 09/05/17 Foot Right (Active)         Lab Values    No results found for: SEDRATE  Lab Results   Component Value Date    CREATININE  1.32 (H) 11/21/2018     Lab Results   Component Value Date    HGBA1C 9.7 (H) 12/20/2018     Lab Results   Component Value Date    BUN 21 11/21/2018     Lab Results   Component Value Date    ALBUMIN 3.7 05/23/2018     No results found for: [id number]      4/8/19 right plantar foot            Impression:  diabetic foot ulcer  Right pre-ulcerative callous   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 After discussion of risk factors and verbal consent was obtained 2% Lidocaine HCL jelly was applied, under clean conditions, the right foot callous was debrided using #15 blade scalpel. Devitalized and nonviable tissue, along with any fibrin and slough, was removed to improve granulation tissue formation, stimulate wound healing, decrease overall bacteria load, disrupt biofilm formation and decrease edge senescence.  Total excisional debridement was 2 sq cm from the epidermis/dermis area with a depth of 0 cm.   Skin was intact underneath. The blister was also ruptured under clean conditions; skin covering was left in place.             2. Edema: continue with tubular compression or compression stockings           3.  Wound treatment:   Wound remains healed but tenuous, no dressings needed; ok to use a bactroban and rolled gauze; cetaphil to other dry skin locations; now retired; no soaking of the feet.  SRIKANTH was done recently; this was reduced but above the threshold for wound healed; he does not have s/sx of claudication; no referral to vascular surgeon at this time; will repeat annually; next due 10/2019           4. Nutrition: diabetic diet; we spoke again about tight glucose control; continue seeing the diabetic educator; may need additional tweaks with his  medications           5. Offloading: has adequate shoes and insoles at this time; reminded him to never to stocking footed or bare foot; he reports that he cannot put shoes on in the middle of the night to use the restroom; we discussed using a urinal he declined; repeated to him that if he is up helping his wife in the middle of the night he needs to put his shoes on.                     Patient will follow up with me in 3-4 week for re-evaluation. They were instructed to call the clinic sooner with any signs or symptoms of infection or any further questions/concerns. Answered all questions.    Ines Zimmerman DNP, RN, CNP, [facility]  [phone]

## 2021-06-27 NOTE — PROGRESS NOTES
"Progress Notes by Ines Zimmerman NP at 1/7/2019  8:00 AM     Author: Ines Zimmerman NP Service: -- Author Type: Nurse Practitioner    Filed: 1/7/2019  8:25 AM Encounter Date: 1/7/2019 Status: Signed    : Ines Zimmerman NP (Nurse Practitioner)       Follow up Vascular Visit       Date of Service:1/7/2019    Date Last Seen: 11/8/2017; 10/23/2017    Chief Complaint:  right diabetic foot ulcer    History:   Past Medical History:   Diagnosis Date   ? BPH (benign prostatic hyperplasia)    ? Cholelithiasis    ? Diabetes mellitus (H)    ? Essential hypertension    ? Hyperlipidemia    ? Pancreatitis        Pt returns to the Vascular clinic with regards to their  right diabetic foot ulcer. Pt was seen 4 weeks ago;  at that time the wound was healed. He was seen at [facility] and [facility] for new insoles; he is very happy with these; obtained 3 more. They have decided to not go with the rocker bottom shoe at this time.   He is now fully retired.  Stopped using the mepilex border as it was felt this was trapping too much moisture; possibly causing blistering. He is applying am lactin or cetaphil lotion to the area and surrounding dry skin areas. He purchased compression stockings he is wearing these or tubigrips.   Denies fevers, chills. His fs are running higher since his insurance will no longer cover the Lantus and Novolog; he is using Victoza and Levimir now; does not like this; fs running 200-220. He met with Mac Yost again and recommend Glidewear stockings to reduce friction/shear; he purchased these; had tried these a few times and did not like this \"felt weird\". We had him repeat SRIKANTH this was reduced but adequate for wound healing. His wife is recovering from hip replacement surgery and he is her Primary care giver; this has been stressful. Reviewed with Patient no changes since last visit dated 12/3/18.     Allergies: Cresol; Crestor [rosuvastatin]; and Demeclocycline    Physical Exam:    /88 "   Pulse 68   Temp 97.3  F (36.3  C) (Oral)   Resp 16     General:  Patient presents to clinic in no apparent distress.  Head: normocephalic atraumatic  Psychiatric:  Alert and oriented x3.   Respiratory: unlabored breathing; no cough  Integumentary:  Skin is uniformly warm, dry and pink.    Wound #1 Location: right plantar foot 5th met head  Size:2x1cm area of  discoloration; moderate callous buildup; this was pared down using a 15 blade scalpel; skin well moisturized; skin intact  The right great toe nail with subungual ecchymoses; nail was loosening paronychia resolved with no purulence    Circumferential volume measures:    Vasc Edema 3/6/2017 4/17/2017 6/2/2017   Right just above MTP 23 24 23   Right Ankle 25 26 23.5   Right Widest Calf 37.5 38.6 37   Left - just above MTP 22 - -   Left Ankle 22 - -   Left Widest Calf 36 - -         Ulceration(s)/Wound(s):   Wound 07/24/17 Right lateral foot (Active)   Pre Size Length 0 1/7/2019  8:00 AM   Pre Size Width 0 1/7/2019  8:00 AM   Pre Size Depth 0 1/7/2019  8:00 AM   Pre Total Sq cm 0 1/7/2019  8:00 AM   Post Size Length 0.3 9/24/2018  7:00 AM   Post Size Width 0.1 9/24/2018  7:00 AM   Post Size Depth 0.2 9/24/2018  7:00 AM   Post Total Sq cm 0.03 9/24/2018  7:00 AM   Undermined 0.75 9/8/2017 11:00 AM   Description callous/scab 5/14/2018  7:27 AM   Prodcut Used Alginate 8/10/2017 11:00 AM       Wound 09/05/17 Foot Right (Active)        Lab Values    No results found for: SEDRATE  Lab Results   Component Value Date    CREATININE 1.32 (H) 11/21/2018     Lab Results   Component Value Date    HGBA1C 9.7 (H) 12/20/2018     Lab Results   Component Value Date    BUN 21 11/21/2018     Lab Results   Component Value Date    ALBUMIN 3.7 05/23/2018     No results found for: [id number]    11/5/18 right plantar foot          9/24/18 right 5th met head          6/18/18 right 5th met head            Impression:   diabetic foot ulcer; resolved  Type 2 diabetes with peripheral  neuropathy and foot ulcer  gastrosoleus equinus deformity on the right  HTN-stable  Right hallux paronychia-resolved  Right heel fissure-resolved  Right hallux subungual ecchymosis -stable      Are any of these wounds new today: No; Location: na    Assessment/Plan:          1. Debridement: the callous on the right plantar 1st met head was pared down and intact underneath; tolerated well; no complications          2. Edema: continue with tubigrips or compression stockings           3.  Wound treatment:   Wound remains healed, no dressings needed; ok to use a bactroban and bandaid if this makes him feel more comfortable; cetaphil to other dry skin locations; now retired; no soaking of the feet.  SRIKANTH was done recently; this was reduced but above the threshold for wound healed; he does not have s/sx of claudication; no referral to vascular surgeon at this time; will repeat annually; next due 10/2019. Right hallux nail will more than likely fall off; he has a home nail care nurse who is monitoring           4. Nutrition: diabetic diet; we spoke again about tight glucose control; continue seeing the diabetic educator; may need additional tweaks with his medications           5. Offloading: has adequate shoes and insoles at this time; reminded him to never to stocking footed or bare foot; he reports that he cannot put shoes on in the middle of the night to use the restroom; we discussed using a urinal he declined           6. HTN: bp was up again today; continue on current regimen; continue to work with PCP; continue to monitor for s/sx of hypertensive crisis such as headache; visual disturbances; chest pain; this was all d/w the patient              Patient will follow up with me in 4 week for re-evaluation. They were instructed to call the clinic sooner with any signs or symptoms of infection or any further questions/concerns. Answered all questions.    Ines Zimmerman DNP, RN, CNP, CWOCN  [facility]  [place]  [phone]

## 2021-06-27 NOTE — PROGRESS NOTES
"Progress Notes by Ines Zimmerman NP at 2/25/2019  8:00 AM     Author: Ines Zimmerman NP Service: -- Author Type: Nurse Practitioner    Filed: 2/25/2019  8:30 AM Encounter Date: 2/25/2019 Status: Signed    : Ines Zimmerman NP (Nurse Practitioner)       Follow up Vascular Visit       Date of Service:2/25/2019    Date Last Seen: 11/8/2017; 10/23/2017    Chief Complaint:  right diabetic foot ulcer    History:   Past Medical History:   Diagnosis Date   ? BPH (benign prostatic hyperplasia)    ? Cholelithiasis    ? Diabetes mellitus (H)    ? Essential hypertension    ? Hyperlipidemia    ? Pancreatitis        Pt returns to the Vascular clinic with regards to their  right diabetic foot ulcer. Pt was seen 2 weeks ago, at that time the wound was healed but had worsening discoloration and bogginess to the callous region. He was seen at [facility] and [facility] for new insoles; he is very happy with these; obtained several pairs. They have decided to not go with the rocker bottom shoe at this time.   He is now fully retired.  Stopped using the mepilex border as it was felt this was trapping too much moisture; possibly causing blistering. He is applying am lactin or cetaphil lotion to the area and surrounding dry skin areas. He is applying bactroban and bandaid to the discolored callous area.  He purchased compression stockings he is wearing these or tubular compression.   Denies fevers, chills. His fs are running higher since his insurance will no longer cover the Lantus and Novolog; he is using Victoza and Levimir now; does not like this; fs running 200-220. He met with Mac Yost again and recommend Glidewear stockings to reduce friction/shear; he purchased these; had tried these a few times and did not like this \"felt weird\". We had him repeat SRIKANTH this was reduced but adequate for wound healing. His wife is recovering from hip replacement surgery and he is her Primary care giver; this has been stressful; at " the last visit he was up with her for 4 hours at night with no shoes on and struck the area repeatedly.    Allergies: Cresol; Crestor [rosuvastatin]; and Demeclocycline    Physical Exam:    /88   Pulse 68   Temp 97.3  F (36.3  C) (Oral)   Resp 16     General:  Patient presents to clinic in no apparent distress.  Head: normocephalic atraumatic  Psychiatric:  Alert and oriented x3.   Respiratory: unlabored breathing; no cough  Integumentary:  Skin is uniformly warm, dry and pink.    Wound #1 Location: right plantar foot 5th met head  Size:2x2cm area of  discoloration; moderate callous buildup; this was pared down using a 15 blade scalpel; skin well moisturized; skin essentially  intact  The right great toe nail with subungual ecchymoses; nail was loosening, paronychia resolved with no purulence    Circumferential volume measures:    Vasc Edema 3/6/2017 4/17/2017 6/2/2017   Right just above MTP 23 24 23   Right Ankle 25 26 23.5   Right Widest Calf 37.5 38.6 37   Left - just above MTP 22 - -   Left Ankle 22 - -   Left Widest Calf 36 - -         Ulceration(s)/Wound(s):   Wound 07/24/17 Right lateral foot (Active)   Pre Size Length 0 2/25/2019  8:00 AM   Pre Size Width 0 2/25/2019  8:00 AM   Pre Size Depth 0 2/25/2019  8:00 AM   Pre Total Sq cm 0 2/25/2019  8:00 AM   Post Size Length 0.3 9/24/2018  7:00 AM   Post Size Width 0.1 9/24/2018  7:00 AM   Post Size Depth 0.2 9/24/2018  7:00 AM   Post Total Sq cm 0.03 9/24/2018  7:00 AM   Undermined 0.75 9/8/2017 11:00 AM   Description callous 2/25/2019  8:00 AM   Prodcut Used Alginate 8/10/2017 11:00 AM       Wound 09/05/17 Foot Right (Active)        Lab Values    No results found for: SEDRATE  Lab Results   Component Value Date    CREATININE 1.32 (H) 11/21/2018     Lab Results   Component Value Date    HGBA1C 9.7 (H) 12/20/2018     Lab Results   Component Value Date    BUN 21 11/21/2018     Lab Results   Component Value Date    ALBUMIN 3.7 05/23/2018     No results  found for: [id number]    2/4/19 right plantar 5th met head              11/5/18 right plantar foot          9/24/18 right 5th met head          6/18/18 right 5th met head            Impression:   diabetic foot ulcer; resolved  Right pre-ulcerative callous   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 Debridement: the callous on the right plantar 1st met head was pared down and intact underneath; tolerated well; no complications          2. Edema: continue with tubular compression or compression stockings           3.  Wound treatment:   Wound remains healed but tenuous, no dressings needed; ok to use a bactroban and bandaid if this makes him feel more comfortable; cetaphil to other dry skin locations; now retired; no soaking of the feet.  SRIKANTH was done recently; this was reduced but above the threshold for wound healed; he does not have s/sx of claudication; no referral to vascular surgeon at this time; will repeat annually; next due 10/2019.  Due to the continuation of the discoloration I will have the patient see Dr. Garcia. Right hallux nail will more than likely fall off; he has a home nail care nurse who is monitoring           4. Nutrition: diabetic diet; we spoke again about tight glucose control; continue seeing the diabetic educator; may need additional tweaks with his medications           5. Offloading: has adequate shoes and insoles at this time; reminded him to never to stocking footed or bare foot; he reports that he cannot put shoes on in the middle of the night to use the restroom; we discussed using a urinal he declined; repeated to him that if he is up helping his wife in the middle of the night he needs to put his shoes on.            6. HTN: bp was up again today; continue on current regimen; continue to work with PCP; continue to monitor for s/sx of hypertensive crisis such as headache; visual disturbances; chest pain; this was all d/w the patient              Patient will follow up with me in 4 week for re-evaluation; follow up with Dr. Garcia in 2 weeks. They were instructed to call the clinic sooner with any signs or symptoms of infection or any further questions/concerns. Answered all questions.    Ines Zimmerman DNP, RN, CNP, [facility]  [phone]

## 2021-06-28 NOTE — PROGRESS NOTES
Progress Notes by Ines Zimmerman NP at 9/9/2019  8:00 AM     Author: Ines Zimmerman NP Service: -- Author Type: Nurse Practitioner    Filed: 9/9/2019  8:53 AM Encounter Date: 9/9/2019 Status: Signed    : Ines Zimmerman NP (Nurse Practitioner)       Follow up Vascular Visit       Date of Service:9/9/2019    Date Last Seen: 6/24/2019; 6/24/2019    Chief Complaint: right foot callous; right foot pain        Pt returns to the [facility] with regards to their right foot callous and right foot pain. He arrives alone. Has not been seen for 3 months. He is doing well. He is now only using one pair of shoes. He does have to go barefoot at nights to use the restroom. Continues to have elevated bp which he relates to issues with his . FS have been variable as he forgets his evening dose of insulin at times. He wears compression inconsistently. He is applying bactroban ointment 1-2 times per day to the shin, foot and right hallux nail; he really feels that this is helping. His bp is noted to be elevated today; he denies h/a, blurred vision or cp.     Allergies: Cresol; Crestor [rosuvastatin]; and Demeclocycline    Medications:   Current Outpatient Medications:   ?  aspirin 81 MG EC tablet, Take 81 mg by mouth daily., Disp: , Rfl:   ?  blood glucose test strips, Use 1 each As Directed 2 (two) times a day Dispense brand per patient's insurance at pharmacy discretion.., Disp: 120 strip, Rfl: 6  ?  blood-glucose meter (ONETOUCH VERIO IQ METER) Misc, Use 1 each As Directed 2 (two) times a day., Disp: 1 each, Rfl: 0  ?  cholecalciferol, vitamin D3, (VITAMIN D3) 5,000 unit Tab, Take 5,000 Units by mouth daily. , Disp: , Rfl:   ?  insulin detemir U-100 (LEVEMIR FLEXTOUCH U-100 INSULN) 100 unit/mL (3 mL) pen, Inject 40 Units under the skin Daily at 5 pm.., Disp: 15 adj dose pen, Rfl: 3  ?  LANTUS SOLOSTAR U-100 INSULIN 100 unit/mL (3 mL) pen, INJECT 40 UNITS UNDER THE  "SKIN AT BEDTIME. DO NOT MIX LANTUS WITH ANY OTHER INSULIN, Disp: 1 Box, Rfl: 3  ?  lisinopril (PRINIVIL,ZESTRIL) 5 MG tablet, TAKE 1 TABLET BY MOUTH ONCE DAILY, Disp: 90 tablet, Rfl: 1  ?  MULTIVITS,CA,MIN/IRON/FA/LYCOP (CENTRUM MEN ORAL), Take 1 tablet by mouth daily., Disp: , Rfl:   ?  mupirocin (BACTROBAN) 2 % ointment, Apply topically to wound every day, Disp: 60 g, Rfl: 3  ?  naproxen sodium (ALEVE) 220 MG tablet, Take 220 mg by mouth daily., Disp: , Rfl:   ?  pen needle, diabetic 31 gauge x 5/16\" Ndle, Used to inject insulin daily, Disp: 90 each, Rfl: 0  ?  VICTOZA 3-RUPALI 0.6 mg/0.1 mL (18 mg/3 mL) PnIj injection, INJECT 1.2 MG SUBCUTANEOUSLY ONCE DAILY, Disp: , Rfl: 1  ?  vitamin E 400 unit capsule, Take 400 Units by mouth daily., Disp: , Rfl:     History:   Past Medical History:   Diagnosis Date   ? BPH (benign prostatic hyperplasia)    ? Cholelithiasis    ? Diabetes mellitus (H)    ? Essential hypertension    ? Hyperlipidemia    ? Pancreatitis        Physical Exam:    BP (!) 232/92   Pulse 72   Temp 97.8  F (36.6  C) (Oral)   Resp 20     General:  Patient presents to clinic in no apparent distress.  Head: normocephalic atraumatic  Psychiatric:  Alert and oriented x3.   Respiratory: unlabored breathing; no cough  Integumentary:  Skin is uniformly warm, dry and pink.    Extremities: no edema; skin intact; well moisturized; right 5th met head with pinpoint area of discoloration; skin intact; no callous build up; bony prominence to the right lateral foot which is tender to palpation; skin intact without erythema      Circumferential volume measures:    Vasc Edema 3/6/2017 4/17/2017 6/2/2017   Right just above MTP 23 24 23   Right Ankle 25 26 23.5   Right Widest Calf 37.5 38.6 37   Left - just above MTP 22 - -   Left Ankle 22 - -   Left Widest Calf 36 - -       Ulceration(s)/Wound(s):     Wound 07/24/17 Right lateral foot (Active)   Pre Size Length 0 9/9/2019  8:00 AM   Pre Size Width 0 9/9/2019  8:00 AM   Pre " Size Depth 0 9/9/2019  8:00 AM   Pre Total Sq cm 0 9/9/2019  8:00 AM   Post Size Length 0.1 4/8/2019  7:00 AM   Post Size Width 0.1 4/8/2019  7:00 AM   Post Size Depth 0.1 4/8/2019  7:00 AM   Post Total Sq cm 0.01 4/8/2019  7:00 AM   Undermined 0.75 9/8/2017 11:00 AM   Description callous 2/25/2019  8:00 AM       Wound 09/05/17 Foot Right (Active)        Lab Values    No results found for: SEDRATE  Lab Results   Component Value Date    CREATININE 1.32 (H) 11/21/2018     Lab Results   Component Value Date    HGBA1C 9.7 (H) 12/20/2018     Lab Results   Component Value Date    BUN 21 11/21/2018     Lab Results   Component Value Date    ALBUMIN 3.7 05/23/2018     No results found for: [id number]          Impression:  1. 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 2 diabetes mellitus, limited to breakdown of skin (H)  Supply - Other   2. Pre-ulcerative corn or callous  Supply - Other   3. Mononeuropathy  Supply - Other   4. Localized edema  Supply - Other   5. Type 2 diabetes mellitus with other skin ulcer, with long-term current use of insulin (H)  Supply - Other   6. Essential hypertension  Supply - Other       9/9/19 right lateral foot           Are any of these wounds new today: No; Location: na    Assessment/Plan:          1. Debridement: na     2.  Wound treatment: wound treatment will include irrigation and dressings to promote autolytic debridement which will include:continue bactroban ointment; no dressings           3. Edema: compression stockings; elevation.            4. Nutrition: diabetic            5. Offloading: will have him get new impressions; go to [facility]; no barefoot walking; rx sent            6. Htn: we spoke about his bp; will send message to Dr. Sahu; asymptomatic; relates this to personal stressors     Patient will follow up with me in 4 months for reevaluation. They were instructed to call the clinic sooner with any signs or symptoms of infection or any further questions/concerns.  Answered all questions.    Ines Zimmerman DNP, RN, CNP, [facility]  [facility]  [phone]        This note was electronically signed by Ines Zimmerman

## 2021-06-28 NOTE — PROGRESS NOTES
Progress Notes by Ines Zimmerman NP at 3/9/2020  7:40 AM     Author: Ines Zimmerman NP Service: -- Author Type: Nurse Practitioner    Filed: 3/9/2020  8:42 AM Encounter Date: 3/9/2020 Status: Signed    : Ines Zimmerman NP (Nurse Practitioner)       Follow up Vascular Visit       Date of Service:3/9/2020    Date Last Seen: 2/10/2020; 2/10/2020    Chief Complaint: right foot pre-ulcerative callous        Pt returns to [facility] with regards to their right foot pre-ulcerative callous.  They arrive today alone. They are currently using bactroban and gauze  to the wounds. This is being done by the patient 1-3 times per day PRN. They are using tubular compression for compression. They are feeling well today. Denies fevers, chills. No shortness of breath. Recent SRIKANTH indicated adequate blood flow for healing. TcPO2s were also done these were 40 at the right digit; borderline. Last A1C was 12% he has not made appt with PCP as we discussed.     Allergies: Cresol; Crestor [rosuvastatin]; and Demeclocycline    Medications:   Current Outpatient Medications:   ?  aspirin 81 MG EC tablet, Take 81 mg by mouth daily., Disp: , Rfl:   ?  blood glucose test strips, Use 1 each As Directed 2 (two) times a day. Dispense brand per patient's insurance at pharmacy discretion., Disp: 120 strip, Rfl: 6  ?  blood-glucose meter (ONETOUCH VERIO IQ METER) Misc, Use 1 each As Directed 2 (two) times a day., Disp: 1 each, Rfl: 0  ?  cholecalciferol, vitamin D3, (VITAMIN D3) 5,000 unit Tab, Take 5,000 Units by mouth daily. , Disp: , Rfl:   ?  LANTUS SOLOSTAR U-100 INSULIN 100 unit/mL (3 mL) pen, INJECT 40 UNITS UNDER THE SKIN AT BEDTIME. DO NOT MIX LANTUS WITH ANY OTHER INSULIN, Disp: 18 adj dose pen, Rfl: 1  ?  lisinopril (PRINIVIL,ZESTRIL) 5 MG tablet, TAKE 1 TABLET BY MOUTH EVERY DAY, Disp: 90 tablet, Rfl: 0  ?  MULTIVITS,CA,MIN/IRON/FA/LYCOP (CENTRUM MEN ORAL), Take 1 tablet by mouth daily., Disp: , Rfl:   ?   "mupirocin (BACTROBAN) 2 % ointment, Apply topically to wound every day, Disp: 60 g, Rfl: 3  ?  naproxen sodium (ALEVE) 220 MG tablet, Take 220 mg by mouth daily., Disp: , Rfl:   ?  pen needle, diabetic 31 gauge x 5/16\" Ndle, Used to inject insulin daily, Disp: 90 each, Rfl: 0  ?  VICTOZA 3-RUPALI 0.6 mg/0.1 mL (18 mg/3 mL) PnIj injection, INJECT 1.2 MG SUBCUTANEOUSLY ONCE DAILY, Disp: 18 Syringe, Rfl: 1  ?  vitamin E 400 unit capsule, Take 400 Units by mouth daily., Disp: , Rfl:     History:   Past Medical History:   Diagnosis Date   ? BPH (benign prostatic hyperplasia)    ? Cholelithiasis    ? Diabetes mellitus (H)    ? Essential hypertension    ? Hyperlipidemia    ? Pancreatitis        Physical Exam:    /78 (Patient Site: Left Arm, Patient Position: Sitting, Cuff Size: Adult Regular)   Pulse 68   Temp 98  F (36.7  C)     General:  Patient presents to clinic in no apparent distress.  Head: normocephalic atraumatic  Psychiatric:  Alert and oriented x3.   Respiratory: unlabored breathing; no cough  Integumentary:  Skin is uniformly warm, dry and pink.    Wound #1 Location: right foot  Size: 0.4L x 0.2W x 0.1depth.  No sinus tract present, Wound base: discolored callous  No undermining present. Wound is full thickness. There is moderate drainage. Periwound: no denudement, erythema, induration, maceration or warmth.      Circumferential volume measures:    Vasc Edema 3/6/2017 4/17/2017 6/2/2017   Right just above MTP 23 24 23   Right Ankle 25 26 23.5   Right Widest Calf 37.5 38.6 37   Left - just above MTP 22 - -   Left Ankle 22 - -   Left Widest Calf 36 - -       Ulceration(s)/Wound(s):     Wound 07/24/17 Right lateral foot (Active)   Pre Size Length 0.2 03/09/20 0807   Pre Size Width 0.3 03/09/20 0807   Pre Size Depth 0.1 03/09/20 0807   Pre Total Sq cm 0.06 03/09/20 0807   Post Size Length 0.4 03/09/20 0807   Post Size Width 0.2 03/09/20 0807   Post Size Depth 0.1 03/09/20 0807   Post Total Sq cm 0.08 03/09/20 " 0807   Description callus 01/13/20 0800       Wound 09/05/17 Foot Right (Active)        Lab Values    No results found for: SEDRATE  Lab Results   Component Value Date    CREATININE 1.01 12/27/2019     Lab Results   Component Value Date    HGBA1C 12.0 (H) 12/27/2019     Lab Results   Component Value Date    BUN 19 12/27/2019     Lab Results   Component Value Date    ALBUMIN 3.8 10/25/2019     No results found for: [id number]          Impression:  1. Pre-ulcerative corn or callous     2. Mononeuropathy     3. Type 2 diabetes mellitus with other skin ulcer, with long-term current use of insulin (H)     4. 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 2 diabetes mellitus, limited to breakdown of skin (H)              Are any of these wounds new today: No; Location: na    Assessment/Plan:          1. Debridement: After discussion of risk factors and verbal consent was obtained 2% Lidocaine HCL jelly was applied, under clean conditions, the right foot ulceration(s) were debrided using currette and #15 blade scalpel. Devitalized and nonviable tissue, along with any fibrin and slough, was removed to improve granulation tissue formation, stimulate wound healing, decrease overall bacteria load, disrupt biofilm formation and decrease edge senescence.  Total excisional debridement was 0.8 sq cm from the epidermis/dermis area and into the subcutaneous tissue with a depth of 0.1 cm.   Ulcers were improved afterwards and .  Measures were unchanged after debridement.       2.  Wound treatment: wound treatment will include irrigation and dressings to promote autolytic debridement which will include:SRIKANTH were adequate for healing; TcPO2s were borderline; will continue to monitor at this time; continue bactroban; silvercel; medipore + pad; Worsened wounds           3. Edema: tubular compression. The compression wraps were applied today in clinic. Stable swelling           4. Nutrition: A1C significantly elevated;  reminded again to make appt with PCP and diabetic educator           5. Offloading: has new shoes and insoles; sent message to Mac Yost to see if any further modifications can be made to offload the 5th met head     Patient will follow up with me in 4 weeks for reevaluation; pt declined to come in sooner. They were instructed to call the clinic sooner with any signs or symptoms of infection or any further questions/concerns. Answered all questions.          Ines Zimmerman DNP, RN, CNP, CWOCN, CFCN, CLT  [facility]   [phone]        This note was electronically signed by Ines Zimmerman

## 2021-06-29 NOTE — PROGRESS NOTES
Progress Notes by Ines Zimmerman NP at 6/17/2020  8:15 AM     Author: Ines Zimmerman NP Service: -- Author Type: Nurse Practitioner    Filed: 6/17/2020 10:54 AM Encounter Date: 6/17/2020 Status: Signed    : Ines Zimmerman NP (Nurse Practitioner)       Follow up Vascular Visit       Date of Service:6/17/2020    Date Last Seen: 6/3/2020; 6/3/2020    Chief Complaint: right foot ulcer        Pt returns to [facility] with regards to their right diabetic foot ulcer.  They arrive today alone. They are currently using silvercel and gauze to the wounds. This is being done by the patient daily. They are using tubular compression for compression. They are feeling well today. Denies fevers, chills. No shortness of breath. He was able to get a CAM boot. I had Dr. Garcia see the patient last visit and they have opted to try and avoid surgery at this time. Recently hospitalized for DVT/PE; was put on eliquis. FS running under 100 in the am now.     Allergies: Cresol; Crestor [rosuvastatin]; and Demeclocycline    Medications:   Current Outpatient Medications:   ?  apixaban ANTICOAGULANT (ELIQUIS) 5 mg Tab tablet, Take 2 tablets (10 mg) by mouth twice daily for 6 days then take 1 tablet (5 mg) twice daily thereafter., Disp: , Rfl:   ?  aspirin 81 MG EC tablet, Take 81 mg by mouth daily., Disp: , Rfl:   ?  blood glucose test strips, Use 1 each As Directed 2 (two) times a day. Dispense brand per patient's insurance at pharmacy discretion., Disp: 120 strip, Rfl: 6  ?  blood-glucose meter (ONETOUCH VERIO IQ METER) Misc, Use 1 each As Directed 2 (two) times a day., Disp: 1 each, Rfl: 0  ?  cholecalciferol, vitamin D3, (VITAMIN D3) 5,000 unit Tab, Take 5,000 Units by mouth daily. , Disp: , Rfl:   ?  LANTUS SOLOSTAR U-100 INSULIN 100 unit/mL (3 mL) pen, INJECT 40 UNITS UNDER THE SKIN AT BEDTIME. DO NOT MIX LANTUS WITH ANY OTHER INSULIN (Patient taking differently: 44 Units. ), Disp: 18 adj dose pen, Rfl: 1  ?  " lisinopril (PRINIVIL,ZESTRIL) 5 MG tablet, TAKE 1 TABLET BY MOUTH EVERY DAY, Disp: 90 tablet, Rfl: 0  ?  MULTIVITS,CA,MIN/IRON/FA/LYCOP (CENTRUM MEN ORAL), Take 1 tablet by mouth daily., Disp: , Rfl:   ?  mupirocin (BACTROBAN) 2 % ointment, Apply topically to wound every day, Disp: 60 g, Rfl: 3  ?  naproxen sodium (ALEVE) 220 MG tablet, Take 220 mg by mouth daily., Disp: , Rfl:   ?  pen needle, diabetic 31 gauge x 5/16\" Ndle, Used to inject insulin daily, Disp: 90 each, Rfl: 0  ?  VICTOZA 3-RUPALI 0.6 mg/0.1 mL (18 mg/3 mL) PnIj injection, INJECT 1.8 MG UNDER THE SKIN ONCE DAILY, Disp: 9 Syringe, Rfl: 1  ?  vitamin E 400 unit capsule, Take 400 Units by mouth daily., Disp: , Rfl:     History:   Past Medical History:   Diagnosis Date   ? BPH (benign prostatic hyperplasia)    ? Cholelithiasis    ? Diabetes mellitus (H)    ? Essential hypertension    ? Hyperlipidemia    ? Pancreatitis        Physical Exam:    There were no vitals taken for this visit.    General:  Patient presents to clinic in no apparent distress.  Head: normocephalic atraumatic  Psychiatric:  Alert and oriented x3.   Respiratory: unlabored breathing; no cough  Integumentary:  Skin is uniformly warm, dry and pink.    Wound #1 Location: right foot 5th met head  Size: 0.9L x 0.4W x 0.2cm depth. Post debridement.  No sinus tract present, Wound base: slough; surrounding callous  No undermining present. Wound is full thickness. There is moderate bloody drainage. Periwound: no denudement, erythema, induration, maceration or warmth.      Circumferential volume measures:    Vasc Edema 3/6/2017 4/17/2017 6/2/2017   Right just above MTP 23 24 23   Right Ankle 25 26 23.5   Right Widest Calf 37.5 38.6 37   Left - just above MTP 22 - -   Left Ankle 22 - -   Left Widest Calf 36 - -       Ulceration(s)/Wound(s):     Wound 07/24/17 Right lateral foot (Active)   Pre Size Length 1 06/03/20 0800   Pre Size Width 1 06/03/20 0800   Pre Size Depth 0.2 06/03/20 0800   Pre " Total Sq cm 0.06 03/09/20 0807   Post Size Length 0.4 03/09/20 0807   Post Size Width 0.2 03/09/20 0807   Post Size Depth 0.1 03/09/20 0807   Post Total Sq cm 0.08 03/09/20 0807   Description callus 01/13/20 0800       Wound 09/05/17 Foot Right (Active)        Lab Values    No results found for: SEDRATE  Lab Results   Component Value Date    CREATININE 1.01 12/27/2019     Lab Results   Component Value Date    HGBA1C 12.0 (H) 12/27/2019     Lab Results   Component Value Date    BUN 19 12/27/2019     Lab Results   Component Value Date    ALBUMIN 3.8 10/25/2019     No results found for: [id number]          Impression:  1. 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 2 diabetes mellitus, limited to breakdown of skin (H)     2. Localized edema     3. Pre-ulcerative corn or callous     4. Ankle instability, right         6/17/2020 right plantar foot         6/3/2020 right plantar foot            Are any of these wounds new today: No; Location: na    Assessment/Plan:          1. Debridement: After discussion of risk factors and verbal consent was obtained 2% Lidocaine HCL jelly was applied, under clean conditions, the right foot ulceration(s) were debrided using currette and #15 blade scalpel. Devitalized and nonviable tissue, along with any fibrin and slough, was removed to improve granulation tissue formation, stimulate wound healing, decrease overall bacteria load, disrupt biofilm formation and decrease edge senescence.  Total excisional debridement was 0.36 sq cm from the epidermis/dermis area and into the subcutaneous tissue with a depth of 0.2 cm.   Ulcers were improved afterwards and .  Measures were unchanged after debridement.       2.  Wound treatment: wound treatment will include irrigation and dressings to promote autolytic debridement which will include:continue with bactroban, silvercel and gauze; change daily due to location of the wound and dressing slippage Stable            3. Edema:  continue tubular compression; elevation as able. The compression wraps were applied today in clinic. Stable            4. Nutrition: having better diabetes control; low sodium diet; focus on protein           5. Offloading: continue CAM boot; limit time up on the foot     Patient will follow up with me in 2 weeks for reevaluation. They were instructed to call the clinic sooner with any signs or symptoms of infection or any further questions/concerns. Answered all questions.          Ines Zimmerman DNP, RN, CNP, CWOCN, CFCN, CLT  [facility]   [phone]        This note was electronically signed by Ines Zimmerman

## 2021-06-29 NOTE — PROGRESS NOTES
Progress Notes by Adina Gloria RN at 10/13/2020  8:00 AM     Author: Adina Gloria RN Service: -- Author Type: Registered Nurse    Filed: 10/13/2020  9:47 AM Encounter Date: 10/13/2020 Status: Signed    : Adina Gloria RN (Registered Nurse)       Nurse Visit          Chief Complaint: Patient presents to clinic for assessment and treatment of their ulcer and and swelling    Dressing on Arrival: endoform,saturated serosangunious gauze, roll gauze, tubular compression x2, cage splint    Patient came in today for dressing change and  Tubular compression per order from Dr. Garcia. Patient rated pain 0 out of 10. Removed dressings and cleansed skin with soap and cleaned wound bed with diluted hibiclens and saline. Applied lotion to intact skin.  Applied algenate silver and foam to wound and covered with tubular compression. Dr. Espinosa saw patient and ordered SRIKANTH, sed rate, CRP, and new dressing change orders of hibiclens, normal saline, algenate silver, foam, and lite 2 layer. Pt to have SRIKANTH and [facility] appt today, did tubular compression x2 instead of 2 layer lite. Notified [facility] nursing staff of new dressing change order from Dr. Espinosa.  Rewrapped tubular compression wrap x2 to right leg. Patient tolerated dressing change well.       Allergies:   Allergies   Allergen Reactions   ? Cresol      Muscle cramps   ? Crestor [Rosuvastatin] Myalgia   ? Demeclocycline Hives       Medications:   Current Outpatient Medications:   ?  apixaban ANTICOAGULANT (ELIQUIS) 5 mg Tab tablet, Take 1 tablet (5 mg total) by mouth 2 (two) times a day., Disp: 60 tablet, Rfl: 3  ?  aspirin 81 MG EC tablet, Take 81 mg by mouth daily., Disp: , Rfl:   ?  blood glucose test strips, Use 1 each As Directed 2 (two) times a day. Dispense brand per patient's insurance at pharmacy discretion., Disp: 120 strip, Rfl: 6  ?  blood-glucose meter (ONETOUCH VERIO IQ METER) Misc, Use 1 each As Directed 2 (two) times a day., Disp: 1 each, Rfl: 0  ?  " cholecalciferol, vitamin D3, (VITAMIN D3) 5,000 unit Tab, Take 5,000 Units by mouth daily. , Disp: , Rfl:   ?  LANTUS SOLOSTAR U-100 INSULIN 100 unit/mL (3 mL) pen, INJECT 40 UNITS UNDER THE SKIN AT BEDTIME. DO NOT MIX LANTUS WITH ANY OTHER INSULIN (Patient taking differently: 44 Units. ), Disp: 18 adj dose pen, Rfl: 1  ?  lisinopriL (PRINIVIL,ZESTRIL) 5 MG tablet, TAKE 1 TABLET BY MOUTH EVERY DAY, Disp: 90 tablet, Rfl: 1  ?  MULTIVITS,CA,MIN/IRON/FA/LYCOP (CENTRUM MEN ORAL), Take 1 tablet by mouth daily., Disp: , Rfl:   ?  mupirocin (BACTROBAN) 2 % ointment, Apply topically to wound every day, Disp: 60 g, Rfl: 3  ?  naproxen sodium (ALEVE) 220 MG tablet, Take 220 mg by mouth daily., Disp: , Rfl:   ?  pen needle, diabetic 31 gauge x 5/16\" Ndle, Used to inject insulin daily, Disp: 90 each, Rfl: 0  ?  traMADoL (ULTRAM) 50 mg tablet, Take 1 tablet (50 mg total) by mouth every 8 (eight) hours as needed for pain., Disp: 30 tablet, Rfl: 0  ?  VICTOZA 3-RUPALI 0.6 mg/0.1 mL (18 mg/3 mL) injection, INJECT 1.2 MG SUBCUTANEOUSLY ONCE DAILY, Disp: 18 mL, Rfl: 1  ?  vitamin E 400 unit capsule, Take 400 Units by mouth daily., Disp: , Rfl:     Vital Signs: /62   Pulse 64   Temp 98.1  F (36.7  C) (Temporal)   Resp 15       Assessment:    General:  Patient presents to clinic in no apparent distress.  Psychiatric:  Alert and oriented .   Lower extremity:  edema is present.    Integumentary:  Skin is uniformly warm, dry and pink.    Wound size:   Wound 07/24/17 Right lateral foot (Active)   Pre Size Length 1.3 10/13/20 0800   Pre Size Width 1.7 10/13/20 0800   Pre Size Depth 0.2 10/13/20 0800   Pre Total Sq cm 1.3 10/02/20 0800   Post Size Length 1 09/25/20 0900   Post Size Width 1 09/25/20 0900   Post Size Depth 0.1 09/25/20 0900   Post Total Sq cm 1 09/25/20 0900   Undermined .3 undermining around whole wound  10/05/20 1400   Tunneling no 09/28/20 0800   Description pink 09/28/20 0800   Prodcut Used Collagen;Gauze 10/09/20 " 0800       Wound 09/05/17 Foot Right (Active)      Undermining is not present.    The periwoundskin is macerated        Plan:         1. Patient will follow up today after labs, SRIKANTH with Dr. Garcia.         2. Treatment provided will include irrigation and dressings to promote autolytic debridement and will be as listed below     Cleansed with: Normal Saline and Dilute Hibiclens    Protected skin with: Remedy Skin Repair    Dressings Applied: foam, roll gauze and Silver alginate      Compression Applied to the Right Leg: tubular compression    Tubular compression was applied from base of toes to just below the bend of knee    Offloading applied: CROW/Cage Boot  Trial Products: no  Provider notified regarding concerns: yes  Treatment Changes: yes    Educational Barriers: none  Taught Regarding: Activity, Compliance, Compression, Dressing, Hygiene, Infection and Tests  Teaching Method: Explanation and DC sheet

## 2021-06-29 NOTE — PROGRESS NOTES
Progress Notes by Omid Baeza RN at 10/2/2020  8:00 AM     Author: Omid Baeza RN Service: -- Author Type: Registered Nurse    Filed: 10/2/2020  8:56 AM Encounter Date: 10/2/2020 Status: Signed    : Omid Baeza RN (Registered Nurse)                     Nurse Visit    Chief Complaint: Patient presents to clinic for assement, and treatment of their ulcer and and swelling    Dressing on Arrival : endoform and mepilex dressing was not covering wound. It had moved down from foot. Right leg had single layer tubular compression. Right foot had +3 pitting edema. When asked if patient has been walking a lot, he states that it is very hard to because there are things that he needs to get done. He says he has been trying to rest and have others help him but does continue to walk on foot in cage splint. Writer advised him that he needs to elevate his foot as much as possible and limit walking. Writer told him he is walking too much since the mepilex applied had moved position and wasn't even covering the wound. Due to the pitting edema in foot, applied double layer tubular compression today.     Allergies:   Allergies   Allergen Reactions   ? Cresol      Muscle cramps   ? Crestor [Rosuvastatin] Myalgia   ? Demeclocycline Hives       Medications:   Current Outpatient Medications:   ?  apixaban ANTICOAGULANT (ELIQUIS) 5 mg Tab tablet, Take 1 tablet (5 mg total) by mouth 2 (two) times a day., Disp: 60 tablet, Rfl: 3  ?  aspirin 81 MG EC tablet, Take 81 mg by mouth daily., Disp: , Rfl:   ?  blood glucose test strips, Use 1 each As Directed 2 (two) times a day. Dispense brand per patient's insurance at pharmacy discretion., Disp: 120 strip, Rfl: 6  ?  blood-glucose meter (ONETOUCH VERIO IQ METER) Misc, Use 1 each As Directed 2 (two) times a day., Disp: 1 each, Rfl: 0  ?  cholecalciferol, vitamin D3, (VITAMIN D3) 5,000 unit Tab, Take 5,000 Units by mouth daily. , Disp: , Rfl:   ?  LANTUS SOLOSTAR U-100 INSULIN 100 unit/mL (3  "mL) pen, INJECT 40 UNITS UNDER THE SKIN AT BEDTIME. DO NOT MIX LANTUS WITH ANY OTHER INSULIN (Patient taking differently: 44 Units. ), Disp: 18 adj dose pen, Rfl: 1  ?  lisinopriL (PRINIVIL,ZESTRIL) 5 MG tablet, TAKE 1 TABLET BY MOUTH EVERY DAY, Disp: 90 tablet, Rfl: 1  ?  MULTIVITS,CA,MIN/IRON/FA/LYCOP (CENTRUM MEN ORAL), Take 1 tablet by mouth daily., Disp: , Rfl:   ?  mupirocin (BACTROBAN) 2 % ointment, Apply topically to wound every day, Disp: 60 g, Rfl: 3  ?  naproxen sodium (ALEVE) 220 MG tablet, Take 220 mg by mouth daily., Disp: , Rfl:   ?  pen needle, diabetic 31 gauge x 5/16\" Ndle, Used to inject insulin daily, Disp: 90 each, Rfl: 0  ?  traMADoL (ULTRAM) 50 mg tablet, Take 1 tablet (50 mg total) by mouth every 8 (eight) hours as needed for pain., Disp: 30 tablet, Rfl: 0  ?  VICTOZA 3-RUPALI 0.6 mg/0.1 mL (18 mg/3 mL) injection, INJECT 1.2 MG SUBCUTANEOUSLY ONCE DAILY, Disp: 18 mL, Rfl: 1  ?  vitamin E 400 unit capsule, Take 400 Units by mouth daily., Disp: , Rfl:     Vital Signs: /68   Pulse 72   Resp 18       Assessment:    General:  Patient presents to clinic in no apparent distress.  Psychiatric:  Alert and oriented x3.   Lower extremity:  edema is present.    Integumentary:  Skin is WNL  Wound size:   Wound 07/24/17 Right lateral foot (Active)   Pre Size Length 1 10/02/20 0800   Pre Size Width 1.3 10/02/20 0800   Pre Size Depth 0.1 10/02/20 0800   Pre Total Sq cm 1.3 10/02/20 0800   Post Size Length 1 09/25/20 0900   Post Size Width 1 09/25/20 0900   Post Size Depth 0.1 09/25/20 0900   Post Total Sq cm 1 09/25/20 0900   Undermined no 09/28/20 0800   Tunneling no 09/28/20 0800   Description pink 09/28/20 0800   Prodcut Used Gauze;Collagen 09/28/20 0800       Wound 09/05/17 Foot Right (Active)      Undermining is not present.    The periwoundskin is WNL      Plan:         1. Patient will follow up with nurse visit in 3 days.          2. Treatment provided will include irrigation and dressings to " promote autolytic debridement and will be as listed below     Cleansed with: Normal Saline    Protected skin with: NA    Dressings Applied: endoform, gauze, roll gauze.     Compression Applied to the Left Leg: None    Compression Applied to the Right Leg: double layer tubular compression due to the swelling in right foot.     Offloading applied: splint cage    Trial Products: no  Provider notified regarding concerns: yes, FYI to Dr. Garcia and Ines Zimmerman  Treatment Changes: yes, applied double layer tubular compression    Educational Barriers: none  Taught Regarding: Activity, Compliance, Compression and Dressing  Teaching Method: Explanation and Handout

## 2021-06-29 NOTE — PROGRESS NOTES
Progress Notes by Pamela Duron LPN at 10/5/2020  2:20 PM     Author: Pamela Duron LPN Service: -- Author Type: Licensed Nurse    Filed: 10/5/2020  2:54 PM Encounter Date: 10/5/2020 Status: Signed    : Pamela Duron LPN (Licensed Nurse)           Nurse Visit    Chief Complaint: Patient presents to clinic for assement, and treatment of their ulcer and and swelling    Dressing on Arrival patient arrived in cage splint with endoform, gauze and roll gauze. Patient stated that they had not been able to stay off their feet very much.       Allergies:   Allergies   Allergen Reactions   ? Cresol      Muscle cramps   ? Crestor [Rosuvastatin] Myalgia   ? Demeclocycline Hives       Medications:    Current Outpatient Medications:   ?  apixaban ANTICOAGULANT (ELIQUIS) 5 mg Tab tablet, Take 1 tablet (5 mg total) by mouth 2 (two) times a day., Disp: 60 tablet, Rfl: 3  ?  aspirin 81 MG EC tablet, Take 81 mg by mouth daily., Disp: , Rfl:   ?  blood glucose test strips, Use 1 each As Directed 2 (two) times a day. Dispense brand per patient's insurance at pharmacy discretion., Disp: 120 strip, Rfl: 6  ?  blood-glucose meter (ONETOUCH VERIO IQ METER) Misc, Use 1 each As Directed 2 (two) times a day., Disp: 1 each, Rfl: 0  ?  cholecalciferol, vitamin D3, (VITAMIN D3) 5,000 unit Tab, Take 5,000 Units by mouth daily. , Disp: , Rfl:   ?  LANTUS SOLOSTAR U-100 INSULIN 100 unit/mL (3 mL) pen, INJECT 40 UNITS UNDER THE SKIN AT BEDTIME. DO NOT MIX LANTUS WITH ANY OTHER INSULIN (Patient taking differently: 44 Units. ), Disp: 18 adj dose pen, Rfl: 1  ?  lisinopriL (PRINIVIL,ZESTRIL) 5 MG tablet, TAKE 1 TABLET BY MOUTH EVERY DAY, Disp: 90 tablet, Rfl: 1  ?  MULTIVITS,CA,MIN/IRON/FA/LYCOP (CENTRUM MEN ORAL), Take 1 tablet by mouth daily., Disp: , Rfl:   ?  mupirocin (BACTROBAN) 2 % ointment, Apply topically to wound every day, Disp: 60 g, Rfl: 3  ?  naproxen sodium (ALEVE) 220 MG tablet, Take 220 mg by mouth daily., Disp: ,  "Rfl:   ?  pen needle, diabetic 31 gauge x 5/16\" Ndle, Used to inject insulin daily, Disp: 90 each, Rfl: 0  ?  traMADoL (ULTRAM) 50 mg tablet, Take 1 tablet (50 mg total) by mouth every 8 (eight) hours as needed for pain., Disp: 30 tablet, Rfl: 0  ?  VICTOZA 3-RUPALI 0.6 mg/0.1 mL (18 mg/3 mL) injection, INJECT 1.2 MG SUBCUTANEOUSLY ONCE DAILY, Disp: 18 mL, Rfl: 1  ?  vitamin E 400 unit capsule, Take 400 Units by mouth daily., Disp: , Rfl:     Vital Signs: /68   Pulse 68   Temp 98.5  F (36.9  C)   Resp 16     Assessment:    General:  Patient presents to clinic in no apparent distress.  Psychiatric:  Alert and oriented x3.   Lower extremity:  edema is present.    Integumentary:  Skin is uniformly warm, dry and pink.    Wound size:   Wound 07/24/17 Right lateral foot (Active)   Pre Size Length 0.8 10/05/20 1400   Pre Size Width 1 10/05/20 1400   Pre Size Depth 0.2 10/05/20 1400   Pre Total Sq cm 1.3 10/02/20 0800   Post Size Length 1 09/25/20 0900   Post Size Width 1 09/25/20 0900   Post Size Depth 0.1 09/25/20 0900   Post Total Sq cm 1 09/25/20 0900   Undermined .3 undermining around whole wound  10/05/20 1400   Tunneling no 09/28/20 0800   Description pink 09/28/20 0800   Prodcut Used Gauze;Collagen 09/28/20 0800       Wound 09/05/17 Foot Right (Active)      Undermining is not present.    The periwoundskin is callous      Plan:         1. Patient will follow up in 4 days         2. Treatment provided  to  the right and foot ulceration(s) and will include irrigation and dressings to promote autolytic debridement and will be as listed below     Cleansed with: Normal Saline    Protected skin with: Remedy Skin Repair    Dressings Applied: Collagen    Compression Compression Applied to Right  Tubigrip size F     Offloading applied: cage splint    Trial Products: no  Provider notified regarding concerns: no  Treatment Changes: no    Educational Barriers: none  Taught regarding: Activity, Compliance, Compression and " Dressing  Teaching Method: Explanation and DC sheet

## 2021-06-29 NOTE — PROGRESS NOTES
Progress Notes by Ines Zimmerman NP at 9/18/2020  8:15 AM     Author: Ines Zimmerman NP Service: -- Author Type: Nurse Practitioner    Filed: 9/18/2020 10:54 AM Encounter Date: 9/18/2020 Status: Addendum    : Ines Zimmerman NP (Nurse Practitioner)    Related Notes: Original Note by Ines Zimmerman NP (Nurse Practitioner) filed at 9/18/2020 10:21 AM                   Follow up Vascular Visit       Date of Service:9/18/2020    Date Last Seen: 7/17/2020; 9/4/2020    Chief Complaint: right diabetic foot ulcer        Pt returns to [facility] with regards to their right diabetic foot ulcer.  They arrive today alone. They are currently using endoform to the wounds. This is being done by the wife or patient twice per week. He does not feel comfortable doing this any longer. They are using tubular compression for compression. They are feeling well today. Denies fevers, chills. No shortness of breath. FS running 150s. Wearing CAGE boot. Doing best to stay off the foot. SRIKANTH done 1/2020 occluded PTA; otherwise biphasic flow.     Allergies: Cresol; Crestor [rosuvastatin]; and Demeclocycline    Medications:   Current Outpatient Medications:   ?  apixaban ANTICOAGULANT (ELIQUIS) 5 mg Tab tablet, Take 1 tablet (5 mg total) by mouth 2 (two) times a day., Disp: 60 tablet, Rfl: 3  ?  aspirin 81 MG EC tablet, Take 81 mg by mouth daily., Disp: , Rfl:   ?  blood glucose test strips, Use 1 each As Directed 2 (two) times a day. Dispense brand per patient's insurance at pharmacy discretion., Disp: 120 strip, Rfl: 6  ?  blood-glucose meter (ONETOUCH VERIO IQ METER) Misc, Use 1 each As Directed 2 (two) times a day., Disp: 1 each, Rfl: 0  ?  cholecalciferol, vitamin D3, (VITAMIN D3) 5,000 unit Tab, Take 5,000 Units by mouth daily. , Disp: , Rfl:   ?  LANTUS SOLOSTAR U-100 INSULIN 100 unit/mL (3 mL) pen, INJECT 40 UNITS UNDER THE SKIN AT BEDTIME. DO NOT MIX LANTUS WITH ANY OTHER INSULIN (Patient taking  "differently: 44 Units. ), Disp: 18 adj dose pen, Rfl: 1  ?  lisinopriL (PRINIVIL,ZESTRIL) 5 MG tablet, TAKE 1 TABLET BY MOUTH EVERY DAY, Disp: 90 tablet, Rfl: 1  ?  MULTIVITS,CA,MIN/IRON/FA/LYCOP (CENTRUM MEN ORAL), Take 1 tablet by mouth daily., Disp: , Rfl:   ?  mupirocin (BACTROBAN) 2 % ointment, Apply topically to wound every day, Disp: 60 g, Rfl: 3  ?  naproxen sodium (ALEVE) 220 MG tablet, Take 220 mg by mouth daily., Disp: , Rfl:   ?  pen needle, diabetic 31 gauge x 5/16\" Ndle, Used to inject insulin daily, Disp: 90 each, Rfl: 0  ?  traMADoL (ULTRAM) 50 mg tablet, Take 1 tablet (50 mg total) by mouth every 8 (eight) hours as needed for pain., Disp: 30 tablet, Rfl: 0  ?  VICTOZA 3-RUPALI 0.6 mg/0.1 mL (18 mg/3 mL) PnIj injection, INJECT 1.8 MG UNDER THE SKIN ONCE DAILY, Disp: 9 Syringe, Rfl: 1  ?  vitamin E 400 unit capsule, Take 400 Units by mouth daily., Disp: , Rfl:     History:   Past Medical History:   Diagnosis Date   ? BPH (benign prostatic hyperplasia)    ? Cholelithiasis    ? Diabetes mellitus (H)    ? Essential hypertension    ? Hyperlipidemia    ? Pancreatitis        Physical Exam:    /78   Pulse 68   Temp 97.8  F (36.6  C)   Resp 18     General:  Patient presents to clinic in no apparent distress.  Head: normocephalic atraumatic  Psychiatric:  Alert and oriented x3.   Respiratory: unlabored breathing; no cough  Integumentary:  Skin is uniformly warm, dry and pink.    Wound #1 Location: right plantar foot 5th met head  Size: 1L x 1W x 0.1depth.  No sinus tract present, Wound base: slough  No undermining present. Wound is full thickness. There is moderate drainage. Periwound: no denudement, erythema, induration, maceration or warmth.      Circumferential volume measures:    Vasc Edema 3/6/2017 4/17/2017 6/2/2017 9/18/2020   Right just above MTP 23 24 23 23.6   Right Ankle 25 26 23.5 25.1   Right Widest Calf 37.5 38.6 37 37.2   Left - just above MTP 22 - - -   Left Ankle 22 - - -   Left Widest " Calf 36 - - -       Ulceration(s)/Wound(s):     Wound 07/24/17 Right lateral foot (Active)   Pre Size Length 1 09/18/20 0800   Pre Size Width 1 09/18/20 0800   Pre Size Depth 0.1 09/18/20 0800   Pre Total Sq cm 1 09/18/20 0800   Post Size Length 1.8 09/04/20 0900   Post Size Width 1 09/04/20 0900   Post Size Depth 0.2 09/04/20 0900   Post Total Sq cm 1.8 09/04/20 0900   Description pink, callous 07/31/20 1419       Wound 09/05/17 Foot Right (Active)        Lab Values    No results found for: SEDRATE  Lab Results   Component Value Date    CREATININE 0.97 07/22/2020     Lab Results   Component Value Date    HGBA1C 7.9 (H) 09/04/2020     Lab Results   Component Value Date    BUN 19 07/22/2020     Lab Results   Component Value Date    ALBUMIN 3.8 07/22/2020     No results found for: [id number]          Impression:  1. 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 2 diabetes mellitus, limited to breakdown of skin (H)     2. Localized edema     3. Pre-ulcerative corn or callous     4. Ankle instability, right     5. Mononeuropathy     6. Type 2 diabetes mellitus with other skin ulcer, with long-term current use of insulin (H)         9/18/2020 right foot             Are any of these wounds new today: No; Location: na      Assessment/Plan:          1. Debridement: After discussion of risk factors and verbal consent was obtained 2% Lidocaine HCL jelly was applied, under clean conditions, the right foot ulceration(s) were debrided using currette and #15 blade scalpel. Devitalized and nonviable tissue, along with any fibrin and slough, was removed to improve granulation tissue formation, stimulate wound healing, decrease overall bacteria load, disrupt biofilm formation and decrease edge senescence.  Total excisional debridement was 1 sq cm from the epidermis/dermis area and into the subcutaneous tissue with a depth of 0.1 cm.   Ulcers were improved afterwards and .  Measures were unchanged after  debridement.       2.  Wound treatment: wound treatment will include irrigation and dressings to promote autolytic debridement which will include:continue endoform; gauze; change twice per week; pt no longer feels comfortable changing this; will have him come in for nurse visits twice per week; I will see monthly; also to follow with Dr. Garcia periodically to ensure that additional imaging or surgery is not warranted Stable to improved; wound smaller. Spoke with Dr. Mccarthy about his arterial studies; he feels there is adequate flow for healing.            3. Edema: tubular compression elevation. The compression wraps were applied today in clinic. Stable            4. Nutrition: diabetic diet; fs running 150s           5. Offloading: we again spoke about the importance of staying off the foot and when he is up needs to be wearing the CAGE boot     Patient will follow up with me in 4 weeks for reevaluation. They were instructed to call the clinic sooner with any signs or symptoms of infection or any further questions/concerns. Answered all questions.          Ines Zimmerman DNP, RN, CNP, CWOCN, CFCN, CLT  [facility]   [phone]        This note was electronically signed by Ines Zimmerman

## 2021-06-29 NOTE — PROGRESS NOTES
Progress Notes by Oralia Elliott LPN at 9/28/2020  8:00 AM     Author: Oralia Elliott LPN Service: -- Author Type: Licensed Nurse    Filed: 9/28/2020  9:11 AM Encounter Date: 9/28/2020 Status: Signed    : Oralia Elliott LPN (Licensed Nurse)       Nurse Visit        Chief Complaint: Patient presents to clinic for assessment and treatment of their ulcer and and swelling    Dressing on Arrival: dressing intact, and noted scan serosanguinous. No odor or s/s of infection    Patient came in today for dressing change on right foot order from Dr Garcia. Patient rated pain 0 out of 10. Removed dressings and cleansed skin with soap and cleaned wound bed with normal saline. Applied lotion to intact skin.  Applied endoform to wound and covered with mepilex. Apply tubular compression then wrap with short stretch to right leg. Patient tolerated dressing change well.          Allergies:   Allergies   Allergen Reactions   ? Cresol      Muscle cramps   ? Crestor [Rosuvastatin] Myalgia   ? Demeclocycline Hives       Medications:   Current Outpatient Medications:   ?  apixaban ANTICOAGULANT (ELIQUIS) 5 mg Tab tablet, Take 1 tablet (5 mg total) by mouth 2 (two) times a day., Disp: 60 tablet, Rfl: 3  ?  aspirin 81 MG EC tablet, Take 81 mg by mouth daily., Disp: , Rfl:   ?  blood glucose test strips, Use 1 each As Directed 2 (two) times a day. Dispense brand per patient's insurance at pharmacy discretion., Disp: 120 strip, Rfl: 6  ?  blood-glucose meter (ONETOUCH VERIO IQ METER) Misc, Use 1 each As Directed 2 (two) times a day., Disp: 1 each, Rfl: 0  ?  cholecalciferol, vitamin D3, (VITAMIN D3) 5,000 unit Tab, Take 5,000 Units by mouth daily. , Disp: , Rfl:   ?  LANTUS SOLOSTAR U-100 INSULIN 100 unit/mL (3 mL) pen, INJECT 40 UNITS UNDER THE SKIN AT BEDTIME. DO NOT MIX LANTUS WITH ANY OTHER INSULIN (Patient taking differently: 44 Units. ), Disp: 18 adj dose pen, Rfl: 1  ?  lisinopriL (PRINIVIL,ZESTRIL) 5 MG tablet, TAKE 1  "TABLET BY MOUTH EVERY DAY, Disp: 90 tablet, Rfl: 1  ?  MULTIVITS,CA,MIN/IRON/FA/LYCOP (CENTRUM MEN ORAL), Take 1 tablet by mouth daily., Disp: , Rfl:   ?  mupirocin (BACTROBAN) 2 % ointment, Apply topically to wound every day, Disp: 60 g, Rfl: 3  ?  naproxen sodium (ALEVE) 220 MG tablet, Take 220 mg by mouth daily., Disp: , Rfl:   ?  pen needle, diabetic 31 gauge x 5/16\" Ndle, Used to inject insulin daily, Disp: 90 each, Rfl: 0  ?  traMADoL (ULTRAM) 50 mg tablet, Take 1 tablet (50 mg total) by mouth every 8 (eight) hours as needed for pain., Disp: 30 tablet, Rfl: 0  ?  VICTOZA 3-RUPALI 0.6 mg/0.1 mL (18 mg/3 mL) PnIj injection, INJECT 1.8 MG UNDER THE SKIN ONCE DAILY, Disp: 9 Syringe, Rfl: 1  ?  vitamin E 400 unit capsule, Take 400 Units by mouth daily., Disp: , Rfl:     Vital Signs: /74 (Patient Site: Left Arm, Patient Position: Sitting, Cuff Size: Adult Regular)   Pulse 60   Temp 97.9  F (36.6  C) (Tympanic)   Resp 20       Assessment:    General:  Patient presents to clinic in no apparent distress.  Psychiatric:  Alert and oriented .   Lower extremity:  edema is present.    Integumentary:  Skin is uniformly warm, dry and pink.    Wound size:   Wound 07/24/17 Right lateral foot (Active)   Pre Size Length 1.5 09/28/20 0800   Pre Size Width 1 09/28/20 0800   Pre Size Depth 0.2 09/28/20 0800   Pre Total Sq cm 1.5 09/28/20 0800   Post Size Length 1 09/25/20 0900   Post Size Width 1 09/25/20 0900   Post Size Depth 0.1 09/25/20 0900   Post Total Sq cm 1 09/25/20 0900   Undermined no 09/28/20 0800   Tunneling no 09/28/20 0800   Description pink 09/28/20 0800   Prodcut Used Gauze;Collagen 09/28/20 0800       Wound 09/05/17 Foot Right (Active)      Undermining is not present.    The periwoundskin is WNL      Plan:         1. Patient will follow up on Friday with nurse visit         2. Treatment provided will include irrigation and dressings to promote autolytic debridement and will be as listed below     Cleansed " with: Normal Saline    Protected skin with: Remedy Skin Repair    Dressings Applied: endoform with mepilex and Collagen    Compression Applied to the Left Leg: None    Compression Applied to the Right Leg: Short Stretch    Tubular compression was applied from base of toes to just below the bend of knee    Offloading applied: cage splint  Trial Products: no  Provider notified regarding concerns: no  Treatment Changes: no    Educational Barriers: none  Taught Regarding: Activity, Compression and Dressing  Teaching Method: Explanation and DC sheet

## 2021-06-29 NOTE — PROGRESS NOTES
"Progress Notes by Pamela Duron LPN at 9/21/2020  9:20 AM     Author: Pamela Duron LPN Service: -- Author Type: Licensed Nurse    Filed: 9/21/2020 10:01 AM Encounter Date: 9/21/2020 Status: Signed    : Pamela Duron LPN (Licensed Nurse)               Nurse Visit    Chief Complaint: Patient presents to clinic for assement, and treatment of their ulcer    Dressing on Arrival dressings were dry and intact upon arrival       Allergies:   Allergies   Allergen Reactions   ? Cresol      Muscle cramps   ? Crestor [Rosuvastatin] Myalgia   ? Demeclocycline Hives       Medications:    Current Outpatient Medications:   ?  apixaban ANTICOAGULANT (ELIQUIS) 5 mg Tab tablet, Take 1 tablet (5 mg total) by mouth 2 (two) times a day., Disp: 60 tablet, Rfl: 3  ?  aspirin 81 MG EC tablet, Take 81 mg by mouth daily., Disp: , Rfl:   ?  blood glucose test strips, Use 1 each As Directed 2 (two) times a day. Dispense brand per patient's insurance at pharmacy discretion., Disp: 120 strip, Rfl: 6  ?  blood-glucose meter (ONETOUCH VERIO IQ METER) Misc, Use 1 each As Directed 2 (two) times a day., Disp: 1 each, Rfl: 0  ?  cholecalciferol, vitamin D3, (VITAMIN D3) 5,000 unit Tab, Take 5,000 Units by mouth daily. , Disp: , Rfl:   ?  LANTUS SOLOSTAR U-100 INSULIN 100 unit/mL (3 mL) pen, INJECT 40 UNITS UNDER THE SKIN AT BEDTIME. DO NOT MIX LANTUS WITH ANY OTHER INSULIN (Patient taking differently: 44 Units. ), Disp: 18 adj dose pen, Rfl: 1  ?  lisinopriL (PRINIVIL,ZESTRIL) 5 MG tablet, TAKE 1 TABLET BY MOUTH EVERY DAY, Disp: 90 tablet, Rfl: 1  ?  MULTIVITS,CA,MIN/IRON/FA/LYCOP (CENTRUM MEN ORAL), Take 1 tablet by mouth daily., Disp: , Rfl:   ?  mupirocin (BACTROBAN) 2 % ointment, Apply topically to wound every day, Disp: 60 g, Rfl: 3  ?  naproxen sodium (ALEVE) 220 MG tablet, Take 220 mg by mouth daily., Disp: , Rfl:   ?  pen needle, diabetic 31 gauge x 5/16\" Ndle, Used to inject insulin daily, Disp: 90 each, Rfl: 0  ?  " traMADoL (ULTRAM) 50 mg tablet, Take 1 tablet (50 mg total) by mouth every 8 (eight) hours as needed for pain., Disp: 30 tablet, Rfl: 0  ?  VICTOZA 3-RUPALI 0.6 mg/0.1 mL (18 mg/3 mL) PnIj injection, INJECT 1.8 MG UNDER THE SKIN ONCE DAILY, Disp: 9 Syringe, Rfl: 1  ?  vitamin E 400 unit capsule, Take 400 Units by mouth daily., Disp: , Rfl:     Vital Signs: /82   Pulse 80   Resp 18     Assessment:    General:  Patient presents to clinic in no apparent distress.  Psychiatric:  Alert and oriented x3.   Lower extremity:  edema is present.    Integumentary:  Skin is uniformly warm, dry and pink.    Wound size:   Wound 07/24/17 Right lateral foot (Active)   Pre Size Length 1 09/21/20 0900   Pre Size Width 0.8 09/21/20 0900   Pre Size Depth 0.1 09/21/20 0900   Pre Total Sq cm 1 09/18/20 0800   Post Size Length 1.8 09/04/20 0900   Post Size Width 1 09/04/20 0900   Post Size Depth 0.2 09/04/20 0900   Post Total Sq cm 1.8 09/04/20 0900   Description pink, callous 07/31/20 1419       Wound 09/05/17 Foot Right (Active)      Undermining is not present.    The periwoundskin is WNL      Plan:         1. Patient will follow up in in 4 days         2. Treatment provided  to  the right and foot ulceration(s) and will include irrigation and dressings to promote autolytic debridement and will be as listed below     Cleansed with: Normal Saline    Protected skin with: Remedy Skin Repair    Dressings Applied: Collagen    Compression Compression Applied to Right  Tubigrip size F     Offloading applied: CROW/Cage Boot    Trial Products: no  Provider notified regarding concerns: no  Treatment Changes: no    Educational Barriers: none  Taught regarding: Activity, Compliance, Compression and Dressing  Teaching Method: Explanation and DC sheet

## 2021-06-29 NOTE — PROGRESS NOTES
Progress Notes by Em Broderick RN at 10/19/2020  1:00 PM     Author: Em Broderick RN Service: -- Author Type: Registered Nurse    Filed: 10/19/2020  1:47 PM Encounter Date: 10/19/2020 Status: Signed    : Em Broderick RN (Registered Nurse)           Right foot (prior to cleansing) 10/19        Right foot (after cleansing) 10/19    Nurse Visit    Chief Complaint: Patient presents to clinic for assessment and treatment of their right foot ulcer and and swelling    Dressing on Arrival: 2-layer had rolled up on foot    Patient came in today for dressing change per order from Dr. John Garcia. Patient rated pain 2 out of 10. 2-layer was applied at last nurse visit and the 2-layer wrap had rolled up around foot at wound area. Dressing sticking out the compression wrap. Frantz states that it was very uncomfortable and wishes to go back to the tubular compression. Removed dressings and cleansed skin with soap and cleaned wound bed with normal saline. Callous present around doug wound. Wound bed pink. Applied lotion to intact skin.  Applied medihoney to wound cut to size and covered with gauze. Applied tubular compression to right leg. Patient tolerated dressing change well. Plan is to follow up on Wednesday for nurse visit/dressing change. Wearing cage splint for offloading.      Allergies:   Allergies   Allergen Reactions   ? Cresol      Muscle cramps   ? Crestor [Rosuvastatin] Myalgia   ? Demeclocycline Hives       Medications:   Current Outpatient Medications:   ?  apixaban ANTICOAGULANT (ELIQUIS) 5 mg Tab tablet, Take 1 tablet (5 mg total) by mouth 2 (two) times a day., Disp: 60 tablet, Rfl: 3  ?  aspirin 81 MG EC tablet, Take 81 mg by mouth daily., Disp: , Rfl:   ?  blood glucose test strips, Use 1 each As Directed 2 (two) times a day. Dispense brand per patient's insurance at pharmacy discretion., Disp: 120 strip, Rfl: 6  ?  blood-glucose meter (ONETOUCH VERIO IQ METER) Misc, Use 1 each As Directed 2  "(two) times a day., Disp: 1 each, Rfl: 0  ?  cholecalciferol, vitamin D3, (VITAMIN D3) 5,000 unit Tab, Take 5,000 Units by mouth daily. , Disp: , Rfl:   ?  LANTUS SOLOSTAR U-100 INSULIN 100 unit/mL (3 mL) pen, INJECT 40 UNITS UNDER THE SKIN AT BEDTIME. DO NOT MIX LANTUS WITH ANY OTHER INSULIN (Patient taking differently: 44 Units. ), Disp: 18 adj dose pen, Rfl: 1  ?  lisinopriL (PRINIVIL,ZESTRIL) 5 MG tablet, TAKE 1 TABLET BY MOUTH EVERY DAY, Disp: 90 tablet, Rfl: 1  ?  MULTIVITS,CA,MIN/IRON/FA/LYCOP (CENTRUM MEN ORAL), Take 1 tablet by mouth daily., Disp: , Rfl:   ?  mupirocin (BACTROBAN) 2 % ointment, Apply topically to wound every day, Disp: 60 g, Rfl: 3  ?  naproxen sodium (ALEVE) 220 MG tablet, Take 220 mg by mouth daily., Disp: , Rfl:   ?  pen needle, diabetic 31 gauge x 5/16\" Ndle, Used to inject insulin daily, Disp: 90 each, Rfl: 0  ?  traMADoL (ULTRAM) 50 mg tablet, Take 1 tablet (50 mg total) by mouth every 8 (eight) hours as needed for pain., Disp: 30 tablet, Rfl: 0  ?  VICTOZA 3-RUPALI 0.6 mg/0.1 mL (18 mg/3 mL) injection, INJECT 1.2 MG SUBCUTANEOUSLY ONCE DAILY, Disp: 18 mL, Rfl: 1  ?  vitamin E 400 unit capsule, Take 400 Units by mouth daily., Disp: , Rfl:     Vital Signs: /62 (Patient Site: Left Arm, Patient Position: Semi-salomon, Cuff Size: Adult Regular)   Pulse 78   Temp 97.7  F (36.5  C) (Temporal)   Resp 18       Assessment:    General:  Patient presents to clinic in no apparent distress.  Psychiatric:  Alert and oriented .   Lower extremity:  edema is present.    Integumentary:  Skin is uniformly warm, dry and pink.    Wound size:   Wound 07/24/17 Right lateral foot (Active)   Pre Size Length 2 10/19/20 1300   Pre Size Width 1.8 10/19/20 1300   Pre Size Depth 0.2 10/19/20 1300   Pre Total Sq cm 3.6 10/19/20 1300   Post Size Length 1 09/25/20 0900   Post Size Width 1 09/25/20 0900   Post Size Depth 0.1 09/25/20 0900   Post Total Sq cm 1 09/25/20 0900   Undermined n 10/19/20 1300   Tunneling " n 10/19/20 1300   Description pink 10/16/20 1300   Prodcut Used Gauze;Medihoney 10/19/20 1300       Wound 09/05/17 Foot Right (Active)      Undermining is not present.    The periwoundskin is callous      Vasc Edema 3/6/2017 4/17/2017 6/2/2017 9/18/2020   Right just above MTP 23 24 23 23.6   Right Ankle 25 26 23.5 25.1   Right Widest Calf 37.5 38.6 37 37.2   Left - just above MTP 22 - - -   Left Ankle 22 - - -   Left Widest Calf 36 - - -       Plan:         1. Patient will follow up on Wednesday for nurse visit/dressing change         2. Treatment provided will include irrigation and dressings to promote autolytic debridement and will be as listed below     Cleansed with: Normal Saline    Protected skin with: Remedy Skin Repair    Dressings Applied: Medihoney    Compression Applied to the Left Leg: None      None    Compression Applied to the Right Leg: Dermafit    Tubular compression was applied from base of toes to just below the bend of knee    Offloading applied: CROW/Cage Boot  Trial Products: no  Provider notified regarding concerns: no  Treatment Changes: no    Educational Barriers: none  Taught Regarding: Activity, Compliance, Compression and Dressing  Teaching Method: Explanation and Handout

## 2021-06-29 NOTE — PROGRESS NOTES
Progress Notes by John Garcia DPM at 9/4/2020  9:00 AM     Author: John Garcia DPM Service: -- Author Type: Physician    Filed: 9/4/2020 11:27 AM Encounter Date: 9/4/2020 Status: Signed    : John Garcia DPM (Physician)       FOOT AND ANKLE SURGERY/PODIATRY Progress Note      ASSESSMENT:   Diabetic Ulceration right foot  Tailor's bunion      TREATMENT:  -The right foot ulceration is measuring larger than his previous visit with macerated tissue. We discussed that this is likely due to excessive weight bearing. Recommend very limited walking in the cage splint.    -After discussion of risk factors and consent obtained 2% Lidocaine HCL jelly was applied, under clean conditions, the right and foot ulceration(s) were debrided using #15 blade scalpel.  Devitalized and nonviable tissue, along with any fibrin and slough, was removed to improve granulation tissue formation, stimulate wound healing, decrease overall bacteria load, disrupt biofilm formation and decrease edge senescence.  Total excisional debridement was 0.18 sq cm into the subcutaneous tissue with a depth of 0.2 cm.   Ulcers were improved afterwards and .  Measures were as noted on the flow sheet. Patient tolerated this well. Endoform with a gauze dresssing was applied. He will continue to apply Endoform with a gauze dresssing as directed.    -He will follow-up in 3 weeks.    John Garcia DPM  [facility] [place]      HPI: Caleb Hernandez was seen again today for a right foot ulceration. Since his last visit, he has continued to walk in the cage splint.      Past Medical History:   Diagnosis Date   ? BPH (benign prostatic hyperplasia)    ? Cholelithiasis    ? Diabetes mellitus (H)    ? Essential hypertension    ? Hyperlipidemia    ? Pancreatitis        Past Surgical History:   Procedure Laterality Date   ? BACK SURGERY      1964 removed a cyst   ? CHOLECYSTECTOMY  1985   ? ERCP     ? ERCP N/A 9/5/2017     "Procedure: ENDOSCOPIC RETROGRADE CHOLANGIOPANCREATOGRAPHY SPHINCTEROTOMY AND STONE EXTRACTION;  Surgeon: Hansel Gannon MD;  Location: [facility];  Service:    ? TONSILLECTOMY  1940       Allergies   Allergen Reactions   ? Cresol      Muscle cramps   ? Crestor [Rosuvastatin] Myalgia   ? Demeclocycline Hives         Current Outpatient Medications:   ?  apixaban ANTICOAGULANT (ELIQUIS) 5 mg Tab tablet, Take 1 tablet (5 mg total) by mouth 2 (two) times a day., Disp: 60 tablet, Rfl: 3  ?  aspirin 81 MG EC tablet, Take 81 mg by mouth daily., Disp: , Rfl:   ?  blood glucose test strips, Use 1 each As Directed 2 (two) times a day. Dispense brand per patient's insurance at pharmacy discretion., Disp: 120 strip, Rfl: 6  ?  blood-glucose meter (ONETOUCH VERIO IQ METER) Misc, Use 1 each As Directed 2 (two) times a day., Disp: 1 each, Rfl: 0  ?  cholecalciferol, vitamin D3, (VITAMIN D3) 5,000 unit Tab, Take 5,000 Units by mouth daily. , Disp: , Rfl:   ?  LANTUS SOLOSTAR U-100 INSULIN 100 unit/mL (3 mL) pen, INJECT 40 UNITS UNDER THE SKIN AT BEDTIME. DO NOT MIX LANTUS WITH ANY OTHER INSULIN (Patient taking differently: 44 Units. ), Disp: 18 adj dose pen, Rfl: 1  ?  lisinopriL (PRINIVIL,ZESTRIL) 5 MG tablet, TAKE 1 TABLET BY MOUTH EVERY DAY, Disp: 90 tablet, Rfl: 1  ?  MULTIVITS,CA,MIN/IRON/FA/LYCOP (CENTRUM MEN ORAL), Take 1 tablet by mouth daily., Disp: , Rfl:   ?  mupirocin (BACTROBAN) 2 % ointment, Apply topically to wound every day, Disp: 60 g, Rfl: 3  ?  naproxen sodium (ALEVE) 220 MG tablet, Take 220 mg by mouth daily., Disp: , Rfl:   ?  pen needle, diabetic 31 gauge x 5/16\" Ndle, Used to inject insulin daily, Disp: 90 each, Rfl: 0  ?  traMADoL (ULTRAM) 50 mg tablet, Take 1 tablet (50 mg total) by mouth every 8 (eight) hours as needed for pain., Disp: 30 tablet, Rfl: 0  ?  VICTOZA 3-RUPALI 0.6 mg/0.1 mL (18 mg/3 mL) PnIj injection, INJECT 1.8 MG UNDER THE SKIN ONCE DAILY, Disp: 9 Syringe, Rfl: 1  ?  vitamin E 400 unit " capsule, Take 400 Units by mouth daily., Disp: , Rfl:   No current facility-administered medications for this visit.     Review of Systems - 10 point Review of Systems is negative except for right foot ulcer which is noted in HPI.      OBJECTIVE:  Vitals:    09/04/20 0923   BP: 138/78   Pulse: 72   Temp: 97.9  F (36.6  C)     General appearance: Patient is alert and fully cooperative with history & exam.  No sign of distress is noted during the visit.   Vascular: Dorsalis pedis palpableRight.  Dermatologic:    Wound 07/24/17 Right lateral foot (Active)   Pre Size Length 0.3 09/04/20 0900   Pre Size Width 0.6 09/04/20 0900   Pre Size Depth 0.1 09/04/20 0900   Pre Total Sq cm 0.18 09/04/20 0900   Post Size Length 1.8 09/04/20 0900   Post Size Width 1 09/04/20 0900   Post Size Depth 0.2 09/04/20 0900   Post Total Sq cm 1.8 09/04/20 0900   Description pink, callous 07/31/20 1419       Wound 09/05/17 Foot Right (Active)   Granular base with macerated tissue. No erythema.   Neurologic: Diminished to light touch Right.  Musculoskeletal: Contracted digits noted Right.    Imaging:     No results found.       Picture:

## 2021-06-29 NOTE — PROGRESS NOTES
Progress Notes by Ines Zimmerman NP at 6/3/2020  8:15 AM     Author: Ines Zimmerman NP Service: -- Author Type: Nurse Practitioner    Filed: 6/5/2020  3:45 PM Encounter Date: 6/3/2020 Status: Addendum    : Ines Zimmerman NP (Nurse Practitioner)    Related Notes: Original Note by Ines Zimmerman NP (Nurse Practitioner) filed at 6/3/2020 11:50 AM       Follow up Vascular Visit       Date of Service:6/5/2020    Date Last Seen: 5/7/2020; 5/22/2020    Chief Complaint: recurrent right foot ulcer        Pt returns to [facility] with regards to their recurrent right foot ulcer.  They arrive today alone. Pt was recently hospitalized at [place] for chest pain; found to have unprovoked PE; started on heparin drip and then transitioned to Eliquis. He is no longer requiring supplemental oxygen he was able . He is back at home. Was having elevated fs while in the hospital he was missing his PM Lantus dosing per MD note however the pt denies this; he has resumed this fs running 110-120s now.  They are currently using bactroban to the wounds. This is being done by the patient daily. He is not covering in the shower. They are using tubular compression for compression. They are feeling well today. Denies fevers, chills. No shortness of breath.     Allergies: Cresol; Crestor [rosuvastatin]; and Demeclocycline    Medications:   Current Outpatient Medications:   ?  apixaban ANTICOAGULANT (ELIQUIS) 5 mg Tab tablet, Take 2 tablets (10 mg) by mouth twice daily for 6 days then take 1 tablet (5 mg) twice daily thereafter., Disp: , Rfl:   ?  aspirin 81 MG EC tablet, Take 81 mg by mouth daily., Disp: , Rfl:   ?  blood glucose test strips, Use 1 each As Directed 2 (two) times a day. Dispense brand per patient's insurance at pharmacy discretion., Disp: 120 strip, Rfl: 6  ?  blood-glucose meter (ONETOUCH VERIO IQ METER) Misc, Use 1 each As Directed 2 (two) times a day., Disp: 1 each, Rfl: 0  ?  cholecalciferol,  "vitamin D3, (VITAMIN D3) 5,000 unit Tab, Take 5,000 Units by mouth daily. , Disp: , Rfl:   ?  LANTUS SOLOSTAR U-100 INSULIN 100 unit/mL (3 mL) pen, INJECT 40 UNITS UNDER THE SKIN AT BEDTIME. DO NOT MIX LANTUS WITH ANY OTHER INSULIN (Patient taking differently: 44 Units. ), Disp: 18 adj dose pen, Rfl: 1  ?  lisinopril (PRINIVIL,ZESTRIL) 5 MG tablet, TAKE 1 TABLET BY MOUTH EVERY DAY, Disp: 90 tablet, Rfl: 0  ?  MULTIVITS,CA,MIN/IRON/FA/LYCOP (CENTRUM MEN ORAL), Take 1 tablet by mouth daily., Disp: , Rfl:   ?  mupirocin (BACTROBAN) 2 % ointment, Apply topically to wound every day, Disp: 60 g, Rfl: 3  ?  naproxen sodium (ALEVE) 220 MG tablet, Take 220 mg by mouth daily., Disp: , Rfl:   ?  pen needle, diabetic 31 gauge x 5/16\" Ndle, Used to inject insulin daily, Disp: 90 each, Rfl: 0  ?  VICTOZA 3-RUPALI 0.6 mg/0.1 mL (18 mg/3 mL) PnIj injection, INJECT 1.8 MG UNDER THE SKIN ONCE DAILY, Disp: 9 Syringe, Rfl: 1  ?  vitamin E 400 unit capsule, Take 400 Units by mouth daily., Disp: , Rfl:     History:   Past Medical History:   Diagnosis Date   ? BPH (benign prostatic hyperplasia)    ? Cholelithiasis    ? Diabetes mellitus (H)    ? Essential hypertension    ? Hyperlipidemia    ? Pancreatitis        Physical Exam:    /78   Pulse 76   Resp 18     General:  Patient presents to clinic in no apparent distress.  Head: normocephalic atraumatic  Psychiatric:  Alert and oriented x3.   Respiratory: unlabored breathing; no cough  Integumentary:  Skin is uniformly warm, dry and pink.    Wound #1 Location: right 5th met head  Size: 1L x 1W x 0.2depth.  No sinus tract present, Wound base: red; viable  No undermining present. Wound is full thickness. There is moderate drainage. Periwound: no denudement, erythema, induration, maceration or warmth.  Varus deformity. Cavus arch.    Circumferential volume measures:    Vasc Edema 3/6/2017 4/17/2017 6/2/2017   Right just above MTP 23 24 23   Right Ankle 25 26 23.5   Right Widest Calf 37.5 " 38.6 37   Left - just above MTP 22 - -   Left Ankle 22 - -   Left Widest Calf 36 - -       Ulceration(s)/Wound(s):     Wound 07/24/17 Right lateral foot (Active)   Pre Size Length 1 06/03/20 0800   Pre Size Width 1 06/03/20 0800   Pre Size Depth 0.2 06/03/20 0800   Pre Total Sq cm 0.06 03/09/20 0807   Post Size Length 0.4 03/09/20 0807   Post Size Width 0.2 03/09/20 0807   Post Size Depth 0.1 03/09/20 0807   Post Total Sq cm 0.08 03/09/20 0807   Description callus 01/13/20 0800       Wound 09/05/17 Foot Right (Active)        Lab Values    No results found for: SEDRATE  Lab Results   Component Value Date    CREATININE 1.01 12/27/2019     Lab Results   Component Value Date    HGBA1C 12.0 (H) 12/27/2019     Lab Results   Component Value Date    BUN 19 12/27/2019     Lab Results   Component Value Date    ALBUMIN 3.8 10/25/2019     No results found for: [id number]          Impression:  1. Ankle instability, right     2. 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 2 diabetes mellitus, limited to breakdown of skin (H)  Supply - Other    Change dressing   3. Localized edema  Supply - Other    Change dressing   4. Pre-ulcerative corn or callous  Supply - Other    Change dressing        6/5/2020 right foot             Are any of these wounds new today: No; Location: na    Assessment/Plan:          1. Debridement: After discussion of risk factors and verbal consent was obtained 2% Lidocaine HCL jelly was applied, under clean conditions, the right foot ulceration(s) were debrided using currette and #15 blade scalpel. Devitalized and nonviable tissue, along with any fibrin and slough, was removed to improve granulation tissue formation, stimulate wound healing, decrease overall bacteria load, disrupt biofilm formation and decrease edge senescence.  Total excisional debridement was 1 sq cm from the epidermis/dermis area and into the subcutaneous tissue with a depth of 0.1 cm.   Ulcers were improved afterwards and  ora.  Measures were unchanged after debridement.       2.  Wound treatment: wound treatment will include irrigation and dressings to promote autolytic debridement which will include:due to the wound not making any progress in the last several months; I had Dr. Garcia come and evaluate the wound; he spoke with the pt about surgery; pt would like to avoid this; he did not feel imaging was needed at this time; recommended the pt stay off his foot and get CAM or CAGE boot. We will stop the bactroban and go to silvercel and gauze; change daily; close follow up Stable            3. Edema: continue tubular compression. The compression wraps were applied today in clinic. Stable            4. Nutrition: diabetic diet; having better control not; he reports taking 44units of lantus at bedtime            5. Offloading: will write for CAM or CAGE boot; pt with ankle instability and cavus arches. He will need custom fabrication due to his severe neuropathy. The goal of the AFO are to provide ankle stability and to assist with offloading of the ulcer.  [facility] did not have availability today; he was [facility] to [facility] to get this. Needs to stay off his feet. He has had a custom AFO in 2017 however due to the subsequent volume reduction in the leg and good edema control, muscle atrophy, this AFO no longer fits him safely.      Patient will follow up with me in 2 weeks for reevaluation. They were instructed to call the clinic sooner with any signs or symptoms of infection or any further questions/concerns. Answered all questions.          Ines Zimmerman DNP, RN, CNP, CWOCN, CFCN, CLT  [facility]   [phone]        This note was electronically signed by Ines Zimmerman

## 2021-06-29 NOTE — PROGRESS NOTES
Progress Notes by John Garcia DPM at 7/31/2020  2:00 PM     Author: John Garcia DPM Service: -- Author Type: Physician    Filed: 7/31/2020  3:15 PM Encounter Date: 7/31/2020 Status: Signed    : John Garcia DPM (Physician)       FOOT AND ANKLE SURGERY/PODIATRY Progress Note      ASSESSMENT:   Diabetic Ulceration right foot  Tailor's bunion      TREATMENT:  -The right foot ulceration is stable with a granular base. Based on the amount of callus tissue on the doug-wound, it appears he has been walking more than directed. Reviewed risks of continued walking, and recommend limited walking in the cage splint at all times.    -After discussion of risk factors and consent obtained 2% Lidocaine HCL jelly was applied, under clean conditions, the right and foot ulceration(s) were debrided using #15 blade scalpel.  Devitalized and nonviable tissue, along with any fibrin and slough, was removed to improve granulation tissue formation, stimulate wound healing, decrease overall bacteria load, disrupt biofilm formation and decrease edge senescence.  Total excisional debridement was 0.24 sq cm into the subcutaneous tissue with a depth of 0.2 cm.   Ulcers were improved afterwards and .  Measures were as noted on the flow sheet. Patient tolerated this well. Endoform with a gauze dresssing was applied. He will continue to apply Endoform with a gauze dresssing as directed.    -He will follow-up with me in 2 weeks.      John Garcia DPM  [facility] [place]      HPI: Caleb Hernandez was seen again today in the upcoming absence of Ines Zimmerman for a right foot ulceration. He has been wearing his cage splint some of the time and applying medihoney with medipore. He admits walking more than directed.      Past Medical History:   Diagnosis Date   ? BPH (benign prostatic hyperplasia)    ? Cholelithiasis    ? Diabetes mellitus (H)    ? Essential hypertension    ? Hyperlipidemia    ?  "Pancreatitis        Past Surgical History:   Procedure Laterality Date   ? BACK SURGERY      1964 removed a cyst   ? CHOLECYSTECTOMY  1985   ? ERCP     ? ERCP N/A 9/5/2017    Procedure: ENDOSCOPIC RETROGRADE CHOLANGIOPANCREATOGRAPHY SPHINCTEROTOMY AND STONE EXTRACTION;  Surgeon: Hansel Gannon MD;  Location: [facility];  Service:    ? TONSILLECTOMY  1940       Allergies   Allergen Reactions   ? Cresol      Muscle cramps   ? Crestor [Rosuvastatin] Myalgia   ? Demeclocycline Hives         Current Outpatient Medications:   ?  apixaban ANTICOAGULANT (ELIQUIS) 5 mg Tab tablet, Take 1 tablet (5 mg total) by mouth 2 (two) times a day., Disp: 60 tablet, Rfl: 3  ?  aspirin 81 MG EC tablet, Take 81 mg by mouth daily., Disp: , Rfl:   ?  blood glucose test strips, Use 1 each As Directed 2 (two) times a day. Dispense brand per patient's insurance at pharmacy discretion., Disp: 120 strip, Rfl: 6  ?  blood-glucose meter (ONETOUCH VERIO IQ METER) Misc, Use 1 each As Directed 2 (two) times a day., Disp: 1 each, Rfl: 0  ?  cholecalciferol, vitamin D3, (VITAMIN D3) 5,000 unit Tab, Take 5,000 Units by mouth daily. , Disp: , Rfl:   ?  LANTUS SOLOSTAR U-100 INSULIN 100 unit/mL (3 mL) pen, INJECT 40 UNITS UNDER THE SKIN AT BEDTIME. DO NOT MIX LANTUS WITH ANY OTHER INSULIN (Patient taking differently: 44 Units. ), Disp: 18 adj dose pen, Rfl: 1  ?  lisinopriL (PRINIVIL,ZESTRIL) 5 MG tablet, TAKE 1 TABLET BY MOUTH EVERY DAY, Disp: 90 tablet, Rfl: 1  ?  MULTIVITS,CA,MIN/IRON/FA/LYCOP (CENTRUM MEN ORAL), Take 1 tablet by mouth daily., Disp: , Rfl:   ?  mupirocin (BACTROBAN) 2 % ointment, Apply topically to wound every day, Disp: 60 g, Rfl: 3  ?  naproxen sodium (ALEVE) 220 MG tablet, Take 220 mg by mouth daily., Disp: , Rfl:   ?  nitrofurantoin, macrocrystal-monohydrate, (MACROBID) 100 MG capsule, Take 1 capsule (100 mg total) by mouth 2 (two) times a day for 7 days., Disp: 14 capsule, Rfl: 0  ?  pen needle, diabetic 31 gauge x 5/16\" " Ndle, Used to inject insulin daily, Disp: 90 each, Rfl: 0  ?  traMADoL (ULTRAM) 50 mg tablet, Take 1 tablet (50 mg total) by mouth every 8 (eight) hours as needed for pain., Disp: 30 tablet, Rfl: 0  ?  VICTOZA 3-RUPALI 0.6 mg/0.1 mL (18 mg/3 mL) PnIj injection, INJECT 1.8 MG UNDER THE SKIN ONCE DAILY, Disp: 9 Syringe, Rfl: 1  ?  vitamin E 400 unit capsule, Take 400 Units by mouth daily., Disp: , Rfl:     Review of Systems - 10 point Review of Systems is negative except for foot ulcer which is noted in HPI.      OBJECTIVE:  There were no vitals filed for this visit.  General appearance: Patient is alert and fully cooperative with history & exam.  No sign of distress is noted during the visit.   Vascular: Dorsalis pedis non-palpableRight.  Dermatologic:    Wound 07/24/17 Right lateral foot (Active)   Pre Size Length 0.6 07/31/20 1419   Pre Size Width 0.4 07/31/20 1419   Pre Size Depth 0.3 07/31/20 1419   Pre Total Sq cm 0.24 07/31/20 1419   Post Size Length 0.6 07/17/20 0800   Post Size Width 0.4 07/17/20 0800   Post Size Depth 0.2 07/17/20 0800   Post Total Sq cm 0.24 07/17/20 0800   Description callus 01/13/20 0800       Wound 09/05/17 Foot Right (Active)   Hyperkeratotic rim with a granular base, does not probe to deep tissues. No erythema.   Neurologic: Diminished to light touch Right.  Musculoskeletal: Contracted digits noted Right.    Imaging:     No results found.       Picture:

## 2021-06-29 NOTE — PROGRESS NOTES
Progress Notes by Em Broderick RN at 10/21/2020  2:00 PM     Author: Em Broderick RN Service: -- Author Type: Registered Nurse    Filed: 10/21/2020  3:35 PM Encounter Date: 10/21/2020 Status: Signed    : Em Broderick RN (Registered Nurse)         Right foot post debridement 10/21  Right foot 10/21  Pre debridement      Nurse Note:  Patient here for routine dressing change to right foot. Wound noted to have callous buildup around the edges and dressing heavily saturated with drainage. Maceration noted. Some depth noted when tissue pressed on. Reported to Dr. Garcia who came in the look at wound. Wound was debrided and patient will go for MRI tomorrow am. Also ordered rolling knee scooter for offloading. Had some bleeding in clinic due to debridement and blood thinners. Pressure dressing applied for 10 minutes and bleeding stopped. Frantz was instructed to elevate foot and apply pressure if bleeding starts at home and he is saturating through the dressing. Plan is for Frantz to follow up on Friday for nurse visit/dressing change. Continue with Cage splint.

## 2021-06-29 NOTE — PROGRESS NOTES
Progress Notes by John Garcia DPM at 9/25/2020  9:00 AM     Author: John Garcia DPM Service: -- Author Type: Physician    Filed: 9/25/2020  2:39 PM Encounter Date: 9/25/2020 Status: Signed    : John Garcia DPM (Physician)       FOOT AND ANKLE SURGERY/PODIATRY Progress Note      ASSESSMENT:   Diabetic Ulceration right foot  Tailor's bunion      TREATMENT:  -The right foot ulceration is measuring similar to the previous visit. We discussed that continued ambulation will only reduce chance for wound healing. Also discussed importance of good glycemic control, last HbA1c at 7.9. I recommend he obtain a new PCP.    -After discussion of risk factors and consent obtained 2% Lidocaine HCL jelly was applied, under clean conditions, the right and foot ulceration(s) were debrided using #15 blade scalpel.  Devitalized and nonviable tissue, along with any fibrin and slough, was removed to improve granulation tissue formation, stimulate wound healing, decrease overall bacteria load, disrupt biofilm formation and decrease edge senescence.  Total excisional debridement was 1 sq cm into the subcutaneous tissue with a depth of 0.2 cm.   Ulcers were improved afterwards and .  Measures were as noted on the flow sheet. Patient tolerated this well. Endoform with a gauze dresssing was applied. He will continue to apply Endoform with a gauze dresssing as directed.    -He will follow-up in 3 weeks.    John Garcia DPM  [facility] [place]      HPI: Caleb Hernandez was seen again today for a right foot ulceration. He continues to walk more than directed and has not obtained a new PCP.       Past Medical History:   Diagnosis Date   ? BPH (benign prostatic hyperplasia)    ? Cholelithiasis    ? Diabetes mellitus (H)    ? Essential hypertension    ? Hyperlipidemia    ? Pancreatitis        Past Surgical History:   Procedure Laterality Date   ? BACK SURGERY      1964 removed a cyst   ?  "CHOLECYSTECTOMY  1985   ? ERCP     ? ERCP N/A 9/5/2017    Procedure: ENDOSCOPIC RETROGRADE CHOLANGIOPANCREATOGRAPHY SPHINCTEROTOMY AND STONE EXTRACTION;  Surgeon: Hansel Gannon MD;  Location: [facility] [place];  Service:    ? TONSILLECTOMY  1940       Allergies   Allergen Reactions   ? Cresol      Muscle cramps   ? Crestor [Rosuvastatin] Myalgia   ? Demeclocycline Hives         Current Outpatient Medications:   ?  apixaban ANTICOAGULANT (ELIQUIS) 5 mg Tab tablet, Take 1 tablet (5 mg total) by mouth 2 (two) times a day., Disp: 60 tablet, Rfl: 3  ?  aspirin 81 MG EC tablet, Take 81 mg by mouth daily., Disp: , Rfl:   ?  blood glucose test strips, Use 1 each As Directed 2 (two) times a day. Dispense brand per patient's insurance at pharmacy discretion., Disp: 120 strip, Rfl: 6  ?  blood-glucose meter (ONETOUCH VERIO IQ METER) Misc, Use 1 each As Directed 2 (two) times a day., Disp: 1 each, Rfl: 0  ?  cholecalciferol, vitamin D3, (VITAMIN D3) 5,000 unit Tab, Take 5,000 Units by mouth daily. , Disp: , Rfl:   ?  LANTUS SOLOSTAR U-100 INSULIN 100 unit/mL (3 mL) pen, INJECT 40 UNITS UNDER THE SKIN AT BEDTIME. DO NOT MIX LANTUS WITH ANY OTHER INSULIN (Patient taking differently: 44 Units. ), Disp: 18 adj dose pen, Rfl: 1  ?  lisinopriL (PRINIVIL,ZESTRIL) 5 MG tablet, TAKE 1 TABLET BY MOUTH EVERY DAY, Disp: 90 tablet, Rfl: 1  ?  MULTIVITS,CA,MIN/IRON/FA/LYCOP (CENTRUM MEN ORAL), Take 1 tablet by mouth daily., Disp: , Rfl:   ?  mupirocin (BACTROBAN) 2 % ointment, Apply topically to wound every day, Disp: 60 g, Rfl: 3  ?  naproxen sodium (ALEVE) 220 MG tablet, Take 220 mg by mouth daily., Disp: , Rfl:   ?  pen needle, diabetic 31 gauge x 5/16\" Ndle, Used to inject insulin daily, Disp: 90 each, Rfl: 0  ?  traMADoL (ULTRAM) 50 mg tablet, Take 1 tablet (50 mg total) by mouth every 8 (eight) hours as needed for pain., Disp: 30 tablet, Rfl: 0  ?  VICTOZA 3-RUPALI 0.6 mg/0.1 mL (18 mg/3 mL) PnIj injection, INJECT 1.8 MG UNDER THE SKIN " ONCE DAILY, Disp: 9 Syringe, Rfl: 1  ?  vitamin E 400 unit capsule, Take 400 Units by mouth daily., Disp: , Rfl:     Review of Systems - 10 point Review of Systems is negative except for foot ulcer which is noted in HPI.      OBJECTIVE:  Vitals:    09/25/20 0906   BP: 140/70   Pulse: 60   Resp: 16     General appearance: Patient is alert and fully cooperative with history & exam.  No sign of distress is noted during the visit.   Vascular: Dorsalis pedis non-palpableRight.  Dermatologic:    Wound 07/24/17 Right lateral foot (Active)   Pre Size Length 1 09/25/20 0900   Pre Size Width 1 09/25/20 0900   Pre Size Depth 0.2 09/25/20 0900   Pre Total Sq cm 1 09/25/20 0900   Post Size Length 1.8 09/04/20 0900   Post Size Width 1 09/04/20 0900   Post Size Depth 0.2 09/04/20 0900   Post Total Sq cm 1.8 09/04/20 0900   Description pink, callous 07/31/20 1419       Wound 09/05/17 Foot Right (Active)   Granular base right foot ulceration, does not probe to deep tissues. No erythema.   Neurologic: Diminished to light touch Right.  Musculoskeletal: Contracted digits noted Right.    Imaging:     No results found.       Picture:

## 2021-06-29 NOTE — PROGRESS NOTES
Progress Notes by John Garcia DPM at 10/21/2020  2:00 PM     Author: John Garcia DPM Service: -- Author Type: Physician    Filed: 10/21/2020  3:35 PM Encounter Date: 10/21/2020 Status: Signed    : John Garcia DPM (Physician)       FOOT AND ANKLE SURGERY/PODIATRY Progress Note      ASSESSMENT:   Diabetic Ulceration right foot  Tailor's bunion      TREATMENT:  -Right foot ulceration now probes to deep tissues, does not appear to probe to capsule or bone. I discussed this with the patient today and recommend an MRI to evaluate for osteomyelitis or septic 5th MPJ. Discussed that I recommend debridement in the OR, and use of wound vac post-op. However, I'd like to obtain MRI prior to scheduling surgery. Also discussed importance of non-weight bearing post-op. Referred for a knee walker.     -After discussion of risk factors and consent obtained 2% Lidocaine HCL jelly was applied, under clean conditions, the right and foot ulceration(s) were debrided using #15 blade scalpel.  Devitalized and nonviable tissue, along with any fibrin and slough, was removed to improve granulation tissue formation, stimulate wound healing, decrease overall bacteria load, disrupt biofilm formation and decrease edge senescence.  Total excisional debridement was 3.6 sq cm into the muscle/fascia with a depth of 0.6 cm.   Ulcers were improved afterwards and .  Measures were as noted on the flow sheet. Patient tolerated this well. Wound was packed with gauze and a gauze dressing, change daily. Continue limited walking in the cage splint.    -I will contact him with the MRI report when available and we will be guided by the results.     John Garcia DPM  [facility]      HPI: Caleb Hernandez was seen again today at the request of nurses visit for a right foot ulceration. The patient states he has remained limited walking on his right foot in a cage splint.       Past Medical History:  "  Diagnosis Date   ? BPH (benign prostatic hyperplasia)    ? Cholelithiasis    ? Diabetes mellitus (H)    ? Essential hypertension    ? Hyperlipidemia    ? Pancreatitis        Past Surgical History:   Procedure Laterality Date   ? BACK SURGERY      1964 removed a cyst   ? CHOLECYSTECTOMY  1985   ? ERCP     ? ERCP N/A 9/5/2017    Procedure: ENDOSCOPIC RETROGRADE CHOLANGIOPANCREATOGRAPHY SPHINCTEROTOMY AND STONE EXTRACTION;  Surgeon: Hansel Gannon MD;  Location: [facility];  Service:    ? TONSILLECTOMY  1940       Allergies   Allergen Reactions   ? Cresol      Muscle cramps   ? Crestor [Rosuvastatin] Myalgia   ? Demeclocycline Hives         Current Outpatient Medications:   ?  apixaban ANTICOAGULANT (ELIQUIS) 5 mg Tab tablet, Take 1 tablet (5 mg total) by mouth 2 (two) times a day., Disp: 60 tablet, Rfl: 3  ?  aspirin 81 MG EC tablet, Take 81 mg by mouth daily., Disp: , Rfl:   ?  blood glucose test strips, Use 1 each As Directed 2 (two) times a day. Dispense brand per patient's insurance at pharmacy discretion., Disp: 120 strip, Rfl: 6  ?  blood-glucose meter (ONETOUCH VERIO IQ METER) Misc, Use 1 each As Directed 2 (two) times a day., Disp: 1 each, Rfl: 0  ?  cholecalciferol, vitamin D3, (VITAMIN D3) 5,000 unit Tab, Take 5,000 Units by mouth daily. , Disp: , Rfl:   ?  LANTUS SOLOSTAR U-100 INSULIN 100 unit/mL (3 mL) pen, INJECT 40 UNITS UNDER THE SKIN AT BEDTIME. DO NOT MIX LANTUS WITH ANY OTHER INSULIN (Patient taking differently: 44 Units. ), Disp: 18 adj dose pen, Rfl: 1  ?  lisinopriL (PRINIVIL,ZESTRIL) 5 MG tablet, TAKE 1 TABLET BY MOUTH EVERY DAY, Disp: 90 tablet, Rfl: 1  ?  MULTIVITS,CA,MIN/IRON/FA/LYCOP (CENTRUM MEN ORAL), Take 1 tablet by mouth daily., Disp: , Rfl:   ?  mupirocin (BACTROBAN) 2 % ointment, Apply topically to wound every day, Disp: 60 g, Rfl: 3  ?  naproxen sodium (ALEVE) 220 MG tablet, Take 220 mg by mouth daily., Disp: , Rfl:   ?  pen needle, diabetic 31 gauge x 5/16\" Ndle, Used to " inject insulin daily, Disp: 90 each, Rfl: 0  ?  traMADoL (ULTRAM) 50 mg tablet, Take 1 tablet (50 mg total) by mouth every 8 (eight) hours as needed for pain., Disp: 30 tablet, Rfl: 0  ?  VICTOZA 3-RUPALI 0.6 mg/0.1 mL (18 mg/3 mL) injection, INJECT 1.2 MG SUBCUTANEOUSLY ONCE DAILY, Disp: 18 mL, Rfl: 1  ?  vitamin E 400 unit capsule, Take 400 Units by mouth daily., Disp: , Rfl:     Review of Systems - 10 point Review of Systems is negative except for right foot ulceration which is noted in HPI.      OBJECTIVE:  There were no vitals filed for this visit.  General appearance: Patient is alert and fully cooperative with history & exam.  No sign of distress is noted during the visit.   Vascular: Dorsalis pedis non-palpableRight.  Dermatologic:    Wound 07/24/17 Right lateral foot (Active)   Pre Size Length 2 10/19/20 1300   Pre Size Width 1.8 10/19/20 1300   Pre Size Depth 0.2 10/19/20 1300   Pre Total Sq cm 3.6 10/19/20 1300   Post Size Length 2 10/21/20 1400   Post Size Width 1.8 10/21/20 1400   Post Size Depth 0.6 10/21/20 1400   Post Total Sq cm 3.6 10/21/20 1400   Undermined n 10/19/20 1300   Tunneling n 10/19/20 1300   Description pink 10/16/20 1300   Prodcut Used Gauze;Medihoney 10/19/20 1300       Wound 09/05/17 Foot Right (Active)   Ulceration plantar 5th MPJ right probes to deeper tissues with significant undermining, non-viable tissue. Does not appear to probe to the 5th MPJ capsule. No erythema.   Neurologic: Diminished to light touch Right.  Musculoskeletal: Contracted digits noted Right.    Imaging:     Us Arterial Legs Bilateral Complete    Result Date: 10/13/2020  Arterial Duplex Ultrasound Lower Extremity Artery Evaluation Indication: Right foot non-healing ulcer  Previous: 01/13/2020 History: Previous Smoker, Hypertension, Diabetic and PAD Technique: Duplex imaging is performed utilizing gray-scale, two-dimensional images, and color-flow imaging. Doppler waveform analysis and spectral Doppler imaging is  also performed. LOWER EXTREMITY ARTERIAL DUPLEX EXAM WITH WAVEFORMS Right Leg:(cm/s) Location: Velocities Waveforms EIA:   84  T CFA:   89  T PFA:   57  T SFA Proximal:   101  T SFA Mid:   81  T SFA Distal:   70  T Popliteal Artery:   69  T PTA:   29   M BREE:   12  M DPA:   9  M Waveforms: T=Triphasic, M=Monophasic, B=Biphasic Left Leg:(cm/s) Location: Velocities Waveforms EIA:   75  B CFA:   77  B PFA:   73  T SFA Proximal:   88  B SFA Mid:   89  B SFA Distal:   64  B Popliteal Artery:   89  T PTA:   11   M BREE:   53  B DPA:   72  M Waveforms: T=Triphasic, M=Monophasic, B=Biphasic Impression: Right Lower Extremity: Patent lower extremity vasculature with transition to low velocity, monophasic flow in the infrapopliteal vessels.  No focal stenosis identified. Left Lower Extremity: Patent lower extremity vasculature.  Monophasic flow in the posterior tibial and dorsalis pedis arteries.  No focal stenosis identified. Reference: Category Normal 1-19% 20-49% 50-99% Occluded PSV <160 cm/sec without spectral broadening <160 cm/sec with spectral broadening Increased Increased Absent Flow Ratio N/A N/A < 2.0 >2.0 N/A Post-Stenotic Turbulence No No No Yes N/A     Us Arterial Segmental Pressures Legs 3 Or More Levels    Result Date: 10/13/2020  BILATERAL RESTING ANKLE-BRACHIAL INDICES (SRIKANTH'S) Indication: Right foot non-healing ulcer  Previous: 01/13/2020 History: Previous Smoker, Hypertension, Diabetic and PAD  Resting SRIKANTH's          Right: mmHg Index     Brachial: 131  Ankle-(PT): 62 0.42 Ankle-(DP): 122 0.83           Digit: 54 0.37               Left: mmHg Index     Brachial: 147  Ankle-(PT): 80 0.54 Ankle-(DP): 131 0.89           Digit: 87 0.59 Resting ankle-brachial index of 0.83 on the right. Toe Pressures of 54 mmHg and TBI of 0.37  Resting ankle-brachial index of 0.89 on the left. Toe Pressures of 87 mmHg and TBI of 0.59  WAVEFORMS: The right dorsalis pedis and posterior tibial arteries show monophasic waveforms. The  left dorsalis pedis and posterior tibial arteries show monophasic waveforms. Impression:  Right SRIKANTH is  Abnormal with an SRIKANTH of 0.83 and Toe Pressures of 54. Left SRIKANTH is Abnormal with an SRIKANTH of 0.89 and Toe Pressures of 87.          Picture:

## 2021-06-29 NOTE — PROGRESS NOTES
"Progress Notes by Maite Caro RN at 10/9/2020  8:00 AM     Author: Maite Caro RN Service: -- Author Type: Registered Nurse    Filed: 10/9/2020  8:54 AM Encounter Date: 10/9/2020 Status: Signed    : Maite Caro RN (Registered Nurse)               Nurse Visit        R foot    Chief Complaint: Patient presents to clinic for assement, and treatment of their ulcer and and swelling    Dressing on Arrival dressing was intact and dry.    Allergies:   Allergies   Allergen Reactions   ? Cresol      Muscle cramps   ? Crestor [Rosuvastatin] Myalgia   ? Demeclocycline Hives       Medications:   Current Outpatient Medications:   ?  apixaban ANTICOAGULANT (ELIQUIS) 5 mg Tab tablet, Take 1 tablet (5 mg total) by mouth 2 (two) times a day., Disp: 60 tablet, Rfl: 3  ?  aspirin 81 MG EC tablet, Take 81 mg by mouth daily., Disp: , Rfl:   ?  blood glucose test strips, Use 1 each As Directed 2 (two) times a day. Dispense brand per patient's insurance at pharmacy discretion., Disp: 120 strip, Rfl: 6  ?  blood-glucose meter (ONETOUCH VERIO IQ METER) Misc, Use 1 each As Directed 2 (two) times a day., Disp: 1 each, Rfl: 0  ?  cholecalciferol, vitamin D3, (VITAMIN D3) 5,000 unit Tab, Take 5,000 Units by mouth daily. , Disp: , Rfl:   ?  LANTUS SOLOSTAR U-100 INSULIN 100 unit/mL (3 mL) pen, INJECT 40 UNITS UNDER THE SKIN AT BEDTIME. DO NOT MIX LANTUS WITH ANY OTHER INSULIN (Patient taking differently: 44 Units. ), Disp: 18 adj dose pen, Rfl: 1  ?  lisinopriL (PRINIVIL,ZESTRIL) 5 MG tablet, TAKE 1 TABLET BY MOUTH EVERY DAY, Disp: 90 tablet, Rfl: 1  ?  MULTIVITS,CA,MIN/IRON/FA/LYCOP (CENTRUM MEN ORAL), Take 1 tablet by mouth daily., Disp: , Rfl:   ?  mupirocin (BACTROBAN) 2 % ointment, Apply topically to wound every day, Disp: 60 g, Rfl: 3  ?  naproxen sodium (ALEVE) 220 MG tablet, Take 220 mg by mouth daily., Disp: , Rfl:   ?  pen needle, diabetic 31 gauge x 5/16\" Ndle, Used to inject insulin daily, Disp: 90 each, Rfl: 0  ? "  traMADoL (ULTRAM) 50 mg tablet, Take 1 tablet (50 mg total) by mouth every 8 (eight) hours as needed for pain., Disp: 30 tablet, Rfl: 0  ?  VICTOZA 3-RUPALI 0.6 mg/0.1 mL (18 mg/3 mL) injection, INJECT 1.2 MG SUBCUTANEOUSLY ONCE DAILY, Disp: 18 mL, Rfl: 1  ?  vitamin E 400 unit capsule, Take 400 Units by mouth daily., Disp: , Rfl:     Vital Signs: /80   Pulse 60   Temp 98.1  F (36.7  C)   Resp 17       Assessment:    General:  Patient presents to clinic in no apparent distress.  Psychiatric:  Alert and oriented x3.   Lower extremity:  edema is present.    Integumentary:  Skin is WNL  Wound size: see measurements from10/5  Wound 07/24/17 Right lateral foot (Active)   Pre Size Length 0.8 10/05/20 1400   Pre Size Width 1 10/05/20 1400   Pre Size Depth 0.2 10/05/20 1400   Pre Total Sq cm 1.3 10/02/20 0800   Post Size Length 1 09/25/20 0900   Post Size Width 1 09/25/20 0900   Post Size Depth 0.1 09/25/20 0900   Post Total Sq cm 1 09/25/20 0900   Undermined .3 undermining around whole wound  10/05/20 1400   Tunneling no 09/28/20 0800   Description pink 09/28/20 0800   Prodcut Used Gauze;Collagen 09/28/20 0800       Wound 09/05/17 Foot Right (Active)      Undermining is not present.    The periwoundskin is WNL      Plan:         1. Patient will in 3 days         2. Treatment provided will include irrigation and dressings to promote autolytic debridement and will be as listed below. Pt had no complaints of pain.No odor or signs of infection. Dried bloody drainage on gauze dressing. He stated that he is try to stay off his foot. Follow-up with Ines Zimmerman was rescheduled.      Cleansed with: Normal Saline    Protected skin with: Remedy Skin Repair    Dressings Applied: Collagen. Gauze and roll gauze    Compression Applied to the Left Leg: None    Compression Applied to the Right Leg: Tubigrip double layer    Offloading applied: CROW/Cage Boot    Trial Products: no  Provider notified regarding concerns: no  Treatment  Changes: no    Educational Barriers: none  Taught Regarding: Compression, Disease and Dressing  Teaching Method: Explanation and Handout

## 2021-06-29 NOTE — PROGRESS NOTES
Progress Notes by Ines Zimmerman NP at 7/3/2020  8:15 AM     Author: Ines Zimmerman NP Service: -- Author Type: Nurse Practitioner    Filed: 7/3/2020 10:05 AM Encounter Date: 7/3/2020 Status: Signed    : Ines Zimmerman NP (Nurse Practitioner)                   Follow up Vascular Visit       Date of Service:7/3/2020    Date Last Seen: 6/17/2020; 6/17/2020    Chief Complaint: right diabetic foot ulcer        Pt returns to [facility] with regards to their right diabetic foot ulcer.  They arrive today alone. They are currently using bactroban; silvercel' gauze to the wounds. This is being done by the patient once per day. They are using tubular compression for compression. They are feeling well today. Denies fevers, chills. No shortness of breath. Using CAGE boot to offload. FS running .      Allergies: Cresol; Crestor [rosuvastatin]; and Demeclocycline    Medications:   Current Outpatient Medications:   ?  apixaban ANTICOAGULANT (ELIQUIS) 5 mg Tab tablet, Take 1 tablet (5 mg total) by mouth 2 (two) times a day., Disp: 60 tablet, Rfl: 3  ?  aspirin 81 MG EC tablet, Take 81 mg by mouth daily., Disp: , Rfl:   ?  blood glucose test strips, Use 1 each As Directed 2 (two) times a day. Dispense brand per patient's insurance at pharmacy discretion., Disp: 120 strip, Rfl: 6  ?  blood-glucose meter (ONETOUCH VERIO IQ METER) Misc, Use 1 each As Directed 2 (two) times a day., Disp: 1 each, Rfl: 0  ?  cholecalciferol, vitamin D3, (VITAMIN D3) 5,000 unit Tab, Take 5,000 Units by mouth daily. , Disp: , Rfl:   ?  LANTUS SOLOSTAR U-100 INSULIN 100 unit/mL (3 mL) pen, INJECT 40 UNITS UNDER THE SKIN AT BEDTIME. DO NOT MIX LANTUS WITH ANY OTHER INSULIN (Patient taking differently: 44 Units. ), Disp: 18 adj dose pen, Rfl: 1  ?  lisinopriL (PRINIVIL,ZESTRIL) 5 MG tablet, TAKE 1 TABLET BY MOUTH EVERY DAY, Disp: 90 tablet, Rfl: 1  ?  MULTIVITS,CA,MIN/IRON/FA/LYCOP (CENTRUM MEN ORAL), Take 1 tablet by mouth  "daily., Disp: , Rfl:   ?  mupirocin (BACTROBAN) 2 % ointment, Apply topically to wound every day, Disp: 60 g, Rfl: 3  ?  naproxen sodium (ALEVE) 220 MG tablet, Take 220 mg by mouth daily., Disp: , Rfl:   ?  pen needle, diabetic 31 gauge x 5/16\" Ndle, Used to inject insulin daily, Disp: 90 each, Rfl: 0  ?  VICTOZA 3-RUPALI 0.6 mg/0.1 mL (18 mg/3 mL) PnIj injection, INJECT 1.8 MG UNDER THE SKIN ONCE DAILY, Disp: 9 Syringe, Rfl: 1  ?  vitamin E 400 unit capsule, Take 400 Units by mouth daily., Disp: , Rfl:     History:   Past Medical History:   Diagnosis Date   ? BPH (benign prostatic hyperplasia)    ? Cholelithiasis    ? Diabetes mellitus (H)    ? Essential hypertension    ? Hyperlipidemia    ? Pancreatitis        Physical Exam:    There were no vitals taken for this visit.    General:  Patient presents to clinic in no apparent distress.  Head: normocephalic atraumatic  Psychiatric:  Alert and oriented x3.   Respiratory: unlabored breathing; no cough  Integumentary:  Skin is uniformly warm, dry and pink.    Wound #1 Location: right plantar 5th met head  Size: 0.8L x 0.4W x 0.2depth.  No sinus tract present, Wound base: slough with surrounding callous  No undermining present. Wound is full thickness. There is moderate drainage. Periwound: no denudement, erythema, induration, maceration or warmth.      Circumferential volume measures:    Vasc Edema 3/6/2017 4/17/2017 6/2/2017   Right just above MTP 23 24 23   Right Ankle 25 26 23.5   Right Widest Calf 37.5 38.6 37   Left - just above MTP 22 - -   Left Ankle 22 - -   Left Widest Calf 36 - -       Ulceration(s)/Wound(s):     Wound 07/24/17 Right lateral foot (Active)   Pre Size Length 0.5 06/17/20 0800   Pre Size Width 0.4 06/17/20 0800   Pre Size Depth 0.2 06/17/20 0800   Pre Total Sq cm 0.2 06/17/20 0800   Post Size Length 0.9 06/17/20 0800   Post Size Width 0.4 06/17/20 0800   Post Size Depth 0.2 06/17/20 0800   Post Total Sq cm 0.36 06/17/20 0800   Description callus " 01/13/20 0800       Wound 09/05/17 Foot Right (Active)        Lab Values    No results found for: SEDRATE  Lab Results   Component Value Date    CREATININE 1.01 12/27/2019     Lab Results   Component Value Date    HGBA1C 12.0 (H) 12/27/2019     Lab Results   Component Value Date    BUN 19 12/27/2019     Lab Results   Component Value Date    ALBUMIN 3.8 10/25/2019     No results found for: [id number]          Impression:  1. 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 2 diabetes mellitus, limited to breakdown of skin (H)     2. Localized edema     3. Pre-ulcerative corn or callous     4. Ankle instability, right     7/3/2020 right 5th met head             Are any of these wounds new today: No; Location: na    Assessment/Plan:          1. Debridement: After discussion of risk factors and verbal consent was obtained 2% Lidocaine HCL jelly was applied, under clean conditions, the right foot ulceration(s) were debrided using currette and #15 blade scalpel. Devitalized and nonviable tissue, along with any fibrin and slough, was removed to improve granulation tissue formation, stimulate wound healing, decrease overall bacteria load, disrupt biofilm formation and decrease edge senescence.  Total excisional debridement was 0.36 sq cm from the epidermis/dermis area and into the subcutaneous tissue with a depth of 0.2 cm.   Ulcers were improved afterwards and .  Measures were unchanged after debridement.       2.  Wound treatment: wound treatment will include irrigation and dressings to promote autolytic debridement which will include:continue bactroban; silvercel; bandaid Stable to improved; slightly smaller           3. Edema: continue elevation as able; tubular compression. The compression wraps were applied today in clinic. Stable            4. Nutrition: diabetic diet; focus on protein           5. Offloading: continue cage boot; pt would like to avoid surgery; I have the patient meet with Dr. Garcia  several times to discuss     Patient will follow up with me in 2-3 weeks for reevaluation. They were instructed to call the clinic sooner with any signs or symptoms of infection or any further questions/concerns. Answered all questions.          Ines Zimmerman DNP, RN, CNP, CWOCN, CFCN, CLT  [facility]   [phone]        This note was electronically signed by Ines Zimmerman

## 2021-06-29 NOTE — PROGRESS NOTES
Progress Notes by Oralia Elliott LPN at 10/23/2020  1:00 PM     Author: Oralia Elliott LPN Service: -- Author Type: Licensed Nurse    Filed: 10/23/2020  2:50 PM Encounter Date: 10/23/2020 Status: Signed    : Oralia Elliott LPN (Licensed Nurse)       Nurse Visit        Chief Complaint: Patient presents to clinic for assessment and treatment of right foot  ulcer    Dressing on Arrival: ABD pad , medihoney. 50% saturated with drainage. No odor. Wound macerated and has slough.    Patient came in today for dressing change per order from . Patient rated pain 2 out of 10. Removed dressings and cleansed skin with soap and cleaned wound bed with normal saline. Applied lotion to intact skin.  Applied gauze to wound and covered with ABD pad. Patient tolerated dressing change well. Pt is schedule for surgery next week.         Allergies:   Allergies   Allergen Reactions   ? Cresol      Muscle cramps   ? Crestor [Rosuvastatin] Myalgia   ? Demeclocycline Hives       Medications:   Current Outpatient Medications:   ?  apixaban ANTICOAGULANT (ELIQUIS) 5 mg Tab tablet, Take 1 tablet (5 mg total) by mouth 2 (two) times a day., Disp: 60 tablet, Rfl: 3  ?  aspirin 81 MG EC tablet, Take 81 mg by mouth daily., Disp: , Rfl:   ?  blood glucose test strips, Use 1 each As Directed 2 (two) times a day. Dispense brand per patient's insurance at pharmacy discretion., Disp: 120 strip, Rfl: 6  ?  blood-glucose meter (ONETOUCH VERIO IQ METER) Misc, Use 1 each As Directed 2 (two) times a day., Disp: 1 each, Rfl: 0  ?  cholecalciferol, vitamin D3, (VITAMIN D3) 5,000 unit Tab, Take 5,000 Units by mouth daily. , Disp: , Rfl:   ?  LANTUS SOLOSTAR U-100 INSULIN 100 unit/mL (3 mL) pen, INJECT 40 UNITS UNDER THE SKIN AT BEDTIME. DO NOT MIX LANTUS WITH ANY OTHER INSULIN (Patient taking differently: 44 Units. ), Disp: 18 adj dose pen, Rfl: 1  ?  lisinopriL (PRINIVIL,ZESTRIL) 5 MG tablet, TAKE 1 TABLET BY MOUTH EVERY DAY, Disp: 90 tablet, Rfl:  "1  ?  MULTIVITS,CA,MIN/IRON/FA/LYCOP (CENTRUM MEN ORAL), Take 1 tablet by mouth daily., Disp: , Rfl:   ?  mupirocin (BACTROBAN) 2 % ointment, Apply topically to wound every day, Disp: 60 g, Rfl: 3  ?  naproxen sodium (ALEVE) 220 MG tablet, Take 220 mg by mouth daily., Disp: , Rfl:   ?  pen needle, diabetic 31 gauge x 5/16\" Ndle, Used to inject insulin daily, Disp: 90 each, Rfl: 0  ?  traMADoL (ULTRAM) 50 mg tablet, Take 1 tablet (50 mg total) by mouth every 8 (eight) hours as needed for pain., Disp: 30 tablet, Rfl: 0  ?  VICTOZA 3-RUPALI 0.6 mg/0.1 mL (18 mg/3 mL) injection, INJECT 1.2 MG SUBCUTANEOUSLY ONCE DAILY, Disp: 18 mL, Rfl: 1  ?  vitamin E 400 unit capsule, Take 400 Units by mouth daily., Disp: , Rfl:     Vital Signs: /60 (Patient Site: Left Arm, Patient Position: Sitting, Cuff Size: Adult Regular)   Pulse 64   Temp 97.4  F (36.3  C) (Tympanic)   Resp 20       Assessment:    General:  Patient presents to clinic in no apparent distress.  Psychiatric:  Alert and oriented .   Lower extremity:  edema is present.    Integumentary:  Skin is uniformly warm, dry and pink.    Wound size:   Wound 07/24/17 Right lateral foot (Active)   Pre Size Length 2 10/23/20 1300   Pre Size Width 3 10/23/20 1300   Pre Size Depth 0.2 10/23/20 1300   Pre Total Sq cm 6 10/23/20 1300   Post Size Length 2 10/21/20 1400   Post Size Width 1.8 10/21/20 1400   Post Size Depth 0.6 10/21/20 1400   Post Total Sq cm 3.6 10/21/20 1400   Undermined n 10/19/20 1300   Tunneling n 10/19/20 1300   Description pink 10/16/20 1300   Prodcut Used Gauze 10/21/20 1400       Wound 09/05/17 Foot Right (Active)      Undermining is not present.    The periwoundskin is WNL and excoriated      Plan:         1. Patient will follow up on 11/11/20 with nurse visit         2. Treatment provided will include irrigation and dressings to promote autolytic debridement and will be as listed below     Cleansed with: Normal Saline    Protected skin with: Remedy Skin " Repair    Dressings Applied: ABD pad    Compression Applied to the Right Leg: Dermafit    Tubular compression was applied from base of toes to just below the bend of knee    Offloading applied: Splint cage  Trial Products: no  Provider notified regarding concerns: no  Treatment Changes: no    Educational Barriers: none  Taught Regarding: Activity, Compression, Dressing and Surgery  Teaching Method: Explanation, Demo and DC sheet

## 2021-06-29 NOTE — PROGRESS NOTES
Progress Notes by Ines Zimmerman NP at 7/17/2020  8:00 AM     Author: Ines Zimmerman NP Service: -- Author Type: Nurse Practitioner    Filed: 7/17/2020  9:19 AM Encounter Date: 7/17/2020 Status: Signed    : Ines Zimmerman NP (Nurse Practitioner)                      Follow up Vascular Visit       Date of Service:7/17/2020    Date Last Seen: 7/3/2020; 7/3/2020    Chief Complaint: right 5th met head diabetic foot ulcer        Pt returns to [facility] with regards to their right 5th met head diabetic foot ulcer.  They arrive today alone. They are currently using bactroban; silvercel; bandaid to the wounds. Reports today that he has been leaving open to air at night; this was not advised. This is being done by the patient daily. They are using tubular compression for compression. They are feeling well today. Denies fevers, chills. No shortness of breath. Using CAGE boot to offload the wound. FS runnin 90-120s.    Allergies: Cresol; Crestor [rosuvastatin]; and Demeclocycline    Medications:   Current Outpatient Medications:   ?  apixaban ANTICOAGULANT (ELIQUIS) 5 mg Tab tablet, Take 1 tablet (5 mg total) by mouth 2 (two) times a day., Disp: 60 tablet, Rfl: 3  ?  aspirin 81 MG EC tablet, Take 81 mg by mouth daily., Disp: , Rfl:   ?  blood glucose test strips, Use 1 each As Directed 2 (two) times a day. Dispense brand per patient's insurance at pharmacy discretion., Disp: 120 strip, Rfl: 6  ?  blood-glucose meter (ONETOUCH VERIO IQ METER) Misc, Use 1 each As Directed 2 (two) times a day., Disp: 1 each, Rfl: 0  ?  cholecalciferol, vitamin D3, (VITAMIN D3) 5,000 unit Tab, Take 5,000 Units by mouth daily. , Disp: , Rfl:   ?  LANTUS SOLOSTAR U-100 INSULIN 100 unit/mL (3 mL) pen, INJECT 40 UNITS UNDER THE SKIN AT BEDTIME. DO NOT MIX LANTUS WITH ANY OTHER INSULIN (Patient taking differently: 44 Units. ), Disp: 18 adj dose pen, Rfl: 1  ?  lisinopriL (PRINIVIL,ZESTRIL) 5 MG tablet, TAKE 1 TABLET BY  "MOUTH EVERY DAY, Disp: 90 tablet, Rfl: 1  ?  MULTIVITS,CA,MIN/IRON/FA/LYCOP (CENTRUM MEN ORAL), Take 1 tablet by mouth daily., Disp: , Rfl:   ?  mupirocin (BACTROBAN) 2 % ointment, Apply topically to wound every day, Disp: 60 g, Rfl: 3  ?  naproxen sodium (ALEVE) 220 MG tablet, Take 220 mg by mouth daily., Disp: , Rfl:   ?  pen needle, diabetic 31 gauge x 5/16\" Ndle, Used to inject insulin daily, Disp: 90 each, Rfl: 0  ?  VICTOZA 3-RUPALI 0.6 mg/0.1 mL (18 mg/3 mL) PnIj injection, INJECT 1.8 MG UNDER THE SKIN ONCE DAILY, Disp: 9 Syringe, Rfl: 1  ?  vitamin E 400 unit capsule, Take 400 Units by mouth daily., Disp: , Rfl:     History:   Past Medical History:   Diagnosis Date   ? BPH (benign prostatic hyperplasia)    ? Cholelithiasis    ? Diabetes mellitus (H)    ? Essential hypertension    ? Hyperlipidemia    ? Pancreatitis        Physical Exam:    /76   Pulse 76   Temp 99.7  F (37.6  C)   Resp 18   Wt 198 lb (89.8 kg)   BMI 29.24 kg/m      General:  Patient presents to clinic in no apparent distress.  Head: normocephalic atraumatic  Psychiatric:  Alert and oriented x3.   Respiratory: unlabored breathing; no cough  Integumentary:  Skin is uniformly warm, dry and pink.    Wound #1 Location: right plantar foot ulcer  Size: 0.6L x 0.4W x 0.2depth.  No sinus tract present, Wound base: slough  No undermining present. Wound is full thickness. There is moderate drainage. Periwound: no denudement, erythema, induration, maceration or warmth.      Circumferential volume measures:    Vasc Edema 3/6/2017 4/17/2017 6/2/2017   Right just above MTP 23 24 23   Right Ankle 25 26 23.5   Right Widest Calf 37.5 38.6 37   Left - just above MTP 22 - -   Left Ankle 22 - -   Left Widest Calf 36 - -       Ulceration(s)/Wound(s):     Wound 07/24/17 Right lateral foot (Active)   Pre Size Length 0.5 07/17/20 0800   Pre Size Width 0.3 07/17/20 0800   Pre Size Depth 0.2 07/17/20 0800   Pre Total Sq cm 0.15 07/17/20 0800   Post Size Length " 0.6 07/17/20 0800   Post Size Width 0.4 07/17/20 0800   Post Size Depth 0.2 07/17/20 0800   Post Total Sq cm 0.24 07/17/20 0800   Description callus 01/13/20 0800       Wound 09/05/17 Foot Right (Active)        Lab Values    No results found for: SEDRATE  Lab Results   Component Value Date    CREATININE 1.01 12/27/2019     Lab Results   Component Value Date    HGBA1C 12.0 (H) 12/27/2019     Lab Results   Component Value Date    BUN 19 12/27/2019     Lab Results   Component Value Date    ALBUMIN 3.8 10/25/2019     No results found for: [id number]          Impression:  1. 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 2 diabetes mellitus, limited to breakdown of skin (H)     2. Localized edema     3. Pre-ulcerative corn or callous     4. Ankle instability, right              7/3/2020 right plantar foot             Are any of these wounds new today: No; Location: na    Assessment/Plan:          1. Debridement: After discussion of risk factors and verbal consent was obtained 2% Lidocaine HCL jelly was applied, under clean conditions, the right foot ulceration(s) were debrided using #15 blade scalpel. Devitalized and nonviable tissue, along with any fibrin and slough, was removed to improve granulation tissue formation, stimulate wound healing, decrease overall bacteria load, disrupt biofilm formation and decrease edge senescence.  Total excisional debridement was 0.24 sq cm from the epidermis/dermis area and into the subcutaneous tissue with a depth of 0.2 cm.   Ulcers were improved afterwards and .  Measures were unchanged after debridement.       2.  Wound treatment: wound treatment will include irrigation and dressings to promote autolytic debridement which will include:will stop the bactroban and silvercel; educated pt on importance of keeping a dressing on at all times; will switch to endoform; medipore + pad daily Improved            3. Edema: will continue with tubular compression. The compression  wraps were applied today in clinic. Stable            4. Nutrition: diabetic diet; having excellent control           5. Offloading: continue cage boot     Patient will follow up with me in 6-8 weeks for reevaluation; follow up with Dr. Garcia every 2-3 weeks while I am out of the office. They were instructed to call the clinic sooner with any signs or symptoms of infection or any further questions/concerns. Answered all questions.          Ines Zimmerman DNP, RN, CNP, CWOCN, CFCN, CLT  [facility]   [phone]        This note was electronically signed by Ines Zimmerman

## 2021-06-29 NOTE — PROGRESS NOTES
Aylin damon cervical collar placed on Pt   Progress Notes by John Garcia DPM at 8/14/2020  9:15 AM     Author: John Garcia DPM Service: -- Author Type: Physician    Filed: 8/14/2020 10:22 AM Encounter Date: 8/14/2020 Status: Signed    : John Garcia DPM (Physician)       FOOT AND ANKLE SURGERY/PODIATRY Progress Note      ASSESSMENT:   Diabetic Ulceration right foot  Tailor's bunion      TREATMENT:  -The right foot ulceration is measuring smaller as compared to his previous visit. I recommend he continue to use the cage splint and endoform as directed.     -After discussion of risk factors and consent obtained 2% Lidocaine HCL jelly was applied, under clean conditions, the right and foot ulceration(s) were debrided using #15 blade scalpel.  Devitalized and nonviable tissue, along with any fibrin and slough, was removed to improve granulation tissue formation, stimulate wound healing, decrease overall bacteria load, disrupt biofilm formation and decrease edge senescence.  Total excisional debridement was 0.08 sq cm into the subcutaneous tissue with a depth of 0.2 cm.   Ulcers were improved afterwards and .  Measures were as noted on the flow sheet. Patient tolerated this well. Endoform with a gauze dresssing was applied. He will continue to apply Endoform with a gauze dresssing as directed.    -He will follow-up in 3 weeks.      John Garcia DPM  [facility]      HPI: Caleb Hernandez was seen again today for a right foot ulceration. Doing well today. He has used endoform with the cage splint as directed.       Past Medical History:   Diagnosis Date   ? BPH (benign prostatic hyperplasia)    ? Cholelithiasis    ? Diabetes mellitus (H)    ? Essential hypertension    ? Hyperlipidemia    ? Pancreatitis        Past Surgical History:   Procedure Laterality Date   ? BACK SURGERY      1964 removed a cyst   ? CHOLECYSTECTOMY  1985   ? ERCP     ? ERCP N/A 9/5/2017    Procedure: ENDOSCOPIC RETROGRADE  "CHOLANGIOPANCREATOGRAPHY SPHINCTEROTOMY AND STONE EXTRACTION;  Surgeon: Hansel Gannon MD;  Location: [facility] OR;  Service:    ? TONSILLECTOMY  1940       Allergies   Allergen Reactions   ? Cresol      Muscle cramps   ? Crestor [Rosuvastatin] Myalgia   ? Demeclocycline Hives         Current Outpatient Medications:   ?  apixaban ANTICOAGULANT (ELIQUIS) 5 mg Tab tablet, Take 1 tablet (5 mg total) by mouth 2 (two) times a day., Disp: 60 tablet, Rfl: 3  ?  aspirin 81 MG EC tablet, Take 81 mg by mouth daily., Disp: , Rfl:   ?  blood glucose test strips, Use 1 each As Directed 2 (two) times a day. Dispense brand per patient's insurance at pharmacy discretion., Disp: 120 strip, Rfl: 6  ?  blood-glucose meter (ONETOUCH VERIO IQ METER) Misc, Use 1 each As Directed 2 (two) times a day., Disp: 1 each, Rfl: 0  ?  cholecalciferol, vitamin D3, (VITAMIN D3) 5,000 unit Tab, Take 5,000 Units by mouth daily. , Disp: , Rfl:   ?  LANTUS SOLOSTAR U-100 INSULIN 100 unit/mL (3 mL) pen, INJECT 40 UNITS UNDER THE SKIN AT BEDTIME. DO NOT MIX LANTUS WITH ANY OTHER INSULIN (Patient taking differently: 44 Units. ), Disp: 18 adj dose pen, Rfl: 1  ?  lisinopriL (PRINIVIL,ZESTRIL) 5 MG tablet, TAKE 1 TABLET BY MOUTH EVERY DAY, Disp: 90 tablet, Rfl: 1  ?  MULTIVITS,CA,MIN/IRON/FA/LYCOP (CENTRUM MEN ORAL), Take 1 tablet by mouth daily., Disp: , Rfl:   ?  mupirocin (BACTROBAN) 2 % ointment, Apply topically to wound every day, Disp: 60 g, Rfl: 3  ?  naproxen sodium (ALEVE) 220 MG tablet, Take 220 mg by mouth daily., Disp: , Rfl:   ?  pen needle, diabetic 31 gauge x 5/16\" Ndle, Used to inject insulin daily, Disp: 90 each, Rfl: 0  ?  traMADoL (ULTRAM) 50 mg tablet, Take 1 tablet (50 mg total) by mouth every 8 (eight) hours as needed for pain., Disp: 30 tablet, Rfl: 0  ?  VICTOZA 3-RUPALI 0.6 mg/0.1 mL (18 mg/3 mL) PnIj injection, INJECT 1.8 MG UNDER THE SKIN ONCE DAILY, Disp: 9 Syringe, Rfl: 1  ?  vitamin E 400 unit capsule, Take 400 Units by mouth " daily., Disp: , Rfl:   No current facility-administered medications for this visit.     Review of Systems - 10 point Review of Systems is negative except for right foot ulcer which is noted in HPI.      OBJECTIVE:  Vitals:    08/14/20 0922   BP: 130/72   Pulse: 76   Temp: 98.1  F (36.7  C)     General appearance: Patient is alert and fully cooperative with history & exam.  No sign of distress is noted during the visit.   Vascular: Dorsalis pedis palpableRight.  Dermatologic:    Wound 07/24/17 Right lateral foot (Active)   Pre Size Length 0.2 08/14/20 0900   Pre Size Width 0.4 08/14/20 0900   Pre Size Depth 0.1 08/14/20 0900   Pre Total Sq cm 0.08 08/14/20 0900   Post Size Length 0.9 07/31/20 1419   Post Size Width 0.8 07/31/20 1419   Post Size Depth 0.2 07/31/20 1419   Post Total Sq cm 0.72 07/31/20 1419   Description pink, callous 07/31/20 1419       Wound 09/05/17 Foot Right (Active)   Granular base. No erythema.   Neurologic: Diminished to light touch Right.  Musculoskeletal: Contracted digits noted Right.    Imaging:     No results found.       Picture:

## 2021-06-29 NOTE — PROGRESS NOTES
Progress Notes by John Garcia DPM at 10/13/2020  2:40 PM     Author: John Garcia DPM Service: -- Author Type: Physician    Filed: 10/13/2020  3:31 PM Encounter Date: 10/13/2020 Status: Signed    : John Garcia DPM (Physician)       FOOT AND ANKLE SURGERY/PODIATRY Progress Note      ASSESSMENT:   Diabetic Ulceration right foot  Tailor's bunion      TREATMENT:  -The right foot ulceration has increased in size after developing a blister. After removing the dried blister, the wound does not probe to deep tissues but there is now fibrotic tissue. Will hold endoform and begin medihoney. Recommend he continue with limited walking in the cage splint, and I have asked him to remove the cage splint during the day to reduce chance for maceration which may have contributed to blister formation. Also discussed importance of limited walking.    -After discussion of risk factors and consent obtained 2% Lidocaine HCL jelly was applied, under clean conditions, the right and foot ulceration(s) were debrided using #15 blade scalpel.  Devitalized and nonviable tissue, along with any fibrin and slough, was removed to improve granulation tissue formation, stimulate wound healing, decrease overall bacteria load, disrupt biofilm formation and decrease edge senescence.  Total excisional debridement was 1.3 sq cm into the subcutaneous tissue with a depth of 0.2 cm.   Ulcers were improved afterwards and .  Measures were as noted on the flow sheet. Patient tolerated this well. Medi-honey with a gauze dressing was applied. He will follow-up with nurses visits 3x per week for application of medihoney with a gauze dressing. Leg compression per Dr. Espinosa.    -He will follow-up with me in 2 weeks.    John Garcia DPM  [facility] [place]      HPI: Caleb Hernandez was seen again today for a right foot ulceration. Since his last visit, he has been using the cage splint when walking, and has not  removed the cage splint during the day. He continues to walk more than directed, but has tried to remain limited walking.       Past Medical History:   Diagnosis Date   ? BPH (benign prostatic hyperplasia)    ? Cholelithiasis    ? Diabetes mellitus (H)    ? Essential hypertension    ? Hyperlipidemia    ? Pancreatitis        Past Surgical History:   Procedure Laterality Date   ? BACK SURGERY      1964 removed a cyst   ? CHOLECYSTECTOMY  1985   ? ERCP     ? ERCP N/A 9/5/2017    Procedure: ENDOSCOPIC RETROGRADE CHOLANGIOPANCREATOGRAPHY SPHINCTEROTOMY AND STONE EXTRACTION;  Surgeon: Hansel Gannon MD;  Location: [facility];  Service:    ? TONSILLECTOMY  1940       Allergies   Allergen Reactions   ? Cresol      Muscle cramps   ? Crestor [Rosuvastatin] Myalgia   ? Demeclocycline Hives         Current Outpatient Medications:   ?  apixaban ANTICOAGULANT (ELIQUIS) 5 mg Tab tablet, Take 1 tablet (5 mg total) by mouth 2 (two) times a day., Disp: 60 tablet, Rfl: 3  ?  aspirin 81 MG EC tablet, Take 81 mg by mouth daily., Disp: , Rfl:   ?  blood glucose test strips, Use 1 each As Directed 2 (two) times a day. Dispense brand per patient's insurance at pharmacy discretion., Disp: 120 strip, Rfl: 6  ?  blood-glucose meter (ONETOUCH VERIO IQ METER) Misc, Use 1 each As Directed 2 (two) times a day., Disp: 1 each, Rfl: 0  ?  cholecalciferol, vitamin D3, (VITAMIN D3) 5,000 unit Tab, Take 5,000 Units by mouth daily. , Disp: , Rfl:   ?  LANTUS SOLOSTAR U-100 INSULIN 100 unit/mL (3 mL) pen, INJECT 40 UNITS UNDER THE SKIN AT BEDTIME. DO NOT MIX LANTUS WITH ANY OTHER INSULIN (Patient taking differently: 44 Units. ), Disp: 18 adj dose pen, Rfl: 1  ?  lisinopriL (PRINIVIL,ZESTRIL) 5 MG tablet, TAKE 1 TABLET BY MOUTH EVERY DAY, Disp: 90 tablet, Rfl: 1  ?  MULTIVITS,CA,MIN/IRON/FA/LYCOP (CENTRUM MEN ORAL), Take 1 tablet by mouth daily., Disp: , Rfl:   ?  mupirocin (BACTROBAN) 2 % ointment, Apply topically to wound every day, Disp: 60 g,  "Rfl: 3  ?  naproxen sodium (ALEVE) 220 MG tablet, Take 220 mg by mouth daily., Disp: , Rfl:   ?  pen needle, diabetic 31 gauge x 5/16\" Ndle, Used to inject insulin daily, Disp: 90 each, Rfl: 0  ?  traMADoL (ULTRAM) 50 mg tablet, Take 1 tablet (50 mg total) by mouth every 8 (eight) hours as needed for pain., Disp: 30 tablet, Rfl: 0  ?  VICTOZA 3-RUPALI 0.6 mg/0.1 mL (18 mg/3 mL) injection, INJECT 1.2 MG SUBCUTANEOUSLY ONCE DAILY, Disp: 18 mL, Rfl: 1  ?  vitamin E 400 unit capsule, Take 400 Units by mouth daily., Disp: , Rfl:     Review of Systems - 10 point Review of Systems is negative except for foot ulcer which is noted in HPI.      OBJECTIVE:  Vitals:    10/13/20 1506   BP: 128/78   Pulse: 68   Resp: 18     General appearance: Patient is alert and fully cooperative with history & exam.  No sign of distress is noted during the visit.   Vascular: Dorsalis pedis non-palpableRight.  Dermatologic:    Wound 07/24/17 Right lateral foot (Active)   Pre Size Length 1.2 10/13/20 1500   Pre Size Width 1.8 10/13/20 1500   Pre Size Depth 0.2 10/13/20 1500   Pre Total Sq cm 1.3 10/02/20 0800   Post Size Length 1 09/25/20 0900   Post Size Width 1 09/25/20 0900   Post Size Depth 0.1 09/25/20 0900   Post Total Sq cm 1 09/25/20 0900   Undermined .3 undermining around whole wound  10/05/20 1400   Tunneling no 09/28/20 0800   Description pink 09/28/20 0800   Prodcut Used Collagen;Gauze 10/09/20 0800       Wound 09/05/17 Foot Right (Active)   There is a dried blister at the site of the plantar lateral right foot ulceration plantar 5th MPJ, now base of wound has fibrotic tissue. No erythema.   Neurologic: Diminished to light touch Right.  Musculoskeletal: Contracted digits noted Right.    Imaging:     No results found.       Picture:              "

## 2021-06-29 NOTE — PROGRESS NOTES
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 Localized erythema. Aerobic wound culture obtained today. I will start him on Levaquin. I will ask vascular surgery to verify healing potential with most recent SRIKANTH's.     -Based on the amount of non-viable tissue and bone, I unfortunately recommend amputation of the 5th ray. Patient consents to surgery. He will keep upcoming appointment with ID.     -After discussion of risk factors and consent obtained 2% Lidocaine HCL jelly was applied, under clean conditions, the right and foot ulceration(s) were debrided using #15 blade scalpel.  Devitalized and nonviable tissue, along with any fibrin and slough, was removed to improve granulation tissue formation, stimulate wound healing, decrease overall bacteria load, disrupt biofilm formation and decrease edge senescence.  Total excisional debridement was 3.6 sq cm bone with a depth of 0.6 cm.   Ulcers were improved afterwards and .  Measures were as noted on the flow sheet. Patient tolerated this well. A gauze dressing was applied. He will continue to apply gauze dressing qday.    -I will ask my staff to coordinate surgery at [facility] for early next week. Hold Eliquis x3 days per PCP.     John Garcia DPM  [facility]      HPI: Caleb SELENE David was seen again today for a lateral right foot ulceration s/p I&D with bone biopsy. He has used the wound vac as directed, scheduled with ID  early next week. He has tried to remain non-weight bearing with crutches and a knee walker.       Past Medical History:   Diagnosis Date   ? BPH (benign prostatic hyperplasia)    ? Cholelithiasis    ? Common bile duct (CBD) obstruction 9/4/2017   ? Compression Fracture Of Thoracic Vertebral Body     Created by Conversion  Replacement Utility updated for latest IMO load   ? Diabetes mellitus (H)    ? Essential hypertension    ? Hyperlipidemia    ? Pancreatitis    ? Rib Fracture     Created by Conversion  Replacement Utility updated for latest IMO load   ? Sepsis due to pneumonia (H) 9/8/2017       Past Surgical History:   Procedure Laterality Date   ? BACK SURGERY      1964 removed a cyst   ? CHOLECYSTECTOMY  1985   ? ERCP     ? ERCP N/A 9/5/2017    Procedure: ENDOSCOPIC RETROGRADE CHOLANGIOPANCREATOGRAPHY SPHINCTEROTOMY AND STONE EXTRACTION;  Surgeon: Hansel Gannon MD;  Location: [facility];  Service:    ? INCISION AND DRAINAGE OF WOUND Right 11/2/2020    Procedure: INCISION AND DRAINAGE, right foot with removal of bone 5th metatarsal;  Surgeon: John Garcia DPM;  Location: [facility];  Service: Podiatry   ? TONSILLECTOMY  1940       Allergies   Allergen Reactions   ? Cresol      Muscle cramps   ? Crestor [Rosuvastatin] Myalgia   ? Declomycin [Demeclocycline] Hives         Current Outpatient Medications:   ?  apixaban ANTICOAGULANT (ELIQUIS) 5 mg Tab tablet, Take 1 tablet (5 mg total) by mouth 2 (two) times a day., Disp: 60 tablet, Rfl: 3  ?  aspirin 81 MG EC tablet, Take 81 mg by mouth daily., Disp: , Rfl:   ?  blood glucose test strips, Use 1 each As Directed 2 (two) times a day. Dispense brand per patient's insurance at pharmacy discretion., Disp: 120 strip, Rfl: 6  ?  blood-glucose meter (ONETOUCH VERIO IQ METER) Misc, Use 1 each As Directed 2 (two) times a day., Disp: 1 each, Rfl: 0  ?  cholecalciferol, vitamin D3, (VITAMIN D3) 5,000 unit Tab, Take 5,000 Units by mouth daily. , Disp: ,  "Rfl:   ?  insulin glargine (LANTUS SOLOSTAR U-100 INSULIN) 100 unit/mL (3 mL) pen, Inject 44 Units under the skin at bedtime., Disp: , Rfl:   ?  liraglutide (VICTOZA 3-RUPALI) 0.6 mg/0.1 mL (18 mg/3 mL) injection, Inject 0.3 mL (1.8 mg total) under the skin daily., Disp: , Rfl:   ?  lisinopriL (PRINIVIL,ZESTRIL) 5 MG tablet, Take 1 tablet (5 mg total) by mouth daily., Disp: 90 tablet, Rfl: 3  ?  MULTIVITS,CA,MIN/IRON/FA/LYCOP (CENTRUM MEN ORAL), Take 1 tablet by mouth daily., Disp: , Rfl:   ?  mupirocin (BACTROBAN) 2 % ointment, Apply topically to wound every day, Disp: 60 g, Rfl: 3  ?  naproxen sodium (ALEVE) 220 MG tablet, Take 220 mg by mouth daily., Disp: , Rfl:   ?  pen needle, diabetic 31 gauge x 5/16\" Ndle, Used to inject insulin daily, Disp: 90 each, Rfl: 0  ?  traMADoL (ULTRAM) 50 mg tablet, Take 1 tablet (50 mg total) by mouth every 8 (eight) hours as needed for pain., Disp: 30 tablet, Rfl: 0  ?  traMADoL (ULTRAM) 50 mg tablet, Take 1 tablet (50 mg total) by mouth every 6 (six) hours as needed for pain., Disp: 30 tablet, Rfl: 0  ?  vitamin E 400 unit capsule, Take 400 Units by mouth daily., Disp: , Rfl:   ?  levoFLOXacin (LEVAQUIN) 500 MG tablet, Take 1 tablet (500 mg total) by mouth daily for 10 days., Disp: 10 tablet, Rfl: 0    Review of Systems - 10 point Review of Systems is negative except for right foot ulcer which is noted in HPI.      OBJECTIVE:  Vitals:    11/11/20 1334   BP: 132/72   Pulse: 72   Resp: 16   Temp: 97.6  F (36.4  C)     General appearance: Patient is alert and fully cooperative with history & exam.  No sign of distress is noted during the visit.   Vascular: Dorsalis pedis non-palpableRight.  Dermatologic:    Wound 07/24/17 Right lateral foot (Active)   Pre Size Length 2 10/23/20 1300   Pre Size Width 3 10/23/20 1300   Pre Size Depth 0.2 10/23/20 1300   Pre Total Sq cm 6 10/23/20 1300   Post Size Length 2 10/21/20 1400   Post Size Width 1.8 10/21/20 1400   Post Size Depth 0.6 10/21/20 " 1400   Post Total Sq cm 3.6 10/21/20 1400   Undermined n 10/19/20 1300   Tunneling n 10/19/20 1300   Description pink 10/16/20 1300   Prodcut Used Gauze 10/21/20 1400       Wound 09/05/17 Foot Right (Active)       Incision 11/02/20 Foot Right (Active)       Incision 11/02/20 Surgical Foot Right (Active)   Non-viable tissue along the proximal wound with purulent drainage, exposed non-viable 5th metatarsal head and neck. Mild erythema at proximal wound, no ascending cellulitis noted.   Neurologic: Diminished to light touch Right.  Musculoskeletal: Contracted digits noted Right.    Imaging:     Mr Forefoot With Without Contrast Right    Result Date: 10/22/2020  EXAM DATE:         10/22/2020 EXAM: MRI FOOT RIGHT W/O AND WITH CONTRAST LOCATION: [facility] DATE/TIME: 10/22/2020 8:30 AM INDICATION: 84-year-old diabetic patient with a lateral forefoot wound and clinical concern for osteomyelitis. COMPARISON: 7/24/2017 radiographs. TECHNIQUE: Routine. Additional postgadolinium T1 sequences were obtained. IV CONTRAST: 19 mL IV Dotarem was given from a 20 mL single dose vial with 1 mL discarded. SPR I-STAT Cr=1.0mg/dl, eGFR=71. FINDINGS: JOINTS AND BONES: There is reticulated edema type signal, slight decreased T1 signal, and enhancement in the fifth metatarsal head. This extends 13 mm in length. The bones are otherwise unremarkable. Foci of moderate chondromalacia at the navicular cuneiform, second TMT, and third TMT joints. TENDONS: No tendon tear, tendinopathy, or tenosynovitis. LIGAMENTS: Lisfranc ligament intact. No subluxation. MUSCLES AND SOFT TISSUES: There is marked attenuation-soft tissue ulcer at the plantar margin of the fifth metatarsal head. Mild enhancement in the adjacent soft tissues. No peripherally enhancing fluid collection. Subcutaneous edema in the dorsal foot. Advanced muscle atrophy. Increased T2 signal in the remaining musculature, consistent with denervation change. No intramuscular  fluid collection. IMPRESSION: 1.  Right fifth metatarsal head osteitis and early osteomyelitis. 2.  Soft tissue ulcer at the plantar margin of the fifth metatarsal head. 3.  Mild cellulitis adjacent to the fifth metatarsal head. 4.  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 5.  Forefoot muscle atrophy and denervation change. Key images series 11, image 20. Series 6, image 12. Series 9, image 5.     Us Arterial Legs Bilateral Complete    Result Date: 10/13/2020  Arterial Duplex Ultrasound Lower Extremity Artery Evaluation Indication: Right foot non-healing ulcer  Previous: 01/13/2020 History: Previous Smoker, Hypertension, Diabetic and PAD Technique: Duplex imaging is performed utilizing gray-scale, two-dimensional images, and color-flow imaging. Doppler waveform analysis and spectral Doppler imaging is also performed. LOWER EXTREMITY ARTERIAL DUPLEX EXAM WITH WAVEFORMS Right Leg:(cm/s) Location: Velocities Waveforms EIA:   84  T CFA:   89  T PFA:   57  T SFA Proximal:   101  T SFA Mid:   81  T SFA Distal:   70  T Popliteal Artery:   69  T PTA:   29   M BREE:   12  M DPA:   9  M Waveforms: T=Triphasic, M=Monophasic, B=Biphasic Left Leg:(cm/s) Location: Velocities Waveforms EIA:   75  B CFA:   77  B PFA:   73  T SFA Proximal:   88  B SFA Mid:   89  B SFA Distal:   64  B Popliteal Artery:   89  T PTA:   11   M BREE:   53  B DPA:   72  M Waveforms: T=Triphasic, M=Monophasic, B=Biphasic Impression: Right Lower Extremity: Patent lower extremity vasculature with transition to low velocity, monophasic flow in the infrapopliteal vessels.  No focal stenosis identified. Left Lower Extremity: Patent lower extremity vasculature.  Monophasic flow in the posterior tibial and dorsalis pedis arteries.  No focal stenosis identified. Reference: Category Normal 1-19% 20-49% 50-99% Occluded PSV <160 cm/sec without spectral broadening <160 cm/sec with spectral broadening Increased Increased  "Absent Flow Ratio N/A N/A < 2.0 >2.0 N/A Post-Stenotic Turbulence No No No Yes N/A     Poc Us Guidance Needle Placement    Result Date: 11/2/2020  Ultrasound was performed as guidance to an anesthesia procedure.  Click \"PACS images\" hyperlink below to view any stored images.  For specific procedure details, view procedure note authored by anesthesia.    Us Arterial Segmental Pressures Legs 3 Or More Levels    Result Date: 10/13/2020  BILATERAL RESTING ANKLE-BRACHIAL INDICES (SRIKANTH'S) Indication: Right foot non-healing ulcer  Previous: 01/13/2020 History: Previous Smoker, Hypertension, Diabetic and PAD  Resting SRIKANTH's          Right: mmHg Index     Brachial: 131  Ankle-(PT): 62 0.42 Ankle-(DP): 122 0.83           Digit: 54 0.37               Left: mmHg Index     Brachial: 147  Ankle-(PT): 80 0.54 Ankle-(DP): 131 0.89           Digit: 87 0.59 Resting ankle-brachial index of 0.83 on the right. Toe Pressures of 54 mmHg and TBI of 0.37  Resting ankle-brachial index of 0.89 on the left. Toe Pressures of 87 mmHg and TBI of 0.59  WAVEFORMS: The right dorsalis pedis and posterior tibial arteries show monophasic waveforms. The left dorsalis pedis and posterior tibial arteries show monophasic waveforms. Impression:  Right SRIKANTH is  Abnormal with an SRIKANTH of 0.83 and Toe Pressures of 54. Left SRIKANTH is Abnormal with an SRIKANTH of 0.89 and Toe Pressures of 87.          Picture:                  "

## 2021-06-30 NOTE — PROGRESS NOTES
"Progress Notes by Em Broderick RN at 11/23/2020 11:00 AM     Author: Em Broderick RN Service: -- Author Type: Registered Nurse    Filed: 11/23/2020  4:31 PM Encounter Date: 11/23/2020 Status: Signed    : Em Broderick RN (Registered Nurse)           Right foot 11/23                          Nurse Visit    Chief Complaint: Patient presents to clinic for assement, and treatment of their right foot S/P incision and drainage right foot on 11/16 with Dr. Garcia. Right foot surgical site is black in color with a loose skin flap present. Approximately 3-4 cm of redness and warmth around perimeter of surgical incision. Is currently on Levaquin until next Saturday 11/28. Frantz is rating his pain \"5\" out of 10. He states he does not like to take oxycodone because he has a history of falling when taking it. He is doing well with the tramadol but only has 2 left so is requested a refill. Notified Dr. Raymond of appearance of surgical incision. Updated wife that is waiting in parking lot that we have notified one of the vascular surgeons here and are awaiting a return call.     Lynnette Glover spoke with Dr. Raymond and he has advised Frantz to be admitted. Lynnette spoke with hospitalist and patient will go over to [facility]. Dr. Raymond will see Frantz there.     Dressing on Arrival: Gauze surgical dressing/ace wrap.    Nursing Note: Frantz arrives today with wife via W/C. Surgical dressing intact. Dressing was removed and incision cleansed with normal saline.     Allergies:   Allergies   Allergen Reactions   ? Cresol      Muscle cramps   ? Crestor [Rosuvastatin] Myalgia   ? Declomycin [Demeclocycline] Hives       Medications:   Current Outpatient Medications:   ?  apixaban ANTICOAGULANT (ELIQUIS) 5 mg Tab tablet, Take 1 tablet (5 mg total) by mouth 2 (two) times a day., Disp: 60 tablet, Rfl: 3  ?  aspirin 81 MG EC tablet, Take 81 mg by mouth daily., Disp: , Rfl:   ?  blood glucose test strips, Use 1 each As Directed 2 (two) times a " "day. Dispense brand per patient's insurance at pharmacy discretion., Disp: 120 strip, Rfl: 6  ?  blood-glucose meter (ONETOUCH VERIO IQ METER) Misc, Use 1 each As Directed 2 (two) times a day., Disp: 1 each, Rfl: 0  ?  cholecalciferol, vitamin D3, (VITAMIN D3) 5,000 unit Tab, Take 5,000 Units by mouth daily. , Disp: , Rfl:   ?  insulin glargine (LANTUS SOLOSTAR U-100 INSULIN) 100 unit/mL (3 mL) pen, Inject 44 Units under the skin at bedtime., Disp: , Rfl:   ?  levoFLOXacin (LEVAQUIN) 500 MG tablet, Take 1 tablet (500 mg total) by mouth daily for 10 days., Disp: 10 tablet, Rfl: 0  ?  liraglutide (VICTOZA 3-RUPALI) 0.6 mg/0.1 mL (18 mg/3 mL) injection, Inject 0.3 mL (1.8 mg total) under the skin daily., Disp: , Rfl:   ?  lisinopriL (PRINIVIL,ZESTRIL) 5 MG tablet, Take 1 tablet (5 mg total) by mouth daily., Disp: 90 tablet, Rfl: 3  ?  MULTIVITS,CA,MIN/IRON/FA/LYCOP (CENTRUM MEN ORAL), Take 1 tablet by mouth daily., Disp: , Rfl:   ?  mupirocin (BACTROBAN) 2 % ointment, Apply topically daily as needed. Apply to wound., Disp: , Rfl:   ?  naproxen sodium (ALEVE) 220 MG tablet, Take 220 mg by mouth daily., Disp: , Rfl:   ?  oxyCODONE (ROXICODONE) 5 MG immediate release tablet, Take 1 tablet (5 mg total) by mouth every 6 (six) hours as needed for pain., Disp: 30 tablet, Rfl: 0  ?  pen needle, diabetic 31 gauge x 5/16\" Ndle, Used to inject insulin daily, Disp: 90 each, Rfl: 0  ?  traMADoL (ULTRAM) 50 mg tablet, Take 1 tablet (50 mg total) by mouth every 6 (six) hours as needed for pain., Disp: 30 tablet, Rfl: 0  ?  vitamin E 400 unit capsule, Take 400 Units by mouth daily., Disp: , Rfl:     Vital Signs: /62 (Patient Site: Left Arm, Patient Position: Semi-salomon, Cuff Size: Adult Regular)   Pulse 82   Temp 98  F (36.7  C) (Oral)   Resp 18       Assessment:    General:  Patient presents to clinic in no apparent distress.  Psychiatric:  Alert and oriented x3.   Lower extremity:  edema is present.    Integumentary:  Skin is " Pink  Wound size:   Wound 07/24/17 Right lateral foot (Active)   Pre Size Length 2 10/23/20 1300   Pre Size Width 3 10/23/20 1300   Pre Size Depth 0.2 10/23/20 1300   Pre Total Sq cm 6 10/23/20 1300   Post Size Length 2 10/21/20 1400   Post Size Width 1.8 10/21/20 1400   Post Size Depth 0.6 10/21/20 1400   Post Total Sq cm 3.6 10/21/20 1400   Undermined n 10/19/20 1300   Tunneling n 10/19/20 1300   Description pink 10/16/20 1300   Prodcut Used Gauze 10/21/20 1400       Wound 09/05/17 Foot Right (Active)       Incision 11/02/20 Foot Right (Active)       Incision 11/02/20 Surgical Foot Right (Active)       Incision 11/16/20 Surgical Foot Right (Active)      Undermining is not present.    The periwoundskin is pink, warm to the touch     Plan:         1. Patient will         2. Treatment provided will include irrigation and dressings to promote autolytic debridement and will be as listed below     Cleansed with: Normal Saline    Protected skin with:     Dressings Applied: ABD pad, roll gauze, paper tape    Compression Applied to the Left Leg: None    Compression Applied to the Right Leg: None    Offloading applied: None    Trial Products: no  Provider notified regarding concerns: yes-notified Dr. Raymond in Dr. Garcia's absence.   Treatment Changes: no    Educational Barriers: none  Taught Regarding: Activity, Compliance, Compression and Dressing  Teaching Method: Explanation and Handout

## 2021-06-30 NOTE — PROGRESS NOTES
Progress Notes by John Garcia DPM at 1/20/2021  9:00 AM     Author: John Garcia DPM Service: -- Author Type: Physician    Filed: 1/20/2021 10:36 AM Encounter Date: 1/20/2021 Status: Signed    : John Garcia DPM (Physician)       FOOT AND ANKLE SURGERY/PODIATRY Progress Note      ASSESSMENT:   Ulceration right foot   DM2      TREATMENT:  -Unfortunately, the majority of the Theraskin graft was removed today after adhering to the foam dressing of the wound vac due to improper application without use of adaptic. The resulting wound has mixed fibrotic/granular tissue. I will start him on Medihoney and hold the wound vac until his next visit. We discussed that additional surgery may be necessary if the wound fails to improve. Continue non-weight bearing on the right foot.    -After discussion of risk factors and consent obtained 2% Lidocaine HCL jelly was applied, under clean conditions, the right and foot ulceration(s) were debrided using #15 blade scalpel.  Devitalized and nonviable tissue, along with any fibrin and slough, was removed to improve granulation tissue formation, stimulate wound healing, decrease overall bacteria load, disrupt biofilm formation and decrease edge senescence.  Total excisional debridement was 35.38 sq cm into the subcutaneous tissue with a depth of 0.3 cm.   Ulcers were improved afterwards and .  Measures were as noted on the flow sheet. Patient tolerated this well. Medi-honey with a gauze dressing was applied. He will continue to apply Medi-honey with a gauze dressing as directed.    -He will follow-up in 2-3 weeks.    John Garcia DPM  [facility]      HPI: Caleb Hernandez was seen again today for a right foot ulceration. He has used the wound vac as directed, no new concerns.      Past Medical History:   Diagnosis Date   ? BPH (benign prostatic hyperplasia)    ? Cholelithiasis    ? Common bile duct (CBD) obstruction 9/4/2017   ?  Compression Fracture Of Thoracic Vertebral Body     Created by Conversion  Replacement Utility updated for latest IMO load   ? Diabetes mellitus (H)    ? Essential hypertension    ? Hyperlipidemia    ? Pancreatitis    ? Rib Fracture     Created by Conversion  Replacement Utility updated for latest IMO load   ? Sepsis due to pneumonia (H) 9/8/2017       Past Surgical History:   Procedure Laterality Date   ? BACK SURGERY      1964 removed a cyst   ? CHOLECYSTECTOMY  1985   ? ERCP     ? ERCP N/A 9/5/2017    Procedure: ENDOSCOPIC RETROGRADE CHOLANGIOPANCREATOGRAPHY SPHINCTEROTOMY AND STONE EXTRACTION;  Surgeon: Hansel Gannon MD;  Location: [facility] OR;  Service:    ? INCISION AND DRAINAGE OF WOUND Right 11/2/2020    Procedure: INCISION AND DRAINAGE, right foot with removal of bone 5th metatarsal;  Surgeon: John Garcia DPM;  Location: [facility] OR;  Service: Podiatry   ? INCISION AND DRAINAGE OF WOUND Right 11/16/2020    Procedure: INCISION AND DRAINAGE, right foot;  Surgeon: John Garcia DPM;  Location: [facility] OR;  Service: Podiatry   ? INCISION AND DRAINAGE OF WOUND Right 11/27/2020    Procedure: INCISION AND DRAINAGE, LOWER EXTREMITY;  Surgeon: Aleksandr Hdez DPM;  Location: [facility] OR;  Service: Podiatry   ? IR EXTREMITY ANGIOGRAM RIGHT  11/24/2020   ? PICC  11/30/2020        ? TOE AMPUTATION Right 11/16/2020    Procedure: with amputation of the fifth ray, peroneal brevis tendon transfer;  Surgeon: John Garcia DPM;  Location: [facility] OR;  Service: Podiatry   ? TONSILLECTOMY  1940       Allergies   Allergen Reactions   ? Cresol      Muscle cramps   ? Crestor [Rosuvastatin] Myalgia   ? Declomycin [Demeclocycline] Hives         Current Outpatient Medications:   ?  acetaminophen (TYLENOL) 500 MG tablet, Take 1-2 tablets (500-1,000 mg total) by mouth every 4 (four) hours as needed., Disp: , Rfl: 0  ?  apixaban ANTICOAGULANT (ELIQUIS) 5 mg Tab tablet, Take 1  "tablet (5 mg total) by mouth 2 (two) times a day., Disp: 60 tablet, Rfl: 3  ?  aspirin 81 MG EC tablet, Take 81 mg by mouth daily., Disp: , Rfl:   ?  blood glucose test strips, Use 1 each As Directed 2 (two) times a day. Dispense brand per patient's insurance at pharmacy discretion., Disp: 120 strip, Rfl: 6  ?  blood-glucose meter (ONETOUCH VERIO IQ METER) Misc, Use 1 each As Directed 2 (two) times a day., Disp: 1 each, Rfl: 0  ?  cholecalciferol, vitamin D3, (VITAMIN D3) 5,000 unit Tab, Take 5,000 Units by mouth daily. , Disp: , Rfl:   ?  insulin glargine (LANTUS SOLOSTAR U-100 INSULIN) 100 unit/mL (3 mL) pen, Inject 44 Units under the skin at bedtime. (Patient taking differently: Inject 30 Units under the skin at bedtime. Takes 15 units q am and 30 units Q HS), Disp: , Rfl:   ?  liraglutide (VICTOZA 3-RUPALI) 0.6 mg/0.1 mL (18 mg/3 mL) injection, Inject 0.3 mL (1.8 mg total) under the skin daily., Disp: , Rfl:   ?  lisinopriL (PRINIVIL,ZESTRIL) 5 MG tablet, Take 1 tablet (5 mg total) by mouth daily., Disp: 90 tablet, Rfl: 3  ?  multivit-min/FA/lycopen/lutein (CENTRUM SILVER MEN ORAL), Take 1 tablet by mouth daily., Disp: , Rfl:   ?  mupirocin (BACTROBAN) 2 % ointment, Apply topically daily as needed. Apply to wound., Disp: , Rfl:   ?  neomycin-bacitracin-polymyxin (NEOSPORIN) ointment, Apply to penis at catheter insertion site three times a day while catheter is in place., Disp: 15 g, Rfl: 0  ?  oxyCODONE (ROXICODONE) 5 MG immediate release tablet, Take 1 tablet (5 mg total) by mouth every 6 (six) hours as needed for pain., Disp: 30 tablet, Rfl: 0  ?  pen needle, diabetic 31 gauge x 5/16\" Ndle, Used to inject insulin daily, Disp: 90 each, Rfl: 0  ?  polyethylene glycol (MIRALAX) 17 gram packet, Take 1 packet (17 g total) by mouth daily as needed., Disp: , Rfl: 0  ?  tamsulosin (FLOMAX) 0.4 mg cap, Take 1 capsule (0.4 mg total) by mouth daily after supper., Disp: , Rfl: 0  ?  traMADoL (ULTRAM) 50 mg tablet, Take 50 mg " by mouth every 6 (six) hours as needed for pain., Disp: , Rfl:   ?  vitamin E 400 unit capsule, Take 400 Units by mouth daily., Disp: , Rfl:   ?  NOVOLOG FLEXPEN U-100 INSULIN 100 unit/mL (3 mL) injection pen, Inject 3 Units under the skin 3 (three) times a day before meals., Disp: 2.7 mL, Rfl: 0    Review of Systems - 10 point Review of Systems is negative except for right foot ulcer which is noted in HPI.      OBJECTIVE:  Vitals:    01/20/21 0854   BP: 144/78   Pulse: 76   Temp: 98.3  F (36.8  C)     General appearance: Patient is alert and fully cooperative with history & exam.  No sign of distress is noted during the visit.   Vascular: Dorsalis pedis non-palpableRight.  Dermatologic:    Urethral Catheter Coude 16 Fr. (Active)       Wound 07/24/17 Right lateral foot (Active)   Pre Size Length 2 10/23/20 1300   Pre Size Width 3 10/23/20 1300   Pre Size Depth 0.2 10/23/20 1300   Pre Total Sq cm 6 10/23/20 1300   Post Size Length 2 10/21/20 1400   Post Size Width 1.8 10/21/20 1400   Post Size Depth 0.6 10/21/20 1400   Post Total Sq cm 3.6 10/21/20 1400   Undermined n 10/19/20 1300   Tunneling n 10/19/20 1300   Description pink 10/16/20 1300   Prodcut Used Gauze 10/21/20 1400       VASC Wound 12/09/20 right foot (Active)   Pre Size Length 2.9 12/23/20 1000   Pre Size Width 12.2 12/23/20 1000   Pre Size Depth 0.3 12/23/20 1000   Pre Total Sq cm 35.38 12/23/20 1000       Wound 09/05/17 Foot Right (Active)       Incision 11/02/20 Surgical Foot Right (Active)       Incision 11/16/20 Surgical Foot Right (Active)       Incision 11/27/20 Surgical Foot Right (Active)       Incision 12/17/20 Surgical Foot Right (Active)   Majority of the theraskin graft was adhered to the foam dressing. No erythema right foot. Mixed fibrotic/granular tissue right foot.   Neurologic: Diminished to light touch Right.  Musculoskeletal: Contracted digits noted Right.    Imaging:     No results found.       Picture:

## 2021-06-30 NOTE — PROGRESS NOTES
Progress Notes by Marva Moore CNP at 2021  9:24 AM     Author: Marva Moore CNP Service: -- Author Type: Nurse Practitioner    Filed: 2021  9:52 AM Encounter Date: 2021 Status: Signed    : Marva Moore CNP (Nurse Practitioner)       [facility]      NAME:  Caleb Hernandez             :  1935  MRN: [id number]  CODE STATUS:  FULL CODE    VISIT TYPE: DISCHARGE SUMMARY  FACILYTY: [facility] [[id number]]                    PRIMARY CARE PROVIDER: Otis Lozano MD    DISCHARGE DIAGNOSIS:      1. Type 2 diabetes mellitus with diabetic peripheral angiopathy without gangrene, with long-term current use of insulin (H)    2. 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 2 diabetes mellitus, with necrosis of muscle (H)         DISCHARGE MEDICATIONS:         Medication List          Accurate as of 2021  6:33 PM. If you have any questions, ask your nurse or doctor.            CHANGE how you take these medications    Lantus Solostar U-100 Insulin 100 unit/mL (3 mL) pen  Generic drug: insulin glargine  Inject 30 Units under the skin at bedtime. Takes 15 units q am and 30 units Q HS  What changed: additional instructions        CONTINUE taking these medications    acetaminophen 500 MG tablet  Commonly known as: TYLENOL  Take 1-2 tablets (500-1,000 mg total) by mouth every 4 (four) hours as needed.     apixaban ANTICOAGULANT 5 mg Tab tablet  Commonly known as: ELIQUIS  Take 1 tablet (5 mg total) by mouth 2 (two) times a day.     aspirin 81 MG EC tablet     blood glucose test strips  Use 1 each As Directed 2 (two) times a day. Dispense brand per patient's insurance at pharmacy discretion.     blood-glucose meter Misc  Commonly known as: OneTouch Verio IQ Meter  Use 1 each As Directed 2 (two) times a day.     CENTRUM SILVER MEN ORAL     cholecalciferol (vitamin D3) 5,000 unit Tab     mupirocin 2 % ointment  Commonly known as:  "BACTROBAN     neomycin-bacitracin-polymyxin ointment  Commonly known as: NEOSPORIN  Apply to penis at catheter insertion site three times a day while catheter is in place.     NovoLOG Flexpen U-100 Insulin 100 unit/mL (3 mL) injection pen  Generic drug: insulin aspart U-100  Inject 3 Units under the skin 3 (three) times a day before meals.     oxyCODONE 5 MG immediate release tablet  Commonly known as: ROXICODONE  Take 1 tablet (5 mg total) by mouth every 6 (six) hours as needed for pain.     pen needle, diabetic 31 gauge x 5/16\" Ndle  Used to inject insulin daily     phenazopyridine 100 MG tablet  Commonly known as: PYRIDIUM     polyethylene glycol 17 gram packet  Commonly known as: MIRALAX  Take 1 packet (17 g total) by mouth daily as needed.     tamsulosin 0.4 mg Cap  Commonly known as: FLOMAX  Take 1 capsule (0.4 mg total) by mouth daily after supper.     traMADoL 50 mg tablet  Commonly known as: ULTRAM     Victoza 3-Ghulam 0.6 mg/0.1 mL (18 mg/3 mL) injection  Generic drug: liraglutide  Inject 0.3 mL (1.8 mg total) under the skin daily.     vitamin E 200 UNIT capsule     vitamin E 400 unit capsule            HISTORY OF PRESENT ILLNESS: Caleb Hernandez is a 85 y.o. male seen for a face to face discontinue home in am. He will require ongoing HHA services for PT/OT. He lives with his wife and reports he is very happy to be going home and plans to be successful. As per his EMR at [facility], \"with DM2, PVD, HTN presented with right lower extremity cellulitis, osteomyelitis, septic arthritis.     Right foot cellulitis with abscess, osteomyelitis, septic arthritis  -Patient with a recent excision of the right fifth metatarsal on 11/2/2020 with podiatry.  Wound cultures with Bacteroides and Enterobacter cloaca.  Patient directly admitted from vascular clinic with concerns for worsening postoperative infection.  MRI with new synovitis, osteomyelitis of the 2nd-4th tarsometatarsal joint and likely septic arthritis.  Patient " underwent I&D of abscess on 11/27, this wound culture now with Corynebacterium.  Patient initially on vancomycin and Zosyn, currently on meropenem with plan for outpatient ertapenem for 4 weeks.  -Nonweightbearing  -Patient received vancomycin and meropenem while inpatient and this will be switched to ertapenem daily dose x4 weeks with ID to follow as outpatient     Urinary retention with hematuria  -Patient had Dietrich catheter placed after angiogram for acute urinary retention.  Patient developed hematuria on 11/30.  CT abdomen and pelvis with significant irregular bladder wall thickening, more suggestive of cystitis.  Patient also has a nonobstructing 11 mm right upper pole kidney stone with no hydro-.  Urine culture negative, likely Dietrich causing irritation  -Patient evaluated daily by urology team  -Continue Flomax at discharge  -Able to restart Eliquis and aspirin daily  -Patient will be discharged on Dietrich catheter and will follow up with urology for definitive treatment     PVD  -Patient underwent right lower extremity angiogram with balloon angioplasty of anterior tibial and peroneal arteries 11/24/2020 with vascular surgery.  Postoperative ABIs without significant improvement, vascular thought this was likely secondary to spasm.  -will need repeat ultrasound outpatient  -Follow-up with vascular clinic outpatient     Adult failure to thrive  -Secondary to surgical interventions, prolonged hospital stays and infection  PT/OT recommending TCU, discharge plan 12/3     DM2  -A1c 7.9  -Held home Victoza, and was started on 3 units of mealtime insulin and increase to glargine 48 units at bedtime   -We will restart home Victoza and continue glargine at bedtime as well as mealtime insulin at discharge     Paroxysmal atrial fibrillation  -Currently rate controlled.    -Restart home Eliquis     HTN  -Lisinopril 5 mg p.o. daily     Normocytic anemia  -Hemoglobin 12.2 with MCV of 92.  Likely anemia of chronic disease,  "but with component of blood loss anemia from recent surgeries, hematuria.\"     He did see ID and his IVAB were completed and his mai is out. Will require ongoing wound care with HH services as well as therapies.He states he is ready to discontinue home and have his wifes curly pasta salad.    SKILLED NURSING FACILITY COURSE:  During this TCU stay, patient completed all anticipated goals of therapy.      PHYSICAL EXAMINATION:    Vitals:    02/01/21 1809   BP: 103/62   Pulse: 70   Temp: 97.1  F (36.2  C)   Weight: 182 lb 12.8 oz (82.9 kg)         GENERAL: Awake, Alert, oriented x3, not in any form of acute distress, answers questions appropriately, follows simple commands, conversant  HEENT: Head is normocephalic with normal hair distribution. No evidence of trauma. Ears: No acute purulent discharge. Eyes: Conjunctivae pink with no scleral jaundice. Nose: Normal mucosa and septum. NECK: Supple with no cervical or supraclavicular lymphadenopathy. Trachea is midline.   EXTREMITIES: Atraumatic. Full range of motion on both upper and lower extremities, there is no tenderness to palpation, no pedal edema, no cyanosis or clubbing, no calf tenderness, normal cap refill, no joint swelling.  SKIN: Warm and dry, no erythema noted, no rashes or lesions.See HPI  NEUROLOGICAL: The patient is oriented to person, place and time. Strength and sensation are grossly intact. Face is symmetric.    PPE and social distancing provided with assessment  For covid 19 precautions.  LABS:  All labs reviewed in the nursing home record.        DISCHARGE PLAN:  The encounter was in whole, or part related to the primary reason for home health.  The Patient is homebound due to:  it is  taxing and it will take a considerable amount of effort for patient to leave the home.  She is dependent on others for transportation.  The patient is confined to her home and needs intermittent skilled nursing, PT,OT, RN.       Patient to be followed by home care " for physical therapy to eval and treat for strengthening, balance, endurance, and safety with mobility, and ambulation.  Patient to be followed by home care for occupational therapy to eval and treat for strengthening, ADL needs, adaptive equipment, and safety.  Patient to be followed by home care for nursing services for medication set up and teaching, symptom and disease processes monitoring and education.    Patient to be followed by home care for home health aid services for bathing and ADL needs.  Planned discharge.  All therapy goals have been met.  Family will assist with discharge and transportation.        Patient will follow up with PCP within 7- days after discharge for medication mangagment and appropriate lab studies.          Electronically signed by:  Marva Moore CNP  This progress note was completed using Dragon software and there may be grammatical errors.      For documentation purposes, chart review, medication management, and discharge coordination of care was greater than 35 minutes

## 2021-06-30 NOTE — PROGRESS NOTES
Progress Notes by John Garcia DPM at 12/9/2020  3:40 PM     Author: John Garcia DPM Service: -- Author Type: Physician    Filed: 12/10/2020  8:49 AM Encounter Date: 12/9/2020 Status: Signed    : John Garcia DPM (Physician)       FOOT AND ANKLE SURGERY/PODIATRY Progress Note      ASSESSMENT:   Ulceration right foot   DM2      A new wound was identified today: yes,  it is located right foot.  TREATMENT:  -Full thickness dehiscence of the surgical site right foot with non-viable tissue resulting in a large ulceration. I discussed with the patient that in light of the current presentation, I recommend surgical debridement with application of Theraskin. He consents to surgery.    -After discussion of risk factors and consent obtained 2% Lidocaine HCL jelly was applied, under clean conditions, the right and foot ulceration(s) were debrided using #15 blade scalpel.  Devitalized and nonviable tissue, along with any fibrin and slough, was removed to improve granulation tissue formation, stimulate wound healing, decrease overall bacteria load, disrupt biofilm formation and decrease edge senescence.  Total excisional debridement was 27 sq cm into the muscle/fascia with a depth of 0.5 cm.   Ulcers were improved afterwards and .  Measures were as noted on the flow sheet. Patient tolerated this well. A gauze dressing was applied.     -I will ask my office to coordinate surgery at [facility] for either later this week or early next week. Continue non-weight bearing on the right foot while at [facility].     John Garcia DPM  [facility] [place]      HPI: Caleb Hernandez was seen again today s/p I&D with Dr. Hdez on 11/27. He has resided at [facility] and has remained non-weight bearing on his right foot.      Past Medical History:   Diagnosis Date   ? BPH (benign prostatic hyperplasia)    ? Cholelithiasis    ? Common bile duct (CBD) obstruction 9/4/2017   ? Compression Fracture Of  Thoracic Vertebral Body     Created by Conversion  Replacement Utility updated for latest IMO load   ? Diabetes mellitus (H)    ? Essential hypertension    ? Hyperlipidemia    ? Pancreatitis    ? Rib Fracture     Created by Conversion  Replacement Utility updated for latest IMO load   ? Sepsis due to pneumonia (H) 9/8/2017       Past Surgical History:   Procedure Laterality Date   ? BACK SURGERY      1964 removed a cyst   ? CHOLECYSTECTOMY  1985   ? ERCP     ? ERCP N/A 9/5/2017    Procedure: ENDOSCOPIC RETROGRADE CHOLANGIOPANCREATOGRAPHY SPHINCTEROTOMY AND STONE EXTRACTION;  Surgeon: Hansel Gannon MD;  Location: [facility] OR;  Service:    ? INCISION AND DRAINAGE OF WOUND Right 11/2/2020    Procedure: INCISION AND DRAINAGE, right foot with removal of bone 5th metatarsal;  Surgeon: John Garcia DPM;  Location: [facility] OR;  Service: Podiatry   ? INCISION AND DRAINAGE OF WOUND Right 11/16/2020    Procedure: INCISION AND DRAINAGE, right foot;  Surgeon: John Garcia DPM;  Location: [facility] OR;  Service: Podiatry   ? INCISION AND DRAINAGE OF WOUND Right 11/27/2020    Procedure: INCISION AND DRAINAGE, LOWER EXTREMITY;  Surgeon: Aleksandr Hdez DPM;  Location: [facility] OR;  Service: Podiatry   ? IR EXTREMITY ANGIOGRAM RIGHT  11/24/2020   ? PICC  11/30/2020        ? TOE AMPUTATION Right 11/16/2020    Procedure: with amputation of the fifth ray, peroneal brevis tendon transfer;  Surgeon: John Garcia DPM;  Location: [facility] OR;  Service: Podiatry   ? TONSILLECTOMY  1940       Allergies   Allergen Reactions   ? Cresol      Muscle cramps   ? Crestor [Rosuvastatin] Myalgia   ? Declomycin [Demeclocycline] Hives         Current Outpatient Medications:   ?  acetaminophen (TYLENOL) 500 MG tablet, Take 1-2 tablets (500-1,000 mg total) by mouth every 4 (four) hours as needed., Disp: , Rfl: 0  ?  apixaban ANTICOAGULANT (ELIQUIS) 5 mg Tab tablet, Take 1 tablet (5 mg total) by mouth  "2 (two) times a day., Disp: 60 tablet, Rfl: 3  ?  aspirin 81 MG EC tablet, Take 81 mg by mouth daily., Disp: , Rfl:   ?  blood glucose test strips, Use 1 each As Directed 2 (two) times a day. Dispense brand per patient's insurance at pharmacy discretion., Disp: 120 strip, Rfl: 6  ?  blood-glucose meter (ONETOUCH VERIO IQ METER) Misc, Use 1 each As Directed 2 (two) times a day., Disp: 1 each, Rfl: 0  ?  cholecalciferol, vitamin D3, (VITAMIN D3) 5,000 unit Tab, Take 5,000 Units by mouth daily. , Disp: , Rfl:   ?  ertapenem 1 g in NaCl 0.9 % 0.9 % 50 mL IVPB, Infuse 1 g into a venous catheter daily., Disp: 1 each, Rfl: 0  ?  insulin glargine (LANTUS SOLOSTAR U-100 INSULIN) 100 unit/mL (3 mL) pen, Inject 44 Units under the skin at bedtime., Disp: , Rfl:   ?  liraglutide (VICTOZA 3-RUPALI) 0.6 mg/0.1 mL (18 mg/3 mL) injection, Inject 0.3 mL (1.8 mg total) under the skin daily., Disp: , Rfl:   ?  lisinopriL (PRINIVIL,ZESTRIL) 5 MG tablet, Take 1 tablet (5 mg total) by mouth daily., Disp: 90 tablet, Rfl: 3  ?  multivit-min/FA/lycopen/lutein (CENTRUM SILVER MEN ORAL), Take 1 tablet by mouth daily., Disp: , Rfl:   ?  mupirocin (BACTROBAN) 2 % ointment, Apply topically daily as needed. Apply to wound., Disp: , Rfl:   ?  neomycin-bacitracin-polymyxin (NEOSPORIN) ointment, Apply to penis at catheter insertion site three times a day while catheter is in place., Disp: 15 g, Rfl: 0  ?  NOVOLOG FLEXPEN U-100 INSULIN 100 unit/mL (3 mL) injection pen, Inject 3 Units under the skin 3 (three) times a day before meals., Disp: 2.7 mL, Rfl: 0  ?  pen needle, diabetic 31 gauge x 5/16\" Ndle, Used to inject insulin daily, Disp: 90 each, Rfl: 0  ?  polyethylene glycol (MIRALAX) 17 gram packet, Take 1 packet (17 g total) by mouth daily as needed., Disp: , Rfl: 0  ?  tamsulosin (FLOMAX) 0.4 mg cap, Take 1 capsule (0.4 mg total) by mouth daily after supper., Disp: , Rfl: 0  ?  traMADoL (ULTRAM) 50 mg tablet, Take 1 tablet (50 mg total) by mouth " every 6 (six) hours as needed for pain., Disp: 28 tablet, Rfl: 0  ?  vitamin E 400 unit capsule, Take 400 Units by mouth daily., Disp: , Rfl:     Review of Systems - 10 point Review of Systems is negative except for right foot ulcer which is noted in HPI.      OBJECTIVE:  Vitals:    12/09/20 1554   BP: 110/70   Resp: 20   Temp: 97  F (36.1  C)     General appearance: Patient is alert and fully cooperative with history & exam.  No sign of distress is noted during the visit.   Vascular: Dorsalis pedis non-palpableRight.  Dermatologic:    Urethral Catheter Coude 16 Fr. (Active)       Wound 07/24/17 Right lateral foot (Active)   Pre Size Length 2 10/23/20 1300   Pre Size Width 3 10/23/20 1300   Pre Size Depth 0.2 10/23/20 1300   Pre Total Sq cm 6 10/23/20 1300   Post Size Length 2 10/21/20 1400   Post Size Width 1.8 10/21/20 1400   Post Size Depth 0.6 10/21/20 1400   Post Total Sq cm 3.6 10/21/20 1400   Undermined n 10/19/20 1300   Tunneling n 10/19/20 1300   Description pink 10/16/20 1300   Prodcut Used Gauze 10/21/20 1400       Wound 09/05/17 Foot Right (Active)       Incision 11/02/20 Surgical Foot Right (Active)       Incision 11/16/20 Surgical Foot Right (Active)       Incision 11/27/20 Surgical Foot Right (Active)   Full thickness dehiscence of the surgical incision lateral right foot which probes to deep tissues. There is necrotic, non-viable tissue along the distal incision. Ulceration measuring 9x3x0.5cm. No erythema right foot.   Neurologic: Diminished to light touch Right.  Musculoskeletal: Amputated 5th ray right foot.     Imaging:     Mr Forefoot With Without Contrast Right    Result Date: 11/26/2020  EXAM: MR FOREFOOT W WO CONTRAST RIGHT LOCATION: [facility] DATE/TIME: 11/26/2020 5:28 PM INDICATION: Recent fifth ray amputation. Positive cultures. Wound. COMPARISON: 10/22/2020 MRI. TECHNIQUE: Routine. Additional postgadolinium T1 sequences were obtained. IV CONTRAST: Gadavist 7ml  FINDINGS:  SOFT TISSUES JOINTS AND BONES: Interval resection of the fifth ray level of the cuboid. There is ulceration at the proximal margin of the lateral midfoot at the level of the fourth TMT joint. There is a subcutaneous fluid collection tracking along the lateral and dorsal margin of the fourth metatarsal worrisome for an abscess that measures 3.4 cm and RL dimension by 0.9 cm in AP dimension and 6.5 cm in length. There are multiple punctate areas of subcutaneous emphysema lateral to the cuboid and fourth  metatarsal. The ulceration and soft tissue thickening is contiguous with the cuboid and fourth tarsometatarsal joint. There are new areas of synovitis and irregularity in the second, third and fourth tarsometatarsal joints and the navicular medial cuneiform joint contiguous between the medial and middle cuneiform. Although there was osteoarthritis in this distribution on the prior examination the synovitis and osseous changes are worrisome for septic arthritis which has developed along multiple joints of the midfoot. TENDONS: No evidence for proximal tenosynovitis. MUSCLES: Diffuse muscular atrophy and edema.     1.  Interval amputation of the fifth ray. 2.  Ulceration and deep tracking fluid collection along the fourth metatarsal consistent with an abscess. 3.  Additional areas of subcutaneous emphysema along the postoperative bed worrisome for additional soft tissue infection given the postoperative time course. 4.  New synovitis and osseous abnormalities in the second through fourth tarsometatarsal joints and the navicular cuneiform joint worrisome for septic arthritis and osteomyelitis.     Us Naila Doppler No Exercise, 1-2 Levels, Bilateral    Result Date: 11/24/2020  EXAM: RESTING ANKLE-BRACHIAL INDICES (ABIs) LOCATION: [facility] DATE/TIME: 11/24/2020 5:24 PM INDICATION: Follow-up post angiogram and intervention, diabetic foot ulcer, critical limb ischemia. COMPARISON: 10/13/2020.  FINDINGS: RIGHT                                               Brachial: 141 Ankle (PT): 0 Index: 0.00  Ankle (DP): 122 Index: 0.87 Digit: 34 Index: 0.24   LEFT Brachial: -- Ankle (PT): 83 Index: 0.59 Ankle (DP): 90 Index: 0.64 Digit: 0 Index: 0.00 Resting ABIs are 0.87 on the right. Resting ABIs are 0.64 on the left. WAVEFORMS: The dorsalis pedis and posterior tibial arteries are monophasic. DUPLEX ARTERIAL ULTRASOUND FINDINGS: RIGHT LOWER EXTREMITY VELOCITIES (cm/s): EIA: 115 CFA: 101 PFA: 63 SFA (proximal): 101 SFA (mid): 90 SFA (distal): 73 Popliteal: 99 PTA: 0 at the ankle; trickle flow mid calf 20 BREE: 69 DPA: 37 (M=monophasic, B=biphasic, T=triphasic) LEFT LOWER EXTREMITY VELOCITIES (cm/s): EIA: 74 CFA: 98 PFA: 42 SFA (proximal): 57 SFA (mid): 102 SFA (distal): 75 Popliteal: 126 PTA: 16 BREE: 27 DPA: 10 (M=monophasic, B=biphasic, T=triphasic)     1.  RIGHT LOWER EXTREMITY: No significant change in ABIs at the dorsalis pedis artery from prior. No flow demonstrated in posterior tibial artery at the ankle on current study where there was flow noted on prior. Waveforms otherwise unchanged with evidence for trifurcation disease with no inflow stenosis. 2.  LEFT LOWER EXTREMITY: Interval worsening of ABIs when compared to prior. Postobstructive or post high-grade stenotic waveform noted in dorsalis pedis artery and posterior tibial arteries at the ankle, worse when compared to prior. No evidence for an inflow stenosis.    Us Arterial Legs Bilateral Complete    Result Date: 11/24/2020  EXAM: RESTING ANKLE-BRACHIAL INDICES (ABIs) LOCATION: [facility] DATE/TIME: 11/24/2020 5:24 PM INDICATION: Follow-up post angiogram and intervention, diabetic foot ulcer, critical limb ischemia. COMPARISON: 10/13/2020. FINDINGS: RIGHT                                               Brachial: 141 Ankle (PT): 0 Index: 0.00  Ankle (DP): 122 Index: 0.87 Digit: 34 Index: 0.24   LEFT Brachial: -- Ankle (PT): 83 Index: 0.59  "Ankle (DP): 90 Index: 0.64 Digit: 0 Index: 0.00 Resting ABIs are 0.87 on the right. Resting ABIs are 0.64 on the left. WAVEFORMS: The dorsalis pedis and posterior tibial arteries are monophasic. DUPLEX ARTERIAL ULTRASOUND FINDINGS: RIGHT LOWER EXTREMITY VELOCITIES (cm/s): EIA: 115 CFA: 101 PFA: 63 SFA (proximal): 101 SFA (mid): 90 SFA (distal): 73 Popliteal: 99 PTA: 0 at the ankle; trickle flow mid calf 20 BREE: 69 DPA: 37 (M=monophasic, B=biphasic, T=triphasic) LEFT LOWER EXTREMITY VELOCITIES (cm/s): EIA: 74 CFA: 98 PFA: 42 SFA (proximal): 57 SFA (mid): 102 SFA (distal): 75 Popliteal: 126 PTA: 16 BREE: 27 DPA: 10 (M=monophasic, B=biphasic, T=triphasic)     1.  RIGHT LOWER EXTREMITY: No significant change in ABIs at the dorsalis pedis artery from prior. No flow demonstrated in posterior tibial artery at the ankle on current study where there was flow noted on prior. Waveforms otherwise unchanged with evidence for trifurcation disease with no inflow stenosis. 2.  LEFT LOWER EXTREMITY: Interval worsening of ABIs when compared to prior. Postobstructive or post high-grade stenotic waveform noted in dorsalis pedis artery and posterior tibial arteries at the ankle, worse when compared to prior. No evidence for an inflow stenosis.    Poc Us Guidance Needle Placement    Result Date: 11/16/2020  Ultrasound was performed as guidance to an anesthesia procedure.  Click \"PACS images\" hyperlink below to view any stored images.  For specific procedure details, view procedure note authored by anesthesia.    Ct Abdomen Pelvis Without Oral With Without Iv Contrast    Result Date: 12/1/2020  EXAM: CT ABDOMEN PELVIS WO ORAL W WO IV CONTRAST LOCATION: [facility] DATE/TIME: 12/1/2020 7:22 PM INDICATION: Hematuria, unknown cause Gross hematuria COMPARISON: Abdominal CT 9 05/07/2020. Chest CT 05/23/2020 TECHNIQUE: CT scan of the abdomen and pelvis was performed before and after injection of IV contrast. Multiplanar " reformats were obtained. Dose reduction techniques were used. CONTRAST: Iohexol (Omni) 100 mL FINDINGS: LOWER CHEST: Significantly elevated right hemidiaphragm with atelectasis at right lung base similar to chest CT. Mild dependent atelectasis also present left lung base. HEPATOBILIARY: Pneumobilia unchanged compared to most recent CT. Prior cholecystectomy. No liver lesion. PANCREAS: Normal. SPLEEN: Normal. ADRENAL GLANDS: Normal. KIDNEYS/BLADDER: Stable benign left renal cysts. 11 x 9 mm stone right upper pole. No hydronephrosis. Dietrich catheter present with bladder collapsed. However, there is prominent diffuse bladder wall thickening suggesting cystitis. BOWEL: No obstruction or inflammatory change. LYMPH NODES: Normal. VASCULATURE: Unremarkable. PELVIC ORGANS: Normal. MUSCULOSKELETAL: Focal area of soft tissue stranding within subcutaneous tissues anterior of left common iliac artery and vein likely relates to recent vascular catheterization procedure.     1.  Significant irregular bladder wall thickening suggests cystitis. 2.  There is a 11 mm right upper pole kidney stone. No hydronephrosis. 3.  Stable benign left renal cysts.     Ir Lower Extremity Angiogram Right    Result Date: 11/25/2020  [facility] Interventional Radiology vascular surgery procedure Date: 11/25/20 Procedures Performed: Ir Lower Extremity Angiogram Right from the aorta level via left groin approach   Ultrasound-guided vascular access   Angioplasty right anterior tibial artery   Angioplasty right tibioperoneal trunk   Angioplasty right peroneal artery   Selective right leg angiogram with distal most catheter position in greater than third order anterior tibial and peroneal arteries.  Note that these components are generally included within the procedure codes Provider: Lourdes Raymond MD Assistant: Vivian Horner MD, vascular surgery fellow Indication: This is an 84-year-old gentleman who has developed osteomyelitis of  his right fifth toe and metatarsal requiring amputation.  This was performed by podiatry with our support given known toe pressures in January of 112 mmHg and has recently has October at greater than 50 mmHg suggesting adequate blood supply for healing.  Wound has not however healed and he is now readmitted for failure with signs of infection of the foot.  Plan for an intervention to see if blood supply can be improved to allow for wound healing. Sedation: Moderate Sedation: The procedure was performed with administration of intravenous conscious sedation with appropriate preoperative, intraoperative, and postoperative evaluation.  120 minutes of supervised face to face intraservice time was provided by a radiology nurse under my direct supervision.  Versed 3.5 mg and fentanyl 175 mcg given. Antibiotics: Patient already on IV Zosyn Fluoroscopic Time: 28.4 Minutes Radiation Dose: 283 mGy Contrast: 45 cc of Visipaque given Procedure:   Ultrasound was used to evaluate the left groin.  The selected vessel was the left common femoral artery which was widely patent and without significant anterior wall plaque. Ultrasound was then used for real-time ultrasound guided needle entry of the common femoral artery. Permanent images were recorded and saved to the patient's medical record.   Micropuncture technique was used to deliver a 4 Lao sheath.  An Omni Flush catheter was advanced to the lower abdominal aorta and an initial aortogram was performed   Findings: Patent lower abdominal aorta, common iliac arteries, internal iliac arteries, and external iliac arteries without disease.  Patent common femoral arteries without disease.   This point wire catheter advanced over the aortic bifurcation and down to the common femoral level on the right side.  Selective right leg angiography was performed from this level.   Findings: Patent profunda femoris artery and superficial femoral artery.  Trivial stenosis at Jagjit's canal of  less than 20%.  Patent popliteal artery without disease.  Single-vessel peroneal runoff to the foot with significant areas of disease in focal stenosis of likely greater than 80% of the tibioperoneal trunk.  The peroneal artery has multiple areas of diffuse stenoses with some areas as much is 70% stenosed.  Posterior tibial artery is occluded from its origin.  Anterior tibial artery has a very short stump but occludes before the elbow in the vessel.  Appears to reconstitute through a peroneal artery collateral at the ankle.   At this point the patient was heparinized.  Wire was advanced down the SFA and was used to exchange the short 4 Russian sheath for a 90 cm 4 Russian sheath which we advanced the wire down to the popliteal level.  Selective angiography from this level allowed us to roadmap identify the origin of the anterior tibial artery.  Wire catheter were used to engage this stump and we able to advance a wire and catheter around the elbow in the vessel and down the vessel to the ankle.  At this point we exchanged out the wire perform an angiogram.   Findings: True lumen location of a catheter in the distal anterior tibial artery.  There is a small stenosis beyond the catheter.    Nitroglycerin 200 mcg was injected down the anterior tibial artery   We advanced an 014 wire down and through this and then exchanged in a 2 mm x 150 mm angioplasty balloon.  Simple balloon angioplasty with 2 separate inflations was used to treat the entire anterior tibial artery all the way to the popliteal artery.  The first inflation was taken only to 8 mandi of pressure in the second 10 mandi of pressure.  Each inflation was held up for 2 minutes.  An angiogram was then performed.   Findings: Inline flow through the anterior tibial artery.  Persistent significant disease in the more proximal anterior tibial artery which appeared to be secondary to undersizing of the balloon angioplasty.  We exchanged and a 2-1/2 mm x 150 mm  angioplasty balloon performed simple balloon angioplasty of the more proximal segment of anterior tibial artery up to the popliteal artery.  This is another 10 mandi inflation held up for 2 minutes.  A completion angiogram was performed.   Findings: Widely patent intertibial artery with filling into the foot.  It does appear that the peroneal artery continues to be the dominant flow to the majority of the foot despite having stenosis within it.   We now readvanced a wire and balloon catheter down the tibioperoneal trunk and peroneal artery positioning it in the distal third of the peroneal artery and moving upwards to treat all the area of disease.  The wire was removed from the balloon and angiogram performed through the balloon catheter to confirm that we were in the true lumen   Findings: True lumen location of a catheter in the peroneal artery distally beyond all stenoses.   We now reintroduced a wire performed balloon angioplasty with 2 separate inflations to treat the entire tibioperoneal trunk and peroneal artery.  The first inflation was to 8 mandi of pressure and the more proximal inflation to 10 mandi of pressure.  Each time the balloon was held up for 2 minutes.  A completion angiogram was performed.   Findings: Widely patent flow through the peroneal artery to peroneal trunk with no residual significant stenosis.  Imaging of the foot demonstrates a both the antitibial artery and peroneal artery contribute to feeling of the foot and that the lateral aspect of the foot at the area of surgery feels very well   At this point wires and catheters of pulled back in the long sheath exchanged for short 4 Burkinan sheath.  Patient was given 10 units of protamine with the plan of removing the sheath once ACT had dropped to below 160. 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 [facility]  "Surgery    Poc Us 3cg Picc Placement Guidance    Result Date: 11/30/2020  Exam was performed as guidance for PICC line insertion.  Click \"PACS images\" hyperlink below to view any stored images.  For specific procedure details, view procedure note authored by PICC/Vascular Access Nurse.         Picture:              "

## 2021-06-30 NOTE — PROGRESS NOTES
Progress Notes by John Garcia DPM at 2/3/2021  9:20 AM     Author: John Garcia DPM Service: -- Author Type: Physician    Filed: 2/3/2021 10:09 AM Encounter Date: 2/3/2021 Status: Signed    : John Garcia DPM (Physician)       FOOT AND ANKLE SURGERY/PODIATRY Progress Note      ASSESSMENT:   Ulceration right foot   DM2      TREATMENT:  -The right foot ulceration continues to slowly improve. I recommend he continue with non-weight bearing and medihoney.    -After discussion of risk factors and consent obtained 2% Lidocaine HCL jelly was applied, under clean conditions, the right and foot ulceration(s) were debrided using #15 blade scalpel.  Devitalized and nonviable tissue, along with any fibrin and slough, was removed to improve granulation tissue formation, stimulate wound healing, decrease overall bacteria load, disrupt biofilm formation and decrease edge senescence.  Total excisional debridement was 18.7 sq cm into the subcutaneous tissue with a depth of 0.3 cm.   Ulcers were improved afterwards and .  Measures were as noted on the flow sheet. Patient tolerated this well. Medi-honey with a gauze dressing was applied.    -He will follow-up in 3 weeks.    John Garcia DPM  [facility] [place]      HPI: Caleb Hernandez was seen again today right foot ulceration. He has been using medihoney as directed. He has returned home and is using the knee walker to remain non-weight bearing.       Past Medical History:   Diagnosis Date   ? Abscess of right foot 11/23/2020    Added automatically from request for surgery [id number]   ? Acute pulmonary embolism with acute cor pulmonale (H) 5/23/2020   ? BPH (benign prostatic hyperplasia)    ? Cholelithiasis    ? Common bile duct (CBD) obstruction 9/4/2017   ? Compression Fracture Of Thoracic Vertebral Body     Created by Conversion  Replacement Utility updated for latest IMO load   ? Diabetes mellitus (H)    ? Essential hypertension    ?  Hyperlipidemia    ? Pancreatitis    ? Rib Fracture     Created by Conversion  Replacement Utility updated for latest IMO load   ? Sepsis due to pneumonia (H) 9/8/2017       Past Surgical History:   Procedure Laterality Date   ? BACK SURGERY      1964 removed a cyst   ? CHOLECYSTECTOMY  1985   ? ERCP     ? ERCP N/A 9/5/2017    Procedure: ENDOSCOPIC RETROGRADE CHOLANGIOPANCREATOGRAPHY SPHINCTEROTOMY AND STONE EXTRACTION;  Surgeon: Hansel Gannon MD;  Location: [facility];  Service:    ? INCISION AND DRAINAGE OF WOUND Right 11/2/2020    Procedure: INCISION AND DRAINAGE, right foot with removal of bone 5th metatarsal;  Surgeon: John Garcia DPM;  Location: [facility] OR;  Service: Podiatry   ? INCISION AND DRAINAGE OF WOUND Right 11/16/2020    Procedure: INCISION AND DRAINAGE, right foot;  Surgeon: John Garcia DPM;  Location: [facility] OR;  Service: Podiatry   ? INCISION AND DRAINAGE OF WOUND Right 11/27/2020    Procedure: INCISION AND DRAINAGE, LOWER EXTREMITY;  Surgeon: Aleksandr Hdez DPM;  Location: [facility] OR;  Service: Podiatry   ? IR EXTREMITY ANGIOGRAM RIGHT  11/24/2020   ? PICC  11/30/2020        ? TOE AMPUTATION Right 11/16/2020    Procedure: with amputation of the fifth ray, peroneal brevis tendon transfer;  Surgeon: John Garcia DPM;  Location: [facility];  Service: Podiatry   ? TONSILLECTOMY  1940       Allergies   Allergen Reactions   ? Cresol      Muscle cramps   ? Crestor [Rosuvastatin] Myalgia   ? Declomycin [Demeclocycline] Hives         Current Outpatient Medications:   ?  acetaminophen (TYLENOL) 500 MG tablet, Take 1-2 tablets (500-1,000 mg total) by mouth every 4 (four) hours as needed., Disp: , Rfl: 0  ?  apixaban ANTICOAGULANT (ELIQUIS) 5 mg Tab tablet, Take 1 tablet (5 mg total) by mouth 2 (two) times a day., Disp: 60 tablet, Rfl: 3  ?  aspirin 81 MG EC tablet, Take 81 mg by mouth daily., Disp: , Rfl:   ?  blood glucose test strips, Use 1  "each As Directed 2 (two) times a day. Dispense brand per patient's insurance at pharmacy discretion., Disp: 120 strip, Rfl: 6  ?  blood-glucose meter (ONETOUCH VERIO IQ METER) Misc, Use 1 each As Directed 2 (two) times a day., Disp: 1 each, Rfl: 0  ?  cholecalciferol, vitamin D3, 5,000 unit Tab, Take by mouth., Disp: , Rfl:   ?  insulin glargine (LANTUS SOLOSTAR U-100 INSULIN) 100 unit/mL (3 mL) pen, Inject 30 Units under the skin at bedtime. Takes 15 units q am and 30 units Q HS (Patient taking differently: Inject 30 Units under the skin at bedtime. Takes 18 units q am and 35 units Q HS), Disp: , Rfl:   ?  lidocaine (XYLOCAINE) 2 % jelly, Apply topically as needed. As needed prior to debridement, Disp: , Rfl:   ?  liraglutide (VICTOZA 3-RUPALI) 0.6 mg/0.1 mL (18 mg/3 mL) injection, Inject 0.3 mL (1.8 mg total) under the skin daily., Disp: , Rfl:   ?  multivit-min/FA/lycopen/lutein (CENTRUM SILVER MEN ORAL), Take 1 tablet by mouth daily., Disp: , Rfl:   ?  mupirocin (BACTROBAN) 2 % ointment, Apply topically daily as needed. Apply to wound., Disp: , Rfl:   ?  neomycin-bacitracin-polymyxin (NEOSPORIN) ointment, Apply to penis at catheter insertion site three times a day while catheter is in place., Disp: 15 g, Rfl: 0  ?  oxyCODONE (ROXICODONE) 5 MG immediate release tablet, Take 1 tablet (5 mg total) by mouth every 6 (six) hours as needed for pain., Disp: 30 tablet, Rfl: 0  ?  pen needle, diabetic 31 gauge x 5/16\" Ndle, Used to inject insulin daily, Disp: 90 each, Rfl: 0  ?  phenazopyridine (PYRIDIUM) 100 MG tablet, Take 100 mg by mouth 3 (three) times a day as needed for pain., Disp: , Rfl:   ?  polyethylene glycol (MIRALAX) 17 gram packet, Take 1 packet (17 g total) by mouth daily as needed., Disp: , Rfl: 0  ?  tamsulosin (FLOMAX) 0.4 mg cap, Take 1 capsule (0.4 mg total) by mouth daily after supper., Disp: , Rfl: 0  ?  traMADoL (ULTRAM) 50 mg tablet, Take 50 mg by mouth every 6 (six) hours as needed for pain., Disp: , " Rfl:   ?  vitamin E 200 UNIT capsule, Take 400 Units by mouth daily., Disp: , Rfl:   ?  vitamin E 400 unit capsule, Take 400 Units by mouth daily., Disp: , Rfl:   ?  NOVOLOG FLEXPEN U-100 INSULIN 100 unit/mL (3 mL) injection pen, Inject 3 Units under the skin 3 (three) times a day before meals., Disp: 2.7 mL, Rfl: 0    Review of Systems - 10 point Review of Systems is negative except for right foot ulcer which is noted in HPI.      OBJECTIVE:  Vitals:    02/03/21 0906   BP: 144/66   Pulse: 80   Resp: 24   Temp: 97.3  F (36.3  C)     General appearance: Patient is alert and fully cooperative with history & exam.  No sign of distress is noted during the visit.   Vascular: Dorsalis pedis non-palpableRight.  Dermatologic:    Urethral Catheter Coude 16 Fr. (Active)       Wound 07/24/17 Right lateral foot (Active)   Pre Size Length 2 10/23/20 1300   Pre Size Width 3 10/23/20 1300   Pre Size Depth 0.2 10/23/20 1300   Pre Total Sq cm 6 10/23/20 1300   Post Size Length 2 10/21/20 1400   Post Size Width 1.8 10/21/20 1400   Post Size Depth 0.6 10/21/20 1400   Post Total Sq cm 3.6 10/21/20 1400   Undermined n 10/19/20 1300   Tunneling n 10/19/20 1300   Description pink 10/16/20 1300   Prodcut Used Gauze 10/21/20 1400       VASC Wound 12/09/20 right foot (Active)   Pre Size Length 1.7 02/03/21 0900   Pre Size Width 11 02/03/21 0900   Pre Size Depth 0.8 02/03/21 0900   Pre Total Sq cm 18.7 02/03/21 0900       Wound 09/05/17 Foot Right (Active)       Incision 11/02/20 Surgical Foot Right (Active)       Incision 11/16/20 Surgical Foot Right (Active)       Incision 11/27/20 Surgical Foot Right (Active)       Incision 12/17/20 Surgical Foot Right (Active)   Granular/fibrotic tissue lateral right foot, post-debridement there is increased granular tissue. No erythema right foot.  Neurologic: Diminished to light touch Right.  Musculoskeletal: Contracted digits noted Right.    Imaging:     No results found.       Picture:

## 2021-07-03 NOTE — ADDENDUM NOTE
Addendum Note by Juan José Hernandez CMA at 3/6/2017 10:44 AM     Author: Juan José Hernandez CMA Service: -- Author Type: Certified Medical Assistant    Filed: 3/6/2017 10:44 AM Encounter Date: 3/6/2017 Status: Signed    : Juan José Hernandez CMA (Certified Medical Assistant)    Addended by: JUAN JOSÉ HERNANDEZ on: 3/6/2017 10:44 AM        Modules accepted: Orders

## 2021-07-03 NOTE — ADDENDUM NOTE
Addendum Note by Elian Ibrahim RN at 10/13/2020  2:40 PM     Author: Elian Ibrahim RN Service: -- Author Type: Registered Nurse    Filed: 10/13/2020  4:19 PM Encounter Date: 10/13/2020 Status: Signed    : Elian Ibrahim RN (Registered Nurse)    Addended by: ELIAN IBRAHIM on: 10/13/2020 04:19 PM        Modules accepted: Orders

## 2021-07-03 NOTE — ADDENDUM NOTE
Addendum Note by Sharla Alonso LPN at 3/9/2020  7:40 AM     Author: Sharla Alonso LPN Service: -- Author Type: Licensed Nurse    Filed: 3/9/2020  8:46 AM Encounter Date: 3/9/2020 Status: Signed    : Sharla Alonso LPN (Licensed Nurse)    Addended by: SHARLA ALONSO on: 3/9/2020 08:46 AM        Modules accepted: Orders

## 2021-07-03 NOTE — ADDENDUM NOTE
Addendum Note by Nissa Whipple MD at 12/7/2020  9:23 AM     Author: Nissa Whipple MD Service: -- Author Type: Physician    Filed: 12/7/2020  9:23 AM Date of Service: 12/7/2020  9:23 AM Status: Signed    : Nissa Whipple MD (Physician)       Addendum  created 12/07/20 0923 by Nissa Whipple MD    SmartForm saved

## 2021-07-03 NOTE — ADDENDUM NOTE
Addendum Note by Ines Yan NP at 2/10/2020  7:40 AM     Author: Ines Yan NP Service: -- Author Type: Nurse Practitioner    Filed: 2/10/2020  4:28 PM Encounter Date: 2/10/2020 Status: Signed    : Ines Yan NP (Nurse Practitioner)    Addended by: INES YAN on: 2/10/2020 04:28 PM        Modules accepted: Orders

## 2021-07-03 NOTE — ADDENDUM NOTE
Addendum Note by Poornima Castelan CMA at 4/2/2018  8:01 AM     Author: Poornima Castelan CMA Service: -- Author Type: Certified Medical Assistant    Filed: 4/2/2018  8:01 AM Encounter Date: 4/2/2018 Status: Signed    : Poornima Castelan CMA (Certified Medical Assistant)    Addended by: POORNIMA CASTELAN on: 4/2/2018 08:01 AM        Modules accepted: Orders

## 2021-07-03 NOTE — ADDENDUM NOTE
Addendum Note by Juan José Hernandez CMA at 6/19/2017  8:34 AM     Author: Juan José Hernandez CMA Service: -- Author Type: Certified Medical Assistant    Filed: 6/19/2017  8:34 AM Encounter Date: 6/19/2017 Status: Signed    : Juan José Hernandez CMA (Certified Medical Assistant)    Addended by: JUAN JOSÉ HERNANDEZ on: 6/19/2017 08:34 AM        Modules accepted: Orders

## 2021-07-04 NOTE — ADDENDUM NOTE
Addendum Note by Otis Cortez MD at 5/25/2021 10:20 AM     Author: Otis Cortez MD Service: -- Author Type: Physician    Filed: 5/25/2021  4:27 PM Encounter Date: 5/25/2021 Status: Signed    : Otis Cortez MD (Physician)    Addended by: OTIS CORTEZ on: 5/25/2021 04:27 PM        Modules accepted: Orders

## 2021-07-04 NOTE — LETTER
Letter by Otis Lozano MD at      Author: Otis Lozano MD Service: -- Author Type: --    Filed:  Encounter Date: 5/26/2021 Status: (Other)         Caleb Hernandez  [address]  [place] [address]             May 26, 2021         Dear Mr. Hernandez,    A1c of 7.9 is same as previous measurement.  Diabetes control is not horrible, but not good.  Goal would be to get the A1c less than 7.5.  I would recommend particular attention to dietary discipline, restricting the amount of carbohydrates (starches and sweets).  Lets plan to recheck the A1c in about 4 5 months.    You continue to have a mild degree of anemia with hemoglobin 9.7 (normal greater than 14), which I think is because of the chronic inflammation related to all the problems with your foot.  The elevated platelets is also a clue for inflammatory anemia.  You do not have iron deficiency, since your ferritin level is on the high side, and ferritin is also a marker of inflammation.  Nothing needs to be done about the anemia other than checking your blood cell counts perhaps twice a year.  As your foot improves, hopefully the anemia will also get better.    Resulted Orders   Basic Metabolic Panel   Result Value Ref Range    Sodium 136 136 - 145 mmol/L    Potassium 4.7 3.5 - 5.0 mmol/L    Chloride 99 98 - 107 mmol/L    CO2 26 22 - 31 mmol/L    Anion Gap, Calculation 11 5 - 18 mmol/L    Glucose 111 70 - 125 mg/dL    Calcium 8.9 8.5 - 10.5 mg/dL    BUN 18 8 - 28 mg/dL    Creatinine 0.94 0.70 - 1.30 mg/dL    GFR MDRD Af Amer >60 >60 mL/min/1.73m2    GFR MDRD Non Af Amer >60 >60 mL/min/1.73m2    Narrative    Fasting Glucose reference range is 70-99 mg/dL per  American Diabetes Association (ADA) guidelines.   Glycosylated Hemoglobin A1c   Result Value Ref Range    Hemoglobin A1c 7.9 (H) <=5.6 %   HM2(CBC w/o Differential)   Result Value Ref Range    WBC 7.8 4.0 - 11.0 thou/uL    RBC 3.78 (L) 4.40 - 6.20 mill/uL    Hemoglobin 9.7 (L) 14.0 - 18.0 g/dL     Hematocrit 32.2 (L) 40.0 - 54.0 %    MCV 85 80 - 100 fL    MCH 25.7 (L) 27.0 - 34.0 pg    MCHC 30.1 (L) 32.0 - 36.0 g/dL    RDW 15.1 (H) 11.0 - 14.5 %    Platelets 451 (H) 140 - 440 thou/uL    MPV 8.8 7.0 - 10.0 fL   Ferritin   Result Value Ref Range    Ferritin 860 (H) 27 - 300 ng/mL           Please call with questions or contact us using [employer].    Sincerely,        Electronically signed by Otis Lozano MD

## 2021-07-06 VITALS
HEART RATE: 80 BPM | OXYGEN SATURATION: 96 % | WEIGHT: 172.7 LBS | DIASTOLIC BLOOD PRESSURE: 60 MMHG | TEMPERATURE: 97.8 F | SYSTOLIC BLOOD PRESSURE: 128 MMHG | BODY MASS INDEX: 24.78 KG/M2

## 2021-07-08 ENCOUNTER — RECORDS - HEALTHEAST (OUTPATIENT)
Dept: VASCULAR ULTRASOUND | Facility: CLINIC | Age: 86
End: 2021-07-08

## 2021-07-08 DIAGNOSIS — I73.9 PVD (PERIPHERAL VASCULAR DISEASE) (H): Primary | ICD-10-CM

## 2021-07-08 DIAGNOSIS — R09.89 CAROTID BRUIT: ICD-10-CM

## 2021-07-08 DIAGNOSIS — I73.9 PERIPHERAL VASCULAR DISEASE, UNSPECIFIED (H): ICD-10-CM

## 2021-07-14 PROBLEM — E11.621 DIABETIC ULCER OF RIGHT FOOT ASSOCIATED WITH TYPE 2 DIABETES MELLITUS (H): Status: RESOLVED | Noted: 2017-08-10 | Resolved: 2021-01-22

## 2021-07-14 PROBLEM — N17.9 AKI (ACUTE KIDNEY INJURY) (H): Status: RESOLVED | Noted: 2017-09-05 | Resolved: 2020-10-22

## 2021-07-14 PROBLEM — L97.519 DIABETIC ULCER OF RIGHT FOOT ASSOCIATED WITH TYPE 2 DIABETES MELLITUS (H): Status: RESOLVED | Noted: 2017-08-10 | Resolved: 2021-01-22

## 2021-07-14 PROBLEM — I26.09 ACUTE PULMONARY EMBOLISM WITH ACUTE COR PULMONALE (H): Status: RESOLVED | Noted: 2020-05-23 | Resolved: 2021-01-22

## 2021-08-02 DIAGNOSIS — L97.413 DIABETIC ULCER OF RIGHT MIDFOOT ASSOCIATED WITH TYPE 2 DIABETES MELLITUS, WITH NECROSIS OF MUSCLE (H): ICD-10-CM

## 2021-08-02 DIAGNOSIS — E11.621 DIABETIC ULCER OF RIGHT MIDFOOT ASSOCIATED WITH TYPE 2 DIABETES MELLITUS, WITH NECROSIS OF MUSCLE (H): ICD-10-CM

## 2021-08-03 PROBLEM — J18.9 SEPSIS DUE TO PNEUMONIA (H): Status: RESOLVED | Noted: 2017-09-08 | Resolved: 2020-10-22

## 2021-08-03 PROBLEM — A41.9 SEPSIS DUE TO PNEUMONIA (H): Status: RESOLVED | Noted: 2017-09-08 | Resolved: 2020-10-22

## 2021-08-06 RX ORDER — TRAMADOL HYDROCHLORIDE 50 MG/1
TABLET ORAL
Qty: 30 TABLET | Refills: 0 | Status: SHIPPED | OUTPATIENT
Start: 2021-08-06 | End: 2021-10-02

## 2021-08-06 NOTE — TELEPHONE ENCOUNTER
Routing refill request to provider for review/approval because:  Controlled medication refill request.  Routing to provider's care team.  ___________________________________________________________    Copy of Last Rx:        Last office visit with provider:  5/25/21   ___________________________________________________________      Requested Prescriptions   Pending Prescriptions Disp Refills     traMADol (ULTRAM) 50 MG tablet [Pharmacy Med Name: TRAMADOL HCL 50 MG TABLET] 30 tablet 0     Sig: TAKE 1 TABLET BY MOUTH EVERY 6 HOURS AS NEEDED FOR PAIN       There is no refill protocol information for this order            Jessica Molina RN 08/05/21 8:18 PM

## 2021-08-08 DIAGNOSIS — E11.622 TYPE 2 DIABETES MELLITUS WITH OTHER SKIN ULCER, WITH LONG-TERM CURRENT USE OF INSULIN (H): ICD-10-CM

## 2021-08-08 DIAGNOSIS — Z79.4 TYPE 2 DIABETES MELLITUS WITH OTHER SKIN ULCER, WITH LONG-TERM CURRENT USE OF INSULIN (H): ICD-10-CM

## 2021-08-10 RX ORDER — PEN NEEDLE, DIABETIC 31 GX5/16"
NEEDLE, DISPOSABLE MISCELLANEOUS
Qty: 100 EACH | Refills: 11 | Status: SHIPPED | OUTPATIENT
Start: 2021-08-10 | End: 2023-01-01

## 2021-08-10 NOTE — TELEPHONE ENCOUNTER
"Routing refill request to provider for review/approval because:  Break in medication    Last Written Prescription Date:  6/25/2019  Last Fill Quantity: 90,  # refills: 1   Last office visit provider:  2/23/2021 Dr. Lozano     pen needle, diabetic 31 gauge x 5/16\" Ndle 90 each 0 6/25/2019  No   Sig: Used to inject insulin daily   Sent to pharmacy as: pen needle, diabetic 31 gauge x 5/16\" Ndle   E-Prescribing Status: Receipt confirmed by pharmacy (6/25/2019  2:01 PM CDT         Requested Prescriptions   Pending Prescriptions Disp Refills     B-D U/F 31G X 8 MM insulin pen needle [Pharmacy Med Name: BD UF SHORT PEN NEEDLE [id number]]       Sig: USED TO INJECT INSULIN DAILY       Diabetic Supplies Protocol Failed - 8/8/2021 12:07 PM        Failed - Recent (6 mo) or future (30 days) visit within the authorizing provider's specialty     Patient had office visit in the last 6 months or has a visit in the next 30 days with authorizing provider.  See \"Patient Info\" tab in inbasket, or \"Choose Columns\" in Meds & Orders section of the refill encounter.            Passed - Medication is active on med list        Passed - Patient is 18 years of age or older             Alexus Sarabia RN 08/10/21 5:07 PM  "

## 2021-08-25 ENCOUNTER — OFFICE VISIT (OUTPATIENT)
Dept: INTERNAL MEDICINE | Facility: CLINIC | Age: 86
End: 2021-08-25
Payer: COMMERCIAL

## 2021-08-25 VITALS
HEART RATE: 72 BPM | SYSTOLIC BLOOD PRESSURE: 112 MMHG | HEIGHT: 70 IN | TEMPERATURE: 97.7 F | BODY MASS INDEX: 24.05 KG/M2 | WEIGHT: 168 LBS | DIASTOLIC BLOOD PRESSURE: 72 MMHG | OXYGEN SATURATION: 96 %

## 2021-08-25 DIAGNOSIS — I10 ESSENTIAL HYPERTENSION: ICD-10-CM

## 2021-08-25 DIAGNOSIS — Z79.4 TYPE 2 DIABETES MELLITUS WITH DIABETIC NEUROPATHY, WITH LONG-TERM CURRENT USE OF INSULIN (H): Primary | ICD-10-CM

## 2021-08-25 DIAGNOSIS — D64.9 NORMOCYTIC ANEMIA: ICD-10-CM

## 2021-08-25 DIAGNOSIS — E11.40 TYPE 2 DIABETES MELLITUS WITH DIABETIC NEUROPATHY, WITH LONG-TERM CURRENT USE OF INSULIN (H): Primary | ICD-10-CM

## 2021-08-25 DIAGNOSIS — S98.131A AMPUTATED TOE, RIGHT (H): ICD-10-CM

## 2021-08-25 DIAGNOSIS — I48.0 PAROXYSMAL ATRIAL FIBRILLATION (H): ICD-10-CM

## 2021-08-25 DIAGNOSIS — I73.9 PVD (PERIPHERAL VASCULAR DISEASE) (H): ICD-10-CM

## 2021-08-25 DIAGNOSIS — Z79.4 TYPE 2 DIABETES MELLITUS WITH DIABETIC PERIPHERAL ANGIOPATHY WITHOUT GANGRENE, WITH LONG-TERM CURRENT USE OF INSULIN (H): ICD-10-CM

## 2021-08-25 DIAGNOSIS — E11.51 TYPE 2 DIABETES MELLITUS WITH DIABETIC PERIPHERAL ANGIOPATHY WITHOUT GANGRENE, WITH LONG-TERM CURRENT USE OF INSULIN (H): ICD-10-CM

## 2021-08-25 PROBLEM — E11.621 DIABETIC ULCER OF RIGHT MIDFOOT ASSOCIATED WITH TYPE 2 DIABETES MELLITUS, WITH NECROSIS OF MUSCLE (H): Status: RESOLVED | Noted: 2020-12-09 | Resolved: 2021-08-25

## 2021-08-25 PROBLEM — L97.413 DIABETIC ULCER OF RIGHT MIDFOOT ASSOCIATED WITH TYPE 2 DIABETES MELLITUS, WITH NECROSIS OF MUSCLE (H): Status: RESOLVED | Noted: 2020-12-09 | Resolved: 2021-08-25

## 2021-08-25 PROBLEM — M86.9 OSTEOMYELITIS OF RIGHT FOOT (H): Status: RESOLVED | Noted: 2020-10-23 | Resolved: 2021-08-25

## 2021-08-25 PROBLEM — M00.9: Status: RESOLVED | Noted: 2020-12-02 | Resolved: 2021-08-25

## 2021-08-25 LAB
ANION GAP SERPL CALCULATED.3IONS-SCNC: 9 MMOL/L (ref 5–18)
BUN SERPL-MCNC: 18 MG/DL (ref 8–28)
CALCIUM SERPL-MCNC: 10.1 MG/DL (ref 8.5–10.5)
CHLORIDE BLD-SCNC: 102 MMOL/L (ref 98–107)
CO2 SERPL-SCNC: 28 MMOL/L (ref 22–31)
CREAT SERPL-MCNC: 1.21 MG/DL (ref 0.7–1.3)
ERYTHROCYTE [DISTWIDTH] IN BLOOD BY AUTOMATED COUNT: 15.6 % (ref 10–15)
GFR SERPL CREATININE-BSD FRML MDRD: 55 ML/MIN/1.73M2
GLUCOSE BLD-MCNC: 169 MG/DL (ref 70–125)
HBA1C MFR BLD: 9.1 % (ref 0–5.6)
HCT VFR BLD AUTO: 33.5 % (ref 40–53)
HGB BLD-MCNC: 10.4 G/DL (ref 13.3–17.7)
MCH RBC QN AUTO: 27.5 PG (ref 26.5–33)
MCHC RBC AUTO-ENTMCNC: 31 G/DL (ref 31.5–36.5)
MCV RBC AUTO: 89 FL (ref 78–100)
PLATELET # BLD AUTO: 305 10E3/UL (ref 150–450)
POTASSIUM BLD-SCNC: 4.6 MMOL/L (ref 3.5–5)
RBC # BLD AUTO: 3.78 10E6/UL (ref 4.4–5.9)
SODIUM SERPL-SCNC: 139 MMOL/L (ref 136–145)
WBC # BLD AUTO: 7.7 10E3/UL (ref 4–11)

## 2021-08-25 PROCEDURE — 99214 OFFICE O/P EST MOD 30 MIN: CPT | Performed by: INTERNAL MEDICINE

## 2021-08-25 PROCEDURE — 36415 COLL VENOUS BLD VENIPUNCTURE: CPT | Performed by: INTERNAL MEDICINE

## 2021-08-25 PROCEDURE — 83036 HEMOGLOBIN GLYCOSYLATED A1C: CPT | Performed by: INTERNAL MEDICINE

## 2021-08-25 PROCEDURE — 85027 COMPLETE CBC AUTOMATED: CPT | Performed by: INTERNAL MEDICINE

## 2021-08-25 PROCEDURE — 80048 BASIC METABOLIC PNL TOTAL CA: CPT | Performed by: INTERNAL MEDICINE

## 2021-08-25 RX ORDER — LISINOPRIL 5 MG/1
TABLET ORAL
COMMUNITY
End: 2021-11-05

## 2021-08-25 ASSESSMENT — MIFFLIN-ST. JEOR: SCORE: 1458.29

## 2021-08-25 NOTE — PATIENT INSTRUCTIONS
Follow-up for multiple issues, overall continues to do well since our previous visit of May 25, 2021    His right foot continues to do pretty well.    A foot nurse comes to the house every 3 months for nail care.    Looking at his foot today August 25, 2021, the wound along the lateral aspect of his right foot, where his fifth metatarsal used to be, shows hyperkeratotic skin, and I think he would benefit from some gentle debridement done at home.  I suggested that he soak his foot in lukewarm (not hot) water for about 5 minutes, and then gently scrub with a soft cloth and mild soap.  Do that every other day or so.    He will wear his new custom molded diabetic shoes, and they look good.    As of the end of April 2021, he graduated from home care that was being delivered by [facility] (PT and OT)    I would like to see Mr. Hernandez be more mobile, using his walker, avoid falling because he is on Eliquis anticoagulation.    Today August 25, 2021, will have him stop by the laboratory for CBC to check his hemoglobin, also basic metabolic panel and A1c.    Referral entered to the diabetes education team, because I think he would benefit from some more fine-tuning.    Type 2 diabetes with suboptimal control currently on insulin and Victoza but no metformin  5-  Hemoglobin A1C <=5.6 % 7.9High      Currently on Lantus 44 units at bedtime, at the moment he is not using any mealtime NovoLog.  Also Victoza 1.8 mg injected once a day    He told me that his morning blood sugar was about 200 a little high.  He told me he is getting into some cheese popcorn, and I told him he needs to stay away from the starches and sweets.    Would specify an A1c goal of about 7.5.  If he is running less than 7 and seen occasional low fingersticks, we might back down on his Lantus dose.  For now, continue on Lantus 44 units a day, Victoza, no short acting insulin.    Pain control  Will continue to use tramadol tablets, which he  averages maybe 1 a day.  I told the tramadol, being an opioid, can be constipating.  Therefore he needs to use his MiraLAX powder to keep stools soft.    Status post excision of right fifth metatarsal because of osteomyelitis on November 2, 2020, wound cultures with Bacteroides and Enterobacter.  Status post skin grafting to the lateral right foot.  Underwent incision and drainage of abscess November 27, 2020, wound culture growing corynebacterium.  He was treated with vancomycin and Zosyn, then meropenem, then transition to outpatient intravenous ertapenem for week course, was completed January 11, 2020.  Returned home after being discharged from the TCU at [facility] on February 2, 2021    Urinary retention with hematuria, Dietrich catheter out January 22, 2021, he told me that his bladder is working better, but still has incontinence, and uses absorptive undergarment.  The Dietrich catheter was placed after angiogram because of acute urinary retention.  He developed hematuria November 30, 2020  CT scan abdomen pelvis with irregular bladder wall thickening suggestive of cystitis.  Noted to have nonobstructing 11 mm right upper pole kidney stone but no hydronephrosis.  He is currently on Flomax (tamsulosin) and I want him to stay on that.    Peripheral vascular disease.  November 24, 2020 he had right lower extremity angiogram with balloon angioplasty of anterior tibial and peroneal arteries.  However postoperative ankle-brachial index did not improve significantly, although vascular surgery thought that was because of vasospasm.  He needs to follow-up with vascular surgery, and will have a repeat arterial Doppler ultrasound.    7-8-2021 Ultrasound  Right Lower Extremity: Patent vasculature to the distal superficial femoral artery with triphasic flow. Transition to monophasic flow in the popliteal artery. Occluded posterior tibial artery.  Patent anterior tibial and dorsalis pedis arteries.  Left Lower Extremity:  Occluded posterior tibial artery.  Patent anterior tibial and dorsalis pedis arteries.    Chronic right leg and foot lymphedema, probably related to vascular insufficiency both arterial and venous, I reminded him to elevate his leg to help the drainage     Adult failure to thrive-- doing better.  Wt Readings from Last 3 Encounters:   08/25/21 76.2 kg (168 lb)   05/25/21 78.3 kg (172 lb 11.2 oz)   05/14/21 79.4 kg (175 lb)     Essential hypertension, apparently no longer taking lisinopril 5 mg a day.    5-  Urea Nitrogen 8 - 28 mg/dL 18      Creatinine 0.70 - 1.30 mg/dL 0.94    GFR Estimate If Black >60 mL/min/1.73m2 >60    GFR Estimate >60 mL/min/1.73m2 >60      Normocytic anemia anemia of chronic disease and postinflammatory effects  5-   Hemoglobin 14.0 - 18.0 g/dL 9.7Low       Peripheral vascular disease with reduced SRIKANTH indices in both feet, but no focal stenosis on arterial ultrasound study.      History of acute pulmonary embolism and cor pulmonale, was unprovoked, diagnosed May 2020, has been on anticoagulation ever since with Eliquis which he will continue long-term.  He seems to be tolerating the Eliquis just fine.  He is on concomitant baby aspirin which I told him does increase the risk for bleeding when combined with Eliquis.  But I think the combination is appropriate for him since he has peripheral vascular disease.    Paroxysmal atrial fibrillation, and history of pulmonary embolism, on anticoagulation with Eliquis for those reasons.    Hyperlipidemia in the context of diabetes, with history of intolerance to statins, LDL cholesterol was 126 when measured July 22, 2020.      Constipation, component of drug-induced from tramadol, I told him its okay to use MiraLAX 1 capful even daily, dissolved in liquid.    Second Covid shot February 22, 2021    I reminded him to get his flu shot this autumn, should be available in September

## 2021-08-25 NOTE — PROGRESS NOTES
Office Visit - Follow Up   Caleb Hernandez   85 year old male    Date of Visit: 8/25/2021    Chief Complaint   Patient presents with     RECHECK     3 months- check right foot        -------------------------------------------------------------------------------------------------------------------------  Assessment and Plan    Follow-up for multiple issues, overall continues to do well since our previous visit of May 25, 2021    His right foot continues to do pretty well.    A foot nurse comes to the house every 3 months for nail care.    Looking at his foot today August 25, 2021, the wound along the lateral aspect of his right foot, where his fifth metatarsal used to be, shows hyperkeratotic skin, and I think he would benefit from some gentle debridement done at home.  I suggested that he soak his foot in lukewarm (not hot) water for about 5 minutes, and then gently scrub with a soft cloth and mild soap.  Do that every other day or so.    He will wear his new custom molded diabetic shoes, and they look good.    As of the end of April 2021, he graduated from home care that was being delivered by [facility] (PT and OT)    I would like to see Mr. Hernandez be more mobile, using his walker, avoid falling because he is on Eliquis anticoagulation.    Today August 25, 2021, will have him stop by the laboratory for CBC to check his hemoglobin, also basic metabolic panel and A1c.    Referral entered to the diabetes education team, because I think he would benefit from some more fine-tuning.    Type 2 diabetes with suboptimal control currently on insulin and Victoza but no metformin  5-  Hemoglobin A1C <=5.6 % 7.9High      Currently on Lantus 44 units at bedtime, at the moment he is not using any mealtime NovoLog.  Also Victoza 1.8 mg injected once a day    He told me that his morning blood sugar was about 200 a little high.  He told me he is getting into some cheese popcorn, and I told him he needs to stay away  from the starches and sweets.    Would specify an A1c goal of about 7.5.  If he is running less than 7 and seen occasional low fingersticks, we might back down on his Lantus dose.  For now, continue on Lantus 44 units a day, Victoza, no short acting insulin.    Pain control  Will continue to use tramadol tablets, which he averages maybe 1 a day.  I told the tramadol, being an opioid, can be constipating.  Therefore he needs to use his MiraLAX powder to keep stools soft.    Status post excision of right fifth metatarsal because of osteomyelitis on November 2, 2020, wound cultures with Bacteroides and Enterobacter.  Status post skin grafting to the lateral right foot.  Underwent incision and drainage of abscess November 27, 2020, wound culture growing corynebacterium.  He was treated with vancomycin and Zosyn, then meropenem, then transition to outpatient intravenous ertapenem for week course, was completed January 11, 2020.  Returned home after being discharged from the TCU at [facility] on February 2, 2021    Urinary retention with hematuria, Dietrich catheter out January 22, 2021, he told me that his bladder is working better, but still has incontinence, and uses absorptive undergarment.  The Dietrich catheter was placed after angiogram because of acute urinary retention.  He developed hematuria November 30, 2020  CT scan abdomen pelvis with irregular bladder wall thickening suggestive of cystitis.  Noted to have nonobstructing 11 mm right upper pole kidney stone but no hydronephrosis.  He is currently on Flomax (tamsulosin) and I want him to stay on that.    Peripheral vascular disease.  November 24, 2020 he had right lower extremity angiogram with balloon angioplasty of anterior tibial and peroneal arteries.  However postoperative ankle-brachial index did not improve significantly, although vascular surgery thought that was because of vasospasm.  He needs to follow-up with vascular surgery, and will have a repeat  arterial Doppler ultrasound.    7-8-2021 Ultrasound  Right Lower Extremity: Patent vasculature to the distal superficial femoral artery with triphasic flow. Transition to monophasic flow in the popliteal artery. Occluded posterior tibial artery.  Patent anterior tibial and dorsalis pedis arteries.  Left Lower Extremity: Occluded posterior tibial artery.  Patent anterior tibial and dorsalis pedis arteries.    Chronic right leg and foot lymphedema, probably related to vascular insufficiency both arterial and venous, I reminded him to elevate his leg to help the drainage     Adult failure to thrive-- doing better.  Wt Readings from Last 3 Encounters:   08/25/21 76.2 kg (168 lb)   05/25/21 78.3 kg (172 lb 11.2 oz)   05/14/21 79.4 kg (175 lb)     Essential hypertension, apparently no longer taking lisinopril 5 mg a day.    5-  Urea Nitrogen 8 - 28 mg/dL 18      Creatinine 0.70 - 1.30 mg/dL 0.94    GFR Estimate If Black >60 mL/min/1.73m2 >60    GFR Estimate >60 mL/min/1.73m2 >60      Normocytic anemia anemia of chronic disease and postinflammatory effects  5-   Hemoglobin 14.0 - 18.0 g/dL 9.7Low       Peripheral vascular disease with reduced SRIKANTH indices in both feet, but no focal stenosis on arterial ultrasound study.      History of acute pulmonary embolism and cor pulmonale, was unprovoked, diagnosed May 2020, has been on anticoagulation ever since with Eliquis which he will continue long-term.  He seems to be tolerating the Eliquis just fine.  He is on concomitant baby aspirin which I told him does increase the risk for bleeding when combined with Eliquis.  But I think the combination is appropriate for him since he has peripheral vascular disease.    Paroxysmal atrial fibrillation, and history of pulmonary embolism, on anticoagulation with Eliquis for those reasons.    Hyperlipidemia in the context of diabetes, with history of intolerance to statins, LDL cholesterol was 126 when measured July 22, 2020.       Constipation, component of drug-induced from tramadol, I told him its okay to use MiraLAX 1 capful even daily, dissolved in liquid.    Second Covid shot February 22, 2021    I reminded him to get his flu shot this autumn, should be available in September    Immunization History   Administered Date(s) Administered     COVID-19,PF,Moderna 01/25/2021, 02/22/2021     Pneumo Conj 13-V (2010&after) 01/01/2016, 10/22/2020     Pneumococcal 23 valent 09/29/2003   --------------------------------------------------------------------------------------------------------------------------  History of Present Illness  This 85 year old old Follow-up for multiple issues, overall continues to do well since our previous visit of May 25, 2021    His right foot continues to do pretty well.    A foot nurse comes to the house every 3 months for nail care.    Looking at his foot today August 25, 2021, the wound along the lateral aspect of his right foot, where his fifth metatarsal used to be, shows hyperkeratotic skin, and I think he would benefit from some gentle debridement done at home.  I suggested that he soak his foot in lukewarm (not hot) water for about 5 minutes, and then gently scrub with a soft cloth and mild soap.  Do that every other day or so.    He will wear his new custom molded diabetic shoes, and they look good.    As of the end of April 2021, he graduated from home care that was being delivered by [facility] (PT and OT)    I would like to see Mr. Hernandez be more mobile, using his walker, avoid falling because he is on Eliquis anticoagulation.    Today August 25, 2021, will have him stop by the laboratory for CBC to check his hemoglobin, also basic metabolic panel and A1c.    Referral entered to the diabetes education team, because I think he would benefit from some more fine-tuning.      Wt Readings from Last 3 Encounters:   08/25/21 76.2 kg (168 lb)   05/25/21 78.3 kg (172 lb 11.2 oz)   05/14/21 79.4 kg  (175 lb)     BP Readings from Last 3 Encounters:   08/25/21 112/72   07/08/21 126/70   05/25/21 128/60       Lab Results   Component Value Date    WBC 7.8 05/25/2021    HGB 9.7 (L) 05/25/2021    HCT 32.2 (L) 05/25/2021     (H) 05/25/2021    CHOL 178 07/22/2020    TRIG 60 07/22/2020    HDL 40 07/22/2020    ALT 13 01/16/2021    AST 15 01/16/2021     05/25/2021    BUN 18 05/25/2021    CO2 26 05/25/2021    PSA 5.8 01/23/2017    INR 1.23 (H) 11/27/2020        ---------------------------------------------------------------------------------------------------------------------------    Medications, Allergies, Social, and Problem List   Current Outpatient Medications   Medication Sig Dispense Refill     acetaminophen (TYLENOL) 500 MG tablet [ACETAMINOPHEN (TYLENOL) 500 MG TABLET] Take 1-2 tablets (500-1,000 mg total) by mouth every 4 (four) hours as needed.  0     aspirin 81 MG EC tablet [ASPIRIN 81 MG EC TABLET] Take 81 mg by mouth daily.       B-D U/F 31G X 8 MM insulin pen needle USED TO INJECT INSULIN DAILY 100 each 11     blood glucose test strips [BLOOD GLUCOSE TEST STRIPS] Test two times daily. Dispense brand per patient's insurance at pharmacy discretion. 200 strip 11     blood-glucose meter (ONETOUCH VERIO IQ METER) Misc [BLOOD-GLUCOSE METER (ONETOUCH VERIO IQ METER) MISC] Use 1 each As Directed 2 (two) times a day. 1 each 0     cholecalciferol, vitamin D3, 5,000 unit Tab [CHOLECALCIFEROL, VITAMIN D3, 5,000 UNIT TAB] Take by mouth.       ELIQUIS 5 mg Tab tablet [ELIQUIS 5 MG TAB TABLET] TAKE 1 TABLET BY MOUTH TWICE A DAY 60 tablet 3     insulin glargine (LANTUS SOLOSTAR) 100 UNIT/ML pen Inject 44 Units Subcutaneous At Bedtime 90 mL 5     lisinopril (ZESTRIL) 5 MG tablet lisinopril 5 mg tablet   TAKE 1 TABLET BY MOUTH EVERY DAY       multivit-min/FA/lycopen/lutein (CENTRUM SILVER MEN ORAL) [MULTIVIT-MIN/FA/LYCOPEN/LUTEIN (CENTRUM SILVER MEN ORAL)] Take 1 tablet by mouth daily.       mupirocin  (BACTROBAN) 2 % ointment [MUPIROCIN (BACTROBAN) 2 % OINTMENT] Apply topically daily as needed. Apply to wound.       polyethylene glycol (MIRALAX) 17 gram packet [POLYETHYLENE GLYCOL (MIRALAX) 17 GRAM PACKET] Take 1 packet (17 g total) by mouth daily as needed. 100 packet 3     tamsulosin (FLOMAX) 0.4 mg cap [TAMSULOSIN (FLOMAX) 0.4 MG CAP] Take 1 capsule (0.4 mg total) by mouth daily after supper. 90 capsule 3     traMADol (ULTRAM) 50 MG tablet TAKE 1 TABLET BY MOUTH EVERY 6 HOURS AS NEEDED FOR PAIN 30 tablet 0     VICTOZA 3-RUPALI 0.6 mg/0.1 mL (18 mg/3 mL) injection [VICTOZA 3-RUPALI 0.6 MG/0.1 ML (18 MG/3 ML) INJECTION] INJECT 1.2 MG UNDER THE SKIN ONCE DAILY 12 mL 11     vitamin E 400 unit capsule [VITAMIN E 400 UNIT CAPSULE] Take 400 Units by mouth daily.       Allergies   Allergen Reactions     Cresol [Phenol] Unknown     Muscle cramps     Demeclocycline Hives and Rash     Crestor [Rosuvastatin] Muscle Pain (Myalgia)     Social History     Tobacco Use     Smoking status: Former Smoker     Quit date: 1991     Years since quittin.8     Smokeless tobacco: Never Used   Substance Use Topics     Alcohol use: No     Drug use: No     Patient Active Problem List   Diagnosis     Hyperlipidemia     Essential hypertension     Statin intolerance     Personal history of PE (pulmonary embolism)-- May 2020, unprovoked, with right heart strain     PVD (peripheral vascular disease) (H)     Overweight (BMI 25.0-29.9)     Chronic anticoagulation     Osteomyelitis of right foot (H)     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 (H)     Septic arthritis of right foot (H)     Gross hematuria     Urinary retention     Type 2 diabetes mellitus with diabetic peripheral angiopathy without gangrene, with long-term current use of insulin (H)     Adult failure to thrive     Normocytic anemia     Paroxysmal atrial fibrillation (H)     ACP (advance care planning)     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    /72 (BP Location: Right arm, Patient Position: Sitting)   Pulse 72   Temp 97.7  F (36.5  C)   Ht 1.778 m (5' 10\")   Wt 76.2 kg (168 lb)   SpO2 96%   BMI 24.11 kg/m      Lungs clear  Heart regular rate and rhythm  Abdomen nontender  Chronic right lower extremity lymphedema, the wound over the  lateral aspect of his foot seems to be well-healed but there is a lot of hyperkeratotic overgrowth.     Additional Information   I spent 30 minutes on this encounter, including reviewing interval history since last visit, examining the patient, explaining and counseling the issues enumerated in the Assessment and Plan (patient given a copy)       DALE CORTEZ MD, MD    "

## 2021-08-26 DIAGNOSIS — E11.51 TYPE 2 DIABETES MELLITUS WITH DIABETIC PERIPHERAL ANGIOPATHY WITHOUT GANGRENE, WITH LONG-TERM CURRENT USE OF INSULIN (H): Primary | ICD-10-CM

## 2021-08-26 DIAGNOSIS — Z79.4 TYPE 2 DIABETES MELLITUS WITH DIABETIC PERIPHERAL ANGIOPATHY WITHOUT GANGRENE, WITH LONG-TERM CURRENT USE OF INSULIN (H): Primary | ICD-10-CM

## 2021-08-26 RX ORDER — METFORMIN HCL 500 MG
500 TABLET, EXTENDED RELEASE 24 HR ORAL
Qty: 90 TABLET | Refills: 3 | Status: SHIPPED | OUTPATIENT
Start: 2021-08-26 | End: 2021-09-21

## 2021-08-26 NOTE — TELEPHONE ENCOUNTER
----- Message from Otis Lozano MD sent at 8/26/2021  8:40 AM CDT -----  Please give him a quick phone call to tell him to read My Chart Message regarding diabetes and starting Metformin

## 2021-08-26 NOTE — TELEPHONE ENCOUNTER
I called patient and read the [employer] message to him. He will start Metformin and follow up 11/24/21. He would like to have a continuous glucose monitor sent into the pharmacy for him. Pt would like a call when this is completed.

## 2021-09-21 ENCOUNTER — OFFICE VISIT (OUTPATIENT)
Dept: VASCULAR SURGERY | Facility: CLINIC | Age: 86
End: 2021-09-21
Attending: PODIATRIST
Payer: COMMERCIAL

## 2021-09-21 VITALS
TEMPERATURE: 98 F | HEART RATE: 80 BPM | SYSTOLIC BLOOD PRESSURE: 132 MMHG | RESPIRATION RATE: 18 BRPM | DIASTOLIC BLOOD PRESSURE: 68 MMHG

## 2021-09-21 DIAGNOSIS — L03.119 CELLULITIS AND ABSCESS OF FOOT EXCLUDING TOE: Primary | ICD-10-CM

## 2021-09-21 DIAGNOSIS — L97.411 DIABETIC ULCER OF RIGHT MIDFOOT ASSOCIATED WITH TYPE 2 DIABETES MELLITUS, LIMITED TO BREAKDOWN OF SKIN (H): ICD-10-CM

## 2021-09-21 DIAGNOSIS — L02.619 CELLULITIS AND ABSCESS OF FOOT EXCLUDING TOE: Primary | ICD-10-CM

## 2021-09-21 DIAGNOSIS — E11.621 DIABETIC ULCER OF RIGHT MIDFOOT ASSOCIATED WITH TYPE 2 DIABETES MELLITUS, LIMITED TO BREAKDOWN OF SKIN (H): ICD-10-CM

## 2021-09-21 PROCEDURE — 11042 DBRDMT SUBQ TIS 1ST 20SQCM/<: CPT | Performed by: PODIATRIST

## 2021-09-21 PROCEDURE — 99213 OFFICE O/P EST LOW 20 MIN: CPT | Mod: 25 | Performed by: PODIATRIST

## 2021-09-21 PROCEDURE — G0463 HOSPITAL OUTPT CLINIC VISIT: HCPCS | Mod: 25

## 2021-09-21 RX ORDER — CEPHALEXIN 500 MG/1
500 CAPSULE ORAL 3 TIMES DAILY
Qty: 30 CAPSULE | Refills: 0 | Status: SHIPPED | OUTPATIENT
Start: 2021-09-21 | End: 2021-11-24

## 2021-09-21 ASSESSMENT — PAIN SCALES - GENERAL: PAINLEVEL: NO PAIN (0)

## 2021-09-21 NOTE — PATIENT INSTRUCTIONS
Limited walking on right foot.    Keep right foot dry.    Start endoform on right foot:  Endoform Dressing Application     1. Endoform should be cut to the size of the wound.  It should touch the edges of the ulcer.  It can overlap the edges just very small amount.  Then, moisten with saline. (If it is easier for you, you can moisten it before laying it in the wound)    2. Cover the top of the wound with dry gauze.  Secure with roll gauze and paper tape.  No tape should be directly onto skin.    3. Endoform is naturally used by the body over time so you may not find it in the wound when you change your dressing.  If you do find some, gently cleanse the wound with saline. Do not remove the remaining Endoform, which may appear as an off-white to lanza gel, just add Endoform on top.  Usually, more Endoform will need to be added every 4 days.     4.  Change your top dressing every 4 days, may increase frequency depending on drainage.     -Endoform is a collagen dressing created from ovine (sheep) fore-stomach tissue. The collagen extracellular matrix transforms into a soft conforming sheet, which is naturally incorporated into the wound over time.  Collagen dressings are used to stimulate wound healing.       It is not ok to get your wound wet     Call the clinic for any questions regarding your wound or cares and if you experience signs or symptoms of infection including: fevers, chills, increased redness, increased drainage, foul odor, increased pain at [phone].

## 2021-09-21 NOTE — PROGRESS NOTES
FOOT AND ANKLE SURGERY/PODIATRY Progress Note      ASSESSMENT:   Cellulitis right foot  Diabetic Ulceration right foot     A new wound was identified today: yes,  it is located right foot.    TREATMENT:  -There is localized erythema with maceration along the lateral right foot. I will start him on Keflex as a precaution.     -New, small ulceration along the lateral right foot. Does not seem to probe to deep tissues.     -After discussion of risk factors and consent obtained 2% Lidocaine HCL jelly was applied, under clean conditions, the right and foot ulceration(s) were debrided using #15 blade scalpel.  Devitalized and nonviable tissue, along with any fibrin and slough, was removed to improve granulation tissue formation, stimulate wound healing, decrease overall bacteria load, disrupt biofilm formation and decrease edge senescence. Wound drainage was scant No. Total excisional debridement was 0.01 sq cm into the subcutaneous tissue with a depth of 0.2 cm.   Ulcers were improved afterwards and .  Measures were as noted on the flow sheet. Collagen with a gauze dresssing was applied. He will continue to apply Collagen with a gauze dresssing as directed.    -He will follow-up in one week.    John Garcia DPM  [facility]      HPI: Caleb Hernandez was seen again today for right foot discoloration. He reports that he has been soaking the foot and applying a band aid over the past week or so. He has not noticed other concerns since his last visit with me.       Past Medical History:   Diagnosis Date     Abscess of right foot 11/23/2020    Added automatically from request for surgery [id number]     Acute pulmonary embolism with acute cor pulmonale (H) 5/23/2020     BPH (benign prostatic hyperplasia)      Cholelithiasis      Closed fracture of rib     Created by Conversion  Replacement Utility updated for latest IMO load     Closed fracture of thoracic vertebra (H)     Created by Conversion   Replacement Utility updated for latest IMO load     Common bile duct (CBD) obstruction 9/4/2017     Diabetes mellitus (H)      Essential hypertension      Hyperlipidemia      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    Added automatically from request for surgery [id number]      Pancreatitis      Sepsis due to pneumonia (H) 9/8/2017     Septic arthritis of right foot (H) 12/2/2020       Past Surgical History:   Procedure Laterality Date     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    Procedure: with amputation of the fifth ray, peroneal brevis tendon transfer;  Surgeon: John Garcia DPM;  Location: [facility];  Service: Podiatry     BACK SURGERY      1964 removed a cyst     CHOLECYSTECTOMY  1985     ENDOSCOPIC RETROGRADE CHOLANGIOPANCREATOGRAM       ENDOSCOPIC RETROGRADE CHOLANGIOPANCREATOGRAM N/A 9/5/2017    Procedure: ENDOSCOPIC RETROGRADE CHOLANGIOPANCREATOGRAPHY SPHINCTEROTOMY AND STONE EXTRACTION;  Surgeon: Hansel Gannon MD;  Location: [facility] OR;  Service:      INCISION AND DRAINAGE OF WOUND Right 11/2/2020    Procedure: INCISION AND DRAINAGE, right foot with removal of bone 5th metatarsal;  Surgeon: John Garcia DPM;  Location: [facility] OR;  Service: Podiatry     INCISION AND DRAINAGE OF WOUND Right 11/16/2020    Procedure: INCISION AND DRAINAGE, right foot;  Surgeon: John Garcia DPM;  Location: [facility] OR;  Service: Podiatry     INCISION AND DRAINAGE OF WOUND Right 11/27/2020    Procedure: INCISION AND DRAINAGE, LOWER EXTREMITY;  Surgeon: Aleksandr Hdez DPM;  Location: [facility] OR;  Service: Podiatry     IR EXTREMITY ANGIOGRAM RIGHT  11/24/2020     IR LOWER EXTREMITY ANGIOGRAM RIGHT  11/24/2020     PICC  11/30/2020          TONSILLECTOMY  1940       Allergies   Allergen Reactions     Cresol [Phenol] Unknown     Muscle cramps     Demeclocycline Hives and Rash     Crestor [Rosuvastatin] Muscle Pain (Myalgia)         Current Outpatient Medications:       acetaminophen (TYLENOL) 500 MG tablet, [ACETAMINOPHEN (TYLENOL) 500 MG TABLET] Take 1-2 tablets (500-1,000 mg total) by mouth every 4 (four) hours as needed., Disp: , Rfl: 0     aspirin 81 MG EC tablet, [ASPIRIN 81 MG EC TABLET] Take 81 mg by mouth daily., Disp: , Rfl:      B-D U/F 31G X 8 MM insulin pen needle, USED TO INJECT INSULIN DAILY, Disp: 100 each, Rfl: 11     blood glucose test strips, [BLOOD GLUCOSE TEST STRIPS] Test two times daily. Dispense brand per patient's insurance at pharmacy discretion., Disp: 200 strip, Rfl: 11     blood-glucose meter (ONETOUCH VERIO IQ METER) Misc, [BLOOD-GLUCOSE METER (ONETOUCH VERIO IQ METER) MISC] Use 1 each As Directed 2 (two) times a day., Disp: 1 each, Rfl: 0     cephALEXin (KEFLEX) 500 MG capsule, Take 1 capsule (500 mg) by mouth 3 times daily, Disp: 30 capsule, Rfl: 0     cholecalciferol, vitamin D3, 5,000 unit Tab, [CHOLECALCIFEROL, VITAMIN D3, 5,000 UNIT TAB] Take by mouth., Disp: , Rfl:      Continuous Blood Gluc Sensor (FREESTYLE APRIL 2 SENSOR) MISC, 1 each every 14 days 1 each every 14 days. Change every 14 days., Disp: 6 each, Rfl: 5     ELIQUIS 5 mg Tab tablet, [ELIQUIS 5 MG TAB TABLET] TAKE 1 TABLET BY MOUTH TWICE A DAY, Disp: 60 tablet, Rfl: 3     insulin glargine (LANTUS SOLOSTAR) 100 UNIT/ML pen, Inject 44 Units Subcutaneous At Bedtime, Disp: 90 mL, Rfl: 5     lisinopril (ZESTRIL) 5 MG tablet, lisinopril 5 mg tablet  TAKE 1 TABLET BY MOUTH EVERY DAY, Disp: , Rfl:      multivit-min/FA/lycopen/lutein (CENTRUM SILVER MEN ORAL), [MULTIVIT-MIN/FA/LYCOPEN/LUTEIN (CENTRUM SILVER MEN ORAL)] Take 1 tablet by mouth daily., Disp: , Rfl:      mupirocin (BACTROBAN) 2 % ointment, [MUPIROCIN (BACTROBAN) 2 % OINTMENT] Apply topically daily as needed. Apply to wound., Disp: , Rfl:      polyethylene glycol (MIRALAX) 17 gram packet, [POLYETHYLENE GLYCOL (MIRALAX) 17 GRAM PACKET] Take 1 packet (17 g total) by mouth daily as needed., Disp: 100 packet, Rfl: 3     tamsulosin  (FLOMAX) 0.4 mg cap, [TAMSULOSIN (FLOMAX) 0.4 MG CAP] Take 1 capsule (0.4 mg total) by mouth daily after supper., Disp: 90 capsule, Rfl: 3     traMADol (ULTRAM) 50 MG tablet, TAKE 1 TABLET BY MOUTH EVERY 6 HOURS AS NEEDED FOR PAIN, Disp: 30 tablet, Rfl: 0     VICTOZA 3-RUPALI 0.6 mg/0.1 mL (18 mg/3 mL) injection, [VICTOZA 3-RUPALI 0.6 MG/0.1 ML (18 MG/3 ML) INJECTION] INJECT 1.2 MG UNDER THE SKIN ONCE DAILY, Disp: 12 mL, Rfl: 11     vitamin E 400 unit capsule, [VITAMIN E 400 UNIT CAPSULE] Take 400 Units by mouth daily., Disp: , Rfl:     Review of Systems - 10 point Review of Systems is negative except for right foot ulcer which is noted in HPI.      OBJECTIVE:  /68   Pulse 80   Temp 98  F (36.7  C)   Resp 18   General appearance: Patient is alert and fully cooperative with history & exam.  No sign of distress is noted during the visit.    Vascular: Dorsalis pedis non-palpableRight.  Dermatologic:    VASC Wound Lateral right foot (Active)   Post Size Length 0.1 09/21/21 1300   Post Size Width 0.1 09/21/21 1300   Post Size Depth 0.2 09/21/21 1300   Post Total Sq cm 0.01 09/21/21 1300   Ulceration lateral right foot has a granular base, does not probe to deep tissues. Localized erythema to the dorsal lateral right foot. Significant maceration along plantar lateral right foot.   Neurologic: Diminished to light touch Right.  Musculoskeletal: Contracted digits noted Right.    Imaging:     No results found.       Picture:

## 2021-09-28 ENCOUNTER — OFFICE VISIT (OUTPATIENT)
Dept: VASCULAR SURGERY | Facility: CLINIC | Age: 86
End: 2021-09-28
Attending: PODIATRIST
Payer: COMMERCIAL

## 2021-09-28 VITALS — HEART RATE: 72 BPM | TEMPERATURE: 98.4 F | DIASTOLIC BLOOD PRESSURE: 54 MMHG | SYSTOLIC BLOOD PRESSURE: 116 MMHG

## 2021-09-28 DIAGNOSIS — L97.411 DIABETIC ULCER OF RIGHT MIDFOOT ASSOCIATED WITH TYPE 2 DIABETES MELLITUS, LIMITED TO BREAKDOWN OF SKIN (H): Primary | ICD-10-CM

## 2021-09-28 DIAGNOSIS — E11.621 DIABETIC ULCER OF RIGHT MIDFOOT ASSOCIATED WITH TYPE 2 DIABETES MELLITUS, LIMITED TO BREAKDOWN OF SKIN (H): Primary | ICD-10-CM

## 2021-09-28 PROCEDURE — 11042 DBRDMT SUBQ TIS 1ST 20SQCM/<: CPT | Performed by: PODIATRIST

## 2021-09-28 RX ORDER — TRAMADOL HYDROCHLORIDE 50 MG/1
50 TABLET ORAL EVERY 6 HOURS PRN
Qty: 30 TABLET | Refills: 0 | Status: SHIPPED | OUTPATIENT
Start: 2021-09-28 | End: 2021-10-01

## 2021-09-28 NOTE — PROGRESS NOTES
FOOT AND ANKLE SURGERY/PODIATRY Progress Note      ASSESSMENT: Diabetic Ulceration right foot       TREATMENT:  -The right foot ulceration has improved. I recommend he continue to remain limited walking and use endoform.    -After discussion of risk factors and consent obtained 2% Lidocaine HCL jelly was applied, under clean conditions, the right and foot ulceration(s) were debrided using #15 blade scalpel.  Devitalized and nonviable tissue, along with any fibrin and slough, was removed to improve granulation tissue formation, stimulate wound healing, decrease overall bacteria load, disrupt biofilm formation and decrease edge senescence. Wound drainage was scant No. Total excisional debridement was 0.06 sq cm into the subcutaneous tissue with a depth of 0.2 cm.   Ulcers were improved afterwards and .  Measures were as noted on the flow sheet. Collagen with a gauze dresssing was applied. He will continue to apply Collagen with a gauze dresssing as directed. Rx Tramadol.     -He will follow-up in 3 weeks.    John Garcia DPM  [facility] [place]      HPI: Caleb Hernandez was seen again today for a right foot ulceration. He has used endoform as directed. Denies new concerns. He would like tramadol for pain which has been working for him.       Past Medical History:   Diagnosis Date     Abscess of right foot 11/23/2020    Added automatically from request for surgery [id number]     Acute pulmonary embolism with acute cor pulmonale (H) 5/23/2020     BPH (benign prostatic hyperplasia)      Cholelithiasis      Closed fracture of rib     Created by Conversion  Replacement Utility updated for latest IMO load     Closed fracture of thoracic vertebra (H)     Created by Conversion  Replacement Utility updated for latest IMO load     Common bile duct (CBD) obstruction 9/4/2017     Diabetes mellitus (H)      Essential hypertension      Hyperlipidemia      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    Added  automatically from request for surgery [id number]      Pancreatitis      Sepsis due to pneumonia (H) 9/8/2017     Septic arthritis of right foot (H) 12/2/2020       Past Surgical History:   Procedure Laterality Date     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;  Service: Podiatry     BACK SURGERY      1964 removed a cyst     CHOLECYSTECTOMY  1985     ENDOSCOPIC RETROGRADE CHOLANGIOPANCREATOGRAM       ENDOSCOPIC RETROGRADE CHOLANGIOPANCREATOGRAM N/A 9/5/2017    Procedure: ENDOSCOPIC RETROGRADE CHOLANGIOPANCREATOGRAPHY SPHINCTEROTOMY AND STONE EXTRACTION;  Surgeon: Hansel Gannon MD;  Location: [facility] OR;  Service:      INCISION AND DRAINAGE OF WOUND Right 11/2/2020    Procedure: INCISION AND DRAINAGE, right foot with removal of bone 5th metatarsal;  Surgeon: John Garcia DPM;  Location: [facility] OR;  Service: Podiatry     INCISION AND DRAINAGE OF WOUND Right 11/16/2020    Procedure: INCISION AND DRAINAGE, right foot;  Surgeon: John Garcia DPM;  Location: [facility] OR;  Service: Podiatry     INCISION AND DRAINAGE OF WOUND Right 11/27/2020    Procedure: INCISION AND DRAINAGE, LOWER EXTREMITY;  Surgeon: Aleksandr Hdez DPM;  Location: [facility] OR;  Service: Podiatry     IR EXTREMITY ANGIOGRAM RIGHT  11/24/2020     IR LOWER EXTREMITY ANGIOGRAM RIGHT  11/24/2020     PICC  11/30/2020          TONSILLECTOMY  1940       Allergies   Allergen Reactions     Cresol [Phenol] Unknown     Muscle cramps     Demeclocycline Hives and Rash     Crestor [Rosuvastatin] Muscle Pain (Myalgia)         Current Outpatient Medications:      acetaminophen (TYLENOL) 500 MG tablet, [ACETAMINOPHEN (TYLENOL) 500 MG TABLET] Take 1-2 tablets (500-1,000 mg total) by mouth every 4 (four) hours as needed., Disp: , Rfl: 0     aspirin 81 MG EC tablet, [ASPIRIN 81 MG EC TABLET] Take  81 mg by mouth daily., Disp: , Rfl:      B-D U/F 31G X 8 MM insulin pen needle, USED TO INJECT INSULIN DAILY, Disp: 100 each, Rfl: 11     blood glucose test strips, [BLOOD GLUCOSE TEST STRIPS] Test two times daily. Dispense brand per patient's insurance at pharmacy discretion., Disp: 200 strip, Rfl: 11     blood-glucose meter (ONETOUCH VERIO IQ METER) Misc, [BLOOD-GLUCOSE METER (ONETOUCH VERIO IQ METER) MISC] Use 1 each As Directed 2 (two) times a day., Disp: 1 each, Rfl: 0     cephALEXin (KEFLEX) 500 MG capsule, Take 1 capsule (500 mg) by mouth 3 times daily, Disp: 30 capsule, Rfl: 0     cholecalciferol, vitamin D3, 5,000 unit Tab, [CHOLECALCIFEROL, VITAMIN D3, 5,000 UNIT TAB] Take by mouth., Disp: , Rfl:      Continuous Blood Gluc Sensor (FREESTYLE APRIL 2 SENSOR) [facility], 1 each every 14 days 1 each every 14 days. Change every 14 days., Disp: 6 each, Rfl: 5     ELIQUIS 5 mg Tab tablet, [ELIQUIS 5 MG TAB TABLET] TAKE 1 TABLET BY MOUTH TWICE A DAY, Disp: 60 tablet, Rfl: 3     insulin glargine (LANTUS SOLOSTAR) 100 UNIT/ML pen, Inject 44 Units Subcutaneous At Bedtime, Disp: 90 mL, Rfl: 5     lisinopril (ZESTRIL) 5 MG tablet, lisinopril 5 mg tablet  TAKE 1 TABLET BY MOUTH EVERY DAY, Disp: , Rfl:      multivit-min/FA/lycopen/lutein (CENTRUM SILVER MEN ORAL), [MULTIVIT-MIN/FA/LYCOPEN/LUTEIN (CENTRUM SILVER MEN ORAL)] Take 1 tablet by mouth daily., Disp: , Rfl:      mupirocin (BACTROBAN) 2 % ointment, [MUPIROCIN (BACTROBAN) 2 % OINTMENT] Apply topically daily as needed. Apply to wound., Disp: , Rfl:      polyethylene glycol (MIRALAX) 17 gram packet, [POLYETHYLENE GLYCOL (MIRALAX) 17 GRAM PACKET] Take 1 packet (17 g total) by mouth daily as needed., Disp: 100 packet, Rfl: 3     tamsulosin (FLOMAX) 0.4 mg cap, [TAMSULOSIN (FLOMAX) 0.4 MG CAP] Take 1 capsule (0.4 mg total) by mouth daily after supper., Disp: 90 capsule, Rfl: 3     traMADol (ULTRAM) 50 MG tablet, Take 1 tablet (50 mg) by mouth every 6 hours as needed for  severe pain, Disp: 30 tablet, Rfl: 0     traMADol (ULTRAM) 50 MG tablet, TAKE 1 TABLET BY MOUTH EVERY 6 HOURS AS NEEDED FOR PAIN, Disp: 30 tablet, Rfl: 0     VICTOZA 3-RUPALI 0.6 mg/0.1 mL (18 mg/3 mL) injection, [VICTOZA 3-RUPALI 0.6 MG/0.1 ML (18 MG/3 ML) INJECTION] INJECT 1.2 MG UNDER THE SKIN ONCE DAILY, Disp: 12 mL, Rfl: 11     vitamin E 400 unit capsule, [VITAMIN E 400 UNIT CAPSULE] Take 400 Units by mouth daily., Disp: , Rfl:     Review of Systems - 10 point Review of Systems is negative except for right foot ulcer which is noted in HPI.      OBJECTIVE:  /54   Pulse 72   Temp 98.4  F (36.9  C)   General appearance: Patient is alert and fully cooperative with history & exam.  No sign of distress is noted during the visit.    Vascular: Dorsalis pedis non-palpableRight.  Dermatologic:    VASC Wound Lateral right foot (Active)   Post Size Length 0.6 09/28/21 1300   Post Size Width 0.1 09/28/21 1300   Post Size Depth 0.2 09/28/21 1300   Post Total Sq cm 0.06 09/28/21 1300   Granular base lateral right foot ulceration. No erythema right foot.   Neurologic: Diminished to light touch Right.  Musculoskeletal: Contracted digits noted Right.    Imaging:     No results found.       Picture: None

## 2021-09-28 NOTE — PATIENT INSTRUCTIONS
Limited walking on right foot.     Keep right foot dry.      endoform on right foot:  Endoform Dressing Application      1. Endoform should be cut to the size of the wound.  It should touch the edges of the ulcer.  It can overlap the edges just very small amount.  Then, moisten with saline. (If it is easier for you, you can moisten it before laying it in the wound)     2. Cover the top of the wound with dry gauze.  Secure with roll gauze and paper tape.  No tape should be directly onto skin.     3. Endoform is naturally used by the body over time so you may not find it in the wound when you change your dressing.  If you do find some, gently cleanse the wound with saline. Do not remove the remaining Endoform, which may appear as an off-white to lanza gel, just add Endoform on top.  Usually, more Endoform will need to be added every 4 days.      4.  Change your top dressing every 4 days, may increase frequency depending on drainage.      -Endoform is a collagen dressing created from ovine (sheep) fore-stomach tissue. The collagen extracellular matrix transforms into a soft conforming sheet, which is naturally incorporated into the wound over time.  Collagen dressings are used to stimulate wound healing.         It is not ok to get your wound wet     Call the clinic for any questions regarding your wound or cares and if you experience signs or symptoms of infection including: fevers, chills, increased redness, increased drainage, foul odor, increased pain at [phone].

## 2021-10-01 DIAGNOSIS — E11.621 DIABETIC ULCER OF RIGHT MIDFOOT ASSOCIATED WITH TYPE 2 DIABETES MELLITUS, WITH NECROSIS OF MUSCLE (H): ICD-10-CM

## 2021-10-01 DIAGNOSIS — L97.413 DIABETIC ULCER OF RIGHT MIDFOOT ASSOCIATED WITH TYPE 2 DIABETES MELLITUS, WITH NECROSIS OF MUSCLE (H): ICD-10-CM

## 2021-10-02 RX ORDER — TRAMADOL HYDROCHLORIDE 50 MG/1
TABLET ORAL
Qty: 30 TABLET | Refills: 0 | Status: SHIPPED | OUTPATIENT
Start: 2021-10-02 | End: 2021-11-05

## 2021-10-02 NOTE — TELEPHONE ENCOUNTER
Routing refill request to provider for review/approval because:  Controlled substance request    Last Written Prescription Date:  8/6/21  Last Fill Quantity: 30,  # refills: 0   Last office visit provider:  5/25/21     Requested Prescriptions   Pending Prescriptions Disp Refills     traMADol (ULTRAM) 50 MG tablet [Pharmacy Med Name: TRAMADOL HCL 50 MG TABLET] 30 tablet 0     Sig: TAKE 1 TABLET BY MOUTH EVERY 6 HOURS AS NEEDED FOR PAIN       There is no refill protocol information for this order          jimmie callejas RN 10/02/21 2:57 AM

## 2021-10-07 ENCOUNTER — TELEPHONE (OUTPATIENT)
Dept: INTERNAL MEDICINE | Facility: CLINIC | Age: 86
End: 2021-10-07

## 2021-10-07 NOTE — TELEPHONE ENCOUNTER
10-7-21  Reason for Call:verbal orders    Detailed comments: Pamela RN called from [facility] and is looking for verbal orders for:  For a RN eval & Treat for wound care    Phone Number Patient can be reached at: [phone]    Best Time: anytime    Can we leave a detailed message on this number? YES    Call taken on 10/7/2021 at 9:31 AM by Natasha Funez

## 2021-10-07 NOTE — TELEPHONE ENCOUNTER
Left voicemail with verbal orders for Pamela.  Nissa Narayanan CMA ............... 4:39 PM, 10/07/21

## 2021-10-16 ENCOUNTER — HEALTH MAINTENANCE LETTER (OUTPATIENT)
Age: 86
End: 2021-10-16

## 2021-10-19 ENCOUNTER — OFFICE VISIT (OUTPATIENT)
Dept: VASCULAR SURGERY | Facility: CLINIC | Age: 86
End: 2021-10-19
Attending: PODIATRIST
Payer: COMMERCIAL

## 2021-10-19 VITALS — DIASTOLIC BLOOD PRESSURE: 72 MMHG | TEMPERATURE: 98.5 F | HEART RATE: 76 BPM | SYSTOLIC BLOOD PRESSURE: 136 MMHG

## 2021-10-19 DIAGNOSIS — L97.411 DIABETIC ULCER OF RIGHT MIDFOOT ASSOCIATED WITH TYPE 2 DIABETES MELLITUS, LIMITED TO BREAKDOWN OF SKIN (H): Primary | ICD-10-CM

## 2021-10-19 DIAGNOSIS — E11.621 DIABETIC ULCER OF RIGHT MIDFOOT ASSOCIATED WITH TYPE 2 DIABETES MELLITUS, LIMITED TO BREAKDOWN OF SKIN (H): Primary | ICD-10-CM

## 2021-10-19 PROCEDURE — 99212 OFFICE O/P EST SF 10 MIN: CPT | Performed by: PODIATRIST

## 2021-10-19 PROCEDURE — G0463 HOSPITAL OUTPT CLINIC VISIT: HCPCS

## 2021-10-19 NOTE — PROGRESS NOTES
FOOT AND ANKLE SURGERY/PODIATRY Progress Note      ASSESSMENT: Diabetic Ulceration right foot       TREATMENT:  -The right foot ulceration has resolved. I am pleased with his progress and recommend he continue to monitor for skin irritation or skin breakdown.     -He will continue to ambulate in diabetic shoes and inserts.     -He is discharged from my care at this time and will follow-up with me as concerns develop.    John Garcia DPM  [facility]      HPI: Caleb Hernandez was seen again today for a right foot ulceration. Since his last visit, he has remained limited walking on the right foot.       Past Medical History:   Diagnosis Date     Abscess of right foot 11/23/2020    Added automatically from request for surgery [id number]     Acute pulmonary embolism with acute cor pulmonale (H) 5/23/2020     BPH (benign prostatic hyperplasia)      Cholelithiasis      Closed fracture of rib     Created by Conversion  Replacement Utility updated for latest IMO load     Closed fracture of thoracic vertebra (H)     Created by Conversion  Replacement Utility updated for latest IMO load     Common bile duct (CBD) obstruction 9/4/2017     Diabetes mellitus (H)      Essential hypertension      Hyperlipidemia      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    Added automatically from request for surgery [id number]      Pancreatitis      Sepsis due to pneumonia (H) 9/8/2017     Septic arthritis of right foot (H) 12/2/2020       Past Surgical History:   Procedure Laterality Date     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    Procedure: with amputation of the fifth ray, peroneal brevis tendon transfer;  Surgeon: John Garcia DPM;  Location: [facility];  Service: Podiatry     BACK SURGERY      1964 removed a cyst     CHOLECYSTECTOMY  1985     ENDOSCOPIC RETROGRADE CHOLANGIOPANCREATOGRAM       ENDOSCOPIC RETROGRADE CHOLANGIOPANCREATOGRAM N/A 9/5/2017    Procedure: ENDOSCOPIC RETROGRADE CHOLANGIOPANCREATOGRAPHY  SPHINCTEROTOMY AND STONE EXTRACTION;  Surgeon: Hansel Gannon MD;  Location: [facility] OR;  Service:      INCISION AND DRAINAGE OF WOUND Right 11/2/2020    Procedure: INCISION AND DRAINAGE, right foot with removal of bone 5th metatarsal;  Surgeon: John Garcia DPM;  Location: [facility] OR;  Service: Podiatry     INCISION AND DRAINAGE OF WOUND Right 11/16/2020    Procedure: INCISION AND DRAINAGE, right foot;  Surgeon: John Garcia DPM;  Location: [facility] OR;  Service: Podiatry     INCISION AND DRAINAGE OF WOUND Right 11/27/2020    Procedure: INCISION AND DRAINAGE, LOWER EXTREMITY;  Surgeon: Aleksandr Hdez DPM;  Location: [facility] OR;  Service: Podiatry     IR EXTREMITY ANGIOGRAM RIGHT  11/24/2020     IR LOWER EXTREMITY ANGIOGRAM RIGHT  11/24/2020     PICC  11/30/2020          TONSILLECTOMY  1940       Allergies   Allergen Reactions     Cresol [Phenol] Unknown     Muscle cramps     Demeclocycline Hives and Rash     Crestor [Rosuvastatin] Muscle Pain (Myalgia)         Current Outpatient Medications:      acetaminophen (TYLENOL) 500 MG tablet, [ACETAMINOPHEN (TYLENOL) 500 MG TABLET] Take 1-2 tablets (500-1,000 mg total) by mouth every 4 (four) hours as needed., Disp: , Rfl: 0     aspirin 81 MG EC tablet, [ASPIRIN 81 MG EC TABLET] Take 81 mg by mouth daily., Disp: , Rfl:      B-D U/F 31G X 8 MM insulin pen needle, USED TO INJECT INSULIN DAILY, Disp: 100 each, Rfl: 11     blood glucose test strips, [BLOOD GLUCOSE TEST STRIPS] Test two times daily. Dispense brand per patient's insurance at pharmacy discretion., Disp: 200 strip, Rfl: 11     blood-glucose meter (ONETOUCH VERIO IQ METER) Misc, [BLOOD-GLUCOSE METER (ONETOUCH VERIO IQ METER) MISC] Use 1 each As Directed 2 (two) times a day., Disp: 1 each, Rfl: 0     cephALEXin (KEFLEX) 500 MG capsule, Take 1 capsule (500 mg) by mouth 3 times daily, Disp: 30 capsule, Rfl: 0     cholecalciferol, vitamin D3, 5,000 unit Tab,  [CHOLECALCIFEROL, VITAMIN D3, 5,000 UNIT TAB] Take by mouth., Disp: , Rfl:      Continuous Blood Gluc Sensor (FREESTYLE APRIL 2 SENSOR) MISC, 1 each every 14 days 1 each every 14 days. Change every 14 days., Disp: 6 each, Rfl: 5     ELIQUIS 5 mg Tab tablet, [ELIQUIS 5 MG TAB TABLET] TAKE 1 TABLET BY MOUTH TWICE A DAY, Disp: 60 tablet, Rfl: 3     insulin glargine (LANTUS SOLOSTAR) 100 UNIT/ML pen, Inject 44 Units Subcutaneous At Bedtime, Disp: 90 mL, Rfl: 5     lisinopril (ZESTRIL) 5 MG tablet, lisinopril 5 mg tablet  TAKE 1 TABLET BY MOUTH EVERY DAY, Disp: , Rfl:      multivit-min/FA/lycopen/lutein (CENTRUM SILVER MEN ORAL), [MULTIVIT-MIN/FA/LYCOPEN/LUTEIN (CENTRUM SILVER MEN ORAL)] Take 1 tablet by mouth daily., Disp: , Rfl:      mupirocin (BACTROBAN) 2 % ointment, [MUPIROCIN (BACTROBAN) 2 % OINTMENT] Apply topically daily as needed. Apply to wound., Disp: , Rfl:      polyethylene glycol (MIRALAX) 17 gram packet, [POLYETHYLENE GLYCOL (MIRALAX) 17 GRAM PACKET] Take 1 packet (17 g total) by mouth daily as needed., Disp: 100 packet, Rfl: 3     tamsulosin (FLOMAX) 0.4 mg cap, [TAMSULOSIN (FLOMAX) 0.4 MG CAP] Take 1 capsule (0.4 mg total) by mouth daily after supper., Disp: 90 capsule, Rfl: 3     traMADol (ULTRAM) 50 MG tablet, TAKE 1 TABLET BY MOUTH EVERY 6 HOURS AS NEEDED FOR PAIN, Disp: 30 tablet, Rfl: 0     VICTOZA 3-RUPALI 0.6 mg/0.1 mL (18 mg/3 mL) injection, [VICTOZA 3-RUPALI 0.6 MG/0.1 ML (18 MG/3 ML) INJECTION] INJECT 1.2 MG UNDER THE SKIN ONCE DAILY, Disp: 12 mL, Rfl: 11     vitamin E 400 unit capsule, [VITAMIN E 400 UNIT CAPSULE] Take 400 Units by mouth daily., Disp: , Rfl:     Review of Systems - 10 point Review of Systems is negative except for right foot ulcer which is noted in HPI.      OBJECTIVE:  /72   Pulse 76   Temp 98.5  F (36.9  C)   General appearance: Patient is alert and fully cooperative with history & exam.  No sign of distress is noted during the visit.    Vascular: Dorsalis pedis  non-palpableRight.  Dermatologic:    VASC Wound Lateral right foot (Active)   Post Size Length 0 10/19/21 1300   Post Size Width 0 10/19/21 1300   Post Size Depth 0 10/19/21 1300   Post Total Sq cm 0 10/19/21 1300   Description calloused 10/19/21 1200     Neurologic: Diminished to light touch Right.  Musculoskeletal: Contracted digits noted Right.    Imaging:     No results found.       Picture:

## 2021-10-19 NOTE — PATIENT INSTRUCTIONS
Your wound is healed! Continue to monitor area and contact clinic if area shows signs of reopening or you have other concerns related to your wound [phone].

## 2021-11-05 DIAGNOSIS — I10 ESSENTIAL HYPERTENSION: Primary | ICD-10-CM

## 2021-11-05 DIAGNOSIS — L97.413 DIABETIC ULCER OF RIGHT MIDFOOT ASSOCIATED WITH TYPE 2 DIABETES MELLITUS, WITH NECROSIS OF MUSCLE (H): ICD-10-CM

## 2021-11-05 DIAGNOSIS — E11.621 DIABETIC ULCER OF RIGHT MIDFOOT ASSOCIATED WITH TYPE 2 DIABETES MELLITUS, WITH NECROSIS OF MUSCLE (H): ICD-10-CM

## 2021-11-05 RX ORDER — TRAMADOL HYDROCHLORIDE 50 MG/1
TABLET ORAL
Qty: 30 TABLET | Refills: 0 | Status: SHIPPED | OUTPATIENT
Start: 2021-11-05 | End: 2021-11-12

## 2021-11-05 RX ORDER — TRAMADOL HYDROCHLORIDE 50 MG/1
TABLET ORAL
Qty: 30 TABLET | Refills: 0 | OUTPATIENT
Start: 2021-11-05

## 2021-11-05 NOTE — TELEPHONE ENCOUNTER
Refill Request  Medication name: Pending Prescriptions:                       Disp   Refills    lisinopril (ZESTRIL) 5 MG tablet                            Who prescribed the medication: Blake  Last refill on medication: 08/18/21  Requested Pharmacy: [facility]  Last appointment with PCP: 08/25/21  Next appointment: Appointment scheduled for 11/24/21

## 2021-11-08 RX ORDER — LISINOPRIL 5 MG/1
5 TABLET ORAL DAILY
Qty: 90 TABLET | Refills: 3 | Status: SHIPPED | OUTPATIENT
Start: 2021-11-08 | End: 2022-04-22

## 2021-11-10 DIAGNOSIS — E11.621 DIABETIC ULCER OF RIGHT MIDFOOT ASSOCIATED WITH TYPE 2 DIABETES MELLITUS, WITH NECROSIS OF MUSCLE (H): ICD-10-CM

## 2021-11-10 DIAGNOSIS — L97.413 DIABETIC ULCER OF RIGHT MIDFOOT ASSOCIATED WITH TYPE 2 DIABETES MELLITUS, WITH NECROSIS OF MUSCLE (H): ICD-10-CM

## 2021-11-10 NOTE — TELEPHONE ENCOUNTER
Routing refill request to provider for review/approval because:  Controlled Substance Request     Last Written Prescription Date:  11/5/21  Last Fill Quantity: 30,  # refills: 0   Last office visit provider:  8/25/21    Requested Prescriptions   Pending Prescriptions Disp Refills     traMADol (ULTRAM) 50 MG tablet [Pharmacy Med Name: TRAMADOL HCL 50MG] 30 tablet 0     Sig: TAKE 1 TABLET BY MOUTH EVERY 6 HOURS AS NEEDED FOR PAIN       There is no refill protocol information for this order          Britany Cruz RN 11/10/21 3:24 PM

## 2021-11-11 RX ORDER — TRAMADOL HYDROCHLORIDE 50 MG/1
TABLET ORAL
Qty: 30 TABLET | Refills: 0 | OUTPATIENT
Start: 2021-11-11

## 2021-11-11 NOTE — TELEPHONE ENCOUNTER
Duplicate. This has been filled. Can you please refuse medication and close encounter?    Thank you,  Nissa Narayanan CMA ............... 5:06 PM, 11/11/21

## 2021-11-12 RX ORDER — TRAMADOL HYDROCHLORIDE 50 MG/1
TABLET ORAL
Qty: 30 TABLET | Refills: 0 | Status: ON HOLD | OUTPATIENT
Start: 2021-11-12 | End: 2022-02-04

## 2021-11-12 NOTE — TELEPHONE ENCOUNTER
LMOM at [facility] to cx rx.  It doesn't look like one has been sent there since 11-5

## 2021-11-12 NOTE — TELEPHONE ENCOUNTER
Patient called and said that he does not have his Tramadol Rx was sent to [facility] [id number] in [place], [place] on 11/05/2021. Patient does not want to use [facility] pharmacy any longer (please remove [facility] pharmacy from his pharmacy list). Patient states he now uses [facility] [address] in [place], [place]. Patient is requesting Dr Lozano cancel the Rx that was sent to [facility] in [place] and re-send the Rx to [facility]. Please call patient with any questions/concerns.    Anu Mcdonald

## 2021-11-24 ENCOUNTER — OFFICE VISIT (OUTPATIENT)
Dept: INTERNAL MEDICINE | Facility: CLINIC | Age: 86
End: 2021-11-24
Payer: COMMERCIAL

## 2021-11-24 VITALS
HEART RATE: 75 BPM | TEMPERATURE: 97.8 F | DIASTOLIC BLOOD PRESSURE: 66 MMHG | WEIGHT: 178.7 LBS | BODY MASS INDEX: 25.64 KG/M2 | OXYGEN SATURATION: 96 % | SYSTOLIC BLOOD PRESSURE: 128 MMHG

## 2021-11-24 DIAGNOSIS — Z79.4 TYPE 2 DIABETES MELLITUS WITH DIABETIC PERIPHERAL ANGIOPATHY WITHOUT GANGRENE, WITH LONG-TERM CURRENT USE OF INSULIN (H): ICD-10-CM

## 2021-11-24 DIAGNOSIS — D64.9 NORMOCYTIC ANEMIA: ICD-10-CM

## 2021-11-24 DIAGNOSIS — E11.51 TYPE 2 DIABETES MELLITUS WITH DIABETIC PERIPHERAL ANGIOPATHY WITHOUT GANGRENE, WITH LONG-TERM CURRENT USE OF INSULIN (H): ICD-10-CM

## 2021-11-24 DIAGNOSIS — Z23 HIGH PRIORITY FOR 2019-NCOV VACCINE: ICD-10-CM

## 2021-11-24 DIAGNOSIS — I48.0 PAROXYSMAL ATRIAL FIBRILLATION (H): ICD-10-CM

## 2021-11-24 DIAGNOSIS — N52.9 ERECTILE DYSFUNCTION, UNSPECIFIED ERECTILE DYSFUNCTION TYPE: Primary | ICD-10-CM

## 2021-11-24 PROCEDURE — 0064A COVID-19,PF,MODERNA (18+ YRS BOOSTER .25ML): CPT | Performed by: INTERNAL MEDICINE

## 2021-11-24 PROCEDURE — 99213 OFFICE O/P EST LOW 20 MIN: CPT | Mod: 25 | Performed by: INTERNAL MEDICINE

## 2021-11-24 PROCEDURE — 91306 COVID-19,PF,MODERNA (18+ YRS BOOSTER .25ML): CPT | Performed by: INTERNAL MEDICINE

## 2021-11-24 RX ORDER — SILDENAFIL 50 MG/1
50 TABLET, FILM COATED ORAL DAILY PRN
Qty: 10 TABLET | Refills: 1 | Status: SHIPPED | OUTPATIENT
Start: 2021-11-24 | End: 2022-04-22

## 2021-11-24 NOTE — PATIENT INSTRUCTIONS
"Follow-up for multiple issues, overall continues to do well since our previous visit of August 25, 2021  His right foot continues to do pretty well.      Dr Garcia told him right foot is healed-- graduated from therapy. \"The right foot ulceration has resolved. I am pleased with his progress and recommend he continue to monitor for skin irritation or skin breakdown.'    He will wear his new custom molded diabetic shoes, and they look good.  As of the end of April 2021, he graduated from home care that was being delivered by [facility] (PT and OT)     I would like to see Mr. Hernandez be more mobile, using his walker, avoid falling because he is on Eliquis anticoagulation.    Erectile dysfunction, I think is reasonable to try a medium dose of Viagra 50 mg  He has been  to his wife for over 55 years, and would like to give sex another try.  I told him that considering his diabetes and vascular disease, I am not particularly optimistic that Viagra or similar medication would work, but I suppose it is worth a try, and I think he could use it safely at a medium dose of just 50 mg.  He is another medication that lowers blood pressure, including tamsulosin.  I told him to be very careful until he knows how his body reacts to Viagra.  He should not drive for least 12 hours after taking it.  I want him to be on the look out for symptoms of low blood pressure which would be dizziness or a sensation that he might faint     Type 2 diabetes with suboptimal control currently on insulin and Victoza but no metformin  8-   Hemoglobin A1C 0.0 - 5.6 % 9.1 High       Currently on Lantus 44 units at bedtime, at the moment he is not using any mealtime NovoLog.  Also Victoza 1.8 mg injected once a day     Morning blood sugars in low 100's, evening a but higher 150-200  He told me he is getting into some cheese popcorn, and I told him he needs to stay away from the starches and sweets.     Would specify an A1c goal of " "about 7.5.  If he is running less than 7 and seen occasional low fingersticks, we might back down on his Lantus dose.  For now, continue on Lantus 44 units a day, Victoza, no short acting insulin.     Pain control-- right foot cramps  Will continue to use tramadol tablets, which he averages maybe 1 a day.  I told the tramadol, being an opioid, can be constipating.  Therefore he needs to use his MiraLAX powder to keep stools soft.     Status post excision of right fifth metatarsal because of osteomyelitis on November 2, 2020, wound cultures with Bacteroides and Enterobacter.  Status post skin grafting to the lateral right foot.  10- Dr Garcia told him right foot is healed-- graduated from therapy. \"The right foot ulceration has resolved. I am pleased with his progress and recommend he continue to monitor for skin irritation or skin breakdown.'    He will wear his new custom molded diabetic shoes, and they look good.  As of the end of April 2021, he graduated from home care that was being delivered by [facility] (PT and OT)     I would like to see Mr. Hernandez be more mobile, using his walker, avoid falling because he is on Eliquis anticoagulation.    Urinary retention with hematuria, Dietrich catheter out January 22, 2021, he told me that his bladder is working better, but still has incontinence, and uses absorptive undergarment.  The Dietrich catheter was placed after angiogram because of acute urinary retention.  He developed hematuria November 30, 2020  CT scan abdomen pelvis with irregular bladder wall thickening suggestive of cystitis.  Noted to have nonobstructing 11 mm right upper pole kidney stone but no hydronephrosis.  He is currently on Flomax (tamsulosin) and I want him to stay on that.     Peripheral vascular disease.  November 24, 2020 he had right lower extremity angiogram with balloon angioplasty of anterior tibial and peroneal arteries.  However postoperative ankle-brachial index did not " improve significantly, although vascular surgery thought that was because of vasospasm.  He needs to follow-up with vascular surgery, and will have a repeat arterial Doppler ultrasound.     7-8-2021 Ultrasound  Right Lower Extremity: Patent vasculature to the distal superficial femoral artery with triphasic flow. Transition to monophasic flow in the popliteal artery. Occluded posterior tibial artery.  Patent anterior tibial and dorsalis pedis arteries.  Left Lower Extremity: Occluded posterior tibial artery.  Patent anterior tibial and dorsalis pedis arteries.     Chronic right leg and foot lymphedema, probably related to vascular insufficiency both arterial and venous, I reminded him to elevate his leg to help the drainage     Adult failure to thrive-- doing better.  Wt Readings from Last 5 Encounters:   11/24/21 81.1 kg (178 lb 11.2 oz)   08/25/21 76.2 kg (168 lb)   05/25/21 78.3 kg (172 lb 11.2 oz)   05/14/21 79.4 kg (175 lb)   02/23/21 82.1 kg (181 lb)     Essential hypertension, apparently no longer taking lisinopril 5 mg a day.  8-  Creatinine 0.70 - 1.30 mg/dL 1.21      Urea Nitrogen 8 - 28 mg/dL 18     GFR Estimate >60 mL/min/1.73m2 55 Low      Normocytic anemia anemia of chronic disease and postinflammatory effects  8-   Hemoglobin 13.3 - 17.7 g/dL 10.4 Low      Peripheral vascular disease with reduced SRIKANTH indices in both feet, but no focal stenosis on arterial ultrasound study.       History of acute pulmonary embolism and cor pulmonale, was unprovoked, diagnosed May 2020, has been on anticoagulation ever since with Eliquis which he will continue long-term.  He seems to be tolerating the Eliquis just fine.  He is on concomitant baby aspirin which I told him does increase the risk for bleeding when combined with Eliquis.  But I think the combination is appropriate for him since he has peripheral vascular disease.     Paroxysmal atrial fibrillation, and history of pulmonary embolism, on  anticoagulation with Eliquis for those reasons.     Hyperlipidemia in the context of diabetes, with history of intolerance to statins, LDL cholesterol was 126 when measured July 22, 2020.       Constipation, component of drug-induced from tramadol, I told him its okay to use MiraLAX 1 capful even daily, dissolved in liquid.     Second Moderna Covid shot February 22, 2021    Will give third shot Moderna  I reminded him to get his flu shot this autumn, should be available in September     Immunization History   Administered Date(s) Administered     COVID-19,PF,Moderna 01/25/2021, 02/22/2021     Pneumo Conj 13-V (2010&after) 01/01/2016, 10/22/2020     Pneumococcal 23 valent 09/29/2003

## 2021-11-24 NOTE — PROGRESS NOTES
"Office Visit - Follow Up   Caleb Hernandez   85 year old male    Date of Visit: 11/24/2021    Chief Complaint   Patient presents with     RECHECK     Imm/Inj     COVID-19 VACCINE        -------------------------------------------------------------------------------------------------------------------------  Assessment and Plan    Follow-up for multiple issues, overall continues to do well since our previous visit of August 25, 2021  His right foot continues to do pretty well.      Dr Garcia told him right foot is healed-- graduated from therapy. \"The right foot ulceration has resolved. I am pleased with his progress and recommend he continue to monitor for skin irritation or skin breakdown.'    He will wear his new custom molded diabetic shoes, and they look good.  As of the end of April 2021, he graduated from home care that was being delivered by [facility] (PT and OT)     I would like to see Mr. Hernandez be more mobile, using his walker, avoid falling because he is on Eliquis anticoagulation.    Erectile dysfunction, I think is reasonable to try a medium dose of Viagra 50 mg  He has been  to his wife for over 55 years, and would like to give sex another try.  I told him that considering his diabetes and vascular disease, I am not particularly optimistic that Viagra or similar medication would work, but I suppose it is worth a try, and I think he could use it safely at a medium dose of just 50 mg.  He is another medication that lowers blood pressure, including tamsulosin.  I told him to be very careful until he knows how his body reacts to Viagra.  He should not drive for least 12 hours after taking it.  I want him to be on the look out for symptoms of low blood pressure which would be dizziness or a sensation that he might faint     Type 2 diabetes with suboptimal control currently on insulin and Victoza but no metformin  8-   Hemoglobin A1C 0.0 - 5.6 % 9.1 High       Currently on Lantus 44 " "units at bedtime, at the moment he is not using any mealtime NovoLog.  Also Victoza 1.8 mg injected once a day     Morning blood sugars in low 100's, evening a but higher 150-200  He told me he is getting into some cheese popcorn, and I told him he needs to stay away from the starches and sweets.     Would specify an A1c goal of about 7.5.  If he is running less than 7 and seen occasional low fingersticks, we might back down on his Lantus dose.  For now, continue on Lantus 44 units a day, Victoza, no short acting insulin.     Pain control-- right foot cramps  Will continue to use tramadol tablets, which he averages maybe 1 a day.  I told the tramadol, being an opioid, can be constipating.  Therefore he needs to use his MiraLAX powder to keep stools soft.     Status post excision of right fifth metatarsal because of osteomyelitis on November 2, 2020, wound cultures with Bacteroides and Enterobacter.  Status post skin grafting to the lateral right foot.  10- Dr Garcia told him right foot is healed-- graduated from therapy. \"The right foot ulceration has resolved. I am pleased with his progress and recommend he continue to monitor for skin irritation or skin breakdown.'    He will wear his new custom molded diabetic shoes, and they look good.  As of the end of April 2021, he graduated from home care that was being delivered by [facility] (PT and OT)     I would like to see Mr. Hernandez be more mobile, using his walker, avoid falling because he is on Eliquis anticoagulation.    Urinary retention with hematuria, Dietrich catheter out January 22, 2021, he told me that his bladder is working better, but still has incontinence, and uses absorptive undergarment.  The Dietrich catheter was placed after angiogram because of acute urinary retention.  He developed hematuria November 30, 2020  CT scan abdomen pelvis with irregular bladder wall thickening suggestive of cystitis.  Noted to have nonobstructing 11 mm right " upper pole kidney stone but no hydronephrosis.  He is currently on Flomax (tamsulosin) and I want him to stay on that.     Peripheral vascular disease.  November 24, 2020 he had right lower extremity angiogram with balloon angioplasty of anterior tibial and peroneal arteries.  However postoperative ankle-brachial index did not improve significantly, although vascular surgery thought that was because of vasospasm.  He needs to follow-up with vascular surgery, and will have a repeat arterial Doppler ultrasound.     7-8-2021 Ultrasound  Right Lower Extremity: Patent vasculature to the distal superficial femoral artery with triphasic flow. Transition to monophasic flow in the popliteal artery. Occluded posterior tibial artery.  Patent anterior tibial and dorsalis pedis arteries.  Left Lower Extremity: Occluded posterior tibial artery.  Patent anterior tibial and dorsalis pedis arteries.     Chronic right leg and foot lymphedema, probably related to vascular insufficiency both arterial and venous, I reminded him to elevate his leg to help the drainage     Adult failure to thrive-- doing better.  Wt Readings from Last 5 Encounters:   11/24/21 81.1 kg (178 lb 11.2 oz)   08/25/21 76.2 kg (168 lb)   05/25/21 78.3 kg (172 lb 11.2 oz)   05/14/21 79.4 kg (175 lb)   02/23/21 82.1 kg (181 lb)     Essential hypertension, apparently no longer taking lisinopril 5 mg a day.  8-  Creatinine 0.70 - 1.30 mg/dL 1.21      Urea Nitrogen 8 - 28 mg/dL 18     GFR Estimate >60 mL/min/1.73m2 55 Low      Normocytic anemia anemia of chronic disease and postinflammatory effects  8-   Hemoglobin 13.3 - 17.7 g/dL 10.4 Low      Peripheral vascular disease with reduced SRIKANTH indices in both feet, but no focal stenosis on arterial ultrasound study.       History of acute pulmonary embolism and cor pulmonale, was unprovoked, diagnosed May 2020, has been on anticoagulation ever since with Eliquis which he will continue long-term.  He seems to  "be tolerating the Eliquis just fine.  He is on concomitant baby aspirin which I told him does increase the risk for bleeding when combined with Eliquis.  But I think the combination is appropriate for him since he has peripheral vascular disease.     Paroxysmal atrial fibrillation, and history of pulmonary embolism, on anticoagulation with Eliquis for those reasons.     Hyperlipidemia in the context of diabetes, with history of intolerance to statins, LDL cholesterol was 126 when measured July 22, 2020.       Constipation, component of drug-induced from tramadol, I told him its okay to use MiraLAX 1 capful even daily, dissolved in liquid.     Second Moderna Covid shot February 22, 2021    Will give third shot Moderna  I reminded him to get his flu shot this autumn, should be available in September     Immunization History   Administered Date(s) Administered     COVID-19,PF,Moderna 01/25/2021, 02/22/2021     Pneumo Conj 13-V (2010&after) 01/01/2016, 10/22/2020     Pneumococcal 23 valent 09/29/2003       --------------------------------------------------------------------------------------------------------------------------  History of Present Illness  This 85 year old old     Follow-up for multiple issues, overall continues to do well since our previous visit of August 25, 2021  His right foot continues to do pretty well.      Dr Garcia told him right foot is healed-- graduated from therapy. \"The right foot ulceration has resolved. I am pleased with his progress and recommend he continue to monitor for skin irritation or skin breakdown.'    He will wear his new custom molded diabetic shoes, and they look good.  As of the end of April 2021, he graduated from home care that was being delivered by [facility] (PT and OT)     I would like to see Mr. Hernandez be more mobile, using his walker, avoid falling because he is on Eliquis anticoagulation.    Erectile dysfunction, I think is reasonable to try a medium dose " of Viagra 50 mg  He has been  to his wife for over 55 years, and would like to give sex another try.  I told him that considering his diabetes and vascular disease, I am not particularly optimistic that Viagra or similar medication would work, but I suppose it is worth a try, and I think he could use it safely at a medium dose of just 50 mg.  He is another medication that lowers blood pressure, including tamsulosin.  I told him to be very careful until he knows how his body reacts to Viagra.  He should not drive for least 12 hours after taking it.  I want him to be on the look out for symptoms of low blood pressure which would be dizziness or a sensation that he might faint      Wt Readings from Last 3 Encounters:   11/24/21 81.1 kg (178 lb 11.2 oz)   08/25/21 76.2 kg (168 lb)   05/25/21 78.3 kg (172 lb 11.2 oz)     BP Readings from Last 3 Encounters:   11/24/21 128/66   10/19/21 136/72   09/28/21 116/54     ---------------------------------------------------------------------------------------------------------------------------    Medications, Allergies, Social, and Problem List   Current Outpatient Medications   Medication Sig Dispense Refill     acetaminophen (TYLENOL) 500 MG tablet [ACETAMINOPHEN (TYLENOL) 500 MG TABLET] Take 1-2 tablets (500-1,000 mg total) by mouth every 4 (four) hours as needed.  0     aspirin 81 MG EC tablet [ASPIRIN 81 MG EC TABLET] Take 81 mg by mouth daily.       B-D U/F 31G X 8 MM insulin pen needle USED TO INJECT INSULIN DAILY 100 each 11     blood glucose test strips [BLOOD GLUCOSE TEST STRIPS] Test two times daily. Dispense brand per patient's insurance at pharmacy discretion. 200 strip 11     blood-glucose meter (ONETOUCH VERIO IQ METER) Misc [BLOOD-GLUCOSE METER (ONETOUCH VERIO IQ METER) MISC] Use 1 each As Directed 2 (two) times a day. 1 each 0     cholecalciferol, vitamin D3, 5,000 unit Tab [CHOLECALCIFEROL, VITAMIN D3, 5,000 UNIT TAB] Take by mouth.       Continuous Blood  Gluc Sensor (FREESTYLE APRIL 2 SENSOR) [facility] 1 each every 14 days 1 each every 14 days. Change every 14 days. 6 each 5     ELIQUIS 5 mg Tab tablet [ELIQUIS 5 MG TAB TABLET] TAKE 1 TABLET BY MOUTH TWICE A DAY 60 tablet 3     insulin glargine (LANTUS SOLOSTAR) 100 UNIT/ML pen Inject 44 Units Subcutaneous At Bedtime 90 mL 5     lisinopril (ZESTRIL) 5 MG tablet Take 1 tablet (5 mg) by mouth daily 90 tablet 3     multivit-min/FA/lycopen/lutein (CENTRUM SILVER MEN ORAL) [MULTIVIT-MIN/FA/LYCOPEN/LUTEIN (CENTRUM SILVER MEN ORAL)] Take 1 tablet by mouth daily.       mupirocin (BACTROBAN) 2 % ointment [MUPIROCIN (BACTROBAN) 2 % OINTMENT] Apply topically daily as needed. Apply to wound.       polyethylene glycol (MIRALAX) 17 gram packet [POLYETHYLENE GLYCOL (MIRALAX) 17 GRAM PACKET] Take 1 packet (17 g total) by mouth daily as needed. 100 packet 3     sildenafil (VIAGRA) 50 MG tablet Take 1 tablet (50 mg) by mouth daily as needed (erectile dysfunction) 10 tablet 1     tamsulosin (FLOMAX) 0.4 mg cap [TAMSULOSIN (FLOMAX) 0.4 MG CAP] Take 1 capsule (0.4 mg total) by mouth daily after supper. 90 capsule 3     traMADol (ULTRAM) 50 MG tablet TAKE 1 TABLET BY MOUTH EVERY 6 HOURS AS NEEDED FOR PAIN 30 tablet 0     VICTOZA 3-RUPALI 0.6 mg/0.1 mL (18 mg/3 mL) injection [VICTOZA 3-RUPALI 0.6 MG/0.1 ML (18 MG/3 ML) INJECTION] INJECT 1.2 MG UNDER THE SKIN ONCE DAILY 12 mL 11     vitamin E 400 unit capsule [VITAMIN E 400 UNIT CAPSULE] Take 400 Units by mouth daily.       Allergies   Allergen Reactions     Cresol [Phenol] Unknown     Muscle cramps     Demeclocycline Hives and Rash     Crestor [Rosuvastatin] Muscle Pain (Myalgia)     Social History     Tobacco Use     Smoking status: Former Smoker     Quit date: 1991     Years since quittin.0     Smokeless tobacco: Never Used   Substance Use Topics     Alcohol use: No     Drug use: No     Patient Active Problem List   Diagnosis     Hyperlipidemia     Essential hypertension     Statin  intolerance     Personal history of PE (pulmonary embolism)-- May 2020, unprovoked, with right heart strain     PVD (peripheral vascular disease) (H)     Overweight (BMI 25.0-29.9)     Chronic anticoagulation     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 (H)     Gross hematuria     Urinary retention     Type 2 diabetes mellitus with diabetic peripheral angiopathy without gangrene, with long-term current use of insulin (H)     Adult failure to thrive     Normocytic anemia     Paroxysmal atrial fibrillation (H)     ACP (advance care planning)     Hematuria     Amputated toe, right (H)     Equinovarus acquired deformity, right     Drug-induced constipation     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 2 diabetes mellitus, limited to breakdown of skin (H)     Cellulitis and abscess of foot excluding toe        Reviewed, reconciled and updated       Physical Exam   General Appearance:   Appears well, blood pressure looks great    /66 (BP Location: Right arm, Patient Position: Sitting, Cuff Size: Adult Regular)   Pulse 75   Temp 97.8  F (36.6  C) (Oral)   Wt 81.1 kg (178 lb 11.2 oz)   SpO2 96%   BMI 25.64 kg/m           Additional Information   I spent 20 minutes on this encounter, including reviewing interval history since last visit, examining the patient, explaining and counseling the issues enumerated in the Assessment and Plan (patient given a copy)       DALE CORTEZ MD, MD

## 2021-12-14 DIAGNOSIS — I26.99 PULMONARY EMBOLISM WITHOUT ACUTE COR PULMONALE, UNSPECIFIED CHRONICITY, UNSPECIFIED PULMONARY EMBOLISM TYPE (H): ICD-10-CM

## 2021-12-14 NOTE — TELEPHONE ENCOUNTER
Refill Request  Medication name: Pending Prescriptions:                       Disp   Refills    apixaban ANTICOAGULANT (ELIQUIS ANTICOAGU*60 tab*3          Who prescribed the medication: Blake  Last refill on medication: 11/17/21  Requested Pharmacy: [facility]  Last appointment with PCP: 11/24/21  Next appointment: Appointment scheduled for 02/24/22

## 2022-01-01 ENCOUNTER — TELEPHONE (OUTPATIENT)
Dept: INTERNAL MEDICINE | Facility: CLINIC | Age: 87
End: 2022-01-01

## 2022-01-01 ENCOUNTER — OFFICE VISIT (OUTPATIENT)
Dept: INTERNAL MEDICINE | Facility: CLINIC | Age: 87
End: 2022-01-01
Payer: COMMERCIAL

## 2022-01-01 ENCOUNTER — TELEPHONE (OUTPATIENT)
Dept: FAMILY MEDICINE | Facility: CLINIC | Age: 87
End: 2022-01-01

## 2022-01-01 ENCOUNTER — OFFICE VISIT (OUTPATIENT)
Dept: FAMILY MEDICINE | Facility: CLINIC | Age: 87
End: 2022-01-01
Payer: COMMERCIAL

## 2022-01-01 ENCOUNTER — PATIENT OUTREACH (OUTPATIENT)
Dept: CARE COORDINATION | Facility: CLINIC | Age: 87
End: 2022-01-01

## 2022-01-01 VITALS
WEIGHT: 168 LBS | DIASTOLIC BLOOD PRESSURE: 68 MMHG | TEMPERATURE: 97.4 F | SYSTOLIC BLOOD PRESSURE: 120 MMHG | OXYGEN SATURATION: 95 % | HEIGHT: 68 IN | BODY MASS INDEX: 25.46 KG/M2 | RESPIRATION RATE: 15 BRPM | HEART RATE: 72 BPM

## 2022-01-01 VITALS
DIASTOLIC BLOOD PRESSURE: 65 MMHG | HEART RATE: 69 BPM | BODY MASS INDEX: 25.54 KG/M2 | SYSTOLIC BLOOD PRESSURE: 147 MMHG | OXYGEN SATURATION: 96 % | WEIGHT: 168 LBS

## 2022-01-01 VITALS
TEMPERATURE: 98.4 F | OXYGEN SATURATION: 96 % | WEIGHT: 168.7 LBS | SYSTOLIC BLOOD PRESSURE: 132 MMHG | BODY MASS INDEX: 25.65 KG/M2 | HEART RATE: 81 BPM | DIASTOLIC BLOOD PRESSURE: 76 MMHG | RESPIRATION RATE: 20 BRPM

## 2022-01-01 VITALS
SYSTOLIC BLOOD PRESSURE: 173 MMHG | RESPIRATION RATE: 14 BRPM | HEART RATE: 75 BPM | OXYGEN SATURATION: 98 % | TEMPERATURE: 98.6 F | DIASTOLIC BLOOD PRESSURE: 74 MMHG

## 2022-01-01 DIAGNOSIS — E11.65 HYPERGLYCEMIA DUE TO DIABETES MELLITUS (H): ICD-10-CM

## 2022-01-01 DIAGNOSIS — A49.8 STAPHYLOCOCCUS EPIDERMIDIS INFECTION: ICD-10-CM

## 2022-01-01 DIAGNOSIS — Z79.4 TYPE 2 DIABETES MELLITUS WITH DIABETIC PERIPHERAL ANGIOPATHY WITHOUT GANGRENE, WITH LONG-TERM CURRENT USE OF INSULIN (H): ICD-10-CM

## 2022-01-01 DIAGNOSIS — Z79.4 TYPE 2 DIABETES MELLITUS WITH DIABETIC PERIPHERAL ANGIOPATHY WITHOUT GANGRENE, WITH LONG-TERM CURRENT USE OF INSULIN (H): Chronic | ICD-10-CM

## 2022-01-01 DIAGNOSIS — Z87.440 PERSONAL HISTORY OF URINARY TRACT INFECTION: Primary | ICD-10-CM

## 2022-01-01 DIAGNOSIS — E11.51 TYPE 2 DIABETES MELLITUS WITH DIABETIC PERIPHERAL ANGIOPATHY WITHOUT GANGRENE, WITH LONG-TERM CURRENT USE OF INSULIN (H): Primary | ICD-10-CM

## 2022-01-01 DIAGNOSIS — E11.51 TYPE 2 DIABETES MELLITUS WITH DIABETIC PERIPHERAL ANGIOPATHY WITHOUT GANGRENE, WITH LONG-TERM CURRENT USE OF INSULIN (H): ICD-10-CM

## 2022-01-01 DIAGNOSIS — N18.32 STAGE 3B CHRONIC KIDNEY DISEASE (H): ICD-10-CM

## 2022-01-01 DIAGNOSIS — R31.0 GROSS HEMATURIA: ICD-10-CM

## 2022-01-01 DIAGNOSIS — N18.31 STAGE 3A CHRONIC KIDNEY DISEASE (H): ICD-10-CM

## 2022-01-01 DIAGNOSIS — N30.01 ACUTE CYSTITIS WITH HEMATURIA: Primary | ICD-10-CM

## 2022-01-01 DIAGNOSIS — I25.10 CORONARY ARTERY DISEASE INVOLVING NATIVE CORONARY ARTERY OF NATIVE HEART WITHOUT ANGINA PECTORIS: ICD-10-CM

## 2022-01-01 DIAGNOSIS — Z23 HIGH PRIORITY FOR COVID-19 VACCINATION: ICD-10-CM

## 2022-01-01 DIAGNOSIS — E11.51 TYPE 2 DIABETES MELLITUS WITH DIABETIC PERIPHERAL ANGIOPATHY WITHOUT GANGRENE, WITH LONG-TERM CURRENT USE OF INSULIN (H): Chronic | ICD-10-CM

## 2022-01-01 DIAGNOSIS — R39.89 URINARY PROBLEM: ICD-10-CM

## 2022-01-01 DIAGNOSIS — Z79.4 TYPE 2 DIABETES MELLITUS WITH DIABETIC PERIPHERAL ANGIOPATHY WITHOUT GANGRENE, WITH LONG-TERM CURRENT USE OF INSULIN (H): Primary | ICD-10-CM

## 2022-01-01 DIAGNOSIS — I10 ESSENTIAL HYPERTENSION: Chronic | ICD-10-CM

## 2022-01-01 DIAGNOSIS — R41.81 AGE-RELATED COGNITIVE DECLINE: Chronic | ICD-10-CM

## 2022-01-01 LAB
ALBUMIN UR-MCNC: 100 MG/DL
ALBUMIN UR-MCNC: >=300 MG/DL
ALBUMIN UR-MCNC: >=300 MG/DL
ANION GAP SERPL CALCULATED.3IONS-SCNC: 13 MMOL/L (ref 7–15)
ANION GAP SERPL CALCULATED.3IONS-SCNC: 14 MMOL/L (ref 7–15)
APPEARANCE UR: ABNORMAL
APPEARANCE UR: ABNORMAL
APPEARANCE UR: CLEAR
BACTERIA #/AREA URNS HPF: ABNORMAL /HPF
BACTERIA UR CULT: ABNORMAL
BACTERIA UR CULT: ABNORMAL
BACTERIA UR CULT: NO GROWTH
BACTERIA UR CULT: NORMAL
BASOPHILS # BLD AUTO: 0.1 10E3/UL (ref 0–0.2)
BASOPHILS # BLD AUTO: 0.1 10E3/UL (ref 0–0.2)
BASOPHILS NFR BLD AUTO: 1 %
BASOPHILS NFR BLD AUTO: 1 %
BILIRUB UR QL STRIP: ABNORMAL
BILIRUB UR QL STRIP: NEGATIVE
BILIRUB UR QL STRIP: NEGATIVE
BUN SERPL-MCNC: 33.8 MG/DL (ref 8–23)
BUN SERPL-MCNC: 42 MG/DL (ref 8–23)
CALCIUM SERPL-MCNC: 9.1 MG/DL (ref 8.8–10.2)
CALCIUM SERPL-MCNC: 9.2 MG/DL (ref 8.8–10.2)
CHLORIDE SERPL-SCNC: 101 MMOL/L (ref 98–107)
CHLORIDE SERPL-SCNC: 104 MMOL/L (ref 98–107)
COLOR UR AUTO: ABNORMAL
COLOR UR AUTO: ABNORMAL
COLOR UR AUTO: YELLOW
CREAT SERPL-MCNC: 1.74 MG/DL (ref 0.67–1.17)
CREAT SERPL-MCNC: 1.8 MG/DL (ref 0.67–1.17)
DEPRECATED HCO3 PLAS-SCNC: 18 MMOL/L (ref 22–29)
DEPRECATED HCO3 PLAS-SCNC: 22 MMOL/L (ref 22–29)
EOSINOPHIL # BLD AUTO: 0.2 10E3/UL (ref 0–0.7)
EOSINOPHIL # BLD AUTO: 0.2 10E3/UL (ref 0–0.7)
EOSINOPHIL NFR BLD AUTO: 3 %
EOSINOPHIL NFR BLD AUTO: 3 %
ERYTHROCYTE [DISTWIDTH] IN BLOOD BY AUTOMATED COUNT: 12.2 % (ref 10–15)
ERYTHROCYTE [DISTWIDTH] IN BLOOD BY AUTOMATED COUNT: 12.6 % (ref 10–15)
GFR SERPL CREATININE-BSD FRML MDRD: 36 ML/MIN/1.73M2
GFR SERPL CREATININE-BSD FRML MDRD: 38 ML/MIN/1.73M2
GLUCOSE SERPL-MCNC: 280 MG/DL (ref 70–99)
GLUCOSE SERPL-MCNC: 328 MG/DL (ref 70–99)
GLUCOSE UR STRIP-MCNC: 500 MG/DL
GLUCOSE UR STRIP-MCNC: NEGATIVE MG/DL
GLUCOSE UR STRIP-MCNC: NEGATIVE MG/DL
HCT VFR BLD AUTO: 34.2 % (ref 40–53)
HCT VFR BLD AUTO: 36.2 % (ref 40–53)
HGB BLD-MCNC: 11.1 G/DL (ref 13.3–17.7)
HGB BLD-MCNC: 12 G/DL (ref 13.3–17.7)
HGB UR QL STRIP: ABNORMAL
IMM GRANULOCYTES # BLD: 0 10E3/UL
IMM GRANULOCYTES # BLD: 0.1 10E3/UL
IMM GRANULOCYTES NFR BLD: 1 %
IMM GRANULOCYTES NFR BLD: 1 %
KETONES UR STRIP-MCNC: ABNORMAL MG/DL
KETONES UR STRIP-MCNC: NEGATIVE MG/DL
KETONES UR STRIP-MCNC: NEGATIVE MG/DL
LEUKOCYTE ESTERASE UR QL STRIP: ABNORMAL
LYMPHOCYTES # BLD AUTO: 1.1 10E3/UL (ref 0.8–5.3)
LYMPHOCYTES # BLD AUTO: 1.3 10E3/UL (ref 0.8–5.3)
LYMPHOCYTES NFR BLD AUTO: 15 %
LYMPHOCYTES NFR BLD AUTO: 18 %
MCH RBC QN AUTO: 30.2 PG (ref 26.5–33)
MCH RBC QN AUTO: 30.8 PG (ref 26.5–33)
MCHC RBC AUTO-ENTMCNC: 32.5 G/DL (ref 31.5–36.5)
MCHC RBC AUTO-ENTMCNC: 33.1 G/DL (ref 31.5–36.5)
MCV RBC AUTO: 93 FL (ref 78–100)
MCV RBC AUTO: 93 FL (ref 78–100)
MONOCYTES # BLD AUTO: 1.3 10E3/UL (ref 0–1.3)
MONOCYTES # BLD AUTO: 1.3 10E3/UL (ref 0–1.3)
MONOCYTES NFR BLD AUTO: 18 %
MONOCYTES NFR BLD AUTO: 19 %
NEUTROPHILS # BLD AUTO: 4.3 10E3/UL (ref 1.6–8.3)
NEUTROPHILS # BLD AUTO: 4.3 10E3/UL (ref 1.6–8.3)
NEUTROPHILS NFR BLD AUTO: 59 %
NEUTROPHILS NFR BLD AUTO: 62 %
NITRATE UR QL: NEGATIVE
PH UR STRIP: 5.5 [PH] (ref 5–8)
PH UR STRIP: 5.5 [PH] (ref 5–8)
PH UR STRIP: 7 [PH] (ref 5–8)
PLATELET # BLD AUTO: 292 10E3/UL (ref 150–450)
PLATELET # BLD AUTO: 342 10E3/UL (ref 150–450)
POTASSIUM SERPL-SCNC: 4.5 MMOL/L (ref 3.4–5.3)
POTASSIUM SERPL-SCNC: 4.9 MMOL/L (ref 3.4–5.3)
RBC # BLD AUTO: 3.68 10E6/UL (ref 4.4–5.9)
RBC # BLD AUTO: 3.89 10E6/UL (ref 4.4–5.9)
RBC #/AREA URNS AUTO: >100 /HPF
SODIUM SERPL-SCNC: 136 MMOL/L (ref 136–145)
SODIUM SERPL-SCNC: 136 MMOL/L (ref 136–145)
SP GR UR STRIP: 1.01 (ref 1–1.03)
SP GR UR STRIP: 1.02 (ref 1–1.03)
SP GR UR STRIP: 1.02 (ref 1–1.03)
SQUAMOUS #/AREA URNS AUTO: ABNORMAL /LPF
SQUAMOUS #/AREA URNS AUTO: ABNORMAL /LPF
UROBILINOGEN UR STRIP-ACNC: 0.2 E.U./DL
UROBILINOGEN UR STRIP-ACNC: 0.2 E.U./DL
UROBILINOGEN UR STRIP-ACNC: 1 E.U./DL
WBC # BLD AUTO: 7 10E3/UL (ref 4–11)
WBC # BLD AUTO: 7.2 10E3/UL (ref 4–11)
WBC #/AREA URNS AUTO: >100 /HPF
WBC #/AREA URNS AUTO: >100 /HPF
WBC #/AREA URNS AUTO: ABNORMAL /HPF
WBC CLUMPS #/AREA URNS HPF: PRESENT /HPF
WBC CLUMPS #/AREA URNS HPF: PRESENT /HPF

## 2022-01-01 PROCEDURE — 80048 BASIC METABOLIC PNL TOTAL CA: CPT | Performed by: STUDENT IN AN ORGANIZED HEALTH CARE EDUCATION/TRAINING PROGRAM

## 2022-01-01 PROCEDURE — 81001 URINALYSIS AUTO W/SCOPE: CPT | Performed by: PHYSICIAN ASSISTANT

## 2022-01-01 PROCEDURE — 99214 OFFICE O/P EST MOD 30 MIN: CPT | Performed by: PHYSICIAN ASSISTANT

## 2022-01-01 PROCEDURE — 0134A COVID-19,PF,MODERNA BIVALENT: CPT | Performed by: INTERNAL MEDICINE

## 2022-01-01 PROCEDURE — 91313 COVID-19,PF,MODERNA BIVALENT: CPT | Performed by: INTERNAL MEDICINE

## 2022-01-01 PROCEDURE — 87186 SC STD MICRODIL/AGAR DIL: CPT | Performed by: INTERNAL MEDICINE

## 2022-01-01 PROCEDURE — 87086 URINE CULTURE/COLONY COUNT: CPT | Performed by: PHYSICIAN ASSISTANT

## 2022-01-01 PROCEDURE — 81001 URINALYSIS AUTO W/SCOPE: CPT | Performed by: INTERNAL MEDICINE

## 2022-01-01 PROCEDURE — 87088 URINE BACTERIA CULTURE: CPT | Performed by: INTERNAL MEDICINE

## 2022-01-01 PROCEDURE — 87086 URINE CULTURE/COLONY COUNT: CPT | Performed by: INTERNAL MEDICINE

## 2022-01-01 PROCEDURE — 85025 COMPLETE CBC W/AUTO DIFF WBC: CPT | Performed by: PHYSICIAN ASSISTANT

## 2022-01-01 PROCEDURE — 99214 OFFICE O/P EST MOD 30 MIN: CPT | Performed by: INTERNAL MEDICINE

## 2022-01-01 PROCEDURE — 87086 URINE CULTURE/COLONY COUNT: CPT | Performed by: STUDENT IN AN ORGANIZED HEALTH CARE EDUCATION/TRAINING PROGRAM

## 2022-01-01 PROCEDURE — 99213 OFFICE O/P EST LOW 20 MIN: CPT | Performed by: STUDENT IN AN ORGANIZED HEALTH CARE EDUCATION/TRAINING PROGRAM

## 2022-01-01 PROCEDURE — 85025 COMPLETE CBC W/AUTO DIFF WBC: CPT | Performed by: STUDENT IN AN ORGANIZED HEALTH CARE EDUCATION/TRAINING PROGRAM

## 2022-01-01 PROCEDURE — 80048 BASIC METABOLIC PNL TOTAL CA: CPT | Performed by: PHYSICIAN ASSISTANT

## 2022-01-01 PROCEDURE — 36415 COLL VENOUS BLD VENIPUNCTURE: CPT | Performed by: STUDENT IN AN ORGANIZED HEALTH CARE EDUCATION/TRAINING PROGRAM

## 2022-01-01 PROCEDURE — 81001 URINALYSIS AUTO W/SCOPE: CPT | Performed by: STUDENT IN AN ORGANIZED HEALTH CARE EDUCATION/TRAINING PROGRAM

## 2022-01-01 PROCEDURE — 36415 COLL VENOUS BLD VENIPUNCTURE: CPT | Performed by: PHYSICIAN ASSISTANT

## 2022-01-01 RX ORDER — DOXYCYCLINE 100 MG/1
100 CAPSULE ORAL 2 TIMES DAILY
Qty: 6 CAPSULE | Refills: 0 | Status: CANCELLED | OUTPATIENT
Start: 2022-01-01

## 2022-01-01 RX ORDER — CIPROFLOXACIN 500 MG/1
500 TABLET, FILM COATED ORAL 2 TIMES DAILY
Qty: 14 TABLET | Refills: 0 | Status: SHIPPED | OUTPATIENT
Start: 2022-01-01 | End: 2022-01-01

## 2022-01-01 RX ORDER — CIPROFLOXACIN 500 MG/1
500 TABLET, FILM COATED ORAL 2 TIMES DAILY
Qty: 20 TABLET | Refills: 0 | Status: SHIPPED | OUTPATIENT
Start: 2022-01-01 | End: 2022-01-01

## 2022-01-01 RX ORDER — DOXYCYCLINE 100 MG/1
100 CAPSULE ORAL 2 TIMES DAILY
Qty: 6 CAPSULE | Refills: 0 | Status: SHIPPED | OUTPATIENT
Start: 2022-01-01 | End: 2022-01-01

## 2022-01-01 RX ORDER — INSULIN GLARGINE 100 [IU]/ML
32 INJECTION, SOLUTION SUBCUTANEOUS AT BEDTIME
Qty: 15 ML | Refills: 11 | Status: ON HOLD | OUTPATIENT
Start: 2022-01-01 | End: 2023-01-01

## 2022-01-01 RX ORDER — DOXYCYCLINE 100 MG/1
100 CAPSULE ORAL 2 TIMES DAILY
Qty: 60 CAPSULE | Refills: 0 | Status: ON HOLD | OUTPATIENT
Start: 2022-01-01 | End: 2023-01-01

## 2022-01-26 ENCOUNTER — HOSPITAL ENCOUNTER (INPATIENT)
Facility: CLINIC | Age: 87
LOS: 1 days | Discharge: OTHER INSTITUTION | DRG: 871 | End: 2022-01-27
Attending: STUDENT IN AN ORGANIZED HEALTH CARE EDUCATION/TRAINING PROGRAM
Payer: COMMERCIAL

## 2022-01-26 ENCOUNTER — APPOINTMENT (OUTPATIENT)
Dept: CT IMAGING | Facility: CLINIC | Age: 87
DRG: 871 | End: 2022-01-26
Attending: STUDENT IN AN ORGANIZED HEALTH CARE EDUCATION/TRAINING PROGRAM
Payer: COMMERCIAL

## 2022-01-26 DIAGNOSIS — K80.50 CHOLEDOCHOLITHIASIS: ICD-10-CM

## 2022-01-26 DIAGNOSIS — R17 JAUNDICE: Primary | ICD-10-CM

## 2022-01-26 LAB
ALBUMIN SERPL-MCNC: 2.2 G/DL (ref 3.5–5)
ALP SERPL-CCNC: 425 U/L (ref 45–120)
ALT SERPL W P-5'-P-CCNC: 126 U/L (ref 0–45)
AMMONIA PLAS-SCNC: 22 UMOL/L (ref 11–35)
ANION GAP SERPL CALCULATED.3IONS-SCNC: 11 MMOL/L (ref 5–18)
APTT PPP: 29 SECONDS (ref 22–38)
AST SERPL W P-5'-P-CCNC: 98 U/L (ref 0–40)
BASOPHILS # BLD AUTO: 0.1 10E3/UL (ref 0–0.2)
BASOPHILS NFR BLD AUTO: 0 %
BILIRUB SERPL-MCNC: 17.2 MG/DL (ref 0–1)
BUN SERPL-MCNC: 67 MG/DL (ref 8–28)
CALCIUM SERPL-MCNC: 9.6 MG/DL (ref 8.5–10.5)
CHLORIDE BLD-SCNC: 101 MMOL/L (ref 98–107)
CO2 SERPL-SCNC: 24 MMOL/L (ref 22–31)
CREAT SERPL-MCNC: 1.5 MG/DL (ref 0.7–1.3)
EOSINOPHIL # BLD AUTO: 0 10E3/UL (ref 0–0.7)
EOSINOPHIL NFR BLD AUTO: 0 %
ERYTHROCYTE [DISTWIDTH] IN BLOOD BY AUTOMATED COUNT: 15.2 % (ref 10–15)
GFR SERPL CREATININE-BSD FRML MDRD: 45 ML/MIN/1.73M2
GLUCOSE BLD-MCNC: 420 MG/DL (ref 70–125)
HCT VFR BLD AUTO: 39.4 % (ref 40–53)
HGB BLD-MCNC: 12.7 G/DL (ref 13.3–17.7)
HOLD SPECIMEN: NORMAL
HOLD SPECIMEN: NORMAL
IMM GRANULOCYTES # BLD: 0.4 10E3/UL
IMM GRANULOCYTES NFR BLD: 2 %
INR PPP: 1.34 (ref 0.85–1.15)
LACTATE SERPL-SCNC: 3.3 MMOL/L (ref 0.7–2)
LIPASE SERPL-CCNC: <9 U/L (ref 0–52)
LYMPHOCYTES # BLD AUTO: 0.4 10E3/UL (ref 0.8–5.3)
LYMPHOCYTES NFR BLD AUTO: 2 %
MAGNESIUM SERPL-MCNC: 2.3 MG/DL (ref 1.8–2.6)
MCH RBC QN AUTO: 30.5 PG (ref 26.5–33)
MCHC RBC AUTO-ENTMCNC: 32.2 G/DL (ref 31.5–36.5)
MCV RBC AUTO: 95 FL (ref 78–100)
MONOCYTES # BLD AUTO: 1.6 10E3/UL (ref 0–1.3)
MONOCYTES NFR BLD AUTO: 8 %
NEUTROPHILS # BLD AUTO: 18.6 10E3/UL (ref 1.6–8.3)
NEUTROPHILS NFR BLD AUTO: 88 %
NRBC # BLD AUTO: 0 10E3/UL
NRBC BLD AUTO-RTO: 0 /100
PLATELET # BLD AUTO: 115 10E3/UL (ref 150–450)
POTASSIUM BLD-SCNC: 4.3 MMOL/L (ref 3.5–5)
PROT SERPL-MCNC: 6.5 G/DL (ref 6–8)
RBC # BLD AUTO: 4.17 10E6/UL (ref 4.4–5.9)
SODIUM SERPL-SCNC: 136 MMOL/L (ref 136–145)
TROPONIN I SERPL-MCNC: 0.01 NG/ML (ref 0–0.29)
WBC # BLD AUTO: 21.1 10E3/UL (ref 4–11)

## 2022-01-26 PROCEDURE — 87077 CULTURE AEROBIC IDENTIFY: CPT | Performed by: STUDENT IN AN ORGANIZED HEALTH CARE EDUCATION/TRAINING PROGRAM

## 2022-01-26 PROCEDURE — 87149 DNA/RNA DIRECT PROBE: CPT | Performed by: STUDENT IN AN ORGANIZED HEALTH CARE EDUCATION/TRAINING PROGRAM

## 2022-01-26 PROCEDURE — 84145 PROCALCITONIN (PCT): CPT | Performed by: STUDENT IN AN ORGANIZED HEALTH CARE EDUCATION/TRAINING PROGRAM

## 2022-01-26 PROCEDURE — 96376 TX/PRO/DX INJ SAME DRUG ADON: CPT

## 2022-01-26 PROCEDURE — 74177 CT ABD & PELVIS W/CONTRAST: CPT

## 2022-01-26 PROCEDURE — 250N000011 HC RX IP 250 OP 636: Performed by: STUDENT IN AN ORGANIZED HEALTH CARE EDUCATION/TRAINING PROGRAM

## 2022-01-26 PROCEDURE — 96365 THER/PROPH/DIAG IV INF INIT: CPT | Mod: 59

## 2022-01-26 PROCEDURE — 70450 CT HEAD/BRAIN W/O DYE: CPT

## 2022-01-26 PROCEDURE — 258N000003 HC RX IP 258 OP 636: Performed by: STUDENT IN AN ORGANIZED HEALTH CARE EDUCATION/TRAINING PROGRAM

## 2022-01-26 PROCEDURE — 83735 ASSAY OF MAGNESIUM: CPT | Performed by: STUDENT IN AN ORGANIZED HEALTH CARE EDUCATION/TRAINING PROGRAM

## 2022-01-26 PROCEDURE — 83605 ASSAY OF LACTIC ACID: CPT | Performed by: STUDENT IN AN ORGANIZED HEALTH CARE EDUCATION/TRAINING PROGRAM

## 2022-01-26 PROCEDURE — 85610 PROTHROMBIN TIME: CPT | Performed by: STUDENT IN AN ORGANIZED HEALTH CARE EDUCATION/TRAINING PROGRAM

## 2022-01-26 PROCEDURE — 83690 ASSAY OF LIPASE: CPT | Performed by: STUDENT IN AN ORGANIZED HEALTH CARE EDUCATION/TRAINING PROGRAM

## 2022-01-26 PROCEDURE — 82140 ASSAY OF AMMONIA: CPT | Performed by: STUDENT IN AN ORGANIZED HEALTH CARE EDUCATION/TRAINING PROGRAM

## 2022-01-26 PROCEDURE — 36415 COLL VENOUS BLD VENIPUNCTURE: CPT | Performed by: STUDENT IN AN ORGANIZED HEALTH CARE EDUCATION/TRAINING PROGRAM

## 2022-01-26 PROCEDURE — 96375 TX/PRO/DX INJ NEW DRUG ADDON: CPT

## 2022-01-26 PROCEDURE — 85730 THROMBOPLASTIN TIME PARTIAL: CPT | Performed by: STUDENT IN AN ORGANIZED HEALTH CARE EDUCATION/TRAINING PROGRAM

## 2022-01-26 PROCEDURE — 84484 ASSAY OF TROPONIN QUANT: CPT | Performed by: STUDENT IN AN ORGANIZED HEALTH CARE EDUCATION/TRAINING PROGRAM

## 2022-01-26 PROCEDURE — 99285 EMERGENCY DEPT VISIT HI MDM: CPT | Mod: 25

## 2022-01-26 PROCEDURE — 80053 COMPREHEN METABOLIC PANEL: CPT | Performed by: STUDENT IN AN ORGANIZED HEALTH CARE EDUCATION/TRAINING PROGRAM

## 2022-01-26 PROCEDURE — 96361 HYDRATE IV INFUSION ADD-ON: CPT

## 2022-01-26 PROCEDURE — 85025 COMPLETE CBC W/AUTO DIFF WBC: CPT | Performed by: STUDENT IN AN ORGANIZED HEALTH CARE EDUCATION/TRAINING PROGRAM

## 2022-01-26 RX ORDER — PIPERACILLIN SODIUM, TAZOBACTAM SODIUM 3; .375 G/15ML; G/15ML
3.38 INJECTION, POWDER, LYOPHILIZED, FOR SOLUTION INTRAVENOUS ONCE
Status: COMPLETED | OUTPATIENT
Start: 2022-01-26 | End: 2022-01-27

## 2022-01-26 RX ORDER — IOPAMIDOL 755 MG/ML
100 INJECTION, SOLUTION INTRAVASCULAR ONCE
Status: COMPLETED | OUTPATIENT
Start: 2022-01-26 | End: 2022-01-26

## 2022-01-26 RX ADMIN — IOPAMIDOL 75 ML: 755 INJECTION, SOLUTION INTRAVENOUS at 22:09

## 2022-01-26 RX ADMIN — SODIUM CHLORIDE, POTASSIUM CHLORIDE, SODIUM LACTATE AND CALCIUM CHLORIDE 1000 ML: 600; 310; 30; 20 INJECTION, SOLUTION INTRAVENOUS at 21:26

## 2022-01-27 ENCOUNTER — APPOINTMENT (OUTPATIENT)
Dept: RADIOLOGY | Facility: HOSPITAL | Age: 87
End: 2022-01-27
Attending: INTERNAL MEDICINE
Payer: COMMERCIAL

## 2022-01-27 ENCOUNTER — ANESTHESIA EVENT (OUTPATIENT)
Dept: SURGERY | Facility: HOSPITAL | Age: 87
End: 2022-01-27
Payer: COMMERCIAL

## 2022-01-27 ENCOUNTER — APPOINTMENT (OUTPATIENT)
Dept: MRI IMAGING | Facility: CLINIC | Age: 87
DRG: 871 | End: 2022-01-27
Attending: STUDENT IN AN ORGANIZED HEALTH CARE EDUCATION/TRAINING PROGRAM
Payer: COMMERCIAL

## 2022-01-27 ENCOUNTER — HOSPITAL ENCOUNTER (INPATIENT)
Facility: CLINIC | Age: 87
LOS: 8 days | Discharge: NURSING, REHAB OR LONG-TERM CARE | DRG: 871 | End: 2022-02-04
Attending: INTERNAL MEDICINE | Admitting: INTERNAL MEDICINE
Payer: COMMERCIAL

## 2022-01-27 ENCOUNTER — HOSPITAL ENCOUNTER (OUTPATIENT)
Facility: HOSPITAL | Age: 87
Discharge: OTHER INSTITUTION | End: 2022-01-27
Attending: INTERNAL MEDICINE | Admitting: INTERNAL MEDICINE
Payer: COMMERCIAL

## 2022-01-27 ENCOUNTER — ANESTHESIA (OUTPATIENT)
Dept: SURGERY | Facility: HOSPITAL | Age: 87
End: 2022-01-27
Payer: COMMERCIAL

## 2022-01-27 ENCOUNTER — APPOINTMENT (OUTPATIENT)
Dept: ULTRASOUND IMAGING | Facility: CLINIC | Age: 87
DRG: 871 | End: 2022-01-27
Attending: INTERNAL MEDICINE
Payer: COMMERCIAL

## 2022-01-27 VITALS
SYSTOLIC BLOOD PRESSURE: 128 MMHG | OXYGEN SATURATION: 95 % | TEMPERATURE: 98.5 F | WEIGHT: 178.79 LBS | RESPIRATION RATE: 20 BRPM | HEART RATE: 77 BPM | DIASTOLIC BLOOD PRESSURE: 60 MMHG | BODY MASS INDEX: 27.1 KG/M2 | HEIGHT: 68 IN

## 2022-01-27 VITALS
HEART RATE: 90 BPM | SYSTOLIC BLOOD PRESSURE: 102 MMHG | OXYGEN SATURATION: 92 % | TEMPERATURE: 98.6 F | RESPIRATION RATE: 24 BRPM | DIASTOLIC BLOOD PRESSURE: 57 MMHG

## 2022-01-27 DIAGNOSIS — Z79.01 CHRONIC ANTICOAGULATION: ICD-10-CM

## 2022-01-27 DIAGNOSIS — L97.413 DIABETIC ULCER OF RIGHT MIDFOOT ASSOCIATED WITH TYPE 2 DIABETES MELLITUS, WITH NECROSIS OF MUSCLE (H): ICD-10-CM

## 2022-01-27 DIAGNOSIS — K83.09 ASCENDING CHOLANGITIS (H): Primary | Chronic | ICD-10-CM

## 2022-01-27 DIAGNOSIS — E11.51 TYPE 2 DIABETES MELLITUS WITH DIABETIC PERIPHERAL ANGIOPATHY WITHOUT GANGRENE, WITH LONG-TERM CURRENT USE OF INSULIN (H): ICD-10-CM

## 2022-01-27 DIAGNOSIS — E11.621 DIABETIC ULCER OF RIGHT MIDFOOT ASSOCIATED WITH TYPE 2 DIABETES MELLITUS, WITH NECROSIS OF MUSCLE (H): ICD-10-CM

## 2022-01-27 DIAGNOSIS — Z79.4 TYPE 2 DIABETES MELLITUS WITH DIABETIC PERIPHERAL ANGIOPATHY WITHOUT GANGRENE, WITH LONG-TERM CURRENT USE OF INSULIN (H): ICD-10-CM

## 2022-01-27 DIAGNOSIS — Z98.890 STATUS POST ENDOSCOPIC RETROGRADE CHOLANGIOPANCREATOGRAPHY: Chronic | ICD-10-CM

## 2022-01-27 PROBLEM — R17 JAUNDICE: Status: ACTIVE | Noted: 2022-01-27

## 2022-01-27 PROBLEM — R78.81 GRAM-NEGATIVE BACTEREMIA: Status: ACTIVE | Noted: 2022-01-27

## 2022-01-27 PROBLEM — R78.81 GRAM-NEGATIVE BACTEREMIA: Chronic | Status: ACTIVE | Noted: 2022-01-27

## 2022-01-27 PROBLEM — A41.9 SEPSIS (H): Status: ACTIVE | Noted: 2022-01-27

## 2022-01-27 PROBLEM — A41.9 SEPSIS (H): Chronic | Status: ACTIVE | Noted: 2022-01-27

## 2022-01-27 PROBLEM — R17 JAUNDICE: Chronic | Status: ACTIVE | Noted: 2022-01-27

## 2022-01-27 PROBLEM — Z86.711 PERSONAL HISTORY OF PE (PULMONARY EMBOLISM): Status: ACTIVE | Noted: 2020-10-22

## 2022-01-27 LAB
ACINETOBACTER SPECIES: NOT DETECTED
ALBUMIN SERPL-MCNC: 1.8 G/DL (ref 3.5–5)
ALBUMIN UR-MCNC: 20 MG/DL
ALP SERPL-CCNC: 354 U/L (ref 45–120)
ALT SERPL W P-5'-P-CCNC: 106 U/L (ref 0–45)
ANION GAP SERPL CALCULATED.3IONS-SCNC: 9 MMOL/L (ref 5–18)
APPEARANCE UR: ABNORMAL
AST SERPL W P-5'-P-CCNC: 80 U/L (ref 0–40)
BACTERIA #/AREA URNS HPF: ABNORMAL /HPF
BASOPHILS # BLD AUTO: 0 10E3/UL (ref 0–0.2)
BASOPHILS NFR BLD AUTO: 0 %
BILIRUB SERPL-MCNC: 15.3 MG/DL (ref 0–1)
BILIRUB UR QL STRIP: ABNORMAL
BUN SERPL-MCNC: 70 MG/DL (ref 8–28)
CALCIUM SERPL-MCNC: 9 MG/DL (ref 8.5–10.5)
CHLORIDE BLD-SCNC: 107 MMOL/L (ref 98–107)
CITROBACTER SPECIES: NOT DETECTED
CO2 SERPL-SCNC: 23 MMOL/L (ref 22–31)
COLOR UR AUTO: ABNORMAL
CREAT SERPL-MCNC: 1.35 MG/DL (ref 0.7–1.3)
CTX-M: NOT DETECTED
ENTEROBACTER SPECIES: NOT DETECTED
EOSINOPHIL # BLD AUTO: 0 10E3/UL (ref 0–0.7)
EOSINOPHIL NFR BLD AUTO: 0 %
ERCP: NORMAL
ERYTHROCYTE [DISTWIDTH] IN BLOOD BY AUTOMATED COUNT: 15.5 % (ref 10–15)
ESCHERICHIA COLI: NOT DETECTED
GFR SERPL CREATININE-BSD FRML MDRD: 51 ML/MIN/1.73M2
GLUCOSE BLD-MCNC: 352 MG/DL (ref 70–125)
GLUCOSE BLDC GLUCOMTR-MCNC: 229 MG/DL (ref 70–99)
GLUCOSE BLDC GLUCOMTR-MCNC: 236 MG/DL (ref 70–99)
GLUCOSE BLDC GLUCOMTR-MCNC: 244 MG/DL (ref 70–99)
GLUCOSE BLDC GLUCOMTR-MCNC: 257 MG/DL (ref 70–99)
GLUCOSE BLDC GLUCOMTR-MCNC: 258 MG/DL (ref 70–99)
GLUCOSE BLDC GLUCOMTR-MCNC: 288 MG/DL (ref 70–99)
GLUCOSE BLDC GLUCOMTR-MCNC: 326 MG/DL (ref 70–99)
GLUCOSE UR STRIP-MCNC: 30 MG/DL
HCT VFR BLD AUTO: 36.6 % (ref 40–53)
HGB BLD-MCNC: 11.7 G/DL (ref 13.3–17.7)
HGB UR QL STRIP: ABNORMAL
IMM GRANULOCYTES # BLD: 0.6 10E3/UL
IMM GRANULOCYTES NFR BLD: 4 %
IMP: NOT DETECTED
KETONES UR STRIP-MCNC: NEGATIVE MG/DL
KLEBSIELLA OXYTOCA: DETECTED
KLEBSIELLA PNEUMONIAE: NOT DETECTED
KPC: NOT DETECTED
LACTATE SERPL-SCNC: 2.9 MMOL/L (ref 0.7–2)
LEUKOCYTE ESTERASE UR QL STRIP: ABNORMAL
LYMPHOCYTES # BLD AUTO: 0.5 10E3/UL (ref 0.8–5.3)
LYMPHOCYTES NFR BLD AUTO: 3 %
MAGNESIUM SERPL-MCNC: 2.1 MG/DL (ref 1.8–2.6)
MCH RBC QN AUTO: 30.4 PG (ref 26.5–33)
MCHC RBC AUTO-ENTMCNC: 32 G/DL (ref 31.5–36.5)
MCV RBC AUTO: 95 FL (ref 78–100)
MONOCYTES # BLD AUTO: 1.1 10E3/UL (ref 0–1.3)
MONOCYTES NFR BLD AUTO: 7 %
MUCOUS THREADS #/AREA URNS LPF: PRESENT /LPF
NDM: NOT DETECTED
NEUTROPHILS # BLD AUTO: 13.8 10E3/UL (ref 1.6–8.3)
NEUTROPHILS NFR BLD AUTO: 86 %
NITRATE UR QL: NEGATIVE
NRBC # BLD AUTO: 0 10E3/UL
NRBC BLD AUTO-RTO: 0 /100
OXA (DETECTED/NOT DETECTED): NOT DETECTED
PH UR STRIP: 5 [PH] (ref 5–7)
PLATELET # BLD AUTO: 89 10E3/UL (ref 150–450)
POTASSIUM BLD-SCNC: 4.2 MMOL/L (ref 3.5–5)
PROCALCITONIN SERPL-MCNC: 8.51 NG/ML (ref 0–0.49)
PROT SERPL-MCNC: 5.5 G/DL (ref 6–8)
PROTEUS SPECIES: NOT DETECTED
PSEUDOMONAS AERUGINOSA: NOT DETECTED
RBC # BLD AUTO: 3.85 10E6/UL (ref 4.4–5.9)
RBC URINE: 136 /HPF
SARS-COV-2 RNA RESP QL NAA+PROBE: NEGATIVE
SODIUM SERPL-SCNC: 139 MMOL/L (ref 136–145)
SP GR UR STRIP: 1.04 (ref 1–1.03)
SQUAMOUS EPITHELIAL: 1 /HPF
UROBILINOGEN UR STRIP-MCNC: <2 MG/DL
VIM: NOT DETECTED
WBC # BLD AUTO: 16 10E3/UL (ref 4–11)
WBC URINE: >182 /HPF
YEAST #/AREA URNS HPF: ABNORMAL /HPF

## 2022-01-27 PROCEDURE — 258N000003 HC RX IP 258 OP 636: Performed by: ANESTHESIOLOGY

## 2022-01-27 PROCEDURE — 81001 URINALYSIS AUTO W/SCOPE: CPT | Performed by: STUDENT IN AN ORGANIZED HEALTH CARE EDUCATION/TRAINING PROGRAM

## 2022-01-27 PROCEDURE — 999N000180 XR SURGERY CARM FLUORO LESS THAN 5 MIN

## 2022-01-27 PROCEDURE — 120N000001 HC R&B MED SURG/OB

## 2022-01-27 PROCEDURE — 250N000009 HC RX 250: Performed by: NURSE ANESTHETIST, CERTIFIED REGISTERED

## 2022-01-27 PROCEDURE — 370N000017 HC ANESTHESIA TECHNICAL FEE, PER MIN: Performed by: INTERNAL MEDICINE

## 2022-01-27 PROCEDURE — 250N000011 HC RX IP 250 OP 636: Performed by: NURSE ANESTHETIST, CERTIFIED REGISTERED

## 2022-01-27 PROCEDURE — 258N000003 HC RX IP 258 OP 636: Performed by: STUDENT IN AN ORGANIZED HEALTH CARE EDUCATION/TRAINING PROGRAM

## 2022-01-27 PROCEDURE — 96361 HYDRATE IV INFUSION ADD-ON: CPT

## 2022-01-27 PROCEDURE — 99207 PR CDG-CODE CATEGORY CHANGED: CPT | Performed by: INTERNAL MEDICINE

## 2022-01-27 PROCEDURE — 80053 COMPREHEN METABOLIC PANEL: CPT | Performed by: INTERNAL MEDICINE

## 2022-01-27 PROCEDURE — 250N000011 HC RX IP 250 OP 636: Performed by: INTERNAL MEDICINE

## 2022-01-27 PROCEDURE — 96376 TX/PRO/DX INJ SAME DRUG ADON: CPT

## 2022-01-27 PROCEDURE — 250N000025 HC SEVOFLURANE, PER MIN: Performed by: INTERNAL MEDICINE

## 2022-01-27 PROCEDURE — C1726 CATH, BAL DIL, NON-VASCULAR: HCPCS | Performed by: INTERNAL MEDICINE

## 2022-01-27 PROCEDURE — 87635 SARS-COV-2 COVID-19 AMP PRB: CPT | Performed by: STUDENT IN AN ORGANIZED HEALTH CARE EDUCATION/TRAINING PROGRAM

## 2022-01-27 PROCEDURE — 250N000009 HC RX 250: Performed by: INTERNAL MEDICINE

## 2022-01-27 PROCEDURE — 710N000009 HC RECOVERY PHASE 1, LEVEL 1, PER MIN: Performed by: INTERNAL MEDICINE

## 2022-01-27 PROCEDURE — 96372 THER/PROPH/DIAG INJ SC/IM: CPT | Performed by: ANESTHESIOLOGY

## 2022-01-27 PROCEDURE — 87077 CULTURE AEROBIC IDENTIFY: CPT | Performed by: STUDENT IN AN ORGANIZED HEALTH CARE EDUCATION/TRAINING PROGRAM

## 2022-01-27 PROCEDURE — 87106 FUNGI IDENTIFICATION YEAST: CPT | Performed by: STUDENT IN AN ORGANIZED HEALTH CARE EDUCATION/TRAINING PROGRAM

## 2022-01-27 PROCEDURE — 85025 COMPLETE CBC W/AUTO DIFF WBC: CPT | Performed by: INTERNAL MEDICINE

## 2022-01-27 PROCEDURE — 99223 1ST HOSP IP/OBS HIGH 75: CPT | Mod: AI | Performed by: INTERNAL MEDICINE

## 2022-01-27 PROCEDURE — G0378 HOSPITAL OBSERVATION PER HR: HCPCS

## 2022-01-27 PROCEDURE — 83735 ASSAY OF MAGNESIUM: CPT | Performed by: INTERNAL MEDICINE

## 2022-01-27 PROCEDURE — 83605 ASSAY OF LACTIC ACID: CPT | Performed by: STUDENT IN AN ORGANIZED HEALTH CARE EDUCATION/TRAINING PROGRAM

## 2022-01-27 PROCEDURE — 255N000002 HC RX 255 OP 636: Performed by: INTERNAL MEDICINE

## 2022-01-27 PROCEDURE — 258N000003 HC RX IP 258 OP 636: Performed by: INTERNAL MEDICINE

## 2022-01-27 PROCEDURE — 999N000141 HC STATISTIC PRE-PROCEDURE NURSING ASSESSMENT: Performed by: INTERNAL MEDICINE

## 2022-01-27 PROCEDURE — 87086 URINE CULTURE/COLONY COUNT: CPT | Performed by: STUDENT IN AN ORGANIZED HEALTH CARE EDUCATION/TRAINING PROGRAM

## 2022-01-27 PROCEDURE — 250N000011 HC RX IP 250 OP 636: Performed by: ANESTHESIOLOGY

## 2022-01-27 PROCEDURE — C1769 GUIDE WIRE: HCPCS | Performed by: INTERNAL MEDICINE

## 2022-01-27 PROCEDURE — 74183 MRI ABD W/O CNTR FLWD CNTR: CPT

## 2022-01-27 PROCEDURE — 255N000002 HC RX 255 OP 636: Performed by: STUDENT IN AN ORGANIZED HEALTH CARE EDUCATION/TRAINING PROGRAM

## 2022-01-27 PROCEDURE — 250N000011 HC RX IP 250 OP 636: Performed by: STUDENT IN AN ORGANIZED HEALTH CARE EDUCATION/TRAINING PROGRAM

## 2022-01-27 PROCEDURE — 258N000003 HC RX IP 258 OP 636: Performed by: NURSE ANESTHETIST, CERTIFIED REGISTERED

## 2022-01-27 PROCEDURE — 99207 PR NON-BILLABLE SERV PER CHARTING: CPT | Performed by: INTERNAL MEDICINE

## 2022-01-27 PROCEDURE — 36415 COLL VENOUS BLD VENIPUNCTURE: CPT | Performed by: INTERNAL MEDICINE

## 2022-01-27 PROCEDURE — 250N000009 HC RX 250: Performed by: ANESTHESIOLOGY

## 2022-01-27 PROCEDURE — 96375 TX/PRO/DX INJ NEW DRUG ADDON: CPT

## 2022-01-27 PROCEDURE — 250N000012 HC RX MED GY IP 250 OP 636 PS 637: Performed by: ANESTHESIOLOGY

## 2022-01-27 PROCEDURE — 36415 COLL VENOUS BLD VENIPUNCTURE: CPT | Performed by: STUDENT IN AN ORGANIZED HEALTH CARE EDUCATION/TRAINING PROGRAM

## 2022-01-27 PROCEDURE — 250N000012 HC RX MED GY IP 250 OP 636 PS 637: Performed by: STUDENT IN AN ORGANIZED HEALTH CARE EDUCATION/TRAINING PROGRAM

## 2022-01-27 PROCEDURE — 360N000082 HC SURGERY LEVEL 2 W/ FLUORO, PER MIN: Performed by: INTERNAL MEDICINE

## 2022-01-27 PROCEDURE — A9585 GADOBUTROL INJECTION: HCPCS | Performed by: STUDENT IN AN ORGANIZED HEALTH CARE EDUCATION/TRAINING PROGRAM

## 2022-01-27 PROCEDURE — C1725 CATH, TRANSLUMIN NON-LASER: HCPCS | Performed by: INTERNAL MEDICINE

## 2022-01-27 PROCEDURE — 0FC98ZZ EXTIRPATION OF MATTER FROM COMMON BILE DUCT, VIA NATURAL OR ARTIFICIAL OPENING ENDOSCOPIC: ICD-10-PCS | Performed by: INTERNAL MEDICINE

## 2022-01-27 PROCEDURE — 82962 GLUCOSE BLOOD TEST: CPT

## 2022-01-27 PROCEDURE — 272N000001 HC OR GENERAL SUPPLY STERILE: Performed by: INTERNAL MEDICINE

## 2022-01-27 PROCEDURE — 76705 ECHO EXAM OF ABDOMEN: CPT

## 2022-01-27 PROCEDURE — C9803 HOPD COVID-19 SPEC COLLECT: HCPCS

## 2022-01-27 PROCEDURE — 250N000012 HC RX MED GY IP 250 OP 636 PS 637: Performed by: INTERNAL MEDICINE

## 2022-01-27 RX ORDER — PROPOFOL 10 MG/ML
INJECTION, EMULSION INTRAVENOUS
Status: COMPLETED | OUTPATIENT
Start: 2022-01-27 | End: 2022-01-27

## 2022-01-27 RX ORDER — NALOXONE HYDROCHLORIDE 1 MG/ML
0.4 INJECTION INTRAMUSCULAR; INTRAVENOUS; SUBCUTANEOUS
Status: CANCELLED | OUTPATIENT
Start: 2022-01-27

## 2022-01-27 RX ORDER — SODIUM CHLORIDE 9 MG/ML
INJECTION, SOLUTION INTRAVENOUS CONTINUOUS
Status: DISCONTINUED | OUTPATIENT
Start: 2022-01-27 | End: 2022-01-30

## 2022-01-27 RX ORDER — NALOXONE HYDROCHLORIDE 0.4 MG/ML
0.4 INJECTION, SOLUTION INTRAMUSCULAR; INTRAVENOUS; SUBCUTANEOUS
Status: DISCONTINUED | OUTPATIENT
Start: 2022-01-27 | End: 2022-02-04 | Stop reason: HOSPADM

## 2022-01-27 RX ORDER — HYDROMORPHONE HCL IN WATER/PF 6 MG/30 ML
0.2 PATIENT CONTROLLED ANALGESIA SYRINGE INTRAVENOUS EVERY 5 MIN PRN
Status: DISCONTINUED | OUTPATIENT
Start: 2022-01-27 | End: 2022-01-27 | Stop reason: HOSPADM

## 2022-01-27 RX ORDER — ONDANSETRON 4 MG/1
4 TABLET, ORALLY DISINTEGRATING ORAL EVERY 6 HOURS PRN
Status: DISCONTINUED | OUTPATIENT
Start: 2022-01-27 | End: 2022-02-04 | Stop reason: HOSPADM

## 2022-01-27 RX ORDER — ASPIRIN 81 MG/1
81 TABLET ORAL DAILY
Status: DISCONTINUED | OUTPATIENT
Start: 2022-01-28 | End: 2022-02-04 | Stop reason: HOSPADM

## 2022-01-27 RX ORDER — LIDOCAINE 40 MG/G
CREAM TOPICAL
Status: CANCELLED | OUTPATIENT
Start: 2022-01-27

## 2022-01-27 RX ORDER — SODIUM CHLORIDE, SODIUM LACTATE, POTASSIUM CHLORIDE, CALCIUM CHLORIDE 600; 310; 30; 20 MG/100ML; MG/100ML; MG/100ML; MG/100ML
INJECTION, SOLUTION INTRAVENOUS CONTINUOUS
Status: CANCELLED | OUTPATIENT
Start: 2022-01-27

## 2022-01-27 RX ORDER — POLYETHYLENE GLYCOL 3350 17 G/17G
17 POWDER, FOR SOLUTION ORAL DAILY PRN
Status: DISCONTINUED | OUTPATIENT
Start: 2022-01-27 | End: 2022-02-04 | Stop reason: HOSPADM

## 2022-01-27 RX ORDER — ONDANSETRON 4 MG/1
4 TABLET, ORALLY DISINTEGRATING ORAL EVERY 6 HOURS PRN
Status: CANCELLED | OUTPATIENT
Start: 2022-01-27

## 2022-01-27 RX ORDER — LIDOCAINE HYDROCHLORIDE 20 MG/ML
INJECTION, SOLUTION INFILTRATION; PERINEURAL PRN
Status: DISCONTINUED | OUTPATIENT
Start: 2022-01-27 | End: 2022-01-27

## 2022-01-27 RX ORDER — LISINOPRIL 5 MG/1
5 TABLET ORAL DAILY
Status: DISCONTINUED | OUTPATIENT
Start: 2022-01-28 | End: 2022-02-04 | Stop reason: HOSPADM

## 2022-01-27 RX ORDER — ONDANSETRON 2 MG/ML
4 INJECTION INTRAMUSCULAR; INTRAVENOUS EVERY 6 HOURS PRN
Status: CANCELLED | OUTPATIENT
Start: 2022-01-27

## 2022-01-27 RX ORDER — MEPERIDINE HYDROCHLORIDE 25 MG/ML
12.5 INJECTION INTRAMUSCULAR; INTRAVENOUS; SUBCUTANEOUS
Status: DISCONTINUED | OUTPATIENT
Start: 2022-01-27 | End: 2022-01-27 | Stop reason: HOSPADM

## 2022-01-27 RX ORDER — FENTANYL CITRATE 50 UG/ML
25 INJECTION, SOLUTION INTRAMUSCULAR; INTRAVENOUS
Status: DISCONTINUED | OUTPATIENT
Start: 2022-01-27 | End: 2022-01-27 | Stop reason: HOSPADM

## 2022-01-27 RX ORDER — PIPERACILLIN SODIUM, TAZOBACTAM SODIUM 3; .375 G/15ML; G/15ML
3.38 INJECTION, POWDER, LYOPHILIZED, FOR SOLUTION INTRAVENOUS EVERY 8 HOURS
Status: DISCONTINUED | OUTPATIENT
Start: 2022-01-27 | End: 2022-02-04 | Stop reason: HOSPADM

## 2022-01-27 RX ORDER — NICOTINE POLACRILEX 4 MG
15-30 LOZENGE BUCCAL
Status: DISCONTINUED | OUTPATIENT
Start: 2022-01-27 | End: 2022-02-04 | Stop reason: HOSPADM

## 2022-01-27 RX ORDER — DEXTROSE MONOHYDRATE 25 G/50ML
25-50 INJECTION, SOLUTION INTRAVENOUS
Status: DISCONTINUED | OUTPATIENT
Start: 2022-01-27 | End: 2022-02-04 | Stop reason: HOSPADM

## 2022-01-27 RX ORDER — LIDOCAINE 40 MG/G
CREAM TOPICAL
Status: DISCONTINUED | OUTPATIENT
Start: 2022-01-27 | End: 2022-01-27 | Stop reason: HOSPADM

## 2022-01-27 RX ORDER — DEXTROSE MONOHYDRATE 25 G/50ML
25-50 INJECTION, SOLUTION INTRAVENOUS
Status: DISCONTINUED | OUTPATIENT
Start: 2022-01-27 | End: 2022-01-27 | Stop reason: HOSPADM

## 2022-01-27 RX ORDER — SODIUM CHLORIDE 9 MG/ML
INJECTION, SOLUTION INTRAVENOUS CONTINUOUS
Status: DISCONTINUED | OUTPATIENT
Start: 2022-01-27 | End: 2022-01-27 | Stop reason: HOSPADM

## 2022-01-27 RX ORDER — HYDROMORPHONE HYDROCHLORIDE 1 MG/ML
0.5 INJECTION, SOLUTION INTRAMUSCULAR; INTRAVENOUS; SUBCUTANEOUS
Status: DISCONTINUED | OUTPATIENT
Start: 2022-01-27 | End: 2022-01-27

## 2022-01-27 RX ORDER — DEXTROSE MONOHYDRATE 25 G/50ML
25-50 INJECTION, SOLUTION INTRAVENOUS
Status: DISCONTINUED | OUTPATIENT
Start: 2022-01-27 | End: 2022-01-27

## 2022-01-27 RX ORDER — GADOBUTROL 604.72 MG/ML
8 INJECTION INTRAVENOUS ONCE
Status: COMPLETED | OUTPATIENT
Start: 2022-01-27 | End: 2022-01-27

## 2022-01-27 RX ORDER — PIPERACILLIN SODIUM, TAZOBACTAM SODIUM 3; .375 G/15ML; G/15ML
3.38 INJECTION, POWDER, LYOPHILIZED, FOR SOLUTION INTRAVENOUS EVERY 8 HOURS
Status: DISCONTINUED | OUTPATIENT
Start: 2022-01-27 | End: 2022-01-27 | Stop reason: HOSPADM

## 2022-01-27 RX ORDER — SODIUM CHLORIDE, SODIUM LACTATE, POTASSIUM CHLORIDE, CALCIUM CHLORIDE 600; 310; 30; 20 MG/100ML; MG/100ML; MG/100ML; MG/100ML
INJECTION, SOLUTION INTRAVENOUS CONTINUOUS
Status: DISCONTINUED | OUTPATIENT
Start: 2022-01-27 | End: 2022-01-27

## 2022-01-27 RX ORDER — OXYCODONE HYDROCHLORIDE 5 MG/1
5 TABLET ORAL EVERY 4 HOURS PRN
Status: DISCONTINUED | OUTPATIENT
Start: 2022-01-27 | End: 2022-01-27 | Stop reason: HOSPADM

## 2022-01-27 RX ORDER — SODIUM CHLORIDE, SODIUM LACTATE, POTASSIUM CHLORIDE, CALCIUM CHLORIDE 600; 310; 30; 20 MG/100ML; MG/100ML; MG/100ML; MG/100ML
INJECTION, SOLUTION INTRAVENOUS ONCE
Status: COMPLETED | OUTPATIENT
Start: 2022-01-27 | End: 2022-01-27

## 2022-01-27 RX ORDER — ACETAMINOPHEN 500 MG
500-1000 TABLET ORAL EVERY 4 HOURS PRN
Status: DISCONTINUED | OUTPATIENT
Start: 2022-01-27 | End: 2022-02-04 | Stop reason: HOSPADM

## 2022-01-27 RX ORDER — NICOTINE POLACRILEX 4 MG
15-30 LOZENGE BUCCAL
Status: DISCONTINUED | OUTPATIENT
Start: 2022-01-27 | End: 2022-01-27 | Stop reason: HOSPADM

## 2022-01-27 RX ORDER — HYDROMORPHONE HCL IN WATER/PF 6 MG/30 ML
0.2 PATIENT CONTROLLED ANALGESIA SYRINGE INTRAVENOUS
Status: DISCONTINUED | OUTPATIENT
Start: 2022-01-27 | End: 2022-01-29

## 2022-01-27 RX ORDER — LIDOCAINE 40 MG/G
CREAM TOPICAL
Status: DISCONTINUED | OUTPATIENT
Start: 2022-01-27 | End: 2022-01-27

## 2022-01-27 RX ORDER — NALOXONE HYDROCHLORIDE 0.4 MG/ML
0.2 INJECTION, SOLUTION INTRAMUSCULAR; INTRAVENOUS; SUBCUTANEOUS
Status: DISCONTINUED | OUTPATIENT
Start: 2022-01-27 | End: 2022-02-04 | Stop reason: HOSPADM

## 2022-01-27 RX ORDER — FENTANYL CITRATE 50 UG/ML
INJECTION, SOLUTION INTRAMUSCULAR; INTRAVENOUS PRN
Status: DISCONTINUED | OUTPATIENT
Start: 2022-01-27 | End: 2022-01-27

## 2022-01-27 RX ORDER — LIDOCAINE 40 MG/G
CREAM TOPICAL
Status: DISCONTINUED | OUTPATIENT
Start: 2022-01-27 | End: 2022-01-29

## 2022-01-27 RX ORDER — SODIUM CHLORIDE, SODIUM LACTATE, POTASSIUM CHLORIDE, CALCIUM CHLORIDE 600; 310; 30; 20 MG/100ML; MG/100ML; MG/100ML; MG/100ML
INJECTION, SOLUTION INTRAVENOUS CONTINUOUS
Status: DISCONTINUED | OUTPATIENT
Start: 2022-01-27 | End: 2022-01-27 | Stop reason: HOSPADM

## 2022-01-27 RX ORDER — FENTANYL CITRATE 50 UG/ML
25 INJECTION, SOLUTION INTRAMUSCULAR; INTRAVENOUS EVERY 5 MIN PRN
Status: DISCONTINUED | OUTPATIENT
Start: 2022-01-27 | End: 2022-01-27 | Stop reason: HOSPADM

## 2022-01-27 RX ORDER — NALOXONE HYDROCHLORIDE 1 MG/ML
0.2 INJECTION INTRAMUSCULAR; INTRAVENOUS; SUBCUTANEOUS
Status: CANCELLED | OUTPATIENT
Start: 2022-01-27

## 2022-01-27 RX ORDER — TAMSULOSIN HYDROCHLORIDE 0.4 MG/1
0.4 CAPSULE ORAL
Status: DISCONTINUED | OUTPATIENT
Start: 2022-01-27 | End: 2022-02-04 | Stop reason: HOSPADM

## 2022-01-27 RX ORDER — ONDANSETRON 2 MG/ML
INJECTION INTRAMUSCULAR; INTRAVENOUS PRN
Status: DISCONTINUED | OUTPATIENT
Start: 2022-01-27 | End: 2022-01-27

## 2022-01-27 RX ORDER — ONDANSETRON 2 MG/ML
4 INJECTION INTRAMUSCULAR; INTRAVENOUS EVERY 30 MIN PRN
Status: DISCONTINUED | OUTPATIENT
Start: 2022-01-27 | End: 2022-01-27 | Stop reason: HOSPADM

## 2022-01-27 RX ORDER — ONDANSETRON 4 MG/1
4 TABLET, ORALLY DISINTEGRATING ORAL EVERY 30 MIN PRN
Status: DISCONTINUED | OUTPATIENT
Start: 2022-01-27 | End: 2022-01-27 | Stop reason: HOSPADM

## 2022-01-27 RX ORDER — FLUMAZENIL 0.1 MG/ML
0.2 INJECTION, SOLUTION INTRAVENOUS
Status: ACTIVE | OUTPATIENT
Start: 2022-01-27 | End: 2022-01-28

## 2022-01-27 RX ORDER — TRAMADOL HYDROCHLORIDE 50 MG/1
50 TABLET ORAL EVERY 6 HOURS PRN
Status: DISCONTINUED | OUTPATIENT
Start: 2022-01-27 | End: 2022-02-04 | Stop reason: HOSPADM

## 2022-01-27 RX ORDER — NICOTINE POLACRILEX 4 MG
15-30 LOZENGE BUCCAL
Status: DISCONTINUED | OUTPATIENT
Start: 2022-01-27 | End: 2022-01-27

## 2022-01-27 RX ORDER — FLUMAZENIL 0.1 MG/ML
0.2 INJECTION, SOLUTION INTRAVENOUS
Status: CANCELLED | OUTPATIENT
Start: 2022-01-27 | End: 2022-01-28

## 2022-01-27 RX ORDER — ONDANSETRON 2 MG/ML
4 INJECTION INTRAMUSCULAR; INTRAVENOUS EVERY 6 HOURS PRN
Status: DISCONTINUED | OUTPATIENT
Start: 2022-01-27 | End: 2022-02-04 | Stop reason: HOSPADM

## 2022-01-27 RX ADMIN — PHENYLEPHRINE HYDROCHLORIDE 50 MCG: 10 INJECTION INTRAVENOUS at 14:02

## 2022-01-27 RX ADMIN — SODIUM CHLORIDE, POTASSIUM CHLORIDE, SODIUM LACTATE AND CALCIUM CHLORIDE 1000 ML: 600; 310; 30; 20 INJECTION, SOLUTION INTRAVENOUS at 00:06

## 2022-01-27 RX ADMIN — ONDANSETRON 4 MG: 2 INJECTION INTRAMUSCULAR; INTRAVENOUS at 14:08

## 2022-01-27 RX ADMIN — SODIUM CHLORIDE, POTASSIUM CHLORIDE, SODIUM LACTATE AND CALCIUM CHLORIDE: 600; 310; 30; 20 INJECTION, SOLUTION INTRAVENOUS at 02:33

## 2022-01-27 RX ADMIN — GADOBUTROL 8 ML: 604.72 INJECTION INTRAVENOUS at 01:16

## 2022-01-27 RX ADMIN — SODIUM CHLORIDE: 9 INJECTION, SOLUTION INTRAVENOUS at 19:11

## 2022-01-27 RX ADMIN — FENTANYL CITRATE 50 MCG: 50 INJECTION, SOLUTION INTRAMUSCULAR; INTRAVENOUS at 13:37

## 2022-01-27 RX ADMIN — LIDOCAINE HYDROCHLORIDE 30 MG: 20 INJECTION, SOLUTION INFILTRATION; PERINEURAL at 13:37

## 2022-01-27 RX ADMIN — FENTANYL CITRATE 50 MCG: 50 INJECTION, SOLUTION INTRAMUSCULAR; INTRAVENOUS at 13:59

## 2022-01-27 RX ADMIN — SUGAMMADEX 200 MG: 100 INJECTION, SOLUTION INTRAVENOUS at 14:09

## 2022-01-27 RX ADMIN — SODIUM CHLORIDE 5 UNITS: 9 INJECTION, SOLUTION INTRAVENOUS at 03:58

## 2022-01-27 RX ADMIN — INSULIN ASPART 2 UNITS: 100 INJECTION, SOLUTION INTRAVENOUS; SUBCUTANEOUS at 20:15

## 2022-01-27 RX ADMIN — SODIUM CHLORIDE: 9 INJECTION, SOLUTION INTRAVENOUS at 04:01

## 2022-01-27 RX ADMIN — PHENYLEPHRINE HYDROCHLORIDE 100 MCG: 10 INJECTION INTRAVENOUS at 14:01

## 2022-01-27 RX ADMIN — PIPERACILLIN AND TAZOBACTAM 3.38 G: 3; .375 INJECTION, POWDER, LYOPHILIZED, FOR SOLUTION INTRAVENOUS at 21:41

## 2022-01-27 RX ADMIN — ROCURONIUM BROMIDE 50 MG: 50 INJECTION, SOLUTION INTRAVENOUS at 13:37

## 2022-01-27 RX ADMIN — PROPOFOL 100 MG: 10 INJECTION, EMULSION INTRAVENOUS at 13:37

## 2022-01-27 RX ADMIN — INSULIN ASPART 5 UNITS: 100 INJECTION, SOLUTION INTRAVENOUS; SUBCUTANEOUS at 13:20

## 2022-01-27 RX ADMIN — PIPERACILLIN AND TAZOBACTAM 3.38 G: 3; .375 INJECTION, POWDER, LYOPHILIZED, FOR SOLUTION INTRAVENOUS at 08:20

## 2022-01-27 RX ADMIN — PIPERACILLIN AND TAZOBACTAM 3.38 G: 3; .375 INJECTION, POWDER, LYOPHILIZED, FOR SOLUTION INTRAVENOUS at 00:07

## 2022-01-27 RX ADMIN — SODIUM CHLORIDE, POTASSIUM CHLORIDE, SODIUM LACTATE AND CALCIUM CHLORIDE: 600; 310; 30; 20 INJECTION, SOLUTION INTRAVENOUS at 13:28

## 2022-01-27 ASSESSMENT — ACTIVITIES OF DAILY LIVING (ADL)
ADLS_ACUITY_SCORE: 18
ADLS_ACUITY_SCORE: 15
ADLS_ACUITY_SCORE: 7
ADLS_ACUITY_SCORE: 18
ADLS_ACUITY_SCORE: 12
ADLS_ACUITY_SCORE: 7
ADLS_ACUITY_SCORE: 13
ADLS_ACUITY_SCORE: 18

## 2022-01-27 ASSESSMENT — MIFFLIN-ST. JEOR: SCORE: 1470.5

## 2022-01-27 ASSESSMENT — ENCOUNTER SYMPTOMS: DYSRHYTHMIAS: 1

## 2022-01-27 NOTE — H&P
[facility] MEDICINE ADMISSION HISTORY AND PHYSICAL       Assessment & Plan      1. Jaundice and Biliary obstruction, possible choledocholithiasis. Also CT showed right hepatic lobe mass measuring 5.3 x 3.6 cms.  This is concerning for focal liver infection/phlegmonous change/developing abscess    He has a history of ERCP. S/P cholecystectomy. He is not encephalopathic at this point. Ammonia level is normal    - He needs ERCP and we do not have this at [facility] --  ED is looking for a bed. Alternative I was told, he will get ERCP at [facility] and back to  - not sure of the logistics of this, will discuss with AM doc   - For the time being, antibiotics and fluids  - NPO for now   - Neuro checks  - Will get an US of the abdomen - evaluate the hepatic mass, if true abscess - may need drainage via IR.  - GI consult  - We may have to consult ID to assist us with possible liver abscess on top of biliary obstruction r/o cholangitis    2. Acute renal failure, likely due to ATN, poor oral intake  - IVF  - BMP    3. DM2 - may need to HOLD po DM meds  - FS, at risk for hypoglycemia, would not cover unless persistently above 180-200    4. On eliquis for history of PE/PAF    5. Has HTN and HL  - holding BP meds, as BP is soft - resume when safe    Will have AM doc or maybe pharm to review all meds with family member, initial med rec, not helpful. Unable to resume meds       7AM - awaiting US report     8AM - I did call [place] and updated her, she is aware of the required procedure and need to transfer the patient.       VTE prophylaxis: on hold LMWH for possible procedure    IV fluids: Per order set   Diet:  NPO   GI prophylaxis: Not indicated at this point, re-assess if needed.   Pain, sleep, and vomiting medications: Per order set  Code Status: Full,  COVID test result:  negative   COVID vaccination: completed   Barriers to discharge: admitting clinical condition  Discharge Disposition and goals:   "Unable to determine at this point, pending clinical progress and response to treatment. Patient may need transfer to SNF or [facility] if unsafe to go home and needed treatment inappropriate at home setting OR may need home health care evaluation if care can be delivered at home settings. Consider referral to care manager/    PPE - I was wearing PPE when I met the patient - N95 mask, Surgical mask, Isolation gown, Gloves, Safety glasses.      Care plan was created based on available information provided, including patient's condition at the time of encounter.   This plan was discussed with patient and/or family members using layman's terms and have agreed to proceed.   At the end of night shift (9PM - 730A), this case will be presented to the AM Hospitalist.    It is recommended to revise care plan if there is change in condition and/or new clinical information that are not available during my encounter.     All or some of home medication/s were not resumed on admission due to safety reasons or contraindications. Dosing and frequency may also have been modified. Please resume/review them during your visit.     70 minutes of total visit duration and greater than 50% was spent in direct evaluation of patient and coordination of care including discussion of diagnostic test results and recommended treatment. .      Mathew Peralta MD, MPH, FACP, [facility]  Internal Medicine - Hospitalist        Chief Complaint No appetite      HISTORY     - I met him in the ED. He was awake and interactive. Not confused.  - \"They told me I dont look good that's why I am here\". Otherwise, \"im Ok\" he denies abdominal pain, vomiting, or fevers. No CP or SOB.  - He said, he has not eaten for 2 days, as he doesn't have appetite. He feels dehydrated.     - In the ED, abdominal imagings were done and concerning for significant biliary obstruction, possible choledocholithiasis. There was also a concern for a right hepatic lobe mass measuring " 5.3 x 3.6 cms.  This is concerning for focal liver infection/phlegmonous change/developing abscess    - He was given antibiotics. He was referred to GI. It was recommended to have ERCP. He has history of ERCP. GB is gone.     - His WBC is elevated at 21 thousand. Has elevated Total Bili of 17 and also elevated ALP/AST/ALT. His procal is 8    - ROS --- No headache. No dizziness. No weakness. No CP or SOB. No palpitations.  No urinary symptoms. No bleeding symptoms. No weight loss. Rest of 12 point ROS was reviewed and negative.       Past Medical History     Past Medical History:   Diagnosis Date     Abscess of right foot 11/23/2020    Added automatically from request for surgery [id number]     Acute pulmonary embolism with acute cor pulmonale (H) 5/23/2020     BPH (benign prostatic hyperplasia)      Cholelithiasis      Closed fracture of rib     Created by Conversion  Replacement Utility updated for latest IMO load     Closed fracture of thoracic vertebra (H)     Created by Conversion  Replacement Utility updated for latest IMO load     Common bile duct (CBD) obstruction 9/4/2017     Diabetes mellitus (H)      Essential hypertension      Hyperlipidemia      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    Added automatically from request for surgery [id number]      Pancreatitis      Sepsis due to pneumonia (H) 9/8/2017     Septic arthritis of right foot (H) 12/2/2020         Surgical History     Past Surgical History:   Procedure Laterality Date     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    Procedure: with amputation of the fifth ray, peroneal brevis tendon transfer;  Surgeon: John Garcia DPM;  Location: [facility] OR;  Service: Podiatry     BACK SURGERY      1964 removed a cyst     CHOLECYSTECTOMY  1985     ENDOSCOPIC RETROGRADE CHOLANGIOPANCREATOGRAM       ENDOSCOPIC RETROGRADE CHOLANGIOPANCREATOGRAM N/A 9/5/2017    Procedure: ENDOSCOPIC RETROGRADE CHOLANGIOPANCREATOGRAPHY SPHINCTEROTOMY AND STONE EXTRACTION;  Surgeon:  Hansel Gannon MD;  Location: [facility] OR;  Service:      INCISION AND DRAINAGE OF WOUND Right 2020    Procedure: INCISION AND DRAINAGE, right foot with removal of bone 5th metatarsal;  Surgeon: John Garcia DPM;  Location: [facility] OR;  Service: Podiatry     INCISION AND DRAINAGE OF WOUND Right 2020    Procedure: INCISION AND DRAINAGE, right foot;  Surgeon: John Garcia DPM;  Location: [facility] OR;  Service: Podiatry     INCISION AND DRAINAGE OF WOUND Right 2020    Procedure: INCISION AND DRAINAGE, LOWER EXTREMITY;  Surgeon: Aleksandr Hdez DPM;  Location: [facility] OR;  Service: Podiatry     IR EXTREMITY ANGIOGRAM RIGHT  2020     IR LOWER EXTREMITY ANGIOGRAM RIGHT  2020     PICC  2020          TONSILLECTOMY  1940        Family History      Family History   Problem Relation Age of Onset     Aortic aneurysm Mother      Alcoholism Father          Social History      .  Social History     Socioeconomic History     Marital status:      Spouse name: Not on file     Number of children: Not on file     Years of education: Not on file     Highest education level: Not on file   Occupational History     Not on file   Tobacco Use     Smoking status: Former Smoker     Quit date: 1991     Years since quittin.2     Smokeless tobacco: Never Used   Substance and Sexual Activity     Alcohol use: No     Drug use: No     Sexual activity: Never   Other Topics Concern     Not on file   Social History Narrative     Not on file     Social Determinants of Health     Financial Resource Strain: Not on file   Food Insecurity: Not on file   Transportation Needs: Not on file   Physical Activity: Not on file   Stress: Not on file   Social Connections: Not on file   Intimate Partner Violence: Not on file   Housing Stability: Not on file          Allergies        Allergies   Allergen Reactions     Cresol [Phenol] Unknown     Muscle cramps      Demeclocycline Hives and Rash     Crestor [Rosuvastatin] Muscle Pain (Myalgia)         Prior to Admission Medications      No current facility-administered medications on file prior to encounter.  acetaminophen (TYLENOL) 500 MG tablet, [ACETAMINOPHEN (TYLENOL) 500 MG TABLET] Take 1-2 tablets (500-1,000 mg total) by mouth every 4 (four) hours as needed.  aspirin 81 MG EC tablet, [ASPIRIN 81 MG EC TABLET] Take 81 mg by mouth daily.  cholecalciferol, vitamin D3, 5,000 unit Tab, Take 5,000 mcg by mouth daily   ELIQUIS ANTICOAGULANT 5 MG tablet, TAKE 1 TABLET BY MOUTH TWICE DAILY  insulin glargine (LANTUS SOLOSTAR) 100 UNIT/ML pen, Inject 44 Units Subcutaneous At Bedtime  lisinopril (ZESTRIL) 5 MG tablet, Take 1 tablet (5 mg) by mouth daily  multivit-min/FA/lycopen/lutein (CENTRUM SILVER MEN ORAL), [MULTIVIT-MIN/FA/LYCOPEN/LUTEIN (CENTRUM SILVER MEN ORAL)] Take 1 tablet by mouth daily.  mupirocin (BACTROBAN) 2 % ointment, Apply topically daily as needed Apply to foot wound  polyethylene glycol (MIRALAX) 17 gram packet, [POLYETHYLENE GLYCOL (MIRALAX) 17 GRAM PACKET] Take 1 packet (17 g total) by mouth daily as needed.  sildenafil (VIAGRA) 50 MG tablet, Take 1 tablet (50 mg) by mouth daily as needed (erectile dysfunction)  tamsulosin (FLOMAX) 0.4 mg cap, [TAMSULOSIN (FLOMAX) 0.4 MG CAP] Take 1 capsule (0.4 mg total) by mouth daily after supper.  traMADol (ULTRAM) 50 MG tablet, TAKE 1 TABLET BY MOUTH EVERY 6 HOURS AS NEEDED FOR PAIN  VICTOZA 3-RUPALI 0.6 mg/0.1 mL (18 mg/3 mL) injection, [VICTOZA 3-RUPALI 0.6 MG/0.1 ML (18 MG/3 ML) INJECTION] INJECT 1.2 MG UNDER THE SKIN ONCE DAILY  vitamin E 400 unit capsule, [VITAMIN E 400 UNIT CAPSULE] Take 400 Units by mouth daily.  B-D U/F 31G X 8 MM insulin pen needle, USED TO INJECT INSULIN DAILY  blood glucose test strips, [BLOOD GLUCOSE TEST STRIPS] Test two times daily. Dispense brand per patient's insurance at pharmacy discretion.  blood-glucose meter (ONETOUCH VERIO IQ METER) Misc,  "[BLOOD-GLUCOSE METER (ONETOUCH VERIO IQ METER) MISC] Use 1 each As Directed 2 (two) times a day.  Continuous Blood Gluc Sensor (FREESTYLE APRIL 2 SENSOR) [facility], 1 each every 14 days 1 each every 14 days. Change every 14 days.            Review of Systems     A 12 point comprehensive review of systems was negative except as noted above in HPI.    PHYSICAL EXAMINATION       Vitals      Vitals: /57   Pulse 74   Temp 97  F (36.1  C) (Temporal)   Resp 24   Ht 1.727 m (5' 8\")   Wt 81.1 kg (178 lb 12.7 oz)   SpO2 94%   BMI 27.19 kg/m    BMI= Body mass index is 27.19 kg/m .      Examination     General Appearance:  Alert, cooperative, no distress. (+) jaundice   Head:    Normocephalic, without obvious abnormality, atraumatic  EENT:  PERRL, conjunctiva/corneas clear, EOM's intact.   Neck:   Supple, symmetrical, trachea midline, no adenopathy; no NVE  Back:  Symmetric, no curvature, no CVA tenderness  Chest/Lungs: Clear to auscultation bilaterally, respirations unlabored, No tenderness or deformity. No abdominal breathing or use of accessory muscles.   Heart:    Regular rate and rhythm, S1 and S2 normal, no murmur, rub   or gallop  Abdomen: Soft, non-tender, bowel sounds active all four quadrants, not peritoneal on palpation. Not distended  Extremities:  Extremities normal, atraumatic, no swelling   Skin:  Skin color, texture, turgor normal, no rashes or lesion  Neurologic:  Awake and alert, No lateralizing or localizing signs          Pertinent Lab       "

## 2022-01-27 NOTE — ED PROVIDER NOTES
Emergency Department Encounter         FINAL IMPRESSION:  Cholangitis        ED COURSE AND MEDICAL DECISION MAKING       ED Course as of 01/26/22 2118 Wed Jan 26, 2022 2116 Patient is a 86-year male with extensive past medical history per chart review, on blood thinners, here from home with jaundice, altered mental status and hyperglycemia.  History is severely limited because of patient's confusion here.  Denies any pain.  Able to tell me the year and month however cannot tell me the day of the week or the hospital he is at.  Neurologically grossly intact.  Physical examination revealing clear jaundice on patient's face and torso and arms.  He looks dry with dry mucous membranes.  Abdomen is benign.  Heart and lungs normal.  Neurologically intact.  No head or neck trauma.  Denies any headache.  Plan for labs CT of the head and abdomen pelvis and admission     -Labs concerning for biliary disease.  Lactate elevated.  Procalcitonin elevated.  Concern for bacteremia.  Patient given sepsis fluids as well as antibiotics.  -CT scan showing concern for obstruction.  Radiology recommending MRCP.  -MRCP showing 8 mm stone in the CBD.  Also evolving phlegmon/abscess in the liver.  Patient otherwise hemodynamically stable here.  Still is mildly confused however improving.  No tachycardia.  No fever.  -Pain controlled.  -Discussed case with Dr. Ojeda on-call who states he will reach out to the biliary team regarding ERCP this morning at [facility].  We will need to send patient from here to [facility] and then most likely back here due to the fact that there are no beds in the system.      9:16 PM I met with the patient, obtained history, performed an initial exam, and discussed options and plan for diagnostics and treatment here in the ED.  11:23 PM I spoke to Dr. Oliva from [facility].  11:26 PM I spoke to patient's wife.    Critical Care     Performed by: Brayden Alferd  Authorized by: Brayden Alfred  Total critical care  time:120 minutes  Critical care was necessary to treat or prevent imminent or life-threatening deterioration of the following conditions:  Sepsis  Critical care was time spent personally by me on the following activities: development of treatment plan with patient or surrogate, discussions with consultants, examination of patient, evaluation of patient's response to treatment, obtaining history from patient or surrogate, ordering and performing treatments and interventions, ordering and review of laboratory studies, ordering and review of radiographic studies, re-evaluation of patient's condition and monitoring for potential decompensation.  Critical care time was exclusive of separately billable procedures and treating other patients.      At the conclusion of the encounter I discussed the results of all the tests and the disposition. The questions were answered. The patient or family acknowledged understanding and was agreeable with the care plan.        MEDICATIONS GIVEN IN THE EMERGENCY DEPARTMENT:  Medications   lactated ringers BOLUS 1,000 mL (has no administration in time range)       NEW PRESCRIPTIONS STARTED AT TODAY'S ED VISIT:  New Prescriptions    No medications on file       HPI     Patient information obtained from: Patient    Use of Interpretor: N/A     Caleb Hernandez is a 86 year old male with a pertinent history of HTN, PVD, paroxysmal atrial fibrillation, hyperlipidemia, PE, DM2, who presents to this ED via EMS for evaluation of generalized weakness and hyperglycemia.     HPI Limited Secondary to Confusion    Patient reports he was told he had elevated blood sugar. He also notes decreased appetite lately. Otherwise, patient denies any abdominal pain, chest pain, shortness of breath, headache, and history of jaundice and cancer.     Per triage note, patient presents with increasing generalized weakness for the past four days. Patient is noted to be jaundice. EMS reports blood sugar in 400's. Patient  is poor historian and does not know why he is in the ED. Wife contacted via phone, she reports that he has been increasingly lethargic and confused. She noted that his skin has been turning yellow, which has never happened before. Poor appetite for the past four days.    REVIEW OF SYSTEMS:  ROS Limited Secondary to Confusion      MEDICAL HISTORY     Past Medical History:   Diagnosis Date     Abscess of right foot 11/23/2020     Acute pulmonary embolism with acute cor pulmonale (H) 5/23/2020     BPH (benign prostatic hyperplasia)      Cholelithiasis      Closed fracture of rib      Closed fracture of thoracic vertebra (H)      Common bile duct (CBD) obstruction 9/4/2017     Diabetes mellitus (H)      Essential hypertension      Hyperlipidemia      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     Pancreatitis      Sepsis due to pneumonia (H) 9/8/2017     Septic arthritis of right foot (H) 12/2/2020       Past Surgical History:   Procedure Laterality Date     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 OR;  Service:      INCISION AND DRAINAGE OF WOUND Right 11/2/2020    Procedure: INCISION AND DRAINAGE, right foot with removal of bone 5th metatarsal;  Surgeon: John Garcia DPM;  Location: [facility] OR;  Service: Podiatry     INCISION AND DRAINAGE OF WOUND Right 11/16/2020    Procedure: INCISION AND DRAINAGE, right foot;  Surgeon: John Garcia DPM;  Location: [facility] OR;  Service: Podiatry     INCISION AND DRAINAGE OF  WOUND Right 2020    Procedure: INCISION AND DRAINAGE, LOWER EXTREMITY;  Surgeon: Aleksandr Hdez DPM;  Location: [facility];  Service: Podiatry     IR EXTREMITY ANGIOGRAM RIGHT  2020     IR LOWER EXTREMITY ANGIOGRAM RIGHT  2020     PICC  2020          TONSILLECTOMY  1940       Social History     Tobacco Use     Smoking status: Former Smoker     Quit date: 1991     Years since quittin.2     Smokeless tobacco: Never Used   Substance Use Topics     Alcohol use: No     Drug use: No       acetaminophen (TYLENOL) 500 MG tablet  aspirin 81 MG EC tablet  B-D U/F 31G X 8 MM insulin pen needle  blood glucose test strips  blood-glucose meter (ONETOUCH VERIO IQ METER) Misc  cholecalciferol, vitamin D3, 5,000 unit Tab  Continuous Blood Gluc Sensor (FREESTYLE APRIL 2 SENSOR) MISC  ELIQUIS ANTICOAGULANT 5 MG tablet  insulin glargine (LANTUS SOLOSTAR) 100 UNIT/ML pen  lisinopril (ZESTRIL) 5 MG tablet  multivit-min/FA/lycopen/lutein (CENTRUM SILVER MEN ORAL)  mupirocin (BACTROBAN) 2 % ointment  polyethylene glycol (MIRALAX) 17 gram packet  sildenafil (VIAGRA) 50 MG tablet  tamsulosin (FLOMAX) 0.4 mg cap  traMADol (ULTRAM) 50 MG tablet  VICTOZA 3-RUPALI 0.6 mg/0.1 mL (18 mg/3 mL) injection  vitamin E 400 unit capsule            PHYSICAL EXAM     /58   Pulse 73   Temp 97  F (36.1  C) (Temporal)   Resp 26   SpO2 95%       PHYSICAL EXAM:     General: Patient appears well, nontoxic, comfortable  HEENT: Dry mucous membranes, no tongue swelling.  No head trauma.  No midline neck pain.  Cardiovascular: Normal rate, normal rhythm, no extremity edema.  No appreciable murmur.  Respiratory: No signs of respiratory distress, lungs are clear to auscultation bilaterally with no wheezes rhonchi or rales.  Abdominal: Soft, nontender, nondistended, no palpable masses, no guarding, no rebound  Musculoskeletal: Full range of motion of joints, no deformities appreciated.  Neurological: Alert and  oriented, grossly neurologically intact.  Psychological: Normal affect and mood.  Integument: No rashes appreciated. Clear jaundice on face, torso, and arms. Skin appears dry.    RESULTS       Labs Ordered and Resulted from Time of ED Arrival to Time of ED Departure   INR - Abnormal       Result Value    INR 1.34 (*)    CBC WITH PLATELETS AND DIFFERENTIAL - Abnormal    WBC Count 21.1 (*)     RBC Count 4.17 (*)     Hemoglobin 12.7 (*)     Hematocrit 39.4 (*)     MCV 95      MCH 30.5      MCHC 32.2      RDW 15.2 (*)     Platelet Count 115 (*)     % Neutrophils 88      % Lymphocytes 2      % Monocytes 8      % Eosinophils 0      % Basophils 0      % Immature Granulocytes 2      NRBCs per 100 WBC 0      Absolute Neutrophils 18.6 (*)     Absolute Lymphocytes 0.4 (*)     Absolute Monocytes 1.6 (*)     Absolute Eosinophils 0.0      Absolute Basophils 0.1      Absolute Immature Granulocytes 0.4      Absolute NRBCs 0.0     PARTIAL THROMBOPLASTIN TIME - Normal    aPTT 29     AMMONIA - Normal    Ammonia 22     COMPREHENSIVE METABOLIC PANEL   LIPASE   MAGNESIUM   ROUTINE UA WITH MICROSCOPIC REFLEX TO CULTURE   TROPONIN I       Head CT w/o contrast    (Results Pending)   CT Abdomen Pelvis w Contrast    (Results Pending)                     PROCEDURES:  Procedures:  Procedures       I, Hermelinda Lala am serving as a scribe to document services personally performed by Brayden Alfred DO, based on my observations and the provider's statements to me.  I, Brayden Alfred DO, attest that Hermelinda Lala is acting in a scribe capacity, has observed my performance of the services and has documented them in accordance with my direction.    Brayden Alfred DO  Emergency Medicine  [facility] EMERGENCY ROOM     Brayden Alfred DO  01/27/22 1900

## 2022-01-27 NOTE — PHARMACY-ADMISSION MEDICATION HISTORY
Pharmacy Note - Admission Medication History    Pertinent Provider Information:     Spoke with patient's wife ([place]), granddaughter, and son via phone as patient unable to contribute to medication history at this time. They live with patient but he manages all of his medications independently. They were able to go through medication list with me but were unsure of last doses and are wondering if he has been taking them regularly or if that is contributing to his confusion or if he hasn't been taking them due to confusion. Last doses unknown.  ______________________________________________________________________    Prior To Admission (PTA) med list completed and updated in EMR.       PTA Med List   Medication Sig Last Dose     acetaminophen (TYLENOL) 500 MG tablet [ACETAMINOPHEN (TYLENOL) 500 MG TABLET] Take 1-2 tablets (500-1,000 mg total) by mouth every 4 (four) hours as needed.      aspirin 81 MG EC tablet [ASPIRIN 81 MG EC TABLET] Take 81 mg by mouth daily. Unknown at Unknown time     cholecalciferol, vitamin D3, 5,000 unit Tab Take 5,000 mcg by mouth daily  Unknown at Unknown time     ELIQUIS ANTICOAGULANT 5 MG tablet TAKE 1 TABLET BY MOUTH TWICE DAILY Unknown at Unknown time     insulin glargine (LANTUS SOLOSTAR) 100 UNIT/ML pen Inject 44 Units Subcutaneous At Bedtime Unknown at Unknown time     lisinopril (ZESTRIL) 5 MG tablet Take 1 tablet (5 mg) by mouth daily Unknown at Unknown time     multivit-min/FA/lycopen/lutein (CENTRUM SILVER MEN ORAL) [MULTIVIT-MIN/FA/LYCOPEN/LUTEIN (CENTRUM SILVER MEN ORAL)] Take 1 tablet by mouth daily. Unknown at Unknown time     mupirocin (BACTROBAN) 2 % ointment Apply topically daily as needed Apply to foot wound Unknown at Unknown time     polyethylene glycol (MIRALAX) 17 gram packet [POLYETHYLENE GLYCOL (MIRALAX) 17 GRAM PACKET] Take 1 packet (17 g total) by mouth daily as needed.      sildenafil (VIAGRA) 50 MG tablet Take 1 tablet (50 mg) by mouth daily as needed  (erectile dysfunction)      tamsulosin (FLOMAX) 0.4 mg cap [TAMSULOSIN (FLOMAX) 0.4 MG CAP] Take 1 capsule (0.4 mg total) by mouth daily after supper. Unknown at Unknown time     traMADol (ULTRAM) 50 MG tablet TAKE 1 TABLET BY MOUTH EVERY 6 HOURS AS NEEDED FOR PAIN More than a month at rarely uses     VICTOZA 3-RUPALI 0.6 mg/0.1 mL (18 mg/3 mL) injection [VICTOZA 3-RUPALI 0.6 MG/0.1 ML (18 MG/3 ML) INJECTION] INJECT 1.2 MG UNDER THE SKIN ONCE DAILY Unknown at Unknown time     vitamin E 400 unit capsule [VITAMIN E 400 UNIT CAPSULE] Take 400 Units by mouth daily. Unknown at Unknown time     Information source(s): Patient, Family member, Clinic records and [facility]  Method of interview communication: phone    Summary of Changes to PTA Med List  New: None  Discontinued: None  Changed: None    Patient was asked about OTC/herbal products specifically.  PTA med list reflects this.    In the past week, patient estimated taking medication this percent of the time:  50-90% due to illness/confusion.    Patient did not bring any medications to the hospital and can't retrieve from home. No multi-dose medications are available for use during hospital stay.     The information provided in this note is only as accurate as the sources available at the time of the update(s).    Thank you for the opportunity to participate in the care of this patient.    Janki Hogan, PharmD, BCPS  1/26/2022 10:31 PM

## 2022-01-27 NOTE — ANESTHESIA CARE TRANSFER NOTE
Patient: Caleb Hernandez    Procedure: Procedure(s):  ENDOSCOPIC RETROGRADE CHOLANGIOPANCREATOGRAPHY, BALLOON DILATION AND STONE EXTRACTION       Diagnosis: Jaundice [R17]  Choledocholithiasis [K80.50]  Diagnosis Additional Information: No value filed.    Anesthesia Type:   MAC     Note:    Oropharynx: oropharynx clear of all foreign objects  Level of Consciousness: drowsy  Oxygen Supplementation: face mask    Independent Airway: airway patency satisfactory and stable  Dentition: dentition unchanged  Vital Signs Stable: post-procedure vital signs reviewed and stable  Report to RN Given: handoff report given  Patient transferred to: PACU    Handoff Report: Identifed the Patient, Identified the Reponsible Provider, Reviewed the pertinent medical history, Discussed the surgical course, Reviewed Intra-OP anesthesia mangement and issues during anesthesia, Set expectations for post-procedure period and Allowed opportunity for questions and acknowledgement of understanding      Vitals:  Vitals Value Taken Time   /62 01/27/22 1431   Temp 36.4  C (97.5  F) 01/27/22 1430   Pulse 96 01/27/22 1430   Resp 30 01/27/22 1430   SpO2 92 % 01/27/22 1430   Vitals shown include unvalidated device data.    Electronically Signed By: SIDDHARTH Fitzgerald CRNA  January 27, 2022  2:32 PM

## 2022-01-27 NOTE — ANESTHESIA PROCEDURE NOTES
Airway         Procedure Start/Stop Times: 1/27/2022 1:50 PM  Staff -        CRNA: Luz Marina Guerrero APRN CRNA       Performed By: CRNA  Consent for Airway        Urgency: elective  Indications and Patient Condition       Indications for airway management: doug-procedural         Mask difficulty assessment: 1 - vent by mask    Final Airway Details       Final airway type: endotracheal airway       Successful airway: ETT - single  Endotracheal Airway Details        ETT size (mm): 8.0       Successful intubation technique: direct laryngoscopy       Grade View of Cords: 1       Adjucts: stylet       Position: Right       Measured from: lips    Post intubation assessment        Placement verified by: capnometry and equal breath sounds        Number of attempts at approach: 1       Number of other approaches attempted: 0       Secured with: silk tape       Ease of procedure: easy    Medication(s) Administered   propofol (DIPRIVAN) injection 10 mg/mL vial, 100 mg  rocuronium (ZEMURON) 10 mg/mL injection, 50 mg  Medication Administration Time: 1/27/2022 1:37 PM

## 2022-01-27 NOTE — ANESTHESIA PREPROCEDURE EVALUATION
Anesthesia Pre-Procedure Evaluation    Patient: Caleb Hernandez   MRN: [id number] : 1935        Preoperative Diagnosis: Jaundice [R17]  Choledocholithiasis [K80.50]    Procedure : Procedure(s):  ENDOSCOPIC RETROGRADE CHOLANGIOPANCREATOGRAPHY          Past Medical History:   Diagnosis Date     Abscess of right foot 2020    Added automatically from request for surgery [id number]     Acute pulmonary embolism with acute cor pulmonale (H) 2020     BPH (benign prostatic hyperplasia)      Cholelithiasis      Closed fracture of rib     Created by Conversion  Replacement Utility updated for latest IMO load     Closed fracture of thoracic vertebra (H)     Created by Conversion  Replacement Utility updated for latest IMO load     Common bile duct (CBD) obstruction 2017     Diabetes mellitus (H)      Essential hypertension      Hyperlipidemia      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;  Service: Podiatry     BACK SURGERY      1964 removed a cyst     CHOLECYSTECTOMY       ENDOSCOPIC RETROGRADE CHOLANGIOPANCREATOGRAM       ENDOSCOPIC RETROGRADE CHOLANGIOPANCREATOGRAM N/A 2017    Procedure: ENDOSCOPIC RETROGRADE CHOLANGIOPANCREATOGRAPHY SPHINCTEROTOMY AND STONE EXTRACTION;  Surgeon: Hansel Gannon MD;  Location: [facility] OR;  Service:      INCISION AND DRAINAGE OF WOUND Right 2020    Procedure: INCISION AND DRAINAGE, right foot with removal of bone 5th metatarsal;  Surgeon: John Garcia DPM;  Location: [facility] OR;  Service: Podiatry     INCISION AND DRAINAGE OF WOUND Right  2020    Procedure: INCISION AND DRAINAGE, right foot;  Surgeon: John Garcia DPM;  Location: [facility] Main OR;  Service: Podiatry     INCISION AND DRAINAGE OF WOUND Right 2020    Procedure: INCISION AND DRAINAGE, LOWER EXTREMITY;  Surgeon: Aleksandr Hdez DPM;  Location: [facility] Main OR;  Service: Podiatry     IR EXTREMITY ANGIOGRAM RIGHT  2020     IR LOWER EXTREMITY ANGIOGRAM RIGHT  2020     PICC  2020          TONSILLECTOMY  1940      Allergies   Allergen Reactions     Cresol [Phenol] Unknown     Muscle cramps     Demeclocycline Hives and Rash     Crestor [Rosuvastatin] Muscle Pain (Myalgia)      Social History     Tobacco Use     Smoking status: Former Smoker     Quit date: 1991     Years since quittin.2     Smokeless tobacco: Never Used   Substance Use Topics     Alcohol use: No      Wt Readings from Last 1 Encounters:   22 81.1 kg (178 lb 12.7 oz)        Anesthesia Evaluation            ROS/MED HX  ENT/Pulmonary:  - neg pulmonary ROS     Neurologic:  - neg neurologic ROS     Cardiovascular: Comment: RLE PAD    2020 TTE   1 Normal left ventricular chamber size. Calculated left ventricular ejection fraction   (modified Ronquillo technique) is 65 %. ?inferior,    inferoseptal hypokinesis.    2 Right ventricle was not well visualized. Right ventricular chamber enlargement. Decreased   right ventricular systolic function.    Abnormal TAPSE (< 17 mm) suggestive of right ventricular systolic dysfunction.    3 Mild aortic sinus of Valsalva dilatation (4.1 cm). Borderline ascending aorta dilatation   (3.7 cm).    4 No Previous Echo.       (+) hypertension-Peripheral Vascular Disease-- Other. ---Taking blood thinners (?last dose yesterday) dysrhythmias, a-fib,     METS/Exercise Tolerance:     Hematologic:     (+) History of blood clots, anemia,     Musculoskeletal:       GI/Hepatic:     (+) cholecystitis/cholelithiasis,     Renal/Genitourinary:     (+) renal  disease, type: CRI,     Endo:     (+) type II DM, Using insulin,     Psychiatric/Substance Use:       Infectious Disease:       Malignancy:       Other:            Physical Exam    Airway  airway exam normal      Mallampati: II   TM distance: > 3 FB   Neck ROM: full   Mouth opening: > 3 cm    Respiratory Devices and Support         Dental       (+) upper dentures and lower dentures      Cardiovascular   cardiovascular exam normal       Rhythm and rate: regular and normal     Pulmonary   pulmonary exam normal        breath sounds clear to auscultation       Other findings: Complaining of some poorly defined right side hip/groin pain; Palpation doesn't elicit pain, he is actively flexing/extending right hip, no ecchymoses or other deformity noted    OUTSIDE LABS:  CBC:   Lab Results   Component Value Date    WBC 16.0 (H) 01/27/2022    WBC 21.1 (H) 01/26/2022    HGB 11.7 (L) 01/27/2022    HGB 12.7 (L) 01/26/2022    HCT 36.6 (L) 01/27/2022    HCT 39.4 (L) 01/26/2022    PLT 89 (L) 01/27/2022     (L) 01/26/2022     BMP:   Lab Results   Component Value Date     01/27/2022     01/26/2022    POTASSIUM 4.2 01/27/2022    POTASSIUM 4.3 01/26/2022    CHLORIDE 107 01/27/2022    CHLORIDE 101 01/26/2022    CO2 23 01/27/2022    CO2 24 01/26/2022    BUN 70 (H) 01/27/2022    BUN 67 (H) 01/26/2022    CR 1.35 (H) 01/27/2022    CR 1.50 (H) 01/26/2022     (H) 01/27/2022     (H) 01/27/2022     COAGS:   Lab Results   Component Value Date    PTT 29 01/26/2022    INR 1.34 (H) 01/26/2022     POC: No results found for: BGM, HCG, HCGS  HEPATIC:   Lab Results   Component Value Date    ALBUMIN 1.8 (L) 01/27/2022    PROTTOTAL 5.5 (L) 01/27/2022     (H) 01/27/2022    AST 80 (H) 01/27/2022    ALKPHOS 354 (H) 01/27/2022    BILITOTAL 15.3 (HH) 01/27/2022    KWAKU 22 01/26/2022     OTHER:   Lab Results   Component Value Date    LACT 2.9 (H) 01/27/2022    A1C 9.1 (H) 08/25/2021    MARTHA 9.0 01/27/2022    MAG 2.1  01/27/2022    LIPASE <9 01/26/2022    CRP 8.8 (H) 01/16/2021     (H) 11/23/2020       Anesthesia Plan    ASA Status:  3   NPO Status:  NPO Appropriate    Anesthesia Type: General.     - Airway: ETT              Consents    Anesthesia Plan(s) and associated risks, benefits, and realistic alternatives discussed. Questions answered and patient/representative(s) expressed understanding.    - Discussed:     - Discussed with:  Patient, Spouse      - Patient is DNR/DNI Status: No         Postoperative Care    Pain management: IV analgesics, Multi-modal analgesia.   PONV prophylaxis: Ondansetron (or other 5HT-3)     Comments:                Alfredo Hernandez MD

## 2022-01-27 NOTE — ED NOTES
Patient taken via EMS to [facility] for ERCP. Report given to EMS and RN at [facility]. Vitals stable on RA. Antibiotic administering through IV. All questions answered.

## 2022-01-27 NOTE — ED TRIAGE NOTES
Per EMS report, patient presents with increasing generalized weakness x 4 days. Patient is noted to be jaundice. EMS reports BS in 400'. Patient is poor historian and does not know why he is here. Wife contacted via phone, she reports that he has been increasingly lethargic and confused. She noted that his skin has been turning yellow, which has never happened before. Poor appetite x 4 days.

## 2022-01-27 NOTE — H&P
GENERAL PRE-PROCEDURE:   Procedure:  ERCP - Ascending Cholangitis, biliary obstruction  Date/Time:  1/27/2022 1:22 PM    Verbal consent obtained?: Yes    Written consent obtained?: Yes    Risks and benefits: Risks, benefits and alternatives were discussed    Consent given by:  Patient  Patient states understanding of procedure being performed: Yes    Patient's understanding of procedure matches consent: Yes    Procedure consent matches procedure scheduled: Yes    Expected level of sedation:  Deep  Appropriately NPO:  Yes  ASA Class:  3  Mallampati  :  Grade 2- soft palate, base of uvula, tonsillar pillars, and portion of posterior pharyngeal wall visible  Lungs:  Lungs clear with good breath sounds bilaterally  Heart:  Normal heart sounds and rate and systolic murmur  History & Physical reviewed:  History and physical reviewed and no updates needed  Statement of review:  I have reviewed the lab findings, diagnostic data, medications, and the plan for sedation

## 2022-01-27 NOTE — PROGRESS NOTES
[facility] MEDICINE PROGRESS NOTE      Identification/Summary: Caleb Hernandez is a 86 year old male with a past medical history of peripheral vascular disease, hypertension, atrial fibrillation on anticoagulation, history of pulmonary embolism, type 2 diabetes mellitus status post cholecystectomy in 2/19/18 with recommend episodes of choledocholithiasis requiring ERCPs most recently in 2017 status post sphincterectomy who was admitted on 1/26/2022 for abdominal pain and jaundice. Hospital course is notable for persistently elevated WBC count and LFTs, evaluation by gastroenterology who determined that the patient needed an urgent ERCP hence is being transferred to [facility] for the same.     Assessment and Plan:   86-year-old man with recurrent jaundice, obstructive type with dilated CBD concerns for choledocholithiasis, patient is n.p.o., anticoagulation on hold, being transferred to [facility] for ERCP        Noteworthy, by the time I went to evaluate the patient he had been transferred to [facility].  His anticipated procedure sometime this afternoon

## 2022-01-27 NOTE — CONSULTS
GI CONSULT NOTE      Name: Caleb Hernandez  Medical Record #: [id number]  YOB: 1935  Date of Admission: 1/26/2022  Date/Time: 1/27/2022/8:05 AM     CHIEF COMPLAINT:      HISTORY OF PRESENT ILLNESS: We were asked to see Caleb Hernandez by Dr. Peralta for biliary obstruction. History obtain from chart review, phone call to patient's wife.     Caleb Hernandez is a 86  year old male with history of HTN, PVD, atrial fibrillation on Eliquis, history of PE, diabetes mellitus, cholecystectomy in 1980 with multiple recurrences of choledocholithiasis requiring ERCPs, most recently in 2017.    He presented to the ED with jaundice, low appetite and weakness. He denies abdominal pain, nausea or vomiting. Work-up showed elevated LFTs: total bili 17.2, alk phos 425, AST 98, . WBC 21.1. MRCP showed 1.3cm bile duct with filling defect suspicious for stone. Also noted was complex right hepatic lobe lesion measuring 5.3x3.6cm. RUQ US showed this to be due to phlegmon or from ductal dilation, does not appear to be an abscess. Patient was started on Zosyn, bowel rest. There are no beds in the system for transfer to a site that performs ERCP. This AM, LFTs trending down slightly. WBC 16.0. He is afebrile.     Last ERCP 9/5/17 showed bile duct with 3-4 stones, sludge and pus, s/p sphincterotomy and stone removal.     Called patient's wife who assisted with history. Unclear when he last took his Eliquis, his pills are missing from his pillbox 1/25 and 1/26/22.    REVIEW OF SYSTEMS (ROS): Complete review of systems negative other than listed in HPI.    PAST MEDICAL HISTORY:  Past Medical History:   Diagnosis Date     Abscess of right foot 11/23/2020    Added automatically from request for surgery [id number]     Acute pulmonary embolism with acute cor pulmonale (H) 5/23/2020     BPH (benign prostatic hyperplasia)      Cholelithiasis      Closed fracture of rib     Created by Conversion  Replacement Utility updated for  "latest IMO load     Closed fracture of thoracic vertebra (H)     Created by Conversion  Replacement Utility updated for latest IMO load     Common bile duct (CBD) obstruction 2017     Diabetes mellitus (H)      Essential hypertension      Hyperlipidemia      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    Added automatically from request for surgery [id number]      Pancreatitis      Sepsis due to pneumonia (H) 2017     Septic arthritis of right foot (H) 2020      FAMILY HISTORY:  Family History   Problem Relation Age of Onset     Aortic aneurysm Mother      Alcoholism Father      SOCIAL HISTORY:  Social History     Socioeconomic History     Marital status:      Spouse name: Not on file     Number of children: Not on file     Years of education: Not on file     Highest education level: Not on file   Occupational History     Not on file   Tobacco Use     Smoking status: Former Smoker     Quit date: 1991     Years since quittin.2     Smokeless tobacco: Never Used   Substance and Sexual Activity     Alcohol use: No     Drug use: No     Sexual activity: Never   Other Topics Concern     Not on file   Social History Narrative     Not on file     Social Determinants of Health     Financial Resource Strain: Not on file   Food Insecurity: Not on file   Transportation Needs: Not on file   Physical Activity: Not on file   Stress: Not on file   Social Connections: Not on file   Intimate Partner Violence: Not on file   Housing Stability: Not on file       MEDICATIONS PRIOR TO ADMISSION: (Not in a hospital admission)       ALLERGIES: Cresol [phenol], Demeclocycline, and Crestor [rosuvastatin]    PHYSICAL EXAM:    /58   Pulse 83   Temp 97.6  F (36.4  C) (Axillary)   Resp 14   Ht 1.727 m (5' 8\")   Wt 81.1 kg (178 lb 12.7 oz)   SpO2 92%   BMI 27.19 kg/m      GENERAL: Pleasant, no obvious distress  NECK: Supple without adenopathy  EYES: Bilateral scleral icterus  LUNGS: Clear to auscultation " bilaterally  HEART: Regular rate and rhythm, S1 and S2 present, no lower extremity edema  ABDOMEN: Non-distended. Positive bowel sounds. Soft, non-tender, no guarding/rebound/mass, no obvious organomegaly  MUSKULOSKELETAL:  Warm and well perfused, moves all extremities well  SKIN: Jaundiced  NEUROLOGIC: Alert and oriented  PSYCHIATRIC: Normal affect    LAB DATA:  CMP Results:   Recent Labs   Lab Test 01/27/22  0627 01/27/22  0232 01/26/22 2047 08/25/21  1523 05/25/21  1112 01/16/21  0724     --  136 139   < > 140   POTASSIUM 4.2  --  4.3 4.6   < > 4.8   CHLORIDE 107  --  101 102   < > 103   CO2 23  --  24 28   < > 29   ANIONGAP 9  --  11 9   < > 8   * 326* 420* 169*   < > 92   BUN 70*  --  67* 18   < > 18   CR 1.35*  --  1.50* 1.21   < > 0.93   BILITOTAL 15.3*  --  17.2*  --   --  0.6   ALKPHOS 354*  --  425*  --   --  79   *  --  126*  --   --  13   AST 80*  --  98*  --   --  15    < > = values in this interval not displayed.      CBC  Recent Labs   Lab 01/27/22 0627 01/26/22 2046   WBC 16.0* 21.1*   RBC 3.85* 4.17*   HGB 11.7* 12.7*   HCT 36.6* 39.4*   MCV 95 95   MCH 30.4 30.5   MCHC 32.0 32.2   RDW 15.5* 15.2*   PLT 89* 115*     INR  Recent Labs   Lab 01/26/22 2047   INR 1.34*      Lipase   Date Value Ref Range Status   01/26/2022 <9 0 - 52 U/L Final     IMAGING:  EXAM: US ABDOMEN LIMITED  LOCATION: [facility]  DATE/TIME: 1/27/2022 4:39 AM     INDICATION: evaulate right hepatic mass, abnormal CT, ?abscess  COMPARISON: CT 01/26/2022. MR 01/27/2022.  TECHNIQUE: Limited abdominal ultrasound.     FINDINGS:     GALLBLADDER: Absent     BILE DUCTS: Dilated. The common duct measures 13 mm.     LIVER: The right hepatic peripheral lesion measures 3.7 x 3.3 x 3 cm by ultrasound. This is not as well seen as CT. It appears heterogeneously hypoechoic. No peripherally increased blood flow to suggest abscess.     RIGHT KIDNEY: No hydronephrosis.     PANCREAS: The visualized  portions are normal.     No ascites.                                                                      IMPRESSION:  1.  Intrauterine extra hepatic bile duct dilation.  2.  Right hepatic peripheral lesion measures 3.7 cm by ultrasound. Evaluating all of the modalities, this may represent focal worsening of bile duct dilation versus phlegmon. At this time, it does not have the appearance of abscess.  3.  Following treatment of the bile duct obstruction, this lesion could be followed with either ultrasound or CT.    EXAM: MR ABDOMEN MRCP W/O AND W CONTRAST  LOCATION: [facility]  DATE/TIME: 1/27/2022 12:46 AM     INDICATION: Jaundice.  COMPARISON: CT scan from 1/26/2022.  TECHNIQUE: Routine MR liver/pancreas protocol including axial and coronal MRCP sequences. 2D and 3D reconstruction performed by MR technologist including MIP reconstruction and slab cholangiograms. If performed with contrast, additional dynamic T1 post   IV contrast images.  CONTRAST: 8 mL Gadavist.     FINDINGS:      MRCP: There is diffuse intrahepatic and extrahepatic biliary dilatation compatible with biliary obstruction. This is significantly increased compared with an older CT scan from 12/1/2020. The common bile duct measures up to 1.3 cm at the hilum of the   liver. There is a filling defect seen within the common bile duct at the hilum of the liver best seen on series 8 image 15 and on series 7 image 30 measuring up to 8 mm in diameter. This is suspicious for an obstructing common bile duct stone. The common   bile duct appears to taper distal to this although the common bile duct above the level of the ampulla is not well seen and cannot exclude some sludge or stone material in the distal common bile duct. Gallbladder is surgically absent.     LIVER: There is a complex lesion in the anterior aspect of the right hepatic lobe peripherally measuring up to 5.3 x 3.6 cm. This shows some increased lacelike enhancement  with multiple small probable cystic collections and corresponds with the   abnormality seen on the recent prior CT scan. Given the findings elsewhere, this is most concerning for phlegmonous change and/or developing abscess within the liver. Underlying malignant lesion accounting for this process felt to be less likely but not   completely excluded and close follow-up in this area is recommended.     PANCREAS: Pancreas is negative. No pancreatic ductal dilatation.     ADDITIONAL FINDINGS: Spleen and adrenal glands are unremarkable. Benign-appearing cysts in the left kidney. No hydronephrosis. No adenopathy or fluid collections. Normal caliber abdominal aorta.                                       IMPRESSION:  1.  Significant biliary obstruction as detailed above. There appears to be an obstructing common bile duct stone at the hilum of the liver measuring up to 8 mm in diameter likely accounting for this obstruction. However, the distal common bile duct is   not well seen and cannot exclude some sludge, stone material, or obstructing process in the distal common bile duct. Recommend ERCP for further evaluation.     2.  Abnormal area of heterogeneous enhancement and probable underlying cystic spaces in the right hepatic lobe peripherally measuring 5.3 x 3.6 cm. This is concerning for focal liver infection/phlegmonous change/developing abscess given the findings   elsewhere. Underlying malignant lesion accounting for this appearance felt to be less likely and clinical correlation is needed. Close imaging follow-up in this area recommended following appropriate therapy to exclude any progressive process.     3.  Cholecystectomy.    ASSESSMENT:  1. Cholangitis  2. Choledocholithiasis   86 year old male with history of atrial fib on Eliquis, recurrent choledocholithiasis admitted with jaundice and elevated LFTs. Imaging shows choledocholithiasis and phlegmon vs bile duct dilation in the right hepatic lobe. Patient needs  "ERCP to clear duct, which we have scheduled today at 1230 at [facility].     Discussed plan with patient, wife Virginia over the phone and nursing staff.     PLAN:  1. NPO  2. Patient needs transport to [facility]   3. Continue IV Zosyn  4. Trend LFTs    Discussed with Dr. Oliveros, GI attending.     TIME SPENT: 55 min including chart review, patient interview and care coordination.                                                Tiffanie Toro PA-C  Thank you for the opportunity to participate in the care of this patient.   Please feel free to call me with any questions or concerns.  Phone number [phone].            GI ATTENDING BRIEF ADDENDUM:  The patient was seen and examined.  Discussed with Tiffanie Toro PA-C.  Agree with findings and plan as above. Patient is 85 yo w/ pmh sf remote cholecystectomy with recurrent choledocholithiasis s/p ercp's in past with previous sphincterotomy and stone extraction, on eliquis (last dose ?yesterday), presented with jaundice, found to have elevated wbc, elevated lfts, dilated cbd with filling defect on MRCP suspicious for obstructing cbd stone also with rt hepatic lesion seen - on ultrasound thought to represent dilated bile duct vs phlegmon, not thought consistent with abscess. On exam abdomen soft, nt. Imp/Recs: Biliary obstruction with suspected cholangitis. Noted rt hepatic peripheral lesion (dilated bile duct vs \"phlegmon\". Plan for ERCP with Dr. Osborne today at [facility]. Repeat ultrasound/CT after ERCP for f/u liver lesion, continue antibiotics, trend lfts.  Total Time 20 minutes    Ayesha Oliveros MD  [facility]  "

## 2022-01-27 NOTE — ANESTHESIA POSTPROCEDURE EVALUATION
Patient: Caleb Hernandez    Procedure: Procedure(s):  ENDOSCOPIC RETROGRADE CHOLANGIOPANCREATOGRAPHY, BALLOON DILATION AND STONE EXTRACTION       Diagnosis:Jaundice [R17]  Choledocholithiasis [K80.50]  Diagnosis Additional Information: No value filed.    Anesthesia Type:  MAC    Note:  Disposition: Inpatient   Postop Pain Control: Uneventful            Sign Out: Well controlled pain   PONV: No   Neuro/Psych: Uneventful            Sign Out: Acceptable/Baseline neuro status   Airway/Respiratory: Uneventful            Sign Out: Acceptable/Baseline resp. status   CV/Hemodynamics: Uneventful            Sign Out: Acceptable CV status; No obvious hypovolemia; No obvious fluid overload   Other NRE: NONE   DID A NON-ROUTINE EVENT OCCUR? No           Last vitals:  Vitals Value Taken Time   /57 01/27/22 1515   Temp 36.4  C (97.5  F) 01/27/22 1430   Pulse 91 01/27/22 1528   Resp 32 01/27/22 1528   SpO2 93 % 01/27/22 1528   Vitals shown include unvalidated device data.    Electronically Signed By: Alfredo Hernandez MD  January 27, 2022  3:29 PM

## 2022-01-27 NOTE — ED NOTES
Pt here via EMS. Reported to have increasing weakness and jaundice.  Pt has pitting edema bilat feet, ankles, calves.  Pt BGL reported to be 400.

## 2022-01-28 ENCOUNTER — APPOINTMENT (OUTPATIENT)
Dept: RADIOLOGY | Facility: CLINIC | Age: 87
DRG: 871 | End: 2022-01-28
Attending: FAMILY MEDICINE
Payer: COMMERCIAL

## 2022-01-28 LAB
ALBUMIN SERPL-MCNC: 1.6 G/DL (ref 3.5–5)
ALBUMIN SERPL-MCNC: 1.6 G/DL (ref 3.5–5)
ALP SERPL-CCNC: 257 U/L (ref 45–120)
ALP SERPL-CCNC: 269 U/L (ref 45–120)
ALT SERPL W P-5'-P-CCNC: 90 U/L (ref 0–45)
ALT SERPL W P-5'-P-CCNC: 93 U/L (ref 0–45)
ANION GAP SERPL CALCULATED.3IONS-SCNC: 8 MMOL/L (ref 5–18)
ANION GAP SERPL CALCULATED.3IONS-SCNC: 8 MMOL/L (ref 5–18)
AST SERPL W P-5'-P-CCNC: 52 U/L (ref 0–40)
AST SERPL W P-5'-P-CCNC: 58 U/L (ref 0–40)
BASE EXCESS BLDV CALC-SCNC: 0.8 MMOL/L
BASOPHILS # BLD MANUAL: 0 10E3/UL (ref 0–0.2)
BASOPHILS NFR BLD MANUAL: 0 %
BILIRUB DIRECT SERPL-MCNC: 7.1 MG/DL
BILIRUB SERPL-MCNC: 11.5 MG/DL (ref 0–1)
BILIRUB SERPL-MCNC: 12.9 MG/DL (ref 0–1)
BUN SERPL-MCNC: 78 MG/DL (ref 8–28)
BUN SERPL-MCNC: 81 MG/DL (ref 8–28)
CALCIUM SERPL-MCNC: 8.5 MG/DL (ref 8.5–10.5)
CALCIUM SERPL-MCNC: 8.6 MG/DL (ref 8.5–10.5)
CHLORIDE BLD-SCNC: 112 MMOL/L (ref 98–107)
CHLORIDE BLD-SCNC: 114 MMOL/L (ref 98–107)
CO2 SERPL-SCNC: 22 MMOL/L (ref 22–31)
CO2 SERPL-SCNC: 22 MMOL/L (ref 22–31)
CREAT SERPL-MCNC: 1.54 MG/DL (ref 0.7–1.3)
CREAT SERPL-MCNC: 1.65 MG/DL (ref 0.7–1.3)
EOSINOPHIL # BLD MANUAL: 0 10E3/UL (ref 0–0.7)
EOSINOPHIL NFR BLD MANUAL: 0 %
ERYTHROCYTE [DISTWIDTH] IN BLOOD BY AUTOMATED COUNT: 15.7 % (ref 10–15)
ERYTHROCYTE [DISTWIDTH] IN BLOOD BY AUTOMATED COUNT: 15.7 % (ref 10–15)
GFR SERPL CREATININE-BSD FRML MDRD: 40 ML/MIN/1.73M2
GFR SERPL CREATININE-BSD FRML MDRD: 44 ML/MIN/1.73M2
GLUCOSE BLD-MCNC: 278 MG/DL (ref 70–125)
GLUCOSE BLD-MCNC: 287 MG/DL (ref 70–125)
GLUCOSE BLDC GLUCOMTR-MCNC: 235 MG/DL (ref 70–99)
GLUCOSE BLDC GLUCOMTR-MCNC: 255 MG/DL (ref 70–99)
GLUCOSE BLDC GLUCOMTR-MCNC: 273 MG/DL (ref 70–99)
GLUCOSE BLDC GLUCOMTR-MCNC: 323 MG/DL (ref 70–99)
GLUCOSE BLDC GLUCOMTR-MCNC: 341 MG/DL (ref 70–99)
HCO3 BLDV-SCNC: 25 MMOL/L (ref 24–30)
HCT VFR BLD AUTO: 33.2 % (ref 40–53)
HCT VFR BLD AUTO: 34 % (ref 40–53)
HGB BLD-MCNC: 11 G/DL (ref 13.3–17.7)
HGB BLD-MCNC: 11.1 G/DL (ref 13.3–17.7)
LYMPHOCYTES # BLD MANUAL: 1.8 10E3/UL (ref 0.8–5.3)
LYMPHOCYTES NFR BLD MANUAL: 10 %
MAGNESIUM SERPL-MCNC: 2.1 MG/DL (ref 1.8–2.6)
MCH RBC QN AUTO: 30.6 PG (ref 26.5–33)
MCH RBC QN AUTO: 31.1 PG (ref 26.5–33)
MCHC RBC AUTO-ENTMCNC: 32.6 G/DL (ref 31.5–36.5)
MCHC RBC AUTO-ENTMCNC: 33.1 G/DL (ref 31.5–36.5)
MCV RBC AUTO: 94 FL (ref 78–100)
MCV RBC AUTO: 94 FL (ref 78–100)
METAMYELOCYTES # BLD MANUAL: 0.2 10E3/UL
METAMYELOCYTES NFR BLD MANUAL: 1 %
MONOCYTES # BLD MANUAL: 0.9 10E3/UL (ref 0–1.3)
MONOCYTES NFR BLD MANUAL: 5 %
NEUTROPHILS # BLD MANUAL: 14.7 10E3/UL (ref 1.6–8.3)
NEUTROPHILS NFR BLD MANUAL: 84 %
OXYHGB MFR BLDV: 90.5 % (ref 70–75)
PCO2 BLDV: 43 MM HG (ref 35–50)
PH BLDV: 7.39 [PH] (ref 7.35–7.45)
PLAT MORPH BLD: ABNORMAL
PLATELET # BLD AUTO: 64 10E3/UL (ref 150–450)
PLATELET # BLD AUTO: 68 10E3/UL (ref 150–450)
PO2 BLDV: 63 MM HG (ref 25–47)
POTASSIUM BLD-SCNC: 4.6 MMOL/L (ref 3.5–5)
POTASSIUM BLD-SCNC: 4.6 MMOL/L (ref 3.5–5)
PROCALCITONIN SERPL-MCNC: 11.72 NG/ML (ref 0–0.49)
PROT SERPL-MCNC: 4.9 G/DL (ref 6–8)
PROT SERPL-MCNC: 5 G/DL (ref 6–8)
RBC # BLD AUTO: 3.54 10E6/UL (ref 4.4–5.9)
RBC # BLD AUTO: 3.63 10E6/UL (ref 4.4–5.9)
RBC MORPH BLD: ABNORMAL
SAO2 % BLDV: 92.1 % (ref 70–75)
SODIUM SERPL-SCNC: 142 MMOL/L (ref 136–145)
SODIUM SERPL-SCNC: 144 MMOL/L (ref 136–145)
WBC # BLD AUTO: 16.9 10E3/UL (ref 4–11)
WBC # BLD AUTO: 17.5 10E3/UL (ref 4–11)

## 2022-01-28 PROCEDURE — 99222 1ST HOSP IP/OBS MODERATE 55: CPT | Performed by: INTERNAL MEDICINE

## 2022-01-28 PROCEDURE — 84145 PROCALCITONIN (PCT): CPT | Performed by: INTERNAL MEDICINE

## 2022-01-28 PROCEDURE — 85027 COMPLETE CBC AUTOMATED: CPT | Performed by: INTERNAL MEDICINE

## 2022-01-28 PROCEDURE — 250N000012 HC RX MED GY IP 250 OP 636 PS 637: Performed by: INTERNAL MEDICINE

## 2022-01-28 PROCEDURE — 120N000001 HC R&B MED SURG/OB

## 2022-01-28 PROCEDURE — 83735 ASSAY OF MAGNESIUM: CPT | Performed by: INTERNAL MEDICINE

## 2022-01-28 PROCEDURE — 36415 COLL VENOUS BLD VENIPUNCTURE: CPT | Performed by: INTERNAL MEDICINE

## 2022-01-28 PROCEDURE — 99232 SBSQ HOSP IP/OBS MODERATE 35: CPT | Performed by: FAMILY MEDICINE

## 2022-01-28 PROCEDURE — 84450 TRANSFERASE (AST) (SGOT): CPT | Performed by: INTERNAL MEDICINE

## 2022-01-28 PROCEDURE — 250N000011 HC RX IP 250 OP 636: Performed by: INTERNAL MEDICINE

## 2022-01-28 PROCEDURE — 250N000013 HC RX MED GY IP 250 OP 250 PS 637: Performed by: INTERNAL MEDICINE

## 2022-01-28 PROCEDURE — 84155 ASSAY OF PROTEIN SERUM: CPT | Performed by: INTERNAL MEDICINE

## 2022-01-28 PROCEDURE — 82805 BLOOD GASES W/O2 SATURATION: CPT | Performed by: INTERNAL MEDICINE

## 2022-01-28 PROCEDURE — 71045 X-RAY EXAM CHEST 1 VIEW: CPT

## 2022-01-28 PROCEDURE — 82248 BILIRUBIN DIRECT: CPT | Performed by: INTERNAL MEDICINE

## 2022-01-28 PROCEDURE — 258N000003 HC RX IP 258 OP 636: Performed by: INTERNAL MEDICINE

## 2022-01-28 RX ADMIN — INSULIN ASPART 2 UNITS: 100 INJECTION, SOLUTION INTRAVENOUS; SUBCUTANEOUS at 10:19

## 2022-01-28 RX ADMIN — ASPIRIN 81 MG: 81 TABLET, COATED ORAL at 10:06

## 2022-01-28 RX ADMIN — TAMSULOSIN HYDROCHLORIDE 0.4 MG: 0.4 CAPSULE ORAL at 16:36

## 2022-01-28 RX ADMIN — LISINOPRIL 5 MG: 5 TABLET ORAL at 10:02

## 2022-01-28 RX ADMIN — INSULIN GLARGINE 44 UNITS: 100 INJECTION, SOLUTION SUBCUTANEOUS at 21:39

## 2022-01-28 RX ADMIN — PIPERACILLIN AND TAZOBACTAM 3.38 G: 3; .375 INJECTION, POWDER, LYOPHILIZED, FOR SOLUTION INTRAVENOUS at 21:42

## 2022-01-28 RX ADMIN — Medication 400 UNITS: at 10:06

## 2022-01-28 RX ADMIN — PIPERACILLIN AND TAZOBACTAM 3.38 G: 3; .375 INJECTION, POWDER, LYOPHILIZED, FOR SOLUTION INTRAVENOUS at 05:24

## 2022-01-28 RX ADMIN — APIXABAN 5 MG: 5 TABLET, FILM COATED ORAL at 10:02

## 2022-01-28 RX ADMIN — APIXABAN 5 MG: 5 TABLET, FILM COATED ORAL at 21:41

## 2022-01-28 RX ADMIN — INSULIN ASPART 3 UNITS: 100 INJECTION, SOLUTION INTRAVENOUS; SUBCUTANEOUS at 13:05

## 2022-01-28 RX ADMIN — PIPERACILLIN AND TAZOBACTAM 3.38 G: 3; .375 INJECTION, POWDER, LYOPHILIZED, FOR SOLUTION INTRAVENOUS at 13:09

## 2022-01-28 RX ADMIN — SODIUM CHLORIDE: 9 INJECTION, SOLUTION INTRAVENOUS at 15:14

## 2022-01-28 RX ADMIN — SODIUM CHLORIDE: 9 INJECTION, SOLUTION INTRAVENOUS at 05:01

## 2022-01-28 RX ADMIN — INSULIN ASPART 5 UNITS: 100 INJECTION, SOLUTION INTRAVENOUS; SUBCUTANEOUS at 16:36

## 2022-01-28 ASSESSMENT — ACTIVITIES OF DAILY LIVING (ADL)
ADLS_ACUITY_SCORE: 18
ADLS_ACUITY_SCORE: 22
ADLS_ACUITY_SCORE: 24
ADLS_ACUITY_SCORE: 24
ADLS_ACUITY_SCORE: 26
ADLS_ACUITY_SCORE: 25
ADLS_ACUITY_SCORE: 18
ADLS_ACUITY_SCORE: 22
ADLS_ACUITY_SCORE: 26
ADLS_ACUITY_SCORE: 22
ADLS_ACUITY_SCORE: 24
ADLS_ACUITY_SCORE: 18
ADLS_ACUITY_SCORE: 24
ADLS_ACUITY_SCORE: 18
ADLS_ACUITY_SCORE: 18
ADLS_ACUITY_SCORE: 26
ADLS_ACUITY_SCORE: 24
ADLS_ACUITY_SCORE: 26
ADLS_ACUITY_SCORE: 22
ADLS_ACUITY_SCORE: 22

## 2022-01-28 NOTE — PLAN OF CARE
Problem: Infection  Goal: Absence of Infection Signs and Symptoms  Outcome: No Change   Patient arrived from [place] this evening. Patient is very drowsy, arouses to voice, but inconsistent with responses. Denies pain. Patient is jaundiced. Patient tachypneic at 34 breaths per minute. Axillary temperature 99.4. Patient was 90% on room air. Placed on 3 liters of oxygen. Patient incontinent of urine and stool. Patient turned every 2 hours. Blood cultures from 1/26 and 1/27 positive. MD notified and was here to see patient. Getting IV zosyn and ID consult placed. Patient too drowsy to eat or drink, lantus decreased per MD for tonight. Right leg swelling greater than left.    with patient

## 2022-01-28 NOTE — PLAN OF CARE
Problem: Adult Inpatient Plan of Care  Goal: Patient-Specific Goal (Individualized)  Outcome: Improving   Pt IVF infusing. Pt is drowsy, arouses to verbal commands. Tele reading NSR throughout shift. O2 at 3L via NC, sating above 90%. Jaundice skin throughout. Incontinent of bowel and bladder, brief in place. Turning every 2 hours. Right foot fusion, some edema. BG montioring. Decreased appetite. Alarms in place for safety.

## 2022-01-28 NOTE — CONSULTS
Infectious Disease Consultation:  Requesting MD:Tere  Reason for consult:+ BC      HISTORY:  Pt very hard to wake up, so much of hx comes from chart but he came in jaundiced, has long hx of biliary stones needing ERCP.  Admit bili 17, MCRP with stone.  Possible hepatic phlegmon on belly US.  Had ERCP and stone clearance on jan 27 with balloon extraction and dilation of CBD.  Tree swept too.  Now with Klebsiella oxytoca in his blood cx, along with GNR in urine, probably same from filtration.  Again, very somnolent on exam today.         Pertinent past history, past infectious disease history:  Past Medical History      Past Medical History        Past Medical History:   Diagnosis Date     Abscess of right foot 11/23/2020     Added automatically from request for surgery [id number]     Acute pulmonary embolism with acute cor pulmonale (H) 5/23/2020     BPH (benign prostatic hyperplasia)       Cholelithiasis       Closed fracture of rib       Created by Conversion  Replacement Utility updated for latest IMO load     Closed fracture of thoracic vertebra (H)       Created by Conversion  Replacement Utility updated for latest IMO load     Common bile duct (CBD) obstruction 9/4/2017     Diabetes mellitus (H)       Essential hypertension       Hyperlipidemia       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     Added automatically from request for surgery [id number]      Pancreatitis       Sepsis due to pneumonia (H) 9/8/2017     Septic arthritis of right foot (H) 12/2/2020               Surgical History      Past Surgical History         Past Surgical History:   Procedure Laterality Date     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     Procedure: with amputation of the fifth ray, peroneal brevis tendon transfer;  Surgeon: John Garcia DPM;  Location: [facility] OR;  Service: Podiatry     BACK SURGERY         1964 removed a cyst     CHOLECYSTECTOMY   1985     ENDOSCOPIC RETROGRADE CHOLANGIOPANCREATOGRAM         ENDOSCOPIC  RETROGRADE CHOLANGIOPANCREATOGRAM N/A 2017     Procedure: ENDOSCOPIC RETROGRADE CHOLANGIOPANCREATOGRAPHY SPHINCTEROTOMY AND STONE EXTRACTION;  Surgeon: Hansel Gannon MD;  Location: [facility] OR;  Service:      INCISION AND DRAINAGE OF WOUND Right 2020     Procedure: INCISION AND DRAINAGE, right foot with removal of bone 5th metatarsal;  Surgeon: John Garcia DPM;  Location: [facility] OR;  Service: Podiatry     INCISION AND DRAINAGE OF WOUND Right 2020     Procedure: INCISION AND DRAINAGE, right foot;  Surgeon: John Garcia DPM;  Location: [facility] OR;  Service: Podiatry     INCISION AND DRAINAGE OF WOUND Right 2020     Procedure: INCISION AND DRAINAGE, LOWER EXTREMITY;  Surgeon: Aleksandr Hdez DPM;  Location: [facility] OR;  Service: Podiatry     IR EXTREMITY ANGIOGRAM RIGHT   2020     IR LOWER EXTREMITY ANGIOGRAM RIGHT   2020     PICC   2020           TONSILLECTOMY   1940            Family History       Family History         Family History   Problem Relation Age of Onset     Aortic aneurysm Mother       Alcoholism Father                 Social History       .  Social History   Social History            Socioeconomic History     Marital status:        Spouse name: Not on file     Number of children: Not on file     Years of education: Not on file     Highest education level: Not on file   Occupational History     Not on file   Tobacco Use     Smoking status: Former Smoker       Quit date: 1991       Years since quittin.2     Smokeless tobacco: Never Used   Substance and Sexual Activity     Alcohol use: No     Drug use: No     Sexual activity: Never   Other Topics Concern     Not on file   Social History Narrative     Not on file      Social Determinants of Health      Financial Resource Strain: Not on file   Food Insecurity: Not on file   Transportation Needs: Not on file   Physical Activity: Not on file   Stress: Not  on file   Social Connections: Not on file   Intimate Partner Violence: Not on file   Housing Stability: Not on file               Allergies               Allergies   Allergen Reactions     Cresol [Phenol] Unknown       Muscle cramps     Demeclocycline Hives and Rash     Crestor [Rosuvastatin] Muscle Pain (Myalgia)          Medications:  Reviewed prior to admission meds as applicable in chart review.  Current meds are reviewed in the EMR listed MAR.     ANTIBIOTICS:    Current:zosyn   Prior:   Allergy to:    SH/FH and  travel history(if applicable to consult):  above  REVIEW OF SYSTEMS:  All other systems negative    EXAMINATION:  BP (!) 151/70   Pulse 68   Temp 97.6  F (36.4  C) (Axillary)   Resp 18   Wt 79.7 kg (175 lb 11.2 oz)   SpO2 98%   BMI 26.72 kg/m    Alert, awake  Vitals tabulated above, reviewed  HEENT:icteric man in NAD, very drowsy  Neck supple without lymphadenopathy  Sclera clear  CARDIOVASCULAR regular rate and rhythm, no murmur  Lungs CLEAR TO AUSCULTATION   Abdomen soft, NT/ND, absent HEPATOSPLENOMEGALY  Skin normal  Joints normal  Neurologic exam non focal  Wound:  NA        CLINICAL DATABASE FOR---LAB/MICRO/CULTURES/IMAGING STUDIES:  Results for SEGOVIA JOSHUA SELENE (MRN [id number]) as of 1/28/2022 11:05   Ref. Range 1/26/2022 20:46 1/26/2022 20:47 1/26/2022 23:43 1/27/2022 00:06 1/27/2022 00:21 1/27/2022 02:32 1/27/2022 06:27 1/27/2022 08:26 1/27/2022 11:27 1/27/2022 12:19 1/27/2022 14:33 1/27/2022 16:16 1/27/2022 18:05 1/27/2022 21:37 1/28/2022 00:00 1/28/2022 02:12 1/28/2022 06:19 1/28/2022 10:17   ALT Latest Ref Range: 0 - 45 U/L  126 (H)     106 (H)        93 (H)  90 (H)    AST Latest Ref Range: 0 - 40 U/L  98 (H)     80 (H)        58 (H)  52 (H)    Ammonia Latest Ref Range: 11 - 35 umol/L  22                   Bilirubin Direct Latest Ref Range: <=0.5 mg/dL                 7.1 (H)    Lactic Acid Latest Ref Range: 0.7 - 2.0 mmol/L   3.3 (H)   2.9 (H)               Lipase Latest Ref Range: 0 - 52  U/L  <9                   Procalcitonin Latest Ref Range: 0.00 - 0.49 ng/mL   8.51 (H)            11.72 (H)      Troponin I Latest Ref Range: 0.00 - 0.29 ng/mL  0.01                   Glucose Latest Ref Range: 70 - 125 mg/dL  420 (HH)     352 (H)        287 (H)  278 (H)    GLUCOSE BY METER POCT Latest Ref Range: 70 - 99 mg/dL      326 (H)  257 (H)  288 (H) 258 (H) 236 (H) 229 (H) 244 (H)  255 (H)  235 (H)   Ph Venous Latest Ref Range: 7.35 - 7.45                7.39      PCO2 Venous Latest Ref Range: 35 - 50 mm Hg               43      PO2 Venous Latest Ref Range: 25 - 47 mm Hg               63 (H)      O2 Sat, Venous Latest Ref Range: 70.0 - 75.0 %               92.1 (H)      Bicarbonate Venous Latest Ref Range: 24 - 30 mmol/L               25      Base Excess Venous Latest Ref Range:   mmol/L               0.8      Oxyhemoglobin Venous Latest Ref Range: 70.0 - 75.0 %               90.5 (H)      WBC Latest Ref Range: 4.0 - 11.0 10e3/uL 21.1 (H)      16.0 (H)        17.5 (H)  16.9 (H)    Hemoglobin Latest Ref Range: 13.3 - 17.7 g/dL 12.7 (L)      11.7 (L)        11.0 (L)  11.1 (L)    Hematocrit Latest Ref Range: 40.0 - 53.0 % 39.4 (L)      36.6 (L)        33.2 (L)  34.0 (L)    Platelet Count Latest Ref Range: 150 - 450 10e3/uL 115 (L)      89 (L)        64 (L)  68 (L)    RBC Count Latest Ref Range: 4.40 - 5.90 10e6/uL 4.17 (L)      3.85 (L)        3.54 (L)  3.63 (L)    MCV Latest Ref Range: 78 - 100 fL 95      95        94  94    MCH Latest Ref Range: 26.5 - 33.0 pg 30.5      30.4        31.1  30.6    MCHC Latest Ref Range: 31.5 - 36.5 g/dL 32.2      32.0        33.1  32.6    RDW Latest Ref Range: 10.0 - 15.0 % 15.2 (H)      15.5 (H)        15.7 (H)  15.7 (H)    % Neutrophils Latest Units: % 88      86        84      % Lymphocytes Latest Units: % 2      3        10      % Monocytes Latest Units: % 8      7        5      % Eosinophils Latest Units: % 0      0        0      % Basophils Latest Units: % 0      0         0      % Metamyelocytes Latest Units: %               1      Absolute Basophils Latest Ref Range: 0.0 - 0.2 10e3/uL 0.1      0.0              Absolute Basophils Latest Ref Range: 0.0 - 0.2 10e3/uL               0.0      Absolute Neutrophil Latest Ref Range: 1.6 - 8.3 10e3/uL               14.7 (H)      Absolute Lymphocytes Latest Ref Range: 0.8 - 5.3 10e3/uL               1.8      Absolute Monocytes Latest Ref Range: 0.0 - 1.3 10e3/uL               0.9      Absolute Eosinophils Latest Ref Range: 0.0 - 0.7 10e3/uL               0.0      Absolute Eosinophils Latest Ref Range: 0.0 - 0.7 10e3/uL 0.0      0.0              Absolute Immature Granulocytes Latest Ref Range: <=0.4 10e3/uL 0.4      0.6 (H)              Absolute Metamyelocytes Latest Ref Range: <=0.0 10e3/uL               0.2 (H)      Absolute Lymphocytes Latest Ref Range: 0.8 - 5.3 10e3/uL 0.4 (L)      0.5 (L)              Absolute Monocytes Latest Ref Range: 0.0 - 1.3 10e3/uL 1.6 (H)      1.1              % Immature Granulocytes Latest Units: % 2      4              Absolute Neutrophils Latest Ref Range: 1.6 - 8.3 10e3/uL 18.6 (H)      13.8 (H)              Absolute NRBCs Latest Units: 10e3/uL 0.0      0.0              NRBCs per 100 WBC Latest Ref Range: <1 /100 0      0              RBC Morphology Unknown               Confirmed RBC Indices      Platelet Morphology Latest Ref Range: Automated Count Confirmed. Platelet morphology is normal.                Automated Count Confirmed. Platelet morphology is normal.      INR Latest Ref Range: 0.85 - 1.15   1.34 (H)                   PTT Latest Ref Range: 22 - 38 Seconds  29                   Color Urine Latest Ref Range: Colorless, Straw, Light Yellow, Yellow          Dark Yellow (A)            Appearance Urine Latest Ref Range: Clear          Turbid (A)            Glucose Urine Latest Ref Range: Negative mg/dL         30 (A)            Bilirubin Urine Latest Ref Range: Negative          4.0 mg/dL (A)             Ketones Urine Latest Ref Range: Negative mg/dL         Negative            Specific Gravity Urine Latest Ref Range: 1.001 - 1.030          1.045 (H)            pH Urine Latest Ref Range: 5.0 - 7.0          5.0            Protein Albumin Urine Latest Ref Range: Negative mg/dL         20 (A)            Urobilinogen mg/dL Latest Ref Range: <2.0 mg/dL         <2.0            Nitrite Urine Latest Ref Range: Negative          Negative            Blood Urine Latest Ref Range: Negative          0.5 mg/dL (A)            Leukocyte Esterase Urine Latest Ref Range: Negative          500 Jasmine/uL (A)            WBC Urine Latest Ref Range: <=5 /HPF         >182 (H)            RBC Urine Latest Ref Range: <=2 /HPF         136 (H)            Bacteria Urine Latest Ref Range: None Seen /HPF         Many (A)            Yeast Urine Latest Ref Range: None Seen /HPF         Moderate (A)            Squamous Epithelial /HPF Urine Latest Ref Range: <=1 /HPF         1            Mucus Urine Latest Ref Range: None Seen /LPF         Present (A)            BLOOD CULTURE Unknown   Rpt (A) Rpt (A)                 URINE CULTURE Unknown         Rpt (A)            Acinetobacter species Latest Ref Range: Not Detected    Not Detected                  Citrobacter species Latest Ref Range: Not Detected    Not Detected                  Enterobacter species Latest Ref Range: Not Detected    Not Detected                  Escherichia coli Latest Ref Range: Not Detected    Not Detected                  Klebsiella oxytoca Latest Ref Range: Not Detected    Detected (A)                  Klebsiella pneumoniae Latest Ref Range: Not Detected    Not Detected                  Proteus species Latest Ref Range: Not Detected    Not Detected                  Pseudomonas aeruginosa Latest Ref Range: Not Detected    Not Detected                  VERIGENE GN PANEL Unknown   Rpt (A)                  SARS CoV2 PCR Latest Ref Range: Negative      Negative                  KO  in blood, times 2  GNR urine    IMPRESSION:  Cholangitis with klebsiellar bacteremia, probably same in his urine  Hx of stones, hx of ERCP  Liver phlegmon?  US jan 27:  1.  Intrauterine extra hepatic bile duct dilation.  2.  Right hepatic peripheral lesion measures 3.7 cm by ultrasound. Evaluating all of the modalities, this may represent focal worsening of bile duct dilation versus phlegmon. At this time, it does not have the appearance of abscess.  3.  Following treatment of the bile duct obstruction, this lesion could be followed with either ultrasound or CT.     PLAN:  Continue zosyn  If worry about liver phlegmon would treat 21 days with appropriate abx (ceph and flagyl probably)  If not as concerned about phlegmon duration would be until Feb 10th.       PEDRO REED MD  [facility]  Office [phone]

## 2022-01-28 NOTE — PROGRESS NOTES
[facility]    Medicine Progress Note - Hospitalist Service    Date of Admission:  1/27/2022    Assessment & Plan            Gram Negative Bacteremia  - Klebsiella in 2x blood cultures  - Urine growing GNR  - continue Zosyn  - consider possible liver phlegmon  - Appreciate ID recs on outpatient abx.    Cholangitis  with Choledocholithiasis with Jaundice  - s/p ERCP on 1/27 with multiple stones removed and CBD dilation.   - continue Zosyn  - LFTs improving  - F/U US planned for today to eval liver lesion further - possible phlegmon.    Mild hypoxia - likely due to somnolence, possibly some atelectasis, lungs sound good on exam.  Check CXR.    DM2 with Hyperglycemia   - restart lantus 44 unit(s) subcutaneous at bedtime  - sliding scale insulin and carb count insulin 1:10 at meals    MANJIT - likely ATN and dehydration  - gentle IVF continued  - follow BMP    Paroxysmal A-Fib/Hx PE  - on Eliquis    Hypertension - continue lisinopril    BPH - if confusion would hold flomax         Diet: Full Liquid Diet    DVT Prophylaxis: DOAC  Dietrich Catheter: Not present  Central Lines: None  Cardiac Monitoring: ACTIVE order. Indication: Electrolyte Imbalance (24 hours)- Magnesium <1.3 mg/ml; Potassium < =2.8 or > 5.5 mg/ml  Code Status: Full Code      Disposition Plan   Expected Discharge: 01/30/2022     Anticipated discharge location:  Awaiting care coordination huddle  Delays:     Administering IV medications  Specialist Consult  Lab Result Pending            The patient's care was discussed with the Bedside Nurse and Patient.    Bismark Walsh MD  Hospitalist Service  [facility]  Securely message with the Vocera Web Console (learn more here)  Text page via [employer] Paging/Directory         Clinically Significant Risk Factors Present on Admission             # Coagulation Defect: home medication list includes an anticoagulant medication  # Platelet Defect: home medication list  includes an antiplatelet medication   # Overweight: last Body mass index is 26.72 kg/m .      ______________________________________________________________________    Interval History   Patient says he feels better and is enjoying drinking apple juice.    Data reviewed today: I reviewed all medications, new labs and imaging results over the last 24 hours. I personally reviewed    Physical Exam   Vital Signs: Temp: 97.8  F (36.6  C) Temp src: Oral BP: 121/58 Pulse: 66   Resp: 16 SpO2: 96 % O2 Device: None (Room air) Oxygen Delivery: 3 LPM  Weight: 175 lbs 11.2 oz  Constitutional: awake, alert, cooperative, no apparent distress, and appears stated age  Eyes: Lids and lashes normal, pupils equal, round and reactive to light, extra ocular muscles intact, sclera clear, conjunctiva normal  Respiratory: No increased work of breathing, good air exchange, clear to auscultation bilaterally, no crackles or wheezing  Cardiovascular: Normal apical impulse, regular rate and rhythm, normal S1 and S2, no S3 or S4, and no murmur noted  GI: normal bowel sounds, soft and non-distended  Skin: Jaundice  Musculoskeletal: There is no redness, warmth, or swelling of the joints.  Full range of motion noted.  Motor strength is 5 out of 5 all extremities bilaterally.  Tone is normal.  Neurologic: Awake, alert, oriented to name, place and time.  Cranial nerves II-XII are grossly intact.  Motor is 5 out of 5 bilaterally.  Cerebellar finger to nose, heel to shin intact.  Sensory is intact.  Babinski down going, Romberg negative, and gait is normal.  Neuropsychiatric: General: normal, calm and normal eye contact    Data   Recent Labs   Lab 01/28/22  1303 01/28/22  1017 01/28/22  0619 01/28/22  0212 01/28/22  0000 01/27/22  0826 01/27/22  0627 01/27/22  0232 01/26/22  2047   WBC  --   --  16.9*  --  17.5*  --  16.0*  --   --    HGB  --   --  11.1*  --  11.0*  --  11.7*  --   --    MCV  --   --  94  --  94  --  95  --   --    PLT  --   --  68*  --   64*  --  89*  --   --    INR  --   --   --   --   --   --   --   --  1.34*   NA  --   --  144  --  142  --  139  --  136   POTASSIUM  --   --  4.6  --  4.6  --  4.2  --  4.3   CHLORIDE  --   --  114*  --  112*  --  107  --  101   CO2  --   --  22  --  22  --  23  --  24   BUN  --   --  81*  --  78*  --  70*  --  67*   CR  --   --  1.54*  --  1.65*  --  1.35*  --  1.50*   ANIONGAP  --   --  8  --  8  --  9  --  11   MARTHA  --   --  8.5  --  8.6  --  9.0  --  9.6   * 235* 278*   < > 287*   < > 352*   < > 420*   ALBUMIN  --   --  1.6*  --  1.6*  --  1.8*  --  2.2*   PROTTOTAL  --   --  5.0*  --  4.9*  --  5.5*  --  6.5   BILITOTAL  --   --  11.5*  --  12.9*  --  15.3*  --  17.2*   ALKPHOS  --   --  257*  --  269*  --  354*  --  425*   ALT  --   --  90*  --  93*  --  106*  --  126*   AST  --   --  52*  --  58*  --  80*  --  98*   LIPASE  --   --   --   --   --   --   --   --  <9    < > = values in this interval not displayed.     No results found for this or any previous visit (from the past 24 hour(s)).  Medications     sodium chloride 100 mL/hr at 01/28/22 0501       apixaban ANTICOAGULANT  5 mg Oral BID     aspirin  81 mg Oral Daily     [START ON 1/29/2022] insulin aspart   Subcutaneous Daily with breakfast     insulin aspart   Subcutaneous Daily with lunch     insulin aspart   Subcutaneous Daily with supper     insulin aspart  1-7 Units Subcutaneous TID AC     insulin aspart  1-5 Units Subcutaneous At Bedtime     insulin glargine  44 Units Subcutaneous At Bedtime     lisinopril  5 mg Oral Daily     piperacillin-tazobactam  3.375 g Intravenous Q8H     sodium chloride (PF)  3 mL Intracatheter Q8H     sodium chloride (PF)  3 mL Intracatheter Q8H     tamsulosin  0.4 mg Oral Daily with supper     vitamin E  400 Units Oral Daily

## 2022-01-28 NOTE — SIGNIFICANT EVENT
Significant Event Note      Active Problems:    Ascending cholangitis (1/27/2022)    Sepsis (H) (1/27/2022)    Essential hypertension ()    Personal history of PE (pulmonary embolism)-- May 2020, unprovoked, with right heart strain (10/22/2020)    Chronic anticoagulation (10/22/2020)    Type 2 diabetes mellitus with diabetic peripheral angiopathy without gangrene, with long-term current use of insulin (H) (12/2/2020)    Jaundice (1/27/2022)    Gram-negative bacteremia (1/27/2022)    Cholelithiasis (3/29/2011)    Status post endoscopic retrograde cholangiopancreatography (1/27/2022)    Returned from [facility] somnolent VS stable  Post anesthesia recovery  On Zosyn for gram negative bacteremia  Obstruction resolved with ERCP  Wife updated   Full code  Orders entered and labs tomorrow      Time of event: 8:05 PM January 27, 2022    Description of event:  See above    Plan:  See above    Discussed with: patient's family/emergency contact and bedside nurse    Abdulaziz Carranza MD

## 2022-01-28 NOTE — PROGRESS NOTES
Cross cover hospitalist note    Contacted by RN who reports patient is lethargic and not eating    Patient's blood sugars have been running high since admission but now 244 and due for 44 units of Lantus    Recently seen by Dr. Carranza to be evaluated for lethargy/somnolence.  I reviewed his note    Vital signs are stable    Plan: Today to give 30 units of Lantus and blood sugar will be checked in 4 hours.  Patient can be reevaluated in the morning    Keon Yoo MD  Cross cover hospitalist   [facility]

## 2022-01-28 NOTE — PLAN OF CARE
Vss. Pt denies pain. Currently on 3L NC and sating in the mid-90's. Disoriented to time. Pt continues to be drowsy throughout the shift. Will arouse to voice and respond to short questions. Diet advanced to full liquids. Tolerating well. Jaundice skin throughout. Incontinent of bowel and bladder. Q2 turns maintained. Will continue to monitor.

## 2022-01-28 NOTE — PROGRESS NOTES
GI PROGRESS NOTE  1/28/2022  Caleb Hernandez  1935  /-15    Subjective:   Drowsy. Does not remember much about his hospital stay. Denies abdominal pain.      Objective:     Blood pressure (!) 151/70, pulse 68, temperature 97.6  F (36.4  C), temperature source Axillary, resp. rate 18, weight 79.7 kg (175 lb 11.2 oz), SpO2 98 %.    Body mass index is 26.72 kg/m .  Gen: NAD  Eyes: Bilateral icterus  Cardio: RRR  GI: Non-distended, BS positive, soft, non-tender  Skin: Jaundiced    Laboratory:  BMP  Recent Labs   Lab 01/28/22  1017 01/28/22  0619 01/28/22  0212 01/28/22  0000 01/27/22 0826 01/27/22 0627   NA  --  144  --  142  --  139   POTASSIUM  --  4.6  --  4.6  --  4.2   CHLORIDE  --  114*  --  112*  --  107   MARTHA  --  8.5  --  8.6  --  9.0   CO2  --  22  --  22  --  23   BUN  --  81*  --  78*  --  70*   CR  --  1.54*  --  1.65*  --  1.35*   * 278* 255* 287*   < > 352*    < > = values in this interval not displayed.     CBC  Recent Labs   Lab 01/28/22  0619 01/28/22  0000 01/27/22 0627   WBC 16.9* 17.5* 16.0*   RBC 3.63* 3.54* 3.85*   HGB 11.1* 11.0* 11.7*   HCT 34.0* 33.2* 36.6*   MCV 94 94 95   MCH 30.6 31.1 30.4   MCHC 32.6 33.1 32.0   RDW 15.7* 15.7* 15.5*   PLT 68* 64* 89*     INR  Recent Labs   Lab 01/26/22 2047   INR 1.34*      LFTs  Recent Labs   Lab Test 01/28/22  0619 01/28/22  0000 01/27/22  1127 01/27/22 0627 01/26/22 2047 01/05/21  0926 12/30/20  1100 12/28/20  1200 12/23/20  0800   ALBUMIN 1.6* 1.6*  --  1.8* 2.2*   < >  --    < >  --    BILITOTAL 11.5* 12.9*  --  15.3* 17.2*   < >  --    < >  --    ALT 90* 93*  --  106* 126*   < >  --    < >  --    AST 52* 58*  --  80* 98*   < >  --    < >  --    PROTEIN  --   --  20 *  --   --   --  300 mg/dL*  --  50 mg/dL*   LIPASE  --   --   --   --  <9  --   --   --   --     < > = values in this interval not displayed.     Procedures:  ERCP 1/27/22 Dr. Osborne:  Impression:            - The upper third of the main bile duct was dilated,                           with a stone causing an obstruction.                          - Choledocholithiasis was found. Complete removal was                          accomplished by balloon extraction.                          - Common bile duct was successfully dilated.                          - The biliary tree was swept.   Recommendation:        - Return patient to referring hospital for ongoing                          care.                          - Zosyn (piperacillin tazobactam) 3.375 gm IV q 6 hr.                          - Clear liquid diet.      Assessment:   1. Cholangitis  2. Choledocholithiasis  86 year old male with history of atrial fib on Eliquis, recurrent choledocholithiasis admitted with jaundice and elevated LFTs. Now s/p ERCP 1/27/22 with dilation of CBD and removal of several stones. LFTs trending down. On Zosyn.     3. Hepatic lesion  Imaging shows phlegmon vs bile duct dilation in the right hepatic lobe. Radiology recommended follow-up US after ERCP, which we can try to arrange today.      Plan:   1. Trend LFTs  2. Complete 7 days of antibiotic therapy for cholangitis  3. RUQ US to follow-up liver lesion    ADDENDUM: Discussed with radiology, may not see much change on repeat US today. Will plan to repeat imaging in 1 week. If concern for phlegmon, would treat with 21 days of ABX per ID. If not, then continue ABX through 2/10/22.                                                                       Tiffanie Toro PA-C  Thank you for the opportunity to participate in the care of this patient.   Please feel free to call me with any questions or concerns.  Phone number [phone].

## 2022-01-28 NOTE — PROGRESS NOTES
Seen bedside    Eyes opened with verbal stimuli. Denies pain.     S/P ERCP     Plans - check labs

## 2022-01-29 LAB
ALBUMIN SERPL-MCNC: 1.5 G/DL (ref 3.5–5)
ALP SERPL-CCNC: 236 U/L (ref 45–120)
ALT SERPL W P-5'-P-CCNC: 80 U/L (ref 0–45)
AMMONIA PLAS-SCNC: 16 UMOL/L (ref 11–35)
ANION GAP SERPL CALCULATED.3IONS-SCNC: 6 MMOL/L (ref 5–18)
AST SERPL W P-5'-P-CCNC: 52 U/L (ref 0–40)
BACTERIA BLD CULT: ABNORMAL
BACTERIA UR CULT: ABNORMAL
BACTERIA UR CULT: ABNORMAL
BASOPHILS # BLD AUTO: 0 10E3/UL (ref 0–0.2)
BASOPHILS # BLD AUTO: 0 10E3/UL (ref 0–0.2)
BASOPHILS NFR BLD AUTO: 0 %
BASOPHILS NFR BLD AUTO: 0 %
BILIRUB SERPL-MCNC: 6.6 MG/DL (ref 0–1)
BUN SERPL-MCNC: 80 MG/DL (ref 8–28)
CALCIUM SERPL-MCNC: 7.9 MG/DL (ref 8.5–10.5)
CHLORIDE BLD-SCNC: 117 MMOL/L (ref 98–107)
CO2 SERPL-SCNC: 23 MMOL/L (ref 22–31)
CREAT SERPL-MCNC: 1.22 MG/DL (ref 0.7–1.3)
EOSINOPHIL # BLD AUTO: 0.1 10E3/UL (ref 0–0.7)
EOSINOPHIL # BLD AUTO: 0.1 10E3/UL (ref 0–0.7)
EOSINOPHIL NFR BLD AUTO: 0 %
EOSINOPHIL NFR BLD AUTO: 1 %
ERYTHROCYTE [DISTWIDTH] IN BLOOD BY AUTOMATED COUNT: 15.4 % (ref 10–15)
ERYTHROCYTE [DISTWIDTH] IN BLOOD BY AUTOMATED COUNT: 15.8 % (ref 10–15)
GFR SERPL CREATININE-BSD FRML MDRD: 58 ML/MIN/1.73M2
GLUCOSE BLD-MCNC: 189 MG/DL (ref 70–125)
GLUCOSE BLDC GLUCOMTR-MCNC: 150 MG/DL (ref 70–99)
GLUCOSE BLDC GLUCOMTR-MCNC: 152 MG/DL (ref 70–99)
GLUCOSE BLDC GLUCOMTR-MCNC: 164 MG/DL (ref 70–99)
GLUCOSE BLDC GLUCOMTR-MCNC: 213 MG/DL (ref 70–99)
GLUCOSE BLDC GLUCOMTR-MCNC: 247 MG/DL (ref 70–99)
HCT VFR BLD AUTO: 31.1 % (ref 40–53)
HCT VFR BLD AUTO: 33 % (ref 40–53)
HGB BLD-MCNC: 10.6 G/DL (ref 13.3–17.7)
HGB BLD-MCNC: 10.6 G/DL (ref 13.3–17.7)
HOLD SPECIMEN: NORMAL
IMM GRANULOCYTES # BLD: 0.2 10E3/UL
IMM GRANULOCYTES # BLD: 0.3 10E3/UL
IMM GRANULOCYTES NFR BLD: 2 %
IMM GRANULOCYTES NFR BLD: 2 %
LIPASE SERPL-CCNC: 57 U/L (ref 0–52)
LYMPHOCYTES # BLD AUTO: 1.2 10E3/UL (ref 0.8–5.3)
LYMPHOCYTES # BLD AUTO: 1.3 10E3/UL (ref 0.8–5.3)
LYMPHOCYTES NFR BLD AUTO: 10 %
LYMPHOCYTES NFR BLD AUTO: 10 %
MCH RBC QN AUTO: 30.7 PG (ref 26.5–33)
MCH RBC QN AUTO: 30.9 PG (ref 26.5–33)
MCHC RBC AUTO-ENTMCNC: 32.1 G/DL (ref 31.5–36.5)
MCHC RBC AUTO-ENTMCNC: 34.1 G/DL (ref 31.5–36.5)
MCV RBC AUTO: 90 FL (ref 78–100)
MCV RBC AUTO: 96 FL (ref 78–100)
MONOCYTES # BLD AUTO: 1.5 10E3/UL (ref 0–1.3)
MONOCYTES # BLD AUTO: 1.6 10E3/UL (ref 0–1.3)
MONOCYTES NFR BLD AUTO: 11 %
MONOCYTES NFR BLD AUTO: 12 %
NEUTROPHILS # BLD AUTO: 10.2 10E3/UL (ref 1.6–8.3)
NEUTROPHILS # BLD AUTO: 9.8 10E3/UL (ref 1.6–8.3)
NEUTROPHILS NFR BLD AUTO: 76 %
NEUTROPHILS NFR BLD AUTO: 76 %
NRBC # BLD AUTO: 0 10E3/UL
NRBC # BLD AUTO: 0 10E3/UL
NRBC BLD AUTO-RTO: 0 /100
NRBC BLD AUTO-RTO: 0 /100
PLATELET # BLD AUTO: 64 10E3/UL (ref 150–450)
PLATELET # BLD AUTO: 66 10E3/UL (ref 150–450)
POTASSIUM BLD-SCNC: 4.4 MMOL/L (ref 3.5–5)
PROT SERPL-MCNC: 4.9 G/DL (ref 6–8)
RBC # BLD AUTO: 3.43 10E6/UL (ref 4.4–5.9)
RBC # BLD AUTO: 3.45 10E6/UL (ref 4.4–5.9)
SODIUM SERPL-SCNC: 146 MMOL/L (ref 136–145)
WBC # BLD AUTO: 12.9 10E3/UL (ref 4–11)
WBC # BLD AUTO: 13.4 10E3/UL (ref 4–11)

## 2022-01-29 PROCEDURE — 80053 COMPREHEN METABOLIC PANEL: CPT | Performed by: INTERNAL MEDICINE

## 2022-01-29 PROCEDURE — 36415 COLL VENOUS BLD VENIPUNCTURE: CPT | Performed by: INTERNAL MEDICINE

## 2022-01-29 PROCEDURE — 85025 COMPLETE CBC W/AUTO DIFF WBC: CPT | Performed by: INTERNAL MEDICINE

## 2022-01-29 PROCEDURE — 250N000011 HC RX IP 250 OP 636: Performed by: INTERNAL MEDICINE

## 2022-01-29 PROCEDURE — 82140 ASSAY OF AMMONIA: CPT | Performed by: INTERNAL MEDICINE

## 2022-01-29 PROCEDURE — 99232 SBSQ HOSP IP/OBS MODERATE 35: CPT | Performed by: INTERNAL MEDICINE

## 2022-01-29 PROCEDURE — 250N000013 HC RX MED GY IP 250 OP 250 PS 637: Performed by: INTERNAL MEDICINE

## 2022-01-29 PROCEDURE — 258N000003 HC RX IP 258 OP 636: Performed by: INTERNAL MEDICINE

## 2022-01-29 PROCEDURE — 87077 CULTURE AEROBIC IDENTIFY: CPT | Performed by: INTERNAL MEDICINE

## 2022-01-29 PROCEDURE — 83690 ASSAY OF LIPASE: CPT | Performed by: INTERNAL MEDICINE

## 2022-01-29 PROCEDURE — 120N000001 HC R&B MED SURG/OB

## 2022-01-29 PROCEDURE — 99233 SBSQ HOSP IP/OBS HIGH 50: CPT | Performed by: INTERNAL MEDICINE

## 2022-01-29 RX ORDER — FUROSEMIDE 10 MG/ML
40 INJECTION INTRAMUSCULAR; INTRAVENOUS EVERY 8 HOURS
Status: COMPLETED | OUTPATIENT
Start: 2022-01-29 | End: 2022-01-30

## 2022-01-29 RX ORDER — LIDOCAINE 40 MG/G
CREAM TOPICAL
Status: ACTIVE | OUTPATIENT
Start: 2022-01-29 | End: 2022-02-01

## 2022-01-29 RX ORDER — HYDROMORPHONE HYDROCHLORIDE 1 MG/ML
0.5 INJECTION, SOLUTION INTRAMUSCULAR; INTRAVENOUS; SUBCUTANEOUS EVERY 6 HOURS PRN
Status: DISCONTINUED | OUTPATIENT
Start: 2022-01-29 | End: 2022-02-04 | Stop reason: HOSPADM

## 2022-01-29 RX ADMIN — SODIUM CHLORIDE: 9 INJECTION, SOLUTION INTRAVENOUS at 01:00

## 2022-01-29 RX ADMIN — INSULIN ASPART 1 UNITS: 100 INJECTION, SOLUTION INTRAVENOUS; SUBCUTANEOUS at 17:38

## 2022-01-29 RX ADMIN — Medication 400 UNITS: at 08:21

## 2022-01-29 RX ADMIN — PIPERACILLIN AND TAZOBACTAM 3.38 G: 3; .375 INJECTION, POWDER, LYOPHILIZED, FOR SOLUTION INTRAVENOUS at 20:21

## 2022-01-29 RX ADMIN — INSULIN ASPART 1 UNITS: 100 INJECTION, SOLUTION INTRAVENOUS; SUBCUTANEOUS at 11:44

## 2022-01-29 RX ADMIN — APIXABAN 5 MG: 5 TABLET, FILM COATED ORAL at 20:17

## 2022-01-29 RX ADMIN — PIPERACILLIN AND TAZOBACTAM 3.38 G: 3; .375 INJECTION, POWDER, LYOPHILIZED, FOR SOLUTION INTRAVENOUS at 12:47

## 2022-01-29 RX ADMIN — TAMSULOSIN HYDROCHLORIDE 0.4 MG: 0.4 CAPSULE ORAL at 16:57

## 2022-01-29 RX ADMIN — FUROSEMIDE 40 MG: 10 INJECTION, SOLUTION INTRAMUSCULAR; INTRAVENOUS at 11:27

## 2022-01-29 RX ADMIN — FUROSEMIDE 40 MG: 10 INJECTION, SOLUTION INTRAMUSCULAR; INTRAVENOUS at 18:53

## 2022-01-29 RX ADMIN — APIXABAN 5 MG: 5 TABLET, FILM COATED ORAL at 08:21

## 2022-01-29 RX ADMIN — LISINOPRIL 5 MG: 5 TABLET ORAL at 08:21

## 2022-01-29 RX ADMIN — INSULIN ASPART 1 UNITS: 100 INJECTION, SOLUTION INTRAVENOUS; SUBCUTANEOUS at 08:26

## 2022-01-29 RX ADMIN — PIPERACILLIN AND TAZOBACTAM 3.38 G: 3; .375 INJECTION, POWDER, LYOPHILIZED, FOR SOLUTION INTRAVENOUS at 04:34

## 2022-01-29 RX ADMIN — SODIUM CHLORIDE: 9 INJECTION, SOLUTION INTRAVENOUS at 11:31

## 2022-01-29 RX ADMIN — ASPIRIN 81 MG: 81 TABLET, COATED ORAL at 08:21

## 2022-01-29 ASSESSMENT — ACTIVITIES OF DAILY LIVING (ADL)
ADLS_ACUITY_SCORE: 26
ADLS_ACUITY_SCORE: 27
ADLS_ACUITY_SCORE: 26
ADLS_ACUITY_SCORE: 27
ADLS_ACUITY_SCORE: 25
ADLS_ACUITY_SCORE: 26
ADLS_ACUITY_SCORE: 25
ADLS_ACUITY_SCORE: 27
ADLS_ACUITY_SCORE: 26
ADLS_ACUITY_SCORE: 25
ADLS_ACUITY_SCORE: 26
ADLS_ACUITY_SCORE: 27
ADLS_ACUITY_SCORE: 27
ADLS_ACUITY_SCORE: 26

## 2022-01-29 NOTE — PROGRESS NOTES
Notified MD at 1440 PM regarding wife at bedside wanting an update.      Spoke with: Dr Leo SEN speaking with wife via phone currently.

## 2022-01-29 NOTE — PROGRESS NOTES
[facility] MEDICINE PROGRESS NOTE      Identification/Summary: Caleb Hernandez is a 86 year old male with a past medical history of recurrent CBD stones, malnutrition, type 2 diabetes mellitus on insulin, CKD stage II, chronic anticoagulation with Eliquis, hypertension, dyslipidemia who was admitted on 1/27/2022 for abdominal pain and jaundice. Hospital course is notable for finding of choledocholithiasis as well as bacteremia with Klebsiella oxytoca.   Patient underwent an ERCP with sludge and stone removal completed on the 27th of this month at [facility].  Patient return to this facility and has been on IV antibiotics since his admission.  Oral intake seems to be somewhat poor    Assessment and Plan:   1/.  Klebsiella bacteremia: Likely related to choledocholithiasis and CBD infection.  Final sensitivity pattern pending  Per infectious disease antibiotics to continue till February 7.  We will have a PICC line be placed on the patient.  2/.  Urinary tract infection with Klebsiella: Addressed with Zosyn  3/.  Malnutrition: Significant in nature with profoundly decreased albumin  4/.  Third spacing because of excessive IV fluids that the patient had received for resuscitation: We will diurese with Lasix at this time  4/.  Leukocytosis: Secondary to problem #1 and 2 improving white count continue to trend.  5/.  Elevated LFTs in the setting of choledocholithiasis and choledocho cystitis, continue to trend there is improvement in AST and ALT however bilirubin and alk phos continue to be elevated  6/.  Type 2 diabetes mellitus: Patient's oral intake is significantly declined will decrease Lantus to 25 units at bedtime and ideally would like to have it at 25 units twice daily instead of once a day especially given his overall body habitus    Diet: Combination Diet Full Liquid; Moderate Consistent Carb (60 g CHO per Meal) Diet  DVT Prophylaxis:  DOAC  Code Status: Full  Code    Anticipated possible discharge in 2-3 days to home versus TCU once acute care related milestones are met.    Loreta Bailey MD, [facility]  Phone: #[phone]    Interval History/Subjective:  Overnight patient's oral intake has not significantly improved he did have positive urine and blood cultures with the same organism.  Additionally he is declining food this morning.  He tells me that he just wants to sleep  He wants to be left alone    Physical Exam/Objective:  Temp:  [97  F (36.1  C)-98  F (36.7  C)] 97.7  F (36.5  C)  Pulse:  [61-73] 61  Resp:  [16-18] 18  BP: (107-151)/(55-70) 131/60  SpO2:  [90 %-96 %] 95 %  Body mass index is 27.28 kg/m .    GENERAL:  Alert, but fairly drowsy appears comfortable, in no acute distress, appears stated age   HEAD:  Normocephalic, without obvious abnormality, atraumatic   EYES:  PERRL, conjunctiva/corneas clear, no scleral icterus, EOM's intact   BACK:   Symmetric, no curvature, ROM normal   LUNGS:   Clear to auscultation bilaterally, no rales, rhonchi, or wheezing, symmetric chest rise on inhalation, respirations unlabored diminished basal breath sounds secondary to poor inspiratory effort   CHEST WALL:  No tenderness or deformity   HEART:  Regular rate and rhythm, S1 and S2 normal, no murmur, rub, or gallop    ABDOMEN:   Soft, non-tender, bowel sounds active all four quadrants, no masses, no organomegaly, no rebound or guarding   EXTREMITIES: Extremities normal, atraumatic, no cyanosis 2+ edema    SKIN: Dry to touch, no exanthems in the visualized areas   NEURO: Alert, oriented x 2, moves all four extremities freely   PSYCH: Cooperative, behavior is appropriate but very tired and sleepy     Data reviewed today: I personally reviewed all new medications, labs, imaging/diagnostics reports over the past 24 hours. Pertinent findings include:    Imaging:   Recent Results (from the past 24 hour(s))   XR Chest  Port 1 View    Narrative    EXAM: XR CHEST PORT 1 VIEW  LOCATION: [facility]  DATE/TIME: 1/28/2022 4:56 PM    INDICATION: hypoxia  COMPARISON: 2/1/2018      Impression    IMPRESSION: Lung volumes are substantially lower. Bands of linear infiltrate present at each lung base favored to represent discoid atelectasis though developing pneumonia possible. Very minimal right lateral pleural thickening or pleural effusion   evident. Upper lung zones are clear. Heart size appears normal.       Labs:  Most Recent 3 CBC's:Recent Labs   Lab Test 01/29/22  0850 01/28/22  0619 01/28/22  0000   WBC 12.9* 16.9* 17.5*   HGB 10.6* 11.1* 11.0*   MCV 90 94 94   PLT 64* 68* 64*     Most Recent 3 BMP's:  Recent Labs   Lab Test 01/29/22  1136 01/29/22  0850 01/29/22  0825 01/28/22  1017 01/28/22  0619 01/28/22  0212 01/28/22  0000   NA  --  146*  --   --  144  --  142   POTASSIUM  --  4.4  --   --  4.6  --  4.6   CHLORIDE  --  117*  --   --  114*  --  112*   CO2  --  23  --   --  22  --  22   BUN  --  80*  --   --  81*  --  78*   CR  --  1.22  --   --  1.54*  --  1.65*   ANIONGAP  --  6  --   --  8  --  8   MARTHA  --  7.9*  --   --  8.5  --  8.6   * 189* 164*   < > 278*   < > 287*    < > = values in this interval not displayed.     Most Recent 2 LFT's:  Recent Labs   Lab Test 01/29/22  0850 01/28/22  0619   AST 52* 52*   ALT 80* 90*   ALKPHOS 236* 257*   BILITOTAL 6.6* 11.5*       Medications:   Personally Reviewed.  Medications     sodium chloride 100 mL/hr at 01/29/22 0100       apixaban ANTICOAGULANT  5 mg Oral BID     aspirin  81 mg Oral Daily     furosemide  40 mg Intravenous Q8H     insulin aspart   Subcutaneous Daily with breakfast     insulin aspart   Subcutaneous Daily with lunch     insulin aspart   Subcutaneous Daily with supper     insulin aspart  1-7 Units Subcutaneous TID AC     insulin aspart  1-5 Units Subcutaneous At Bedtime     insulin glargine  25 Units Subcutaneous BID     lisinopril  5  mg Oral Daily     piperacillin-tazobactam  3.375 g Intravenous Q8H     sodium chloride (PF)  3 mL Intracatheter Q8H     sodium chloride (PF)  3 mL Intracatheter Q8H     tamsulosin  0.4 mg Oral Daily with supper     vitamin E  400 Units Oral Daily

## 2022-01-29 NOTE — PROGRESS NOTES
PICC team paged for PICC line placement. Pt noted to have POSITIVE blood cultures on 1/26/22 and 1/27/22. ID is following this patient. ID DID NOT mention or order PICC line in today's note.. They did order repeat blood cultures. Discussed with primary RN, Dorie, that when ID is following the patient, we as the PICC team, wait for them to give the OK for PICC line placement. The patient is not going home today and currently has 2 PIV in place.

## 2022-01-29 NOTE — PROGRESS NOTES
[facility]  Infectious Disease   Progress Note     Date of Admission:  1/27/2022    Assessment & Plan   Cholangitis with klebsiella bacteremia, bacteriuria  Choledocholithiasis, ERCP 1/27  Liver phlegmon?  DM  US jan 27:  1.  extra hepatic bile duct dilation.  2.  Right hepatic peripheral lesion measures 3.7 cm by ultrasound. Evaluating all of the modalities, this may represent focal worsening of bile duct dilation versus phlegmon. At this time, it does not have the appearance of abscess.  3.  Following treatment of the bile duct obstruction, this lesion could be followed with either ultrasound or CT.    PLAN  Zosyn  Repeat BC  Await susceptibilities  Potentially oral abx  Discussed w staff    Pablo Hammond M.D.     __________________________________________________    Interval History   Looks weak  Few words  No pain  No rash      Physical Exam   Vital Signs: Temp: 97.7  F (36.5  C) Temp src: Axillary BP: 131/60 Pulse: 61   Resp: 18 SpO2: 95 % O2 Device: Nasal cannula Oxygen Delivery: 1 LPM  Weight: 179 lbs 6.4 oz  Gen. appearance nontoxic  Eyes no conjunctivitis or icterus  Neck no stiffness or neck vein distention, no LN  Heart  no S3 or murmurs  Lungs clear no wheeze  Abdomen soft not tender  Extremities no synovitis, trace edema  Skin  no rash or emboli  Neurologic alert gen weakness      Data   Results for orders placed or performed during the hospital encounter of 01/27/22 (from the past 24 hour(s))   Glucose by meter   Result Value Ref Range    GLUCOSE BY METER POCT 341 (H) 70 - 99 mg/dL   XR Chest Port 1 View    Narrative    EXAM: XR CHEST PORT 1 VIEW  LOCATION: [facility]  DATE/TIME: 1/28/2022 4:56 PM    INDICATION: hypoxia  COMPARISON: 2/1/2018      Impression    IMPRESSION: Lung volumes are substantially lower. Bands of linear infiltrate present at each lung base favored to represent discoid atelectasis though developing pneumonia possible. Very  minimal right lateral pleural thickening or pleural effusion   evident. Upper lung zones are clear. Heart size appears normal.   Glucose by meter   Result Value Ref Range    GLUCOSE BY METER POCT 323 (H) 70 - 99 mg/dL   Glucose by meter   Result Value Ref Range    GLUCOSE BY METER POCT 247 (H) 70 - 99 mg/dL   Glucose by meter   Result Value Ref Range    GLUCOSE BY METER POCT 164 (H) 70 - 99 mg/dL   CBC with Platelets & Differential    Narrative    The following orders were created for panel order CBC with Platelets & Differential.  Procedure                               Abnormality         Status                     ---------                               -----------         ------                     CBC with platelets and d...[[id number]]  Abnormal            Final result                 Please view results for these tests on the individual orders.   Comprehensive metabolic panel   Result Value Ref Range    Sodium 146 (H) 136 - 145 mmol/L    Potassium 4.4 3.5 - 5.0 mmol/L    Chloride 117 (H) 98 - 107 mmol/L    Carbon Dioxide (CO2) 23 22 - 31 mmol/L    Anion Gap 6 5 - 18 mmol/L    Urea Nitrogen 80 (H) 8 - 28 mg/dL    Creatinine 1.22 0.70 - 1.30 mg/dL    Calcium 7.9 (L) 8.5 - 10.5 mg/dL    Glucose 189 (H) 70 - 125 mg/dL    Alkaline Phosphatase 236 (H) 45 - 120 U/L    AST 52 (H) 0 - 40 U/L    ALT 80 (H) 0 - 45 U/L    Protein Total 4.9 (L) 6.0 - 8.0 g/dL    Albumin 1.5 (L) 3.5 - 5.0 g/dL    Bilirubin Total 6.6 (H) 0.0 - 1.0 mg/dL    GFR Estimate 58 (L) >60 mL/min/1.73m2   CBC with platelets and differential   Result Value Ref Range    WBC Count 12.9 (H) 4.0 - 11.0 10e3/uL    RBC Count 3.45 (L) 4.40 - 5.90 10e6/uL    Hemoglobin 10.6 (L) 13.3 - 17.7 g/dL    Hematocrit 31.1 (L) 40.0 - 53.0 %    MCV 90 78 - 100 fL    MCH 30.7 26.5 - 33.0 pg    MCHC 34.1 31.5 - 36.5 g/dL    RDW 15.4 (H) 10.0 - 15.0 %    Platelet Count 64 (L) 150 - 450 10e3/uL    % Neutrophils 76 %    % Lymphocytes 10 %    % Monocytes 12 %    % Eosinophils  0 %    % Basophils 0 %    % Immature Granulocytes 2 %    NRBCs per 100 WBC 0 <1 /100    Absolute Neutrophils 9.8 (H) 1.6 - 8.3 10e3/uL    Absolute Lymphocytes 1.2 0.8 - 5.3 10e3/uL    Absolute Monocytes 1.6 (H) 0.0 - 1.3 10e3/uL    Absolute Eosinophils 0.1 0.0 - 0.7 10e3/uL    Absolute Basophils 0.0 0.0 - 0.2 10e3/uL    Absolute Immature Granulocytes 0.2 <=0.4 10e3/uL    Absolute NRBCs 0.0 10e3/uL   Lipase   Result Value Ref Range    Lipase 57 (H) 0 - 52 U/L   Extra Tube ([place] Draw)    Narrative    The following orders were created for panel order Extra Tube ([place] Draw).  Procedure                               Abnormality         Status                     ---------                               -----------         ------                     Extra Red Top Tube[[id number]]                               Final result                 Please view results for these tests on the individual orders.   Extra Red Top Tube   Result Value Ref Range    Hold Specimen [facility]    Glucose by meter   Result Value Ref Range    GLUCOSE BY METER POCT 150 (H) 70 - 99 mg/dL   Ammonia   Result Value Ref Range    Ammonia 16 11 - 35 umol/L

## 2022-01-29 NOTE — PROGRESS NOTES
New orders placed by hospitalist for PICC placement. PICC team called writer(also see their note) stating they will place a PICC once infectious disease MD orders it. Writer spoke with ELSIE Ortiz and he states at this time since patient has 2 PIVs and they do not have a plan for antibiotics yet ok to NOT place the PICC yet.     Text page send to Dr. Bailey to make her aware.

## 2022-01-29 NOTE — PLAN OF CARE
Problem: Infection  Goal: Absence of Infection Signs and Symptoms  Outcome: Improving     A/O but drowsy/lethargic overnight. VSS- weaned 1L NC, telemetry- NSR. IVMF @100- intermittent IV abx. Full liquid diet- poor appetite. BG monitoring- has been in the 250-300's. Skin jaundice, trace edema in BLE. Q2 turns. Incontinent of bowel and bladder- external cath in place- urine dark huey.

## 2022-01-29 NOTE — PLAN OF CARE
Problem: Adult Inpatient Plan of Care  Goal: Plan of Care Review  Outcome: Improving   Patient lethargic and drowsy. Arouses to voice. Denies pain. Assist of 2 with turns. Q2hr turns. Has male purewick in use. PICC to be placed soon, picc team aware. Currently on full liquid diet. Having to really encourage food patient refused breakfast x3 this AM. Ate soup and 2 apple juices for lunch. Tele discontinued by MD. IVF running.

## 2022-01-29 NOTE — PROGRESS NOTES
Notified MD at 0942 AM regarding blood sugar of 164 and new lantus orders. Patient also refusing to eat.      Spoke with: Awaiting new order or call    Orders pending.    0946 Received orders to hold 1000 lantus for now.

## 2022-01-30 ENCOUNTER — APPOINTMENT (OUTPATIENT)
Dept: OCCUPATIONAL THERAPY | Facility: CLINIC | Age: 87
DRG: 871 | End: 2022-01-30
Attending: INTERNAL MEDICINE
Payer: COMMERCIAL

## 2022-01-30 LAB
ALBUMIN SERPL-MCNC: 1.6 G/DL (ref 3.5–5)
ALP SERPL-CCNC: 259 U/L (ref 45–120)
ALT SERPL W P-5'-P-CCNC: 81 U/L (ref 0–45)
ANION GAP SERPL CALCULATED.3IONS-SCNC: 7 MMOL/L (ref 5–18)
AST SERPL W P-5'-P-CCNC: 54 U/L (ref 0–40)
BACTERIA BLD CULT: ABNORMAL
BASOPHILS # BLD AUTO: 0 10E3/UL (ref 0–0.2)
BASOPHILS NFR BLD AUTO: 0 %
BILIRUB SERPL-MCNC: 5.9 MG/DL (ref 0–1)
BUN SERPL-MCNC: 71 MG/DL (ref 8–28)
CALCIUM SERPL-MCNC: 8.1 MG/DL (ref 8.5–10.5)
CHLORIDE BLD-SCNC: 112 MMOL/L (ref 98–107)
CO2 SERPL-SCNC: 27 MMOL/L (ref 22–31)
CREAT SERPL-MCNC: 1.26 MG/DL (ref 0.7–1.3)
EOSINOPHIL # BLD AUTO: 0.2 10E3/UL (ref 0–0.7)
EOSINOPHIL NFR BLD AUTO: 1 %
ERYTHROCYTE [DISTWIDTH] IN BLOOD BY AUTOMATED COUNT: 15.8 % (ref 10–15)
GFR SERPL CREATININE-BSD FRML MDRD: 56 ML/MIN/1.73M2
GLUCOSE BLD-MCNC: 128 MG/DL (ref 70–125)
GLUCOSE BLDC GLUCOMTR-MCNC: 104 MG/DL (ref 70–99)
GLUCOSE BLDC GLUCOMTR-MCNC: 108 MG/DL (ref 70–99)
GLUCOSE BLDC GLUCOMTR-MCNC: 148 MG/DL (ref 70–99)
GLUCOSE BLDC GLUCOMTR-MCNC: 175 MG/DL (ref 70–99)
GLUCOSE BLDC GLUCOMTR-MCNC: 200 MG/DL (ref 70–99)
HCT VFR BLD AUTO: 34.2 % (ref 40–53)
HGB BLD-MCNC: 11.1 G/DL (ref 13.3–17.7)
HOLD SPECIMEN: NORMAL
IMM GRANULOCYTES # BLD: 0.3 10E3/UL
IMM GRANULOCYTES NFR BLD: 2 %
LYMPHOCYTES # BLD AUTO: 1.2 10E3/UL (ref 0.8–5.3)
LYMPHOCYTES NFR BLD AUTO: 9 %
MCH RBC QN AUTO: 30.9 PG (ref 26.5–33)
MCHC RBC AUTO-ENTMCNC: 32.5 G/DL (ref 31.5–36.5)
MCV RBC AUTO: 95 FL (ref 78–100)
MONOCYTES # BLD AUTO: 1.4 10E3/UL (ref 0–1.3)
MONOCYTES NFR BLD AUTO: 12 %
NEUTROPHILS # BLD AUTO: 9.3 10E3/UL (ref 1.6–8.3)
NEUTROPHILS NFR BLD AUTO: 76 %
NRBC # BLD AUTO: 0 10E3/UL
NRBC BLD AUTO-RTO: 0 /100
PLATELET # BLD AUTO: 72 10E3/UL (ref 150–450)
POTASSIUM BLD-SCNC: 3.8 MMOL/L (ref 3.5–5)
PROT SERPL-MCNC: 5.2 G/DL (ref 6–8)
RBC # BLD AUTO: 3.59 10E6/UL (ref 4.4–5.9)
SODIUM SERPL-SCNC: 146 MMOL/L (ref 136–145)
WBC # BLD AUTO: 12.4 10E3/UL (ref 4–11)

## 2022-01-30 PROCEDURE — 99233 SBSQ HOSP IP/OBS HIGH 50: CPT | Performed by: INTERNAL MEDICINE

## 2022-01-30 PROCEDURE — 36415 COLL VENOUS BLD VENIPUNCTURE: CPT | Performed by: INTERNAL MEDICINE

## 2022-01-30 PROCEDURE — 80053 COMPREHEN METABOLIC PANEL: CPT | Performed by: INTERNAL MEDICINE

## 2022-01-30 PROCEDURE — 97535 SELF CARE MNGMENT TRAINING: CPT | Mod: GO

## 2022-01-30 PROCEDURE — 87040 BLOOD CULTURE FOR BACTERIA: CPT | Performed by: INTERNAL MEDICINE

## 2022-01-30 PROCEDURE — 250N000011 HC RX IP 250 OP 636: Performed by: INTERNAL MEDICINE

## 2022-01-30 PROCEDURE — 85025 COMPLETE CBC W/AUTO DIFF WBC: CPT | Performed by: INTERNAL MEDICINE

## 2022-01-30 PROCEDURE — 250N000013 HC RX MED GY IP 250 OP 250 PS 637: Performed by: INTERNAL MEDICINE

## 2022-01-30 PROCEDURE — 120N000001 HC R&B MED SURG/OB

## 2022-01-30 PROCEDURE — 97166 OT EVAL MOD COMPLEX 45 MIN: CPT | Mod: GO

## 2022-01-30 RX ADMIN — FUROSEMIDE 40 MG: 10 INJECTION, SOLUTION INTRAMUSCULAR; INTRAVENOUS at 03:53

## 2022-01-30 RX ADMIN — LISINOPRIL 5 MG: 5 TABLET ORAL at 08:10

## 2022-01-30 RX ADMIN — APIXABAN 5 MG: 5 TABLET, FILM COATED ORAL at 08:10

## 2022-01-30 RX ADMIN — PIPERACILLIN AND TAZOBACTAM 3.38 G: 3; .375 INJECTION, POWDER, LYOPHILIZED, FOR SOLUTION INTRAVENOUS at 12:14

## 2022-01-30 RX ADMIN — TAMSULOSIN HYDROCHLORIDE 0.4 MG: 0.4 CAPSULE ORAL at 16:27

## 2022-01-30 RX ADMIN — APIXABAN 5 MG: 5 TABLET, FILM COATED ORAL at 20:56

## 2022-01-30 RX ADMIN — Medication 400 UNITS: at 08:09

## 2022-01-30 RX ADMIN — PIPERACILLIN AND TAZOBACTAM 3.38 G: 3; .375 INJECTION, POWDER, LYOPHILIZED, FOR SOLUTION INTRAVENOUS at 06:07

## 2022-01-30 RX ADMIN — ASPIRIN 81 MG: 81 TABLET, COATED ORAL at 08:10

## 2022-01-30 RX ADMIN — PIPERACILLIN AND TAZOBACTAM 3.38 G: 3; .375 INJECTION, POWDER, LYOPHILIZED, FOR SOLUTION INTRAVENOUS at 21:00

## 2022-01-30 RX ADMIN — INSULIN ASPART 1 UNITS: 100 INJECTION, SOLUTION INTRAVENOUS; SUBCUTANEOUS at 12:14

## 2022-01-30 ASSESSMENT — ACTIVITIES OF DAILY LIVING (ADL)
ADLS_ACUITY_SCORE: 26

## 2022-01-30 NOTE — PROVIDER NOTIFICATION
Notified MD at 1050 AM regarding lab results.      Spoke with: Call to Infectious Disease MD regarding positive blood cultures from lab.     Orders await call back.      1104 Dr. Hammond called back, updated him. He will review chart.

## 2022-01-30 NOTE — PLAN OF CARE
Problem: Risk for Delirium  Goal: Improved Attention and Thought Clarity  Outcome: No Change     Problem: Adult Inpatient Plan of Care  Goal: Optimal Comfort and Wellbeing  Outcome: Improving     Problem: Infection  Goal: Absence of Infection Signs and Symptoms  Outcome: Improving    Pt disoriented to time and situation. Very weak and lethargic. Bedrest; repositioned q 2 hrs. Incontinent of B&B. External male catheter placed @ HS. VSS. Denies SOB, chest pain and N/V. Tolerating full liquid diet with carb count; poor appetite. Pt titrated down to RA from 1 L/min NC. Sat @ 95%.

## 2022-01-30 NOTE — PROGRESS NOTES
Brief GI chart note:    - Discussed with hospital team, ok to advance diet to diabetic diet.   - LFT's are trending down following ERCP on 1/27, although still elevated. I suspect this should gradually improve but could consider repeat US imaging later this week if LFT's rise or don't improve. US 1/27 was concerned for possible Phelgmon - if positive would treat with 21 days of ABX per ID. If not, then continue ABX through 2/10/22.     Vitor Horta PA-C  [facility]  [phone]

## 2022-01-30 NOTE — PLAN OF CARE
Problem: Adult Inpatient Plan of Care  Goal: Plan of Care Review  Outcome: Improving   Patient more alert today than yesterday. Changes to insulin medications due to blood sugar 108 this AM. See insulin orders. Ate good breakfast and small amount of lunch. Patient denies pain. IV fluids discontinued. PT/OT orders started. Got patient up to chair today with heavy assist of 2. Does not bear weight well. Positive blood cultures from yesterday see provider notification note and lab results. Diet changed to diabetic with carb count. Able to use the call light today and express needs. Wife visited.

## 2022-01-30 NOTE — PROGRESS NOTES
01/30/22 1045   Quick Adds   Type of Visit Initial Occupational Therapy Evaluation   Living Environment   People in home child(claire), adult;spouse   Current Living Arrangements house   Living Environment Comments Pt appears to be a poor historian at this time, pt reports son lives with him   Self-Care   Usual Activity Tolerance moderate   Current Activity Tolerance fair   Equipment Currently Used at Home raised toilet seat  (supports near toilet)   Instrumental Activities of Daily Living (IADL)   IADL Comments Pt reports I w/driving, basica ADLs. Son sets up medications and another son manages wife's meds    General Information   Onset of Illness/Injury or Date of Surgery 01/30/22   Referring Physician Jami    Patient/Family Therapy Goal Statement (OT) None stated   Cognitive Status Examination   Affect/Mental Status (Cognitive) confused   Follows Commands follows one-step commands  (inconsistently, off task at  times)   Cognitive Status Comments Increased processing time, decreased safety awarness and judgement   Pain Assessment   Patient Currently in Pain No   Strength Comprehensive (MMT)   Comment, General Manual Muscle Testing (MMT) Assessment Generalized weakness   Transfers   Transfer Comments Completed 2 sit to stands w/mod to max A. Pt wanting to sit down shortly after standing.   Activities of Daily Living   BADL Assessment lower body dressing   Lower Body Dressing Assessment   Comment (Lower Body Dressing) Pt attempted to doff  sock, unable to  fully complete.     Clinical Impression   Criteria for Skilled Therapeutic Interventions Met (OT) yes   OT Diagnosis decreased ADL independence   OT Problem List-Impairments impacting ADL cognition;strength   Assessment of Occupational Performance 1-3 Performance Deficits   Identified Performance Deficits Toileting, fx mobility, dressing   Planned Therapy Interventions (OT) ADL retraining   Clinical Decision Making Complexity (OT) moderate complexity   Therapy  Frequency (OT) Daily   Predicted Duration of Therapy 4   Risk & Benefits of therapy have been explained evaluation/treatment results reviewed   OT Discharge Planning    OT Discharge Recommendation (DC Rec) Transitional Care Facility   OT Rationale for DC Rec Pt is requiring assist of 2 for transfers, and needs assistance w/all ADLs. 24hour supervision for safety   Total Evaluation Time (Minutes)   Total Evaluation Time (Minutes) 10

## 2022-01-30 NOTE — PLAN OF CARE
Denies pain. Continues on IVF 75 ml/hr. No signs of hypo or hyperglycemia. Turned every 2 hours. Incontinent of B/B. Kept clean and dry. Alarms on for safety.

## 2022-01-31 ENCOUNTER — APPOINTMENT (OUTPATIENT)
Dept: PHYSICAL THERAPY | Facility: CLINIC | Age: 87
DRG: 871 | End: 2022-01-31
Attending: INTERNAL MEDICINE
Payer: COMMERCIAL

## 2022-01-31 LAB
ALBUMIN SERPL-MCNC: 1.6 G/DL (ref 3.5–5)
ALP SERPL-CCNC: 254 U/L (ref 45–120)
ALT SERPL W P-5'-P-CCNC: 86 U/L (ref 0–45)
ANION GAP SERPL CALCULATED.3IONS-SCNC: 6 MMOL/L (ref 5–18)
AST SERPL W P-5'-P-CCNC: 69 U/L (ref 0–40)
BILIRUB SERPL-MCNC: 4.8 MG/DL (ref 0–1)
BUN SERPL-MCNC: 57 MG/DL (ref 8–28)
CALCIUM SERPL-MCNC: 8 MG/DL (ref 8.5–10.5)
CHLORIDE BLD-SCNC: 110 MMOL/L (ref 98–107)
CO2 SERPL-SCNC: 26 MMOL/L (ref 22–31)
CREAT SERPL-MCNC: 1.13 MG/DL (ref 0.7–1.3)
GFR SERPL CREATININE-BSD FRML MDRD: 64 ML/MIN/1.73M2
GLUCOSE BLD-MCNC: 181 MG/DL (ref 70–125)
GLUCOSE BLDC GLUCOMTR-MCNC: 132 MG/DL (ref 70–99)
GLUCOSE BLDC GLUCOMTR-MCNC: 205 MG/DL (ref 70–99)
GLUCOSE BLDC GLUCOMTR-MCNC: 246 MG/DL (ref 70–99)
GLUCOSE BLDC GLUCOMTR-MCNC: 289 MG/DL (ref 70–99)
POTASSIUM BLD-SCNC: 3.8 MMOL/L (ref 3.5–5)
PROT SERPL-MCNC: 5 G/DL (ref 6–8)
SODIUM SERPL-SCNC: 142 MMOL/L (ref 136–145)

## 2022-01-31 PROCEDURE — 99233 SBSQ HOSP IP/OBS HIGH 50: CPT | Performed by: INTERNAL MEDICINE

## 2022-01-31 PROCEDURE — 250N000011 HC RX IP 250 OP 636: Performed by: INTERNAL MEDICINE

## 2022-01-31 PROCEDURE — 250N000013 HC RX MED GY IP 250 OP 250 PS 637: Performed by: INTERNAL MEDICINE

## 2022-01-31 PROCEDURE — 36415 COLL VENOUS BLD VENIPUNCTURE: CPT | Performed by: INTERNAL MEDICINE

## 2022-01-31 PROCEDURE — 97116 GAIT TRAINING THERAPY: CPT | Mod: GP

## 2022-01-31 PROCEDURE — 97162 PT EVAL MOD COMPLEX 30 MIN: CPT | Mod: GP

## 2022-01-31 PROCEDURE — 80053 COMPREHEN METABOLIC PANEL: CPT | Performed by: INTERNAL MEDICINE

## 2022-01-31 PROCEDURE — 120N000001 HC R&B MED SURG/OB

## 2022-01-31 RX ORDER — FUROSEMIDE 10 MG/ML
20 INJECTION INTRAMUSCULAR; INTRAVENOUS 2 TIMES DAILY
Status: COMPLETED | OUTPATIENT
Start: 2022-01-31 | End: 2022-01-31

## 2022-01-31 RX ADMIN — PIPERACILLIN AND TAZOBACTAM 3.38 G: 3; .375 INJECTION, POWDER, LYOPHILIZED, FOR SOLUTION INTRAVENOUS at 20:43

## 2022-01-31 RX ADMIN — Medication 400 UNITS: at 09:49

## 2022-01-31 RX ADMIN — APIXABAN 5 MG: 5 TABLET, FILM COATED ORAL at 20:43

## 2022-01-31 RX ADMIN — INSULIN ASPART 3 UNITS: 100 INJECTION, SOLUTION INTRAVENOUS; SUBCUTANEOUS at 18:16

## 2022-01-31 RX ADMIN — INSULIN ASPART 2 UNITS: 100 INJECTION, SOLUTION INTRAVENOUS; SUBCUTANEOUS at 13:31

## 2022-01-31 RX ADMIN — FUROSEMIDE 20 MG: 10 INJECTION, SOLUTION INTRAMUSCULAR; INTRAVENOUS at 20:43

## 2022-01-31 RX ADMIN — TAMSULOSIN HYDROCHLORIDE 0.4 MG: 0.4 CAPSULE ORAL at 18:20

## 2022-01-31 RX ADMIN — PIPERACILLIN AND TAZOBACTAM 3.38 G: 3; .375 INJECTION, POWDER, LYOPHILIZED, FOR SOLUTION INTRAVENOUS at 04:27

## 2022-01-31 RX ADMIN — FUROSEMIDE 20 MG: 10 INJECTION, SOLUTION INTRAMUSCULAR; INTRAVENOUS at 09:52

## 2022-01-31 RX ADMIN — PIPERACILLIN AND TAZOBACTAM 3.38 G: 3; .375 INJECTION, POWDER, LYOPHILIZED, FOR SOLUTION INTRAVENOUS at 13:31

## 2022-01-31 RX ADMIN — ASPIRIN 81 MG: 81 TABLET, COATED ORAL at 09:51

## 2022-01-31 RX ADMIN — LISINOPRIL 5 MG: 5 TABLET ORAL at 09:48

## 2022-01-31 RX ADMIN — APIXABAN 5 MG: 5 TABLET, FILM COATED ORAL at 09:51

## 2022-01-31 ASSESSMENT — ACTIVITIES OF DAILY LIVING (ADL)
ADLS_ACUITY_SCORE: 25
ADLS_ACUITY_SCORE: 26
ADLS_ACUITY_SCORE: 25
ADLS_ACUITY_SCORE: 25
ADLS_ACUITY_SCORE: 23
ADLS_ACUITY_SCORE: 25
ADLS_ACUITY_SCORE: 23
ADLS_ACUITY_SCORE: 25
ADLS_ACUITY_SCORE: 23
ADLS_ACUITY_SCORE: 26
ADLS_ACUITY_SCORE: 25
ADLS_ACUITY_SCORE: 25
ADLS_ACUITY_SCORE: 26
ADLS_ACUITY_SCORE: 26
ADLS_ACUITY_SCORE: 25

## 2022-01-31 NOTE — PLAN OF CARE
Problem: Adult Inpatient Plan of Care  Goal: Optimal Comfort and Wellbeing  Outcome: Improving     Problem: Adult Inpatient Plan of Care  Goal: Absence of Hospital-Acquired Illness or Injury  Intervention: Prevent Skin Injury  Recent Flowsheet Documentation  Taken 1/30/2022 1738 by Enrrique Almaguer RN  Body Position: weight shifting  Taken 1/30/2022 1700 by Enrrique Almaguer RN  Body Position:    left    turned    weight shifting    Pt alert and oriented x 4. VSS. Denies SOB, chest pain and N/V. Bedrest; repositioned q 2 hrs. Incontinent of B&B. Tolerating regular diet; poor appetite. Repeated blood cultures came back gram neg bacilli; MD aware, already treating pt with antibiotics. Will continue plan of care.

## 2022-01-31 NOTE — CONSULTS
Care Management Initial Consult    General Information  Assessment completed with: Patient,    Type of CM/SW Visit: Offer D/C Planning    Primary Care Provider verified and updated as needed: Yes   Readmission within the last 30 days:        Reason for Consult: discharge planning  Advance Care Planning: Advance Care Planning Reviewed:  (none on file- declined)          Communication Assessment  Patient's communication style:               Cognitive  Cognitive/Neuro/Behavioral: .WDL except  Level of Consciousness: alert  Arousal Level: opens eyes spontaneously  Orientation: disoriented to,time  Mood/Behavior: cooperative  Best Language: 0 - No aphasia  Speech: slow    Living Environment:   People in home: spouse  [place]  Current living Arrangements: house      Able to return to prior arrangements: other (see comments) (has not practiced stairclimbing yet)       Family/Social Support:  Care provided by: self  Provides care for:    Marital Status:   Wife,Children          Description of Support System:           Current Resources:   Patient receiving home care services: No     Community Resources: None  Equipment currently used at home: walker, rolling  Supplies currently used at home: None    Employment/Financial:  Employment Status: retired        Financial Concerns: No concerns identified           Lifestyle & Psychosocial Needs:  Social Determinants of Health     Tobacco Use: Medium Risk     Smoking Tobacco Use: Former Smoker     Smokeless Tobacco Use: Never Used   Alcohol Use: Not on file   Financial Resource Strain: Not on file   Food Insecurity: Not on file   Transportation Needs: Not on file   Physical Activity: Not on file   Stress: Not on file   Social Connections: Not on file   Intimate Partner Violence: Not on file   Depression: Not at risk     PHQ-2 Score: 0   Housing Stability: Not on file       Functional Status:  Prior to admission patient needed assistance:              Mental Health Status:    "       Chemical Dependency Status:                Values/Beliefs:  Spiritual, Cultural Beliefs, Alevism Practices, Values that affect care:                 Additional Information:  Admitted 1/27/22 with bacteremia and UTI. Currently on IV antibiotics. Consult placed for discharge planning. Therapy recommending TCU. Discussed discharge planning with patient at length. He has stairs at home and has not yet attempted stair climbing with therapy. He is adamantly declining TCU due to previous experience during COVID limitations. He had had foot surgery so unclear if he had weight bearing restrictions. He has had home care in the past and favors returning home with his wife and having home care again. No recollection of name of previous home care agency but would like to have them again. Per chart review, patient had Home Health Care back in February of 2021.     Awaiting final antibiotic plan. Per ID progress note 1/31/2022, \"Continue Zosyn while in the hospital  If clinical improvement and improved imaging, potential PO abx for discharge.\"    Called and spoke with Sarah in intake for [facility]- will review. Per her review, they have not been opened to the patient before.     Reviewed chart further and discovered in encounter dated 3/5/2021 patient had [facility] home care requesting extension of home PT.      Called and spoke with Loly in intake @ [facility] home care to notify of new referral for RN, PT OT and HHA- would be able to accept if no chronic wound care. (referral sent)    CM following.           " no...

## 2022-01-31 NOTE — PLAN OF CARE
Problem: Risk for Delirium  Goal: Improved Sleep  Outcome: Improving     Problem: Infection  Goal: Absence of Infection Signs and Symptoms  Outcome: Improving     Intermittently disorientated to situation/time. VSS. Denies pain. Skin Jaundice. BLE trace edema, R foot +2. Incontinent of bowel and bladder, 3 large BMs overnight, external cath in place- dark huey urine.Diabetic/ CHO diet- bg monitoring. Q2 turns in bed, heavy A2 OOB.     Plan- Monitor labs, IV abx, TCU recommended at discharge

## 2022-01-31 NOTE — PROGRESS NOTES
[facility]  Infectious Disease   Progress Note     Date of Admission:  1/27/2022    Assessment & Plan   Cholangitis with klebsiella bacteremia, bacteriuria. Active issue.   Choledocholithiasis, ERCP 1/27  Liver phlegmon?  DM  US jan 27:  1.  extra hepatic bile duct dilation.  2.  Right hepatic peripheral lesion measures 3.7 cm by ultrasound. Evaluating all of the modalities, this may represent focal worsening of bile duct dilation versus phlegmon. At this time, it does not have the appearance of abscess.  3.  Following treatment of the bile duct obstruction, this lesion could be followed with either ultrasound or CT.    PLAN  repeat BC positive, this suggests liver abscesses  Continue Zosyn while in the hospital  If clinical improvement and improved imaging, potential PO abx for discharge  ?repeat CT later this week to see if any liver abscesses amenable to drainage  Repeat BC are pending  Discussed w staff    Valeria Crawley M.D.     __________________________________________________    Interval History   First visit by me. Frail looking, states feels ok, tolerating antiboitics.       Physical Exam   Vital Signs: Temp: 97.9  F (36.6  C) Temp src: Oral BP: (!) 142/66 (RN notified) Pulse: 59   Resp: 18 SpO2: 94 % O2 Device: None (Room air)    Weight: 179 lbs 6.4 oz  Gen. appearance nontoxic  Eyes no conjunctivitis or icterus  Neck no stiffness or neck vein distention, no LN  Heart  no S3 or murmurs  Lungs clear no wheeze  Abdomen soft not tender  Extremities no synovitis, trace edema  Skin  no rash or emboli  Neurologic alert gen weakness      Data   Results for orders placed or performed during the hospital encounter of 01/27/22 (from the past 24 hour(s))   Glucose by meter   Result Value Ref Range    GLUCOSE BY METER POCT 175 (H) 70 - 99 mg/dL   Blood Culture Peripheral Blood    Specimen: Peripheral Blood   Result Value Ref Range    Culture No growth after 12 hours    Blood Culture  Peripheral Blood    Specimen: Peripheral Blood   Result Value Ref Range    Culture No growth after 12 hours    Blood Culture Peripheral Blood    Specimen: Peripheral Blood   Result Value Ref Range    Culture No growth after 12 hours     Narrative    Only an Aerobic Blood Culture Bottle was collected, interpret results with caution.     Blood Culture Peripheral Blood    Specimen: Peripheral Blood   Result Value Ref Range    Culture No growth after 12 hours     Narrative    Only an Aerobic Blood Culture Bottle was collected, interpret results with caution.     Glucose by meter   Result Value Ref Range    GLUCOSE BY METER POCT 104 (H) 70 - 99 mg/dL   Glucose by meter   Result Value Ref Range    GLUCOSE BY METER POCT 200 (H) 70 - 99 mg/dL   Comprehensive metabolic panel   Result Value Ref Range    Sodium 142 136 - 145 mmol/L    Potassium 3.8 3.5 - 5.0 mmol/L    Chloride 110 (H) 98 - 107 mmol/L    Carbon Dioxide (CO2) 26 22 - 31 mmol/L    Anion Gap 6 5 - 18 mmol/L    Urea Nitrogen 57 (H) 8 - 28 mg/dL    Creatinine 1.13 0.70 - 1.30 mg/dL    Calcium 8.0 (L) 8.5 - 10.5 mg/dL    Glucose 181 (H) 70 - 125 mg/dL    Alkaline Phosphatase 254 (H) 45 - 120 U/L    AST 69 (H) 0 - 40 U/L    ALT 86 (H) 0 - 45 U/L    Protein Total 5.0 (L) 6.0 - 8.0 g/dL    Albumin 1.6 (L) 3.5 - 5.0 g/dL    Bilirubin Total 4.8 (H) 0.0 - 1.0 mg/dL    GFR Estimate 64 >60 mL/min/1.73m2   Glucose by meter   Result Value Ref Range    GLUCOSE BY METER POCT 132 (H) 70 - 99 mg/dL

## 2022-01-31 NOTE — PROGRESS NOTES
[facility] Medicine PROGRESS NOTE      Identification/Summary:   Caleb Hernandez is a 86 year old male with  past medical history of recurrent CBD stones, malnutrition, cholecystectomy, type 2 diabetes mellitus on insulin, CKD stage II, PE chronic anticoagulation with Eliquis, hypertension, dyslipidemia who was admitted on 1/27/2022 for abdominal pain and jaundice. Hospital course is notable for finding of choledocholithiasis, bacteremia with Klebsiella oxytoca.   Patient underwent ERCP with sludge and stone removal 1/27 at [facility]. Patient returned to this facility and has been on IV antibiotics since his admission.  Ultrasound right upper quadrant showed ?  Liver phlegmon.  Blood cultures from 1/29 again positive for gram-negative bacilli, final ID pending ID following.     Assessment and Plan:  Acute ascending cholangitis secondary to choledocholithiasis status post ERCP 1/27/2022  Klebsiella bacteremia:   Probable Liver Abscess  Likely related to choledocholithiasis and ascending cholangitis  Noticed recent blood cultures from 1/29 positive for gram-negative bacilli.  Blood cultures from 1/30/2022 still pending, negative at 12 hours  On Zosyn.  GI, ID following.    UTI secondary to Klebsiella oxytoca  Zosyn as above    Generalized edema  Weight is up by 4 kg's/10 pounds since admission  Lasix 20 mg IV twice daily x2 dosages and monitor    Malnutrition significant hypoalbuminemia  Albumin 1.6, in the setting of acute infection  Patient has chronic hypoalbuminemia, may be related to nutritional intake    Anemia, mild in the setting of acute infection  Intermittent monitoring    Elevated LFTs in the setting of choledocholithiasis, continue to trend there is improvement in AST and ALT however bilirubin and alk phos continue to be elevated  No abdominal pain or tenderness  Advance to diabetic diet.    Type 2 diabetes mellitus:   BS Ok-  Lantus 35 units nightly (home dose 25 units twice  "daily)  Sliding scale insulin, adjust as needed    History of PE, A.fib   Chronically on Eliquis.    Hypertension  Dyslipidemia    Diet: High Consistent Carb (75 g CHO per Meal) Diet  DVT Prophylaxis: On Eliquis.  Code Status: Full Code    Disposition: Patient will likely need IV antibiotics, once bacteremia clears then PICC line can be placed, will need TCU    Interval History/Subjective:  Reports feeling okay, appetite is fine, no nausea or vomiting, tolerating diet, no abdominal pain, no dysuria  No fever or chills    Physical Exam/Objective:  Vitals I/O   Vital signs:  Temp: 97.9  F (36.6  C) Temp src: Oral BP: (!) 142/66 (RN notified) Pulse: 59   Resp: 18 SpO2: 94 % O2 Device: None (Room air) Oxygen Delivery: 1 LPM   Weight: 81.4 kg (179 lb 6.4 oz)  Estimated body mass index is 27.28 kg/m  as calculated from the following:    Height as of an earlier encounter on 1/27/22: 1.727 m (5' 8\").    Weight as of this encounter: 81.4 kg (179 lb 6.4 oz).       I/O last 3 completed shifts:  In: 1140 [P.O.:1140]  Out: 650 [Urine:650]     Body mass index is 27.28 kg/m .  General: Awake alert and comfortable  Lungs: CTA without rales or rhonchi  Heart: S1, S2 without murmurs  Abdomen: Soft nontender, bowel sounds positive  CNS: Nonfocal  Extremities: mild edema, UE( hands), LE     Medications:   Personally Reviewed.    apixaban ANTICOAGULANT  5 mg Oral BID     aspirin  81 mg Oral Daily     insulin aspart   Subcutaneous Daily with breakfast     insulin aspart   Subcutaneous Daily with lunch     insulin aspart  1-7 Units Subcutaneous TID AC     insulin aspart  1-5 Units Subcutaneous At Bedtime     insulin glargine  35 Units Subcutaneous At Bedtime     lisinopril  5 mg Oral Daily     piperacillin-tazobactam  3.375 g Intravenous Q8H     sodium chloride (PF)  3 mL Intracatheter Q8H     sodium chloride (PF)  3 mL Intracatheter Q8H     tamsulosin  0.4 mg Oral Daily with supper     vitamin E  400 Units Oral Daily       Data reviewed " today: I personally reviewed all new medications, labs, imaging/diagnostics reports over the past 24 hours. Pertinent findings include    Labs:  Most Recent 3 CBC's:  Recent Labs   Lab Test 01/30/22  0538 01/29/22  1602 01/29/22  0850   WBC 12.4* 13.4* 12.9*   HGB 11.1* 10.6* 10.6*   MCV 95 96 90   PLT 72* 66* 64*     Most Recent 3 BMP's:  Recent Labs   Lab Test 01/31/22  0510 01/30/22 2010 01/30/22  1736 01/30/22  0733 01/30/22  0538 01/29/22  1136 01/29/22  0850     --   --   --  146*  --  146*   POTASSIUM 3.8  --   --   --  3.8  --  4.4   CHLORIDE 110*  --   --   --  112*  --  117*   CO2 26  --   --   --  27  --  23   BUN 57*  --   --   --  71*  --  80*   CR 1.13  --   --   --  1.26  --  1.22   ANIONGAP 6  --   --   --  7  --  6   MARTHA 8.0*  --   --   --  8.1*  --  7.9*   * 200* 104*   < > 128*   < > 189*    < > = values in this interval not displayed.     Most Recent 2 LFT's:  Recent Labs   Lab Test 01/31/22  0510 01/30/22  0538   AST 69* 54*   ALT 86* 81*   ALKPHOS 254* 259*   BILITOTAL 4.8* 5.9*     Most Recent 3 BNP's:No lab results found.  Most Recent 6 Bacteria Isolates From Any Culture (See EPIC Reports for Culture Details): Blood cultures from 1/26, 1/27, 1/29+ for Klebsiella oxytoca.  Urine culture growing Klebsiella oxytoca.      Most Recent Hemoglobin A1c:  Recent Labs   Lab Test 08/25/21  1523   A1C 9.1*     Most Recent 6 glucoses:  Recent Labs   Lab Test 01/31/22  0510 01/30/22 2010 01/30/22  1736 01/30/22  1212 01/30/22  0733 01/30/22  0538   * 200* 104* 175* 108* 128*       Imaging:   No results found for this or any previous visit (from the past 24 hour(s)).    Ne Adame MD  Internal Medicine/ Hospitalist  [facility]  Office # [phone]

## 2022-02-01 ENCOUNTER — APPOINTMENT (OUTPATIENT)
Dept: PHYSICAL THERAPY | Facility: CLINIC | Age: 87
DRG: 871 | End: 2022-02-01
Attending: INTERNAL MEDICINE
Payer: COMMERCIAL

## 2022-02-01 ENCOUNTER — APPOINTMENT (OUTPATIENT)
Dept: CT IMAGING | Facility: CLINIC | Age: 87
DRG: 871 | End: 2022-02-01
Attending: INTERNAL MEDICINE
Payer: COMMERCIAL

## 2022-02-01 LAB
ALBUMIN SERPL-MCNC: 1.7 G/DL (ref 3.5–5)
ALP SERPL-CCNC: 241 U/L (ref 45–120)
ALT SERPL W P-5'-P-CCNC: 87 U/L (ref 0–45)
ANION GAP SERPL CALCULATED.3IONS-SCNC: 9 MMOL/L (ref 5–18)
AST SERPL W P-5'-P-CCNC: 50 U/L (ref 0–40)
BASOPHILS # BLD MANUAL: 0 10E3/UL (ref 0–0.2)
BASOPHILS NFR BLD MANUAL: 0 %
BILIRUB SERPL-MCNC: 4.1 MG/DL (ref 0–1)
BUN SERPL-MCNC: 44 MG/DL (ref 8–28)
CALCIUM SERPL-MCNC: 7.9 MG/DL (ref 8.5–10.5)
CHLORIDE BLD-SCNC: 105 MMOL/L (ref 98–107)
CO2 SERPL-SCNC: 26 MMOL/L (ref 22–31)
CREAT SERPL-MCNC: 0.97 MG/DL (ref 0.7–1.3)
EOSINOPHIL # BLD MANUAL: 0.3 10E3/UL (ref 0–0.7)
EOSINOPHIL NFR BLD MANUAL: 2 %
ERYTHROCYTE [DISTWIDTH] IN BLOOD BY AUTOMATED COUNT: 14.9 % (ref 10–15)
GFR SERPL CREATININE-BSD FRML MDRD: 77 ML/MIN/1.73M2
GLUCOSE BLD-MCNC: 153 MG/DL (ref 70–125)
GLUCOSE BLDC GLUCOMTR-MCNC: 105 MG/DL (ref 70–99)
GLUCOSE BLDC GLUCOMTR-MCNC: 146 MG/DL (ref 70–99)
GLUCOSE BLDC GLUCOMTR-MCNC: 159 MG/DL (ref 70–99)
GLUCOSE BLDC GLUCOMTR-MCNC: 200 MG/DL (ref 70–99)
GLUCOSE BLDC GLUCOMTR-MCNC: 202 MG/DL (ref 70–99)
HCT VFR BLD AUTO: 32.4 % (ref 40–53)
HGB BLD-MCNC: 10.2 G/DL (ref 13.3–17.7)
LYMPHOCYTES # BLD MANUAL: 1.3 10E3/UL (ref 0.8–5.3)
LYMPHOCYTES NFR BLD MANUAL: 9 %
MCH RBC QN AUTO: 31 PG (ref 26.5–33)
MCHC RBC AUTO-ENTMCNC: 31.5 G/DL (ref 31.5–36.5)
MCV RBC AUTO: 99 FL (ref 78–100)
METAMYELOCYTES # BLD MANUAL: 0.3 10E3/UL
METAMYELOCYTES NFR BLD MANUAL: 2 %
MONOCYTES # BLD MANUAL: 1.6 10E3/UL (ref 0–1.3)
MONOCYTES NFR BLD MANUAL: 11 %
NEUTROPHILS # BLD MANUAL: 11.2 10E3/UL (ref 1.6–8.3)
NEUTROPHILS NFR BLD MANUAL: 76 %
PLAT MORPH BLD: ABNORMAL
PLATELET # BLD AUTO: 113 10E3/UL (ref 150–450)
POTASSIUM BLD-SCNC: 3.8 MMOL/L (ref 3.5–5)
PROT SERPL-MCNC: 4.9 G/DL (ref 6–8)
RBC # BLD AUTO: 3.29 10E6/UL (ref 4.4–5.9)
RBC MORPH BLD: ABNORMAL
SODIUM SERPL-SCNC: 140 MMOL/L (ref 136–145)
WBC # BLD AUTO: 14.8 10E3/UL (ref 4–11)

## 2022-02-01 PROCEDURE — 85027 COMPLETE CBC AUTOMATED: CPT | Performed by: INTERNAL MEDICINE

## 2022-02-01 PROCEDURE — 74177 CT ABD & PELVIS W/CONTRAST: CPT

## 2022-02-01 PROCEDURE — 80053 COMPREHEN METABOLIC PANEL: CPT | Performed by: INTERNAL MEDICINE

## 2022-02-01 PROCEDURE — 250N000013 HC RX MED GY IP 250 OP 250 PS 637: Performed by: INTERNAL MEDICINE

## 2022-02-01 PROCEDURE — 97116 GAIT TRAINING THERAPY: CPT | Mod: GP

## 2022-02-01 PROCEDURE — 99232 SBSQ HOSP IP/OBS MODERATE 35: CPT | Performed by: INTERNAL MEDICINE

## 2022-02-01 PROCEDURE — 250N000011 HC RX IP 250 OP 636: Performed by: INTERNAL MEDICINE

## 2022-02-01 PROCEDURE — 99233 SBSQ HOSP IP/OBS HIGH 50: CPT | Performed by: INTERNAL MEDICINE

## 2022-02-01 PROCEDURE — 36415 COLL VENOUS BLD VENIPUNCTURE: CPT | Performed by: INTERNAL MEDICINE

## 2022-02-01 PROCEDURE — 120N000001 HC R&B MED SURG/OB

## 2022-02-01 RX ORDER — IOPAMIDOL 755 MG/ML
100 INJECTION, SOLUTION INTRAVASCULAR ONCE
Status: COMPLETED | OUTPATIENT
Start: 2022-02-01 | End: 2022-02-01

## 2022-02-01 RX ADMIN — INSULIN ASPART 1 UNITS: 100 INJECTION, SOLUTION INTRAVENOUS; SUBCUTANEOUS at 17:16

## 2022-02-01 RX ADMIN — TAMSULOSIN HYDROCHLORIDE 0.4 MG: 0.4 CAPSULE ORAL at 16:18

## 2022-02-01 RX ADMIN — Medication 400 UNITS: at 09:08

## 2022-02-01 RX ADMIN — INSULIN ASPART 2 UNITS: 100 INJECTION, SOLUTION INTRAVENOUS; SUBCUTANEOUS at 13:02

## 2022-02-01 RX ADMIN — ASPIRIN 81 MG: 81 TABLET, COATED ORAL at 09:08

## 2022-02-01 RX ADMIN — APIXABAN 5 MG: 5 TABLET, FILM COATED ORAL at 09:08

## 2022-02-01 RX ADMIN — PIPERACILLIN AND TAZOBACTAM 3.38 G: 3; .375 INJECTION, POWDER, LYOPHILIZED, FOR SOLUTION INTRAVENOUS at 05:01

## 2022-02-01 RX ADMIN — LISINOPRIL 5 MG: 5 TABLET ORAL at 09:08

## 2022-02-01 RX ADMIN — IOPAMIDOL 100 ML: 755 INJECTION, SOLUTION INTRAVENOUS at 10:48

## 2022-02-01 RX ADMIN — PIPERACILLIN AND TAZOBACTAM 3.38 G: 3; .375 INJECTION, POWDER, LYOPHILIZED, FOR SOLUTION INTRAVENOUS at 22:24

## 2022-02-01 RX ADMIN — APIXABAN 5 MG: 5 TABLET, FILM COATED ORAL at 22:24

## 2022-02-01 RX ADMIN — PIPERACILLIN AND TAZOBACTAM 3.38 G: 3; .375 INJECTION, POWDER, LYOPHILIZED, FOR SOLUTION INTRAVENOUS at 14:09

## 2022-02-01 ASSESSMENT — ACTIVITIES OF DAILY LIVING (ADL)
ADLS_ACUITY_SCORE: 26

## 2022-02-01 NOTE — PROGRESS NOTES
[facility] Medicine PROGRESS NOTE      Identification/Summary:   Caleb Hernandez is a 86 year old male with  past medical history of recurrent CBD stones, malnutrition, cholecystectomy, type 2 diabetes mellitus on insulin, CKD stage II, PE chronic anticoagulation with Eliquis, hypertension, dyslipidemia who was admitted on 1/27/2022 for abdominal pain and jaundice. Hospital course is notable for finding of choledocholithiasis, bacteremia with Klebsiella oxytoca.   Patient underwent ERCP with sludge and stone removal 1/27 at [facility]. Patient returned to this facility and has been on IV antibiotics since his admission.  Ultrasound right upper quadrant showed ?  Liver phlegmon.  Blood cultures from 1/29 again positive for Klebsiella oxytoca, CT scan abdomen 2/1 for evaluation of phlegmon versus abscess     Assessment and Plan:  Acute ascending cholangitis secondary to choledocholithiasis status post ERCP 1/27/2022  Klebsiella bacteremia/sepsis:   Probable Liver Abscess  Likely related to choledocholithiasis and ascending cholangitis  Noticed recent blood cultures from 1/29 Klebsiella  Blood cultures from 1/30/2022 still pending, negative at 24 hours  CT abdomen to evaluate for possible liver abscess, if evidence of abscess then likely will need drainage  On Zosyn.  GI, ID following.    UTI secondary to Klebsiella oxytoca  Zosyn as above    Generalized edema  Weight is up by 4 kg's/10 pounds since admission  Lasix 20 mg IV twice daily x2 dosages and monitor    Malnutrition significant hypoalbuminemia  Albumin 1.6, in the setting of acute infection  Patient has chronic hypoalbuminemia, may be related to nutritional intake    Anemia, mild in the setting of acute infection  Intermittent monitoring    Elevated LFTs in the setting of choledocholithiasis, continue to trend there is improvement in AST and ALT however bilirubin and alk phos continue to be elevated  No abdominal pain or tenderness  Advance to  "diabetic diet.    Type 2 diabetes mellitus:   Blood sugars elevated  Lantus 35 units nightly==> 40 units at bedtime  (home dose 44 units at bedtime)   Sliding scale insulin, adjust as needed    History of PE, A.fib   Chronically on Eliquis.    Peripheral edema  S/p IV diuretics,  Continue to evaluate and dose diuretics intermittently    Hypertension  Dyslipidemia  Thrombocytopenia: Secondary to gram-negative infection, improving  Severe protein/calorie malnutrition: Severe hypoalbuminemia, partly chronic, partly related infection, RD consult  Diet: High Consistent Carb (75 g CHO per Meal) Diet  DVT Prophylaxis: On Eliquis.  Code Status: Full Code    Disposition: Patient will likely need IV antibiotics, once bacteremia clears then PICC line can be placed if IV abx needed , will need TCU, patient is hesitant to go to TCU.  Discussed need for physical rehabilitation and possible IV antibiotics at discharge.    Interval History/Subjective:  Reports feeling okay, no chest pain or shortness of breath or abdominal pain or nausea or vomiting or diarrhea, afebrile    Physical Exam/Objective:  Vitals I/O   Vital signs:  Temp: 98.1  F (36.7  C) Temp src: Oral BP: 137/62 Pulse: 59   Resp: 18 SpO2: 93 % O2 Device: None (Room air) Oxygen Delivery: 1 LPM   Weight: 81.4 kg (179 lb 6.4 oz)  Estimated body mass index is 27.28 kg/m  as calculated from the following:    Height as of an earlier encounter on 1/27/22: 1.727 m (5' 8\").    Weight as of this encounter: 81.4 kg (179 lb 6.4 oz).       I/O last 3 completed shifts:  In: 720 [P.O.:720]  Out: 350 [Urine:350]     Body mass index is 27.28 kg/m .  General: Awake alert and comfortable  Lungs: CTA without rales or rhonchi  Heart: S1, S2 without murmurs  Abdomen: Soft nontender, bowel sounds positive  CNS: Nonfocal  Extremities: mild edema, UE( hands), LE     Medications:   Personally Reviewed.    apixaban ANTICOAGULANT  5 mg Oral BID     aspirin  81 mg Oral Daily     insulin aspart   " Subcutaneous Daily with breakfast     insulin aspart   Subcutaneous Daily with lunch     insulin aspart  1-7 Units Subcutaneous TID AC     insulin aspart  1-5 Units Subcutaneous At Bedtime     insulin glargine  40 Units Subcutaneous At Bedtime     lisinopril  5 mg Oral Daily     piperacillin-tazobactam  3.375 g Intravenous Q8H     sodium chloride (PF)  3 mL Intracatheter Q8H     sodium chloride (PF)  3 mL Intracatheter Q8H     tamsulosin  0.4 mg Oral Daily with supper     vitamin E  400 Units Oral Daily       Data reviewed today: I personally reviewed all new medications, labs, imaging/diagnostics reports over the past 24 hours. Pertinent findings include    Labs:  Most Recent 3 CBC's:  Recent Labs   Lab Test 02/01/22  0425 01/30/22  0538 01/29/22  1602   WBC 14.8* 12.4* 13.4*   HGB 10.2* 11.1* 10.6*   MCV 99 95 96   * 72* 66*     Most Recent 3 BMP's:  Recent Labs   Lab Test 02/01/22  0857 02/01/22 0425 02/01/22  0214 01/31/22  0848 01/31/22  0510 01/30/22  0733 01/30/22  0538   NA  --  140  --   --  142  --  146*   POTASSIUM  --  3.8  --   --  3.8  --  3.8   CHLORIDE  --  105  --   --  110*  --  112*   CO2  --  26  --   --  26  --  27   BUN  --  44*  --   --  57*  --  71*   CR  --  0.97  --   --  1.13  --  1.26   ANIONGAP  --  9  --   --  6  --  7   MARTHA  --  7.9*  --   --  8.0*  --  8.1*   * 153* 146*   < > 181*   < > 128*    < > = values in this interval not displayed.     Most Recent 2 LFT's:  Recent Labs   Lab Test 02/01/22 0425 01/31/22  0510   AST 50* 69*   ALT 87* 86*   ALKPHOS 241* 254*   BILITOTAL 4.1* 4.8*     Most Recent 3 BNP's:No lab results found.  Most Recent 6 Bacteria Isolates From Any Culture (See EPIC Reports for Culture Details): Blood cultures from 1/26, 1/27, 1/29+ for Klebsiella oxytoca.  Urine culture growing Klebsiella oxytoca.      Most Recent Hemoglobin A1c:  Recent Labs   Lab Test 08/25/21  1523   A1C 9.1*     Most Recent 6 glucoses:  Recent Labs   Lab Test 02/01/22  0857  02/01/22  0425 02/01/22  0214 01/31/22  2036 01/31/22  1807 01/31/22  1332   * 153* 146* 289* 246* 205*       Imaging:   No results found for this or any previous visit (from the past 24 hour(s)).    Ne Adame MD  Internal Medicine/ Hospitalist  [facility]  Office # [phone]

## 2022-02-01 NOTE — PROGRESS NOTES
Received message from [facility] last evening declining patient due to no availability for home RN, or PT.

## 2022-02-01 NOTE — PLAN OF CARE
Vss. Pt denies pain. Orientation fluctuates. Disoriented to time. Diet advanced to 75g CHO. Tolerating well. 1500 fluid restriction. Jaundice skin throughout. Incontinent of bowel and bladder. Q2 turns maintained. Will continue to monitor.

## 2022-02-01 NOTE — PLAN OF CARE
Problem: Adult Inpatient Plan of Care  Goal: Patient-Specific Goal (Individualized)  Outcome: Improving   Pt IVSL. IV zosyn. Alarms in place as pt is forgetful. Turning every 2 hours for skin integrity. Incontinent, brief in place. Jaundice skin. BG monitoring. Tolerating diet. Assist of 1 with walker and gait belt.

## 2022-02-01 NOTE — PROGRESS NOTES
[facility]  Infectious Disease   Progress Note     Date of Admission:  1/27/2022    Assessment & Plan   Cholangitis with klebsiella bacteremia, bacteriuria. Active issue.   Choledocholithiasis, ERCP 1/27  Liver phlegmon?  DM  US jan 27:  1.  extra hepatic bile duct dilation.  2.  Right hepatic peripheral lesion measures 3.7 cm by ultrasound. Evaluating all of the modalities, this may represent focal worsening of bile duct dilation versus phlegmon. At this time, it does not have the appearance of abscess.  3.  Following treatment of the bile duct obstruction, this lesion could be followed with either ultrasound or CT.    PLAN  repeat BC positive, this suggests liver abscesses  Continue Zosyn while in the hospital  WBC remains elevated, repeat CT abdomen today, to evaluate liver abscess-if present may need drainage for source control   Repeat BC are NGTD  Discussed w staff    Valeria Crawley M.D.     __________________________________________________    Interval History   Resting in bed. Feels ok, tolerating abx. WBC remains elevated.       Physical Exam   Vital Signs: Temp: 98.1  F (36.7  C) Temp src: Oral BP: 137/62 Pulse: 59   Resp: 18 SpO2: 93 % O2 Device: None (Room air)    Weight: 179 lbs 6.4 oz  Gen. appearance nontoxic  Eyes no conjunctivitis or icterus  Neck no stiffness or neck vein distention, no LN  Heart  no S3 or murmurs  Lungs clear no wheeze  Abdomen soft not tender  Extremities no synovitis, trace edema  Skin  no rash or emboli  Neurologic alert gen weakness      Data   Results for orders placed or performed during the hospital encounter of 01/27/22 (from the past 24 hour(s))   Glucose by meter   Result Value Ref Range    GLUCOSE BY METER POCT 205 (H) 70 - 99 mg/dL   Social Work/ Care Management IP Consult    Narrative    Breanne Herbert, RN     1/31/2022  3:28 PM  Care Management Initial Consult    General Information  Assessment completed with: Patient,    Type of CM/SW Visit:  Offer D/C Planning    Primary Care Provider verified and updated as needed: Yes   Readmission within the last 30 days:        Reason for Consult: discharge planning  Advance Care Planning: Advance Care Planning Reviewed:  (none on   file- declined)          Communication Assessment  Patient's communication style:               Cognitive  Cognitive/Neuro/Behavioral: .WDL except  Level of Consciousness:   alert  Arousal Level: opens eyes spontaneously  Orientation:   disoriented to,time  Mood/Behavior: cooperative  Best Language: 0   - No aphasia  Speech: slow    Living Environment:   People in home: spouse  [place]  Current living Arrangements: house      Able to return to prior arrangements: other (see comments) (has   not practiced stairclimbing yet)       Family/Social Support:  Care provided by: self  Provides care for:    Marital Status:   Wife,Children          Description of Support System:           Current Resources:   Patient receiving home care services: No     Community Resources: None  Equipment currently used at home: walker, rolling  Supplies currently used at home: None    Employment/Financial:  Employment Status: retired        Financial Concerns: No concerns identified           Lifestyle & Psychosocial Needs:  Social Determinants of Health     Tobacco Use: Medium Risk     Smoking Tobacco Use: Former Smoker     Smokeless Tobacco Use: Never Used   Alcohol Use: Not on file   Financial Resource Strain: Not on file   Food Insecurity: Not on file   Transportation Needs: Not on file   Physical Activity: Not on file   Stress: Not on file   Social Connections: Not on file   Intimate Partner Violence: Not on file   Depression: Not at risk     PHQ-2 Score: 0   Housing Stability: Not on file       Functional Status:  Prior to admission patient needed assistance:              Mental Health Status:          Chemical Dependency Status:                Values/Beliefs:  Spiritual, Cultural Beliefs, Buddhism  "Practices, Values that   affect care:                 Additional Information:  Admitted 1/27/22 with bacteremia and UTI. Currently on IV   antibiotics. Consult placed for discharge planning. Therapy   recommending TCU. Discussed discharge planning with patient at   length. He has stairs at home and has not yet attempted stair   climbing with therapy. He is adamantly declining TCU due to   previous experience during COVID limitations. He had had foot   surgery so unclear if he had weight bearing restrictions. He has   had home care in the past and favors returning home with his wife   and having home care again. No recollection of name of previous   home care agency but would like to have them again. Per chart   review, patient had Home Health Care back in February of 2021.     Awaiting final antibiotic plan. Per ID progress note 1/31/2022,   \"Continue Zosyn while in the hospital  If clinical improvement and improved imaging, potential PO abx   for discharge.\"    Called and spoke with Sarah in intake for [facility]-   will review. Per her review, they have not been opened to the   patient before.     Reviewed chart further and discovered in encounter dated 3/5/2021   patient had [facility] requesting extension of home PT.        Called and spoke with Loly in intake @ [facility]   to notify of new referral for RN, PT OT and HHA- would be able to   accept if no chronic wound care. (referral sent)    CM following.            Glucose by meter   Result Value Ref Range    GLUCOSE BY METER POCT 246 (H) 70 - 99 mg/dL   Glucose by meter   Result Value Ref Range    GLUCOSE BY METER POCT 289 (H) 70 - 99 mg/dL   Glucose by meter   Result Value Ref Range    GLUCOSE BY METER POCT 146 (H) 70 - 99 mg/dL   CBC with Platelets & Differential    Narrative    The following orders were created for panel order CBC with Platelets & Differential.  Procedure                               Abnormality         " Status                     ---------                               -----------         ------                     CBC with platelets and d...[[id number]]  Abnormal            Final result               Manual Differential[[id number]]          Abnormal            Final result                 Please view results for these tests on the individual orders.   Comprehensive metabolic panel   Result Value Ref Range    Sodium 140 136 - 145 mmol/L    Potassium 3.8 3.5 - 5.0 mmol/L    Chloride 105 98 - 107 mmol/L    Carbon Dioxide (CO2) 26 22 - 31 mmol/L    Anion Gap 9 5 - 18 mmol/L    Urea Nitrogen 44 (H) 8 - 28 mg/dL    Creatinine 0.97 0.70 - 1.30 mg/dL    Calcium 7.9 (L) 8.5 - 10.5 mg/dL    Glucose 153 (H) 70 - 125 mg/dL    Alkaline Phosphatase 241 (H) 45 - 120 U/L    AST 50 (H) 0 - 40 U/L    ALT 87 (H) 0 - 45 U/L    Protein Total 4.9 (L) 6.0 - 8.0 g/dL    Albumin 1.7 (L) 3.5 - 5.0 g/dL    Bilirubin Total 4.1 (H) 0.0 - 1.0 mg/dL    GFR Estimate 77 >60 mL/min/1.73m2   CBC with platelets and differential   Result Value Ref Range    WBC Count 14.8 (H) 4.0 - 11.0 10e3/uL    RBC Count 3.29 (L) 4.40 - 5.90 10e6/uL    Hemoglobin 10.2 (L) 13.3 - 17.7 g/dL    Hematocrit 32.4 (L) 40.0 - 53.0 %    MCV 99 78 - 100 fL    MCH 31.0 26.5 - 33.0 pg    MCHC 31.5 31.5 - 36.5 g/dL    RDW 14.9 10.0 - 15.0 %    Platelet Count 113 (L) 150 - 450 10e3/uL   Manual Differential   Result Value Ref Range    % Neutrophils 76 %    % Lymphocytes 9 %    % Monocytes 11 %    % Eosinophils 2 %    % Basophils 0 %    % Metamyelocytes 2 %    Absolute Neutrophils 11.2 (H) 1.6 - 8.3 10e3/uL    Absolute Lymphocytes 1.3 0.8 - 5.3 10e3/uL    Absolute Monocytes 1.6 (H) 0.0 - 1.3 10e3/uL    Absolute Eosinophils 0.3 0.0 - 0.7 10e3/uL    Absolute Basophils 0.0 0.0 - 0.2 10e3/uL    Absolute Metamyelocytes 0.3 (H) <=0.0 10e3/uL    RBC Morphology Confirmed RBC Indices     Platelet Assessment  Automated Count Confirmed. Platelet morphology is normal.     Automated Count  Confirmed. Platelet morphology is normal.   Glucose by meter   Result Value Ref Range    GLUCOSE BY METER POCT 105 (H) 70 - 99 mg/dL

## 2022-02-01 NOTE — PROGRESS NOTES
"CLINICAL NUTRITION SERVICES - ASSESSMENT NOTE     Nutrition Prescription    RECOMMENDATIONS FOR MDs/PROVIDERS TO ORDER:  None at this time     Malnutrition Status:    Patient does not meet two of the above criteria necessary for diagnosing malnutrition    Recommendations already ordered by Registered Dietitian (RD):  Chocolate Glucerna bid     Future/Additional Recommendations:  None noted      REASON FOR ASSESSMENT  Caleb Hernandez is a/an 86 year old male assessed by the dietitian for Provider Order - malnutrition    PT admitted with cholangitis to choledocholithiasis s/p ERCP, malnutrition, edema, anemia, DM2    NUTRITION HISTORY  NKFA  He states his appetite is fair  He drinks 1-2 chocolate boost daily at home  He lives at home with his wife.     CURRENT NUTRITION ORDERS  Diet: High Consistent Carbohydrate  Intake/Tolerance: %; however only at about 25% of his lunch when I stopped in     LABS  Labs reviewed; Ca-7.9, Alb-1.7 (most likely falsely low from inflammation), CRP-8.8, BUN-44, Ca-7.9, Gluc-105,200    MEDICATIONS  Medications reviewed; BUN-44, Ca-7.9, Gluc-105,200    ANTHROPOMETRICS  Height: 5'8\"  Most Recent Weight: 81.4 kg (179 lb 6.4 oz)    IBW: 70 kg  BMI: Overweight BMI 25-29.9  Weight History:   Wt Readings from Last 15 Encounters:   01/29/22 81.4 kg (179 lb 6.4 oz)   01/27/22 81.1 kg (178 lb 12.7 oz)   11/24/21 81.1 kg (178 lb 11.2 oz)   08/25/21 76.2 kg (168 lb)   05/25/21 78.3 kg (172 lb 11.2 oz)   05/14/21 79.4 kg (175 lb)   02/23/21 82.1 kg (181 lb)   02/01/21 82.9 kg (182 lb 12.8 oz)   01/28/21 82.8 kg (182 lb 9.6 oz)   01/26/21 82.8 kg (182 lb 9.6 oz)   01/22/21 82.6 kg (182 lb)   01/21/21 81.4 kg (179 lb 6.4 oz)   01/19/21 81.4 kg (179 lb 6.4 oz)   12/29/20 82.3 kg (181 lb 6.4 oz)   12/21/20 85.3 kg (188 lb)   pt is down 9# in the past year (5%)     Dosing Weight: 70 kg    ASSESSED NUTRITION NEEDS  Estimated Energy Needs: 5219-1170 kcals/day (25 - 30 kcals/kg)  Justification: " Maintenance  Estimated Protein Needs: 70-84 grams protein/day (1 - 1.2 grams of pro/kg)  Justification: Maintenance  Estimated Fluid Needs: 7028-9408 mL/day (25 - 30 mL/kg)   Justification: Maintenance    PHYSICAL FINDINGS  See malnutrition section below.  Per flowsheet:  Edema: Trace edema noted in bilateral hands  GI: fecal incontinence noted   Skin: No skin breakdown noted      MALNUTRITION:  % Weight Loss:  Weight loss does not meet criteria for malnutrition   % Intake:  No decreased intake noted per pt, intake here is variable   Subcutaneous Fat Loss:  Orbital region mild depletion  Muscle Loss:  Clavicle bone region mild depletion  Fluid Retention:  Mild trace edema in hands     Malnutrition Diagnosis: Patient does not meet two of the above criteria necessary for diagnosing malnutrition      NUTRITION DIAGNOSIS  Pt does not meet 2 criteria at this time. Wt loss is minimal. Intake is fair-good    INTERVENTIONS  Implementation  Chocolate Glucerna bid     Goals  Patient to consume % of nutritionally adequate meals three times per day, or the equivalent with supplements/snacks.     Monitoring/Evaluation  Progress toward goals will be monitored and evaluated per protocol.

## 2022-02-01 NOTE — PLAN OF CARE
Problem: Adult Inpatient Plan of Care  Goal: Plan of Care Review  Outcome: Improving     [id number]  Patient is alert and oriented, hard of hearing but calls appropriately for assistance. Denies pain.  and 200 with meals, covered per orders. IV zosyn q8 hours. Had abdominal CT this shift. Up with A1, working with physical therapy as well. Positive BC 1/26, 1/27, and 1/29. VSS, afebrile.     Katie WALLACE RN  02/01/22

## 2022-02-02 ENCOUNTER — APPOINTMENT (OUTPATIENT)
Dept: RADIOLOGY | Facility: CLINIC | Age: 87
DRG: 871 | End: 2022-02-02
Attending: INTERNAL MEDICINE
Payer: COMMERCIAL

## 2022-02-02 ENCOUNTER — APPOINTMENT (OUTPATIENT)
Dept: PHYSICAL THERAPY | Facility: CLINIC | Age: 87
DRG: 871 | End: 2022-02-02
Attending: INTERNAL MEDICINE
Payer: COMMERCIAL

## 2022-02-02 LAB
ALBUMIN SERPL-MCNC: 1.7 G/DL (ref 3.5–5)
ALP SERPL-CCNC: 251 U/L (ref 45–120)
ALT SERPL W P-5'-P-CCNC: 83 U/L (ref 0–45)
ANION GAP SERPL CALCULATED.3IONS-SCNC: 6 MMOL/L (ref 5–18)
AST SERPL W P-5'-P-CCNC: 54 U/L (ref 0–40)
BASOPHILS # BLD MANUAL: 0 10E3/UL (ref 0–0.2)
BASOPHILS NFR BLD MANUAL: 0 %
BILIRUB SERPL-MCNC: 3.4 MG/DL (ref 0–1)
BUN SERPL-MCNC: 30 MG/DL (ref 8–28)
CALCIUM SERPL-MCNC: 7.6 MG/DL (ref 8.5–10.5)
CHLORIDE BLD-SCNC: 105 MMOL/L (ref 98–107)
CO2 SERPL-SCNC: 27 MMOL/L (ref 22–31)
CREAT SERPL-MCNC: 0.92 MG/DL (ref 0.7–1.3)
EOSINOPHIL # BLD MANUAL: 0.1 10E3/UL (ref 0–0.7)
EOSINOPHIL NFR BLD MANUAL: 1 %
ERYTHROCYTE [DISTWIDTH] IN BLOOD BY AUTOMATED COUNT: 14.6 % (ref 10–15)
GFR SERPL CREATININE-BSD FRML MDRD: 82 ML/MIN/1.73M2
GLUCOSE BLD-MCNC: 79 MG/DL (ref 70–125)
GLUCOSE BLDC GLUCOMTR-MCNC: 103 MG/DL (ref 70–99)
GLUCOSE BLDC GLUCOMTR-MCNC: 169 MG/DL (ref 70–99)
GLUCOSE BLDC GLUCOMTR-MCNC: 180 MG/DL (ref 70–99)
GLUCOSE BLDC GLUCOMTR-MCNC: 209 MG/DL (ref 70–99)
GLUCOSE BLDC GLUCOMTR-MCNC: 79 MG/DL (ref 70–99)
HCT VFR BLD AUTO: 30.3 % (ref 40–53)
HGB BLD-MCNC: 9.5 G/DL (ref 13.3–17.7)
LYMPHOCYTES # BLD MANUAL: 1.5 10E3/UL (ref 0.8–5.3)
LYMPHOCYTES NFR BLD MANUAL: 11 %
MCH RBC QN AUTO: 30.9 PG (ref 26.5–33)
MCHC RBC AUTO-ENTMCNC: 31.4 G/DL (ref 31.5–36.5)
MCV RBC AUTO: 99 FL (ref 78–100)
METAMYELOCYTES # BLD MANUAL: 0.1 10E3/UL
METAMYELOCYTES NFR BLD MANUAL: 1 %
MONOCYTES # BLD MANUAL: 2.4 10E3/UL (ref 0–1.3)
MONOCYTES NFR BLD MANUAL: 18 %
MYELOCYTES # BLD MANUAL: 0.1 10E3/UL
MYELOCYTES NFR BLD MANUAL: 1 %
NEUTROPHILS # BLD MANUAL: 9.1 10E3/UL (ref 1.6–8.3)
NEUTROPHILS NFR BLD MANUAL: 68 %
PLAT MORPH BLD: ABNORMAL
PLATELET # BLD AUTO: 167 10E3/UL (ref 150–450)
POTASSIUM BLD-SCNC: 4.1 MMOL/L (ref 3.5–5)
PROT SERPL-MCNC: 4.8 G/DL (ref 6–8)
RBC # BLD AUTO: 3.07 10E6/UL (ref 4.4–5.9)
RBC MORPH BLD: ABNORMAL
SODIUM SERPL-SCNC: 138 MMOL/L (ref 136–145)
WBC # BLD AUTO: 13.4 10E3/UL (ref 4–11)

## 2022-02-02 PROCEDURE — 99233 SBSQ HOSP IP/OBS HIGH 50: CPT | Performed by: INTERNAL MEDICINE

## 2022-02-02 PROCEDURE — 999N000065 XR CHEST PICC PLACEMENT PORT 1 VW

## 2022-02-02 PROCEDURE — 97530 THERAPEUTIC ACTIVITIES: CPT | Mod: GP

## 2022-02-02 PROCEDURE — 80053 COMPREHEN METABOLIC PANEL: CPT | Performed by: INTERNAL MEDICINE

## 2022-02-02 PROCEDURE — 272N000450 HC KIT 4FR POWER PICC SINGLE LUMEN

## 2022-02-02 PROCEDURE — 250N000009 HC RX 250: Performed by: INTERNAL MEDICINE

## 2022-02-02 PROCEDURE — 120N000001 HC R&B MED SURG/OB

## 2022-02-02 PROCEDURE — 36415 COLL VENOUS BLD VENIPUNCTURE: CPT | Performed by: INTERNAL MEDICINE

## 2022-02-02 PROCEDURE — 85027 COMPLETE CBC AUTOMATED: CPT | Performed by: INTERNAL MEDICINE

## 2022-02-02 PROCEDURE — 999N000065 XR CHEST PORT 1 VIEW

## 2022-02-02 PROCEDURE — 36569 INSJ PICC 5 YR+ W/O IMAGING: CPT

## 2022-02-02 PROCEDURE — 250N000013 HC RX MED GY IP 250 OP 250 PS 637: Performed by: INTERNAL MEDICINE

## 2022-02-02 PROCEDURE — 250N000011 HC RX IP 250 OP 636: Performed by: INTERNAL MEDICINE

## 2022-02-02 RX ORDER — LIDOCAINE 40 MG/G
CREAM TOPICAL
Status: DISCONTINUED | OUTPATIENT
Start: 2022-02-02 | End: 2022-02-02 | Stop reason: CLARIF

## 2022-02-02 RX ADMIN — ASPIRIN 81 MG: 81 TABLET, COATED ORAL at 09:56

## 2022-02-02 RX ADMIN — INSULIN ASPART 1 UNITS: 100 INJECTION, SOLUTION INTRAVENOUS; SUBCUTANEOUS at 13:24

## 2022-02-02 RX ADMIN — INSULIN ASPART 2 UNITS: 100 INJECTION, SOLUTION INTRAVENOUS; SUBCUTANEOUS at 17:31

## 2022-02-02 RX ADMIN — TAMSULOSIN HYDROCHLORIDE 0.4 MG: 0.4 CAPSULE ORAL at 18:07

## 2022-02-02 RX ADMIN — LISINOPRIL 5 MG: 5 TABLET ORAL at 09:56

## 2022-02-02 RX ADMIN — PIPERACILLIN AND TAZOBACTAM 3.38 G: 3; .375 INJECTION, POWDER, LYOPHILIZED, FOR SOLUTION INTRAVENOUS at 21:22

## 2022-02-02 RX ADMIN — APIXABAN 5 MG: 5 TABLET, FILM COATED ORAL at 21:24

## 2022-02-02 RX ADMIN — PIPERACILLIN AND TAZOBACTAM 3.38 G: 3; .375 INJECTION, POWDER, LYOPHILIZED, FOR SOLUTION INTRAVENOUS at 04:28

## 2022-02-02 RX ADMIN — LIDOCAINE HYDROCHLORIDE 2 ML: 10 INJECTION, SOLUTION INFILTRATION; PERINEURAL at 15:50

## 2022-02-02 RX ADMIN — APIXABAN 5 MG: 5 TABLET, FILM COATED ORAL at 09:56

## 2022-02-02 RX ADMIN — Medication 400 UNITS: at 09:56

## 2022-02-02 RX ADMIN — PIPERACILLIN AND TAZOBACTAM 3.38 G: 3; .375 INJECTION, POWDER, LYOPHILIZED, FOR SOLUTION INTRAVENOUS at 13:23

## 2022-02-02 ASSESSMENT — ACTIVITIES OF DAILY LIVING (ADL)
ADLS_ACUITY_SCORE: 26
ADLS_ACUITY_SCORE: 24
ADLS_ACUITY_SCORE: 26
ADLS_ACUITY_SCORE: 24
ADLS_ACUITY_SCORE: 26
ADLS_ACUITY_SCORE: 24
ADLS_ACUITY_SCORE: 26
ADLS_ACUITY_SCORE: 26
ADLS_ACUITY_SCORE: 24
ADLS_ACUITY_SCORE: 26
ADLS_ACUITY_SCORE: 24
ADLS_ACUITY_SCORE: 26
ADLS_ACUITY_SCORE: 26
ADLS_ACUITY_SCORE: 24

## 2022-02-02 NOTE — PLAN OF CARE
VSS. Afebrile. No pain reported. A&Ox4 but forgetful. Zosyn currently running. Up to bathroom a couple of time this shift, assist of 1 w/walker and gait belt. Skin jaundiced. Very small stool this shift, brown and soft. Red blanchable bottom, q2 turning. Continue to monitor.    Problem: Infection  Goal: Absence of Infection Signs and Symptoms  Outcome: Improving

## 2022-02-02 NOTE — PROGRESS NOTES
"Per Radiology MD Flores recommendation, PICC pulled back 4cm and new sterile dressing placed. PICC line was also power flushed at this time.    Original placement xray read \"right upper extremity PICC line catheter which courses into the SVC confluence and then crosses to the left of midline into the left innominate vein.\"  "

## 2022-02-02 NOTE — PROGRESS NOTES
[facility]  Infectious Disease   Progress Note     Date of Admission:  1/27/2022    Assessment & Plan   Cholangitis with klebsiella bacteremia, bacteriuria. Active issue.   Choledocholithiasis, ERCP 1/27  Liver phlegmon-microabscesses noted 2/1  DM  US jan 27:  1.  extra hepatic bile duct dilation.  2.  Right hepatic peripheral lesion measures 3.7 cm by ultrasound. Evaluating all of the modalities, this may represent focal worsening of bile duct dilation versus phlegmon. At this time, it does not have the appearance of abscess.  3.  Following treatment of the bile duct obstruction, this lesion could be followed with either ultrasound or CT.    PLAN  repeat BC positive, this suggests liver abscesses  Continue Zosyn IV  Repeat CT scan with cholangitis, inflammatory phlegmon/edema and microabscesses but no drainable fluid collection  Given unable to obtain additional source control, likely stuck with course of IV pip-tazo for 2-3 weeks at discharge. Ok for PICC anytime.   Trend WBC, some improvement today  ID will follow, thanks    Valeria Crawley M.D.     __________________________________________________    Interval History   Feels ok. Abdomen stable. A bit weaker with therapy this morning.     Physical Exam   Vital Signs: Temp: 98  F (36.7  C) Temp src: Oral BP: 115/56 Pulse: 66   Resp: 18 SpO2: 91 % O2 Device: None (Room air)    Weight: 179 lbs 0 oz  Gen. appearance nontoxic  Eyes no conjunctivitis or icterus  Neck no stiffness or neck vein distention, no LN  Heart  no S3 or murmurs  Lungs clear no wheeze  Abdomen soft not tender  Extremities no synovitis, trace edema  Skin  no rash or emboli  Neurologic alert gen weakness      Data   Results for orders placed or performed during the hospital encounter of 01/27/22 (from the past 24 hour(s))   CT Abdomen Pelvis w Contrast    Narrative    EXAM: CT ABDOMEN PELVIS W CONTRAST  LOCATION: [facility]  DATE/TIME: 2/1/2022  10:35 AM    INDICATION: Abdominal abscess/infection suspected  COMPARISON: CT of the abdomen and pelvis 01/26/2022; MRCP 01/27/2022  TECHNIQUE: CT scan of the abdomen and pelvis was performed following injection of IV contrast. Multiplanar reformats were obtained. Dose reduction techniques were used.  CONTRAST: Isovue 370 100mL    FINDINGS:   LOWER CHEST: Minimal pleural fluid layers into the posterior costophrenic sulci with adjacent atelectasis. Atelectatic bands along the right hemidiaphragm including a substantial proportion of the right middle lobe and the basilar segments of the right   lower lobe is similar to 01/26/2022. Mild elevation of the right hemidiaphragm. Degenerative calcification of the aortic root. Moderate mitral annular calcifications.    HEPATOBILIARY: Bile duct dilation present 01/26/2022 is no longer present. Wall thickening of the common bile duct and pneumobilia are new. Heterogeneity of the liver in segments V/VII persists with surrounding diminished liver enhancement consistent   with ongoing inflammatory phlegmon. Towards the liver capsule there are multiple small irregularly shaped areas of more fluid attenuation which could represent developing microabscesses, however there is no drainable or aspirable fluid collection.    PANCREAS: Normal parenchymal architecture and enhancement. No findings to suggest acute pancreatitis.    SPLEEN: Normal.    ADRENAL GLANDS: Normal.    KIDNEYS/BLADDER: No significant mass, stones, or hydronephrosis. There are simple or benign cysts. No follow up is needed. Mildly trabeculated bladder.    BOWEL: Ingested material in the stomach. Small bowel is normal caliber. A few scattered colonic diverticula are present. No focal bowel wall thickening or inflammatory stranding in the mesentery. Normal appendix.    LYMPH NODES: There are a few mildly enlarged, reactive portacaval lymph nodes.    VASCULATURE: A branch of the right hepatic vein supplying the anterior  aspect of the right lobe does not opacify with contrast (series 3, image 36) consistent with hepatic vein thrombosis. Diffuse aortoiliac atheromatous plaque but no aneurysm or   critical stenosis. Atheromatous calcifications are also present along the celiac axis and SMA as well as their proximal branches.    PELVIC ORGANS: Prostate gland remains mildly enlarged and slightly indents the bladder base.    MUSCULOSKELETAL: Grade 2 degenerative anterolisthesis of L5 on S1 with bilateral L5 pars defects. Moderate lower thoracic and upper lumbar degenerative disc disease with marginal osteophytes, disc space narrowing, and vacuum disc phenomenon at a few   levels.      Impression    IMPRESSION:     1.  Status post ERCP with decompression of intrahepatic bile duct dilation. New wall thickening/enhancement of the common bile duct consistent with cholangitis.   2.  Persistent heterogeneity of the parenchyma in segments V/VII, due in part to thrombus in a branch of the right hepatic vein, consistent with inflammatory phlegmon/edema. Within the area of phlegmon there are multiple subcentimeter low-attenuation   foci consistent with microabscesses but no aspirable or drainable fluid collection.   Glucose by meter   Result Value Ref Range    GLUCOSE BY METER POCT 200 (H) 70 - 99 mg/dL   Glucose by meter   Result Value Ref Range    GLUCOSE BY METER POCT 159 (H) 70 - 99 mg/dL   Glucose by meter   Result Value Ref Range    GLUCOSE BY METER POCT 202 (H) 70 - 99 mg/dL   Glucose by meter   Result Value Ref Range    GLUCOSE BY METER POCT 103 (H) 70 - 99 mg/dL   CBC with Platelets & Differential    Narrative    The following orders were created for panel order CBC with Platelets & Differential.  Procedure                               Abnormality         Status                     ---------                               -----------         ------                     CBC with platelets and d...[[id number]]  Abnormal            Final  result               Manual Differential[[id number]]          Abnormal            Final result                 Please view results for these tests on the individual orders.   Comprehensive metabolic panel   Result Value Ref Range    Sodium 138 136 - 145 mmol/L    Potassium 4.1 3.5 - 5.0 mmol/L    Chloride 105 98 - 107 mmol/L    Carbon Dioxide (CO2) 27 22 - 31 mmol/L    Anion Gap 6 5 - 18 mmol/L    Urea Nitrogen 30 (H) 8 - 28 mg/dL    Creatinine 0.92 0.70 - 1.30 mg/dL    Calcium 7.6 (L) 8.5 - 10.5 mg/dL    Glucose 79 70 - 125 mg/dL    Alkaline Phosphatase 251 (H) 45 - 120 U/L    AST 54 (H) 0 - 40 U/L    ALT 83 (H) 0 - 45 U/L    Protein Total 4.8 (L) 6.0 - 8.0 g/dL    Albumin 1.7 (L) 3.5 - 5.0 g/dL    Bilirubin Total 3.4 (H) 0.0 - 1.0 mg/dL    GFR Estimate 82 >60 mL/min/1.73m2   CBC with platelets and differential   Result Value Ref Range    WBC Count 13.4 (H) 4.0 - 11.0 10e3/uL    RBC Count 3.07 (L) 4.40 - 5.90 10e6/uL    Hemoglobin 9.5 (L) 13.3 - 17.7 g/dL    Hematocrit 30.3 (L) 40.0 - 53.0 %    MCV 99 78 - 100 fL    MCH 30.9 26.5 - 33.0 pg    MCHC 31.4 (L) 31.5 - 36.5 g/dL    RDW 14.6 10.0 - 15.0 %    Platelet Count 167 150 - 450 10e3/uL   Manual Differential   Result Value Ref Range    % Neutrophils 68 %    % Lymphocytes 11 %    % Monocytes 18 %    % Eosinophils 1 %    % Basophils 0 %    % Metamyelocytes 1 %    % Myelocytes 1 %    Absolute Neutrophils 9.1 (H) 1.6 - 8.3 10e3/uL    Absolute Lymphocytes 1.5 0.8 - 5.3 10e3/uL    Absolute Monocytes 2.4 (H) 0.0 - 1.3 10e3/uL    Absolute Eosinophils 0.1 0.0 - 0.7 10e3/uL    Absolute Basophils 0.0 0.0 - 0.2 10e3/uL    Absolute Metamyelocytes 0.1 (H) <=0.0 10e3/uL    Absolute Myelocytes 0.1 (H) <=0.0 10e3/uL    RBC Morphology Confirmed RBC Indices     Platelet Assessment  Automated Count Confirmed. Platelet morphology is normal.     Automated Count Confirmed. Platelet morphology is normal.   Glucose by meter   Result Value Ref Range    GLUCOSE BY METER POCT 79 70 - 99 mg/dL

## 2022-02-02 NOTE — PLAN OF CARE
Problem: Infection  Goal: Absence of Infection Signs and Symptoms  Outcome: Improving     Pt refused to work with therapy this AM. Continues on IV zosyn. Chair/Bed alarm on. BG 79 this AM. Incontinent of urine. No picc line yet due to BC being positive. Call light within reach.

## 2022-02-02 NOTE — PROGRESS NOTES
[facility] Medicine PROGRESS NOTE      Identification/Summary:   Caleb Hernandez is a 86 year old male with  past medical history of recurrent CBD stones, malnutrition, cholecystectomy, type 2 diabetes mellitus on insulin, CKD stage II, PE chronic anticoagulation with Eliquis, hypertension, dyslipidemia who was admitted on 1/27/2022 for abdominal pain and jaundice. Hospital course is notable for finding of choledocholithiasis, bacteremia with Klebsiella oxytoca.   Patient underwent ERCP with sludge and stone removal 1/27 at [facility]. Patient returned to this facility and has been on IV antibiotics since his admission.  Ultrasound right upper quadrant showed ?  Liver phlegmon.  Blood cultures from 1/29 again positive for Klebsiella oxytoca, CT scan abdomen 2/1 for evaluation of phlegmon versus abscess. ID on consult. He needs IV antibiotic for 2-3 weeks      Assessment and Plan:  Acute ascending cholangitis secondary to choledocholithiasis status post ERCP 1/27/2022  Klebsiella bacteremia/sepsis:   Repeat CT with cholangitis, inflammatory phlegmon/edema and microabscesses with no drainable fluid collection  Likely related to choledocholithiasis and ascending cholangitis  Noticed recent blood cultures from 1/29 Klebsiella  Blood cultures from 1/30/2022, no growth after 2 days   On Zosyn  Insert midline    UTI secondary to Klebsiella oxytoca  Zosyn as above    Generalized edema  Weight is up by 4 kg's/10 pounds since admission  S/p Lasix 20 mg IV twice daily x 2    Malnutrition significant hypoalbuminemia  Albumin 1.6, in the setting of acute infection  Patient has chronic hypoalbuminemia, may be related to nutritional intake    Anemia, mild in the setting of acute infection  Intermittent monitoring    Elevated LFTs in the setting of choledocholithiasis, continue to trend  No abdominal pain or tenderness  Advance to diabetic diet.    Type 2 diabetes mellitus:  Lantus 35 units nightly, increased to 40  "units at bedtime  (home dose 44 units at bedtime)   Sliding scale insulin, adjust as needed    History of PE, A.fib   Chronically on Eliquis.    Peripheral edema  S/p IV diuretics,    Hypertension  Dyslipidemia  Thrombocytopenia: Secondary to gram-negative infection, improving  Severe protein/calorie malnutrition: Severe hypoalbuminemia, partly chronic, partly related infection, RD consult  Diet: High Consistent Carb (75 g CHO per Meal) Diet  Snacks/Supplements Adult: Glucerna; Between Meals  DVT Prophylaxis: On Eliquis.  Code Status: Full Code    Disposition: will insert midline today, needs IV antibiotic for 2-3 weeks according to ID note  Likely TCU placement       Interval History/Subjective:  Feeling well, denies any pain, nausea, vomiting, SOB    Physical Exam/Objective:  Vitals I/O   Vital signs:  Temp: 98  F (36.7  C) Temp src: Oral BP: 115/56 Pulse: 66   Resp: 18 SpO2: 91 % O2 Device: None (Room air) Oxygen Delivery: 1 LPM   Weight: 81.2 kg (179 lb)  Estimated body mass index is 27.22 kg/m  as calculated from the following:    Height as of an earlier encounter on 1/27/22: 1.727 m (5' 8\").    Weight as of this encounter: 81.2 kg (179 lb).       I/O last 3 completed shifts:  In: 1020 [P.O.:1020]  Out: 200 [Urine:200]     Body mass index is 27.22 kg/m .    Physical Exam:    General Appearance:  Alert, cooperative, no distress, frail   Head:  Normocephalic, atraumatic   Eyes:  PERRL    Throat:  mucosa; moist   Neck: No JVD, no LAP   Lungs:   Clear to auscultation bilaterally, respirations unlabored   Chest Wall:  No tenderness or deformity   Heart:  Regular rate and rhythm, S1, S2    Abdomen:   Soft, non tender, non distended, bowel sounds present, no guarding or rigidity   Extremities: Remarkable for lower extremities edema   Skin: Skin color, texture, turgor normal, no rashes or lesions   Neurologic: Alert and oriented X 3, Moves all 4 extremities         Medications:   Personally Reviewed.    apixaban " ANTICOAGULANT  5 mg Oral BID     aspirin  81 mg Oral Daily     insulin aspart   Subcutaneous Daily with breakfast     insulin aspart   Subcutaneous Daily with lunch     insulin aspart  1-7 Units Subcutaneous TID AC     insulin aspart  1-5 Units Subcutaneous At Bedtime     insulin glargine  40 Units Subcutaneous At Bedtime     lisinopril  5 mg Oral Daily     piperacillin-tazobactam  3.375 g Intravenous Q8H     sodium chloride (PF)  3 mL Intracatheter Q8H     sodium chloride (PF)  3 mL Intracatheter Q8H     tamsulosin  0.4 mg Oral Daily with supper     vitamin E  400 Units Oral Daily       Data reviewed today: I personally reviewed all new medications, labs, imaging/diagnostics reports over the past 24 hours. Pertinent findings include    Labs:  Most Recent 3 CBC's:  Recent Labs   Lab Test 02/02/22 0423 02/01/22 0425 01/30/22  0538   WBC 13.4* 14.8* 12.4*   HGB 9.5* 10.2* 11.1*   MCV 99 99 95    113* 72*     Most Recent 3 BMP's:  Recent Labs   Lab Test 02/02/22  0954 02/02/22 0423 02/02/22  0232 02/01/22  0857 02/01/22 0425 01/31/22  0848 01/31/22  0510   NA  --  138  --   --  140  --  142   POTASSIUM  --  4.1  --   --  3.8  --  3.8   CHLORIDE  --  105  --   --  105  --  110*   CO2  --  27  --   --  26  --  26   BUN  --  30*  --   --  44*  --  57*   CR  --  0.92  --   --  0.97  --  1.13   ANIONGAP  --  6  --   --  9  --  6   MARTHA  --  7.6*  --   --  7.9*  --  8.0*   GLC 79 79 103*   < > 153*   < > 181*    < > = values in this interval not displayed.     Most Recent 2 LFT's:  Recent Labs   Lab Test 02/02/22 0423 02/01/22 0425   AST 54* 50*   ALT 83* 87*   ALKPHOS 251* 241*   BILITOTAL 3.4* 4.1*     Most Recent 3 BNP's:No lab results found.  Most Recent 6 Bacteria Isolates From Any Culture (See EPIC Reports for Culture Details): Blood cultures from 1/26, 1/27, 1/29+ for Klebsiella oxytoca.  Urine culture growing Klebsiella oxytoca.      Most Recent Hemoglobin A1c:  Recent Labs   Lab Test 08/25/21  1523   A1C  9.1*     Most Recent 6 glucoses:  Recent Labs   Lab Test 02/02/22  0954 02/02/22  0423 02/02/22  0232 02/01/22  2146 02/01/22  1717 02/01/22  1252   GLC 79 79 103* 202* 159* 200*       Imaging:   No results found for this or any previous visit (from the past 24 hour(s)).    Patsy Laura MD  [facility] Medicine service

## 2022-02-02 NOTE — PLAN OF CARE
Problem: Adult Inpatient Plan of Care  Goal: Optimal Comfort and Wellbeing  Outcome: Improving    Pt jaundice, blanchable redness on coccyx, Q2 turns, encourage repositioning. Incontinent ~q2 hours overnight. Urinal offered many times but usually already incontinent. Iv abx. Bg monitoring.     Discharge home with home health vs. TCU

## 2022-02-03 ENCOUNTER — APPOINTMENT (OUTPATIENT)
Dept: PHYSICAL THERAPY | Facility: CLINIC | Age: 87
DRG: 871 | End: 2022-02-03
Attending: INTERNAL MEDICINE
Payer: COMMERCIAL

## 2022-02-03 ENCOUNTER — HOME INFUSION (PRE-WILLOW HOME INFUSION) (OUTPATIENT)
Dept: PHARMACY | Facility: CLINIC | Age: 87
End: 2022-02-03
Payer: COMMERCIAL

## 2022-02-03 ENCOUNTER — APPOINTMENT (OUTPATIENT)
Dept: OCCUPATIONAL THERAPY | Facility: CLINIC | Age: 87
DRG: 871 | End: 2022-02-03
Attending: INTERNAL MEDICINE
Payer: COMMERCIAL

## 2022-02-03 LAB
ALBUMIN SERPL-MCNC: 1.7 G/DL (ref 3.5–5)
ALP SERPL-CCNC: 256 U/L (ref 45–120)
ALT SERPL W P-5'-P-CCNC: 83 U/L (ref 0–45)
ANION GAP SERPL CALCULATED.3IONS-SCNC: 4 MMOL/L (ref 5–18)
AST SERPL W P-5'-P-CCNC: 51 U/L (ref 0–40)
BASOPHILS # BLD MANUAL: 0 10E3/UL (ref 0–0.2)
BASOPHILS NFR BLD MANUAL: 0 %
BILIRUB SERPL-MCNC: 3 MG/DL (ref 0–1)
BUN SERPL-MCNC: 21 MG/DL (ref 8–28)
CALCIUM SERPL-MCNC: 7.6 MG/DL (ref 8.5–10.5)
CHLORIDE BLD-SCNC: 103 MMOL/L (ref 98–107)
CO2 SERPL-SCNC: 28 MMOL/L (ref 22–31)
CREAT SERPL-MCNC: 0.98 MG/DL (ref 0.7–1.3)
EOSINOPHIL # BLD MANUAL: 0.2 10E3/UL (ref 0–0.7)
EOSINOPHIL NFR BLD MANUAL: 2 %
ERYTHROCYTE [DISTWIDTH] IN BLOOD BY AUTOMATED COUNT: 14.3 % (ref 10–15)
GFR SERPL CREATININE-BSD FRML MDRD: 76 ML/MIN/1.73M2
GLUCOSE BLD-MCNC: 96 MG/DL (ref 70–125)
GLUCOSE BLDC GLUCOMTR-MCNC: 161 MG/DL (ref 70–99)
GLUCOSE BLDC GLUCOMTR-MCNC: 164 MG/DL (ref 70–99)
GLUCOSE BLDC GLUCOMTR-MCNC: 189 MG/DL (ref 70–99)
GLUCOSE BLDC GLUCOMTR-MCNC: 194 MG/DL (ref 70–99)
GLUCOSE BLDC GLUCOMTR-MCNC: 53 MG/DL (ref 70–99)
GLUCOSE BLDC GLUCOMTR-MCNC: 67 MG/DL (ref 70–99)
GLUCOSE BLDC GLUCOMTR-MCNC: 76 MG/DL (ref 70–99)
GLUCOSE BLDC GLUCOMTR-MCNC: 81 MG/DL (ref 70–99)
HCT VFR BLD AUTO: 27.5 % (ref 40–53)
HGB BLD-MCNC: 8.9 G/DL (ref 13.3–17.7)
LYMPHOCYTES # BLD MANUAL: 1.6 10E3/UL (ref 0.8–5.3)
LYMPHOCYTES NFR BLD MANUAL: 15 %
MCH RBC QN AUTO: 30.8 PG (ref 26.5–33)
MCHC RBC AUTO-ENTMCNC: 32.4 G/DL (ref 31.5–36.5)
MCV RBC AUTO: 95 FL (ref 78–100)
METAMYELOCYTES # BLD MANUAL: 0.1 10E3/UL
METAMYELOCYTES NFR BLD MANUAL: 1 %
MONOCYTES # BLD MANUAL: 0.9 10E3/UL (ref 0–1.3)
MONOCYTES NFR BLD MANUAL: 9 %
NEUTROPHILS # BLD MANUAL: 7.6 10E3/UL (ref 1.6–8.3)
NEUTROPHILS NFR BLD MANUAL: 73 %
PLAT MORPH BLD: ABNORMAL
PLATELET # BLD AUTO: 246 10E3/UL (ref 150–450)
POTASSIUM BLD-SCNC: 4.5 MMOL/L (ref 3.5–5)
PROT SERPL-MCNC: 5.1 G/DL (ref 6–8)
RBC # BLD AUTO: 2.89 10E6/UL (ref 4.4–5.9)
RBC MORPH BLD: ABNORMAL
SODIUM SERPL-SCNC: 135 MMOL/L (ref 136–145)
WBC # BLD AUTO: 10.4 10E3/UL (ref 4–11)

## 2022-02-03 PROCEDURE — 97535 SELF CARE MNGMENT TRAINING: CPT | Mod: GO

## 2022-02-03 PROCEDURE — 97530 THERAPEUTIC ACTIVITIES: CPT | Mod: GP

## 2022-02-03 PROCEDURE — 85027 COMPLETE CBC AUTOMATED: CPT | Performed by: INTERNAL MEDICINE

## 2022-02-03 PROCEDURE — 99233 SBSQ HOSP IP/OBS HIGH 50: CPT | Performed by: INTERNAL MEDICINE

## 2022-02-03 PROCEDURE — 80053 COMPREHEN METABOLIC PANEL: CPT | Performed by: INTERNAL MEDICINE

## 2022-02-03 PROCEDURE — 97116 GAIT TRAINING THERAPY: CPT | Mod: GP

## 2022-02-03 PROCEDURE — 250N000013 HC RX MED GY IP 250 OP 250 PS 637: Performed by: INTERNAL MEDICINE

## 2022-02-03 PROCEDURE — 99232 SBSQ HOSP IP/OBS MODERATE 35: CPT | Performed by: FAMILY MEDICINE

## 2022-02-03 PROCEDURE — 250N000011 HC RX IP 250 OP 636: Performed by: INTERNAL MEDICINE

## 2022-02-03 PROCEDURE — 120N000001 HC R&B MED SURG/OB

## 2022-02-03 RX ORDER — PIPERACILLIN SODIUM, TAZOBACTAM SODIUM 3; .375 G/15ML; G/15ML
3.38 INJECTION, POWDER, LYOPHILIZED, FOR SOLUTION INTRAVENOUS EVERY 8 HOURS
Qty: 945 ML | Refills: 0
Start: 2022-02-03 | End: 2022-02-24

## 2022-02-03 RX ADMIN — APIXABAN 5 MG: 5 TABLET, FILM COATED ORAL at 20:40

## 2022-02-03 RX ADMIN — PIPERACILLIN AND TAZOBACTAM 3.38 G: 3; .375 INJECTION, POWDER, LYOPHILIZED, FOR SOLUTION INTRAVENOUS at 20:44

## 2022-02-03 RX ADMIN — LISINOPRIL 5 MG: 5 TABLET ORAL at 08:53

## 2022-02-03 RX ADMIN — INSULIN ASPART 1 UNITS: 100 INJECTION, SOLUTION INTRAVENOUS; SUBCUTANEOUS at 17:28

## 2022-02-03 RX ADMIN — DEXTROSE 15 G: 15 GEL ORAL at 09:11

## 2022-02-03 RX ADMIN — PIPERACILLIN AND TAZOBACTAM 3.38 G: 3; .375 INJECTION, POWDER, LYOPHILIZED, FOR SOLUTION INTRAVENOUS at 05:03

## 2022-02-03 RX ADMIN — INSULIN ASPART 1 UNITS: 100 INJECTION, SOLUTION INTRAVENOUS; SUBCUTANEOUS at 12:28

## 2022-02-03 RX ADMIN — PIPERACILLIN AND TAZOBACTAM 3.38 G: 3; .375 INJECTION, POWDER, LYOPHILIZED, FOR SOLUTION INTRAVENOUS at 12:27

## 2022-02-03 RX ADMIN — ASPIRIN 81 MG: 81 TABLET, COATED ORAL at 08:53

## 2022-02-03 RX ADMIN — APIXABAN 5 MG: 5 TABLET, FILM COATED ORAL at 08:52

## 2022-02-03 RX ADMIN — Medication 400 UNITS: at 08:53

## 2022-02-03 RX ADMIN — TAMSULOSIN HYDROCHLORIDE 0.4 MG: 0.4 CAPSULE ORAL at 17:27

## 2022-02-03 ASSESSMENT — ACTIVITIES OF DAILY LIVING (ADL)
ADLS_ACUITY_SCORE: 20
ADLS_ACUITY_SCORE: 24
ADLS_ACUITY_SCORE: 20
ADLS_ACUITY_SCORE: 24

## 2022-02-03 NOTE — PLAN OF CARE
Occupational Therapy Discharge Summary    Reason for therapy discharge:    Discharged to home with home therapy.    Progress towards therapy goal(s). See goals on Care Plan in [facility] electronic health record for goal details.  Goals partially met.  Barriers to achieving goals:   discharge from facility.    Therapy recommendation(s):    Continued therapy is recommended.  Rationale/Recommendations:  Patient will benefit from continued skilled therapy to increase IND and safety with ADLs and mobility.

## 2022-02-03 NOTE — PLAN OF CARE
Problem: Electrolyte Imbalance  Goal: Electrolyte Balance  Outcome: Improving     Pt blood glucose level at 0200 was 62, gave apple juice, notified Dr. Asher, rechecked and was 76, gave another juice and crackers, pt asymptomatic. Will continue to monitor.

## 2022-02-03 NOTE — PROGRESS NOTES
Care Management Follow Up      11:17 AM  SW spoke with Pt to discuss discharge planning.  Pt declining TCU stating he had a negative experience in the past.  SW provided education on TCU and informed Pt that referrals can be sent to alternate facilities.  Pt declines.  SW discussed IV abx with Pt and Pt reports he has had them at home before.  Pt reports wanting to discharge home with home IV abx and home PT / OT.    RNCM working on home IV abx.  Pt reports he will have a ride home.     PHOENIX Lu

## 2022-02-03 NOTE — PROGRESS NOTES
Pt does not have coverage for iv abx in the home with their [employer] plan. Pt would be self-pay. Drug would be billed to the part D and supplies will be self-pay. Based on Zosyn 3.375g q8h, total cost is $50.42 for drug and supplies per day.     Nursing is only covered if patient is homebound if not cost is $90.00 per visit if [facility] bills for it. Patient should have coverage in a TCU or infusion center. Let us know how patient would like to proceed.        ([employer])  In reference to admission date 01/27/2022 to check for iv abx coverage.         Please contact Intake with any questions, [phone] or In Basket pool,  Home Infusion ([phone]).

## 2022-02-03 NOTE — PLAN OF CARE
Vss. Pt denies pain. Disoriented to time. BG was 53 this morning. Protocol followed and BG is now in the 160-180's. Jaundice skin throughout. Incontinent of bowel and bladder. Plan is either to go home on abx which is not covered by health insurance or go to a TCU. Family would like to opt for TCU and pt would like to be home. Care management is looking into options. Will continue to monitor.

## 2022-02-03 NOTE — PROCEDURES
"PICC Line Insertion Procedure Note  Pt. Name: Caleb Hernandez  MRN:        [id number]  Procedure: Insertion of a  single Lumen  4 fr  Bard SOLO (valved) Power PICC, Lot number [id number]    Indications: antibiotics    Contraindications : none    Procedure Details   Patient identified with 2 identifiers and \"Time Out\" conducted.  .     Central line insertion bundle followed: hand hygeine performed prior to procedure, site cleansed with cholraprep, hat, mask, sterile gloves,sterile gown worn, patient draped with maximum barrier head to toe drape, sterile field maintained.    The vein was assessed and found to be compressible and of adequate size. 2 ml 1% Lidocaine administered sq to the insertion site. A 4 Fr PICC was inserted into the basilic vein of the right arm with ultrasound guidance. 1 attempt(s) required to access vein.   Catheter threaded without difficulty. Good blood return noted.    Modified Seldinger Technique used for insertion.    The 8 sharps that are included in the PICC insertion kit were accounted for and disposed of in the sharps container prior to breakdown of the sterile field.    Catheter secured with Statlock, biopatch and Tegaderm dressing applied.    Findings:  Total catheter length  41 cm, with 0 cm exposed. Mid upper arm circumference is 26 cm. Catheter was flushed with 20cc NS. Patient  tolerated procedure well.    Tip placement verified by xray. Xray read by Dr. Flores . Tip placement- \"PICC line catheter which courses into the SVC confluence and then crosses to the left of midline into the left innominate vein.\" It is recommended to pull back the picc line 4cm. PICC line pulled back 4cm as recommended and new order placed for cxr to verify placement after adjustment.    Repeat xray had a loop present in picc line, power flush performed and xray ordered.     2nd repeat xray now showing \"Repositioned right PICC now in satisfactory position in the mid to lower SVC\" per Radiologist Fracisco Rollins " Horacio.    CLABSI prevention brochure left at bedside.    Patient's primary RN notified PICC is ready for use.    Comments:            Liv Rock, RN [facility] V[facility] Access

## 2022-02-03 NOTE — PROGRESS NOTES
[facility] MEDICINE  PROGRESS NOTE     Code Status: Full Code       Identification/Summary:   86 year old male with PMH of recurrent CBD stones, malnutrition, cholecystectomy, IDDM type 2, CKD stage II, PE chronic anticoagulation with Eliquis, HTN, dyslipidemia.  1/27 admitted for abdominal pain and jaundice. Hospital course is notable for finding of choledocholithiasis, bacteremia with Klebsiella oxytoca.     1/27 underwent ERCP with sludge and stone removal at [facility]. Patient returned to this facility and has been on IV antibiotics since.  Ultrasound right upper quadrant showed likely liver phlegmon.  Symptomatically improving.  1/29 blood cultures positive for Klebsiella oxytoca with subsequent NGTD.  2/1 CT scan abdomen consistent with phlegmon and not an abscess. ID and GI consulted.  Will need IV antibiotic for 2-3 weeks  and does not have home IV insurance coverage.  Recommend TCU.     Assessment and Plan:  Acute ascending cholangitis secondary to choledocholithiasis   Status post ERCP 1/27/2022 at [facility]  Klebsiella bacteremia/sepsis:   On Zosyn since admission.  Repeat CT with cholangitis, inflammatory phlegmon/edema and microabscesses with no drainable fluid collection.findings likely related to choledocholithiasis and ascending cholangitis  Blood culture 1/29 Klebsiella x2.  Blood cultures 1/30 NGTD x4.  Appreciate infectious disease and gastroenterology consultation.  2/2 PICC line placed.  Recommend Zosyn for 2 to 3 weeks with follow-up repeat scan.  Will follow up with Dr. Crawley at ID clinic in 2 weeks.  2/3 determined that insurance does not pay for home IV antibiotics.  Care management to discuss with patient and family.  TCU recommended.   UTI secondary to Klebsiella oxytoca  Zosyn as above  Generalized edema  Weight is up by 4 kg's/10 pounds since admission  S/p Lasix 20 mg IV twice daily x 2   Transaminitis and hyperbilirubinemia  Secondary to  cholangitis.  Admission bilirubin 5.9-->--> 3.  Admission AST 54 and ALT 81.  Values holding stable.  Anticipate gradual improvement with treatment.  Recheck in 3 to 4 days at TCU versus home care.  Significant hypoalbuminemia  Albumin 1.6, in the setting of acute infection  Patient has chronic hypoalbuminemia, may be related to nutritional intake and recurrent choledocholithiasis.   Appreciate RD consultation.  Anemia  Mild in the setting of acute infection  Intermittent monitoring   Insulin-dependent type 2 diabetes mellitus:  Lantus home dose 44 units.  Multiple adjustments due to his varying oral intake status.   Sliding scale insulin, adjust as needed.  2/3 having some hypoglycemia.  Lantus decreased to 35 units nightly.  Hypertension  Blood pressure reasonably controlled.  Continue lisinopril 5 mg daily.  Leukocytosis  Thrombocytopenia:   Secondary to gram-negative infection, improving.  2/3 white count and platelets have normalized.  No further checks indicated.    COVID-19 PCR negative on 1/27/2022  Noted.  Standard precautions.  Atrial fibrillation  History of PE  Chronic anticoagulation  Chronically on Eliquis and baby aspirin.  Continue.  Fluids: Saline lock  Pain meds: Tylenol as needed.  Therapy: TCU recommended by therapy.  Family hoping to go with home care.  Dietrich:Not present  Current Diet  Orders Placed This Encounter      High Consistent Carb (75 g CHO per Meal) Diet    Supplements  Active Nourishment Order   Procedures     Snacks/Supplements Adult: Glucerna; Between Meals     Barriers to Discharge: Discharge disposition determination/decision by patient and family    Disposition: TCU recommended by therapy and insurance does not pay for home IV antibiotics.  Recommend again TCU.  Family decision pending.    Interval History/Subjective:  Patient doing well.  No complaints at this time.  Abdominal pain doing better.  No chest pain.  No shortness of breath.  No nausea or vomiting.  Hoping to  discharge home.  Questions answered to verbalized satisfaction.    Physical Exam/Objective:  Temp:  [97.5  F (36.4  C)-98.3  F (36.8  C)] 97.5  F (36.4  C)  Pulse:  [62-68] 68  Resp:  [18-20] 18  BP: (124-159)/(58-68) 129/60  SpO2:  [96 %-97 %] 96 %  Wt Readings from Last 4 Encounters:   02/03/22 81.3 kg (179 lb 4.8 oz)   01/27/22 81.1 kg (178 lb 12.7 oz)   11/24/21 81.1 kg (178 lb 11.2 oz)   08/25/21 76.2 kg (168 lb)     Body mass index is 27.26 kg/m .    Constitutional: awake, alert, cooperative, no apparent distress, and appears stated age and jaundiced  ENT: Normocephalic, without obvious abnormality, atraumatic, external ears without lesions, oral pharynx with moist mucous membranes, tonsils without erythema or exudates, gums normal and good dentition.  Respiratory: No increased work of breathing, good air exchange, clear to auscultation bilaterally, no crackles or wheezing  Cardiovascular: Normal apical impulse, regular rate and rhythm, normal S1 and S2, no S3 or S4, and no murmur noted  GI: No scars, normal bowel sounds, soft, non-distended, non-tender, no masses palpated, no hepatosplenomegally  Skin: Diffuse jaundice color.  No significant bruising.  Musculoskeletal: There is no redness, warmth, or swelling of the joints.  Motor strength is 5 out of 5 all extremities bilaterally.  Tone is normal. no lower extremity pitting edema present  Neurologic: Cranial nerves II-XII are grossly intact. Sensory:  Sensory intact  Neuropsychiatric: General: normal, calm and normal eye contact Level of consciousness: alert / normal Affect: normal Orientation: oriented to self, place, time and situation Memory and insight: normal, memory for past and recent events intact and thought process normal      Medications:   Personally Reviewed.  Medications       apixaban ANTICOAGULANT  5 mg Oral BID     aspirin  81 mg Oral Daily     insulin aspart   Subcutaneous Daily with breakfast     insulin aspart   Subcutaneous Daily with  lunch     insulin aspart  1-7 Units Subcutaneous TID AC     insulin aspart  1-5 Units Subcutaneous At Bedtime     insulin glargine  35 Units Subcutaneous At Bedtime     lisinopril  5 mg Oral Daily     piperacillin-tazobactam  3.375 g Intravenous Q8H     sodium chloride (PF)  10-40 mL Intracatheter Q7 Days     sodium chloride (PF)  3 mL Intracatheter Q8H     sodium chloride (PF)  3 mL Intracatheter Q8H     tamsulosin  0.4 mg Oral Daily with supper     vitamin E  400 Units Oral Daily       Data reviewed today: I personally reviewed all new medications, labs, imaging/diagnostics reports over the past 24 hours. Pertinent findings include:    Imaging:   Recent Results (from the past 24 hour(s))   XR Chest Port 1 View    Narrative    EXAM: XR CHEST PORT 1 VIEW  LOCATION: [facility]  DATE/TIME: 2/2/2022 4:15 PM    INDICATION: RN placed PICC   verify tip placement  COMPARISON: 01/28/2022      Impression    IMPRESSION: There is a new right upper extremity PICC line catheter which courses into the SVC confluence and then crosses to the left of midline into the left innominate vein. The tip is difficult to visualize given the spinous elements. Nevertheless,   it needs to be withdrawn at least 4 cm.    Findings discussed by the undersigned with his nurse Kirill at 1639.    Better inspiration. Resolution of the linear atelectasis in the left lower lobe. 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 Heart and pulmonary vascularity are normal. No signs of pneumonia or failure. Prior   cholecystectomy.   XR PICC Chest Placement Port 1vw    Narrative    EXAM: XR CHEST PICC PLACEMENT PORT 1 VW  LOCATION: [facility]  DATE/TIME: 2/2/2022 5:31 PM    INDICATION: Check PICC line position.  COMPARISON: 02/02/2022 at 1622 hours    FINDINGS: The right PICC line has been pulled back and the tip is now located within the mid SVC, however there  remains a loop of the PICC line within the proximal SVC. Elevated right hemidiaphragm and atelectasis or infiltrate right base, similar to the   earlier study.     XR PICC Chest Placement Port 1vw    Narrative    EXAM: XR CHEST PICC PLACEMENT PORT 1 VW  LOCATION: [facility]  DATE/TIME: 2/2/2022 6:22 PM    INDICATION: Check PICC line position.  COMPARISON: 02/02/2022 at 1737 hours    FINDINGS:     Repositioned right PICC now in satisfactory position in the mid to lower SVC.    Unchanged right lung volume loss with opacity along the right hemidiaphragm consistent with atelectasis. No new left lung opacities.    Unchanged heart and mediastinal contours.           Labs:  XR PICC Chest Placement Port 1vw   Final Result      XR PICC Chest Placement Port 1vw   Final Result      XR Chest Port 1 View   Final Result   IMPRESSION: There is a new right upper extremity PICC line catheter which courses into the SVC confluence and then crosses to the left of midline into the left innominate vein. The tip is difficult to visualize given the spinous elements. Nevertheless,    it needs to be withdrawn at least 4 cm.      Findings discussed by the undersigned with his nurse Kirill at 1639.      Better inspiration. Resolution of the linear atelectasis in the left lower lobe. 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 Heart and pulmonary vascularity are normal. No signs of pneumonia or failure. Prior    cholecystectomy.      CT Abdomen Pelvis w Contrast   Final Result   IMPRESSION:       1.  Status post ERCP with decompression of intrahepatic bile duct dilation. New wall thickening/enhancement of the common bile duct consistent with cholangitis.    2.  Persistent heterogeneity of the parenchyma in segments V/VII, due in part to thrombus in a branch of the right hepatic vein, consistent with inflammatory phlegmon/edema. Within the area of phlegmon there are multiple  subcentimeter low-attenuation    foci consistent with microabscesses but no aspirable or drainable fluid collection.      XR Chest Port 1 View   Final Result   IMPRESSION: Lung volumes are substantially lower. Bands of linear infiltrate present at each lung base favored to represent discoid atelectasis though developing pneumonia possible. Very minimal right lateral pleural thickening or pleural effusion    evident. Upper lung zones are clear. Heart size appears normal.      CT Abdomen Pelvis w Contrast    (Results Pending)     Recent Results (from the past 24 hour(s))   Glucose by meter    Collection Time: 02/02/22 11:58 AM   Result Value Ref Range    GLUCOSE BY METER POCT 169 (H) 70 - 99 mg/dL   Glucose by meter    Collection Time: 02/02/22  4:57 PM   Result Value Ref Range    GLUCOSE BY METER POCT 209 (H) 70 - 99 mg/dL   Glucose by meter    Collection Time: 02/02/22  9:06 PM   Result Value Ref Range    GLUCOSE BY METER POCT 180 (H) 70 - 99 mg/dL   Glucose by meter    Collection Time: 02/03/22  2:25 AM   Result Value Ref Range    GLUCOSE BY METER POCT 67 (L) 70 - 99 mg/dL   Glucose by meter    Collection Time: 02/03/22  2:58 AM   Result Value Ref Range    GLUCOSE BY METER POCT 76 70 - 99 mg/dL   Comprehensive metabolic panel    Collection Time: 02/03/22  5:01 AM   Result Value Ref Range    Sodium 135 (L) 136 - 145 mmol/L    Potassium 4.5 3.5 - 5.0 mmol/L    Chloride 103 98 - 107 mmol/L    Carbon Dioxide (CO2) 28 22 - 31 mmol/L    Anion Gap 4 (L) 5 - 18 mmol/L    Urea Nitrogen 21 8 - 28 mg/dL    Creatinine 0.98 0.70 - 1.30 mg/dL    Calcium 7.6 (L) 8.5 - 10.5 mg/dL    Glucose 96 70 - 125 mg/dL    Alkaline Phosphatase 256 (H) 45 - 120 U/L    AST 51 (H) 0 - 40 U/L    ALT 83 (H) 0 - 45 U/L    Protein Total 5.1 (L) 6.0 - 8.0 g/dL    Albumin 1.7 (L) 3.5 - 5.0 g/dL    Bilirubin Total 3.0 (H) 0.0 - 1.0 mg/dL    GFR Estimate 76 >60 mL/min/1.73m2   CBC with platelets and differential    Collection Time: 02/03/22  5:01 AM    Result Value Ref Range    WBC Count 10.4 4.0 - 11.0 10e3/uL    RBC Count 2.89 (L) 4.40 - 5.90 10e6/uL    Hemoglobin 8.9 (L) 13.3 - 17.7 g/dL    Hematocrit 27.5 (L) 40.0 - 53.0 %    MCV 95 78 - 100 fL    MCH 30.8 26.5 - 33.0 pg    MCHC 32.4 31.5 - 36.5 g/dL    RDW 14.3 10.0 - 15.0 %    Platelet Count 246 150 - 450 10e3/uL   Manual Differential    Collection Time: 02/03/22  5:01 AM   Result Value Ref Range    % Neutrophils 73 %    % Lymphocytes 15 %    % Monocytes 9 %    % Eosinophils 2 %    % Basophils 0 %    % Metamyelocytes 1 %    Absolute Neutrophils 7.6 1.6 - 8.3 10e3/uL    Absolute Lymphocytes 1.6 0.8 - 5.3 10e3/uL    Absolute Monocytes 0.9 0.0 - 1.3 10e3/uL    Absolute Eosinophils 0.2 0.0 - 0.7 10e3/uL    Absolute Basophils 0.0 0.0 - 0.2 10e3/uL    Absolute Metamyelocytes 0.1 (H) <=0.0 10e3/uL    RBC Morphology Confirmed RBC Indices     Platelet Assessment  Automated Count Confirmed. Platelet morphology is normal.     Automated Count Confirmed. Platelet morphology is normal.   Glucose by meter    Collection Time: 02/03/22  8:58 AM   Result Value Ref Range    GLUCOSE BY METER POCT 53 (L) 70 - 99 mg/dL   Glucose by meter    Collection Time: 02/03/22  9:21 AM   Result Value Ref Range    GLUCOSE BY METER POCT 81 70 - 99 mg/dL   Glucose by meter    Collection Time: 02/03/22 10:19 AM   Result Value Ref Range    GLUCOSE BY METER POCT 161 (H) 70 - 99 mg/dL       Pending Labs:  Unresulted Labs Ordered in the Past 30 Days of this Admission     Date and Time Order Name Status Description    1/30/2022  8:10 PM Blood Culture Peripheral Blood Preliminary     1/30/2022  2:09 PM Blood Culture Peripheral Blood Preliminary     1/30/2022 11:40 AM Blood Culture Peripheral Blood Preliminary     1/30/2022 11:40 AM Blood Culture Peripheral Blood Preliminary     1/29/2022  1:23 PM Blood Culture Peripheral Blood Preliminary     1/29/2022  1:23 PM Blood Culture Line, venous Preliminary           Chaz Albarado,  MD  [facility]  [facility]  [facility]  Phone: #[phone]

## 2022-02-03 NOTE — PLAN OF CARE
Problem: Infection  Goal: Absence of Infection Signs and Symptoms  Outcome: Improving   Afebrile. Receiving iv antibiotics

## 2022-02-03 NOTE — PROGRESS NOTES
Care Management Follow Up    Length of Stay (days): 7    Expected Discharge Date: 02/04/2022     Concerns to be Addressed: care coordination/care conferences,discharge planning,medication  Home IV Abx teaching is currently being arranged.  Patient plan of care discussed at interdisciplinary rounds: Yes  Anticipated Discharge Disposition: Home Infusion,Home Care  Anticipated Discharge Services: None  Anticipated Discharge DME: None  Patient/family educated on Medicare website which has current facility and service quality ratings: no  Education Provided on the Discharge Plan:    Patient/Family in Agreement with the Plan: yes  Referrals Placed by CM/SW: Homecare,Home Infusion,Post Acute Facilities  Private pay costs discussed: Yes, pertaining to home infusion.    Additional Information:  Writer was asked by co-worker to assist with arranging home IV Antibiotic therapy for this patient. Pt wants to avoid TCU placement as much as possible as he had a bad experience previously resulting in a three month stay. Pt has a wife, a son, and a later teen granddaughter in the home, and medically associated neighbor reportedly able to assist with IV Abx and other care at home as needed/able.    [facility] provided coverage numbers at ~$50/day for meds and equipment along with ~$90 for RN visits.    [facility] provided coverage at ~$210/week with RN visits included as they are covered.    Pt states he does not care what it costs, he will pay to get care at home over TCU placement. [facility] will proceed with initiation of service as directed to do so by pt. As visitors are allowed for IV therapy teaching, Ilda JACKSON) from [facility] at [phone] is going to arrange a teach some time on 2/4 with pt and available caregivers in hospital.    Pt should receive his ~1300hrs dose of Zosyn on 2/4 and then, depending on timing of the teach, pt should be returning home via family shortly  thereafter.  -----------------------------------------------------------------------------------------------------------------------    At ~1730hrs -  received a call from  stating pt's wife and son were contacted by [employer] to arrange a teach. They both reportedly felt pt would be better served by being placed in a TCU upon discharge rather than a return home. The teach was not scheduled. No current TCUs accepting of pt placement.    Danetetr attempted to contact pt's wife/son by phone to mitigate any barriers in discharge planning if able. There was no answer. Writer left a voice message, without involving [employer], apologizing for any confusion and offering to coordinate with them for a successful and safe discharge.    CM to follow and assist with discharge service coordination as needed.    Cholo Salazar, RN

## 2022-02-03 NOTE — PROGRESS NOTES
[facility]  Infectious Disease   Progress Note     Date of Admission:  1/27/2022    Assessment & Plan   Cholangitis with klebsiella bacteremia, bacteriuria. Active issue.   Choledocholithiasis, ERCP 1/27  Liver phlegmon-microabscesses noted 2/1-will complete course of IV abx.   DM  US jan 27:  1.  extra hepatic bile duct dilation.  2.  Right hepatic peripheral lesion measures 3.7 cm by ultrasound. Evaluating all of the modalities, this may represent focal worsening of bile duct dilation versus phlegmon. At this time, it does not have the appearance of abscess.  3.  Following treatment of the bile duct obstruction, this lesion could be followed with either ultrasound or CT.    PLAN  repeat BC positive, this suggests liver abscesses  Continue Zosyn IV-plan 2-3 weeks at discharge   weekly cbc diff, cmp while on IV pip-tazo  Plan repeat CT scan ~2-3 weeks  Follow up in ID clinic with Misbah in 2 weeks  ID discharge orders written  Please call with additional questions. ID will sign off.   Repeat CT scan with cholangitis, inflammatory phlegmon/edema and microabscesses but no drainable fluid collection    Valeria Crawley M.D.     __________________________________________________    Interval History   Doing ok. PICC placed. Tolerating antibiotics.     Physical Exam   Vital Signs: Temp: 97.5  F (36.4  C) Temp src: Oral BP: 129/60 Pulse: 68   Resp: 18 SpO2: 96 % O2 Device: None (Room air)    Weight: 179 lbs 4.8 oz  Gen. appearance nontoxic  Eyes no conjunctivitis or icterus  Neck no stiffness or neck vein distention, no LN  Heart  no S3 or murmurs  Lungs clear no wheeze  Abdomen soft not tender  Extremities no synovitis, trace edema  Skin  no rash or emboli  Neurologic alert gen weakness      Data   Results for orders placed or performed during the hospital encounter of 01/27/22 (from the past 24 hour(s))   Glucose by meter   Result Value Ref Range    GLUCOSE BY METER POCT 169 (H) 70 - 99 mg/dL  "  Vascular Access Adult IP Consult    Narrative    Liv Rock RN     2/2/2022  7:26 PM  PICC Line Insertion Procedure Note  Pt. Name: Caleb Hernandez  MRN:        [id number]  Procedure: Insertion of a  single Lumen  4 fr  Bard SOLO (valved)   Power PICC, Lot number [id number]    Indications: antibiotics    Contraindications : none    Procedure Details   Patient identified with 2 identifiers and \"Time Out\" conducted.    .     Central line insertion bundle followed: hand hygeine performed   prior to procedure, site cleansed with cholraprep, hat, mask,   sterile gloves,sterile gown worn, patient draped with maximum   barrier head to toe drape, sterile field maintained.    The vein was assessed and found to be compressible and of   adequate size. 2 ml 1% Lidocaine administered sq to the insertion   site. A 4 Fr PICC was inserted into the basilic vein of the right   arm with ultrasound guidance. 1 attempt(s) required to access   vein.   Catheter threaded without difficulty. Good blood return   noted.    Modified Seldinger Technique used for insertion.    The 8 sharps that are included in the PICC insertion kit were   accounted for and disposed of in the sharps container prior to   breakdown of the sterile field.    Catheter secured with Statlock, biopatch and Tegaderm dressing   applied.    Findings:  Total catheter length  41 cm, with 0 cm exposed. Mid upper arm   circumference is 26 cm. Catheter was flushed with 20cc NS.   Patient  tolerated procedure well.    Tip placement verified by xray. Xray read by Dr. Flores . Tip   placement- \"PICC line catheter which courses into the SVC   confluence and then crosses to the left of midline into the left   innominate vein.\" It is recommended to pull back the picc line   4cm. PICC line pulled back 4cm as recommended and new order   placed for cxr to verify placement after adjustment.    Repeat xray had a loop present in picc line, power flush   performed and xray " "ordered.     2nd repeat xray now showing \"Repositioned right PICC now in   satisfactory position in the mid to lower SVC\" per Radiologist   Fracisco Leonard.    CLABSI prevention brochure left at bedside.    Patient's primary RN notified PICC is ready for use.    Comments:            Liv Rock, RN [facility]       XR Chest Port 1 View    Narrative    EXAM: XR CHEST PORT 1 VIEW  LOCATION: [facility]  DATE/TIME: 2/2/2022 4:15 PM    INDICATION: RN placed PICC   verify tip placement  COMPARISON: 01/28/2022      Impression    IMPRESSION: There is a new right upper extremity PICC line catheter which courses into the SVC confluence and then crosses to the left of midline into the left innominate vein. The tip is difficult to visualize given the spinous elements. Nevertheless,   it needs to be withdrawn at least 4 cm.    Findings discussed by the undersigned with his nurse Kirill at 1639.    Better inspiration. Resolution of the linear atelectasis in the left lower lobe. 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 Heart and pulmonary vascularity are normal. No signs of pneumonia or failure. Prior   cholecystectomy.   Glucose by meter   Result Value Ref Range    GLUCOSE BY METER POCT 209 (H) 70 - 99 mg/dL   XR PICC Chest Placement Port 1vw    Narrative    EXAM: XR CHEST PICC PLACEMENT PORT 1 VW  LOCATION: [facility]  DATE/TIME: 2/2/2022 5:31 PM    INDICATION: Check PICC line position.  COMPARISON: 02/02/2022 at 1622 hours    FINDINGS: The right PICC line has been pulled back and the tip is now located within the mid SVC, however there remains a loop of the PICC line within the proximal SVC. Elevated right hemidiaphragm and atelectasis or infiltrate right base, similar to the   earlier study.     XR PICC Chest Placement Port 1vw    Narrative    EXAM: XR CHEST PICC PLACEMENT PORT 1 VW  LOCATION: [facility]  DATE/TIME: 2/2/2022 6:22 PM    INDICATION: Check PICC line position.  COMPARISON: 02/02/2022 at 1737 hours    FINDINGS:     Repositioned right PICC now in satisfactory position in the mid to lower SVC.    Unchanged right lung volume loss with opacity along the right hemidiaphragm consistent with atelectasis. No new left lung opacities.    Unchanged heart and mediastinal contours.       Glucose by meter   Result Value Ref Range    GLUCOSE BY METER POCT 180 (H) 70 - 99 mg/dL   Glucose by meter   Result Value Ref Range    GLUCOSE BY METER POCT 67 (L) 70 - 99 mg/dL   Glucose by meter   Result Value Ref Range    GLUCOSE BY METER POCT 76 70 - 99 mg/dL   CBC with Platelets & Differential    Narrative    The following orders were created for panel order CBC with Platelets & Differential.  Procedure                               Abnormality         Status                     ---------                               -----------         ------                     CBC with platelets and d...[[id number]]  Abnormal            Final result               Manual Differential[[id number]]          Abnormal            Final result                 Please view results for these tests on the individual orders.   Comprehensive metabolic panel   Result Value Ref Range    Sodium 135 (L) 136 - 145 mmol/L    Potassium 4.5 3.5 - 5.0 mmol/L    Chloride 103 98 - 107 mmol/L    Carbon Dioxide (CO2) 28 22 - 31 mmol/L    Anion Gap 4 (L) 5 - 18 mmol/L    Urea Nitrogen 21 8 - 28 mg/dL    Creatinine 0.98 0.70 - 1.30 mg/dL    Calcium 7.6 (L) 8.5 - 10.5 mg/dL    Glucose 96 70 - 125 mg/dL    Alkaline Phosphatase 256 (H) 45 - 120 U/L    AST 51 (H) 0 - 40 U/L    ALT 83 (H) 0 - 45 U/L    Protein Total 5.1 (L) 6.0 - 8.0 g/dL    Albumin 1.7 (L) 3.5 - 5.0 g/dL    Bilirubin Total 3.0 (H) 0.0 - 1.0 mg/dL    GFR Estimate 76 >60 mL/min/1.73m2   CBC with platelets and differential   Result Value Ref Range    WBC Count 10.4 4.0 - 11.0 10e3/uL     RBC Count 2.89 (L) 4.40 - 5.90 10e6/uL    Hemoglobin 8.9 (L) 13.3 - 17.7 g/dL    Hematocrit 27.5 (L) 40.0 - 53.0 %    MCV 95 78 - 100 fL    MCH 30.8 26.5 - 33.0 pg    MCHC 32.4 31.5 - 36.5 g/dL    RDW 14.3 10.0 - 15.0 %    Platelet Count 246 150 - 450 10e3/uL   Manual Differential   Result Value Ref Range    % Neutrophils 73 %    % Lymphocytes 15 %    % Monocytes 9 %    % Eosinophils 2 %    % Basophils 0 %    % Metamyelocytes 1 %    Absolute Neutrophils 7.6 1.6 - 8.3 10e3/uL    Absolute Lymphocytes 1.6 0.8 - 5.3 10e3/uL    Absolute Monocytes 0.9 0.0 - 1.3 10e3/uL    Absolute Eosinophils 0.2 0.0 - 0.7 10e3/uL    Absolute Basophils 0.0 0.0 - 0.2 10e3/uL    Absolute Metamyelocytes 0.1 (H) <=0.0 10e3/uL    RBC Morphology Confirmed RBC Indices     Platelet Assessment  Automated Count Confirmed. Platelet morphology is normal.     Automated Count Confirmed. Platelet morphology is normal.   Glucose by meter   Result Value Ref Range    GLUCOSE BY METER POCT 53 (L) 70 - 99 mg/dL   Glucose by meter   Result Value Ref Range    GLUCOSE BY METER POCT 81 70 - 99 mg/dL

## 2022-02-04 ENCOUNTER — TELEPHONE (OUTPATIENT)
Dept: INFECTIOUS DISEASES | Facility: CLINIC | Age: 87
End: 2022-02-04
Payer: COMMERCIAL

## 2022-02-04 ENCOUNTER — APPOINTMENT (OUTPATIENT)
Dept: PHYSICAL THERAPY | Facility: CLINIC | Age: 87
DRG: 871 | End: 2022-02-04
Attending: INTERNAL MEDICINE
Payer: COMMERCIAL

## 2022-02-04 VITALS
DIASTOLIC BLOOD PRESSURE: 61 MMHG | OXYGEN SATURATION: 96 % | BODY MASS INDEX: 27.08 KG/M2 | RESPIRATION RATE: 20 BRPM | HEART RATE: 71 BPM | TEMPERATURE: 98 F | WEIGHT: 178.1 LBS | SYSTOLIC BLOOD PRESSURE: 133 MMHG

## 2022-02-04 DIAGNOSIS — M86.9 OSTEOMYELITIS OF RIGHT FOOT, UNSPECIFIED TYPE (H): Primary | ICD-10-CM

## 2022-02-04 LAB
BACTERIA BLD CULT: NO GROWTH
GLUCOSE BLDC GLUCOMTR-MCNC: 113 MG/DL (ref 70–99)
GLUCOSE BLDC GLUCOMTR-MCNC: 156 MG/DL (ref 70–99)
GLUCOSE BLDC GLUCOMTR-MCNC: 193 MG/DL (ref 70–99)
GLUCOSE BLDC GLUCOMTR-MCNC: 199 MG/DL (ref 70–99)
GLUCOSE BLDC GLUCOMTR-MCNC: 42 MG/DL (ref 70–99)
GLUCOSE BLDC GLUCOMTR-MCNC: 47 MG/DL (ref 70–99)
SARS-COV-2 RNA RESP QL NAA+PROBE: NEGATIVE

## 2022-02-04 PROCEDURE — 250N000011 HC RX IP 250 OP 636: Performed by: INTERNAL MEDICINE

## 2022-02-04 PROCEDURE — 99239 HOSP IP/OBS DSCHRG MGMT >30: CPT | Performed by: FAMILY MEDICINE

## 2022-02-04 PROCEDURE — 250N000013 HC RX MED GY IP 250 OP 250 PS 637: Performed by: INTERNAL MEDICINE

## 2022-02-04 PROCEDURE — 87635 SARS-COV-2 COVID-19 AMP PRB: CPT | Performed by: FAMILY MEDICINE

## 2022-02-04 PROCEDURE — 97116 GAIT TRAINING THERAPY: CPT | Mod: GP

## 2022-02-04 RX ORDER — INSULIN GLARGINE 100 [IU]/ML
25 INJECTION, SOLUTION SUBCUTANEOUS AT BEDTIME
Qty: 15 ML | Refills: 0 | Status: ON HOLD
Start: 2022-02-04 | End: 2022-04-25

## 2022-02-04 RX ORDER — TRAMADOL HYDROCHLORIDE 50 MG/1
50 TABLET ORAL EVERY 6 HOURS PRN
Qty: 20 TABLET | Refills: 0 | Status: SHIPPED | OUTPATIENT
Start: 2022-02-04 | End: 2022-06-29

## 2022-02-04 RX ADMIN — LISINOPRIL 5 MG: 5 TABLET ORAL at 08:21

## 2022-02-04 RX ADMIN — PIPERACILLIN AND TAZOBACTAM 3.38 G: 3; .375 INJECTION, POWDER, LYOPHILIZED, FOR SOLUTION INTRAVENOUS at 14:15

## 2022-02-04 RX ADMIN — INSULIN ASPART 2 UNITS: 100 INJECTION, SOLUTION INTRAVENOUS; SUBCUTANEOUS at 14:15

## 2022-02-04 RX ADMIN — Medication 400 UNITS: at 08:21

## 2022-02-04 RX ADMIN — PIPERACILLIN AND TAZOBACTAM 3.38 G: 3; .375 INJECTION, POWDER, LYOPHILIZED, FOR SOLUTION INTRAVENOUS at 05:53

## 2022-02-04 RX ADMIN — APIXABAN 5 MG: 5 TABLET, FILM COATED ORAL at 08:21

## 2022-02-04 RX ADMIN — INSULIN ASPART 2 UNITS: 100 INJECTION, SOLUTION INTRAVENOUS; SUBCUTANEOUS at 16:30

## 2022-02-04 RX ADMIN — ASPIRIN 81 MG: 81 TABLET, COATED ORAL at 08:21

## 2022-02-04 ASSESSMENT — ACTIVITIES OF DAILY LIVING (ADL)
ADLS_ACUITY_SCORE: 26
ADLS_ACUITY_SCORE: 24
ADLS_ACUITY_SCORE: 28
ADLS_ACUITY_SCORE: 26
ADLS_ACUITY_SCORE: 26
ADLS_ACUITY_SCORE: 24
ADLS_ACUITY_SCORE: 26
ADLS_ACUITY_SCORE: 26
ADLS_ACUITY_SCORE: 24
ADLS_ACUITY_SCORE: 26
ADLS_ACUITY_SCORE: 26
ADLS_ACUITY_SCORE: 28
ADLS_ACUITY_SCORE: 24
ADLS_ACUITY_SCORE: 26
ADLS_ACUITY_SCORE: 24

## 2022-02-04 NOTE — PLAN OF CARE
Problem: Adult Inpatient Plan of Care  Goal: Optimal Comfort and Wellbeing  Outcome: Improving   Pt denies pain. Tolerating reg soft diet well. Was worried about things being easy to chew. Enc more protein intake d/t low BG in the AM. Lantus dose also reduced.     Problem: Adult Inpatient Plan of Care  Goal: Readiness for Transition of Care  Outcome: Improving   Pt has PICC line in place and receiving IV antibx. Jaundice seems to be resolved. Going to TCU for cont IV antibx.

## 2022-02-04 NOTE — TELEPHONE ENCOUNTER
2/4 update - pt still IP    ----- Message -----  From: Valeria Crawley MD  Sent: 2/3/2022  10:09 AM CST  To: Shaheed Scheduling Registration Pool, #    Can we have Frantz follow-up with me in 2 weeks  Continue Zosyn IV-plan 2-3 weeks at discharge   weekly cbc diff, cmp while on IV pip-tazo  Plan repeat CT scan ~2-3 weeks (before apt with me)  Thanks!

## 2022-02-04 NOTE — PLAN OF CARE
Physical Therapy Discharge Summary    Reason for therapy discharge:    Discharged to transitional care facility.    Progress towards therapy goal(s). See goals on Care Plan in [facility] electronic health record for goal details.  Goals not met.  Barriers to achieving goals:   discharge from facility.    Therapy recommendation(s):    Continued therapy is recommended.  Rationale/Recommendations:  strengthening to maximize ind with mobility/decrease fall risk.

## 2022-02-04 NOTE — PROGRESS NOTES
"Care Management Discharge Note    Discharge Date: 02/04/2022       Discharge Disposition: Transitional Care ([facility] TCU)    Discharge Services:  ([facility] TCU)    Discharge DME:  (PICC line for IV antibiotics)    Discharge Transportation: family or friend will provide (son to transport)    Private pay costs discussed: None indicated. Son will transport.     PAS Confirmation Code:  [id number]  Patient/family educated on Medicare website which has current facility and service quality ratings: yes, CM reviewed with patient and wife.  Education Provided on the Discharge Plan:  CM arranged for TCU with patient and wife. AVS per bedside RN.   Persons Notified of Discharge Plans: patient, wife and son   Patient/Family in Agreement with the Plan: yes (patient and wife [place])    Handoff Referral Completed: CM coordinated with patient, wife ([place]) and son (Elfego). CM coordinated with TCU admissions. CM updated MD, bedside RN, Charge RN and HUC. CM faxed discharge orders and Rx.     Additional Information:  Ilda Hester states she spoke with wife and son and they are not comfortable with plan for home infusion. CM met with patient, he did indicate preference to \"go home or why can't I just stay in the hospital until it is done\". Patient agreeable to CM including wife [place] in conversation via speakerphone. Wife states son works during the day and she is dealing with medical concerns- arthritis, recent back surgery and bad hip and barely able to walk herself right now. Wife not confident that she could provide assist of 1. Wife does not drive, son is not able to provide transportation as he is working.   Patient and wife agreeable to TCU placement, prefer [facility]. CM sent referrals per request. Son can possibly assist with transportation this evening or over the weekend to TCU.     [facility] and [facility] call to CM. "   [facility]-  left VM with admissions requesting return call to .   [facility]- Guillermina Klein declines as they do not accept IV antibiotics  [facility]- unable to accept any IV antibiotic patients at this time   [facility]- per Alice, no beds until Monday.     3:01 PM  [facility]. Patient, wife and son agreeable. CM updated MD.   Son will transport at 4:30pm.  updated bedside RN, Charge RN and HUC.         Lizzie Del Rio, RN

## 2022-02-04 NOTE — DISCHARGE SUMMARY
[facility] MEDICINE  DISCHARGE SUMMARY     Primary Care Physician: Otis Lozano  Admission Date: 1/27/2022   Discharge Provider: Chaz Albarado MD Discharge Date: 2/4/2022   Diet:   Active Diet and Nourishment Order   Procedures     Snacks/Supplements Adult: Glucerna; Between Meals     High Consistent Carb (75 g CHO per Meal) Diet     Advance Diet as Tolerated     Code Status: Full Code   Activity: DCACTIVITY: Activity as tolerated  Ambulate with nursing assistance.  Fall precautions.      Condition at Discharge: Stable     REASON FOR PRESENTATION(See Admission Note for Details)   Poor appetite and jaundice    PRINCIPAL & ACTIVE DISCHARGE DIAGNOSES     Jaundice  Ascending cholangitis  Cholelithiasis  Sepsis (H)  Status post endoscopic retrograde cholangiopancreatography  Gram-negative bacteremia Klebsiella  Acute cystitis-Klebsiella  Generalized edema  Transaminitis and hyperbilirubinemia  Leukocytosis  Thrombocytopenia  Atrial fibrillation  Essential hypertension  Personal history of PE (pulmonary embolism)  Chronic anticoagulation  Type 2 diabetes mellitus with diabetic peripheral angiopathy without gangrene, with long-term current use of insulin (H)    PENDING LABS     Unresulted Labs Ordered in the Past 30 Days of this Admission     Date and Time Order Name Status Description    1/30/2022  8:10 PM Blood Culture Peripheral Blood Preliminary     1/30/2022  2:09 PM Blood Culture Peripheral Blood Preliminary     1/30/2022 11:40 AM Blood Culture Peripheral Blood Preliminary     1/30/2022 11:40 AM Blood Culture Peripheral Blood Preliminary     1/29/2022  1:23 PM Blood Culture Peripheral Blood Preliminary     1/29/2022  1:23 PM Blood Culture Line, venous Preliminary         PROCEDURES ( this hospitalization only)      ERCP at [facility] 1/27/2022    RECOMMENDATIONS TO OUTPATIENT PROVIDER FOR F/U VISIT   Follow-up Appointments     Follow Up and recommended labs and tests       1.  See recommendations from infectious disease.  2.  Follow-up with primary care provider after TCU discharge.           DISPOSITION     Skilled Nursing Facility    SUMMARY OF HOSPITAL COURSE:      86 year old male with PMH of recurrent CBD stones, malnutrition, cholecystectomy, IDDM type 2, CKD stage II, PE chronic anticoagulation with Eliquis, HTN, dyslipidemia.  1/26 presented to the ED with complaints of abdominal pain and significant jaundice.  CT scan of the abdomen showed common bile duct obstruction and cholangitis with possible small abscesses.  1/27 admitted for abdominal pain and jaundice.    1.  GI.  Initiated on intravenous antibiotics.  GI consulted.  Hospital course is notable for finding of choledocholithiasis, bacteremia with Klebsiella oxytoca. 1/27 underwent ERCP with sludge and stone removal at [facility]. Patient returned to [facility] and has been on IV antibiotics since.  Admission bilirubin 5.9 improved to 3.  Noted abnormal urinalysis with subsequent positive urine culture with Klebsiella.  Ultrasound right upper quadrant showed likely liver phlegmon.  Symptomatically improving.  1/29 blood cultures positive for Klebsiella oxytoca with subsequent NGTD.  2/1 CT scan abdomen consistent with phlegmon and not an abscess. ID consulted.  Will need Zosyn IV antibiotic for 2-3 weeks.  Follow-up with ID Dr. Crawley in 2 weeks.  Recommend TCU.    2.  Diabetes.  Patient's Victoza was not available during his stay so he was placed on sliding scale and carb counting insulin.  Did have some struggles with hypoglycemia.  At discharge we will can resume his Victoza and decrease the dose of his Lantus.    Patient's multiple other chronic diagnoses have remained stable.    Discharge Medications with Med changes:     Current Discharge Medication List      START taking these medications    Details   !! insulin aspart (NOVOLOG PEN) 100 UNIT/ML pen Inject 1-7 Units Subcutaneous 3 times daily (before meals)  Correction Scale - MEDIUM INSULIN RESISTANCE DOSING     Do Not give Correction Insulin if Pre-Meal BG less than 140.   For Pre-Meal  - 189 give 1 unit.   For Pre-Meal  - 239 give 2 units.   For Pre-Meal  - 289 give 3 units.   For Pre-Meal  - 339 give 4 units.   For Pre-Meal - 399 give 5 units.   For Pre-Meal -449 give 6 units  For Pre-Meal BG greater than or equal to 450 give 7 units.   To be given with prandial insulin, and based on pre-meal blood glucose.    Notify provider if glucose greater than or equal to 350 mg/dL after administration of correction dose. If given at mealtime, administer within 30 minutes of start of meal.  Qty: 15 mL    Associated Diagnoses: Type 2 diabetes mellitus with diabetic peripheral angiopathy without gangrene, with long-term current use of insulin (H)      !! insulin aspart (NOVOLOG PEN) 100 UNIT/ML pen Inject 1-5 Units Subcutaneous At Bedtime MEDIUM INSULIN RESISTANCE DOSING    Do Not give Bedtime Correction Insulin if BG less than  200.   For  - 249 give 1 units.   For  - 299 give 2 units.   For  - 349 give 3 units.   For  -399 give 4 units.   For BG greater than or equal to 400 give 5 units.  Notify provider if glucose greater than or equal to 350 mg/dL after administration of correction dose. If given at mealtime, administer within 30 minutes of start of meal.  Qty: 15 mL    Associated Diagnoses: Type 2 diabetes mellitus with diabetic peripheral angiopathy without gangrene, with long-term current use of insulin (H)      piperacillin-tazobactam (ZOSYN) 3-0.375 GM vial Inject 3.375 g into the vein every 8 hours for 21 days  Qty: 945 mL, Refills: 0    Associated Diagnoses: Ascending cholangitis       !! - Potential duplicate medications found. Please discuss with provider.      CONTINUE these medications which have CHANGED    Details   insulin glargine (LANTUS SOLOSTAR) 100 UNIT/ML pen Inject 25 Units Subcutaneous At  Bedtime  Qty: 15 mL, Refills: 0    Comments: If Lantus is not covered by insurance, may substitute Basaglar or Semglee or other insulin glargine product per insurance preference at same dose and frequency.    Associated Diagnoses: Type 2 diabetes mellitus with diabetic peripheral angiopathy without gangrene, with long-term current use of insulin (H)      traMADol (ULTRAM) 50 MG tablet Take 1 tablet (50 mg) by mouth every 6 hours as needed for moderate pain  Qty: 20 tablet, Refills: 0    Associated Diagnoses: Status post endoscopic retrograde cholangiopancreatography         CONTINUE these medications which have NOT CHANGED    Details   acetaminophen (TYLENOL) 500 MG tablet [ACETAMINOPHEN (TYLENOL) 500 MG TABLET] Take 1-2 tablets (500-1,000 mg total) by mouth every 4 (four) hours as needed.  Refills: 0    Associated Diagnoses: Osteomyelitis of right foot, unspecified type (H)      aspirin 81 MG EC tablet [ASPIRIN 81 MG EC TABLET] Take 81 mg by mouth daily.      B-D U/F 31G X 8 MM insulin pen needle USED TO INJECT INSULIN DAILY  Qty: 100 each, Refills: 11    Associated Diagnoses: Type 2 diabetes mellitus with other skin ulcer, with long-term current use of insulin (H)      !! blood glucose test strips [BLOOD GLUCOSE TEST STRIPS] Test two times daily. Dispense brand per patient's insurance at pharmacy discretion.  Qty: 200 strip, Refills: 11    Comments: OneTouch Verio  Associated Diagnoses: Type 2 diabetes mellitus without complication, without long-term current use of insulin (H)      !! blood-glucose meter (ONETOUCH VERIO IQ METER) Misc [BLOOD-GLUCOSE METER (ONETOUCH VERIO IQ METER) MISC] Use 1 each As Directed 2 (two) times a day.  Qty: 1 each, Refills: 0      cholecalciferol, vitamin D3, 5,000 unit Tab Take 5,000 mcg by mouth daily       Continuous Blood Gluc Sensor (FREESTYLE APRIL 2 SENSOR) MISC 1 each every 14 days 1 each every 14 days. Change every 14 days.  Qty: 6 each, Refills: 5    Associated Diagnoses:  Type 2 diabetes mellitus with diabetic peripheral angiopathy without gangrene, with long-term current use of insulin (H)      ELIQUIS ANTICOAGULANT 5 MG tablet TAKE 1 TABLET BY MOUTH TWICE DAILY  Qty: 180 tablet, Refills: 3    Associated Diagnoses: Pulmonary embolism without acute cor pulmonale, unspecified chronicity, unspecified pulmonary embolism type (H)      lisinopril (ZESTRIL) 5 MG tablet Take 1 tablet (5 mg) by mouth daily  Qty: 90 tablet, Refills: 3    Associated Diagnoses: Essential hypertension      multivit-min/FA/lycopen/lutein (CENTRUM SILVER MEN ORAL) [MULTIVIT-MIN/FA/LYCOPEN/LUTEIN (CENTRUM SILVER MEN ORAL)] Take 1 tablet by mouth daily.      mupirocin (BACTROBAN) 2 % ointment Apply topically daily as needed Apply to foot wound      polyethylene glycol (MIRALAX) 17 gram packet [POLYETHYLENE GLYCOL (MIRALAX) 17 GRAM PACKET] Take 1 packet (17 g total) by mouth daily as needed.  Qty: 100 packet, Refills: 3    Associated Diagnoses: Drug-induced constipation      sildenafil (VIAGRA) 50 MG tablet Take 1 tablet (50 mg) by mouth daily as needed (erectile dysfunction)  Qty: 10 tablet, Refills: 1    Associated Diagnoses: Erectile dysfunction, unspecified erectile dysfunction type      tamsulosin (FLOMAX) 0.4 mg cap [TAMSULOSIN (FLOMAX) 0.4 MG CAP] Take 1 capsule (0.4 mg total) by mouth daily after supper.  Qty: 90 capsule, Refills: 3    Associated Diagnoses: Urinary retention      VICTOZA 3-RUPALI 0.6 mg/0.1 mL (18 mg/3 mL) injection [VICTOZA 3-RUPALI 0.6 MG/0.1 ML (18 MG/3 ML) INJECTION] INJECT 1.2 MG UNDER THE SKIN ONCE DAILY  Qty: 12 mL, Refills: 11    Associated Diagnoses: Type 2 diabetes mellitus with other skin ulcer, with long-term current use of insulin (H)      vitamin E 400 unit capsule [VITAMIN E 400 UNIT CAPSULE] Take 400 Units by mouth daily.       !! - Potential duplicate medications found. Please discuss with provider.            Rationale for medication changes:      Intravenous Zosyn for Klebsiella  cholangitis and bacteremia.  Insulin for diabetes.  Tramadol for pain control.      Consults     INFECTIOUS DISEASES IP CONSULT  VASCULAR ACCESS ADULT IP CONSULT  PHYSICAL THERAPY ADULT IP CONSULT  OCCUPATIONAL THERAPY ADULT IP CONSULT  SOCIAL WORK IP CONSULT  NUTRITION SERVICES ADULT IP CONSULT  VASCULAR ACCESS ADULT IP CONSULT  PHYSICAL THERAPY ADULT IP CONSULT  OCCUPATIONAL THERAPY ADULT IP CONSULT      Immunizations given this encounter     Most Recent Immunizations   Administered Date(s) Administered     COVID-19,PF,Moderna 02/22/2021     COVID-19,PF,Moderna Booster 11/24/2021     Pneumo Conj 13-V (2010&after) 10/22/2020     Pneumococcal 23 valent 09/29/2003           Anticoagulation Information      Recent INR results: No results for input(s): INR in the last 168 hours.  Warfarin doses (if applicable) or name of other anticoagulant: Continue home Eliquis and baby aspirin.  Chronic.      SIGNIFICANT IMAGING FINDINGS     Results for orders placed or performed during the hospital encounter of 01/27/22   XR Chest Port 1 View    Impression    IMPRESSION: Lung volumes are substantially lower. Bands of linear infiltrate present at each lung base favored to represent discoid atelectasis though developing pneumonia possible. Very minimal right lateral pleural thickening or pleural effusion   evident. Upper lung zones are clear. Heart size appears normal.   CT Abdomen Pelvis w Contrast    Impression    IMPRESSION:     1.  Status post ERCP with decompression of intrahepatic bile duct dilation. New wall thickening/enhancement of the common bile duct consistent with cholangitis.   2.  Persistent heterogeneity of the parenchyma in segments V/VII, due in part to thrombus in a branch of the right hepatic vein, consistent with inflammatory phlegmon/edema. Within the area of phlegmon there are multiple subcentimeter low-attenuation   foci consistent with microabscesses but no aspirable or drainable fluid collection.   XR Chest  Port 1 View    Impression    IMPRESSION: There is a new right upper extremity PICC line catheter which courses into the SVC confluence and then crosses to the left of midline into the left innominate vein. The tip is difficult to visualize given the spinous elements. Nevertheless,   it needs to be withdrawn at least 4 cm.    Findings discussed by the undersigned with his nurse Kirill at 1639.    Better inspiration. Resolution of the linear atelectasis in the left lower lobe. 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 Heart and pulmonary vascularity are normal. No signs of pneumonia or failure. Prior   cholecystectomy.     EXAM: US ABDOMEN LIMITED  LOCATION: [facility]  DATE/TIME: 1/27/2022 4:39 AM     INDICATION: evaulate right hepatic mass, abnormal CT, ?abscess  COMPARISON: CT 01/26/2022. MR 01/27/2022.  TECHNIQUE: Limited abdominal ultrasound.     FINDINGS:     GALLBLADDER: Absent     BILE DUCTS: Dilated. The common duct measures 13 mm.     LIVER: The right hepatic peripheral lesion measures 3.7 x 3.3 x 3 cm by ultrasound. This is not as well seen as CT. It appears heterogeneously hypoechoic. No peripherally increased blood flow to suggest abscess.     RIGHT KIDNEY: No hydronephrosis.     PANCREAS: The visualized portions are normal.     No ascites.                                                                      IMPRESSION:  1.  Intrauterine extra hepatic bile duct dilation.  2.  Right hepatic peripheral lesion measures 3.7 cm by ultrasound. Evaluating all of the modalities, this may represent focal worsening of bile duct dilation versus phlegmon. At this time, it does not have the appearance of abscess.  3.  Following treatment of the bile duct obstruction, this lesion could be followed with either ultrasound or CT.    EXAM: MR ABDOMEN MRCP W/O AND W CONTRAST  LOCATION: [facility]  DATE/TIME: 1/27/2022 12:46  AM     INDICATION: Jaundice.  COMPARISON: CT scan from 1/26/2022.  TECHNIQUE: Routine MR liver/pancreas protocol including axial and coronal MRCP sequences. 2D and 3D reconstruction performed by MR technologist including MIP reconstruction and slab cholangiograms. If performed with contrast, additional dynamic T1 post   IV contrast images.  CONTRAST: 8 mL Gadavist.     FINDINGS:      MRCP: There is diffuse intrahepatic and extrahepatic biliary dilatation compatible with biliary obstruction. This is significantly increased compared with an older CT scan from 12/1/2020. The common bile duct measures up to 1.3 cm at the hilum of the   liver. There is a filling defect seen within the common bile duct at the hilum of the liver best seen on series 8 image 15 and on series 7 image 30 measuring up to 8 mm in diameter. This is suspicious for an obstructing common bile duct stone. The common   bile duct appears to taper distal to this although the common bile duct above the level of the ampulla is not well seen and cannot exclude some sludge or stone material in the distal common bile duct. Gallbladder is surgically absent.     LIVER: There is a complex lesion in the anterior aspect of the right hepatic lobe peripherally measuring up to 5.3 x 3.6 cm. This shows some increased lacelike enhancement with multiple small probable cystic collections and corresponds with the   abnormality seen on the recent prior CT scan. Given the findings elsewhere, this is most concerning for phlegmonous change and/or developing abscess within the liver. Underlying malignant lesion accounting for this process felt to be less likely but not   completely excluded and close follow-up in this area is recommended.     PANCREAS: Pancreas is negative. No pancreatic ductal dilatation.     ADDITIONAL FINDINGS: Spleen and adrenal glands are unremarkable. Benign-appearing cysts in the left kidney. No hydronephrosis. No adenopathy or fluid collections.  Normal caliber abdominal aorta.                                                                      IMPRESSION:  1.  Significant biliary obstruction as detailed above. There appears to be an obstructing common bile duct stone at the hilum of the liver measuring up to 8 mm in diameter likely accounting for this obstruction. However, the distal common bile duct is   not well seen and cannot exclude some sludge, stone material, or obstructing process in the distal common bile duct. Recommend ERCP for further evaluation.     2.  Abnormal area of heterogeneous enhancement and probable underlying cystic spaces in the right hepatic lobe peripherally measuring 5.3 x 3.6 cm. This is concerning for focal liver infection/phlegmonous change/developing abscess given the findings   elsewhere. Underlying malignant lesion accounting for this appearance felt to be less likely and clinical correlation is needed. Close imaging follow-up in this area recommended following appropriate therapy to exclude any progressive process.     3.  Cholecystectomy.    EXAM: CT ABDOMEN PELVIS W CONTRAST  LOCATION: [facility]  DATE/TIME: 1/26/2022 9:47 PM     INDICATION: Jaundice, altered mental status.  COMPARISON: 12/01/2020.  TECHNIQUE: CT scan of the abdomen and pelvis was performed following injection of IV contrast. Multiplanar reformats were obtained. Dose reduction techniques were used.  CONTRAST: [id number] 75 ml.     FINDINGS:   LOWER CHEST: Discoid atelectasis. Normal size heart. Severe coronary artery disease.     HEPATOBILIARY: Status post cholecystectomy. Marked intra and extrahepatic biliary dilation. Interval development of a 5 x 3.2 cm heterogeneous hypodensity in segment 6 of the liver. Internal debris/soft tissue density within the CBD (10, 39).     PANCREAS: Normal.     SPLEEN: Normal.     ADRENAL GLANDS: Normal.     KIDNEYS/BLADDER: Simple left renal cyst. No obstructing renal or ureteral stones. 9 mm  nonobstructing right renal stone. Mildly trabeculated bladder, correlate for neurogenic bladder.     BOWEL: Diverticulosis. No diverticulitis, colitis, or obstruction.     LYMPH NODES: Normal.     VASCULATURE: No aneurysm. Severe atherosclerosis.     PELVIC ORGANS: Mildly enlarged prostate.     MUSCULOSKELETAL: Moderate multilevel discogenic degenerative change.                                                                      IMPRESSION:   1.  Interval worsening of intra and extrahepatic biliary dilation with suggestion of debris/soft tissue density in the distal CBD, consider correlation with ERCP and/or MRCP. Constellation of findings raises concern for biliary obstruction and   cholangitis.  2.  5 x 3.2 cm ill-defined segment 6 hypodensity, diagnostic considerations include small abscesses, though malignancy such as cholangiocarcinoma are not excluded, recommend correlation with contrast enhanced MRI of the abdomen.  3.  Severe coronary artery disease and atherosclerotic vascular disease with mildly enlarged heart.  4.  Diverticulosis. No diverticulitis, colitis, or obstruction.  5.  Neurogenic bladder.    SIGNIFICANT LABORATORY FINDINGS     Most Recent 3 CBC's:Recent Labs   Lab Test 02/03/22  0501 02/02/22  0423 02/01/22  0425   WBC 10.4 13.4* 14.8*   HGB 8.9* 9.5* 10.2*   MCV 95 99 99    167 113*     Most Recent 3 BMP's:Recent Labs   Lab Test 02/04/22  1212 02/04/22  0905 02/04/22  0815 02/03/22  0858 02/03/22  0501 02/02/22  0954 02/02/22  0423 02/01/22  0857 02/01/22  0425   NA  --   --   --   --  135*  --  138  --  140   POTASSIUM  --   --   --   --  4.5  --  4.1  --  3.8   CHLORIDE  --   --   --   --  103  --  105  --  105   CO2  --   --   --   --  28  --  27  --  26   BUN  --   --   --   --  21  --  30*  --  44*   CR  --   --   --   --  0.98  --  0.92  --  0.97   ANIONGAP  --   --   --   --  4*  --  6  --  9   MARTHA  --   --   --   --  7.6*  --  7.6*  --  7.9*   * 113* 42*   < > 96   < > 79    < > 153*    < > = values in this interval not displayed.     Most Recent 2 LFT's:Recent Labs   Lab Test 02/03/22  0501 02/02/22  0423   AST 51* 54*   ALT 83* 83*   ALKPHOS 256* 251*   BILITOTAL 3.0* 3.4*     Most Recent 3 INR's:Recent Labs   Lab Test 01/26/22  2047 11/27/20  0713 11/23/20  1407   INR 1.34* 1.23* 1.26*     Most Recent 3 BNP's:No lab results found.  Most Recent D-dimer:No lab results found.      Discharge Orders        CT Abdomen Pelvis w Contrast     Comprehensive metabolic panel     General info for SNF    Length of Stay Estimate: Short Term Care estimated <30 days  Condition at Discharge: Improving  Level of care:skilled   Rehabilitation Potential: Good  Admission H&P remains valid and up-to-date: Yes  Recent Chemotherapy: N/A  Use Nursing Home Standing Orders: Yes     Mantoux instructions    Give two-step Mantoux (PPD) Per Facility Policy Yes     Reason for your hospital stay    Ascending cholangitis and Klebsiella bacteremia     Follow Up and recommended labs and tests    1.  See recommendations from infectious disease.  2.  Follow-up with primary care provider after TCU discharge.     Activity - Up with nursing assistance     Glucose monitor nursing POCT    Before meals and at bedtime     IV access    PICC.     Full Code     Physical Therapy Adult Consult    Evaluate and treat as clinically indicated.    Reason: Ambulation and strength     Occupational Therapy Adult Consult    Evaluate and treat as clinically indicated.    Reason: ADLs and home safety     Fall precautions     Advance Diet as Tolerated    Follow this diet upon discharge: Orders Placed This Encounter      Snacks/Supplements Adult: Glucerna; Between Meals      High Consistent Carb (75 g CHO per Meal) Diet     CBC with Platelets & Differential       Examination   Physical Exam   Temp:  [97.9  F (36.6  C)-98  F (36.7  C)] 97.9  F (36.6  C)  Pulse:  [61-72] 72  Resp:  [16-18] 16  BP: (125-156)/(61-70) 156/70  SpO2:  [95 %-96 %] 96  %  Wt Readings from Last 4 Encounters:   02/04/22 80.8 kg (178 lb 1.6 oz)   01/27/22 81.1 kg (178 lb 12.7 oz)   11/24/21 81.1 kg (178 lb 11.2 oz)   08/25/21 76.2 kg (168 lb)       Constitutional: awake, alert, cooperative, no apparent distress, and appears stated age  Eyes: Lids and lashes normal, pupils equal, round and reactive to light, extra ocular muscles intact, sclera clear, conjunctiva normal  ENT: Normocephalic, without obvious abnormality, atraumatic, sinuses nontender on palpation, external ears without lesions, oral pharynx with moist mucous membranes, tonsils without erythema or exudates, gums normal and good dentition.  Respiratory: No increased work of breathing, good air exchange, clear to auscultation bilaterally, no crackles or wheezing  Cardiovascular: Normal apical impulse, regular rate and rhythm, normal S1 and S2, no S3 or S4, and no murmur noted  GI: No scars, normal bowel sounds, soft, non-distended, non-tender, no masses palpated, no hepatosplenomegally  Skin: normal skin color, texture, turgor, no redness, warmth, or swelling, and no rashes  Musculoskeletal: There is no redness, warmth, or swelling of the joints.  Full range of motion noted.  Motor strength is 5 out of 5 all extremities bilaterally.  Tone is normal. no lower extremity pitting edema present  Neurologic: Cranial nerves II-XII are grossly intact. Sensory:  Sensory intact  Neuropsychiatric: General: normal, calm and normal eye contact Level of consciousness: alert / normal Affect: normal Orientation: oriented to self, place, time and situation Memory and insight: impaired:       Please see EMR for more detailed significant labs, imaging, consultant notes etc.    IChaz MD, personally saw the patient today and spent greater than 30 minutes discharging this patient.    Chaz Albarado MD  [facility]    CC:Otis Lozano

## 2022-02-04 NOTE — PLAN OF CARE
Problem: Adult Inpatient Plan of Care  Goal: Readiness for Transition of Care  Outcome: Improving     Patient alert and oriented x 3 with exception of time, reoriented to environment. Denied pain. Blood glucose 194 at HS, received long acting insulin. Spot-checked blood glucose overnight was 156. Jaundiced sclera and skin. Trace edema noted in legs. PICC patent, good blood return noted. PIV saline locked. Continues on IV zosyn. [id number]. Urinary incontinence intermittently, mostly urgency incontinence. Patient up to bathroom with assist of 1. Per care manager note, patient to discharge home with home care/home infusion services.

## 2022-02-05 LAB
BACTERIA BLD CULT: ABNORMAL

## 2022-02-06 ENCOUNTER — LAB REQUISITION (OUTPATIENT)
Dept: LAB | Facility: CLINIC | Age: 87
End: 2022-02-06
Payer: COMMERCIAL

## 2022-02-06 DIAGNOSIS — E11.621 TYPE 2 DIABETES MELLITUS WITH FOOT ULCER (CODE) (H): ICD-10-CM

## 2022-02-06 DIAGNOSIS — A41.9 SEPSIS, UNSPECIFIED ORGANISM (H): ICD-10-CM

## 2022-02-06 DIAGNOSIS — R78.81 BACTEREMIA: ICD-10-CM

## 2022-02-06 DIAGNOSIS — D64.9 ANEMIA, UNSPECIFIED: ICD-10-CM

## 2022-02-07 ENCOUNTER — PATIENT OUTREACH (OUTPATIENT)
Dept: CARE COORDINATION | Facility: CLINIC | Age: 87
End: 2022-02-07
Payer: COMMERCIAL

## 2022-02-07 NOTE — TELEPHONE ENCOUNTER
Date: 2/15/2022 Status: Scheduled   Time: 2:20 PM Length: 20   Visit Type: RETURN [657] Copay: $0.00   Provider: Valeria Crawley MD Department: [facility]   Bill Area: Infectious Disease [facility]

## 2022-02-07 NOTE — TELEPHONE ENCOUNTER
2/7 - [facility] x 1 with [facility] @ [phone]

## 2022-02-07 NOTE — PROGRESS NOTES
"Clinic Care Coordination Contact  Care Coordination Transition Communication         Clinical Data: Patient was hospitalized at  from 1/27 to 2/4 with diagnosis of poor appetite , Jaundice  Ascending cholangitis  Cholelithiasis  Sepsis (H)  Status post endoscopic retrograde cholangiopancreatography  Gram-negative bacteremia Klebsiella  Acute cystitis-Klebsiella  Generalized edema  Transaminitis and hyperbilirubinemia  Leukocytosis  Thrombocytopenia  Atrial fibrillation  Essential hypertension.     Transition to Facility:              Facility Name:  [facility] @ [phone]                  Plan: RN/SW Care Coordinator will await notification from facility staff informing RN/SW Care Coordinator of patient's discharge plans/needs. RN/SW Care Coordinator will review chart and outreach to facility staff every 4 weeks and as needed.     Per hospital care coordinator \"Wife states son works during the day and she is dealing with medical concerns- arthritis, recent back surgery and bad hip and barely able to walk herself right now. Wife not confident that she could provide assist of 1. Wife does not drive, son is not able to provide transportation as he is working.\"    "

## 2022-02-08 LAB
ALBUMIN SERPL-MCNC: 1.9 G/DL (ref 3.5–5)
ALP SERPL-CCNC: 182 U/L (ref 45–120)
ALT SERPL W P-5'-P-CCNC: 34 U/L (ref 0–45)
ANION GAP SERPL CALCULATED.3IONS-SCNC: 6 MMOL/L (ref 5–18)
AST SERPL W P-5'-P-CCNC: 21 U/L (ref 0–40)
BILIRUB SERPL-MCNC: 2.3 MG/DL (ref 0–1)
BUN SERPL-MCNC: 12 MG/DL (ref 8–28)
CALCIUM SERPL-MCNC: 8.1 MG/DL (ref 8.5–10.5)
CHLORIDE BLD-SCNC: 104 MMOL/L (ref 98–107)
CO2 SERPL-SCNC: 26 MMOL/L (ref 22–31)
CREAT SERPL-MCNC: 1.19 MG/DL (ref 0.7–1.3)
GFR SERPL CREATININE-BSD FRML MDRD: 60 ML/MIN/1.73M2
GLUCOSE BLD-MCNC: 145 MG/DL (ref 70–125)
POTASSIUM BLD-SCNC: 4.4 MMOL/L (ref 3.5–5)
PROT SERPL-MCNC: 5.4 G/DL (ref 6–8)
SODIUM SERPL-SCNC: 136 MMOL/L (ref 136–145)

## 2022-02-08 PROCEDURE — P9604 ONE-WAY ALLOW PRORATED TRIP: HCPCS | Performed by: INTERNAL MEDICINE

## 2022-02-08 PROCEDURE — 84155 ASSAY OF PROTEIN SERUM: CPT | Performed by: INTERNAL MEDICINE

## 2022-02-08 PROCEDURE — 36415 COLL VENOUS BLD VENIPUNCTURE: CPT | Performed by: INTERNAL MEDICINE

## 2022-02-13 ENCOUNTER — LAB REQUISITION (OUTPATIENT)
Dept: LAB | Facility: CLINIC | Age: 87
End: 2022-02-13
Payer: COMMERCIAL

## 2022-02-13 DIAGNOSIS — R78.81 BACTEREMIA: ICD-10-CM

## 2022-02-13 DIAGNOSIS — E11.621 TYPE 2 DIABETES MELLITUS WITH FOOT ULCER (CODE) (H): ICD-10-CM

## 2022-02-13 DIAGNOSIS — A41.9 SEPSIS, UNSPECIFIED ORGANISM (H): ICD-10-CM

## 2022-02-13 DIAGNOSIS — D64.9 ANEMIA, UNSPECIFIED: ICD-10-CM

## 2022-02-15 ENCOUNTER — OFFICE VISIT (OUTPATIENT)
Dept: GERIATRICS | Facility: CLINIC | Age: 87
End: 2022-02-15
Payer: COMMERCIAL

## 2022-02-15 ENCOUNTER — OFFICE VISIT (OUTPATIENT)
Dept: INFECTIOUS DISEASES | Facility: CLINIC | Age: 87
End: 2022-02-15
Payer: COMMERCIAL

## 2022-02-15 ENCOUNTER — LAB REQUISITION (OUTPATIENT)
Dept: LAB | Facility: CLINIC | Age: 87
End: 2022-02-15
Payer: COMMERCIAL

## 2022-02-15 VITALS
TEMPERATURE: 98.4 F | OXYGEN SATURATION: 98 % | HEIGHT: 68 IN | SYSTOLIC BLOOD PRESSURE: 145 MMHG | RESPIRATION RATE: 20 BRPM | HEART RATE: 62 BPM | WEIGHT: 175 LBS | BODY MASS INDEX: 26.52 KG/M2 | DIASTOLIC BLOOD PRESSURE: 68 MMHG

## 2022-02-15 VITALS — TEMPERATURE: 97.9 F | DIASTOLIC BLOOD PRESSURE: 56 MMHG | HEART RATE: 80 BPM | SYSTOLIC BLOOD PRESSURE: 128 MMHG

## 2022-02-15 DIAGNOSIS — D64.9 ANEMIA, UNSPECIFIED: ICD-10-CM

## 2022-02-15 DIAGNOSIS — E11.621 TYPE 2 DIABETES MELLITUS WITH FOOT ULCER (CODE) (H): ICD-10-CM

## 2022-02-15 DIAGNOSIS — R78.81 BACTEREMIA: ICD-10-CM

## 2022-02-15 DIAGNOSIS — A41.9 SEPSIS, UNSPECIFIED ORGANISM (H): ICD-10-CM

## 2022-02-15 DIAGNOSIS — K83.09 CHOLANGITIS (H): Primary | ICD-10-CM

## 2022-02-15 DIAGNOSIS — R63.0 APPETITE IMPAIRED: ICD-10-CM

## 2022-02-15 DIAGNOSIS — K83.09 ASCENDING CHOLANGITIS (H): Chronic | ICD-10-CM

## 2022-02-15 DIAGNOSIS — I10 ESSENTIAL HYPERTENSION: ICD-10-CM

## 2022-02-15 DIAGNOSIS — Z79.01 CHRONIC ANTICOAGULATION: Primary | ICD-10-CM

## 2022-02-15 LAB
ALBUMIN SERPL-MCNC: 2.1 G/DL (ref 3.5–5)
ALP SERPL-CCNC: 129 U/L (ref 45–120)
ALT SERPL W P-5'-P-CCNC: 22 U/L (ref 0–45)
ANION GAP SERPL CALCULATED.3IONS-SCNC: 6 MMOL/L (ref 5–18)
AST SERPL W P-5'-P-CCNC: 21 U/L (ref 0–40)
BILIRUB SERPL-MCNC: 1.8 MG/DL (ref 0–1)
BUN SERPL-MCNC: 11 MG/DL (ref 8–28)
CALCIUM SERPL-MCNC: 8.6 MG/DL (ref 8.5–10.5)
CHLORIDE BLD-SCNC: 104 MMOL/L (ref 98–107)
CO2 SERPL-SCNC: 28 MMOL/L (ref 22–31)
CREAT SERPL-MCNC: 1.32 MG/DL (ref 0.7–1.3)
GFR SERPL CREATININE-BSD FRML MDRD: 53 ML/MIN/1.73M2
GLUCOSE BLD-MCNC: 138 MG/DL (ref 70–125)
POTASSIUM BLD-SCNC: 4.6 MMOL/L (ref 3.5–5)
PROT SERPL-MCNC: 5.8 G/DL (ref 6–8)
SODIUM SERPL-SCNC: 138 MMOL/L (ref 136–145)

## 2022-02-15 PROCEDURE — 99305 1ST NF CARE MODERATE MDM 35: CPT | Performed by: FAMILY MEDICINE

## 2022-02-15 PROCEDURE — 80053 COMPREHEN METABOLIC PANEL: CPT | Performed by: INTERNAL MEDICINE

## 2022-02-15 PROCEDURE — 99214 OFFICE O/P EST MOD 30 MIN: CPT | Performed by: INTERNAL MEDICINE

## 2022-02-15 PROCEDURE — P9604 ONE-WAY ALLOW PRORATED TRIP: HCPCS | Performed by: INTERNAL MEDICINE

## 2022-02-15 PROCEDURE — 36415 COLL VENOUS BLD VENIPUNCTURE: CPT | Performed by: INTERNAL MEDICINE

## 2022-02-15 ASSESSMENT — MIFFLIN-ST. JEOR: SCORE: 1453.29

## 2022-02-15 NOTE — PROGRESS NOTES
[facility] SERVICES    Facility:   [facility] ([facility]) [[id number]]   Code Status: FULL CODE      HPI:   Caleb is a 86 year old male who who was hospitalized January 27, 2022 secondary to not feeling well and has not eaten for a couple of days prior to his admission and he did feel dehydrated.  Initial abdominal imagings were concerning for biliary obstruction with possible choledocholithiasis there was also a concern for a right hepatic lobe mass measuring 5.3 x 3.6 cm which was concerning for a focal liver infection or developing abscess.  He was given antibiotics and referred to gastroenterology which was then recommended he have an ERCP.  His white count continue to be elevated at 20 1K he also an elevated bilirubin total of 17 and elevated other liver enzymes.  He was diagnosed with ascending cholangitis along with sepsis -gram-negative bacteremia, along with personal active problems including hypertension, history of PE, diabetes, and hypertension.  The cholangitis with Klebsiella bacteremia, bacteria with choledocholithiasis, ERCP January 27.  He was treated for bile duct obstruction.  His initial laboratory studies did show a sodium of 146, potassium 4.4, BUN 80, creatinine 1.22, alkaline phosphatase 236, AST 52, ALT 40, albumin 1.5, total bilirubin 6.6.  White cell count was 12.9 with hemoglobin 10.6, platelets 64.  The acute renal failure likely due from ATN and poor oral intake he did receive IV fluids.  He also did manage and follow and monitor his diabetes and potential for risk of hypoglycemia.  He is on Eliquis for history of PE/PAF.  The antihypertensive medications were initially on hold.  He currently now is in the transitional care unit which we had a chance to address the [place] total care unit as well as the rehabilitation.  He is pleased so far with the rehabilitation process.  His appetite is slowly improving.  He does not have any significant jaundice.  Denies any belly or bowel  changes or problems at this time.  He does get his weekly CBC and CMP which were done today.  Coming up February 17 he is having a CT of the abdomen and pelvis.  He is able to ambulate with his front wheel walker down the hallway.  He did spend some time initially in [place] [employer].  [place] in [place] he grew up near [place].  He is from a [place] heritage.  His grandfather came more from [place] initially settled in [place].    Past Medical History:  Past Medical History:   Diagnosis Date     Abscess of right foot 11/23/2020    Added automatically from request for surgery [id number]     Acute pulmonary embolism with acute cor pulmonale (H) 5/23/2020     BPH (benign prostatic hyperplasia)      Cholelithiasis      Closed fracture of rib     Created by Conversion  Replacement Utility updated for latest IMO load     Closed fracture of thoracic vertebra (H)     Created by Conversion  Replacement Utility updated for latest IMO load     Common bile duct (CBD) obstruction 9/4/2017     Diabetes mellitus (H)      Essential hypertension      Hyperlipidemia      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    Added automatically from request for surgery [id number]      Pancreatitis      Sepsis due to pneumonia (H) 9/8/2017     Septic arthritis of right foot (H) 12/2/2020           Surgical History:  Past Surgical History:   Procedure Laterality Date     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    Procedure: with amputation of the fifth ray, peroneal brevis tendon transfer;  Surgeon: John Garcia DPM;  Location: [facility];  Service: Podiatry     BACK SURGERY      1964 removed a cyst     CHOLECYSTECTOMY  1985     ENDOSCOPIC RETROGRADE CHOLANGIOPANCREATOGRAM       ENDOSCOPIC RETROGRADE CHOLANGIOPANCREATOGRAM N/A 9/5/2017    Procedure: ENDOSCOPIC RETROGRADE CHOLANGIOPANCREATOGRAPHY SPHINCTEROTOMY AND STONE EXTRACTION;  Surgeon: Hansel Gannon MD;  Location: [facility] OR;  Service:      ENDOSCOPIC RETROGRADE  CHOLANGIOPANCREATOGRAM N/A 2022    Procedure: ENDOSCOPIC RETROGRADE CHOLANGIOPANCREATOGRAPHY, BALLOON DILATION AND STONE EXTRACTION;  Surgeon: aSv Osborne MD;  Location: [facility]     INCISION AND DRAINAGE OF WOUND Right 2020    Procedure: INCISION AND DRAINAGE, right foot with removal of bone 5th metatarsal;  Surgeon: John Garcia DPM;  Location: [facility] OR;  Service: Podiatry     INCISION AND DRAINAGE OF WOUND Right 2020    Procedure: INCISION AND DRAINAGE, right foot;  Surgeon: John Garcia DPM;  Location: [facility] OR;  Service: Podiatry     INCISION AND DRAINAGE OF WOUND Right 2020    Procedure: INCISION AND DRAINAGE, LOWER EXTREMITY;  Surgeon: Aleksandr Hdez DPM;  Location: [facility] OR;  Service: Podiatry     IR EXTREMITY ANGIOGRAM RIGHT  2020     IR LOWER EXTREMITY ANGIOGRAM RIGHT  2020     PICC  2020          TONSILLECTOMY  1940       Family History:   Family History   Problem Relation Age of Onset     Aortic aneurysm Mother      Alcoholism Father        Social History:    Social History     Socioeconomic History     Marital status:      Spouse name: Not on file     Number of children: Not on file     Years of education: Not on file     Highest education level: Not on file   Occupational History     Not on file   Tobacco Use     Smoking status: Former Smoker     Quit date: 1991     Years since quittin.2     Smokeless tobacco: Never Used   Substance and Sexual Activity     Alcohol use: No     Drug use: No     Sexual activity: Never   Other Topics Concern     Parent/sibling w/ CABG, MI or angioplasty before 65F 55M? Not Asked   Social History Narrative     Not on file     Social Determinants of Health     Financial Resource Strain: Not on file   Food Insecurity: Not on file   Transportation Needs: Not on file   Physical Activity: Not on file   Stress: Not on file   Social Connections: Not on file  "  Intimate Partner Violence: Not on file   Housing Stability: Not on file        REVIEW OF SYSTEM:  He currently denies any new symptoms of fever chills cough or cold sore throat postnasal drip wheezing chest pain dizziness vertigo nausea vomiting diarrhea dysuria frequency urgency.  History of A. fib, hypertension, PE, CKD, diabetes.    PHYSICAL EXAM:   Pleasant gentleman in no acute distress.  Good sense of humor.  Head is normocephalic.  Conjunctiva is pink and sclerae clear.  Neck is supple without adenopathy.  Lung sounds are clear throughout.  Cardiovascular S1-S2 regular rate and rhythm.  No lower extremity edema.  Gastrointestinal soft nondistended and nontender.  Musculoskeletal working with therapy regarding his rehabilitation.  PICC line right bicep.  Able to use his walker.  No pain.  Psychiatric: Pleasant affect.    Vitals:    02/15/22 1055   BP: (!) 145/68   Pulse: 62   Resp: 20   Temp: 98.4  F (36.9  C)   SpO2: 98%   Weight: 79.4 kg (175 lb)   Height: 1.727 m (5' 8\")         Medication List:  Current Outpatient Medications   Medication Sig     acetaminophen (TYLENOL) 500 MG tablet [ACETAMINOPHEN (TYLENOL) 500 MG TABLET] Take 1-2 tablets (500-1,000 mg total) by mouth every 4 (four) hours as needed.     aspirin 81 MG EC tablet [ASPIRIN 81 MG EC TABLET] Take 81 mg by mouth daily.     B-D U/F 31G X 8 MM insulin pen needle USED TO INJECT INSULIN DAILY     blood glucose test strips [BLOOD GLUCOSE TEST STRIPS] Test two times daily. Dispense brand per patient's insurance at pharmacy discretion.     blood-glucose meter (ONETOUCH VERIO IQ METER) Misc [BLOOD-GLUCOSE METER (ONETOUCH VERIO IQ METER) MISC] Use 1 each As Directed 2 (two) times a day.     cholecalciferol, vitamin D3, 5,000 unit Tab Take 5,000 mcg by mouth daily      Continuous Blood Gluc Sensor (FREESTYLE APRIL 2 SENSOR) MISC 1 each every 14 days 1 each every 14 days. Change every 14 days.     ELIQUIS ANTICOAGULANT 5 MG tablet TAKE 1 TABLET BY MOUTH " TWICE DAILY     insulin aspart (NOVOLOG PEN) 100 UNIT/ML pen Inject 1-7 Units Subcutaneous 3 times daily (before meals) Correction Scale - MEDIUM INSULIN RESISTANCE DOSING     Do Not give Correction Insulin if Pre-Meal BG less than 140.   For Pre-Meal  - 189 give 1 unit.   For Pre-Meal  - 239 give 2 units.   For Pre-Meal  - 289 give 3 units.   For Pre-Meal  - 339 give 4 units.   For Pre-Meal - 399 give 5 units.   For Pre-Meal -449 give 6 units  For Pre-Meal BG greater than or equal to 450 give 7 units.   To be given with prandial insulin, and based on pre-meal blood glucose.    Notify provider if glucose greater than or equal to 350 mg/dL after administration of correction dose. If given at mealtime, administer within 30 minutes of start of meal.     insulin aspart (NOVOLOG PEN) 100 UNIT/ML pen Inject 1-5 Units Subcutaneous At Bedtime MEDIUM INSULIN RESISTANCE DOSING    Do Not give Bedtime Correction Insulin if BG less than  200.   For  - 249 give 1 units.   For  - 299 give 2 units.   For  - 349 give 3 units.   For  -399 give 4 units.   For BG greater than or equal to 400 give 5 units.  Notify provider if glucose greater than or equal to 350 mg/dL after administration of correction dose. If given at mealtime, administer within 30 minutes of start of meal.     insulin glargine (LANTUS SOLOSTAR) 100 UNIT/ML pen Inject 25 Units Subcutaneous At Bedtime     lisinopril (ZESTRIL) 5 MG tablet Take 1 tablet (5 mg) by mouth daily     multivit-min/FA/lycopen/lutein (CENTRUM SILVER MEN ORAL) [MULTIVIT-MIN/FA/LYCOPEN/LUTEIN (CENTRUM SILVER MEN ORAL)] Take 1 tablet by mouth daily.     mupirocin (BACTROBAN) 2 % ointment Apply topically daily as needed Apply to foot wound     piperacillin-tazobactam (ZOSYN) 3-0.375 GM vial Inject 3.375 g into the vein every 8 hours for 21 days     polyethylene glycol (MIRALAX) 17 gram packet [POLYETHYLENE GLYCOL (MIRALAX) 17 GRAM PACKET]  Take 1 packet (17 g total) by mouth daily as needed.     sildenafil (VIAGRA) 50 MG tablet Take 1 tablet (50 mg) by mouth daily as needed (erectile dysfunction)     tamsulosin (FLOMAX) 0.4 mg cap [TAMSULOSIN (FLOMAX) 0.4 MG CAP] Take 1 capsule (0.4 mg total) by mouth daily after supper.     traMADol (ULTRAM) 50 MG tablet Take 1 tablet (50 mg) by mouth every 6 hours as needed for moderate pain     VICTOZA 3-RUPALI 0.6 mg/0.1 mL (18 mg/3 mL) injection [VICTOZA 3-RUPALI 0.6 MG/0.1 ML (18 MG/3 ML) INJECTION] INJECT 1.2 MG UNDER THE SKIN ONCE DAILY     vitamin E 400 unit capsule [VITAMIN E 400 UNIT CAPSULE] Take 400 Units by mouth daily.     No current facility-administered medications for this visit.        Labs:  Last Comprehensive Metabolic Panel:  Sodium   Date Value Ref Range Status   02/08/2022 136 136 - 145 mmol/L Final     Potassium   Date Value Ref Range Status   02/08/2022 4.4 3.5 - 5.0 mmol/L Final     Chloride   Date Value Ref Range Status   02/08/2022 104 98 - 107 mmol/L Final     Carbon Dioxide (CO2)   Date Value Ref Range Status   02/08/2022 26 22 - 31 mmol/L Final     Anion Gap   Date Value Ref Range Status   02/08/2022 6 5 - 18 mmol/L Final     Glucose   Date Value Ref Range Status   02/08/2022 145 (H) 70 - 125 mg/dL Final     Urea Nitrogen   Date Value Ref Range Status   02/08/2022 12 8 - 28 mg/dL Final     Creatinine   Date Value Ref Range Status   02/08/2022 1.19 0.70 - 1.30 mg/dL Final     GFR Estimate   Date Value Ref Range Status   02/08/2022 60 (L) >60 mL/min/1.73m2 Final     Comment:     Effective December 21, 2021 eGFRcr in adults is calculated using the 2021 CKD-EPI creatinine equation which includes age and gender (Herbie patton al., NEJM, DOI: 10.1056/YIGGdv2312247)   05/25/2021 >60 >60 mL/min/1.73m2 Final     Calcium   Date Value Ref Range Status   02/08/2022 8.1 (L) 8.5 - 10.5 mg/dL Final     Bilirubin Total   Date Value Ref Range Status   02/08/2022 2.3 (H) 0.0 - 1.0 mg/dL Final     Alkaline  Phosphatase   Date Value Ref Range Status   02/08/2022 182 (H) 45 - 120 U/L Final     ALT   Date Value Ref Range Status   02/08/2022 34 0 - 45 U/L Final     AST   Date Value Ref Range Status   02/08/2022 21 0 - 40 U/L Final             CBC RESULTS: Recent Labs   Lab Test 02/03/22  0501   WBC 10.4   RBC 2.89*   HGB 8.9*   HCT 27.5*   MCV 95   MCH 30.8   MCHC 32.4   RDW 14.3            Assessment:   (Z79.01) Chronic anticoagulation  (primary encounter diagnosis)  Comment: He is being treated with Eliquis  Plan: Continue with the Eliquis    (I10) Essential hypertension  Comment: Continue to manage and follow  Plan: Is stable    (K83.09) Ascending cholangitis  Comment: Doing well  Plan: Does have a follow-up CT of the abdomen and pelvis on February 17    (R63.0) Appetite impaired  Comment: Slowly improving  Plan: He is aware nutritional ideas      PLAN:    He did have a CBC as well as a CMP scheduled for today.  His prior was see above.  Otherwise he is feeling a little bit better with his overall care.  Is enjoying the therapy component.  He was a pleasure to get to know and does have a good sense of humor.    For documentation purposes total visit 35 minutes which over 50% was spent with the patient going over his care, hospitalization, previous laboratory studies, work-up in the hospital, future scans, nutrition, rehabilitation in the transitional care unit.      Electronically signed by: Navin Freitas NP

## 2022-02-15 NOTE — LETTER
2/15/2022        RE: Caleb Hernandez  [address]  [place] [address]        [facility] GERIATRIC SERVICES    Facility:   [facility] ([facility]) [[id number]]   Code Status: FULL CODE      HPI:   Caleb is a 86 year old male who who was hospitalized January 27, 2022 secondary to not feeling well and has not eaten for a couple of days prior to his admission and he did feel dehydrated.  Initial abdominal imagings were concerning for biliary obstruction with possible choledocholithiasis there was also a concern for a right hepatic lobe mass measuring 5.3 x 3.6 cm which was concerning for a focal liver infection or developing abscess.  He was given antibiotics and referred to gastroenterology which was then recommended he have an ERCP.  His white count continue to be elevated at 20 1K he also an elevated bilirubin total of 17 and elevated other liver enzymes.  He was diagnosed with ascending cholangitis along with sepsis -gram-negative bacteremia, along with personal active problems including hypertension, history of PE, diabetes, and hypertension.  The cholangitis with Klebsiella bacteremia, bacteria with choledocholithiasis, ERCP January 27.  He was treated for bile duct obstruction.  His initial laboratory studies did show a sodium of 146, potassium 4.4, BUN 80, creatinine 1.22, alkaline phosphatase 236, AST 52, ALT 40, albumin 1.5, total bilirubin 6.6.  White cell count was 12.9 with hemoglobin 10.6, platelets 64.  The acute renal failure likely due from ATN and poor oral intake he did receive IV fluids.  He also did manage and follow and monitor his diabetes and potential for risk of hypoglycemia.  He is on Eliquis for history of PE/PAF.  The antihypertensive medications were initially on hold.  He currently now is in the transitional care unit which we had a chance to address the [place] total care unit as well as the rehabilitation.  He is pleased so far with the rehabilitation process.  His appetite is slowly  improving.  He does not have any significant jaundice.  Denies any belly or bowel changes or problems at this time.  He does get his weekly CBC and CMP which were done today.  Coming up February 17 he is having a CT of the abdomen and pelvis.  He is able to ambulate with his front wheel walker down the hallway.  He did spend some time initially in [employer].  [place] in [place] he grew up near [place].  He is from a [place] heritage.  His grandfather came more from [place] initially settled in [place].    Past Medical History:  Past Medical History:   Diagnosis Date     Abscess of right foot 11/23/2020    Added automatically from request for surgery [id number]     Acute pulmonary embolism with acute cor pulmonale (H) 5/23/2020     BPH (benign prostatic hyperplasia)      Cholelithiasis      Closed fracture of rib     Created by Conversion  Replacement Utility updated for latest IMO load     Closed fracture of thoracic vertebra (H)     Created by Conversion  Replacement Utility updated for latest IMO load     Common bile duct (CBD) obstruction 9/4/2017     Diabetes mellitus (H)      Essential hypertension      Hyperlipidemia      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    Added automatically from request for surgery [id number]      Pancreatitis      Sepsis due to pneumonia (H) 9/8/2017     Septic arthritis of right foot (H) 12/2/2020           Surgical History:  Past Surgical History:   Procedure Laterality Date     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    Procedure: with amputation of the fifth ray, peroneal brevis tendon transfer;  Surgeon: John Garcia DPM;  Location: [facility];  Service: Podiatry     BACK SURGERY      1964 removed a cyst     CHOLECYSTECTOMY  1985     ENDOSCOPIC RETROGRADE CHOLANGIOPANCREATOGRAM       ENDOSCOPIC RETROGRADE CHOLANGIOPANCREATOGRAM N/A 9/5/2017    Procedure: ENDOSCOPIC RETROGRADE CHOLANGIOPANCREATOGRAPHY SPHINCTEROTOMY AND STONE EXTRACTION;  Surgeon: Hansel  ZAINAB Gannon MD;  Location: [facility] OR;  Service:      ENDOSCOPIC RETROGRADE CHOLANGIOPANCREATOGRAM N/A 2022    Procedure: ENDOSCOPIC RETROGRADE CHOLANGIOPANCREATOGRAPHY, BALLOON DILATION AND STONE EXTRACTION;  Surgeon: Sav Osborne MD;  [place] [facility]     INCISION AND DRAINAGE OF WOUND Right 2020    Procedure: INCISION AND DRAINAGE, right foot with removal of bone 5th metatarsal;  Surgeon: John Garcia DPM;  Location: [facility] OR;  Service: Podiatry     INCISION AND DRAINAGE OF WOUND Right 2020    Procedure: INCISION AND DRAINAGE, right foot;  Surgeon: John Garcia DPM;  Location: [facility] OR;  Service: Podiatry     INCISION AND DRAINAGE OF WOUND Right 2020    Procedure: INCISION AND DRAINAGE, LOWER EXTREMITY;  Surgeon: Aleksandr Hdez DPM;  Location: [facility] OR;  Service: Podiatry     IR EXTREMITY ANGIOGRAM RIGHT  2020     IR LOWER EXTREMITY ANGIOGRAM RIGHT  2020     PICC  2020          TONSILLECTOMY  1940       Family History:   Family History   Problem Relation Age of Onset     Aortic aneurysm Mother      Alcoholism Father        Social History:    Social History     Socioeconomic History     Marital status:      Spouse name: Not on file     Number of children: Not on file     Years of education: Not on file     Highest education level: Not on file   Occupational History     Not on file   Tobacco Use     Smoking status: Former Smoker     Quit date: 1991     Years since quittin.2     Smokeless tobacco: Never Used   Substance and Sexual Activity     Alcohol use: No     Drug use: No     Sexual activity: Never   Other Topics Concern     Parent/sibling w/ CABG, MI or angioplasty before 65F 55M? Not Asked   Social History Narrative     Not on file     Social Determinants of Health     Financial Resource Strain: Not on file   Food Insecurity: Not on file   Transportation Needs: Not on file   Physical  "Activity: Not on file   Stress: Not on file   Social Connections: Not on file   Intimate Partner Violence: Not on file   Housing Stability: Not on file        REVIEW OF SYSTEM:  He currently denies any new symptoms of fever chills cough or cold sore throat postnasal drip wheezing chest pain dizziness vertigo nausea vomiting diarrhea dysuria frequency urgency.  History of A. fib, hypertension, PE, CKD, diabetes.    PHYSICAL EXAM:   Pleasant gentleman in no acute distress.  Good sense of humor.  Head is normocephalic.  Conjunctiva is pink and sclerae clear.  Neck is supple without adenopathy.  Lung sounds are clear throughout.  Cardiovascular S1-S2 regular rate and rhythm.  No lower extremity edema.  Gastrointestinal soft nondistended and nontender.  Musculoskeletal working with therapy regarding his rehabilitation.  PICC line right bicep.  Able to use his walker.  No pain.  Psychiatric: Pleasant affect.    Vitals:    02/15/22 1055   BP: (!) 145/68   Pulse: 62   Resp: 20   Temp: 98.4  F (36.9  C)   SpO2: 98%   Weight: 79.4 kg (175 lb)   Height: 1.727 m (5' 8\")         Medication List:  Current Outpatient Medications   Medication Sig     acetaminophen (TYLENOL) 500 MG tablet [ACETAMINOPHEN (TYLENOL) 500 MG TABLET] Take 1-2 tablets (500-1,000 mg total) by mouth every 4 (four) hours as needed.     aspirin 81 MG EC tablet [ASPIRIN 81 MG EC TABLET] Take 81 mg by mouth daily.     B-D U/F 31G X 8 MM insulin pen needle USED TO INJECT INSULIN DAILY     blood glucose test strips [BLOOD GLUCOSE TEST STRIPS] Test two times daily. Dispense brand per patient's insurance at pharmacy discretion.     blood-glucose meter (ONETOUCH VERIO IQ METER) Misc [BLOOD-GLUCOSE METER (ONETOUCH VERIO IQ METER) MISC] Use 1 each As Directed 2 (two) times a day.     cholecalciferol, vitamin D3, 5,000 unit Tab Take 5,000 mcg by mouth daily      Continuous Blood Gluc Sensor (FREESTYLE APRIL 2 SENSOR) MISC 1 each every 14 days 1 each every 14 days. " Change every 14 days.     ELIQUIS ANTICOAGULANT 5 MG tablet TAKE 1 TABLET BY MOUTH TWICE DAILY     insulin aspart (NOVOLOG PEN) 100 UNIT/ML pen Inject 1-7 Units Subcutaneous 3 times daily (before meals) Correction Scale - MEDIUM INSULIN RESISTANCE DOSING     Do Not give Correction Insulin if Pre-Meal BG less than 140.   For Pre-Meal  - 189 give 1 unit.   For Pre-Meal  - 239 give 2 units.   For Pre-Meal  - 289 give 3 units.   For Pre-Meal  - 339 give 4 units.   For Pre-Meal - 399 give 5 units.   For Pre-Meal -449 give 6 units  For Pre-Meal BG greater than or equal to 450 give 7 units.   To be given with prandial insulin, and based on pre-meal blood glucose.    Notify provider if glucose greater than or equal to 350 mg/dL after administration of correction dose. If given at mealtime, administer within 30 minutes of start of meal.     insulin aspart (NOVOLOG PEN) 100 UNIT/ML pen Inject 1-5 Units Subcutaneous At Bedtime MEDIUM INSULIN RESISTANCE DOSING    Do Not give Bedtime Correction Insulin if BG less than  200.   For  - 249 give 1 units.   For  - 299 give 2 units.   For  - 349 give 3 units.   For  -399 give 4 units.   For BG greater than or equal to 400 give 5 units.  Notify provider if glucose greater than or equal to 350 mg/dL after administration of correction dose. If given at mealtime, administer within 30 minutes of start of meal.     insulin glargine (LANTUS SOLOSTAR) 100 UNIT/ML pen Inject 25 Units Subcutaneous At Bedtime     lisinopril (ZESTRIL) 5 MG tablet Take 1 tablet (5 mg) by mouth daily     multivit-min/FA/lycopen/lutein (CENTRUM SILVER MEN ORAL) [MULTIVIT-MIN/FA/LYCOPEN/LUTEIN (CENTRUM SILVER MEN ORAL)] Take 1 tablet by mouth daily.     mupirocin (BACTROBAN) 2 % ointment Apply topically daily as needed Apply to foot wound     piperacillin-tazobactam (ZOSYN) 3-0.375 GM vial Inject 3.375 g into the vein every 8 hours for 21 days      polyethylene glycol (MIRALAX) 17 gram packet [POLYETHYLENE GLYCOL (MIRALAX) 17 GRAM PACKET] Take 1 packet (17 g total) by mouth daily as needed.     sildenafil (VIAGRA) 50 MG tablet Take 1 tablet (50 mg) by mouth daily as needed (erectile dysfunction)     tamsulosin (FLOMAX) 0.4 mg cap [TAMSULOSIN (FLOMAX) 0.4 MG CAP] Take 1 capsule (0.4 mg total) by mouth daily after supper.     traMADol (ULTRAM) 50 MG tablet Take 1 tablet (50 mg) by mouth every 6 hours as needed for moderate pain     VICTOZA 3-RUPALI 0.6 mg/0.1 mL (18 mg/3 mL) injection [VICTOZA 3-RUPALI 0.6 MG/0.1 ML (18 MG/3 ML) INJECTION] INJECT 1.2 MG UNDER THE SKIN ONCE DAILY     vitamin E 400 unit capsule [VITAMIN E 400 UNIT CAPSULE] Take 400 Units by mouth daily.     No current facility-administered medications for this visit.        Labs:  Last Comprehensive Metabolic Panel:  Sodium   Date Value Ref Range Status   02/08/2022 136 136 - 145 mmol/L Final     Potassium   Date Value Ref Range Status   02/08/2022 4.4 3.5 - 5.0 mmol/L Final     Chloride   Date Value Ref Range Status   02/08/2022 104 98 - 107 mmol/L Final     Carbon Dioxide (CO2)   Date Value Ref Range Status   02/08/2022 26 22 - 31 mmol/L Final     Anion Gap   Date Value Ref Range Status   02/08/2022 6 5 - 18 mmol/L Final     Glucose   Date Value Ref Range Status   02/08/2022 145 (H) 70 - 125 mg/dL Final     Urea Nitrogen   Date Value Ref Range Status   02/08/2022 12 8 - 28 mg/dL Final     Creatinine   Date Value Ref Range Status   02/08/2022 1.19 0.70 - 1.30 mg/dL Final     GFR Estimate   Date Value Ref Range Status   02/08/2022 60 (L) >60 mL/min/1.73m2 Final     Comment:     Effective December 21, 2021 eGFRcr in adults is calculated using the 2021 CKD-EPI creatinine equation which includes age and gender (Herbie patton al., NEJ, DOI: 10.1056/TEWPcs7622628)   05/25/2021 >60 >60 mL/min/1.73m2 Final     Calcium   Date Value Ref Range Status   02/08/2022 8.1 (L) 8.5 - 10.5 mg/dL Final     Bilirubin Total    Date Value Ref Range Status   02/08/2022 2.3 (H) 0.0 - 1.0 mg/dL Final     Alkaline Phosphatase   Date Value Ref Range Status   02/08/2022 182 (H) 45 - 120 U/L Final     ALT   Date Value Ref Range Status   02/08/2022 34 0 - 45 U/L Final     AST   Date Value Ref Range Status   02/08/2022 21 0 - 40 U/L Final             CBC RESULTS: Recent Labs   Lab Test 02/03/22  0501   WBC 10.4   RBC 2.89*   HGB 8.9*   HCT 27.5*   MCV 95   MCH 30.8   MCHC 32.4   RDW 14.3            Assessment:   (Z79.01) Chronic anticoagulation  (primary encounter diagnosis)  Comment: He is being treated with Eliquis  Plan: Continue with the Eliquis    (I10) Essential hypertension  Comment: Continue to manage and follow  Plan: Is stable    (K83.09) Ascending cholangitis  Comment: Doing well  Plan: Does have a follow-up CT of the abdomen and pelvis on February 17    (R63.0) Appetite impaired  Comment: Slowly improving  Plan: He is aware nutritional ideas      PLAN:    He did have a CBC as well as a CMP scheduled for today.  His prior was see above.  Otherwise he is feeling a little bit better with his overall care.  Is enjoying the therapy component.  He was a pleasure to get to know and does have a good sense of humor.    For documentation purposes total visit 35 minutes which over 50% was spent with the patient going over his care, hospitalization, previous laboratory studies, work-up in the hospital, future scans, nutrition, rehabilitation in the transitional care unit.      Electronically signed by: Navin Freitas NP          Sincerely,        Navin Freitas NP

## 2022-02-15 NOTE — PROGRESS NOTES
"[facility] [place] [facility] FOLLOW UP NOTE      Date: 02/15/2022   Patient Name: Caleb Hernandez   YOB: 1935  MRN: [id number]      ASSESSMENT:  Cholangitis with klebsiella bacteremia, bacteriuria. Active issue.   Choledocholithiasis, ERCP 1/27  Liver phlegmon-microabscesses noted 2/1-will complete course of IV abx.   DM  US jan 27:  1.  extra hepatic bile duct dilation.  2.  Right hepatic peripheral lesion measures 3.7 cm by ultrasound. Evaluating all of the modalities, this may represent focal worsening of bile duct dilation versus phlegmon. At this time, it does not have the appearance of abscess.  3.  Following treatment of the bile duct obstruction, this lesion could be followed with either ultrasound or CT.     PLAN:  - continue pip-tazo IV  - repeat CT today-if infection resolved then stop abx, if microabscesses matured into larger abscess may need drain placed. Discussed with patient and wife and they are in agreement.   - labs from this morning still pending. LFTs were better last week   Return to clinic MARTÍN Crawley MD  [facility]   Clinic phone: [phone]   Clinic fax: [phone]    Addendum: CT with improved areas of microabscesses, however still present. Will continue pip-tazo IV through 2/21-ok to pull PICC after last dose, then transition to cefdinir 300mg PO BID for an additional 2 weeks.   ______________________________________________________________________    SUBJECTIVE / INTERVAL HISTORY: Caleb Hernandez returns for follow up of bacteremia, cholangitis.   At TCU, he's feeling well, appetite good. No abdominal pain. No rashes or diarrhea. Says he's done all the PT they've asked him \"except walk on the ceiling\"    ROS: All other systems negative except as listed above.      Current Outpatient Medications:      acetaminophen (TYLENOL) 500 MG tablet, [ACETAMINOPHEN (TYLENOL) 500 MG TABLET] Take 1-2 tablets (500-1,000 mg total) by mouth " every 4 (four) hours as needed., Disp: , Rfl: 0     aspirin 81 MG EC tablet, [ASPIRIN 81 MG EC TABLET] Take 81 mg by mouth daily., Disp: , Rfl:      lisinopril (ZESTRIL) 5 MG tablet, Take 1 tablet (5 mg) by mouth daily, Disp: 90 tablet, Rfl: 3     multivit-min/FA/lycopen/lutein (CENTRUM SILVER MEN ORAL), [MULTIVIT-MIN/FA/LYCOPEN/LUTEIN (CENTRUM SILVER MEN ORAL)] Take 1 tablet by mouth daily., Disp: , Rfl:      mupirocin (BACTROBAN) 2 % ointment, Apply topically daily as needed Apply to foot wound, Disp: , Rfl:      piperacillin-tazobactam (ZOSYN) 3-0.375 GM vial, Inject 3.375 g into the vein every 8 hours for 21 days, Disp: 945 mL, Rfl: 0     polyethylene glycol (MIRALAX) 17 gram packet, [POLYETHYLENE GLYCOL (MIRALAX) 17 GRAM PACKET] Take 1 packet (17 g total) by mouth daily as needed., Disp: 100 packet, Rfl: 3     sildenafil (VIAGRA) 50 MG tablet, Take 1 tablet (50 mg) by mouth daily as needed (erectile dysfunction), Disp: 10 tablet, Rfl: 1     tamsulosin (FLOMAX) 0.4 mg cap, [TAMSULOSIN (FLOMAX) 0.4 MG CAP] Take 1 capsule (0.4 mg total) by mouth daily after supper., Disp: 90 capsule, Rfl: 3     traMADol (ULTRAM) 50 MG tablet, Take 1 tablet (50 mg) by mouth every 6 hours as needed for moderate pain, Disp: 20 tablet, Rfl: 0     VICTOZA 3-RUPALI 0.6 mg/0.1 mL (18 mg/3 mL) injection, [VICTOZA 3-RUPALI 0.6 MG/0.1 ML (18 MG/3 ML) INJECTION] INJECT 1.2 MG UNDER THE SKIN ONCE DAILY, Disp: 12 mL, Rfl: 11     vitamin E 400 unit capsule, [VITAMIN E 400 UNIT CAPSULE] Take 400 Units by mouth daily., Disp: , Rfl:      B-D U/F 31G X 8 MM insulin pen needle, USED TO INJECT INSULIN DAILY, Disp: 100 each, Rfl: 11     blood glucose test strips, [BLOOD GLUCOSE TEST STRIPS] Test two times daily. Dispense brand per patient's insurance at pharmacy discretion., Disp: 200 strip, Rfl: 11     blood-glucose meter (ONETOUCH VERIO IQ METER) Misc, [BLOOD-GLUCOSE METER (ONETOUCH VERIO IQ METER) MISC] Use 1 each As Directed 2 (two) times a day., Disp:  1 each, Rfl: 0     cholecalciferol, vitamin D3, 5,000 unit Tab, Take 5,000 mcg by mouth daily , Disp: , Rfl:      Continuous Blood Gluc Sensor (FREESTYLE APRIL 2 SENSOR) [facility] 1 each every 14 days 1 each every 14 days. Change every 14 days., Disp: 6 each, Rfl: 5     ELIQUIS ANTICOAGULANT 5 MG tablet, TAKE 1 TABLET BY MOUTH TWICE DAILY, Disp: 180 tablet, Rfl: 3     insulin aspart (NOVOLOG PEN) 100 UNIT/ML pen, Inject 1-7 Units Subcutaneous 3 times daily (before meals) Correction Scale - MEDIUM INSULIN RESISTANCE DOSING    Do Not give Correction Insulin if Pre-Meal BG less than 140.  For Pre-Meal  - 189 give 1 unit.  For Pre-Meal  - 239 give 2 units.  For Pre-Meal  - 289 give 3 units.  For Pre-Meal  - 339 give 4 units.  For Pre-Meal - 399 give 5 units.  For Pre-Meal -449 give 6 units For Pre-Meal BG greater than or equal to 450 give 7 units.  To be given with prandial insulin, and based on pre-meal blood glucose.   Notify provider if glucose greater than or equal to 350 mg/dL after administration of correction dose. If given at mealtime, administer within 30 minutes of start of meal., Disp: 15 mL, Rfl:      insulin aspart (NOVOLOG PEN) 100 UNIT/ML pen, Inject 1-5 Units Subcutaneous At Bedtime MEDIUM INSULIN RESISTANCE DOSING   Do Not give Bedtime Correction Insulin if BG less than  200.  For  - 249 give 1 units.  For  - 299 give 2 units.  For  - 349 give 3 units.  For  -399 give 4 units.  For BG greater than or equal to 400 give 5 units. Notify provider if glucose greater than or equal to 350 mg/dL after administration of correction dose. If given at mealtime, administer within 30 minutes of start of meal., Disp: 15 mL, Rfl:      insulin glargine (LANTUS SOLOSTAR) 100 UNIT/ML pen, Inject 25 Units Subcutaneous At Bedtime, Disp: 15 mL, Rfl: 0      OBJECTIVE:  /56   Pulse 80   Temp 97.9  F (36.6  C)       GEN: No acute distress.    RESPIRATORY:  Normal  breathing pattern.   ABDOMEN:  Soft, normal bowel sounds, non-tender, no masses, no organomegaly.  EXTREMITIES: No edema.  SKIN/HAIR/NAILS:  No rashes      Pertinent labs:    CRP   Date Value Ref Range Status   01/16/2021 8.8 (H) 0.0 - 0.8 mg/dL Final     Last Comprehensive Metabolic Panel:  Sodium   Date Value Ref Range Status   02/08/2022 136 136 - 145 mmol/L Final     Potassium   Date Value Ref Range Status   02/08/2022 4.4 3.5 - 5.0 mmol/L Final     Chloride   Date Value Ref Range Status   02/08/2022 104 98 - 107 mmol/L Final     Carbon Dioxide (CO2)   Date Value Ref Range Status   02/08/2022 26 22 - 31 mmol/L Final     Anion Gap   Date Value Ref Range Status   02/08/2022 6 5 - 18 mmol/L Final     Glucose   Date Value Ref Range Status   02/08/2022 145 (H) 70 - 125 mg/dL Final     Urea Nitrogen   Date Value Ref Range Status   02/08/2022 12 8 - 28 mg/dL Final     Creatinine   Date Value Ref Range Status   02/08/2022 1.19 0.70 - 1.30 mg/dL Final     GFR Estimate   Date Value Ref Range Status   02/08/2022 60 (L) >60 mL/min/1.73m2 Final     Comment:     Effective December 21, 2021 eGFRcr in adults is calculated using the 2021 CKD-EPI creatinine equation which includes age and gender (Herbie patton al., NEJ, DOI: 10.1056/CCTFht6847486)   05/25/2021 >60 >60 mL/min/1.73m2 Final     Calcium   Date Value Ref Range Status   02/08/2022 8.1 (L) 8.5 - 10.5 mg/dL Final     CBC RESULTS:   Recent Labs   Lab Test 02/03/22  0501   WBC 10.4   RBC 2.89*   HGB 8.9*   HCT 27.5*   MCV 95   MCH 30.8   MCHC 32.4   RDW 14.3          MICROBIOLOGY DATA:  Nothing new    T Cell Subset:  WBC Count   Date Value Ref Range Status   02/03/2022 10.4 4.0 - 11.0 10e3/uL Final     % Lymphocytes   Date Value Ref Range Status   02/03/2022 15 % Final   01/30/2022 9 % Final   02/02/2018 4 (L) 20 - 40 % Final     RADIOLOGY:    Recent Results (from the past 744 hour(s))   Head CT w/o contrast    Narrative    EXAM: CT HEAD W/O CONTRAST  LOCATION: [facility]  DATE/TIME: 1/26/2022 9:48 PM    INDICATION: Altered mental status.  COMPARISON: None.  TECHNIQUE: Routine CT Head without IV contrast. Multiplanar reformats. Dose reduction techniques were used.    FINDINGS:  INTRACRANIAL CONTENTS: No intracranial hemorrhage, extraaxial collection, or mass effect.  No CT evidence of acute infarct. There is scattered low-attenuation within the periventricular and subcortical white matter consistent with mild small vessel   ischemic disease. The ventricular system, basal cisterns and the cortical sulci are consistent with mild volume loss.     VISUALIZED ORBITS/SINUSES/MASTOIDS: No intraorbital abnormality. No paranasal sinus mucosal disease. No middle ear or mastoid effusion.    BONES/SOFT TISSUES: No acute abnormality.      Impression    IMPRESSION:  1.  No CT finding of a mass, hemorrhage or focal area suggestive of acute infarct.  2.  Mild age-related changes.   CT Abdomen Pelvis w Contrast    Narrative    EXAM: CT ABDOMEN PELVIS W CONTRAST  LOCATION: [facility]  DATE/TIME: 1/26/2022 9:47 PM    INDICATION: Jaundice, altered mental status.  COMPARISON: 12/01/2020.  TECHNIQUE: CT scan of the abdomen and pelvis was performed following injection of IV contrast. Multiplanar reformats were obtained. Dose reduction techniques were used.  CONTRAST: [id number] 75 ml.    FINDINGS:   LOWER CHEST: Discoid atelectasis. Normal size heart. Severe coronary artery disease.    HEPATOBILIARY: Status post cholecystectomy. Marked intra and extrahepatic biliary dilation. Interval development of a 5 x 3.2 cm heterogeneous hypodensity in segment 6 of the liver. Internal debris/soft tissue density within the CBD (10, 39).    PANCREAS: Normal.    SPLEEN: Normal.    ADRENAL GLANDS: Normal.    KIDNEYS/BLADDER: Simple left renal cyst. No obstructing renal or ureteral stones. 9 mm nonobstructing right renal stone. Mildly trabeculated bladder, correlate for  neurogenic bladder.    BOWEL: Diverticulosis. No diverticulitis, colitis, or obstruction.    LYMPH NODES: Normal.    VASCULATURE: No aneurysm. Severe atherosclerosis.    PELVIC ORGANS: Mildly enlarged prostate.    MUSCULOSKELETAL: Moderate multilevel discogenic degenerative change.      Impression    IMPRESSION:   1.  Interval worsening of intra and extrahepatic biliary dilation with suggestion of debris/soft tissue density in the distal CBD, consider correlation with ERCP and/or MRCP. Constellation of findings raises concern for biliary obstruction and   cholangitis.  2.  5 x 3.2 cm ill-defined segment 6 hypodensity, diagnostic considerations include small abscesses, though malignancy such as cholangiocarcinoma are not excluded, recommend correlation with contrast enhanced MRI of the abdomen.  3.  Severe coronary artery disease and atherosclerotic vascular disease with mildly enlarged heart.  4.  Diverticulosis. No diverticulitis, colitis, or obstruction.  5.  Neurogenic bladder.    Findings were discussed with Dr. Alfred at 11:20 on 01/26/2022.   MR Abdomen MRCP w/o & w Contrast    Narrative    EXAM: MR ABDOMEN MRCP W/O AND W CONTRAST  LOCATION: [facility]  DATE/TIME: 1/27/2022 12:46 AM    INDICATION: Jaundice.  COMPARISON: CT scan from 1/26/2022.  TECHNIQUE: Routine MR liver/pancreas protocol including axial and coronal MRCP sequences. 2D and 3D reconstruction performed by MR technologist including MIP reconstruction and slab cholangiograms. If performed with contrast, additional dynamic T1 post   IV contrast images.  CONTRAST: 8 mL Gadavist.    FINDINGS:     MRCP: There is diffuse intrahepatic and extrahepatic biliary dilatation compatible with biliary obstruction. This is significantly increased compared with an older CT scan from 12/1/2020. The common bile duct measures up to 1.3 cm at the hilum of the   liver. There is a filling defect seen within the common bile duct at the hilum of  the liver best seen on series 8 image 15 and on series 7 image 30 measuring up to 8 mm in diameter. This is suspicious for an obstructing common bile duct stone. The common   bile duct appears to taper distal to this although the common bile duct above the level of the ampulla is not well seen and cannot exclude some sludge or stone material in the distal common bile duct. Gallbladder is surgically absent.    LIVER: There is a complex lesion in the anterior aspect of the right hepatic lobe peripherally measuring up to 5.3 x 3.6 cm. This shows some increased lacelike enhancement with multiple small probable cystic collections and corresponds with the   abnormality seen on the recent prior CT scan. Given the findings elsewhere, this is most concerning for phlegmonous change and/or developing abscess within the liver. Underlying malignant lesion accounting for this process felt to be less likely but not   completely excluded and close follow-up in this area is recommended.    PANCREAS: Pancreas is negative. No pancreatic ductal dilatation.    ADDITIONAL FINDINGS: Spleen and adrenal glands are unremarkable. Benign-appearing cysts in the left kidney. No hydronephrosis. No adenopathy or fluid collections. Normal caliber abdominal aorta.      Impression    IMPRESSION:  1.  Significant biliary obstruction as detailed above. There appears to be an obstructing common bile duct stone at the hilum of the liver measuring up to 8 mm in diameter likely accounting for this obstruction. However, the distal common bile duct is   not well seen and cannot exclude some sludge, stone material, or obstructing process in the distal common bile duct. Recommend ERCP for further evaluation.    2.  Abnormal area of heterogeneous enhancement and probable underlying cystic spaces in the right hepatic lobe peripherally measuring 5.3 x 3.6 cm. This is concerning for focal liver infection/phlegmonous change/developing abscess given the findings    elsewhere. Underlying malignant lesion accounting for this appearance felt to be less likely and clinical correlation is needed. Close imaging follow-up in this area recommended following appropriate therapy to exclude any progressive process.    3.  Cholecystectomy.   US Abdomen Limited    Narrative    EXAM: US ABDOMEN LIMITED  LOCATION: [facility]  DATE/TIME: 1/27/2022 4:39 AM    INDICATION: evaulate right hepatic mass, abnormal CT, ?abscess  COMPARISON: CT 01/26/2022. MR 01/27/2022.  TECHNIQUE: Limited abdominal ultrasound.    FINDINGS:    GALLBLADDER: Absent    BILE DUCTS: Dilated. The common duct measures 13 mm.    LIVER: The right hepatic peripheral lesion measures 3.7 x 3.3 x 3 cm by ultrasound. This is not as well seen as CT. It appears heterogeneously hypoechoic. No peripherally increased blood flow to suggest abscess.    RIGHT KIDNEY: No hydronephrosis.    PANCREAS: The visualized portions are normal.    No ascites.      Impression    IMPRESSION:  1.  Intrauterine extra hepatic bile duct dilation.  2.  Right hepatic peripheral lesion measures 3.7 cm by ultrasound. Evaluating all of the modalities, this may represent focal worsening of bile duct dilation versus phlegmon. At this time, it does not have the appearance of abscess.  3.  Following treatment of the bile duct obstruction, this lesion could be followed with either ultrasound or CT.       XR Surgery HUMBERTO L/T 5 Min Fluoro    Narrative    This exam was marked as non-reportable because it will not be read by a   radiologist or a Icard non-radiologist provider.         XR Chest Port 1 View    Narrative    EXAM: XR CHEST PORT 1 VIEW  LOCATION: [facility]  DATE/TIME: 1/28/2022 4:56 PM    INDICATION: hypoxia  COMPARISON: 2/1/2018      Impression    IMPRESSION: Lung volumes are substantially lower. Bands of linear infiltrate present at each lung base favored to represent discoid atelectasis though  developing pneumonia possible. Very minimal right lateral pleural thickening or pleural effusion   evident. Upper lung zones are clear. Heart size appears normal.   CT Abdomen Pelvis w Contrast    Narrative    EXAM: CT ABDOMEN PELVIS W CONTRAST  LOCATION: [facility]  DATE/TIME: 2/1/2022 10:35 AM    INDICATION: Abdominal abscess/infection suspected  COMPARISON: CT of the abdomen and pelvis 01/26/2022; MRCP 01/27/2022  TECHNIQUE: CT scan of the abdomen and pelvis was performed following injection of IV contrast. Multiplanar reformats were obtained. Dose reduction techniques were used.  CONTRAST: Isovue 370 100mL    FINDINGS:   LOWER CHEST: Minimal pleural fluid layers into the posterior costophrenic sulci with adjacent atelectasis. Atelectatic bands along the right hemidiaphragm including a substantial proportion of the right middle lobe and the basilar segments of the right   lower lobe is similar to 01/26/2022. Mild elevation of the right hemidiaphragm. Degenerative calcification of the aortic root. Moderate mitral annular calcifications.    HEPATOBILIARY: Bile duct dilation present 01/26/2022 is no longer present. Wall thickening of the common bile duct and pneumobilia are new. Heterogeneity of the liver in segments V/VII persists with surrounding diminished liver enhancement consistent   with ongoing inflammatory phlegmon. Towards the liver capsule there are multiple small irregularly shaped areas of more fluid attenuation which could represent developing microabscesses, however there is no drainable or aspirable fluid collection.    PANCREAS: Normal parenchymal architecture and enhancement. No findings to suggest acute pancreatitis.    SPLEEN: Normal.    ADRENAL GLANDS: Normal.    KIDNEYS/BLADDER: No significant mass, stones, or hydronephrosis. There are simple or benign cysts. No follow up is needed. Mildly trabeculated bladder.    BOWEL: Ingested material in the stomach. Small bowel is  normal caliber. A few scattered colonic diverticula are present. No focal bowel wall thickening or inflammatory stranding in the mesentery. Normal appendix.    LYMPH NODES: There are a few mildly enlarged, reactive portacaval lymph nodes.    VASCULATURE: A branch of the right hepatic vein supplying the anterior aspect of the right lobe does not opacify with contrast (series 3, image 36) consistent with hepatic vein thrombosis. Diffuse aortoiliac atheromatous plaque but no aneurysm or   critical stenosis. Atheromatous calcifications are also present along the celiac axis and SMA as well as their proximal branches.    PELVIC ORGANS: Prostate gland remains mildly enlarged and slightly indents the bladder base.    MUSCULOSKELETAL: Grade 2 degenerative anterolisthesis of L5 on S1 with bilateral L5 pars defects. Moderate lower thoracic and upper lumbar degenerative disc disease with marginal osteophytes, disc space narrowing, and vacuum disc phenomenon at a few   levels.      Impression    IMPRESSION:     1.  Status post ERCP with decompression of intrahepatic bile duct dilation. New wall thickening/enhancement of the common bile duct consistent with cholangitis.   2.  Persistent heterogeneity of the parenchyma in segments V/VII, due in part to thrombus in a branch of the right hepatic vein, consistent with inflammatory phlegmon/edema. Within the area of phlegmon there are multiple subcentimeter low-attenuation   foci consistent with microabscesses but no aspirable or drainable fluid collection.   XR Chest Port 1 View    Narrative    EXAM: XR CHEST PORT 1 VIEW  LOCATION: [facility]  DATE/TIME: 2/2/2022 4:15 PM    INDICATION: RN placed PICC   verify tip placement  COMPARISON: 01/28/2022      Impression    IMPRESSION: There is a new right upper extremity PICC line catheter which courses into the SVC confluence and then crosses to the left of midline into the left innominate vein. The tip is  difficult to visualize given the spinous elements. Nevertheless,   it needs to be withdrawn at least 4 cm.    Findings discussed by the undersigned with his nurse Kirill at 1639.    Better inspiration. Resolution of the linear atelectasis in the left lower lobe. 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 Heart and pulmonary vascularity are normal. No signs of pneumonia or failure. Prior   cholecystectomy.   XR PICC Chest Placement Port 1vw    Narrative    EXAM: XR CHEST PICC PLACEMENT PORT 1 VW  LOCATION: [facility]  DATE/TIME: 2/2/2022 5:31 PM    INDICATION: Check PICC line position.  COMPARISON: 02/02/2022 at 1622 hours    FINDINGS: The right PICC line has been pulled back and the tip is now located within the mid SVC, however there remains a loop of the PICC line within the proximal SVC. Elevated right hemidiaphragm and atelectasis or infiltrate right base, similar to the   earlier study.     XR PICC Chest Placement Port 1vw    Narrative    EXAM: XR CHEST PICC PLACEMENT PORT 1 VW  LOCATION: [facility]  DATE/TIME: 2/2/2022 6:22 PM    INDICATION: Check PICC line position.  COMPARISON: 02/02/2022 at 1737 hours    FINDINGS:     Repositioned right PICC now in satisfactory position in the mid to lower SVC.    Unchanged right lung volume loss with opacity along the right hemidiaphragm consistent with atelectasis. No new left lung opacities.    Unchanged heart and mediastinal contours.            Total Time Spent 30 minutes including chart review, time with patient, orders, and documentation.

## 2022-02-15 NOTE — Clinical Note
Will continue the pip-tazo through 2/21 then 2 weeks of PO cefdinir. Can you call family and TCU? Thanks!

## 2022-02-16 ENCOUNTER — TELEPHONE (OUTPATIENT)
Dept: INFECTIOUS DISEASES | Facility: CLINIC | Age: 87
End: 2022-02-16
Payer: COMMERCIAL

## 2022-02-16 LAB
ALBUMIN SERPL-MCNC: 2.3 G/DL (ref 3.5–5)
ALP SERPL-CCNC: 130 U/L (ref 45–120)
ALT SERPL W P-5'-P-CCNC: 24 U/L (ref 0–45)
ANION GAP SERPL CALCULATED.3IONS-SCNC: 6 MMOL/L (ref 5–18)
AST SERPL W P-5'-P-CCNC: 24 U/L (ref 0–40)
BILIRUB SERPL-MCNC: 1.8 MG/DL (ref 0–1)
BUN SERPL-MCNC: 12 MG/DL (ref 8–28)
CALCIUM SERPL-MCNC: 8.8 MG/DL (ref 8.5–10.5)
CHLORIDE BLD-SCNC: 104 MMOL/L (ref 98–107)
CO2 SERPL-SCNC: 28 MMOL/L (ref 22–31)
CREAT SERPL-MCNC: 1.34 MG/DL (ref 0.7–1.3)
ERYTHROCYTE [DISTWIDTH] IN BLOOD BY AUTOMATED COUNT: 15.2 % (ref 10–15)
GFR SERPL CREATININE-BSD FRML MDRD: 52 ML/MIN/1.73M2
GLUCOSE BLD-MCNC: 89 MG/DL (ref 70–125)
HCT VFR BLD AUTO: 34.3 % (ref 40–53)
HGB BLD-MCNC: 11.1 G/DL (ref 13.3–17.7)
MCH RBC QN AUTO: 31.9 PG (ref 26.5–33)
MCHC RBC AUTO-ENTMCNC: 32.4 G/DL (ref 31.5–36.5)
MCV RBC AUTO: 99 FL (ref 78–100)
PLATELET # BLD AUTO: 220 10E3/UL (ref 150–450)
POTASSIUM BLD-SCNC: 4 MMOL/L (ref 3.5–5)
PROT SERPL-MCNC: 6.4 G/DL (ref 6–8)
RBC # BLD AUTO: 3.48 10E6/UL (ref 4.4–5.9)
SODIUM SERPL-SCNC: 138 MMOL/L (ref 136–145)
WBC # BLD AUTO: 5.5 10E3/UL (ref 4–11)

## 2022-02-16 PROCEDURE — 36415 COLL VENOUS BLD VENIPUNCTURE: CPT | Performed by: NURSE PRACTITIONER

## 2022-02-16 PROCEDURE — 80053 COMPREHEN METABOLIC PANEL: CPT | Performed by: NURSE PRACTITIONER

## 2022-02-16 PROCEDURE — P9604 ONE-WAY ALLOW PRORATED TRIP: HCPCS | Performed by: NURSE PRACTITIONER

## 2022-02-16 PROCEDURE — 85027 COMPLETE CBC AUTOMATED: CPT | Performed by: NURSE PRACTITIONER

## 2022-02-16 RX ORDER — CEFDINIR 300 MG/1
300 CAPSULE ORAL 2 TIMES DAILY
Qty: 28 CAPSULE | Refills: 0 | Status: SHIPPED | OUTPATIENT
Start: 2022-02-16 | End: 2022-03-02

## 2022-02-16 NOTE — TELEPHONE ENCOUNTER
Called TCU in morning on 2/15/22 regarding missing weekly CBC orders. Gave VO for labs to be drawn that morning. Requisitions for orders sent to [facility] showed the CBC was scheduled for today 2/16/22.    Plan of treatment was reliant on test resulting today and not tomorrow.  LVM for TCU to inform error. Facility tcu provider Navin Freitas NP documented/confirms labs CBC and CMP were to be done on 2/15/22.

## 2022-02-17 NOTE — TELEPHONE ENCOUNTER
LVMTCB for TCU on message below. Ok to pull picc after last dose on 2/21/22.  Discussed with patient and his wife. Understands, they will  prescription from [facility] this weekend.  If Virginia, patient's wife, calls back, pt wanted her to write down ID's number in case cannot tolerate cefdinir once started.       Per Dr Crawley  Will continue the pip-tazo through 2/21 then 2 weeks of PO cefdinir sent to patient's home pharmacy.

## 2022-02-19 ENCOUNTER — LAB REQUISITION (OUTPATIENT)
Dept: LAB | Facility: CLINIC | Age: 87
End: 2022-02-19
Payer: COMMERCIAL

## 2022-02-19 DIAGNOSIS — D64.9 ANEMIA, UNSPECIFIED: ICD-10-CM

## 2022-02-19 DIAGNOSIS — A41.9 SEPSIS, UNSPECIFIED ORGANISM (H): ICD-10-CM

## 2022-02-19 DIAGNOSIS — R78.81 BACTEREMIA: ICD-10-CM

## 2022-02-19 DIAGNOSIS — E11.621 TYPE 2 DIABETES MELLITUS WITH FOOT ULCER (CODE) (H): ICD-10-CM

## 2022-02-21 ENCOUNTER — LAB REQUISITION (OUTPATIENT)
Dept: LAB | Facility: CLINIC | Age: 87
End: 2022-02-21
Payer: COMMERCIAL

## 2022-02-21 ENCOUNTER — DISCHARGE SUMMARY NURSING HOME (OUTPATIENT)
Dept: GERIATRICS | Facility: CLINIC | Age: 87
End: 2022-02-21
Payer: COMMERCIAL

## 2022-02-21 VITALS
OXYGEN SATURATION: 94 % | WEIGHT: 175 LBS | TEMPERATURE: 98 F | HEART RATE: 69 BPM | RESPIRATION RATE: 20 BRPM | BODY MASS INDEX: 26.52 KG/M2 | HEIGHT: 68 IN | SYSTOLIC BLOOD PRESSURE: 133 MMHG | DIASTOLIC BLOOD PRESSURE: 71 MMHG

## 2022-02-21 DIAGNOSIS — I25.10 CORONARY ARTERY DISEASE INVOLVING NATIVE CORONARY ARTERY OF NATIVE HEART WITHOUT ANGINA PECTORIS: ICD-10-CM

## 2022-02-21 DIAGNOSIS — Z86.19 HISTORY OF GRAM NEGATIVE SEPSIS: Primary | ICD-10-CM

## 2022-02-21 DIAGNOSIS — Z90.49 HISTORY OF CHOLECYSTECTOMY: ICD-10-CM

## 2022-02-21 DIAGNOSIS — Z98.890 STATUS POST ENDOSCOPIC RETROGRADE CHOLANGIOPANCREATOGRAPHY: ICD-10-CM

## 2022-02-21 DIAGNOSIS — E11.51 TYPE 2 DIABETES MELLITUS WITH DIABETIC PERIPHERAL ANGIOPATHY WITHOUT GANGRENE, WITH LONG-TERM CURRENT USE OF INSULIN (H): ICD-10-CM

## 2022-02-21 DIAGNOSIS — A41.9 SEPSIS, UNSPECIFIED ORGANISM (H): ICD-10-CM

## 2022-02-21 DIAGNOSIS — I48.0 PAROXYSMAL ATRIAL FIBRILLATION (H): ICD-10-CM

## 2022-02-21 DIAGNOSIS — Z79.01 CHRONIC ANTICOAGULATION: ICD-10-CM

## 2022-02-21 DIAGNOSIS — Z86.711 PERSONAL HISTORY OF PULMONARY EMBOLISM: ICD-10-CM

## 2022-02-21 DIAGNOSIS — D64.9 ANEMIA, UNSPECIFIED: ICD-10-CM

## 2022-02-21 DIAGNOSIS — E11.621 TYPE 2 DIABETES MELLITUS WITH FOOT ULCER (CODE) (H): ICD-10-CM

## 2022-02-21 DIAGNOSIS — K80.33 CALCULUS OF BILE DUCT WITH ACUTE CHOLANGITIS WITH OBSTRUCTION: ICD-10-CM

## 2022-02-21 DIAGNOSIS — I10 ESSENTIAL HYPERTENSION: ICD-10-CM

## 2022-02-21 DIAGNOSIS — Z79.4 TYPE 2 DIABETES MELLITUS WITH DIABETIC PERIPHERAL ANGIOPATHY WITHOUT GANGRENE, WITH LONG-TERM CURRENT USE OF INSULIN (H): ICD-10-CM

## 2022-02-21 DIAGNOSIS — Z87.440 HISTORY OF ACUTE CYSTITIS: ICD-10-CM

## 2022-02-21 DIAGNOSIS — R78.81 BACTEREMIA: ICD-10-CM

## 2022-02-21 PROCEDURE — 99316 NF DSCHRG MGMT 30 MIN+: CPT | Performed by: NURSE PRACTITIONER

## 2022-02-21 NOTE — LETTER
2/21/2022        RE: Caleb Hernandez  [address]  [place] [address]        [facility] GERIATRICS DISCHARGE SUMMARY  PATIENT'S NAME: Caleb Hernandez  YOB: 1935  MEDICAL RECORD NUMBER:  [id number]  Place of Service where encounter took place:  [facility] (SNF) [[id number]]  [facility] [address]    PRIMARY CARE PROVIDER AND CLINIC RESPONSIBLE AFTER TRANSFER:   DALE CORTEZ MD, [address] [facility]  / [place] [address]    [facility] Provider     Transferring providers: SIDDHARTH Angulo CNP  Recent Hospitalization/ED:  [facility] stay 01/27/2022 to 02/04/2022.  Date of SNF Admission: February 04, 2022  Date of SNF (anticipated) Discharge: February 22, 2022  Discharged to: previous independent home  Cognitive Scores: ---  Physical Function: ---  DME: No DME needed    CODE STATUS/ADVANCE DIRECTIVES DISCUSSION:  Full Code   ALLERGIES: Cresol [phenol], Demeclocycline, and Crestor [rosuvastatin]    NURSING FACILITY COURSE   Medication Changes/Rationale:     Discontinuation of sliding scale aspart, as the patient was not on this prior to his hospitalization and most likely not necessary.  He did continue on PTA glargine and Victoza.    Summary of nursing facility stay:   (Z86.19) History of gram negative sepsis  (primary encounter diagnosis)  Comment: Completing antibiotics today.    (Z98.890) Status post endoscopic retrograde cholangiopancreatography    (K80.33) Calculus of bile duct with acute cholangitis with obstruction  Comment: Recurrent.    (Z90.49) History of cholecystectomy  Comment: Stones found in common bile duct.    (Z87.440) History of acute cystitis with hematuria  Comment: Klebsiella.    (E11.51,  Z79.4) Type 2 diabetes mellitus with diabetic peripheral angiopathy without gangrene, with long-term current use of insulin (H)  Comment: Sliding scale aspart discontinued due to symptomatic hypoglycemic episodes.  Additionally, he hasn't not been taking this at  home.    (I48.0) Paroxysmal atrial fibrillation (H)  Comment: Chronic anticoagulation (see below)    (Z86.711) Personal history of pulmonary embolism (05/2020, unprovoked, with right heart strain)    (Z79.01) Chronic anticoagulation  Comment: On Eliquis.    (I25.10) Coronary artery disease involving native coronary artery of native heart without angina pectoris  Comment: Asymptomatic.    (I10) Essential hypertension  Comment: Blood pressures look good.      Patient presented to the ED on 01/27/2022 with complaints of abdominal pain and significant jaundice.  CT abdomen showed common bile duct obstruction and cholangitis with possible small abscesses.  He was initiated on IV antibiotics, and GI was consulted.  Hospital course was notable for finding of choledocholelithiasis, bacteremia with Klebsiella oxytoca.  On 01/27, he underwent ERCP with sludge and stone removal at [facility].  He then returned to [facility] and continued on IV antibiotics throughout his stay.  Admission bilirubin 5.9 improved to 3.0.  Noted abnormal urinalysis with subsequent positive urine culture of Klebsiella.  US RUQ showed likely liver phlegmon.  On 01/29, blood cultures positive for Klebsiella oxytoca with subsequent NGTD.  On 02/01, CT abdomen consistent with phlegmon, not an abscess.  ID consulted, recommending Zosyn IV for 2 to 3 weeks.  F/U ID Dr. Crawley in 2 weeks.  Also, recommendations for TCU for ongoing IV antibiotic administration.    TCU stay was generally uneventful other than some symptomatic low blood glucose levels (48 this morning), hence discontinuation of aspart sliding scale.    He tells me he feels great.  IV antibiotics completed today with discharge scheduled for tomorrow.    Discharge Medications:    Current Outpatient Medications   Medication Sig Dispense Refill     acetaminophen (TYLENOL) 500 MG tablet [ACETAMINOPHEN (TYLENOL) 500 MG TABLET] Take 1-2 tablets (500-1,000 mg total) by mouth every 4 (four)  hours as needed.  0     aspirin 81 MG EC tablet [ASPIRIN 81 MG EC TABLET] Take 81 mg by mouth daily.       B-D U/F 31G X 8 MM insulin pen needle USED TO INJECT INSULIN DAILY 100 each 11     blood glucose test strips [BLOOD GLUCOSE TEST STRIPS] Test two times daily. Dispense brand per patient's insurance at pharmacy discretion. 200 strip 11     blood-glucose meter (ONETOUCH VERIO IQ METER) Misc [BLOOD-GLUCOSE METER (ONETOUCH VERIO IQ METER) MISC] Use 1 each As Directed 2 (two) times a day. 1 each 0     cefdinir (OMNICEF) 300 MG capsule Take 1 capsule (300 mg) by mouth 2 times daily for 14 days 28 capsule 0     cholecalciferol, vitamin D3, 5,000 unit Tab Take 5,000 mcg by mouth daily        Continuous Blood Gluc Sensor (FREESTYLE APRIL 2 SENSOR) [facility] 1 each every 14 days 1 each every 14 days. Change every 14 days. 6 each 5     ELIQUIS ANTICOAGULANT 5 MG tablet TAKE 1 TABLET BY MOUTH TWICE DAILY 180 tablet 3     insulin glargine (LANTUS SOLOSTAR) 100 UNIT/ML pen Inject 25 Units Subcutaneous At Bedtime 15 mL 0     lisinopril (ZESTRIL) 5 MG tablet Take 1 tablet (5 mg) by mouth daily 90 tablet 3     multivit-min/FA/lycopen/lutein (CENTRUM SILVER MEN ORAL) [MULTIVIT-MIN/FA/LYCOPEN/LUTEIN (CENTRUM SILVER MEN ORAL)] Take 1 tablet by mouth daily.       mupirocin (BACTROBAN) 2 % ointment Apply topically daily as needed Apply to foot wound       piperacillin-tazobactam (ZOSYN) 3-0.375 GM vial Inject 3.375 g into the vein every 8 hours for 21 days 945 mL 0     polyethylene glycol (MIRALAX) 17 gram packet [POLYETHYLENE GLYCOL (MIRALAX) 17 GRAM PACKET] Take 1 packet (17 g total) by mouth daily as needed. 100 packet 3     sildenafil (VIAGRA) 50 MG tablet Take 1 tablet (50 mg) by mouth daily as needed (erectile dysfunction) 10 tablet 1     tamsulosin (FLOMAX) 0.4 mg cap [TAMSULOSIN (FLOMAX) 0.4 MG CAP] Take 1 capsule (0.4 mg total) by mouth daily after supper. 90 capsule 3     traMADol (ULTRAM) 50 MG tablet Take 1 tablet (50 mg) by  "mouth every 6 hours as needed for moderate pain 20 tablet 0     VICTOZA 3-RUPALI 0.6 mg/0.1 mL (18 mg/3 mL) injection [VICTOZA 3-RUPALI 0.6 MG/0.1 ML (18 MG/3 ML) INJECTION] INJECT 1.2 MG UNDER THE SKIN ONCE DAILY 12 mL 11     vitamin E 400 unit capsule [VITAMIN E 400 UNIT CAPSULE] Take 400 Units by mouth daily.       Controlled medications:   Medication: tramadol , ? tabs given to patient at the time of discharge to take home     Past Medical History:   Past Medical History:   Diagnosis Date     Abscess of right foot 11/23/2020    Added automatically from request for surgery [id number]     Acute pulmonary embolism with acute cor pulmonale (H) 5/23/2020     BPH (benign prostatic hyperplasia)      Cholelithiasis      Closed fracture of rib     Created by Conversion  Replacement Utility updated for latest IMO load     Closed fracture of thoracic vertebra (H)     Created by Conversion  Replacement Utility updated for latest IMO load     Common bile duct (CBD) obstruction 9/4/2017     Diabetes mellitus (H)      Essential hypertension      Hyperlipidemia      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    Added automatically from request for surgery [id number]      Pancreatitis      Sepsis due to pneumonia (H) 9/8/2017     Septic arthritis of right foot (H) 12/2/2020     Physical Exam:   Vitals: /71   Pulse 69   Temp 98  F (36.7  C)   Resp 20   Ht 1.727 m (5' 8\")   Wt 79.4 kg (175 lb)   SpO2 94%   BMI 26.61 kg/m    BMI: Body mass index is 26.61 kg/m .  GENERAL APPEARANCE:  Alert, in no distress, oriented     SNF labs: Recent labs in [facility] reviewed by me today.     DISCHARGE PLAN:    Follow up labs: No labs orders/due    Medical Follow Up:      Follow up with primary care provider in 1-2 weeks    [facility] scheduled appointments: ?    Discharge Services: No therapy or home care recommended.     Discharge Instructions Verbalized to Patient at Discharge:     None    TOTAL DISCHARGE TIME:   Greater than 30 " minutes  Electronically signed by:  SIDDHARTH Angulo CNP                   Sincerely,        SIDDHARTH Angulo CNP

## 2022-02-22 LAB
ALBUMIN SERPL-MCNC: 2.4 G/DL (ref 3.5–5)
ALP SERPL-CCNC: 117 U/L (ref 45–120)
ALT SERPL W P-5'-P-CCNC: 32 U/L (ref 0–45)
ANION GAP SERPL CALCULATED.3IONS-SCNC: 7 MMOL/L (ref 5–18)
AST SERPL W P-5'-P-CCNC: 36 U/L (ref 0–40)
BILIRUB SERPL-MCNC: 1.6 MG/DL (ref 0–1)
BUN SERPL-MCNC: 14 MG/DL (ref 8–28)
CALCIUM SERPL-MCNC: 8.8 MG/DL (ref 8.5–10.5)
CHLORIDE BLD-SCNC: 103 MMOL/L (ref 98–107)
CO2 SERPL-SCNC: 27 MMOL/L (ref 22–31)
CREAT SERPL-MCNC: 1.34 MG/DL (ref 0.7–1.3)
ERYTHROCYTE [DISTWIDTH] IN BLOOD BY AUTOMATED COUNT: 15 % (ref 10–15)
GFR SERPL CREATININE-BSD FRML MDRD: 52 ML/MIN/1.73M2
GLUCOSE BLD-MCNC: 172 MG/DL (ref 70–125)
HCT VFR BLD AUTO: 35 % (ref 40–53)
HGB BLD-MCNC: 11.3 G/DL (ref 13.3–17.7)
MCH RBC QN AUTO: 31.4 PG (ref 26.5–33)
MCHC RBC AUTO-ENTMCNC: 32.3 G/DL (ref 31.5–36.5)
MCV RBC AUTO: 97 FL (ref 78–100)
PLATELET # BLD AUTO: 181 10E3/UL (ref 150–450)
POTASSIUM BLD-SCNC: 4.7 MMOL/L (ref 3.5–5)
PROT SERPL-MCNC: 6.3 G/DL (ref 6–8)
RBC # BLD AUTO: 3.6 10E6/UL (ref 4.4–5.9)
SODIUM SERPL-SCNC: 137 MMOL/L (ref 136–145)
WBC # BLD AUTO: 4.1 10E3/UL (ref 4–11)

## 2022-02-22 PROCEDURE — 36415 COLL VENOUS BLD VENIPUNCTURE: CPT | Performed by: NURSE PRACTITIONER

## 2022-02-22 PROCEDURE — P9603 ONE-WAY ALLOW PRORATED MILES: HCPCS | Performed by: NURSE PRACTITIONER

## 2022-02-22 PROCEDURE — 80053 COMPREHEN METABOLIC PANEL: CPT | Performed by: NURSE PRACTITIONER

## 2022-02-22 PROCEDURE — 85027 COMPLETE CBC AUTOMATED: CPT | Performed by: NURSE PRACTITIONER

## 2022-02-23 PROBLEM — Z87.440 HISTORY OF ACUTE CYSTITIS: Status: ACTIVE | Noted: 2022-02-23

## 2022-02-23 NOTE — PROGRESS NOTES
[facility] GERIATRICS DISCHARGE SUMMARY  PATIENT'S NAME: Caleb Hernandez  YOB: 1935  MEDICAL RECORD NUMBER:  [id number]  Place of Service where encounter took place:  [facility] (SNF) [[id number]]  [address] [address]    PRIMARY CARE PROVIDER AND CLINIC RESPONSIBLE AFTER TRANSFER:   DALE CORTEZ MD, [address] [facility]  / [place] [id number]    [facility] Provider     Transferring providers: SIDDHARTH Angulo CNP  Recent Hospitalization/ED:  [facility] stay 01/27/2022 to 02/04/2022.  Date of SNF Admission: February 04, 2022  Date of SNF (anticipated) Discharge: February 22, 2022  Discharged to: previous independent home  Cognitive Scores: ---  Physical Function: ---  DME: No DME needed    CODE STATUS/ADVANCE DIRECTIVES DISCUSSION:  Full Code   ALLERGIES: Cresol [phenol], Demeclocycline, and Crestor [rosuvastatin]    NURSING FACILITY COURSE   Medication Changes/Rationale:     Discontinuation of sliding scale aspart, as the patient was not on this prior to his hospitalization and most likely not necessary.  He did continue on PTA glargine and Victoza.    Summary of nursing facility stay:   (Z86.19) History of gram negative sepsis  (primary encounter diagnosis)  Comment: Completing antibiotics today.    (Z98.890) Status post endoscopic retrograde cholangiopancreatography    (K80.33) Calculus of bile duct with acute cholangitis with obstruction  Comment: Recurrent.    (Z90.49) History of cholecystectomy  Comment: Stones found in common bile duct.    (Z87.440) History of acute cystitis with hematuria  Comment: Klebsiella.    (E11.51,  Z79.4) Type 2 diabetes mellitus with diabetic peripheral angiopathy without gangrene, with long-term current use of insulin (H)  Comment: Sliding scale aspart discontinued due to symptomatic hypoglycemic episodes.  Additionally, he hasn't not been taking this at home.    (I48.0) Paroxysmal atrial fibrillation (H)  Comment: Chronic anticoagulation  (see below)    (Z86.711) Personal history of pulmonary embolism (05/2020, unprovoked, with right heart strain)    (Z79.01) Chronic anticoagulation  Comment: On Eliquis.    (I25.10) Coronary artery disease involving native coronary artery of native heart without angina pectoris  Comment: Asymptomatic.    (I10) Essential hypertension  Comment: Blood pressures look good.      Patient presented to the ED on 01/27/2022 with complaints of abdominal pain and significant jaundice.  CT abdomen showed common bile duct obstruction and cholangitis with possible small abscesses.  He was initiated on IV antibiotics, and GI was consulted.  Hospital course was notable for finding of choledocholelithiasis, bacteremia with Klebsiella oxytoca.  On 01/27, he underwent ERCP with sludge and stone removal at [facility].  He then returned to [facility] and continued on IV antibiotics throughout his stay.  Admission bilirubin 5.9 improved to 3.0.  Noted abnormal urinalysis with subsequent positive urine culture of Klebsiella.  US RUQ showed likely liver phlegmon.  On 01/29, blood cultures positive for Klebsiella oxytoca with subsequent NGTD.  On 02/01, CT abdomen consistent with phlegmon, not an abscess.  ID consulted, recommending Zosyn IV for 2 to 3 weeks.  F/U ID Dr. Crawley in 2 weeks.  Also, recommendations for TCU for ongoing IV antibiotic administration.    TCU stay was generally uneventful other than some symptomatic low blood glucose levels (48 this morning), hence discontinuation of aspart sliding scale.    He tells me he feels great.  IV antibiotics completed today with discharge scheduled for tomorrow.    Discharge Medications:    Current Outpatient Medications   Medication Sig Dispense Refill     acetaminophen (TYLENOL) 500 MG tablet [ACETAMINOPHEN (TYLENOL) 500 MG TABLET] Take 1-2 tablets (500-1,000 mg total) by mouth every 4 (four) hours as needed.  0     aspirin 81 MG EC tablet [ASPIRIN 81 MG EC TABLET] Take 81 mg by  mouth daily.       B-D U/F 31G X 8 MM insulin pen needle USED TO INJECT INSULIN DAILY 100 each 11     blood glucose test strips [BLOOD GLUCOSE TEST STRIPS] Test two times daily. Dispense brand per patient's insurance at pharmacy discretion. 200 strip 11     blood-glucose meter (ONETOUCH VERIO IQ METER) Misc [BLOOD-GLUCOSE METER (ONETOUCH VERIO IQ METER) MISC] Use 1 each As Directed 2 (two) times a day. 1 each 0     cefdinir (OMNICEF) 300 MG capsule Take 1 capsule (300 mg) by mouth 2 times daily for 14 days 28 capsule 0     cholecalciferol, vitamin D3, 5,000 unit Tab Take 5,000 mcg by mouth daily        Continuous Blood Gluc Sensor (FREESTYLE APRIL 2 SENSOR) [facility] 1 each every 14 days 1 each every 14 days. Change every 14 days. 6 each 5     ELIQUIS ANTICOAGULANT 5 MG tablet TAKE 1 TABLET BY MOUTH TWICE DAILY 180 tablet 3     insulin glargine (LANTUS SOLOSTAR) 100 UNIT/ML pen Inject 25 Units Subcutaneous At Bedtime 15 mL 0     lisinopril (ZESTRIL) 5 MG tablet Take 1 tablet (5 mg) by mouth daily 90 tablet 3     multivit-min/FA/lycopen/lutein (CENTRUM SILVER MEN ORAL) [MULTIVIT-MIN/FA/LYCOPEN/LUTEIN (CENTRUM SILVER MEN ORAL)] Take 1 tablet by mouth daily.       mupirocin (BACTROBAN) 2 % ointment Apply topically daily as needed Apply to foot wound       piperacillin-tazobactam (ZOSYN) 3-0.375 GM vial Inject 3.375 g into the vein every 8 hours for 21 days 945 mL 0     polyethylene glycol (MIRALAX) 17 gram packet [POLYETHYLENE GLYCOL (MIRALAX) 17 GRAM PACKET] Take 1 packet (17 g total) by mouth daily as needed. 100 packet 3     sildenafil (VIAGRA) 50 MG tablet Take 1 tablet (50 mg) by mouth daily as needed (erectile dysfunction) 10 tablet 1     tamsulosin (FLOMAX) 0.4 mg cap [TAMSULOSIN (FLOMAX) 0.4 MG CAP] Take 1 capsule (0.4 mg total) by mouth daily after supper. 90 capsule 3     traMADol (ULTRAM) 50 MG tablet Take 1 tablet (50 mg) by mouth every 6 hours as needed for moderate pain 20 tablet 0     VICTOZA 3-RUPALI 0.6 mg/0.1  "mL (18 mg/3 mL) injection [VICTOZA 3-RUPALI 0.6 MG/0.1 ML (18 MG/3 ML) INJECTION] INJECT 1.2 MG UNDER THE SKIN ONCE DAILY 12 mL 11     vitamin E 400 unit capsule [VITAMIN E 400 UNIT CAPSULE] Take 400 Units by mouth daily.       Controlled medications:   Medication: tramadol , ? tabs given to patient at the time of discharge to take home     Past Medical History:   Past Medical History:   Diagnosis Date     Abscess of right foot 11/23/2020    Added automatically from request for surgery [id number]     Acute pulmonary embolism with acute cor pulmonale (H) 5/23/2020     BPH (benign prostatic hyperplasia)      Cholelithiasis      Closed fracture of rib     Created by Conversion  Replacement Utility updated for latest IMO load     Closed fracture of thoracic vertebra (H)     Created by Conversion  Replacement Utility updated for latest IMO load     Common bile duct (CBD) obstruction 9/4/2017     Diabetes mellitus (H)      Essential hypertension      Hyperlipidemia      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    Added automatically from request for surgery [id number]      Pancreatitis      Sepsis due to pneumonia (H) 9/8/2017     Septic arthritis of right foot (H) 12/2/2020     Physical Exam:   Vitals: /71   Pulse 69   Temp 98  F (36.7  C)   Resp 20   Ht 1.727 m (5' 8\")   Wt 79.4 kg (175 lb)   SpO2 94%   BMI 26.61 kg/m    BMI: Body mass index is 26.61 kg/m .  GENERAL APPEARANCE:  Alert, in no distress, oriented     SNF labs: Recent labs in [facility] reviewed by me today.     DISCHARGE PLAN:    Follow up labs: No labs orders/due    Medical Follow Up:      Follow up with primary care provider in 1-2 weeks    [facility] scheduled appointments: ?    Discharge Services: No therapy or home care recommended.     Discharge Instructions Verbalized to Patient at Discharge:     None    TOTAL DISCHARGE TIME:   Greater than 30 minutes  Electronically signed by:  SIDDHARTH Angulo CNP             "

## 2022-02-24 NOTE — TELEPHONE ENCOUNTER
Called patient to confirm taking oral antibiotics. Patient's wife Virginia confirms and informs patient is tolerating. Gave ID clinic number if they have any questions or concerns. Confirmed with readback [phone]

## 2022-02-28 ENCOUNTER — PATIENT OUTREACH (OUTPATIENT)
Dept: NURSING | Facility: CLINIC | Age: 87
End: 2022-02-28
Payer: COMMERCIAL

## 2022-02-28 ENCOUNTER — PATIENT OUTREACH (OUTPATIENT)
Dept: CARE COORDINATION | Facility: CLINIC | Age: 87
End: 2022-02-28
Payer: COMMERCIAL

## 2022-02-28 DIAGNOSIS — R62.7 ADULT FAILURE TO THRIVE: ICD-10-CM

## 2022-02-28 DIAGNOSIS — K80.33 CALCULUS OF BILE DUCT WITH ACUTE CHOLANGITIS WITH OBSTRUCTION: Primary | ICD-10-CM

## 2022-02-28 NOTE — PROGRESS NOTES
"Clinic Care Coordination Contact    Care Coordination Transition Communication           Clinical Data: Patient was hospitalized at  from 1/27 to 2/4 with diagnosis of poor appetite , Jaundice  Ascending cholangitis  Cholelithiasis  Sepsis (H)  Status post endoscopic retrograde cholangiopancreatography  Gram-negative bacteremia Klebsiella  Acute cystitis-Klebsiella  Generalized edema  Transaminitis and hyperbilirubinemia  Leukocytosis  Thrombocytopenia  Atrial fibrillation  Essential hypertension.      Transition to Facility:              Facility Name:  [facility] @ [phone]                   Plan: Patient discharged from TCU to home.  Will write a new order for CHW to offer CCC.     Per hospital care coordinator \"Wife states son works during the day and she is dealing with medical concerns- arthritis, recent back surgery and bad hip and barely able to walk herself right now. Wife not confident that she could provide assist of 1. Wife does not drive, son is not able to provide transportation as he is working.\"     "

## 2022-02-28 NOTE — PROGRESS NOTES
Clinic Care Coordination Contact  Community Health Worker Initial Outreach    Patient accepts CC: No, patient let CHW know that he doesn't need any help at this time . Patient will be sent Care Coordination introduction letter for future reference.     Julissa Alvarez  [facility] Health Worker  [facility] Care Coordination   Office: [phone]

## 2022-02-28 NOTE — LETTER
[facility] CARE COORDINATION  [address] [place] [address]    February 28, 2022    Caleb Hernandez  [address] [place] [address]      Dear Caleb,    I am a clinic community health worker who works with DALE CORTEZ MD at [facility]. I wanted to thank you for spending the time to talk with me.  Below is a description of clinic care coordination and how I can further assist you.      The clinic care coordination team is made up of a registered nurse,  and community health worker who understand the health care system. The goal of clinic care coordination is to help you manage your health and improve access to the health care system in the most efficient manner. The team can assist you in meeting your health care goals by providing education, coordinating services, strengthening the communication among your providers and supporting you with any resource needs.    Please feel free to contact me at [phone] with any questions or concerns. We are focused on providing you with the highest-quality healthcare experience possible and that all starts with you.     Sincerely,     Julissa Alvarez  Community Health Worker  [facility] Care Coordination   Office: [phone]

## 2022-03-11 DIAGNOSIS — R33.9 URINARY RETENTION: ICD-10-CM

## 2022-03-11 RX ORDER — TAMSULOSIN HYDROCHLORIDE 0.4 MG/1
CAPSULE ORAL
Qty: 90 CAPSULE | Refills: 3 | Status: SHIPPED | OUTPATIENT
Start: 2022-03-11 | End: 2023-01-01

## 2022-04-02 ENCOUNTER — HEALTH MAINTENANCE LETTER (OUTPATIENT)
Age: 87
End: 2022-04-02

## 2022-04-04 NOTE — ANESTHESIA POSTPROCEDURE EVALUATION
Patient: Caleb Hernandez  ENDOSCOPIC RETROGRADE CHOLANGIOPANCREATOGRAPHY SPHINCTEROTOMY AND STONE EXTRACTION  Anesthesia type: general    Patient location: PACU  Last vitals:   Vitals:    09/05/17 2031   BP: 110/59   Pulse: 95   Resp: 18   Temp: 37.2  C (99  F)   SpO2: 93%     Post vital signs: stable  Level of consciousness: awake and responds to simple questions  Post-anesthesia pain: pain controlled  Post-anesthesia nausea and vomiting: no  Pulmonary: unassisted, return to baseline  Cardiovascular: stable and blood pressure at baseline  Hydration: adequate  Anesthetic events: no    QCDR Measures:  ASA# 11 - Renu-op Cardiac Arrest: ASA11B - Patient did NOT experience unanticipated cardiac arrest  ASA# 12 - Renu-op Mortality Rate: ASA12B - Patient did NOT die  ASA# 13 - PACU Re-Intubation Rate: ASA13B - Patient did NOT require a new airway mgmt  ASA# 10 - Composite Anes Safety: ASA10A - No serious adverse event    Additional Notes:   Patient

## 2022-04-22 ENCOUNTER — OFFICE VISIT (OUTPATIENT)
Dept: INTERNAL MEDICINE | Facility: CLINIC | Age: 87
End: 2022-04-22
Payer: COMMERCIAL

## 2022-04-22 ENCOUNTER — NURSE TRIAGE (OUTPATIENT)
Dept: NURSING | Facility: CLINIC | Age: 87
End: 2022-04-22

## 2022-04-22 VITALS
OXYGEN SATURATION: 88 % | HEART RATE: 66 BPM | DIASTOLIC BLOOD PRESSURE: 51 MMHG | BODY MASS INDEX: 22.73 KG/M2 | HEIGHT: 68 IN | WEIGHT: 150 LBS | SYSTOLIC BLOOD PRESSURE: 100 MMHG

## 2022-04-22 DIAGNOSIS — I73.9 PVD (PERIPHERAL VASCULAR DISEASE) (H): ICD-10-CM

## 2022-04-22 DIAGNOSIS — Z98.890 STATUS POST ENDOSCOPIC RETROGRADE CHOLANGIOPANCREATOGRAPHY: Chronic | ICD-10-CM

## 2022-04-22 DIAGNOSIS — R35.89 POLYURIA: ICD-10-CM

## 2022-04-22 DIAGNOSIS — R62.7 ADULT FAILURE TO THRIVE: Primary | ICD-10-CM

## 2022-04-22 DIAGNOSIS — Z79.4 TYPE 2 DIABETES MELLITUS WITH DIABETIC PERIPHERAL ANGIOPATHY WITHOUT GANGRENE, WITH LONG-TERM CURRENT USE OF INSULIN (H): ICD-10-CM

## 2022-04-22 DIAGNOSIS — E11.51 TYPE 2 DIABETES MELLITUS WITH DIABETIC PERIPHERAL ANGIOPATHY WITHOUT GANGRENE, WITH LONG-TERM CURRENT USE OF INSULIN (H): ICD-10-CM

## 2022-04-22 DIAGNOSIS — Z86.19 HISTORY OF GRAM NEGATIVE SEPSIS: ICD-10-CM

## 2022-04-22 PROBLEM — R31.0 GROSS HEMATURIA: Status: RESOLVED | Noted: 2020-12-02 | Resolved: 2022-04-22

## 2022-04-22 PROBLEM — K83.09 ASCENDING CHOLANGITIS (H): Chronic | Status: RESOLVED | Noted: 2022-01-27 | Resolved: 2022-04-22

## 2022-04-22 PROBLEM — R78.81 GRAM-NEGATIVE BACTEREMIA: Chronic | Status: RESOLVED | Noted: 2022-01-27 | Resolved: 2022-04-22

## 2022-04-22 PROBLEM — R33.9 URINARY RETENTION: Status: RESOLVED | Noted: 2020-12-02 | Resolved: 2022-04-22

## 2022-04-22 PROBLEM — Z87.440 HISTORY OF ACUTE CYSTITIS: Status: RESOLVED | Noted: 2022-02-23 | Resolved: 2022-04-22

## 2022-04-22 PROBLEM — A41.9 SEPSIS (H): Chronic | Status: RESOLVED | Noted: 2022-01-27 | Resolved: 2022-04-22

## 2022-04-22 LAB
ALBUMIN SERPL-MCNC: 2.5 G/DL (ref 3.5–5)
ALP SERPL-CCNC: 113 U/L (ref 45–120)
ALT SERPL W P-5'-P-CCNC: 36 U/L (ref 0–45)
ANION GAP SERPL CALCULATED.3IONS-SCNC: 12 MMOL/L (ref 5–18)
AST SERPL W P-5'-P-CCNC: 31 U/L (ref 0–40)
BILIRUB SERPL-MCNC: 0.8 MG/DL (ref 0–1)
BUN SERPL-MCNC: 44 MG/DL (ref 8–28)
CALCIUM SERPL-MCNC: 9.6 MG/DL (ref 8.5–10.5)
CHLORIDE BLD-SCNC: 95 MMOL/L (ref 98–107)
CO2 SERPL-SCNC: 23 MMOL/L (ref 22–31)
CREAT SERPL-MCNC: 2.17 MG/DL (ref 0.7–1.3)
ERYTHROCYTE [DISTWIDTH] IN BLOOD BY AUTOMATED COUNT: 12.1 % (ref 10–15)
GFR SERPL CREATININE-BSD FRML MDRD: 29 ML/MIN/1.73M2
GLUCOSE BLD-MCNC: 626 MG/DL (ref 70–125)
HBA1C MFR BLD: 13.5 % (ref 0–5.6)
HCT VFR BLD AUTO: 34.1 % (ref 40–53)
HGB BLD-MCNC: 11.8 G/DL (ref 13.3–17.7)
MCH RBC QN AUTO: 31.7 PG (ref 26.5–33)
MCHC RBC AUTO-ENTMCNC: 34.6 G/DL (ref 31.5–36.5)
MCV RBC AUTO: 92 FL (ref 78–100)
PLATELET # BLD AUTO: 356 10E3/UL (ref 150–450)
POTASSIUM BLD-SCNC: 5.2 MMOL/L (ref 3.5–5)
PROT SERPL-MCNC: 7.4 G/DL (ref 6–8)
RBC # BLD AUTO: 3.72 10E6/UL (ref 4.4–5.9)
SODIUM SERPL-SCNC: 130 MMOL/L (ref 136–145)
WBC # BLD AUTO: 8.7 10E3/UL (ref 4–11)

## 2022-04-22 PROCEDURE — 80053 COMPREHEN METABOLIC PANEL: CPT | Performed by: INTERNAL MEDICINE

## 2022-04-22 PROCEDURE — 36415 COLL VENOUS BLD VENIPUNCTURE: CPT | Performed by: INTERNAL MEDICINE

## 2022-04-22 PROCEDURE — 83036 HEMOGLOBIN GLYCOSYLATED A1C: CPT | Performed by: INTERNAL MEDICINE

## 2022-04-22 PROCEDURE — 99215 OFFICE O/P EST HI 40 MIN: CPT | Performed by: INTERNAL MEDICINE

## 2022-04-22 PROCEDURE — 85027 COMPLETE CBC AUTOMATED: CPT | Performed by: INTERNAL MEDICINE

## 2022-04-22 NOTE — PATIENT INSTRUCTIONS
Complex post hospital visit    Recovered from sepsis, cholangitis with klebsiella bacteremia, bacteriuria  Choledocholithiasis, ERCP 1/27  Admission Date: 1/27/2022   Discharge Date: 2/4/2022  When in the hospital he was very ill, with jaundice and sepsis syndrome  The TCU, returned home around Feb 21 2/21/2022 PICC line pulled cefdinir 300mg PO BID for an additional 2 weeks, ended around March 5 2- showed bilirubin recovering nicely  Bilirubin Total 0.0 - 1.0 mg/dL 1.6 High      EXAM: MR ABDOMEN MRCP W/O AND W CONTRAST  DATE/TIME: 1/27/2022  Significant biliary obstruction -- obstructing common bile duct stone at the hilum of the liver measuring up to 8 mm in diameter likely accounting for this obstruction.     peripherally measuring 5.3 x 3.6 cm.  focal liver infection/phlegmonous change/developing abscess given the findings     EXAM: CT ABDOMEN PELVIS W CONTRAST  DATE/TIME: 2/1/2022 10:35 AM   Status post ERCP with decompression of intrahepatic bile duct dilation    Type 2 diabetes suboptimal control currently on insulin and Victoza but no metformin  Concern for glycosuria because of excessive urination  As of April 22, 2022   on Lantus 25 units at bedtime, at the moment he is not using any mealtime NovoLog.    Also Victoza 1.8 mg injected once a day     Would specify an A1c goal of about 7.5.  If he is running less than 7 and seen occasional low fingersticks, we might back down on his Lantus dose.  For now, continue on Lantus 44 units a day, Victoza, no short acting insulin.    He is doing fingersticks, but unfortunately we do not have his blood sugar book.  I told collect blood sugar readings twice a day, and bring the book to his next meeting with me in a month    Excessive urination ever since he returned home in March  We will check urinalysis, I am concerned that his blood sugar may be running high and that he is experiencing glycosuria    History of Urinary retention with hematuria, had Karlo  "catheter out January 22, 2021  The Dietrich catheter was placed after angiogram because of acute urinary retention.  He developed hematuria November 30, 2020  CT scan abdomen pelvis with irregular bladder wall thickening suggestive of cystitis.  Noted to have nonobstructing 11 mm right upper pole kidney stone but no hydronephrosis.  He is currently on Flomax (tamsulosin) and I want him to stay on that.    Adult failure to thrive  Wt Readings from Last 5 Encounters:   04/22/22 68 kg (150 lb)   02/18/22 79.4 kg (175 lb)   02/15/22 79.4 kg (175 lb)   02/04/22 80.8 kg (178 lb 1.6 oz)   01/27/22 81.1 kg (178 lb 12.7 oz)     Pain control-- right foot cramps  Will continue to use tramadol tablets, which he averages maybe 1 a day.  I told the tramadol, being an opioid, can be constipating.  Therefore he needs to use his MiraLAX powder to keep stools soft     Status post excision of right fifth metatarsal because of osteomyelitis on November 2, 2020, wound cultures with Bacteroides and Enterobacter.  Status post skin grafting to the lateral right foot.  10- Dr Garcia told him right foot is healed-- graduated from therapy. \"The right foot ulceration has resolved. I am pleased with his progress and recommend he continue to monitor for skin irritation or skin breakdown.'     He will wear his new custom molded diabetic shoes, and they look good.  As of the end of April 2021, he graduated from home care that was being delivered by [facility] (PT and OT)     Peripheral vascular disease.  November 24, 2020 he had right lower extremity angiogram with balloon angioplasty of anterior tibial and peroneal arteries.  However postoperative ankle-brachial index did not improve significantly, although vascular surgery thought that was because of vasospasm.  He needs to follow-up with vascular surgery, and will have a repeat arterial Doppler ultrasound.     7-8-2021 Ultrasound  Right Lower Extremity: Patent vasculature to the " distal superficial femoral artery with triphasic flow. Transition to monophasic flow in the popliteal artery. Occluded posterior tibial artery.  Patent anterior tibial and dorsalis pedis arteries.  Left Lower Extremity: Occluded posterior tibial artery.  Patent anterior tibial and dorsalis pedis arteries.     Chronic right leg and foot lymphedema, probably related to vascular insufficiency both arterial and venous, I reminded him to elevate his leg to help the drainage     Essential hypertension, I asked him to stop the lisinopril, since his blood pressure seems to be running low he lost weight while in the hospital February-March 2022    Evolution into chronic kidney disease, recent hospitalization probably played a part, estimated GFR 52 measured February 22, 2022  Creatinine 0.70 - 1.30 mg/dL 1.34     Normocytic anemia of chronic disease and postinflammatory effects  2-  Hemoglobin 13.3 - 17.7 g/dL 11.1 Low      Peripheral vascular disease with reduced SRIKANTH indices in both feet, but no focal stenosis on arterial ultrasound study.       History of acute pulmonary embolism and cor pulmonale, was unprovoked, diagnosed May 2020, has been on anticoagulation ever since with Eliquis which he will continue long-term.  He seems to be tolerating the Eliquis just fine.  He is on concomitant baby aspirin which I told him does increase the risk for bleeding when combined with Eliquis.  But I think the combination is appropriate for him since he has peripheral vascular disease.     Paroxysmal atrial fibrillation, and history of pulmonary embolism, on anticoagulation with Eliquis for those reasons     Hyperlipidemia in the context of diabetes, with history of intolerance to statins, LDL cholesterol was 126 when measured July 22, 2020.       Constipation, component of drug-induced from tramadol, I told him its okay to use MiraLAX 1 capful even daily, dissolved in liquid.    Erectile dysfunction, took the sildenafil off his  medication list, because too many medical issues going on, and blood pressures running low     Second Moderna Covid third shot Moderna 11-  He would like to wait until our next meeting in May to get his fourth shot    Immunization History   Administered Date(s) Administered    COVID-19,PF,Moderna 01/25/2021, 02/22/2021    COVID-19,PF,Moderna Booster 11/24/2021    Mantoux Tuberculin Skin Test 12/09/2020, 12/23/2020    Pneumo Conj 13-V (2010&after) 01/01/2016, 10/22/2020    Pneumococcal 23 valent 09/29/2003

## 2022-04-23 ENCOUNTER — HOSPITAL ENCOUNTER (OUTPATIENT)
Facility: CLINIC | Age: 87
Setting detail: OBSERVATION
Discharge: HOME | End: 2022-04-25
Attending: EMERGENCY MEDICINE | Admitting: INTERNAL MEDICINE
Payer: COMMERCIAL

## 2022-04-23 DIAGNOSIS — N30.00 ACUTE CYSTITIS WITHOUT HEMATURIA: ICD-10-CM

## 2022-04-23 DIAGNOSIS — R73.9 HYPERGLYCEMIA: ICD-10-CM

## 2022-04-23 DIAGNOSIS — R41.81 AGE-RELATED COGNITIVE DECLINE: Primary | Chronic | ICD-10-CM

## 2022-04-23 DIAGNOSIS — E11.51 TYPE 2 DIABETES MELLITUS WITH DIABETIC PERIPHERAL ANGIOPATHY WITHOUT GANGRENE, WITH LONG-TERM CURRENT USE OF INSULIN (H): ICD-10-CM

## 2022-04-23 DIAGNOSIS — E86.0 DEHYDRATION: ICD-10-CM

## 2022-04-23 DIAGNOSIS — Z79.4 TYPE 2 DIABETES MELLITUS WITH DIABETIC PERIPHERAL ANGIOPATHY WITHOUT GANGRENE, WITH LONG-TERM CURRENT USE OF INSULIN (H): ICD-10-CM

## 2022-04-23 DIAGNOSIS — E55.9 VITAMIN D DEFICIENCY: ICD-10-CM

## 2022-04-23 PROBLEM — I73.9 PVD (PERIPHERAL VASCULAR DISEASE) (H): Status: ACTIVE | Noted: 2020-10-22

## 2022-04-23 PROBLEM — Z79.01 CHRONIC ANTICOAGULATION: Chronic | Status: ACTIVE | Noted: 2020-10-22

## 2022-04-23 PROBLEM — E11.65 HYPERGLYCEMIA DUE TO DIABETES MELLITUS (H): Status: ACTIVE | Noted: 2022-04-23

## 2022-04-23 PROBLEM — I89.0 LYMPHEDEMA: Status: ACTIVE | Noted: 2022-04-23

## 2022-04-23 PROBLEM — E11.65 HYPERGLYCEMIA DUE TO DIABETES MELLITUS (H): Chronic | Status: ACTIVE | Noted: 2022-04-23

## 2022-04-23 LAB
ALBUMIN SERPL-MCNC: 2.7 G/DL (ref 3.5–5)
ALBUMIN UR-MCNC: 70 MG/DL
ALP SERPL-CCNC: 107 U/L (ref 45–120)
ALT SERPL W P-5'-P-CCNC: 36 U/L (ref 0–45)
ANION GAP SERPL CALCULATED.3IONS-SCNC: 11 MMOL/L (ref 5–18)
APPEARANCE UR: ABNORMAL
AST SERPL W P-5'-P-CCNC: 24 U/L (ref 0–40)
BASE EXCESS BLDV CALC-SCNC: 4.9 MMOL/L
BASOPHILS # BLD AUTO: 0.1 10E3/UL (ref 0–0.2)
BASOPHILS NFR BLD AUTO: 1 %
BILIRUB SERPL-MCNC: 0.9 MG/DL (ref 0–1)
BILIRUB UR QL STRIP: NEGATIVE
BUN SERPL-MCNC: 40 MG/DL (ref 8–28)
CALCIUM SERPL-MCNC: 10.1 MG/DL (ref 8.5–10.5)
CHLORIDE BLD-SCNC: 99 MMOL/L (ref 98–107)
CO2 SERPL-SCNC: 25 MMOL/L (ref 22–31)
COLOR UR AUTO: ABNORMAL
CREAT SERPL-MCNC: 1.69 MG/DL (ref 0.7–1.3)
EOSINOPHIL # BLD AUTO: 0.1 10E3/UL (ref 0–0.7)
EOSINOPHIL NFR BLD AUTO: 2 %
ERYTHROCYTE [DISTWIDTH] IN BLOOD BY AUTOMATED COUNT: 12.3 % (ref 10–15)
GFR SERPL CREATININE-BSD FRML MDRD: 39 ML/MIN/1.73M2
GLUCOSE BLD-MCNC: 406 MG/DL (ref 70–125)
GLUCOSE BLDC GLUCOMTR-MCNC: 331 MG/DL (ref 70–99)
GLUCOSE BLDC GLUCOMTR-MCNC: 345 MG/DL (ref 70–99)
GLUCOSE BLDC GLUCOMTR-MCNC: 400 MG/DL (ref 70–99)
GLUCOSE BLDC GLUCOMTR-MCNC: 424 MG/DL (ref 70–99)
GLUCOSE UR STRIP-MCNC: 150 MG/DL
HCO3 BLDV-SCNC: 26 MMOL/L (ref 24–30)
HCT VFR BLD AUTO: 35.5 % (ref 40–53)
HGB BLD-MCNC: 12 G/DL (ref 13.3–17.7)
HGB UR QL STRIP: ABNORMAL
IMM GRANULOCYTES # BLD: 0.1 10E3/UL
IMM GRANULOCYTES NFR BLD: 2 %
KETONES BLD-SCNC: 0.61 MMOL/L
KETONES UR STRIP-MCNC: NEGATIVE MG/DL
LEUKOCYTE ESTERASE UR QL STRIP: ABNORMAL
LYMPHOCYTES # BLD AUTO: 0.9 10E3/UL (ref 0.8–5.3)
LYMPHOCYTES NFR BLD AUTO: 13 %
MCH RBC QN AUTO: 30.8 PG (ref 26.5–33)
MCHC RBC AUTO-ENTMCNC: 33.8 G/DL (ref 31.5–36.5)
MCV RBC AUTO: 91 FL (ref 78–100)
MONOCYTES # BLD AUTO: 0.8 10E3/UL (ref 0–1.3)
MONOCYTES NFR BLD AUTO: 11 %
NEUTROPHILS # BLD AUTO: 5 10E3/UL (ref 1.6–8.3)
NEUTROPHILS NFR BLD AUTO: 71 %
NITRATE UR QL: NEGATIVE
NRBC # BLD AUTO: 0 10E3/UL
NRBC BLD AUTO-RTO: 0 /100
OXYHGB MFR BLDV: 31.6 % (ref 70–75)
PCO2 BLDV: 52 MM HG (ref 35–50)
PH BLDV: 7.37 [PH] (ref 7.35–7.45)
PH UR STRIP: 5.5 [PH] (ref 5–7)
PLATELET # BLD AUTO: 329 10E3/UL (ref 150–450)
PO2 BLDV: 21 MM HG (ref 25–47)
POTASSIUM BLD-SCNC: 5 MMOL/L (ref 3.5–5)
PROT SERPL-MCNC: 7.8 G/DL (ref 6–8)
RBC # BLD AUTO: 3.9 10E6/UL (ref 4.4–5.9)
RBC URINE: >182 /HPF
SAO2 % BLDV: 32.1 % (ref 70–75)
SARS-COV-2 RNA RESP QL NAA+PROBE: NEGATIVE
SODIUM SERPL-SCNC: 135 MMOL/L (ref 136–145)
SP GR UR STRIP: 1.01 (ref 1–1.03)
SQUAMOUS EPITHELIAL: 56 /HPF
UROBILINOGEN UR STRIP-MCNC: <2 MG/DL
WBC # BLD AUTO: 6.9 10E3/UL (ref 4–11)
WBC CLUMPS #/AREA URNS HPF: PRESENT /HPF
WBC URINE: >182 /HPF
YEAST #/AREA URNS HPF: ABNORMAL /HPF

## 2022-04-23 PROCEDURE — 80053 COMPREHEN METABOLIC PANEL: CPT | Performed by: EMERGENCY MEDICINE

## 2022-04-23 PROCEDURE — 82010 KETONE BODYS QUAN: CPT | Performed by: EMERGENCY MEDICINE

## 2022-04-23 PROCEDURE — 96372 THER/PROPH/DIAG INJ SC/IM: CPT | Performed by: INTERNAL MEDICINE

## 2022-04-23 PROCEDURE — 250N000009 HC RX 250: Performed by: INTERNAL MEDICINE

## 2022-04-23 PROCEDURE — C9803 HOPD COVID-19 SPEC COLLECT: HCPCS

## 2022-04-23 PROCEDURE — 36415 COLL VENOUS BLD VENIPUNCTURE: CPT | Performed by: EMERGENCY MEDICINE

## 2022-04-23 PROCEDURE — 87086 URINE CULTURE/COLONY COUNT: CPT | Performed by: EMERGENCY MEDICINE

## 2022-04-23 PROCEDURE — 120N000001 HC R&B MED SURG/OB

## 2022-04-23 PROCEDURE — 96361 HYDRATE IV INFUSION ADD-ON: CPT

## 2022-04-23 PROCEDURE — 250N000013 HC RX MED GY IP 250 OP 250 PS 637: Performed by: INTERNAL MEDICINE

## 2022-04-23 PROCEDURE — 85025 COMPLETE CBC W/AUTO DIFF WBC: CPT | Performed by: EMERGENCY MEDICINE

## 2022-04-23 PROCEDURE — G0378 HOSPITAL OBSERVATION PER HR: HCPCS

## 2022-04-23 PROCEDURE — 82805 BLOOD GASES W/O2 SATURATION: CPT | Performed by: EMERGENCY MEDICINE

## 2022-04-23 PROCEDURE — 87635 SARS-COV-2 COVID-19 AMP PRB: CPT | Performed by: EMERGENCY MEDICINE

## 2022-04-23 PROCEDURE — 96360 HYDRATION IV INFUSION INIT: CPT

## 2022-04-23 PROCEDURE — 99222 1ST HOSP IP/OBS MODERATE 55: CPT | Performed by: INTERNAL MEDICINE

## 2022-04-23 PROCEDURE — 258N000003 HC RX IP 258 OP 636: Performed by: EMERGENCY MEDICINE

## 2022-04-23 PROCEDURE — 99285 EMERGENCY DEPT VISIT HI MDM: CPT | Mod: 25

## 2022-04-23 PROCEDURE — 250N000012 HC RX MED GY IP 250 OP 636 PS 637: Performed by: INTERNAL MEDICINE

## 2022-04-23 PROCEDURE — 81001 URINALYSIS AUTO W/SCOPE: CPT | Performed by: EMERGENCY MEDICINE

## 2022-04-23 RX ORDER — TRAMADOL HYDROCHLORIDE 50 MG/1
50 TABLET ORAL EVERY 6 HOURS PRN
Status: DISCONTINUED | OUTPATIENT
Start: 2022-04-23 | End: 2022-04-25 | Stop reason: HOSPADM

## 2022-04-23 RX ORDER — ACETAMINOPHEN 500 MG
500-1000 TABLET ORAL EVERY 4 HOURS PRN
Status: DISCONTINUED | OUTPATIENT
Start: 2022-04-23 | End: 2022-04-25 | Stop reason: HOSPADM

## 2022-04-23 RX ORDER — NALOXONE HYDROCHLORIDE 0.4 MG/ML
0.4 INJECTION, SOLUTION INTRAMUSCULAR; INTRAVENOUS; SUBCUTANEOUS
Status: DISCONTINUED | OUTPATIENT
Start: 2022-04-23 | End: 2022-04-25 | Stop reason: HOSPADM

## 2022-04-23 RX ORDER — NICOTINE POLACRILEX 4 MG
15-30 LOZENGE BUCCAL
Status: DISCONTINUED | OUTPATIENT
Start: 2022-04-23 | End: 2022-04-25 | Stop reason: HOSPADM

## 2022-04-23 RX ORDER — POLYETHYLENE GLYCOL 3350 17 G/17G
17 POWDER, FOR SOLUTION ORAL DAILY PRN
Status: DISCONTINUED | OUTPATIENT
Start: 2022-04-23 | End: 2022-04-25 | Stop reason: HOSPADM

## 2022-04-23 RX ORDER — NALOXONE HYDROCHLORIDE 0.4 MG/ML
0.2 INJECTION, SOLUTION INTRAMUSCULAR; INTRAVENOUS; SUBCUTANEOUS
Status: DISCONTINUED | OUTPATIENT
Start: 2022-04-23 | End: 2022-04-25 | Stop reason: HOSPADM

## 2022-04-23 RX ORDER — DEXTROSE MONOHYDRATE 25 G/50ML
25-50 INJECTION, SOLUTION INTRAVENOUS
Status: DISCONTINUED | OUTPATIENT
Start: 2022-04-23 | End: 2022-04-25 | Stop reason: HOSPADM

## 2022-04-23 RX ORDER — TAMSULOSIN HYDROCHLORIDE 0.4 MG/1
0.4 CAPSULE ORAL EVERY EVENING
Status: DISCONTINUED | OUTPATIENT
Start: 2022-04-23 | End: 2022-04-25 | Stop reason: HOSPADM

## 2022-04-23 RX ORDER — LIRAGLUTIDE 6 MG/ML
1.2 INJECTION SUBCUTANEOUS DAILY
Status: DISCONTINUED | OUTPATIENT
Start: 2022-04-23 | End: 2022-04-24

## 2022-04-23 RX ORDER — ASPIRIN 81 MG/1
81 TABLET ORAL DAILY
Status: DISCONTINUED | OUTPATIENT
Start: 2022-04-23 | End: 2022-04-25 | Stop reason: HOSPADM

## 2022-04-23 RX ADMIN — LIRAGLUTIDE 1.2 MG: 6 INJECTION SUBCUTANEOUS at 19:59

## 2022-04-23 RX ADMIN — APIXABAN 5 MG: 5 TABLET, FILM COATED ORAL at 20:44

## 2022-04-23 RX ADMIN — INSULIN ASPART 6 UNITS: 100 INJECTION, SOLUTION INTRAVENOUS; SUBCUTANEOUS at 19:58

## 2022-04-23 RX ADMIN — INSULIN GLARGINE 35 UNITS: 100 INJECTION, SOLUTION SUBCUTANEOUS at 20:45

## 2022-04-23 RX ADMIN — ASPIRIN 81 MG: 81 TABLET, DELAYED RELEASE ORAL at 20:00

## 2022-04-23 RX ADMIN — TAMSULOSIN HYDROCHLORIDE 0.4 MG: 0.4 CAPSULE ORAL at 20:44

## 2022-04-23 RX ADMIN — SODIUM CHLORIDE 1000 ML: 9 INJECTION, SOLUTION INTRAVENOUS at 14:06

## 2022-04-23 ASSESSMENT — ACTIVITIES OF DAILY LIVING (ADL)
WEAR_GLASSES_OR_BLIND: YES
ADLS_ACUITY_SCORE: 10
DOING_ERRANDS_INDEPENDENTLY_DIFFICULTY: YES
TOILETING_ISSUES: YES
WALKING_OR_CLIMBING_STAIRS_DIFFICULTY: YES
FALL_HISTORY_WITHIN_LAST_SIX_MONTHS: YES
ADLS_ACUITY_SCORE: 16
VISION_MANAGEMENT: GLASSES
ADLS_ACUITY_SCORE: 16
EQUIPMENT_CURRENTLY_USED_AT_HOME: WALKER, STANDARD
ADLS_ACUITY_SCORE: 16
TOILETING_MANAGEMENT: URINARY INCONTINENCE
CONCENTRATING,_REMEMBERING_OR_MAKING_DECISIONS_DIFFICULTY: NO
CHANGE_IN_FUNCTIONAL_STATUS_SINCE_ONSET_OF_CURRENT_ILLNESS/INJURY: NO
TOILETING_ASSISTANCE: TOILETING DIFFICULTY, REQUIRES EQUIPMENT
ADLS_ACUITY_SCORE: 10
DEPENDENT_IADLS:: MEDICATION MANAGEMENT
WALKING_OR_CLIMBING_STAIRS: OTHER (SEE COMMENTS)
ADLS_ACUITY_SCORE: 16
DRESSING/BATHING_DIFFICULTY: NO
ADLS_ACUITY_SCORE: 16
DIFFICULTY_EATING/SWALLOWING: NO

## 2022-04-23 ASSESSMENT — ENCOUNTER SYMPTOMS
FEVER: 0
ABDOMINAL PAIN: 0
POLYDIPSIA: 1
APPETITE CHANGE: 1
DIZZINESS: 0
CHILLS: 0
DIARRHEA: 0
NAUSEA: 0
CONFUSION: 1
HEMATURIA: 0
DYSURIA: 0
POLYPHAGIA: 1
SHORTNESS OF BREATH: 0
LIGHT-HEADEDNESS: 0
VOMITING: 0
HEADACHES: 0
COUGH: 0
WEAKNESS: 0

## 2022-04-23 NOTE — TELEPHONE ENCOUNTER
Lab calling to report a critical value drawn today at 04:23PM:   Glucose 70 - 125 mg/dL 626 High Panic        Pt seen in clinic today for a complex post hospital visit.  Admission 01/27, discharge 02/04.   OV note from today states:   Type 2 diabetes suboptimal control currently on insulin and Victoza but no metformin  Concern for glycosuria because of excessive urination  As of April 22, 2022   on Lantus 25 units at bedtime, at the moment he is not using any mealtime NovoLog.    Also Victoza 1.8 mg injected once a day     Would specify an A1c goal of about 7.5.  If he is running less than 7 and seen occasional low fingersticks, we might back down on his Lantus dose.  For now, continue on Lantus 44 units a day, Victoza, no short acting insulin.     He is doing fingersticks, but unfortunately we do not have his blood sugar book.  I told collect blood sugar readings twice a day, and bring the book to his next meeting with me in a month    Hemoglobin A1C 0.0 - 5.6 % 13.5 High           09:49PM:  Smart Web used to page on-call Dr. Louie Angeles to call this RN.   10:04PM:  No response from provider, a second page was sent to call this RN.   10:08PM:  Dr. Angeles called and is now aware of the critical BS.  Pt to f/u with PCP on Monday.  RN will route this message to PCP care team.     Rasheeda Triplett RN  [facility] [place] Nurse Advisor    Reason for Disposition    Lab or radiology calling with CRITICAL test results    Additional Information    Negative: Lab calling with strep throat test results and triager can call in prescription    Negative: Lab calling with urinalysis test results and triager can call in prescription    Negative: Medication questions    Negative: ED call to PCP    Negative: Physician call to PCP    Negative: Call about patient who is currently hospitalized    Protocols used: PCP CALL - NO TRIAGE-A-

## 2022-04-23 NOTE — ED PROVIDER NOTES
Emergency Department Midlevel Supervisory Note     I personally saw the patient and performed a substantive portion of the visit including all aspects of the medical decision making.    ED Course:  1:07 PM Mavis Hogan PA-C staffed patient with me. I agree with their assessment and plan of management, and I will see the patient.  1:09 PM I met with the patient to introduce myself, gather additional history, perform my initial exam, and discuss the plan.     Brief HPI:     Caleb Hernandez is a 86 year old male who presents for evaluation of abnormal labs.     The patient reports that he is here because his primary care clinic called him due to hyperglycemia noted on labs yesterday and wanted him admitted to the hospital. He has been having increased thirst, hunger, and increased urination for the last few weeks. He denies any fevers, chills, nausea, vomiting, diarrhea, abdominal pain, chest pain, shortness of breath, dysuria, or new weight changes. The patient believes he has been compliant with his medications but his son does not think that he has been taking his oral medications or his insulin. The patient's son also notes some increased minor confusion over the last few weeks. No recent falls.     I, Jake Horta, am serving as a scribe to document services personally performed by Giovani Mireles MD, based on my observations and the provider's statements to me.   I, Giovani Mireles MD, attest that Jake Horta was acting in a scribe capacity, has observed my performance of the services and has documented them in accordance with my direction.    Brief Physical Exam:  Constitutional:  Alert, in no acute distress  EYES: Conjunctivae clear  HENT:  Atraumatic, normocephalic.  Dry mucous membranes.  Respiratory:  Respirations even, unlabored, in no acute respiratory distress  Cardiovascular:  Regular rate and rhythm, good peripheral perfusion  GI: Soft, nondistended, nontender, no palpable masses, no rebound, no  guarding   Musculoskeletal:  No edema. No cyanosis. Range of motion major extremities intact.    Integument: Warm, Dry, No erythema, No rash.   Neurologic:  Alert & oriented, no focal deficits noted  Psych: Normal mood and affect     MDM:  I saw and met with the patient.  Performed physical examination and history.  Patient clinically dehydrated and blood sugar elevated.  Evaluation for hyperglycemia versus DKA proceeding.  Plan eventually to admit patient to [facility] inpatient.  Patient and family aware of the plan.       Diagnostic impression:    1.  Acute hyperglycemia.  2.  Acute dehydration.    Labs and Imaging:  Results for orders placed or performed during the hospital encounter of 04/23/22   Glucose by meter   Result Value Ref Range    GLUCOSE BY METER POCT 345 (H) 70 - 99 mg/dL     I have reviewed the relevant laboratory and radiology studies    Procedures:      Giovani Mireles MD  [facility] EMERGENCY ROOM  [address] [id number] [phone]     Giovani Mireles MD  04/23/22 [id number]

## 2022-04-23 NOTE — CONSULTS
Care Management Initial Consult    General Information  Assessment completed with: Patient,    Type of CM/SW Visit: Initial Assessment  Primary Care Provider verified and updated as needed: Yes   Readmission within the last 30 days: no previous admission in last 30 days   Reason for Consult: discharge planning, health care directive, transportation  Advance Care Planning: Advance Care Planning Reviewed: no concerns identified  Pt refused HCD materials.     Communication Assessment  Patient's communication style: spoken language (English or Bilingual)    Hearing Difficulty or Deaf: yes   Wear Glasses or Blind: yes    Cognitive  Cognitive/Neuro/Behavioral:  WNL                 Living Environment:   People in home: child(claire), adult, grandchild(claire), spouse     Current living Arrangements: house      Able to return to prior arrangements: yes (If near baseline or better.)     Family/Social Support:  Care provided by: child(claire), spouse/significant other, self (Fam assists as needed.)  Provides care for: no one, unable/limited ability to care for self  Marital Status:   Wife, Children  Donna VASQUEZ       Description of Support System: Involved, Supportive    Support Assessment: Adequate family and caregiver support    Current Resources:   Patient receiving home care services: No  Community Resources: None  Equipment currently used at home: walker, rolling, grab bar, tub/shower, grab bar, toilet, glucometer  Supplies currently used at home: None, Diabetic Supplies    Employment/Financial:  Employment Status: retired     Financial Concerns: No concerns identified   Referral to Financial Worker: No     Lifestyle & Psychosocial Needs:  Social Determinants of Health     Tobacco Use: Medium Risk     Smoking Tobacco Use: Former Smoker     Smokeless Tobacco Use: Never Used   Alcohol Use: Not on file   Financial Resource Strain: Not on file   Food Insecurity: Not on file   Transportation Needs: Not on file   Physical  "Activity: Not on file   Stress: Not on file   Social Connections: Not on file   Intimate Partner Violence: Not on file   Depression: Not at risk     PHQ-2 Score: 0   Housing Stability: Not on file     Functional Status:  Prior to admission patient needed assistance:   Dependent ADLs:: Ambulation-walker (Pt stataes total independence at baseline.)  Dependent IADLs:: Medication Management (Pt stated son assists with set-up. Current medication compliance is in question.)  Assesssment of Functional Status: Not at  functional baseline    Mental Health Status:  Mental Health Status: No Current Concerns       Chemical Dependency Status:  Chemical Dependency Status: No Current Concerns           Values/Beliefs:  Spiritual, Cultural Beliefs, [employer]an Practices, Values that affect care: no             Additional Information:  Writer met with pt to introduce Care Management service(CM) and obtain an initial assessment. Possible home care need. Pt states sharing a house with his wife, a son, and grandchild/claire. Pt reports using a walker, having grab bars in the restroom, having son set-up medications, and otherwise being independent with I/ADLs at baseline. No concerns expressed in general or after CABE[employer] Info. provision.     4/23 per AILYN Wang -\"Caleb Hernandez is a 86 year old male who presents for evaluation of abnormal labs. The patient reports that he is here because his primary care clinic called him due to hyperglycemia noted on labs yesterday and wanted him admitted to the hospital. He has been having increased thirst, hunger, and increased urination for the last few weeks. He denies any fevers, chills, nausea, vomiting, diarrhea, abdominal pain, chest pain, shortness of breath, dysuria, or new weight changes. The patient believes he has been compliant with his medications but his son does not think that he has been taking his oral medications or his insulin. The patient's son also notes some increased minor confusion " "over the last few weeks. No recent falls.\"    No current consults pending - Possible Diabetes Educator consult to follow. Hospitalist stated OT Consult will be ordered - Provider Sticky note updated asking if PT/OT consults would need to be ordered. Anticipate return home via family without issue or services upon discharge unless hospital course dictates otherwise. CM to follow and assist with service coordination if deemed appropriate.    Cholo Salazar RN        "

## 2022-04-23 NOTE — ED PROVIDER NOTES
EMERGENCY DEPARTMENT ENCOUNTER      NAME: Caleb Hernandez  AGE: 86 year old male  YOB: 1935  MRN: [id number]  EVALUATION DATE & TIME: 4/23/2022 12:47 PM    PCP: Otis Lozano    ED PROVIDER: Mavis Hogan PA-C      Chief Complaint   Patient presents with     Hyperglycemia         FINAL IMPRESSION:  1. Hyperglycemia    2. Dehydration          ED COURSE & MEDICAL DECISION MAKING:    Pertinent Labs & Imaging studies reviewed. (See chart for details)    86 year old male presents to the Emergency Department for evaluation of hyperglycemia.    Physical exam is remarkable for a frail but otherwise generally well-appearing elderly male who is in no acute distress.  Heart and lung sounds are clear diffusely throughout.  Abdomen is soft and nontender.  No focal neurologic deficits noted.  No edema in the extremities.  Patient is alert and oriented to self, place, and date.  A vital signs are stable and he is afebrile.      CBC remarkable for mild anemia with hemoglobin of 12.0, no leukocytosis.  CMP is remarkable for hyperglycemia with glucose of 406, no anion gap noted; BUN elevated at 40, creatinine 1.69, GFR of 39.  Very mild hyponatremia with sodium of 135 although markedly improved compared to yesterday.  VBG without evidence of acidosis.  Beta hydroxybutyrate is mildly elevated at 0.61.    The patient was given 1 L of IV fluids here. I do not think any further emergent labs or imaging are indicated at this time, he is hemodynamically stable and has essentially no complaints. Currently, no evidence of DKA. Plan to admit him to the hospital for management of his hyperglycemia, both patient and son expressed concern about him returning home today due to mildly increased confusion and worsening of his diabetes.  Patient agreeable to admission.  Care discussed with staff physician Dr. Mireles who is in agreement with the treatment plan.      ED Course   12:53 PM Performed my initial history and physical  exam. Discussed workup in the emergency department, management of symptoms, and likely disposition.   1:09 PM Staffed with Dr. Mireles.  2:30 PM Lab called with glucose of 406.   3:15 PM Updated with test results and discussed plan for admission.  4:40 PM Discussed with Dr. Carranza, admitting MD.    At the conclusion of the encounter I discussed the results of all of the tests and the disposition. The questions were answered. The patient or family acknowledged understanding and was agreeable with the care plan.     Voice recognition software was used in the creation of this note. Any grammatical or nonsensical errors are due to inherent errors with the software and are not the intention of the writer.     MEDICATIONS GIVEN IN THE EMERGENCY:  Medications   0.9% sodium chloride BOLUS (0 mLs Intravenous Stopped 4/23/22 1601)       NEW PRESCRIPTIONS STARTED AT TODAY'S ER VISIT  New Prescriptions    No medications on file            =================================================================    HPI    Patient information was obtained from: Patient, patient's son    Use of : N/A         Caleb SELENE David is a 86 year old male with PMH of DM2, PE on Eliquis, CAD, PVD who presents to the ED via walk-in with son for evaluation of hyperglycemia.    Per chart review, the patient was seen in clinic yesterday by Dr. Otis Lozano for evaluation of hospital follow-up (hospital visit in January 2022). The patient was complaining of frequent urination. He was found to have severe hyperglycemia with new kidney dysfunction and electrolyte derangements. Hemoglobin A1C was found to be 13.5.    The patient reports that he is here because his primary care clinic called him due to hyperglycemia noted on labs yesterday and wanted him admitted to the hospital.  He currently notes that he has been having increased thirst, hunger, and increased urination for the last few weeks.  Other than that, he has no complaints.  He denies  any fevers, chills, nausea, vomiting, diarrhea, abdominal pain, chest pain, shortness of breath, dysuria, or new weight changes.  The patient believes he has been compliant with his medications but his son does not think that he has been taking his oral medications or his insulin.  The patient's son also notes some increased minor confusion over the last few weeks.  The patient denies any recent falls.      REVIEW OF SYSTEMS   Review of Systems   Constitutional: Positive for appetite change. Negative for chills and fever.   Eyes: Negative for visual disturbance.   Respiratory: Negative for cough and shortness of breath.    Cardiovascular: Negative for chest pain.   Gastrointestinal: Negative for abdominal pain, diarrhea, nausea and vomiting.   Endocrine: Positive for polydipsia, polyphagia and polyuria.   Genitourinary: Negative for dysuria and hematuria.   Neurological: Negative for dizziness, syncope, weakness, light-headedness and headaches.   Psychiatric/Behavioral: Positive for confusion (Per son).       All other systems reviewed and are negative unless noted in HPI.      PAST MEDICAL HISTORY:  Past Medical History:   Diagnosis Date     Abscess of right foot 11/23/2020    Added automatically from request for surgery [id number]     Acute pulmonary embolism with acute cor pulmonale (H) 5/23/2020     Ascending cholangitis 1/27/2022     BPH (benign prostatic hyperplasia)      Cholelithiasis      Closed fracture of rib     Created by Conversion  Replacement Utility updated for latest IMO load     Closed fracture of thoracic vertebra (H)     Created by Conversion  Replacement Utility updated for latest IMO load     Common bile duct (CBD) obstruction 9/4/2017     Diabetes mellitus (H)      Essential hypertension      Gram-negative bacteremia 1/27/2022    Positive blood culture 1/26/2022; likely biliary source     Hyperlipidemia      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    Added automatically from request for  surgery [id number]      Pancreatitis      Sepsis (H) 1/27/2022     Sepsis due to pneumonia (H) 9/8/2017     Septic arthritis of right foot (H) 12/2/2020       PAST SURGICAL HISTORY:  Past Surgical History:   Procedure Laterality Date     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    Procedure: with amputation of the fifth ray, peroneal brevis tendon transfer;  Surgeon: John Garcia DPM;  Location: [facility];  Service: Podiatry     BACK SURGERY      1964 removed a cyst     CHOLECYSTECTOMY  1985     ENDOSCOPIC RETROGRADE CHOLANGIOPANCREATOGRAM       ENDOSCOPIC RETROGRADE CHOLANGIOPANCREATOGRAM N/A 9/5/2017    Procedure: ENDOSCOPIC RETROGRADE CHOLANGIOPANCREATOGRAPHY SPHINCTEROTOMY AND STONE EXTRACTION;  Surgeon: Hansel Gannon MD;  Location: [facility];  Service:      ENDOSCOPIC RETROGRADE CHOLANGIOPANCREATOGRAM N/A 1/27/2022    Procedure: ENDOSCOPIC RETROGRADE CHOLANGIOPANCREATOGRAPHY, BALLOON DILATION AND STONE EXTRACTION;  Surgeon: Sav Osborne MD;  [place] [facility] [place]     INCISION AND DRAINAGE OF WOUND Right 11/2/2020    Procedure: INCISION AND DRAINAGE, right foot with removal of bone 5th metatarsal;  Surgeon: John Garcia DPM;  Location: [facility] OR;  Service: Podiatry     INCISION AND DRAINAGE OF WOUND Right 11/16/2020    Procedure: INCISION AND DRAINAGE, right foot;  Surgeon: John Garcia DPM;  Location: [facility] OR;  Service: Podiatry     INCISION AND DRAINAGE OF WOUND Right 11/27/2020    Procedure: INCISION AND DRAINAGE, LOWER EXTREMITY;  Surgeon: Aleksandr Hdez DPM;  Location: [facility];  Service: Podiatry     IR EXTREMITY ANGIOGRAM RIGHT  11/24/2020     IR LOWER EXTREMITY ANGIOGRAM RIGHT  11/24/2020     PICC  11/30/2020          TONSILLECTOMY  1940       CURRENT MEDICATIONS:    acetaminophen (TYLENOL) 500 MG tablet  aspirin 81 MG EC tablet  cholecalciferol, vitamin D3, 5,000 unit Tab  ELIQUIS ANTICOAGULANT 5 MG tablet  insulin glargine (LANTUS  SOLOSTAR) 100 UNIT/ML pen  multivit-min/FA/lycopen/lutein (CENTRUM SILVER MEN ORAL)  mupirocin (BACTROBAN) 2 % ointment  polyethylene glycol (MIRALAX) 17 gram packet  tamsulosin (FLOMAX) 0.4 MG capsule  traMADol (ULTRAM) 50 MG tablet  VICTOZA 3-RUPALI 0.6 mg/0.1 mL (18 mg/3 mL) injection  vitamin E 400 unit capsule  B-D U/F 31G X 8 MM insulin pen needle  blood glucose test strips  blood-glucose meter (ONETOUCH VERIO IQ METER) Misc  Continuous Blood Gluc Sensor (FREESTYLE APRIL 2 SENSOR) MISC        ALLERGIES:  Allergies   Allergen Reactions     Cresol [Phenol] Unknown     Muscle cramps     Demeclocycline Hives and Rash     Crestor [Rosuvastatin] Muscle Pain (Myalgia)       FAMILY HISTORY:  Family History   Problem Relation Age of Onset     Aortic aneurysm Mother      Alcoholism Father        SOCIAL HISTORY:   Social History     Socioeconomic History     Marital status:    Tobacco Use     Smoking status: Former Smoker     Quit date: 1991     Years since quittin.4     Smokeless tobacco: Never Used   Substance and Sexual Activity     Alcohol use: No     Drug use: No     Sexual activity: Never       VITALS:  Patient Vitals for the past 24 hrs:   BP Temp Temp src Pulse Resp SpO2 Weight   22 1637 (!) 170/83 -- -- 59 16 97 % --   22 1243 (!) 154/68 97.8  F (36.6  C) Oral 63 16 98 % 68 kg (150 lb)       PHYSICAL EXAM    VITAL SIGNS: BP (!) 170/83   Pulse 59   Temp 97.8  F (36.6  C) (Oral)   Resp 16   Wt 68 kg (150 lb)   SpO2 97%   BMI 22.81 kg/m    General Appearance: Frail appearing; Alert, cooperative, normal speech and facial symmetry, appears stated age, the patient does not appear in distress  Head:  Normocephalic, without obvious abnormality, atraumatic  Eyes: Conjunctiva/corneas clear, EOM's intact, no nystagmus, PERRL  ENT:  Lips, mucosa, and tongue normal; teeth and gums normal, no pharyngeal inflammation, no dysphonia or difficulty swallowing, membranes are moist without  pallor  Cardio:  Regular rate and rhythm, S1 and S2 normal, no murmur, rub    or gallop, 2+ pulses symmetric in all extremities  Pulm:  Clear to auscultation bilaterally, respirations unlabored with no accessory muscle use  Abdomen:  Abdomen is soft, non-distended with no tenderness to palpation, rebound tenderness, or guarding.   Extremities:  Extremities normal, there is no tenderness to palpation , atraumatic, no cyanosis or edema, full function and range of motion, pulses equal in all extremities, normal cap refill, no joint swelling  Neuro: Patient is awake, alert, and responsive to voice. No gross motor weaknesses or sensory loss; moves all extremities. Cranial Nerves:  CN2: No funduscopic exam performed. CN3,4 & 6: Pupillary light response, lateral and vertical gaze normal.  No nystagmus.  CN7: No facial weakness, smile, facial symmetry intact. CN8: Intact to spoken voice. CN9&10: Gag reflex, uvula midline, palate rises with phonation. CN11: Shoulder shrug 5/5 intact bilaterally. CN12: Tongue midline and moves freely from side to side.       LAB:  All pertinent labs reviewed and interpreted.  Labs Ordered and Resulted from Time of ED Arrival to Time of ED Departure   GLUCOSE BY METER - Abnormal       Result Value    GLUCOSE BY METER POCT 345 (*)    COMPREHENSIVE METABOLIC PANEL - Abnormal    Sodium 135 (*)     Potassium 5.0      Chloride 99      Carbon Dioxide (CO2) 25      Anion Gap 11      Urea Nitrogen 40 (*)     Creatinine 1.69 (*)     Calcium 10.1      Glucose 406 (*)     Alkaline Phosphatase 107      AST 24      ALT 36      Protein Total 7.8      Albumin 2.7 (*)     Bilirubin Total 0.9      GFR Estimate 39 (*)    BLOOD GAS VENOUS - Abnormal    pH Venous 7.37      pCO2 Venous 52 (*)     pO2 Venous 21 (*)     Bicarbonate Venous 26      Base Excess/Deficit (+/-) 4.9      Oxyhemoglobin Venous 31.6 (*)     O2 Sat, Venous 32.1 (*)    KETONE BETA-HYDROXYBUTYRATE QUANTITATIVE, RAPID - Abnormal    Ketone  (Beta-Hydroxybutyrate) Quantitative 0.61 (*)    CBC WITH PLATELETS AND DIFFERENTIAL - Abnormal    WBC Count 6.9      RBC Count 3.90 (*)     Hemoglobin 12.0 (*)     Hematocrit 35.5 (*)     MCV 91      MCH 30.8      MCHC 33.8      RDW 12.3      Platelet Count 329      % Neutrophils 71      % Lymphocytes 13      % Monocytes 11      % Eosinophils 2      % Basophils 1      % Immature Granulocytes 2      NRBCs per 100 WBC 0      Absolute Neutrophils 5.0      Absolute Lymphocytes 0.9      Absolute Monocytes 0.8      Absolute Eosinophils 0.1      Absolute Basophils 0.1      Absolute Immature Granulocytes 0.1      Absolute NRBCs 0.0     ROUTINE UA WITH MICROSCOPIC REFLEX TO CULTURE   COVID-19 VIRUS (CORONAVIRUS) BY PCR       RADIOLOGY:  Reviewed all pertinent imaging. Please see official radiology report.  No orders to display         Mavis Hogan PA-C  Emergency Medicine  [facility] EMERGENCY ROOM  [address] [id number] [phone]  Dept: [phone]       Mavis Hogan PA-C  04/23/22 4543

## 2022-04-23 NOTE — PHARMACY-ADMISSION MEDICATION HISTORY
Pharmacy Note - Admission Medication History    Pertinent Provider Information: Spoke with son who brought medication list from yesterdays (4/22/22) PCP visit.     Insulin glargine: Son reports pt's glucose was >600 so he gave 44 units of glargine as instructed by PCP. Unclear if PCP wants patient on 44 units per day per his note since RX was not updated.    Patient uses pill boxes for medications. Last doses of morning medications was Wednesday 4/20 and last doses of evening medications was Thursday 4/21 per son.      ______________________________________________________________________    Prior To Admission (PTA) med list completed and updated in EMR.       PTA Med List   Medication Sig Last Dose     acetaminophen (TYLENOL) 500 MG tablet [ACETAMINOPHEN (TYLENOL) 500 MG TABLET] Take 1-2 tablets (500-1,000 mg total) by mouth every 4 (four) hours as needed. Unknown at Unknown time     aspirin 81 MG EC tablet [ASPIRIN 81 MG EC TABLET] Take 81 mg by mouth daily. Past Week at Unknown time     cholecalciferol, vitamin D3, 5,000 unit Tab Take 5,000 mcg by mouth daily  Past Week at Unknown time     ELIQUIS ANTICOAGULANT 5 MG tablet TAKE 1 TABLET BY MOUTH TWICE DAILY Past Week at Unknown time     insulin glargine (LANTUS SOLOSTAR) 100 UNIT/ML pen Inject 25 Units Subcutaneous At Bedtime Past Week at Unknown time     multivit-min/FA/lycopen/lutein (CENTRUM SILVER MEN ORAL) [MULTIVIT-MIN/FA/LYCOPEN/LUTEIN (CENTRUM SILVER MEN ORAL)] Take 1 tablet by mouth daily. Past Week at Unknown time     mupirocin (BACTROBAN) 2 % ointment Apply topically daily as needed Apply to foot wound Unknown at Unknown time     polyethylene glycol (MIRALAX) 17 gram packet [POLYETHYLENE GLYCOL (MIRALAX) 17 GRAM PACKET] Take 1 packet (17 g total) by mouth daily as needed. Past Week at Unknown time     tamsulosin (FLOMAX) 0.4 MG capsule TAKE 1 CAPSULE BY MOUTH EVERY DAY AFTER SUPPER Past Week at Unknown time     traMADol (ULTRAM) 50 MG tablet Take 1  tablet (50 mg) by mouth every 6 hours as needed for moderate pain Unknown at Unknown time     VICTOZA 3-RUPALI 0.6 mg/0.1 mL (18 mg/3 mL) injection [VICTOZA 3-RUPALI 0.6 MG/0.1 ML (18 MG/3 ML) INJECTION] INJECT 1.2 MG UNDER THE SKIN ONCE DAILY Past Week at Unknown time     vitamin E 400 unit capsule [VITAMIN E 400 UNIT CAPSULE] Take 400 Units by mouth daily. Past Week at Unknown time       Information source(s): Family member, Clinic records and [facility]  Method of interview communication: in-person and phone    Summary of Changes to PTA Med List  New: None  Discontinued: None  Changed: None    Patient was asked about OTC/herbal products specifically.  PTA med list reflects this.    In the past week, patient estimated taking medication this percent of the time:  50-90% due to other.    Allergies were reviewed, assessed, and updated with the patient.      Patient did not bring any medications to the hospital and can't retrieve from home. No multi-dose medications are available for use during hospital stay.     The information provided in this note is only as accurate as the sources available at the time of the update(s).    Thank you for the opportunity to participate in the care of this patient.    Maria Elena Camacho [facility]  4/23/2022 1:52 PM

## 2022-04-23 NOTE — ED TRIAGE NOTES
Pt presents to the ED with c/o hypoglycemia. Pt was called by PMD this morning saying his BG was 626 yesterday when he had blood work performed. Pt c/o fatigue. Denies pain. Hx of DM II.

## 2022-04-23 NOTE — H&P
[facility]    History and Physical - Hospitalist Service       Date of Admission:  4/23/2022    Assessment & Plan      Caleb Hernandez is a 86 year old male admitted on 4/23/2022. He recently had cholelithiasis biliary sepsis and ERCP and then cholecystectomy in January 2022 he has diabetes has been on insulin and Trulicity probably has demonstrated some cognitive decline a compliance in this regard is questionable he was seen in the clinic yesterday see nicely outlined note from his primary care physician and blood sugar on that visit came back greater than 600 with hemoglobin A1c 13.5 and he was advised to come to the emergency room    Principal Problem:    Hyperglycemia due to diabetes mellitus (H) (4/23/2022)  Active Problems:    Type 2 diabetes mellitus with diabetic peripheral angiopathy without gangrene, with long-term current use of insulin (H) (12/2/2020)    Personal history of pulmonary embolism (10/22/2020)    PVD (peripheral vascular disease) (H) (10/22/2020)    Chronic anticoagulation (10/22/2020)    Lymphedema (4/23/2022)    Age-related cognitive decline (4/23/2022)    Essential hypertension ()      Plan is to restart his insulin I suspect will be able to reverse his hyperglycemia fairly quickly because of compliance issues he has some mental follow a we will have occupational therapy do a slums cognition test on him will review with family tomorrow he does live with his son daughter-in-law and some grandkids I suspect he has not been taking his insulin          Diet: Consistent Carbohydrate Diet Low Consistent Carb (45 g CHO per Meal) Diet    DVT Prophylaxis: DOAC  Dietrich Catheter: Not present  Central Lines: None  Cardiac Monitoring: None  Code Status:  Full code         # Hypoalbuminemia: Albumin = 2.7 g/dL (Ref range: 3.5 - 5.0 g/dL) on admission, will monitor as appropriate   # Coagulation Defect: home medication list includes an anticoagulant medication  # Platelet Defect:  home medication list includes an antiplatelet medication   # Diabetes, type II: last A1C 13.5 % (Ref range: 0.0 - 5.6 %)        Complex post hospital visit     Recovered from sepsis, cholangitis with klebsiella bacteremia, bacteriuria  Choledocholithiasis, ERCP 1/27  Admission Date: 1/27/2022      Discharge Date: 2/4/2022  When in the hospital he was very ill, with jaundice and sepsis syndrome  The TCU, returned home around Feb 21 2/21/2022 PICC line pulled cefdinir 300mg PO BID for an additional 2 weeks, ended around March 5 2- showed bilirubin recovering nicely  Bilirubin Total 0.0 - 1.0 mg/dL 1.6 High       EXAM: MR ABDOMEN MRCP W/O AND W CONTRAST  DATE/TIME: 1/27/2022  Significant biliary obstruction -- obstructing common bile duct stone at the hilum of the liver measuring up to 8 mm in diameter likely accounting for this obstruction.      peripherally measuring 5.3 x 3.6 cm.  focal liver infection/phlegmonous change/developing abscess given the findings      EXAM: CT ABDOMEN PELVIS W CONTRAST  DATE/TIME: 2/1/2022 10:35 AM   Status post ERCP with decompression of intrahepatic bile duct dilation     Type 2 diabetes suboptimal control currently on insulin and Victoza but no metformin  Concern for glycosuria because of excessive urination  As of April 22, 2022   on Lantus 25 units at bedtime, at the moment he is not using any mealtime NovoLog.    Also Victoza 1.8 mg injected once a day     Would specify an A1c goal of about 7.5.  If he is running less than 7 and seen occasional low fingersticks, we might back down on his Lantus dose.  For now, continue on Lantus 44 units a day, Victoza, no short acting insulin.     He is doing fingersticks, but unfortunately we do not have his blood sugar book.  I told collect blood sugar readings twice a day, and bring the book to his next meeting with me in a month     Excessive urination ever since he returned home in March  We will check urinalysis, I am concerned  that his blood sugar may be running high and that he is experiencing glycosuria     History of Urinary retention with hematuria, had Dietrich catheter out January 22, 2021  The Dietrich catheter was placed after angiogram because of acute urinary retention.  He developed hematuria November 30, 2020  CT scan abdomen pelvis with irregular bladder wall thickening suggestive of cystitis.  Noted to have nonobstructing 11 mm right upper pole kidney stone but no hydronephrosis.  He is currently on Flomax (tamsulosin) and I want him to stay on that.            Disposition Plan   Eventually home probably may need TCU we will see how things go after discussion with family     The patient's care was discussed with the Bedside Nurse and Patient.    Abdulaziz Carranza MD  Hospitalist Service  [facility]  Securely message with the Vocera Web Console (learn more here)  Text page via [employer] Paging/Directory         ______________________________________________________________________    Chief Complaint   Told to come to hospital for high blood sugar (greater than 600    History is obtained from the patient and chart see note from yesterday    History of Present Illness   Caleb Hernandez is a 86 year old male who has multiple issues but has been making some progress for probably has not been taking his insulin see note from clinic visit yesterday his blood sugar came back over 600 although he supposed to be on insulin and Trulicity he has some obvious mental follow a age related which probably contributes to compliance very pleasant affect no pain    Review of Systems    Complete review of systems 10 system review negative except as noted    Past Medical History    I have reviewed this patient's medical history and updated it with pertinent information if needed.   Past Medical History:   Diagnosis Date     Abscess of right foot 11/23/2020    Added automatically from request for surgery [id number]     Acute pulmonary  embolism with acute cor pulmonale (H) 5/23/2020     Ascending cholangitis 1/27/2022     BPH (benign prostatic hyperplasia)      Cholelithiasis      Closed fracture of rib     Created by Conversion  Replacement Utility updated for latest IMO load     Closed fracture of thoracic vertebra (H)     Created by Conversion  Replacement Utility updated for latest IMO load     Common bile duct (CBD) obstruction 9/4/2017     Diabetes mellitus (H)      Essential hypertension      Gram-negative bacteremia 1/27/2022    Positive blood culture 1/26/2022; likely biliary source     Hyperlipidemia      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;  Service: Podiatry     BACK SURGERY      1964 removed a cyst     CHOLECYSTECTOMY  1985     ENDOSCOPIC RETROGRADE CHOLANGIOPANCREATOGRAM       ENDOSCOPIC RETROGRADE CHOLANGIOPANCREATOGRAM N/A 9/5/2017    Procedure: ENDOSCOPIC RETROGRADE CHOLANGIOPANCREATOGRAPHY SPHINCTEROTOMY AND STONE EXTRACTION;  Surgeon: Hansel Gannon MD;  Location: [facility] OR;  Service:      ENDOSCOPIC RETROGRADE CHOLANGIOPANCREATOGRAM N/A 1/27/2022    Procedure: ENDOSCOPIC RETROGRADE CHOLANGIOPANCREATOGRAPHY, BALLOON DILATION AND STONE EXTRACTION;  Surgeon: Sav Osborne MD;  [place] [facility] [place]     INCISION AND DRAINAGE OF WOUND Right 11/2/2020    Procedure: INCISION AND DRAINAGE, right foot with removal of bone 5th metatarsal;  Surgeon: John Garcia  SAURAV;  Location: [facility] OR;  Service: Podiatry     INCISION AND DRAINAGE OF WOUND Right 2020    Procedure: INCISION AND DRAINAGE, right foot;  Surgeon: oJhn Garcia DPM;  Location: [facility] OR;  Service: Podiatry     INCISION AND DRAINAGE OF WOUND Right 2020    Procedure: INCISION AND DRAINAGE, LOWER EXTREMITY;  Surgeon: Aleksandr Hdez DPM;  Location: [facility] OR;  Service: Podiatry     IR EXTREMITY ANGIOGRAM RIGHT  2020     IR LOWER EXTREMITY ANGIOGRAM RIGHT  2020     PICC  2020          TONSILLECTOMY  194       Social History   I have reviewed this patient's social history and updated it with pertinent information if needed.  Social History     Tobacco Use     Smoking status: Former Smoker     Quit date: 1991     Years since quittin.4     Smokeless tobacco: Never Used   Substance Use Topics     Alcohol use: No     Drug use: No       Family History   I have reviewed this patient's family history and updated it with pertinent information if needed.  Family History   Problem Relation Age of Onset     Aortic aneurysm Mother      Alcoholism Father        Prior to Admission Medications   Prior to Admission Medications   Prescriptions Last Dose Informant Patient Reported? Taking?   B-D U/F 31G X 8 MM insulin pen needle   No No   Sig: USED TO INJECT INSULIN DAILY   Continuous Blood Gluc Sensor (FREESTYLE APRIL 2 SENSOR) [facility] [place] [facility]   No No   Si each every 14 days 1 each every 14 days. Change every 14 days.   ELIQUIS ANTICOAGULANT 5 MG tablet Past Week at Unknown time  No Yes   Sig: TAKE 1 TABLET BY MOUTH TWICE DAILY   VICTOZA 3-RUPALI 0.6 mg/0.1 mL (18 mg/3 mL) injection Past Week at Unknown time  No Yes   Sig: [VICTOZA 3-RUPALI 0.6 MG/0.1 ML (18 MG/3 ML) INJECTION] INJECT 1.2 MG UNDER THE SKIN ONCE DAILY   acetaminophen (TYLENOL) 500 MG tablet Unknown at Unknown time  No Yes   Sig: [ACETAMINOPHEN (TYLENOL) 500 MG TABLET] Take 1-2 tablets (500-1,000 mg  total) by mouth every 4 (four) hours as needed.   aspirin 81 MG EC tablet Past Week at Unknown time  Yes Yes   Sig: [ASPIRIN 81 MG EC TABLET] Take 81 mg by mouth daily.   blood glucose test strips   No No   Sig: [BLOOD GLUCOSE TEST STRIPS] Test two times daily. Dispense brand per patient's insurance at pharmacy discretion.   blood-glucose meter (ONETOUCH VERIO IQ METER) Misc   No No   Sig: [BLOOD-GLUCOSE METER (ONETOUCH VERIO IQ METER) MISC] Use 1 each As Directed 2 (two) times a day.   cholecalciferol, vitamin D3, 5,000 unit Tab Past Week at Unknown time  Yes Yes   Sig: Take 5,000 mcg by mouth daily    insulin glargine (LANTUS SOLOSTAR) 100 UNIT/ML pen 4/22/2022 at Unknown time  No Yes   Sig: Inject 25 Units Subcutaneous At Bedtime   multivit-min/FA/lycopen/lutein (CENTRUM SILVER MEN ORAL) Past Week at Unknown time  Yes Yes   Sig: [MULTIVIT-MIN/FA/LYCOPEN/LUTEIN (CENTRUM SILVER MEN ORAL)] Take 1 tablet by mouth daily.   mupirocin (BACTROBAN) 2 % ointment Unknown at Unknown time  Yes Yes   Sig: Apply topically daily as needed Apply to foot wound   polyethylene glycol (MIRALAX) 17 gram packet Past Week at Unknown time  No Yes   Sig: [POLYETHYLENE GLYCOL (MIRALAX) 17 GRAM PACKET] Take 1 packet (17 g total) by mouth daily as needed.   tamsulosin (FLOMAX) 0.4 MG capsule Past Week at Unknown time  No Yes   Sig: TAKE 1 CAPSULE BY MOUTH EVERY DAY AFTER SUPPER   traMADol (ULTRAM) 50 MG tablet Unknown at Unknown time  No Yes   Sig: Take 1 tablet (50 mg) by mouth every 6 hours as needed for moderate pain   vitamin E 400 unit capsule Past Week at Unknown time  Yes Yes   Sig: [VITAMIN E 400 UNIT CAPSULE] Take 400 Units by mouth daily.      Facility-Administered Medications: None     Allergies   Allergies   Allergen Reactions     Cresol [Phenol] Unknown     Muscle cramps     Demeclocycline Hives and Rash     Crestor [Rosuvastatin] Muscle Pain (Myalgia)       Physical Exam   Vital Signs: Temp: 97.8  F (36.6  C) Temp src: Oral BP:  (!) 154/68 Pulse: 63   Resp: 16 SpO2: 98 % O2 Device: None (Room air)    Weight: 150 lbs 0 oz  He is seen emergency room #6 very pleasant man obviously some mental follow a likely age-related he retains social pleasantries and is easy to converse with but is clear he has some confusion about his medications etc.  Lungs are clear  Heart regular rhythm  Abdomen benign  Extremities lymphedema present some trace pitting  Neurologic some mental follow away otherwise unremarkable    Recent Results (from the past 24 hour(s))   Glucose by meter    Collection Time: 04/23/22 12:54 PM   Result Value Ref Range    GLUCOSE BY METER POCT 345 (H) 70 - 99 mg/dL   Comprehensive metabolic panel    Collection Time: 04/23/22  1:56 PM   Result Value Ref Range    Sodium 135 (L) 136 - 145 mmol/L    Potassium 5.0 3.5 - 5.0 mmol/L    Chloride 99 98 - 107 mmol/L    Carbon Dioxide (CO2) 25 22 - 31 mmol/L    Anion Gap 11 5 - 18 mmol/L    Urea Nitrogen 40 (H) 8 - 28 mg/dL    Creatinine 1.69 (H) 0.70 - 1.30 mg/dL    Calcium 10.1 8.5 - 10.5 mg/dL    Glucose 406 (HH) 70 - 125 mg/dL    Alkaline Phosphatase 107 45 - 120 U/L    AST 24 0 - 40 U/L    ALT 36 0 - 45 U/L    Protein Total 7.8 6.0 - 8.0 g/dL    Albumin 2.7 (L) 3.5 - 5.0 g/dL    Bilirubin Total 0.9 0.0 - 1.0 mg/dL    GFR Estimate 39 (L) >60 mL/min/1.73m2   Blood gas venous    Collection Time: 04/23/22  1:56 PM   Result Value Ref Range    pH Venous 7.37 7.35 - 7.45    pCO2 Venous 52 (H) 35 - 50 mm Hg    pO2 Venous 21 (L) 25 - 47 mm Hg    Bicarbonate Venous 26 24 - 30 mmol/L    Base Excess/Deficit (+/-) 4.9   mmol/L    Oxyhemoglobin Venous 31.6 (L) 70.0 - 75.0 %    O2 Sat, Venous 32.1 (L) 70.0 - 75.0 %   Ketone Beta-Hydroxybutyrate Quantitative    Collection Time: 04/23/22  1:56 PM   Result Value Ref Range    Ketone (Beta-Hydroxybutyrate) Quantitative 0.61 (H) <=0.3 mmol/L   CBC with platelets and differential    Collection Time: 04/23/22  1:56 PM   Result Value Ref Range    WBC Count 6.9 4.0 -  11.0 10e3/uL    RBC Count 3.90 (L) 4.40 - 5.90 10e6/uL    Hemoglobin 12.0 (L) 13.3 - 17.7 g/dL    Hematocrit 35.5 (L) 40.0 - 53.0 %    MCV 91 78 - 100 fL    MCH 30.8 26.5 - 33.0 pg    MCHC 33.8 31.5 - 36.5 g/dL    RDW 12.3 10.0 - 15.0 %    Platelet Count 329 150 - 450 10e3/uL    % Neutrophils 71 %    % Lymphocytes 13 %    % Monocytes 11 %    % Eosinophils 2 %    % Basophils 1 %    % Immature Granulocytes 2 %    NRBCs per 100 WBC 0 <1 /100    Absolute Neutrophils 5.0 1.6 - 8.3 10e3/uL    Absolute Lymphocytes 0.9 0.8 - 5.3 10e3/uL    Absolute Monocytes 0.8 0.0 - 1.3 10e3/uL    Absolute Eosinophils 0.1 0.0 - 0.7 10e3/uL    Absolute Basophils 0.1 0.0 - 0.2 10e3/uL    Absolute Immature Granulocytes 0.1 <=0.4 10e3/uL    Absolute NRBCs 0.0 10e3/uL

## 2022-04-23 NOTE — TELEPHONE ENCOUNTER
This morning I phoned Mr. Hernandez home, son Elfego answered and then got Mr. Hernandez out of bed, and I actually got to talk to Mr. Hernandez as well.    Elfego told me that for the past few weeks, Mr. Hernandez has been supposedly giving himself his own medications, but I strongly suspect that Mr. Hernandez has been missing doses, maybe all doses including insulin, and not monitoring blood sugars at all.    When I spoke to Mr. Hernandez himself, who had just been woken from bed, he seemed confused.  He seemed to admit to me that he had not been taking his medicines.    I told Mr. Hernandez and Elfego that yesterday's labs show severe metabolic derangement from hyperglycemia and acute kidney injury and hyponatremia, and he is also clearly dehydrated right now, which fits with the low blood pressure we saw yesterday, the hyponatremia, surely vigorous glycosuria.    I told Mr. Hernandez and Elfego that he really needs to get stabilized in the hospital.  They agreed to come to [facility] ED today.    While hospitalized, I recommend that he have  consultation, setting up home care, or possibly considering TCU stay, and then beyond that may need to consider institutional [facility] care.  His wife has disabling chronic medical illnesses well.  Elfego told me that the situation is a handful, and he may not be able to care for them both.

## 2022-04-24 ENCOUNTER — APPOINTMENT (OUTPATIENT)
Dept: OCCUPATIONAL THERAPY | Facility: CLINIC | Age: 87
End: 2022-04-24
Attending: INTERNAL MEDICINE
Payer: COMMERCIAL

## 2022-04-24 ENCOUNTER — APPOINTMENT (OUTPATIENT)
Dept: PHYSICAL THERAPY | Facility: CLINIC | Age: 87
End: 2022-04-24
Attending: INTERNAL MEDICINE
Payer: COMMERCIAL

## 2022-04-24 LAB
ALBUMIN UR-MCNC: 30 MG/DL
APPEARANCE UR: ABNORMAL
BACTERIA #/AREA URNS HPF: ABNORMAL /HPF
BILIRUB UR QL STRIP: NEGATIVE
COLOR UR AUTO: ABNORMAL
GLUCOSE BLDC GLUCOMTR-MCNC: 102 MG/DL (ref 70–99)
GLUCOSE BLDC GLUCOMTR-MCNC: 187 MG/DL (ref 70–99)
GLUCOSE BLDC GLUCOMTR-MCNC: 204 MG/DL (ref 70–99)
GLUCOSE BLDC GLUCOMTR-MCNC: 214 MG/DL (ref 70–99)
GLUCOSE BLDC GLUCOMTR-MCNC: 89 MG/DL (ref 70–99)
GLUCOSE BLDC GLUCOMTR-MCNC: 89 MG/DL (ref 70–99)
GLUCOSE UR STRIP-MCNC: 50 MG/DL
HGB UR QL STRIP: ABNORMAL
KETONES UR STRIP-MCNC: NEGATIVE MG/DL
LEUKOCYTE ESTERASE UR QL STRIP: ABNORMAL
NITRATE UR QL: NEGATIVE
PH UR STRIP: 6 [PH] (ref 5–7)
RBC URINE: 12 /HPF
SP GR UR STRIP: 1.01 (ref 1–1.03)
SQUAMOUS EPITHELIAL: 1 /HPF
UROBILINOGEN UR STRIP-MCNC: <2 MG/DL
WBC CLUMPS #/AREA URNS HPF: PRESENT /HPF
WBC URINE: >182 /HPF

## 2022-04-24 PROCEDURE — 250N000013 HC RX MED GY IP 250 OP 250 PS 637: Performed by: INTERNAL MEDICINE

## 2022-04-24 PROCEDURE — 81001 URINALYSIS AUTO W/SCOPE: CPT | Performed by: INTERNAL MEDICINE

## 2022-04-24 PROCEDURE — 97161 PT EVAL LOW COMPLEX 20 MIN: CPT | Mod: GP

## 2022-04-24 PROCEDURE — 82962 GLUCOSE BLOOD TEST: CPT

## 2022-04-24 PROCEDURE — G0378 HOSPITAL OBSERVATION PER HR: HCPCS

## 2022-04-24 PROCEDURE — 96372 THER/PROPH/DIAG INJ SC/IM: CPT

## 2022-04-24 PROCEDURE — 97530 THERAPEUTIC ACTIVITIES: CPT | Mod: GP

## 2022-04-24 PROCEDURE — 87106 FUNGI IDENTIFICATION YEAST: CPT | Performed by: INTERNAL MEDICINE

## 2022-04-24 PROCEDURE — 97535 SELF CARE MNGMENT TRAINING: CPT | Mod: GO

## 2022-04-24 PROCEDURE — 97165 OT EVAL LOW COMPLEX 30 MIN: CPT | Mod: GO

## 2022-04-24 PROCEDURE — 97116 GAIT TRAINING THERAPY: CPT | Mod: GP

## 2022-04-24 PROCEDURE — 99225 PR SUBSEQUENT OBSERVATION CARE,LEVEL II: CPT | Performed by: INTERNAL MEDICINE

## 2022-04-24 RX ORDER — CIPROFLOXACIN 250 MG/1
250 TABLET, FILM COATED ORAL 2 TIMES DAILY
Status: DISCONTINUED | OUTPATIENT
Start: 2022-04-24 | End: 2022-04-25 | Stop reason: HOSPADM

## 2022-04-24 RX ADMIN — TAMSULOSIN HYDROCHLORIDE 0.4 MG: 0.4 CAPSULE ORAL at 21:44

## 2022-04-24 RX ADMIN — APIXABAN 5 MG: 5 TABLET, FILM COATED ORAL at 09:28

## 2022-04-24 RX ADMIN — INSULIN GLARGINE 35 UNITS: 100 INJECTION, SOLUTION SUBCUTANEOUS at 21:47

## 2022-04-24 RX ADMIN — CIPROFLOXACIN HYDROCHLORIDE 250 MG: 250 TABLET, FILM COATED ORAL at 13:14

## 2022-04-24 RX ADMIN — ASPIRIN 81 MG: 81 TABLET, DELAYED RELEASE ORAL at 09:28

## 2022-04-24 RX ADMIN — APIXABAN 5 MG: 5 TABLET, FILM COATED ORAL at 21:44

## 2022-04-24 RX ADMIN — INSULIN ASPART 2 UNITS: 100 INJECTION, SOLUTION INTRAVENOUS; SUBCUTANEOUS at 12:37

## 2022-04-24 RX ADMIN — CIPROFLOXACIN HYDROCHLORIDE 250 MG: 250 TABLET, FILM COATED ORAL at 21:43

## 2022-04-24 RX ADMIN — INSULIN ASPART 2 UNITS: 100 INJECTION, SOLUTION INTRAVENOUS; SUBCUTANEOUS at 17:30

## 2022-04-24 ASSESSMENT — ACTIVITIES OF DAILY LIVING (ADL)
ADLS_ACUITY_SCORE: 18
ADLS_ACUITY_SCORE: 16
ADLS_ACUITY_SCORE: 18

## 2022-04-24 NOTE — PLAN OF CARE
Problem: Plan of Care - These are the overarching goals to be used throughout the patient stay.    Goal: Absence of Hospital-Acquired Illness or Injury  Intervention: Identify and Manage Fall Risk  Recent Flowsheet Documentation  Taken 4/23/2022 1825 by Carmencita Aguirre, RN  Safety Promotion/Fall Prevention:    activity supervised    assistive device/personal items within reach    clutter free environment maintained    fall prevention program maintained    room near nurse's station    room organization consistent    safety round/check completed  Intervention: Prevent Skin Injury  Recent Flowsheet Documentation  Taken 4/23/2022 1825 by Carmencita Aguirre, RN  Body Position: position changed independently  Goal: Readiness for Transition of Care  Intervention: Mutually Develop Transition Plan  Recent Flowsheet Documentation  Taken 4/23/2022 1847 by Carmencita Aguirre, RN  Patient/Family Anticipates Transition to: home with family  Taken 4/23/2022 1830 by Carmencita Aguirre, RN  Equipment Currently Used at Home: walker, standard    Patient alert and oriented. Moments of possible mild confusion, but difficult to assess this. Agreeable to blood sugar checks, vitals, medication. VSS throughout this shift. Blood sugar 424, then 400, then 331 at HS. Patient incontinent of bladder, difficult to void on command. UA/UC order. Very small amount of urine obtained through clean catch and attempting to send to lab for processing. Order in place for straight cath if lab is unable to use sample. Patient hesitant to have straight cath performed, but educated on the importance of getting a urine sample. Patient currently resting in bed comfortably for the evening.

## 2022-04-24 NOTE — PLAN OF CARE
Goal Outcome Evaluation:      Pt sleeping through the shift. Denies pain. Pt is hard of hearing and slow to respond. Incontinent of B/B. No BM this shift. BG at 0200 was 102. Urine cultures pending. Sticky note left for MD to notify of positive UA. Bed alarms on.

## 2022-04-24 NOTE — PROGRESS NOTES
[facility]      OUTPATIENT PHYSICAL THERAPY EVALUATION  PLAN OF TREATMENT FOR OUTPATIENT REHABILITATION  (COMPLETE FOR INITIAL CLAIMS ONLY)  Patient's Last Name, First Name, M.I.  YOB: 1935  DavidCaleb MORTON                        Provider's Name  [facility] Medical Record No.  [id number]                               Onset Date:  04/23/22   Start of Care Date:  04/24/22      Type:     _X_PT   ___OT   ___SLP Medical Diagnosis:                           PT Diagnosis:  impaired functional mobility   Visits from SOC:  1   _________________________________________________________________________________  Plan of Treatment/Functional Goals    Planned Interventions: gait training, home exercise program, patient/family education, stair training, strengthening, transfer training, home program guidelines     Goals: See Physical Therapy Goals on Care Plan in [employer] electronic health record.    Therapy Frequency:    Predicted Duration of Therapy Intervention:    _________________________________________________________________________________    I CERTIFY THE NEED FOR THESE SERVICES FURNISHED UNDER        THIS PLAN OF TREATMENT AND WHILE UNDER MY CARE     (Physician co-signature of this document indicates review and certification of the therapy plan).              Certification date from: 04/24/22, Certification date to: 05/23/22                 Initial Assessment        See Physical Therapy evaluation dated 04/24/22 in Epic electronic health record.

## 2022-04-24 NOTE — PROGRESS NOTES
04/24/22 1100   Quick Adds   Type of Visit Initial Occupational Therapy Evaluation   Living Environment   People in Home spouse;grandchild(claire);child(claire), adult;other (see comments)  (lives with wife, son, and renuka)   Current Living Arrangements house   Home Accessibility stairs to enter home;stairs within home   Number of Stairs, Main Entrance 8   Stair Railings, Main Entrance railing on left side (ascending)   Number of Stairs, Within Home, Primary ten   Stair Railings, Within Home, Primary railing on right side (ascending)   Living Environment Comments Bedroom and bathroom are on main floor. Wife is home all the time, but son and grandshonna work. Has walk in shower with built in bench. Has grab bars in bathroom, raised toilet seat.   Self-Care   Usual Activity Tolerance moderate   Current Activity Tolerance fair   Equipment Currently Used at Home walker, standard;none   Fall history within last six months yes   Number of times patient has fallen within last six months 5  (Loses balance)   Instrumental Activities of Daily Living (IADL)   IADL Comments Son completes all IADLs for him   Cognitive Status Examination   Orientation Status orientation to person, place and time   Visual Perception   Visual Impairment/Limitations WFL   Sensory   Sensory Quick Adds No deficits were identified   Integumentary/Edema   Integumentary/Edema no deficits were identifed   Range of Motion Comprehensive   General Range of Motion bilateral upper extremity ROM WFL   Strength Comprehensive (MMT)   General Manual Muscle Testing (MMT) Assessment upper extremity strength deficits identified   Comment, General Manual Muscle Testing (MMT) Assessment generalized weakness   Coordination   Upper Extremity Coordination No deficits were identified   Transfers   Transfers sit-stand transfer   Transfer Comments SBA with FWW   Activities of Daily Living   BADL Assessment/Intervention lower body dressing   Lower Body Dressing  Assessment/Training   Comment, (Lower Body Dressing) Donned socks sitting EOC mod I   [place] Level (Lower Body Dressing) modified independence   Clinical Impression   Criteria for Skilled Therapeutic Interventions Met (OT) Yes, treatment indicated   OT Diagnosis Decreased ADL I   OT Problem List-Impairments impacting ADL problems related to;activity tolerance impaired;cognition;mobility   Assessment of Occupational Performance 1-3 Performance Deficits   Identified Performance Deficits dressing, bathing, toilet transfer   Planned Therapy Interventions (OT) ADL retraining;IADL retraining;bed mobility training;transfer training   Clinical Decision Making Complexity (OT) low complexity   Risk & Benefits of therapy have been explained evaluation/treatment results reviewed;risks/benefits reviewed;care plan/treatment goals reviewed;current/potential barriers reviewed;participants voiced agreement with care plan;participants included;patient   OT Discharge Planning   OT Discharge Recommendation (DC Rec) home with assist;home with home care occupational therapy   OT Rationale for DC Rec Pt is below functional baseline and is a high falls risk, anticipate pt will be safe to return home with A, but would benefit from home OT to assess safety in home   Total Evaluation Time (Minutes)   Total Evaluation Time (Minutes) 5   OT Goals   Therapy Frequency (OT) Daily   OT Predicted Duration/Target Date for Goal Attainment 05/01/22   OT Goals Lower Body Dressing;Lower Body Bathing;Toilet Transfer/Toileting   OT: Lower Body Dressing Modified independent   OT: Lower Body Bathing Modified independent   OT: Toilet Transfer/Toileting Modified independent     Madison Huerta OTR/L

## 2022-04-24 NOTE — UTILIZATION REVIEW
"Inpatient to Observation note:    Admission Status; Secondary Review Determination     Under the authority of the Utilization Management Committee, the utilization review process indicated a secondary review on the above patient.  The review outcome is based on review of the medical records, discussions with staff, and applying clinical experience noted on the date of the review.          (x) Observation Status Appropriate - This patient does not meet hospital inpatient criteria and is placed in observation status. If this patient's primary payer is Medicare and was admitted as an inpatient, Condition Code 44 should be used and patient status changed to \"observation\".     RATIONALE FOR DETERMINATION     86 year old male with PMH of DM2, PE on Eliquis, CAD, PVD who presents to the ED for evaluation of hyperglycemia.  Patient was seen by primary care provider earlier found to have blood glucose greater than 600 and hemoglobin A1c of 13.5.  This seemed to be some cognitive decline which lead to compliant with medication.  His home insulin was restarted and hyperglycemia resolved.  On admission UA was abnormal.  Today started on Cipro for possible UTI.  Overall patient is improving.  Possible discharge in a.m.        The severity of illness, intensity of service provided, expected LOS and risk for adverse outcome make the care appropriate for further observation; however, doesn't meet criteria for hospital inpatient admission. Dr Carranza notified of this determination.        The information on this document is developed by the utilization review team in order for the business office to ensure compliance.  This only denotes the appropriateness of proper admission status and does not reflect the quality of care rendered.         The definitions of Inpatient Status and Observation Status used in making the determination above are those provided in the CMS Coverage Manual, Chapter 1 and Chapter 6, section 70.4.    "   Sincerely,  Jeremy Galeas MD  Utilization Review  Physician Advisor  [facility].

## 2022-04-24 NOTE — PROGRESS NOTES
Summary/Update  86-year-old man who lives with his son and extended family with some mental follow away and it appears he has not been taking his insulin at home presented with blood sugar greater than 600 he also has what looks like a urinary tract infection      Assessment/Plan/Narrative  1 improved on insulin will adjust the dose and cover as necessary the hyperosmolar issue is resolved  2 abnormal urinalysis on cath Will start him on Cipro twice a day await culture  3 mental follow a senescent decline we will have OT assess him his son says his dad no longer drives a car and he and is also noticed some decline in memory the past 6 months  4 history of pulmonary embolus on anticoagulation continue same    Discontinue IV fluid insulin dosage adjusted Victoza discontinued entirely diabetes education review and proceed from there Home likely tomorrow    Active Hospital Problems    *Hyperglycemia due to diabetes mellitus (H)      Type 2 diabetes mellitus with diabetic peripheral angiopathy without gangrene, with long-term current use of insulin (H)      Lymphedema      Age-related cognitive decline      Dehydration      Hyperglycemia      Personal history of pulmonary embolism      PVD (peripheral vascular disease) (H)      Chronic anticoagulation      Essential hypertension          Code Status full code; discussed with son time who states the patient has a living will we did discuss the difference between a living will and advanced directive but no decision was forthcoming and not sure the patient himself could understand the issues    Discharge Planning Home in a day or 2    Subjective:  He is feeling better still frail obvious cognitive decline but that is probably baseline no pain issues    Objective:    Vital signs in last 24 hours  Temp:  [97.5  F (36.4  C)-97.8  F (36.6  C)] 97.8  F (36.6  C)  Pulse:  [59-70] 65  Resp:  [16-18] 18  BP: (132-185)/(81-99) 185/81  SpO2:  [94 %-97 %] 95 %  Weight:   Physical  Exam  General: Pleasantly frail elderly man somewhat confused at times able to converse casually without problems  VS-see above  HEENT:  Lungs:  Cor:  ABD: Benign  Ext :  Neuro: See above  Pertinent Labs   Lab Results   Component Value Date    WBC 6.9 04/23/2022    HGB 12.0 (L) 04/23/2022    HCT 35.5 (L) 04/23/2022    MCV 91 04/23/2022     04/23/2022     Lab Results   Component Value Date    BUN 40 (H) 04/23/2022     (L) 04/23/2022    CO2 25 04/23/2022         Abdulaziz Carranza MD.

## 2022-04-24 NOTE — PROGRESS NOTES
04/24/22 1500   Quick Adds   Quick Adds Certification   Type of Visit Initial PT Evaluation   Living Environment   People in Home spouse;child(claire), adult;grandchild(claire)   Current Living Arrangements house   Home Accessibility stairs to enter home;stairs within home   Number of Stairs, Main Entrance 8   Stair Railings, Main Entrance railing on left side (ascending)   Number of Stairs, Within Home, Primary ten   Stair Railings, Within Home, Primary railing on right side (ascending)   Self-Care   Usual Activity Tolerance moderate   Current Activity Tolerance fair   Equipment Currently Used at Home walker, rolling   Fall history within last six months yes   Number of times patient has fallen within last six months 5   General Information   Onset of Illness/Injury or Date of Surgery 04/23/22   Patient/Family Therapy Goals Statement (PT) go home   Existing Precautions/Restrictions fall   Cognition   Follows Commands (Cognition) follows one-step commands   Strength (Manual Muscle Testing)   Strength (Manual Muscle Testing) Deficits observed during functional mobility   Bed Mobility   Bed Mobility sit-supine   Sit-Supine Beaver (Bed Mobility) supervision   Transfers   Transfers sit-stand transfer   Impairments Contributing to Impaired Transfers decreased strength   Sit-Stand Transfer   Sit-Stand Beaver (Transfers) contact guard;verbal cues   Assistive Device (Sit-Stand Transfers) walker, front-wheeled   Gait/Stairs (Locomotion)   Beaver Level (Gait) contact guard   Assistive Device (Gait) walker, front-wheeled   Distance in Feet (Required for LE Total Joints) 25, 10   Deviations/Abnormal Patterns (Gait) ronen decreased;stride length decreased;weight shifting decreased   Clinical Impression   Criteria for Skilled Therapeutic Intervention Yes, treatment indicated   PT Diagnosis (PT) impaired functional mobility   Influenced by the following impairments gait, transfers   Functional limitations due to  impairments weakness   Clinical Presentation (PT Evaluation Complexity) Stable/Uncomplicated   Clinical Presentation Rationale pt presents as medically diagnosed   Clinical Decision Making (Complexity) low complexity   Planned Therapy Interventions (PT) gait training;home exercise program;patient/family education;stair training;strengthening;transfer training;home program guidelines   Anticipated Equipment Needs at Discharge (PT) walker, rolling   Risk & Benefits of therapy have been explained evaluation/treatment results reviewed;care plan/treatment goals reviewed;patient   PT Discharge Planning   PT Discharge Recommendation (DC Rec) home with home care physical therapy;home with assist  (24 hour supervision)   PT Rationale for DC Rec fall risk, weakness   Therapy Certification   Start of care date 04/24/22   Certification date from 04/24/22   Certification date to 05/23/22   Medical Diagnosis frailty, falls, weakness   Total Evaluation Time   Total Evaluation Time (Minutes) 10   Physical Therapy Goals   PT Frequency Daily   PT Predicted Duration/Target Date for Goal Attainment 04/28/22   PT Goals Transfers;Gait;Stairs   PT: Transfers Modified independent;Sit to/from stand;Assistive device   PT: Gait Modified independent;Rolling walker;100 feet   PT: Stairs Supervision/stand-by assist;8 stairs;Rail on left;Rail on right

## 2022-04-25 ENCOUNTER — APPOINTMENT (OUTPATIENT)
Dept: PHYSICAL THERAPY | Facility: CLINIC | Age: 87
End: 2022-04-25
Payer: COMMERCIAL

## 2022-04-25 ENCOUNTER — APPOINTMENT (OUTPATIENT)
Dept: OCCUPATIONAL THERAPY | Facility: CLINIC | Age: 87
End: 2022-04-25
Payer: COMMERCIAL

## 2022-04-25 VITALS
DIASTOLIC BLOOD PRESSURE: 59 MMHG | TEMPERATURE: 98 F | RESPIRATION RATE: 16 BRPM | HEART RATE: 81 BPM | BODY MASS INDEX: 23.42 KG/M2 | SYSTOLIC BLOOD PRESSURE: 113 MMHG | OXYGEN SATURATION: 94 % | HEIGHT: 68 IN | WEIGHT: 154.5 LBS

## 2022-04-25 LAB
BACTERIA UR CULT: NORMAL
GLUCOSE BLDC GLUCOMTR-MCNC: 139 MG/DL (ref 70–99)
GLUCOSE BLDC GLUCOMTR-MCNC: 140 MG/DL (ref 70–99)
GLUCOSE BLDC GLUCOMTR-MCNC: 75 MG/DL (ref 70–99)

## 2022-04-25 PROCEDURE — 82962 GLUCOSE BLOOD TEST: CPT

## 2022-04-25 PROCEDURE — G0378 HOSPITAL OBSERVATION PER HR: HCPCS

## 2022-04-25 PROCEDURE — 250N000013 HC RX MED GY IP 250 OP 250 PS 637: Performed by: INTERNAL MEDICINE

## 2022-04-25 PROCEDURE — 96372 THER/PROPH/DIAG INJ SC/IM: CPT

## 2022-04-25 PROCEDURE — 99217 PR OBSERVATION CARE DISCHARGE: CPT | Performed by: INTERNAL MEDICINE

## 2022-04-25 PROCEDURE — 97130 THER IVNTJ EA ADDL 15 MIN: CPT | Mod: GO

## 2022-04-25 PROCEDURE — 97530 THERAPEUTIC ACTIVITIES: CPT | Mod: GP

## 2022-04-25 PROCEDURE — 97116 GAIT TRAINING THERAPY: CPT | Mod: GP

## 2022-04-25 RX ORDER — CIPROFLOXACIN 250 MG/1
250 TABLET, FILM COATED ORAL 2 TIMES DAILY
Qty: 12 TABLET | Refills: 0 | Status: SHIPPED | OUTPATIENT
Start: 2022-04-25 | End: 2022-05-01

## 2022-04-25 RX ORDER — INSULIN GLARGINE 100 [IU]/ML
32 INJECTION, SOLUTION SUBCUTANEOUS AT BEDTIME
Qty: 15 ML | Refills: 0
Start: 2022-04-25 | End: 2022-01-01

## 2022-04-25 RX ADMIN — INSULIN ASPART 1 UNITS: 100 INJECTION, SOLUTION INTRAVENOUS; SUBCUTANEOUS at 11:13

## 2022-04-25 RX ADMIN — CIPROFLOXACIN HYDROCHLORIDE 250 MG: 250 TABLET, FILM COATED ORAL at 09:23

## 2022-04-25 RX ADMIN — APIXABAN 5 MG: 5 TABLET, FILM COATED ORAL at 09:23

## 2022-04-25 RX ADMIN — ASPIRIN 81 MG: 81 TABLET, DELAYED RELEASE ORAL at 09:23

## 2022-04-25 NOTE — CONSULTS
"DIABETES CARE  Consulted by Provider for Diabetes Education: ? Compliance BS >600 and A1C 13.5%    86 year old male with type 2 diabetes. Patient was admitted for hyperglycemia.   Related Co-morbidities include:   Past Medical History:   Diagnosis Date     Abscess of right foot 11/23/2020    Added automatically from request for surgery [id number]     Acute pulmonary embolism with acute cor pulmonale (H) 5/23/2020     Ascending cholangitis 1/27/2022     BPH (benign prostatic hyperplasia)      Cholelithiasis      Closed fracture of rib     Created by Conversion  Replacement Utility updated for latest IMO load     Closed fracture of thoracic vertebra (H)     Created by Conversion  Replacement Utility updated for latest IMO load     Common bile duct (CBD) obstruction 9/4/2017     Diabetes mellitus (H)      Essential hypertension      Gram-negative bacteremia 1/27/2022    Positive blood culture 1/26/2022; likely biliary source     Hyperlipidemia      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 Having some cognitive decline.     Pt reports the plan was to start having his son take over medications but it \"didn't get in transit\".     Insulin glargine: Son reports pt's glucose was >600 so he gave 44 units of glargine as instructed by PCP.     Meds for BG Management PTA:  -Lantus 25u HS  -Victoza 1.8mg    Current Inpatient Meds for BG Management:  -Novolog correction: medium scale with meals + HS  -Lantus 35u HS    Labs:  Hemoglobin A1C: 13.5% (4/22/22)         Hgb: 11.8 SCr: 1.69  GFR: 39   BGs:       Diet Order:  45g cho diet  Weight: 70.1kg  BMI:  Body mass index is 23.49 " "kg/m .      DM EDUCATION/COUNSELING:  Barriers to Learning and/or DM Self-Management: cognition  Current Education and/or visit with Patient and/or caregiver(s): Met with pt briefly, discussed that it would be important that his son helped to assist with his medications to ensure he was taking them, he agrees with this, said they were trying to do this.     Called and spoke to pt's son Elfego. He reports he would ask his father if he took medicine and he would tell him yes. Discussed that having him take over this would be important, so giving him his meds each day and watching him take these including Victoza and Lantus. Elfego feels comfortable with doing this.       (See also \"Diabetic Ed Flowsheet\"  Or Education tab-diabetes for any education topic details.)    ASSESSMENT:  Pt with hx of type 2 diabetes, A1C 13.5%. Forgetting to take Medications. Spoke with son Elfego who will be more involved with medication management, discussed importance of this.     RECOMMENDATIONS:  -Son will assist with medication management to ensure Frantz takes his meds.     For inpatient diabetes management guidelines refer to \"Guidelines for Subcutaneous Insulin Dosing\" found in the DIAB Subcutaneous Insulin Management Adult order set.       Thank you,   Cici Valladares RD, NESTOR, [facility]  Diabetes Educator  Pager: [phone]            "

## 2022-04-25 NOTE — PLAN OF CARE
PRIMARY DIAGNOSIS:HYPERGLYCEMIA    OUTPATIENT/OBSERVATION GOALS TO BE MET BEFORE DISCHARGE  BG greater than 100 and less than 250 on two consecutive readings: Yes  No results for input(s): BGM in the last 24920 hours.    Ketones absent from urine  (hyperglycemia): Yes    Tolerating oral intake to maintain hydration: Yes    Return to near baseline physical activity: Yes    Discharge Planner Nurse   Safe discharge environment identified: No  Barriers to discharge: No       Entered by: Rohit Swartz 04/25/2022 6:43 AM     Please review provider order for any additional goals.   Nurse to notify provider when observation goals have been met and patient is ready for discharge.Goal Outcome Evaluation:

## 2022-04-25 NOTE — PROGRESS NOTES
PRIMARY DIAGNOSIS: HYPO/HYPERGLYCEMIA    OUTPATIENT/OBSERVATION GOALS TO BE MET BEFORE DISCHARGE  1. BG greater than 100 and less than 250 on two consecutive readings: Yes    2. Ketones absent from urine  (hyperglycemia): Yes    3. Tolerating oral intake to maintain hydration: Yes    4. Return to near baseline physical activity: Yes    Discharge Planner Nurse   Safe discharge environment identified: No; SLUMs assessment needed  Barriers to discharge: No       Entered by: Carmencita Aguirre 04/24/2022 8:23 PM     Please review provider order for any additional goals.   Nurse to notify provider when observation goals have been met and patient is ready for discharge.    Patient sleeping between cares. HS blood glucose 187; no correctional insulin needed. Patient seems to be resting comfortably in bed.

## 2022-04-25 NOTE — PROGRESS NOTES
"2PRIMARY DIAGNOSIS: \"GENERIC\" NURSING  OUTPATIENT/OBSERVATION GOALS TO BE MET BEFORE DISCHARGE:  1. ADLs back to baseline: Yes    2. Activity and level of assistance: Up with standby assistance.    3. Pain status: Pain free.    4. Return to near baseline physical activity: Yes     Discharge Planner Nurse   Safe discharge environment identified: No  Barriers to discharge: No       Entered by: Becky Erickson 04/25/2022 11:04 AM    Patient A & O x 4 this morning. Up with SBA. BS's normal. No assistance needed with breakfast. VSS.     Please review provider order for any additional goals.   Nurse to notify provider when observation goals have been met and patient is ready for discharge.  "

## 2022-04-25 NOTE — PLAN OF CARE
PRIMARY DIAGNOSIS: HYPERGLYCEMIA    OUTPATIENT/OBSERVATION GOALS TO BE MET BEFORE DISCHARGE  BG greater than 100 and less than 250 on two consecutive readings: Yes  No results for input(s): BGM in the last 57786 hours.    Ketones absent from urine  (hyperglycemia): Yes    Tolerating oral intake to maintain hydration: Yes    Return to near baseline physical activity: Yes    Discharge Planner Nurse   Safe discharge environment identified: No  Barriers to discharge: Yes       Entered by: Rohit Swartz 04/25/2022 2:06 AM     Please review provider order for any additional goals.   Nurse to notify provider when observation goals have been met and patient is ready for discharge.Goal Outcome Evaluation:

## 2022-04-25 NOTE — PROGRESS NOTES
PRIMARY DIAGNOSIS: HYPO/HYPERGLYCEMIA    OUTPATIENT/OBSERVATION GOALS TO BE MET BEFORE DISCHARGE  1. BG greater than 100 and less than 250 on two consecutive readings: Yes;  at 1700, 2 units Insulin given per sliding scale    2. Ketones absent from urine  (hyperglycemia): Yes    3. Tolerating oral intake to maintain hydration: Yes    4. Return to near baseline physical activity: Yes    Discharge Planner Nurse   Safe discharge environment identified: No; needs SLUMs assessment   Barriers to discharge: No       Entered by: Carmencita Aguirre 04/24/2022 8:10 PM     Please review provider order for any additional goals.   Nurse to notify provider when observation goals have been met and patient is ready for discharge.    Patient alert and oriented X4, forgetful sometimes and slow to answer. Up to bathroom but incontinent of bladder. Patient has poor appetite. Sleeping in-between cares and agreeable to nursing interventions once awake.

## 2022-04-25 NOTE — DISCHARGE SUMMARY
[facility] MEDICINE  DISCHARGE SUMMARY     Primary Care Physician: Otis Lozano  Admission Date: 4/23/2022   Discharge Provider: Merry Farrell MD Discharge Date: 4/25/2022   Diet: Orders Placed This Encounter      Consistent Carbohydrate Diet Low Consistent Carb (45 g CHO per Meal) Diet      Diet      Code Status: Full Code   Activity: activity as tolerated   [facility]      Condition at Discharge: Stable      REASON FOR PRESENTATION(See Admission Note for Details)   Elevated blood sugars    PRINCIPAL & ACTIVE DISCHARGE DIAGNOSES     Principal Problem:    Hyperglycemia due to diabetes mellitus (H)  Uncontrolled diabetes mellitus type 2 A1c 13  Active Problems:    Essential hypertension    Personal history of pulmonary embolism    PVD (peripheral vascular disease) (H)    Chronic anticoagulation    Lymphedema    Age-related cognitive decline    Dehydration      SIGNIFICANT FINDINGS (Imaging, labs):   Blood sugar on admission 626  Hemoglobin A1c 13.5  Creatinine 2.17-1.6    Urinalysis abnormal, initial urine culture urogenital jennifer repeat pending  PENDING LABS         PROCEDURES ( this hospitalization only)          RECOMMENDATION FOR F/U VISIT   Monitor blood sugars and follow-up with primary.    DISPOSITION     Home with home care    SUMMARY OF HOSPITAL COURSE:    85-year-old male with past medical history significant for diabetes mellitus type 2, hypertension, PE on anticoagulation, peripheral vascular disease recent cholecystectomy in January 2022 admitted from the primary care clinic with hyperglycemia with a blood sugar of 600.  Question of noncompliance and some cognitive impairment contributing to not able to manage his insulin at home.  He was admitted.  His hemoglobin A1c at 13.5.  Restarted on his home regimen of insulin.  Lantus dose increased at discharge.  He was seen by diabetes educator.  Family will be managing his insulin.  His blood sugars were stable.   Monitor blood sugars at home and follow-up with primary.  His urinalysis was abnormal and consistent with UTI.  Initial urine cultures urogenital jennifer.  Previously had Klebsiella  UTI.  He was prescribed ciprofloxacin for UTI.  He is discharging home with home care.  Will need official cognitive assessment.   Discharge Medications with Med changes:        Review of your medicines      START taking      Dose / Directions   ciprofloxacin 250 MG tablet  Commonly known as: CIPRO  Indication: Urinary Tract Infection, ABN UA WITH CULTURE AND SENS PENDING  Used for: Acute cystitis without hematuria      Dose: 250 mg  Take 1 tablet (250 mg) by mouth 2 times daily for 6 days  Quantity: 12 tablet  Refills: 0     insulin aspart 100 UNIT/ML pen  Commonly known as: NovoLOG PEN  Used for: Type 2 diabetes mellitus with diabetic peripheral angiopathy without gangrene, with long-term current use of insulin (H)      Dose: 1-7 Units  Inject 1-7 Units Subcutaneous 3 times daily (before meals) Correction Scale - MEDIUM INSULIN RESISTANCE DOSING   Do Not give Correction Insulin if Pre-Meal BG less than 140. For Pre-Meal  - 189 give 1 unit. For Pre-Meal  - 239 give 2 units. For Pre-Meal  - 289 give 3 units. For Pre-Meal  - 339 give 4 units. For Pre-Meal - 399 give 5 units. For Pre-Meal -449 give 6 units For Pre-Meal BG greater than or equal to 450 give 7 units. To be given with prandial insulin, and based on pre-meal blood glucose.  Notify provider if glucose greater than or equal to 350 mg/dL after administration of correction dose. If given at mealtime, administer within 30 minutes of start of meal.  Quantity: 15 mL  Refills: 0        CONTINUE these medicines which may have CHANGED, or have new prescriptions. If we are uncertain of the size of tablets/capsules you have at home, strength may be listed as something that might have changed.      Dose / Directions   Lantus SoloStar 100 UNIT/ML pen  This  may have changed: how much to take  Used for: Type 2 diabetes mellitus with diabetic peripheral angiopathy without gangrene, with long-term current use of insulin (H)  Generic drug: insulin glargine      Dose: 32 Units  Inject 32 Units Subcutaneous At Bedtime  Quantity: 15 mL  Refills: 0     Vitamin D3 125 MCG (5000 UT) tablet  Commonly known as: CHOLECALCIFEROL  This may have changed:     medication strength    how much to take  Used for: Vitamin D deficiency      Dose: 125 mcg  Take 1 tablet (125 mcg) by mouth daily  Refills: 0        CONTINUE these medicines which have NOT CHANGED      Dose / Directions   acetaminophen 500 MG tablet  Commonly known as: TYLENOL  Used for: Osteomyelitis of right foot, unspecified type (H)      Dose: 500-1,000 mg  [ACETAMINOPHEN (TYLENOL) 500 MG TABLET] Take 1-2 tablets (500-1,000 mg total) by mouth every 4 (four) hours as needed.  Refills: 0     aspirin 81 MG EC tablet  Commonly known as: ASA      Dose: 81 mg  [ASPIRIN 81 MG EC TABLET] Take 81 mg by mouth daily.  Refills: 0     B-D U/F 31G X 8 MM miscellaneous  Used for: Type 2 diabetes mellitus with other skin ulcer, with long-term current use of insulin (H)  Generic drug: insulin pen needle      USED TO INJECT INSULIN DAILY  Quantity: 100 each  Refills: 11     CENTRUM SILVER ULTRA MENS PO      Dose: 1 tablet  [MULTIVIT-MIN/FA/LYCOPEN/LUTEIN (CENTRUM SILVER MEN ORAL)] Take 1 tablet by mouth daily.  Refills: 0     ELIQUIS ANTICOAGULANT 5 MG tablet  Used for: Pulmonary embolism without acute cor pulmonale, unspecified chronicity, unspecified pulmonary embolism type (H)  Generic drug: apixaban ANTICOAGULANT      TAKE 1 TABLET BY MOUTH TWICE DAILY  Quantity: 180 tablet  Refills: 3     FreeStyle Mayuri 2 Sensor Misc  Used for: Type 2 diabetes mellitus with diabetic peripheral angiopathy without gangrene, with long-term current use of insulin (H)      Dose: 1 each  1 each every 14 days 1 each every 14 days. Change every 14  days.  Quantity: 6 each  Refills: 5     * GLUCOSE METER TEST VI      Dose: 1 each  [BLOOD-GLUCOSE METER (ONETOUCH VERIO IQ METER) MISC] Use 1 each As Directed 2 (two) times a day.  Quantity: 1 each  Refills: 0     * BLOOD GLUCOSE TEST STRIPS Strp  Used for: Type 2 diabetes mellitus without complication, without long-term current use of insulin (H)      [BLOOD GLUCOSE TEST STRIPS] Test two times daily. Dispense brand per patient's insurance at pharmacy discretion.  Quantity: 200 strip  Refills: 11     mupirocin 2 % external ointment  Commonly known as: BACTROBAN      Apply topically daily as needed Apply to foot wound  Refills: 0     polyethylene glycol 17 g packet  Commonly known as: MIRALAX  Used for: Drug-induced constipation      Dose: 17 g  [POLYETHYLENE GLYCOL (MIRALAX) 17 GRAM PACKET] Take 1 packet (17 g total) by mouth daily as needed.  Quantity: 100 packet  Refills: 3     tamsulosin 0.4 MG capsule  Commonly known as: FLOMAX  Used for: Urinary retention      TAKE 1 CAPSULE BY MOUTH EVERY DAY AFTER SUPPER  Quantity: 90 capsule  Refills: 3     traMADol 50 MG tablet  Commonly known as: ULTRAM  Used for: Status post endoscopic retrograde cholangiopancreatography      Dose: 50 mg  Take 1 tablet (50 mg) by mouth every 6 hours as needed for moderate pain  Quantity: 20 tablet  Refills: 0     VICTOZA PEN 18 MG/3ML solution  Used for: Type 2 diabetes mellitus with other skin ulcer, with long-term current use of insulin (H)  Generic drug: liraglutide      [VICTOZA 3-RUPALI 0.6 MG/0.1 ML (18 MG/3 ML) INJECTION] INJECT 1.2 MG UNDER THE SKIN ONCE DAILY  Quantity: 12 mL  Refills: 11     vitamin E 400 units (180 mg) capsule  Commonly known as: TOCOPHEROL      Dose: 400 Units  [VITAMIN E 400 UNIT CAPSULE] Take 400 Units by mouth daily.  Refills: 0         * This list has 2 medication(s) that are the same as other medications prescribed for you. Read the directions carefully, and ask your doctor or other care provider to review  "them with you.               Where to get your medicines      These medications were sent to [id number] - [place], [place] - [address]  [address] [address]    Phone: [phone]     ciprofloxacin 250 MG tablet    insulin aspart 100 UNIT/ML pen              Rationale for medication changes:      Ciprofloxacin for UTI  Started on insulin sliding scale  Lantus dose increased    Consults         Immunizations given this encounter       Discharge Procedure Orders   Home care nursing referral   Referral Priority: Routine: Next available opening Referral Type: Home Health Therapies & Aides   Number of Visits Requested: 1     Reason for your hospital stay   Order Comments: Elevated blood sugars, UTI     Follow-up and recommended labs and tests   Order Comments: Follow up with primary care provider, DALE CORTEZ, within 7 days for hospital follow- up.  The following labs/tests are recommended: .     Activity   Order Comments: Your activity upon discharge: activity as tolerated     Order Specific Question Answer Comments   Is discharge order? Yes      Discharge Instructions   Order Comments: Monitor blood sugars 4 times daily, keep a log and follow-up with the primary.     Diet   Order Comments: Follow this diet upon discharge: Orders Placed This Encounter      Consistent Carbohydrate Diet Low Consistent Carb (45 g CHO per Meal) Diet       Order Specific Question Answer Comments   Is discharge order? Yes      Examination     Vital Signs in last 24 hours:   Vital signs:  Temp: 97.2  F (36.2  C) Temp src: Oral BP: 109/66 Pulse: 74   Resp: 16 SpO2: 95 % O2 Device: None (Room air)   Height: 172.7 cm (5' 8\") Weight: 70.1 kg (154 lb 8 oz)  Estimated body mass index is 23.49 kg/m  as calculated from the following:    Height as of this encounter: 1.727 m (5' 8\").    Weight as of this encounter: 70.1 kg (154 lb 8 oz).    Please see EMR for more detailed significant labs, imaging, consultant notes " etc.  Total time spent on discharge: > 35 minutes    Merry Farrell MD   [facility] Hospitalist Service: Ph:[phone]    CC:Otis Lozano

## 2022-04-25 NOTE — PROGRESS NOTES
Care Management Follow Up    Length of Stay (days): 1    Expected Discharge Date: 04/26/2022     Concerns to be Addressed: no discharge needs identified, denies needs/concerns at this time     Patient plan of care discussed at interdisciplinary rounds: Yes    Anticipated Discharge Disposition: Home     Anticipated Discharge Services: None  Anticipated Discharge DME: None    Patient/family educated on Medicare website which has current facility and service quality ratings: no  Education Provided on the Discharge Plan:    Patient/Family in Agreement with the Plan: yes    Referrals Placed by CM/SW:  (Not currently indicated.)    Additional Information:  CM met with the Pt. Pt declines having Home PT and OT at home and declines working with OUTPT stating that he has a strong family support at home with Son, his wife, and granddtr. Pt feels at this time he doesn't have any concerns for discharge home.       PHOENIX Vidal

## 2022-04-25 NOTE — PROGRESS NOTES
Care Management Discharge Note    Discharge Date: 04/25/2022       Discharge Disposition: Home    Discharge Services: None    Discharge DME: None    Discharge Transportation: family or friend will provide    Additional Information:  Pt to discharge home with no needs. BPA Triggered and sent as needed.      PHOENIX Vidal

## 2022-04-26 NOTE — PLAN OF CARE
Occupational Therapy Discharge Summary    Reason for therapy discharge:    Discharged to home.    Progress towards therapy goal(s). See goals on Care Plan in [facility] electronic health record for goal details.  Goals not met.  Barriers to achieving goals:   discharge from facility.    Therapy recommendation(s):    Continued therapy is recommended.  Rationale/Recommendations:  home OT to address cognition and remaining functional deficits.

## 2022-04-26 NOTE — PROGRESS NOTES
Physical Therapy Discharge Summary    Reason for therapy discharge:    Discharged to home.    Progress towards therapy goal(s). See goals on Care Plan in [facility] electronic health record for goal details.  Goals not met.  Barriers to achieving goals:   discharge from facility.    Therapy recommendation(s):    Recommend home PT but pt declined.

## 2022-04-27 ENCOUNTER — PATIENT OUTREACH (OUTPATIENT)
Dept: CARE COORDINATION | Facility: CLINIC | Age: 87
End: 2022-04-27
Payer: COMMERCIAL

## 2022-04-27 LAB — BACTERIA UR CULT: ABNORMAL

## 2022-04-27 NOTE — PROGRESS NOTES
Clinic Care Coordination Contact  [facility]: Post-Discharge Note  SITUATION                                                      Admission:    Admission Date: 04/23/22   Reason for Admission: abnormal labs  Discharge:   Discharge Date: 04/25/22  Discharge Diagnosis: Hyperglycemia due to diabetes mellitus (H)  Uncontrolled diabetes mellitus type 2 A1c 13  Active Problems:    Essential hypertension    Personal history of pulmonary embolism    PVD (peripheral vascular disease) (H)    Chronic anticoagulation    Type 2 diabetes mellitus with diabetic peripheral angiopathy without gangrene, with long-term current use of insulin (H)    Lymphedema    Age-related cognitive decline    Dehydration    BACKGROUND                                                      Per wife; patient had stopped managing his diabetes a year ago.  He had reported that he was checking his blood sugars and giving himself his insulin but he had stopped.  Currently his son is leaving work to come home and manage this.    ASSESSMENT      Enrollment  Primary Care Care Coordination Status: Declined    Discharge Assessment  How are you doing now that you are home?: spoke with wife, he is doing well  How are your symptoms? (Red Flag symptoms escalate to triage hotline per guidelines): Improved  Do you feel your condition is stable enough to be safe at home until your provider visit?: Yes  Does the patient have their discharge instructions? : Yes  Does the patient have questions regarding their discharge instructions? : No  Were you started on any new medications or were there changes to any of your previous medications? : Yes  Does the patient have all of their medications?: Yes (son is managing the medications, he lives with them and is currently at work.)  Do you have questions regarding any of your medications? : No  Do you have all of your needed medical supplies or equipment (DME)?  (i.e. oxygen tank, CPAP, cane, etc.): Yes  Discharge  "follow-up appointment scheduled within 14 calendar days? : Yes  Discharge Follow Up Appointment Date: 05/05/22  Discharge Follow Up Appointment Scheduled with?: Primary Care Provider    Post-op ([facility] Only)  If the patient had a surgery or procedure, do they have any questions for a nurse?: No    Post-op (Clinicians Only)  Did the patient have surgery or a procedure: No  Eating & Drinking: eating and drinking without complaints/concerns  PO Intake: regular diet        PLAN                                                      Outpatient Plan:  Writer gave wife Virginia the phone number to [facility] [phone] for private pay.  Attempted to also schedule with [facility] for additional [place] Assistance.  Wife asking that [facility] RN call back \"at night\".  Writer stated that [facility] is open during clinic office hours.  Writer instructed for wife to call [facility] first and to also discuss with her son.  Gave the call back number of [phone] if son/wife would still like to speak with a SW for assistance in the home.  She stated they would call back with any additional questions.    PCP follow up 5/6      Future Appointments   Date Time Provider Department Center   5/6/2022 11:30 AM Otis Lozano MD [facility]   5/26/2022  2:50 PM Otis Lozano MD [facility]         For any urgent concerns, please contact our 24 hour nurse triage line: [phone] ([phone])         Pat Baum RN                "

## 2022-05-06 ENCOUNTER — OFFICE VISIT (OUTPATIENT)
Dept: INTERNAL MEDICINE | Facility: CLINIC | Age: 87
End: 2022-05-06
Payer: COMMERCIAL

## 2022-05-06 ENCOUNTER — LAB (OUTPATIENT)
Dept: LAB | Facility: CLINIC | Age: 87
End: 2022-05-06

## 2022-05-06 VITALS
HEIGHT: 68 IN | SYSTOLIC BLOOD PRESSURE: 165 MMHG | DIASTOLIC BLOOD PRESSURE: 80 MMHG | WEIGHT: 154 LBS | OXYGEN SATURATION: 98 % | HEART RATE: 70 BPM | BODY MASS INDEX: 23.34 KG/M2

## 2022-05-06 DIAGNOSIS — R54 FRAILTY SYNDROME IN GERIATRIC PATIENT: ICD-10-CM

## 2022-05-06 DIAGNOSIS — E11.51 TYPE 2 DIABETES MELLITUS WITH DIABETIC PERIPHERAL ANGIOPATHY WITHOUT GANGRENE, WITH LONG-TERM CURRENT USE OF INSULIN (H): Primary | Chronic | ICD-10-CM

## 2022-05-06 DIAGNOSIS — Z87.440 PERSONAL HISTORY OF URINARY TRACT INFECTION: ICD-10-CM

## 2022-05-06 DIAGNOSIS — I10 ESSENTIAL HYPERTENSION: Chronic | ICD-10-CM

## 2022-05-06 DIAGNOSIS — Z79.4 TYPE 2 DIABETES MELLITUS WITH DIABETIC PERIPHERAL ANGIOPATHY WITHOUT GANGRENE, WITH LONG-TERM CURRENT USE OF INSULIN (H): Primary | Chronic | ICD-10-CM

## 2022-05-06 DIAGNOSIS — R29.6 FALLS FREQUENTLY: ICD-10-CM

## 2022-05-06 LAB
ALBUMIN UR-MCNC: 100 MG/DL
ANION GAP SERPL CALCULATED.3IONS-SCNC: 10 MMOL/L (ref 5–18)
APPEARANCE UR: ABNORMAL
BACTERIA #/AREA URNS HPF: ABNORMAL /HPF
BILIRUB UR QL STRIP: NEGATIVE
BUN SERPL-MCNC: 23 MG/DL (ref 8–28)
CALCIUM SERPL-MCNC: 9.4 MG/DL (ref 8.5–10.5)
CHLORIDE BLD-SCNC: 102 MMOL/L (ref 98–107)
CO2 SERPL-SCNC: 22 MMOL/L (ref 22–31)
COLOR UR AUTO: YELLOW
CREAT SERPL-MCNC: 1.35 MG/DL (ref 0.7–1.3)
ERYTHROCYTE [DISTWIDTH] IN BLOOD BY AUTOMATED COUNT: 12.2 % (ref 10–15)
GFR SERPL CREATININE-BSD FRML MDRD: 51 ML/MIN/1.73M2
GLUCOSE BLD-MCNC: 343 MG/DL (ref 70–125)
GLUCOSE UR STRIP-MCNC: 500 MG/DL
HCT VFR BLD AUTO: 33.6 % (ref 40–53)
HGB BLD-MCNC: 11.3 G/DL (ref 13.3–17.7)
HGB UR QL STRIP: ABNORMAL
KETONES UR STRIP-MCNC: NEGATIVE MG/DL
LEUKOCYTE ESTERASE UR QL STRIP: ABNORMAL
MCH RBC QN AUTO: 31.3 PG (ref 26.5–33)
MCHC RBC AUTO-ENTMCNC: 33.6 G/DL (ref 31.5–36.5)
MCV RBC AUTO: 93 FL (ref 78–100)
NITRATE UR QL: NEGATIVE
PH UR STRIP: 6 [PH] (ref 5–8)
PLATELET # BLD AUTO: 357 10E3/UL (ref 150–450)
POTASSIUM BLD-SCNC: 5.5 MMOL/L (ref 3.5–5)
RBC # BLD AUTO: 3.61 10E6/UL (ref 4.4–5.9)
RBC #/AREA URNS AUTO: ABNORMAL /HPF
SODIUM SERPL-SCNC: 134 MMOL/L (ref 136–145)
SP GR UR STRIP: 1.01 (ref 1–1.03)
SQUAMOUS #/AREA URNS AUTO: ABNORMAL /LPF
UROBILINOGEN UR STRIP-ACNC: 0.2 E.U./DL
WBC # BLD AUTO: 7.8 10E3/UL (ref 4–11)
WBC #/AREA URNS AUTO: >100 /HPF

## 2022-05-06 PROCEDURE — 99495 TRANSJ CARE MGMT MOD F2F 14D: CPT | Performed by: INTERNAL MEDICINE

## 2022-05-06 PROCEDURE — 81001 URINALYSIS AUTO W/SCOPE: CPT

## 2022-05-06 PROCEDURE — 36415 COLL VENOUS BLD VENIPUNCTURE: CPT | Performed by: INTERNAL MEDICINE

## 2022-05-06 PROCEDURE — 80048 BASIC METABOLIC PNL TOTAL CA: CPT | Performed by: INTERNAL MEDICINE

## 2022-05-06 PROCEDURE — 85027 COMPLETE CBC AUTOMATED: CPT | Performed by: INTERNAL MEDICINE

## 2022-05-06 PROCEDURE — 87086 URINE CULTURE/COLONY COUNT: CPT

## 2022-05-06 NOTE — PROGRESS NOTES
"Office Visit - Follow Up   Caleb Hernandez   86 year old male    Date of Visit: 5/6/2022    Chief Complaint   Patient presents with     Follow Up     Go over BS, running high - Fell on his way in this AM        -------------------------------------------------------------------------------------------------------------------------  Assessment and Plan    Post hospital follow-up visit,    Hospitalized April 23-25 with severe hyperglycemia due to uncontrolled diabetes, Mr. Hernandez was not giving himself insulin or checking his blood sugars  UTI, treated with ciprofloxacin  Dehydration, hyponatremia, acute renal failure with creatinine peaking at 2.17  In the hospital his glucose was stabilized, he was rehydrated, and his kidney function improved.  Urinalysis was abnormal consistent with UTI, although cultures reported only urogenital jennifer  He was sent home with ciprofloxacin 250 mg twice a day for 6 additional days after discharge    Today May 6, 2022 will check a basic metabolic panel, CBC, and urinalysis if he can give a specimen.    He comes in today accompanied by his son Shiv.  Mr. Hernandez main caregiver at home is his other son Elfego.  I have spoken to Elfego on numerous occasions, including the morning of April 23 when I reported the blood sugar that was out of control and instructed Elfego to bring Mr. Hernandez to the hospital to be admitted.    Clearly we have an issue of:    Patient not able to care for self adequately considering chronic severe medical conditions, frailty, falling, family despite their best efforts are not able to provide a support system necessary for him and his chronically ill frail wife  I have placed a request for [facility] primary care coordination team to meet with Mr. Hernandez and explore options.  I do think Mr. Hernandez needs to consider moving into an institutional setting such as assisted living  He told me \"No!\"  But his son Shiv who is here today told me that for more than a year he has been urging " Mr. Hernandez and his wife to consider assisted living.  Elfego has a full-time job, and has to be out of the house for long stretches of hours.  Clearly Mr. Hernandez family has to have a in-depth conversation, and I think the care coordination team can add a lot of value here  Mr. Hernandez values his autonomy, when he eats, what he eats, where he goes.  This is certainly understandable.    Falling, severe deconditioning, general frailty, falling is particularly dangerous because he takes Eliquis anticoagulation  Actually fell coming into the clinic this morning of May 6, 2022  Then last week a friend drove him to a restaurant and he had a fall in the restaurant    Poorly controlled diabetes.  Mr. Hernandez did bring in his blood sugar readings and they are definitely better.  This morning of May 6 6 AM was 261.  Most of the readings May 4 and 5 were around 220.  There is an occasional 350 or so.  I suspect there are dietary indiscretions happening producing these blood sugar spikes.  Vazquez concurs.    He is supposed to be measuring his blood sugar before each meal and supplementing his insulin using a sliding scale based on the Premeal blood glucose.    Do Not give Correction Insulin if Pre-Meal BG less than 140.   For Pre-Meal  - 189 give 1 unit.   For Pre-Meal  - 239 give 2 units.   For Pre-Meal  - 289 give 3 units.   For Pre-Meal  - 339 give 4 units.   For Pre-Meal - 399 give 5 units.   For Pre-Meal -449 give 6 units   For Pre-Meal BG greater than or equal to 450 give 7 units.   To be given with prandial insulin, and based on pre-meal blood glucose.  Notify provider if glucose greater than or equal to 350 mg/dL after administration of correction dose.  If given at mealtime, administer within 30 minutes of start of meal.    Lantus dose is 32 units at bedtime  Also Victoza 1.8 mg injected once a day  No metformin    He was sent home with a prescription for a freestyle radha 2 continuous glucose  monitor.  But I question Mr. Hernandez about it this morning, he said he does not know anything aabout it.    Recovered from sepsis, cholangitis with klebsiella bacteremia, bacteriuria  Choledocholithiasis, ERCP 1/27  Admission Date: 1/27/2022      Discharge Date: 2/4/2022  When in the hospital he was very ill, with jaundice and sepsis syndrome  The TCU, returned home around Feb 21 2/21/2022 PICC line pulled cefdinir 300mg PO BID for an additional 2 weeks, ended around March 5 2- showed bilirubin recovering nicely  Bilirubin Total 0.0 - 1.0 mg/dL 1.6 High       EXAM: MR ABDOMEN MRCP W/O AND W CONTRAST  DATE/TIME: 1/27/2022  Significant biliary obstruction -- obstructing common bile duct stone at the hilum of the liver measuring up to 8 mm in diameter likely accounting for this obstruction.      peripherally measuring 5.3 x 3.6 cm.  focal liver infection/phlegmonous change/developing abscess given the findings      EXAM: CT ABDOMEN PELVIS W CONTRAST  DATE/TIME: 2/1/2022 10:35 AM   Status post ERCP with decompression of intrahepatic bile duct dilation     History of Urinary retention with hematuria, had Dietrich catheter out January 22, 2021  The Dietrich catheter was placed after angiogram because of acute urinary retention.  He developed hematuria November 30, 2020  CT scan abdomen pelvis with irregular bladder wall thickening suggestive of cystitis.  Noted to have nonobstructing 11 mm right upper pole kidney stone but no hydronephrosis.  He is currently on Flomax (tamsulosin) and I want him to stay on that.    Pain control-- right foot cramps  Will continue to use tramadol tablets, which he averages maybe 1 a day.  I told the tramadol, being an opioid, can be constipating.  Therefore he needs to use his MiraLAX powder to keep stools soft     Status post excision of right fifth metatarsal because of osteomyelitis on November 2, 2020, wound cultures with Bacteroides and Enterobacter.  Status post skin grafting to  "the lateral right foot.  10- Dr Garcia told him right foot is healed-- graduated from therapy. \"The right foot ulceration has resolved. I am pleased with his progress and recommend he continue to monitor for skin irritation or skin breakdown.'     He will wear his new custom molded diabetic shoes, and they look good.  As of the end of April 2021, he graduated from home care that was being delivered by [facility] (PT and OT)     Peripheral vascular disease.  November 24, 2020 he had right lower extremity angiogram with balloon angioplasty of anterior tibial and peroneal arteries.  However postoperative ankle-brachial index did not improve significantly, although vascular surgery thought that was because of vasospasm.  He needs to follow-up with vascular surgery, and will have a repeat arterial Doppler ultrasound.     7-8-2021 Ultrasound  Right Lower Extremity: Patent vasculature to the distal superficial femoral artery with triphasic flow. Transition to monophasic flow in the popliteal artery. Occluded posterior tibial artery.  Patent anterior tibial and dorsalis pedis arteries.  Left Lower Extremity: Occluded posterior tibial artery.  Patent anterior tibial and dorsalis pedis arteries.     Chronic right leg and foot lymphedema, probably related to vascular insufficiency both arterial and venous, I reminded him to elevate his leg to help the drainage     Essential hypertension, I asked him to stop the lisinopril, since his blood pressure seems to be running low he lost weight while in the hospital February-March 2022  I want to see what his kidney function looks like on the metabolic panel of today May 6, 2022 before restarting him on lisinopril.    Acute on chronic kidney failure from hospitalization April 23-25, 2022     Anemia of chronic disease and postinflammatory effects    Peripheral vascular disease with reduced SRIKANTH indices in both feet, but no focal stenosis on arterial ultrasound study. "       History of acute pulmonary embolism and cor pulmonale, was unprovoked, diagnosed May 2020, has been on anticoagulation ever since with Eliquis which he will continue long-term.  He seems to be tolerating the Eliquis just fine.  He is on concomitant baby aspirin which I told him does increase the risk for bleeding when combined with Eliquis.  But I think the combination is appropriate for him since he has peripheral vascular disease.     Paroxysmal atrial fibrillation, and history of pulmonary embolism, on anticoagulation with Eliquis for those reasons     Hyperlipidemia in the context of diabetes, with history of intolerance to statins, LDL cholesterol was 126 when measured July 22, 2020.       Constipation, component of drug-induced from tramadol, I told him its okay to use MiraLAX 1 capful even daily, dissolved in liquid.     Erectile dysfunction, took the sildenafil off his medication list, because too many medical issues going on, and blood pressures running low     Second Moderna Covid third shot Moderna 11-      --------------------------------------------------------------------------------------------------------------------------  History of Present Illness  This 86 year old old     Post hospital follow-up visit,    Hospitalized April 23-25 with severe hyperglycemia due to uncontrolled diabetes, Mr. Hernandez was not giving himself insulin or checking his blood sugars  UTI, treated with ciprofloxacin  Dehydration, hyponatremia, acute renal failure with creatinine peaking at 2.17  In the hospital his glucose was stabilized, he was rehydrated, and his kidney function improved.  Urinalysis was abnormal consistent with UTI, although cultures reported only urogenital jennifer  He was sent home with ciprofloxacin 250 mg twice a day for 6 additional days after discharge    Today May 6, 2022 will check a basic metabolic panel, CBC, and urinalysis if he can give a specimen.    He comes in today accompanied by  "his son Shiv.  Mr. Hernandez main caregiver at home is his other son Elfego.  I have spoken to Elfego on numerous occasions, including the morning of April 23 when I reported the blood sugar that was out of control and instructed Elfego to bring Mr. Hernandez to the hospital to be admitted.    Clearly we have an issue of:    Patient not able to care for self adequately considering chronic severe medical conditions, frailty, falling, family despite their best efforts are not able to provide a support system necessary for him and his chronically ill frail wife  I have placed a request for [facility] primary care coordination team to meet with Mr. Hernandez and explore options.  I do think Mr. Hernandez needs to consider moving into an institutional setting such as assisted living  He told me \"No!\"  But his son Shiv who is here today told me that for more than a year he has been urging Mr. Hernandez and his wife to consider assisted living.  Elfego has a full-time job, and has to be out of the house for long stretches of hours.  Clearly Mr. Hernandez family has to have a in-depth conversation, and I think the care coordination team can add a lot of value here  Mr. Hernandez values his autonomy, when he eats, what he eats, where he goes.  This is certainly understandable.    Falling, severe deconditioning, general frailty, falling is particularly dangerous because he takes Eliquis anticoagulation  Actually fell coming into the clinic this morning of May 6, 2022  Then last week a friend drove him to a restaurant and he had a fall in the restaurant    Poorly controlled diabetes.  Mr. Hernandez did bring in his blood sugar readings and they are definitely better.  This morning of May 6 6 AM was 261.  Most of the readings May 4 and 5 were around 220.  There is an occasional 350 or so.  I suspect there are dietary indiscretions happening producing these blood sugar spikes.  Vazquez concurs.    He is supposed to be measuring his blood sugar before each meal and supplementing " his insulin using a sliding scale based on the Premeal blood glucose.    Do Not give Correction Insulin if Pre-Meal BG less than 140.   For Pre-Meal  - 189 give 1 unit.   For Pre-Meal  - 239 give 2 units.   For Pre-Meal  - 289 give 3 units.   For Pre-Meal  - 339 give 4 units.   For Pre-Meal - 399 give 5 units.   For Pre-Meal -449 give 6 units   For Pre-Meal BG greater than or equal to 450 give 7 units.   To be given with prandial insulin, and based on pre-meal blood glucose.  Notify provider if glucose greater than or equal to 350 mg/dL after administration of correction dose.  If given at mealtime, administer within 30 minutes of start of meal.    Lantus dose is 32 units at bedtime  Also Victoza 1.8 mg injected once a day  No metformin    He was sent home with a prescription for a freestyle radha 2 continuous glucose monitor.  But I question Mr. Hernandez about it this morning, he said he does not know anything aabout it.        Wt Readings from Last 3 Encounters:   05/06/22 69.9 kg (154 lb)   04/25/22 70.1 kg (154 lb 8 oz)   04/22/22 68 kg (150 lb)     BP Readings from Last 3 Encounters:   05/06/22 (!) 165/80   04/25/22 113/59   04/22/22 100/51       ---------------------------------------------------------------------------------------------------------------------------    Medications, Allergies, Social, and Problem List   Current Outpatient Medications   Medication Sig Dispense Refill     acetaminophen (TYLENOL) 500 MG tablet [ACETAMINOPHEN (TYLENOL) 500 MG TABLET] Take 1-2 tablets (500-1,000 mg total) by mouth every 4 (four) hours as needed.  0     aspirin 81 MG EC tablet [ASPIRIN 81 MG EC TABLET] Take 81 mg by mouth daily.       B-D U/F 31G X 8 MM insulin pen needle USED TO INJECT INSULIN DAILY 100 each 11     blood glucose test strips [BLOOD GLUCOSE TEST STRIPS] Test two times daily. Dispense brand per patient's insurance at pharmacy discretion. 200 strip 11     blood-glucose  meter (ONETOUCH VERIO IQ METER) Misc [BLOOD-GLUCOSE METER (ONETOUCH VERIO IQ METER) MISC] Use 1 each As Directed 2 (two) times a day. 1 each 0     Continuous Blood Gluc Sensor (FREESTYLE APRIL 2 SENSOR) [facility] [place] 1 each every 14 days 1 each every 14 days. Change every 14 days. 6 each 5     ELIQUIS ANTICOAGULANT 5 MG tablet TAKE 1 TABLET BY MOUTH TWICE DAILY 180 tablet 3     insulin aspart (NOVOLOG PEN) 100 UNIT/ML pen Inject 1-7 Units Subcutaneous 3 times daily (before meals) Correction Scale - MEDIUM INSULIN RESISTANCE DOSING   Do Not give Correction Insulin if Pre-Meal BG less than 140. For Pre-Meal  - 189 give 1 unit. For Pre-Meal  - 239 give 2 units. For Pre-Meal  - 289 give 3 units. For Pre-Meal  - 339 give 4 units. For Pre-Meal - 399 give 5 units. For Pre-Meal -449 give 6 units For Pre-Meal BG greater than or equal to 450 give 7 units. To be given with prandial insulin, and based on pre-meal blood glucose.  Notify provider if glucose greater than or equal to 350 mg/dL after administration of correction dose. If given at mealtime, administer within 30 minutes of start of meal. 15 mL 0     insulin glargine (LANTUS SOLOSTAR) 100 UNIT/ML pen Inject 32 Units Subcutaneous At Bedtime 15 mL 0     multivit-min/FA/lycopen/lutein (CENTRUM SILVER MEN ORAL) [MULTIVIT-MIN/FA/LYCOPEN/LUTEIN (CENTRUM SILVER MEN ORAL)] Take 1 tablet by mouth daily.       mupirocin (BACTROBAN) 2 % ointment Apply topically daily as needed Apply to foot wound       polyethylene glycol (MIRALAX) 17 gram packet [POLYETHYLENE GLYCOL (MIRALAX) 17 GRAM PACKET] Take 1 packet (17 g total) by mouth daily as needed. 100 packet 3     tamsulosin (FLOMAX) 0.4 MG capsule TAKE 1 CAPSULE BY MOUTH EVERY DAY AFTER SUPPER 90 capsule 3     traMADol (ULTRAM) 50 MG tablet Take 1 tablet (50 mg) by mouth every 6 hours as needed for moderate pain 20 tablet 0     VICTOZA 3-RUPALI 0.6 mg/0.1 mL (18 mg/3 mL) injection [VICTOZA 3-RUPALI 0.6 MG/0.1  ML (18 MG/3 ML) INJECTION] INJECT 1.2 MG UNDER THE SKIN ONCE DAILY 12 mL 11     vitamin D3 (CHOLECALCIFEROL) 125 MCG (5000 UT) tablet Take 1 tablet (125 mcg) by mouth daily       vitamin E 400 unit capsule [VITAMIN E 400 UNIT CAPSULE] Take 400 Units by mouth daily.       Allergies   Allergen Reactions     Cresol [Phenol] Unknown     Muscle cramps     Demeclocycline Hives and Rash     Crestor [Rosuvastatin] Muscle Pain (Myalgia)     Social History     Tobacco Use     Smoking status: Former Smoker     Quit date: 1991     Years since quittin.5     Smokeless tobacco: Never Used   Substance Use Topics     Alcohol use: No     Drug use: No     Patient Active Problem List   Diagnosis     Hyperlipidemia     Essential hypertension     Statin intolerance     Personal history of pulmonary embolism     PVD (peripheral vascular disease) (H)     Overweight (BMI 25.0-29.9)     Chronic anticoagulation     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 (H)     Type 2 diabetes mellitus with diabetic peripheral angiopathy without gangrene, with long-term current use of insulin (H)     Adult failure to thrive     Normocytic anemia     Paroxysmal atrial fibrillation (H)     ACP (advance care planning)     Hematuria     Amputated toe, right (H)     Equinovarus acquired deformity, right     Drug-induced constipation     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 2 diabetes mellitus, limited to breakdown of skin (H)     Cellulitis and abscess of foot excluding toe     Jaundice     Cholelithiasis     Status post endoscopic retrograde cholangiopancreatography     History of cholecystectomy     Coronary artery disease involving native coronary artery of native heart without angina pectoris     Hyperglycemia due to diabetes mellitus (H)     Lymphedema     Age-related cognitive decline     Dehydration     Hyperglycemia        Reviewed, reconciled and updated       Physical  "Exam   General Appearance:       BP (!) 165/80 (BP Location: Right arm, Patient Position: Sitting, Cuff Size: Adult Regular)   Pulse 70   Ht 1.727 m (5' 8\")   Wt 69.9 kg (154 lb)   SpO2 98%   BMI 23.42 kg/m      Cornelia Hernandez looks pretty good today, alert, answers my questions appropriately.  Lungs clear  Heart regular rate and rhythm  Abdomen nontender  Trace ankle edema  Sitting in wheelchair       Additional Information   I spent 40 minutes on this encounter, including reviewing interval history since last visit, examining the patient, explaining and counseling the issues enumerated in the Assessment and Plan (patient given a copy), ordering indicated tests, ordering referrals.       DALE CORTEZ MD, MD    "

## 2022-05-06 NOTE — PATIENT INSTRUCTIONS
"Post hospital follow-up visit,    Hospitalized April 23-25 with severe hyperglycemia due to uncontrolled diabetes, Mr. Hernandez was not giving himself insulin or checking his blood sugars  UTI, treated with ciprofloxacin  Dehydration, hyponatremia, acute renal failure with creatinine peaking at 2.17  In the hospital his glucose was stabilized, he was rehydrated, and his kidney function improved.  Urinalysis was abnormal consistent with UTI, although cultures reported only urogenital jennifer  He was sent home with ciprofloxacin 250 mg twice a day for 6 additional days after discharge    Today May 6, 2022 will check a basic metabolic panel, CBC, and urinalysis if he can give a specimen.    He comes in today accompanied by his son Shiv.  Mr. Hernandez main caregiver at home is his other son Elfego.  I have spoken to Elfego on numerous occasions, including the morning of April 23 when I reported the blood sugar that was out of control and instructed Elfego to bring Mr. Hernandez to the hospital to be admitted.    Clearly we have an issue of:    Patient not able to care for self adequately considering chronic severe medical conditions, frailty, falling, family despite their best efforts are not able to provide a support system necessary for him and his chronically ill frail wife  I have placed a request for [facility] primary care coordination team to meet with Mr. Hernandez and explore options.  I do think Mr. Hernandez needs to consider moving into an institutional setting such as assisted living  He told me \"No!\"  But his son Shiv who is here today told me that for more than a year he has been urging Mr. Hernandez and his wife to consider assisted living.  Elfego has a full-time job, and has to be out of the house for long stretches of hours.  Clearly Mr. Hernandez family has to have a in-depth conversation, and I think the care coordination team can add a lot of value here  Mr. Hernandez values his autonomy, when he eats, what he eats, where he goes.  This is " certainly understandable.    Falling, severe deconditioning, general frailty, falling is particularly dangerous because he takes Eliquis anticoagulation  Actually fell coming into the clinic this morning of May 6, 2022  Then last week a friend drove him to a restaurant and he had a fall in the restaurant    Poorly controlled diabetes.  Mr. Hernandez did bring in his blood sugar readings and they are definitely better.  This morning of May 6 6 AM was 261.  Most of the readings May 4 and 5 were around 220.  There is an occasional 350 or so.  I suspect there are dietary indiscretions happening producing these blood sugar spikes.  Vazquez concurs.    He is supposed to be measuring his blood sugar before each meal and supplementing his insulin using a sliding scale based on the Premeal blood glucose.    Do Not give Correction Insulin if Pre-Meal BG less than 140.   For Pre-Meal  - 189 give 1 unit.   For Pre-Meal  - 239 give 2 units.   For Pre-Meal  - 289 give 3 units.   For Pre-Meal  - 339 give 4 units.   For Pre-Meal - 399 give 5 units.   For Pre-Meal -449 give 6 units   For Pre-Meal BG greater than or equal to 450 give 7 units.   To be given with prandial insulin, and based on pre-meal blood glucose.  Notify provider if glucose greater than or equal to 350 mg/dL after administration of correction dose.  If given at mealtime, administer within 30 minutes of start of meal.      Lantus dose is 32 units at bedtime  Also Victoza 1.8 mg injected once a day  No metformin    He was sent home with a prescription for a freestyle radha 2 continuous glucose monitor.  But I question Mr. Hernandez about it this morning, he said he does not know anything aabout it.    Recovered from sepsis, cholangitis with klebsiella bacteremia, bacteriuria  Choledocholithiasis, ERCP 1/27  Admission Date: 1/27/2022      Discharge Date: 2/4/2022  When in the hospital he was very ill, with jaundice and sepsis syndrome  The TCU,  "returned home around Feb 21 2/21/2022 PICC line pulled cefdinir 300mg PO BID for an additional 2 weeks, ended around March 5 2- showed bilirubin recovering nicely  Bilirubin Total 0.0 - 1.0 mg/dL 1.6 High       EXAM: MR ABDOMEN MRCP W/O AND W CONTRAST  DATE/TIME: 1/27/2022  Significant biliary obstruction -- obstructing common bile duct stone at the hilum of the liver measuring up to 8 mm in diameter likely accounting for this obstruction.      peripherally measuring 5.3 x 3.6 cm.  focal liver infection/phlegmonous change/developing abscess given the findings      EXAM: CT ABDOMEN PELVIS W CONTRAST  DATE/TIME: 2/1/2022 10:35 AM   Status post ERCP with decompression of intrahepatic bile duct dilation     History of Urinary retention with hematuria, had Dietrich catheter out January 22, 2021  The Dietrich catheter was placed after angiogram because of acute urinary retention.  He developed hematuria November 30, 2020  CT scan abdomen pelvis with irregular bladder wall thickening suggestive of cystitis.  Noted to have nonobstructing 11 mm right upper pole kidney stone but no hydronephrosis.  He is currently on Flomax (tamsulosin) and I want him to stay on that.    Pain control-- right foot cramps  Will continue to use tramadol tablets, which he averages maybe 1 a day.  I told the tramadol, being an opioid, can be constipating.  Therefore he needs to use his MiraLAX powder to keep stools soft     Status post excision of right fifth metatarsal because of osteomyelitis on November 2, 2020, wound cultures with Bacteroides and Enterobacter.  Status post skin grafting to the lateral right foot.  10- Dr Garcia told him right foot is healed-- graduated from therapy. \"The right foot ulceration has resolved. I am pleased with his progress and recommend he continue to monitor for skin irritation or skin breakdown.'     He will wear his new custom molded diabetic shoes, and they look good.  As of the end of April " 2021, he graduated from home care that was being delivered by [facility] (PT and OT)     Peripheral vascular disease.  November 24, 2020 he had right lower extremity angiogram with balloon angioplasty of anterior tibial and peroneal arteries.  However postoperative ankle-brachial index did not improve significantly, although vascular surgery thought that was because of vasospasm.  He needs to follow-up with vascular surgery, and will have a repeat arterial Doppler ultrasound.     7-8-2021 Ultrasound  Right Lower Extremity: Patent vasculature to the distal superficial femoral artery with triphasic flow. Transition to monophasic flow in the popliteal artery. Occluded posterior tibial artery.  Patent anterior tibial and dorsalis pedis arteries.  Left Lower Extremity: Occluded posterior tibial artery.  Patent anterior tibial and dorsalis pedis arteries.     Chronic right leg and foot lymphedema, probably related to vascular insufficiency both arterial and venous, I reminded him to elevate his leg to help the drainage     Essential hypertension, I asked him to stop the lisinopril, since his blood pressure seems to be running low he lost weight while in the hospital February-March 2022  I want to see what his kidney function looks like on the metabolic panel of today May 6, 2022 before restarting him on lisinopril.    Acute on chronic kidney failure from hospitalization April 23-25, 2022     Anemia of chronic disease and postinflammatory effects    Peripheral vascular disease with reduced SRIKANTH indices in both feet, but no focal stenosis on arterial ultrasound study.       History of acute pulmonary embolism and cor pulmonale, was unprovoked, diagnosed May 2020, has been on anticoagulation ever since with Eliquis which he will continue long-term.  He seems to be tolerating the Eliquis just fine.  He is on concomitant baby aspirin which I told him does increase the risk for bleeding when combined with Eliquis.   But I think the combination is appropriate for him since he has peripheral vascular disease.     Paroxysmal atrial fibrillation, and history of pulmonary embolism, on anticoagulation with Eliquis for those reasons     Hyperlipidemia in the context of diabetes, with history of intolerance to statins, LDL cholesterol was 126 when measured July 22, 2020.       Constipation, component of drug-induced from tramadol, I told him its okay to use MiraLAX 1 capful even daily, dissolved in liquid.     Erectile dysfunction, took the sildenafil off his medication list, because too many medical issues going on, and blood pressures running low     Second Moderna Covid third shot Moderna 11-

## 2022-05-08 LAB — BACTERIA UR CULT: NORMAL

## 2022-05-09 ENCOUNTER — PATIENT OUTREACH (OUTPATIENT)
Dept: NURSING | Facility: CLINIC | Age: 87
End: 2022-05-09
Payer: COMMERCIAL

## 2022-05-09 DIAGNOSIS — N41.9 PROSTATITIS, UNSPECIFIED PROSTATITIS TYPE: Primary | ICD-10-CM

## 2022-05-09 RX ORDER — CIPROFLOXACIN 250 MG/1
250 TABLET, FILM COATED ORAL 2 TIMES DAILY
Qty: 28 TABLET | Refills: 0 | Status: SHIPPED | OUTPATIENT
Start: 2022-05-09 | End: 2022-05-23

## 2022-05-09 NOTE — PROGRESS NOTES
Clinic Care Coordination Contact  Community Health Worker Initial Outreach    CHW Initial Information Gathering:  Referral Source: PCP  Preferred Hospital: [facility]  [phone]  Current living arrangement:: I live in a private home with spouse  Supplies Currently Used at Home: Diabetic Supplies  Equipment Currently Used at Home: walker, rolling, walker, standard, grab bar, toilet, grab bar, tub/shower, glucometer  Informal Support system:: Family  No PCP office visit in Past Year: No (5/6/22 with Dr. Lozano)  CHW Additional Questions  MyChart active?: Yes  Patient sent Social Determinants of Health questionnaire?: No    Patient accepts CC: Yes. Patient scheduled for assessment with OLMAN Ziegler on 5/13/22 at 9:30. Patient noted desire to discuss looking into help in the home and getting information for Assisted living homes.     ** For  Assessment call patients lazaro Chaney @ [phone].- Consent to communicate with patients lazaro Chaney dated 5/23/18.    ** CHW did not send Care coordination questions. Patients lazaro Chaney does not have access to patients [place].     Julissa Alvarez  Community Health Worker  [facility] Care Coordination   Office: [phone]

## 2022-05-13 ENCOUNTER — PATIENT OUTREACH (OUTPATIENT)
Dept: NURSING | Facility: CLINIC | Age: 87
End: 2022-05-13
Payer: COMMERCIAL

## 2022-05-13 DIAGNOSIS — R29.6 FALLS FREQUENTLY: ICD-10-CM

## 2022-05-13 DIAGNOSIS — Z79.4 TYPE 2 DIABETES MELLITUS WITH DIABETIC PERIPHERAL ANGIOPATHY WITHOUT GANGRENE, WITH LONG-TERM CURRENT USE OF INSULIN (H): Chronic | ICD-10-CM

## 2022-05-13 DIAGNOSIS — E11.51 TYPE 2 DIABETES MELLITUS WITH DIABETIC PERIPHERAL ANGIOPATHY WITHOUT GANGRENE, WITH LONG-TERM CURRENT USE OF INSULIN (H): Chronic | ICD-10-CM

## 2022-05-13 DIAGNOSIS — R54 FRAILTY SYNDROME IN GERIATRIC PATIENT: ICD-10-CM

## 2022-05-13 ASSESSMENT — ACTIVITIES OF DAILY LIVING (ADL): DEPENDENT_IADLS:: CLEANING;COOKING;LAUNDRY;SHOPPING;MEAL PREPARATION;TRANSPORTATION;MEDICATION MANAGEMENT

## 2022-05-13 NOTE — PROGRESS NOTES
Clinic Care Coordination Contact    Clinic Care Coordination Contact  OUTREACH    Referral Information:  Referral Source: PCP    Primary Diagnosis: Frailty/Failure to thrive    Chief Complaint   Patient presents with     Clinic Care Coordination - Initial        Universal Utilization:   Clinic Utilization  Difficulty keeping appointments:: No  Compliance Concerns: No  No-Show Concerns: No  No PCP office visit in Past Year: No  Utilization    Hospital Admissions  3             ED Visits  2             No Show Count (past year)  2                Current as of: 5/13/2022 12:02 PM              Clinical Concerns:  Current Medical Concerns:    Patient Active Problem List   Diagnosis     Hyperlipidemia     Essential hypertension     Statin intolerance     Personal history of pulmonary embolism     PVD (peripheral vascular disease) (H)     Overweight (BMI 25.0-29.9)     Chronic anticoagulation     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 (H)     Type 2 diabetes mellitus with diabetic peripheral angiopathy without gangrene, with long-term current use of insulin (H)     Adult failure to thrive     Normocytic anemia     Paroxysmal atrial fibrillation (H)     ACP (advance care planning)     Hematuria     Amputated toe, right (H)     Equinovarus acquired deformity, right     Drug-induced constipation     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 2 diabetes mellitus, limited to breakdown of skin (H)     Cellulitis and abscess of foot excluding toe     Jaundice     Cholelithiasis     Status post endoscopic retrograde cholangiopancreatography     History of cholecystectomy     Coronary artery disease involving native coronary artery of native heart without angina pectoris     Hyperglycemia due to diabetes mellitus (H)     Lymphedema     Age-related cognitive decline     Dehydration     Hyperglycemia     Frailty syndrome in geriatric patient     Falls frequently      "  Current Behavioral Concerns: refuses to accept help, per son      Education Provided to patient:      Private pay caregivers-    Home Instead  [phone]  [email or web address]    Gabby Stevens  [phone]  [email or web address]    [employer]  [phone]  Www.[email or web address]    [place] [employer]- GREAT resource for seniors in [facility]  Www.[facility] [email or web address] Hospital[facility]  [phone]    Meals on Wheels- referral placed online will be sent to   [facility]  Phone: [phone]      [facility]- look into assisted living  Www.careoptionsnetwork.org  Click \"Helpful Info\"  Click \"AL-NE\" , for example, for map and fact sheets    Pain  Pain (GOAL):: No  Health Maintenance Reviewed: Due/Overdue Due for annual wellness    Medication Management:  Medication review status: Medications reviewed and no changes reported per patient.             Functional Status:  Dependent ADLs:: Ambulation-walker, Bathing, Dressing  Dependent IADLs:: Cleaning, Cooking, Laundry, Shopping, Meal Preparation, Transportation, Medication Management  Bed or wheelchair confined:: No  Mobility Status: Independent w/Device  Fallen 2 or more times in the past year?: No  Any fall with injury in the past year?: No    Living Situation:  Current living arrangement:: I live in a private home with family (Lives with spouse and son)  [facility]    Lifestyle & Psychosocial Needs:    Social Determinants of Health     Tobacco Use: Medium Risk     Smoking Tobacco Use: Former Smoker     Smokeless Tobacco Use: Never Used   Alcohol Use: Not on file   Financial Resource Strain: Not on file   Food Insecurity: Not on file   Transportation Needs: Not on file   Physical Activity: Not on file   Stress: Not on file   Social Connections: Not on file   Intimate Partner Violence: Not on file   Depression: Not at risk     PHQ-2 Score: 0   Housing Stability: Not on file     Diet:: Diabetic diet  Inadequate " nutrition (GOAL):: No  Tube Feeding: No  Inadequate activity/exercise (GOAL):: No  Significant changes in sleep pattern (GOAL): No  Transportation means:: Accessible car, Family     Scientologist or spiritual beliefs that impact treatment:: No  Mental health DX:: No  Mental health management concern (GOAL):: No  Chemical Dependency Status: No Current Concerns  Informal Support system:: Family, Children, Spouse             Resources and Interventions:  Current Resources:      Community Resources: None  Supplies Currently Used at Home: Diabetic Supplies  Equipment Currently Used at Home: walker, rolling, walker, standard, grab bar, toilet, grab bar, tub/shower, glucometer  Employment Status: retired         Advance Care Plan/Directive  Advanced Care Plans/Directives on file:: No  Type Advanced Care Plans/Directives: Other  Advanced Care Plan/Directive Status: Declined Further Information    Referrals Placed: Community Resources, PCA, Meals on Wheels ([place] [employer])         Goals:    Goals        Functional (pt-stated)       Goal Statement: My family will use resources to keep me safe at home    Date Goal set: 5/13/22  Barriers: falls, frail, chronic medical conditions  Strengths: family  Date to Achieve By: 3 months  Patient expressed understanding of goal: yes, son    Action steps to achieve this goal:  1. My family will look into private pay caregiver resources  2. I will trial meals on wheels  3. My family will look into services/supports available through [employer]   4.My family will research assisted living facilities on [facility] and reach out directly to admissions staff              Patient/Caregiver understanding: Pt agreeable to follow up from CC team     Outreach Frequency: monthly  Future Appointments              In 2 weeks Otis Lozano MD [facility] WB          Plan: CHW outreach monthly.  CC SW available as needed and will monitor chart  every 45 days.

## 2022-05-13 NOTE — LETTER
"[facility] CARE COORDINATION  [address] [place] [address]  May 13, 2022    Caleb Hernandez  [address]  [place] [address]      Dear Shiv,    I am a clinic care coordinator who works with DALE CORTEZ MD. I wanted to thank you for spending the time to talk with me.  Below  are the resources we discussed during our call.    Private pay caregivers-    [employer]  [phone]  [email or web address]    [facility] [place]  [phone]  Www.[facility]    [employer]  [phone]  [email or web address]    [place] [employer]- GREAT resource for seniors in [facility]  Www.[facility] Cent[facility].org  [phone]    Meals on Wheels- referral placed online will be sent to   [facility]  Phone: [phone]      [facility] Options Network- look into assisted living  Www.careoptionsnetwork.org  Click \"Helpful Info\"  Click \"AL-NE\" , for example, for map and fact sheets    Please feel free to contact the Community Health Worker at [phone] with any questions or concerns. We are focused on providing you with the highest-quality healthcare experience possible and that all starts with you.     Sincerely,     Karlie Freitas  Care Coordinator    Enclosed: I have enclosed a copy of the Patient Centered Plan of Care. This has helpful information and goals that we have talked about. Please keep this in an easy to access place to use as needed.    "

## 2022-05-13 NOTE — LETTER
[facility]  Patient Centered Plan of Care  About Me:        Patient Name:  Caleb Segovia    YOB: 1935  Age:         86 year old   Aditya MRN:    [id number] Telephone Information:  Home Phone [phone]   Mobile [phone]       Address:  [address] [place] [address] Email address:  [email or web address]      Emergency Contact(s)    Name Relationship Lgl Grd Work Phone Home Phone Mobile Phone   1. DANNY SEGOVIA Spouse   [phone]    2. KISHA SEGOVIA Son   [phone]           Primary language:  English     needed? No   [place] [employer]:  [phone] op. 1  Other communication barriers:Lack of coping    Preferred Method of Communication:     Current living arrangement: I live in a private home with family (Lives with spouse and son)    Mobility Status/ Medical Equipment: Independent w/Device        Health Maintenance  Health Maintenance Reviewed: Due/Overdue       My Access Plan  Medical Emergency 911   Primary Clinic Line [facility] - [phone]   24 Hour Appointment Line [phone] or  [phone]) (toll-free)   24 Hour Nurse Line [phone] (toll-free)   Preferred Urgent Care [facility] [phone]     Preferred [facility]  [phone]     Preferred Pharmacy [facility] [address] [address]     Behavioral Health Crisis Line The National Suicide Prevention Lifeline at 1-747.190.6716 or 911             My Care Team Members  Patient Care Team       Relationship Specialty Notifications Start End    Otis Lozano MD PCP - General   10/22/20     Phone: [phone] Fax: [phone]         [facility] [address] [place] [address]    Otis Lozano MD Assigned PCP   6/16/21     Phone: [phone] Fax: [phone] [address] [place] [address]    John Garcia DPM Assigned Musculoskeletal Provider    7/16/21     Phone: [phone] Fax: [phone]         [address] [facility] [address]    Lourdes Raymond MD Assigned Heart and Vascular Provider   7/16/21     Phone: [phone] Fax: [phone]         [address] [facility] [address]    Valeria Crawley MD Assigned Infectious Disease Provider   2/20/22     Phone: [phone] Fax: [phone]         [facility] [facility] [address]    Karlie Freitas LICSW Lead Care Coordinator  Admissions 5/13/22     Julissa Alvarez Community Health Worker  Admissions 5/13/22             My Care Plans  Self Management and Treatment Plan  Goals and (Comments)   Goals        General     Functional (pt-stated)      Notes - Note edited  5/13/2022 12:54 PM by Karlie Freitas LICSW     Goal Statement: My family will use resources to keep me safe at home    Date Goal set: 5/13/22  Barriers: falls, frail, chronic medical conditions  Strengths: family  Date to Achieve By: 3 months  Patient expressed understanding of goal: yes, son    Action steps to achieve this goal:  1. My family will look into private pay caregiver resources  2. I will trial meals on wheels  3. My family will look into services/supports available through [employer]   4.My family will research assisted living facilities on [facility] and reach out directly to admissions staff                 Advance Care Plans/Directives Type:   Not on file      My Medical and Care Information  Problem List   Patient Active Problem List   Diagnosis     Hyperlipidemia     Essential hypertension     Statin intolerance     Personal history of pulmonary embolism     PVD (peripheral vascular disease) (H)     Overweight (BMI 25.0-29.9)     Chronic anticoagulation     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 (H)     Type 2 diabetes mellitus with diabetic peripheral angiopathy without gangrene, with long-term current use of insulin (H)      Adult failure to thrive     Normocytic anemia     Paroxysmal atrial fibrillation (H)     ACP (advance care planning)     Hematuria     Amputated toe, right (H)     Equinovarus acquired deformity, right     Drug-induced constipation     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 2 diabetes mellitus, limited to breakdown of skin (H)     Cellulitis and abscess of foot excluding toe     Jaundice     Cholelithiasis     Status post endoscopic retrograde cholangiopancreatography     History of cholecystectomy     Coronary artery disease involving native coronary artery of native heart without angina pectoris     Hyperglycemia due to diabetes mellitus (H)     Lymphedema     Age-related cognitive decline     Dehydration     Hyperglycemia     Frailty syndrome in geriatric patient     Falls frequently      Current Medications and Allergies:  See printed Medication Report.    Care Coordination Start Date: 5/13/2022   Frequency of Care Coordination: monthly     Form Last Updated: 05/13/2022

## 2022-05-21 NOTE — PROGRESS NOTES
[facility]      OUTPATIENT OCCUPATIONAL THERAPY  EVALUATION  PLAN OF TREATMENT FOR OUTPATIENT REHABILITATION  (COMPLETE FOR INITIAL CLAIMS ONLY)  Patient's Last Name, First Name, M.I.  YOB: 1935  Caleb Hernandez                          Provider's Name  [facility] Medical Record No.  [id number]                               Onset Date:      Start of Care Date:  (P) 04/24/22     Type:     ___PT   _X_OT   ___SLP Medical Diagnosis:                           OT Diagnosis:  Decreased ADL I   Visits from SOC:  1   _________________________________________________________________________________  Plan of Treatment/Functional Goals    Planned Interventions: ADL retraining, IADL retraining, bed mobility training, transfer training   Goals: See Occupational Therapy Goals on Care Plan in [employer] electronic health record.    Therapy Frequency: Daily  Predicted Duration of Therapy Intervention: 05/01/22  _________________________________________________________________________________    I CERTIFY THE NEED FOR THESE SERVICES FURNISHED UNDER        THIS PLAN OF TREATMENT AND WHILE UNDER MY CARE .             Physician Signature               Date    X_____________________________________________________                   ,                   Initial Assessment        See Occupational Therapy evaluation dated (P) 04/24/22 in Epic electronic health record.

## 2022-05-27 ENCOUNTER — OFFICE VISIT (OUTPATIENT)
Dept: INTERNAL MEDICINE | Facility: CLINIC | Age: 87
End: 2022-05-27
Payer: COMMERCIAL

## 2022-05-27 VITALS
HEART RATE: 69 BPM | TEMPERATURE: 98.3 F | BODY MASS INDEX: 24.94 KG/M2 | OXYGEN SATURATION: 94 % | DIASTOLIC BLOOD PRESSURE: 54 MMHG | SYSTOLIC BLOOD PRESSURE: 130 MMHG | WEIGHT: 164 LBS

## 2022-05-27 DIAGNOSIS — E11.65 POORLY CONTROLLED TYPE 2 DIABETES MELLITUS (H): ICD-10-CM

## 2022-05-27 DIAGNOSIS — E11.51 TYPE 2 DIABETES MELLITUS WITH DIABETIC PERIPHERAL ANGIOPATHY WITHOUT GANGRENE, WITH LONG-TERM CURRENT USE OF INSULIN (H): Primary | ICD-10-CM

## 2022-05-27 DIAGNOSIS — Z23 HIGH PRIORITY FOR COVID-19 VACCINATION: ICD-10-CM

## 2022-05-27 DIAGNOSIS — Z79.4 TYPE 2 DIABETES MELLITUS WITH DIABETIC PERIPHERAL ANGIOPATHY WITHOUT GANGRENE, WITH LONG-TERM CURRENT USE OF INSULIN (H): Primary | ICD-10-CM

## 2022-05-27 DIAGNOSIS — N18.32 STAGE 3B CHRONIC KIDNEY DISEASE (H): ICD-10-CM

## 2022-05-27 LAB
ANION GAP SERPL CALCULATED.3IONS-SCNC: 11 MMOL/L (ref 5–18)
BUN SERPL-MCNC: 28 MG/DL (ref 8–28)
CALCIUM SERPL-MCNC: 9.6 MG/DL (ref 8.5–10.5)
CHLORIDE BLD-SCNC: 104 MMOL/L (ref 98–107)
CO2 SERPL-SCNC: 24 MMOL/L (ref 22–31)
CREAT SERPL-MCNC: 1.4 MG/DL (ref 0.7–1.3)
ERYTHROCYTE [DISTWIDTH] IN BLOOD BY AUTOMATED COUNT: 12.2 % (ref 10–15)
GFR SERPL CREATININE-BSD FRML MDRD: 49 ML/MIN/1.73M2
GLUCOSE BLD-MCNC: 152 MG/DL (ref 70–125)
HCT VFR BLD AUTO: 33.6 % (ref 40–53)
HGB BLD-MCNC: 11 G/DL (ref 13.3–17.7)
MCH RBC QN AUTO: 30.9 PG (ref 26.5–33)
MCHC RBC AUTO-ENTMCNC: 32.7 G/DL (ref 31.5–36.5)
MCV RBC AUTO: 94 FL (ref 78–100)
PLATELET # BLD AUTO: 343 10E3/UL (ref 150–450)
POTASSIUM BLD-SCNC: 4.9 MMOL/L (ref 3.5–5)
RBC # BLD AUTO: 3.56 10E6/UL (ref 4.4–5.9)
SODIUM SERPL-SCNC: 139 MMOL/L (ref 136–145)
WBC # BLD AUTO: 9.9 10E3/UL (ref 4–11)

## 2022-05-27 PROCEDURE — 91306 COVID-19,PF,MODERNA (18+ YRS BOOSTER .25ML): CPT | Performed by: INTERNAL MEDICINE

## 2022-05-27 PROCEDURE — 85027 COMPLETE CBC AUTOMATED: CPT | Performed by: INTERNAL MEDICINE

## 2022-05-27 PROCEDURE — 99214 OFFICE O/P EST MOD 30 MIN: CPT | Mod: 25 | Performed by: INTERNAL MEDICINE

## 2022-05-27 PROCEDURE — 36415 COLL VENOUS BLD VENIPUNCTURE: CPT | Performed by: INTERNAL MEDICINE

## 2022-05-27 PROCEDURE — 0064A COVID-19,PF,MODERNA (18+ YRS BOOSTER .25ML): CPT | Performed by: INTERNAL MEDICINE

## 2022-05-27 PROCEDURE — 80048 BASIC METABOLIC PNL TOTAL CA: CPT | Performed by: INTERNAL MEDICINE

## 2022-05-27 NOTE — LETTER
May 27, 2022      Frantz Hernandez  [address]  [place] [address]        Dear NbaDavid,    I was pleased to see that the kidney function is holding stable (as indicated by the creatinine and GFR), and glucose was really not too bad at 152.  Blood cell counts continue to show a mild degree of anemia, but it's really not too bad.    Resulted Orders   Basic metabolic panel  (Ca, Cl, CO2, Creat, Gluc, K, Na, BUN)   Result Value Ref Range    Sodium 139 136 - 145 mmol/L    Potassium 4.9 3.5 - 5.0 mmol/L    Chloride 104 98 - 107 mmol/L    Carbon Dioxide (CO2) 24 22 - 31 mmol/L    Anion Gap 11 5 - 18 mmol/L    Urea Nitrogen 28 8 - 28 mg/dL    Creatinine 1.40 (H) 0.70 - 1.30 mg/dL    Calcium 9.6 8.5 - 10.5 mg/dL    Glucose 152 (H) 70 - 125 mg/dL    GFR Estimate 49 (L) >60 mL/min/1.73m2      Comment:      Effective December 21, 2021 eGFRcr in adults is calculated using the 2021 CKD-EPI creatinine equation which includes age and gender (Herbie et al., NEJ, DOI: 10.1056/XWKIxz7682292)   CBC with platelets   Result Value Ref Range    WBC Count 9.9 4.0 - 11.0 10e3/uL    RBC Count 3.56 (L) 4.40 - 5.90 10e6/uL    Hemoglobin 11.0 (L) 13.3 - 17.7 g/dL    Hematocrit 33.6 (L) 40.0 - 53.0 %    MCV 94 78 - 100 fL    MCH 30.9 26.5 - 33.0 pg    MCHC 32.7 31.5 - 36.5 g/dL    RDW 12.2 10.0 - 15.0 %    Platelet Count 343 150 - 450 10e3/uL       If you have any questions or concerns, please call the clinic at the number listed above.       Sincerely,      Otis Lozano MD

## 2022-05-27 NOTE — PATIENT INSTRUCTIONS
Follow-up visit multiple issues, Mr. Hernandez is actually doing reasonably well since our last visit of May 6, 2022.  He did finish out the 2 weeks of ciprofloxacin that I prescribed on May 6, 2022.    He showed me some blood sugar readings over the past week, and they certainly look much better, with morning readings typically around 120-150, and then afternoon readings are around low 200s, and an occasional high reading in the evening that could be up to 400.    I am going to send a request to the Diabetes Education Team to help him better fine-tune his insulin dosing.    He did meet with the care coordination team, and they finished the conversation with the recommendation that he should look into additional resources including considering assisted living.  Mr. Hernandez still not ready for that.    The caregiving duties mostly born by Elfego, who lives in the house.  Elfego has a traditional full-time job, so Mr. Hernandez is often in a house by himself for much of the day  His spouse Mrs. Hernandez is in the house, but she has a lot of medical problems herself    Pyuria and mixed jennifer seen on the urinalysis from May 6, 2022 which prompted my giving him another 2 weeks of ciprofloxacin    Recovering from acute kidney injury  Metabolic panel from May 6, 2022 had a creatinine of 1.35, estimated GFR 51.  Elevated glucose of 343 which could account for the potassium 5.5 and sodium 134    Check basic metabolic panel today May 27, 2020    Hospitalized April 23-25 with severe hyperglycemia due to uncontrolled diabetes, Mr. Hernandez was not giving himself insulin or checking his blood sugars  UTI, treated with ciprofloxacin  Dehydration, hyponatremia, acute renal failure with creatinine peaking at 2.17  In the hospital his glucose was stabilized, he was rehydrated, and his kidney function improved.    Ongoing concerns about self-care considering chronic severe medical conditions, frailty, falling, family despite their best efforts are not  "able to provide a support system necessary for him and his chronically ill frail wife  I do think Mr. Hernandez needs to consider moving into an institutional setting such as assisted living  He told me \"No!\"  But his son Don told me that for more than a year he has been urging Mr. Hernandez and his wife to consider assisted living.  Elfego has a full-time job, and has to be out of the house for long stretches of hours.     Falling, severe deconditioning, general frailty, falling is particularly dangerous because he takes Eliquis anticoagulation    Diabetes control was very poor, but is getting better     He is supposed to be measuring his blood sugar before each meal and supplementing his insulin using a sliding scale based on the Premeal blood glucose.     Do Not give Correction Insulin if Pre-Meal BG less than 140.   For Pre-Meal  - 189 give 1 unit.   For Pre-Meal  - 239 give 2 units.   For Pre-Meal  - 289 give 3 units.   For Pre-Meal  - 339 give 4 units.   For Pre-Meal - 399 give 5 units.   For Pre-Meal -449 give 6 units   For Pre-Meal BG greater than or equal to 450 give 7 units.   To be given with prandial insulin, and based on pre-meal blood glucose.  Notify provider if glucose greater than or equal to 350 mg/dL after administration of correction dose.  If given at mealtime, administer within 30 minutes of start of meal.    Lantus dose is 32 units at bedtime  Also Victoza 1.8 mg injected once a day  No metformin     Recovered from sepsis, cholangitis with klebsiella bacteremia, bacteriuria  Choledocholithiasis, ERCP 1/27  Admission Date: 1/27/2022      Discharge Date: 2/4/2022  When in the hospital he was very ill, with jaundice and sepsis syndrome  The TCU, returned home around Feb 21 2/21/2022 PICC line pulled cefdinir 300mg PO BID for an additional 2 weeks, ended around March 5 2- showed bilirubin recovering nicely  Bilirubin Total 0.0 - 1.0 mg/dL 1.6 High       EXAM: " "MR ABDOMEN MRCP W/O AND W CONTRAST  DATE/TIME: 1/27/2022  Significant biliary obstruction -- obstructing common bile duct stone at the hilum of the liver measuring up to 8 mm in diameter likely accounting for this obstruction.      peripherally measuring 5.3 x 3.6 cm.  focal liver infection/phlegmonous change/developing abscess given the findings      EXAM: CT ABDOMEN PELVIS W CONTRAST  DATE/TIME: 2/1/2022 10:35 AM   Status post ERCP with decompression of intrahepatic bile duct dilation     History of Urinary retention with hematuria, had Dietrich catheter out January 22, 2021  The Dietrich catheter was placed after angiogram because of acute urinary retention.  He developed hematuria November 30, 2020  CT scan abdomen pelvis with irregular bladder wall thickening suggestive of cystitis.  Noted to have nonobstructing 11 mm right upper pole kidney stone but no hydronephrosis.  He is currently on Flomax (tamsulosin) and I want him to stay on that.     Pain control-- right foot cramps  Will continue to use tramadol tablets, which he averages maybe 1 a day.  I told the tramadol, being an opioid, can be constipating.  Therefore he needs to use his MiraLAX powder to keep stools soft     Status post excision of right fifth metatarsal because of osteomyelitis on November 2, 2020, wound cultures with Bacteroides and Enterobacter.  Status post skin grafting to the lateral right foot.  10- Dr Garcia told him right foot is healed-- graduated from therapy. \"The right foot ulceration has resolved. I am pleased with his progress and recommend he continue to monitor for skin irritation or skin breakdown.'     He will wear his new custom molded diabetic shoes, and they look good.  As of the end of April 2021, he graduated from home care that was being delivered by [facility] (PT and OT)     Peripheral vascular disease.  November 24, 2020 he had right lower extremity angiogram with balloon angioplasty of anterior tibial " and peroneal arteries.  However postoperative ankle-brachial index did not improve significantly, although vascular surgery thought that was because of vasospasm.  He needs to follow-up with vascular surgery, and will have a repeat arterial Doppler ultrasound.     7-8-2021 Ultrasound  Right Lower Extremity: Patent vasculature to the distal superficial femoral artery with triphasic flow. Transition to monophasic flow in the popliteal artery. Occluded posterior tibial artery.  Patent anterior tibial and dorsalis pedis arteries.  Left Lower Extremity: Occluded posterior tibial artery.  Patent anterior tibial and dorsalis pedis arteries.     Chronic right leg and foot lymphedema, probably related to vascular insufficiency both arterial and venous, I reminded him to elevate his leg to help the drainage     Essential hypertension, stopped the lisinopril, since his blood pressure seems to be running low he lost weight while in the hospital February-March 2022    Anemia of chronic disease and postinflammatory effects     History of acute pulmonary embolism and cor pulmonale, was unprovoked, diagnosed May 2020, has been on anticoagulation ever since with Eliquis which he will continue long-term.  He seems to be tolerating the Eliquis just fine.  He is on concomitant baby aspirin which I told him does increase the risk for bleeding when combined with Eliquis.  But I think the combination is appropriate for him since he has peripheral vascular disease.     Paroxysmal atrial fibrillation, and history of pulmonary embolism, on anticoagulation with Eliquis for those reasons     Hyperlipidemia in the context of diabetes, with history of intolerance to statins, LDL cholesterol was 126 when measured July 22, 2020.       Constipation, component of drug-induced from tramadol, I told him its okay to use MiraLAX 1 capful even daily, dissolved in liquid.     Erectile dysfunction, took the sildenafil off his medication list, because too  many medical issues going on, and blood pressures running low     Third shot Moderna 11-  We will give fourth shot today May 27, 2022    Immunization History   Administered Date(s) Administered    COVID-19,PF,Moderna 01/25/2021, 02/22/2021    COVID-19,PF,Moderna Booster 11/24/2021    Mantoux Tuberculin Skin Test 12/09/2020, 12/23/2020    Pneumo Conj 13-V (2010&after) 01/01/2016, 10/22/2020    Pneumococcal 23 valent 09/29/2003

## 2022-05-27 NOTE — PROGRESS NOTES
Office Visit - Follow Up   Caleb Hernandez   86 year old male    Date of Visit: 5/27/2022    Chief Complaint   Patient presents with     Diabetes     Follow up        -------------------------------------------------------------------------------------------------------------------------  Assessment and Plan    Follow-up visit multiple issues, Mr. Hernandez is actually doing reasonably well since our last visit of May 6, 2022.  He did finish out the 2 weeks of ciprofloxacin that I prescribed on May 6, 2022.    He showed me some blood sugar readings over the past week, and they certainly look much better, with morning readings typically around 120-150, and then afternoon readings are around low 200s, and an occasional high reading in the evening that could be up to 400.    I am going to send a request to the Diabetes Education Team to help him better fine-tune his insulin dosing.    He did meet with the care coordination team, and they finished the conversation with the recommendation that he should look into additional resources including considering assisted living.  Mr. Hernandez still not ready for that.    The caregiving duties mostly born by Elfego, who lives in the house.  Elfego has a traditional full-time job, so Mr. Hernandez is often in a house by himself for much of the day  His spouse Mrs. Hernandez is in the house, but she has a lot of medical problems herself    Pyuria and mixed jennifer seen on the urinalysis from May 6, 2022 which prompted my giving him another 2 weeks of ciprofloxacin    Recovering from acute kidney injury  Metabolic panel from May 6, 2022 had a creatinine of 1.35, estimated GFR 51.  Elevated glucose of 343 which could account for the potassium 5.5 and sodium 134    Check basic metabolic panel today May 27, 2020    Hospitalized April 23-25 with severe hyperglycemia due to uncontrolled diabetes, Mr. Hernandez was not giving himself insulin or checking his blood sugars  UTI, treated with  "ciprofloxacin  Dehydration, hyponatremia, acute renal failure with creatinine peaking at 2.17  In the hospital his glucose was stabilized, he was rehydrated, and his kidney function improved.    Ongoing concerns about self-care considering chronic severe medical conditions, frailty, falling, family despite their best efforts are not able to provide a support system necessary for him and his chronically ill frail wife  I do think Mr. Hernandez needs to consider moving into an institutional setting such as assisted living  He told me \"No!\"  But his son Don told me that for more than a year he has been urging Mr. Hernandez and his wife to consider assisted living.  Elfego has a full-time job, and has to be out of the house for long stretches of hours.     Falling, severe deconditioning, general frailty, falling is particularly dangerous because he takes Eliquis anticoagulation    Diabetes control was very poor, but is getting better     He is supposed to be measuring his blood sugar before each meal and supplementing his insulin using a sliding scale based on the Premeal blood glucose.     Do Not give Correction Insulin if Pre-Meal BG less than 140.   For Pre-Meal  - 189 give 1 unit.   For Pre-Meal  - 239 give 2 units.   For Pre-Meal  - 289 give 3 units.   For Pre-Meal  - 339 give 4 units.   For Pre-Meal - 399 give 5 units.   For Pre-Meal -449 give 6 units   For Pre-Meal BG greater than or equal to 450 give 7 units.   To be given with prandial insulin, and based on pre-meal blood glucose.  Notify provider if glucose greater than or equal to 350 mg/dL after administration of correction dose.  If given at mealtime, administer within 30 minutes of start of meal.    Lantus dose is 32 units at bedtime  Also Victoza 1.8 mg injected once a day  No metformin     Recovered from sepsis, cholangitis with klebsiella bacteremia, bacteriuria  Choledocholithiasis, ERCP 1/27  Admission Date: " "1/27/2022      Discharge Date: 2/4/2022  When in the hospital he was very ill, with jaundice and sepsis syndrome  The TCU, returned home around Feb 21 2/21/2022 PICC line pulled cefdinir 300mg PO BID for an additional 2 weeks, ended around March 5 2- showed bilirubin recovering nicely  Bilirubin Total 0.0 - 1.0 mg/dL 1.6 High       EXAM: MR ABDOMEN MRCP W/O AND W CONTRAST  DATE/TIME: 1/27/2022  Significant biliary obstruction -- obstructing common bile duct stone at the hilum of the liver measuring up to 8 mm in diameter likely accounting for this obstruction.      peripherally measuring 5.3 x 3.6 cm.  focal liver infection/phlegmonous change/developing abscess given the findings      EXAM: CT ABDOMEN PELVIS W CONTRAST  DATE/TIME: 2/1/2022 10:35 AM   Status post ERCP with decompression of intrahepatic bile duct dilation     History of Urinary retention with hematuria, had Dietrich catheter out January 22, 2021  The Dietrich catheter was placed after angiogram because of acute urinary retention.  He developed hematuria November 30, 2020  CT scan abdomen pelvis with irregular bladder wall thickening suggestive of cystitis.  Noted to have nonobstructing 11 mm right upper pole kidney stone but no hydronephrosis.  He is currently on Flomax (tamsulosin) and I want him to stay on that.     Pain control-- right foot cramps  Will continue to use tramadol tablets, which he averages maybe 1 a day.  I told the tramadol, being an opioid, can be constipating.  Therefore he needs to use his MiraLAX powder to keep stools soft     Status post excision of right fifth metatarsal because of osteomyelitis on November 2, 2020, wound cultures with Bacteroides and Enterobacter.  Status post skin grafting to the lateral right foot.  10- Dr Garcia told him right foot is healed-- graduated from therapy. \"The right foot ulceration has resolved. I am pleased with his progress and recommend he continue to monitor for skin irritation " or skin breakdown.'     He will wear his new custom molded diabetic shoes, and they look good.  As of the end of April 2021, he graduated from home care that was being delivered by [facility] (PT and OT)     Peripheral vascular disease.  November 24, 2020 he had right lower extremity angiogram with balloon angioplasty of anterior tibial and peroneal arteries.  However postoperative ankle-brachial index did not improve significantly, although vascular surgery thought that was because of vasospasm.  He needs to follow-up with vascular surgery, and will have a repeat arterial Doppler ultrasound.     7-8-2021 Ultrasound  Right Lower Extremity: Patent vasculature to the distal superficial femoral artery with triphasic flow. Transition to monophasic flow in the popliteal artery. Occluded posterior tibial artery.  Patent anterior tibial and dorsalis pedis arteries.  Left Lower Extremity: Occluded posterior tibial artery.  Patent anterior tibial and dorsalis pedis arteries.     Chronic right leg and foot lymphedema, probably related to vascular insufficiency both arterial and venous, I reminded him to elevate his leg to help the drainage     Essential hypertension, stopped the lisinopril, since his blood pressure seems to be running low he lost weight while in the hospital February-March 2022    Anemia of chronic disease and postinflammatory effects     History of acute pulmonary embolism and cor pulmonale, was unprovoked, diagnosed May 2020, has been on anticoagulation ever since with Eliquis which he will continue long-term.  He seems to be tolerating the Eliquis just fine.  He is on concomitant baby aspirin which I told him does increase the risk for bleeding when combined with Eliquis.  But I think the combination is appropriate for him since he has peripheral vascular disease.     Paroxysmal atrial fibrillation, and history of pulmonary embolism, on anticoagulation with Eliquis for those  reasons     Hyperlipidemia in the context of diabetes, with history of intolerance to statins, LDL cholesterol was 126 when measured July 22, 2020.       Constipation, component of drug-induced from tramadol, I told him its okay to use MiraLAX 1 capful even daily, dissolved in liquid.     Erectile dysfunction, took the sildenafil off his medication list, because too many medical issues going on, and blood pressures running low     Third shot Moderna 11-  We will give fourth shot today May 27, 2022    Immunization History   Administered Date(s) Administered     COVID-19,PF,Moderna 01/25/2021, 02/22/2021     COVID-19,PF,Moderna Booster 11/24/2021     Mantoux Tuberculin Skin Test 12/09/2020, 12/23/2020     Pneumo Conj 13-V (2010&after) 01/01/2016, 10/22/2020     Pneumococcal 23 valent 09/29/2003     --------------------------------------------------------------------------------------------------------------------------  History of Present Illness  This 86 year old old     Follow-up visit multiple issues, Mr. Hernandez is actually doing reasonably well since our last visit of May 6, 2022.  He did finish out the 2 weeks of ciprofloxacin that I prescribed on May 6, 2022.    He showed me some blood sugar readings over the past week, and they certainly look much better, with morning readings typically around 120-150, and then afternoon readings are around low 200s, and an occasional high reading in the evening that could be up to 400.    I am going to send a request to the Diabetes Education Team to help him better fine-tune his insulin dosing.    He did meet with the care coordination team, and they finished the conversation with the recommendation that he should look into additional resources including considering assisted living.  Mr. Hernandez still not ready for that.    The caregiving duties mostly born by Elfego, who lives in the house.  Elfego has a traditional full-time job, so Mr. Hernandez is often in a house by himself  for much of the day  His spouse Mrs. Hernandez is in the house, but she has a lot of medical problems herself    Pyuria and mixed jennifer seen on the urinalysis from May 6, 2022 which prompted my giving him another 2 weeks of ciprofloxacin    Wt Readings from Last 3 Encounters:   05/27/22 74.4 kg (164 lb)   05/06/22 69.9 kg (154 lb)   04/25/22 70.1 kg (154 lb 8 oz)     BP Readings from Last 3 Encounters:   05/27/22 (!) 146/75   05/06/22 (!) 165/80   04/25/22 113/59       ---------------------------------------------------------------------------------------------------------------------------    Medications, Allergies, Social, and Problem List   Current Outpatient Medications   Medication Sig Dispense Refill     acetaminophen (TYLENOL) 500 MG tablet [ACETAMINOPHEN (TYLENOL) 500 MG TABLET] Take 1-2 tablets (500-1,000 mg total) by mouth every 4 (four) hours as needed.  0     aspirin 81 MG EC tablet [ASPIRIN 81 MG EC TABLET] Take 81 mg by mouth daily.       B-D U/F 31G X 8 MM insulin pen needle USED TO INJECT INSULIN DAILY 100 each 11     blood glucose test strips [BLOOD GLUCOSE TEST STRIPS] Test two times daily. Dispense brand per patient's insurance at pharmacy discretion. 200 strip 11     blood-glucose meter (ONETOUCH VERIO IQ METER) Misc [BLOOD-GLUCOSE METER (ONETOUCH VERIO IQ METER) MISC] Use 1 each As Directed 2 (two) times a day. 1 each 0     Continuous Blood Gluc Sensor (FREESTYLE APRIL 2 SENSOR) MISC 1 each every 14 days 1 each every 14 days. Change every 14 days. 6 each 5     ELIQUIS ANTICOAGULANT 5 MG tablet TAKE 1 TABLET BY MOUTH TWICE DAILY 180 tablet 3     insulin aspart (NOVOLOG PEN) 100 UNIT/ML pen Inject 1-7 Units Subcutaneous 3 times daily (before meals) Correction Scale - MEDIUM INSULIN RESISTANCE DOSING   Do Not give Correction Insulin if Pre-Meal BG less than 140. For Pre-Meal  - 189 give 1 unit. For Pre-Meal  - 239 give 2 units. For Pre-Meal  - 289 give 3 units. For Pre-Meal  -  339 give 4 units. For Pre-Meal - 399 give 5 units. For Pre-Meal -449 give 6 units For Pre-Meal BG greater than or equal to 450 give 7 units. To be given with prandial insulin, and based on pre-meal blood glucose.  Notify provider if glucose greater than or equal to 350 mg/dL after administration of correction dose. If given at mealtime, administer within 30 minutes of start of meal. 15 mL 0     insulin glargine (LANTUS SOLOSTAR) 100 UNIT/ML pen Inject 32 Units Subcutaneous At Bedtime 15 mL 0     multivit-min/FA/lycopen/lutein (CENTRUM SILVER MEN ORAL) [MULTIVIT-MIN/FA/LYCOPEN/LUTEIN (CENTRUM SILVER MEN ORAL)] Take 1 tablet by mouth daily.       mupirocin (BACTROBAN) 2 % ointment Apply topically daily as needed Apply to foot wound       polyethylene glycol (MIRALAX) 17 gram packet [POLYETHYLENE GLYCOL (MIRALAX) 17 GRAM PACKET] Take 1 packet (17 g total) by mouth daily as needed. 100 packet 3     tamsulosin (FLOMAX) 0.4 MG capsule TAKE 1 CAPSULE BY MOUTH EVERY DAY AFTER SUPPER 90 capsule 3     traMADol (ULTRAM) 50 MG tablet Take 1 tablet (50 mg) by mouth every 6 hours as needed for moderate pain 20 tablet 0     VICTOZA 3-RUPALI 0.6 mg/0.1 mL (18 mg/3 mL) injection [VICTOZA 3-RUPALI 0.6 MG/0.1 ML (18 MG/3 ML) INJECTION] INJECT 1.2 MG UNDER THE SKIN ONCE DAILY 12 mL 11     vitamin D3 (CHOLECALCIFEROL) 125 MCG (5000 UT) tablet Take 1 tablet (125 mcg) by mouth daily       vitamin E 400 unit capsule [VITAMIN E 400 UNIT CAPSULE] Take 400 Units by mouth daily.       Allergies   Allergen Reactions     Cresol [Phenol] Unknown     Muscle cramps     Demeclocycline Hives and Rash     Crestor [Rosuvastatin] Muscle Pain (Myalgia)     Social History     Tobacco Use     Smoking status: Former Smoker     Quit date: 1991     Years since quittin.5     Smokeless tobacco: Never Used   Substance Use Topics     Alcohol use: No     Drug use: No     Patient Active Problem List   Diagnosis     Hyperlipidemia     Essential  hypertension     Statin intolerance     Personal history of pulmonary embolism     PVD (peripheral vascular disease) (H)     Overweight (BMI 25.0-29.9)     Chronic anticoagulation     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 (H)     Type 2 diabetes mellitus with diabetic peripheral angiopathy without gangrene, with long-term current use of insulin (H)     Adult failure to thrive     Normocytic anemia     Paroxysmal atrial fibrillation (H)     ACP (advance care planning)     Hematuria     Amputated toe, right (H)     Equinovarus acquired deformity, right     Drug-induced constipation     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 2 diabetes mellitus, limited to breakdown of skin (H)     Cellulitis and abscess of foot excluding toe     Jaundice     Cholelithiasis     Status post endoscopic retrograde cholangiopancreatography     History of cholecystectomy     Coronary artery disease involving native coronary artery of native heart without angina pectoris     Hyperglycemia due to diabetes mellitus (H)     Lymphedema     Age-related cognitive decline     Dehydration     Hyperglycemia     Frailty syndrome in geriatric patient     Falls frequently        Reviewed, reconciled and updated       Physical Exam   General Appearance:      BP (!) 146/75 (BP Location: Left arm, Patient Position: Sitting, Cuff Size: Adult Regular)   Pulse 69   Temp 98.3  F (36.8  C) (Oral)   Wt 74.4 kg (164 lb)   SpO2 94%   BMI 24.94 kg/m      Mr. Hernandez looks pretty good today, alert and cheerful  Lungs clear  Heart regular rhythm  Chronic 2+ leg lymphedema     Additional Information   I spent 30 minutes on this encounter, including reviewing interval history since last visit, examining the patient, explaining and counseling the issues enumerated in the Assessment and Plan (patient given a copy), ordering indicated tests       DALE CORTEZ MD, MD    Answers for HPI/ROS  submitted by the patient on 5/27/2022  Frequency of checking blood sugars:: two times daily  What time of day are you checking your blood sugars : before and after meals  Have you had any blood sugars above 200?: Yes  Have you had any blood sugars below 70?: No  Hypoglycemia symptoms:: shakiness, weakness, lethargy, confusion  Diabetic concerns:: blood sugar frequently over 200  Paraesthesia present:: none of these symptoms  Have you had a diabetic eye exam within the last year?: No  How many servings of fruits and vegetables do you eat daily?: 0-1  On average, how many sweetened beverages do you drink each day (Examples: soda, juice, sweet tea, etc.  Do NOT count diet or artificially sweetened beverages)?: 6  How many minutes a day do you exercise enough to make your heart beat faster?: 9 or less  How many days a week do you exercise enough to make your heart beat faster?: 3 or less  How many days per week do you miss taking your medication?: 0

## 2022-06-15 ENCOUNTER — TELEPHONE (OUTPATIENT)
Dept: CARE COORDINATION | Facility: CLINIC | Age: 87
End: 2022-06-15
Payer: COMMERCIAL

## 2022-06-15 ENCOUNTER — PATIENT OUTREACH (OUTPATIENT)
Dept: CARE COORDINATION | Facility: CLINIC | Age: 87
End: 2022-06-15
Payer: COMMERCIAL

## 2022-06-15 NOTE — PROGRESS NOTES
Clinic Care Coordination Contact  [facility]/Voicemail    Clinical Data: Care Coordinator Outreach  Outreach attempted x 1.  Left message on patients son Don voicemail with call back information and requested return call.  Plan: Care Coordinator will follow up with patients son in 10 business days.    Next CHW outreach: 6/30/22    Julissa Alvarez  [facility] Worker  [facility] Care Coordination   Office: [phone]

## 2022-06-20 ENCOUNTER — ALLIED HEALTH/NURSE VISIT (OUTPATIENT)
Dept: EDUCATION SERVICES | Facility: CLINIC | Age: 87
End: 2022-06-20
Attending: INTERNAL MEDICINE
Payer: COMMERCIAL

## 2022-06-20 ENCOUNTER — PATIENT OUTREACH (OUTPATIENT)
Dept: CARE COORDINATION | Facility: CLINIC | Age: 87
End: 2022-06-20

## 2022-06-20 DIAGNOSIS — E11.65 POORLY CONTROLLED TYPE 2 DIABETES MELLITUS (H): ICD-10-CM

## 2022-06-20 PROCEDURE — 99207 PR NO BILLABLE SERVICE THIS VISIT: CPT

## 2022-06-20 NOTE — LETTER
6/20/2022         RE: Caleb MORTON Hernandez  [address]  [place] [address]        Dear Colleague,    Thank you for referring your patient, Caleb Hernandez, to the [facility]. Please see a copy of my visit note below.    No notes on file

## 2022-06-20 NOTE — CONFIDENTIAL NOTE
Confused about dosing  63 doesn't seem to effect him   8:33p  156 8:09a  Hasn't been taking Lantus  Not good historian  Last shot was about 1 year ago before bed  B-1 piece of toast, butter and grape jelly  Sometimes out of breakfast  D-last night-hotdog  Son living with him  Reads a lot  Mayuri?  Good friend of about 70 years  recently  Elfego does all medications and helps with BG checks    Plan-  Get a hold of Elfego to talk about insulin  Send mayuri to [facility] specialty

## 2022-06-20 NOTE — PROGRESS NOTES
Care Coordination Clinician Chart Review     Situation: Patient chart reviewed by care coordinator.?     Background: Initial assessment and enrollment to Care Coordination was May 2022.?? Patient centered goals were developed with participation from patient.? Lead CC handed patient off to CHW for continued outreach every 30 days.??     Assessment: Per chart review, patient outreach completed by CC CHW on 6/15/22.? Patient is not actively working to accomplish goal(s) Pt not willing to accept resource information.? Patient's goal(s) remain(s) appropriate at this time.? Patient is not due for updated Plan of Care.? Annual assessment will be due May 2023.      Goals        Functional (pt-stated)       Goal Statement: My family will use resources to keep me safe at home    Date Goal set: 5/13/22  Barriers: falls, frail, chronic medical conditions  Strengths: family  Date to Achieve By: 3 months  Patient expressed understanding of goal: yes, son    Action steps to achieve this goal:  1. My family will look into private pay caregiver resources  2. I will trial meals on wheels  3. My family will look into services/supports available through [facility] [place] [facility]   4.My family will research assisted living facilities on [facility] and reach out directly to admissions staff          ??     Plan/Recommendations: The patient will continue working with Care Coordination to achieve above goal(s).? CHW will involve Lead CC as needed or if patient is ready to move to maintenance.? Lead CC will continue to monitor CHW s monthly outreaches and progress to goal(s) every 6 weeks.?     Plan of Care updated and sent to patient: No

## 2022-06-28 DIAGNOSIS — S98.131A AMPUTATED TOE, RIGHT (H): Primary | ICD-10-CM

## 2022-06-28 DIAGNOSIS — Z98.890 STATUS POST ENDOSCOPIC RETROGRADE CHOLANGIOPANCREATOGRAPHY: Chronic | ICD-10-CM

## 2022-06-29 ENCOUNTER — MEDICAL CORRESPONDENCE (OUTPATIENT)
Dept: HEALTH INFORMATION MANAGEMENT | Facility: CLINIC | Age: 87
End: 2022-06-29

## 2022-06-29 DIAGNOSIS — E11.51 TYPE 2 DIABETES MELLITUS WITH DIABETIC PERIPHERAL ANGIOPATHY WITHOUT GANGRENE, WITH LONG-TERM CURRENT USE OF INSULIN (H): Primary | ICD-10-CM

## 2022-06-29 DIAGNOSIS — Z79.4 TYPE 2 DIABETES MELLITUS WITH DIABETIC PERIPHERAL ANGIOPATHY WITHOUT GANGRENE, WITH LONG-TERM CURRENT USE OF INSULIN (H): Primary | ICD-10-CM

## 2022-06-29 RX ORDER — TRAMADOL HYDROCHLORIDE 50 MG/1
TABLET ORAL
Qty: 30 TABLET | Refills: 0 | Status: SHIPPED | OUTPATIENT
Start: 2022-06-29 | End: 2022-07-11

## 2022-07-01 ENCOUNTER — PATIENT OUTREACH (OUTPATIENT)
Dept: CARE COORDINATION | Facility: CLINIC | Age: 87
End: 2022-07-01

## 2022-07-01 ENCOUNTER — OFFICE VISIT (OUTPATIENT)
Dept: INTERNAL MEDICINE | Facility: CLINIC | Age: 87
End: 2022-07-01
Payer: COMMERCIAL

## 2022-07-01 VITALS
DIASTOLIC BLOOD PRESSURE: 72 MMHG | HEIGHT: 68 IN | OXYGEN SATURATION: 98 % | SYSTOLIC BLOOD PRESSURE: 138 MMHG | HEART RATE: 75 BPM | BODY MASS INDEX: 24.55 KG/M2 | WEIGHT: 162 LBS

## 2022-07-01 DIAGNOSIS — E11.51 TYPE 2 DIABETES MELLITUS WITH DIABETIC PERIPHERAL ANGIOPATHY WITHOUT GANGRENE, WITH LONG-TERM CURRENT USE OF INSULIN (H): ICD-10-CM

## 2022-07-01 DIAGNOSIS — N18.31 STAGE 3A CHRONIC KIDNEY DISEASE (H): Primary | ICD-10-CM

## 2022-07-01 DIAGNOSIS — Z79.4 TYPE 2 DIABETES MELLITUS WITH DIABETIC PERIPHERAL ANGIOPATHY WITHOUT GANGRENE, WITH LONG-TERM CURRENT USE OF INSULIN (H): ICD-10-CM

## 2022-07-01 DIAGNOSIS — E11.9 TYPE 2 DIABETES MELLITUS WITHOUT COMPLICATION, WITHOUT LONG-TERM CURRENT USE OF INSULIN (H): ICD-10-CM

## 2022-07-01 PROCEDURE — 99213 OFFICE O/P EST LOW 20 MIN: CPT | Performed by: INTERNAL MEDICINE

## 2022-07-01 NOTE — PROGRESS NOTES
Office Visit - Follow Up   Caleb Hernandez   86 year old male    Date of Visit: 7/1/2022    Chief Complaint   Patient presents with     Follow Up     1 month follow up - doing better, feeling good        -------------------------------------------------------------------------------------------------------------------------  Assessment and Plan    Follow-up visit multiple issues, especially control of type 2 diabetes, which is doing much better, under the supervision of diabetes educator nurse Vianca Uribe  Mr. Hernandez comes to this morning's meeting accompanied by son Elfego    He showed me some blood sugar readings over the past week, and they certainly look much better, with morning readings typically around 120-150, and then afternoon readings are around low 200s, and an occasional high reading in the evening that could be up to 400.     The caregiving duties mostly born by Elfego, who lives in the house.  Elfego has a traditional full-time job, so Mr. Hernandez is often in a house by himself for much of the day  His spouse Mrs. Hernandez is in the house, but she has a lot of medical problems herself    Diabetes control much better  Lantus dose is 32 units at bedtime  Also Victoza 1.2 mg injected once a day  No metformin    He asked what to do if his morning blood sugar is on the low side.  I told him to go ahead and take his Victoza, but also eat immediately.    He is supposed to be measuring his blood sugar before each meal and supplementing his insulin using a sliding scale based on the Premeal blood glucose.     Do Not give Correction Insulin if Pre-Meal BG less than 140.   For Pre-Meal  - 189 give 1 unit.   For Pre-Meal  - 239 give 2 units.   For Pre-Meal  - 289 give 3 units.   For Pre-Meal  - 339 give 4 units.   For Pre-Meal - 399 give 5 units.   For Pre-Meal -449 give 6 units   For Pre-Meal BG greater than or equal to 450 give 7 units.   To be given with prandial insulin, and based on  pre-meal blood glucose.  Notify provider if glucose greater than or equal to 350 mg/dL after administration of correction dose.  If given at mealtime, administer within 30 minutes of start of meal.     Pyuria and mixed jennifer seen on the urinalysis from May 6, 2022 which prompted my giving him another 2 weeks of ciprofloxacin-- finished     Chronic kidney disease, stage IIIa, stable, GFR estimated 49   Recovered from acute kidney injury, from hospitalization April 23-25, 2022 5-  Sodium 136 - 145 mmol/L 139       Potassium 3.5 - 5.0 mmol/L 4.9      Creatinine 0.70 - 1.30 mg/dL 1.40 High      GFR Estimate >60 mL/min/1.73m2 49 Low      Hospitalized April 23-25 with severe hyperglycemia due to uncontrolled diabetes, Mr. Hernandez was not giving himself insulin or checking his blood sugars  UTI, treated with ciprofloxacin  Dehydration, hyponatremia, acute renal failure with creatinine peaking at 2.17  In the hospital his glucose was stabilized, he was rehydrated, and his kidney function improved.     Stabilized situation with regards to self-care, I believe he is getting great support from his family   Falling, severe deconditioning, general frailty, falling risk because he takes Eliquis anticoagulation    Mr. Hernandez confirms that he is not interested in moving into an assisted living setting.  His sons Don and Elfego shared the duties taking care of him (mostly Elfego)    Recovered from sepsis, cholangitis with klebsiella bacteremia, bacteriuria  Choledocholithiasis, ERCP 1/27  Admission Date: 1/27/2022      Discharge Date: 2/4/2022  When in the hospital he was very ill, with jaundice and sepsis syndrome  The TCU, returned home around Feb 21 2/21/2022 PICC line pulled cefdinir 300mg PO BID for an additional 2 weeks, ended around March 5 2- showed bilirubin recovering nicely  Bilirubin Total 0.0 - 1.0 mg/dL 1.6 High       EXAM: MR ABDOMEN MRCP W/O AND W CONTRAST  DATE/TIME: 1/27/2022  Significant biliary  "obstruction -- obstructing common bile duct stone at the hilum of the liver measuring up to 8 mm in diameter likely accounting for this obstruction.      peripherally measuring 5.3 x 3.6 cm.  focal liver infection/phlegmonous change/developing abscess given the findings      EXAM: CT ABDOMEN PELVIS W CONTRAST  DATE/TIME: 2/1/2022 10:35 AM   Status post ERCP with decompression of intrahepatic bile duct dilation     History of Urinary retention with hematuria, had Dietrich catheter out January 22, 2021  The Dietrich catheter was placed after angiogram because of acute urinary retention.  He developed hematuria November 30, 2020  CT scan abdomen pelvis with irregular bladder wall thickening suggestive of cystitis.  Noted to have nonobstructing 11 mm right upper pole kidney stone but no hydronephrosis.  He is currently on Flomax (tamsulosin) and I want him to stay on that.     Pain control-- right foot cramps  Will continue to use tramadol tablets, which he averages maybe 1 a day.  I told the tramadol, being an opioid, can be constipating.  Therefore he needs to use his MiraLAX powder to keep stools soft     Status post excision of right fifth metatarsal because of osteomyelitis on November 2, 2020, wound cultures with Bacteroides and Enterobacter.  Status post skin grafting to the lateral right foot.  10- Dr Garcia told him right foot is healed-- graduated from therapy. \"The right foot ulceration has resolved. I am pleased with his progress and recommend he continue to monitor for skin irritation or skin breakdown.'     He will wear his new custom molded diabetic shoes, and they look good.  As of the end of April 2021, he graduated from home care that was being delivered by [facility] (PT and OT)     Peripheral vascular disease.  November 24, 2020 he had right lower extremity angiogram with balloon angioplasty of anterior tibial and peroneal arteries.  However postoperative ankle-brachial index did not " improve significantly, although vascular surgery thought that was because of vasospasm.  He needs to follow-up with vascular surgery, and will have a repeat arterial Doppler ultrasound.     7-8-2021 Ultrasound  Right Lower Extremity: Patent vasculature to the distal superficial femoral artery with triphasic flow. Transition to monophasic flow in the popliteal artery. Occluded posterior tibial artery.  Patent anterior tibial and dorsalis pedis arteries.  Left Lower Extremity: Occluded posterior tibial artery.  Patent anterior tibial and dorsalis pedis arteries.     Chronic right leg and foot lymphedema, probably related to vascular insufficiency both arterial and venous, I reminded him to elevate his leg to help the drainage     Essential hypertension, stopped the lisinopril, since his blood pressure seems to be running low he lost weight while in the hospital February-March 2022     BP Readings from Last 6 Encounters:   07/01/22 138/72   05/27/22 130/54   05/06/22 (!) 165/80   04/25/22 113/59   04/22/22 100/51   02/20/22 133/71     Anemia of chronic disease and postinflammatory effects     History of acute pulmonary embolism and cor pulmonale, was unprovoked, diagnosed May 2020, has been on anticoagulation ever since with Eliquis which he will continue long-term.  He seems to be tolerating the Eliquis just fine.  He is on concomitant baby aspirin which I told him does increase the risk for bleeding when combined with Eliquis.  But I think the combination is appropriate for him since he has peripheral vascular disease.     Paroxysmal atrial fibrillation, and history of pulmonary embolism, on anticoagulation with Eliquis for those reasons     Hyperlipidemia in the context of diabetes, with history of intolerance to statins, LDL cholesterol was 126 when measured July 22, 2020.       Constipation, component of drug-induced from tramadol, I told him its okay to use MiraLAX 1 capful even daily, dissolved in  liquid.     Erectile dysfunction, took the sildenafil off his medication list, because too many medical issues going on, and blood pressures running low     Third shot Moderna 11-  We will give fourth shot May 27, 2022        --------------------------------------------------------------------------------------------------------------------------  History of Present Illness  This 86 year old old    Follow-up visit multiple issues, especially control of type 2 diabetes, which is doing much better, under the supervision of diabetes educator nurse Vianca Uribe  Mr. Hernandez comes to this morning's meeting accompanied by son Elfego    He showed me some blood sugar readings over the past week, and they certainly look much better, with morning readings typically around 120-150, and then afternoon readings are around low 200s, and an occasional high reading in the evening that could be up to 400.     The caregiving duties mostly born by Elfego, who lives in the house.  Elfego has a traditional full-time job, so Mr. Hernandez is often in a house by himself for much of the day  His spouse Mrs. Hernandez is in the house, but she has a lot of medical problems herself    Diabetes control much better  Lantus dose is 32 units at bedtime  Also Victoza 1.2 mg injected once a day  No metformin    He asked what to do if his morning blood sugar is on the low side.  I told him to go ahead and take his Victoza, but also eat immediately.      Wt Readings from Last 3 Encounters:   07/01/22 73.5 kg (162 lb)   05/27/22 74.4 kg (164 lb)   05/06/22 69.9 kg (154 lb)     BP Readings from Last 3 Encounters:   07/01/22 138/72   05/27/22 130/54   05/06/22 (!) 165/80         ---------------------------------------------------------------------------------------------------------------------------    Medications, Allergies, Social, and Problem List   Current Outpatient Medications   Medication Sig Dispense Refill     acetaminophen (TYLENOL) 500 MG tablet  [ACETAMINOPHEN (TYLENOL) 500 MG TABLET] Take 1-2 tablets (500-1,000 mg total) by mouth every 4 (four) hours as needed.  0     aspirin 81 MG EC tablet [ASPIRIN 81 MG EC TABLET] Take 81 mg by mouth daily.       B-D U/F 31G X 8 MM insulin pen needle USED TO INJECT INSULIN DAILY 100 each 11     blood glucose test strips [BLOOD GLUCOSE TEST STRIPS] Test two times daily. Dispense brand per patient's insurance at pharmacy discretion. 200 strip 11     blood-glucose meter (ONETOUCH VERIO IQ METER) Misc [BLOOD-GLUCOSE METER (ONETOUCH VERIO IQ METER) MISC] Use 1 each As Directed 2 (two) times a day. 1 each 0     Continuous Blood Gluc Sensor (FREESTYLE APRIL 2 SENSOR) MISC 1 kit every 14 days Use per manufacture's directions to check glucose daily. 6 each 1     Continuous Blood Gluc Sensor (FREESTYLE APRIL 2 SENSOR) MISC 1 each every 14 days 1 each every 14 days. Change every 14 days. 6 each 5     ELIQUIS ANTICOAGULANT 5 MG tablet TAKE 1 TABLET BY MOUTH TWICE DAILY 180 tablet 3     insulin aspart (NOVOLOG PEN) 100 UNIT/ML pen Inject 1-7 Units Subcutaneous 3 times daily (before meals) Correction Scale - MEDIUM INSULIN RESISTANCE DOSING   Do Not give Correction Insulin if Pre-Meal BG less than 140. For Pre-Meal  - 189 give 1 unit. For Pre-Meal  - 239 give 2 units. For Pre-Meal  - 289 give 3 units. For Pre-Meal  - 339 give 4 units. For Pre-Meal - 399 give 5 units. For Pre-Meal -449 give 6 units For Pre-Meal BG greater than or equal to 450 give 7 units. To be given with prandial insulin, and based on pre-meal blood glucose.  Notify provider if glucose greater than or equal to 350 mg/dL after administration of correction dose. If given at mealtime, administer within 30 minutes of start of meal. (Patient taking differently: Inject 1-7 Units Subcutaneous 3 times daily (before meals) Correction Scale - MEDIUM INSULIN RESISTANCE DOSING   Do Not give Correction Insulin if Pre-Meal BG less than 140. For  Pre-Meal  - 189 give 1 unit. For Pre-Meal  - 239 give 2 units. For Pre-Meal  - 289 give 3 units. For Pre-Meal  - 339 give 4 units. For Pre-Meal - 399 give 5 units. For Pre-Meal -449 give 6 units For Pre-Meal BG greater than or equal to 450 give 7 units. To be given with prandial insulin, and based on pre-meal blood glucose.  Notify provider if glucose greater than or equal to 350 mg/dL after administration of correction dose. If given at mealtime, administer within 30 minutes of start of meal.) 15 mL 0     insulin glargine (LANTUS SOLOSTAR) 100 UNIT/ML pen Inject 32 Units Subcutaneous At Bedtime 15 mL 0     multivit-min/FA/lycopen/lutein (CENTRUM SILVER MEN ORAL) [MULTIVIT-MIN/FA/LYCOPEN/LUTEIN (CENTRUM SILVER MEN ORAL)] Take 1 tablet by mouth daily.       mupirocin (BACTROBAN) 2 % ointment Apply topically daily as needed Apply to foot wound       polyethylene glycol (MIRALAX) 17 gram packet [POLYETHYLENE GLYCOL (MIRALAX) 17 GRAM PACKET] Take 1 packet (17 g total) by mouth daily as needed. 100 packet 3     tamsulosin (FLOMAX) 0.4 MG capsule TAKE 1 CAPSULE BY MOUTH EVERY DAY AFTER SUPPER 90 capsule 3     traMADol (ULTRAM) 50 MG tablet TAKE 1 TABLET BY MOUTH EVERY 6 HOURS AS NEEDED FOR PAIN 30 tablet 0     VICTOZA 3-RUPALI 0.6 mg/0.1 mL (18 mg/3 mL) injection [VICTOZA 3-RUPALI 0.6 MG/0.1 ML (18 MG/3 ML) INJECTION] INJECT 1.2 MG UNDER THE SKIN ONCE DAILY 12 mL 11     vitamin D3 (CHOLECALCIFEROL) 125 MCG (5000 UT) tablet Take 1 tablet (125 mcg) by mouth daily       vitamin E 400 unit capsule [VITAMIN E 400 UNIT CAPSULE] Take 400 Units by mouth daily.       Allergies   Allergen Reactions     Cresol [Phenol] Unknown     Muscle cramps     Demeclocycline Hives and Rash     Crestor [Rosuvastatin] Muscle Pain (Myalgia)     Social History     Tobacco Use     Smoking status: Former Smoker     Quit date: 1991     Years since quittin.6     Smokeless tobacco: Never Used   Substance Use  "Topics     Alcohol use: No     Drug use: No     Patient Active Problem List   Diagnosis     Hyperlipidemia     Essential hypertension     Statin intolerance     Personal history of pulmonary embolism     PVD (peripheral vascular disease) (H)     Overweight (BMI 25.0-29.9)     Chronic anticoagulation     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 (H)     Type 2 diabetes mellitus with diabetic peripheral angiopathy without gangrene, with long-term current use of insulin (H)     Adult failure to thrive     Normocytic anemia     Paroxysmal atrial fibrillation (H)     ACP (advance care planning)     Hematuria     Amputated toe, right (H)     Equinovarus acquired deformity, right     Drug-induced constipation     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 2 diabetes mellitus, limited to breakdown of skin (H)     Cellulitis and abscess of foot excluding toe     Jaundice     Cholelithiasis     Status post endoscopic retrograde cholangiopancreatography     History of cholecystectomy     Coronary artery disease involving native coronary artery of native heart without angina pectoris     Hyperglycemia due to diabetes mellitus (H)     Lymphedema     Age-related cognitive decline     Dehydration     Hyperglycemia     Frailty syndrome in geriatric patient     Falls frequently     CKD (chronic kidney disease) stage 3, GFR 30-59 ml/min (H)        Reviewed, reconciled and updated       Physical Exam   General Appearance:       /72 (BP Location: Right arm, Patient Position: Sitting, Cuff Size: Adult Regular)   Pulse 75   Ht 1.727 m (5' 8\")   Wt 73.5 kg (162 lb)   SpO2 98%   BMI 24.63 kg/m      Mr. Hernandez really looks great today, breathing comfortably, voice is nice and strong, with a big booming laugh, blood pressure nicely controlled  Lungs clear  Heart regular rate rhythm  Abdomen nontender  Chronic 1+ leg edema.     Additional Information   I spent 20 " minutes on this encounter, including reviewing interval history since last visit, examining the patient, explaining and counseling the issues enumerated in the Assessment and Plan (patient given a copy)       DALE CORTEZ MD, MD    Answers for HPI/ROS submitted by the patient on 7/1/2022  Frequency of checking blood sugars:: two times daily  What time of day are you checking your blood sugars : before meals, at bedtime  Have you had any blood sugars above 200?: Yes  Have you had any blood sugars below 70?: Yes  Hypoglycemia symptoms:: shakiness, dizziness, weakness, lethargy, confusion  Diabetic concerns:: none  Paraesthesia present:: numbness in feet  Have you had a diabetic eye exam within the last year?: No  How many servings of fruits and vegetables do you eat daily?: 0-1  On average, how many sweetened beverages do you drink each day (Examples: soda, juice, sweet tea, etc.  Do NOT count diet or artificially sweetened beverages)?: 1  How many minutes a day do you exercise enough to make your heart beat faster?: 9 or less  How many days a week do you exercise enough to make your heart beat faster?: 3 or less  How many days per week do you miss taking your medication?: 7  What makes it hard for you to take your medication every day?: remembering to take

## 2022-07-01 NOTE — PATIENT INSTRUCTIONS
Follow-up visit multiple issues, especially control of type 2 diabetes, which is doing much better, under the supervision of diabetes educator nurse Vianca Uribe  Mr. Hernandez comes to this morning's meeting accompanied by son Elfego    He showed me some blood sugar readings over the past week, and they certainly look much better, with morning readings typically around 120-150, and then afternoon readings are around low 200s, and an occasional high reading in the evening that could be up to 400.     The caregiving duties mostly born by Elfego, who lives in the house.  Elfego has a traditional full-time job, so Mr. Hernandez is often in a house by himself for much of the day  His spouse Mrs. Hernandez is in the house, but she has a lot of medical problems herself    Diabetes control much better  Lantus dose is 32 units at bedtime  Also Victoza 1.2 mg injected once a day  No metformin    He asked what to do if his morning blood sugar is on the low side.  I told him to go ahead and take his Victoza, but also eat immediately.    He is supposed to be measuring his blood sugar before each meal and supplementing his insulin using a sliding scale based on the Premeal blood glucose.     Do Not give Correction Insulin if Pre-Meal BG less than 140.   For Pre-Meal  - 189 give 1 unit.   For Pre-Meal  - 239 give 2 units.   For Pre-Meal  - 289 give 3 units.   For Pre-Meal  - 339 give 4 units.   For Pre-Meal - 399 give 5 units.   For Pre-Meal -449 give 6 units   For Pre-Meal BG greater than or equal to 450 give 7 units.   To be given with prandial insulin, and based on pre-meal blood glucose.  Notify provider if glucose greater than or equal to 350 mg/dL after administration of correction dose.  If given at mealtime, administer within 30 minutes of start of meal.     Pyuria and mixed jennifer seen on the urinalysis from May 6, 2022 which prompted my giving him another 2 weeks of ciprofloxacin-- finished     Chronic  kidney disease, stage IIIa, stable, GFR estimated 49   Recovered from acute kidney injury, from hospitalization April 23-25, 2022 5-  Sodium 136 - 145 mmol/L 139       Potassium 3.5 - 5.0 mmol/L 4.9      Creatinine 0.70 - 1.30 mg/dL 1.40 High      GFR Estimate >60 mL/min/1.73m2 49 Low      Hospitalized April 23-25 with severe hyperglycemia due to uncontrolled diabetes, Mr. Hernandez was not giving himself insulin or checking his blood sugars  UTI, treated with ciprofloxacin  Dehydration, hyponatremia, acute renal failure with creatinine peaking at 2.17  In the hospital his glucose was stabilized, he was rehydrated, and his kidney function improved.     Stabilized situation with regards to self-care, I believe he is getting great support from his family   Falling, severe deconditioning, general frailty, falling risk because he takes Eliquis anticoagulation    Mr. Hernandez confirms that he is not interested in moving into an assisted living setting.  His sons Shiv and Elfego shared the duties taking care of him (mostly Elfego)    Recovered from sepsis, cholangitis with klebsiella bacteremia, bacteriuria  Choledocholithiasis, ERCP 1/27  Admission Date: 1/27/2022      Discharge Date: 2/4/2022  When in the hospital he was very ill, with jaundice and sepsis syndrome  The TCU, returned home around Feb 21 2/21/2022 PICC line pulled cefdinir 300mg PO BID for an additional 2 weeks, ended around March 5 2- showed bilirubin recovering nicely  Bilirubin Total 0.0 - 1.0 mg/dL 1.6 High       EXAM: MR ABDOMEN MRCP W/O AND W CONTRAST  DATE/TIME: 1/27/2022  Significant biliary obstruction -- obstructing common bile duct stone at the hilum of the liver measuring up to 8 mm in diameter likely accounting for this obstruction.      peripherally measuring 5.3 x 3.6 cm.  focal liver infection/phlegmonous change/developing abscess given the findings      EXAM: CT ABDOMEN PELVIS W CONTRAST  DATE/TIME: 2/1/2022 10:35 AM   Status post  "ERCP with decompression of intrahepatic bile duct dilation     History of Urinary retention with hematuria, had Dietrich catheter out January 22, 2021  The Dietrich catheter was placed after angiogram because of acute urinary retention.  He developed hematuria November 30, 2020  CT scan abdomen pelvis with irregular bladder wall thickening suggestive of cystitis.  Noted to have nonobstructing 11 mm right upper pole kidney stone but no hydronephrosis.  He is currently on Flomax (tamsulosin) and I want him to stay on that.     Pain control-- right foot cramps  Will continue to use tramadol tablets, which he averages maybe 1 a day.  I told the tramadol, being an opioid, can be constipating.  Therefore he needs to use his MiraLAX powder to keep stools soft     Status post excision of right fifth metatarsal because of osteomyelitis on November 2, 2020, wound cultures with Bacteroides and Enterobacter.  Status post skin grafting to the lateral right foot.  10- Dr Garcia told him right foot is healed-- graduated from therapy. \"The right foot ulceration has resolved. I am pleased with his progress and recommend he continue to monitor for skin irritation or skin breakdown.'     He will wear his new custom molded diabetic shoes, and they look good.  As of the end of April 2021, he graduated from home care that was being delivered by [facility] (PT and OT)     Peripheral vascular disease.  November 24, 2020 he had right lower extremity angiogram with balloon angioplasty of anterior tibial and peroneal arteries.  However postoperative ankle-brachial index did not improve significantly, although vascular surgery thought that was because of vasospasm.  He needs to follow-up with vascular surgery, and will have a repeat arterial Doppler ultrasound.     7-8-2021 Ultrasound  Right Lower Extremity: Patent vasculature to the distal superficial femoral artery with triphasic flow. Transition to monophasic flow in the " popliteal artery. Occluded posterior tibial artery.  Patent anterior tibial and dorsalis pedis arteries.  Left Lower Extremity: Occluded posterior tibial artery.  Patent anterior tibial and dorsalis pedis arteries.     Chronic right leg and foot lymphedema, probably related to vascular insufficiency both arterial and venous, I reminded him to elevate his leg to help the drainage     Essential hypertension, stopped the lisinopril, since his blood pressure seems to be running low he lost weight while in the hospital February-March 2022     BP Readings from Last 6 Encounters:   07/01/22 138/72   05/27/22 130/54   05/06/22 (!) 165/80   04/25/22 113/59   04/22/22 100/51   02/20/22 133/71     Anemia of chronic disease and postinflammatory effects     History of acute pulmonary embolism and cor pulmonale, was unprovoked, diagnosed May 2020, has been on anticoagulation ever since with Eliquis which he will continue long-term.  He seems to be tolerating the Eliquis just fine.  He is on concomitant baby aspirin which I told him does increase the risk for bleeding when combined with Eliquis.  But I think the combination is appropriate for him since he has peripheral vascular disease.     Paroxysmal atrial fibrillation, and history of pulmonary embolism, on anticoagulation with Eliquis for those reasons     Hyperlipidemia in the context of diabetes, with history of intolerance to statins, LDL cholesterol was 126 when measured July 22, 2020.       Constipation, component of drug-induced from tramadol, I told him its okay to use MiraLAX 1 capful even daily, dissolved in liquid.     Erectile dysfunction, took the sildenafil off his medication list, because too many medical issues going on, and blood pressures running low     Third shot Moderna 11-  We will give fourth shot May 27, 2022

## 2022-07-01 NOTE — PROGRESS NOTES
Clinic Care Coordination Contact    Community Health Worker Follow Up    Care Gaps:     Health Maintenance Due   Topic Date Due     MICROALBUMIN  Never done     DIABETIC FOOT EXAM  Never done     ZOSTER IMMUNIZATION (1 of 2) Never done     MEDICARE ANNUAL WELLNESS VISIT  10/25/2020     LIPID  07/22/2021     EYE EXAM  07/29/2021       Patient accepted scheduling phone number for [facility]  to schedule independently     Goals:    Goals Addressed as of 7/1/2022 at 11:36 AM                    Today       Functional (pt-stated)   20%    Added 5/13/22 [facility]      Goal Statement: My family will use resources to keep me safe at home    Date Goal set: 5/13/22  Barriers: falls, frail, chronic medical conditions  Strengths: family  Date to Achieve By: 3 months  Patient expressed understanding of goal: yes, son    Action steps to achieve this goal:  1. My family will look into private pay caregiver resources. I Don let CHW know that patient has not wanted to look into this resource yet.  2. I will trial meals on wheels. I Don let CHW know that they are receiving meals 2 times a week.  3. My family will look into services/supports available through [employer]. I Don let CHW know that patient has not wanted to look into this resource yet.  4. My family will research assisted living facilities on [facility] and reach out directly to admissions staff. I Don let CHW know that patient has not wanted to look into this resource yet.    Goal Updated: 7/1/22              Intervention and Education during outreach: N/A    CHW Plan: CHW will follow up with patients lazaro Chaney in 1 month on 8/2/22.    Julissa Alvarez  Community Health Worker   [facility]  Clinic Care Coordination   Office: [phone]

## 2022-07-02 RX ORDER — BLOOD SUGAR DIAGNOSTIC
STRIP MISCELLANEOUS
Qty: 200 STRIP | Refills: 3 | Status: SHIPPED | OUTPATIENT
Start: 2022-07-02 | End: 2023-01-01

## 2022-07-02 NOTE — TELEPHONE ENCOUNTER
"Last Written Prescription Date:  2/23/21  Last Fill Quantity: 200,  # refills: 11   Last office visit provider:  7/1/22     Requested Prescriptions   Pending Prescriptions Disp Refills     ONETOUCH ULTRA test strip [Pharmacy Med Name: ONETOUCH ULTRA STRIPS]  0     Sig: USE TO TEST TWICE DAILY       Diabetic Supplies Protocol Passed - 7/1/2022  3:39 PM        Passed - Medication is active on med list        Passed - Patient is 18 years of age or older        Passed - Recent (6 mo) or future (30 days) visit within the authorizing provider's specialty     Patient had office visit in the last 6 months or has a visit in the next 30 days with authorizing provider.  See \"Patient Info\" tab in inbasket, or \"Choose Columns\" in Meds & Orders section of the refill encounter.                 Allyssa Hunter 07/02/22 11:23 AM  "

## 2022-07-07 ENCOUNTER — TELEPHONE (OUTPATIENT)
Dept: INTERNAL MEDICINE | Facility: CLINIC | Age: 87
End: 2022-07-07

## 2022-07-07 NOTE — TELEPHONE ENCOUNTER
Prior Authorization Request   Who s requesting:  Pharmacy  Pharmacy Name and Location: [facility]  Medication Name: freestyle radha 2 reader  Insurance Plan: [facility]  Key: [id number]

## 2022-07-07 NOTE — TELEPHONE ENCOUNTER
Central Prior Authorization Team   Phone: [phone]    PA Initiation    Medication: Continuous Blood Gluc Sensor [place]FREESTYLE APRIL 2 SENSOR[place] [facility] [place] [facility] [place] Company[place] [employer] - Phone [phone] Fax [phone]  Pharmacy Filling the Rx: [facility] - [place] [address]  Filling Pharmacy Phone: [phone]  Filling Pharmacy Fax:    Start Date: 7/7/2022  Manually faxed request

## 2022-07-09 DIAGNOSIS — S98.131A AMPUTATED TOE, RIGHT (H): ICD-10-CM

## 2022-07-11 RX ORDER — TRAMADOL HYDROCHLORIDE 50 MG/1
TABLET ORAL
Qty: 30 TABLET | Refills: 0 | Status: ON HOLD | OUTPATIENT
Start: 2022-07-11 | End: 2023-01-01

## 2022-07-12 NOTE — TELEPHONE ENCOUNTER
Prior Authorization Approval    Authorization Effective Date: 6/6/2022  Authorization Expiration Date: 7/7/2025  Medication: Continuous Blood Gluc Sensor (FREESTYLE APRIL 2 SENSOR) [facility] [place]  Approved Dose/Quantity:    Reference #:     Insurance Company: [employer] - Phone [phone] Fax [phone]  Expected CoPay:       CoPay Card Available:      Foundation Assistance Needed:    Which Pharmacy is filling the prescription (Not needed for infusion/clinic administered): [facility] - [facility] [address]  Pharmacy Notified: Yes  Patient Notified: Yes

## 2022-08-08 ENCOUNTER — PATIENT OUTREACH (OUTPATIENT)
Dept: CARE COORDINATION | Facility: CLINIC | Age: 87
End: 2022-08-08

## 2022-08-08 NOTE — PROGRESS NOTES
"Clinic Care Coordination Contact    Community Health Worker Follow Up    Care Gaps:     Health Maintenance Due   Topic Date Due     MICROALBUMIN  Never done     DIABETIC FOOT EXAM  Never done     ZOSTER IMMUNIZATION (1 of 2) Never done     MEDICARE ANNUAL WELLNESS VISIT  10/25/2020     LIPID  07/22/2021     EYE EXAM  07/29/2021       Patient accepted scheduling phone number for [facility]  to schedule independently     Goals:    Goals Addressed as of 8/8/2022 at 2:25 PM                    Today    7/1/22       Functional (pt-stated)   100%  20%    Added 5/13/22 by Karlie Freitas LICSW      I have accomplished obtaining resources to keep me safe at home    Personal Plan  In the future when I am wanting to look into resources for assistance I will look into resources below:  Private pay caregivers-     [employer]  [phone]  [email or web address])     [facility]  [phone]  Www[employer]     [place]  [phone]  [email or web address]     [place] [employer]- GREAT resource for seniors in [facility]  [email or web address]  [phone]     Meals on Wheels- referral placed online will be sent to   [employer]  Phone: [phone]        [facility]- look into assisted living  Www.[facility].org  Click \"Helpful Info\"  Click \"[facility]\" , for example, for map and fact sheets                  Intervention and Education during outreach: N/A    CHW Plan: Patient has accomplished goals and has no other goals that this patient would like to work on with Clinic Care Coordination. Community Health Worker sent request to [facility] SW pool to review for Maintenance.    Julissa Alvarez  Community Health Worker  [facility]  Clinic Care Coordination   [place], [facility]  Office: [phone]       "

## 2022-08-09 ENCOUNTER — PATIENT OUTREACH (OUTPATIENT)
Dept: CARE COORDINATION | Facility: CLINIC | Age: 87
End: 2022-08-09

## 2022-08-09 NOTE — PROGRESS NOTES
Clinic Care Coordination Contact    Assessment: Per review CHW completed monthly follow up.  Patient has continued to follow the plan of care and assessment is negative for any new needs or concerns.    Enrollment status: Maintenance     Plan: CHW set outreach for 2 months.  If no new needs identified at next outreach send chart to CC to review for graduation.

## 2022-08-22 ENCOUNTER — PATIENT OUTREACH (OUTPATIENT)
Dept: CARE COORDINATION | Facility: CLINIC | Age: 87
End: 2022-08-22

## 2022-08-22 NOTE — PROGRESS NOTES
Clinic Care Coordination - Chart Review Only    Situation/Background: Patient chart reviewed by care coordinator related to [employer] conversion.    Assessment: Patient continues to be followed by Clinic Care Coordination.    Plan: Patient's chart updated to align with [employer] program for ongoing patient management.    [facility]   Social Work Care Coordinator  [phone]

## 2022-09-29 NOTE — PROGRESS NOTES
Assessment & Plan     Acute cystitis with hematuria  Vital signs are within normal limits, patient is afebrile and not in any acute distress during her visit today.  Urinalysis is consistent with acute cystitis, hematuria thought to be secondary to acute cystitis.  In the absence of any flank pain or urinary discomfort low suspicion for obstructing kidney stones at this time.  Did recommend that we start antibiotics for acute cystitis with ciprofloxacin 500 mg twice daily for 10 days, and also check routine labs including kidney function and a CBC.  Patient endorses follow-up if symptoms do not resolve, worsening fevers, chills, hematuria, or flank pain.  - ciprofloxacin (CIPRO) 500 MG tablet; Take 1 tablet (500 mg) by mouth 2 times daily for 10 days  - CBC with platelets and differential; Future  - Basic metabolic panel  (Ca, Cl, CO2, Creat, Gluc, K, Na, BUN); Future  - CBC with platelets and differential  - Basic metabolic panel  (Ca, Cl, CO2, Creat, Gluc, K, Na, BUN)    Urinary problem  - UA macro with reflex to Microscopic and Culture - Clinc Collect  - Urine Microscopic  - Urine Culture    Return if symptoms worsen or fail to improve.    Иван Neely MD  [facility]    Anurag Landry is a 86 year old accompanied by his son, presenting for the following health issues:  Urinary Problem (Blood in urine since last night. Patient has had a history of sepsis.)      HPI     Patient is an 86-year-old with a complex medical history including urinary retention, multiple history of UTIs including Klebsiella bacteria and bacteremia in January requiring long-term antibiotics.  He endorses 1 day of acute hematuria in isolation, he denies any burning, itching, irritation with urination.  He denies any flank pain, back pain, or any other discomfort.    Denies any fevers, chills, nausea, vomiting.  He thought he should come in given his complex history of sepsis in the past.    Review of  Systems   Constitutional, HEENT, cardiovascular, pulmonary, gi and gu systems are negative, except as otherwise noted.      Objective    /76   Pulse 81   Temp 98.4  F (36.9  C) (Oral)   Resp 20   Wt 76.5 kg (168 lb 11.2 oz)   SpO2 96%   BMI 25.65 kg/m    Body mass index is 25.65 kg/m .  Physical Exam   GENERAL: healthy, alert and no distress  EYES: Eyes grossly normal to inspection, PERRL and conjunctivae and sclerae normal  RESP: lungs clear to auscultation - no rales, rhonchi or wheezes  CV: regular rate and rhythm, normal S1 S2, no S3 or S4, no murmur, click or rub, no peripheral edema and peripheral pulses strong  ABDOMEN: soft, nontender, no hepatosplenomegaly, no masses and bowel sounds normal  MS: no gross musculoskeletal defects noted, no edema  SKIN: no suspicious lesions or rashes  NEURO: Normal strength and tone, mentation intact and speech normal  PSYCH: mentation appears normal, affect normal/bright    Results for orders placed or performed in visit on 09/29/22 (from the past 24 hour(s))   UA macro with reflex to Microscopic and Culture - Clinc Collect    Specimen: Urine, Clean Catch   Result Value Ref Range    Color Urine Red (A) Colorless, Straw, Light Yellow, Yellow    Appearance Urine Cloudy (A) Clear    Glucose Urine 500  (A) Negative mg/dL    Bilirubin Urine Negative Negative    Ketones Urine Negative Negative mg/dL    Specific Gravity Urine 1.020 1.005 - 1.030    Blood Urine Large (A) Negative    pH Urine 7.0 5.0 - 8.0    Protein Albumin Urine >=300 (A) Negative mg/dL    Urobilinogen Urine 0.2 0.2, 1.0 E.U./dL    Nitrite Urine Negative Negative    Leukocyte Esterase Urine Large (A) Negative   Urine Microscopic   Result Value Ref Range    Bacteria Urine None Seen None Seen /HPF    RBC Urine >100 (A) 0-2 /HPF /HPF    WBC Urine >100 (A) 0-5 /HPF /HPF    Squamous Epithelials Urine None Seen None Seen /LPF   CBC with platelets and differential    Narrative    The following orders were  created for panel order CBC with platelets and differential.  Procedure                               Abnormality         Status                     ---------                               -----------         ------                     CBC with platelets and d...[[id number]]                      In process                   Please view results for these tests on the individual orders.

## 2022-10-07 NOTE — PROGRESS NOTES
Office Visit - Follow Up   Caleb Hernandez   86 year old male    Date of Visit: 10/7/2022    Chief Complaint   Patient presents with     Follow Up     3 mo follow up        -------------------------------------------------------------------------------------------------------------------------  Assessment and Plan    Follow-up visit multiple issues, overall doing really well  Mr. Hernandez comes to this morning's meeting accompanied by son Elfego    We will give him his Moderna bivalent COVID-19 booster today.    Recovering uneventfully from a case of acute cystitis (bladder infection) diagnosed in the walk-in clinic September 29, 2022, for which she is finishing the last few days of ciprofloxacin 10-day course  Presenting symptom was visible blood in urine  Urine had RBCs and WBCs, with culture showing mixed jennifer  Symptomatically better after he started on the Cipro, and the visible blood has disappeared  On the 29th he did get a basic metabolic panel drawn which had a blood sugar of over 300, but we know exactly why.  On the way to the clinic, Mr. Hernandez stop by [employer] for Quarter pounder and a Coca[employer].    under the supervision of diabetes educator nurse Vianca Uribe   He showed me some blood sugar readings over the past week,  Morning blood sugars are around 150, but evening blood sugars, between 5 and 7 PM are often 300, but that is typically within a couple hours after eating, according to Mr. Hernandez and his son Elfego     The caregiving duties mostly born by Elfego, who lives in the house.  Elfego has a traditional full-time job, so Mr. Hernandez is often in a house by himself for much of the day  His spouse Mrs. Hernandez is in the house, but she has a lot of medical problems herself    Diabetes control much better  Lantus dose is 32 units at bedtime  Also Victoza 1.2 mg injected once a day  No metformin    He asked what to do if his morning blood sugar is on the low side.  I told him to go ahead and take his Victoza, but  also eat immediately.     He is supposed to be measuring his blood sugar before each meal and supplementing his insulin using a sliding scale based on the Premeal blood glucose.     Do Not give Correction Insulin if Pre-Meal BG less than 140.   For Pre-Meal  - 189 give 1 unit.   For Pre-Meal  - 239 give 2 units.   For Pre-Meal  - 289 give 3 units.   For Pre-Meal  - 339 give 4 units.   For Pre-Meal - 399 give 5 units.   For Pre-Meal -449 give 6 units   For Pre-Meal BG greater than or equal to 450 give 7 units.   To be given with prandial insulin, and based on pre-meal blood glucose.  Notify provider if glucose greater than or equal to 350 mg/dL after administration of correction dose.  If given at mealtime, administer within 30 minutes of start of meal.     Chronic kidney disease, stage IIIa, stable, GFR estimated 49   Recovered from acute kidney injury, from hospitalization April 23-25, 2022 9-  Creatinine 0.67 - 1.17 mg/dL 1.80     GFR Estimate >60 mL/min/1.73m2 36 Low      5-  Sodium 136 - 145 mmol/L 139         Potassium 3.5 - 5.0 mmol/L 4.9       Creatinine 0.70 - 1.30 mg/dL 1.40 High       GFR Estimate >60 mL/min/1.73m2 49 Low       Hospitalized April 23-25, 2022 with severe hyperglycemia due to uncontrolled diabetes, Mr. Hernandez was not giving himself insulin or checking his blood sugars  UTI, treated with ciprofloxacin  Dehydration, hyponatremia, acute renal failure with creatinine peaking at 2.17    Stabilized situation with regards to self-care, I believe he is getting great support from his family   Falling, severe deconditioning, general frailty, falling risk because he takes Eliquis anticoagulation    Mr. Hernandez confirms that he is not interested in moving into an assisted living setting.  His sons Don and Elfego shared the duties taking care of him (mostly Elfego)     Recovered from sepsis, cholangitis with klebsiella bacteremia,  "bacteriuria  Choledocholithiasis, ERCP 1/27/2022  Admission Date: 1/27/2022      Discharge Date: 2/4/2022  When in the hospital he was very ill, with jaundice and sepsis syndrome  The TCU, returned home around Feb 21 2/21/2022 PICC line pulled cefdinir 300mg PO BID for an additional 2 weeks, ended around March 5 2- showed bilirubin recovering nicely  Bilirubin Total 0.0 - 1.0 mg/dL 1.6 High       EXAM: MR ABDOMEN MRCP W/O AND W CONTRAST  DATE/TIME: 1/27/2022  Significant biliary obstruction -- obstructing common bile duct stone at the hilum of the liver measuring up to 8 mm in diameter likely accounting for this obstruction.   peripherally measuring 5.3 x 3.6 cm.  focal liver infection/phlegmonous change/developing abscess given the findings   EXAM: CT ABDOMEN PELVIS W CONTRAST  DATE/TIME: 2/1/2022 10:35 AM   Status post ERCP with decompression of intrahepatic bile duct dilation      Status post excision of right fifth metatarsal because of osteomyelitis on November 2, 2020, wound cultures with Bacteroides and Enterobacter.  Status post skin grafting to the lateral right foot.  10- Dr Garcia told him right foot is healed-- graduated from therapy. \"The right foot ulceration has resolved. I am pleased with his progress and recommend he continue to monitor for skin irritation or skin breakdown.'     He will wear his new custom molded diabetic shoes, and they look good.  As of the end of April 2021, he graduated from home care that was being delivered by [facility] (PT and OT)    Right foot pain, occasionally takes a tramadol for that.    Peripheral vascular disease.  November 24, 2020 he had right lower extremity angiogram with balloon angioplasty of anterior tibial and peroneal arteries.  However postoperative ankle-brachial index did not improve significantly, although vascular surgery thought that was because of vasospasm.  He needs to follow-up with vascular surgery, and will have a " repeat arterial Doppler ultrasound.     7-8-2021 Ultrasound  Right Lower Extremity: Patent vasculature to the distal superficial femoral artery with triphasic flow. Transition to monophasic flow in the popliteal artery. Occluded posterior tibial artery.  Patent anterior tibial and dorsalis pedis arteries.  Left Lower Extremity: Occluded posterior tibial artery.  Patent anterior tibial and dorsalis pedis arteries.     Chronic right leg and foot lymphedema, probably related to vascular insufficiency both arterial and venous, I reminded him to elevate his leg to help the drainage    Essential hypertension, stopped the lisinopril, since his blood pressure seems to be running low he lost weight while in the hospital February-March 2022     BP Readings from Last 6 Encounters:   10/07/22 (!) 147/65   09/29/22 132/76   07/01/22 138/72   05/27/22 130/54   05/06/22 (!) 165/80   04/25/22 113/59     Anemia of chronic disease and renal disease  9-  Hemoglobin 13.3 - 17.7 g/dL 12.0 Low       History of acute pulmonary embolism and cor pulmonale, was unprovoked, diagnosed May 2020, has been on anticoagulation ever since with Eliquis which he will continue long-term.  He seems to be tolerating the Eliquis just fine.  He is on concomitant baby aspirin which I told him does increase the risk for bleeding when combined with Eliquis.  But I think the combination is appropriate for him since he has peripheral vascular disease.     Paroxysmal atrial fibrillation, and history of pulmonary embolism, on anticoagulation with Eliquis for those reasons    Hyperlipidemia in the context of diabetes, with history of intolerance to statins, LDL cholesterol was 126 when measured July 22, 2020.       Constipation, component of drug-induced from tramadol, I told him its okay to use MiraLAX 1 capful even daily, dissolved in liquid.     Erectile dysfunction, took the sildenafil off his medication list, because too many medical issues going on,  and blood pressures running low     We will give him his Moderna bivalent booster today October 7, 2022  Immunization History   Administered Date(s) Administered     COVID-19,PF,Moderna 01/25/2021, 02/22/2021     COVID-19,PF,Moderna Booster 11/24/2021, 05/27/2022     Mantoux Tuberculin Skin Test 12/09/2020, 12/23/2020     Pneumo Conj 13-V (2010&after) 01/01/2016, 10/22/2020     Pneumococcal 23 valent 09/29/2003     --------------------------------------------------------------------------------------------------------------------------  History of Present Illness  This 86 year old old     Follow-up visit multiple issues, overall doing really well  Mr. Hernandez comes to this morning's meeting accompanied by son Elfego    We will give him his Moderna bivalent COVID-19 booster today.    Recovering uneventfully from a case of acute cystitis (bladder infection) diagnosed in the walk-in clinic September 29, 2022, for which she is finishing the last few days of ciprofloxacin 10-day course  Presenting symptom was visible blood in urine  Urine had RBCs and WBCs, with culture showing mixed jennifer  Symptomatically better after he started on the Cipro, and the visible blood has disappeared  On the 29th he did get a basic metabolic panel drawn which had a blood sugar of over 300, but we know exactly why.  On the way to the clinic, Mr. Hernandez stop by [employer] for Quarter pounder and a Coca[employer]Cola.    under the supervision of diabetes educator nurse Vianca Uribe   He showed me some blood sugar readings over the past week,  Morning blood sugars are around 150, but evening blood sugars, between 5 and 7 PM are often 300, but that is typically within a couple hours after eating, according to Mr. Hernandez and his son Elfego     The caregiving duties mostly born by Elfego, who lives in the house.  Elfego has a traditional full-time job, so Mr. Hernandez is often in a house by himself for much of the day  His spouse Mrs. Hernandez is in the house, but she has a  lot of medical problems herself      Wt Readings from Last 3 Encounters:   10/07/22 76.2 kg (168 lb)   09/29/22 76.5 kg (168 lb 11.2 oz)   07/01/22 73.5 kg (162 lb)     BP Readings from Last 3 Encounters:   10/07/22 (!) 147/65   09/29/22 132/76   07/01/22 138/72       ---------------------------------------------------------------------------------------------------------------------------    Medications, Allergies, Social, and Problem List   Current Outpatient Medications   Medication Sig Dispense Refill     acetaminophen (TYLENOL) 500 MG tablet [ACETAMINOPHEN (TYLENOL) 500 MG TABLET] Take 1-2 tablets (500-1,000 mg total) by mouth every 4 (four) hours as needed.  0     aspirin 81 MG EC tablet [ASPIRIN 81 MG EC TABLET] Take 81 mg by mouth daily.       B-D U/F 31G X 8 MM insulin pen needle USED TO INJECT INSULIN DAILY 100 each 11     blood-glucose meter (ONETOUCH VERIO IQ METER) Misc [BLOOD-GLUCOSE METER (ONETOUCH VERIO IQ METER) MISC] Use 1 each As Directed 2 (two) times a day. 1 each 0     ciprofloxacin (CIPRO) 500 MG tablet Take 1 tablet (500 mg) by mouth 2 times daily for 10 days 20 tablet 0     Continuous Blood Gluc  (FREESTYLE APRIL 2 READER) MILE USE AS DIRECTED 1 each 0     Continuous Blood Gluc Sensor (FREESTYLE APRIL 2 SENSOR) MISC USE AS DIRECTED EVERY 14 DAYS 2 each 11     Continuous Blood Gluc Sensor (FREESTYLE APRIL 2 SENSOR) [facility] 1 kit every 14 days Use per manufacture's directions to check glucose daily. 6 each 1     Continuous Blood Gluc Sensor (FREESTYLE APRIL 2 SENSOR) [facility] 1 each every 14 days 1 each every 14 days. Change every 14 days. 6 each 5     ELIQUIS ANTICOAGULANT 5 MG tablet TAKE 1 TABLET BY MOUTH TWICE DAILY 180 tablet 3     insulin aspart (NOVOLOG PEN) 100 UNIT/ML pen Inject 1-7 Units Subcutaneous 3 times daily (before meals) Correction Scale - MEDIUM INSULIN RESISTANCE DOSING   Do Not give Correction Insulin if Pre-Meal BG less than 140. For Pre-Meal  - 189 give 1 unit.  For Pre-Meal  - 239 give 2 units. For Pre-Meal  - 289 give 3 units. For Pre-Meal  - 339 give 4 units. For Pre-Meal - 399 give 5 units. For Pre-Meal -449 give 6 units For Pre-Meal BG greater than or equal to 450 give 7 units. To be given with prandial insulin, and based on pre-meal blood glucose.  Notify provider if glucose greater than or equal to 350 mg/dL after administration of correction dose. If given at mealtime, administer within 30 minutes of start of meal. (Patient taking differently: Inject 1-7 Units Subcutaneous 3 times daily (before meals) Correction Scale - MEDIUM INSULIN RESISTANCE DOSING   Do Not give Correction Insulin if Pre-Meal BG less than 140. For Pre-Meal  - 189 give 1 unit. For Pre-Meal  - 239 give 2 units. For Pre-Meal  - 289 give 3 units. For Pre-Meal  - 339 give 4 units. For Pre-Meal - 399 give 5 units. For Pre-Meal -449 give 6 units For Pre-Meal BG greater than or equal to 450 give 7 units. To be given with prandial insulin, and based on pre-meal blood glucose.  Notify provider if glucose greater than or equal to 350 mg/dL after administration of correction dose. If given at mealtime, administer within 30 minutes of start of meal.) 15 mL 0     insulin glargine (LANTUS SOLOSTAR) 100 UNIT/ML pen Inject 32 Units Subcutaneous At Bedtime 15 mL 0     multivit-min/FA/lycopen/lutein (CENTRUM SILVER MEN ORAL) [MULTIVIT-MIN/FA/LYCOPEN/LUTEIN (CENTRUM SILVER MEN ORAL)] Take 1 tablet by mouth daily.       mupirocin (BACTROBAN) 2 % ointment Apply topically daily as needed Apply to foot wound       ONETOUCH ULTRA test strip USE TO TEST TWICE DAILY 200 strip 3     polyethylene glycol (MIRALAX) 17 gram packet [POLYETHYLENE GLYCOL (MIRALAX) 17 GRAM PACKET] Take 1 packet (17 g total) by mouth daily as needed. 100 packet 3     tamsulosin (FLOMAX) 0.4 MG capsule TAKE 1 CAPSULE BY MOUTH EVERY DAY AFTER SUPPER 90 capsule 3     traMADol (ULTRAM)  50 MG tablet TAKE 1 TABLET BY MOUTH EVERY 6 HOURS AS NEEDED FOR PAIN 30 tablet 0     VICTOZA 3-RUPALI 0.6 mg/0.1 mL (18 mg/3 mL) injection [VICTOZA 3-RUPALI 0.6 MG/0.1 ML (18 MG/3 ML) INJECTION] INJECT 1.2 MG UNDER THE SKIN ONCE DAILY 12 mL 11     vitamin D3 (CHOLECALCIFEROL) 125 MCG (5000 UT) tablet Take 1 tablet (125 mcg) by mouth daily       vitamin E 400 unit capsule [VITAMIN E 400 UNIT CAPSULE] Take 400 Units by mouth daily.       Allergies   Allergen Reactions     Cresol [Phenol] Unknown     Muscle cramps     Demeclocycline Hives and Rash     Crestor [Rosuvastatin] Muscle Pain (Myalgia)     Social History     Tobacco Use     Smoking status: Former Smoker     Quit date: 1991     Years since quittin.9     Smokeless tobacco: Never Used   Substance Use Topics     Alcohol use: No     Drug use: No     Patient Active Problem List   Diagnosis     Hyperlipidemia     Essential hypertension     Statin intolerance     Personal history of pulmonary embolism     PVD (peripheral vascular disease) (H)     Overweight (BMI 25.0-29.9)     Chronic anticoagulation     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 (H)     Type 2 diabetes mellitus with diabetic peripheral angiopathy without gangrene, with long-term current use of insulin (H)     Adult failure to thrive     Normocytic anemia     Paroxysmal atrial fibrillation (H)     ACP (advance care planning)     Hematuria     Amputated toe, right (H)     Equinovarus acquired deformity, right     Drug-induced constipation     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 2 diabetes mellitus, limited to breakdown of skin (H)     Cellulitis and abscess of foot excluding toe     Jaundice     Cholelithiasis     Status post endoscopic retrograde cholangiopancreatography     History of cholecystectomy     Coronary artery disease involving native coronary artery of native heart without angina pectoris     Hyperglycemia due to  diabetes mellitus (H)     Lymphedema     Age-related cognitive decline     Dehydration     Hyperglycemia     Frailty syndrome in geriatric patient     Falls frequently     CKD (chronic kidney disease) stage 3, GFR 30-59 ml/min (H)        Reviewed, reconciled and updated       Physical Exam   General Appearance:       BP (!) 147/65   Pulse 69   Wt 76.2 kg (168 lb)   SpO2 96%   BMI 25.54 kg/m      Mr. Hernandez looks great this morning, as good as I have seen him all year, blood pressure is reasonable he is in great spirits, has a huge smile, and gave me a big 2 handed thumbs up.    Lungs clear  Heart regular rate and rhythm  Abdomen nontender  Chronic 1+ leg edema, no change       Additional Information   I spent 30 minutes on this encounter, including reviewing interval history since last visit, examining the patient, explaining and counseling the issues enumerated in the Assessment and Plan (patient given a copy)       DALE CORTEZ MD, MD

## 2022-10-07 NOTE — PATIENT INSTRUCTIONS
Follow-up visit multiple issues, overall doing really well  Mr. Hernandez comes to this morning's meeting accompanied by son Elfego    Recovering uneventfully from a case of acute cystitis (bladder infection) diagnosed in the walk-in clinic September 29, 2022, for which she is finishing the last few days of ciprofloxacin 10-day course  Presenting symptom was visible blood in urine  Urine had RBCs and WBCs, with culture showing mixed jennifer  Symptomatically better after he started on the Cipro, and the visible blood has disappeared  On the 29th he did get a basic metabolic panel drawn which had a blood sugar of over 300, but we know exactly why.  On the way to the clinic, Mr. Hernandez stop by [employer] for Quarter pounder and a Coca[employer].    under the supervision of diabetes educator nurse Vianca Uribe   He showed me some blood sugar readings over the past week,  Morning blood sugars are around 150, but evening blood sugars, between 5 and 7 PM are often 300, but that is typically within a couple hours after eating, according to Mr. Hernandez and his son Elfego     The caregiving duties mostly born by Elfego, who lives in the house.  Elfego has a traditional full-time job, so Mr. Hernandez is often in a house by himself for much of the day  His spouse Mrs. Hernandez is in the house, but she has a lot of medical problems herself    Diabetes control much better  Lantus dose is 32 units at bedtime  Also Victoza 1.2 mg injected once a day  No metformin    He asked what to do if his morning blood sugar is on the low side.  I told him to go ahead and take his Victoza, but also eat immediately.     He is supposed to be measuring his blood sugar before each meal and supplementing his insulin using a sliding scale based on the Premeal blood glucose.     Do Not give Correction Insulin if Pre-Meal BG less than 140.   For Pre-Meal  - 189 give 1 unit.   For Pre-Meal  - 239 give 2 units.   For Pre-Meal  - 289 give 3 units.   For Pre-Meal BG  290 - 339 give 4 units.   For Pre-Meal - 399 give 5 units.   For Pre-Meal -449 give 6 units   For Pre-Meal BG greater than or equal to 450 give 7 units.   To be given with prandial insulin, and based on pre-meal blood glucose.  Notify provider if glucose greater than or equal to 350 mg/dL after administration of correction dose.  If given at mealtime, administer within 30 minutes of start of meal.     Chronic kidney disease, stage IIIa, stable, GFR estimated 49   Recovered from acute kidney injury, from hospitalization April 23-25, 2022 9-  Creatinine 0.67 - 1.17 mg/dL 1.80     GFR Estimate >60 mL/min/1.73m2 36 Low      5-  Sodium 136 - 145 mmol/L 139         Potassium 3.5 - 5.0 mmol/L 4.9       Creatinine 0.70 - 1.30 mg/dL 1.40 High       GFR Estimate >60 mL/min/1.73m2 49 Low       Hospitalized April 23-25, 2022 with severe hyperglycemia due to uncontrolled diabetes, Mr. Hernandez was not giving himself insulin or checking his blood sugars  UTI, treated with ciprofloxacin  Dehydration, hyponatremia, acute renal failure with creatinine peaking at 2.17    Stabilized situation with regards to self-care, I believe he is getting great support from his family   Falling, severe deconditioning, general frailty, falling risk because he takes Eliquis anticoagulation    Mr. Hernandez confirms that he is not interested in moving into an assisted living setting.  His sons Don and Elfego shared the duties taking care of him (mostly Elfego)     Recovered from sepsis, cholangitis with klebsiella bacteremia, bacteriuria  Choledocholithiasis, ERCP 1/27/2022  Admission Date: 1/27/2022      Discharge Date: 2/4/2022  When in the hospital he was very ill, with jaundice and sepsis syndrome  The TCU, returned home around Feb 21 2/21/2022 PICC line pulled cefdinir 300mg PO BID for an additional 2 weeks, ended around March 5 2- showed bilirubin recovering nicely  Bilirubin Total 0.0 - 1.0 mg/dL 1.6 High       EXAM:  "MR ABDOMEN MRCP W/O AND W CONTRAST  DATE/TIME: 1/27/2022  Significant biliary obstruction -- obstructing common bile duct stone at the hilum of the liver measuring up to 8 mm in diameter likely accounting for this obstruction.   peripherally measuring 5.3 x 3.6 cm.  focal liver infection/phlegmonous change/developing abscess given the findings   EXAM: CT ABDOMEN PELVIS W CONTRAST  DATE/TIME: 2/1/2022 10:35 AM   Status post ERCP with decompression of intrahepatic bile duct dilation      Status post excision of right fifth metatarsal because of osteomyelitis on November 2, 2020, wound cultures with Bacteroides and Enterobacter.  Status post skin grafting to the lateral right foot.  10- Dr Garcia told him right foot is healed-- graduated from therapy. \"The right foot ulceration has resolved. I am pleased with his progress and recommend he continue to monitor for skin irritation or skin breakdown.'     He will wear his new custom molded diabetic shoes, and they look good.  As of the end of April 2021, he graduated from home care that was being delivered by [facility] (PT and OT)    Right foot pain, occasionally takes a tramadol for that.    Peripheral vascular disease.  November 24, 2020 he had right lower extremity angiogram with balloon angioplasty of anterior tibial and peroneal arteries.  However postoperative ankle-brachial index did not improve significantly, although vascular surgery thought that was because of vasospasm.  He needs to follow-up with vascular surgery, and will have a repeat arterial Doppler ultrasound.     7-8-2021 Ultrasound  Right Lower Extremity: Patent vasculature to the distal superficial femoral artery with triphasic flow. Transition to monophasic flow in the popliteal artery. Occluded posterior tibial artery.  Patent anterior tibial and dorsalis pedis arteries.  Left Lower Extremity: Occluded posterior tibial artery.  Patent anterior tibial and dorsalis pedis arteries.   "   Chronic right leg and foot lymphedema, probably related to vascular insufficiency both arterial and venous, I reminded him to elevate his leg to help the drainage    Essential hypertension, stopped the lisinopril, since his blood pressure seems to be running low he lost weight while in the hospital February-March 2022     BP Readings from Last 6 Encounters:   10/07/22 (!) 147/65   09/29/22 132/76   07/01/22 138/72   05/27/22 130/54   05/06/22 (!) 165/80   04/25/22 113/59     Anemia of chronic disease and renal disease  9-  Hemoglobin 13.3 - 17.7 g/dL 12.0 Low       History of acute pulmonary embolism and cor pulmonale, was unprovoked, diagnosed May 2020, has been on anticoagulation ever since with Eliquis which he will continue long-term.  He seems to be tolerating the Eliquis just fine.  He is on concomitant baby aspirin which I told him does increase the risk for bleeding when combined with Eliquis.  But I think the combination is appropriate for him since he has peripheral vascular disease.     Paroxysmal atrial fibrillation, and history of pulmonary embolism, on anticoagulation with Eliquis for those reasons    Hyperlipidemia in the context of diabetes, with history of intolerance to statins, LDL cholesterol was 126 when measured July 22, 2020.       Constipation, component of drug-induced from tramadol, I told him its okay to use MiraLAX 1 capful even daily, dissolved in liquid.     Erectile dysfunction, took the sildenafil off his medication list, because too many medical issues going on, and blood pressures running low     We will give him his Moderna bivalent booster today October 7, 2022  Immunization History   Administered Date(s) Administered    COVID-19,PF,Moderna 01/25/2021, 02/22/2021    COVID-19,PF,Moderna Booster 11/24/2021, 05/27/2022    Mantoux Tuberculin Skin Test 12/09/2020, 12/23/2020    Pneumo Conj 13-V (2010&after) 01/01/2016, 10/22/2020    Pneumococcal 23 valent 09/29/2003

## 2022-10-10 NOTE — PROGRESS NOTES
Clinic Care Coordination Contact  [facility]/Voicemail    Clinical Data: Care Coordinator Outreach  Outreach attempted x 1.  Left message on patients son Don voicemail with call back information and requested return call.  Plan: Care Coordinator will try to reach patient again in 10 business days.    ** Patient on CCC Maintenance since 8/9/22.    Next CHW outreach: 10/25/22    [facility] Health Worker  [facility] Care Coordination   [place], [place] [facility]  Office: [phone]

## 2022-10-25 NOTE — PROGRESS NOTES
Clinic Care Coordination Contact    Community Health Worker Follow Up    Care Gaps:     Health Maintenance Due   Topic Date Due     MICROALBUMIN  Never done     DIABETIC FOOT EXAM  Never done     ZOSTER IMMUNIZATION (1 of 2) Never done     MEDICARE ANNUAL WELLNESS VISIT  10/25/2020     LIPID  07/22/2021     EYE EXAM  07/29/2021     INFLUENZA VACCINE (1) Never done     A1C  10/22/2022       Patient accepted scheduling phone number for [facility]  to schedule independently     Care Plan: Patient on CCC Maintenance since 8/9/22.      Intervention and Education during outreach: N/A    CHW Plan: Patient has been on CCC Maintenance since 8/9/22 and has no other goals that this patient would like to work on with Clinic Care Coordination. CHW sent request to [facility] SW pool to review for Graduation.    Julissa Alvarez  Community Health Worker  [facility]  Clinic Care Coordination   [place], [place] [facility]  Office: [phone]

## 2022-10-26 NOTE — LETTER
[facility] CARE COORDINATION  [address] [place] [address]    October 28, 2022    Caleb Hernandez  [address] [place] [address]    Dear Caleb,  Your Care Team congratulates you on your journey to maintain wellness. This document will help guide you on your journey to maintain a healthy lifestyle.  You can use this to help you overcome any barriers you may encounter.  If you should have any questions or concerns, you can contact the members of your Care Team or contact your Primary Care Clinic for assistance.     Health Maintenance  Health Maintenance Reviewed:      My Access Plan  Medical Emergency 911   Primary Clinic Line [facility] - [phone]   24 Hour Appointment Line [phone] or  [phone]) (toll-free)   24 Hour Nurse Line [phone] (toll-free)   Preferred Urgent Care     Preferred Hospital     Preferred Pharmacy [place], [place] - [address] [place] [address]     Behavioral Health Crisis Line The National Suicide Prevention Lifeline at 1-622.675.3980 or 911     My Care Team Members  Patient Care Team       Relationship Specialty Notifications Start End    Otis Lozano MD PCP - General   10/22/20     Phone: [phone] Fax: [phone]         [address] [place] [address]    Otis Lozano MD Assigned PCP   6/16/21     Phone: [phone] Fax: [phone]         [address] [place] [address]    John Garcia DPM Assigned Musculoskeletal Provider   7/16/21     Phone: [phone] Fax: [phone]         [address] [place] [address]    Vlaeria Crawley MD Assigned Infectious Disease Provider   2/20/22     Phone: [phone] Fax: [phone]         [address] [place] [address]    Karlie Freitas LICSW Lead Care Coordinator  Admissions 5/13/22 10/28/22    Julissa Alvarez Community Health Worker  Admissions 5/13/22 10/28/22               Goals        COMPLETED: Functional (pt-stated)       " I have accomplished obtaining resources to keep me safe at home    Personal Plan  In the future when I am wanting to look into resources for assistance I will look into resources below:  Private pay caregivers-     [employer]  [phone]  Www.homeinstead.[email or web address]     [employer]  [phone]  [email or web address]     [facility]  [phone]  Www.[email or web address].[email or web address]     [facility] [employer]- GREAT resource for seniors in [facility]  [email or web address]  [phone]     Meals on Wheels- referral placed online will be sent to   [employer]  Phone: [phone]        [employer] Options [facility]- look into assisted living  Www.careoptionsnetwork.org  Click \"Helpful Info\"  Click \"AL-NE\" , for example, for map and fact sheets                 Advance Care Plans/Directives Type:      We notice that you do not have an Advance Directive on file. Upon completion of your Health Care Directive, please bring a copy with you to your next office visit.    It has been your Clinic Care Team's pleasure to work with you on your goals.    Regards,  Your Clinic Care Team    "

## 2022-10-28 NOTE — PROGRESS NOTES
Clinic Care Coordination Contact    Assessment: Per review CHW contacted patient for 2 month follow up.  Patient has continued to follow the plan of care and assessment is negative for any new needs or concerns.    Enrollment status: Graduated.      Plan: No further outreaches at this time.  Patient will continue to follow the plan of care.  If new needs arise a new Care Coordination referral may be placed.  FYI to PCP

## 2022-11-28 NOTE — PROGRESS NOTES
Chief Complaint   Patient presents with     UTI     Started with blood in urine last night       ASSESSMENT/PLAN:  Caleb was seen today for uti.    Diagnoses and all orders for this visit:    Acute cystitis with hematuria  -     Primary Care Referral; Future  -     ciprofloxacin (CIPRO) 500 MG tablet; Take 1 tablet (500 mg) by mouth 2 times daily for 7 days    Gross hematuria  -     UA macro with reflex to Microscopic and Culture - Clinc Collect  -     Urine Microscopic Exam  -     Urine Culture  -     CBC with platelets and differential; Future  -     Basic metabolic panel  (Ca, Cl, CO2, Creat, Gluc, K, Na, BUN); Future  -     Primary Care Referral; Future  -     ciprofloxacin (CIPRO) 500 MG tablet; Take 1 tablet (500 mg) by mouth 2 times daily for 7 days  -     CBC with platelets and differential  -     Basic metabolic panel  (Ca, Cl, CO2, Creat, Gluc, K, Na, BUN)    Type 2 diabetes mellitus with diabetic peripheral angiopathy without gangrene, with long-term current use of insulin (H)  -     Primary Care Referral; Future    Age-related cognitive decline    Essential hypertension    Stage 3b chronic kidney disease (H)    Similar presentation a few months ago.  UA concerning for UTI.  No evidence of pyelonephritis.  Consider prostatitis.  Restarting patient on Cipro with close follow-up by PCP.  Referral placed.  No significant change in CBC from previous.  Checking BMP for kidney function.  Acute kidney injury may warrant ER referral for fluids or ADS appointment    Vitor Blood PA-C      SUBJECTIVE:  Caleb is a 86 year old male with a complex medical history including urinary retention, multiple history of UTIs including Klebsiella bacteria and bacteremia in January requiring long-term antibiotics. who presents to urgent care with 1 day of gross hematuria.  He had a similar presentation a few months ago and was treated with ciprofloxacin for UTI.  Improved his symptoms quickly.  He denies any back pain,  abdominal pain, nausea or vomiting.  No fevers or chills.    ROS: Pertinent ROS neg other than the symptoms noted above in the HPI.     OBJECTIVE:  BP (!) 187/98   Pulse 75   Temp 98.6  F (37  C) (Oral)   Resp 14   SpO2 98%    GENERAL: healthy, alert and no distress  EYES: Eyes grossly normal to inspection, PERRL and conjunctivae and sclerae normal  RESP: lungs clear to auscultation - no rales, rhonchi or wheezes  CV: regular rate and rhythm, normal S1 S2, no S3 or S4, no murmur, click or rub, no peripheral edema and peripheral pulses strong  ABDOMEN: soft, nontender, no rebound or guarding  MS: no gross musculoskeletal defects noted, no edema  SKIN: no suspicious lesions or rashes    DIAGNOSTICS    Results for orders placed or performed in visit on 11/28/22   UA macro with reflex to Microscopic and Culture - Clinc Collect     Status: Abnormal    Specimen: Urine, Clean Catch   Result Value Ref Range    Color Urine Red (A) Colorless, Straw, Light Yellow, Yellow    Appearance Urine Clear Clear    Glucose Urine Negative Negative mg/dL    Bilirubin Urine Moderate (A) Negative    Ketones Urine Trace (A) Negative mg/dL    Specific Gravity Urine 1.015 1.005 - 1.030    Blood Urine Large (A) Negative    pH Urine 5.5 5.0 - 8.0    Protein Albumin Urine >=300 (A) Negative mg/dL    Urobilinogen Urine 1.0 0.2, 1.0 E.U./dL    Nitrite Urine Negative Negative    Leukocyte Esterase Urine Large (A) Negative   Urine Microscopic Exam     Status: Abnormal   Result Value Ref Range    Bacteria Urine Few (A) None Seen /HPF    RBC Urine >100 (A) 0-2 /HPF /HPF    WBC Urine  (A) 0-5 /HPF /HPF    WBC Clumps Urine Present (A) None Seen /HPF   CBC with platelets and differential     Status: Abnormal   Result Value Ref Range    WBC Count 7.2 4.0 - 11.0 10e3/uL    RBC Count 3.68 (L) 4.40 - 5.90 10e6/uL    Hemoglobin 11.1 (L) 13.3 - 17.7 g/dL    Hematocrit 34.2 (L) 40.0 - 53.0 %    MCV 93 78 - 100 fL    MCH 30.2 26.5 - 33.0 pg    MCHC 32.5  31.5 - 36.5 g/dL    RDW 12.2 10.0 - 15.0 %    Platelet Count 342 150 - 450 10e3/uL    % Neutrophils 59 %    % Lymphocytes 18 %    % Monocytes 18 %    % Eosinophils 3 %    % Basophils 1 %    % Immature Granulocytes 1 %    Absolute Neutrophils 4.3 1.6 - 8.3 10e3/uL    Absolute Lymphocytes 1.3 0.8 - 5.3 10e3/uL    Absolute Monocytes 1.3 0.0 - 1.3 10e3/uL    Absolute Eosinophils 0.2 0.0 - 0.7 10e3/uL    Absolute Basophils 0.1 0.0 - 0.2 10e3/uL    Absolute Immature Granulocytes 0.1 <=0.4 10e3/uL   CBC with platelets and differential     Status: Abnormal    Narrative    The following orders were created for panel order CBC with platelets and differential.  Procedure                               Abnormality         Status                     ---------                               -----------         ------                     CBC with platelets and d...[[id number]]  Abnormal            Final result                 Please view results for these tests on the individual orders.        Current Outpatient Medications   Medication     acetaminophen (TYLENOL) 500 MG tablet     aspirin 81 MG EC tablet     B-D U/F 31G X 8 MM insulin pen needle     blood-glucose meter (ONETOUCH VERIO IQ METER) Misc     Continuous Blood Gluc  (FREESTYLE APRIL 2 READER) MILE     Continuous Blood Gluc Sensor (FREESTYLE APRIL 2 SENSOR) MISC     Continuous Blood Gluc Sensor (FREESTYLE APRIL 2 SENSOR) MISC     Continuous Blood Gluc Sensor (FREESTYLE APRIL 2 SENSOR) MISC     ELIQUIS ANTICOAGULANT 5 MG tablet     insulin aspart (NOVOLOG PEN) 100 UNIT/ML pen     insulin glargine (LANTUS SOLOSTAR) 100 UNIT/ML pen     multivit-min/FA/lycopen/lutein (CENTRUM SILVER MEN ORAL)     mupirocin (BACTROBAN) 2 % ointment     ONETOUCH ULTRA test strip     polyethylene glycol (MIRALAX) 17 gram packet     tamsulosin (FLOMAX) 0.4 MG capsule     traMADol (ULTRAM) 50 MG tablet     VICTOZA 3-RUPALI 0.6 mg/0.1 mL (18 mg/3 mL) injection     vitamin D3 (CHOLECALCIFEROL)  125 MCG (5000 UT) tablet     vitamin E 400 unit capsule     No current facility-administered medications for this visit.      Patient Active Problem List   Diagnosis     Hyperlipidemia     Essential hypertension     Statin intolerance     Personal history of pulmonary embolism     PVD (peripheral vascular disease) (H)     Overweight (BMI 25.0-29.9)     Chronic anticoagulation     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 (H)     Type 2 diabetes mellitus with diabetic peripheral angiopathy without gangrene, with long-term current use of insulin (H)     Adult failure to thrive     Normocytic anemia     Paroxysmal atrial fibrillation (H)     ACP (advance care planning)     Hematuria     Amputated toe, right (H)     Equinovarus acquired deformity, right     Drug-induced constipation     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 2 diabetes mellitus, limited to breakdown of skin (H)     Cellulitis and abscess of foot excluding toe     Jaundice     Cholelithiasis     Status post endoscopic retrograde cholangiopancreatography     History of cholecystectomy     Coronary artery disease involving native coronary artery of native heart without angina pectoris     Hyperglycemia due to diabetes mellitus (H)     Lymphedema     Age-related cognitive decline     Dehydration     Hyperglycemia     Frailty syndrome in geriatric patient     Falls frequently     CKD (chronic kidney disease) stage 3, GFR 30-59 ml/min (H)      Past Medical History:   Diagnosis Date     Abscess of right foot 11/23/2020    Added automatically from request for surgery [id number]     Acute pulmonary embolism with acute cor pulmonale (H) 5/23/2020     Ascending cholangitis 1/27/2022     BPH (benign prostatic hyperplasia)      Cholelithiasis      Closed fracture of rib     Created by Conversion  Replacement Utility updated for latest IMO load     Closed fracture of thoracic vertebra (H)     Created by  Conversion  Replacement Utility updated for latest IMO load     Common bile duct (CBD) obstruction 9/4/2017     Diabetes mellitus (H)      Essential hypertension      Gram-negative bacteremia 1/27/2022    Positive blood culture 1/26/2022; likely biliary source     Hyperlipidemia      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    Added automatically from request for surgery [id number]      Pancreatitis      Sepsis (H) 1/27/2022     Sepsis due to pneumonia (H) 9/8/2017     Septic arthritis of right foot (H) 12/2/2020     Past Surgical History:   Procedure Laterality Date     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    Procedure: with amputation of the fifth ray, peroneal brevis tendon transfer;  Surgeon: John Garcia DPM;  Location: [facility];  Service: Podiatry     BACK SURGERY      1964 removed a cyst     CHOLECYSTECTOMY  1985     ENDOSCOPIC RETROGRADE CHOLANGIOPANCREATOGRAM       ENDOSCOPIC RETROGRADE CHOLANGIOPANCREATOGRAM N/A 9/5/2017    Procedure: ENDOSCOPIC RETROGRADE CHOLANGIOPANCREATOGRAPHY SPHINCTEROTOMY AND STONE EXTRACTION;  Surgeon: Hansel Gannon MD;  Location: [facility];  Service:      ENDOSCOPIC RETROGRADE CHOLANGIOPANCREATOGRAM N/A 1/27/2022    Procedure: ENDOSCOPIC RETROGRADE CHOLANGIOPANCREATOGRAPHY, BALLOON DILATION AND STONE EXTRACTION;  Surgeon: Sav Osborne MD;  Location: [facility]     INCISION AND DRAINAGE OF WOUND Right 11/2/2020    Procedure: INCISION AND DRAINAGE, right foot with removal of bone 5th metatarsal;  Surgeon: John Garcia DPM;  Location: [facility] OR;  Service: Podiatry     INCISION AND DRAINAGE OF WOUND Right 11/16/2020    Procedure: INCISION AND DRAINAGE, right foot;  Surgeon: John Garcia DPM;  Location: [facility] OR;  Service: Podiatry     INCISION AND DRAINAGE OF WOUND Right 11/27/2020    Procedure: INCISION AND DRAINAGE, LOWER EXTREMITY;  Surgeon: Aleksandr Hdez DPM;  Location: [facility] OR;  Service: Podiatry     IR  EXTREMITY ANGIOGRAM RIGHT  2020     IR LOWER EXTREMITY ANGIOGRAM RIGHT  2020     PICC  2020          TONSILLECTOMY  1940     Family History   Problem Relation Age of Onset     Aortic aneurysm Mother      Alcoholism Father      Social History     Tobacco Use     Smoking status: Former     Types: Cigarettes     Quit date: 1991     Years since quittin.0     Smokeless tobacco: Never   Substance Use Topics     Alcohol use: No              The plan of care was discussed with the patient. They understand and agree with the course of treatment prescribed. A printed summary was given including instructions and medications.  The use of Dragon/Arecont Vision dictation services may have been used to construct the content in this note; any grammatical or spelling errors are non-intentional. Please contact the author of this note directly if you are in need of any clarification.

## 2022-11-30 NOTE — TELEPHONE ENCOUNTER
Called patient to discuss lab results and see how symptoms were improving.  He denies ever having any dysuria.  Blood in the urine has improved significantly.  Patient denies fevers and is eating and drinking appropriately.  Continue with ciprofloxacin prescription as instructed.  Recommend he follow-up in 2 to 3 days for urine recheck to ensure good resolution of suspected UTI versus hematuria.

## 2022-11-30 NOTE — TELEPHONE ENCOUNTER
Reason for Call:  Other appointment    Detailed comments: pts son would like call back from pcp care team if Dr Lozano can see pt next week for uc follow up. No openings until end of December. Please call back to discuss.    Phone Number Patient can be reached at:    [phone]          Best Time: na    Can we leave a detailed message on this number? Not Applicable    Call taken on 11/30/2022 at 3:24 PM by Marika Domínguez

## 2022-12-09 NOTE — PROGRESS NOTES
Office Visit - Follow Up   Caleb Hernandez   86 year old male    Date of Visit: 12/9/2022    Chief Complaint   Patient presents with     Follow Up        -------------------------------------------------------------------------------------------------------------------------  Assessment and Plan    Follow-up visit multiple issues, overall doing really well  Mr. Hernandez comes to this morning's meeting accompanied by son Elfego     Follow up on a UTI for which she was seen in the walk-in clinic November 20, 2022, when his urinalysis had gross blood, many red cells, many white cells, although the culture had no growth.  He was treated with ciprofloxacin 500 mg twice a day for 7 days.    Today December 9, 2022, will check urinalysis.  I do not think he needs any blood work, since he had a CBC and basic metabolic panel done on 5/28.  Kidney function holding stable with creatinine around 1.7.    Frantz reports that his fingerstick blood sugars are running approximately 150, and Elfego told me that he is doing the best he can to police Frantz's eating to make sure he does not get into any [employer] bars.    Renewed his prescription for Lantus 32 units at bedtime.  Also insulin pen needles.    I reminded Frantz that he should try the scheduled voiding technique, trying to empty his bladder every hour on the hour while awake.  Ideas to keep the bladder empty as possible, which could help reduce the risk of recurring bladder infections.    History acute cystitis (bladder infection) diagnosed in the walk-in clinic September 29, 2022, ciprofloxacin 10-day course    Diabetes under the supervision of diabetes educator nurse Vianca Uribe   The caregiving duties mostly born by Elfego, who lives in the house.  Elfego has a traditional full-time job, so Mr. Hernandez is often in a house by himself for much of the day  His spouse Mrs. Hernandez is in the house, but she has a lot of medical problems herself    Diabetes control much better  Lantus dose is 32 units  at bedtime  Also Victoza 1.2 mg injected once a day  No metformin     He asked what to do if his morning blood sugar is on the low side.  I told him to go ahead and take his Victoza, but also eat immediately.     He is supposed to be measuring his blood sugar before each meal and supplementing his insulin using a sliding scale based on the Premeal blood glucose.     Do Not give Correction Insulin if Pre-Meal BG less than 140.   For Pre-Meal  - 189 give 1 unit.   For Pre-Meal  - 239 give 2 units.   For Pre-Meal  - 289 give 3 units.   For Pre-Meal  - 339 give 4 units.   For Pre-Meal - 399 give 5 units.   For Pre-Meal -449 give 6 units   For Pre-Meal BG greater than or equal to 450 give 7 units.   To be given with prandial insulin, and based on pre-meal blood glucose.  Notify provider if glucose greater than or equal to 350 mg/dL after administration of correction dose.  If given at mealtime, administer within 30 minutes of start of meal.     Chronic kidney disease, stage IIIb, stable, GFR estimated 38  Recovered from acute kidney injury, from hospitalization April 23-25, 2022 11-  Creatinine 0.67 - 1.17 mg/dL 1.74 High      GFR Estimate >60 mL/min/1.73m2 38 Low      Hospitalized April 23-25, 2022 with severe hyperglycemia due to uncontrolled diabetes, Mr. Hernandez was not giving himself insulin or checking his blood sugars  UTI, treated with ciprofloxacin  Dehydration, hyponatremia, acute renal failure with creatinine peaking at 2.17     Stabilized situation with regards to self-care, I believe he is getting great support from his family   Falling, severe deconditioning, general frailty, falling risk because he takes Eliquis anticoagulation    Mr. Hernandez confirms that he is not interested in moving into an assisted living setting.  His sons Don and Elfego shared the duties taking care of him (mostly Elfego)     Recovered from sepsis, cholangitis with klebsiella bacteremia,  "bacteriuria  Choledocholithiasis, ERCP 1/27/2022  Admission Date: 1/27/2022      Discharge Date: 2/4/2022  When in the hospital he was very ill, with jaundice and sepsis syndrome  The TCU, returned home around Feb 21 2/21/2022 PICC line pulled cefdinir 300mg PO BID for an additional 2 weeks, ended around March 5 2- showed bilirubin recovering nicely  Bilirubin Total 0.0 - 1.0 mg/dL 1.6 High       EXAM: MR ABDOMEN MRCP W/O AND W CONTRAST  DATE/TIME: 1/27/2022  Significant biliary obstruction -- obstructing common bile duct stone at the hilum of the liver measuring up to 8 mm in diameter likely accounting for this obstruction.   peripherally measuring 5.3 x 3.6 cm.  focal liver infection/phlegmonous change/developing abscess given the findings   EXAM: CT ABDOMEN PELVIS W CONTRAST  DATE/TIME: 2/1/2022 10:35 AM   Status post ERCP with decompression of intrahepatic bile duct dilation      Status post excision of right fifth metatarsal because of osteomyelitis on November 2, 2020, wound cultures with Bacteroides and Enterobacter.  Status post skin grafting to the lateral right foot.  10- Dr Garcia told him right foot is healed-- graduated from therapy. \"The right foot ulceration has resolved. I am pleased with his progress and recommend he continue to monitor for skin irritation or skin breakdown.'     He will wear his new custom molded diabetic shoes, and they look good.  As of the end of April 2021, he graduated from home care that was being delivered by [facility] (PT and OT)     Right foot pain, occasionally takes a tramadol for that.     Peripheral vascular disease.  November 24, 2020 he had right lower extremity angiogram with balloon angioplasty of anterior tibial and peroneal arteries.  However postoperative ankle-brachial index did not improve significantly, although vascular surgery thought that was because of vasospasm.  He needs to follow-up with vascular surgery, and will have a " repeat arterial Doppler ultrasound.     7-8-2021 Ultrasound  Right Lower Extremity: Patent vasculature to the distal superficial femoral artery with triphasic flow. Transition to monophasic flow in the popliteal artery. Occluded posterior tibial artery.  Patent anterior tibial and dorsalis pedis arteries.  Left Lower Extremity: Occluded posterior tibial artery.  Patent anterior tibial and dorsalis pedis arteries.     Chronic right leg and foot lymphedema, probably related to vascular insufficiency both arterial and venous, I reminded him to elevate his leg to help the drainage     Essential hypertension, stopped the lisinopril, since his blood pressure seems to be running low he lost weight while in the hospital February-March 2022  BP Readings from Last 6 Encounters:   12/09/22 120/68   11/28/22 (!) 173/74   10/07/22 (!) 147/65   09/29/22 132/76   07/01/22 138/72   05/27/22 130/54     Anemia of chronic disease and renal disease  11-  Hemoglobin 13.3 - 17.7 g/dL 11.1 Low       History of acute pulmonary embolism and cor pulmonale, was unprovoked, diagnosed May 2020, has been on anticoagulation ever since with Eliquis which he will continue long-term.  He seems to be tolerating the Eliquis just fine.  He is on concomitant baby aspirin which I told him does increase the risk for bleeding when combined with Eliquis.  But I think the combination is appropriate for him since he has peripheral vascular disease.     Paroxysmal atrial fibrillation, and history of pulmonary embolism, on anticoagulation with Eliquis for those reasons     Hyperlipidemia in the context of diabetes, with history of intolerance to statins, LDL cholesterol was 126 when measured July 22, 2020.       Constipation, component of drug-induced from tramadol, I told him its okay to use MiraLAX 1 capful even daily, dissolved in liquid.     Erectile dysfunction, took the sildenafil off his medication list, because too many medical issues going on,  and blood pressures runs low     Moderna bivalent booster October 7, 2022  Already had his seasonal flu shot for autumn 2022      Immunization History   Administered Date(s) Administered     COVID-19 Vaccine 18+ (Moderna) 01/25/2021, 02/22/2021     COVID-19 Vaccine Bivalent Booster 18+ (Moderna) 10/07/2022     COVID-19,PF,Moderna Booster 11/24/2021, 05/27/2022     Mantoux Tuberculin Skin Test 12/09/2020, 12/23/2020     Pneumo Conj 13-V (2010&after) 01/01/2016, 10/22/2020     Pneumococcal 23 valent 09/29/2003     --------------------------------------------------------------------------------------------------------------------------  History of Present Illness  This 86 year old old     Follow-up visit multiple issues, overall doing really well  Mr. Hernandez comes to this morning's meeting accompanied by son Elfego     Follow up on a UTI for which she was seen in the walk-in clinic November 20, 2022, when his urinalysis had gross blood, many red cells, many white cells, although the culture had no growth.  He was treated with ciprofloxacin 500 mg twice a day for 7 days.    Today December 9, 2022, will check urinalysis.  I do not think he needs any blood work, since he had a CBC and basic metabolic panel done on 5/28.  Kidney function holding stable with creatinine around 1.7.    Frantz reports that his fingerstick blood sugars are running approximately 150, and Elfego told me that he is doing the best he can to police Frantz's eating to make sure he does not get into any [employer] bars.    Renewed his prescription for Lantus 32 units at bedtime.  Also insulin pen needles.    I reminded Frantz that he should try the scheduled voiding technique, trying to empty his bladder every hour on the hour while awake.  Ideas to keep the bladder empty as possible, which could help reduce the risk of recurring bladder infections.    History acute cystitis (bladder infection) diagnosed in the walk-in clinic September 29, 2022, ciprofloxacin 10-day  course    Wt Readings from Last 3 Encounters:   12/09/22 76.2 kg (168 lb)   10/07/22 76.2 kg (168 lb)   09/29/22 76.5 kg (168 lb 11.2 oz)     BP Readings from Last 3 Encounters:   12/09/22 120/68   11/28/22 (!) 173/74   10/07/22 (!) 147/65       ---------------------------------------------------------------------------------------------------------------------------    Medications, Allergies, Social, and Problem List   Current Outpatient Medications   Medication Sig Dispense Refill     acetaminophen (TYLENOL) 500 MG tablet [ACETAMINOPHEN (TYLENOL) 500 MG TABLET] Take 1-2 tablets (500-1,000 mg total) by mouth every 4 (four) hours as needed.  0     aspirin 81 MG EC tablet [ASPIRIN 81 MG EC TABLET] Take 81 mg by mouth daily.       B-D U/F 31G X 8 MM insulin pen needle USED TO INJECT INSULIN DAILY 100 each 11     blood-glucose meter (ONETOUCH VERIO IQ METER) Misc [BLOOD-GLUCOSE METER (ONETOUCH VERIO IQ METER) MISC] Use 1 each As Directed 2 (two) times a day. 1 each 0     Continuous Blood Gluc  (FREESTYLE APRIL 2 READER) MILE USE AS DIRECTED 1 each 0     Continuous Blood Gluc Sensor (FREESTYLE APRIL 2 SENSOR) MISC USE AS DIRECTED EVERY 14 DAYS 2 each 11     Continuous Blood Gluc Sensor (FREESTYLE APRIL 2 SENSOR) [facility] 1 kit every 14 days Use per manufacture's directions to check glucose daily. 6 each 1     Continuous Blood Gluc Sensor (FREESTYLE APRIL 2 SENSOR) [facility] 1 each every 14 days 1 each every 14 days. Change every 14 days. 6 each 5     ELIQUIS ANTICOAGULANT 5 MG tablet TAKE 1 TABLET BY MOUTH TWICE DAILY 180 tablet 3     insulin aspart (NOVOLOG PEN) 100 UNIT/ML pen Inject 1-7 Units Subcutaneous 3 times daily (before meals) Correction Scale - MEDIUM INSULIN RESISTANCE DOSING   Do Not give Correction Insulin if Pre-Meal BG less than 140. For Pre-Meal  - 189 give 1 unit. For Pre-Meal  - 239 give 2 units. For Pre-Meal  - 289 give 3 units. For Pre-Meal  - 339 give 4 units. For Pre-Meal BG  340- 399 give 5 units. For Pre-Meal -449 give 6 units For Pre-Meal BG greater than or equal to 450 give 7 units. To be given with prandial insulin, and based on pre-meal blood glucose.  Notify provider if glucose greater than or equal to 350 mg/dL after administration of correction dose. If given at mealtime, administer within 30 minutes of start of meal. (Patient taking differently: Inject 1-7 Units Subcutaneous 3 times daily (before meals) Correction Scale - MEDIUM INSULIN RESISTANCE DOSING   Do Not give Correction Insulin if Pre-Meal BG less than 140. For Pre-Meal  - 189 give 1 unit. For Pre-Meal  - 239 give 2 units. For Pre-Meal  - 289 give 3 units. For Pre-Meal  - 339 give 4 units. For Pre-Meal - 399 give 5 units. For Pre-Meal -449 give 6 units For Pre-Meal BG greater than or equal to 450 give 7 units. To be given with prandial insulin, and based on pre-meal blood glucose.  Notify provider if glucose greater than or equal to 350 mg/dL after administration of correction dose. If given at mealtime, administer within 30 minutes of start of meal.) 15 mL 0     insulin glargine (LANTUS SOLOSTAR) 100 UNIT/ML pen Inject 32 Units Subcutaneous At Bedtime 15 mL 11     insulin pen needle (32G X 6 MM) 32G X 6 MM miscellaneous Use 4 pen needles daily or as directed. 100 each 11     multivit-min/FA/lycopen/lutein (CENTRUM SILVER MEN ORAL) [MULTIVIT-MIN/FA/LYCOPEN/LUTEIN (CENTRUM SILVER MEN ORAL)] Take 1 tablet by mouth daily.       mupirocin (BACTROBAN) 2 % ointment Apply topically daily as needed Apply to foot wound       ONETOUCH ULTRA test strip USE TO TEST TWICE DAILY 200 strip 3     polyethylene glycol (MIRALAX) 17 gram packet [POLYETHYLENE GLYCOL (MIRALAX) 17 GRAM PACKET] Take 1 packet (17 g total) by mouth daily as needed. 100 packet 3     tamsulosin (FLOMAX) 0.4 MG capsule TAKE 1 CAPSULE BY MOUTH EVERY DAY AFTER SUPPER 90 capsule 3     traMADol (ULTRAM) 50 MG tablet TAKE 1  TABLET BY MOUTH EVERY 6 HOURS AS NEEDED FOR PAIN 30 tablet 0     VICTOZA 3-RUPALI 0.6 mg/0.1 mL (18 mg/3 mL) injection [VICTOZA 3-RUPALI 0.6 MG/0.1 ML (18 MG/3 ML) INJECTION] INJECT 1.2 MG UNDER THE SKIN ONCE DAILY 12 mL 11     vitamin D3 (CHOLECALCIFEROL) 125 MCG (5000 UT) tablet Take 1 tablet (125 mcg) by mouth daily       vitamin E 400 unit capsule [VITAMIN E 400 UNIT CAPSULE] Take 400 Units by mouth daily.       Allergies   Allergen Reactions     Cresol [Phenol] Unknown     Muscle cramps     Demeclocycline Hives and Rash     Crestor [Rosuvastatin] Muscle Pain (Myalgia)     Social History     Tobacco Use     Smoking status: Former     Types: Cigarettes     Quit date: 1991     Years since quittin.0     Smokeless tobacco: Never   Vaping Use     Vaping Use: Never used   Substance Use Topics     Alcohol use: No     Drug use: No     Patient Active Problem List   Diagnosis     Hyperlipidemia     Essential hypertension     Statin intolerance     Personal history of pulmonary embolism     PVD (peripheral vascular disease) (H)     Overweight (BMI 25.0-29.9)     Chronic anticoagulation     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 (H)     Type 2 diabetes mellitus with diabetic peripheral angiopathy without gangrene, with long-term current use of insulin (H)     Adult failure to thrive     Normocytic anemia     Paroxysmal atrial fibrillation (H)     ACP (advance care planning)     Hematuria     Amputated toe, right (H)     Equinovarus acquired deformity, right     Drug-induced constipation     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 2 diabetes mellitus, limited to breakdown of skin (H)     Cellulitis and abscess of foot excluding toe     Jaundice     Cholelithiasis     Status post endoscopic retrograde cholangiopancreatography     History of cholecystectomy     Coronary artery disease involving native coronary artery of native heart without angina  "pectoris     Hyperglycemia due to diabetes mellitus (H)     Lymphedema     Age-related cognitive decline     Dehydration     Hyperglycemia     Frailty syndrome in geriatric patient     Falls frequently     CKD (chronic kidney disease) stage 3, GFR 30-59 ml/min (H)        Reviewed, reconciled and updated       Physical Exam   General Appearance:       /68   Pulse 72   Temp 97.4  F (36.3  C) (Oral)   Resp 15   Ht 1.727 m (5' 8\")   Wt 76.2 kg (168 lb)   SpO2 95%   BMI 25.54 kg/m      Frantz looks generally well, great blood pressure, breathing comfortably, sitting in wheelchair  Lungs clear although slightly reduced breath sounds on the right  Heart regular rate rhythm  Abdomen nontender  Extremities no edema     Additional Information   I spent 30 minutes on this encounter, including reviewing interval history since last visit, examining the patient, explaining and counseling the issues enumerated in the Assessment and Plan (patient given a copy), ordering indicated tests     DALE CORTEZ MD, MD        "

## 2022-12-09 NOTE — PATIENT INSTRUCTIONS
Follow-up visit multiple issues, overall doing really well  Mr. Hernandez comes to this morning's meeting accompanied by son Elfego     Follow up on a UTI for which she was seen in the walk-in clinic November 20, 2022, when his urinalysis had gross blood, many red cells, many white cells, although the culture had no growth.  He was treated with ciprofloxacin 500 mg twice a day for 7 days.    Today December 9, 2022, will check urinalysis.  I do not think he needs any blood work, since he had a CBC and basic metabolic panel done on 5/28.  Kidney function holding stable with creatinine around 1.7.    Frantz reports that his fingerstick blood sugars are running approximately 150, and Elfego told me that he is doing the best he can to police Frantz's eating to make sure he does not get into any [place] bars.    Renewed his prescription for Lantus 32 units at bedtime.  Also insulin pen needles.    I reminded Frantz that he should try the scheduled voiding technique, trying to empty his bladder every hour on the hour while awake.  Ideas to keep the bladder empty as possible, which could help reduce the risk of recurring bladder infections.    History acute cystitis (bladder infection) diagnosed in the walk-in clinic September 29, 2022, ciprofloxacin 10-day course    Diabetes under the supervision of diabetes educator nurse Vianca Uribe   The caregiving duties mostly born by Elfego, who lives in the house.  Elfego has a traditional full-time job, so Mr. Hernandez is often in a house by himself for much of the day  His spouse Mrs. Hernandez is in the house, but she has a lot of medical problems herself    Diabetes control much better  Lantus dose is 32 units at bedtime  Also Victoza 1.2 mg injected once a day  No metformin     He asked what to do if his morning blood sugar is on the low side.  I told him to go ahead and take his Victoza, but also eat immediately.     He is supposed to be measuring his blood sugar before each meal and supplementing his  insulin using a sliding scale based on the Premeal blood glucose.     Do Not give Correction Insulin if Pre-Meal BG less than 140.   For Pre-Meal  - 189 give 1 unit.   For Pre-Meal  - 239 give 2 units.   For Pre-Meal  - 289 give 3 units.   For Pre-Meal  - 339 give 4 units.   For Pre-Meal - 399 give 5 units.   For Pre-Meal -449 give 6 units   For Pre-Meal BG greater than or equal to 450 give 7 units.   To be given with prandial insulin, and based on pre-meal blood glucose.  Notify provider if glucose greater than or equal to 350 mg/dL after administration of correction dose.  If given at mealtime, administer within 30 minutes of start of meal.     Chronic kidney disease, stage IIIb, stable, GFR estimated 38  Recovered from acute kidney injury, from hospitalization April 23-25, 2022 11-  Creatinine 0.67 - 1.17 mg/dL 1.74 High      GFR Estimate >60 mL/min/1.73m2 38 Low      Hospitalized April 23-25, 2022 with severe hyperglycemia due to uncontrolled diabetes, Mr. Hernandez was not giving himself insulin or checking his blood sugars  UTI, treated with ciprofloxacin  Dehydration, hyponatremia, acute renal failure with creatinine peaking at 2.17     Stabilized situation with regards to self-care, I believe he is getting great support from his family   Falling, severe deconditioning, general frailty, falling risk because he takes Eliquis anticoagulation    Mr. Hernandez confirms that he is not interested in moving into an assisted living setting.  His sons Shiv and Elfego shared the duties taking care of him (mostly Elfego)     Recovered from sepsis, cholangitis with klebsiella bacteremia, bacteriuria  Choledocholithiasis, ERCP 1/27/2022  Admission Date: 1/27/2022      Discharge Date: 2/4/2022  When in the hospital he was very ill, with jaundice and sepsis syndrome  The TCU, returned home around Feb 21 2/21/2022 PICC line pulled cefdinir 300mg PO BID for an additional 2 weeks, ended around  "March 5 2- showed bilirubin recovering nicely  Bilirubin Total 0.0 - 1.0 mg/dL 1.6 High       EXAM: MR ABDOMEN MRCP W/O AND W CONTRAST  DATE/TIME: 1/27/2022  Significant biliary obstruction -- obstructing common bile duct stone at the hilum of the liver measuring up to 8 mm in diameter likely accounting for this obstruction.   peripherally measuring 5.3 x 3.6 cm.  focal liver infection/phlegmonous change/developing abscess given the findings   EXAM: CT ABDOMEN PELVIS W CONTRAST  DATE/TIME: 2/1/2022 10:35 AM   Status post ERCP with decompression of intrahepatic bile duct dilation      Status post excision of right fifth metatarsal because of osteomyelitis on November 2, 2020, wound cultures with Bacteroides and Enterobacter.  Status post skin grafting to the lateral right foot.  10- Dr Garcia told him right foot is healed-- graduated from therapy. \"The right foot ulceration has resolved. I am pleased with his progress and recommend he continue to monitor for skin irritation or skin breakdown.'     He will wear his new custom molded diabetic shoes, and they look good.  As of the end of April 2021, he graduated from home care that was being delivered by [facility] (PT and OT)     Right foot pain, occasionally takes a tramadol for that.     Peripheral vascular disease.  November 24, 2020 he had right lower extremity angiogram with balloon angioplasty of anterior tibial and peroneal arteries.  However postoperative ankle-brachial index did not improve significantly, although vascular surgery thought that was because of vasospasm.  He needs to follow-up with vascular surgery, and will have a repeat arterial Doppler ultrasound.     7-8-2021 Ultrasound  Right Lower Extremity: Patent vasculature to the distal superficial femoral artery with triphasic flow. Transition to monophasic flow in the popliteal artery. Occluded posterior tibial artery.  Patent anterior tibial and dorsalis pedis " arteries.  Left Lower Extremity: Occluded posterior tibial artery.  Patent anterior tibial and dorsalis pedis arteries.     Chronic right leg and foot lymphedema, probably related to vascular insufficiency both arterial and venous, I reminded him to elevate his leg to help the drainage     Essential hypertension, stopped the lisinopril, since his blood pressure seems to be running low he lost weight while in the hospital February-March 2022  BP Readings from Last 6 Encounters:   12/09/22 120/68   11/28/22 (!) 173/74   10/07/22 (!) 147/65   09/29/22 132/76   07/01/22 138/72   05/27/22 130/54     Anemia of chronic disease and renal disease  11-  Hemoglobin 13.3 - 17.7 g/dL 11.1 Low       History of acute pulmonary embolism and cor pulmonale, was unprovoked, diagnosed May 2020, has been on anticoagulation ever since with Eliquis which he will continue long-term.  He seems to be tolerating the Eliquis just fine.  He is on concomitant baby aspirin which I told him does increase the risk for bleeding when combined with Eliquis.  But I think the combination is appropriate for him since he has peripheral vascular disease.     Paroxysmal atrial fibrillation, and history of pulmonary embolism, on anticoagulation with Eliquis for those reasons     Hyperlipidemia in the context of diabetes, with history of intolerance to statins, LDL cholesterol was 126 when measured July 22, 2020.       Constipation, component of drug-induced from tramadol, I told him its okay to use MiraLAX 1 capful even daily, dissolved in liquid.     Erectile dysfunction, took the sildenafil off his medication list, because too many medical issues going on, and blood pressures runs low     Moderna bivalent booster October 7, 2022  Already had his seasonal flu shot for autumn 2022      Immunization History   Administered Date(s) Administered    COVID-19 Vaccine 18+ (Moderna) 01/25/2021, 02/22/2021    COVID-19 Vaccine Bivalent Booster 18+ (Moderna)  10/07/2022    COVID-19,PF,Moderna Booster 11/24/2021, 05/27/2022    Mantoux Tuberculin Skin Test 12/09/2020, 12/23/2020    Pneumo Conj 13-V (2010&after) 01/01/2016, 10/22/2020    Pneumococcal 23 valent 09/29/2003

## 2022-12-13 NOTE — TELEPHONE ENCOUNTER
Outgoing Call:  No answer    Janice Pittman RN contacted Caleb on 12/13/22 and left a message. If patient calls back please route to any available RN to relay the following message  Per Dr. Lozano: Please call Frantz and tell him that I believe he has a staph epidermidis either bladder or prostate infection.  Tell him that I want him to take 1 month of antibiotic to treat this.     The preferred antibiotic is doxycycline, but he has a history of a rash/hives from a similar molecule demeclocycline.     Tell Frantz  that I sent a prescription for 3 DAYS of doxycycline, which is dosed twice a day.  This is a TEST to see if he can tolerate the doxycycline without side effects     If he is able to tolerate the doxycycline, please have him reply back to us (or we call him if that is more practical), then I can prescribe him the full 1 month course.     Janice YATES RN  [facility]

## 2022-12-14 NOTE — TELEPHONE ENCOUNTER
Pt's son Elfego came up to the desk at the clinic and said he's been having a hard time getting back to the RN who called him yesterday about his dad.    I pulled up the encounter and explained Dr Lozano's message to Elfego and printed it out for him. I let him know which pharmacy the trial prescription went to.     His son will let us know by calling the clinic if this medication is well tolerated so the 30 day course can be ordered.

## 2022-12-26 NOTE — TELEPHONE ENCOUNTER
Patient called and stated that he did well on his trial period of doxycycline hyclate. pcp directed him to inform staff if he tolerate medication then a month supply could be sent in.     Medication pended for review.     Bambi Barrera RN

## 2023-01-01 ENCOUNTER — TELEPHONE (OUTPATIENT)
Dept: GERIATRICS | Facility: CLINIC | Age: 88
End: 2023-01-01
Payer: COMMERCIAL

## 2023-01-01 ENCOUNTER — HOSPITAL ENCOUNTER (OUTPATIENT)
Dept: MRI IMAGING | Facility: HOSPITAL | Age: 88
Discharge: HOME | End: 2023-05-11
Attending: PODIATRIST | Admitting: PODIATRIST
Payer: COMMERCIAL

## 2023-01-01 ENCOUNTER — OFFICE VISIT (OUTPATIENT)
Dept: VASCULAR SURGERY | Facility: CLINIC | Age: 88
End: 2023-01-01
Attending: PODIATRIST
Payer: COMMERCIAL

## 2023-01-01 ENCOUNTER — MEDICAL CORRESPONDENCE (OUTPATIENT)
Dept: HEALTH INFORMATION MANAGEMENT | Facility: CLINIC | Age: 88
End: 2023-01-01
Payer: COMMERCIAL

## 2023-01-01 ENCOUNTER — APPOINTMENT (OUTPATIENT)
Dept: OCCUPATIONAL THERAPY | Facility: CLINIC | Age: 88
DRG: 871 | End: 2023-01-01
Payer: COMMERCIAL

## 2023-01-01 ENCOUNTER — LAB REQUISITION (OUTPATIENT)
Dept: LAB | Facility: CLINIC | Age: 88
End: 2023-01-01
Payer: COMMERCIAL

## 2023-01-01 ENCOUNTER — MEDICAL CORRESPONDENCE (OUTPATIENT)
Dept: HEALTH INFORMATION MANAGEMENT | Facility: CLINIC | Age: 88
End: 2023-01-01

## 2023-01-01 ENCOUNTER — TRANSITIONAL CARE UNIT VISIT (OUTPATIENT)
Dept: GERIATRICS | Facility: CLINIC | Age: 88
End: 2023-01-01
Payer: COMMERCIAL

## 2023-01-01 ENCOUNTER — TELEPHONE (OUTPATIENT)
Dept: INTERNAL MEDICINE | Facility: CLINIC | Age: 88
End: 2023-01-01
Payer: COMMERCIAL

## 2023-01-01 ENCOUNTER — TRANSFERRED RECORDS (OUTPATIENT)
Dept: HEALTH INFORMATION MANAGEMENT | Facility: CLINIC | Age: 88
End: 2023-01-01
Payer: COMMERCIAL

## 2023-01-01 ENCOUNTER — TELEPHONE (OUTPATIENT)
Dept: VASCULAR SURGERY | Facility: CLINIC | Age: 88
End: 2023-01-01
Payer: COMMERCIAL

## 2023-01-01 ENCOUNTER — PATIENT OUTREACH (OUTPATIENT)
Dept: CARE COORDINATION | Facility: CLINIC | Age: 88
End: 2023-01-01
Payer: COMMERCIAL

## 2023-01-01 ENCOUNTER — OFFICE VISIT (OUTPATIENT)
Dept: PODIATRY | Facility: CLINIC | Age: 88
End: 2023-01-01
Payer: COMMERCIAL

## 2023-01-01 ENCOUNTER — DOCUMENTATION ONLY (OUTPATIENT)
Dept: VASCULAR SURGERY | Facility: CLINIC | Age: 88
End: 2023-01-01
Payer: COMMERCIAL

## 2023-01-01 ENCOUNTER — DISCHARGE SUMMARY NURSING HOME (OUTPATIENT)
Dept: GERIATRICS | Facility: CLINIC | Age: 88
End: 2023-01-01
Payer: COMMERCIAL

## 2023-01-01 ENCOUNTER — TRANSFERRED RECORDS (OUTPATIENT)
Dept: HEALTH INFORMATION MANAGEMENT | Facility: CLINIC | Age: 88
End: 2023-01-01

## 2023-01-01 ENCOUNTER — OFFICE VISIT (OUTPATIENT)
Dept: INTERNAL MEDICINE | Facility: CLINIC | Age: 88
End: 2023-01-01
Payer: COMMERCIAL

## 2023-01-01 ENCOUNTER — ANCILLARY PROCEDURE (OUTPATIENT)
Dept: VASCULAR ULTRASOUND | Facility: CLINIC | Age: 88
End: 2023-01-01
Attending: RADIOLOGY
Payer: COMMERCIAL

## 2023-01-01 ENCOUNTER — HEALTH MAINTENANCE LETTER (OUTPATIENT)
Age: 88
End: 2023-01-01

## 2023-01-01 ENCOUNTER — APPOINTMENT (OUTPATIENT)
Dept: CT IMAGING | Facility: CLINIC | Age: 88
DRG: 871 | End: 2023-01-01
Attending: EMERGENCY MEDICINE
Payer: COMMERCIAL

## 2023-01-01 ENCOUNTER — OFFICE VISIT (OUTPATIENT)
Dept: VASCULAR SURGERY | Facility: CLINIC | Age: 88
End: 2023-01-01
Payer: COMMERCIAL

## 2023-01-01 ENCOUNTER — MYC MEDICAL ADVICE (OUTPATIENT)
Dept: VASCULAR SURGERY | Facility: CLINIC | Age: 88
End: 2023-01-01
Payer: COMMERCIAL

## 2023-01-01 ENCOUNTER — TELEPHONE (OUTPATIENT)
Dept: GERIATRICS | Facility: CLINIC | Age: 88
End: 2023-01-01

## 2023-01-01 ENCOUNTER — APPOINTMENT (OUTPATIENT)
Dept: OCCUPATIONAL THERAPY | Facility: CLINIC | Age: 88
DRG: 871 | End: 2023-01-01
Attending: STUDENT IN AN ORGANIZED HEALTH CARE EDUCATION/TRAINING PROGRAM
Payer: COMMERCIAL

## 2023-01-01 ENCOUNTER — APPOINTMENT (OUTPATIENT)
Dept: PHYSICAL THERAPY | Facility: CLINIC | Age: 88
DRG: 871 | End: 2023-01-01
Attending: STUDENT IN AN ORGANIZED HEALTH CARE EDUCATION/TRAINING PROGRAM
Payer: COMMERCIAL

## 2023-01-01 ENCOUNTER — OFFICE VISIT (OUTPATIENT)
Dept: VASCULAR SURGERY | Facility: CLINIC | Age: 88
End: 2023-01-01
Attending: RADIOLOGY
Payer: COMMERCIAL

## 2023-01-01 ENCOUNTER — HOSPITAL ENCOUNTER (INPATIENT)
Facility: CLINIC | Age: 88
LOS: 6 days | Discharge: NURSING, REHAB OR LONG-TERM CARE | DRG: 871 | End: 2023-01-23
Attending: EMERGENCY MEDICINE | Admitting: STUDENT IN AN ORGANIZED HEALTH CARE EDUCATION/TRAINING PROGRAM
Payer: COMMERCIAL

## 2023-01-01 ENCOUNTER — NURSE TRIAGE (OUTPATIENT)
Dept: NURSING | Facility: CLINIC | Age: 88
End: 2023-01-01
Payer: COMMERCIAL

## 2023-01-01 ENCOUNTER — HOSPITAL ENCOUNTER (OUTPATIENT)
Dept: INTERVENTIONAL RADIOLOGY/VASCULAR | Facility: CLINIC | Age: 88
Discharge: HOME | End: 2023-05-23
Attending: RADIOLOGY | Admitting: RADIOLOGY
Payer: COMMERCIAL

## 2023-01-01 ENCOUNTER — ANCILLARY PROCEDURE (OUTPATIENT)
Dept: VASCULAR ULTRASOUND | Facility: CLINIC | Age: 88
End: 2023-01-01
Attending: PODIATRIST
Payer: COMMERCIAL

## 2023-01-01 ENCOUNTER — PATIENT OUTREACH (OUTPATIENT)
Dept: CARE COORDINATION | Facility: CLINIC | Age: 88
End: 2023-01-01

## 2023-01-01 VITALS
HEIGHT: 68 IN | DIASTOLIC BLOOD PRESSURE: 81 MMHG | SYSTOLIC BLOOD PRESSURE: 177 MMHG | HEART RATE: 67 BPM | WEIGHT: 148 LBS | BODY MASS INDEX: 22.43 KG/M2 | OXYGEN SATURATION: 95 %

## 2023-01-01 VITALS
BODY MASS INDEX: 24.16 KG/M2 | RESPIRATION RATE: 18 BRPM | OXYGEN SATURATION: 94 % | TEMPERATURE: 97.2 F | HEIGHT: 68 IN | SYSTOLIC BLOOD PRESSURE: 132 MMHG | HEART RATE: 63 BPM | WEIGHT: 159.4 LBS | DIASTOLIC BLOOD PRESSURE: 59 MMHG

## 2023-01-01 VITALS
DIASTOLIC BLOOD PRESSURE: 62 MMHG | SYSTOLIC BLOOD PRESSURE: 108 MMHG | HEART RATE: 67 BPM | TEMPERATURE: 97.7 F | RESPIRATION RATE: 18 BRPM | BODY MASS INDEX: 24.29 KG/M2 | OXYGEN SATURATION: 96 % | HEIGHT: 68 IN | WEIGHT: 160.3 LBS

## 2023-01-01 VITALS
OXYGEN SATURATION: 96 % | BODY MASS INDEX: 23.95 KG/M2 | SYSTOLIC BLOOD PRESSURE: 126 MMHG | HEIGHT: 68 IN | DIASTOLIC BLOOD PRESSURE: 57 MMHG | HEART RATE: 67 BPM | RESPIRATION RATE: 18 BRPM | WEIGHT: 158 LBS | TEMPERATURE: 96.6 F

## 2023-01-01 VITALS
WEIGHT: 159 LBS | DIASTOLIC BLOOD PRESSURE: 64 MMHG | SYSTOLIC BLOOD PRESSURE: 111 MMHG | HEIGHT: 68 IN | TEMPERATURE: 98.2 F | RESPIRATION RATE: 16 BRPM | OXYGEN SATURATION: 97 % | BODY MASS INDEX: 24.1 KG/M2 | HEART RATE: 74 BPM

## 2023-01-01 VITALS
HEIGHT: 68 IN | BODY MASS INDEX: 24.07 KG/M2 | DIASTOLIC BLOOD PRESSURE: 62 MMHG | WEIGHT: 158.8 LBS | HEART RATE: 70 BPM | SYSTOLIC BLOOD PRESSURE: 136 MMHG | RESPIRATION RATE: 18 BRPM | TEMPERATURE: 97.7 F

## 2023-01-01 VITALS
WEIGHT: 159 LBS | HEIGHT: 68 IN | HEART RATE: 72 BPM | OXYGEN SATURATION: 93 % | RESPIRATION RATE: 16 BRPM | BODY MASS INDEX: 24.1 KG/M2 | DIASTOLIC BLOOD PRESSURE: 52 MMHG | TEMPERATURE: 96.4 F | SYSTOLIC BLOOD PRESSURE: 105 MMHG

## 2023-01-01 VITALS
WEIGHT: 167.55 LBS | RESPIRATION RATE: 18 BRPM | OXYGEN SATURATION: 97 % | SYSTOLIC BLOOD PRESSURE: 122 MMHG | DIASTOLIC BLOOD PRESSURE: 57 MMHG | BODY MASS INDEX: 25.48 KG/M2 | HEART RATE: 72 BPM | TEMPERATURE: 98.6 F

## 2023-01-01 VITALS
HEART RATE: 72 BPM | OXYGEN SATURATION: 93 % | RESPIRATION RATE: 18 BRPM | BODY MASS INDEX: 24.16 KG/M2 | HEIGHT: 68 IN | TEMPERATURE: 98 F | DIASTOLIC BLOOD PRESSURE: 64 MMHG | WEIGHT: 159.4 LBS | SYSTOLIC BLOOD PRESSURE: 137 MMHG

## 2023-01-01 VITALS
DIASTOLIC BLOOD PRESSURE: 68 MMHG | HEART RATE: 66 BPM | TEMPERATURE: 97.9 F | SYSTOLIC BLOOD PRESSURE: 127 MMHG | OXYGEN SATURATION: 96 %

## 2023-01-01 VITALS
RESPIRATION RATE: 16 BRPM | HEART RATE: 71 BPM | WEIGHT: 148 LBS | BODY MASS INDEX: 22.43 KG/M2 | OXYGEN SATURATION: 96 % | DIASTOLIC BLOOD PRESSURE: 70 MMHG | SYSTOLIC BLOOD PRESSURE: 144 MMHG | HEIGHT: 68 IN

## 2023-01-01 VITALS
RESPIRATION RATE: 20 BRPM | HEIGHT: 68 IN | HEART RATE: 69 BPM | DIASTOLIC BLOOD PRESSURE: 61 MMHG | BODY MASS INDEX: 24.1 KG/M2 | WEIGHT: 159 LBS | OXYGEN SATURATION: 96 % | SYSTOLIC BLOOD PRESSURE: 130 MMHG | TEMPERATURE: 97.5 F

## 2023-01-01 VITALS
HEIGHT: 68 IN | TEMPERATURE: 98.2 F | HEART RATE: 71 BPM | OXYGEN SATURATION: 94 % | DIASTOLIC BLOOD PRESSURE: 88 MMHG | SYSTOLIC BLOOD PRESSURE: 147 MMHG | BODY MASS INDEX: 22.43 KG/M2 | WEIGHT: 148 LBS

## 2023-01-01 VITALS
SYSTOLIC BLOOD PRESSURE: 132 MMHG | TEMPERATURE: 98.3 F | HEIGHT: 68 IN | RESPIRATION RATE: 16 BRPM | WEIGHT: 161 LBS | HEART RATE: 72 BPM | BODY MASS INDEX: 24.4 KG/M2 | OXYGEN SATURATION: 99 % | DIASTOLIC BLOOD PRESSURE: 72 MMHG

## 2023-01-01 VITALS — OXYGEN SATURATION: 93 % | HEART RATE: 62 BPM | DIASTOLIC BLOOD PRESSURE: 70 MMHG | SYSTOLIC BLOOD PRESSURE: 108 MMHG

## 2023-01-01 VITALS
WEIGHT: 159.4 LBS | SYSTOLIC BLOOD PRESSURE: 121 MMHG | HEART RATE: 71 BPM | HEIGHT: 68 IN | BODY MASS INDEX: 24.16 KG/M2 | OXYGEN SATURATION: 96 % | DIASTOLIC BLOOD PRESSURE: 63 MMHG | RESPIRATION RATE: 17 BRPM | TEMPERATURE: 96.4 F

## 2023-01-01 VITALS
BODY MASS INDEX: 22.43 KG/M2 | WEIGHT: 148 LBS | OXYGEN SATURATION: 94 % | DIASTOLIC BLOOD PRESSURE: 81 MMHG | HEART RATE: 79 BPM | SYSTOLIC BLOOD PRESSURE: 122 MMHG | HEIGHT: 68 IN

## 2023-01-01 VITALS
RESPIRATION RATE: 18 BRPM | SYSTOLIC BLOOD PRESSURE: 163 MMHG | HEART RATE: 57 BPM | DIASTOLIC BLOOD PRESSURE: 70 MMHG | OXYGEN SATURATION: 95 %

## 2023-01-01 VITALS
BODY MASS INDEX: 24.1 KG/M2 | SYSTOLIC BLOOD PRESSURE: 111 MMHG | OXYGEN SATURATION: 97 % | DIASTOLIC BLOOD PRESSURE: 64 MMHG | RESPIRATION RATE: 16 BRPM | HEIGHT: 68 IN | TEMPERATURE: 98.2 F | HEART RATE: 74 BPM | WEIGHT: 159 LBS

## 2023-01-01 VITALS — OXYGEN SATURATION: 95 % | HEART RATE: 68 BPM

## 2023-01-01 DIAGNOSIS — N40.1 BENIGN PROSTATIC HYPERPLASIA WITH URINARY RETENTION: ICD-10-CM

## 2023-01-01 DIAGNOSIS — D63.1 ANEMIA DUE TO STAGE 3A CHRONIC KIDNEY DISEASE (H): ICD-10-CM

## 2023-01-01 DIAGNOSIS — L97.521 DIABETIC ULCER OF TOE OF LEFT FOOT ASSOCIATED WITH DIABETES MELLITUS DUE TO UNDERLYING CONDITION, LIMITED TO BREAKDOWN OF SKIN (H): ICD-10-CM

## 2023-01-01 DIAGNOSIS — N32.0 BLADDER OUTLET OBSTRUCTION: ICD-10-CM

## 2023-01-01 DIAGNOSIS — L89.623 PRESSURE ULCER OF LEFT HEEL, STAGE 3 (H): Primary | ICD-10-CM

## 2023-01-01 DIAGNOSIS — E11.51 TYPE 2 DIABETES MELLITUS WITH DIABETIC PERIPHERAL ANGIOPATHY WITHOUT GANGRENE, WITH LONG-TERM CURRENT USE OF INSULIN (H): ICD-10-CM

## 2023-01-01 DIAGNOSIS — L02.619 CELLULITIS AND ABSCESS OF FOOT EXCLUDING TOE: Primary | ICD-10-CM

## 2023-01-01 DIAGNOSIS — I10 ESSENTIAL HYPERTENSION: ICD-10-CM

## 2023-01-01 DIAGNOSIS — I48.0 PAF (PAROXYSMAL ATRIAL FIBRILLATION) (H): ICD-10-CM

## 2023-01-01 DIAGNOSIS — S32.010S COMPRESSION FRACTURE OF L1 VERTEBRA, SEQUELA: Primary | ICD-10-CM

## 2023-01-01 DIAGNOSIS — N18.31 STAGE 3A CHRONIC KIDNEY DISEASE (H): ICD-10-CM

## 2023-01-01 DIAGNOSIS — L89.623 PRESSURE ULCER OF LEFT HEEL, STAGE 3 (H): ICD-10-CM

## 2023-01-01 DIAGNOSIS — E08.621 DIABETIC ULCER OF TOE OF LEFT FOOT ASSOCIATED WITH DIABETES MELLITUS DUE TO UNDERLYING CONDITION, LIMITED TO BREAKDOWN OF SKIN (H): ICD-10-CM

## 2023-01-01 DIAGNOSIS — M54.50 CHRONIC BILATERAL LOW BACK PAIN WITHOUT SCIATICA: ICD-10-CM

## 2023-01-01 DIAGNOSIS — L97.523 DIABETIC ULCER OF TOE OF LEFT FOOT ASSOCIATED WITH DIABETES MELLITUS DUE TO UNDERLYING CONDITION, WITH NECROSIS OF MUSCLE (H): ICD-10-CM

## 2023-01-01 DIAGNOSIS — N18.30 STAGE 3 CHRONIC KIDNEY DISEASE, UNSPECIFIED WHETHER STAGE 3A OR 3B CKD (H): ICD-10-CM

## 2023-01-01 DIAGNOSIS — R53.81 PHYSICAL DECONDITIONING: Primary | ICD-10-CM

## 2023-01-01 DIAGNOSIS — Z53.9 DIAGNOSIS NOT YET DEFINED: Primary | ICD-10-CM

## 2023-01-01 DIAGNOSIS — N17.9 SEPSIS WITH ACUTE RENAL FAILURE WITHOUT SEPTIC SHOCK, DUE TO UNSPECIFIED ORGANISM, UNSPECIFIED ACUTE RENAL FAILURE TYPE (H): ICD-10-CM

## 2023-01-01 DIAGNOSIS — I48.0 PAROXYSMAL ATRIAL FIBRILLATION (H): Primary | ICD-10-CM

## 2023-01-01 DIAGNOSIS — E08.621 DIABETIC ULCER OF TOE OF LEFT FOOT ASSOCIATED WITH DIABETES MELLITUS DUE TO UNDERLYING CONDITION, WITH NECROSIS OF MUSCLE (H): ICD-10-CM

## 2023-01-01 DIAGNOSIS — R53.81 PHYSICAL DECONDITIONING: ICD-10-CM

## 2023-01-01 DIAGNOSIS — N18.9 CHRONIC KIDNEY DISEASE, UNSPECIFIED: ICD-10-CM

## 2023-01-01 DIAGNOSIS — R33.8 BENIGN PROSTATIC HYPERPLASIA WITH URINARY RETENTION: ICD-10-CM

## 2023-01-01 DIAGNOSIS — E08.621 DIABETIC ULCER OF LEFT MIDFOOT ASSOCIATED WITH DIABETES MELLITUS DUE TO UNDERLYING CONDITION, LIMITED TO BREAKDOWN OF SKIN (H): ICD-10-CM

## 2023-01-01 DIAGNOSIS — A41.9 SEPSIS WITH ACUTE RENAL FAILURE WITHOUT SEPTIC SHOCK, DUE TO UNSPECIFIED ORGANISM, UNSPECIFIED ACUTE RENAL FAILURE TYPE (H): ICD-10-CM

## 2023-01-01 DIAGNOSIS — R41.82 ALTERED MENTAL STATUS, UNSPECIFIED ALTERED MENTAL STATUS TYPE: ICD-10-CM

## 2023-01-01 DIAGNOSIS — L97.521 DIABETIC ULCER OF TOE OF LEFT FOOT ASSOCIATED WITH DIABETES MELLITUS DUE TO UNDERLYING CONDITION, LIMITED TO BREAKDOWN OF SKIN (H): Primary | ICD-10-CM

## 2023-01-01 DIAGNOSIS — Z79.4 TYPE 2 DIABETES MELLITUS WITH HYPERGLYCEMIA, WITH LONG-TERM CURRENT USE OF INSULIN (H): ICD-10-CM

## 2023-01-01 DIAGNOSIS — Z79.4 TYPE 2 DIABETES MELLITUS WITH DIABETIC PERIPHERAL ANGIOPATHY WITHOUT GANGRENE, WITH LONG-TERM CURRENT USE OF INSULIN (H): ICD-10-CM

## 2023-01-01 DIAGNOSIS — I73.9 PAD (PERIPHERAL ARTERY DISEASE) (H): Primary | ICD-10-CM

## 2023-01-01 DIAGNOSIS — Z79.4 TYPE 2 DIABETES MELLITUS WITH DIABETIC PERIPHERAL ANGIOPATHY WITHOUT GANGRENE, WITH LONG-TERM CURRENT USE OF INSULIN (H): Primary | ICD-10-CM

## 2023-01-01 DIAGNOSIS — I70.248 ATHEROSCLEROSIS OF NATIVE ARTERIES OF LEFT LEG WITH ULCERATION OF OTHER PART OF LOWER LEG (H): ICD-10-CM

## 2023-01-01 DIAGNOSIS — N39.0 URINARY TRACT INFECTION, SITE NOT SPECIFIED: ICD-10-CM

## 2023-01-01 DIAGNOSIS — I73.9 PAD (PERIPHERAL ARTERY DISEASE) (H): ICD-10-CM

## 2023-01-01 DIAGNOSIS — L97.421 DIABETIC ULCER OF LEFT MIDFOOT ASSOCIATED WITH DIABETES MELLITUS DUE TO UNDERLYING CONDITION, LIMITED TO BREAKDOWN OF SKIN (H): ICD-10-CM

## 2023-01-01 DIAGNOSIS — N30.00 ACUTE CYSTITIS WITHOUT HEMATURIA: ICD-10-CM

## 2023-01-01 DIAGNOSIS — I48.0 PAROXYSMAL ATRIAL FIBRILLATION (H): ICD-10-CM

## 2023-01-01 DIAGNOSIS — N30.01 ACUTE CYSTITIS WITH HEMATURIA: Primary | ICD-10-CM

## 2023-01-01 DIAGNOSIS — E11.621 DIABETIC ULCER OF RIGHT MIDFOOT ASSOCIATED WITH TYPE 2 DIABETES MELLITUS, WITH NECROSIS OF MUSCLE (H): ICD-10-CM

## 2023-01-01 DIAGNOSIS — L03.119 CELLULITIS AND ABSCESS OF FOOT, EXCEPT TOES: ICD-10-CM

## 2023-01-01 DIAGNOSIS — N39.0 URINARY TRACT INFECTION WITH HEMATURIA, SITE UNSPECIFIED: ICD-10-CM

## 2023-01-01 DIAGNOSIS — T83.9XXD PROBLEM WITH FOLEY CATHETER, SUBSEQUENT ENCOUNTER: Primary | ICD-10-CM

## 2023-01-01 DIAGNOSIS — E11.621 DIABETIC ULCER OF LEFT HEEL ASSOCIATED WITH TYPE 2 DIABETES MELLITUS, WITH FAT LAYER EXPOSED (H): Primary | ICD-10-CM

## 2023-01-01 DIAGNOSIS — L97.529 ULCER OF LEFT FOOT, WITH UNSPECIFIED SEVERITY (H): ICD-10-CM

## 2023-01-01 DIAGNOSIS — L97.422 DIABETIC ULCER OF LEFT HEEL ASSOCIATED WITH TYPE 2 DIABETES MELLITUS, WITH FAT LAYER EXPOSED (H): Primary | ICD-10-CM

## 2023-01-01 DIAGNOSIS — N30.01 ACUTE CYSTITIS WITH HEMATURIA: ICD-10-CM

## 2023-01-01 DIAGNOSIS — E11.65 TYPE 2 DIABETES MELLITUS WITH HYPERGLYCEMIA, WITH LONG-TERM CURRENT USE OF INSULIN (H): ICD-10-CM

## 2023-01-01 DIAGNOSIS — G47.51 CONFUSIONAL AROUSALS: ICD-10-CM

## 2023-01-01 DIAGNOSIS — R31.0 GROSS HEMATURIA: ICD-10-CM

## 2023-01-01 DIAGNOSIS — L03.119 CELLULITIS AND ABSCESS OF FOOT EXCLUDING TOE: Primary | ICD-10-CM

## 2023-01-01 DIAGNOSIS — M54.50 ACUTE RIGHT-SIDED LOW BACK PAIN WITHOUT SCIATICA: Primary | ICD-10-CM

## 2023-01-01 DIAGNOSIS — G89.29 CHRONIC BILATERAL LOW BACK PAIN WITHOUT SCIATICA: ICD-10-CM

## 2023-01-01 DIAGNOSIS — R41.89 COGNITIVE IMPAIRMENT: ICD-10-CM

## 2023-01-01 DIAGNOSIS — L97.521 ULCER OF LEFT FOOT, LIMITED TO BREAKDOWN OF SKIN (H): ICD-10-CM

## 2023-01-01 DIAGNOSIS — I10 ESSENTIAL HYPERTENSION: Chronic | ICD-10-CM

## 2023-01-01 DIAGNOSIS — R33.9 URINARY RETENTION: Primary | ICD-10-CM

## 2023-01-01 DIAGNOSIS — L97.413 DIABETIC ULCER OF RIGHT MIDFOOT ASSOCIATED WITH TYPE 2 DIABETES MELLITUS, WITH NECROSIS OF MUSCLE (H): ICD-10-CM

## 2023-01-01 DIAGNOSIS — R53.83 OTHER FATIGUE: ICD-10-CM

## 2023-01-01 DIAGNOSIS — Z01.818 PREOP GENERAL PHYSICAL EXAM: Primary | ICD-10-CM

## 2023-01-01 DIAGNOSIS — L02.619 CELLULITIS AND ABSCESS OF FOOT, EXCEPT TOES: ICD-10-CM

## 2023-01-01 DIAGNOSIS — R33.9 URINARY RETENTION: ICD-10-CM

## 2023-01-01 DIAGNOSIS — Z79.4 TYPE 2 DIABETES MELLITUS WITH DIABETIC PERIPHERAL ANGIOPATHY WITHOUT GANGRENE, WITH LONG-TERM CURRENT USE OF INSULIN (H): Chronic | ICD-10-CM

## 2023-01-01 DIAGNOSIS — E11.51 TYPE 2 DIABETES MELLITUS WITH DIABETIC PERIPHERAL ANGIOPATHY WITHOUT GANGRENE, WITH LONG-TERM CURRENT USE OF INSULIN (H): Primary | ICD-10-CM

## 2023-01-01 DIAGNOSIS — R54 FRAILTY SYNDROME IN GERIATRIC PATIENT: ICD-10-CM

## 2023-01-01 DIAGNOSIS — A41.9 SEPSIS, UNSPECIFIED ORGANISM (H): ICD-10-CM

## 2023-01-01 DIAGNOSIS — R31.9 URINARY TRACT INFECTION WITH HEMATURIA, SITE UNSPECIFIED: ICD-10-CM

## 2023-01-01 DIAGNOSIS — E53.8 VITAMIN B12 DEFICIENCY (NON ANEMIC): ICD-10-CM

## 2023-01-01 DIAGNOSIS — Z79.01 CHRONIC ANTICOAGULATION: ICD-10-CM

## 2023-01-01 DIAGNOSIS — E87.5 HYPERKALEMIA: ICD-10-CM

## 2023-01-01 DIAGNOSIS — I25.10 CORONARY ARTERY DISEASE INVOLVING NATIVE CORONARY ARTERY OF NATIVE HEART WITHOUT ANGINA PECTORIS: ICD-10-CM

## 2023-01-01 DIAGNOSIS — E08.621 DIABETIC ULCER OF TOE OF LEFT FOOT ASSOCIATED WITH DIABETES MELLITUS DUE TO UNDERLYING CONDITION, LIMITED TO BREAKDOWN OF SKIN (H): Primary | ICD-10-CM

## 2023-01-01 DIAGNOSIS — E11.51 TYPE 2 DIABETES MELLITUS WITH DIABETIC PERIPHERAL ANGIOPATHY WITHOUT GANGRENE, WITH LONG-TERM CURRENT USE OF INSULIN (H): Chronic | ICD-10-CM

## 2023-01-01 DIAGNOSIS — E11.65 TYPE 2 DIABETES MELLITUS WITH HYPERGLYCEMIA (H): ICD-10-CM

## 2023-01-01 DIAGNOSIS — N18.31 ANEMIA DUE TO STAGE 3A CHRONIC KIDNEY DISEASE (H): ICD-10-CM

## 2023-01-01 DIAGNOSIS — R65.20 SEPSIS WITH ACUTE RENAL FAILURE WITHOUT SEPTIC SHOCK, DUE TO UNSPECIFIED ORGANISM, UNSPECIFIED ACUTE RENAL FAILURE TYPE (H): ICD-10-CM

## 2023-01-01 LAB
ACT BLD: 139 SECONDS (ref 74–150)
ACT BLD: 216 SECONDS (ref 74–150)
ALBUMIN SERPL-MCNC: 2 G/DL (ref 3.5–5)
ALBUMIN SERPL-MCNC: 2.5 G/DL (ref 3.5–5)
ALBUMIN SERPL-MCNC: 3 G/DL (ref 3.5–5)
ALBUMIN UR-MCNC: 100 MG/DL
ALBUMIN UR-MCNC: 200 MG/DL
ALBUMIN UR-MCNC: 200 MG/DL
ALBUMIN UR-MCNC: 70 MG/DL
ALP SERPL-CCNC: 59 U/L (ref 45–120)
ALP SERPL-CCNC: 64 U/L (ref 45–120)
ALP SERPL-CCNC: 79 U/L (ref 45–120)
ALT SERPL W P-5'-P-CCNC: 24 U/L (ref 0–45)
ALT SERPL W P-5'-P-CCNC: 29 U/L (ref 0–45)
ALT SERPL W P-5'-P-CCNC: 31 U/L (ref 0–45)
ANION GAP SERPL CALCULATED.3IONS-SCNC: 10 MMOL/L (ref 5–18)
ANION GAP SERPL CALCULATED.3IONS-SCNC: 10 MMOL/L (ref 7–15)
ANION GAP SERPL CALCULATED.3IONS-SCNC: 11 MMOL/L (ref 7–15)
ANION GAP SERPL CALCULATED.3IONS-SCNC: 12 MMOL/L (ref 5–18)
ANION GAP SERPL CALCULATED.3IONS-SCNC: 16 MMOL/L (ref 5–18)
ANION GAP SERPL CALCULATED.3IONS-SCNC: 17 MMOL/L (ref 5–18)
ANION GAP SERPL CALCULATED.3IONS-SCNC: 3 MMOL/L (ref 5–18)
ANION GAP SERPL CALCULATED.3IONS-SCNC: 6 MMOL/L (ref 5–18)
ANION GAP SERPL CALCULATED.3IONS-SCNC: 7 MMOL/L (ref 5–18)
ANION GAP SERPL CALCULATED.3IONS-SCNC: 7 MMOL/L (ref 5–18)
ANION GAP SERPL CALCULATED.3IONS-SCNC: 7 MMOL/L (ref 7–15)
ANION GAP SERPL CALCULATED.3IONS-SCNC: 9 MMOL/L (ref 7–15)
APPEARANCE UR: ABNORMAL
AST SERPL W P-5'-P-CCNC: 32 U/L (ref 0–40)
AST SERPL W P-5'-P-CCNC: 37 U/L (ref 0–40)
AST SERPL W P-5'-P-CCNC: 42 U/L (ref 0–40)
ATRIAL RATE - MUSE: 63 BPM
ATRIAL RATE - MUSE: 74 BPM
BACTERIA BLD CULT: NO GROWTH
BACTERIA BLD CULT: NO GROWTH
BACTERIA SPEC CULT: NORMAL
BACTERIA UR CULT: NO GROWTH
BACTERIA WND CULT: ABNORMAL
BASE EXCESS BLDA CALC-SCNC: -13.3 MMOL/L
BASE EXCESS BLDV CALC-SCNC: -13.2 MMOL/L
BASE EXCESS BLDV CALC-SCNC: -9.3 MMOL/L
BASOPHILS # BLD AUTO: 0 10E3/UL (ref 0–0.2)
BASOPHILS NFR BLD AUTO: 0 %
BILIRUB DIRECT SERPL-MCNC: 0.2 MG/DL
BILIRUB SERPL-MCNC: 0.4 MG/DL (ref 0–1)
BILIRUB UR QL STRIP: NEGATIVE
BUN SERPL-MCNC: 111 MG/DL (ref 8–28)
BUN SERPL-MCNC: 121 MG/DL (ref 8–28)
BUN SERPL-MCNC: 151 MG/DL (ref 8–28)
BUN SERPL-MCNC: 155 MG/DL (ref 8–28)
BUN SERPL-MCNC: 167 MG/DL (ref 8–28)
BUN SERPL-MCNC: 17.6 MG/DL (ref 8–23)
BUN SERPL-MCNC: 22.6 MG/DL (ref 8–23)
BUN SERPL-MCNC: 24.5 MG/DL (ref 8–23)
BUN SERPL-MCNC: 25 MG/DL (ref 8–23)
BUN SERPL-MCNC: 47 MG/DL (ref 8–28)
BUN SERPL-MCNC: 57 MG/DL (ref 8–28)
BUN SERPL-MCNC: 78 MG/DL (ref 8–28)
CALCIUM SERPL-MCNC: 10.2 MG/DL (ref 8.5–10.5)
CALCIUM SERPL-MCNC: 8 MG/DL (ref 8.5–10.5)
CALCIUM SERPL-MCNC: 8.1 MG/DL (ref 8.5–10.5)
CALCIUM SERPL-MCNC: 8.2 MG/DL (ref 8.5–10.5)
CALCIUM SERPL-MCNC: 8.3 MG/DL (ref 8.8–10.2)
CALCIUM SERPL-MCNC: 8.4 MG/DL (ref 8.5–10.5)
CALCIUM SERPL-MCNC: 8.7 MG/DL (ref 8.8–10.2)
CALCIUM SERPL-MCNC: 8.8 MG/DL (ref 8.8–10.2)
CALCIUM SERPL-MCNC: 9.1 MG/DL (ref 8.5–10.5)
CALCIUM SERPL-MCNC: 9.2 MG/DL (ref 8.8–10.2)
CHLORIDE BLD-SCNC: 108 MMOL/L (ref 98–107)
CHLORIDE BLD-SCNC: 111 MMOL/L (ref 98–107)
CHLORIDE BLD-SCNC: 115 MMOL/L (ref 98–107)
CHLORIDE BLD-SCNC: 119 MMOL/L (ref 98–107)
CHLORIDE BLD-SCNC: 121 MMOL/L (ref 98–107)
CHLORIDE BLD-SCNC: 125 MMOL/L (ref 98–107)
CHLORIDE SERPL-SCNC: 102 MMOL/L (ref 98–107)
CHLORIDE SERPL-SCNC: 103 MMOL/L (ref 98–107)
CHLORIDE SERPL-SCNC: 104 MMOL/L (ref 98–107)
CHLORIDE SERPL-SCNC: 106 MMOL/L (ref 98–107)
CO2 SERPL-SCNC: 12 MMOL/L (ref 22–31)
CO2 SERPL-SCNC: 12 MMOL/L (ref 22–31)
CO2 SERPL-SCNC: 13 MMOL/L (ref 22–31)
CO2 SERPL-SCNC: 16 MMOL/L (ref 22–31)
CO2 SERPL-SCNC: 18 MMOL/L (ref 22–31)
CO2 SERPL-SCNC: 19 MMOL/L (ref 22–31)
CO2 SERPL-SCNC: 19 MMOL/L (ref 22–31)
CO2 SERPL-SCNC: 22 MMOL/L (ref 22–31)
COHGB MFR BLD: 97 % (ref 95–96)
COLOR UR AUTO: ABNORMAL
COLOR UR AUTO: YELLOW
CREAT SERPL-MCNC: 1.16 MG/DL (ref 0.67–1.17)
CREAT SERPL-MCNC: 1.19 MG/DL (ref 0.67–1.17)
CREAT SERPL-MCNC: 1.21 MG/DL (ref 0.67–1.17)
CREAT SERPL-MCNC: 1.23 MG/DL (ref 0.7–1.3)
CREAT SERPL-MCNC: 1.35 MG/DL (ref 0.67–1.17)
CREAT SERPL-MCNC: 1.38 MG/DL (ref 0.7–1.3)
CREAT SERPL-MCNC: 1.62 MG/DL (ref 0.7–1.3)
CREAT SERPL-MCNC: 1.76 MG/DL (ref 0.7–1.3)
CREAT SERPL-MCNC: 1.92 MG/DL (ref 0.7–1.3)
CREAT SERPL-MCNC: 2.46 MG/DL (ref 0.7–1.3)
CREAT SERPL-MCNC: 3.45 MG/DL (ref 0.7–1.3)
CREAT SERPL-MCNC: 4.05 MG/DL (ref 0.7–1.3)
CREAT SERPL-MCNC: 4.62 MG/DL (ref 0.7–1.3)
CREAT SERPL-MCNC: 4.83 MG/DL (ref 0.7–1.3)
DEPRECATED HCO3 PLAS-SCNC: 23 MMOL/L (ref 22–29)
DEPRECATED HCO3 PLAS-SCNC: 24 MMOL/L (ref 22–29)
DEPRECATED HCO3 PLAS-SCNC: 25 MMOL/L (ref 22–29)
DEPRECATED HCO3 PLAS-SCNC: 26 MMOL/L (ref 22–29)
DIASTOLIC BLOOD PRESSURE - MUSE: 54 MMHG
DIASTOLIC BLOOD PRESSURE - MUSE: NORMAL MMHG
EOSINOPHIL # BLD AUTO: 0 10E3/UL (ref 0–0.7)
EOSINOPHIL # BLD AUTO: 0 10E3/UL (ref 0–0.7)
EOSINOPHIL # BLD AUTO: 0.1 10E3/UL (ref 0–0.7)
EOSINOPHIL # BLD AUTO: 0.1 10E3/UL (ref 0–0.7)
EOSINOPHIL NFR BLD AUTO: 0 %
EOSINOPHIL NFR BLD AUTO: 0 %
EOSINOPHIL NFR BLD AUTO: 1 %
EOSINOPHIL NFR BLD AUTO: 2 %
ERYTHROCYTE [DISTWIDTH] IN BLOOD BY AUTOMATED COUNT: 12.9 % (ref 10–15)
ERYTHROCYTE [DISTWIDTH] IN BLOOD BY AUTOMATED COUNT: 13.2 % (ref 10–15)
ERYTHROCYTE [DISTWIDTH] IN BLOOD BY AUTOMATED COUNT: 13.2 % (ref 10–15)
ERYTHROCYTE [DISTWIDTH] IN BLOOD BY AUTOMATED COUNT: 13.4 % (ref 10–15)
ERYTHROCYTE [DISTWIDTH] IN BLOOD BY AUTOMATED COUNT: 13.9 % (ref 10–15)
ERYTHROCYTE [DISTWIDTH] IN BLOOD BY AUTOMATED COUNT: 14 % (ref 10–15)
ERYTHROCYTE [DISTWIDTH] IN BLOOD BY AUTOMATED COUNT: 14.1 % (ref 10–15)
ERYTHROCYTE [DISTWIDTH] IN BLOOD BY AUTOMATED COUNT: 15.2 % (ref 10–15)
FERRITIN SERPL-MCNC: 497 NG/ML (ref 31–409)
FLUAV RNA SPEC QL NAA+PROBE: NEGATIVE
FLUBV RNA RESP QL NAA+PROBE: NEGATIVE
FOLATE SERPL-MCNC: 11.2 NG/ML (ref 4.6–34.8)
GFR SERPL CREATININE-BSD FRML MDRD: 11 ML/MIN/1.73M2
GFR SERPL CREATININE-BSD FRML MDRD: 12 ML/MIN/1.73M2
GFR SERPL CREATININE-BSD FRML MDRD: 14 ML/MIN/1.73M2
GFR SERPL CREATININE-BSD FRML MDRD: 17 ML/MIN/1.73M2
GFR SERPL CREATININE-BSD FRML MDRD: 25 ML/MIN/1.73M2
GFR SERPL CREATININE-BSD FRML MDRD: 34 ML/MIN/1.73M2
GFR SERPL CREATININE-BSD FRML MDRD: 37 ML/MIN/1.73M2
GFR SERPL CREATININE-BSD FRML MDRD: 41 ML/MIN/1.73M2
GFR SERPL CREATININE-BSD FRML MDRD: 50 ML/MIN/1.73M2
GFR SERPL CREATININE-BSD FRML MDRD: 51 ML/MIN/1.73M2
GFR SERPL CREATININE-BSD FRML MDRD: 57 ML/MIN/1.73M2
GFR SERPL CREATININE-BSD FRML MDRD: 58 ML/MIN/1.73M2
GFR SERPL CREATININE-BSD FRML MDRD: 59 ML/MIN/1.73M2
GFR SERPL CREATININE-BSD FRML MDRD: 61 ML/MIN/1.73M2
GLUCOSE BLD-MCNC: 111 MG/DL (ref 70–125)
GLUCOSE BLD-MCNC: 113 MG/DL (ref 70–125)
GLUCOSE BLD-MCNC: 129 MG/DL (ref 70–125)
GLUCOSE BLD-MCNC: 145 MG/DL (ref 70–125)
GLUCOSE BLD-MCNC: 148 MG/DL (ref 70–125)
GLUCOSE BLD-MCNC: 154 MG/DL (ref 70–125)
GLUCOSE BLD-MCNC: 79 MG/DL (ref 70–125)
GLUCOSE BLD-MCNC: 95 MG/DL (ref 70–125)
GLUCOSE BLDC GLUCOMTR-MCNC: 104 MG/DL (ref 70–99)
GLUCOSE BLDC GLUCOMTR-MCNC: 105 MG/DL (ref 70–99)
GLUCOSE BLDC GLUCOMTR-MCNC: 105 MG/DL (ref 70–99)
GLUCOSE BLDC GLUCOMTR-MCNC: 106 MG/DL (ref 70–99)
GLUCOSE BLDC GLUCOMTR-MCNC: 109 MG/DL (ref 70–99)
GLUCOSE BLDC GLUCOMTR-MCNC: 109 MG/DL (ref 70–99)
GLUCOSE BLDC GLUCOMTR-MCNC: 111 MG/DL (ref 70–99)
GLUCOSE BLDC GLUCOMTR-MCNC: 112 MG/DL (ref 70–99)
GLUCOSE BLDC GLUCOMTR-MCNC: 116 MG/DL (ref 70–99)
GLUCOSE BLDC GLUCOMTR-MCNC: 118 MG/DL (ref 70–99)
GLUCOSE BLDC GLUCOMTR-MCNC: 119 MG/DL (ref 70–99)
GLUCOSE BLDC GLUCOMTR-MCNC: 120 MG/DL (ref 70–99)
GLUCOSE BLDC GLUCOMTR-MCNC: 121 MG/DL (ref 70–99)
GLUCOSE BLDC GLUCOMTR-MCNC: 128 MG/DL (ref 70–99)
GLUCOSE BLDC GLUCOMTR-MCNC: 133 MG/DL (ref 70–99)
GLUCOSE BLDC GLUCOMTR-MCNC: 136 MG/DL (ref 70–99)
GLUCOSE BLDC GLUCOMTR-MCNC: 139 MG/DL (ref 70–99)
GLUCOSE BLDC GLUCOMTR-MCNC: 143 MG/DL (ref 70–99)
GLUCOSE BLDC GLUCOMTR-MCNC: 144 MG/DL (ref 70–99)
GLUCOSE BLDC GLUCOMTR-MCNC: 147 MG/DL (ref 70–99)
GLUCOSE BLDC GLUCOMTR-MCNC: 158 MG/DL (ref 70–99)
GLUCOSE BLDC GLUCOMTR-MCNC: 165 MG/DL (ref 70–99)
GLUCOSE BLDC GLUCOMTR-MCNC: 173 MG/DL (ref 70–99)
GLUCOSE BLDC GLUCOMTR-MCNC: 175 MG/DL (ref 70–99)
GLUCOSE BLDC GLUCOMTR-MCNC: 175 MG/DL (ref 70–99)
GLUCOSE BLDC GLUCOMTR-MCNC: 201 MG/DL (ref 70–99)
GLUCOSE BLDC GLUCOMTR-MCNC: 226 MG/DL (ref 70–99)
GLUCOSE BLDC GLUCOMTR-MCNC: 232 MG/DL (ref 70–99)
GLUCOSE BLDC GLUCOMTR-MCNC: 244 MG/DL (ref 70–99)
GLUCOSE BLDC GLUCOMTR-MCNC: 245 MG/DL (ref 70–99)
GLUCOSE BLDC GLUCOMTR-MCNC: 57 MG/DL (ref 70–99)
GLUCOSE BLDC GLUCOMTR-MCNC: 83 MG/DL (ref 70–99)
GLUCOSE BLDC GLUCOMTR-MCNC: 88 MG/DL (ref 70–99)
GLUCOSE BLDC GLUCOMTR-MCNC: 90 MG/DL (ref 70–99)
GLUCOSE SERPL-MCNC: 105 MG/DL (ref 70–99)
GLUCOSE SERPL-MCNC: 148 MG/DL (ref 70–99)
GLUCOSE SERPL-MCNC: 89 MG/DL (ref 70–99)
GLUCOSE SERPL-MCNC: 94 MG/DL (ref 70–99)
GLUCOSE UR STRIP-MCNC: 100 MG/DL
GLUCOSE UR STRIP-MCNC: 1000 MG/DL
GLUCOSE UR STRIP-MCNC: NEGATIVE MG/DL
GLUCOSE UR STRIP-MCNC: NEGATIVE MG/DL
HAPTOGLOB SERPL-MCNC: 134 MG/DL (ref 32–197)
HBA1C MFR BLD: 7.5 %
HCO3 BLD-SCNC: 13 MMOL/L (ref 23–29)
HCO3 BLDV-SCNC: 15 MMOL/L (ref 24–30)
HCO3 BLDV-SCNC: 17 MMOL/L (ref 24–30)
HCT VFR BLD AUTO: 25.5 % (ref 40–53)
HCT VFR BLD AUTO: 26.9 % (ref 40–53)
HCT VFR BLD AUTO: 27.7 % (ref 40–53)
HCT VFR BLD AUTO: 28 % (ref 40–53)
HCT VFR BLD AUTO: 28.3 % (ref 40–53)
HCT VFR BLD AUTO: 29.2 % (ref 40–53)
HCT VFR BLD AUTO: 32 % (ref 40–53)
HCT VFR BLD AUTO: 33.4 % (ref 40–53)
HCT VFR BLD AUTO: 34.2 % (ref 40–53)
HCT VFR BLD AUTO: 35.1 % (ref 40–53)
HEMOCCULT STL QL: POSITIVE
HGB BLD-MCNC: 10.4 G/DL (ref 13.3–17.7)
HGB BLD-MCNC: 10.4 G/DL (ref 13.3–17.7)
HGB BLD-MCNC: 10.9 G/DL (ref 13.3–17.7)
HGB BLD-MCNC: 12.1 G/DL (ref 13.3–17.7)
HGB BLD-MCNC: 8.5 G/DL (ref 13.3–17.7)
HGB BLD-MCNC: 8.5 G/DL (ref 13.3–17.7)
HGB BLD-MCNC: 8.6 G/DL (ref 13.3–17.7)
HGB BLD-MCNC: 8.8 G/DL (ref 13.3–17.7)
HGB BLD-MCNC: 8.9 G/DL (ref 13.3–17.7)
HGB BLD-MCNC: 8.9 G/DL (ref 13.3–17.7)
HGB BLD-MCNC: 9.1 G/DL (ref 13.3–17.7)
HGB BLD-MCNC: 9.2 G/DL (ref 13.3–17.7)
HGB BLD-MCNC: 9.3 G/DL (ref 13.3–17.7)
HGB BLD-MCNC: 9.5 G/DL (ref 13.3–17.7)
HGB BLD-MCNC: 9.5 G/DL (ref 13.3–17.7)
HGB BLD-MCNC: 9.6 G/DL (ref 13.3–17.7)
HGB UR QL STRIP: ABNORMAL
IMM GRANULOCYTES # BLD: 0.1 10E3/UL
IMM GRANULOCYTES NFR BLD: 1 %
INR PPP: 1.04 (ref 0.85–1.15)
INTERPRETATION ECG - MUSE: NORMAL
INTERPRETATION ECG - MUSE: NORMAL
KETONES BLD-SCNC: 0.32 MMOL/L
KETONES BLD-SCNC: 0.46 MMOL/L
KETONES BLD-SCNC: 0.6 MMOL/L
KETONES BLD-SCNC: 0.73 MMOL/L
KETONES BLD-SCNC: 0.78 MMOL/L
KETONES BLD-SCNC: 0.82 MMOL/L
KETONES BLD-SCNC: 1.03 MMOL/L
KETONES BLD-SCNC: 1.39 MMOL/L
KETONES BLD-SCNC: 2.95 MMOL/L
KETONES UR STRIP-MCNC: ABNORMAL MG/DL
KETONES UR STRIP-MCNC: NEGATIVE MG/DL
LACTATE SERPL-SCNC: 2 MMOL/L (ref 0.7–2)
LDH SERPL L TO P-CCNC: 210 U/L (ref 125–220)
LEUKOCYTE ESTERASE UR QL STRIP: ABNORMAL
LYMPHOCYTES # BLD AUTO: 0.3 10E3/UL (ref 0.8–5.3)
LYMPHOCYTES # BLD AUTO: 0.6 10E3/UL (ref 0.8–5.3)
LYMPHOCYTES # BLD AUTO: 0.7 10E3/UL (ref 0.8–5.3)
LYMPHOCYTES # BLD AUTO: 1 10E3/UL (ref 0.8–5.3)
LYMPHOCYTES NFR BLD AUTO: 12 %
LYMPHOCYTES NFR BLD AUTO: 12 %
LYMPHOCYTES NFR BLD AUTO: 6 %
LYMPHOCYTES NFR BLD AUTO: 9 %
MAGNESIUM SERPL-MCNC: 2 MG/DL (ref 1.7–2.3)
MAGNESIUM SERPL-MCNC: 2.3 MG/DL (ref 1.8–2.6)
MAGNESIUM SERPL-MCNC: 2.4 MG/DL (ref 1.8–2.6)
MCH RBC QN AUTO: 28.4 PG (ref 26.5–33)
MCH RBC QN AUTO: 28.8 PG (ref 26.5–33)
MCH RBC QN AUTO: 28.9 PG (ref 26.5–33)
MCH RBC QN AUTO: 29 PG (ref 26.5–33)
MCH RBC QN AUTO: 29 PG (ref 26.5–33)
MCH RBC QN AUTO: 29.2 PG (ref 26.5–33)
MCH RBC QN AUTO: 29.2 PG (ref 26.5–33)
MCH RBC QN AUTO: 29.3 PG (ref 26.5–33)
MCH RBC QN AUTO: 29.4 PG (ref 26.5–33)
MCH RBC QN AUTO: 29.5 PG (ref 26.5–33)
MCHC RBC AUTO-ENTMCNC: 30 G/DL (ref 31.5–36.5)
MCHC RBC AUTO-ENTMCNC: 30.7 G/DL (ref 31.5–36.5)
MCHC RBC AUTO-ENTMCNC: 31.1 G/DL (ref 31.5–36.5)
MCHC RBC AUTO-ENTMCNC: 31.9 G/DL (ref 31.5–36.5)
MCHC RBC AUTO-ENTMCNC: 32.5 G/DL (ref 31.5–36.5)
MCHC RBC AUTO-ENTMCNC: 32.5 G/DL (ref 31.5–36.5)
MCHC RBC AUTO-ENTMCNC: 33.1 G/DL (ref 31.5–36.5)
MCHC RBC AUTO-ENTMCNC: 33.7 G/DL (ref 31.5–36.5)
MCHC RBC AUTO-ENTMCNC: 33.9 G/DL (ref 31.5–36.5)
MCHC RBC AUTO-ENTMCNC: 34.5 G/DL (ref 31.5–36.5)
MCV RBC AUTO: 85 FL (ref 78–100)
MCV RBC AUTO: 85 FL (ref 78–100)
MCV RBC AUTO: 86 FL (ref 78–100)
MCV RBC AUTO: 88 FL (ref 78–100)
MCV RBC AUTO: 88 FL (ref 78–100)
MCV RBC AUTO: 90 FL (ref 78–100)
MCV RBC AUTO: 93 FL (ref 78–100)
MCV RBC AUTO: 93 FL (ref 78–100)
MCV RBC AUTO: 94 FL (ref 78–100)
MCV RBC AUTO: 98 FL (ref 78–100)
MONOCYTES # BLD AUTO: 0 10E3/UL (ref 0–1.3)
MONOCYTES # BLD AUTO: 0.1 10E3/UL (ref 0–1.3)
MONOCYTES # BLD AUTO: 0.2 10E3/UL (ref 0–1.3)
MONOCYTES # BLD AUTO: 0.5 10E3/UL (ref 0–1.3)
MONOCYTES NFR BLD AUTO: 0 %
MONOCYTES NFR BLD AUTO: 3 %
MONOCYTES NFR BLD AUTO: 4 %
MONOCYTES NFR BLD AUTO: 5 %
NEUTROPHILS # BLD AUTO: 3.7 10E3/UL (ref 1.6–8.3)
NEUTROPHILS # BLD AUTO: 4 10E3/UL (ref 1.6–8.3)
NEUTROPHILS # BLD AUTO: 4.8 10E3/UL (ref 1.6–8.3)
NEUTROPHILS # BLD AUTO: 9.2 10E3/UL (ref 1.6–8.3)
NEUTROPHILS NFR BLD AUTO: 82 %
NEUTROPHILS NFR BLD AUTO: 82 %
NEUTROPHILS NFR BLD AUTO: 85 %
NEUTROPHILS NFR BLD AUTO: 93 %
NITRATE UR QL: NEGATIVE
NRBC # BLD AUTO: 0 10E3/UL
NRBC BLD AUTO-RTO: 0 /100
NRBC BLD AUTO-RTO: 1 /100
OSMOLALITY SERPL: 350 MMOL/KG (ref 280–301)
OXYHGB MFR BLD: 95.1 % (ref 95–96)
OXYHGB MFR BLDV: 32.2 % (ref 70–75)
OXYHGB MFR BLDV: 77.1 % (ref 70–75)
P AXIS - MUSE: 52 DEGREES
P AXIS - MUSE: NORMAL DEGREES
PATH REPORT.COMMENTS IMP SPEC: NORMAL
PATH REPORT.COMMENTS IMP SPEC: NORMAL
PATH REPORT.FINAL DX SPEC: NORMAL
PATH REPORT.RELEVANT HX SPEC: NORMAL
PCO2 BLD: 26 MM HG (ref 35–45)
PCO2 BLDV: 31 MM HG (ref 35–50)
PCO2 BLDV: 35 MM HG (ref 35–50)
PH BLD: 7.3 [PH] (ref 7.37–7.44)
PH BLDV: 7.22 [PH] (ref 7.35–7.45)
PH BLDV: 7.34 [PH] (ref 7.35–7.45)
PH UR STRIP: 6 [PH] (ref 5–7)
PHOSPHATE SERPL-MCNC: 7.9 MG/DL (ref 2.5–4.5)
PLATELET # BLD AUTO: 154 10E3/UL (ref 150–450)
PLATELET # BLD AUTO: 187 10E3/UL (ref 150–450)
PLATELET # BLD AUTO: 192 10E3/UL (ref 150–450)
PLATELET # BLD AUTO: 205 10E3/UL (ref 150–450)
PLATELET # BLD AUTO: 258 10E3/UL (ref 150–450)
PLATELET # BLD AUTO: 260 10E3/UL (ref 150–450)
PLATELET # BLD AUTO: 274 10E3/UL (ref 150–450)
PLATELET # BLD AUTO: 318 10E3/UL (ref 150–450)
PLATELET # BLD AUTO: 336 10E3/UL (ref 150–450)
PLATELET # BLD AUTO: 361 10E3/UL (ref 150–450)
PLATELET # BLD AUTO: 395 10E3/UL (ref 150–450)
PLATELET # BLD AUTO: 396 10E3/UL (ref 150–450)
PO2 BLD: 87 MM HG (ref 75–85)
PO2 BLDV: 23 MM HG (ref 25–47)
PO2 BLDV: 45 MM HG (ref 25–47)
POTASSIUM BLD-SCNC: 3.9 MMOL/L (ref 3.5–5)
POTASSIUM BLD-SCNC: 4 MMOL/L (ref 3.5–5)
POTASSIUM BLD-SCNC: 4.3 MMOL/L (ref 3.5–5)
POTASSIUM BLD-SCNC: 4.5 MMOL/L (ref 3.5–5)
POTASSIUM BLD-SCNC: 4.8 MMOL/L (ref 3.5–5)
POTASSIUM BLD-SCNC: 4.9 MMOL/L (ref 3.5–5)
POTASSIUM BLD-SCNC: 5.4 MMOL/L (ref 3.5–5)
POTASSIUM BLD-SCNC: 5.5 MMOL/L (ref 3.5–5)
POTASSIUM BLD-SCNC: 6.3 MMOL/L (ref 3.5–5)
POTASSIUM SERPL-SCNC: 4.4 MMOL/L (ref 3.4–5.3)
POTASSIUM SERPL-SCNC: 4.7 MMOL/L (ref 3.4–5.3)
POTASSIUM SERPL-SCNC: 5 MMOL/L (ref 3.4–5.3)
POTASSIUM SERPL-SCNC: 5 MMOL/L (ref 3.4–5.3)
PR INTERVAL - MUSE: 160 MS
PR INTERVAL - MUSE: NORMAL MS
PROT SERPL-MCNC: 4.7 G/DL (ref 6–8)
PROT SERPL-MCNC: 6.2 G/DL (ref 6–8)
PROT SERPL-MCNC: 7.6 G/DL (ref 6–8)
QRS DURATION - MUSE: 84 MS
QRS DURATION - MUSE: 88 MS
QT - MUSE: 364 MS
QT - MUSE: 368 MS
QTC - MUSE: 404 MS
QTC - MUSE: 405 MS
R AXIS - MUSE: 10 DEGREES
R AXIS - MUSE: 7 DEGREES
RBC # BLD AUTO: 2.98 10E6/UL (ref 4.4–5.9)
RBC # BLD AUTO: 2.99 10E6/UL (ref 4.4–5.9)
RBC # BLD AUTO: 3.07 10E6/UL (ref 4.4–5.9)
RBC # BLD AUTO: 3.2 10E6/UL (ref 4.4–5.9)
RBC # BLD AUTO: 3.25 10E6/UL (ref 4.4–5.9)
RBC # BLD AUTO: 3.27 10E6/UL (ref 4.4–5.9)
RBC # BLD AUTO: 3.28 10E6/UL (ref 4.4–5.9)
RBC # BLD AUTO: 3.55 10E6/UL (ref 4.4–5.9)
RBC # BLD AUTO: 3.69 10E6/UL (ref 4.4–5.9)
RBC # BLD AUTO: 4.14 10E6/UL (ref 4.4–5.9)
RBC URINE: 109 /HPF
RBC URINE: 47 /HPF
RBC URINE: 58 /HPF
RBC URINE: 9 /HPF
RETICS # AUTO: 0.05 10E6/UL (ref 0.01–0.11)
RETICS # AUTO: 0.05 10E6/UL (ref 0.01–0.11)
RETICS/RBC NFR AUTO: 1.7 % (ref 0.8–2.7)
RETICS/RBC NFR AUTO: 1.7 % (ref 0.8–2.7)
RSV RNA SPEC NAA+PROBE: NEGATIVE
SAO2 % BLDV: 32.7 % (ref 70–75)
SAO2 % BLDV: 78.6 % (ref 70–75)
SARS-COV-2 RNA RESP QL NAA+PROBE: NEGATIVE
SODIUM SERPL-SCNC: 135 MMOL/L (ref 136–145)
SODIUM SERPL-SCNC: 138 MMOL/L (ref 136–145)
SODIUM SERPL-SCNC: 139 MMOL/L (ref 136–145)
SODIUM SERPL-SCNC: 140 MMOL/L (ref 136–145)
SODIUM SERPL-SCNC: 141 MMOL/L (ref 136–145)
SODIUM SERPL-SCNC: 145 MMOL/L (ref 136–145)
SODIUM SERPL-SCNC: 145 MMOL/L (ref 136–145)
SODIUM SERPL-SCNC: 146 MMOL/L (ref 136–145)
SODIUM SERPL-SCNC: 150 MMOL/L (ref 136–145)
SP GR UR STRIP: 1.01 (ref 1–1.03)
SP GR UR STRIP: 1.02 (ref 1–1.03)
SYSTOLIC BLOOD PRESSURE - MUSE: 108 MMHG
SYSTOLIC BLOOD PRESSURE - MUSE: NORMAL MMHG
T AXIS - MUSE: 45 DEGREES
T AXIS - MUSE: 56 DEGREES
TEMPERATURE: 37 DEGREES C
TRANSITIONAL EPI: 1 /HPF
TROPONIN I SERPL-MCNC: 0.04 NG/ML (ref 0–0.29)
UROBILINOGEN UR STRIP-MCNC: <2 MG/DL
UROBILINOGEN UR STRIP-MCNC: NORMAL MG/DL
VANCOMYCIN SERPL-MCNC: 11.4 MG/L
VANCOMYCIN SERPL-MCNC: 9.1 MG/L
VENTRICULAR RATE- MUSE: 73 BPM
VENTRICULAR RATE- MUSE: 74 BPM
VIT B12 SERPL-MCNC: <150 PG/ML (ref 232–1245)
WBC # BLD AUTO: 10.9 10E3/UL (ref 4–11)
WBC # BLD AUTO: 11.3 10E3/UL (ref 4–11)
WBC # BLD AUTO: 4.1 10E3/UL (ref 4–11)
WBC # BLD AUTO: 4.7 10E3/UL (ref 4–11)
WBC # BLD AUTO: 4.8 10E3/UL (ref 4–11)
WBC # BLD AUTO: 5.7 10E3/UL (ref 4–11)
WBC # BLD AUTO: 5.8 10E3/UL (ref 4–11)
WBC # BLD AUTO: 6.1 10E3/UL (ref 4–11)
WBC # BLD AUTO: 6.1 10E3/UL (ref 4–11)
WBC # BLD AUTO: 7.2 10E3/UL (ref 4–11)
WBC CLUMPS #/AREA URNS HPF: PRESENT /HPF
WBC URINE: >182 /HPF

## 2023-01-01 PROCEDURE — 80048 BASIC METABOLIC PNL TOTAL CA: CPT | Performed by: STUDENT IN AN ORGANIZED HEALTH CARE EDUCATION/TRAINING PROGRAM

## 2023-01-01 PROCEDURE — G0463 HOSPITAL OUTPT CLINIC VISIT: HCPCS | Performed by: PODIATRIST

## 2023-01-01 PROCEDURE — 85027 COMPLETE CBC AUTOMATED: CPT | Performed by: STUDENT IN AN ORGANIZED HEALTH CARE EDUCATION/TRAINING PROGRAM

## 2023-01-01 PROCEDURE — 96365 THER/PROPH/DIAG IV INF INIT: CPT | Mod: 59

## 2023-01-01 PROCEDURE — 36415 COLL VENOUS BLD VENIPUNCTURE: CPT | Performed by: STUDENT IN AN ORGANIZED HEALTH CARE EDUCATION/TRAINING PROGRAM

## 2023-01-01 PROCEDURE — G0463 HOSPITAL OUTPT CLINIC VISIT: HCPCS

## 2023-01-01 PROCEDURE — 99213 OFFICE O/P EST LOW 20 MIN: CPT | Mod: 25 | Performed by: PODIATRIST

## 2023-01-01 PROCEDURE — G0179 MD RECERTIFICATION HHA PT: HCPCS | Performed by: INTERNAL MEDICINE

## 2023-01-01 PROCEDURE — 272N000500 HC NEEDLE CR2

## 2023-01-01 PROCEDURE — G0463 HOSPITAL OUTPT CLINIC VISIT: HCPCS | Mod: 25 | Performed by: PODIATRIST

## 2023-01-01 PROCEDURE — 85027 COMPLETE CBC AUTOMATED: CPT | Performed by: INTERNAL MEDICINE

## 2023-01-01 PROCEDURE — 258N000001 HC RX 258: Performed by: EMERGENCY MEDICINE

## 2023-01-01 PROCEDURE — 999N000157 HC STATISTIC RCP TIME EA 10 MIN

## 2023-01-01 PROCEDURE — 36415 COLL VENOUS BLD VENIPUNCTURE: CPT | Performed by: INTERNAL MEDICINE

## 2023-01-01 PROCEDURE — 85025 COMPLETE CBC W/AUTO DIFF WBC: CPT | Performed by: STUDENT IN AN ORGANIZED HEALTH CARE EDUCATION/TRAINING PROGRAM

## 2023-01-01 PROCEDURE — 250N000013 HC RX MED GY IP 250 OP 250 PS 637: Performed by: NURSE PRACTITIONER

## 2023-01-01 PROCEDURE — 83930 ASSAY OF BLOOD OSMOLALITY: CPT | Performed by: STUDENT IN AN ORGANIZED HEALTH CARE EDUCATION/TRAINING PROGRAM

## 2023-01-01 PROCEDURE — 71250 CT THORAX DX C-: CPT

## 2023-01-01 PROCEDURE — 99232 SBSQ HOSP IP/OBS MODERATE 35: CPT | Performed by: NURSE PRACTITIONER

## 2023-01-01 PROCEDURE — 250N000011 HC RX IP 250 OP 636: Performed by: INTERNAL MEDICINE

## 2023-01-01 PROCEDURE — 84132 ASSAY OF SERUM POTASSIUM: CPT | Performed by: INTERNAL MEDICINE

## 2023-01-01 PROCEDURE — C9113 INJ PANTOPRAZOLE SODIUM, VIA: HCPCS | Performed by: STUDENT IN AN ORGANIZED HEALTH CARE EDUCATION/TRAINING PROGRAM

## 2023-01-01 PROCEDURE — 93923 UPR/LXTR ART STDY 3+ LVLS: CPT

## 2023-01-01 PROCEDURE — 97166 OT EVAL MOD COMPLEX 45 MIN: CPT | Mod: GO

## 2023-01-01 PROCEDURE — 82010 KETONE BODYS QUAN: CPT | Performed by: STUDENT IN AN ORGANIZED HEALTH CARE EDUCATION/TRAINING PROGRAM

## 2023-01-01 PROCEDURE — 250N000009 HC RX 250: Performed by: RADIOLOGY

## 2023-01-01 PROCEDURE — 37232 HC REVASC TIB-PERON ARTANGIOPLASTY EA ADL VESSEL: CPT

## 2023-01-01 PROCEDURE — 99214 OFFICE O/P EST MOD 30 MIN: CPT | Performed by: INTERNAL MEDICINE

## 2023-01-01 PROCEDURE — 36415 COLL VENOUS BLD VENIPUNCTURE: CPT | Performed by: EMERGENCY MEDICINE

## 2023-01-01 PROCEDURE — 82962 GLUCOSE BLOOD TEST: CPT

## 2023-01-01 PROCEDURE — 82010 KETONE BODYS QUAN: CPT | Performed by: EMERGENCY MEDICINE

## 2023-01-01 PROCEDURE — 80048 BASIC METABOLIC PNL TOTAL CA: CPT | Performed by: NURSE PRACTITIONER

## 2023-01-01 PROCEDURE — 73720 MRI LWR EXTREMITY W/O&W/DYE: CPT | Mod: LT

## 2023-01-01 PROCEDURE — 11043 DBRDMT MUSC&/FSCA 1ST 20/<: CPT | Performed by: PODIATRIST

## 2023-01-01 PROCEDURE — P9604 ONE-WAY ALLOW PRORATED TRIP: HCPCS | Mod: ORL | Performed by: FAMILY MEDICINE

## 2023-01-01 PROCEDURE — 85018 HEMOGLOBIN: CPT | Performed by: INTERNAL MEDICINE

## 2023-01-01 PROCEDURE — 83615 LACTATE (LD) (LDH) ENZYME: CPT | Performed by: STUDENT IN AN ORGANIZED HEALTH CARE EDUCATION/TRAINING PROGRAM

## 2023-01-01 PROCEDURE — 99309 SBSQ NF CARE MODERATE MDM 30: CPT | Performed by: NURSE PRACTITIONER

## 2023-01-01 PROCEDURE — 99152 MOD SED SAME PHYS/QHP 5/>YRS: CPT | Performed by: RADIOLOGY

## 2023-01-01 PROCEDURE — 258N000003 HC RX IP 258 OP 636: Performed by: STUDENT IN AN ORGANIZED HEALTH CARE EDUCATION/TRAINING PROGRAM

## 2023-01-01 PROCEDURE — 99207 PR NO CHARGE LOS: CPT | Performed by: INTERNAL MEDICINE

## 2023-01-01 PROCEDURE — 82248 BILIRUBIN DIRECT: CPT | Performed by: STUDENT IN AN ORGANIZED HEALTH CARE EDUCATION/TRAINING PROGRAM

## 2023-01-01 PROCEDURE — 120N000004 HC R&B MS OVERFLOW

## 2023-01-01 PROCEDURE — 93005 ELECTROCARDIOGRAM TRACING: CPT | Performed by: EMERGENCY MEDICINE

## 2023-01-01 PROCEDURE — 255N000002 HC RX 255 OP 636: Performed by: RADIOLOGY

## 2023-01-01 PROCEDURE — 250N000011 HC RX IP 250 OP 636: Performed by: STUDENT IN AN ORGANIZED HEALTH CARE EDUCATION/TRAINING PROGRAM

## 2023-01-01 PROCEDURE — 272N000566 HC SHEATH CR3

## 2023-01-01 PROCEDURE — 99305 1ST NF CARE MODERATE MDM 35: CPT | Performed by: FAMILY MEDICINE

## 2023-01-01 PROCEDURE — 82272 OCCULT BLD FECES 1-3 TESTS: CPT | Performed by: STUDENT IN AN ORGANIZED HEALTH CARE EDUCATION/TRAINING PROGRAM

## 2023-01-01 PROCEDURE — 11042 DBRDMT SUBQ TIS 1ST 20SQCM/<: CPT | Performed by: PODIATRIST

## 2023-01-01 PROCEDURE — 99152 MOD SED SAME PHYS/QHP 5/>YRS: CPT

## 2023-01-01 PROCEDURE — 99310 SBSQ NF CARE HIGH MDM 45: CPT | Performed by: NURSE PRACTITIONER

## 2023-01-01 PROCEDURE — 255N000002 HC RX 255 OP 636: Performed by: PODIATRIST

## 2023-01-01 PROCEDURE — A9585 GADOBUTROL INJECTION: HCPCS | Performed by: PODIATRIST

## 2023-01-01 PROCEDURE — 36415 COLL VENOUS BLD VENIPUNCTURE: CPT | Performed by: NURSE PRACTITIONER

## 2023-01-01 PROCEDURE — 85004 AUTOMATED DIFF WBC COUNT: CPT | Performed by: STUDENT IN AN ORGANIZED HEALTH CARE EDUCATION/TRAINING PROGRAM

## 2023-01-01 PROCEDURE — 82805 BLOOD GASES W/O2 SATURATION: CPT | Performed by: STUDENT IN AN ORGANIZED HEALTH CARE EDUCATION/TRAINING PROGRAM

## 2023-01-01 PROCEDURE — 85045 AUTOMATED RETICULOCYTE COUNT: CPT | Performed by: STUDENT IN AN ORGANIZED HEALTH CARE EDUCATION/TRAINING PROGRAM

## 2023-01-01 PROCEDURE — 96366 THER/PROPH/DIAG IV INF ADDON: CPT

## 2023-01-01 PROCEDURE — 93926 LOWER EXTREMITY STUDY: CPT | Mod: 26 | Performed by: SURGERY

## 2023-01-01 PROCEDURE — 99233 SBSQ HOSP IP/OBS HIGH 50: CPT | Performed by: STUDENT IN AN ORGANIZED HEALTH CARE EDUCATION/TRAINING PROGRAM

## 2023-01-01 PROCEDURE — 82607 VITAMIN B-12: CPT | Performed by: NURSE PRACTITIONER

## 2023-01-01 PROCEDURE — 99153 MOD SED SAME PHYS/QHP EA: CPT

## 2023-01-01 PROCEDURE — 82565 ASSAY OF CREATININE: CPT | Performed by: STUDENT IN AN ORGANIZED HEALTH CARE EDUCATION/TRAINING PROGRAM

## 2023-01-01 PROCEDURE — C1725 CATH, TRANSLUMIN NON-LASER: HCPCS

## 2023-01-01 PROCEDURE — 85018 HEMOGLOBIN: CPT | Performed by: STUDENT IN AN ORGANIZED HEALTH CARE EDUCATION/TRAINING PROGRAM

## 2023-01-01 PROCEDURE — 80053 COMPREHEN METABOLIC PANEL: CPT | Performed by: EMERGENCY MEDICINE

## 2023-01-01 PROCEDURE — 80202 ASSAY OF VANCOMYCIN: CPT | Performed by: STUDENT IN AN ORGANIZED HEALTH CARE EDUCATION/TRAINING PROGRAM

## 2023-01-01 PROCEDURE — 87086 URINE CULTURE/COLONY COUNT: CPT | Mod: ORL | Performed by: FAMILY MEDICINE

## 2023-01-01 PROCEDURE — 87086 URINE CULTURE/COLONY COUNT: CPT | Mod: ORL | Performed by: NURSE PRACTITIONER

## 2023-01-01 PROCEDURE — 250N000012 HC RX MED GY IP 250 OP 636 PS 637: Performed by: STUDENT IN AN ORGANIZED HEALTH CARE EDUCATION/TRAINING PROGRAM

## 2023-01-01 PROCEDURE — 258N000003 HC RX IP 258 OP 636: Performed by: NURSE PRACTITIONER

## 2023-01-01 PROCEDURE — 97535 SELF CARE MNGMENT TRAINING: CPT | Mod: GO

## 2023-01-01 PROCEDURE — 82805 BLOOD GASES W/O2 SATURATION: CPT | Performed by: EMERGENCY MEDICINE

## 2023-01-01 PROCEDURE — 250N000011 HC RX IP 250 OP 636: Performed by: EMERGENCY MEDICINE

## 2023-01-01 PROCEDURE — 84295 ASSAY OF SERUM SODIUM: CPT | Performed by: STUDENT IN AN ORGANIZED HEALTH CARE EDUCATION/TRAINING PROGRAM

## 2023-01-01 PROCEDURE — 99213 OFFICE O/P EST LOW 20 MIN: CPT | Performed by: PODIATRIST

## 2023-01-01 PROCEDURE — 87040 BLOOD CULTURE FOR BACTERIA: CPT | Performed by: EMERGENCY MEDICINE

## 2023-01-01 PROCEDURE — 83036 HEMOGLOBIN GLYCOSYLATED A1C: CPT | Performed by: STUDENT IN AN ORGANIZED HEALTH CARE EDUCATION/TRAINING PROGRAM

## 2023-01-01 PROCEDURE — 210N000002 HC R&B HEART CARE

## 2023-01-01 PROCEDURE — 85060 BLOOD SMEAR INTERPRETATION: CPT | Performed by: PATHOLOGY

## 2023-01-01 PROCEDURE — 84155 ASSAY OF PROTEIN SERUM: CPT | Performed by: STUDENT IN AN ORGANIZED HEALTH CARE EDUCATION/TRAINING PROGRAM

## 2023-01-01 PROCEDURE — C9113 INJ PANTOPRAZOLE SODIUM, VIA: HCPCS | Performed by: INTERNAL MEDICINE

## 2023-01-01 PROCEDURE — 83735 ASSAY OF MAGNESIUM: CPT | Performed by: STUDENT IN AN ORGANIZED HEALTH CARE EDUCATION/TRAINING PROGRAM

## 2023-01-01 PROCEDURE — 85027 COMPLETE CBC AUTOMATED: CPT | Mod: ORL | Performed by: NURSE PRACTITIONER

## 2023-01-01 PROCEDURE — 82746 ASSAY OF FOLIC ACID SERUM: CPT | Performed by: NURSE PRACTITIONER

## 2023-01-01 PROCEDURE — 250N000013 HC RX MED GY IP 250 OP 250 PS 637: Performed by: STUDENT IN AN ORGANIZED HEALTH CARE EDUCATION/TRAINING PROGRAM

## 2023-01-01 PROCEDURE — 258N000003 HC RX IP 258 OP 636: Performed by: EMERGENCY MEDICINE

## 2023-01-01 PROCEDURE — 51702 INSERT TEMP BLADDER CATH: CPT

## 2023-01-01 PROCEDURE — 80053 COMPREHEN METABOLIC PANEL: CPT | Performed by: NURSE PRACTITIONER

## 2023-01-01 PROCEDURE — 3E033XZ INTRODUCTION OF VASOPRESSOR INTO PERIPHERAL VEIN, PERCUTANEOUS APPROACH: ICD-10-PCS | Performed by: STUDENT IN AN ORGANIZED HEALTH CARE EDUCATION/TRAINING PROGRAM

## 2023-01-01 PROCEDURE — 80048 BASIC METABOLIC PNL TOTAL CA: CPT | Mod: ORL | Performed by: NURSE PRACTITIONER

## 2023-01-01 PROCEDURE — 999N000248 HC STATISTIC IV INSERT WITH US BY RN

## 2023-01-01 PROCEDURE — 120N000001 HC R&B MED SURG/OB

## 2023-01-01 PROCEDURE — 81001 URINALYSIS AUTO W/SCOPE: CPT | Mod: ORL | Performed by: FAMILY MEDICINE

## 2023-01-01 PROCEDURE — 36569 INSJ PICC 5 YR+ W/O IMAGING: CPT

## 2023-01-01 PROCEDURE — 82248 BILIRUBIN DIRECT: CPT | Performed by: EMERGENCY MEDICINE

## 2023-01-01 PROCEDURE — 258N000003 HC RX IP 258 OP 636: Performed by: INTERNAL MEDICINE

## 2023-01-01 PROCEDURE — 258N000003 HC RX IP 258 OP 636: Performed by: HOSPITALIST

## 2023-01-01 PROCEDURE — 87086 URINE CULTURE/COLONY COUNT: CPT | Performed by: EMERGENCY MEDICINE

## 2023-01-01 PROCEDURE — 36415 COLL VENOUS BLD VENIPUNCTURE: CPT | Mod: ORL | Performed by: NURSE PRACTITIONER

## 2023-01-01 PROCEDURE — 99285 EMERGENCY DEPT VISIT HI MDM: CPT | Mod: 25,CS

## 2023-01-01 PROCEDURE — 99222 1ST HOSP IP/OBS MODERATE 55: CPT | Performed by: INTERNAL MEDICINE

## 2023-01-01 PROCEDURE — 80048 BASIC METABOLIC PNL TOTAL CA: CPT | Performed by: INTERNAL MEDICINE

## 2023-01-01 PROCEDURE — P9604 ONE-WAY ALLOW PRORATED TRIP: HCPCS | Mod: ORL | Performed by: NURSE PRACTITIONER

## 2023-01-01 PROCEDURE — G0180 MD CERTIFICATION HHA PATIENT: HCPCS | Performed by: INTERNAL MEDICINE

## 2023-01-01 PROCEDURE — 87637 SARSCOV2&INF A&B&RSV AMP PRB: CPT | Performed by: EMERGENCY MEDICINE

## 2023-01-01 PROCEDURE — 84100 ASSAY OF PHOSPHORUS: CPT | Performed by: STUDENT IN AN ORGANIZED HEALTH CARE EDUCATION/TRAINING PROGRAM

## 2023-01-01 PROCEDURE — 37228 HC REVASC TIB-PERON ART ANGIOPLASTY INIT VESSEL: CPT

## 2023-01-01 PROCEDURE — 93923 UPR/LXTR ART STDY 3+ LVLS: CPT | Mod: 26 | Performed by: SURGERY

## 2023-01-01 PROCEDURE — 82805 BLOOD GASES W/O2 SATURATION: CPT | Performed by: INTERNAL MEDICINE

## 2023-01-01 PROCEDURE — 250N000009 HC RX 250: Performed by: INTERNAL MEDICINE

## 2023-01-01 PROCEDURE — 97161 PT EVAL LOW COMPLEX 20 MIN: CPT | Mod: GP

## 2023-01-01 PROCEDURE — 85610 PROTHROMBIN TIME: CPT | Performed by: NURSE PRACTITIONER

## 2023-01-01 PROCEDURE — 200N000001 HC R&B ICU

## 2023-01-01 PROCEDURE — 80048 BASIC METABOLIC PNL TOTAL CA: CPT | Mod: ORL | Performed by: FAMILY MEDICINE

## 2023-01-01 PROCEDURE — 99316 NF DSCHRG MGMT 30 MIN+: CPT | Performed by: NURSE PRACTITIONER

## 2023-01-01 PROCEDURE — 99239 HOSP IP/OBS DSCHRG MGMT >30: CPT | Performed by: INTERNAL MEDICINE

## 2023-01-01 PROCEDURE — 83735 ASSAY OF MAGNESIUM: CPT | Mod: ORL | Performed by: FAMILY MEDICINE

## 2023-01-01 PROCEDURE — 85027 COMPLETE CBC AUTOMATED: CPT | Mod: ORL | Performed by: FAMILY MEDICINE

## 2023-01-01 PROCEDURE — 87070 CULTURE OTHR SPECIMN AEROBIC: CPT | Performed by: PODIATRIST

## 2023-01-01 PROCEDURE — 258N000002 HC RX IP 258 OP 250: Performed by: NURSE PRACTITIONER

## 2023-01-01 PROCEDURE — 36600 WITHDRAWAL OF ARTERIAL BLOOD: CPT

## 2023-01-01 PROCEDURE — C9803 HOPD COVID-19 SPEC COLLECT: HCPCS

## 2023-01-01 PROCEDURE — 83735 ASSAY OF MAGNESIUM: CPT | Performed by: EMERGENCY MEDICINE

## 2023-01-01 PROCEDURE — 97530 THERAPEUTIC ACTIVITIES: CPT | Mod: GP

## 2023-01-01 PROCEDURE — 99222 1ST HOSP IP/OBS MODERATE 55: CPT | Performed by: NURSE PRACTITIONER

## 2023-01-01 PROCEDURE — 85004 AUTOMATED DIFF WBC COUNT: CPT | Performed by: EMERGENCY MEDICINE

## 2023-01-01 PROCEDURE — 82728 ASSAY OF FERRITIN: CPT | Performed by: NURSE PRACTITIONER

## 2023-01-01 PROCEDURE — 84484 ASSAY OF TROPONIN QUANT: CPT | Performed by: EMERGENCY MEDICINE

## 2023-01-01 PROCEDURE — 85049 AUTOMATED PLATELET COUNT: CPT | Performed by: NURSE PRACTITIONER

## 2023-01-01 PROCEDURE — 51798 US URINE CAPACITY MEASURE: CPT

## 2023-01-01 PROCEDURE — 70450 CT HEAD/BRAIN W/O DYE: CPT

## 2023-01-01 PROCEDURE — 272N000452 HC KIT SHRLOCK 5FR POWER PICC TRIPLE LUMEN

## 2023-01-01 PROCEDURE — C1769 GUIDE WIRE: HCPCS

## 2023-01-01 PROCEDURE — 250N000009 HC RX 250: Performed by: EMERGENCY MEDICINE

## 2023-01-01 PROCEDURE — 84132 ASSAY OF SERUM POTASSIUM: CPT | Performed by: EMERGENCY MEDICINE

## 2023-01-01 PROCEDURE — 85347 COAGULATION TIME ACTIVATED: CPT

## 2023-01-01 PROCEDURE — 83605 ASSAY OF LACTIC ACID: CPT | Performed by: EMERGENCY MEDICINE

## 2023-01-01 PROCEDURE — 250N000011 HC RX IP 250 OP 636: Performed by: RADIOLOGY

## 2023-01-01 PROCEDURE — 82565 ASSAY OF CREATININE: CPT | Performed by: NURSE PRACTITIONER

## 2023-01-01 PROCEDURE — P9603 ONE-WAY ALLOW PRORATED MILES: HCPCS | Mod: ORL | Performed by: NURSE PRACTITIONER

## 2023-01-01 PROCEDURE — 97597 DBRDMT OPN WND 1ST 20 CM/<: CPT | Mod: XU | Performed by: PODIATRIST

## 2023-01-01 PROCEDURE — C1887 CATHETER, GUIDING: HCPCS

## 2023-01-01 PROCEDURE — 85018 HEMOGLOBIN: CPT | Performed by: NURSE PRACTITIONER

## 2023-01-01 PROCEDURE — 93926 LOWER EXTREMITY STUDY: CPT | Mod: LT

## 2023-01-01 PROCEDURE — 81001 URINALYSIS AUTO W/SCOPE: CPT | Mod: ORL | Performed by: NURSE PRACTITIONER

## 2023-01-01 PROCEDURE — 96376 TX/PRO/DX INJ SAME DRUG ADON: CPT

## 2023-01-01 PROCEDURE — C1760 CLOSURE DEV, VASC: HCPCS

## 2023-01-01 PROCEDURE — 2894A IR LOWER EXTREMITY ANGIOGRAM LEFT: CPT | Performed by: RADIOLOGY

## 2023-01-01 PROCEDURE — 36415 COLL VENOUS BLD VENIPUNCTURE: CPT | Mod: ORL | Performed by: FAMILY MEDICINE

## 2023-01-01 PROCEDURE — 85049 AUTOMATED PLATELET COUNT: CPT | Performed by: INTERNAL MEDICINE

## 2023-01-01 PROCEDURE — 83010 ASSAY OF HAPTOGLOBIN QUANT: CPT | Performed by: STUDENT IN AN ORGANIZED HEALTH CARE EDUCATION/TRAINING PROGRAM

## 2023-01-01 PROCEDURE — 75710 ARTERY X-RAYS ARM/LEG: CPT | Mod: LT

## 2023-01-01 PROCEDURE — 87077 CULTURE AEROBIC IDENTIFY: CPT | Performed by: PODIATRIST

## 2023-01-01 PROCEDURE — C2623 CATH, TRANSLUMIN, DRUG-COAT: HCPCS

## 2023-01-01 PROCEDURE — 99223 1ST HOSP IP/OBS HIGH 75: CPT | Performed by: STUDENT IN AN ORGANIZED HEALTH CARE EDUCATION/TRAINING PROGRAM

## 2023-01-01 PROCEDURE — 96375 TX/PRO/DX INJ NEW DRUG ADDON: CPT

## 2023-01-01 PROCEDURE — 99215 OFFICE O/P EST HI 40 MIN: CPT | Performed by: INTERNAL MEDICINE

## 2023-01-01 PROCEDURE — 81001 URINALYSIS AUTO W/SCOPE: CPT | Performed by: EMERGENCY MEDICINE

## 2023-01-01 PROCEDURE — 272N000302 HC DEVICE INFLATION CR5

## 2023-01-01 PROCEDURE — 73723 MRI JOINT LWR EXTR W/O&W/DYE: CPT | Mod: LT

## 2023-01-01 PROCEDURE — 250N000012 HC RX MED GY IP 250 OP 636 PS 637: Performed by: EMERGENCY MEDICINE

## 2023-01-01 RX ORDER — CEPHALEXIN 500 MG/1
500 CAPSULE ORAL 4 TIMES DAILY
COMMUNITY
End: 2023-01-01

## 2023-01-01 RX ORDER — NALOXONE HYDROCHLORIDE 0.4 MG/ML
0.4 INJECTION, SOLUTION INTRAMUSCULAR; INTRAVENOUS; SUBCUTANEOUS
Status: DISCONTINUED | OUTPATIENT
Start: 2023-01-01 | End: 2023-01-01 | Stop reason: HOSPADM

## 2023-01-01 RX ORDER — INSULIN LISPRO 100 [IU]/ML
INJECTION, SOLUTION INTRAVENOUS; SUBCUTANEOUS
COMMUNITY
Start: 2023-01-01

## 2023-01-01 RX ORDER — FENTANYL CITRATE 50 UG/ML
25-50 INJECTION, SOLUTION INTRAMUSCULAR; INTRAVENOUS EVERY 5 MIN PRN
Status: DISCONTINUED | OUTPATIENT
Start: 2023-01-01 | End: 2023-01-01 | Stop reason: HOSPADM

## 2023-01-01 RX ORDER — SODIUM CHLORIDE 9 MG/ML
INJECTION, SOLUTION INTRAVENOUS CONTINUOUS
Status: DISCONTINUED | OUTPATIENT
Start: 2023-01-01 | End: 2023-01-01

## 2023-01-01 RX ORDER — HEPARIN SODIUM 200 [USP'U]/100ML
1 INJECTION, SOLUTION INTRAVENOUS CONTINUOUS PRN
Status: DISCONTINUED | OUTPATIENT
Start: 2023-01-01 | End: 2023-01-01 | Stop reason: HOSPADM

## 2023-01-01 RX ORDER — PROCHLORPERAZINE 25 MG
12.5 SUPPOSITORY, RECTAL RECTAL EVERY 12 HOURS PRN
Status: DISCONTINUED | OUTPATIENT
Start: 2023-01-01 | End: 2023-01-01 | Stop reason: HOSPADM

## 2023-01-01 RX ORDER — NALOXONE HYDROCHLORIDE 0.4 MG/ML
0.2 INJECTION, SOLUTION INTRAMUSCULAR; INTRAVENOUS; SUBCUTANEOUS
Status: DISCONTINUED | OUTPATIENT
Start: 2023-01-01 | End: 2023-01-01 | Stop reason: HOSPADM

## 2023-01-01 RX ORDER — DEXTROSE MONOHYDRATE 25 G/50ML
25 INJECTION, SOLUTION INTRAVENOUS ONCE
Status: COMPLETED | OUTPATIENT
Start: 2023-01-01 | End: 2023-01-01

## 2023-01-01 RX ORDER — DEXTROSE MONOHYDRATE 25 G/50ML
25-50 INJECTION, SOLUTION INTRAVENOUS
Status: DISCONTINUED | OUTPATIENT
Start: 2023-01-01 | End: 2023-01-01 | Stop reason: HOSPADM

## 2023-01-01 RX ORDER — TAMSULOSIN HYDROCHLORIDE 0.4 MG/1
0.4 CAPSULE ORAL EVERY EVENING
Status: DISCONTINUED | OUTPATIENT
Start: 2023-01-01 | End: 2023-01-01 | Stop reason: HOSPADM

## 2023-01-01 RX ORDER — ONDANSETRON 4 MG/1
4 TABLET, ORALLY DISINTEGRATING ORAL EVERY 6 HOURS PRN
Status: DISCONTINUED | OUTPATIENT
Start: 2023-01-01 | End: 2023-01-01 | Stop reason: HOSPADM

## 2023-01-01 RX ORDER — TRAMADOL HYDROCHLORIDE 50 MG/1
TABLET ORAL PRN
COMMUNITY
End: 2023-01-01

## 2023-01-01 RX ORDER — SULFAMETHOXAZOLE/TRIMETHOPRIM 800-160 MG
1 TABLET ORAL 2 TIMES DAILY
Qty: 20 TABLET | Refills: 0 | Status: SHIPPED | OUTPATIENT
Start: 2023-01-01 | End: 2023-01-01

## 2023-01-01 RX ORDER — TRAMADOL HYDROCHLORIDE 50 MG/1
50 TABLET ORAL EVERY 6 HOURS PRN
Qty: 30 TABLET | Refills: 0 | Status: SHIPPED | OUTPATIENT
Start: 2023-01-01 | End: 2023-01-01

## 2023-01-01 RX ORDER — PIPERACILLIN SODIUM, TAZOBACTAM SODIUM 3; .375 G/15ML; G/15ML
3.38 INJECTION, POWDER, LYOPHILIZED, FOR SOLUTION INTRAVENOUS ONCE
Status: COMPLETED | OUTPATIENT
Start: 2023-01-01 | End: 2023-01-01

## 2023-01-01 RX ORDER — DEXTROSE MONOHYDRATE 50 MG/ML
INJECTION, SOLUTION INTRAVENOUS CONTINUOUS
Status: DISCONTINUED | OUTPATIENT
Start: 2023-01-01 | End: 2023-01-01 | Stop reason: HOSPADM

## 2023-01-01 RX ORDER — IODIXANOL 320 MG/ML
90 INJECTION, SOLUTION INTRAVASCULAR ONCE
Status: COMPLETED | OUTPATIENT
Start: 2023-01-01 | End: 2023-01-01

## 2023-01-01 RX ORDER — GADOBUTROL 604.72 MG/ML
7 INJECTION INTRAVENOUS ONCE
Status: COMPLETED | OUTPATIENT
Start: 2023-01-01 | End: 2023-01-01

## 2023-01-01 RX ORDER — SODIUM CHLORIDE 9 MG/ML
INJECTION, SOLUTION INTRAVENOUS CONTINUOUS
Status: DISCONTINUED | OUTPATIENT
Start: 2023-01-01 | End: 2023-01-01 | Stop reason: HOSPADM

## 2023-01-01 RX ORDER — CALCIUM GLUCONATE 20 MG/ML
1 INJECTION, SOLUTION INTRAVENOUS ONCE
Status: COMPLETED | OUTPATIENT
Start: 2023-01-01 | End: 2023-01-01

## 2023-01-01 RX ORDER — TRAMADOL HYDROCHLORIDE 50 MG/1
50 TABLET ORAL EVERY 6 HOURS PRN
Qty: 30 TABLET | Refills: 0 | Status: SHIPPED | OUTPATIENT
Start: 2023-01-01

## 2023-01-01 RX ORDER — INSULIN GLARGINE 100 [IU]/ML
23 INJECTION, SOLUTION SUBCUTANEOUS AT BEDTIME
Qty: 15 ML | Refills: 0 | Status: SHIPPED | OUTPATIENT
Start: 2023-01-01 | End: 2023-01-01

## 2023-01-01 RX ORDER — LIDOCAINE 40 MG/G
CREAM TOPICAL
Status: ACTIVE | OUTPATIENT
Start: 2023-01-01 | End: 2023-01-01

## 2023-01-01 RX ORDER — NICOTINE POLACRILEX 4 MG
15-30 LOZENGE BUCCAL
Status: DISCONTINUED | OUTPATIENT
Start: 2023-01-01 | End: 2023-01-01

## 2023-01-01 RX ORDER — ONDANSETRON 2 MG/ML
4 INJECTION INTRAMUSCULAR; INTRAVENOUS EVERY 6 HOURS PRN
Status: DISCONTINUED | OUTPATIENT
Start: 2023-01-01 | End: 2023-01-01 | Stop reason: HOSPADM

## 2023-01-01 RX ORDER — ONDANSETRON 2 MG/ML
4 INJECTION INTRAMUSCULAR; INTRAVENOUS ONCE
Status: COMPLETED | OUTPATIENT
Start: 2023-01-01 | End: 2023-01-01

## 2023-01-01 RX ORDER — CEPHALEXIN 500 MG/1
CAPSULE ORAL
COMMUNITY
Start: 2023-01-01 | End: 2023-01-01

## 2023-01-01 RX ORDER — AMOXICILLIN AND CLAVULANATE POTASSIUM 500; 125 MG/1; MG/1
1 TABLET, FILM COATED ORAL EVERY 12 HOURS SCHEDULED
Status: DISCONTINUED | OUTPATIENT
Start: 2023-01-01 | End: 2023-01-01 | Stop reason: HOSPADM

## 2023-01-01 RX ORDER — LIRAGLUTIDE 6 MG/ML
1.2 INJECTION SUBCUTANEOUS DAILY
COMMUNITY
End: 2023-01-01

## 2023-01-01 RX ORDER — PIPERACILLIN SODIUM, TAZOBACTAM SODIUM 3; .375 G/15ML; G/15ML
3.38 INJECTION, POWDER, LYOPHILIZED, FOR SOLUTION INTRAVENOUS EVERY 12 HOURS
Status: DISCONTINUED | OUTPATIENT
Start: 2023-01-01 | End: 2023-01-01

## 2023-01-01 RX ORDER — UREA 10 %
500 LOTION (ML) TOPICAL DAILY
Status: DISCONTINUED | OUTPATIENT
Start: 2023-01-01 | End: 2023-01-01 | Stop reason: HOSPADM

## 2023-01-01 RX ORDER — NOREPINEPHRINE BITARTRATE 0.02 MG/ML
.01-.6 INJECTION, SOLUTION INTRAVENOUS CONTINUOUS
Status: DISCONTINUED | OUTPATIENT
Start: 2023-01-01 | End: 2023-01-01

## 2023-01-01 RX ORDER — PROCHLORPERAZINE MALEATE 5 MG
5 TABLET ORAL EVERY 6 HOURS PRN
Status: DISCONTINUED | OUTPATIENT
Start: 2023-01-01 | End: 2023-01-01 | Stop reason: HOSPADM

## 2023-01-01 RX ORDER — ACETAMINOPHEN 500 MG
1000 TABLET ORAL 3 TIMES DAILY
COMMUNITY

## 2023-01-01 RX ORDER — HEPARIN SODIUM 1000 [USP'U]/ML
8000 INJECTION, SOLUTION INTRAVENOUS; SUBCUTANEOUS ONCE
Status: COMPLETED | OUTPATIENT
Start: 2023-01-01 | End: 2023-01-01

## 2023-01-01 RX ORDER — HEPARIN SODIUM 1000 [USP'U]/ML
2000 INJECTION, SOLUTION INTRAVENOUS; SUBCUTANEOUS ONCE
Status: COMPLETED | OUTPATIENT
Start: 2023-01-01 | End: 2023-01-01

## 2023-01-01 RX ORDER — FLUMAZENIL 0.1 MG/ML
0.2 INJECTION, SOLUTION INTRAVENOUS
Status: DISCONTINUED | OUTPATIENT
Start: 2023-01-01 | End: 2023-01-01 | Stop reason: HOSPADM

## 2023-01-01 RX ORDER — NICOTINE POLACRILEX 4 MG
15-30 LOZENGE BUCCAL
Status: DISCONTINUED | OUTPATIENT
Start: 2023-01-01 | End: 2023-01-01 | Stop reason: HOSPADM

## 2023-01-01 RX ORDER — SODIUM CHLORIDE, SODIUM LACTATE, POTASSIUM CHLORIDE, CALCIUM CHLORIDE 600; 310; 30; 20 MG/100ML; MG/100ML; MG/100ML; MG/100ML
INJECTION, SOLUTION INTRAVENOUS CONTINUOUS
Status: DISCONTINUED | OUTPATIENT
Start: 2023-01-01 | End: 2023-01-01

## 2023-01-01 RX ORDER — LIDOCAINE 40 MG/G
CREAM TOPICAL
Status: DISCONTINUED | OUTPATIENT
Start: 2023-01-01 | End: 2023-01-01 | Stop reason: HOSPADM

## 2023-01-01 RX ORDER — HEPARIN SODIUM 5000 [USP'U]/.5ML
5000 INJECTION, SOLUTION INTRAVENOUS; SUBCUTANEOUS EVERY 8 HOURS
Status: DISCONTINUED | OUTPATIENT
Start: 2023-01-01 | End: 2023-01-01

## 2023-01-01 RX ORDER — DEXTROSE MONOHYDRATE 25 G/50ML
25-50 INJECTION, SOLUTION INTRAVENOUS
Status: DISCONTINUED | OUTPATIENT
Start: 2023-01-01 | End: 2023-01-01

## 2023-01-01 RX ORDER — LIDOCAINE HYDROCHLORIDE 20 MG/ML
10 JELLY TOPICAL ONCE
Status: COMPLETED | OUTPATIENT
Start: 2023-01-01 | End: 2023-01-01

## 2023-01-01 RX ORDER — PROTAMINE SULFATE 10 MG/ML
40 INJECTION, SOLUTION INTRAVENOUS ONCE
Status: COMPLETED | OUTPATIENT
Start: 2023-01-01 | End: 2023-01-01

## 2023-01-01 RX ORDER — SODIUM CHLORIDE 450 MG/100ML
INJECTION, SOLUTION INTRAVENOUS CONTINUOUS
Status: DISCONTINUED | OUTPATIENT
Start: 2023-01-01 | End: 2023-01-01

## 2023-01-01 RX ORDER — TROLAMINE SALICYLATE 10 G/100G
CREAM TOPICAL 3 TIMES DAILY
COMMUNITY

## 2023-01-01 RX ADMIN — AMOXICILLIN AND CLAVULANATE POTASSIUM 1 TABLET: 500; 125 TABLET, FILM COATED ORAL at 10:05

## 2023-01-01 RX ADMIN — INSULIN GLARGINE 19 UNITS: 100 INJECTION, SOLUTION SUBCUTANEOUS at 22:13

## 2023-01-01 RX ADMIN — INSULIN GLARGINE 19 UNITS: 100 INJECTION, SOLUTION SUBCUTANEOUS at 20:01

## 2023-01-01 RX ADMIN — CALCIUM GLUCONATE 1 G: 20 INJECTION, SOLUTION INTRAVENOUS at 13:29

## 2023-01-01 RX ADMIN — DEXTROSE MONOHYDRATE 300 ML: 100 INJECTION, SOLUTION INTRAVENOUS at 13:54

## 2023-01-01 RX ADMIN — PIPERACILLIN AND TAZOBACTAM 3.38 G: 3; .375 INJECTION, POWDER, FOR SOLUTION INTRAVENOUS at 22:10

## 2023-01-01 RX ADMIN — INSULIN GLARGINE 19 UNITS: 100 INJECTION, SOLUTION SUBCUTANEOUS at 22:57

## 2023-01-01 RX ADMIN — PIPERACILLIN AND TAZOBACTAM 3.38 G: 3; .375 INJECTION, POWDER, FOR SOLUTION INTRAVENOUS at 09:57

## 2023-01-01 RX ADMIN — VANCOMYCIN HYDROCHLORIDE 750 MG: 5 INJECTION, POWDER, LYOPHILIZED, FOR SOLUTION INTRAVENOUS at 09:07

## 2023-01-01 RX ADMIN — AMOXICILLIN AND CLAVULANATE POTASSIUM 1 TABLET: 500; 125 TABLET, FILM COATED ORAL at 22:06

## 2023-01-01 RX ADMIN — DEXTROSE MONOHYDRATE 25 G: 25 INJECTION, SOLUTION INTRAVENOUS at 13:37

## 2023-01-01 RX ADMIN — FENTANYL CITRATE 50 MCG: 50 INJECTION, SOLUTION INTRAMUSCULAR; INTRAVENOUS at 11:10

## 2023-01-01 RX ADMIN — PIPERACILLIN AND TAZOBACTAM 3.38 G: 3; .375 INJECTION, POWDER, FOR SOLUTION INTRAVENOUS at 21:09

## 2023-01-01 RX ADMIN — HEPARIN SODIUM 8000 UNITS: 1000 INJECTION INTRAVENOUS; SUBCUTANEOUS at 11:36

## 2023-01-01 RX ADMIN — TAMSULOSIN HYDROCHLORIDE 0.4 MG: 0.4 CAPSULE ORAL at 21:09

## 2023-01-01 RX ADMIN — INSULIN GLARGINE 19 UNITS: 100 INJECTION, SOLUTION SUBCUTANEOUS at 21:09

## 2023-01-01 RX ADMIN — PIPERACILLIN AND TAZOBACTAM 3.38 G: 3; .375 INJECTION, POWDER, FOR SOLUTION INTRAVENOUS at 21:05

## 2023-01-01 RX ADMIN — MIDAZOLAM 0.5 MG: 1 INJECTION INTRAMUSCULAR; INTRAVENOUS at 12:15

## 2023-01-01 RX ADMIN — MIDAZOLAM 0.5 MG: 1 INJECTION INTRAMUSCULAR; INTRAVENOUS at 11:37

## 2023-01-01 RX ADMIN — AMOXICILLIN AND CLAVULANATE POTASSIUM 1 TABLET: 500; 125 TABLET, FILM COATED ORAL at 08:13

## 2023-01-01 RX ADMIN — INSULIN ASPART 1 UNITS: 100 INJECTION, SOLUTION INTRAVENOUS; SUBCUTANEOUS at 12:07

## 2023-01-01 RX ADMIN — SODIUM CHLORIDE 7.62 UNITS: 9 INJECTION, SOLUTION INTRAVENOUS at 13:40

## 2023-01-01 RX ADMIN — TAMSULOSIN HYDROCHLORIDE 0.4 MG: 0.4 CAPSULE ORAL at 22:13

## 2023-01-01 RX ADMIN — VANCOMYCIN HYDROCHLORIDE 1500 MG: 5 INJECTION, POWDER, LYOPHILIZED, FOR SOLUTION INTRAVENOUS at 16:50

## 2023-01-01 RX ADMIN — LIDOCAINE HYDROCHLORIDE 10 ML: 10 INJECTION, SOLUTION EPIDURAL; INFILTRATION; INTRACAUDAL; PERINEURAL at 11:12

## 2023-01-01 RX ADMIN — FENTANYL CITRATE 25 MCG: 50 INJECTION, SOLUTION INTRAMUSCULAR; INTRAVENOUS at 12:00

## 2023-01-01 RX ADMIN — AMOXICILLIN AND CLAVULANATE POTASSIUM 1 TABLET: 500; 125 TABLET, FILM COATED ORAL at 22:57

## 2023-01-01 RX ADMIN — SODIUM CHLORIDE 1000 ML: 9 INJECTION, SOLUTION INTRAVENOUS at 18:00

## 2023-01-01 RX ADMIN — MIDAZOLAM 1 MG: 1 INJECTION INTRAMUSCULAR; INTRAVENOUS at 11:10

## 2023-01-01 RX ADMIN — FENTANYL CITRATE 25 MCG: 50 INJECTION, SOLUTION INTRAMUSCULAR; INTRAVENOUS at 11:37

## 2023-01-01 RX ADMIN — PANTOPRAZOLE SODIUM 40 MG: 40 INJECTION, POWDER, FOR SOLUTION INTRAVENOUS at 22:13

## 2023-01-01 RX ADMIN — INSULIN GLARGINE 19 UNITS: 100 INJECTION, SOLUTION SUBCUTANEOUS at 22:04

## 2023-01-01 RX ADMIN — SODIUM CHLORIDE: 9 INJECTION, SOLUTION INTRAVENOUS at 05:08

## 2023-01-01 RX ADMIN — SODIUM BICARBONATE 50 MEQ: 84 INJECTION INTRAVENOUS at 13:30

## 2023-01-01 RX ADMIN — INSULIN ASPART 1 UNITS: 100 INJECTION, SOLUTION INTRAVENOUS; SUBCUTANEOUS at 12:50

## 2023-01-01 RX ADMIN — PANTOPRAZOLE SODIUM 40 MG: 40 INJECTION, POWDER, FOR SOLUTION INTRAVENOUS at 20:30

## 2023-01-01 RX ADMIN — SODIUM CHLORIDE: 9 INJECTION, SOLUTION INTRAVENOUS at 18:54

## 2023-01-01 RX ADMIN — PANTOPRAZOLE SODIUM 40 MG: 40 INJECTION, POWDER, FOR SOLUTION INTRAVENOUS at 09:37

## 2023-01-01 RX ADMIN — TAMSULOSIN HYDROCHLORIDE 0.4 MG: 0.4 CAPSULE ORAL at 20:41

## 2023-01-01 RX ADMIN — SODIUM CHLORIDE: 9 INJECTION, SOLUTION INTRAVENOUS at 09:59

## 2023-01-01 RX ADMIN — INSULIN ASPART 1 UNITS: 100 INJECTION, SOLUTION INTRAVENOUS; SUBCUTANEOUS at 12:33

## 2023-01-01 RX ADMIN — SODIUM CHLORIDE: 4.5 INJECTION, SOLUTION INTRAVENOUS at 13:18

## 2023-01-01 RX ADMIN — HEPARIN SODIUM 2000 UNITS: 1000 INJECTION INTRAVENOUS; SUBCUTANEOUS at 12:01

## 2023-01-01 RX ADMIN — AMOXICILLIN AND CLAVULANATE POTASSIUM 1 TABLET: 500; 125 TABLET, FILM COATED ORAL at 23:51

## 2023-01-01 RX ADMIN — AMOXICILLIN AND CLAVULANATE POTASSIUM 1 TABLET: 500; 125 TABLET, FILM COATED ORAL at 09:37

## 2023-01-01 RX ADMIN — IODIXANOL 90 ML: 320 INJECTION, SOLUTION INTRAVASCULAR at 13:40

## 2023-01-01 RX ADMIN — SODIUM CHLORIDE 1000 ML: 9 INJECTION, SOLUTION INTRAVENOUS at 13:29

## 2023-01-01 RX ADMIN — GADOBUTROL 7 ML: 604.72 INJECTION INTRAVENOUS at 22:28

## 2023-01-01 RX ADMIN — CYANOCOBALAMIN TAB 500 MCG 500 MCG: 500 TAB at 09:37

## 2023-01-01 RX ADMIN — APIXABAN 2.5 MG: 2.5 TABLET, FILM COATED ORAL at 09:25

## 2023-01-01 RX ADMIN — CYANOCOBALAMIN TAB 500 MCG 500 MCG: 500 TAB at 10:05

## 2023-01-01 RX ADMIN — FENTANYL CITRATE 25 MCG: 50 INJECTION, SOLUTION INTRAMUSCULAR; INTRAVENOUS at 12:14

## 2023-01-01 RX ADMIN — AMOXICILLIN AND CLAVULANATE POTASSIUM 1 TABLET: 500; 125 TABLET, FILM COATED ORAL at 16:03

## 2023-01-01 RX ADMIN — APIXABAN 2.5 MG: 2.5 TABLET, FILM COATED ORAL at 21:09

## 2023-01-01 RX ADMIN — PANTOPRAZOLE SODIUM 40 MG: 40 INJECTION, POWDER, FOR SOLUTION INTRAVENOUS at 08:13

## 2023-01-01 RX ADMIN — MIDAZOLAM 0.5 MG: 1 INJECTION INTRAMUSCULAR; INTRAVENOUS at 12:00

## 2023-01-01 RX ADMIN — TAMSULOSIN HYDROCHLORIDE 0.4 MG: 0.4 CAPSULE ORAL at 20:01

## 2023-01-01 RX ADMIN — SODIUM CHLORIDE, POTASSIUM CHLORIDE, SODIUM LACTATE AND CALCIUM CHLORIDE: 600; 310; 30; 20 INJECTION, SOLUTION INTRAVENOUS at 14:44

## 2023-01-01 RX ADMIN — SODIUM CHLORIDE: 9 INJECTION, SOLUTION INTRAVENOUS at 17:47

## 2023-01-01 RX ADMIN — TAMSULOSIN HYDROCHLORIDE 0.4 MG: 0.4 CAPSULE ORAL at 20:30

## 2023-01-01 RX ADMIN — CYANOCOBALAMIN TAB 500 MCG 500 MCG: 500 TAB at 08:13

## 2023-01-01 RX ADMIN — APIXABAN 2.5 MG: 2.5 TABLET, FILM COATED ORAL at 20:01

## 2023-01-01 RX ADMIN — INSULIN ASPART 1 UNITS: 100 INJECTION, SOLUTION INTRAVENOUS; SUBCUTANEOUS at 17:05

## 2023-01-01 RX ADMIN — CYANOCOBALAMIN TAB 500 MCG 500 MCG: 500 TAB at 12:33

## 2023-01-01 RX ADMIN — Medication 0.03 MCG/KG/MIN: at 21:47

## 2023-01-01 RX ADMIN — PIPERACILLIN AND TAZOBACTAM 3.38 G: 3; .375 INJECTION, POWDER, FOR SOLUTION INTRAVENOUS at 15:02

## 2023-01-01 RX ADMIN — TAMSULOSIN HYDROCHLORIDE 0.4 MG: 0.4 CAPSULE ORAL at 20:26

## 2023-01-01 RX ADMIN — HEPARIN SODIUM 5000 UNITS: 5000 INJECTION, SOLUTION INTRAVENOUS; SUBCUTANEOUS at 09:57

## 2023-01-01 RX ADMIN — DEXTROSE MONOHYDRATE 50 ML: 25 INJECTION, SOLUTION INTRAVENOUS at 23:26

## 2023-01-01 RX ADMIN — DEXTROSE MONOHYDRATE: 50 INJECTION, SOLUTION INTRAVENOUS at 09:25

## 2023-01-01 RX ADMIN — SODIUM CHLORIDE: 4.5 INJECTION, SOLUTION INTRAVENOUS at 02:17

## 2023-01-01 RX ADMIN — PROTAMINE SULFATE 40 MG: 10 INJECTION, SOLUTION INTRAVENOUS at 12:54

## 2023-01-01 RX ADMIN — PANTOPRAZOLE SODIUM 40 MG: 40 INJECTION, POWDER, FOR SOLUTION INTRAVENOUS at 10:05

## 2023-01-01 RX ADMIN — SODIUM CHLORIDE 1000 ML: 9 INJECTION, SOLUTION INTRAVENOUS at 15:33

## 2023-01-01 RX ADMIN — SODIUM CHLORIDE, POTASSIUM CHLORIDE, SODIUM LACTATE AND CALCIUM CHLORIDE: 600; 310; 30; 20 INJECTION, SOLUTION INTRAVENOUS at 06:35

## 2023-01-01 RX ADMIN — APIXABAN 2.5 MG: 2.5 TABLET, FILM COATED ORAL at 09:02

## 2023-01-01 RX ADMIN — SODIUM CHLORIDE, POTASSIUM CHLORIDE, SODIUM LACTATE AND CALCIUM CHLORIDE: 600; 310; 30; 20 INJECTION, SOLUTION INTRAVENOUS at 23:19

## 2023-01-01 RX ADMIN — PIPERACILLIN AND TAZOBACTAM 3.38 G: 3; .375 INJECTION, POWDER, FOR SOLUTION INTRAVENOUS at 09:26

## 2023-01-01 RX ADMIN — PIPERACILLIN AND TAZOBACTAM 3.38 G: 3; .375 INJECTION, POWDER, FOR SOLUTION INTRAVENOUS at 09:02

## 2023-01-01 RX ADMIN — LIDOCAINE HYDROCHLORIDE 10 ML: 20 JELLY TOPICAL at 14:27

## 2023-01-01 RX ADMIN — PANTOPRAZOLE SODIUM 40 MG: 40 INJECTION, POWDER, FOR SOLUTION INTRAVENOUS at 20:41

## 2023-01-01 RX ADMIN — SODIUM CHLORIDE 250 ML: 9 INJECTION, SOLUTION INTRAVENOUS at 19:34

## 2023-01-01 RX ADMIN — LIDOCAINE HYDROCHLORIDE 1 ML: 10 INJECTION, SOLUTION EPIDURAL; INFILTRATION; INTRACAUDAL; PERINEURAL at 23:16

## 2023-01-01 ASSESSMENT — ACTIVITIES OF DAILY LIVING (ADL)
ADLS_ACUITY_SCORE: 45
ADLS_ACUITY_SCORE: 51
ADLS_ACUITY_SCORE: 45
ADLS_ACUITY_SCORE: 49
ADLS_ACUITY_SCORE: 51
ADLS_ACUITY_SCORE: 46
ADLS_ACUITY_SCORE: 56
DRESSING/BATHING: BATHING DIFFICULTY, ASSISTANCE 1 PERSON;DRESSING DIFFICULTY, ASSISTANCE 1 PERSON
DRESSING/BATHING_DIFFICULTY: YES
ADLS_ACUITY_SCORE: 48
ADLS_ACUITY_SCORE: 49
VISION_MANAGEMENT: GLASSES
ADLS_ACUITY_SCORE: 51
ADLS_ACUITY_SCORE: 56
ADLS_ACUITY_SCORE: 39
DRESSING/BATHING_MANAGEMENT: ASSIST OF ONE
TRANSFERRING: 1-->ASSISTANCE (EQUIPMENT/PERSON) NEEDED (NOT DEVELOPMENTALLY APPROPRIATE)
ADLS_ACUITY_SCORE: 56
ADLS_ACUITY_SCORE: 45
ADLS_ACUITY_SCORE: 56
ADLS_ACUITY_SCORE: 51
ADLS_ACUITY_SCORE: 56
ADLS_ACUITY_SCORE: 48
FALL_HISTORY_WITHIN_LAST_SIX_MONTHS: NO
TRANSFERRING: 1-->ASSISTANCE (EQUIPMENT/PERSON) NEEDED
ADLS_ACUITY_SCORE: 53
ADLS_ACUITY_SCORE: 48
ADLS_ACUITY_SCORE: 51
ADLS_ACUITY_SCORE: 49
ADLS_ACUITY_SCORE: 50
ADLS_ACUITY_SCORE: 51
DIFFICULTY_COMMUNICATING: NO
ADLS_ACUITY_SCORE: 35
ADLS_ACUITY_SCORE: 39
ADLS_ACUITY_SCORE: 45
WEAR_GLASSES_OR_BLIND: YES
ADLS_ACUITY_SCORE: 51
ADLS_ACUITY_SCORE: 56
DOING_ERRANDS_INDEPENDENTLY_DIFFICULTY: YES
ADLS_ACUITY_SCORE: 49
ADLS_ACUITY_SCORE: 51
DRESS: 1-->ASSISTANCE (EQUIPMENT/PERSON) NEEDED (NOT DEVELOPMENTALLY APPROPRIATE)
ADLS_ACUITY_SCORE: 45
ADLS_ACUITY_SCORE: 51
ADLS_ACUITY_SCORE: 50
ADLS_ACUITY_SCORE: 53
DRESS: 1-->ASSISTANCE (EQUIPMENT/PERSON) NEEDED
BATHING: 1-->ASSISTANCE NEEDED
ADLS_ACUITY_SCORE: 56
ADLS_ACUITY_SCORE: 35
WALKING_OR_CLIMBING_STAIRS: AMBULATION DIFFICULTY, REQUIRES EQUIPMENT;STAIR CLIMBING DIFFICULTY, REQUIRES EQUIPMENT;STAIR CLIMBING DIFFICULTY, ASSISTANCE 1 PERSON;TRANSFERRING DIFFICULTY, REQUIRES EQUIPMENT
ADLS_ACUITY_SCORE: 48
ADLS_ACUITY_SCORE: 48
ADLS_ACUITY_SCORE: 56
ADLS_ACUITY_SCORE: 51
ADLS_ACUITY_SCORE: 45
ADLS_ACUITY_SCORE: 49
ADLS_ACUITY_SCORE: 50
ADLS_ACUITY_SCORE: 50
ADLS_ACUITY_SCORE: 51
DIFFICULTY_EATING/SWALLOWING: NO
ADLS_ACUITY_SCORE: 51
ADLS_ACUITY_SCORE: 45
ADLS_ACUITY_SCORE: 56
ADLS_ACUITY_SCORE: 35
WALKING_OR_CLIMBING_STAIRS_DIFFICULTY: YES
ADLS_ACUITY_SCORE: 56
ADLS_ACUITY_SCORE: 50
ADLS_ACUITY_SCORE: 49
ADLS_ACUITY_SCORE: 48
TOILETING: 1-->ASSISTANCE (EQUIPMENT/PERSON) NEEDED
ADLS_ACUITY_SCORE: 39
ADLS_ACUITY_SCORE: 45
ADLS_ACUITY_SCORE: 56
ADLS_ACUITY_SCORE: 51
CHANGE_IN_FUNCTIONAL_STATUS_SINCE_ONSET_OF_CURRENT_ILLNESS/INJURY: YES
ADLS_ACUITY_SCORE: 56
TOILETING: 1-->ASSISTANCE (EQUIPMENT/PERSON) NEEDED (NOT DEVELOPMENTALLY APPROPRIATE)
ADLS_ACUITY_SCORE: 49
ADLS_ACUITY_SCORE: 49
ADLS_ACUITY_SCORE: 51
CONCENTRATING,_REMEMBERING_OR_MAKING_DECISIONS_DIFFICULTY: YES
DEPENDENT_IADLS:: CLEANING;COOKING;LAUNDRY;SHOPPING;MEAL PREPARATION;MEDICATION MANAGEMENT
TOILETING_ISSUES: YES
ADLS_ACUITY_SCORE: 51
ADLS_ACUITY_SCORE: 56
ADLS_ACUITY_SCORE: 49
ADLS_ACUITY_SCORE: 56
ADLS_ACUITY_SCORE: 49
ADLS_ACUITY_SCORE: 49
ADLS_ACUITY_SCORE: 39
ADLS_ACUITY_SCORE: 51
ADLS_ACUITY_SCORE: 45
ADLS_ACUITY_SCORE: 53
ADLS_ACUITY_SCORE: 51
ADLS_ACUITY_SCORE: 49
ADLS_ACUITY_SCORE: 49
TOILETING_ASSISTANCE: TOILETING DIFFICULTY, ASSISTANCE 1 PERSON
DEPENDENT_IADLS:: CLEANING;COOKING;LAUNDRY;SHOPPING;MEAL PREPARATION;MEDICATION MANAGEMENT;TRANSPORTATION

## 2023-01-01 ASSESSMENT — PAIN SCALES - GENERAL
PAINLEVEL: NO PAIN (0)

## 2023-01-17 PROBLEM — N39.0 UTI (URINARY TRACT INFECTION): Status: ACTIVE | Noted: 2023-01-01

## 2023-01-17 PROBLEM — E11.621 DIABETIC ULCER OF RIGHT MIDFOOT ASSOCIATED WITH TYPE 2 DIABETES MELLITUS, LIMITED TO BREAKDOWN OF SKIN (H): Status: ACTIVE | Noted: 2021-09-21

## 2023-01-17 PROBLEM — Z79.4 TYPE 2 DIABETES MELLITUS WITH DIABETIC PERIPHERAL ANGIOPATHY WITHOUT GANGRENE, WITH LONG-TERM CURRENT USE OF INSULIN (H): Chronic | Status: ACTIVE | Noted: 2020-12-02

## 2023-01-17 PROBLEM — Z90.49 HISTORY OF CHOLECYSTECTOMY: Status: ACTIVE | Noted: 2022-02-23

## 2023-01-17 PROBLEM — N18.30 CKD (CHRONIC KIDNEY DISEASE) STAGE 3, GFR 30-59 ML/MIN (H): Status: ACTIVE | Noted: 2022-07-01

## 2023-01-17 PROBLEM — I25.10 CORONARY ARTERY DISEASE INVOLVING NATIVE CORONARY ARTERY OF NATIVE HEART WITHOUT ANGINA PECTORIS: Status: ACTIVE | Noted: 2022-02-23

## 2023-01-17 PROBLEM — A41.9 SEPSIS (H): Status: ACTIVE | Noted: 2023-01-01

## 2023-01-17 PROBLEM — E11.51 TYPE 2 DIABETES MELLITUS WITH DIABETIC PERIPHERAL ANGIOPATHY WITHOUT GANGRENE, WITH LONG-TERM CURRENT USE OF INSULIN (H): Chronic | Status: ACTIVE | Noted: 2020-12-02

## 2023-01-17 PROBLEM — N17.9 ACUTE KIDNEY INJURY (H): Status: ACTIVE | Noted: 2023-01-01

## 2023-01-17 PROBLEM — E11.10 DKA (DIABETIC KETOACIDOSIS) (H): Status: ACTIVE | Noted: 2023-01-01

## 2023-01-17 PROBLEM — L97.411 DIABETIC ULCER OF RIGHT MIDFOOT ASSOCIATED WITH TYPE 2 DIABETES MELLITUS, LIMITED TO BREAKDOWN OF SKIN (H): Status: ACTIVE | Noted: 2021-09-21

## 2023-01-17 PROBLEM — R41.81 AGE-RELATED COGNITIVE DECLINE: Chronic | Status: ACTIVE | Noted: 2022-04-23

## 2023-01-17 PROBLEM — R62.7 ADULT FAILURE TO THRIVE: Status: ACTIVE | Noted: 2020-12-02

## 2023-01-17 PROBLEM — I48.0 PAROXYSMAL ATRIAL FIBRILLATION (H): Status: ACTIVE | Noted: 2020-12-02

## 2023-01-17 PROBLEM — E87.5 HYPERKALEMIA: Status: ACTIVE | Noted: 2023-01-01

## 2023-01-17 PROBLEM — R54 FRAILTY SYNDROME IN GERIATRIC PATIENT: Status: ACTIVE | Noted: 2022-05-06

## 2023-01-17 PROBLEM — R29.6 FALLS FREQUENTLY: Status: ACTIVE | Noted: 2022-05-06

## 2023-01-17 NOTE — ED NOTES
Reviewed CT scan results which demonstrated a still the distended bladder despite Dietrich catheter placement.  I reviewed the CT scan Dietrich catheter appeared to be in place and I discussed with the ED nursing who is going to troubleshoot the catheter adjusted to potentially irrigate it if there has not been further drainage.  Nurse has already received more than 300 cc from the bladder catheter prior to going over from CT scan.     Dale Reid MD  01/17/23 [id number]

## 2023-01-17 NOTE — ED PROVIDER NOTES
EMERGENCY DEPARTMENT ENCOUNTER      NAME: Caleb Hernandez  AGE: 87 year old male  YOB: 1935  MRN: [id number]  EVALUATION DATE & TIME: 1/17/2023 12:00 PM    PCP: Otis Lozano    ED PROVIDER: Valeria Chen MD      Chief Complaint   Patient presents with     Nausea & Vomiting     Urinary Frequency         FINAL IMPRESSION:  1. Sepsis with acute renal failure without septic shock, due to unspecified organism, unspecified acute renal failure type (H)    2. Hyperkalemia    3. Acute cystitis without hematuria          ED COURSE & MEDICAL DECISION MAKING:    Pertinent Labs & Imaging studies reviewed. (See chart for details)  87 year old male presents to the Emergency Department for evaluation of vomiting and altered mental status. History was limited due to the patient's cognitive functioning. I called the patient's wife for further history and then spoke to his son in person.  Differential diagnosis for his complaint is very broad.  This includes but is not limited to infectious etiology such as UTI, pneumonia, bacteremia, DKA, dehydration, intracranial hemorrhage or mass, without focal deficit felt that CVA was unlikely, COVID-19 or other viral infection, hypoglycemia, kidney stone, renal failure, toxic metabolic encephalopathy, electrolyte derangement, appendicitis, diverticulitis, bowel obstruction, ascending cholangitis, cardiac etiology, etc.  Work-up initiated from the waiting room by the triage physician and then passed on to me.  He is awake and alert here and answering questions appropriately.  Does appear slightly confused.  Does not have any focal deficit on my exam.  Has mild diffuse abdominal tenderness.  Broad work-up initiated including sepsis work-up.  Blood cultures x2 as well as urine and urine culture ordered and lactic acid as well as VBG.  CT of the head without contrast as well as CT of the chest abdomen pelvis with contrast were ordered initially.  Glucose within normal limits  at 154.  EKG was obtained which was read by the computer as atrial fibrillation however on my evaluation this appears to be sinus rhythm with frequent PACs.  Does appear he has had some PACs in the past but this is more than his previous EKG.  Did repeat an EKG which again revealed sinus rhythm with PACs.  No sign of acute ischemia or arrhythmia on second EKG.    BMP did result with an elevated potassium of 6.3.  Bicarb of 13.  Creatinine is 4.83 with a BUN of 155.  Glucose was 154.  VBG reveals a pH of 7.22 with a bicarb of 15.  CO2 normal at 35.  Do suspect that this may be acidosis related to renal failure likely related to dehydration as well.  Initial blood pressure 109/50.  Heart rate in the 70s.  Initially was satting normally on room air.  He is also initially slightly hypothermic at 35.6.  Meron hugger was applied.  IV access was established and the patient was started on a liter bolus of IV fluids.  With his hyperkalemia we did order IV calcium gluconate as well as an amp of sodium bicarb, 7 units of insulin as well as dextrose.  Second liter of IV fluids were also ordered.  Lactate is 2.  CBC reveals white blood cell count of 10.9.  Hemoglobin 12.1.  Magnesium within normal limits.  Do feel likely his hyperkalemia is related to his renal failure.  This is 3 times what his previous baseline has been at 1.7.  Beta hydroxybutyrate is elevated at 2.95.  Did consider the potential of Victoza as a cause of normoglycemic DKA and discussed this with pharmacy.  However they were able to confirm that he is not taking Victoza and only uses Lantus.  Thus do not feel this is the case and more likely that he has some starvation ketosis as it sounds as though he has not been eating for 3 days and does appear significantly dry.  Troponin within normal limits.  COVID-19 influenza and RSV PCR obtained and was negative.  Urinalysis obtained after straight cath which revealed approximately 300 mL in the bladder.  Dietrich was  placed and reveals purulent appearing urine output.  Per family he had previously been on doxycycline possibly for a UTI for about a month and stopped taking this recently.  Urine culture is sent.  Did start IV Zosyn initially after blood cultures obtained.  Glucose levels were closely monitored.  Repeat potassium is decreased at 5.4.  CT of the head is without acute pathology.  CT chest abdomen pelvis without contrast is pending.  This was delayed as it was originally ordered with contrast but with his acute kidney injury this needed to be canceled and reordered and I was in a procedure.  Discussed getting him to CT of the chest abdomen pelvis is soon as possible with staff.  Do want to rule out potentially an infected kidney stone as family reports he may have had kidney stones in the past but are uncertain if these were kidney stones or gallstones.  Does appear that he has had a sending cholangitis in the past however his liver function today is overall reassuring.  AST 42 with an ALT of 29 and a alk phos of 79 with a normal bilirubin of 0.4.  Thus do not feel this is a sending cholangitis at this time.  He is receiving a second liter of fluids at this time as his blood pressures became soft in the 90s systolic range.  Temperature improving with bear hugger.  He apparently warranted supplemental oxygen however in discussion with nursing staff they had recently changed shifts and they were unaware why he had been previously placed on oxygen.  He was not hypoxic on my evaluation.  This was weaned and he is satting normally on room air at this time.  Discussed the patient with the hospitalist Dr. Lanza who accepted the patient for admission.  We discussed the results of the CT chest abdomen pelvis were pending at this time and he stated he would follow-up on this as well.  He asked that we add on vancomycin as well and this was ordered IV.  Patient and his family were in agreement with plan for admission.  His care  was transferred to the hospitalist in stable condition with diagnosis of likely sepsis related to UTI and acute kidney injury and hyperkalemia. He is receiving 2 L of NS as well as dextrose fluids and will meet his 30 ml/kg goal with this.     12:07 PM I introduced myself to the patient, obtained patient history, performed a physical exam, and discussed plan for ED workup including potential diagnostic laboratory/imaging studies and interventions.  3:15 pm Patient admitted to Dr. Lanza with the hospitalist team. CT chest abdomen pelvis without contrast pending and Dr. Lanza is aware and will follow up on it.          At the conclusion of the encounter I discussed the results of all of the tests and the disposition. The questions were answered. The patient or family acknowledged understanding and was agreeable with the care plan.     30 minutes of critical care time     Critical Care  Performed by: Valeria Chen MD  Authorized by: Valeria Chen MD     Total critical care time: 30 minutes  Critical care time was exclusive of separately billable procedures and treating other patients.  Critical care was necessary to treat or prevent imminent or life-threatening deterioration of the following conditions: hyperkalemia, renal failure, sepsis  Critical care was time spent personally by me on the following activities: development of treatment plan with patient or surrogate, discussions with consultants, examination of patient, evaluation of patient's response to treatment, obtaining history from patient or surrogate, ordering and performing treatments and interventions, ordering and review of laboratory studies, ordering and review of radiographic studies and re-evaluation of patient's condition, this excludes any separately billable procedures.      Medical Decision Making    History:    Supplemental history from: Documented in chart, if applicable    External Record(s) reviewed: Documented in chart, if  applicable.    Work Up:    Chart documentation includes differential considered and any EKGs or imaging independently interpreted by provider.    In additional to work up documented, I considered the following work up: Documented in chart, if applicable.    External consultation:    Discussion of management with another provider: Documented in chart, if applicable    Complicating factors:    Care impacted by chronic illness: Anticoagulated State, Chronic Kidney Disease, Diabetes, Heart Disease, Hyperlipidemia and Hypertension    Care affected by social determinants of health: N/A    Disposition considerations: Admit.      MEDICATIONS GIVEN IN THE EMERGENCY:  Medications   glucose gel 15-30 g (has no administration in time range)     Or   dextrose 50 % injection 25-50 mL (has no administration in time range)     Or   glucagon injection 1 mg (has no administration in time range)   dextrose 10% BOLUS (300 mLs Intravenous New Bag 1/17/23 1354)   0.9% sodium chloride BOLUS (0 mLs Intravenous Stopped 1/17/23 1507)     Followed by   sodium chloride 0.9% infusion (has no administration in time range)   0.9% sodium chloride BOLUS (1,000 mLs Intravenous New Bag 1/17/23 1533)   ondansetron (ZOFRAN) injection 4 mg (4 mg Intravenous Not Given 1/17/23 1345)   calcium gluconate 1 g in 50 mL sodium chloride intermittent infusion (premix) (1 g Intravenous Given 1/17/23 1329)   sodium bicarbonate 8.4 % injection 50 mEq (50 mEq Intravenous Given 1/17/23 1330)   dextrose 50 % injection 25 g (25 g Intravenous Given 1/17/23 1337)   insulin regular 1 unit/mL injection 7.62 Units (7.62 Units Intravenous Given 1/17/23 1340)   lidocaine (XYLOCAINE) 2 % external gel 10 mL (10 mLs Urethral Given 1/17/23 1427)   piperacillin-tazobactam (ZOSYN) 3.375 g vial to attach to  mL bag (3.375 g Intravenous Given 1/17/23 1502)       NEW PRESCRIPTIONS STARTED AT TODAY'S ER VISIT  New Prescriptions    No medications on file           =================================================================    Hospitals in [place]    Patient information was obtained from: patient, EMS, patient's family (wife and son)    Use of : N/A         Caleb Hernandez is a 87 year old male with a pertinent history of DM type II, HLD, HTN, CAD, CKD, lymphedema, and a-fib with chronic anticoagulation on Eliquis who presents to this ED for evaluation of nausea and vomiting.    The patient reports he has been feeling unwell for the past ~1 day. He states he has had nausea and vomiting. He also states he feels cold. He denies any abdominal pain, chest pain, shortness of breath, fevers, cough, diarrhea, difficulty urinating, dysuria, hematuria, or foot pain. He denies any recent fall. Denies anyone else being sick at home. Patient denies additional medical concerns or complaints at this time.      Per EMS, the patient reportedly has a UTI which he was diagnosed with a few days ago. The patient told them he has been unable to keep his antibiotics down due to his nausea and vomiting. 3 lead done by EMS was suspicious for afib.    SHx: The patient walks with a walker at baseline. He lives at home with his wife, son, grandson, and granddaughter.    Note: patient may be unreliable historian due to acuity of his condition and report of cognitive difficulty.      REVIEW OF SYSTEMS   Review of Systems   Pertinent positives and negatives are documented in the HPI. All other systems reviewed and are negative.      PAST MEDICAL HISTORY:  Past Medical History:   Diagnosis Date     Abscess of right foot 11/23/2020    Added automatically from request for surgery [id number]     Acute pulmonary embolism with acute cor pulmonale (H) 5/23/2020     Ascending cholangitis 1/27/2022     BPH (benign prostatic hyperplasia)      Cholelithiasis      Closed fracture of rib     Created by Conversion  Replacement Utility updated for latest IMO load     Closed fracture of thoracic vertebra (H)     Created by  Conversion  Replacement Utility updated for latest IMO load     Common bile duct (CBD) obstruction 9/4/2017     Diabetes mellitus (H)      Essential hypertension      Gram-negative bacteremia 1/27/2022    Positive blood culture 1/26/2022; likely biliary source     Hyperlipidemia      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    Added automatically from request for surgery [id number]      Pancreatitis      Sepsis (H) 1/27/2022     Sepsis due to pneumonia (H) 9/8/2017     Septic arthritis of right foot (H) 12/2/2020       PAST SURGICAL HISTORY:  Past Surgical History:   Procedure Laterality Date     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    Procedure: with amputation of the fifth ray, peroneal brevis tendon transfer;  Surgeon: John Garcia DPM;  Location: [facility];  Service: Podiatry     BACK SURGERY      1964 removed a cyst     CHOLECYSTECTOMY  1985     ENDOSCOPIC RETROGRADE CHOLANGIOPANCREATOGRAM       ENDOSCOPIC RETROGRADE CHOLANGIOPANCREATOGRAM N/A 9/5/2017    Procedure: ENDOSCOPIC RETROGRADE CHOLANGIOPANCREATOGRAPHY SPHINCTEROTOMY AND STONE EXTRACTION;  Surgeon: Hansel Gannon MD;  [place] [facility] [place]  Service:      ENDOSCOPIC RETROGRADE CHOLANGIOPANCREATOGRAM N/A 1/27/2022    Procedure: ENDOSCOPIC RETROGRADE CHOLANGIOPANCREATOGRAPHY, BALLOON DILATION AND STONE EXTRACTION;  Surgeon: Sav Osborne MD;  [place] [facility] [place]     INCISION AND DRAINAGE OF WOUND Right 11/2/2020    Procedure: INCISION AND DRAINAGE, right foot with removal of bone 5th metatarsal;  Surgeon: John Garcia DPM;  Location: [facility] OR;  Service: Podiatry     INCISION AND DRAINAGE OF WOUND Right 11/16/2020    Procedure: INCISION AND DRAINAGE, right foot;  Surgeon: John Garcia DPM;  Location: [facility];  Service: Podiatry     INCISION AND DRAINAGE OF WOUND Right 11/27/2020    Procedure: INCISION AND DRAINAGE, LOWER EXTREMITY;  Surgeon: Aleksandr Hdez DPM;  [place] [facility] [place]   Service: Podiatry     IR EXTREMITY ANGIOGRAM RIGHT  2020     IR LOWER EXTREMITY ANGIOGRAM RIGHT  2020     PICC  2020          TONSILLECTOMY  1940           CURRENT MEDICATIONS:    acetaminophen (TYLENOL) 500 MG tablet  aspirin 81 MG EC tablet  doxycycline hyclate (VIBRAMYCIN) 100 MG capsule  ELIQUIS ANTICOAGULANT 5 MG tablet  insulin glargine (LANTUS SOLOSTAR) 100 UNIT/ML pen  multivit-min/FA/lycopen/lutein (CENTRUM SILVER MEN ORAL)  mupirocin (BACTROBAN) 2 % ointment  polyethylene glycol (MIRALAX) 17 gram packet  tamsulosin (FLOMAX) 0.4 MG capsule  traMADol (ULTRAM) 50 MG tablet  vitamin D3 (CHOLECALCIFEROL) 125 MCG (5000 UT) tablet  vitamin E 400 unit capsule  B-D U/F 31G X 8 MM insulin pen needle  blood-glucose meter (ONETOUCH VERIO IQ METER) Misc  Continuous Blood Gluc  (FREESTYLE APRIL 2 READER) MILE  Continuous Blood Gluc Sensor (FREESTYLE APRIL 2 SENSOR) MISC  Continuous Blood Gluc Sensor (FREESTYLE APRIL 2 SENSOR) MISC  Continuous Blood Gluc Sensor (FREESTYLE APRIL 2 SENSOR) MISC  insulin pen needle (32G X 6 MM) 32G X 6 MM miscellaneous  ONETOUCH ULTRA test strip        ALLERGIES:  Allergies   Allergen Reactions     Cresol [Phenol] Unknown     Muscle cramps     Demeclocycline Hives and Rash     Crestor [Rosuvastatin] Muscle Pain (Myalgia)       FAMILY HISTORY:  Family History   Problem Relation Age of Onset     Aortic aneurysm Mother      Alcoholism Father        SOCIAL HISTORY:   Social History     Socioeconomic History     Marital status:    Tobacco Use     Smoking status: Former     Types: Cigarettes     Quit date: 1991     Years since quittin.2     Smokeless tobacco: Never   Vaping Use     Vaping Use: Never used   Substance and Sexual Activity     Alcohol use: No     Drug use: No     Sexual activity: Never       VITALS:  BP 93/52 (BP Location: Right arm, Patient Position: Semi-Jeff's, Cuff Size: Adult Regular)   Pulse 71   Temp 97.3  F (36.3  C) (Temporal)    Resp 22   SpO2 96%     PHYSICAL EXAM    Physical Exam  Vitals reviewed.   Constitutional:       General: He is not in acute distress.     Appearance: He is well-developed. He is ill-appearing.   HENT:      Head: Normocephalic and atraumatic.      Nose: Nose normal.      Mouth/Throat:      Mouth: Mucous membranes are dry.      Pharynx: No oropharyngeal exudate.   Eyes:      Extraocular Movements: Extraocular movements intact.      Conjunctiva/sclera: Conjunctivae normal.      Pupils: Pupils are equal, round, and reactive to light.   Cardiovascular:      Rate and Rhythm: Normal rate and regular rhythm.      Pulses: Normal pulses.      Heart sounds: Normal heart sounds. No murmur heard.  Pulmonary:      Effort: Pulmonary effort is normal. No respiratory distress.      Breath sounds: Normal breath sounds. No stridor. No wheezing or rales.   Abdominal:      General: Bowel sounds are normal. There is no distension.      Palpations: Abdomen is soft. There is no mass.      Tenderness: There is abdominal tenderness (Mild generalized abdominal tenderness). There is no right CVA tenderness, left CVA tenderness, guarding or rebound.   Musculoskeletal:         General: No signs of injury. Normal range of motion.      Cervical back: Normal range of motion and neck supple. No rigidity.   Skin:     General: Skin is warm and dry.      Capillary Refill: Capillary refill takes less than 2 seconds.      Coloration: Skin is pale.      Findings: No rash.   Neurological:      Mental Status: He is alert.      GCS: GCS eye subscore is 4. GCS verbal subscore is 5. GCS motor subscore is 6.      Cranial Nerves: No cranial nerve deficit.      Sensory: No sensory deficit.      Motor: No abnormal muscle tone.      Comments: Awake and alert and answers questions appropriately.  Does appear somewhat confused.  4 out of 5 strength in all 4 extremities bilaterally.  Sensation intact to light touch in all 4 extremities bilaterally.  Cranial nerves  II through XII intact.  No facial asymmetry.   Psychiatric:         Mood and Affect: Mood normal.            LAB:  All pertinent labs reviewed and interpreted.  Results for orders placed or performed during the hospital encounter of 01/17/23   Head CT w/o contrast    Impression    IMPRESSION:  1.  No acute intracranial process.   Basic metabolic panel   Result Value Ref Range    Sodium 138 136 - 145 mmol/L    Potassium 6.3 (HH) 3.5 - 5.0 mmol/L    Chloride 108 (H) 98 - 107 mmol/L    Carbon Dioxide (CO2) 13 (L) 22 - 31 mmol/L    Anion Gap 17 5 - 18 mmol/L    Urea Nitrogen 155 (H) 8 - 28 mg/dL    Creatinine 4.83 (H) 0.70 - 1.30 mg/dL    Calcium 10.2 8.5 - 10.5 mg/dL    Glucose 154 (H) 70 - 125 mg/dL    GFR Estimate 11 (L) >60 mL/min/1.73m2   Blood gas venous   Result Value Ref Range    pH Venous 7.22 (LL) 7.35 - 7.45    pCO2 Venous 35 35 - 50 mm Hg    pO2 Venous 23 (L) 25 - 47 mm Hg    Bicarbonate Venous 15 (L) 24 - 30 mmol/L    Base Excess/Deficit (+/-) -13.2   mmol/L    Oxyhemoglobin Venous 32.2 (L) 70.0 - 75.0 %    O2 Sat, Venous 32.7 (L) 70.0 - 75.0 %   Result Value Ref Range    Magnesium 2.4 1.8 - 2.6 mg/dL   Troponin I (now)   Result Value Ref Range    Troponin I 0.04 0.00 - 0.29 ng/mL   Lactic acid whole blood   Result Value Ref Range    Lactic Acid 2.0 0.7 - 2.0 mmol/L   Ketone Beta-Hydroxybutyrate Quantitative   Result Value Ref Range    Ketone (Beta-Hydroxybutyrate) Quantitative 2.95 (H) <=0.3 mmol/L   Hepatic function panel   Result Value Ref Range    Bilirubin Total 0.4 0.0 - 1.0 mg/dL    Bilirubin Direct 0.2 <=0.5 mg/dL    Protein Total 7.6 6.0 - 8.0 g/dL    Albumin 3.0 (L) 3.5 - 5.0 g/dL    Alkaline Phosphatase 79 45 - 120 U/L    AST 42 (H) 0 - 40 U/L    ALT 29 0 - 45 U/L   CBC with platelets and differential   Result Value Ref Range    WBC Count 10.9 4.0 - 11.0 10e3/uL    RBC Count 4.14 (L) 4.40 - 5.90 10e6/uL    Hemoglobin 12.1 (L) 13.3 - 17.7 g/dL    Hematocrit 35.1 (L) 40.0 - 53.0 %    MCV 85 78 -  100 fL    MCH 29.2 26.5 - 33.0 pg    MCHC 34.5 31.5 - 36.5 g/dL    RDW 13.2 10.0 - 15.0 %    Platelet Count 396 150 - 450 10e3/uL    % Neutrophils 85 %    % Lymphocytes 9 %    % Monocytes 5 %    % Eosinophils 0 %    % Basophils 0 %    % Immature Granulocytes 1 %    NRBCs per 100 WBC 0 <1 /100    Absolute Neutrophils 9.2 (H) 1.6 - 8.3 10e3/uL    Absolute Lymphocytes 1.0 0.8 - 5.3 10e3/uL    Absolute Monocytes 0.5 0.0 - 1.3 10e3/uL    Absolute Eosinophils 0.0 0.0 - 0.7 10e3/uL    Absolute Basophils 0.0 0.0 - 0.2 10e3/uL    Absolute Immature Granulocytes 0.1 <=0.4 10e3/uL    Absolute NRBCs 0.0 10e3/uL   Glucose by meter   Result Value Ref Range    GLUCOSE BY METER POCT 147 (H) 70 - 99 mg/dL   Symptomatic Influenza A/B & SARS-CoV2 (COVID-19) Virus PCR Multiplex Nasopharyngeal    Specimen: Nasopharyngeal; Swab   Result Value Ref Range    Influenza A PCR Negative Negative    Influenza B PCR Negative Negative    RSV PCR Negative Negative    SARS CoV2 PCR Negative Negative   UA with Microscopic reflex to Culture    Specimen: Urine, Dietrich Catheter   Result Value Ref Range    Color Urine Yellow Colorless, Straw, Light Yellow, Yellow    Appearance Urine Cloudy (A) Clear    Glucose Urine Negative Negative mg/dL    Bilirubin Urine Negative Negative    Ketones Urine Trace (A) Negative mg/dL    Specific Gravity Urine 1.010 1.001 - 1.030    Blood Urine 0.5 mg/dL (A) Negative    pH Urine 6.0 5.0 - 7.0    Protein Albumin Urine 200 (A) Negative mg/dL    Urobilinogen Urine <2.0 <2.0 mg/dL    Nitrite Urine Negative Negative    Leukocyte Esterase Urine 500 Jasmine/uL (A) Negative    WBC Clumps Urine Present (A) None Seen /HPF    RBC Urine 9 (H) <=2 /HPF    WBC Urine >182 (H) <=5 /HPF   Result Value Ref Range    Potassium 5.4 (H) 3.5 - 5.0 mmol/L   Glucose by meter   Result Value Ref Range    GLUCOSE BY METER POCT 139 (H) 70 - 99 mg/dL   Glucose by meter   Result Value Ref Range    GLUCOSE BY METER POCT 201 (H) 70 - 99 mg/dL   Glucose by  meter   Result Value Ref Range    GLUCOSE BY METER POCT 133 (H) 70 - 99 mg/dL   ECG 12-LEAD WITH MUSE ([facility])   Result Value Ref Range    Systolic Blood Pressure 108 mmHg    Diastolic Blood Pressure 54 mmHg    Ventricular Rate 74 BPM    Atrial Rate 74 BPM    HI Interval 160 ms    QRS Duration 88 ms     ms    QTc 404 ms    P Axis 52 degrees    R AXIS 10 degrees    T Axis 56 degrees    Interpretation ECG       Sinus rhythm with Premature supraventricular complexes  Otherwise normal ECG  When compared with ECG of 17-JAN-2023 12:48,  No significant change was found  Confirmed by SEE ED PROVIDER NOTE FOR, ECG INTERPRETATION ([id number]),  Tahir Heck ([id number]) on 1/17/2023 12:53:55 PM     ECG 12-LEAD WITH MUSE ([facility])   Result Value Ref Range    Systolic Blood Pressure  mmHg    Diastolic Blood Pressure  mmHg    Ventricular Rate 73 BPM    Atrial Rate 63 BPM    HI Interval  ms    QRS Duration 84 ms     ms    QTc 405 ms    P Axis  degrees    R AXIS 7 degrees    T Axis 45 degrees    Interpretation ECG       Atrial fibrillation  Septal infarct , age undetermined  Abnormal ECG  When compared with ECG of 23-NOV-2020 14:34,  Atrial fibrillation has replaced Sinus rhythm  Septal infarct is now Present  Borderline criteria for Inferior infarct are no longer Present  Nonspecific T wave abnormality no longer evident in Inferior leads  Confirmed by SEE ED PROVIDER NOTE FOR, ECG INTERPRETATION ([id number]),  Tahir Heck ([id number]) on 1/17/2023 12:54:14 PM         RADIOLOGY:  Reviewed all pertinent imaging. Please see official radiology report.  Head CT w/o contrast   Final Result   IMPRESSION:   1.  No acute intracranial process.      CT Chest Abdomen Pelvis w/o Contrast    (Results Pending)       EKG:    Performed at: 11:26 1/17/22.     Impression: Computer read was atrial fibrillation however this appears to be sinus rhythm with frequent PACs.  Difficult baseline due to the patient's tremor.    Rate: 73 BPM.  Rhythm: Sinus  rhythm with PACs.  Axis: 7.  ID Interval: Not discernable.   QRS Interval: 84 ms.  QTc Interval: 405 ms.  ST Changes: None.  Comparison: More PACs compared to previous EKG on 11/23/2020  I have independently reviewed and interpreted the EKG(s) documented above.    Repeat EKG:    Performed at: 12:49 1/17/2023.    Impression: Sinus rhythm with PACs. No evidence of acute ischemia. Compared with ECG from 12:48 1/17/23, no significant change was found.    Rate: 74 BPM.  Rhythm: sinus with PACs.  Axis: 10.  ID Interval: 160 ms.  QRS Interval: 88 ms.  QTc Interval: 404 ms.  ST Changes: None.  Comparison: Compared with ECG from 12:48 1/17/23, no significant change was found.    I have independently reviewed and interpreted the EKG(s) documented above.    PROCEDURES:   None.      [facility] System Documentation:   CMS Diagnoses:   The patient has signs of Severe Sepsis        If one the following conditions is present, a 30 mL/kg bolus is recommended as part of the 6 hour bundle (IBW can be used for BMI >30, or document refusal/contraindication):      1.   Initial hypotension  defined as 2 bps < 90 or map < 65 in the 6hrs before or 3hrs after time zero.     2.  Lactate >4.      The patient has signs of Severe Sepsis as evidenced by:    1. 2 SIRS criteria, AND  2. Suspected infection, AND   3. Organ dysfunction: ARF with Cr >2 due to infection    Time severe sepsis diagnosis confirmed: 1230  01/17/23 as this was the time when Lab results revealing acute organ dysfunction due to infection (Cr, Bili, Plt)    3 Hour Severe Sepsis Bundle Completion:    1. Initial Lactic Acid Result:   Recent Labs   Lab Test 01/17/23  1200 01/27/22  0232 01/26/22  2343   LACT 2.0 2.9* 3.3*     2. Blood Cultures before Antibiotics: Yes  3. Broad Spectrum Antibiotics Administered:  yes       Anti-infectives (From admission through now)    Start     Dose/Rate Route Frequency Ordered Stop    01/17/23 1623  vancomycin place [facility] - receiving  "intermittent dosing         1 each Intravenous SEE ADMIN INSTRUCTIONS 01/17/23 1624      01/17/23 1500  piperacillin-tazobactam (ZOSYN) 3.375 g vial to attach to  mL bag        Note to Pharmacy: For [facility], [facility] and [facility]: For Zosyn-naive patients, use the \"Zosyn initial dose + extended infusion\" order panel.    3.375 g  over 30 Minutes Intravenous ONCE 01/17/23 1443 01/17/23 1532          4. Is initial hypotension present?     No (IV fluid bolus NOT required). IV Fluid volume administered: 2 L                    Severe Sepsis reassessment:  1. Repeat Lactic Acid Level within 6 hours of time zero: pending  2. MAP>65 after initial IVF bolus, will continue to monitor fluid status and vital signs                  I, Mavis Davies, am serving as a scribe to document services personally performed by Valeria Chen MD based on my observation and the provider's statements to me. I, Valeria Chen MD, attest that Mavis Davies is acting in a scribe capacity, has observed my performance of the services and has documented them in accordance with my direction.    Valeria Chen MD  [facility] EMERGENCY ROOM  [address] [address] [phone]     Valeria Chen MD  01/17/23 1635    "

## 2023-01-17 NOTE — PHARMACY-VANCOMYCIN DOSING SERVICE
Pharmacy Vancomycin Initial Note  Date of Service 2023  Patient's  1935  87 year old, male    Indication: sepsis, likely UTI plus possibly other sources (pending CT chest); significant MANJIT    Current estimated CrCl = Estimated Creatinine Clearance: 11.8 mL/min (A) (based on SCr of 4.83 mg/dL (H)).    Creatinine for last 3 days  2023: 12:00 PM Creatinine 4.83 mg/dL    Recent Vancomycin Level(s) for last 3 days  No results found for requested labs within last 72 hours.      Vancomycin IV Administrations (past 72 hours)      No vancomycin orders with administrations in past 72 hours.                Nephrotoxins and other renal medications (From now, onward)    Start     Dose/Rate Route Frequency Ordered Stop    23 1630  vancomycin (VANCOCIN) 1,500 mg in 0.9% NaCl 250 mL intermittent infusion         1,500 mg  over 90 Minutes Intravenous ONCE 23 1605            Contrast Orders - past 72 hours (72h ago, onward)    None                Plan:  1. Start vancomycin  1500mg iv x 1 now then intermittent dosing per pharmacy based on renal function.  2. Vancomycin monitoring method: AUC  3. Vancomycin therapeutic monitoring goal: 400-600 mg*h/L  4. Pharmacy will check vancomycin levels as appropriate in 1-3 Days.    5. Serum creatinine levels will be ordered daily for the first week of therapy and at least twice weekly for subsequent weeks.      Santos Huitron [facility]

## 2023-01-17 NOTE — ED TRIAGE NOTES
The patient presents to the ED via EMS with complications from a UTI he was diagnosed with a couple of days ago. The patient reports he has been unable to keep his medications down due to nausea and vomiting. The patient is extremely weak and is participating in triage assessment poorly. Blood sugar normal per EMS. 3 lead per EMS showed atrial fibrillation, rate controlled.

## 2023-01-17 NOTE — H&P
"[facility]    History and Physical - Hospitalist Service       Date of Admission:  1/17/2023    Assessment & Plan      Caleb Hernandez is a 87 year old male admitted on 1/17/2023. He has a past medical history of paroxysmal atrial fibrillation on Eliquis, chronic kidney disease, previous recurrent UTIs, previous a sending cholangitis status postcholecystectomy, progressive cognitive issues, DM2, hypertension, coronary artery disease, lymphedema, who presented with x1 day of \"feeling unwell\" and family later reported he had nausea with emesis.      On admission, hyperkalemia 6.3, treated in ER and improved to 4.4, stable EKG (SR w/ PACs); found with significant MANJIT creatinine 4.83, , met sepsis criteria; lactate was 2; given hyperkalemia medications including calcium gluconate in the ER; resuscitated with 2 L of fluid; initiated on Zosyn.  On admission additionally with vancomycin for broad coverage, and CT chest/abdomen/pelvis is pending. Also started on DKA protocol given the bicarb, beta-hydroxybutyrate and acidosis (conservativley, though ddx also decreased po/starvation and renal metabolic).     CT C/A/P returned demonstrating Circumferential and more focal bladder wall thickening. Bilateral hydronephrosis and hydroureter extending to the bladder. Findings are concerning for cystitis. (of note, catheter being adjusted in ER after CT results); Advanced atherosclerotic disease including coronary artery disease.     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  Urinalysis certainly does appear consistent with infection; initiated on Zosyn.  Discussed with the ER provider on admission and " broadening with additional vancomycin coverage, dosed per pharmacy given the significant MANJIT.  On admission, pending CT chest abdomen pelvis; that could further help delineate potential sources of the infection/sepsis. This later returned with findings consistent with cystitis.    Plan:  -Admit to inpatient  -Low threshold for ICU transfer-monitor for hypotension, ICU transfer if requiring pressors if not responding to boluses and or potential oxygen requirements-on admission interview he was on room air  -Continue Zosyn  -Pharmacy to dose of vancomycin  -Follow-up urine and blood cultures  -Eventually will have therapies and social work-we will order on 1/8      Essential hypertension    Assessment: Pressures are soft.  Not currently on antihypertensives.    Plan:-Monitor blood pressure; would give bolus if MAP is less than 65     Euglycemic DKA    Type 2 diabetes mellitus with diabetic peripheral angiopathy without gangrene, with long-term current use of insulin (H)   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 2 diabetes mellitus, limited to breakdown of skin (H)    Assessment: with bicarb of 15, pH 7.22, and beta-hydroxybutyrate 2.95, consistent with euglycemic DKA  as sugars are in the 300s. Could be also consistent with the story of decreased po and a starvation ketosis; also could be likely from metabolic component with the Creatinine at 4.83; however, will cautiously and conservative start the DKA protocol and anticipate transition off likely before morning. Normal regimen is Lantus 32u at bedtime.    Plan:   - DKA protocol   - anticipate transitioning overnight back to subcutaneous       Paroxysmal atrial fibrillation (H)    Assessment: ekg demonstrated SR w/ PACs; confirmed on repeat ekg. On eliquis    Plan:   - continue to hold w/ consideration of the anemia 12.1 and pt report of hematuria and on urinalysis      History of cholecystectomy    Assessment: noted in setting of ascending cholangitis;  elevated AST, could be early shocked liver in developing sepsis    Plan:   - clinically follow  And recheck as indicated       Coronary artery disease involving native coronary artery of native heart without angina pectoris    Assessment: on baby aspirin and no longer on blood pressure medications. ekg without ischemic findings    Plan:   - monitor clinically      Falls frequently    Assessment: noted     Plan:   - fall precautions and up with assist only       MANJIT, acute on chronic; CKD (chronic kidney disease)  (H)    Hyperkalemia    Assessment: could be prerenal in setting of decreased po for a few days. CT demonstrated hydronephrosis and mai needed to be adjusted.     Plan:   - received 2L as above  - follow up AM labs  - renal US and nephrology consult on 1/18       Diet: Moderate Consistent Carb (60 g CHO per Meal) Diet    DVT Prophylaxis: Pneumatic Compression Devices  Mai Catheter: PRESENT, indication: Retention  Lines: None     Cardiac Monitoring: ACTIVE order. Indication: dka and sepsis  Code Status: Full Code      Clinically Significant Risk Factors Present on Admission         # Hyperkalemia: Highest K = 6.3 mmol/L in last 2 days, will monitor as appropriate    # Hypercalcemia: Highest Ca = 10.2 mg/dL in last 2 days, will monitor as appropriate    # Hypoalbuminemia: Lowest albumin = 3 g/dL at 1/17/2023 12:00 PM, will monitor as appropriate  # Drug Induced Coagulation Defect: home medication list includes an anticoagulant medication   # Acute Kidney Injury, unspecified: based on a >150% or 0.3 mg/dL increase in last creatinine compared to past 90 day average, will monitor renal function               Disposition Plan      Expected Discharge Date: 01/19/2023                  Greg Lanza MD  Hospitalist Service  [facility]  Securely message with [employer] (more info)  Text page via [employer] Paging/Directory  "    ______________________________________________________________________    Chief Complaint   \"I don't feel well\"     History is obtained from the patient    History of Present Illness   Caleb Hernandez is a 87 year old male who has a past medical history of paroxysmal atrial fibrillation on Eliquis, chronic kidney disease, previous recurrent UTIs, previous a sending cholangitis status postcholecystectomy, progressive cognitive issues, DM2, hypertension, coronary artery disease, lymphedema, who presented with x1 day of \"feeling unwell\" and family later reported he had nausea with emesis.     On admission, pt endorsed having some nausea with emesis but denied having any other symptoms.  He specifically denied having dysuria or urgency or frequency or bladder pressure.  However, he later demonstrates mixing up details during our conversation. Called and discussed with the pt's son Elfego, who reported the patient did seem to have potentially some urinary urgency-the patient uses depends but he felt that the patient was going to the restroom more often but also had some issues with incomplete voiding as well.  Patient's son was also updated on the CT chest abdomen pelvis findings.    Patient lives with the son, grandson and granddaughter, and they live about 5 miles from the hospital.  Patient is a former smoker, quit in 1980s; does not use alcohol.  The patient would like to be full code-he states that \"I still have much to live for\"      Past Medical History    Past Medical History:   Diagnosis Date     Abscess of right foot 11/23/2020    Added automatically from request for surgery [id number]     Acute pulmonary embolism with acute cor pulmonale (H) 5/23/2020     Ascending cholangitis 1/27/2022     BPH (benign prostatic hyperplasia)      Cholelithiasis      Closed fracture of rib     Created by Conversion  Replacement Utility updated for latest IMO load     Closed fracture of thoracic vertebra (H)     Created by " Conversion  Replacement Utility updated for latest IMO load     Common bile duct (CBD) obstruction 9/4/2017     Diabetes mellitus (H)      Essential hypertension      Gram-negative bacteremia 1/27/2022    Positive blood culture 1/26/2022; likely biliary source     Hyperlipidemia      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    Added automatically from request for surgery [id number]      Pancreatitis      Sepsis (H) 1/27/2022     Sepsis due to pneumonia (H) 9/8/2017     Septic arthritis of right foot (H) 12/2/2020       Past Surgical History   Past Surgical History:   Procedure Laterality Date     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    Procedure: with amputation of the fifth ray, peroneal brevis tendon transfer;  Surgeon: John Garcia DPM;  Location: [facility];  Service: Podiatry     BACK SURGERY      1964 removed a cyst     CHOLECYSTECTOMY  1985     ENDOSCOPIC RETROGRADE CHOLANGIOPANCREATOGRAM       ENDOSCOPIC RETROGRADE CHOLANGIOPANCREATOGRAM N/A 9/5/2017    Procedure: ENDOSCOPIC RETROGRADE CHOLANGIOPANCREATOGRAPHY SPHINCTEROTOMY AND STONE EXTRACTION;  Surgeon: Hansel Gannon MD;  [place] [facility] [place]:      ENDOSCOPIC RETROGRADE CHOLANGIOPANCREATOGRAM N/A 1/27/2022    Procedure: ENDOSCOPIC RETROGRADE CHOLANGIOPANCREATOGRAPHY, BALLOON DILATION AND STONE EXTRACTION;  Surgeon: Sav Osborne MD;  [facility] [place]     INCISION AND DRAINAGE OF WOUND Right 11/2/2020    Procedure: INCISION AND DRAINAGE, right foot with removal of bone 5th metatarsal;  Surgeon: John Garcia DPM;  Location: [facility] OR;  Service: Podiatry     INCISION AND DRAINAGE OF WOUND Right 11/16/2020    Procedure: INCISION AND DRAINAGE, right foot;  Surgeon: John Garcia DPM;  Location: [facility] OR;  Service: Podiatry     INCISION AND DRAINAGE OF WOUND Right 11/27/2020    Procedure: INCISION AND DRAINAGE, LOWER EXTREMITY;  Surgeon: Aleksandr Hdez DPM;  [place] [facility] [place]   Service: [place]iatry     IR EXTREMITY ANGIOGRAM RIGHT  2020     IR LOWER EXTREMITY ANGIOGRAM RIGHT  2020     PICC  2020          TONSILLECTOMY  1940       Prior to Admission Medications   Prior to Admission Medications   Prescriptions Last Dose Informant Patient Reported? Taking?   B-D U/F 31G X 8 MM insulin pen needle   No No   Sig: USED TO INJECT INSULIN DAILY   Continuous Blood Gluc  (FREESTYLE APRIL 2 READER) MILE   No No   Sig: USE AS DIRECTED   Continuous Blood Gluc Sensor (FREESTYLE APRIL 2 SENSOR) [facility]   No No   Si each every 14 days 1 each every 14 days. Change every 14 days.   Continuous Blood Gluc Sensor (FREESTYLE APRIL 2 SENSOR) [facility] [place]   No No   Si kit every 14 days Use per manufacture's directions to check glucose daily.   Continuous Blood Gluc Sensor (FREESTYLE APRIL 2 SENSOR) [facility]   No No   Sig: USE AS DIRECTED EVERY 14 DAYS   ELIQUIS ANTICOAGULANT 5 MG tablet 1/15/2023 at pm  No Yes   Sig: TAKE 1 TABLET BY MOUTH TWICE DAILY   ONETOUCH ULTRA test strip   No No   Sig: USE TO TEST TWICE DAILY   acetaminophen (TYLENOL) 500 MG tablet Unknown at prn  No Yes   Sig: [ACETAMINOPHEN (TYLENOL) 500 MG TABLET] Take 1-2 tablets (500-1,000 mg total) by mouth every 4 (four) hours as needed.   aspirin 81 MG EC tablet 1/15/2023 at am  Yes Yes   Sig: [ASPIRIN 81 MG EC TABLET] Take 81 mg by mouth daily.   blood-glucose meter (ONETOUCH VERIO IQ METER) Misc   No No   Sig: [BLOOD-GLUCOSE METER (ONETOUCH VERIO IQ METER) MISC] Use 1 each As Directed 2 (two) times a day.   doxycycline hyclate (VIBRAMYCIN) 100 MG capsule 1/15/2023 at pm  No Yes   Sig: Take 1 capsule (100 mg) by mouth 2 times daily for 30 days   insulin glargine (LANTUS SOLOSTAR) 100 UNIT/ML pen 1/15/2023 at pm  No Yes   Sig: Inject 32 Units Subcutaneous At Bedtime   insulin pen needle (32G X 6 MM) 32G X 6 MM miscellaneous   No No   Sig: Use 4 pen needles daily or as directed.   multivit-min/FA/lycopen/lutein (CENTRUM SILVER MEN  ORAL) 1/15/2023 at am  Yes Yes   Sig: [MULTIVIT-MIN/FA/LYCOPEN/LUTEIN (CENTRUM SILVER MEN ORAL)] Take 1 tablet by mouth daily.   mupirocin (BACTROBAN) 2 % ointment Unknown at prn  Yes Yes   Sig: Apply topically daily as needed Apply to foot wound   polyethylene glycol (MIRALAX) 17 gram packet Unknown at prn  No Yes   Sig: [POLYETHYLENE GLYCOL (MIRALAX) 17 GRAM PACKET] Take 1 packet (17 g total) by mouth daily as needed.   tamsulosin (FLOMAX) 0.4 MG capsule 1/15/2023 at am  No Yes   Sig: TAKE 1 CAPSULE BY MOUTH EVERY DAY AFTER SUPPER   traMADol (ULTRAM) 50 MG tablet Past Week at prn  No Yes   Sig: TAKE 1 TABLET BY MOUTH EVERY 6 HOURS AS NEEDED FOR PAIN   vitamin D3 (CHOLECALCIFEROL) 125 MCG (5000 UT) tablet 1/15/2023 at am  No Yes   Sig: Take 1 tablet (125 mcg) by mouth daily   vitamin E 400 unit capsule 1/15/2023 at am  Yes Yes   Sig: [VITAMIN E 400 UNIT CAPSULE] Take 400 Units by mouth daily.      Facility-Administered Medications: None        Review of Systems    Review of systems not obtained due to patient factors - confusion    Social History   I have reviewed this patient's social history and updated it with pertinent information if needed.  Social History     Tobacco Use     Smoking status: Former     Types: Cigarettes     Quit date: 1991     Years since quittin.2     Smokeless tobacco: Never   Vaping Use     Vaping Use: Never used   Substance Use Topics     Alcohol use: No     Drug use: No       Family History   I have reviewed this patient's family history and updated it with pertinent information if needed.  Family History   Problem Relation Age of Onset     Aortic aneurysm Mother      Alcoholism Father        Allergies   Allergies   Allergen Reactions     Cresol [Phenol] Unknown     Muscle cramps     Demeclocycline Hives and Rash     Crestor [Rosuvastatin] Muscle Pain (Myalgia)        Physical Exam   Vital Signs: Temp: 97.3  F (36.3  C) Temp src: Temporal BP: 91/51 Pulse: 86   Resp: 26 SpO2: 99  % O2 Device: None (Room air) Oxygen Delivery: 4 LPM  Weight: 0 lbs 0 oz    GENERAL:  Alert, appears comfortable, in no acute distress, appears stated age, appears chronically sick and frail   HEAD:  Normocephalic, without obvious abnormality, atraumatic   EYES:  PERRL, conjunctiva/corneas clear, no scleral icterus, EOM's intact   NOSE: Nares normal, septum midline, mucosa normal, no drainage   THROAT: Lips, mucosa, and tongue normal; teeth and gums normal, mouth moist   NECK: Supple, symmetrical, trachea midline   BACK:   Symmetric, no curvature, ROM normal   LUNGS:   Coarse bilaterally but no rales, rhonchi, or wheezing, symmetric chest rise on inhalation, respirations unlabored, on room air    CHEST WALL:  No tenderness or conspicuous deformity   HEART:  Regular rate and rhythm, S1 and S2 normal, no murmur, rub, or gallop, no conspicuous jugular venous distention noted, peripheral pulses intact    ABDOMEN:   Soft, non-tender, bowel sounds auscultated in all four quadrants, no masses, no organomegaly, no rebound or guarding   EXTREMITIES: Extremities normal, atraumatic, no cyanosis or edema    SKIN: Dry to touch, no exanthems in the visualized areas   NEURO: Alert, oriented x3, moves all four extremities of their own volition    PSYCH: Cooperative and behavior is appropriate       Medical Decision Making       80 MINUTES SPENT BY ME on the date of service doing chart review, history, exam, documentation & further activities per the note.    I discussed with the ER provider, bedside nurse, CT tech, as well as the patient's son Elfego via telephone.    Data     I have personally reviewed the following data over the past 24 hrs:    10.9  \   12.1 (L)   / 396     138 108 (H) 155 (H) /  119 (H)   5.4 (H) 13 (L) 4.83 (H) \       ALT: 29 AST: 42 (H) AP: 79 TBILI: 0.4   ALB: 3.0 (L) TOT PROTEIN: 7.6 LIPASE: N/A       Procal: N/A CRP: N/A Lactic Acid: 2.0         Imaging results reviewed over the past 24 hrs:   Recent Results  (from the past 24 hour(s))   Head CT w/o contrast    Narrative    EXAM: CT HEAD W/O CONTRAST  LOCATION: [facility]  DATE/TIME: 1/17/2023 1:32 PM    INDICATION: Confusion  COMPARISON: CT head 1/26/2022  TECHNIQUE: Routine CT Head without IV contrast. Multiplanar reformats. Dose reduction techniques were used.    FINDINGS:  INTRACRANIAL CONTENTS: No intracranial hemorrhage, extraaxial collection, or mass effect.  No CT evidence of acute infarct. Unchanged mild presumed chronic small vessel ischemic changes. Unchanged moderate to severe generalized volume loss. No   hydrocephalus.     VISUALIZED ORBITS/SINUSES/MASTOIDS: No intraorbital abnormality. No paranasal sinus mucosal disease. No middle ear or mastoid effusion.    BONES/SOFT TISSUES: No acute abnormality.      Impression    IMPRESSION:  1.  No acute intracranial process.   CT Chest Abdomen Pelvis w/o Contrast    Narrative    EXAM: CT CHEST ABDOMEN PELVIS W/O CONTRAST  LOCATION: [facility]  DATE/TIME: 1/17/2023 4:11 PM    INDICATION: vomiting, AMS, eval for pathology  COMPARISON: 2/15/2022 and 3/10/2014   TECHNIQUE: CT scan of the chest, abdomen, and pelvis was performed without IV contrast. Multiplanar reformats were obtained. Dose reduction techniques were used.   CONTRAST: None.    FINDINGS:   LUNGS AND PLEURA: Elevated right hemidiaphragm with associated atelectasis.     MEDIASTINUM/AXILLAE: Mitral annular calcification.     CORONARY ARTERY CALCIFICATION: Severe.    HEPATOBILIARY: Cholecystectomy. Pneumobilia is again seen.     PANCREAS: Normal.    SPLEEN: Normal.    ADRENAL GLANDS: Normal.    KIDNEYS/BLADDER: Simple left renal cysts do not require follow-up. There is left and right hydronephrosis and hydroureter extending to the urinary bladder. No ureteral calculi. Again seen is a nonobstructing calculus at the upper pole of the right   kidney. There is circumferential and more focal wall thickening of the  urinary bladder with mild adjacent stranding. A Dietrich catheter is present though the bladder is not decompressed.     BOWEL: Mild colonic diverticulosis. Large amount of stool in the rectum     LYMPH NODES: Normal.    VASCULATURE: Atherosclerotic disease.     PELVIC ORGANS: Mildly enlarged prostate gland.     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 There may be bony fusion of L5 and S1.       Impression    IMPRESSION:  1.  Circumferential and more focal bladder wall thickening. Bilateral hydronephrosis and hydroureter extending to the bladder. Findings are concerning for cystitis. Other bladder pathology cannot be excluded on this study. The bladder is not decompressed   despite presence of a Dietrich catheter; correlate for catheter dysfunction.  2.  Cannot assess the portal or systemic venous system on this study.   3.  Advanced atherosclerotic disease including coronary artery disease.

## 2023-01-17 NOTE — PHARMACY-ADMISSION MEDICATION HISTORY
Pharmacy Note - Admission Medication History    Pertinent Provider Information:     Son reported all medications and the last time he took them. Patient has not had any medications for 2 days.     He was prescribed Victoza in October, but has not taken the medication for over a month. When asked if he stopped taking the medication due to ADRs, the son reported that the patient did not mention side effects and declined to receive the medication.      Patient is no longer taking insulin aspart and is ONLY taking insulin glargine.      ______________________________________________________________________    Prior To Admission (PTA) med list completed and updated in EMR.       PTA Med List   Medication Sig Last Dose     acetaminophen (TYLENOL) 500 MG tablet [ACETAMINOPHEN (TYLENOL) 500 MG TABLET] Take 1-2 tablets (500-1,000 mg total) by mouth every 4 (four) hours as needed. Unknown at prn     aspirin 81 MG EC tablet [ASPIRIN 81 MG EC TABLET] Take 81 mg by mouth daily. 1/15/2023 at am     doxycycline hyclate (VIBRAMYCIN) 100 MG capsule Take 1 capsule (100 mg) by mouth 2 times daily for 30 days 1/15/2023 at pm     ELIQUIS ANTICOAGULANT 5 MG tablet TAKE 1 TABLET BY MOUTH TWICE DAILY 1/15/2023 at pm     insulin glargine (LANTUS SOLOSTAR) 100 UNIT/ML pen Inject 32 Units Subcutaneous At Bedtime 1/15/2023 at pm     multivit-min/FA/lycopen/lutein (CENTRUM SILVER MEN ORAL) [MULTIVIT-MIN/FA/LYCOPEN/LUTEIN (CENTRUM SILVER MEN ORAL)] Take 1 tablet by mouth daily. 1/15/2023 at am     mupirocin (BACTROBAN) 2 % ointment Apply topically daily as needed Apply to foot wound Unknown at prn     polyethylene glycol (MIRALAX) 17 gram packet [POLYETHYLENE GLYCOL (MIRALAX) 17 GRAM PACKET] Take 1 packet (17 g total) by mouth daily as needed. Unknown at prn     tamsulosin (FLOMAX) 0.4 MG capsule TAKE 1 CAPSULE BY MOUTH EVERY DAY AFTER SUPPER 1/15/2023 at am     traMADol (ULTRAM) 50 MG tablet TAKE 1 TABLET BY MOUTH EVERY 6 HOURS AS NEEDED FOR  PAIN Past Week at prn     vitamin D3 (CHOLECALCIFEROL) 125 MCG (5000 UT) tablet Take 1 tablet (125 mcg) by mouth daily 1/15/2023 at am     vitamin E 400 unit capsule [VITAMIN E 400 UNIT CAPSULE] Take 400 Units by mouth daily. 1/15/2023 at am       Information source(s): Family member  Method of interview communication: in-person    Summary of Changes to PTA Med List  New:   Discontinued: Insulin aspart and Victoza   Changed:     Patient was asked about OTC/herbal products specifically.  PTA med list reflects this.    In the past week, patient estimated taking medication this percent of the time:  greater than 90%.    Allergies were reviewed, assessed, and updated with the patient.      Patient does not anticipate needing any multi-use medications during admission.    The information provided in this note is only as accurate as the sources available at the time of the update(s).    Thank you for the opportunity to participate in the care of this patient.    MYRON VILLALTA  1/17/2023 3:06 PM

## 2023-01-18 NOTE — CONSULTS
"[facility] Nurse Inpatient Assessment     Consulted for: perineal skin and coccyx     Patient History (according to provider note(s):      Caleb Hernandez is a 87 year old male admitted on 1/17/2023. He has a past medical history of paroxysmal atrial fibrillation on Eliquis, chronic kidney disease, previous recurrent UTIs, previous a sending cholangitis status postcholecystectomy, progressive cognitive issues, DM2, hypertension, coronary artery disease, lymphedema, who presented with x1 day of \"feeling unwell\" and family later reported he had nausea with emesis.         Areas Assessed:      Areas visualized during today's visit: Focused:, Perineal area and Sacrum/coccyx    Perineal skin - urethra draining moderate amounts of creamy tan drainage onto the perineal skin and hair was wrapped around the catheter.   Coccyx intact.      Treatment Plan:     Perineal skin care TID and prn   Skin care plan to perineal/ coccyx: TID and prn  1. Clean with Barrier wipes   2. Apply barrier cream (on the unit)   - keep pt positioned side to side only, when in bed, repositioning every 2 hours  - keep heels elevated at all times, at least one pillow under each leg from knees to heels, assuring heels are floating  - when up the chair pt should fully offload every 2 hours and be encouraged to shift weight every 15 minutes      Plan of care for intact coccyx area: every 3 days and prn  1. Cleanse skin with normal saline and pat dry.   2. Apply skin prep  3. Press a Mepilex  Sacral Dressing ([id number])  to the area, making sure to conform nicely to skin curvatures   - begin placing the Mepilex \"upside down\" , as high up along the gluteal cleft or buttocks as you can  - place the most distal aspect of the dressing onto skin then smooth into place in an upward motion, then side to side.   4. Time and date dressing change and dylon with a \"P\" for prevention.  -Mattress: low air loss  -HOB: Maintain at or below " 30 degrees, unless contraindicated  -Repositioning in bed: Every 1-2 hours , Left/right positioning; avoid supine, Raise foot of bed prior to raising head of bed, to reduce patient sliding down (shear) and Frequent microturns using TAPS wedges, as patient tolerates  -Heels: Keep elevated off mattress and Pillows under calves  -Protective Dressing: Sacral Mepilex for prevention (#[id number]),  especially for the agitated patient   -Positioning Equipment: TAPS wedges (#[id number]) to help maintain 30 degree side lying position as needed  -Chair positioning: Chair cushion (#[id number])  and Assist patient to reposition hourly   -Moisture Management: Perineal cleansing /protection: Follow Incontinence Protocol, Avoid brief in bed and Clean and dry skin folds with bathing   -Under Devices: Inspect skin under all medical devices during skin inspection , Ensure tubes are stabilized without tension and Ensure patient is not lying on medical devices or equipment when repositioned      Orders: Written    RECOMMEND PRIMARY TEAM ORDER: None, at this time  Education provided: plan of care, Infection prevention , Moisture management and Hygiene  Discussed plan of care with: Nurse  WOC nurse follow-up plan: signing off  Notify WOC if wound(s) deteriorate.  Nursing to notify the Provider(s) and re-consult the WOC Nurse if new skin concern.    DATA:     Current support surface: Standard  Low air loss (MARCOS pump, Isolibrium, Pulsate, skin guard, etc)  Containment of urine/stool: Indwelling catheter  BMI: Body mass index is 24.24 kg/m .   Active diet order: Orders Placed This Encounter      Moderate Consistent Carb (60 g CHO per Meal) Diet     Output: I/O last 3 completed shifts:  In: 4535.59 [P.O.:60; I.V.:1225.59; IV Piggyback:3250]  Out: 2225 [Urine:2225]     Labs: Recent Labs   Lab 01/18/23  0526 01/17/23  1749 01/17/23  1524 01/17/23  1200   ALBUMIN  --   --  2.5* 3.0*   HGB 9.5*   < >  --  12.1*   WBC 11.3*   < >  --  10.9   A1C  --   --    --  7.5*    < > = values in this interval not displayed.     Pressure injury risk assessment:   Sensory Perception: 3-->slightly limited  Moisture: 4-->rarely moist  Activity: 1-->bedfast  Mobility: 3-->slightly limited  Nutrition: 3-->adequate  Friction and Shear: 2-->potential problem  Erasmo Score: 16    Leonor Hercules RN BS CWOCN

## 2023-01-18 NOTE — PROVIDER NOTIFICATION
"MD notified, \"BP 72/43. lethargic, but denies N/V and dizziness. Pressors? PICC? Please advise. Thanks.\"     New orders for blood gas, PICC, intensivist consult and Levophed drip.   "

## 2023-01-18 NOTE — PROGRESS NOTES
Hospital Cross Cover    I received call from patient's RN regarding change in the status of this patient.  87-year-old male admitted with urosepsis and possible DKA.  He is to start insulin drip as he does have a metabolic acidosis with elevated ketones.  However, his current glucose is only 90 before initiating insulin drip.  I suspect his metabolic acidosis is more related to sepsis and underlying kidney disease rather than DKA and I would be hesitant to start any insulin drip at this time but will recheck labs again shortly.  I am equally concerned that he is becoming unstable with blood pressure down to 89/54 and may need pressors.  We will give 500 cc bolus of normal saline and I am asking for in-house hospitalist Dr. Farrell to assess patient.    Oliverio Veras MD  Hospitalist

## 2023-01-18 NOTE — PROGRESS NOTES
Was notified by remote cross cover.  Patient'S BP most recent 89/54, MAP at 69. most recent blood sugar is only 90s.  We will hold off on insulin drip.  Repeat BMP now, gentle fluid bolus, request nephrology consultation for ARF on CKD, metabolic acidosis.  If MAP below 65 then start pressors as needed.    Merry Farrell MD.  Hospitalist

## 2023-01-18 NOTE — PROCEDURES
"PICC Line Insertion Procedure Note  Pt. Name: Caleb Hernandez  MRN:        [id number]    Procedure: Insertion of a  TRIPLE Lumen  5 fr  Bard SOLO (valved) Power PICC, Lot number [id number]    Indications: pressors antibiotics    Contraindications : none    Procedure Details     Patient identified with 2 identifiers and \"Time Out\" conducted.  .     Central line insertion bundle followed: hand hygiene performed prior to procedure, site cleansed with cholraprep, hat, mask, sterile gloves, sterile gown worn, patient draped with maximum barrier head to toe drape, sterile field maintained.    The vein was assessed and found to be compressible and of adequate size.     Lidocaine 1% 1 ml administered sq to the insertion site. A 5 Fr PICC was inserted into the basilic vein of the right arm with ultrasound guidance. 1 attempt(s) required to access vein.   Catheter threaded without difficulty. Good blood return noted.    Modified Seldinger Technique used for insertion.    The 8 sharps that are included in the PICC insertion kit were accounted for and disposed of in the sharps container prior to breakdown of the sterile field.    Catheter secured with Statlock, biopatch and Tegaderm dressing applied.    Findings:    Total catheter length  44 cm, with 2 cm exposed. Mid upper arm circumference is 26 cm. Catheter was flushed with 30 cc NS. Patient  tolerated procedure well.    Tip placement verified by 3CG . Tip placement in the SVC.    CLABSI prevention brochure left at bedside.    Patient's primary RN notified PICC is ready for use.      Comments:  Thanks         [facility]        "

## 2023-01-18 NOTE — PROGRESS NOTES
Care Management Follow Up    Length of Stay (days): 1    Expected Discharge Date: 01/20/2023     Concerns to be Addressed: discharge planning     CM reviewing chart, PT/OT rec's will be helpful in discharge planning as he was moving with a walker in home, with family support. Family open to TCU if recommended. Will continue to follow along, assist with discharge.       Aedla Caban RN

## 2023-01-18 NOTE — PROGRESS NOTES
"[facility]    Medicine Progress Note - Hospitalist Service    Date of Admission:  1/17/2023    Assessment & Plan      Caleb Hernandez is a 87 year old male admitted on 1/17/2023. He has a past medical history of paroxysmal atrial fibrillation on Eliquis, chronic kidney disease, previous recurrent UTIs, previous a sending cholangitis status postcholecystectomy, progressive cognitive issues, DM2, hypertension, coronary artery disease, lymphedema, who presented with x1 day of \"feeling unwell\" and family later reported he had nausea with emesis.       On admission, hyperkalemia 6.3, treated in ER and improved to 4.4, stable EKG (SR w/ PACs); found with significant MANJIT creatinine 4.83, , met sepsis criteria; lactate was 2; given hyperkalemia medications including calcium gluconate in the ER; resuscitated with 2 L of fluid; initiated on Zosyn.  On admission additionally with vancomycin for broad coverage, and CT chest/abdomen/pelvis returned demonstrating Circumferential and more focal bladder wall thickening. Bilateral hydronephrosis and hydroureter extending to the bladder, consistent with cystitis. Ordered for DKA protocol but not started as sugars were normalized after ER treatments including for hyperkalamia/insulin infusion; overnight with significant events including transfer to ICU for hypotension requiring norepinephrine    On 1/18, the patient was transferred out of ICU as he was able to be weaned off of norepinephrine pressor.  His Eliquis was held on admission in the setting of a 3.5g hemoglobin drop; appears to be stable and thus his Eliquis is resumed. Ongoing broad spectrum antibiotics treatment and following pending cultures. Resuming reduced-dose basal and corrective insulin.    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  Not currently on antihypertensives.    Plan:-Monitor blood pressure; would give bolus if MAP is less than 65     Euglycemic DKA    Type 2 diabetes mellitus with diabetic peripheral angiopathy without gangrene, with long-term current use of insulin (H)   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 2 diabetes mellitus, limited to breakdown of skin (H)    Assessment: with bicarb of 15, pH 7.22, and beta-hydroxybutyrate 2.95, consistent with euglycemic DKA  as sugars were in the 300s. Could be also consistent with the story of decreased po and a starvation ketosis; also more likely from metabolic component with the Creatinine at 4.83; thus cautiously and conservative started the DKA protocol and did not require as normal BG after receiving insulin for hyperkalemia in ED. Normal regimen is Lantus 32u at bedtime.    Plan:   - DKA protocol off  - transition to subcutaneous insulin now   - being conservative, reducing dose to 60% of usual-- ordered 19units for tonight      Paroxysmal atrial fibrillation (H) (note episode of hematuria)    Assessment: ekg demonstrated SR w/ PACs; confirmed on repeat ekg. On eliquis. Was held on admission in the setting of a 3.5g hemoglobin drop; appears to be stable and thus his Eliquis is resumed.    Plan:   - resume 1/18/2023       History of cholecystectomy    Assessment: noted in setting of ascending cholangitis; elevated AST, could be early shocked liver in developing sepsis    Plan:   - clinically follow  And recheck as indicated       Coronary artery disease  involving native coronary artery of native heart without angina pectoris    Assessment: on baby aspirin and no longer on blood pressure medications. ekg without ischemic findings    Plan:   - monitor clinically      Falls frequently     Assessment: noted     Plan:   - fall precautions and up with assist only       MANJIT, acute on chronic; CKD (chronic kidney disease)  (H)    Hyperkalemia   hematuria    Assessment: could be prerenal in setting of decreased po for a few days. CT demonstrated hydronephrosis and mai needed to be adjusted.     Plan:   - received 2L as above  - follow up AM labs  - renal US and nephrology consult on 1/18  - giving gentle 75cc/hr of MIVFs overnight w/ consideration of the hypotension last night   - defer fluid modifications to nephrology   - considering urology on 1/19         Diet: Moderate Consistent Carb (60 g CHO per Meal) Diet    DVT Prophylaxis: Heparin SQ  Mai Catheter: PRESENT, indication: Retention  Lines: PRESENT      PICC 01/17/23 Triple Lumen Right Basilic-Site Assessment: WDL      Cardiac Monitoring: ACTIVE order. Indication: dka and sepsis  Code Status: Full Code      Clinically Significant Risk Factors Present on Admission         # Hyperkalemia: Highest K = 6.3 mmol/L in last 2 days, will monitor as appropriate    # Hypercalcemia: Highest Ca = 10.2 mg/dL in last 2 days, will monitor as appropriate    # Hypoalbuminemia: Lowest albumin = 2.5 g/dL at 1/17/2023  3:24 PM, will monitor as appropriate  # Drug Induced Coagulation Defect: home medication list includes an anticoagulant medication      # Circulatory Shock: currently requiring pressors for blood pressure support             Disposition Plan      Expected Discharge Date: 01/20/2023      Destination: home with family;home with help/services;nursing home (TCU)  Discharge Comments: needs placement. PT/OT recs?          Greg Lanza MD  Hospitalist Service  [facility]  Securely message with  Don (more info)  Text page via [facility] Paging/Directory   ______________________________________________________________________    Interval History   - significant overnight events as in summary  - pt this AM felt relatively better- he did not recall much of the ICU admit/transfer  - we discussed his hypotension and improvement this morning  - he has no new symptoms or questions; discussed cultures     Physical Exam   Vital Signs: Temp: 97.2  F (36.2  C) Temp src: Axillary BP: 130/69 Pulse: 72   Resp: 20 SpO2: 99 % O2 Device: None (Room air)    Weight: 159 lbs 6.4 oz    General: alert, oriented, and in no acute distress; appears frail and weak  Pulmonary: coarse breath sounds, normal respiratory effort, on room air, no rales or wheezes or evidence of accessory muscle use  CVS: regular rate and rhythm, no murmurs, rubs, or gallops; no blatant jugular venous distention; no extremity edema and extremities are warm to the touch  GI: soft, nontender, BS+, no rebound or guarding, no conspicuous organomegaly   Neuro: nonfocal, moves all extremities of own volition  Psych: appropriate          Medical Decision Making       55 MINUTES SPENT BY ME on the date of service doing chart review, history, exam, documentation & further activities per the note.      Data     I have personally reviewed the following data over the past 24 hrs:    11.3 (H)  \   9.5 (L)   / 274     141 115 (H) 121 (H) /  88   4.5 16 (L) 3.45 (H) \       ALT: 24 AST: 37 AP: 64 TBILI: 0.4   ALB: 2.5 (L) TOT PROTEIN: 6.2 LIPASE: N/A       TSH: N/A T4: N/A A1C: N/A       Imaging results reviewed over the past 24 hrs:   Recent Results (from the past 24 hour(s))   CT Chest Abdomen Pelvis w/o Contrast    Narrative    EXAM: CT CHEST ABDOMEN PELVIS W/O CONTRAST  LOCATION: [facility]  DATE/TIME: 1/17/2023 4:11 PM    INDICATION: vomiting, AMS, eval for pathology  COMPARISON: 2/15/2022 and 3/10/2014   TECHNIQUE: CT scan of the chest,  abdomen, and pelvis was performed without IV contrast. Multiplanar reformats were obtained. Dose reduction techniques were used.   CONTRAST: None.    FINDINGS:   LUNGS AND PLEURA: Elevated right hemidiaphragm with associated atelectasis.     MEDIASTINUM/AXILLAE: Mitral annular calcification.     CORONARY ARTERY CALCIFICATION: Severe.    HEPATOBILIARY: Cholecystectomy. Pneumobilia is again seen.     PANCREAS: Normal.    SPLEEN: Normal.    ADRENAL GLANDS: Normal.    KIDNEYS/BLADDER: Simple left renal cysts do not require follow-up. There is left and right hydronephrosis and hydroureter extending to the urinary bladder. No ureteral calculi. Again seen is a nonobstructing calculus at the upper pole of the right   kidney. There is circumferential and more focal wall thickening of the urinary bladder with mild adjacent stranding. A Dietrich catheter is present though the bladder is not decompressed.     BOWEL: Mild colonic diverticulosis. Large amount of stool in the rectum     LYMPH NODES: Normal.    VASCULATURE: Atherosclerotic disease.     PELVIC ORGANS: Mildly enlarged prostate gland.     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 There may be bony fusion of L5 and S1.       Impression    IMPRESSION:  1.  Circumferential and more focal bladder wall thickening. Bilateral hydronephrosis and hydroureter extending to the bladder. Findings are concerning for cystitis. Other bladder pathology cannot be excluded on this study. The bladder is not decompressed   despite presence of a Dietrich catheter; correlate for catheter dysfunction.  2.  Cannot assess the portal or systemic venous system on this study.   3.  Advanced atherosclerotic disease including coronary artery disease.

## 2023-01-18 NOTE — CONSULTS
RENAL CONSULT NOTE    REQUESTING PHYSICIAN: MD Jami    REASON FOR CONSULT: MANJIT on CKD, urinary obstruction/UTI; hyperkalemia    PLAN:  Cont BS antibx pending cx   Maintain mai for now (may want to consider urologic eval, if persistent RODRIGUEZ, oupt void trial?), some risk for recurrence   Pressors prn for MAP>65 (off pressor support this a.m.!)   Strict I/o  daily BMP, trend RF (expect ongoing improvement)  Avoiding nephrotoxins  Consider adding oral bicarb if MA persists or worsens despite improved RF, can address in a.m  Would benefit from renal f/u outpt with CKD3, presumed DM      ASSESSMENT:  Acute on Chronic Kidney dz (3): non-oliguric, sCr peaked 4.8--->3.45 overnoc after mai placed and improved BP.  Metabolic derangement on presentation with severe acidosis and hyperK, has improved.  Presumed primary etiology, obstruction with bilat hydronephrosis on initial imaging and brisk recovery post mai placement + HoTN with sepsis (also improved and off pressor support this a.m.)   Baseline sCr in the 1.3-1.7 range more recently  Nephrotic proteinuria by prior dips >300, lkely underlying DMN; would hold off on up/c with active UTI  (2g range in 2020)    RODRIGUEZ:  On tamsulosin PTA, continue. ?does he follow with urology?, if not, should be considered if recurrent UTI's  Primitivo as he was not symptomatic (pelvic/flank/abd pain) with his hydro and infection.     Lytes/Acid-Base:  Sig metabolic acidosis on presentation, some improvement with bicarb bumps, expect this should improve with ongoing renal  Improvement  Hyperkalemia: with MANJIT and metabolic acidosis, reversed, stable today. Trend  Pseudohypocalcemia: corrects normal for low albumin    Sepsis:  Was req pressors, now stable off, BS antibx coverage pending finalcx.     DM:  On insulin, per hosp service    H/o A-fib:  Current NSA, Rate controlled , he is not on BB or dysrythmia meds PTA        HPI: Caleb Hernandez is a 87 year old male for whom we have been asked to  see for MANJIT on CKD in the setting of urinary obstruction, sepsis. He is new to our service.  Underlying CKD 3. Also h/o BPH/RODRIGUEZ, and frequent UTI's per his report.  I am uncertain if he follows with urology, and he was not able to clarify this for me.  On Tamsulosin PTA . Underlying CKD3 at baseline, Scr typically1.3-1.7.  2gm proteinuria in 2020.  Likely DMN.  He does not follow with nephrology outpt, but would benefit from ongoing surveillance.     He denies pain curently, said he did NOT notice anydecrease in UO PTA, no did he have abd/flank or pelvic pain.    \ Lives home with wife and several family members.     REVIEW OF SYSTEMS:  COMPLETE REVIEW OF SYSTEMS: General: see HPI, feels tired, worn out, but denies pain  Eyes: denies issues   Ears/Nose/Throat: denies isues   Cardiovascular: denies CP, no SOB  Respiratory: denies  Gastrointestinal: no abd pain, flank pain or pelvic pressure currently and PTA   Musculoskeletal: gen deconditioning, but says he take care of his family  Skin: denies acute issues   Neurologic: denies issues   Psychiatric: denies mood disorder      Past Medical History:   Diagnosis Date     Abscess of right foot 11/23/2020    Added automatically from request for surgery [id number]     Acute pulmonary embolism with acute cor pulmonale (H) 5/23/2020     Ascending cholangitis 1/27/2022     BPH (benign prostatic hyperplasia)      Cholelithiasis      Closed fracture of rib     Created by Conversion  Replacement Utility updated for latest IMO load     Closed fracture of thoracic vertebra (H)     Created by Conversion  Replacement Utility updated for latest IMO load     Common bile duct (CBD) obstruction 9/4/2017     Diabetes mellitus (H)      Essential hypertension      Gram-negative bacteremia 1/27/2022    Positive blood culture 1/26/2022; likely biliary source     Hyperlipidemia      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  Family History   Problem Relation Age of Onset     Aortic aneurysm Mother      Alcoholism Father          Social History     Tobacco Use     Smoking status: Former     Types: Cigarettes     Quit date: 1991     Years since quittin.2     Smokeless tobacco: Never   Vaping Use     Vaping Use: Never used   Substance Use Topics     Alcohol use: No     Drug use: No          No current facility-administered medications on file prior to encounter.  acetaminophen (TYLENOL) 500 MG tablet, [ACETAMINOPHEN (TYLENOL) 500 MG TABLET] Take 1-2 tablets (500-1,000 mg total) by mouth every 4 (four) hours as needed.  aspirin 81 MG EC tablet, [ASPIRIN 81 MG EC TABLET] Take 81 mg by mouth daily.  doxycycline hyclate (VIBRAMYCIN) 100 MG capsule, Take 1 capsule (100 mg) by mouth 2 times daily for 30 days  ELIQUIS ANTICOAGULANT 5 MG tablet, TAKE 1 TABLET BY MOUTH TWICE DAILY  insulin glargine (LANTUS SOLOSTAR) 100 UNIT/ML pen, Inject 32 Units Subcutaneous At Bedtime  multivit-min/FA/lycopen/lutein (CENTRUM SILVER MEN ORAL), [MULTIVIT-MIN/FA/LYCOPEN/LUTEIN (CENTRUM SILVER MEN ORAL)] Take 1 tablet by mouth daily.  mupirocin (BACTROBAN) 2 % ointment, Apply topically daily as needed Apply to foot wound  polyethylene glycol (MIRALAX) 17 gram packet, [POLYETHYLENE GLYCOL (MIRALAX) 17 GRAM PACKET] Take 1 packet (17 g total) by mouth daily as needed.  tamsulosin (FLOMAX) 0.4 MG capsule, TAKE 1 CAPSULE BY MOUTH EVERY DAY AFTER SUPPER  traMADol (ULTRAM) 50 MG tablet, TAKE 1 TABLET BY MOUTH EVERY 6 HOURS AS NEEDED FOR PAIN  vitamin D3 (CHOLECALCIFEROL) 125 MCG (5000 UT) tablet, Take 1 tablet (125 mcg) by mouth daily  vitamin E 400 unit capsule, [VITAMIN E 400 UNIT CAPSULE] Take 400 Units by mouth daily.  B-D U/F 31G X 8 MM  insulin pen needle, USED TO INJECT INSULIN DAILY  blood-glucose meter (ONETOUCH VERIO IQ METER) Misc, [BLOOD-GLUCOSE METER (ONETOUCH VERIO IQ METER) MISC] Use 1 each As Directed 2 (two) times a day.  Continuous Blood Gluc  (FREESTYLE APRIL 2 READER) MILE, USE AS DIRECTED  Continuous Blood Gluc Sensor (FREESTYLE APRIL 2 SENSOR) MISC, USE AS DIRECTED EVERY 14 DAYS  Continuous Blood Gluc Sensor (FREESTYLE APRIL 2 SENSOR) MISC, 1 kit every 14 days Use per manufacture's directions to check glucose daily.  Continuous Blood Gluc Sensor (FREESTYLE APRIL 2 SENSOR) MISC, 1 each every 14 days 1 each every 14 days. Change every 14 days.  insulin pen needle (32G X 6 MM) 32G X 6 MM miscellaneous, Use 4 pen needles daily or as directed.  ONETOUCH ULTRA test strip, USE TO TEST TWICE DAILY          ALLERGIES/SENSITIVITIES:  Allergies   Allergen Reactions     Cresol [Phenol] Unknown     Muscle cramps     Demeclocycline Hives and Rash     Crestor [Rosuvastatin] Muscle Pain (Myalgia)          PHYSICAL EXAM:  /62 (BP Location: Left arm, Cuff Size: Adult Small)   Pulse 68   Temp 97  F (36.1  C) (Axillary)   Resp 16   Wt 72.3 kg (159 lb 6.4 oz)   SpO2 99%   BMI 24.24 kg/m      Patient Vitals for the past 72 hrs:   Weight   01/18/23 0200 72.3 kg (159 lb 6.4 oz)   01/17/23 2147 64 kg (141 lb)     Body mass index is 24.24 kg/m .    Intake/Output Summary (Last 24 hours) at 1/18/2023 0937  Last data filed at 1/18/2023 0800  Gross per 24 hour   Intake 4785.59 ml   Output 2575 ml   Net 2210.59 ml         General - A & O x 3, NAD, supine ,in ICU, RN bedside, discussed, pt lethargic but able to contribute to some intact  HEENT - PERRLA, no scleral icterus  Respiratory - Lungs CTA bilat without wheeze, rhonchi, rales, sats good on RA  Cardiovascular - rate controlled, no murmur -120s off pressors  Abdomen - soft, BS present, no bruits, no fluid wave  Extremities - no edema  Integumentary - intact, good turgor, no  rash/lesions  Neurologic - grossly intact  Psych:  Judgement intact, affect WNL  :  Clear urine in mai     Laboratory:  Recent Labs   Lab Test 01/18/23  0526 01/17/23  1749 01/27/22  0627 01/26/22  2047 11/28/20  0728 11/27/20  0713   WBC 11.3* 4.1   < >  --    < > 7.7   HGB 9.5* 8.6*   < >  --    < > 11.9*   MCV 85 86   < >  --    < > 92    258   < >  --    < > 313   INR  --   --   --  1.34*  --  1.23*    < > = values in this interval not displayed.      Recent Labs   Lab Test 01/18/23  0526 01/17/23  1931   POTASSIUM 4.5 4.8   CHLORIDE 115* 115*   * 151*      Recent Labs   Lab Test 01/17/23  1524 01/17/23  1417 01/17/23  1200 12/09/22  1611   ALBUMIN 2.5*  --  3.0*  --    BILITOTAL 0.4  --  0.4  --    ALT 24  --  29  --    AST 37  --  42*  --    PROTEIN  --  200*  --  100*       Personally reviewed today's laboratory studies      Thank you for involving us in the care of this patient. We will continue to follow along with you.      Adela Bassett, P-BC  [facility]  [phone]

## 2023-01-18 NOTE — ED NOTES
Aditya Notified for critical lab update of Osmolality Blood 350mmol/kg  : jaqueline gutierrez RN  Provider notified.

## 2023-01-18 NOTE — CONSULTS
Care Management Initial Consult    General Information  Assessment completed with: Shyam Son Elfego via phone ([phone])       Primary Care Provider verified and updated as needed: Yes   Readmission within the last 30 days: no previous admission in last 30 days      Reason for Consult: discharge planning  Advance Care Planning: Advance Care Planning Reviewed: no concerns identified, other (see comments) (Declined Honoring Choices)     General Information Comments: Lives with octaviano Thomposn and lazaro Telles    Communication Assessment  Patient's communication style: spoken language (English or Bilingual)    Hearing Difficulty or Deaf: yes   Wear Glasses or Blind: yes    Cognitive  Cognitive/Neuro/Behavioral: .WDL except, all  Level of Consciousness: confused  Arousal Level: opens eyes spontaneously  Orientation: disoriented to, place, time, situation     Best Language: 0 - No aphasia  Speech: clear    Living Environment:   People in home: child(claire), adult, spouse     Current living Arrangements: house      Able to return to prior arrangements: other (see comments) (Pending clinical progress)  Living Arrangement Comments: Lives with wife and son    Family/Social Support:  Care provided by: self, spouse/significant other, child(claire)  Provides care for: no one  Marital Status:   Children, Octaviano Thompson       Description of Support System: Supportive, Involved    Support Assessment: Adequate family and caregiver support    Current Resources:   Patient receiving home care services: No     Community Resources: None  Equipment currently used at home: glucometer, walker, standard  Supplies currently used at home: Diabetic Supplies    Employment/Financial:  Employment Status: retired        Financial Concerns: No concerns identified         Lifestyle & Psychosocial Needs:  Social Determinants of Health     Tobacco Use: Medium Risk     Smoking Tobacco Use: Former     Smokeless Tobacco Use: Never     Passive Exposure:  Not on file   Alcohol Use: Not on file   Financial Resource Strain: Not on file   Food Insecurity: Not on file   Transportation Needs: Not on file   Physical Activity: Not on file   Stress: Not on file   Social Connections: Not on file   Intimate Partner Violence: Not on file   Depression: Not at risk     PHQ-2 Score: 0   Housing Stability: Not on file       Functional Status:  Prior to admission patient needed assistance:   Dependent ADLs:: Ambulation-walker, Bathing, Dressing, Grooming  Dependent IADLs:: Cleaning, Cooking, Laundry, Shopping, Meal Preparation, Medication Management, Transportation  Assesssment of Functional Status: Not at  functional baseline, Not at baseline with mobility, Not at baseline with ADL Functioning    Mental Health Status:          Chemical Dependency Status:              Values/Beliefs:  Spiritual, Cultural Beliefs, Denominational Practices, Values that affect care:                 Additional Information:   Patient presents to the ED via EMS with complications from a UTI he was diagnosed with a couple of days ago. The patient reports he has been unable to keep his medications down due to nausea and vomiting. The patient is extremely weak and is participating in triage assessment poorly. Blood sugar normal per EMS. 3 lead per EMS showed atrial fibrillation, rate controlled. Diagnosis of likely sepsis related to UTI and acute kidney injury and hyperkalemia .    Information gathered via phone from patient s son Elfego ([phone]). Patient lives with wife Virginia and son Elfego in a private residence. Uses a standard walker; no home care services; doesn t drive. Patient is assisted by wife and son with most I/ADLs.  Elfego manages and administers medications including insulin and provides transportation. Wife stand-by assists with showers for safety, meals, and other ADLs as needed. Family will consider transitional care if recommended and will provide transportation on discharge. Other needs  pending clinical progress.    STEPHANIE FERNANDEZ, RN/CM

## 2023-01-18 NOTE — PROGRESS NOTES
Patient with persistent hypotension now MAP less than 65.  PICC line ordered, will start Levophed.  With worsening acidosis and change fluids to LR.  Intensivist consult placed.     Merry Farrell MD.

## 2023-01-18 NOTE — DISCHARGE INSTRUCTIONS
"Perineal skin care TID and prn   Skin care plan to perineal/ coccyx: TID and prn  1. Clean with Barrier wipes   2. Apply barrier cream (on the unit)   - keep pt positioned side to side only, when in bed, repositioning every 2 hours  - keep heels elevated at all times, at least one pillow under each leg from knees to heels, assuring heels are floating  - when up the chair pt should fully offload every 2 hours and be encouraged to shift weight every 15 minutes      Plan of care for intact coccyx area: every 3 days and prn  1. Cleanse skin with normal saline and pat dry.   2. Apply skin prep  3. Press a Mepilex  Sacral Dressing (PS#[id number])  to the area, making sure to conform nicely to skin curvatures   - begin placing the Mepilex \"upside down\" , as high up along the gluteal cleft or buttocks as you can  - place the most distal aspect of the dressing onto skin then smooth into place in an upward motion, then side to side.   4. Time and date dressing change and dylon with a \"P\" for prevention.  -Mattress: low air loss  -HOB: Maintain at or below 30 degrees, unless contraindicated  -Repositioning in bed: Every 1-2 hours , Left/right positioning; avoid supine, Raise foot of bed prior to raising head of bed, to reduce patient sliding down (shear) and Frequent microturns using TAPS wedges, as patient tolerates  -Heels: Keep elevated off mattress and Pillows under calves  -Protective Dressing: Sacral Mepilex for prevention (#031992),  especially for the agitated patient   -Positioning Equipment: TAPS wedges (#[id number]) to help maintain 30 degree side lying position as needed  -Chair positioning: Chair cushion (#[id number])  and Assist patient to reposition hourly   -Moisture Management: Perineal cleansing /protection: Follow Incontinence Protocol, Avoid brief in bed and Clean and dry skin folds with bathing   -Under Devices: Inspect skin under all medical devices during skin inspection , Ensure tubes are stabilized without tension " and Ensure patient is not lying on medical devices or equipment when repositioned

## 2023-01-18 NOTE — PROGRESS NOTES
Pt disoriented x 4. Bp continues to be soft. Last bp 70/30s with MAP in mid 50s. MD notified and aware. Orders placed for levophed, PICC line, blood gases, and intensivist consult. Got verbal order from son- Elfego to place PICC. Levophed drip initiated. Transferred to ICU. Report given to GOLDEN Ang.

## 2023-01-18 NOTE — ED NOTES
Notified Dr. Veras about patient's low blood sugar and low blood pressure. MD is not in house and writer text paged Dr. Farrell about blood pressure. In terms of patient's insulin drip, patient's blood sugar was only 90, and per Dr. Veras, labs are to be drawn at 2000 and okay to wait until labs to come back at 2000 to proceed with insulin drip.

## 2023-01-19 NOTE — PROGRESS NOTES
01/19/23 1330   Appointment Info   Signing Clinician's Name / Credentials (PT) Angelina Muniz, PT, DPT   Living Environment   People in Home child(claire), adult;grandparent(s)   Current Living Arrangements house   Home Accessibility stairs to enter home;stairs within home   Number of Stairs, Main Entrance 4   Stair Railings, Main Entrance railing on right side (ascending)   Number of Stairs, Within Home, Primary eight   Stair Railings, Within Home, Primary railing on left side (ascending)   Living Environment Comments unclear of accuracy of stairs   Self-Care   Usual Activity Tolerance moderate   Current Activity Tolerance poor   Fall history within last six months no   General Information   Onset of Illness/Injury or Date of Surgery 01/17/23   Referring Physician Greg Lanza MD   Patient/Family Therapy Goals Statement (PT) get stronger, go home   Pertinent History of Current Problem (include personal factors and/or comorbidities that impact the POC) Sepsis   Existing Precautions/Restrictions no known precautions/restrictions   Weight-Bearing Status - LLE full weight-bearing   Weight-Bearing Status - RLE full weight-bearing   Strength (Manual Muscle Testing)   Strength Comments generalized weakness   Bed Mobility   Bed Mobility supine-sit;sit-supine;scooting/bridging;rolling right;rolling left   Rolling Left Cooper (Bed Mobility) moderate assist (50% patient effort);maximum assist (25% patient effort);2 person assist   Rolling Right Cooper (Bed Mobility) maximum assist (25% patient effort);2 person assist   Scooting/Bridging Cooper (Bed Mobility) maximum assist (25% patient effort);2 person assist   Supine-Sit Cooper (Bed Mobility) maximum assist (25% patient effort);2 person assist   Sit-Supine Cooper (Bed Mobility) maximum assist (25% patient effort);2 person assist   Transfers   Transfers sit-stand transfer   Gait/Stairs (Locomotion)   Distance in Feet (Gait) did not stand or  "ambulate due to dizziness/weakness/fatigue   Clinical Impression   Criteria for Skilled Therapeutic Intervention Yes, treatment indicated   PT Diagnosis (PT) impaired functional mobility   Influenced by the following impairments weakness, pain   Functional limitations due to impairments gait, stairs, transfers   Clinical Presentation (PT Evaluation Complexity) Stable/Uncomplicated   Clinical Presentation Rationale pt presents as medically diagnosed   Clinical Decision Making (Complexity) moderate complexity   Planned Therapy Interventions (PT) balance training;bed mobility training;gait training;home exercise program;patient/family education;stair training;ROM (range of motion);strengthening;transfer training   Anticipated Equipment Needs at Discharge (PT) walker, rolling   Risk & Benefits of therapy have been explained care plan/treatment goals reviewed;patient   PT Total Evaluation Time   PT Eval, Low Complexity Minutes (64527) 10   Physical Therapy Goals   PT Frequency Daily   PT Predicted Duration/Target Date for Goal Attainment 01/25/23   PT Goals Transfers;Gait;Stairs   PT: Transfers Supervision/stand-by assist;Sit to/from stand;Assistive device   PT: Gait Supervision/stand-by assist;50 feet;Rolling walker   PT: Stairs Supervision/stand-by assist;4 stairs;Rail on right;Assistive device   Interventions   Interventions Quick Adds Gait Training;Therapeutic Activity;Therapeutic Procedure   Therapeutic Activity   Therapeutic Activities: dynamic activities to improve functional performance Minutes (05770) 30   Symptoms Noted During/After Treatment Dizziness;Fatigue   Treatment Detail/Skilled Intervention supine to sit with mod-max A x2, cueing for technique, sitting EOB x5 min, limited by dizziness, pt reports \"equilibrium feels off\", pt returned to supine with Max A x2, rolling L and R x5 with mod- max A, cueing for hand placement, max A x2-dependent for supine scoot to HOB, increased time for education, O2: 99%, " HR and BP WNL   PT Discharge Planning   PT Plan transfers, gait as able, LE therex   PT Discharge Recommendation (DC Rec) (S)  Transitional Care Facility   PT Rationale for DC Rec Max A x2 for mobility, limited tolerance for activity, unable to stand and ambulate this date   PT Brief overview of current status Max A x2 for mobility, limited tolerance for activity, unable to stand and ambulate this date   Total Session Time   Timed Code Treatment Minutes 30   Total Session Time (sum of timed and untimed services) 40

## 2023-01-19 NOTE — PLAN OF CARE
Pt A&Ox4, very sleepy today. RA, denies SOB. Denies pain, N/V,CP. Dietrich in place, urine pale yellow. Q2H turns performed. WOC consulted for doug area and bottom, perineal cares performed, catheter wipes utilized. NaCl 0.9% infusing at 75ml/hr. Family at bedside this afternoon, attentive to patient. Pt resting comfortably.     Problem: Risk for Delirium  Goal: Improved Sleep  Outcome: Progressing     Problem: Plan of Care - These are the overarching goals to be used throughout the patient stay.    Goal: Optimal Comfort and Wellbeing  Outcome: Progressing     Jennifer Pinedo RN on 1/18/2023 at 6:56 PM

## 2023-01-19 NOTE — PROGRESS NOTES
01/19/23 1453   Appointment Info   Signing Clinician's Name / Credentials (OT) JACQUES Maldonado   Living Environment   People in Home child(claire), adult   Current Living Arrangements house   Transportation Anticipated family or friend will provide   Living Environment Comments Pt stated he lives w/ adult son per chart (pt stated he lives w/ wife), and has walkin shower w/ shower chair and grab bars, standard toilets. Accuracy is unclear. Pt has FWW   Self-Care   Usual Activity Tolerance moderate   Current Activity Tolerance poor   Equipment Currently Used at Home grab bar, tub/shower;glucometer;shower chair;walker, rolling   Fall history within last six months no   Activity/Exercise/Self-Care Comment Pt stated he gets assistance w/ dressing, bathing, and IADLs.   General Information   Onset of Illness/Injury or Date of Surgery 01/17/23   Referring Physician Greg Lanza MD   Patient/Family Therapy Goal Statement (OT) get stronger and go home   Additional Occupational Profile Info/Pertinent History of Current Problem sepsis, weak   Existing Precautions/Restrictions fall   Cognitive Status Examination   Orientation Status person   Follows Commands follows one-step commands;50-74% accuracy   Cognitive Status Comments further assessment in subsequent visits   Visual Perception   Visual Impairment/Limitations WNL   Sensory   Sensory Quick Adds sensation intact   Pain Assessment   Patient Currently in Pain Yes, see Vital Sign flowsheet   Posture   Posture not impaired   Range of Motion Comprehensive   General Range of Motion bilateral upper extremity ROM WFL   Strength Comprehensive (MMT)   General Manual Muscle Testing (MMT) Assessment upper extremity strength deficits identified   Comment, General Manual Muscle Testing (MMT) Assessment generalized weakness   Muscle Tone Assessment   Muscle Tone Quick Adds No deficits were identified   Coordination   Fine Motor Coordination slow FM   Bed Mobility   Bed Mobility  scooting/bridging;supine-sit;sit-supine   Comment (Bed Mobility) Max Ax2 for bed mobility   Transfers   Transfers bed-chair transfer;sit-stand transfer;toilet transfer   Transfer Comments Mod-Max Ax2 for all transfers   Activities of Daily Living   BADL Assessment/Intervention bathing;lower body dressing;toileting   Bathing Assessment/Intervention   [place]mith Level (Bathing) maximum assist (25% patient effort)   Lower Body Dressing Assessment/Training   [place]mith Level (Lower Body Dressing) maximum assist (25% patient effort)   Toileting   [place]mith Level (Toileting) maximum assist (25% patient effort)   Clinical Impression   Criteria for Skilled Therapeutic Interventions Met (OT) Yes, treatment indicated   OT Diagnosis decreased ADLs   Influenced by the following impairments weakness, sepsis, UTI, MANJIT   OT Problem List-Impairments impacting ADL activity tolerance impaired;mobility;cognition;strength;pain   Assessment of Occupational Performance 3-5 Performance Deficits   Identified Performance Deficits dressing, toileting, bed mobility, all transfers   Planned Therapy Interventions (OT) ADL retraining;bed mobility training;cognition;strengthening;transfer training   Clinical Decision Making Complexity (OT) moderate complexity   Risk & Benefits of therapy have been explained evaluation/treatment results reviewed;patient   OT Total Evaluation Time   OT Eval, Moderate Complexity Minutes (76715) 10   OT Goals   Therapy Frequency (OT) Daily   OT Predicted Duration/Target Date for Goal Attainment 01/25/23   OT Goals Hygiene/Grooming;Bed Mobility;Toilet Transfer/Toileting   OT: Hygiene/Grooming modified independent   OT: Bed Mobility Minimal assist;supine to/from sitting;rolling;bridging   OT: Toilet Transfer/Toileting Minimal assist;toilet transfer;cleaning and garment management   Interventions   Interventions Quick Adds Self-Care/Home Management   Self-Care/Home Management   Self-Care/Home Mgmt/ADL,  Compensatory, Meal Prep Minutes (98008) 18   Symptoms Noted During/After Treatment (Meal Preparation/Planning Training) fatigue;dizziness   Treatment Detail/Skilled Intervention Pt sleeping supine in bed upon arrival. Pt transferred supine>sitting EOB w/ Max Ax2 w/ HOB elevated. Pt weak in BLEs. Pt tolerated sitting EOB ~5 mins - became dizzy w/ position change. Edu on PLB. Pt had blood in catheter while sitting EOB - RN notified and came to assess. Pt STS w/ Max Ax2 and cueing for hand placement. Pt tolerated standing ~1 min before needing to sit due to dizziness. Pt transferred sit>supine Max Ax2. Pt given apple juice w/ straw after exercise.   OT Discharge Planning   OT Plan 1/26: bed mobility, standing tolerance, seated g/h, UE ex   OT Discharge Recommendation (DC Rec) (S)  Transitional Care Facility   OT Rationale for DC Rec Pt very weak and needing assistance w/ all ADLs and Ax2 w/ transfers. Pt has some assistance at home from son but unsure of his availability. Pt may benefit from Raised toilet seat.   OT Brief overview of current status Ax2 for all transfers   Total Session Time   Timed Code Treatment Minutes 18   Total Session Time (sum of timed and untimed services) 28

## 2023-01-19 NOTE — PLAN OF CARE
Assumed care [id number]. Pt received in bed, Ao to self and place only, appears sleepy, arouses to voice and gentle touch/shake. Pt is on RA, VSS. Denies pain when asked. 1+ edema to BLE. R foot more contracted than L foot. Pt is incontinent of stool, had 2x loose dark green/black stools this shift. Replaced mepilex to coccyx/buttocks to prevent PU. Has Dietrich in place for retention. Resumed Eliquis and Lantus 1units tonight. Turned Q2h. Resting comfortably. Telemetry discontinued.     Problem: Risk for Delirium  Goal: Optimal Coping  Outcome: Progressing     Problem: Skin Injury Risk Increased  Goal: Skin Health and Integrity  Outcome: Progressing     Problem: Activity Intolerance  Goal: Enhanced Capacity and Energy  Outcome: Progressing     Problem: Fall Injury Risk  Goal: Absence of Fall and Fall-Related Injury  Outcome: Progressing

## 2023-01-19 NOTE — PLAN OF CARE
Goal Outcome Evaluation:       Alert to lethargic, oriented to self, place, did not ask other orientation questions during this shift. Denies pain when asked. Sats good on RA lung sounds dim on R side, clear on L. NS at 75ml/hr throughout night. Noted BUE edema below elbows, elevated hands on pillows. Dietrich patent, cloudy straw-colored urine. Incontinent of bowel, loose-to-liquid and dark green to black, note left for providers asking of fecal occult blood testing warranted. Q2 turns, pt mobility significantly limited. Sacral mepilex in place, skin intact and without discoloration beneath. Will continue to monitor.     Problem: Skin Injury Risk Increased  Goal: Skin Health and Integrity  Outcome: Progressing     Problem: Fall Injury Risk  Goal: Absence of Fall and Fall-Related Injury  Outcome: Progressing

## 2023-01-19 NOTE — PROGRESS NOTES
"[facility]    Medicine Progress Note - Hospitalist Service    Date of Admission:  1/17/2023    Assessment & Plan      Caleb Hernandez is a 87 year old male admitted on 1/17/2023. He has a past medical history of paroxysmal atrial fibrillation on Eliquis, chronic kidney disease, previous recurrent UTIs, previous a sending cholangitis status postcholecystectomy, progressive cognitive issues, DM2, hypertension, coronary artery disease, lymphedema, who presented with x1 day of \"feeling unwell\" and family later reported he had nausea with emesis.       On admission, hyperkalemia 6.3, treated in ER and improved to 4.4, stable EKG (SR w/ PACs); found with significant MANJIT creatinine 4.83, , met sepsis criteria; lactate was 2; given hyperkalemia medications including calcium gluconate in the ER; resuscitated with 2 L of fluid; initiated on Zosyn.  On admission additionally with vancomycin for broad coverage, and CT chest/abdomen/pelvis returned demonstrating Circumferential and more focal bladder wall thickening. Bilateral hydronephrosis and hydroureter extending to the bladder, consistent with cystitis. Ordered for DKA protocol but not started as sugars were normalized after ER treatments including for hyperkalamia/insulin infusion; overnight with significant events including transfer to ICU for hypotension requiring norepinephrine    On 1/18, the patient was transferred out of ICU as he was able to be weaned off of norepinephrine pressor.  His Eliquis was held on admission in the setting of a 3.5g hemoglobin drop; appears to be stable and thus his Eliquis was resumed. Ongoing broad spectrum antibiotics. Resumed reduced-dose basal and corrective insulin.    Nephrology following and guiding fluids. Anemia has been stable but notable drop on admission, ongoing labs including ferritin and vitamin studies; adding on hemolysis labs including haptoglobin, LDH, ferritin, LFTs, reticulocyte count, " and blood smear. Fecal occult as well; reported episodes of melena vs 'dark-green' stools; pt unaware of any BM changes.     The disposition is pending; sw and therapies consulted.    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  Not currently on antihypertensives.    Plan:-Monitor blood pressure; would give bolus if MAP is less than 65     Euglycemic DKA    Type 2 diabetes mellitus with diabetic peripheral angiopathy without gangrene, with long-term current use of insulin (H)   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 2 diabetes mellitus, limited to breakdown of skin (H)    Assessment: with bicarb of 15, pH 7.22, and beta-hydroxybutyrate 2.95, consistent with euglycemic DKA  as sugars were in the 300s. Could be also consistent with the story of decreased po and a starvation ketosis; also more likely from metabolic component with the Creatinine at 4.83; thus cautiously and conservative started the DKA protocol and did not require as normal BG after receiving insulin for hyperkalemia in ED. Normal regimen is Lantus 32u at bedtime.    Plan:   - DKA protocol off  - transition to subcutaneous insulin now   - being conservative, reducing dose to 60% of usual-- ordered 19units for tonight    Anemia, normocytic  Assessment: On admission hemoglobin was 12.5 and later recheck to be 8.6; Eliquis was held given the 3.5 g  drop; however, there were no signs or symptoms concerning for bleed; as such, patient was started on subcu heparin while in ICU, and on 1/18 transitioned back to oral Eliquis.  However, overnight into 1/19 there are reported episodes of dark green/melanotic stools.  Patient is unable to recall further details and does not appear to have insight into these changes; as such, will work-up with the anemia and monitor carefully for any signs concerning for bleed.  Plan:  -Recheck hemoglobin in the afternoon  -Noted nephrology vitamin B-12 and ferritin studies   -add on additional labs with hemolysis investigation: haptoglobin, LDH, ferritin, LFTs, reticulocyte count and blood smear  -occult blood stool      Paroxysmal atrial fibrillation (H) (note episode of hematuria)    Assessment: ekg demonstrated SR w/ PACs; confirmed on repeat ekg. On eliquis. Was held on admission in the setting of a 3.5g hemoglobin drop; appears to be stable and thus his Eliquis is resumed.    Plan:   - resumed 1/18/2023   -As above, would opt to hold anticoagulation if there is confirmed melanotic stools; occult blood stool      History of cholecystectomy    Assessment: noted in setting of ascending cholangitis; elevated AST, could be early shocked liver in developing sepsis    Plan:   - clinically follow  And recheck as indicated       Coronary artery disease involving native coronary artery of native heart without angina pectoris    Assessment: on baby aspirin and no longer on blood pressure medications. ekg without ischemic findings    Plan:   - monitor clinically      Falls frequently     Assessment: noted     Plan:   - fall precautions and up with assist only       MANJIT, acute on chronic; CKD (chronic kidney disease)  (H)    Hyperkalemia   hematuria episode    Assessment: could be prerenal in setting of decreased po for a few days. CT demonstrated hydronephrosis and mai needed to be adjusted.  Nephrology following.    Plan:   - received 2L  as above on admit  - follow up AM labs  - renal US and nephrology consult on 1/18  - giving gentle 75cc/hr of MIVFs overnight w/ consideration of the hypotension last night   - defer fluid modifications to nephrology   - considering urology if worsening (now improving)         Diet: Moderate Consistent Carb (60 g CHO per Meal) Diet    DVT Prophylaxis: Heparin SQ  Dietrich Catheter: PRESENT, indication: Retention  Lines: PRESENT      PICC 01/17/23 Triple Lumen Right Basilic-Site Assessment: WDL      Cardiac Monitoring: None  Code Status: Full Code      Clinically Significant Risk Factors         # Hyperkalemia: Highest K = 5.5 mmol/L in last 2 days, will monitor as appropriate  # Hypernatremia: Highest Na = 146 mmol/L in last 2 days, will monitor as appropriate   # Hypercalcemia: corrected calcium is >10.1, will monitor as appropriate    # Hypoalbuminemia: Lowest albumin = 2.5 g/dL at 1/17/2023  3:24 PM, will monitor as appropriate                    Disposition Plan      Expected Discharge Date: 01/20/2023    Discharge Delays: Placement - TCU  Destination: home with family;home with help/services;nursing home (TCU)  Discharge Comments: needs placement. PT/OT recs          Greg Lanza MD  Hospitalist Service  [facility]  Securely message with [employer] (more info)  Text page via [employer] Paging/Directory   ______________________________________________________________________    Interval History   - we discussed his reports of melanotic vs dark green stools-- he does not recall any details  - we discussed his hemoglobin and his eliquis  - he has no new symptoms or questions   -feels his subjective leg strength is improving    Physical Exam   Vital Signs: Temp: 97.5  F (36.4  C) Temp src: Axillary BP: 100/55 Pulse: 74   Resp: 18 SpO2: 100 % O2 Device: None (Room air)    Weight: 162 lbs 14.4 oz    General: alert, oriented, and in no acute distress; appears frail and weak  Pulmonary: coarse breath  sounds, normal respiratory effort, on room air, no rales or wheezes or evidence of accessory muscle use  CVS: regular rate and rhythm, no murmurs, rubs, or gallops; no blatant jugular venous distention; no extremity edema and extremities are warm to the touch  GI: soft, nontender, BS+, no rebound or guarding, no conspicuous organomegaly   Neuro: nonfocal, moves all extremities of own volition; strength 5/5 in all extremities   Psych: appropriate    Medical Decision Making       55 MINUTES SPENT BY ME on the date of service doing chart review, history, exam, documentation & further activities per the note.      Data     I have personally reviewed the following data over the past 24 hrs:    4.7  \   8.9 (L)   / 192     146 (H) 121 (H) 111 (H) /  112 (H)   4.0 18 (L) 2.46 (H) \       Imaging results reviewed over the past 24 hrs:   No results found for this or any previous visit (from the past 24 hour(s)).     Addendum- have added Encephalopathy, Septic   to problem list. Greg Lanza MD on 1/19/2023 at 2:27 PM

## 2023-01-19 NOTE — PROGRESS NOTES
RENAL PROGRESS NOTE    REASON FOR CONSULT: MANJIT on CKD, urinary obstruction/UTI; hyperkalemia     PLAN:  Swap to 1/2 NS at 75/hr until pos and resolution of  post-obstructive diuresis  Maintain mai for now (may want to consider urologic eval, if persistent RODRIGUEZ, oupt void trial?), some risk for recurrence   Strict I/o  daily BMP, trend RF (expect ongoing improvement)  Ck iron and B12 with sig anemia   Would benefit from renal f/u outpt with CKD3, presumed DM        ASSESSMENT:  Acute on Chronic Kidney dz (3): non-oliguric, sCr peaked 4.8--->2.4  after mai placed and improved BP.  Metabolic derangement on presentation has improved. Presumed primary etiology, obstruction with bilat hydronephrosis on initial imaging and brisk recovery post mai placement + HoTN with sepsis (also improved and off pressor support this a.m.)   Baseline sCr in the 1.3-1.7 range more recently  Nephrotic proteinuria by prior dips >300, lkely underlying DMN; would hold off on up/c with active UTI  (2g range in 2020)     RODRIGUEZ:  On tamsulosin PTA, continue. ?does he follow with urology?, if not, should be considered if recurrent UTI's  Primitivo as he was not symptomatic (pelvic/flank/abd pain) with his hydro and infection.      Lytes/Acid-Base:   metabolic acidosis improving, suspect some hyperchlodremic acidosis with NS,, expect this should improve with ongoing renal  Improvement  Hyperkalemia: with MANJIT and metabolic acidosis, reversed, stable today. Trend  Pseudohypocalcemia: corrects normal for low albumin     Sepsis:  improved, Was req pressors, now stable off pressors,on  BS antibx coverage pending finalcx.     Anemia:  With CKD, infection, no overt bleeding, panel doesn't suggest iron def, likely malnutrition component hgb hovering 8's (down drift from 12 on admission liikely hemodilutional with sig hemoconcentration on admit; this is more likely his true degree of anemia.      DM:  On insulin, per hosp service, BS have been  stable     H/o A-fib:  Current NSA, Rate controlled , he is not on BB or dysrythmia meds PTA    SUBJECTIVE:  Denies appetite and admittedly not drinking much, RN confirms, deneis pain, pt just tired, no other complaints, enc po intkae     OBJECTIVE:  Physical Exam   Temp: 97.5  F (36.4  C) Temp src: Axillary BP: 100/55 Pulse: 74   Resp: 18 SpO2: 100 % O2 Device: None (Room air)    Vitals:    23 2147 23 0200 23 0237   Weight: 64 kg (141 lb) 72.3 kg (159 lb 6.4 oz) 73.9 kg (162 lb 14.4 oz)     Vital Signs with Ranges  Temp:  [97.5  F (36.4  C)-98.5  F (36.9  C)] 97.5  F (36.4  C)  Pulse:  [72-74] 74  Resp:  [18-22] 18  BP: (100-138)/(55-62) 100/55  SpO2:  [97 %-100 %] 100 %  I/O last 3 completed shifts:  In: 1715 [P.O.:360; I.V.:1355]  Out: 2875 [Urine:2875]    Temp (24hrs), Av.9  F (36.6  C), Min:97.5  F (36.4  C), Max:98.5  F (36.9  C)      Patient Vitals for the past 72 hrs:   Weight   23 0237 73.9 kg (162 lb 14.4 oz)   23 0200 72.3 kg (159 lb 6.4 oz)   23 214 64 kg (141 lb)       Intake/Output Summary (Last 24 hours) at 2023 1305  Last data filed at 2023 0902  Gross per 24 hour   Intake 3106.25 ml   Output 2825 ml   Net 281.25 ml       PHYSICAL EXAM:  General - Alert and oriented x3, appears comfortable, NAD, slow to respond but able to track conversation and anwser my questions  Cardiovascular - Rate and BP mostly controlled  Respiratory - Clear to auscultation bilaterally, no crackles or wheezes, on RA  Abd: BS present, no guarding or pain with palpation, no ascites  Extremities - No lower extremity edema bilaterally, gen muscle wasting noted   Skin: dry, intact, no rash, good turgor  Neuro:  Grossly intact, no focal deficits  MSK:  Grossly intact  Psych:  flat affect  Net + UO slowing a bit, near 3L, Na and Cl rising, swap fluids    LABORATORY STUDIES:     Recent Labs   Lab 23  1222 23  0517 23  0526 23  1749 23  1200   WBC  --   4.7 11.3* 4.1 10.9   RBC  --  3.07* 3.28* 2.98* 4.14*   HGB 8.9* 8.9* 9.5* 8.6* 12.1*   HCT  --  26.9* 28.0* 25.5* 35.1*   PLT  --  192 274 258 396       Basic Metabolic Panel:  Recent Labs   Lab 01/19/23  1105 01/19/23  0845 01/19/23  0517 01/19/23  0200 01/18/23  2054 01/18/23  1701 01/18/23  0603 01/18/23  0526 01/17/23  2324 01/17/23  1931 01/17/23  1641 01/17/23  1524 01/17/23  1334 01/17/23  1200   NA  --   --  146*  --   --   --   --  141  --  139  --  139  --  138   POTASSIUM  --   --  4.0  --   --   --   --  4.5  --  4.8  --  5.5*  5.4*  --  6.3*   CHLORIDE  --   --  121*  --   --   --   --  115*  --  115*  --  111*  --  108*   CO2  --   --  18*  --   --   --   --  16*  --  12*  --  12*  --  13*   BUN  --   --  111*  --   --   --   --  121*  --  151*  --  167*  --  155*   CR  --   --  2.46*  --   --   --   --  3.45*  --  4.05*  --  4.62*  --  4.83*   * 105* 111 143* 158* 104*   < > 113   < > 79   < > 129*   < > 154*   MARTHA  --   --  8.0*  --   --   --   --  8.4*  --  8.2*  --  9.1  --  10.2    < > = values in this interval not displayed.       INRNo lab results found in last 7 days.     Recent Labs   Lab Test 01/19/23  1222 01/19/23  0517 01/18/23  0526 01/27/22  0627 01/26/22 2047 11/28/20  0728 11/27/20 0713   INR  --   --   --   --  1.34*  --  1.23*   WBC  --  4.7 11.3*   < >  --    < > 7.7   HGB 8.9* 8.9* 9.5*   < >  --    < > 11.9*   PLT  --  192 274   < >  --    < > 313    < > = values in this interval not displayed.       Personally reviewed current labs      MILDRED Amaya-BC  [facility]  [phone]

## 2023-01-19 NOTE — PROGRESS NOTES
Care Management Follow Up    Length of Stay (days): 2    Expected Discharge Date: 01/20/2023     Concerns to be Addressed: discharge planning         Additional Information:  Cm been following, assisting with discharge plans. Awaiting PT, OT rec's. Patient from home with his son and wife, however unsure if he can return. Not sure what his mobility is right now. TCUs pending. Will continue to assist with safe discharge plan once final dispo decided.       Adela Caban RN

## 2023-01-20 NOTE — PLAN OF CARE
Pt A&O with intermittent confusion for situation. RA, denies SOB. Denies pain, N/V, CP. Dietrich remains in place, incontinent of BM. 0.45% NaCl infusing at 75ml/hr. Pt worked with PT and OT today in room. Son called and updated on pts progress.     Problem: Plan of Care - These are the overarching goals to be used throughout the patient stay.    Goal: Absence of Hospital-Acquired Illness or Injury  Intervention: Identify and Manage Fall Risk  Recent Flowsheet Documentation  Taken 1/19/2023 1600 by Jennifer Pinedo RN  Safety Promotion/Fall Prevention:   activity supervised   bed alarm on   clutter free environment maintained   fall prevention program maintained   room near nurse's station   safety round/check completed  Taken 1/19/2023 0800 by Jennifer Pinedo RN  Safety Promotion/Fall Prevention:   activity supervised   bed alarm on   clutter free environment maintained   fall prevention program maintained   room near nurse's station   safety round/check completed     Problem: Plan of Care - These are the overarching goals to be used throughout the patient stay.    Goal: Absence of Hospital-Acquired Illness or Injury  Intervention: Prevent Skin Injury  Recent Flowsheet Documentation  Taken 1/19/2023 1600 by Jennifer Pinedo, RN  Body Position:   turned   supine   supine, legs elevated  Taken 1/19/2023 0800 by Jennifer Pinedo RN  Body Position:   turned   supine   supine, legs elevated    Jennifer Pinedo RN on 1/19/2023 at 7:50 PM

## 2023-01-20 NOTE — CONSULTS
CONSULTING PHYSICIAN   Greg Lanza MD     REASON FOR CONSULTATION   Melena     IMPRESSION/RECOMMENDATIONS   Melena, FOBT positive-some drop in hemoglobin but very stable.  On Eliquis.  At this point he is hemodynamically stable.  Broad differential but likely more upper GI in origin.  At this point I would hold his Eliquis if possible.  We will monitor the hemoglobin.  Continue IV PPI.  We will plan for endoscopy pending his clinical course.  We will likely plan for Monday unless it becomes more urgent during the weekend.     HISTORY OF PRESENT ILLNESS   Caleb Hernandez is a pleasant 87 year old male who was mated to the ICU with acute kidney injury and septic shock requiring pressors.  He is making a good recovery from that standpoint.  He is eating and drinking.  Apparently there was melanic type stool seen.  He is also been having hematuria.  He is on Eliquis.  He denies any abdominal pain.  He states he is not having any trouble with his dietary intake.  His hemoglobin when he came in did fall from from 12.0 down to now 9.1.  Has been very stable for the past 48 hours.  His stool has been occult positive.  He states he has not had any colonoscopy or upper endoscopy for many years.  He denies any abdominal pain.  CT scan with some bladder wall thickening but no gastrointestinal concerns.     PAST HISTORY   Past Medical History:   Diagnosis Date     Abscess of right foot 11/23/2020    Added automatically from request for surgery [id number]     Acute pulmonary embolism with acute cor pulmonale (H) 5/23/2020     Ascending cholangitis 1/27/2022     BPH (benign prostatic hyperplasia)      Cholelithiasis      Closed fracture of rib     Created by Conversion  Replacement Utility updated for latest IMO load     Closed fracture of thoracic vertebra (H)     Created by Conversion  Replacement Utility updated for latest IMO load     Common bile duct (CBD) obstruction 9/4/2017     Diabetes  mellitus (H)      Essential hypertension      Gram-negative bacteremia 1/27/2022    Positive blood culture 1/26/2022; likely biliary source     Hyperlipidemia      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    Added automatically from request for surgery [id number]      Pancreatitis      Sepsis (H) 1/27/2022     Sepsis due to pneumonia (H) 9/8/2017     Septic arthritis of right foot (H) 12/2/2020      Past Surgical History:   Procedure Laterality Date     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    Procedure: with amputation of the fifth ray, peroneal brevis tendon transfer;  Surgeon: John Garcia DPM;  Location: [facility];  Service: Podiatry     BACK SURGERY      1964 removed a cyst     CHOLECYSTECTOMY  1985     ENDOSCOPIC RETROGRADE CHOLANGIOPANCREATOGRAM       ENDOSCOPIC RETROGRADE CHOLANGIOPANCREATOGRAM N/A 9/5/2017    Procedure: ENDOSCOPIC RETROGRADE CHOLANGIOPANCREATOGRAPHY SPHINCTEROTOMY AND STONE EXTRACTION;  Surgeon: Hansel Gannon MD;  Location: [facility] - [place], [place];  Service:      ENDOSCOPIC RETROGRADE CHOLANGIOPANCREATOGRAM N/A 1/27/2022    Procedure: ENDOSCOPIC RETROGRADE CHOLANGIOPANCREATOGRAPHY, BALLOON DILATION AND STONE EXTRACTION;  Surgeon: Sav Osborne MD;  Location: [place] [facility] [place]     INCISION AND DRAINAGE OF WOUND Right 11/2/2020    Procedure: INCISION AND DRAINAGE, right foot with removal of bone 5th metatarsal;  Surgeon: John Garcia DPM;  Location: [facility] [place] OR;  Service: Podiatry     INCISION AND DRAINAGE OF WOUND Right 11/16/2020    Procedure: INCISION AND DRAINAGE, right foot;  Surgeon: John Garcia DPM;  Location: [facility] OR;  Service: Podiatry     INCISION AND DRAINAGE OF WOUND Right 11/27/2020    Procedure: INCISION AND DRAINAGE, LOWER EXTREMITY;  Surgeon: Aleksandr Hdez DPM;  Location: [facility] [place] OR;  Service: Podiatry     IR EXTREMITY ANGIOGRAM RIGHT  11/24/2020     IR LOWER EXTREMITY ANGIOGRAM RIGHT  11/24/2020     PICC  11/30/2020           TONSILLECTOMY  1940        Family History Social History   Family History   Problem Relation Age of Onset     Aortic aneurysm Mother      Alcoholism Father     Social History     Socioeconomic History     Marital status:    Tobacco Use     Smoking status: Former     Types: Cigarettes     Quit date: 1991     Years since quittin.2     Smokeless tobacco: Never   Vaping Use     Vaping Use: Never used   Substance and Sexual Activity     Alcohol use: No     Drug use: No     Sexual activity: Never         MEDICATIONS & ALLERGIES   Medications Prior to Admission   Medication Sig Dispense Refill Last Dose     acetaminophen (TYLENOL) 500 MG tablet [ACETAMINOPHEN (TYLENOL) 500 MG TABLET] Take 1-2 tablets (500-1,000 mg total) by mouth every 4 (four) hours as needed.  0 Unknown at prn     aspirin 81 MG EC tablet [ASPIRIN 81 MG EC TABLET] Take 81 mg by mouth daily.   1/15/2023 at am     doxycycline hyclate (VIBRAMYCIN) 100 MG capsule Take 1 capsule (100 mg) by mouth 2 times daily for 30 days 60 capsule 0 1/15/2023 at pm     insulin glargine (LANTUS SOLOSTAR) 100 UNIT/ML pen Inject 32 Units Subcutaneous At Bedtime 15 mL 11 1/15/2023 at pm     multivit-min/FA/lycopen/lutein (CENTRUM SILVER MEN ORAL) [MULTIVIT-MIN/FA/LYCOPEN/LUTEIN (CENTRUM SILVER MEN ORAL)] Take 1 tablet by mouth daily.   1/15/2023 at am     mupirocin (BACTROBAN) 2 % ointment Apply topically daily as needed Apply to foot wound   Unknown at prn     polyethylene glycol (MIRALAX) 17 gram packet [POLYETHYLENE GLYCOL (MIRALAX) 17 GRAM PACKET] Take 1 packet (17 g total) by mouth daily as needed. 100 packet 3 Unknown at prn     tamsulosin (FLOMAX) 0.4 MG capsule TAKE 1 CAPSULE BY MOUTH EVERY DAY AFTER SUPPER 90 capsule 3 1/15/2023 at am     traMADol (ULTRAM) 50 MG tablet TAKE 1 TABLET BY MOUTH EVERY 6 HOURS AS NEEDED FOR PAIN 30 tablet 0 Past Week at prn     vitamin D3 (CHOLECALCIFEROL) 125 MCG (5000 UT) tablet Take 1 tablet (125 mcg) by mouth daily    1/15/2023 at am     vitamin E 400 unit capsule [VITAMIN E 400 UNIT CAPSULE] Take 400 Units by mouth daily.   1/15/2023 at am     B-D U/F 31G X 8 MM insulin pen needle USED TO INJECT INSULIN DAILY 100 each 11      blood-glucose meter (ONETOUCH VERIO IQ METER) Misc [BLOOD-GLUCOSE METER (ONETOUCH VERIO IQ METER) MISC] Use 1 each As Directed 2 (two) times a day. 1 each 0      Continuous Blood Gluc  (FREESTYLE APRIL 2 READER) MILE USE AS DIRECTED 1 each 0      Continuous Blood Gluc Sensor (FREESTYLE APRIL 2 SENSOR) MISC USE AS DIRECTED EVERY 14 DAYS 2 each 11      Continuous Blood Gluc Sensor (FREESTYLE APRIL 2 SENSOR) [facility] 1 kit every 14 days Use per manufacture's directions to check glucose daily. 6 each 1      Continuous Blood Gluc Sensor (FREESTYLE APRIL 2 SENSOR) MISC 1 each every 14 days 1 each every 14 days. Change every 14 days. 6 each 5      insulin pen needle (32G X 6 MM) 32G X 6 MM miscellaneous Use 4 pen needles daily or as directed. 100 each 11      ONETOUCH ULTRA test strip USE TO TEST TWICE DAILY 200 strip 3      [DISCONTINUED] ELIQUIS ANTICOAGULANT 5 MG tablet TAKE 1 TABLET BY MOUTH TWICE DAILY 180 tablet 3 1/15/2023 at pm        ALLERGIES   Allergies   Allergen Reactions     Cresol [Phenol] Unknown     Muscle cramps     Demeclocycline Hives and Rash     Crestor [Rosuvastatin] Muscle Pain (Myalgia)         REVIEW OF SYSTEMS     See HPI for pertinent positives and negatives. A comprehensive review of systems was performed and was otherwise noncontributory.     OBJECTIVE   Vitals Blood pressure (!) 173/81, pulse 68, temperature 98.6  F (37  C), temperature source Axillary, resp. rate 18, weight 73.5 kg (162 lb 1.6 oz), SpO2 100 %.           Physical  Exam   GENERAL: alert and oriented, no acute disstress.     HEENT: atraumatic, anicteric, moist mucous membranes, neck soft/supple     PULMONARY: normal resp effort, breath sounds clear to auscultation bilaterally    CARDIOVASCULAR: normal rate and  rhythm, no murmurs    ABDOMEN: soft, no tenderness, no distention, bowel sounds normal. No hepatosplenomegaly.     MUSCULOSKELETAL: joints grossly normal    NEUROLOGICAL: appropriate mental status, grossly intact    PSYCHIATRIC: normal mood, affect and insight    SKIN: warm and dry, no rashes    EXT: no edema        LABORATORY    ELECTROLYTE PANEL   Recent Labs   Lab 01/20/23  1239 01/20/23  1146 01/20/23  0806 01/20/23  0552 01/19/23  0845 01/19/23  0517 01/18/23  0603 01/18/23  0526     --   --  150*  --  146*  --  141   POTASSIUM  --   --   --  3.9  --  4.0  --  4.5   CHLORIDE  --   --   --  125*  --  121*  --  115*   CO2  --   --   --  19*  --  18*  --  16*   GLC  --  144* 83 95   < > 111   < > 113   CR  --   --   --  1.92*  --  2.46*  --  3.45*   BUN  --   --   --  78*  --  111*  --  121*    < > = values in this interval not displayed.      HEMATOLOGY PANEL   Recent Labs   Lab 01/20/23  0552 01/19/23  1957 01/19/23  1431 01/19/23  1222 01/19/23  0517   HGB 9.1* 9.5* 9.2*   < > 8.9*   MCV  --  90 88  --  88   WBC  --  5.8 4.8  --  4.7   PLT  --  187 205  --  192    < > = values in this interval not displayed.      LIVER AND PANCREAS PANEL   Recent Labs   Lab 01/19/23  1431 01/17/23  1524 01/17/23  1200   AST 32 37 42*   ALT 31 24 29   ALKPHOS 59 64 79   BILITOTAL 0.4 0.4 0.4     IMAGING STUDIES      As above  I have reviewed the current diagnostic and laboratory tests.                                                      Abdulaziz Guzman MD    Thank you for the opportunity to participate in the care of this patient.   Please feel free to call me with any questions or concerns.  Phone number [phone].

## 2023-01-20 NOTE — PROGRESS NOTES
RENAL PROGRESS NOTE    REASON FOR CONSULT: MANJIT on CKD, urinary obstruction/UTI; hyperkalemia     PLAN:  Was started On D5W  50/hr early with rising Na (Zosyn may be contributing, and likely will discontinue Vanco and Zosyn today)  Sodium gloria 1200, if <148, discontinue IVF's  Maintain mai for now (urologic eval, if persistent RODRIGUEZ, oupt void trial?), some risk for recurrenceday  Irrigate mai q shift and prn d/t inc clotting    daily BMP  Add B12  Would benefit from renal f/u outpt with CKD3, presumed DM (scheduled in our office Friday Feb 10th at 11 a.m. with LEANA Evans)        ASSESSMENT:  Acute on Chronic Kidney dz (3): non-oliguric, sCr peaked 4.8--->1.9  after mai placed and improved BP.  Metabolic derangement on presentation has improved. Presumed primary etiology, obstruction with bilat hydronephrosis on initial imaging and brisk recovery post mai placement + HoTN with sepsis (also improved and off pressor support this a.m.)   Baseline sCr in the 1.3-1.7 range more recently  Nephrotic proteinuria by prior dips >300, lkely underlying DMN; would hold off on up/c with active UTI  (2g range in 2020)     RODRIGUEZ:  On tamsulosin PTA, continue. Involve urol for further recs, timing of outpt void trial, Primitivo as he was not symptomatic (pelvic/flank/abd pain) with his hydro and infection.     Volume:  Na rising (likely Zosyn and recent NS load), some edema dev, wt with slow climb, on maintenance D5W for now, may consider thiazide for Na wasting tomorrow if further wt gains and Na not improving after D5W     Lytes/Acid-Base:   metabolic acidosis improving, suspect some hyperchloremic acidosis with NS and Zosyn (has been DC'd), expect this should improve with ongoing renal  Improvement  Hyperkalemia: resolved, with MANJIT and metabolic acidosis  Pseudohypocalcemia: corrects normal for low albumin     UTI:  With Sepsis-like presentation, s/p IV antibx, converting ot Augementin    Anemia:  B12 def, pos occult, CKD,  infection, and some Urinary losses after resuming Eliquis, panel doesn't suggest iron def, likely malnutrition,down drift from 12--->9, on admission likely hemodilutional post volume resus, this is more likely his true degree of anemia.  starting B12, possible GI consult     DM:  On insulin, per hosp service, BS have been stable     H/o A-fib:  Current NSA, Rate controlled , he is not on BB or dysrythmia meds PTA    SUBJECTIVE:  Denies appetite and admittedly not drinking much, RN confirms, denies pain, seems more alert today per RN staff, not wanting to go to suggested TCU [facility], likely staying overnoc, several issues to address , no other complaints, enc po intake . E-communication with Dr Lanza    OBJECTIVE:  Physical Exam   Temp: 98.6  F (37  C) Temp src: Axillary BP: (!) 173/81 Pulse: 68   Resp: 18 SpO2: 100 % O2 Device: None (Room air) Oxygen Delivery: 1 LPM  Vitals:    23   Weight: 72.3 kg (159 lb 6.4 oz) 73.9 kg (162 lb 14.4 oz) 73.5 kg (162 lb 1.6 oz)     Vital Signs with Ranges  Temp:  [97.6  F (36.4  C)-98.6  F (37  C)] 98.6  F (37  C)  Pulse:  [68-73] 68  Resp:  [18-20] 18  BP: (136-173)/(64-81) 173/81  SpO2:  [98 %-100 %] 100 %  I/O last 3 completed shifts:  In: 3412.5 [P.O.:370; I.V.:3042.5]  Out: 2475 [Urine:2475]    Temp (24hrs), Av.9  F (36.6  C), Min:97.5  F (36.4  C), Max:98.5  F (36.9  C)      Patient Vitals for the past 72 hrs:   Weight   23 0453 73.5 kg (162 lb 1.6 oz)   23 73.9 kg (162 lb 14.4 oz)   01/18/23 0200 72.3 kg (159 lb 6.4 oz)   23 2147 64 kg (141 lb)       Intake/Output Summary (Last 24 hours) at 2023 1305  Last data filed at 2023 0902  Gross per 24 hour   Intake 3106.25 ml   Output 2825 ml   Net 281.25 ml       PHYSICAL EXAM:  General - Alert and oriented x3, appears comfortable, NAD, more alert, up in chair   Cardiovascular - Rate controlled, BP escalated a bit  Respiratory - Clear to auscultation  bilaterally, no crackles or wheezes, on RA  Abd: BS present, no guarding or pain with palpation, no ascites  Extremities - trace lower extremity edema bilaterally, sl UE edema, mostly hands  Skin: dry, intact, no rash, good turgor, gen pallor  Neuro:  Grossly intact, no focal deficits  MSK:  Grossly intact  Psych:  flat affect  Net + 2L    LABORATORY STUDIES:     Recent Labs   Lab 01/20/23  0552 01/19/23  1957 01/19/23  1431 01/19/23  1222 01/19/23  0517 01/18/23  0526 01/17/23  1749 01/17/23  1200   WBC  --  5.8 4.8  --  4.7 11.3* 4.1 10.9   RBC  --  3.25* 3.20*  --  3.07* 3.28* 2.98* 4.14*   HGB 9.1* 9.5* 9.2* 8.9* 8.9* 9.5* 8.6* 12.1*   HCT  --  29.2* 28.3*  --  26.9* 28.0* 25.5* 35.1*   PLT  --  187 205  --  192 274 258 396       Basic Metabolic Panel:  Recent Labs   Lab 01/20/23  0806 01/20/23  0552 01/19/23  2113 01/19/23  1606 01/19/23  1105 01/19/23  0845 01/19/23  0517 01/18/23  0603 01/18/23  0526 01/17/23  2324 01/17/23  1931 01/17/23  1641 01/17/23  1524 01/17/23  1334 01/17/23  1200   NA  --  150*  --   --   --   --  146*  --  141  --  139  --  139  --  138   POTASSIUM  --  3.9  --   --   --   --  4.0  --  4.5  --  4.8  --  5.5*  5.4*  --  6.3*   CHLORIDE  --  125*  --   --   --   --  121*  --  115*  --  115*  --  111*  --  108*   CO2  --  19*  --   --   --   --  18*  --  16*  --  12*  --  12*  --  13*   BUN  --  78*  --   --   --   --  111*  --  121*  --  151*  --  167*  --  155*   CR  --  1.92*  --   --   --   --  2.46*  --  3.45*  --  4.05*  --  4.62*  --  4.83*   GLC 83 95 175* 128* 112* 105* 111   < > 113   < > 79   < > 129*   < > 154*   MARTHA  --  8.1*  --   --   --   --  8.0*  --  8.4*  --  8.2*  --  9.1  --  10.2    < > = values in this interval not displayed.       INRNo lab results found in last 7 days.     Recent Labs   Lab Test 01/20/23  0552 01/19/23  1957 01/19/23  1431 01/27/22  0627 01/26/22  2047 11/28/20  0728 11/27/20  0713   INR  --   --   --   --  1.34*  --  1.23*   WBC  --  5.8 4.8    < >  --    < > 7.7   HGB 9.1* 9.5* 9.2*   < >  --    < > 11.9*   PLT  --  187 205   < >  --    < > 313    < > = values in this interval not displayed.       Personally reviewed current labs      Adela Bassett, [facility]  [facility]  [phone]

## 2023-01-20 NOTE — PROGRESS NOTES
"[facility]    Medicine Progress Note - Hospitalist Service    Date of Admission:  1/17/2023    Assessment & Plan      Caleb Hernandez is a 87 year old male admitted on 1/17/2023. He has a past medical history of paroxysmal atrial fibrillation on Eliquis, chronic kidney disease, previous recurrent UTIs, previous a sending cholangitis status postcholecystectomy, progressive cognitive issues, DM2, hypertension, coronary artery disease, lymphedema, who presented with x1 day of \"feeling unwell\" and family later reported he had nausea with emesis.       On admission, hyperkalemia 6.3, treated in ER and improved to 4.4, stable EKG (SR w/ PACs); found with significant MANJIT creatinine 4.83, , met sepsis criteria; lactate was 2; given hyperkalemia medications including calcium gluconate in the ER; resuscitated with 2 L of fluid; initiated on Zosyn.  On admission additionally with vancomycin for broad coverage, and CT chest/abdomen/pelvis returned demonstrating Circumferential and more focal bladder wall thickening. Bilateral hydronephrosis and hydroureter extending to the bladder, consistent with cystitis. Ordered for DKA protocol but not started as sugars were normalized after ER treatments including for hyperkalamia/insulin infusion; overnight with significant events including transfer to ICU for hypotension requiring norepinephrine    On 1/18, the patient was transferred out of ICU as he was able to be weaned off of norepinephrine pressor.  His Eliquis was held on admission in the setting of a 3.5g hemoglobin drop; appears to be stable and thus his Eliquis was resumed. Ongoing broad spectrum antibiotics. Resumed reduced-dose basal and corrective insulin.    Nephrology following and guiding fluids. Working on hypernatremia. Anemia has been stable but notable drop on admission, ongoing labs including ferritin and vitamin studies; added on hemolysis labs including haptoglobin, LDH, ferritin, " LFTs, reticulocyte count, and blood smear. Fecal occult as well; reported episodes of melena vs 'dark-green' stools; pt unaware of any BM changes. Consulted GI 1/20 and recommendations include endoscopy on Monday.    Transitioned to PO abx 1/20. Urology also consulted. The disposition is to TCU, likely next week after scoping.    ----------------------------------------------    Principal Problem:  Encephalopathy, Septic    Sepsis (H)     UTI (urinary tract infection)   hypotension requiring pressors-- resolved  Adult failure to thrive; Age-related cognitive decline; Frailty syndrome in geriatric patient    Assessment: as above in summary  -appreciate ICU consult and cares-- transferring out as weaned off NE pressor on 1/18  -discussed with pharmacy:    STOP Zosyn and vancomycin given renal dysfunction and hypernatremia   - transition to Augmentin   -Follow-up urine and blood cultures  -PT and OT and SW/CM  - fluids as per nephrology       Essential hypertension    see above regarding hypotension    Assessment: Pressures are soft.  Not currently on antihypertensives.    Plan  :-Monitor blood pressure; would give bolus if MAP is less than 65     Euglycemic DKA    Type 2 diabetes mellitus with diabetic peripheral angiopathy without gangrene, with long-term current use of insulin (H)   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 2 diabetes mellitus, limited to breakdown of skin (H)    Assessment: with bicarb of 15, pH 7.22, and beta-hydroxybutyrate 2.95, consistent with euglycemic DKA  as sugars were in the 300s. Could be also consistent with the story of decreased po and a starvation ketosis; also more likely from metabolic component with the Creatinine at 4.83; thus cautiously and conservative started the DKA protocol and did not require as normal BG after receiving insulin for hyperkalemia in ED. Normal regimen is Lantus 32u at bedtime.    Plan:   - DKA protocol off  - transitioned to subcutaneous insulin      - being conservative, reducing dose basal of 19    Anemia, normocytic  Assessment: On admission hemoglobin was 12.5 and later recheck to be 8.6; Eliquis was held given the 3.5 g drop; however, there were no signs or symptoms concerning for bleed; as such, patient was started on subcu heparin while in ICU, and on 1/18 transitioned back to oral Eliquis.  However, overnight into 1/19 there are reported episodes of dark green/melanotic stools.  Patient is unable to recall further details and does not appear to have insight into these changes; as such, will work-up with the anemia and monitor carefully for any signs concerning for bleed. Given reports of melena, asked GI to see; plan for endoscopy on Monday.  Plan:  -Recheck hemoglobin q12  -Noted nephrology vitamin B-12 and ferritin studies   -add on additional labs with hemolysis investigation: haptoglobin, LDH, ferritin, LFTs, reticulocyte count and blood smear  -occult blood stool positive  -Consult GI, greatly appreciate       Paroxysmal atrial fibrillation (H) (note episode of hematuria)    Assessment: ekg demonstrated SR w/ PACs; confirmed on repeat ekg. On eliquis. Was held on admission in the setting of a 3.5g hemoglobin drop; appears to be stable and thus his Eliquis was resumed until 1/20 when GI recommended pausing and scoping on Monday.    Plan:   - resumed 1/18/2023   -As above, would opt to hold anticoagulation if there is confirmed melanotic stools; occult blood stool   - HOLDING eliquis now 1/20 after GI evaluation and recommendation to hold.      History of cholecystectomy    Assessment: noted in setting of ascending cholangitis; elevated AST, could be early shocked liver in developing sepsis    Plan:   - clinically follow And recheck as indicated       Coronary artery disease involving native coronary artery of native heart without angina pectoris    Assessment: on baby aspirin and no longer on blood pressure medications. ekg without ischemic  findings    Plan:   - monitor clinically      Falls frequently     Assessment: noted     Plan:   - fall precautions and up with assist only       MANJIT, acute on chronic; CKD (chronic kidney disease)  (H)    Hyperkalemia   hematuria episodes x2   Hypernatremia    Assessment: could be prerenal in setting of decreased po for a few days. CT demonstrated hydronephrosis and mai needed to be adjusted.  Nephrology following.    Plan:   - received 2L as above on admit  - follow up AM labs  - renal US and nephrology consulted on 1/18  -fluids per nephrology  - consult urology          Diet: Moderate Consistent Carb (60 g CHO per Meal) Diet  Diet    DVT Prophylaxis: Heparin SQ  Mai Catheter: PRESENT, indication: Retention  Lines: PRESENT      PICC 01/17/23 Triple Lumen Right Basilic-Site Assessment: WDL      Cardiac Monitoring: None  Code Status: Full Code      Clinically Significant Risk Factors          # Hypernatremia: Highest Na = 150 mmol/L in last 2 days, will monitor as appropriate      # Hypoalbuminemia: Lowest albumin = 2 g/dL at 1/19/2023  2:31 PM, will monitor as appropriate                    Disposition Plan      Expected Discharge Date: 01/21/2023,  8:56 AM  Discharge Delays: Placement - TCU  Destination: home with family;home with help/services;nursing home (TCU)  Discharge Comments: [facility] 1pm 1/21          Greg Lanza MD  Hospitalist Service  [facility]  Securely message with [employer] (more info)  Text page via [employer] Paging/Directory   ______________________________________________________________________    Interval History   -This morning patient has no new concerns or symptoms; we discussed his hemoglobin and electrolytes and antibiotics  -He notes that a lot of this information he is hearing but not necessarily able to comment on as he is feeling very tired in general but feels relatively better than when he was septic in the ER  -he states that he just wants to feel better  and that he is glad his infection is starting to improve  -He was unable to recall any details of melena, but aware of the reports given by the night nursing team  -I mentioned that there will be other specialists to visit with him today     Physical Exam   Vital Signs: Temp: 98.6  F (37  C) Temp src: Axillary BP: (!) 173/81 Pulse: 68   Resp: 18 SpO2: 100 % O2 Device: None (Room air) Oxygen Delivery: 1 LPM  Weight: 162 lbs 1.6 oz    General: alert, oriented, and in no acute distress; appears frail and weak  Pulmonary: coarse breath sounds, normal respiratory effort, on room air, no rales or wheezes or evidence of accessory muscle use  CVS: regular rate and rhythm, no murmurs, rubs, or gallops; no blatant jugular venous distention; no extremity edema and extremities are warm to the touch  GI: soft, nontender, BS+, no rebound or guarding, no conspicuous organomegaly   Neuro: nonfocal, moves all extremities of own volition; strength 5/5 in all extremities   Psych: appropriate    Medical Decision Making       65 MINUTES SPENT BY ME on the date of service doing chart review, history, exam, documentation & further activities per the note.       I discussed with the bedside nurse, with the patient, with the nephrology team, and with the pharmacist.     Data     I have personally reviewed the following data over the past 24 hrs:    5.8  \   9.1 (L)   / 187     145 125 (H) 78 (H) /  144 (H)   3.9 19 (L) 1.92 (H) \       ALT: 31 AST: 32 AP: 59 TBILI: 0.4   ALB: 2.0 (L) TOT PROTEIN: 4.7 (L) LIPASE: N/A       Ferritin:  N/A % Retic:  1.7 LDH:  210       Imaging results reviewed over the past 24 hrs:   No results found for this or any previous visit (from the past 24 hour(s)).

## 2023-01-20 NOTE — CONSULTS
[facility] CONSULT - URINARY RETENTION     Type of Consult: inpatient   Place of Service:  [facility]   Reason for Consultation: bladder outlet obstruction, MANJIT, UTI   Consult Requested by: Adela Bassett    History of present illness:  Caleb Hernandez is a 87 year old male that was admitted to the hospital for Sepsis (H). Urology was consulted for urinary retention. History is obtained from patient, [facility] EMR and chart review.     The patient has had a catheter placed, it was placed on 1/17/23 for urinary retention, MANJIT, and presumed urosepsis. A UC has been obtained and demonstrates no growth. Hematuria began after mai placement. Per nursing report, there was some resistance met with initial mai placement. Nursing has been hand irrigating as needed for catheter not draining well. Patient is on anticoagulation. Patient denies hematuria prior. Patient reports last BM was this morning. Patient admits to a history of urinary retention in the past.  Patient admits to a history of UTIs. Patient admits to weak or slow urine stream. He denies surgical procedures on the prostate. Patient is taking medications for benign prostatic hypertrophy, flomax. Patient was lost to follow up after seeing Dr. Villa in clinic January 2021. At that time, they discussed catheter replacement, going without a catheter, or looking into a procedure to open his prostate to help him void. Patient initially elected do Urolift but then decided against it.     CT on day of admission, 1/17/23 showed bilateral hydronephrosis and hydroureter extending to the urinary bladder and circumferential and focal wall thickening of the urinary bladder with mild adjacent stranding. A Mai catheter is present though the bladder is not decompressed. Patient is tolerating mai catheter. He denies fevers, chills, nausea, vomiting.     Past medical history :  Past Medical History:   Diagnosis Date     Abscess of right foot 11/23/2020    Added  automatically from request for surgery [id number]     Acute pulmonary embolism with acute cor pulmonale (H) 5/23/2020     Ascending cholangitis 1/27/2022     BPH (benign prostatic hyperplasia)      Cholelithiasis      Closed fracture of rib     Created by Conversion  Replacement Utility updated for latest IMO load     Closed fracture of thoracic vertebra (H)     Created by Conversion  Replacement Utility updated for latest IMO load     Common bile duct (CBD) obstruction 9/4/2017     Diabetes mellitus (H)      Essential hypertension      Gram-negative bacteremia 1/27/2022    Positive blood culture 1/26/2022; likely biliary source     Hyperlipidemia      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    Added automatically from request for surgery [id number]      Pancreatitis      Sepsis (H) 1/27/2022     Sepsis due to pneumonia (H) 9/8/2017     Septic arthritis of right foot (H) 12/2/2020       Past surgical history:   Past Surgical History:   Procedure Laterality Date     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    Procedure: with amputation of the fifth ray, peroneal brevis tendon transfer;  Surgeon: John Garcia DPM;  Location: [facility];  Service: Podiatry     BACK SURGERY      1964 removed a cyst     CHOLECYSTECTOMY  1985     ENDOSCOPIC RETROGRADE CHOLANGIOPANCREATOGRAM       ENDOSCOPIC RETROGRADE CHOLANGIOPANCREATOGRAM N/A 9/5/2017    Procedure: ENDOSCOPIC RETROGRADE CHOLANGIOPANCREATOGRAPHY SPHINCTEROTOMY AND STONE EXTRACTION;  Surgeon: Hansel Gannon MD;  Location: [facility] [place];  Service:      ENDOSCOPIC RETROGRADE CHOLANGIOPANCREATOGRAM N/A 1/27/2022    Procedure: ENDOSCOPIC RETROGRADE CHOLANGIOPANCREATOGRAPHY, BALLOON DILATION AND STONE EXTRACTION;  Surgeon: Sav Osborne MD;  [place] [facility] [place]     INCISION AND DRAINAGE OF WOUND Right 11/2/2020    Procedure: INCISION AND DRAINAGE, right foot with removal of bone 5th metatarsal;  Surgeon: John Garcia DPM;  Location: [facility] [place];   Service: Podiatry     INCISION AND DRAINAGE OF WOUND Right 2020    Procedure: INCISION AND DRAINAGE, right foot;  Surgeon: John Garcia DPM;  Location: [facility] Main OR;  Service: Podiatry     INCISION AND DRAINAGE OF WOUND Right 2020    Procedure: INCISION AND DRAINAGE, LOWER EXTREMITY;  Surgeon: Aleksandr Hdez DPM;  Location: [facility] Main OR;  Service: Podiatry     IR EXTREMITY ANGIOGRAM RIGHT  2020     IR LOWER EXTREMITY ANGIOGRAM RIGHT  2020     PICC  2020          TONSILLECTOMY  1940       Social history:  Social History     Socioeconomic History     Marital status:      Spouse name: Not on file     Number of children: Not on file     Years of education: Not on file     Highest education level: Not on file   Occupational History     Not on file   Tobacco Use     Smoking status: Former     Types: Cigarettes     Quit date: 1991     Years since quittin.2     Smokeless tobacco: Never   Vaping Use     Vaping Use: Never used   Substance and Sexual Activity     Alcohol use: No     Drug use: No     Sexual activity: Never   Other Topics Concern     Parent/sibling w/ CABG, MI or angioplasty before 65F 55M? Not Asked   Social History Narrative     Not on file     Social Determinants of Health     Financial Resource Strain: Not on file   Food Insecurity: Not on file   Transportation Needs: Not on file   Physical Activity: Not on file   Stress: Not on file   Social Connections: Not on file   Intimate Partner Violence: Not on file   Housing Stability: Not on file       Medications  Current Facility-Administered Medications   Medication     amoxicillin-clavulanate (AUGMENTIN) 500-125 MG per tablet 1 tablet     [Held by provider] apixaban ANTICOAGULANT (ELIQUIS) tablet 2.5 mg     cyanocobalamin (VITAMIN B-12) tablet 500 mcg     dextrose 5% infusion     glucose gel 15-30 g    Or     dextrose 50 % injection 25-50 mL    Or     glucagon injection 1 mg     glucose gel  15-30 g    Or     dextrose 50 % injection 25-50 mL    Or     glucagon injection 1 mg     glucose gel 15-30 g    Or     dextrose 50 % injection 25-50 mL    Or     glucagon injection 1 mg     insulin aspart (NovoLOG) injection (RAPID ACTING)     insulin aspart (NovoLOG) injection (RAPID ACTING)     insulin glargine (LANTUS PEN) injection 19 Units     lidocaine (LMX4) cream     lidocaine (LMX4) cream     lidocaine 1 % 0.1-1 mL     lidocaine 1 % 0.1-5 mL     melatonin tablet 1 mg     ondansetron (ZOFRAN ODT) ODT tab 4 mg    Or     ondansetron (ZOFRAN) injection 4 mg     prochlorperazine (COMPAZINE) injection 5 mg    Or     prochlorperazine (COMPAZINE) tablet 5 mg    Or     prochlorperazine (COMPAZINE) suppository 12.5 mg     sodium chloride (PF) 0.9% PF flush 10-40 mL     sodium chloride (PF) 0.9% PF flush 3 mL     sodium chloride (PF) 0.9% PF flush 3 mL     tamsulosin (FLOMAX) capsule 0.4 mg       Allergies  Allergies   Allergen Reactions     Cresol [Phenol] Unknown     Muscle cramps     Demeclocycline Hives and Rash     Crestor [Rosuvastatin] Muscle Pain (Myalgia)       Review of systems   12 point review of systems is otherwise negative except what is stated in the HPI.     Physical examinations:  BP (!) 173/81 (BP Location: Left arm, Cuff Size: Adult Regular)   Pulse 68   Temp 98.6  F (37  C) (Axillary)   Resp 18   Wt 73.5 kg (162 lb 1.6 oz)   SpO2 100%   BMI 24.65 kg/m     GENERAL: NAD, alert, cooperative  HEAD: normocephalic/atraumatic   ABDOMEN: soft, non tender, non distended, no suprapubic fulness/tenderness noted, no CVA tenderness noted   : mai catheter in place draining purulent yellow urine without clot or sediment.   SKIN: no rashes or lesions  MUSCULOSKELETAL: moves all four extremities equally, no pedal edema  PSYCHOLOGICAL: alert and oriented, answers questions appropriately      LABS:   Lab Results   Component Value Date    WBC 5.8 01/19/2023    HGB 9.1 (L) 01/20/2023    HCT 29.2 (L)  01/19/2023     01/19/2023    CHOL 178 07/22/2020    TRIG 60 07/22/2020    HDL 40 07/22/2020    ALT 31 01/19/2023    AST 32 01/19/2023     01/20/2023    BUN 78 (H) 01/20/2023    CO2 19 (L) 01/20/2023    PSA 5.8 01/23/2017    INR 1.34 (H) 01/26/2022       UA RESULTS:  Recent Labs   Lab Test 01/17/23  1417 12/09/22  1611   COLOR Yellow Yellow   APPEARANCE Cloudy* Turbid*   URINEGLC Negative Negative   URINEBILI Negative Negative   URINEKETONE Trace* Negative   SG 1.010 1.025   UBLD 0.5 mg/dL* Large*   URINEPH 6.0 5.5   PROTEIN 200* 100*   UROBILINOGEN  --  0.2   NITRITE Negative Negative   LEUKEST 500 Jasmine/uL* Large*   RBCU 9* >100*   WBCU >182* >100*       Intake/Output Summary (Last 24 hours) at 1/20/2023 1440  Last data filed at 1/20/2023 1100  Gross per 24 hour   Intake 2221.25 ml   Output 1900 ml   Net 321.25 ml       Urine culture: negative  Blood: NGTD.     I have personally reviewed these labs.       ASSESSMENT/PLAN:  Caleb Hernandez is being seen by [facility] for urinary retention due to bladder outlet obstruction.     - Patient does have a catheter in place. Patient has known bladder outlet obstruction. Recommend maintaining catheter at discharge with TOV at TCU in 5-7 days.   - Patient should follow up in the outpatient setting to discuss ongoing bladder outlet obstruction options. I will send an e-mail to help arrange. He is asymptomatic for RODRIGUEZ and inital presentation with hydronephrosis. He tolerates mai catheter.   - Creatine trending down, 1.2 today. Nephrology following MANJIT.    - Urine culture without infection. Being covered with Augmentin.   - Hematuria appears to be resolved today. This was likely due to traumatic catheter insertion and anticoagulation. Agree to hold anticoagulation for a day or so if possible. Continue to hand irrigate as needed.  - Recommend trying to decrease or stop medication that may be contributing to retention.   - Ensure the patient is on a bowel  regimen.  - Continue tamsulosin 0.4 mg daily, if no contraindication.  - Urology will sign off. Email has been sent to our office. Please do not hesitate to call or re consult with urological concerns.       Thank you for consulting [facility] regarding this patient's care. Please contact us with questions or concerns.     Mavis Mcbride PA-C,  [facility]  [phone]

## 2023-01-20 NOTE — PROGRESS NOTES
Care Management Follow Up    Length of Stay (days): 3    Expected Discharge Date: 01/20/2023     Concerns to be Addressed: discharge planning       Additional Information:  Cm been assisting with Discharge. Patient set up to go to [facility]. Plan for Saturday 1/21/23, wheel chair ride. Set up for 1pm. Family and patient aware. Barrier, patient has some blood tinged urine, and rising sodium. May not be medically ready for discharge today.       Adlea Caban RN    230pm: Patient not discharging this weekend. Cancelled ride. Needing a scope on Monday. Facility updated.

## 2023-01-20 NOTE — PROGRESS NOTES
Hospitalist follow up note:    Called regarding rising sodium. Stopped 1/2NS, started D5W.    Eusebio Rocha DO   Pager #: [phone]

## 2023-01-20 NOTE — PROGRESS NOTES
RENAL PROGRESS NOTE    REASON FOR CONSULT: MANJIT on CKD, urinary obstruction/UTI; hyperkalemia     PLAN:  Was started On D5W  50/hr early with rising Na (Zosyn may be contributing, and likely will discontinue Vanco and Zosyn today)  Consider adding single dose Thiazide tomorrow, if edema and hyperNat persist despite above changes   Maintain mai for now (urologic eval, if persistent RODRIGUEZ, oupt void trial?), some risk for recurrenceday  Irrigate mai q shift and prn d/t inc clotting    daily BMP  Add B12  Would benefit from renal f/u outpt with CKD3, presumed DM (scheduled in our office Friday Feb 10th at 11 a.m. with LEANA Evans)        ASSESSMENT:  Acute on Chronic Kidney dz (3): non-oliguric, sCr peaked 4.8--->1.9  after mai placed and improved BP.  Metabolic derangement on presentation has improved. Presumed primary etiology, obstruction with bilat hydronephrosis on initial imaging and brisk recovery post mai placement + HoTN with sepsis (also improved and off pressor support this a.m.)   Baseline sCr in the 1.3-1.7 range more recently  Nephrotic proteinuria by prior dips >300, lkely underlying DMN; would hold off on up/c with active UTI  (2g range in 2020)     RODRIGUEZ:  On tamsulosin PTA, continue. Involve urol for further recs, timing of outpt void trial, Primitivo as he was not symptomatic (pelvic/flank/abd pain) with his hydro and infection.     Volume:  Na rising (likely Zosyn and recent NS load), some edema dev, wt with slow climb, on maintenance D5W for now, may consider thiazide for Na wasting tomorrow if further wt gains and Na not improving after D5W     Lytes/Acid-Base:   metabolic acidosis improving, suspect some hyperchloremic acidosis with NS and Zosyn (has been DC'd), expect this should improve with ongoing renal  Improvement  Hyperkalemia: resolved, with MANJIT and metabolic acidosis  Pseudohypocalcemia: corrects normal for low albumin     UTI:  With Sepsis-like presentation, s/p IV antibx,  converting ot Augementin    Anemia:  B12 def, pos occult, CKD, infection, and some Urinary losses after resuming Eliquis, panel doesn't suggest iron def, likely malnutrition,down drift from 12--->9, on admission likely hemodilutional post volume resus, this is more likely his true degree of anemia.  starting B12, possible GI consult     DM:  On insulin, per hosp service, BS have been stable     H/o A-fib:  Current NSA, Rate controlled , he is not on BB or dysrythmia meds PTA    SUBJECTIVE:  Denies appetite and admittedly not drinking much, RN confirms, denies pain, seems more alert today per RN staff, not wanting to go to suggested TCU [facility], likely staying overnoc, several issues to address , no other complaints, enc po intake . E-communication with Dr Lanza    OBJECTIVE:  Physical Exam   Temp: 98.6  F (37  C) Temp src: Axillary BP: (!) 173/81 Pulse: 68   Resp: 18 SpO2: 100 % O2 Device: None (Room air) Oxygen Delivery: 1 LPM  Vitals:    23 0200 233   Weight: 72.3 kg (159 lb 6.4 oz) 73.9 kg (162 lb 14.4 oz) 73.5 kg (162 lb 1.6 oz)     Vital Signs with Ranges  Temp:  [97.6  F (36.4  C)-98.6  F (37  C)] 98.6  F (37  C)  Pulse:  [68-73] 68  Resp:  [18-20] 18  BP: (136-173)/(64-81) 173/81  SpO2:  [98 %-100 %] 100 %  I/O last 3 completed shifts:  In: 3412.5 [P.O.:370; I.V.:3042.5]  Out: 2475 [Urine:2475]    Temp (24hrs), Av.9  F (36.6  C), Min:97.5  F (36.4  C), Max:98.5  F (36.9  C)      Patient Vitals for the past 72 hrs:   Weight   23 0453 73.5 kg (162 lb 1.6 oz)   23 0237 73.9 kg (162 lb 14.4 oz)   23 0200 72.3 kg (159 lb 6.4 oz)   23 2147 64 kg (141 lb)       Intake/Output Summary (Last 24 hours) at 2023 1305  Last data filed at 2023 0902  Gross per 24 hour   Intake 3106.25 ml   Output 2825 ml   Net 281.25 ml       PHYSICAL EXAM:  General - Alert and oriented x3, appears comfortable, NAD, more alert, up in chair   Cardiovascular - Rate  controlled, BP escalated a bit  Respiratory - Clear to auscultation bilaterally, no crackles or wheezes, on RA  Abd: BS present, no guarding or pain with palpation, no ascites  Extremities - trace lower extremity edema bilaterally, sl UE edema, mostly hands  Skin: dry, intact, no rash, good turgor, gen pallor  Neuro:  Grossly intact, no focal deficits  MSK:  Grossly intact  Psych:  flat affect  Net + 2L    LABORATORY STUDIES:     Recent Labs   Lab 01/20/23  0552 01/19/23  1957 01/19/23  1431 01/19/23  1222 01/19/23  0517 01/18/23  0526 01/17/23  1749 01/17/23  1200   WBC  --  5.8 4.8  --  4.7 11.3* 4.1 10.9   RBC  --  3.25* 3.20*  --  3.07* 3.28* 2.98* 4.14*   HGB 9.1* 9.5* 9.2* 8.9* 8.9* 9.5* 8.6* 12.1*   HCT  --  29.2* 28.3*  --  26.9* 28.0* 25.5* 35.1*   PLT  --  187 205  --  192 274 258 396       Basic Metabolic Panel:  Recent Labs   Lab 01/20/23  0806 01/20/23  0552 01/19/23  2113 01/19/23  1606 01/19/23  1105 01/19/23  0845 01/19/23  0517 01/18/23  0603 01/18/23  0526 01/17/23  2324 01/17/23  1931 01/17/23  1641 01/17/23  1524 01/17/23  1334 01/17/23  1200   NA  --  150*  --   --   --   --  146*  --  141  --  139  --  139  --  138   POTASSIUM  --  3.9  --   --   --   --  4.0  --  4.5  --  4.8  --  5.5*  5.4*  --  6.3*   CHLORIDE  --  125*  --   --   --   --  121*  --  115*  --  115*  --  111*  --  108*   CO2  --  19*  --   --   --   --  18*  --  16*  --  12*  --  12*  --  13*   BUN  --  78*  --   --   --   --  111*  --  121*  --  151*  --  167*  --  155*   CR  --  1.92*  --   --   --   --  2.46*  --  3.45*  --  4.05*  --  4.62*  --  4.83*   GLC 83 95 175* 128* 112* 105* 111   < > 113   < > 79   < > 129*   < > 154*   MARTHA  --  8.1*  --   --   --   --  8.0*  --  8.4*  --  8.2*  --  9.1  --  10.2    < > = values in this interval not displayed.       INRNo lab results found in last 7 days.     Recent Labs   Lab Test 01/20/23  0552 01/19/23 1957 01/19/23  1431 01/27/22  0627 01/26/22 2047 11/28/20  0728  11/27/20  0713   INR  --   --   --   --  1.34*  --  1.23*   WBC  --  5.8 4.8   < >  --    < > 7.7   HGB 9.1* 9.5* 9.2*   < >  --    < > 11.9*   PLT  --  187 205   < >  --    < > 313    < > = values in this interval not displayed.       Personally reviewed current labs      MILDRED Amaya-BC  [facility]  [phone]

## 2023-01-20 NOTE — PLAN OF CARE
Pt received in bed, Aox3, on 2L oxymask overnight 2/2 pt is mouth breather while asleep. Denies pain. Still having loose, dark stool. Dietrich in place for hydronephrosis. +1 edema noted to B/l hands. Q2h turned. Pt resting in bed in NAD, bed alarm on.     Problem: Skin Injury Risk Increased  Goal: Skin Health and Integrity  Outcome: Progressing     Problem: Activity Intolerance  Goal: Enhanced Capacity and Energy  Outcome: Progressing     Problem: Fall Injury Risk  Goal: Absence of Fall and Fall-Related Injury  Outcome: Progressing

## 2023-01-21 NOTE — PLAN OF CARE
Problem: Plan of Care - These are the overarching goals to be used throughout the patient stay.    Goal: Optimal Comfort and Wellbeing  Outcome: Progressing     Problem: Activity Intolerance  Goal: Enhanced Capacity and Energy  Outcome: Progressing     Problem: Skin Injury Risk Increased  Goal: Skin Health and Integrity  Outcome: Progressing     Patient is alert and oriented x 4 but is forgetful. Bed alarm on for safety. Q 2 hour turns provided. Dietrich cares completed this morning. Urine was pink tinged this morning but is now huey in color. VSS on room air.

## 2023-01-21 NOTE — PLAN OF CARE
Major Shift Events:  Pt A/Ox4. VSS. On RA. Denies pain. Up w AR, WGB to chair. Pt had decreased appetite this evening. Will check BG-insulin when pt ready to eat. Dietrich (+) [id number] on hold. , D5 paused.   Plan: Plan for transition to TCU possibly tomorrow.  For vital signs and complete assessments, please see documentation flowsheets.   Goal Outcome Evaluation:

## 2023-01-21 NOTE — PROGRESS NOTES
"[facility]    Medicine Progress Note - Hospitalist Service    Date of Admission:  1/17/2023    Assessment & Plan      Caleb Hernandez is a 87 year old male admitted on 1/17/2023. He has a past medical history of paroxysmal atrial fibrillation on Eliquis, chronic kidney disease, previous recurrent UTIs, previous a sending cholangitis status postcholecystectomy, progressive cognitive issues, DM2, hypertension, coronary artery disease, lymphedema, who presented with x1 day of \"feeling unwell\" and family later reported he had nausea with emesis.  Admitted for sepsis, treating UTI and now with possibly bleed for persistent anemia. Eiquis held.     On admission, hyperkalemia 6.3, treated in ER and improved to 4.4, stable EKG (SR w/ PACs); found with significant MANJIT creatinine 4.83, , met sepsis criteria; lactate was 2; given hyperkalemia medications including calcium gluconate in the ER; resuscitated with 2 L of fluid; initiated on Zosyn.  On admission additionally with vancomycin for broad coverage, and CT chest/abdomen/pelvis returned demonstrating Circumferential and more focal bladder wall thickening. Bilateral hydronephrosis and hydroureter extending to the bladder, consistent with cystitis. Ordered for DKA protocol but not started as sugars were normalized after ER treatments including for hyperkalamia/insulin infusion; overnight with significant events including transfer to ICU for hypotension requiring norepinephrine. Resumed reduced-dose basal and corrective insulin.    On 1/18, the patient was transferred out of ICU as he was able to be weaned off of norepinephrine pressor.  His Eliquis was held on admission in the setting of a 3.5g hemoglobin drop; appears to be stable and thus his Eliquis was resumed. There were reported episodes of melena vs 'dark-green' stools; pt unaware of any BM changes. Consulted GI 1/20 and recommendations include endoscopy on Monday.    Nephrology " following and guiding fluids. Working on hypernatremia.     Transitioned to PO abx 1/20. Urology followed and felt hematuria stable/improving. The disposition is to TCU, likely next week after scoping.    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    - follow up in the outpatient setting to discuss ongoing bladder outlet obstruction options  -dispo is tcu    Anemia, normocytic  Assessment: On admission hemoglobin was 12.5 and later recheck to be 8.6; Eliquis was held given the 3.5 g drop; however, there were no signs or symptoms concerning for bleed; as such, patient was started on subcu heparin while in ICU, and on 1/18 transitioned back to oral Eliquis.  However, overnight into 1/19 there are reported episodes of dark green/melanotic stools.  Patient is unable to recall further details and does not appear to have insight into these changes; as such, will work-up with the anemia and monitor carefully for any signs concerning for bleed. Given reports of melena, asked GI to see; plan for endoscopy on Monday.  Plan:  -Recheck hemoglobin q12  -Noted nephrology vitamin B-12 and ferritin studies   -add on additional labs with hemolysis investigation: haptoglobin, LDH, ferritin, LFTs, reticulocyte count and blood smear  -occult blood stool positive  -Consult GI, greatly appreciate    - endoscopy       Essential  hypertension    see above regarding hypotension    Assessment: Pressures are soft.  Not currently on antihypertensives.    Plan:  -Monitor blood pressure; would give bolus if MAP is less than 65      Type 2 diabetes mellitus with diabetic peripheral angiopathy without gangrene, with long-term current use of insulin (H)   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 2 diabetes mellitus, limited to breakdown of skin (H)    Assessment: with bicarb of 15, pH 7.22, and beta-hydroxybutyrate 2.95, consistent with euglycemic DKA  as sugars were in the 300s. Did not require dka protocol as improved labs; likely metabolic/starvation. Normal regimen is Lantus 32u at bedtime.    Plan:   - DKA protocol off  - transitioned to subcutaneous insulin     - being conservative, reduced dose basal of 19      Paroxysmal atrial fibrillation (H) (note episode of hematuria)    Assessment: ekg demonstrated SR w/ PACs; confirmed on repeat ekg. On eliquis. Was held on admission in the setting of a 3.5g hemoglobin drop; appears to be stable and thus his Eliquis was resumed until 1/20 when GI recommended pausing and scoping on Monday.    Plan:   - resumed 1/18/2023   -As above, would opt to hold anticoagulation if there is confirmed melanotic stools; occult blood stool   - HOLDING eliquis now 1/20 after GI evaluation and recommendation to hold.      History of cholecystectomy    Assessment: noted in setting of ascending cholangitis; elevated AST, could be early shocked liver in developing sepsis    Plan:   - clinically follow And recheck as indicated       Coronary artery disease involving native coronary artery of native heart without angina pectoris    Assessment: on baby aspirin and no longer on blood pressure medications. ekg without ischemic findings    Plan:   - monitor clinically      Falls frequently     Assessment: noted     Plan:   - fall precautions and up with assist only       MANJIT, acute on chronic; CKD (chronic kidney  disease)  (H)    Hyperkalemia   hematuria episodes x2   Hypernatremia    Assessment: could be prerenal in setting of decreased po for a few days. CT demonstrated hydronephrosis and mai needed to be adjusted.  Nephrology following.    Plan:   - received 2L as above on admit  - follow up AM labs  - renal US and nephrology consulted on 1/18  -fluids per nephrology       Diet: Moderate Consistent Carb (60 g CHO per Meal) Diet  Diet    DVT Prophylaxis: Heparin SQ  Mai Catheter: PRESENT, indication: Retention  Lines: PRESENT      PICC 01/17/23 Triple Lumen Right Basilic-Site Assessment: WDL      Cardiac Monitoring: None  Code Status: Full Code      Clinically Significant Risk Factors          # Hypernatremia: Highest Na = 150 mmol/L in last 2 days, will monitor as appropriate      # Hypoalbuminemia: Lowest albumin = 2 g/dL at 1/19/2023  2:31 PM, will monitor as appropriate                    Disposition Plan     Expected Discharge Date: 01/23/2023,  8:56 AM  Discharge Delays: Placement - TCU  Destination: home with family;home with help/services;nursing home (TCU)  Discharge Comments: [facility] after EGD monday          Greg Lanza MD  Hospitalist Service  [facility]  Securely message with [employer] (more info)  Text page via [employer] Paging/Directory   ______________________________________________________________________    Interval History   -This morning patient has no new concerns or symptoms again  - we discussed his hemoglobin and plan for scoping on Monday  - he notes his memory is not very good and he will forget some details we tell him  - no other questions.     Physical Exam   Vital Signs: Temp: 98  F (36.7  C) Temp src: Oral BP: 108/59 Pulse: 71   Resp: 16 SpO2: 98 % O2 Device: None (Room air)    Weight: 167 lbs 1.74 oz    General: alert, oriented, and in no acute distress; appears frail and weak  Pulmonary: coarse breath sounds, normal respiratory effort, on room air, no rales or  wheezes or evidence of accessory muscle use  CVS: regular rate and rhythm, no murmurs, rubs, or gallops; no blatant jugular venous distention; no extremity edema and extremities are warm to the touch  GI: soft, nontender, BS+, no rebound or guarding, no conspicuous organomegaly   Neuro: nonfocal, moves all extremities of own volition; strength 5/5 in all extremities; notable memory issues   Psych: appropriate    Medical Decision Making       40 MINUTES SPENT BY ME on the date of service doing chart review, history, exam, documentation & further activities per the note.       Data     I have personally reviewed the following data over the past 24 hrs:    N/A  \   N/A   / 154     145 119 (H) 57 (H) /  136 (H)   4.3 19 (L) 1.62 (H) \       Imaging results reviewed over the past 24 hrs:   No results found for this or any previous visit (from the past 24 hour(s)).

## 2023-01-21 NOTE — PROGRESS NOTES
RENAL PROGRESS NOTE    REASON FOR CONSULT: MANJIT on CKD, urinary obstruction/UTI; hyperkalemia     PLAN:  NO renal changes today , Renal signing off daily rounds as he is back to baseline RF currently. reconsult if remains admitted and if worsening RF  Appreciate urol input and assistance with outpt void trial  Would benefit from renal f/u outpt with CKD3, presumed DM (scheduled in our office Friday Feb 10th at 11 a.m. with LEANA Evans)  Appreciate GI eval for worsening anemia.       ASSESSMENT:  Acute on Chronic Kidney dz (3): non-oliguric, sCr peaked 4.8--->1.2 with ongoing recovery  after mai placed and improved BP.   Presumed primary etiology, obstruction with bilat hydronephrosis on initial imaging and brisk recovery post mai placement + HoTN with sepsis (also improved and off pressor support this a.m.)   Baseline sCr in the 1.3-1.7 range more recently  Nephrotic proteinuria by prior dips >300, lkely underlying DMN; would hold off on up/c with active UTI  (2g range in 2020)     RODRIGUEZ:  On tamsulosin PTA, urology has seen and will follow outpt , he was not symptomatic (pelvic/flank/abd pain) with his hydro and infection.     Volume:  Na stablized after stopping Na, no longer with  Edema.  Looks euvolemic      Lytes/Acid-Base:   metabolic acidosis improved, suspect some hyperchloremic acidosis with NS and Zosyn (has been DC'd), expect this should improve with ongoing renal  Improvement  Hyperkalemia: resolved, with MANJIT and metabolic acidosis  Pseudohypocalcemia: corrects normal for low albumin     UTI:  With Sepsis-like presentation, s/p IV antibx, converting ot Augementin 1/21    Anemia: down drift from 12--->8's    B12 def/now on orals, pos occult GI losses, CKD, infection, and some Urinary losses after resuming Eliquis (now resolved),    on admission likely hemodilutional post volume resus, this is more likely his true degree of anemia.  starting B12,GI consulted, on IV PPI, and will scope if ongoing  decline     DM:  On insulin, per hosp service, BS have been stable     H/o A-fib:  Current NSA, Rate controlled , he is not on BB or dysrythmia meds PTA    SUBJECTIVE:  Feeling much better today denies pain, family (son and GF) present,  no other complaints, enc po intake , in light of stable RF now at baseline and plans for GI f/u and urol outpt,we will sign off.  HE hasf/u renal eval outpt in Feb, see above     OBJECTIVE:  Physical Exam   Temp: 98  F (36.7  C) Temp src: Oral BP: 108/59 Pulse: 71   Resp: 16 SpO2: 98 % O2 Device: None (Room air)    Vitals:    23 0237 23 0453 23 0500   Weight: 73.9 kg (162 lb 14.4 oz) 73.5 kg (162 lb 1.6 oz) 75.8 kg (167 lb 1.7 oz)     Vital Signs with Ranges  Temp:  [97.8  F (36.6  C)-98.7  F (37.1  C)] 98  F (36.7  C)  Pulse:  [69-75] 71  Resp:  [16-18] 16  BP: (108-133)/(59-63) 108/59  SpO2:  [98 %-100 %] 98 %  I/O last 3 completed shifts:  In: 768.34 [P.O.:480; I.V.:288.34]  Out: 1850 [Urine:1850]    Temp (24hrs), Av.9  F (36.6  C), Min:97.5  F (36.4  C), Max:98.5  F (36.9  C)      Patient Vitals for the past 72 hrs:   Weight   23 0500 75.8 kg (167 lb 1.7 oz)   23 0453 73.5 kg (162 lb 1.6 oz)   23 0237 73.9 kg (162 lb 14.4 oz)       Intake/Output Summary (Last 24 hours) at 2023 1305  Last data filed at 2023 0902  Gross per 24 hour   Intake 3106.25 ml   Output 2825 ml   Net 281.25 ml       PHYSICAL EXAM:  General - Alert and oriented x3, appears comfortable, NAD, more alert, sitting up in bed, fmaiy x 2 present  Cardiovascular - Rate controlled, BP well controlled today  Respiratory -on RA, sats good   Abd: BS present, no guarding or pain with palpation, no ascites  Extremities - no lower  Or upper extremity edema bilaterally  Skin: dry, intact, no rash, good turgor, gen pallor  Neuro:  Grossly intact, no focal deficits  MSK:  Grossly intact  Psych:  pleasant affect  :urine in foleynow clear    LABORATORY STUDIES:     Recent Labs    Lab 01/21/23  0653 01/20/23  0552 01/19/23  1957 01/19/23  1431 01/19/23  1222 01/19/23  0517 01/18/23  0526 01/17/23  1749 01/17/23  1200   WBC  --   --  5.8 4.8  --  4.7 11.3* 4.1 10.9   RBC  --   --  3.25* 3.20*  --  3.07* 3.28* 2.98* 4.14*   HGB 8.8* 9.1* 9.5* 9.2* 8.9* 8.9* 9.5* 8.6* 12.1*   HCT  --   --  29.2* 28.3*  --  26.9* 28.0* 25.5* 35.1*     --  187 205  --  192 274 258 396       Basic Metabolic Panel:  Recent Labs   Lab 01/21/23  0733 01/21/23  0653 01/20/23  2102 01/20/23  1721 01/20/23  1239 01/20/23  1146 01/20/23  0806 01/20/23  0552 01/19/23  0845 01/19/23  0517 01/18/23  0603 01/18/23  0526 01/17/23  2324 01/17/23  1931 01/17/23  1641 01/17/23  1524   NA  --  145  --   --  145  --   --  150*  --  146*  --  141  --  139  --  139   POTASSIUM  --  4.3  --   --   --   --   --  3.9  --  4.0  --  4.5  --  4.8  --  5.5*  5.4*   CHLORIDE  --  119*  --   --   --   --   --  125*  --  121*  --  115*  --  115*  --  111*   CO2  --  19*  --   --   --   --   --  19*  --  18*  --  16*  --  12*  --  12*   BUN  --  57*  --   --   --   --   --  78*  --  111*  --  121*  --  151*  --  167*   CR  --  1.62*  --   --   --   --   --  1.92*  --  2.46*  --  3.45*  --  4.05*  --  4.62*   * 145* 173* 232*  --  144* 83 95   < > 111   < > 113   < > 79   < > 129*   MRATHA  --  8.0*  --   --   --   --   --  8.1*  --  8.0*  --  8.4*  --  8.2*  --  9.1    < > = values in this interval not displayed.       INRNo lab results found in last 7 days.     Recent Labs   Lab Test 01/21/23  0653 01/20/23  0552 01/19/23  1957 01/19/23  1431 01/27/22  0627 01/26/22  2047 11/28/20  0728 11/27/20  0713   INR  --   --   --   --   --  1.34*  --  1.23*   WBC  --   --  5.8 4.8   < >  --    < > 7.7   HGB 8.8* 9.1* 9.5* 9.2*   < >  --    < > 11.9*     --  187 205   < >  --    < > 313    < > = values in this interval not displayed.       Personally reviewed current labs      MILDRED Amaya-BC  [facility]  [phone]

## 2023-01-21 NOTE — PLAN OF CARE
Goal Outcome Evaluation:    Problem: Plan of Care - These are the overarching goals to be used throughout the patient stay.    Goal: Optimal Comfort and Wellbeing  Intervention: Monitor Pain and Promote Comfort  Recent Flowsheet Documentation  Taken 1/21/2023 0043 by Lauryn Contreras RN  Pain Management Interventions:   pillow support provided   repositioned   rest   quiet environment facilitated   Patient transferred to bed 3102 from ICU at about 0030. Patient is alert and oriented X 4. Denied pain. Has a Dietrich catheter in place draining. Turned and repositioned. PICC and PIV saline locked. VSS. Bed alarm activated for safety.

## 2023-01-21 NOTE — PROGRESS NOTES
[facility] Progress Note    Subjective: Patient denies an GI complaints. Reports last bowel movement was brown.    Objective:  /59 (BP Location: Right arm)   Pulse 71   Temp 98  F (36.7  C) (Oral)   Resp 16   Wt 75.8 kg (167 lb 1.7 oz)   SpO2 98%   BMI 25.41 kg/m     Gen: Awake, no acute distress  Gastrointestinal: soft, non-tender, non-distended      Patient Active Problem List   Diagnosis     Hyperlipidemia     Essential hypertension     Statin intolerance     Personal history of pulmonary embolism     PVD (peripheral vascular disease) (H)     Overweight (BMI 25.0-29.9)     Chronic anticoagulation     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 (H)     Type 2 diabetes mellitus with diabetic peripheral angiopathy without gangrene, with long-term current use of insulin (H)     Adult failure to thrive     Normocytic anemia     Paroxysmal atrial fibrillation (H)     ACP (advance care planning)     Hematuria     Amputated toe, right (H)     Equinovarus acquired deformity, right     Drug-induced constipation     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 2 diabetes mellitus, limited to breakdown of skin (H)     Cellulitis and abscess of foot excluding toe     Jaundice     Cholelithiasis     Status post endoscopic retrograde cholangiopancreatography     History of cholecystectomy     Coronary artery disease involving native coronary artery of native heart without angina pectoris     Hyperglycemia due to diabetes mellitus (H)     Lymphedema     Age-related cognitive decline     Dehydration     Hyperglycemia     Frailty syndrome in geriatric patient     Falls frequently     CKD (chronic kidney disease) stage 3, GFR 30-59 ml/min (H)     Sepsis (H)     UTI (urinary tract infection)     Hyperkalemia     Acute kidney injury (H)     DKA (diabetic ketoacidosis) (H)       Labs:  Recent Labs   Lab Test 01/21/23  0653 01/20/23  0552 01/19/23 1957    WBC  --   --  5.8   RBC  --   --  3.25*   HGB 8.8*   < > 9.5*   HCT  --   --  29.2*   MCV  --   --  90   MCH  --   --  29.2   MCHC  --   --  32.5   RDW  --   --  14.1     --  187    < > = values in this interval not displayed.      Sodium   Date Value Ref Range Status   01/21/2023 145 136 - 145 mmol/L Final     Potassium   Date Value Ref Range Status   01/21/2023 4.3 3.5 - 5.0 mmol/L Final     Chloride   Date Value Ref Range Status   01/21/2023 119 (H) 98 - 107 mmol/L Final     Carbon Dioxide (CO2)   Date Value Ref Range Status   01/21/2023 19 (L) 22 - 31 mmol/L Final     Anion Gap   Date Value Ref Range Status   01/21/2023 7 5 - 18 mmol/L Final     Glucose   Date Value Ref Range Status   01/21/2023 145 (H) 70 - 125 mg/dL Final     GLUCOSE BY METER POCT   Date Value Ref Range Status   01/21/2023 136 (H) 70 - 99 mg/dL Final     Urea Nitrogen   Date Value Ref Range Status   01/21/2023 57 (H) 8 - 28 mg/dL Final     Creatinine   Date Value Ref Range Status   01/21/2023 1.62 (H) 0.70 - 1.30 mg/dL Final     GFR Estimate   Date Value Ref Range Status   01/21/2023 41 (L) >60 mL/min/1.73m2 Final     Comment:     Effective December 21, 2021 eGFRcr in adults is calculated using the 2021 CKD-EPI creatinine equation which includes age and gender (Herbie et al., NEJM, DOI: 10.1056/SNDMav3085025)   05/25/2021 >60 >60 mL/min/1.73m2 Final     Calcium   Date Value Ref Range Status   01/21/2023 8.0 (L) 8.5 - 10.5 mg/dL Final      Recent Labs   Lab Test 01/19/23  1431   PROTTOTAL 4.7*   ALBUMIN 2.0*   BILITOTAL 0.4   ALKPHOS 59   AST 32   ALT 31      INR   Date Value Ref Range Status   01/26/2022 1.34 (H) 0.85 - 1.15 Final        Assessment: Caleb Hernandez is an 87 year old male who was admitted to the ICU with acute kidney injury and septic shock requiring pressors. He has overall improved, but had a hemoglobin drop during this admission with prior report of dark stool. Hemoglobin now relatively stable. Patient reports current  brown stool.    Plan:   -Continue to monitor hemoglobin and transfuse PRBCs as needed  -Protonix 40 mg twice daily  -Consider EGD Monday  -NPO at midnight tomorrow night for possible EGD Monday morning    Cleve Leslie MD  1/21/2023   [facility]

## 2023-01-22 NOTE — PROGRESS NOTES
GI Progress Note  Caleb Hernandez  [id number]     Subjective:   Sitting up in chair, napping but easily awakens to voice.  Patient passed brown stool today.  Had brown stool yesterday as well.  Hemoglobin stable.  Tolerated oatmeal for breakfast.  Had some hematuria noted by RN.  Pt has catheter in.     Objective:   /59 (BP Location: Left arm)   Pulse 76   Temp 98.2  F (36.8  C) (Oral)   Resp 16   Wt 76 kg (167 lb 8.8 oz)   SpO2 96%   BMI 25.48 kg/m    Body mass index is 25.48 kg/m .   Gen: No acute distress  Cardio: RRR  GI: Non-distended, BS positive, soft, non-tender. No guarding.    Laboratory  Recent Labs   Lab 01/21/23  0653 01/20/23  0552 01/19/23  1957 01/19/23  1431 01/19/23  1222 01/19/23  0517   WBC  --   --  5.8 4.8  --  4.7   RBC  --   --  3.25* 3.20*  --  3.07*   HGB 8.8* 9.1* 9.5* 9.2*   < > 8.9*   HCT  --   --  29.2* 28.3*  --  26.9*   MCV  --   --  90 88  --  88   MCH  --   --  29.2 28.8  --  29.0   MCHC  --   --  32.5 32.5  --  33.1   RDW  --   --  14.1 13.9  --  14.0     --  187 205  --  192    < > = values in this interval not displayed.      Recent Labs   Lab 01/22/23  0616 01/21/23  0653 01/20/23  1239 01/20/23  0552    145 145 150*   CO2 22 19*  --  19*   BUN 47* 57*  --  78*     Recent Labs   Lab 01/19/23  1431 01/17/23  1524 01/17/23  1200   ALKPHOS 59 64 79   AST 32 37 42*   ALT 31 24 29     Lab Results   Component Value Date    INR 1.34 (H) 01/26/2022    INR 1.23 (H) 11/27/2020    INR 1.26 (H) 11/23/2020       No results found.      Assessment:   Mr Hernandez is an 87 year old male who was admitted to the ICU with acute kidney injury and septic shock requiring pressors. He has overall improved and Nephrology has signed off. Pt had hemoglobin drop during this admission from 12 to 9 with prior report of dark stool, though unclear if this was melena since we were not following him back then.  Stools has been brown thepast two days (confirmed with RN).  Hemoglobin looks to  be stabilized (though not checked yet today). Eliquis on hold (taking for paroxysmal a fib).     Patient Active Problem List   Diagnosis     Hyperlipidemia     Essential hypertension     Statin intolerance     Personal history of pulmonary embolism     PVD (peripheral vascular disease) (H)     Overweight (BMI 25.0-29.9)     Chronic anticoagulation     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 (H)     Type 2 diabetes mellitus with diabetic peripheral angiopathy without gangrene, with long-term current use of insulin (H)     Adult failure to thrive     Normocytic anemia     Paroxysmal atrial fibrillation (H)     ACP (advance care planning)     Hematuria     Amputated toe, right (H)     Equinovarus acquired deformity, right     Drug-induced constipation     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 2 diabetes mellitus, limited to breakdown of skin (H)     Cellulitis and abscess of foot excluding toe     Jaundice     Cholelithiasis     Status post endoscopic retrograde cholangiopancreatography     History of cholecystectomy     Coronary artery disease involving native coronary artery of native heart without angina pectoris     Hyperglycemia due to diabetes mellitus (H)     Lymphedema     Age-related cognitive decline     Dehydration     Hyperglycemia     Frailty syndrome in geriatric patient     Falls frequently     CKD (chronic kidney disease) stage 3, GFR 30-59 ml/min (H)     Sepsis (H)     UTI (urinary tract infection)     Hyperkalemia     Acute kidney injury (H)     DKA (diabetic ketoacidosis) (H)      Plan:   1) Continue to monitor hemoglobin and stools. Transfuse PRBCs as needed  2) Continue Protonix 40 mg twice daily  3) GI will follow-up tomorrow.  If hgb drops or pt has black stool, would ten plan on EGD.  However, if pt passes brown stools and hgb stable, would then discuss further with pt regarding his thoughts on EGD and how much he would/would  not like to pursue this.   -NPO at midnight tomorrow night for possible EGD.  4) Eliquis on hold.     Thank you.  Moses Devine PA-C  [facility]  [phone]

## 2023-01-22 NOTE — PROGRESS NOTES
"[facility]    Medicine Progress Note - Hospitalist Service    Date of Admission:  1/17/2023    Assessment & Plan       Caleb Hernandez is a 87 year old male admitted on 1/17/2023. He has a past medical history of paroxysmal atrial fibrillation on Eliquis, chronic kidney disease, previous recurrent UTIs, previous a sending cholangitis status postcholecystectomy, progressive cognitive issues, DM2, hypertension, coronary artery disease, lymphedema, who presented with x1 day of \"feeling unwell\" and family later reported he had nausea with emesis.  Admitted for sepsis, treating UTI and now with possibly bleed for persistent anemia. Eiquis was held.     On admission, hyperkalemia 6.3, treated in ER and improved to 4.4, stable EKG (SR w/ PACs); found with significant MANJIT creatinine 4.83, , met sepsis criteria; lactate was 2; given hyperkalemia medications including calcium gluconate in the ER; resuscitated with 2 L of fluid; initiated on Zosyn.  On admission additionally with vancomycin for broad coverage, and CT chest/abdomen/pelvis returned demonstrating Circumferential and more focal bladder wall thickening. Bilateral hydronephrosis and hydroureter extending to the bladder, consistent with cystitis. Ordered for DKA protocol but not started as sugars were normalized after ER treatments including for hyperkalamia/insulin infusion; overnight with significant events including transfer to ICU for hypotension requiring norepinephrine. Resumed reduced-dose basal and corrective insulin.    On 1/18, the patient was transferred out of ICU as he was able to be weaned off of norepinephrine pressor.  His Eliquis was held on admission in the setting of a 3.5g hemoglobin drop; thereafter appeared to be stable and thus his Eliquis was resumed. There were then subsequent reported episodes of melena vs 'dark-green' stools; pt was unaware of any BM changes though occult stool testing was positive. Consulted " GI 1/20 and recommendations include endoscopy on Monday. On BID protonix.    Nephrology following and guiding fluids. Working on hypernatremia.     Transitioned to PO abx 1/20. Urology followed and felt hematuria stable/improving. The disposition is to TCU, likely next week after scoping. Depending on findings either resume or pause home eliquis.    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    - follow up in the outpatient setting to discuss ongoing bladder outlet obstruction options  -dispo is tcu    Anemia, normocytic  Assessment: On admission hemoglobin was 12.5 and later recheck to be 8.6; Eliquis was held given the 3.5 g drop; however, there were no signs or symptoms concerning for bleed; as such, patient was started on subcu heparin while in ICU, and on 1/18 transitioned back to oral Eliquis.  However, overnight into 1/19 there are reported episodes of dark green/melanotic stools.  Patient is unable to recall further details and does not appear to have insight into these changes; as such, will work-up with the anemia and monitor carefully for any signs concerning for bleed. Given reports of melena, asked GI to see; plan for endoscopy on Monday.  Plan:  -Recheck hemoglobin q12   - labs not drawn for 1/22 yet at time of signing note  -Noted nephrology vitamin B-12 and ferritin studies   -added on  additional labs with hemolysis investigation: haptoglobin, LDH, ferritin, LFTs, reticulocyte count and blood smear,  - not convincing for hemolysis   - smear is still pending  -occult blood stool positive  -Consult GI, greatly appreciate    - endoscopy 1/23 Monday      Essential hypertension    see above regarding hypotension    Assessment: Pressures are soft.  Not currently on antihypertensives.    Plan:  -Monitor blood pressure; would give bolus if MAP is less than 65      Type 2 diabetes mellitus with diabetic peripheral angiopathy without gangrene, with long-term current use of insulin (H)   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 2 diabetes mellitus, limited to breakdown of skin (H)    Assessment: with bicarb of 15, pH 7.22, and beta-hydroxybutyrate 2.95, consistent with euglycemic DKA  as sugars were in the 300s. Did not require dka protocol as improved labs; likely metabolic/starvation. Normal regimen is Lantus 32u at bedtime.    Plan:   - DKA protocol off  - transitioned to subcutaneous insulin     - being conservative, reduced dose basal of 19      Paroxysmal atrial fibrillation (H) (note episode of hematuria)    Assessment: ekg demonstrated SR w/ PACs; confirmed on repeat ekg. On eliquis. Was held on admission in the setting of a 3.5g hemoglobin drop; appears to be stable and thus his Eliquis was resumed until 1/20 when GI recommended pausing and scoping on Monday.    Plan:   - resumed 1/18/2023   -As above, would opt to hold anticoagulation if there is confirmed melanotic stools; occult blood stool positive   - HOLDING eliquis now 1/20 after GI evaluation and recommendation to hold.      History of cholecystectomy    Assessment: noted in setting of ascending cholangitis; elevated AST, could be early shocked liver in developing sepsis    Plan:   - clinically follow And recheck as indicated       Coronary artery disease involving native coronary artery of native heart without angina pectoris     Assessment: on baby aspirin and no longer on blood pressure medications. ekg without ischemic findings    Plan:   - monitor clinically      Falls frequently     Assessment: noted     Plan:   - fall precautions and up with assist only       MANJIT, acute on chronic; CKD (chronic kidney disease)  (H)    Hyperkalemia   hematuria episodes x2   Hypernatremia    Assessment: could be prerenal in setting of decreased po for a few days. CT demonstrated hydronephrosis and mai needed to be adjusted.  Nephrology following.    Plan:   - received 2L as above on admit  - follow up AM labs  - renal US and nephrology consulted on 1/18  -fluids per nephrology, appreciated  - they have now signed off on 1/21       Diet: Moderate Consistent Carb (60 g CHO per Meal) Diet  Diet  NPO per Anesthesia Guidelines for Procedure/Surgery Except for: Meds, Ice Chips    DVT Prophylaxis: Heparin SQ  Mai Catheter: PRESENT, indication: Retention  Lines: PRESENT      PICC 01/17/23 Triple Lumen Right Basilic-Site Assessment: WDL      Cardiac Monitoring: None  Code Status: Full Code      Clinically Significant Risk Factors               # Hypoalbuminemia: Lowest albumin = 2 g/dL at 1/19/2023  2:31 PM, will monitor as appropriate                    Disposition Plan     Expected Discharge Date: 01/23/2023,  8:56 AM  Discharge Delays: Placement - TCU  Destination: home with family;home with help/services;nursing home (TCU)  Discharge Comments: [facility] after EGD monday          Greg Lanza MD  Hospitalist Service  [facility]  Securely message with extraTKT (more info)  Text page via [employer] Paging/Directory   ______________________________________________________________________    Interval History   -This morning patient has no new concerns or symptoms again   - we discussed his hemoglobin again and plan for scoping tomorrow-- his lab this morning not yet draw  - no other questions.     Physical Exam   Vital Signs: Temp: 98.2  F  (36.8  C) Temp src: Oral BP: 126/59 Pulse: 76   Resp: 16 SpO2: 96 % O2 Device: None (Room air)    Weight: 167 lbs 8.79 oz    General: alert, oriented, and in no acute distress; appears frail and weak  Pulmonary: coarse breath sounds, normal respiratory effort, on room air, no rales or wheezes or evidence of accessory muscle use  CVS: regular rate and rhythm, no murmurs, rubs, or gallops; no blatant jugular venous distention; no extremity edema and extremities are warm to the touch  GI: soft, nontender, BS+, no rebound or guarding, no conspicuous organomegaly   Neuro: nonfocal, moves all extremities of own volition; strength 5/5 in all extremities; notable memory issues   Psych: appropriate    Medical Decision Making       38 MINUTES SPENT BY ME on the date of service doing chart review, history, exam, documentation & further activities per the note.       Data     I have personally reviewed the following data over the past 24 hrs:    N/A  \   N/A   / N/A     140 115 (H) 47 (H) /  175 (H)   4.9 22 1.38 (H) \       Imaging results reviewed over the past 24 hrs:   No results found for this or any previous visit (from the past 24 hour(s)).

## 2023-01-22 NOTE — PLAN OF CARE
Problem: Plan of Care - These are the overarching goals to be used throughout the patient stay.    Goal: Optimal Comfort and Wellbeing  Outcome: Progressing   Goal Outcome Evaluation:                      /58 (BP Location: Left arm)   Pulse 75   Temp 97.4  F (36.3  C) (Oral)   Resp 16   Wt 75.8 kg (167 lb 1.7 oz)   SpO2 99%   BMI 25.41 kg/m      Pt AOx4. Awaiting Endoscopy on Monday.    Royce Chiang RN

## 2023-01-22 NOTE — PLAN OF CARE
Problem: Plan of Care - These are the overarching goals to be used throughout the patient stay.    Goal: Absence of Hospital-Acquired Illness or Injury  Intervention: Identify and Manage Fall Risk  Recent Flowsheet Documentation  Taken 1/21/2023 1800 by Berna Wang RN  Safety Promotion/Fall Prevention:   activity supervised   bed alarm on   assistive device/personal items within reach   clutter free environment maintained   increased rounding and observation   fall prevention program maintained   lighting adjusted   mobility aid in reach   nonskid shoes/slippers when out of bed   patient and family education   room door open   room near nurse's station   Goal Outcome Evaluation:      Plan of Care Reviewed With: patient    Overall Patient Progress: improvingOverall Patient Progress: improving pt alert and oriented.  Vss on room air. Able to voice needs. Has got a mai in place which is draining well. On 2 hourly repostioning. Has not complained of pain.

## 2023-01-23 NOTE — PLAN OF CARE
Problem: Plan of Care - These are the overarching goals to be used throughout the patient stay.    Goal: Optimal Comfort and Wellbeing  Outcome: Progressing     Problem: Risk for Delirium  Goal: Optimal Coping  Outcome: Progressing   Goal Outcome Evaluation:  Patient is alert and oriented x4 but forgetful at times, slow to respond, and ; CMS intact; able to sleep between cares; tolerates medications.  Diet: tolerating diet; adaquate fluid intake   Activity:1-2 assist with walker.   Vital signs:vital signs within normal limits on room air.   Triple lumen PICC Line: saline locked; flash blood return on all 3 lines.   IV: IV saline locked.   Pain: Patient denies.  GI: incontinent  : Patient has mai due to retention and obstruction.  Labs: Unit draw; 0500: Creatinine 1.23, Hemoglobin 8.5; 1/22/23 at 1812 9.3  Blood glucose: 2027: 244    Discharge plan: TCU TBD after Endoscopy.     Patient 1/23/23 Endoscopy scheduled but no specific time and pt is NPO at 0000. CHG bath will need to be done on days closer to when the Endoscopy occurs.

## 2023-01-23 NOTE — PLAN OF CARE
Physical Therapy Discharge Summary    Reason for therapy discharge:    Discharged to transitional care facility.    Progress towards therapy goal(s). See goals on Care Plan in [facility] electronic health record for goal details.  Goals not met.  Barriers to achieving goals:   discharge from facility.    Therapy recommendation(s):    Continued therapy is recommended.  Rationale/Recommendations:  Continue PT at TCU as pt is progressing towards goals..

## 2023-01-23 NOTE — PROGRESS NOTES
Writer assumed cares from [id number]. Patient alert and oriented; forgetful at times. VSS.  Up with I-2 assist and walker. Ambulated to bathroom. Incontinent to bladder and bowel. Had 1 brown BM this shift; no blood streaks noted. Denies pain. Up in chair for dinner. Hgb drawn at 1800 came bag 9.3. Will continue with plan of care.

## 2023-01-23 NOTE — PLAN OF CARE
Occupational Therapy Discharge Summary    Reason for therapy discharge:    Discharged to transitional care facility.    Progress towards therapy goal(s). See goals on Care Plan in [facility] electronic health record for goal details.  Goals partially met.  Barriers to achieving goals:   discharge from facility.    Therapy recommendation(s):    Continued therapy is recommended.  Rationale/Recommendations:  To increase IND with mobility and ADLs.

## 2023-01-23 NOTE — PROGRESS NOTES
Care Management Discharge Note    Discharge Date: 01/23/2023       Discharge Disposition: Transitional Care    Discharge Services: None    Discharge DME: Oxygen    Discharge Transportation: health plan transportation    Private pay costs discussed: transportation costs    PAS Confirmation Code:  [id number])  Patient/family educated on Medicare website which has current facility and service quality ratings: yes    Education Provided on the Discharge Plan:    Persons Notified of Discharge Plans: Yes  Patient/Family in Agreement with the Plan: yes    Handoff Referral Completed: Yes    Additional Information:  Pt is discharging today to [facility]. [facility] transportation with w/c set up for 4:30pm 1/23. CMSW and SW intern spoke with Pt and is in agreement with plan. CMSW spoke with son of Pt and is also in agreement with plan. PAS completed. Pt orders faxed to facility.         María Gallagher

## 2023-01-23 NOTE — PROGRESS NOTES
[facility] Progress Note       SUBJECTIVE:  Patient denies any GERD, abdominal pain or nausea/vomiting. He had brown stool yesterday evening.       OBJECTIVE:  /57 (BP Location: Left arm)   Pulse 72   Temp 98.6  F (37  C) (Oral)   Resp 18   Wt 76 kg (167 lb 8.8 oz)   SpO2 97%   BMI 25.48 kg/m    Temp (24hrs), Av.3  F (36.8  C), Min:97.7  F (36.5  C), Max:98.6  F (37  C)    Patient Vitals for the past 72 hrs:   Weight   23 0500 76 kg (167 lb 8.8 oz)   23 0500 75.8 kg (167 lb 1.7 oz)       Intake/Output Summary (Last 24 hours) at 2023 0943  Last data filed at 2023 2129  Gross per 24 hour   Intake 243 ml   Output 1300 ml   Net -1057 ml        PHYSICAL EXAM  GEN: NAD, elderly male sitting up in bed  HRT: no LE edema  RESP: unlabored  ABD: soft, nontender  SKIN: No rash or jaundice      Additional Data:  I have reviewed the patient's new clinical lab results:     Recent Labs   Lab Test 23  0500 23  1812 23  0653 23  0552 23  1957 23  1431 23  1222 23  0517 22  0627 22  2047 20  0728 20  0713 20  0647 20  1407   WBC  --   --   --   --  5.8 4.8  --  4.7   < >  --    < > 7.7   < > 7.7   HGB 8.5* 9.3* 8.8*   < > 9.5* 9.2*   < > 8.9*   < >  --    < > 11.9*   < > 12.5*   MCV  --   --   --   --  90 88  --  88   < >  --    < > 92   < > 93   PLT  --   --  154  --  187 205  --  192   < >  --    < > 313   < > 355   INR  --   --   --   --   --   --   --   --   --  1.34*  --  1.23*  --  1.26*    < > = values in this interval not displayed.     Recent Labs   Lab Test 23  0616 23  0653 23  0552   POTASSIUM 4.9 4.3 3.9   CHLORIDE 115* 119* 125*   CO2 22 19* 19*   BUN 47* 57* 78*   ANIONGAP 3* 7 6     Recent Labs   Lab Test 23  1431 23  1524 23  1417 23  1200 22  1611 22  1429 22  0538 22  0850 22  0627 22  2047   ALBUMIN 2.0*  2.5*  --  3.0*  --   --    < > 1.5*   < > 2.2*   BILITOTAL 0.4 0.4  --  0.4  --   --    < > 6.6*   < > 17.2*   ALT 31 24  --  29  --   --    < > 80*   < > 126*   AST 32 37  --  42*  --   --    < > 52*   < > 98*   PROTEIN  --   --  200*  --  100* >=300*   < >  --    < >  --    LIPASE  --   --   --   --   --   --   --  57*  --  <9    < > = values in this interval not displayed.         Imaging results:  CT chest/abdomen/pelvis w/o 1/17/23:  IMPRESSION:  1.  Circumferential and more focal bladder wall thickening. Bilateral hydronephrosis and hydroureter extending to the bladder. Findings are concerning for cystitis. Other bladder pathology cannot be excluded on this study. The bladder is not decompressed despite presence of a Dietrich catheter; correlate for catheter dysfunction.  2.  Cannot assess the portal or systemic venous system on this study.   3.  Advanced atherosclerotic disease including coronary artery disease.          IMPRESSION:  MANJIT  Septic shock  Anemia  86 y/o male with PMH paroxysmal a fib on Eliquis (being held), CKD, type 2 diabetes, HTN, CAD, lymphedema, cognitive issues admitted 1/17 for MANJIT and sepsis/UTI and GI consulted 1/20 for melena and FOBT positive. Hemoglobin dropped from 12/1 1/17 to 8-9 range in subsequent days, Eliquis held. Hemoglobin has since been stable in 8-9 range and he has had brown stool x 2 days. Inpatient EGD will be deferred given no overt/active GI bleed suspected. I discussed this with patient and he is relieved. I offered him an outpatient EGD for work up of what may have caused bleeding but he is not sure if he wants to proceed. He is agreeable to an office visit to discuss and pending symptoms/hemoglobin this can be ordered.     PLAN:  - Restart diet  - Okay to restart Eliquis  - Discharge on pantoprazole 40 mg once daily in the morning until he is seen by [facility]  - Hemoglobin check at PCP in 1 week  - Outpatient follow up at [facility] to determine if he needs/wants EGD  - Okay  to discharge today to TCU per GI    We will no longer actively follow this patient in-house. Please call if questions arise or patient's status changes.       (Dr. Elmore)  Roxanna Riggins PA-C  [facility]  1/23/2023 9:43 AM  [phone] (office)  ________________________________________________________________________

## 2023-01-23 NOTE — PROGRESS NOTES
"SPIRITUAL HEALTH SERVICES ([facility]S)  SPIRITUAL ASSESSMENT Progress Note  [facility]. [address]    REFERRAL SOURCE: LOS     Mr Hernandez was enjoying his scrambled eggs, his applejuice and his water at the time fo the visit. \"I'm fine\" he said and declined the visit.      PLAN: Spiritual Care remains available for emotional and spiritual support during this hospitalization should the patient change his mind .     Eneida Plasencia, Ph.D., [facility]      [facility] available 24/7 for emergency requests/referrals, either by having the on-call  paged or by entering an ASAP/STAT consult in Epic (this will also page the on-call ).  "

## 2023-01-23 NOTE — DISCHARGE SUMMARY
[facility] MEDICINE  DISCHARGE SUMMARY     Primary Care Physician: Otis Lozano  Admission Date: 1/17/2023   Discharge Provider: Merry Farrell MD Discharge Date: 1/23/2023   Diet: Orders Placed This Encounter      Diet      Moderate Consistent Carb (60 g CHO per Meal) Diet      Diet      Code Status: Full Code   Activity: activity as tolerated   [facility]      Condition at Discharge: Stable      REASON FOR PRESENTATION(See Admission Note for Details)     Not feeling well    PRINCIPAL & ACTIVE DISCHARGE DIAGNOSES     Principal Problem:  Septic shock  Acute pyelonephritis  Urinary retention status post Dietrich catheter placement  Hematuria resolved  ARF on CKD 3  Hyperkalemia  Vitamin B12 deficiency  Hypernatremia resolved  Active Problems:    Essential hypertension    Type 2 diabetes mellitus with diabetic peripheral angiopathy without gangrene, with long-term current use of insulin (H)    Adult failure to thrive    Paroxysmal atrial fibrillation (H)    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 2 diabetes mellitus, limited to breakdown of skin (H)    History of cholecystectomy    Coronary artery disease involving native coronary artery of native heart without angina pectoris    Age-related cognitive decline    Frailty syndrome in geriatric patient    Falls frequently    SIGNIFICANT FINDINGS (Imaging, labs):     Most Recent 3 CBC's:Recent Labs   Lab Test 01/23/23  0500 01/22/23  1812 01/21/23  0653 01/20/23  0552 01/19/23  1957 01/19/23  1431 01/19/23  1222 01/19/23  0517   WBC  --   --   --   --  5.8 4.8  --  4.7   HGB 8.5* 9.3* 8.8*   < > 9.5* 9.2*   < > 8.9*   MCV  --   --   --   --  90 88  --  88   PLT  --   --  154  --  187 205  --  192    < > = values in this interval not displayed.      Most Recent 3 BMP's:  Recent Labs   Lab Test 01/23/23  1234 01/23/23  0806 01/23/23  0500 01/22/23  2026 01/22/23  0815 01/22/23  0616 01/21/23  0733 01/21/23  0653  01/20/23  1721 01/20/23  1239 01/20/23  0806 01/20/23  0552   NA  --   --   --   --   --  140  --  145  --  145  --  150*   POTASSIUM  --   --   --   --   --  4.9  --  4.3  --   --   --  3.9   CHLORIDE  --   --   --   --   --  115*  --  119*  --   --   --  125*   CO2  --   --   --   --   --  22  --  19*  --   --   --  19*   BUN  --   --   --   --   --  47*  --  57*  --   --   --  78*   CR  --   --  1.23  --   --  1.38*  --  1.62*  --   --   --  1.92*   ANIONGAP  --   --   --   --   --  3*  --  7  --   --   --  6   MARTHA  --   --   --   --   --  8.0*  --  8.0*  --   --   --  8.1*   * 105*  --  244*   < > 148*   < > 145*   < >  --    < > 95    < > = values in this interval not displayed.   Most Recent INR's and Anticoagulation Dosing History:  Anticoagulation Dose History     Recent Dosing and Labs Latest Ref Rng & Units 11/23/2020 11/27/2020 1/26/2022    INR 0.85 - 1.15 1.26(H) 1.23(H) 1.34(H)        Recent Labs   Lab Test 01/17/23  1200   A1C 7.5*     Results for orders placed or performed during the hospital encounter of 01/17/23   Head CT w/o contrast    Narrative    EXAM: CT HEAD W/O CONTRAST  LOCATION: [facility]  DATE/TIME: 1/17/2023 1:32 PM    INDICATION: Confusion  COMPARISON: CT head 1/26/2022  TECHNIQUE: Routine CT Head without IV contrast. Multiplanar reformats. Dose reduction techniques were used.    FINDINGS:  INTRACRANIAL CONTENTS: No intracranial hemorrhage, extraaxial collection, or mass effect.  No CT evidence of acute infarct. Unchanged mild presumed chronic small vessel ischemic changes. Unchanged moderate to severe generalized volume loss. No   hydrocephalus.     VISUALIZED ORBITS/SINUSES/MASTOIDS: No intraorbital abnormality. No paranasal sinus mucosal disease. No middle ear or mastoid effusion.    BONES/SOFT TISSUES: No acute abnormality.      Impression    IMPRESSION:  1.  No acute intracranial process.   CT Chest Abdomen Pelvis w/o Contrast    Narrative    EXAM:  CT CHEST ABDOMEN PELVIS W/O CONTRAST  LOCATION: [facility]  DATE/TIME: 1/17/2023 4:11 PM    INDICATION: vomiting, AMS, eval for pathology  COMPARISON: 2/15/2022 and 3/10/2014   TECHNIQUE: CT scan of the chest, abdomen, and pelvis was performed without IV contrast. Multiplanar reformats were obtained. Dose reduction techniques were used.   CONTRAST: None.    FINDINGS:   LUNGS AND PLEURA: Elevated right hemidiaphragm with associated atelectasis.     MEDIASTINUM/AXILLAE: Mitral annular calcification.     CORONARY ARTERY CALCIFICATION: Severe.    HEPATOBILIARY: Cholecystectomy. Pneumobilia is again seen.     PANCREAS: Normal.    SPLEEN: Normal.    ADRENAL GLANDS: Normal.    KIDNEYS/BLADDER: Simple left renal cysts do not require follow-up. There is left and right hydronephrosis and hydroureter extending to the urinary bladder. No ureteral calculi. Again seen is a nonobstructing calculus at the upper pole of the right   kidney. There is circumferential and more focal wall thickening of the urinary bladder with mild adjacent stranding. A Dietrich catheter is present though the bladder is not decompressed.     BOWEL: Mild colonic diverticulosis. Large amount of stool in the rectum     LYMPH NODES: Normal.    VASCULATURE: Atherosclerotic disease.     PELVIC ORGANS: Mildly enlarged prostate gland.     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 There may be bony fusion of L5 and S1.       Impression    IMPRESSION:  1.  Circumferential and more focal bladder wall thickening. Bilateral hydronephrosis and hydroureter extending to the bladder. Findings are concerning for cystitis. Other bladder pathology cannot be excluded on this study. The bladder is not decompressed   despite presence of a Dietrich catheter; correlate for catheter dysfunction.  2.  Cannot assess the portal or systemic venous system on this study.   3.  Advanced atherosclerotic  "disease including coronary artery disease.             PENDING LABS         PROCEDURES ( this hospitalization only)          RECOMMENDATION FOR F/U VISIT       DISPOSITION     Skilled Nursing Facility    SUMMARY OF HOSPITAL COURSE:    Caleb Hernandez is a 87 year old male admitted on 1/17/2023. He has a past medical history of paroxysmal atrial fibrillation on Eliquis, chronic kidney disease, previous recurrent UTIs, previous ascending cholangitis status postcholecystectomy, progressive cognitive issues, DM2, hypertension, coronary artery disease, lymphedema, who presented with x1 day of \"feeling unwell\" and family later reported he had nausea with emesis.  Admitted for sepsis, treating UTI, acute renal failure on CKD 3, urinary retention requiring Dietrich catheter placement.  Was treated with broad-spectrum antibiotics, and hypotension but required pressors in ICU for short period of time later on weaned off of it.  Kidney function improved with aggressive IV fluid resuscitation, treatment of sepsis.  Was seen by nephrology, pulm and critical care.  Urine culture ultimately returned negative.  Given his overall presentation is continuing on Augmentin.  Urology recommended outpatient follow-up for void trial.  He has vitamin B12 deficiency with a level less than 150.  He is started on vitamin B12 supplements.  Will need repeat check in 1 to 2 months.   He had a drop in hemoglobin from 12 to around 8.8.  Was seen by GI with concerns of GI bleeding while on Eliquis.  Recommended outpatient follow-up.  Is discharging to TCU in stable condition.    Diabetes mellitus type 2 he is on decreased dose of Lantus likely secondary to more restrictive diet in the hospital.  May need to increase the Lantus as an outpatient if blood sugars are trending high.    Discharge Medications with Med changes:        Review of your medicines      START taking      Dose / Directions   amoxicillin-clavulanate 875-125 MG tablet  Commonly known " as: AUGMENTIN      Dose: 1 tablet  Take 1 tablet by mouth daily for 9 days  Quantity: 9 tablet  Refills: 0     insulin aspart 100 UNIT/ML pen  Commonly known as: NovoLOG PEN  Used for: Type 2 diabetes mellitus with diabetic peripheral angiopathy without gangrene, with long-term current use of insulin (H)      Dose: 1-3 Units  Inject 1-3 Units Subcutaneous 3 times daily (before meals) Correction Scale - LOW INSULIN RESISTANCE DOSING     Do Not give Correction Insulin if Pre-Meal BG less than 140.   For Pre-Meal  - 239 give 1 unit.   For Pre-Meal  - 339 give 2 units.   For Pre-Meal BG greater than or equal to 340 give 3 units.   To be given with prandial insulin, and based on pre-meal blood glucose.   Notify provider if glucose greater than or equal to 350 mg/dL after administration of correction dose.  If given at mealtime, administer within 30 minutes of start of meal.  Quantity: 15 mL  Refills: 0     vitamin B-12 1000 MCG tablet  Commonly known as: CYANOCOBALAMIN  Used for: Vitamin B12 deficiency (non anemic)      Dose: 1,000 mcg  Start taking on: January 24, 2023  Take 1 tablet (1,000 mcg) by mouth daily  Quantity: 60 tablet  Refills: 0        CONTINUE these medicines which may have CHANGED, or have new prescriptions. If we are uncertain of the size of tablets/capsules you have at home, strength may be listed as something that might have changed.      Dose / Directions   apixaban ANTICOAGULANT 2.5 MG tablet  Commonly known as: ELIQUIS  Indication: Atrial Fibrillation Not Caused By A Heart Valve Problem  This may have changed:     medication strength    how much to take  Used for: Paroxysmal atrial fibrillation (H)      Dose: 2.5 mg  Take 1 tablet (2.5 mg) by mouth 2 times daily  Quantity: 60 tablet  Refills: 0     Lantus SoloStar 100 UNIT/ML pen  This may have changed: how much to take  Used for: Type 2 diabetes mellitus with diabetic peripheral angiopathy without gangrene, with long-term current use  of insulin (H)  Generic drug: insulin glargine      Dose: 23 Units  Inject 23 Units Subcutaneous At Bedtime  Quantity: 15 mL  Refills: 0        CONTINUE these medicines which have NOT CHANGED      Dose / Directions   acetaminophen 500 MG tablet  Commonly known as: TYLENOL  Used for: Osteomyelitis of right foot, unspecified type (H)      Dose: 500-1,000 mg  [ACETAMINOPHEN (TYLENOL) 500 MG TABLET] Take 1-2 tablets (500-1,000 mg total) by mouth every 4 (four) hours as needed.  Refills: 0     aspirin 81 MG EC tablet  Commonly known as: ASA      Dose: 81 mg  [ASPIRIN 81 MG EC TABLET] Take 81 mg by mouth daily.  Refills: 0     * B-D U/F 31G X 8 MM miscellaneous  Used for: Type 2 diabetes mellitus with other skin ulcer, with long-term current use of insulin (H)  Generic drug: insulin pen needle      USED TO INJECT INSULIN DAILY  Quantity: 100 each  Refills: 11     * insulin pen needle 32G X 6 MM miscellaneous  Commonly known as: 32G X 6 MM  Used for: Type 2 diabetes mellitus with diabetic peripheral angiopathy without gangrene, with long-term current use of insulin (H)      Use 4 pen needles daily or as directed.  Quantity: 100 each  Refills: 11     CENTRUM SILVER ULTRA MENS PO      Dose: 1 tablet  [MULTIVIT-MIN/FA/LYCOPEN/LUTEIN (CENTRUM SILVER MEN ORAL)] Take 1 tablet by mouth daily.  Refills: 0     FreeStyle Mayuri 2 [place]  Used for: Type 2 diabetes mellitus with diabetic peripheral angiopathy without gangrene, with long-term current use of insulin (H)      USE AS DIRECTED  Quantity: 1 each  Refills: 0     * FreeStyle Mayuri 2 Sensor Misc  Used for: Type 2 diabetes mellitus with diabetic peripheral angiopathy without gangrene, with long-term current use of insulin (H)      Dose: 1 each  1 each every 14 days 1 each every 14 days. Change every 14 days.  Quantity: 6 each  Refills: 5     * FreeStyle Mayuri 2 Sensor Misc  Used for: Type 2 diabetes mellitus with diabetic peripheral angiopathy without gangrene, with  long-term current use of insulin (H)      Dose: 1 kit  1 kit every 14 days Use per manufacture's directions to check glucose daily.  Quantity: 6 each  Refills: 1     * FreeStyle Mayuri 2 Sensor Misc  Used for: Type 2 diabetes mellitus with diabetic peripheral angiopathy without gangrene, with long-term current use of insulin (H)      USE AS DIRECTED EVERY 14 DAYS  Quantity: 2 each  Refills: 11     * GLUCOSE METER TEST VI      Dose: 1 each  [BLOOD-GLUCOSE METER (ONETOUCH VERIO IQ METER) MISC] Use 1 each As Directed 2 (two) times a day.  Quantity: 1 each  Refills: 0     * OneTouch Ultra test strip  Used for: Type 2 diabetes mellitus without complication, without long-term current use of insulin (H)  Generic drug: blood glucose      USE TO TEST TWICE DAILY  Quantity: 200 strip  Refills: 3     mupirocin 2 % external ointment  Commonly known as: BACTROBAN      Apply topically daily as needed Apply to foot wound  Refills: 0     polyethylene glycol 17 g packet  Commonly known as: MIRALAX  Used for: Drug-induced constipation      Dose: 17 g  [POLYETHYLENE GLYCOL (MIRALAX) 17 GRAM PACKET] Take 1 packet (17 g total) by mouth daily as needed.  Quantity: 100 packet  Refills: 3     tamsulosin 0.4 MG capsule  Commonly known as: FLOMAX  Used for: Urinary retention      TAKE 1 CAPSULE BY MOUTH EVERY DAY AFTER SUPPER  Quantity: 90 capsule  Refills: 3     Vitamin D3 125 MCG (5000 UT) tablet  Commonly known as: CHOLECALCIFEROL  Used for: Vitamin D deficiency      Dose: 125 mcg  Take 1 tablet (125 mcg) by mouth daily  Refills: 0     vitamin E 400 units (180 mg) capsule  Commonly known as: TOCOPHEROL      Dose: 400 Units  [VITAMIN E 400 UNIT CAPSULE] Take 400 Units by mouth daily.  Refills: 0         * This list has 7 medication(s) that are the same as other medications prescribed for you. Read the directions carefully, and ask your doctor or other care provider to review them with you.            STOP taking    doxycycline hyclate 100  MG capsule  Commonly known as: VIBRAMYCIN        traMADol 50 MG tablet  Commonly known as: ULTRAM              Where to get your medicines      These medications were sent to [place], [place] - [address]  [address] [address]    Phone: [phone]     apixaban ANTICOAGULANT 2.5 MG tablet    Lantus SoloStar 100 UNIT/ML pen    vitamin B-12 1000 MCG tablet     Some of these will need a paper prescription and others can be bought over the counter. Ask your nurse if you have questions.    Bring a paper prescription for each of these medications    insulin aspart 100 UNIT/ML pen              Rationale for medication changes:              Consults   Nephrology, urology, pulmonary critical, GI      Immunizations given this encounter     [unfilled]      Discharge Procedure Orders   Primary Care - Care Coordination Referral   Standing Status: Future   Referral Priority: Routine: Next available opening Referral Type: Care Coordination   Number of Visits Requested: 1     Reason for your hospital stay   Order Comments: Urinary tract infection     Follow-up and recommended labs and tests    Order Comments: Follow up with primary care provider, DALE CORTEZ, within 7 days for hospital follow- up.  The following labs/tests are recommended: bmp and cbc.     Activity   Order Comments: Your activity upon discharge: activity as tolerated     Order Specific Question Answer Comments   Is discharge order? Yes      Follow-up and recommended labs and tests   Order Comments: Kidney follow up on Friday February 10th at 11 a.m. with LEANA Evans       [facility]  [address] [address]    Office:  [phone]  Fax:  [phone]     Reason for your hospital stay   Order Comments: While you were in the hospital, you had a mai catheter placed for bladder outlet obstruction     Discharge Instructions   Order Comments: You are being discharged  with a catheter.  The nurse should have reviewed with you how to take care of the catheter while at TCU. Please hold on to any written instructions you may have received for your review.   You may wear the leg bag when you are walking or up in a chair (with your legs down), empty the bag when it is 2/3 full to avoid overfilling.  You should switch to the bigger (night) bag when lying down as the bag drains by gravity and can be hung on a waist paper basket at the side of the bed.   Leg bags and night bags should always be lower than the level of your bladder to encourage gravity drainage.    Clean around where the tube enters your body with soap and water, pat dry.     Men: You may apply bacitracin to the tip of the penis up to 3 times per day to help with discomfort     General info for SNF   Order Comments: Length of Stay Estimate: Short Term Care: Estimated # of Days <30  Condition at Discharge: Improving  Level of care:skilled   Rehabilitation Potential: Good  Admission H&P remains valid and up-to-date: Yes  Recent Chemotherapy: N/A  Use Nursing Home Standing Orders: Yes     Mantoux instructions   Order Comments: Give two-step Mantoux (PPD) Per Facility Policy Yes     Glucose monitor nursing POCT   Order Comments: Before meals and at bedtime     Follow-up and recommended labs and tests    Order Comments: Follow up with [facility] in 5 days for void trial. Please call to make an appointment [phone]     Full Code     Order Specific Question Answer Comments   Code status determined by: Discussion with patient/ legal decision maker      Physical Therapy Adult Consult   Order Comments: Evaluate and treat as clinically indicated.    Reason: weakness     Occupational Therapy Adult Consult   Order Comments: Evaluate and treat as clinically indicated.    Reason: weakness     Fall precautions     Diet   Order Comments: Follow this diet upon discharge: Orders Placed This Encounter      Moderate Consistent Carb (60 g  "CHO per Meal) Diet     Order Specific Question Answer Comments   Is discharge order? Yes      Diet   Order Comments: Follow this diet upon discharge: Orders Placed This Encounter      Diet      Moderate Consistent Carb (60 g CHO per Meal) Diet     Order Specific Question Answer Comments   Is discharge order? Yes      Examination     Vital Signs in last 24 hours:   Vital signs:  Temp: 98.6  F (37  C) Temp src: Oral BP: 122/57 Pulse: 72   Resp: 18 SpO2: 97 % O2 Device: None (Room air) Oxygen Delivery: 1 LPM   Weight: 76 kg (167 lb 8.8 oz)  Estimated body mass index is 25.48 kg/m  as calculated from the following:    Height as of 12/9/22: 1.727 m (5' 8\").    Weight as of this encounter: 76 kg (167 lb 8.8 oz).       Pertinent positives on exam:  Abdomen: Soft nontender nondistended bowel sounds no guarding or rigidity.    Please see EMR for more detailed significant labs, imaging, consultant notes etc.  Total time spent on discharge: > 35 minutes    Merry Farrell MD   [facility] Hospitalist Service: Ph:[phone]    CC:Otis Lozano    "

## 2023-01-25 PROBLEM — L97.411 DIABETIC ULCER OF RIGHT MIDFOOT ASSOCIATED WITH TYPE 2 DIABETES MELLITUS, LIMITED TO BREAKDOWN OF SKIN (H): Status: RESOLVED | Noted: 2021-09-21 | Resolved: 2023-01-01

## 2023-01-25 PROBLEM — E11.621 DIABETIC ULCER OF RIGHT MIDFOOT ASSOCIATED WITH TYPE 2 DIABETES MELLITUS, LIMITED TO BREAKDOWN OF SKIN (H): Status: RESOLVED | Noted: 2021-09-21 | Resolved: 2023-01-01

## 2023-01-25 NOTE — PROGRESS NOTES
Clinic Care Coordination Contact  Care Coordination Transition Communication         Clinical Data: Patient was hospitalized at  from 1/17 to 1/23 with diagnosis of Septic shock  Acute pyelonephritis  Urinary retention status post Dietrich catheter placement  Hematuria resolved  ARF on CKD 3  Hyperkalemia  Vitamin B12 deficiency  Hypernatremia resolved  Active Problems:    Essential hypertension    Type 2 diabetes mellitus with diabetic peripheral angiopathy without gangrene, with long-term current use of insulin (H)    Adult failure to thrive    Paroxysmal atrial fibrillation (H)    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 2 diabetes mellitus, limited to breakdown of skin (H)    History of cholecystectomy    Coronary artery disease involving native coronary artery of native heart without angina pectoris    Age-related cognitive decline    Frailty syndrome in geriatric patient    Falls frequently.     Transition to Facility:              Facility Name: [facility]              Contact name and phone number/fax: Patient lives with wife Virginia and son Elfego in a private residence. Uses a standard walker; no home care services; doesn t drive. Patient is assisted by wife and son with most I/ADLs.  Elfego manages and administers medications including insulin and provides transportation. Wife stand-by assists with showers for safety, meals, and other ADLs as needed    Plan: RN/SW Care Coordinator will await notification from facility staff informing RN/SW Care Coordinator of patient's discharge plans/needs. RN/SW Care Coordinator will review chart and outreach to facility staff every 4 weeks and as needed.     Per chart review: Patient lives with wife Virginia and son Elfego in a private residence. Uses a standard walker; no home care services; doesn t drive. Patient is assisted by wife and son with most I/ADLs.  Elfego manages and administers medications including insulin and provides transportation. Wife stand-by  assists with showers for safety, meals, and other ADLs as needed

## 2023-01-25 NOTE — PROGRESS NOTES
[facility] GERIATRIC SERVICES       Patient Caleb Hernandez  MRN: [id number]        Reason for Visit     Chief Complaint   Patient presents with     RECHECK     INITIAL       Code Status     CPR/Full code     Assessment     Acute UTI/urosepsis  Urinary retention requiring Dietrich catheter placement  Chronic kidney disease  A. fib on Eliquis  Diabetes type 2  B12 deficiency  AB LA  Generalized weakness    Plan     Pt is admitted to TCU for strengthening and rehab.  Patient had a prolonged stay in the hospital requiring interventions and up consultation from GI, urology as well as nephrology and pulmonary.  Admitted with urosepsis.  Cultures were negative but urology recommended continuing antibiotics in light of his significant septic appearance.  Dietrich catheter placed for urinary retention.  On Augmentin x 9d  Outpatient follow-up with urology for a voiding trial  At his request a voiding trial has been ordered in the TCU  Urine appears cloudy so we will again order a UA UC on him  Recheck labs to monitor kidney functions  In addition due to B12 deficiency started on supplementation.  Hemoglobin noted to drop down to 8.8 and GI was consulted.  He is on Eliquis.  He will require outpatient follow-up and appropriate testing with monitoring of hemoglobin in the TCU.  Has type 2 diabetes and is on insulin and put on a low-dose of Lantus due to low blood sugars.  Adjust insulin as needed  Staff is reporting that he is not eating well at all.  He is on a higher dose ofinsulin    so far blood sugars appear to be managed  Speech consultation requested for him  Continue with PT/OT-at baseline quite weak debilitated and wheelchair-bound  Also noted to have some cognitive impairment with limited recall of recent hospitalization and events    History     Patient is a very pleasant 87 year old male who is admitted to TCU  Patient is admitted to the hospital with sepsis with acute UTI in the setting of renal impairment.    Noted to  have urinary retention requiring Dietrich catheter placement.  He was seen by pulmonary nephrology as well as urology.  His urine culture however was negative but because of his septic appearance he was continued on antibiotics he will follow-up with urology as an outpatient      Past Medical & Surgical History     PAST MEDICAL HISTORY:   Past Medical History:   Diagnosis Date     Abscess of right foot 11/23/2020    Added automatically from request for surgery [id number]     Acute pulmonary embolism with acute cor pulmonale (H) 5/23/2020     Ascending cholangitis 1/27/2022     BPH (benign prostatic hyperplasia)      Cholelithiasis      Closed fracture of rib     Created by Conversion  Replacement Utility updated for latest IMO load     Closed fracture of thoracic vertebra (H)     Created by Conversion  Replacement Utility updated for latest IMO load     Common bile duct (CBD) obstruction 9/4/2017     Diabetes mellitus (H)      Essential hypertension      Gram-negative bacteremia 1/27/2022    Positive blood culture 1/26/2022; likely biliary source     Hyperlipidemia      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    Added automatically from request for surgery [id number]      Pancreatitis      Sepsis (H) 1/27/2022     Sepsis due to pneumonia (H) 9/8/2017     Septic arthritis of right foot (H) 12/2/2020      PAST SURGICAL HISTORY:   has a past surgical history that includes IR Lower Extremity Angiogram Right (11/24/2020); Cholecystectomy (1985); Tonsillectomy (1940); Endoscopic retrograde cholangiopancreatogram; back surgery; Endoscopic retrograde cholangiopancreatogram (N/A, 9/5/2017); Incision And Drainage Of Wound (Right, 11/2/2020); Incision And Drainage Of Wound (Right, 11/16/2020); Amputate toe(s) (Right, 11/16/2020); Ir Extremity Angiogram Right (11/24/2020); Picc (11/30/2020); Incision And Drainage Of Wound (Right, 11/27/2020); and Endoscopic retrograde cholangiopancreatogram (N/A, 1/27/2022).      Past Social History      Reviewed,  reports that he quit smoking about 31 years ago. His smoking use included cigarettes. He has never used smokeless tobacco. He reports that he does not drink alcohol and does not use drugs.    Family History     Reviewed, and family history includes Alcoholism in his father; Aortic aneurysm in his mother.    Medication List   Post Discharge Medication Reconciliation Status: Post Discharge Medication Reconciliation Status: discharge medications reconciled, continue medications without change.  Current Outpatient Medications   Medication     acetaminophen (TYLENOL) 500 MG tablet     amoxicillin-clavulanate (AUGMENTIN) 875-125 MG tablet     apixaban ANTICOAGULANT (ELIQUIS) 2.5 MG tablet     aspirin 81 MG EC tablet     B-D U/F 31G X 8 MM insulin pen needle     blood-glucose meter (ONETOUCH VERIO IQ METER) Misc     Continuous Blood Gluc  (FREESTYLE APRIL 2 READER) MILE     Continuous Blood Gluc Sensor (FREESTYLE APRIL 2 SENSOR) MISC     Continuous Blood Gluc Sensor (FREESTYLE APRIL 2 SENSOR) MISC     Continuous Blood Gluc Sensor (FREESTYLE APRIL 2 SENSOR) MISC     cyanocobalamin (CYANOCOBALAMIN) 1000 MCG tablet     insulin aspart (NOVOLOG PEN) 100 UNIT/ML pen     insulin glargine (LANTUS SOLOSTAR) 100 UNIT/ML pen     insulin pen needle (32G X 6 MM) 32G X 6 MM miscellaneous     multivit-min/FA/lycopen/lutein (CENTRUM SILVER MEN ORAL)     mupirocin (BACTROBAN) 2 % ointment     ONETOUCH ULTRA test strip     polyethylene glycol (MIRALAX) 17 gram packet     tamsulosin (FLOMAX) 0.4 MG capsule     trolamine salicylate (ASPERCREME) 10 % external cream     vitamin D3 (CHOLECALCIFEROL) 125 MCG (5000 UT) tablet     vitamin E 400 unit capsule     No current facility-administered medications for this visit.          Allergies     Allergies   Allergen Reactions     Cresol [Phenol] Unknown     Muscle cramps     Demeclocycline Hives and Rash     Crestor [Rosuvastatin] Muscle Pain (Myalgia)       Review of Systems  "  A comprehensive review of 14 systems was done. Pertinent findings noted here and in history of present illness. All the rest negative.  Constitutional: Negative.  Negative for fever, chills, he has  activity change, appetite change and fatigue.   Very weak debilitated and reporting tiredness.  Using a wheelchair  He is not eating at all and staff reports that he did not eat anything and breakfast he told me he only had apple juice  HENT: Negative for congestion and facial swelling.    Eyes: Negative for photophobia, redness and visual disturbance.   Respiratory: Negative for cough and chest tightness.    Cardiovascular: Negative for chest pain, palpitations and leg swelling.   Gastrointestinal: Negative for nausea, diarrhea, constipation, blood in stool and abdominal distention.   Genitourinary: Has Dietrich with dark cloudy urine noted  Musculoskeletal: Negative.  Weak and using WC  Skin: Scattered ecchymoses and swelling of the arm was noted  Neurological: Negative for dizziness, tremors, syncope, weakness, light-headedness and headaches.   Hematological: Does not bruise/bleed easily.   Psychiatric/Behavioral: Negative.        Physical Exam   /64   Pulse 74   Temp 98.2  F (36.8  C)   Resp 16   Ht 1.727 m (5' 8\")   Wt 72.1 kg (159 lb)   SpO2 97%   BMI 24.18 kg/m       Constitutional: Oriented to person, place,  and appears well-developed.   Appears very weak and frail  HEENT:  Normocephalic and atraumatic.  Eyes: Conjunctivae and EOM are normal. Pupils are equal, round, and reactive to light. No discharge.  No scleral icterus. Nose normal. Mouth/Throat: Oropharynx is clear and moist. No oropharyngeal exudate.    NECK: Normal range of motion. Neck supple. No JVD present. No tracheal deviation present. No thyromegaly present.   CARDIOVASCULAR: Normal rate, regular rhythm and intact distal pulses.  Exam reveals no gallop and no friction rub.  Systolic murmur present.  PULMONARY: Effort normal and breath " sounds normal. No respiratory distress.No Wheezing or rales.  ABDOMEN: Soft. Bowel sounds are normal. No distension and no mass.  There is no tenderness. There is no rebound and no guarding. No HSM.  MUSCULOSKELETAL: Normal range of motion. No edema and no tenderness. Mild kyphosis, no tenderness.  LYMPH NODES: Has no cervical, supraclavicular, axillary and groin adenopathy.   NEUROLOGICAL: Alert and oriented to person, place, and time. No cranial nerve deficit.  Normal muscle tone. Coordination normal.   GENITOURINARY: Deferred exam.  Has an indwelling Dietrich  SKIN: Skin is warm and dry. No rash noted. No erythema. No pallor.   Edema of the arm with scattered ecchymoses noted  EXTREMITIES: No cyanosis, no clubbing, no edema. No Deformity.  PSYCHIATRIC: abNormal mood, affect and behavior.  Limited recall of recent events      Lab Results     Recent Results (from the past 240 hour(s))   ECG 12-LEAD WITH MUSE ([facility])    Collection Time: 01/17/23 11:26 AM   Result Value Ref Range    Systolic Blood Pressure  mmHg    Diastolic Blood Pressure  mmHg    Ventricular Rate 73 BPM    Atrial Rate 63 BPM    OH Interval  ms    QRS Duration 84 ms     ms    QTc 405 ms    P Axis  degrees    R AXIS 7 degrees    T Axis 45 degrees    Interpretation ECG       Atrial fibrillation  Septal infarct , age undetermined  Abnormal ECG  When compared with ECG of 23-NOV-2020 14:34,  Atrial fibrillation has replaced Sinus rhythm  Septal infarct is now Present  Borderline criteria for Inferior infarct are no longer Present  Nonspecific T wave abnormality no longer evident in Inferior leads  Confirmed by SEE ED PROVIDER NOTE FOR, ECG INTERPRETATION (1948),  Tahir Heck ([id number]) on 1/17/2023 12:54:14 PM     Glucose by meter    Collection Time: 01/17/23 11:42 AM   Result Value Ref Range    GLUCOSE BY METER POCT 147 (H) 70 - 99 mg/dL   Basic metabolic panel    Collection Time: 01/17/23 12:00 PM   Result Value Ref Range    Sodium 138 136 - 145  mmol/L    Potassium 6.3 (HH) 3.5 - 5.0 mmol/L    Chloride 108 (H) 98 - 107 mmol/L    Carbon Dioxide (CO2) 13 (L) 22 - 31 mmol/L    Anion Gap 17 5 - 18 mmol/L    Urea Nitrogen 155 (H) 8 - 28 mg/dL    Creatinine 4.83 (H) 0.70 - 1.30 mg/dL    Calcium 10.2 8.5 - 10.5 mg/dL    Glucose 154 (H) 70 - 125 mg/dL    GFR Estimate 11 (L) >60 mL/min/1.73m2   Magnesium    Collection Time: 01/17/23 12:00 PM   Result Value Ref Range    Magnesium 2.4 1.8 - 2.6 mg/dL   Troponin I (now)    Collection Time: 01/17/23 12:00 PM   Result Value Ref Range    Troponin I 0.04 0.00 - 0.29 ng/mL   Lactic acid whole blood    Collection Time: 01/17/23 12:00 PM   Result Value Ref Range    Lactic Acid 2.0 0.7 - 2.0 mmol/L   Ketone Beta-Hydroxybutyrate Quantitative    Collection Time: 01/17/23 12:00 PM   Result Value Ref Range    Ketone (Beta-Hydroxybutyrate) Quantitative 2.95 (H) <=0.3 mmol/L   Hepatic function panel    Collection Time: 01/17/23 12:00 PM   Result Value Ref Range    Bilirubin Total 0.4 0.0 - 1.0 mg/dL    Bilirubin Direct 0.2 <=0.5 mg/dL    Protein Total 7.6 6.0 - 8.0 g/dL    Albumin 3.0 (L) 3.5 - 5.0 g/dL    Alkaline Phosphatase 79 45 - 120 U/L    AST 42 (H) 0 - 40 U/L    ALT 29 0 - 45 U/L   CBC with platelets and differential    Collection Time: 01/17/23 12:00 PM   Result Value Ref Range    WBC Count 10.9 4.0 - 11.0 10e3/uL    RBC Count 4.14 (L) 4.40 - 5.90 10e6/uL    Hemoglobin 12.1 (L) 13.3 - 17.7 g/dL    Hematocrit 35.1 (L) 40.0 - 53.0 %    MCV 85 78 - 100 fL    MCH 29.2 26.5 - 33.0 pg    MCHC 34.5 31.5 - 36.5 g/dL    RDW 13.2 10.0 - 15.0 %    Platelet Count 396 150 - 450 10e3/uL    % Neutrophils 85 %    % Lymphocytes 9 %    % Monocytes 5 %    % Eosinophils 0 %    % Basophils 0 %    % Immature Granulocytes 1 %    NRBCs per 100 WBC 0 <1 /100    Absolute Neutrophils 9.2 (H) 1.6 - 8.3 10e3/uL    Absolute Lymphocytes 1.0 0.8 - 5.3 10e3/uL    Absolute Monocytes 0.5 0.0 - 1.3 10e3/uL    Absolute Eosinophils 0.0 0.0 - 0.7 10e3/uL     Absolute Basophils 0.0 0.0 - 0.2 10e3/uL    Absolute Immature Granulocytes 0.1 <=0.4 10e3/uL    Absolute NRBCs 0.0 10e3/uL   Hemoglobin A1c    Collection Time: 01/17/23 12:00 PM   Result Value Ref Range    Hemoglobin A1C 7.5 (H) <5.7 %   Blood gas venous    Collection Time: 01/17/23 12:13 PM   Result Value Ref Range    pH Venous 7.22 (LL) 7.35 - 7.45    pCO2 Venous 35 35 - 50 mm Hg    pO2 Venous 23 (L) 25 - 47 mm Hg    Bicarbonate Venous 15 (L) 24 - 30 mmol/L    Base Excess/Deficit (+/-) -13.2   mmol/L    Oxyhemoglobin Venous 32.2 (L) 70.0 - 75.0 %    O2 Sat, Venous 32.7 (L) 70.0 - 75.0 %   Blood Culture Line, venous    Collection Time: 01/17/23 12:22 PM    Specimen: Line, venous; Blood   Result Value Ref Range    Culture No Growth    Symptomatic Influenza A/B & SARS-CoV2 (COVID-19) Virus PCR Multiplex Nasopharyngeal    Collection Time: 01/17/23 12:23 PM    Specimen: Nasopharyngeal; Swab   Result Value Ref Range    Influenza A PCR Negative Negative    Influenza B PCR Negative Negative    RSV PCR Negative Negative    SARS CoV2 PCR Negative Negative   ECG 12-LEAD WITH MUSE ([facility])    Collection Time: 01/17/23 12:49 PM   Result Value Ref Range    Systolic Blood Pressure 108 mmHg    Diastolic Blood Pressure 54 mmHg    Ventricular Rate 74 BPM    Atrial Rate 74 BPM    OR Interval 160 ms    QRS Duration 88 ms     ms    QTc 404 ms    P Axis 52 degrees    R AXIS 10 degrees    T Axis 56 degrees    Interpretation ECG       Sinus rhythm with Premature supraventricular complexes  Otherwise normal ECG  When compared with ECG of 17-JAN-2023 12:48,  No significant change was found  Confirmed by SEE ED PROVIDER NOTE FOR, ECG INTERPRETATION (4000),  Tahir Heck ([id number]) on 1/17/2023 12:53:55 PM     Blood Culture Wrist, Left    Collection Time: 01/17/23  1:02 PM    Specimen: Wrist, Left; Blood   Result Value Ref Range    Culture No Growth    Glucose by meter    Collection Time: 01/17/23  1:34 PM   Result Value Ref Range     GLUCOSE BY METER POCT 139 (H) 70 - 99 mg/dL   Glucose by meter    Collection Time: 01/17/23  2:14 PM   Result Value Ref Range    GLUCOSE BY METER POCT 201 (H) 70 - 99 mg/dL   UA with Microscopic reflex to Culture    Collection Time: 01/17/23  2:17 PM    Specimen: Urine, Dietrich Catheter   Result Value Ref Range    Color Urine Yellow Colorless, Straw, Light Yellow, Yellow    Appearance Urine Cloudy (A) Clear    Glucose Urine Negative Negative mg/dL    Bilirubin Urine Negative Negative    Ketones Urine Trace (A) Negative mg/dL    Specific Gravity Urine 1.010 1.001 - 1.030    Blood Urine 0.5 mg/dL (A) Negative    pH Urine 6.0 5.0 - 7.0    Protein Albumin Urine 200 (A) Negative mg/dL    Urobilinogen Urine <2.0 <2.0 mg/dL    Nitrite Urine Negative Negative    Leukocyte Esterase Urine 500 Jasmine/uL (A) Negative    WBC Clumps Urine Present (A) None Seen /HPF    RBC Urine 9 (H) <=2 /HPF    WBC Urine >182 (H) <=5 /HPF   Urine Culture    Collection Time: 01/17/23  2:17 PM    Specimen: Urine, Dietrich Catheter   Result Value Ref Range    Culture No Growth    Glucose by meter    Collection Time: 01/17/23  3:15 PM   Result Value Ref Range    GLUCOSE BY METER POCT 133 (H) 70 - 99 mg/dL   Potassium    Collection Time: 01/17/23  3:24 PM   Result Value Ref Range    Potassium 5.4 (H) 3.5 - 5.0 mmol/L   Basic metabolic panel    Collection Time: 01/17/23  3:24 PM   Result Value Ref Range    Sodium 139 136 - 145 mmol/L    Potassium 5.5 (H) 3.5 - 5.0 mmol/L    Chloride 111 (H) 98 - 107 mmol/L    Carbon Dioxide (CO2) 12 (L) 22 - 31 mmol/L    Anion Gap 16 5 - 18 mmol/L    Urea Nitrogen 167 (H) 8 - 28 mg/dL    Creatinine 4.62 (H) 0.70 - 1.30 mg/dL    Calcium 9.1 8.5 - 10.5 mg/dL    Glucose 129 (H) 70 - 125 mg/dL    GFR Estimate 12 (L) >60 mL/min/1.73m2   Phosphorus    Collection Time: 01/17/23  3:24 PM   Result Value Ref Range    Phosphorus 7.9 (H) 2.5 - 4.5 mg/dL   Magnesium    Collection Time: 01/17/23  3:24 PM   Result Value Ref Range     Magnesium 2.3 1.8 - 2.6 mg/dL   Hepatic panel    Collection Time: 01/17/23  3:24 PM   Result Value Ref Range    Bilirubin Total 0.4 0.0 - 1.0 mg/dL    Bilirubin Direct 0.2 <=0.5 mg/dL    Protein Total 6.2 6.0 - 8.0 g/dL    Albumin 2.5 (L) 3.5 - 5.0 g/dL    Alkaline Phosphatase 64 45 - 120 U/L    AST 37 0 - 40 U/L    ALT 24 0 - 45 U/L   Glucose by meter    Collection Time: 01/17/23  4:41 PM   Result Value Ref Range    GLUCOSE BY METER POCT 119 (H) 70 - 99 mg/dL   Blood gas arterial    Collection Time: 01/17/23  5:08 PM   Result Value Ref Range    pH Arterial 7.30 (L) 7.37 - 7.44    pCO2 Arterial 26 (L) 35 - 45 mm Hg    pO2 Arterial 87 (H) 75 - 85 mm Hg    Bicarbonate Arterial 13 (L) 23 - 29 mmol/L    O2 Sat, Arterial 97.0 (H) 95.0 - 96.0 %    Oxyhemoglobin 95.1 95.0 - 96.0 %    Base Excess/Deficit (+/-) -13.3   mmol/L    Sample Stabilized Temperature 37.0 degrees C   Glucose by meter    Collection Time: 01/17/23  5:28 PM   Result Value Ref Range    GLUCOSE BY METER POCT 118 (H) 70 - 99 mg/dL   Ketone Beta-Hydroxybutyrate Quantitative    Collection Time: 01/17/23  5:49 PM   Result Value Ref Range    Ketone (Beta-Hydroxybutyrate) Quantitative 1.39 (H) <=0.3 mmol/L   Osmolality    Collection Time: 01/17/23  5:49 PM   Result Value Ref Range    Osmolality Blood 350 (HH) 280 - 301 mmol/kg   CBC with platelets and differential    Collection Time: 01/17/23  5:49 PM   Result Value Ref Range    WBC Count 4.1 4.0 - 11.0 10e3/uL    RBC Count 2.98 (L) 4.40 - 5.90 10e6/uL    Hemoglobin 8.6 (L) 13.3 - 17.7 g/dL    Hematocrit 25.5 (L) 40.0 - 53.0 %    MCV 86 78 - 100 fL    MCH 28.9 26.5 - 33.0 pg    MCHC 33.7 31.5 - 36.5 g/dL    RDW 13.2 10.0 - 15.0 %    Platelet Count 258 150 - 450 10e3/uL    % Neutrophils 93 %    % Lymphocytes 6 %    % Monocytes 0 %    % Eosinophils 0 %    % Basophils 0 %    % Immature Granulocytes 1 %    NRBCs per 100 WBC 1 (H) <1 /100    Absolute Neutrophils 3.7 1.6 - 8.3 10e3/uL    Absolute Lymphocytes 0.3 (L)  0.8 - 5.3 10e3/uL    Absolute Monocytes 0.0 0.0 - 1.3 10e3/uL    Absolute Eosinophils 0.0 0.0 - 0.7 10e3/uL    Absolute Basophils 0.0 0.0 - 0.2 10e3/uL    Absolute Immature Granulocytes 0.1 <=0.4 10e3/uL    Absolute NRBCs 0.0 10e3/uL   Glucose by meter    Collection Time: 01/17/23  6:52 PM   Result Value Ref Range    GLUCOSE BY METER POCT 90 70 - 99 mg/dL   Ketone Beta-Hydroxybutyrate Quantitative    Collection Time: 01/17/23  7:31 PM   Result Value Ref Range    Ketone (Beta-Hydroxybutyrate) Quantitative 1.03 (H) <=0.3 mmol/L   Basic metabolic panel    Collection Time: 01/17/23  7:31 PM   Result Value Ref Range    Sodium 139 136 - 145 mmol/L    Potassium 4.8 3.5 - 5.0 mmol/L    Chloride 115 (H) 98 - 107 mmol/L    Carbon Dioxide (CO2) 12 (L) 22 - 31 mmol/L    Anion Gap 12 5 - 18 mmol/L    Urea Nitrogen 151 (H) 8 - 28 mg/dL    Creatinine 4.05 (H) 0.70 - 1.30 mg/dL    Calcium 8.2 (L) 8.5 - 10.5 mg/dL    Glucose 79 70 - 125 mg/dL    GFR Estimate 14 (L) >60 mL/min/1.73m2   Blood gas venous    Collection Time: 01/17/23  9:20 PM   Result Value Ref Range    pH Venous 7.34 (L) 7.35 - 7.45    pCO2 Venous 31 (L) 35 - 50 mm Hg    pO2 Venous 45 25 - 47 mm Hg    Bicarbonate Venous 17 (L) 24 - 30 mmol/L    Base Excess/Deficit (+/-) -9.3   mmol/L    Oxyhemoglobin Venous 77.1 (H) 70.0 - 75.0 %    O2 Sat, Venous 78.6 (H) 70.0 - 75.0 %   Ketone Beta-Hydroxybutyrate Quantitative    Collection Time: 01/17/23  9:21 PM   Result Value Ref Range    Ketone (Beta-Hydroxybutyrate) Quantitative 0.32 (H) <=0.3 mmol/L   Glucose by meter    Collection Time: 01/17/23 11:24 PM   Result Value Ref Range    GLUCOSE BY METER POCT 57 (L) 70 - 99 mg/dL   Glucose by meter    Collection Time: 01/17/23 11:45 PM   Result Value Ref Range    GLUCOSE BY METER POCT 109 (H) 70 - 99 mg/dL   Ketone Beta-Hydroxybutyrate Quantitative    Collection Time: 01/17/23 11:47 PM   Result Value Ref Range    Ketone (Beta-Hydroxybutyrate) Quantitative 0.46 (H) <=0.3 mmol/L    Glucose by meter    Collection Time: 01/18/23  2:02 AM   Result Value Ref Range    GLUCOSE BY METER POCT 111 (H) 70 - 99 mg/dL   Ketone Beta-Hydroxybutyrate Quantitative    Collection Time: 01/18/23  2:08 AM   Result Value Ref Range    Ketone (Beta-Hydroxybutyrate) Quantitative 0.60 (H) <=0.3 mmol/L   Glucose by meter    Collection Time: 01/18/23  3:58 AM   Result Value Ref Range    GLUCOSE BY METER POCT 106 (H) 70 - 99 mg/dL   Ketone Beta-Hydroxybutyrate Quantitative    Collection Time: 01/18/23  4:32 AM   Result Value Ref Range    Ketone (Beta-Hydroxybutyrate) Quantitative 0.78 (H) <=0.3 mmol/L   Basic metabolic panel    Collection Time: 01/18/23  5:26 AM   Result Value Ref Range    Sodium 141 136 - 145 mmol/L    Potassium 4.5 3.5 - 5.0 mmol/L    Chloride 115 (H) 98 - 107 mmol/L    Carbon Dioxide (CO2) 16 (L) 22 - 31 mmol/L    Anion Gap 10 5 - 18 mmol/L    Urea Nitrogen 121 (H) 8 - 28 mg/dL    Creatinine 3.45 (H) 0.70 - 1.30 mg/dL    Calcium 8.4 (L) 8.5 - 10.5 mg/dL    Glucose 113 70 - 125 mg/dL    GFR Estimate 17 (L) >60 mL/min/1.73m2   CBC with platelets    Collection Time: 01/18/23  5:26 AM   Result Value Ref Range    WBC Count 11.3 (H) 4.0 - 11.0 10e3/uL    RBC Count 3.28 (L) 4.40 - 5.90 10e6/uL    Hemoglobin 9.5 (L) 13.3 - 17.7 g/dL    Hematocrit 28.0 (L) 40.0 - 53.0 %    MCV 85 78 - 100 fL    MCH 29.0 26.5 - 33.0 pg    MCHC 33.9 31.5 - 36.5 g/dL    RDW 13.4 10.0 - 15.0 %    Platelet Count 274 150 - 450 10e3/uL   Ketone Beta-Hydroxybutyrate Quantitative    Collection Time: 01/18/23  5:26 AM   Result Value Ref Range    Ketone (Beta-Hydroxybutyrate) Quantitative 0.82 (H) <=0.3 mmol/L   Glucose by meter    Collection Time: 01/18/23  6:03 AM   Result Value Ref Range    GLUCOSE BY METER POCT 116 (H) 70 - 99 mg/dL   Ketone Beta-Hydroxybutyrate Quantitative    Collection Time: 01/18/23  7:43 AM   Result Value Ref Range    Ketone (Beta-Hydroxybutyrate) Quantitative 0.73 (H) <=0.3 mmol/L   Glucose by meter     Collection Time: 01/18/23  7:45 AM   Result Value Ref Range    GLUCOSE BY METER POCT 109 (H) 70 - 99 mg/dL   Glucose by meter    Collection Time: 01/18/23 11:03 AM   Result Value Ref Range    GLUCOSE BY METER POCT 88 70 - 99 mg/dL   Glucose by meter    Collection Time: 01/18/23  5:01 PM   Result Value Ref Range    GLUCOSE BY METER POCT 104 (H) 70 - 99 mg/dL   Glucose by meter    Collection Time: 01/18/23  8:54 PM   Result Value Ref Range    GLUCOSE BY METER POCT 158 (H) 70 - 99 mg/dL   Glucose by meter    Collection Time: 01/19/23  2:00 AM   Result Value Ref Range    GLUCOSE BY METER POCT 143 (H) 70 - 99 mg/dL   Vancomycin level    Collection Time: 01/19/23  5:17 AM   Result Value Ref Range    Vancomycin 9.1   mg/L   Basic metabolic panel    Collection Time: 01/19/23  5:17 AM   Result Value Ref Range    Sodium 146 (H) 136 - 145 mmol/L    Potassium 4.0 3.5 - 5.0 mmol/L    Chloride 121 (H) 98 - 107 mmol/L    Carbon Dioxide (CO2) 18 (L) 22 - 31 mmol/L    Anion Gap 7 5 - 18 mmol/L    Urea Nitrogen 111 (H) 8 - 28 mg/dL    Creatinine 2.46 (H) 0.70 - 1.30 mg/dL    Calcium 8.0 (L) 8.5 - 10.5 mg/dL    Glucose 111 70 - 125 mg/dL    GFR Estimate 25 (L) >60 mL/min/1.73m2   CBC with platelets    Collection Time: 01/19/23  5:17 AM   Result Value Ref Range    WBC Count 4.7 4.0 - 11.0 10e3/uL    RBC Count 3.07 (L) 4.40 - 5.90 10e6/uL    Hemoglobin 8.9 (L) 13.3 - 17.7 g/dL    Hematocrit 26.9 (L) 40.0 - 53.0 %    MCV 88 78 - 100 fL    MCH 29.0 26.5 - 33.0 pg    MCHC 33.1 31.5 - 36.5 g/dL    RDW 14.0 10.0 - 15.0 %    Platelet Count 192 150 - 450 10e3/uL   Vitamin B12    Collection Time: 01/19/23  5:17 AM   Result Value Ref Range    Vitamin B12 <150 (L) 232 - 1,245 pg/mL   Ferritin    Collection Time: 01/19/23  5:17 AM   Result Value Ref Range    Ferritin 497 (H) 31 - 409 ng/mL   Glucose by meter    Collection Time: 01/19/23  8:45 AM   Result Value Ref Range    GLUCOSE BY METER POCT 105 (H) 70 - 99 mg/dL   Glucose by meter     Collection Time: 01/19/23 11:05 AM   Result Value Ref Range    GLUCOSE BY METER POCT 112 (H) 70 - 99 mg/dL   Hemoglobin    Collection Time: 01/19/23 12:22 PM   Result Value Ref Range    Hemoglobin 8.9 (L) 13.3 - 17.7 g/dL   Occult blood stool    Collection Time: 01/19/23  2:16 PM   Result Value Ref Range    Occult Blood Positive (A) Negative   Lactate Dehydrogenase    Collection Time: 01/19/23  2:31 PM   Result Value Ref Range    Lactate Dehydrogenase 210 125 - 220 U/L   Haptoglobin    Collection Time: 01/19/23  2:31 PM   Result Value Ref Range    Haptoglobin 134 32 - 197 mg/dL   Hepatic panel    Collection Time: 01/19/23  2:31 PM   Result Value Ref Range    Bilirubin Total 0.4 0.0 - 1.0 mg/dL    Bilirubin Direct 0.2 <=0.5 mg/dL    Protein Total 4.7 (L) 6.0 - 8.0 g/dL    Albumin 2.0 (L) 3.5 - 5.0 g/dL    Alkaline Phosphatase 59 45 - 120 U/L    AST 32 0 - 40 U/L    ALT 31 0 - 45 U/L   CBC with platelets and differential    Collection Time: 01/19/23  2:31 PM   Result Value Ref Range    WBC Count 4.8 4.0 - 11.0 10e3/uL    RBC Count 3.20 (L) 4.40 - 5.90 10e6/uL    Hemoglobin 9.2 (L) 13.3 - 17.7 g/dL    Hematocrit 28.3 (L) 40.0 - 53.0 %    MCV 88 78 - 100 fL    MCH 28.8 26.5 - 33.0 pg    MCHC 32.5 31.5 - 36.5 g/dL    RDW 13.9 10.0 - 15.0 %    Platelet Count 205 150 - 450 10e3/uL    % Neutrophils 82 %    % Lymphocytes 12 %    % Monocytes 3 %    % Eosinophils 2 %    % Basophils 0 %    % Immature Granulocytes 1 %    NRBCs per 100 WBC 0 <1 /100    Absolute Neutrophils 4.0 1.6 - 8.3 10e3/uL    Absolute Lymphocytes 0.6 (L) 0.8 - 5.3 10e3/uL    Absolute Monocytes 0.1 0.0 - 1.3 10e3/uL    Absolute Eosinophils 0.1 0.0 - 0.7 10e3/uL    Absolute Basophils 0.0 0.0 - 0.2 10e3/uL    Absolute Immature Granulocytes 0.1 <=0.4 10e3/uL    Absolute NRBCs 0.0 10e3/uL   Reticulocyte count    Collection Time: 01/19/23  2:31 PM   Result Value Ref Range    % Reticulocyte 1.7 0.8 - 2.7 %    Absolute Reticulocyte 0.053 0.010 - 0.110 10e6/uL    Glucose by meter    Collection Time: 01/19/23  4:06 PM   Result Value Ref Range    GLUCOSE BY METER POCT 128 (H) 70 - 99 mg/dL   Bld morphology pathology review    Collection Time: 01/19/23  7:57 PM   Result Value Ref Range    Final Diagnosis       Peripheral blood  - 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    The clinical history has been reviewed. Although other causes of ineffective erythropoiesis should be ruled out, the features are suggestive of an underlying disorder of globin synthesis versus anemia of chronic disease, and hemoglobin studies can be performed on the current specimen, if requested. Please correlate with family history and iron studies, if appropriate.          Clinical Information       Unexplained anemia 3.5g drop, no signs/symptoms of bleed      Performing Labs       The technical component of this testing was completed at [facility] and [facility]     CBC with platelets and differential    Collection Time: 01/19/23  7:57 PM   Result Value Ref Range    WBC Count 5.8 4.0 - 11.0 10e3/uL    RBC Count 3.25 (L) 4.40 - 5.90 10e6/uL    Hemoglobin 9.5 (L) 13.3 - 17.7 g/dL    Hematocrit 29.2 (L) 40.0 - 53.0 %    MCV 90 78 - 100 fL    MCH 29.2 26.5 - 33.0 pg    MCHC 32.5 31.5 - 36.5 g/dL    RDW 14.1 10.0 - 15.0 %    Platelet Count 187 150 - 450 10e3/uL    % Neutrophils 82 %    % Lymphocytes 12 %    % Monocytes 4 %    % Eosinophils 1 %    % Basophils 0 %    % Immature Granulocytes 1 %    NRBCs per 100 WBC 0 <1 /100    Absolute Neutrophils 4.8 1.6 - 8.3 10e3/uL    Absolute Lymphocytes 0.7 (L) 0.8 - 5.3 10e3/uL    Absolute Monocytes 0.2 0.0 - 1.3 10e3/uL    Absolute Eosinophils 0.1 0.0 - 0.7 10e3/uL    Absolute Basophils 0.0 0.0 - 0.2 10e3/uL    Absolute Immature Granulocytes 0.1 <=0.4 10e3/uL    Absolute NRBCs 0.0 10e3/uL   Reticulocyte count     Collection Time: 01/19/23  7:57 PM   Result Value Ref Range    % Reticulocyte 1.7 0.8 - 2.7 %    Absolute Reticulocyte 0.054 0.010 - 0.110 10e6/uL   Glucose by meter    Collection Time: 01/19/23  9:13 PM   Result Value Ref Range    GLUCOSE BY METER POCT 175 (H) 70 - 99 mg/dL   Basic metabolic panel    Collection Time: 01/20/23  5:52 AM   Result Value Ref Range    Sodium 150 (H) 136 - 145 mmol/L    Potassium 3.9 3.5 - 5.0 mmol/L    Chloride 125 (H) 98 - 107 mmol/L    Carbon Dioxide (CO2) 19 (L) 22 - 31 mmol/L    Anion Gap 6 5 - 18 mmol/L    Urea Nitrogen 78 (H) 8 - 28 mg/dL    Creatinine 1.92 (H) 0.70 - 1.30 mg/dL    Calcium 8.1 (L) 8.5 - 10.5 mg/dL    Glucose 95 70 - 125 mg/dL    GFR Estimate 34 (L) >60 mL/min/1.73m2   Folate    Collection Time: 01/20/23  5:52 AM   Result Value Ref Range    Folic Acid 11.2 4.6 - 34.8 ng/mL   Hemoglobin    Collection Time: 01/20/23  5:52 AM   Result Value Ref Range    Hemoglobin 9.1 (L) 13.3 - 17.7 g/dL   Vancomycin level    Collection Time: 01/20/23  5:52 AM   Result Value Ref Range    Vancomycin 11.4   mg/L   Glucose by meter    Collection Time: 01/20/23  8:06 AM   Result Value Ref Range    GLUCOSE BY METER POCT 83 70 - 99 mg/dL   Glucose by meter    Collection Time: 01/20/23 11:46 AM   Result Value Ref Range    GLUCOSE BY METER POCT 144 (H) 70 - 99 mg/dL   Sodium    Collection Time: 01/20/23 12:39 PM   Result Value Ref Range    Sodium 145 136 - 145 mmol/L   Glucose by meter    Collection Time: 01/20/23  5:21 PM   Result Value Ref Range    GLUCOSE BY METER POCT 232 (H) 70 - 99 mg/dL   Glucose by meter    Collection Time: 01/20/23  9:02 PM   Result Value Ref Range    GLUCOSE BY METER POCT 173 (H) 70 - 99 mg/dL   Platelet count    Collection Time: 01/21/23  6:53 AM   Result Value Ref Range    Platelet Count 154 150 - 450 10e3/uL   Basic metabolic panel    Collection Time: 01/21/23  6:53 AM   Result Value Ref Range    Sodium 145 136 - 145 mmol/L    Potassium 4.3 3.5 - 5.0 mmol/L     Chloride 119 (H) 98 - 107 mmol/L    Carbon Dioxide (CO2) 19 (L) 22 - 31 mmol/L    Anion Gap 7 5 - 18 mmol/L    Urea Nitrogen 57 (H) 8 - 28 mg/dL    Creatinine 1.62 (H) 0.70 - 1.30 mg/dL    Calcium 8.0 (L) 8.5 - 10.5 mg/dL    Glucose 145 (H) 70 - 125 mg/dL    GFR Estimate 41 (L) >60 mL/min/1.73m2   Hemoglobin    Collection Time: 01/21/23  6:53 AM   Result Value Ref Range    Hemoglobin 8.8 (L) 13.3 - 17.7 g/dL   Glucose by meter    Collection Time: 01/21/23  7:33 AM   Result Value Ref Range    GLUCOSE BY METER POCT 136 (H) 70 - 99 mg/dL   Glucose by meter    Collection Time: 01/21/23  3:33 PM   Result Value Ref Range    GLUCOSE BY METER POCT 165 (H) 70 - 99 mg/dL   Glucose by meter    Collection Time: 01/21/23  8:39 PM   Result Value Ref Range    GLUCOSE BY METER POCT 245 (H) 70 - 99 mg/dL   Basic metabolic panel    Collection Time: 01/22/23  6:16 AM   Result Value Ref Range    Sodium 140 136 - 145 mmol/L    Potassium 4.9 3.5 - 5.0 mmol/L    Chloride 115 (H) 98 - 107 mmol/L    Carbon Dioxide (CO2) 22 22 - 31 mmol/L    Anion Gap 3 (L) 5 - 18 mmol/L    Urea Nitrogen 47 (H) 8 - 28 mg/dL    Creatinine 1.38 (H) 0.70 - 1.30 mg/dL    Calcium 8.0 (L) 8.5 - 10.5 mg/dL    Glucose 148 (H) 70 - 125 mg/dL    GFR Estimate 50 (L) >60 mL/min/1.73m2   Glucose by meter    Collection Time: 01/22/23  8:15 AM   Result Value Ref Range    GLUCOSE BY METER POCT 120 (H) 70 - 99 mg/dL   Glucose by meter    Collection Time: 01/22/23 11:49 AM   Result Value Ref Range    GLUCOSE BY METER POCT 175 (H) 70 - 99 mg/dL   Glucose by meter    Collection Time: 01/22/23  4:43 PM   Result Value Ref Range    GLUCOSE BY METER POCT 121 (H) 70 - 99 mg/dL   Hemoglobin    Collection Time: 01/22/23  6:12 PM   Result Value Ref Range    Hemoglobin 9.3 (L) 13.3 - 17.7 g/dL   Glucose by meter    Collection Time: 01/22/23  8:26 PM   Result Value Ref Range    GLUCOSE BY METER POCT 244 (H) 70 - 99 mg/dL   Creatinine    Collection Time: 01/23/23  5:00 AM   Result Value  Ref Range    Creatinine 1.23 0.70 - 1.30 mg/dL    GFR Estimate 57 (L) >60 mL/min/1.73m2   Hemoglobin    Collection Time: 01/23/23  5:00 AM   Result Value Ref Range    Hemoglobin 8.5 (L) 13.3 - 17.7 g/dL   Glucose by meter    Collection Time: 01/23/23  8:06 AM   Result Value Ref Range    GLUCOSE BY METER POCT 105 (H) 70 - 99 mg/dL   Glucose by meter    Collection Time: 01/23/23 12:34 PM   Result Value Ref Range    GLUCOSE BY METER POCT 226 (H) 70 - 99 mg/dL             Imaging Results     CT Chest Abdomen Pelvis w/o Contrast    Result Date: 1/17/2023  EXAM: CT CHEST ABDOMEN PELVIS W/O CONTRAST LOCATION: [facility] DATE/TIME: 1/17/2023 4:11 PM INDICATION: vomiting, AMS, eval for pathology COMPARISON: 2/15/2022 and 3/10/2014 TECHNIQUE: CT scan of the chest, abdomen, and pelvis was performed without IV contrast. Multiplanar reformats were obtained. Dose reduction techniques were used. CONTRAST: None. FINDINGS: LUNGS AND PLEURA: Elevated right hemidiaphragm with associated atelectasis. MEDIASTINUM/AXILLAE: Mitral annular calcification. CORONARY ARTERY CALCIFICATION: Severe. HEPATOBILIARY: Cholecystectomy. Pneumobilia is again seen. PANCREAS: Normal. SPLEEN: Normal. ADRENAL GLANDS: Normal. KIDNEYS/BLADDER: Simple left renal cysts do not require follow-up. There is left and right hydronephrosis and hydroureter extending to the urinary bladder. No ureteral calculi. Again seen is a nonobstructing calculus at the upper pole of the right kidney. There is circumferential and more focal wall thickening of the urinary bladder with mild adjacent stranding. A Dietrich catheter is present though the bladder is not decompressed. BOWEL: Mild colonic diverticulosis. Large amount of stool in the rectum LYMPH NODES: Normal. VASCULATURE: Atherosclerotic disease. PELVIC ORGANS: Mildly enlarged prostate gland. 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 There  may be bony fusion of L5 and S1.     IMPRESSION: 1.  Circumferential and more focal bladder wall thickening. Bilateral hydronephrosis and hydroureter extending to the bladder. Findings are concerning for cystitis. Other bladder pathology cannot be excluded on this study. The bladder is not decompressed  despite presence of a Dietrich catheter; correlate for catheter dysfunction. 2.  Cannot assess the portal or systemic venous system on this study. 3.  Advanced atherosclerotic disease including coronary artery disease.     Head CT w/o contrast    Result Date: 1/17/2023  EXAM: CT HEAD W/O CONTRAST LOCATION: [facility] DATE/TIME: 1/17/2023 1:32 PM INDICATION: Confusion COMPARISON: CT head 1/26/2022 TECHNIQUE: Routine CT Head without IV contrast. Multiplanar reformats. Dose reduction techniques were used. FINDINGS: INTRACRANIAL CONTENTS: No intracranial hemorrhage, extraaxial collection, or mass effect.  No CT evidence of acute infarct. Unchanged mild presumed chronic small vessel ischemic changes. Unchanged moderate to severe generalized volume loss. No hydrocephalus. VISUALIZED ORBITS/SINUSES/MASTOIDS: No intraorbital abnormality. No paranasal sinus mucosal disease. No middle ear or mastoid effusion. BONES/SOFT TISSUES: No acute abnormality.     IMPRESSION: 1.  No acute intracranial process.          Electronically signed by    Charlotte Mckenna MD

## 2023-01-25 NOTE — LETTER
Hand-off  for Care Coordination  What is Care Coordination?  [facility] Care Coordination Services are available to people in complex situations,   for example medical, social or financial. The Care Coordinator, a SW or an RN, works with the   patient and their doctor to determine health goals, obtain resources, achieve outcomes,   and develop plans to coordinate care across settings.      o Patient Name:   Caleb Hernandez  o Patient :     [id number]  o Patient PCP:     DALE CORTEZ MD    o Patient Primary Clinic:   [address] [place] [address] [address] [facility] [id number]  o D/C Facility: _____________________________________________   o TCU Contact Info for questions: ___________________________  o D/C Date:  ______________________________________________  o Follow-up Apt with PCP after TCU D/C:   ____________________  o Other Follow-up Apt s:      _________________________________________  Additional information (concerns, and Home Care, ect ):   __________________________________________________________________  __________________________________________________________________  Care Coordinator to Contact at DC  Fax to: [phone]  Attn: Pat Baum RN Clinic Care Coordination [facility]  Phone: [phone]

## 2023-01-25 NOTE — LETTER
"    1/25/2023        RE: Caleb Hernandez  [address]  [place] [address]        Code Status:  FULL CODE  Visit Type: Hospital F/U     Facility:   [facility] ([facility]) [[id number]]        History of Present Illness:   Hospital Admission Date: 1/17/2023      Hospital Discharge Date: 1/23/2023      Caleb Hernandez is a 87 year old male with past medical history for past medical history for paroxysmal afib, CKD, recurrent UTIs, cognitive impairment, DM2, HTN, CAD, lymphedema.  He was recently hospitalized after \"not feeling well\" and found to have sepsis 2/2 UTI and MANJIT with urinary retention.  He was treated with broad-spectrum abx and mai catheter was placed.  MANJIT improved with IVF and infection treatment.      His B12 was <150 and was started on B12.      He had a hgb drop from 12 to 8.8 and was seen by GI for possible bleeding on Eliquis.  It was recommend he follow up as outpatient.      Today, therapy tells me that he did not participate in therapy yesterday due to complaints of back pain with movement.  He states that he had pain in his legs this morning but \"moved them\" and now the pain is gone.  Nursing reports giving him apap this AM.  Upon exam, he has no spine tenderness and and endorses muscle soreness when his right lumbar area is rubbed.  He was able to ambulate a bit with therapy.  His mai is draining yellow urine with high sediment.  He denies any abdominal pain or discomfort.      BS ranging 145-291 and he was put on sliding scale insulin from the hospital.     Past Medical History:   Diagnosis Date     Abscess of right foot 11/23/2020    Added automatically from request for surgery [id number]     Acute pulmonary embolism with acute cor pulmonale (H) 5/23/2020     Ascending cholangitis 1/27/2022     BPH (benign prostatic hyperplasia)      Cholelithiasis      Closed fracture of rib     Created by Conversion  Replacement Utility updated for latest IMO load     Closed fracture of thoracic vertebra (H) "     Created by Conversion  Replacement Utility updated for latest IMO load     Common bile duct (CBD) obstruction 9/4/2017     Diabetes mellitus (H)      Essential hypertension      Gram-negative bacteremia 1/27/2022    Positive blood culture 1/26/2022; likely biliary source     Hyperlipidemia      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    Added automatically from request for surgery [id number]      Pancreatitis      Sepsis (H) 1/27/2022     Sepsis due to pneumonia (H) 9/8/2017     Septic arthritis of right foot (H) 12/2/2020     Past Surgical History:   Procedure Laterality Date     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    Procedure: with amputation of the fifth ray, peroneal brevis tendon transfer;  Surgeon: John Garcia DPM;  Location: [facility];  Service: Podiatry     BACK SURGERY      1964 removed a cyst     CHOLECYSTECTOMY  1985     ENDOSCOPIC RETROGRADE CHOLANGIOPANCREATOGRAM       ENDOSCOPIC RETROGRADE CHOLANGIOPANCREATOGRAM N/A 9/5/2017    Procedure: ENDOSCOPIC RETROGRADE CHOLANGIOPANCREATOGRAPHY SPHINCTEROTOMY AND STONE EXTRACTION;  Surgeon: Hansel Gannon MD;  Location: [facility];  Service:      ENDOSCOPIC RETROGRADE CHOLANGIOPANCREATOGRAM N/A 1/27/2022    Procedure: ENDOSCOPIC RETROGRADE CHOLANGIOPANCREATOGRAPHY, BALLOON DILATION AND STONE EXTRACTION;  Surgeon: Sav Osborne MD;  Location: [facility]     INCISION AND DRAINAGE OF WOUND Right 11/2/2020    Procedure: INCISION AND DRAINAGE, right foot with removal of bone 5th metatarsal;  Surgeon: John Garcia DPM;  Location: [facility] OR;  Service: Podiatry     INCISION AND DRAINAGE OF WOUND Right 11/16/2020    Procedure: INCISION AND DRAINAGE, right foot;  Surgeon: John Garcia DPM;  Location: [facility] OR;  Service: Podiatry     INCISION AND DRAINAGE OF WOUND Right 11/27/2020    Procedure: INCISION AND DRAINAGE, LOWER EXTREMITY;  Surgeon: Aleksandr Hdez DPM;  Location: [facility];  Service:  Podiatry     IR EXTREMITY ANGIOGRAM RIGHT  2020     IR LOWER EXTREMITY ANGIOGRAM RIGHT  2020     PICC  2020          TONSILLECTOMY  1940     Family History   Problem Relation Age of Onset     Aortic aneurysm Mother      Alcoholism Father      Social History     Socioeconomic History     Marital status:      Spouse name: Not on file     Number of children: Not on file     Years of education: Not on file     Highest education level: Not on file   Occupational History     Not on file   Tobacco Use     Smoking status: Former     Types: Cigarettes     Quit date: 1991     Years since quittin.2     Smokeless tobacco: Never   Vaping Use     Vaping Use: Never used   Substance and Sexual Activity     Alcohol use: No     Drug use: No     Sexual activity: Never   Other Topics Concern     Parent/sibling w/ CABG, MI or angioplasty before 65F 55M? Not Asked   Social History Narrative     Not on file     Social Determinants of Health     Financial Resource Strain: Not on file   Food Insecurity: Not on file   Transportation Needs: Not on file   Physical Activity: Not on file   Stress: Not on file   Social Connections: Not on file   Intimate Partner Violence: Not on file   Housing Stability: Not on file       Current Outpatient Medications   Medication Sig Dispense Refill     amoxicillin-clavulanate (AUGMENTIN) 875-125 MG tablet Take 1 tablet by mouth daily for 9 days 9 tablet 0     apixaban ANTICOAGULANT (ELIQUIS) 2.5 MG tablet Take 1 tablet (2.5 mg) by mouth 2 times daily 60 tablet 0     aspirin 81 MG EC tablet [ASPIRIN 81 MG EC TABLET] Take 81 mg by mouth daily.       B-D U/F 31G X 8 MM insulin pen needle USED TO INJECT INSULIN DAILY 100 each 11     blood-glucose meter (ONETOUCH VERIO IQ METER) Misc [BLOOD-GLUCOSE METER (ONETOUCH VERIO IQ METER) MISC] Use 1 each As Directed 2 (two) times a day. 1 each 0     Continuous Blood Gluc  (FREESTYLE APRIL 2 READER) MILE USE AS DIRECTED 1 each 0      Continuous Blood Gluc Sensor (FREESTYLE APRIL 2 SENSOR) MISC USE AS DIRECTED EVERY 14 DAYS 2 each 11     Continuous Blood Gluc Sensor (FREESTYLE APRIL 2 SENSOR) [facility] 1 kit every 14 days Use per manufacture's directions to check glucose daily. 6 each 1     Continuous Blood Gluc Sensor (FREESTYLE APRIL 2 SENSOR) [facility] 1 each every 14 days 1 each every 14 days. Change every 14 days. 6 each 5     cyanocobalamin (CYANOCOBALAMIN) 1000 MCG tablet Take 1 tablet (1,000 mcg) by mouth daily 60 tablet 0     insulin aspart (NOVOLOG PEN) 100 UNIT/ML pen Inject 1-3 Units Subcutaneous 3 times daily (before meals) Correction Scale - LOW INSULIN RESISTANCE DOSING     Do Not give Correction Insulin if Pre-Meal BG less than 140.   For Pre-Meal  - 239 give 1 unit.   For Pre-Meal  - 339 give 2 units.   For Pre-Meal BG greater than or equal to 340 give 3 units.   To be given with prandial insulin, and based on pre-meal blood glucose.   Notify provider if glucose greater than or equal to 350 mg/dL after administration of correction dose.  If given at mealtime, administer within 30 minutes of start of meal. 15 mL 0     insulin glargine (LANTUS SOLOSTAR) 100 UNIT/ML pen Inject 23 Units Subcutaneous At Bedtime 15 mL 0     insulin pen needle (32G X 6 MM) 32G X 6 MM miscellaneous Use 4 pen needles daily or as directed. 100 each 11     multivit-min/FA/lycopen/lutein (CENTRUM SILVER MEN ORAL) [MULTIVIT-MIN/FA/LYCOPEN/LUTEIN (CENTRUM SILVER MEN ORAL)] Take 1 tablet by mouth daily.       mupirocin (BACTROBAN) 2 % ointment Apply topically daily as needed Apply to foot wound       ONETOUCH ULTRA test strip USE TO TEST TWICE DAILY 200 strip 3     polyethylene glycol (MIRALAX) 17 gram packet [POLYETHYLENE GLYCOL (MIRALAX) 17 GRAM PACKET] Take 1 packet (17 g total) by mouth daily as needed. 100 packet 3     tamsulosin (FLOMAX) 0.4 MG capsule TAKE 1 CAPSULE BY MOUTH EVERY DAY AFTER SUPPER 90 capsule 3     vitamin D3 (CHOLECALCIFEROL) 125  "MCG (5000 UT) tablet Take 1 tablet (125 mcg) by mouth daily       vitamin E 400 unit capsule [VITAMIN E 400 UNIT CAPSULE] Take 400 Units by mouth daily.       Allergies   Allergen Reactions     Cresol [Phenol] Unknown     Muscle cramps     Demeclocycline Hives and Rash     Crestor [Rosuvastatin] Muscle Pain (Myalgia)     Immunization History   Administered Date(s) Administered     COVID-19 Vaccine 18+ (Moderna) 01/25/2021, 02/22/2021     COVID-19 Vaccine Bivalent Booster 18+ (Moderna) 10/07/2022     COVID-19,PF,Moderna Booster 11/24/2021, 05/27/2022     Mantoux Tuberculin Skin Test 12/09/2020, 12/23/2020, 02/04/2022, 02/14/2022     Pneumo Conj 13-V (2010&after) 01/01/2016, 10/22/2020     Pneumococcal 23 valent 09/29/2003         Post Discharge Medication Reconciliation Status: discharge medications reconciled and changed, per note/orders.    Medications list and allergies in the facility chart have been reviewed.  Please see facility EMR for most up to date list.         Review of Systems   Patient denies fever, chills, headache, lightheadedness, dizziness, rhinorrhea, cough, congestion, shortness of breath, chest pain, palpitations, abdominal pain, n/v, diarrhea, constipation, change in appetite, change in sleep pattern, dysuria, frequency, burning or pain with urination.  Other than stated in HPI all other review of systems is negative.         Physical Exam  Vital signs:/64   Pulse 74   Temp 98.2  F (36.8  C)   Resp 16   Ht 1.727 m (5' 8\")   Wt 72.1 kg (159 lb)   SpO2 97%   BMI 24.18 kg/m     GENERAL APPEARANCE: elderly frail male,  in no acute distress.  HEENT: normocephalic, atraumatic   sclerae anicteric, conjunctivae clear and moist, EOM intact  LUNGS: Lung sounds CTA, no adventitious sounds, respiratory effort normal.  CARD: RRR, S1, S2, without murmurs, gallops, rubs  BACK: No kyphosis of the thoracic spine. S no CVA tenderness, no spinal tenderness. Muscle soreness right lumbar area  ABD: " Soft, nondistended and nontender with normal bowel sounds.   MSK: Muscle strength and tone were equal bilaterally. Moves all extremities easily and intentionally.   EXTREMITIES: No cyanosis, clubbing or edema.  NEURO: Alert with cognitive impairment,   Face is symmetric.  SKIN: Inspection of the skin reveals no rashes, ulcerations or petechiae.  PSYCH: euthymic          Labs:    Last Comprehensive Metabolic Panel:  Sodium   Date Value Ref Range Status   01/22/2023 140 136 - 145 mmol/L Final     Potassium   Date Value Ref Range Status   01/22/2023 4.9 3.5 - 5.0 mmol/L Final     Chloride   Date Value Ref Range Status   01/22/2023 115 (H) 98 - 107 mmol/L Final     Carbon Dioxide (CO2)   Date Value Ref Range Status   01/22/2023 22 22 - 31 mmol/L Final     Anion Gap   Date Value Ref Range Status   01/22/2023 3 (L) 5 - 18 mmol/L Final     Glucose   Date Value Ref Range Status   01/22/2023 148 (H) 70 - 125 mg/dL Final     GLUCOSE BY METER POCT   Date Value Ref Range Status   01/23/2023 226 (H) 70 - 99 mg/dL Final     Urea Nitrogen   Date Value Ref Range Status   01/22/2023 47 (H) 8 - 28 mg/dL Final     Creatinine   Date Value Ref Range Status   01/23/2023 1.23 0.70 - 1.30 mg/dL Final     GFR Estimate   Date Value Ref Range Status   01/23/2023 57 (L) >60 mL/min/1.73m2 Final     Comment:     Effective December 21, 2021 eGFRcr in adults is calculated using the 2021 CKD-EPI creatinine equation which includes age and gender (Herbie patton al., NEJM, DOI: 10.1056/DRSHum5944090)   05/25/2021 >60 >60 mL/min/1.73m2 Final     Calcium   Date Value Ref Range Status   01/22/2023 8.0 (L) 8.5 - 10.5 mg/dL Final     Lab Results   Component Value Date    WBC 5.8 01/19/2023     Lab Results   Component Value Date    RBC 3.25 01/19/2023     Lab Results   Component Value Date    HGB 8.5 01/23/2023     Lab Results   Component Value Date    HCT 29.2 01/19/2023     Lab Results   Component Value Date    MCV 90 01/19/2023     Lab Results   Component  Value Date    MCH 29.2 01/19/2023     Lab Results   Component Value Date    MCHC 32.5 01/19/2023     Lab Results   Component Value Date    RDW 14.1 01/19/2023     Lab Results   Component Value Date     01/21/2023           Assessment/plan:   Type 2 diabetes mellitus with diabetic peripheral angiopathy without gangrene, with long-term current use of insulin (H)  Last A1c 7.5 last week.  This should be goal for patient.  Will continue to monitor BS and continue with Glargine and Aspart.  I don't think patient can manage sliding scale insulin so try to get patient on sliding scale insulin if able.      Paroxysmal atrial fibrillation (H)  Controlled continue without rate controlling medications.  On apixaban     Stage 3a chronic kidney disease (H)  Last gfr 57 which is baseline, monitor and avoid nephrotoxins    Acute right-sided low back pain without sciatica  Likely related to the bed.  Scheduled apap 500mg in the AM and nursing to apply trolamine cream BID.  Monitor for progress in therapy.      Acute cystitis without hematuria  Lots of sediment today, will continue with augmentin through 2/2 and then will attempt void trial if catheter needs to be put back in will refer to urology.      Coronary artery disease involving native coronary artery of native heart without angina pectoris  No chest pain, continue with home asa and not on a statin due to intolerance    Essential hypertension  Diet controlled.    Cognitive impairment  Will need cognitive testing in order to determine his discharge plan.  On no medications.     45 total minutes spent with 25 minutes spent with patient, nursing and therapy reviewing pain status, observing his mobility today and counseling on the plan of care with patient, nursing and DON.      Electronically signed by: Steph Reveles NP          Sincerely,        Steph Reveles NP

## 2023-01-25 NOTE — PROGRESS NOTES
"Code Status:  FULL CODE  Visit Type: Hospital F/U     Facility:   [facility] ([facility]) [[id number]]        History of Present Illness:   Hospital Admission Date: 1/17/2023      Hospital Discharge Date: 1/23/2023      Caleb Hernandez is a 87 year old male with past medical history for past medical history for paroxysmal afib, CKD, recurrent UTIs, cognitive impairment, DM2, HTN, CAD, lymphedema.  He was recently hospitalized after \"not feeling well\" and found to have sepsis 2/2 UTI and MANJIT with urinary retention.  He was treated with broad-spectrum abx and mai catheter was placed.  MANJIT improved with IVF and infection treatment.      His B12 was <150 and was started on B12.      He had a hgb drop from 12 to 8.8 and was seen by GI for possible bleeding on Eliquis.  It was recommend he follow up as outpatient.      Today, therapy tells me that he did not participate in therapy yesterday due to complaints of back pain with movement.  He states that he had pain in his legs this morning but \"moved them\" and now the pain is gone.  Nursing reports giving him apap this AM.  Upon exam, he has no spine tenderness and and endorses muscle soreness when his right lumbar area is rubbed.  He was able to ambulate a bit with therapy.  His mai is draining yellow urine with high sediment.  He denies any abdominal pain or discomfort.      BS ranging 145-291 and he was put on sliding scale insulin from the hospital.     Past Medical History:   Diagnosis Date     Abscess of right foot 11/23/2020    Added automatically from request for surgery [id number]     Acute pulmonary embolism with acute cor pulmonale (H) 5/23/2020     Ascending cholangitis 1/27/2022     BPH (benign prostatic hyperplasia)      Cholelithiasis      Closed fracture of rib     Created by Conversion  Replacement Utility updated for latest IMO load     Closed fracture of thoracic vertebra (H)     Created by Conversion  Replacement Utility updated for latest IMO load     " Common bile duct (CBD) obstruction 9/4/2017     Diabetes mellitus (H)      Essential hypertension      Gram-negative bacteremia 1/27/2022    Positive blood culture 1/26/2022; likely biliary source     Hyperlipidemia      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    Added automatically from request for surgery [id number]      Pancreatitis      Sepsis (H) 1/27/2022     Sepsis due to pneumonia (H) 9/8/2017     Septic arthritis of right foot (H) 12/2/2020     Past Surgical History:   Procedure Laterality Date     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    Procedure: with amputation of the fifth ray, peroneal brevis tendon transfer;  Surgeon: John Garcia DPM;  Location: [facility];  Service: Podiatry     BACK SURGERY      1964 removed a cyst     CHOLECYSTECTOMY  1985     ENDOSCOPIC RETROGRADE CHOLANGIOPANCREATOGRAM       ENDOSCOPIC RETROGRADE CHOLANGIOPANCREATOGRAM N/A 9/5/2017    Procedure: ENDOSCOPIC RETROGRADE CHOLANGIOPANCREATOGRAPHY SPHINCTEROTOMY AND STONE EXTRACTION;  Surgeon: Hansel Gannon MD;  Location: [facility] OR;  Service:      ENDOSCOPIC RETROGRADE CHOLANGIOPANCREATOGRAM N/A 1/27/2022    Procedure: ENDOSCOPIC RETROGRADE CHOLANGIOPANCREATOGRAPHY, BALLOON DILATION AND STONE EXTRACTION;  Surgeon: Sav Osborne MD;  Location: [facility]     INCISION AND DRAINAGE OF WOUND Right 11/2/2020    Procedure: INCISION AND DRAINAGE, right foot with removal of bone 5th metatarsal;  Surgeon: John Garcia DPM;  Location: [facility] OR;  Service: Podiatry     INCISION AND DRAINAGE OF WOUND Right 11/16/2020    Procedure: INCISION AND DRAINAGE, right foot;  Surgeon: John Garcia DPM;  Location: [facility] OR;  Service: Podiatry     INCISION AND DRAINAGE OF WOUND Right 11/27/2020    Procedure: INCISION AND DRAINAGE, LOWER EXTREMITY;  Surgeon: Aleksandr Hdez DPM;  Location: [facility] OR;  Service: Podiatry     IR EXTREMITY ANGIOGRAM RIGHT  11/24/2020     IR LOWER EXTREMITY  ANGIOGRAM RIGHT  2020     PICC  2020          TONSILLECTOMY  1940     Family History   Problem Relation Age of Onset     Aortic aneurysm Mother      Alcoholism Father      Social History     Socioeconomic History     Marital status:      Spouse name: Not on file     Number of children: Not on file     Years of education: Not on file     Highest education level: Not on file   Occupational History     Not on file   Tobacco Use     Smoking status: Former     Types: Cigarettes     Quit date: 1991     Years since quittin.2     Smokeless tobacco: Never   Vaping Use     Vaping Use: Never used   Substance and Sexual Activity     Alcohol use: No     Drug use: No     Sexual activity: Never   Other Topics Concern     Parent/sibling w/ CABG, MI or angioplasty before 65F 55M? Not Asked   Social History Narrative     Not on file     Social Determinants of Health     Financial Resource Strain: Not on file   Food Insecurity: Not on file   Transportation Needs: Not on file   Physical Activity: Not on file   Stress: Not on file   Social Connections: Not on file   Intimate Partner Violence: Not on file   Housing Stability: Not on file       Current Outpatient Medications   Medication Sig Dispense Refill     amoxicillin-clavulanate (AUGMENTIN) 875-125 MG tablet Take 1 tablet by mouth daily for 9 days 9 tablet 0     apixaban ANTICOAGULANT (ELIQUIS) 2.5 MG tablet Take 1 tablet (2.5 mg) by mouth 2 times daily 60 tablet 0     aspirin 81 MG EC tablet [ASPIRIN 81 MG EC TABLET] Take 81 mg by mouth daily.       B-D U/F 31G X 8 MM insulin pen needle USED TO INJECT INSULIN DAILY 100 each 11     blood-glucose meter (ONETOUCH VERIO IQ METER) Misc [BLOOD-GLUCOSE METER (ONETOUCH VERIO IQ METER) MISC] Use 1 each As Directed 2 (two) times a day. 1 each 0     Continuous Blood Gluc  (FREESTYLE APRIL 2 READER) MILE USE AS DIRECTED 1 each 0     Continuous Blood Gluc Sensor (FREESTYLE APRIL 2 SENSOR) MISC USE AS  DIRECTED EVERY 14 DAYS 2 each 11     Continuous Blood Gluc Sensor (FREESTYLE APRIL 2 SENSOR) [facility] [place] kit every 14 days Use per manufacture's directions to check glucose daily. 6 each 1     Continuous Blood Gluc Sensor (FREESTYLE APRIL 2 SENSOR) [facility] [place] each every 14 days 1 each every 14 days. Change every 14 days. 6 each 5     cyanocobalamin (CYANOCOBALAMIN) 1000 MCG tablet Take 1 tablet (1,000 mcg) by mouth daily 60 tablet 0     insulin aspart (NOVOLOG PEN) 100 UNIT/ML pen Inject 1-3 Units Subcutaneous 3 times daily (before meals) Correction Scale - LOW INSULIN RESISTANCE DOSING     Do Not give Correction Insulin if Pre-Meal BG less than 140.   For Pre-Meal  - 239 give 1 unit.   For Pre-Meal  - 339 give 2 units.   For Pre-Meal BG greater than or equal to 340 give 3 units.   To be given with prandial insulin, and based on pre-meal blood glucose.   Notify provider if glucose greater than or equal to 350 mg/dL after administration of correction dose.  If given at mealtime, administer within 30 minutes of start of meal. 15 mL 0     insulin glargine (LANTUS SOLOSTAR) 100 UNIT/ML pen Inject 23 Units Subcutaneous At Bedtime 15 mL 0     insulin pen needle (32G X 6 MM) 32G X 6 MM miscellaneous Use 4 pen needles daily or as directed. 100 each 11     multivit-min/FA/lycopen/lutein (CENTRUM SILVER MEN ORAL) [MULTIVIT-MIN/FA/LYCOPEN/LUTEIN (CENTRUM SILVER MEN ORAL)] Take 1 tablet by mouth daily.       mupirocin (BACTROBAN) 2 % ointment Apply topically daily as needed Apply to foot wound       ONETOUCH ULTRA test strip USE TO TEST TWICE DAILY 200 strip 3     polyethylene glycol (MIRALAX) 17 gram packet [POLYETHYLENE GLYCOL (MIRALAX) 17 GRAM PACKET] Take 1 packet (17 g total) by mouth daily as needed. 100 packet 3     tamsulosin (FLOMAX) 0.4 MG capsule TAKE 1 CAPSULE BY MOUTH EVERY DAY AFTER SUPPER 90 capsule 3     vitamin D3 (CHOLECALCIFEROL) 125 MCG (5000 UT) tablet Take 1 tablet (125 mcg) by mouth daily       vitamin  "E 400 unit capsule [VITAMIN E 400 UNIT CAPSULE] Take 400 Units by mouth daily.       Allergies   Allergen Reactions     Cresol [Phenol] Unknown     Muscle cramps     Demeclocycline Hives and Rash     Crestor [Rosuvastatin] Muscle Pain (Myalgia)     Immunization History   Administered Date(s) Administered     COVID-19 Vaccine 18+ (Moderna) 01/25/2021, 02/22/2021     COVID-19 Vaccine Bivalent Booster 18+ (Moderna) 10/07/2022     COVID-19,PF,Moderna Booster 11/24/2021, 05/27/2022     Mantoux Tuberculin Skin Test 12/09/2020, 12/23/2020, 02/04/2022, 02/14/2022     Pneumo Conj 13-V (2010&after) 01/01/2016, 10/22/2020     Pneumococcal 23 valent 09/29/2003         Post Discharge Medication Reconciliation Status: discharge medications reconciled and changed, per note/orders.    Medications list and allergies in the facility chart have been reviewed.  Please see facility EMR for most up to date list.         Review of Systems   Patient denies fever, chills, headache, lightheadedness, dizziness, rhinorrhea, cough, congestion, shortness of breath, chest pain, palpitations, abdominal pain, n/v, diarrhea, constipation, change in appetite, change in sleep pattern, dysuria, frequency, burning or pain with urination.  Other than stated in HPI all other review of systems is negative.         Physical Exam  Vital signs:/64   Pulse 74   Temp 98.2  F (36.8  C)   Resp 16   Ht 1.727 m (5' 8\")   Wt 72.1 kg (159 lb)   SpO2 97%   BMI 24.18 kg/m     GENERAL APPEARANCE: elderly frail male,  in no acute distress.  HEENT: normocephalic, atraumatic   sclerae anicteric, conjunctivae clear and moist, EOM intact  LUNGS: Lung sounds CTA, no adventitious sounds, respiratory effort normal.  CARD: RRR, S1, S2, without murmurs, gallops, rubs  BACK: No kyphosis of the thoracic spine. S no CVA tenderness, no spinal tenderness. Muscle soreness right lumbar area  ABD: Soft, nondistended and nontender with normal bowel sounds.   MSK: Muscle " strength and tone were equal bilaterally. Moves all extremities easily and intentionally.   EXTREMITIES: No cyanosis, clubbing or edema.  NEURO: Alert with cognitive impairment,   Face is symmetric.  SKIN: Inspection of the skin reveals no rashes, ulcerations or petechiae.  PSYCH: euthymic          Labs:    Last Comprehensive Metabolic Panel:  Sodium   Date Value Ref Range Status   01/22/2023 140 136 - 145 mmol/L Final     Potassium   Date Value Ref Range Status   01/22/2023 4.9 3.5 - 5.0 mmol/L Final     Chloride   Date Value Ref Range Status   01/22/2023 115 (H) 98 - 107 mmol/L Final     Carbon Dioxide (CO2)   Date Value Ref Range Status   01/22/2023 22 22 - 31 mmol/L Final     Anion Gap   Date Value Ref Range Status   01/22/2023 3 (L) 5 - 18 mmol/L Final     Glucose   Date Value Ref Range Status   01/22/2023 148 (H) 70 - 125 mg/dL Final     GLUCOSE BY METER POCT   Date Value Ref Range Status   01/23/2023 226 (H) 70 - 99 mg/dL Final     Urea Nitrogen   Date Value Ref Range Status   01/22/2023 47 (H) 8 - 28 mg/dL Final     Creatinine   Date Value Ref Range Status   01/23/2023 1.23 0.70 - 1.30 mg/dL Final     GFR Estimate   Date Value Ref Range Status   01/23/2023 57 (L) >60 mL/min/1.73m2 Final     Comment:     Effective December 21, 2021 eGFRcr in adults is calculated using the 2021 CKD-EPI creatinine equation which includes age and gender (Herbie patton al., NEJM, DOI: 10.1056/IBGNbq4020778)   05/25/2021 >60 >60 mL/min/1.73m2 Final     Calcium   Date Value Ref Range Status   01/22/2023 8.0 (L) 8.5 - 10.5 mg/dL Final     Lab Results   Component Value Date    WBC 5.8 01/19/2023     Lab Results   Component Value Date    RBC 3.25 01/19/2023     Lab Results   Component Value Date    HGB 8.5 01/23/2023     Lab Results   Component Value Date    HCT 29.2 01/19/2023     Lab Results   Component Value Date    MCV 90 01/19/2023     Lab Results   Component Value Date    MCH 29.2 01/19/2023     Lab Results   Component Value Date     [facility] 32.5 01/19/2023     Lab Results   Component Value Date    RDW 14.1 01/19/2023     Lab Results   Component Value Date     01/21/2023           Assessment/plan:   Type 2 diabetes mellitus with diabetic peripheral angiopathy without gangrene, with long-term current use of insulin (H)  Last A1c 7.5 last week.  This should be goal for patient.  Will continue to monitor BS and continue with Glargine and Aspart.  I don't think patient can manage sliding scale insulin so try to get patient on sliding scale insulin if able.      Paroxysmal atrial fibrillation (H)  Controlled continue without rate controlling medications.  On apixaban     Stage 3a chronic kidney disease (H)  Last gfr 57 which is baseline, monitor and avoid nephrotoxins    Acute right-sided low back pain without sciatica  Likely related to the bed.  Scheduled apap 500mg in the AM and nursing to apply trolamine cream BID.  Monitor for progress in therapy.      Acute cystitis without hematuria  Lots of sediment today, will continue with augmentin through 2/2 and then will attempt void trial if catheter needs to be put back in will refer to urology.      Coronary artery disease involving native coronary artery of native heart without angina pectoris  No chest pain, continue with home asa and not on a statin due to intolerance    Essential hypertension  Diet controlled.    Cognitive impairment  Will need cognitive testing in order to determine his discharge plan.  On no medications.     45 total minutes spent with 25 minutes spent with patient, nursing and therapy reviewing pain status, observing his mobility today and counseling on the plan of care with patient, nursing and DON.      Electronically signed by: Steph Reveles NP

## 2023-01-26 NOTE — LETTER
1/26/2023        RE: Caleb Hernandez  [address]  [place] [address]        [facility] GERIATRIC [facility]       Patient Caleb Hernandez  MRN: [id number]        Reason for Visit     Chief Complaint   Patient presents with     RECHECK     INITIAL       Code Status     CPR/Full code     Assessment     Acute UTI/urosepsis  Urinary retention requiring Dietrich catheter placement  Chronic kidney disease  A. fib on Eliquis  Diabetes type 2  B12 deficiency  AB LA  Generalized weakness    Plan     Pt is admitted to TCU for strengthening and rehab.  Patient had a prolonged stay in the hospital requiring interventions and up consultation from GI, urology as well as nephrology and pulmonary.  Admitted with urosepsis.  Cultures were negative but urology recommended continuing antibiotics in light of his significant septic appearance.  Dietrich catheter placed for urinary retention.  On Augmentin x 9d  Outpatient follow-up with urology for a voiding trial  At his request a voiding trial has been ordered in the TCU  Urine appears cloudy so we will again order a UA UC on him  Recheck labs to monitor kidney functions  In addition due to B12 deficiency started on supplementation.  Hemoglobin noted to drop down to 8.8 and GI was consulted.  He is on Eliquis.  He will require outpatient follow-up and appropriate testing with monitoring of hemoglobin in the TCU.  Has type 2 diabetes and is on insulin and put on a low-dose of Lantus due to low blood sugars.  Adjust insulin as needed  Staff is reporting that he is not eating well at all.  He is on a higher dose ofinsulin    so far blood sugars appear to be managed  Speech consultation requested for him  Continue with PT/OT-at baseline quite weak debilitated and wheelchair-bound  Also noted to have some cognitive impairment with limited recall of recent hospitalization and events    History     Patient is a very pleasant 87 year old male who is admitted to TCU  Patient is admitted to the  hospital with sepsis with acute UTI in the setting of renal impairment.    Noted to have urinary retention requiring Dietrich catheter placement.  He was seen by pulmonary nephrology as well as urology.  His urine culture however was negative but because of his septic appearance he was continued on antibiotics he will follow-up with urology as an outpatient      Past Medical & Surgical History     PAST MEDICAL HISTORY:   Past Medical History:   Diagnosis Date     Abscess of right foot 11/23/2020    Added automatically from request for surgery [id number]     Acute pulmonary embolism with acute cor pulmonale (H) 5/23/2020     Ascending cholangitis 1/27/2022     BPH (benign prostatic hyperplasia)      Cholelithiasis      Closed fracture of rib     Created by Conversion  Replacement Utility updated for latest IMO load     Closed fracture of thoracic vertebra (H)     Created by Conversion  Replacement Utility updated for latest IMO load     Common bile duct (CBD) obstruction 9/4/2017     Diabetes mellitus (H)      Essential hypertension      Gram-negative bacteremia 1/27/2022    Positive blood culture 1/26/2022; likely biliary source     Hyperlipidemia      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    Added automatically from request for surgery [id number]      Pancreatitis      Sepsis (H) 1/27/2022     Sepsis due to pneumonia (H) 9/8/2017     Septic arthritis of right foot (H) 12/2/2020      PAST SURGICAL HISTORY:   has a past surgical history that includes IR Lower Extremity Angiogram Right (11/24/2020); Cholecystectomy (1985); Tonsillectomy (1940); Endoscopic retrograde cholangiopancreatogram; back surgery; Endoscopic retrograde cholangiopancreatogram (N/A, 9/5/2017); Incision And Drainage Of Wound (Right, 11/2/2020); Incision And Drainage Of Wound (Right, 11/16/2020); Amputate toe(s) (Right, 11/16/2020); Ir Extremity Angiogram Right (11/24/2020); Picc (11/30/2020); Incision And Drainage Of Wound (Right, 11/27/2020); and  Endoscopic retrograde cholangiopancreatogram (N/A, 1/27/2022).      Past Social History     Reviewed,  reports that he quit smoking about 31 years ago. His smoking use included cigarettes. He has never used smokeless tobacco. He reports that he does not drink alcohol and does not use drugs.    Family History     Reviewed, and family history includes Alcoholism in his father; Aortic aneurysm in his mother.    Medication List   Post Discharge Medication Reconciliation Status: Post Discharge Medication Reconciliation Status: discharge medications reconciled, continue medications without change.  Current Outpatient Medications   Medication     acetaminophen (TYLENOL) 500 MG tablet     amoxicillin-clavulanate (AUGMENTIN) 875-125 MG tablet     apixaban ANTICOAGULANT (ELIQUIS) 2.5 MG tablet     aspirin 81 MG EC tablet     B-D U/F 31G X 8 MM insulin pen needle     blood-glucose meter (ONETOUCH VERIO IQ METER) Misc     Continuous Blood Gluc  (FREESTYLE APRIL 2 READER) MILE     Continuous Blood Gluc Sensor (FREESTYLE APRIL 2 SENSOR) MISC     Continuous Blood Gluc Sensor (FREESTYLE APRIL 2 SENSOR) MISC     Continuous Blood Gluc Sensor (FREESTYLE APRIL 2 SENSOR) MISC     cyanocobalamin (CYANOCOBALAMIN) 1000 MCG tablet     insulin aspart (NOVOLOG PEN) 100 UNIT/ML pen     insulin glargine (LANTUS SOLOSTAR) 100 UNIT/ML pen     insulin pen needle (32G X 6 MM) 32G X 6 MM miscellaneous     multivit-min/FA/lycopen/lutein (CENTRUM SILVER MEN ORAL)     mupirocin (BACTROBAN) 2 % ointment     ONETOUCH ULTRA test strip     polyethylene glycol (MIRALAX) 17 gram packet     tamsulosin (FLOMAX) 0.4 MG capsule     trolamine salicylate (ASPERCREME) 10 % external cream     vitamin D3 (CHOLECALCIFEROL) 125 MCG (5000 UT) tablet     vitamin E 400 unit capsule     No current facility-administered medications for this visit.          Allergies     Allergies   Allergen Reactions     Cresol [Phenol] Unknown     Muscle cramps     Demeclocycline  "Hives and Rash     Crestor [Rosuvastatin] Muscle Pain (Myalgia)       Review of Systems   A comprehensive review of 14 systems was done. Pertinent findings noted here and in history of present illness. All the rest negative.  Constitutional: Negative.  Negative for fever, chills, he has  activity change, appetite change and fatigue.   Very weak debilitated and reporting tiredness.  Using a wheelchair  He is not eating at all and staff reports that he did not eat anything and breakfast he told me he only had apple juice  HENT: Negative for congestion and facial swelling.    Eyes: Negative for photophobia, redness and visual disturbance.   Respiratory: Negative for cough and chest tightness.    Cardiovascular: Negative for chest pain, palpitations and leg swelling.   Gastrointestinal: Negative for nausea, diarrhea, constipation, blood in stool and abdominal distention.   Genitourinary: Has Dietrich with dark cloudy urine noted  Musculoskeletal: Negative.  Weak and using WC  Skin: Scattered ecchymoses and swelling of the arm was noted  Neurological: Negative for dizziness, tremors, syncope, weakness, light-headedness and headaches.   Hematological: Does not bruise/bleed easily.   Psychiatric/Behavioral: Negative.        Physical Exam   /64   Pulse 74   Temp 98.2  F (36.8  C)   Resp 16   Ht 1.727 m (5' 8\")   Wt 72.1 kg (159 lb)   SpO2 97%   BMI 24.18 kg/m       Constitutional: Oriented to person, place,  and appears well-developed.   Appears very weak and frail  HEENT:  Normocephalic and atraumatic.  Eyes: Conjunctivae and EOM are normal. Pupils are equal, round, and reactive to light. No discharge.  No scleral icterus. Nose normal. Mouth/Throat: Oropharynx is clear and moist. No oropharyngeal exudate.    NECK: Normal range of motion. Neck supple. No JVD present. No tracheal deviation present. No thyromegaly present.   CARDIOVASCULAR: Normal rate, regular rhythm and intact distal pulses.  Exam reveals no " gallop and no friction rub.  Systolic murmur present.  PULMONARY: Effort normal and breath sounds normal. No respiratory distress.No Wheezing or rales.  ABDOMEN: Soft. Bowel sounds are normal. No distension and no mass.  There is no tenderness. There is no rebound and no guarding. No HSM.  MUSCULOSKELETAL: Normal range of motion. No edema and no tenderness. Mild kyphosis, no tenderness.  LYMPH NODES: Has no cervical, supraclavicular, axillary and groin adenopathy.   NEUROLOGICAL: Alert and oriented to person, place, and time. No cranial nerve deficit.  Normal muscle tone. Coordination normal.   GENITOURINARY: Deferred exam.  Has an indwelling Dietrich  SKIN: Skin is warm and dry. No rash noted. No erythema. No pallor.   Edema of the arm with scattered ecchymoses noted  EXTREMITIES: No cyanosis, no clubbing, no edema. No Deformity.  PSYCHIATRIC: abNormal mood, affect and behavior.  Limited recall of recent events      Lab Results     Recent Results (from the past 240 hour(s))   ECG 12-LEAD WITH MUSE ([facility])    Collection Time: 01/17/23 11:26 AM   Result Value Ref Range    Systolic Blood Pressure  mmHg    Diastolic Blood Pressure  mmHg    Ventricular Rate 73 BPM    Atrial Rate 63 BPM    WV Interval  ms    QRS Duration 84 ms     ms    QTc 405 ms    P Axis  degrees    R AXIS 7 degrees    T Axis 45 degrees    Interpretation ECG       Atrial fibrillation  Septal infarct , age undetermined  Abnormal ECG  When compared with ECG of 23-NOV-2020 14:34,  Atrial fibrillation has replaced Sinus rhythm  Septal infarct is now Present  Borderline criteria for Inferior infarct are no longer Present  Nonspecific T wave abnormality no longer evident in Inferior leads  Confirmed by SEE ED PROVIDER NOTE FOR, ECG INTERPRETATION (4000),  Tahir Heck ([id number]) on 1/17/2023 12:54:14 PM     Glucose by meter    Collection Time: 01/17/23 11:42 AM   Result Value Ref Range    GLUCOSE BY METER POCT 147 (H) 70 - 99 mg/dL   Basic metabolic  panel    Collection Time: 01/17/23 12:00 PM   Result Value Ref Range    Sodium 138 136 - 145 mmol/L    Potassium 6.3 (HH) 3.5 - 5.0 mmol/L    Chloride 108 (H) 98 - 107 mmol/L    Carbon Dioxide (CO2) 13 (L) 22 - 31 mmol/L    Anion Gap 17 5 - 18 mmol/L    Urea Nitrogen 155 (H) 8 - 28 mg/dL    Creatinine 4.83 (H) 0.70 - 1.30 mg/dL    Calcium 10.2 8.5 - 10.5 mg/dL    Glucose 154 (H) 70 - 125 mg/dL    GFR Estimate 11 (L) >60 mL/min/1.73m2   Magnesium    Collection Time: 01/17/23 12:00 PM   Result Value Ref Range    Magnesium 2.4 1.8 - 2.6 mg/dL   Troponin I (now)    Collection Time: 01/17/23 12:00 PM   Result Value Ref Range    Troponin I 0.04 0.00 - 0.29 ng/mL   Lactic acid whole blood    Collection Time: 01/17/23 12:00 PM   Result Value Ref Range    Lactic Acid 2.0 0.7 - 2.0 mmol/L   Ketone Beta-Hydroxybutyrate Quantitative    Collection Time: 01/17/23 12:00 PM   Result Value Ref Range    Ketone (Beta-Hydroxybutyrate) Quantitative 2.95 (H) <=0.3 mmol/L   Hepatic function panel    Collection Time: 01/17/23 12:00 PM   Result Value Ref Range    Bilirubin Total 0.4 0.0 - 1.0 mg/dL    Bilirubin Direct 0.2 <=0.5 mg/dL    Protein Total 7.6 6.0 - 8.0 g/dL    Albumin 3.0 (L) 3.5 - 5.0 g/dL    Alkaline Phosphatase 79 45 - 120 U/L    AST 42 (H) 0 - 40 U/L    ALT 29 0 - 45 U/L   CBC with platelets and differential    Collection Time: 01/17/23 12:00 PM   Result Value Ref Range    WBC Count 10.9 4.0 - 11.0 10e3/uL    RBC Count 4.14 (L) 4.40 - 5.90 10e6/uL    Hemoglobin 12.1 (L) 13.3 - 17.7 g/dL    Hematocrit 35.1 (L) 40.0 - 53.0 %    MCV 85 78 - 100 fL    MCH 29.2 26.5 - 33.0 pg    MCHC 34.5 31.5 - 36.5 g/dL    RDW 13.2 10.0 - 15.0 %    Platelet Count 396 150 - 450 10e3/uL    % Neutrophils 85 %    % Lymphocytes 9 %    % Monocytes 5 %    % Eosinophils 0 %    % Basophils 0 %    % Immature Granulocytes 1 %    NRBCs per 100 WBC 0 <1 /100    Absolute Neutrophils 9.2 (H) 1.6 - 8.3 10e3/uL    Absolute Lymphocytes 1.0 0.8 - 5.3 10e3/uL     Absolute Monocytes 0.5 0.0 - 1.3 10e3/uL    Absolute Eosinophils 0.0 0.0 - 0.7 10e3/uL    Absolute Basophils 0.0 0.0 - 0.2 10e3/uL    Absolute Immature Granulocytes 0.1 <=0.4 10e3/uL    Absolute NRBCs 0.0 10e3/uL   Hemoglobin A1c    Collection Time: 01/17/23 12:00 PM   Result Value Ref Range    Hemoglobin A1C 7.5 (H) <5.7 %   Blood gas venous    Collection Time: 01/17/23 12:13 PM   Result Value Ref Range    pH Venous 7.22 (LL) 7.35 - 7.45    pCO2 Venous 35 35 - 50 mm Hg    pO2 Venous 23 (L) 25 - 47 mm Hg    Bicarbonate Venous 15 (L) 24 - 30 mmol/L    Base Excess/Deficit (+/-) -13.2   mmol/L    Oxyhemoglobin Venous 32.2 (L) 70.0 - 75.0 %    O2 Sat, Venous 32.7 (L) 70.0 - 75.0 %   Blood Culture Line, venous    Collection Time: 01/17/23 12:22 PM    Specimen: Line, venous; Blood   Result Value Ref Range    Culture No Growth    Symptomatic Influenza A/B & SARS-CoV2 (COVID-19) Virus PCR Multiplex Nasopharyngeal    Collection Time: 01/17/23 12:23 PM    Specimen: Nasopharyngeal; Swab   Result Value Ref Range    Influenza A PCR Negative Negative    Influenza B PCR Negative Negative    RSV PCR Negative Negative    SARS CoV2 PCR Negative Negative   ECG 12-LEAD WITH MUSE ([facility])    Collection Time: 01/17/23 12:49 PM   Result Value Ref Range    Systolic Blood Pressure 108 mmHg    Diastolic Blood Pressure 54 mmHg    Ventricular Rate 74 BPM    Atrial Rate 74 BPM    MI Interval 160 ms    QRS Duration 88 ms     ms    QTc 404 ms    P Axis 52 degrees    R AXIS 10 degrees    T Axis 56 degrees    Interpretation ECG       Sinus rhythm with Premature supraventricular complexes  Otherwise normal ECG  When compared with ECG of 17-JAN-2023 12:48,  No significant change was found  Confirmed by SEE ED PROVIDER NOTE FOR, ECG INTERPRETATION (4000),  Tahir Heck ([id number]) on 1/17/2023 12:53:55 PM     Blood Culture Wrist, Left    Collection Time: 01/17/23  1:02 PM    Specimen: Wrist, Left; Blood   Result Value Ref Range    Culture No  Growth    Glucose by meter    Collection Time: 01/17/23  1:34 PM   Result Value Ref Range    GLUCOSE BY METER POCT 139 (H) 70 - 99 mg/dL   Glucose by meter    Collection Time: 01/17/23  2:14 PM   Result Value Ref Range    GLUCOSE BY METER POCT 201 (H) 70 - 99 mg/dL   UA with Microscopic reflex to Culture    Collection Time: 01/17/23  2:17 PM    Specimen: Urine, Dietrich Catheter   Result Value Ref Range    Color Urine Yellow Colorless, Straw, Light Yellow, Yellow    Appearance Urine Cloudy (A) Clear    Glucose Urine Negative Negative mg/dL    Bilirubin Urine Negative Negative    Ketones Urine Trace (A) Negative mg/dL    Specific Gravity Urine 1.010 1.001 - 1.030    Blood Urine 0.5 mg/dL (A) Negative    pH Urine 6.0 5.0 - 7.0    Protein Albumin Urine 200 (A) Negative mg/dL    Urobilinogen Urine <2.0 <2.0 mg/dL    Nitrite Urine Negative Negative    Leukocyte Esterase Urine 500 Jasmine/uL (A) Negative    WBC Clumps Urine Present (A) None Seen /HPF    RBC Urine 9 (H) <=2 /HPF    WBC Urine >182 (H) <=5 /HPF   Urine Culture    Collection Time: 01/17/23  2:17 PM    Specimen: Urine, Dietrich Catheter   Result Value Ref Range    Culture No Growth    Glucose by meter    Collection Time: 01/17/23  3:15 PM   Result Value Ref Range    GLUCOSE BY METER POCT 133 (H) 70 - 99 mg/dL   Potassium    Collection Time: 01/17/23  3:24 PM   Result Value Ref Range    Potassium 5.4 (H) 3.5 - 5.0 mmol/L   Basic metabolic panel    Collection Time: 01/17/23  3:24 PM   Result Value Ref Range    Sodium 139 136 - 145 mmol/L    Potassium 5.5 (H) 3.5 - 5.0 mmol/L    Chloride 111 (H) 98 - 107 mmol/L    Carbon Dioxide (CO2) 12 (L) 22 - 31 mmol/L    Anion Gap 16 5 - 18 mmol/L    Urea Nitrogen 167 (H) 8 - 28 mg/dL    Creatinine 4.62 (H) 0.70 - 1.30 mg/dL    Calcium 9.1 8.5 - 10.5 mg/dL    Glucose 129 (H) 70 - 125 mg/dL    GFR Estimate 12 (L) >60 mL/min/1.73m2   Phosphorus    Collection Time: 01/17/23  3:24 PM   Result Value Ref Range    Phosphorus 7.9 (H) 2.5 -  4.5 mg/dL   Magnesium    Collection Time: 01/17/23  3:24 PM   Result Value Ref Range    Magnesium 2.3 1.8 - 2.6 mg/dL   Hepatic panel    Collection Time: 01/17/23  3:24 PM   Result Value Ref Range    Bilirubin Total 0.4 0.0 - 1.0 mg/dL    Bilirubin Direct 0.2 <=0.5 mg/dL    Protein Total 6.2 6.0 - 8.0 g/dL    Albumin 2.5 (L) 3.5 - 5.0 g/dL    Alkaline Phosphatase 64 45 - 120 U/L    AST 37 0 - 40 U/L    ALT 24 0 - 45 U/L   Glucose by meter    Collection Time: 01/17/23  4:41 PM   Result Value Ref Range    GLUCOSE BY METER POCT 119 (H) 70 - 99 mg/dL   Blood gas arterial    Collection Time: 01/17/23  5:08 PM   Result Value Ref Range    pH Arterial 7.30 (L) 7.37 - 7.44    pCO2 Arterial 26 (L) 35 - 45 mm Hg    pO2 Arterial 87 (H) 75 - 85 mm Hg    Bicarbonate Arterial 13 (L) 23 - 29 mmol/L    O2 Sat, Arterial 97.0 (H) 95.0 - 96.0 %    Oxyhemoglobin 95.1 95.0 - 96.0 %    Base Excess/Deficit (+/-) -13.3   mmol/L    Sample Stabilized Temperature 37.0 degrees C   Glucose by meter    Collection Time: 01/17/23  5:28 PM   Result Value Ref Range    GLUCOSE BY METER POCT 118 (H) 70 - 99 mg/dL   Ketone Beta-Hydroxybutyrate Quantitative    Collection Time: 01/17/23  5:49 PM   Result Value Ref Range    Ketone (Beta-Hydroxybutyrate) Quantitative 1.39 (H) <=0.3 mmol/L   Osmolality    Collection Time: 01/17/23  5:49 PM   Result Value Ref Range    Osmolality Blood 350 (HH) 280 - 301 mmol/kg   CBC with platelets and differential    Collection Time: 01/17/23  5:49 PM   Result Value Ref Range    WBC Count 4.1 4.0 - 11.0 10e3/uL    RBC Count 2.98 (L) 4.40 - 5.90 10e6/uL    Hemoglobin 8.6 (L) 13.3 - 17.7 g/dL    Hematocrit 25.5 (L) 40.0 - 53.0 %    MCV 86 78 - 100 fL    MCH 28.9 26.5 - 33.0 pg    MCHC 33.7 31.5 - 36.5 g/dL    RDW 13.2 10.0 - 15.0 %    Platelet Count 258 150 - 450 10e3/uL    % Neutrophils 93 %    % Lymphocytes 6 %    % Monocytes 0 %    % Eosinophils 0 %    % Basophils 0 %    % Immature Granulocytes 1 %    NRBCs per 100 WBC 1  (H) <1 /100    Absolute Neutrophils 3.7 1.6 - 8.3 10e3/uL    Absolute Lymphocytes 0.3 (L) 0.8 - 5.3 10e3/uL    Absolute Monocytes 0.0 0.0 - 1.3 10e3/uL    Absolute Eosinophils 0.0 0.0 - 0.7 10e3/uL    Absolute Basophils 0.0 0.0 - 0.2 10e3/uL    Absolute Immature Granulocytes 0.1 <=0.4 10e3/uL    Absolute NRBCs 0.0 10e3/uL   Glucose by meter    Collection Time: 01/17/23  6:52 PM   Result Value Ref Range    GLUCOSE BY METER POCT 90 70 - 99 mg/dL   Ketone Beta-Hydroxybutyrate Quantitative    Collection Time: 01/17/23  7:31 PM   Result Value Ref Range    Ketone (Beta-Hydroxybutyrate) Quantitative 1.03 (H) <=0.3 mmol/L   Basic metabolic panel    Collection Time: 01/17/23  7:31 PM   Result Value Ref Range    Sodium 139 136 - 145 mmol/L    Potassium 4.8 3.5 - 5.0 mmol/L    Chloride 115 (H) 98 - 107 mmol/L    Carbon Dioxide (CO2) 12 (L) 22 - 31 mmol/L    Anion Gap 12 5 - 18 mmol/L    Urea Nitrogen 151 (H) 8 - 28 mg/dL    Creatinine 4.05 (H) 0.70 - 1.30 mg/dL    Calcium 8.2 (L) 8.5 - 10.5 mg/dL    Glucose 79 70 - 125 mg/dL    GFR Estimate 14 (L) >60 mL/min/1.73m2   Blood gas venous    Collection Time: 01/17/23  9:20 PM   Result Value Ref Range    pH Venous 7.34 (L) 7.35 - 7.45    pCO2 Venous 31 (L) 35 - 50 mm Hg    pO2 Venous 45 25 - 47 mm Hg    Bicarbonate Venous 17 (L) 24 - 30 mmol/L    Base Excess/Deficit (+/-) -9.3   mmol/L    Oxyhemoglobin Venous 77.1 (H) 70.0 - 75.0 %    O2 Sat, Venous 78.6 (H) 70.0 - 75.0 %   Ketone Beta-Hydroxybutyrate Quantitative    Collection Time: 01/17/23  9:21 PM   Result Value Ref Range    Ketone (Beta-Hydroxybutyrate) Quantitative 0.32 (H) <=0.3 mmol/L   Glucose by meter    Collection Time: 01/17/23 11:24 PM   Result Value Ref Range    GLUCOSE BY METER POCT 57 (L) 70 - 99 mg/dL   Glucose by meter    Collection Time: 01/17/23 11:45 PM   Result Value Ref Range    GLUCOSE BY METER POCT 109 (H) 70 - 99 mg/dL   Ketone Beta-Hydroxybutyrate Quantitative    Collection Time: 01/17/23 11:47 PM   Result  Value Ref Range    Ketone (Beta-Hydroxybutyrate) Quantitative 0.46 (H) <=0.3 mmol/L   Glucose by meter    Collection Time: 01/18/23  2:02 AM   Result Value Ref Range    GLUCOSE BY METER POCT 111 (H) 70 - 99 mg/dL   Ketone Beta-Hydroxybutyrate Quantitative    Collection Time: 01/18/23  2:08 AM   Result Value Ref Range    Ketone (Beta-Hydroxybutyrate) Quantitative 0.60 (H) <=0.3 mmol/L   Glucose by meter    Collection Time: 01/18/23  3:58 AM   Result Value Ref Range    GLUCOSE BY METER POCT 106 (H) 70 - 99 mg/dL   Ketone Beta-Hydroxybutyrate Quantitative    Collection Time: 01/18/23  4:32 AM   Result Value Ref Range    Ketone (Beta-Hydroxybutyrate) Quantitative 0.78 (H) <=0.3 mmol/L   Basic metabolic panel    Collection Time: 01/18/23  5:26 AM   Result Value Ref Range    Sodium 141 136 - 145 mmol/L    Potassium 4.5 3.5 - 5.0 mmol/L    Chloride 115 (H) 98 - 107 mmol/L    Carbon Dioxide (CO2) 16 (L) 22 - 31 mmol/L    Anion Gap 10 5 - 18 mmol/L    Urea Nitrogen 121 (H) 8 - 28 mg/dL    Creatinine 3.45 (H) 0.70 - 1.30 mg/dL    Calcium 8.4 (L) 8.5 - 10.5 mg/dL    Glucose 113 70 - 125 mg/dL    GFR Estimate 17 (L) >60 mL/min/1.73m2   CBC with platelets    Collection Time: 01/18/23  5:26 AM   Result Value Ref Range    WBC Count 11.3 (H) 4.0 - 11.0 10e3/uL    RBC Count 3.28 (L) 4.40 - 5.90 10e6/uL    Hemoglobin 9.5 (L) 13.3 - 17.7 g/dL    Hematocrit 28.0 (L) 40.0 - 53.0 %    MCV 85 78 - 100 fL    MCH 29.0 26.5 - 33.0 pg    MCHC 33.9 31.5 - 36.5 g/dL    RDW 13.4 10.0 - 15.0 %    Platelet Count 274 150 - 450 10e3/uL   Ketone Beta-Hydroxybutyrate Quantitative    Collection Time: 01/18/23  5:26 AM   Result Value Ref Range    Ketone (Beta-Hydroxybutyrate) Quantitative 0.82 (H) <=0.3 mmol/L   Glucose by meter    Collection Time: 01/18/23  6:03 AM   Result Value Ref Range    GLUCOSE BY METER POCT 116 (H) 70 - 99 mg/dL   Ketone Beta-Hydroxybutyrate Quantitative    Collection Time: 01/18/23  7:43 AM   Result Value Ref Range    Ketone  (Beta-Hydroxybutyrate) Quantitative 0.73 (H) <=0.3 mmol/L   Glucose by meter    Collection Time: 01/18/23  7:45 AM   Result Value Ref Range    GLUCOSE BY METER POCT 109 (H) 70 - 99 mg/dL   Glucose by meter    Collection Time: 01/18/23 11:03 AM   Result Value Ref Range    GLUCOSE BY METER POCT 88 70 - 99 mg/dL   Glucose by meter    Collection Time: 01/18/23  5:01 PM   Result Value Ref Range    GLUCOSE BY METER POCT 104 (H) 70 - 99 mg/dL   Glucose by meter    Collection Time: 01/18/23  8:54 PM   Result Value Ref Range    GLUCOSE BY METER POCT 158 (H) 70 - 99 mg/dL   Glucose by meter    Collection Time: 01/19/23  2:00 AM   Result Value Ref Range    GLUCOSE BY METER POCT 143 (H) 70 - 99 mg/dL   Vancomycin level    Collection Time: 01/19/23  5:17 AM   Result Value Ref Range    Vancomycin 9.1   mg/L   Basic metabolic panel    Collection Time: 01/19/23  5:17 AM   Result Value Ref Range    Sodium 146 (H) 136 - 145 mmol/L    Potassium 4.0 3.5 - 5.0 mmol/L    Chloride 121 (H) 98 - 107 mmol/L    Carbon Dioxide (CO2) 18 (L) 22 - 31 mmol/L    Anion Gap 7 5 - 18 mmol/L    Urea Nitrogen 111 (H) 8 - 28 mg/dL    Creatinine 2.46 (H) 0.70 - 1.30 mg/dL    Calcium 8.0 (L) 8.5 - 10.5 mg/dL    Glucose 111 70 - 125 mg/dL    GFR Estimate 25 (L) >60 mL/min/1.73m2   CBC with platelets    Collection Time: 01/19/23  5:17 AM   Result Value Ref Range    WBC Count 4.7 4.0 - 11.0 10e3/uL    RBC Count 3.07 (L) 4.40 - 5.90 10e6/uL    Hemoglobin 8.9 (L) 13.3 - 17.7 g/dL    Hematocrit 26.9 (L) 40.0 - 53.0 %    MCV 88 78 - 100 fL    MCH 29.0 26.5 - 33.0 pg    MCHC 33.1 31.5 - 36.5 g/dL    RDW 14.0 10.0 - 15.0 %    Platelet Count 192 150 - 450 10e3/uL   Vitamin B12    Collection Time: 01/19/23  5:17 AM   Result Value Ref Range    Vitamin B12 <150 (L) 232 - 1,245 pg/mL   Ferritin    Collection Time: 01/19/23  5:17 AM   Result Value Ref Range    Ferritin 497 (H) 31 - 409 ng/mL   Glucose by meter    Collection Time: 01/19/23  8:45 AM   Result Value Ref  Range    GLUCOSE BY METER POCT 105 (H) 70 - 99 mg/dL   Glucose by meter    Collection Time: 01/19/23 11:05 AM   Result Value Ref Range    GLUCOSE BY METER POCT 112 (H) 70 - 99 mg/dL   Hemoglobin    Collection Time: 01/19/23 12:22 PM   Result Value Ref Range    Hemoglobin 8.9 (L) 13.3 - 17.7 g/dL   Occult blood stool    Collection Time: 01/19/23  2:16 PM   Result Value Ref Range    Occult Blood Positive (A) Negative   Lactate Dehydrogenase    Collection Time: 01/19/23  2:31 PM   Result Value Ref Range    Lactate Dehydrogenase 210 125 - 220 U/L   Haptoglobin    Collection Time: 01/19/23  2:31 PM   Result Value Ref Range    Haptoglobin 134 32 - 197 mg/dL   Hepatic panel    Collection Time: 01/19/23  2:31 PM   Result Value Ref Range    Bilirubin Total 0.4 0.0 - 1.0 mg/dL    Bilirubin Direct 0.2 <=0.5 mg/dL    Protein Total 4.7 (L) 6.0 - 8.0 g/dL    Albumin 2.0 (L) 3.5 - 5.0 g/dL    Alkaline Phosphatase 59 45 - 120 U/L    AST 32 0 - 40 U/L    ALT 31 0 - 45 U/L   CBC with platelets and differential    Collection Time: 01/19/23  2:31 PM   Result Value Ref Range    WBC Count 4.8 4.0 - 11.0 10e3/uL    RBC Count 3.20 (L) 4.40 - 5.90 10e6/uL    Hemoglobin 9.2 (L) 13.3 - 17.7 g/dL    Hematocrit 28.3 (L) 40.0 - 53.0 %    MCV 88 78 - 100 fL    MCH 28.8 26.5 - 33.0 pg    MCHC 32.5 31.5 - 36.5 g/dL    RDW 13.9 10.0 - 15.0 %    Platelet Count 205 150 - 450 10e3/uL    % Neutrophils 82 %    % Lymphocytes 12 %    % Monocytes 3 %    % Eosinophils 2 %    % Basophils 0 %    % Immature Granulocytes 1 %    NRBCs per 100 WBC 0 <1 /100    Absolute Neutrophils 4.0 1.6 - 8.3 10e3/uL    Absolute Lymphocytes 0.6 (L) 0.8 - 5.3 10e3/uL    Absolute Monocytes 0.1 0.0 - 1.3 10e3/uL    Absolute Eosinophils 0.1 0.0 - 0.7 10e3/uL    Absolute Basophils 0.0 0.0 - 0.2 10e3/uL    Absolute Immature Granulocytes 0.1 <=0.4 10e3/uL    Absolute NRBCs 0.0 10e3/uL   Reticulocyte count    Collection Time: 01/19/23  2:31 PM   Result Value Ref Range    % Reticulocyte  1.7 0.8 - 2.7 %    Absolute Reticulocyte 0.053 0.010 - 0.110 10e6/uL   Glucose by meter    Collection Time: 01/19/23  4:06 PM   Result Value Ref Range    GLUCOSE BY METER POCT 128 (H) 70 - 99 mg/dL   Bld morphology pathology review    Collection Time: 01/19/23  7:57 PM   Result Value Ref Range    Final Diagnosis       Peripheral blood  - 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    The clinical history has been reviewed. Although other causes of ineffective erythropoiesis should be ruled out, the features are suggestive of an underlying disorder of globin synthesis versus anemia of chronic disease, and hemoglobin studies can be performed on the current specimen, if requested. Please correlate with family history and iron studies, if appropriate.          Clinical Information       Unexplained anemia 3.5g drop, no signs/symptoms of bleed      Performing Labs       The technical component of this testing was completed at [facility]     CBC with platelets and differential    Collection Time: 01/19/23  7:57 PM   Result Value Ref Range    WBC Count 5.8 4.0 - 11.0 10e3/uL    RBC Count 3.25 (L) 4.40 - 5.90 10e6/uL    Hemoglobin 9.5 (L) 13.3 - 17.7 g/dL    Hematocrit 29.2 (L) 40.0 - 53.0 %    MCV 90 78 - 100 fL    MCH 29.2 26.5 - 33.0 pg    MCHC 32.5 31.5 - 36.5 g/dL    RDW 14.1 10.0 - 15.0 %    Platelet Count 187 150 - 450 10e3/uL    % Neutrophils 82 %    % Lymphocytes 12 %    % Monocytes 4 %    % Eosinophils 1 %    % Basophils 0 %    % Immature Granulocytes 1 %    NRBCs per 100 WBC 0 <1 /100    Absolute Neutrophils 4.8 1.6 - 8.3 10e3/uL    Absolute Lymphocytes 0.7 (L) 0.8 - 5.3 10e3/uL    Absolute Monocytes 0.2 0.0 - 1.3 10e3/uL    Absolute Eosinophils 0.1 0.0 - 0.7 10e3/uL    Absolute Basophils 0.0 0.0 - 0.2 10e3/uL    Absolute Immature Granulocytes  0.1 <=0.4 10e3/uL    Absolute NRBCs 0.0 10e3/uL   Reticulocyte count    Collection Time: 01/19/23  7:57 PM   Result Value Ref Range    % Reticulocyte 1.7 0.8 - 2.7 %    Absolute Reticulocyte 0.054 0.010 - 0.110 10e6/uL   Glucose by meter    Collection Time: 01/19/23  9:13 PM   Result Value Ref Range    GLUCOSE BY METER POCT 175 (H) 70 - 99 mg/dL   Basic metabolic panel    Collection Time: 01/20/23  5:52 AM   Result Value Ref Range    Sodium 150 (H) 136 - 145 mmol/L    Potassium 3.9 3.5 - 5.0 mmol/L    Chloride 125 (H) 98 - 107 mmol/L    Carbon Dioxide (CO2) 19 (L) 22 - 31 mmol/L    Anion Gap 6 5 - 18 mmol/L    Urea Nitrogen 78 (H) 8 - 28 mg/dL    Creatinine 1.92 (H) 0.70 - 1.30 mg/dL    Calcium 8.1 (L) 8.5 - 10.5 mg/dL    Glucose 95 70 - 125 mg/dL    GFR Estimate 34 (L) >60 mL/min/1.73m2   Folate    Collection Time: 01/20/23  5:52 AM   Result Value Ref Range    Folic Acid 11.2 4.6 - 34.8 ng/mL   Hemoglobin    Collection Time: 01/20/23  5:52 AM   Result Value Ref Range    Hemoglobin 9.1 (L) 13.3 - 17.7 g/dL   Vancomycin level    Collection Time: 01/20/23  5:52 AM   Result Value Ref Range    Vancomycin 11.4   mg/L   Glucose by meter    Collection Time: 01/20/23  8:06 AM   Result Value Ref Range    GLUCOSE BY METER POCT 83 70 - 99 mg/dL   Glucose by meter    Collection Time: 01/20/23 11:46 AM   Result Value Ref Range    GLUCOSE BY METER POCT 144 (H) 70 - 99 mg/dL   Sodium    Collection Time: 01/20/23 12:39 PM   Result Value Ref Range    Sodium 145 136 - 145 mmol/L   Glucose by meter    Collection Time: 01/20/23  5:21 PM   Result Value Ref Range    GLUCOSE BY METER POCT 232 (H) 70 - 99 mg/dL   Glucose by meter    Collection Time: 01/20/23  9:02 PM   Result Value Ref Range    GLUCOSE BY METER POCT 173 (H) 70 - 99 mg/dL   Platelet count    Collection Time: 01/21/23  6:53 AM   Result Value Ref Range    Platelet Count 154 150 - 450 10e3/uL   Basic metabolic panel    Collection Time: 01/21/23  6:53 AM   Result Value Ref  Range    Sodium 145 136 - 145 mmol/L    Potassium 4.3 3.5 - 5.0 mmol/L    Chloride 119 (H) 98 - 107 mmol/L    Carbon Dioxide (CO2) 19 (L) 22 - 31 mmol/L    Anion Gap 7 5 - 18 mmol/L    Urea Nitrogen 57 (H) 8 - 28 mg/dL    Creatinine 1.62 (H) 0.70 - 1.30 mg/dL    Calcium 8.0 (L) 8.5 - 10.5 mg/dL    Glucose 145 (H) 70 - 125 mg/dL    GFR Estimate 41 (L) >60 mL/min/1.73m2   Hemoglobin    Collection Time: 01/21/23  6:53 AM   Result Value Ref Range    Hemoglobin 8.8 (L) 13.3 - 17.7 g/dL   Glucose by meter    Collection Time: 01/21/23  7:33 AM   Result Value Ref Range    GLUCOSE BY METER POCT 136 (H) 70 - 99 mg/dL   Glucose by meter    Collection Time: 01/21/23  3:33 PM   Result Value Ref Range    GLUCOSE BY METER POCT 165 (H) 70 - 99 mg/dL   Glucose by meter    Collection Time: 01/21/23  8:39 PM   Result Value Ref Range    GLUCOSE BY METER POCT 245 (H) 70 - 99 mg/dL   Basic metabolic panel    Collection Time: 01/22/23  6:16 AM   Result Value Ref Range    Sodium 140 136 - 145 mmol/L    Potassium 4.9 3.5 - 5.0 mmol/L    Chloride 115 (H) 98 - 107 mmol/L    Carbon Dioxide (CO2) 22 22 - 31 mmol/L    Anion Gap 3 (L) 5 - 18 mmol/L    Urea Nitrogen 47 (H) 8 - 28 mg/dL    Creatinine 1.38 (H) 0.70 - 1.30 mg/dL    Calcium 8.0 (L) 8.5 - 10.5 mg/dL    Glucose 148 (H) 70 - 125 mg/dL    GFR Estimate 50 (L) >60 mL/min/1.73m2   Glucose by meter    Collection Time: 01/22/23  8:15 AM   Result Value Ref Range    GLUCOSE BY METER POCT 120 (H) 70 - 99 mg/dL   Glucose by meter    Collection Time: 01/22/23 11:49 AM   Result Value Ref Range    GLUCOSE BY METER POCT 175 (H) 70 - 99 mg/dL   Glucose by meter    Collection Time: 01/22/23  4:43 PM   Result Value Ref Range    GLUCOSE BY METER POCT 121 (H) 70 - 99 mg/dL   Hemoglobin    Collection Time: 01/22/23  6:12 PM   Result Value Ref Range    Hemoglobin 9.3 (L) 13.3 - 17.7 g/dL   Glucose by meter    Collection Time: 01/22/23  8:26 PM   Result Value Ref Range    GLUCOSE BY METER POCT 244 (H) 70 - 99  mg/dL   Creatinine    Collection Time: 01/23/23  5:00 AM   Result Value Ref Range    Creatinine 1.23 0.70 - 1.30 mg/dL    GFR Estimate 57 (L) >60 mL/min/1.73m2   Hemoglobin    Collection Time: 01/23/23  5:00 AM   Result Value Ref Range    Hemoglobin 8.5 (L) 13.3 - 17.7 g/dL   Glucose by meter    Collection Time: 01/23/23  8:06 AM   Result Value Ref Range    GLUCOSE BY METER POCT 105 (H) 70 - 99 mg/dL   Glucose by meter    Collection Time: 01/23/23 12:34 PM   Result Value Ref Range    GLUCOSE BY METER POCT 226 (H) 70 - 99 mg/dL             Imaging Results     CT Chest Abdomen Pelvis w/o Contrast    Result Date: 1/17/2023  EXAM: CT CHEST ABDOMEN PELVIS W/O CONTRAST LOCATION: [facility] DATE/TIME: 1/17/2023 4:11 PM INDICATION: vomiting, AMS, eval for pathology COMPARISON: 2/15/2022 and 3/10/2014 TECHNIQUE: CT scan of the chest, abdomen, and pelvis was performed without IV contrast. Multiplanar reformats were obtained. Dose reduction techniques were used. CONTRAST: None. FINDINGS: LUNGS AND PLEURA: Elevated right hemidiaphragm with associated atelectasis. MEDIASTINUM/AXILLAE: Mitral annular calcification. CORONARY ARTERY CALCIFICATION: Severe. HEPATOBILIARY: Cholecystectomy. Pneumobilia is again seen. PANCREAS: Normal. SPLEEN: Normal. ADRENAL GLANDS: Normal. KIDNEYS/BLADDER: Simple left renal cysts do not require follow-up. There is left and right hydronephrosis and hydroureter extending to the urinary bladder. No ureteral calculi. Again seen is a nonobstructing calculus at the upper pole of the right kidney. There is circumferential and more focal wall thickening of the urinary bladder with mild adjacent stranding. A Dietrich catheter is present though the bladder is not decompressed. BOWEL: Mild colonic diverticulosis. Large amount of stool in the rectum LYMPH NODES: Normal. VASCULATURE: Atherosclerotic disease. PELVIC ORGANS: Mildly enlarged prostate gland. 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 There may be bony fusion of L5 and S1.     IMPRESSION: 1.  Circumferential and more focal bladder wall thickening. Bilateral hydronephrosis and hydroureter extending to the bladder. Findings are concerning for cystitis. Other bladder pathology cannot be excluded on this study. The bladder is not decompressed  despite presence of a Dietrich catheter; correlate for catheter dysfunction. 2.  Cannot assess the portal or systemic venous system on this study. 3.  Advanced atherosclerotic disease including coronary artery disease.     Head CT w/o contrast    Result Date: 1/17/2023  EXAM: CT HEAD W/O CONTRAST LOCATION: [facility] DATE/TIME: 1/17/2023 1:32 PM INDICATION: Confusion COMPARISON: CT head 1/26/2022 TECHNIQUE: Routine CT Head without IV contrast. Multiplanar reformats. Dose reduction techniques were used. FINDINGS: INTRACRANIAL CONTENTS: No intracranial hemorrhage, extraaxial collection, or mass effect.  No CT evidence of acute infarct. Unchanged mild presumed chronic small vessel ischemic changes. Unchanged moderate to severe generalized volume loss. No hydrocephalus. VISUALIZED ORBITS/SINUSES/MASTOIDS: No intraorbital abnormality. No paranasal sinus mucosal disease. No middle ear or mastoid effusion. BONES/SOFT TISSUES: No acute abnormality.     IMPRESSION: 1.  No acute intracranial process.          Electronically signed by    Charlotte Mckenna MD                             Sincerely,        DONNY Kwan

## 2023-01-27 NOTE — TELEPHONE ENCOUNTER
[facility]     Provider: YOUSUF Bruce  Facility: [facility]  Facility Type:  TCU    Allergies   Allergen Reactions     Cresol [Phenol] Unknown     Muscle cramps     Demeclocycline Hives and Rash     Crestor [Rosuvastatin] Muscle Pain (Myalgia)       Labs Reviewed by provider: BRISEYDA     Verbal Order/Direction given by Provider: Update with final UC and sensitivities.      Provider giving Order:  YOUSUF Bruce    Verbal Order given to: Paulette([phone])    Abdulaziz Orr RN

## 2023-01-30 NOTE — TELEPHONE ENCOUNTER
[facility] Geriatrics Lab Note     Provider: YOUSUF Bruce  Facility: [facility]  Facility Type:  TCU    Allergies   Allergen Reactions     Cresol [Phenol] Unknown     Muscle cramps     Demeclocycline Hives and Rash     Crestor [Rosuvastatin] Muscle Pain (Myalgia)       Labs Reviewed by provider: Heme 2, BMP, Mg     Verbal Order/Direction given by Provider: No new orders.      Provider giving Order:  YOUSUF Bruce    Verbal Order given to: Alexandr([phone])    Abdulaziz Orr RN

## 2023-02-06 NOTE — PROGRESS NOTES
"Code Status:  FULL CODE  Visit Type: RECHECK     Facility:   [facility] (formerly [facility]3] ([facility]) [[id number]]        History of Present Illness:   Hospital Admission Date: 1/17/2023      Hospital Discharge Date: 1/23/2023      Caleb Hernandez is a 87 year old male with past medical history for past medical history for paroxysmal afib, CKD, recurrent UTIs, cognitive impairment, DM2, HTN, CAD, lymphedema.  He was recently hospitalized after \"not feeling well\" and found to have sepsis 2/2 UTI and MANJIT with urinary retention.  He was treated with broad-spectrum abx and mai catheter was placed.  MANJIT improved with IVF and infection treatment.      His B12 was <150 and was started on B12.      He had a hgb drop from 12 to 8.8 and was seen by GI for possible bleeding on Eliquis.  It was recommend he follow up as outpatient.      Today, I follow up with patient after failed void trial over the weekend.  UA indicative of infection however still waiting for UC.  He denies any pain or discomforts.  Mai draining dark yellow urine with high sediment.         Current Outpatient Medications   Medication Sig Dispense Refill     acetaminophen (TYLENOL) 500 MG tablet Take 500 mg by mouth 500mg in the AM upon waking, then 500mg every 4 hours prn for pain.       apixaban ANTICOAGULANT (ELIQUIS) 2.5 MG tablet Take 1 tablet (2.5 mg) by mouth 2 times daily 60 tablet 0     aspirin 81 MG EC tablet [ASPIRIN 81 MG EC TABLET] Take 81 mg by mouth daily.       B-D U/F 31G X 8 MM insulin pen needle USED TO INJECT INSULIN DAILY 100 each 11     blood-glucose meter (ONETOUCH VERIO IQ METER) Misc [BLOOD-GLUCOSE METER (ONETOUCH VERIO IQ METER) MISC] Use 1 each As Directed 2 (two) times a day. 1 each 0     Continuous Blood Gluc  (FREESTYLE APRIL 2 READER) MILE USE AS DIRECTED 1 each 0     Continuous Blood Gluc Sensor (FREESTYLE APRIL 2 SENSOR) MISC USE AS DIRECTED EVERY 14 DAYS 2 each 11     Continuous Blood Gluc Sensor (FREESTYLE APRIL 2 SENSOR) MIS 1 " kit every 14 days Use per manufacture's directions to check glucose daily. 6 each 1     Continuous Blood Gluc Sensor (FREESTYLE APRIL 2 SENSOR) [facility] [place] [facility] to [facility] 1 each every 14 days 1 each every 14 days. Change every 14 days. 6 each 5     cyanocobalamin (CYANOCOBALAMIN) 1000 MCG tablet Take 1 tablet (1,000 mcg) by mouth daily 60 tablet 0     insulin aspart (NOVOLOG PEN) 100 UNIT/ML pen Inject 1-3 Units Subcutaneous 3 times daily (before meals) Correction Scale - LOW INSULIN RESISTANCE DOSING     Do Not give Correction Insulin if Pre-Meal BG less than 140.   For Pre-Meal  - 239 give 1 unit.   For Pre-Meal  - 339 give 2 units.   For Pre-Meal BG greater than or equal to 340 give 3 units.   To be given with prandial insulin, and based on pre-meal blood glucose.   Notify provider if glucose greater than or equal to 350 mg/dL after administration of correction dose.  If given at mealtime, administer within 30 minutes of start of meal. 15 mL 0     insulin glargine (LANTUS SOLOSTAR) 100 UNIT/ML pen Inject 23 Units Subcutaneous At Bedtime 15 mL 0     insulin pen needle (32G X 6 MM) 32G X 6 MM miscellaneous Use 4 pen needles daily or as directed. 100 each 11     multivit-min/FA/lycopen/lutein (CENTRUM SILVER MEN ORAL) [MULTIVIT-MIN/FA/LYCOPEN/LUTEIN (CENTRUM SILVER MEN ORAL)] Take 1 tablet by mouth daily.       mupirocin (BACTROBAN) 2 % ointment Apply topically daily as needed Apply to foot wound       ONETOUCH ULTRA test strip USE TO TEST TWICE DAILY 200 strip 3     polyethylene glycol (MIRALAX) 17 gram packet [POLYETHYLENE GLYCOL (MIRALAX) 17 GRAM PACKET] Take 1 packet (17 g total) by mouth daily as needed. 100 packet 3     tamsulosin (FLOMAX) 0.4 MG capsule TAKE 1 CAPSULE BY MOUTH EVERY DAY AFTER SUPPER 90 capsule 3     trolamine salicylate (ASPERCREME) 10 % external cream Apply topically 2 times daily       vitamin D3 (CHOLECALCIFEROL) 125 MCG (5000 UT) tablet Take 1 tablet (125 mcg) by mouth daily       vitamin E 400  "unit capsule [VITAMIN E 400 UNIT CAPSULE] Take 400 Units by mouth daily.       Allergies   Allergen Reactions     Cresol [Phenol] Unknown     Muscle cramps     Demeclocycline Hives and Rash     Crestor [Rosuvastatin] Muscle Pain (Myalgia)     Immunization History   Administered Date(s) Administered     COVID-19 Vaccine 18+ (Moderna) 01/25/2021, 02/22/2021     COVID-19 Vaccine Bivalent Booster 18+ (Moderna) 10/07/2022     COVID-19,PF,Moderna Booster 11/24/2021, 05/27/2022     Mantoux Tuberculin Skin Test 12/09/2020, 12/23/2020, 02/04/2022, 02/14/2022     Pneumo Conj 13-V (2010&after) 01/01/2016, 10/22/2020     Pneumococcal 23 valent 09/29/2003       Review of Systems   Patient denies fever, chills, headache, lightheadedness, dizziness, rhinorrhea, cough, congestion, shortness of breath, chest pain, palpitations, abdominal pain, n/v, diarrhea, constipation, change in appetite, change in sleep pattern, dysuria, frequency, burning or pain with urination.  Other than stated in HPI all other review of systems is negative.         Physical Exam  Vital signs:/57   Pulse 67   Temp (!) 96.6  F (35.9  C)   Resp 18   Ht 1.727 m (5' 8\")   Wt 71.7 kg (158 lb)   SpO2 96%   BMI 24.02 kg/m     GENERAL APPEARANCE: elderly frail male,  in no acute distress.  HEENT: normocephalic, atraumatic   sclerae anicteric, conjunctivae clear and moist, EOM intact  LUNGS: Lung sounds CTA, no adventitious sounds, respiratory effort normal.  CARD: RRR, S1, S2, without murmurs, gallops, rubs  ABD: Soft, nondistended and nontender with normal bowel sounds. Dietrich draining dark yellow urine with sediment.   MSK: Muscle strength and tone were equal bilaterally. Moves all extremities easily and intentionally.   EXTREMITIES: No cyanosis, clubbing or edema.  NEURO: Alert with cognitive impairment,   Face is symmetric.  SKIN: Inspection of the skin reveals no rashes, ulcerations or petechiae.  PSYCH: euthymic        Labs:    Last Comprehensive " Metabolic Panel:  Sodium   Date Value Ref Range Status   01/30/2023 135 (L) 136 - 145 mmol/L Final     Potassium   Date Value Ref Range Status   01/30/2023 5.0 3.4 - 5.3 mmol/L Final   01/22/2023 4.9 3.5 - 5.0 mmol/L Final     Chloride   Date Value Ref Range Status   01/30/2023 102 98 - 107 mmol/L Final   01/22/2023 115 (H) 98 - 107 mmol/L Final     Carbon Dioxide (CO2)   Date Value Ref Range Status   01/30/2023 26 22 - 29 mmol/L Final   01/22/2023 22 22 - 31 mmol/L Final     Anion Gap   Date Value Ref Range Status   01/30/2023 7 7 - 15 mmol/L Final   01/22/2023 3 (L) 5 - 18 mmol/L Final     Glucose   Date Value Ref Range Status   01/30/2023 94 70 - 99 mg/dL Final   01/22/2023 148 (H) 70 - 125 mg/dL Final     GLUCOSE BY METER POCT   Date Value Ref Range Status   01/23/2023 226 (H) 70 - 99 mg/dL Final     Urea Nitrogen   Date Value Ref Range Status   01/30/2023 22.6 8.0 - 23.0 mg/dL Final   01/22/2023 47 (H) 8 - 28 mg/dL Final     Creatinine   Date Value Ref Range Status   01/30/2023 1.35 (H) 0.67 - 1.17 mg/dL Final     GFR Estimate   Date Value Ref Range Status   01/30/2023 51 (L) >60 mL/min/1.73m2 Final     Comment:     eGFR calculated using 2021 CKD-EPI equation.   05/25/2021 >60 >60 mL/min/1.73m2 Final     Calcium   Date Value Ref Range Status   01/30/2023 8.3 (L) 8.8 - 10.2 mg/dL Final     Lab Results   Component Value Date    WBC 5.8 01/19/2023     Lab Results   Component Value Date    RBC 3.25 01/19/2023     Lab Results   Component Value Date    HGB 8.5 01/23/2023     Lab Results   Component Value Date    HCT 29.2 01/19/2023     Lab Results   Component Value Date    MCV 90 01/19/2023     Lab Results   Component Value Date    MCH 29.2 01/19/2023     Lab Results   Component Value Date    MCHC 32.5 01/19/2023     Lab Results   Component Value Date    RDW 14.1 01/19/2023     Lab Results   Component Value Date     01/21/2023     UA RESULTS:  Recent Labs   Lab Test 02/03/23  2230 01/17/23  1417 12/09/22  1611    COLOR Orange*   < > Yellow   APPEARANCE Cloudy*   < > Turbid*   URINEGLC Negative   < > Negative   URINEBILI Negative   < > Negative   URINEKETONE Negative   < > Negative   SG 1.013   < > 1.025   UBLD Moderate*   < > Large*   URINEPH 6.0   < > 5.5   PROTEIN 200*   < > 100*   UROBILINOGEN  --   --  0.2   NITRITE Negative   < > Negative   LEUKEST Large*   < > Large*   RBCU 47*   < > >100*   WBCU >182*   < > >100*    < > = values in this interval not displayed.           Assessment/plan:   Acute cystitis with hematuria  Urinary retention  Dietrich cares, follow up with Urology on 3/3.  Await cultures before treating with abx. Encourage fluids.      Type 2 diabetes mellitus with diabetic peripheral angiopathy without gangrene, with long-term current use of insulin (H)  A1c 7.5,  Goal for age.  BS fluctuant  with most 200s. Continue with Glargine, and Aspart.  Goal to discontinue sliding scale insulin.  Will assess again once UTI treated.     Paroxysmal atrial fibrillation (H)  Rate controlled without rate controlling medications.  On apixaban    Essential hypertension  Diet controlled    Stage 3a chronic kidney disease (H)  Last gfr 51 follow up with nephrology on 2/10    Acute right-sided low back pain without sciatica  Improved with scheduled apap and trolamine cream.     Coronary artery disease involving native coronary artery of native heart without angina pectoris  No chest pain, continue with home asa and not on a statin due to intolerance         Electronically signed by: Steph Reveles NP

## 2023-02-06 NOTE — TELEPHONE ENCOUNTER
[facility] Geriatrics Lab Note     Provider: YOUSUF Bruce  Facility: [facility]  Facility Type:  TCU    Allergies   Allergen Reactions     Cresol [Phenol] Unknown     Muscle cramps     Demeclocycline Hives and Rash     Crestor [Rosuvastatin] Muscle Pain (Myalgia)       Labs Reviewed by provider: UA results     Verbal Order/Direction given by Provider: Call the lab and have them run a UC on the current urine specimen.      Provider giving Order:  YOUSUF Bruce    Verbal Order given to: Pooja([phone])    Abdulaziz Orr RN

## 2023-02-06 NOTE — LETTER
"    2/6/2023        RE: Caleb Hernandez  [address]  [place] [address]        Code Status:  FULL CODE  Visit Type: RECHECK     Facility:   [facility] ([facility]) [[id number]]        History of Present Illness:   Hospital Admission Date: 1/17/2023      Hospital Discharge Date: 1/23/2023      Caleb Hernandez is a 87 year old male with past medical history for past medical history for paroxysmal afib, CKD, recurrent UTIs, cognitive impairment, DM2, HTN, CAD, lymphedema.  He was recently hospitalized after \"not feeling well\" and found to have sepsis 2/2 UTI and MANJIT with urinary retention.  He was treated with broad-spectrum abx and mai catheter was placed.  MANJIT improved with IVF and infection treatment.      His B12 was <150 and was started on B12.      He had a hgb drop from 12 to 8.8 and was seen by GI for possible bleeding on Eliquis.  It was recommend he follow up as outpatient.      Today, I follow up with patient after failed void trial over the weekend.  UA indicative of infection however still waiting for UC.  He denies any pain or discomforts.  Mai draining dark yellow urine with high sediment.         Current Outpatient Medications   Medication Sig Dispense Refill     acetaminophen (TYLENOL) 500 MG tablet Take 500 mg by mouth 500mg in the AM upon waking, then 500mg every 4 hours prn for pain.       apixaban ANTICOAGULANT (ELIQUIS) 2.5 MG tablet Take 1 tablet (2.5 mg) by mouth 2 times daily 60 tablet 0     aspirin 81 MG EC tablet [ASPIRIN 81 MG EC TABLET] Take 81 mg by mouth daily.       B-D U/F 31G X 8 MM insulin pen needle USED TO INJECT INSULIN DAILY 100 each 11     blood-glucose meter (ONETOUCH VERIO IQ METER) Misc [BLOOD-GLUCOSE METER (ONETOUCH VERIO IQ METER) MISC] Use 1 each As Directed 2 (two) times a day. 1 each 0     Continuous Blood Gluc  (FREESTYLE APRIL 2 READER) MILE USE AS DIRECTED 1 each 0     Continuous Blood Gluc Sensor (FREESTYLE APRIL 2 SENSOR) MISC USE AS DIRECTED EVERY 14 " DAYS 2 each 11     Continuous Blood Gluc Sensor (FREESTYLE APRIL 2 SENSOR) [facility] kit every 14 days Use per manufacture's directions to check glucose daily. 6 each 1     Continuous Blood Gluc Sensor (FREESTYLE APRIL 2 SENSOR) MISC 1 each every 14 days 1 each every 14 days. Change every 14 days. 6 each 5     cyanocobalamin (CYANOCOBALAMIN) 1000 MCG tablet Take 1 tablet (1,000 mcg) by mouth daily 60 tablet 0     insulin aspart (NOVOLOG PEN) 100 UNIT/ML pen Inject 1-3 Units Subcutaneous 3 times daily (before meals) Correction Scale - LOW INSULIN RESISTANCE DOSING     Do Not give Correction Insulin if Pre-Meal BG less than 140.   For Pre-Meal  - 239 give 1 unit.   For Pre-Meal  - 339 give 2 units.   For Pre-Meal BG greater than or equal to 340 give 3 units.   To be given with prandial insulin, and based on pre-meal blood glucose.   Notify provider if glucose greater than or equal to 350 mg/dL after administration of correction dose.  If given at mealtime, administer within 30 minutes of start of meal. 15 mL 0     insulin glargine (LANTUS SOLOSTAR) 100 UNIT/ML pen Inject 23 Units Subcutaneous At Bedtime 15 mL 0     insulin pen needle (32G X 6 MM) 32G X 6 MM miscellaneous Use 4 pen needles daily or as directed. 100 each 11     multivit-min/FA/lycopen/lutein (CENTRUM SILVER MEN ORAL) [MULTIVIT-MIN/FA/LYCOPEN/LUTEIN (CENTRUM SILVER MEN ORAL)] Take 1 tablet by mouth daily.       mupirocin (BACTROBAN) 2 % ointment Apply topically daily as needed Apply to foot wound       ONETOUCH ULTRA test strip USE TO TEST TWICE DAILY 200 strip 3     polyethylene glycol (MIRALAX) 17 gram packet [POLYETHYLENE GLYCOL (MIRALAX) 17 GRAM PACKET] Take 1 packet (17 g total) by mouth daily as needed. 100 packet 3     tamsulosin (FLOMAX) 0.4 MG capsule TAKE 1 CAPSULE BY MOUTH EVERY DAY AFTER SUPPER 90 capsule 3     trolamine salicylate (ASPERCREME) 10 % external cream Apply topically 2 times daily       vitamin D3 (CHOLECALCIFEROL) 125  "MCG (5000 UT) tablet Take 1 tablet (125 mcg) by mouth daily       vitamin E 400 unit capsule [VITAMIN E 400 UNIT CAPSULE] Take 400 Units by mouth daily.       Allergies   Allergen Reactions     Cresol [Phenol] Unknown     Muscle cramps     Demeclocycline Hives and Rash     Crestor [Rosuvastatin] Muscle Pain (Myalgia)     Immunization History   Administered Date(s) Administered     COVID-19 Vaccine 18+ (Moderna) 01/25/2021, 02/22/2021     COVID-19 Vaccine Bivalent Booster 18+ (Moderna) 10/07/2022     COVID-19,PF,Moderna Booster 11/24/2021, 05/27/2022     Mantoux Tuberculin Skin Test 12/09/2020, 12/23/2020, 02/04/2022, 02/14/2022     Pneumo Conj 13-V (2010&after) 01/01/2016, 10/22/2020     Pneumococcal 23 valent 09/29/2003       Review of Systems   Patient denies fever, chills, headache, lightheadedness, dizziness, rhinorrhea, cough, congestion, shortness of breath, chest pain, palpitations, abdominal pain, n/v, diarrhea, constipation, change in appetite, change in sleep pattern, dysuria, frequency, burning or pain with urination.  Other than stated in HPI all other review of systems is negative.         Physical Exam  Vital signs:/57   Pulse 67   Temp (!) 96.6  F (35.9  C)   Resp 18   Ht 1.727 m (5' 8\")   Wt 71.7 kg (158 lb)   SpO2 96%   BMI 24.02 kg/m     GENERAL APPEARANCE: elderly frail male,  in no acute distress.  HEENT: normocephalic, atraumatic   sclerae anicteric, conjunctivae clear and moist, EOM intact  LUNGS: Lung sounds CTA, no adventitious sounds, respiratory effort normal.  CARD: RRR, S1, S2, without murmurs, gallops, rubs  ABD: Soft, nondistended and nontender with normal bowel sounds. Dietrich draining dark yellow urine with sediment.   MSK: Muscle strength and tone were equal bilaterally. Moves all extremities easily and intentionally.   EXTREMITIES: No cyanosis, clubbing or edema.  NEURO: Alert with cognitive impairment,   Face is symmetric.  SKIN: Inspection of the skin reveals no rashes, " ulcerations or petechiae.  PSYCH: euthymic        Labs:    Last Comprehensive Metabolic Panel:  Sodium   Date Value Ref Range Status   01/30/2023 135 (L) 136 - 145 mmol/L Final     Potassium   Date Value Ref Range Status   01/30/2023 5.0 3.4 - 5.3 mmol/L Final   01/22/2023 4.9 3.5 - 5.0 mmol/L Final     Chloride   Date Value Ref Range Status   01/30/2023 102 98 - 107 mmol/L Final   01/22/2023 115 (H) 98 - 107 mmol/L Final     Carbon Dioxide (CO2)   Date Value Ref Range Status   01/30/2023 26 22 - 29 mmol/L Final   01/22/2023 22 22 - 31 mmol/L Final     Anion Gap   Date Value Ref Range Status   01/30/2023 7 7 - 15 mmol/L Final   01/22/2023 3 (L) 5 - 18 mmol/L Final     Glucose   Date Value Ref Range Status   01/30/2023 94 70 - 99 mg/dL Final   01/22/2023 148 (H) 70 - 125 mg/dL Final     GLUCOSE BY METER POCT   Date Value Ref Range Status   01/23/2023 226 (H) 70 - 99 mg/dL Final     Urea Nitrogen   Date Value Ref Range Status   01/30/2023 22.6 8.0 - 23.0 mg/dL Final   01/22/2023 47 (H) 8 - 28 mg/dL Final     Creatinine   Date Value Ref Range Status   01/30/2023 1.35 (H) 0.67 - 1.17 mg/dL Final     GFR Estimate   Date Value Ref Range Status   01/30/2023 51 (L) >60 mL/min/1.73m2 Final     Comment:     eGFR calculated using 2021 CKD-EPI equation.   05/25/2021 >60 >60 mL/min/1.73m2 Final     Calcium   Date Value Ref Range Status   01/30/2023 8.3 (L) 8.8 - 10.2 mg/dL Final     Lab Results   Component Value Date    WBC 5.8 01/19/2023     Lab Results   Component Value Date    RBC 3.25 01/19/2023     Lab Results   Component Value Date    HGB 8.5 01/23/2023     Lab Results   Component Value Date    HCT 29.2 01/19/2023     Lab Results   Component Value Date    MCV 90 01/19/2023     Lab Results   Component Value Date    MCH 29.2 01/19/2023     Lab Results   Component Value Date    MCHC 32.5 01/19/2023     Lab Results   Component Value Date    RDW 14.1 01/19/2023     Lab Results   Component Value Date     01/21/2023     UA  RESULTS:  Recent Labs   Lab Test 02/03/23  2230 01/17/23  1417 12/09/22  1611   COLOR Orange*   < > Yellow   APPEARANCE Cloudy*   < > Turbid*   URINEGLC Negative   < > Negative   URINEBILI Negative   < > Negative   URINEKETONE Negative   < > Negative   SG 1.013   < > 1.025   UBLD Moderate*   < > Large*   URINEPH 6.0   < > 5.5   PROTEIN 200*   < > 100*   UROBILINOGEN  --   --  0.2   NITRITE Negative   < > Negative   LEUKEST Large*   < > Large*   RBCU 47*   < > >100*   WBCU >182*   < > >100*    < > = values in this interval not displayed.           Assessment/plan:   Acute cystitis with hematuria  Urinary retention  Dietrich cares, follow up with Urology on 3/3.  Await cultures before treating with abx. Encourage fluids.      Type 2 diabetes mellitus with diabetic peripheral angiopathy without gangrene, with long-term current use of insulin (H)  A1c 7.5,  Goal for age.  BS fluctuant  with most 200s. Continue with Glargine, and Aspart.  Goal to discontinue sliding scale insulin.  Will assess again once UTI treated.     Paroxysmal atrial fibrillation (H)  Rate controlled without rate controlling medications.  On apixaban    Essential hypertension  Diet controlled    Stage 3a chronic kidney disease (H)  Last gfr 51 follow up with nephrology on 2/10    Acute right-sided low back pain without sciatica  Improved with scheduled apap and trolamine cream.     Coronary artery disease involving native coronary artery of native heart without angina pectoris  No chest pain, continue with home asa and not on a statin due to intolerance         Electronically signed by: Steph Reveles NP          Sincerely,        Steph Reveles NP

## 2023-02-17 NOTE — LETTER
"    2/17/2023        RE: Caleb Hernandez  [address]  [place] [address]        [facility]  Chief Complaint   Patient presents with     RECHECK     [place] Medical Record Number:  [id number]  Place of Service where encounter took place:  [facility] ([facility]) [[id number]]  Code Status:  Full Code     HISTORY:      HPI:  Caleb Hernandez  is 87 year old (1935) undergoing physical and occupational therapy. admitted on 1/17/2023. He has a past medical history of paroxysmal atrial fibrillation on Eliquis, chronic kidney disease, previous recurrent UTIs, previous ascending cholangitis status postcholecystectomy, progressive cognitive issues, DM2, hypertension, coronary artery disease, lymphedema, Excerpted from records  who presented with x1 day of \"feeling unwell\" and family later reported he had nausea with emesis.  Admitted for sepsis, treating UTI, acute renal failure on CKD 3, urinary retention requiring Dietrich catheter placement.  Was treated with broad-spectrum antibiotics, and hypotension but required pressors in ICU for short period of time later on weaned off of it.  Kidney function improved with aggressive IV fluid resuscitation, treatment of sepsis.  Was seen by nephrology, pulm and critical care.  Urine culture ultimately returned negative.  Given his overall presentation is continuing on Augmentin.  Urology recommended outpatient follow-up for void trial.  He has vitamin B12 deficiency with a level less than 150.  He is started on vitamin B12 supplements.  Will need repeat check in 1 to 2 months.   He had a drop in hemoglobin from 12 to around 8.8.  Was seen by GI with concerns of GI bleeding while on Eliquis.  Recommended outpatient follow-up.  Is discharging to TCU in stable condition.     Today he was seen at the bedside to review vital signs, labs, routine visit and to establish care.  He is currently in the TCU with sepsis related to UTI.  He does have a Dietrich with very dark " urine he did have a UA UC done on 2/3/2023 which showed no growth at U AUC was ordered for today he did fail his voiding trial continues to have a Dietrich will need to follow-up with urology.  Labs reviewed last hemoglobin 8.5 he denies chest pain shortness of breath cough congestion constipation or diarrhea.  Labs reviewed from 1/30/2023.    ALLERGIES:Cresol [phenol], Demeclocycline, and Crestor [rosuvastatin]    PAST MEDICAL HISTORY:   Past Medical History:   Diagnosis Date     Abscess of right foot 11/23/2020    Added automatically from request for surgery [id number]     Acute pulmonary embolism with acute cor pulmonale (H) 5/23/2020     Ascending cholangitis 1/27/2022     BPH (benign prostatic hyperplasia)      Cholelithiasis      Closed fracture of rib     Created by Conversion  Replacement Utility updated for latest IMO load     Closed fracture of thoracic vertebra (H)     Created by Conversion  Replacement Utility updated for latest IMO load     Common bile duct (CBD) obstruction 9/4/2017     Diabetes mellitus (H)      Essential hypertension      Gram-negative bacteremia 1/27/2022    Positive blood culture 1/26/2022; likely biliary source     Hyperlipidemia      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    Added automatically from request for surgery [id number]      Pancreatitis      Sepsis (H) 1/27/2022     Sepsis due to pneumonia (H) 9/8/2017     Septic arthritis of right foot (H) 12/2/2020       PAST SURGICAL HISTORY:   has a past surgical history that includes IR Lower Extremity Angiogram Right (11/24/2020); Cholecystectomy (1985); Tonsillectomy (1940); Endoscopic retrograde cholangiopancreatogram; back surgery; Endoscopic retrograde cholangiopancreatogram (N/A, 9/5/2017); Incision And Drainage Of Wound (Right, 11/2/2020); Incision And Drainage Of Wound (Right, 11/16/2020); Amputate toe(s) (Right, 11/16/2020); Ir Extremity Angiogram Right (11/24/2020); Picc (11/30/2020); Incision And Drainage Of Wound (Right,  11/27/2020); and Endoscopic retrograde cholangiopancreatogram (N/A, 1/27/2022).    FAMILY HISTORY: family history includes Alcoholism in his father; Aortic aneurysm in his mother.    SOCIAL HISTORY:  reports that he quit smoking about 31 years ago. His smoking use included cigarettes. He has never used smokeless tobacco. He reports that he does not drink alcohol and does not use drugs.    ROS:  Constitutional: Negative for activity change, appetite change, fatigue and fever.   HENT: Negative for congestion.    Respiratory: Negative for cough, shortness of breath and wheezing.    Cardiovascular: Negative for chest pain and leg swelling.   Gastrointestinal: Negative for abdominal distention, abdominal pain, constipation, diarrhea and nausea.   Genitourinary: Negative for dysuria.  Dietrich in place with dark huey urine  Musculoskeletal: Negative for arthralgia. Negative for back pain.   Skin: Negative for color change and wound.   Neurological: Negative for dizziness.   Psychiatric/Behavioral: Negative for agitation, behavioral problems and confusion.     Physical Exam:  Constitutional:       Appearance: Patient is well-developed.   HENT:      Head: Normocephalic.   Eyes:      Conjunctiva/sclera: Conjunctivae normal.   Neck:      Musculoskeletal: Normal range of motion.   Cardiovascular:      Rate and Rhythm: Normal rate and regular rhythm.      Heart sounds: Normal heart sounds. No murmur.   Pulmonary:      Effort: No respiratory distress.      Breath sounds: Normal breath sounds. No wheezing or rales.   Abdominal:      General: Bowel sounds are normal. There is no distension.      Palpations: Abdomen is soft.      Tenderness: There is no abdominal tenderness.   Musculoskeletal:       Normal range of motion.     Skin:General:        Skin is warm.   Neurological:         Mental Status: Patient is alert and oriented to person, place, and time.   Psychiatric:         Behavior: Behavior normal.     Vitals:/52    "Pulse 72   Temp (!) 96.4  F (35.8  C)   Resp 16   Ht 1.727 m (5' 8\")   Wt 72.1 kg (159 lb)   SpO2 93%   BMI 24.18 kg/m   and Body mass index is 24.18 kg/m .    Lab/Diagnostic data:   No results found for this or any previous visit (from the past 240 hour(s)).    MEDICATIONS:     Review of your medicines          Accurate as of February 17, 2023 11:59 PM. If you have any questions, ask your nurse or doctor.            CONTINUE these medicines which have NOT CHANGED      Dose / Directions   acetaminophen 500 MG tablet  Commonly known as: TYLENOL      Dose: 500 mg  Take 500 mg by mouth daily And every 6 hours prn  Refills: 0     apixaban ANTICOAGULANT 2.5 MG tablet  Commonly known as: ELIQUIS  Indication: Atrial Fibrillation Not Caused By A Heart Valve Problem  Used for: Paroxysmal atrial fibrillation (H)      Dose: 2.5 mg  Take 1 tablet (2.5 mg) by mouth 2 times daily  Quantity: 60 tablet  Refills: 0     aspirin 81 MG EC tablet  Commonly known as: ASA      Dose: 81 mg  [ASPIRIN 81 MG EC TABLET] Take 81 mg by mouth daily.  Refills: 0     CENTRUM SILVER ULTRA MENS PO      Dose: 1 tablet  [MULTIVIT-MIN/FA/LYCOPEN/LUTEIN (CENTRUM SILVER MEN ORAL)] Take 1 tablet by mouth daily.  Refills: 0     insulin aspart 100 UNIT/ML pen  Commonly known as: NovoLOG PEN  Used for: Type 2 diabetes mellitus with diabetic peripheral angiopathy without gangrene, with long-term current use of insulin (H)      Dose: 1-3 Units  Inject 1-3 Units Subcutaneous 3 times daily (before meals) Correction Scale - LOW INSULIN RESISTANCE DOSING     Do Not give Correction Insulin if Pre-Meal BG less than 140.   For Pre-Meal  - 239 give 1 unit.   For Pre-Meal  - 339 give 2 units.   For Pre-Meal BG greater than or equal to 340 give 3 units.   To be given with prandial insulin, and based on pre-meal blood glucose.   Notify provider if glucose greater than or equal to 350 mg/dL after administration of correction dose.  If given at mealtime, " administer within 30 minutes of start of meal.  Quantity: 15 mL  Refills: 0     Lantus SoloStar 100 UNIT/ML pen  Used for: Type 2 diabetes mellitus with diabetic peripheral angiopathy without gangrene, with long-term current use of insulin (H)  Generic drug: insulin glargine      Dose: 23 Units  Inject 23 Units Subcutaneous At Bedtime  Quantity: 15 mL  Refills: 0     mupirocin 2 % external ointment  Commonly known as: BACTROBAN      Apply topically daily as needed Apply to foot wound  Refills: 0     polyethylene glycol 17 g packet  Commonly known as: MIRALAX  Used for: Drug-induced constipation      Dose: 17 g  [POLYETHYLENE GLYCOL (MIRALAX) 17 GRAM PACKET] Take 1 packet (17 g total) by mouth daily as needed.  Quantity: 100 packet  Refills: 3     tamsulosin 0.4 MG capsule  Commonly known as: FLOMAX  Used for: Urinary retention      TAKE 1 CAPSULE BY MOUTH EVERY DAY AFTER SUPPER  Quantity: 90 capsule  Refills: 3     trolamine salicylate 10 % external cream  Commonly known as: ASPERCREME      Apply topically 2 times daily  Refills: 0     vitamin B-12 1000 MCG tablet  Commonly known as: CYANOCOBALAMIN  Used for: Vitamin B12 deficiency (non anemic)      Dose: 1,000 mcg  Take 1 tablet (1,000 mcg) by mouth daily  Quantity: 60 tablet  Refills: 0     Vitamin D3 125 MCG (5000 UT) tablet  Commonly known as: CHOLECALCIFEROL  Used for: Vitamin D deficiency      Dose: 125 mcg  Take 1 tablet (125 mcg) by mouth daily  Refills: 0     vitamin E 400 units (180 mg) capsule  Commonly known as: TOCOPHEROL      Dose: 400 Units  [VITAMIN E 400 UNIT CAPSULE] Take 400 Units by mouth daily.  Refills: 0        STOP taking    B-D U/F 31G X 8 MM miscellaneous  Generic drug: insulin pen needle  Stopped by: Rhonda Ware CNP        FreeStyle Mayuri 2 Eleva Luiza  Stopped by: Rhonda Ware CNP        FreeStyle Mayuri 2 Sensor Misc  Stopped by: Rhonda Ware CNP        GLUCOSE METER TEST VI  Stopped by: Rhonda Ware CNP        insulin pen needle 32G  X 6 MM miscellaneous  Commonly known as: 32G X 6 MM  Stopped by: Rhonda Ware CNP        OneTouch Ultra test strip  Generic drug: blood glucose  Stopped by: Rhonda Ware CNP               ASSESSMENT/PLAN  Encounter Diagnoses   Name Primary?     Type 2 diabetes mellitus with diabetic peripheral angiopathy without gangrene, with long-term current use of insulin (H) Yes     Physical deconditioning      Urinary retention      Diabetes mellitus.  Diabetic diet, fingersticks as ordered aspart sliding scale with meals, Lantus 23 units at bedtime may need to be increased depending on fingerstick's    Atrial fibrillation on Eliquis    Urinary retention Failed voiding trial, Dietrich in place, follow-up with urology, currently on tamsulosin checking another U/A U/C    Pain management on extra strength Tylenol scheduled and as needed    Vitamin D deficiency started on 1000 mics daily in the hospital.    Physical deconditioning PT OT      Electronically signed by: Rhonda Ware CNP        Sincerely,        Rhonda Ware CNP

## 2023-02-17 NOTE — TELEPHONE ENCOUNTER
[facility] Nurse Triage Telephone Note    Provider: YOUSUF Levy  Facility: [facility] Type:  TCU    Caller: Pooja  Call Back Number: [phone]    Allergies   Allergen Reactions     Cresol [Phenol] Unknown     Muscle cramps     Demeclocycline Hives and Rash     Crestor [Rosuvastatin] Muscle Pain (Myalgia)       Reason for call: BG at 1700 is 417. Asymptomatic. Will receive Novolog sliding scale 3u, gets Lantus at HS.    Verbal Order/Direction given by Provider:   - Give additional Novolog 3u for a total of 6u one time only     Provider giving Order:  YOUSUF Levy    Verbal Order given to: Pooja Levine RN

## 2023-02-18 NOTE — TELEPHONE ENCOUNTER
Third UA in 3 weeks all similar to each other.  Will wait for UC to come back    Component      Latest Ref Rng & Units 2/17/2023   Color Urine      Colorless, Straw, Light Yellow, Yellow Yellow   Appearance Urine      Clear Cloudy (A)   Glucose Urine      Negative mg/dL 100 (A)   Bilirubin Urine      Negative Negative   Ketones Urine      Negative mg/dL Negative   Specific Gravity Urine      1.003 - 1.035 1.017   Blood Urine      Negative Large (A)   pH Urine      5.0 - 7.0 6.0   Protein Albumin Urine      Negative mg/dL 70 (A)   Urobilinogen mg/dL      Normal, 2.0 mg/dL Normal   Nitrite Urine      Negative Negative   Leukocyte Esterase Urine      Negative Large (A)   WBC Clumps      None Seen /HPF Present (A)   RBC Urine      <=2 / (H)   WBC Urine      <=5 /HPF >182 (H)     SIDDHARTH Joe CNP

## 2023-02-18 NOTE — PROGRESS NOTES
"[facility] GERIATRIC SERVICES  Chief Complaint   Patient presents with     RECHECK     [place] Medical Record Number:  [id number]  Place of Service where encounter took place:  [facility] ([facility]) [[id number]]  Code Status:  Full Code     HISTORY:      HPI:  Caleb Hernandez  is 87 year old (1935) undergoing physical and occupational therapy. admitted on 1/17/2023. He has a past medical history of paroxysmal atrial fibrillation on Eliquis, chronic kidney disease, previous recurrent UTIs, previous ascending cholangitis status postcholecystectomy, progressive cognitive issues, DM2, hypertension, coronary artery disease, lymphedema, Excerpted from records  who presented with x1 day of \"feeling unwell\" and family later reported he had nausea with emesis.  Admitted for sepsis, treating UTI, acute renal failure on CKD 3, urinary retention requiring Dietrich catheter placement.  Was treated with broad-spectrum antibiotics, and hypotension but required pressors in ICU for short period of time later on weaned off of it.  Kidney function improved with aggressive IV fluid resuscitation, treatment of sepsis.  Was seen by nephrology, pulm and critical care.  Urine culture ultimately returned negative.  Given his overall presentation is continuing on Augmentin.  Urology recommended outpatient follow-up for void trial.  He has vitamin B12 deficiency with a level less than 150.  He is started on vitamin B12 supplements.  Will need repeat check in 1 to 2 months.   He had a drop in hemoglobin from 12 to around 8.8.  Was seen by GI with concerns of GI bleeding while on Eliquis.  Recommended outpatient follow-up.  Is discharging to TCU in stable condition.     Today he was seen at the bedside to review vital signs, labs, routine visit and to establish care.  He is currently in the TCU with sepsis related to UTI.  He does have a Dietrich with very dark urine he did have a UA UC done on 2/3/2023 which showed no growth at U AUC was " ordered for today he did fail his voiding trial continues to have a Dietrich will need to follow-up with urology.  Labs reviewed last hemoglobin 8.5 he denies chest pain shortness of breath cough congestion constipation or diarrhea.  Labs reviewed from 1/30/2023.    ALLERGIES:Cresol [phenol], Demeclocycline, and Crestor [rosuvastatin]    PAST MEDICAL HISTORY:   Past Medical History:   Diagnosis Date     Abscess of right foot 11/23/2020    Added automatically from request for surgery [id number]     Acute pulmonary embolism with acute cor pulmonale (H) 5/23/2020     Ascending cholangitis 1/27/2022     BPH (benign prostatic hyperplasia)      Cholelithiasis      Closed fracture of rib     Created by Conversion  Replacement Utility updated for latest IMO load     Closed fracture of thoracic vertebra (H)     Created by Conversion  Replacement Utility updated for latest IMO load     Common bile duct (CBD) obstruction 9/4/2017     Diabetes mellitus (H)      Essential hypertension      Gram-negative bacteremia 1/27/2022    Positive blood culture 1/26/2022; likely biliary source     Hyperlipidemia      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    Added automatically from request for surgery [id number]      Pancreatitis      Sepsis (H) 1/27/2022     Sepsis due to pneumonia (H) 9/8/2017     Septic arthritis of right foot (H) 12/2/2020       PAST SURGICAL HISTORY:   has a past surgical history that includes IR Lower Extremity Angiogram Right (11/24/2020); Cholecystectomy (1985); Tonsillectomy (1940); Endoscopic retrograde cholangiopancreatogram; back surgery; Endoscopic retrograde cholangiopancreatogram (N/A, 9/5/2017); Incision And Drainage Of Wound (Right, 11/2/2020); Incision And Drainage Of Wound (Right, 11/16/2020); Amputate toe(s) (Right, 11/16/2020); Ir Extremity Angiogram Right (11/24/2020); Picc (11/30/2020); Incision And Drainage Of Wound (Right, 11/27/2020); and Endoscopic retrograde cholangiopancreatogram (N/A,  "1/27/2022).    FAMILY HISTORY: family history includes Alcoholism in his father; Aortic aneurysm in his mother.    SOCIAL HISTORY:  reports that he quit smoking about 31 years ago. His smoking use included cigarettes. He has never used smokeless tobacco. He reports that he does not drink alcohol and does not use drugs.    ROS:  Constitutional: Negative for activity change, appetite change, fatigue and fever.   HENT: Negative for congestion.    Respiratory: Negative for cough, shortness of breath and wheezing.    Cardiovascular: Negative for chest pain and leg swelling.   Gastrointestinal: Negative for abdominal distention, abdominal pain, constipation, diarrhea and nausea.   Genitourinary: Negative for dysuria.  Dietrich in place with dark huey urine  Musculoskeletal: Negative for arthralgia. Negative for back pain.   Skin: Negative for color change and wound.   Neurological: Negative for dizziness.   Psychiatric/Behavioral: Negative for agitation, behavioral problems and confusion.     Physical Exam:  Constitutional:       Appearance: Patient is well-developed.   HENT:      Head: Normocephalic.   Eyes:      Conjunctiva/sclera: Conjunctivae normal.   Neck:      Musculoskeletal: Normal range of motion.   Cardiovascular:      Rate and Rhythm: Normal rate and regular rhythm.      Heart sounds: Normal heart sounds. No murmur.   Pulmonary:      Effort: No respiratory distress.      Breath sounds: Normal breath sounds. No wheezing or rales.   Abdominal:      General: Bowel sounds are normal. There is no distension.      Palpations: Abdomen is soft.      Tenderness: There is no abdominal tenderness.   Musculoskeletal:       Normal range of motion.     Skin:General:        Skin is warm.   Neurological:         Mental Status: Patient is alert and oriented to person, place, and time.   Psychiatric:         Behavior: Behavior normal.     Vitals:/52   Pulse 72   Temp (!) 96.4  F (35.8  C)   Resp 16   Ht 1.727 m (5' 8\")  "  Wt 72.1 kg (159 lb)   SpO2 93%   BMI 24.18 kg/m   and Body mass index is 24.18 kg/m .    Lab/Diagnostic data:   No results found for this or any previous visit (from the past 240 hour(s)).    MEDICATIONS:     Review of your medicines          Accurate as of February 17, 2023 11:59 PM. If you have any questions, ask your nurse or doctor.            CONTINUE these medicines which have NOT CHANGED      Dose / Directions   acetaminophen 500 MG tablet  Commonly known as: TYLENOL      Dose: 500 mg  Take 500 mg by mouth daily And every 6 hours prn  Refills: 0     apixaban ANTICOAGULANT 2.5 MG tablet  Commonly known as: ELIQUIS  Indication: Atrial Fibrillation Not Caused By A Heart Valve Problem  Used for: Paroxysmal atrial fibrillation (H)      Dose: 2.5 mg  Take 1 tablet (2.5 mg) by mouth 2 times daily  Quantity: 60 tablet  Refills: 0     aspirin 81 MG EC tablet  Commonly known as: ASA      Dose: 81 mg  [ASPIRIN 81 MG EC TABLET] Take 81 mg by mouth daily.  Refills: 0     CENTRUM SILVER ULTRA MENS PO      Dose: 1 tablet  [MULTIVIT-MIN/FA/LYCOPEN/LUTEIN (CENTRUM SILVER MEN ORAL)] Take 1 tablet by mouth daily.  Refills: 0     insulin aspart 100 UNIT/ML pen  Commonly known as: NovoLOG PEN  Used for: Type 2 diabetes mellitus with diabetic peripheral angiopathy without gangrene, with long-term current use of insulin (H)      Dose: 1-3 Units  Inject 1-3 Units Subcutaneous 3 times daily (before meals) Correction Scale - LOW INSULIN RESISTANCE DOSING     Do Not give Correction Insulin if Pre-Meal BG less than 140.   For Pre-Meal  - 239 give 1 unit.   For Pre-Meal  - 339 give 2 units.   For Pre-Meal BG greater than or equal to 340 give 3 units.   To be given with prandial insulin, and based on pre-meal blood glucose.   Notify provider if glucose greater than or equal to 350 mg/dL after administration of correction dose.  If given at mealtime, administer within 30 minutes of start of meal.  Quantity: 15 mL  Refills:  0     Lantus SoloStar 100 UNIT/ML pen  Used for: Type 2 diabetes mellitus with diabetic peripheral angiopathy without gangrene, with long-term current use of insulin (H)  Generic drug: insulin glargine      Dose: 23 Units  Inject 23 Units Subcutaneous At Bedtime  Quantity: 15 mL  Refills: 0     mupirocin 2 % external ointment  Commonly known as: BACTROBAN      Apply topically daily as needed Apply to foot wound  Refills: 0     polyethylene glycol 17 g packet  Commonly known as: MIRALAX  Used for: Drug-induced constipation      Dose: 17 g  [POLYETHYLENE GLYCOL (MIRALAX) 17 GRAM PACKET] Take 1 packet (17 g total) by mouth daily as needed.  Quantity: 100 packet  Refills: 3     tamsulosin 0.4 MG capsule  Commonly known as: FLOMAX  Used for: Urinary retention      TAKE 1 CAPSULE BY MOUTH EVERY DAY AFTER SUPPER  Quantity: 90 capsule  Refills: 3     trolamine salicylate 10 % external cream  Commonly known as: ASPERCREME      Apply topically 2 times daily  Refills: 0     vitamin B-12 1000 MCG tablet  Commonly known as: CYANOCOBALAMIN  Used for: Vitamin B12 deficiency (non anemic)      Dose: 1,000 mcg  Take 1 tablet (1,000 mcg) by mouth daily  Quantity: 60 tablet  Refills: 0     Vitamin D3 125 MCG (5000 UT) tablet  Commonly known as: CHOLECALCIFEROL  Used for: Vitamin D deficiency      Dose: 125 mcg  Take 1 tablet (125 mcg) by mouth daily  Refills: 0     vitamin E 400 units (180 mg) capsule  Commonly known as: TOCOPHEROL      Dose: 400 Units  [VITAMIN E 400 UNIT CAPSULE] Take 400 Units by mouth daily.  Refills: 0        STOP taking    B-D U/F 31G X 8 MM miscellaneous  Generic drug: insulin pen needle  Stopped by: Rhonda Ware CNP        FreeStyle Mayuri 2 [place] Luiza  Stopped by: Rhonda Ware CNP        FreeStyle Mayuri 2 Sensor Misc  Stopped by: Rhonda Ware CNP        GLUCOSE METER TEST VI  Stopped by: Rhonda Ware CNP        insulin pen needle 32G X 6 MM miscellaneous  Commonly known as: 32G X 6 MM  Stopped by: Rhonda  LEONILA Ware        OneTouch Ultra test strip  Generic drug: blood glucose  Stopped by: Rhonda Ware CNP               ASSESSMENT/PLAN  Encounter Diagnoses   Name Primary?     Type 2 diabetes mellitus with diabetic peripheral angiopathy without gangrene, with long-term current use of insulin (H) Yes     Physical deconditioning      Urinary retention      Diabetes mellitus.  Diabetic diet, fingersticks as ordered aspart sliding scale with meals, Lantus 23 units at bedtime may need to be increased depending on fingerstick's    Atrial fibrillation on Eliquis    Urinary retention Failed voiding trial, Dietrich in place, follow-up with urology, currently on tamsulosin checking another U/A U/C    Pain management on extra strength Tylenol scheduled and as needed    Vitamin D deficiency started on 1000 mics daily in the hospital.    Physical deconditioning PT OT      Electronically signed by: Rhonda Ware CNP

## 2023-02-20 NOTE — TELEPHONE ENCOUNTER
[facility] Geriatrics Lab Note     Provider: YOUSUF Levy  Facility: [facility]  Facility Type:  TCU    Allergies   Allergen Reactions     Cresol [Phenol] Unknown     Muscle cramps     Demeclocycline Hives and Rash     Crestor [Rosuvastatin] Muscle Pain (Myalgia)       Labs Reviewed by provider: Heme 2, BMP     Verbal Order/Direction given by Provider: No new orders.      Provider giving Order:  YOUSUF Levy    Verbal Order given to: Pooja([phone])    Abdulaziz Orr RN

## 2023-02-22 NOTE — LETTER
"    2/22/2023        RE: Caleb Hernandez  [address]  [place] [address]        [facility]  Chief Complaint   Patient presents with     Discharge Summary [facility] Medical Record Number:  [id number]  Place of Service where encounter took place:  [facility] ([facility]) [[id number]]  Code Status:  Full Code     HISTORY:      HPI:  Caleb Hernandez  is 87 year old (1935) undergoing physical and occupational therapy. admitted on 1/17/2023. He has a past medical history of paroxysmal atrial fibrillation on Eliquis, chronic kidney disease, previous recurrent UTIs, previous ascending cholangitis status postcholecystectomy, progressive cognitive issues, DM2, hypertension, coronary artery disease, lymphedema, Excerpted from records  who presented with x1 day of \"feeling unwell\" and family later reported he had nausea with emesis.  Admitted for sepsis, treating UTI, acute renal failure on CKD 3, urinary retention requiring Dietrich catheter placement.  Was treated with broad-spectrum antibiotics, and hypotension but required pressors in ICU for short period of time later on weaned off of it.  Kidney function improved with aggressive IV fluid resuscitation, treatment of sepsis.  Was seen by nephrology, pulm and critical care.  Urine culture ultimately returned negative.  Given his overall presentation is continuing on Augmentin.  Urology recommended outpatient follow-up for void trial.  He has vitamin B12 deficiency with a level less than 150.  He is started on vitamin B12 supplements.  Will need repeat check in 1 to 2 months.   He had a drop in hemoglobin from 12 to around 8.8.  Was seen by GI with concerns of GI bleeding while on Eliquis.  Recommended outpatient follow-up.  Is discharging to TCU in stable condition.     Today he was seen at the bedside to review vital signs, labs, routine visit and a face-to-face appointment.  He will discharge to home on 2/23/2023 with current " medications and pain.  He will have home care services PT OT home health aide RN.  He is currently in the TCU with sepsis related to UTI.  He does have a Dietrich with dark urine, urine culture on 2/20/2023 showed no growth.  He will need urology follow-up.  Labs reviewed last hemoglobin 9.6 up from 8.5.  He denies chest pain shortness of breath cough congestion constipation or diarrhea.     ALLERGIES:Cresol [phenol], Demeclocycline, and Crestor [rosuvastatin]    PAST MEDICAL HISTORY:   Past Medical History:   Diagnosis Date     Abscess of right foot 11/23/2020    Added automatically from request for surgery [id number]     Acute pulmonary embolism with acute cor pulmonale (H) 5/23/2020     Ascending cholangitis 1/27/2022     BPH (benign prostatic hyperplasia)      Cholelithiasis      Closed fracture of rib     Created by Conversion  Replacement Utility updated for latest IMO load     Closed fracture of thoracic vertebra (H)     Created by Conversion  Replacement Utility updated for latest IMO load     Common bile duct (CBD) obstruction 9/4/2017     Diabetes mellitus (H)      Essential hypertension      Gram-negative bacteremia 1/27/2022    Positive blood culture 1/26/2022; likely biliary source     Hyperlipidemia      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    Added automatically from request for surgery [id number]      Pancreatitis      Sepsis (H) 1/27/2022     Sepsis due to pneumonia (H) 9/8/2017     Septic arthritis of right foot (H) 12/2/2020       PAST SURGICAL HISTORY:   has a past surgical history that includes IR Lower Extremity Angiogram Right (11/24/2020); Cholecystectomy (1985); Tonsillectomy (1940); Endoscopic retrograde cholangiopancreatogram; back surgery; Endoscopic retrograde cholangiopancreatogram (N/A, 9/5/2017); Incision And Drainage Of Wound (Right, 11/2/2020); Incision And Drainage Of Wound (Right, 11/16/2020); Amputate toe(s) (Right, 11/16/2020); Ir Extremity Angiogram Right (11/24/2020); Picc  (11/30/2020); Incision And Drainage Of Wound (Right, 11/27/2020); and Endoscopic retrograde cholangiopancreatogram (N/A, 1/27/2022).    FAMILY HISTORY: family history includes Alcoholism in his father; Aortic aneurysm in his mother.    SOCIAL HISTORY:  reports that he quit smoking about 31 years ago. His smoking use included cigarettes. He has never used smokeless tobacco. He reports that he does not drink alcohol and does not use drugs.    ROS:  Constitutional: Negative for activity change, appetite change, fatigue and fever.   HENT: Negative for congestion.    Respiratory: Negative for cough, shortness of breath and wheezing.    Cardiovascular: Negative for chest pain and leg swelling.   Gastrointestinal: Negative for abdominal distention, abdominal pain, constipation, diarrhea and nausea.   Genitourinary: Negative for dysuria.  Dietrich in place with dark huey urine  Musculoskeletal: Negative for arthralgia. Negative for back pain.   Skin: Negative for color change and wound.   Neurological: Negative for dizziness.   Psychiatric/Behavioral: Negative for agitation, behavioral problems and confusion.     Physical Exam:  Constitutional:       Appearance: Patient is well-developed.   HENT:      Head: Normocephalic.   Eyes:      Conjunctiva/sclera: Conjunctivae normal.   Neck:      Musculoskeletal: Normal range of motion.   Cardiovascular:      Rate and Rhythm: Normal rate and regular rhythm.      Heart sounds: Normal heart sounds. No murmur.   Pulmonary:      Effort: No respiratory distress.      Breath sounds: Normal breath sounds. No wheezing or rales.   Abdominal:      General: Bowel sounds are normal. There is no distension.      Palpations: Abdomen is soft.      Tenderness: There is no abdominal tenderness.   Musculoskeletal:       Normal range of motion.     Skin:General:        Skin is warm.   Neurological:         Mental Status: Patient is alert and oriented to person, place, and time.   Psychiatric:          "Behavior: Behavior normal.     Vitals:/61   Pulse 69   Temp 97.5  F (36.4  C)   Resp 20   Ht 1.727 m (5' 8\")   Wt 72.1 kg (159 lb)   SpO2 96%   BMI 24.18 kg/m   and Body mass index is 24.18 kg/m .    Lab/Diagnostic data:   Recent Results (from the past 240 hour(s))   UA with Microscopic reflex to Culture    Collection Time: 02/17/23  9:15 PM    Specimen: Urine, Dietrich Catheter   Result Value Ref Range    Color Urine Yellow Colorless, Straw, Light Yellow, Yellow    Appearance Urine Cloudy (A) Clear    Glucose Urine 100 (A) Negative mg/dL    Bilirubin Urine Negative Negative    Ketones Urine Negative Negative mg/dL    Specific Gravity Urine 1.017 1.003 - 1.035    Blood Urine Large (A) Negative    pH Urine 6.0 5.0 - 7.0    Protein Albumin Urine 70 (A) Negative mg/dL    Urobilinogen Urine Normal Normal, 2.0 mg/dL    Nitrite Urine Negative Negative    Leukocyte Esterase Urine Large (A) Negative    WBC Clumps Urine Present (A) None Seen /HPF    RBC Urine 109 (H) <=2 /HPF    WBC Urine >182 (H) <=5 /HPF   Urine Culture    Collection Time: 02/17/23  9:15 PM    Specimen: Urine, Dietrich Catheter   Result Value Ref Range    Culture No Growth    Basic metabolic panel    Collection Time: 02/20/23  7:02 AM   Result Value Ref Range    Sodium 138 136 - 145 mmol/L    Potassium 4.7 3.4 - 5.3 mmol/L    Chloride 103 98 - 107 mmol/L    Carbon Dioxide (CO2) 25 22 - 29 mmol/L    Anion Gap 10 7 - 15 mmol/L    Urea Nitrogen 24.5 (H) 8.0 - 23.0 mg/dL    Creatinine 1.19 (H) 0.67 - 1.17 mg/dL    Calcium 8.8 8.8 - 10.2 mg/dL    Glucose 89 70 - 99 mg/dL    GFR Estimate 59 (L) >60 mL/min/1.73m2   CBC with platelets    Collection Time: 02/20/23  7:02 AM   Result Value Ref Range    WBC Count 7.2 4.0 - 11.0 10e3/uL    RBC Count 3.27 (L) 4.40 - 5.90 10e6/uL    Hemoglobin 9.6 (L) 13.3 - 17.7 g/dL    Hematocrit 32.0 (L) 40.0 - 53.0 %    MCV 98 78 - 100 fL    MCH 29.4 26.5 - 33.0 pg    MCHC 30.0 (L) 31.5 - 36.5 g/dL    RDW 15.2 (H) 10.0 - 15.0 " %    Platelet Count 395 150 - 450 10e3/uL       MEDICATIONS:     Review of your medicines          Accurate as of February 22, 2023  2:57 PM. If you have any questions, ask your nurse or doctor.            CONTINUE these medicines which have NOT CHANGED      Dose / Directions   acetaminophen 500 MG tablet  Commonly known as: TYLENOL      Dose: 500 mg  Take 500 mg by mouth daily And every 6 hours prn  Refills: 0     apixaban ANTICOAGULANT 2.5 MG tablet  Commonly known as: ELIQUIS  Indication: Atrial Fibrillation Not Caused By A Heart Valve Problem  Used for: Paroxysmal atrial fibrillation (H)      Dose: 2.5 mg  Take 1 tablet (2.5 mg) by mouth 2 times daily  Quantity: 60 tablet  Refills: 0     aspirin 81 MG EC tablet  Commonly known as: ASA      Dose: 81 mg  [ASPIRIN 81 MG EC TABLET] Take 81 mg by mouth daily.  Refills: 0     CENTRUM SILVER ULTRA MENS PO      Dose: 1 tablet  [MULTIVIT-MIN/FA/LYCOPEN/LUTEIN (CENTRUM SILVER MEN ORAL)] Take 1 tablet by mouth daily.  Refills: 0     insulin aspart 100 UNIT/ML pen  Commonly known as: NovoLOG PEN  Used for: Type 2 diabetes mellitus with diabetic peripheral angiopathy without gangrene, with long-term current use of insulin (H)      Dose: 1-3 Units  Inject 1-3 Units Subcutaneous 3 times daily (before meals) Correction Scale - LOW INSULIN RESISTANCE DOSING     Do Not give Correction Insulin if Pre-Meal BG less than 140.   For Pre-Meal  - 239 give 1 unit.   For Pre-Meal  - 339 give 2 units.   For Pre-Meal BG greater than or equal to 340 give 3 units.   To be given with prandial insulin, and based on pre-meal blood glucose.   Notify provider if glucose greater than or equal to 350 mg/dL after administration of correction dose.  If given at mealtime, administer within 30 minutes of start of meal.  Quantity: 15 mL  Refills: 0     Lantus SoloStar 100 UNIT/ML pen  Used for: Type 2 diabetes mellitus with diabetic peripheral angiopathy without gangrene, with long-term  current use of insulin (H)  Generic drug: insulin glargine      Dose: 23 Units  Inject 23 Units Subcutaneous At Bedtime  Quantity: 15 mL  Refills: 0     mupirocin 2 % external ointment  Commonly known as: BACTROBAN      Apply topically daily as needed Apply to foot wound  Refills: 0     polyethylene glycol 17 g packet  Commonly known as: MIRALAX  Used for: Drug-induced constipation      Dose: 17 g  [POLYETHYLENE GLYCOL (MIRALAX) 17 GRAM PACKET] Take 1 packet (17 g total) by mouth daily as needed.  Quantity: 100 packet  Refills: 3     tamsulosin 0.4 MG capsule  Commonly known as: FLOMAX  Used for: Urinary retention      TAKE 1 CAPSULE BY MOUTH EVERY DAY AFTER SUPPER  Quantity: 90 capsule  Refills: 3     trolamine salicylate 10 % external cream  Commonly known as: ASPERCREME      Apply topically 2 times daily  Refills: 0     vitamin B-12 1000 MCG tablet  Commonly known as: CYANOCOBALAMIN  Used for: Vitamin B12 deficiency (non anemic)      Dose: 1,000 mcg  Take 1 tablet (1,000 mcg) by mouth daily  Quantity: 60 tablet  Refills: 0     Vitamin D3 125 MCG (5000 UT) tablet  Commonly known as: CHOLECALCIFEROL  Used for: Vitamin D deficiency      Dose: 125 mcg  Take 1 tablet (125 mcg) by mouth daily  Refills: 0     vitamin E 400 units (180 mg) capsule  Commonly known as: TOCOPHEROL      Dose: 400 Units  [VITAMIN E 400 UNIT CAPSULE] Take 400 Units by mouth daily.  Refills: 0            ASSESSMENT/PLAN  Encounter Diagnoses   Name Primary?     Urinary retention Yes     Physical deconditioning      Diabetes mellitus.  Diabetic diet, fingersticks as ordered aspart sliding scale with meals, Lantus 23 units at bedtime may need to be increased depending on fingerstick's    Atrial fibrillation on Eliquis    Urinary retention Failed voiding trial, Dietrich in place, follow-up with urology, currently on tamsulosin recent urine culture on 2/17/2023 showed no growth    Pain management on extra strength Tylenol scheduled and as  needed    Vitamin D deficiency started on 1000 mics daily in the hospital.    Physical deconditioning PT OT    MEDICAL EQUIPMENT NEEDS:  Home with a Dietrich catheter    DISCHARGE PLAN/FACE TO FACE:  I certify that services are/were furnished while this patient was under the care of a physician and that a physician or an allowed non-physician practitioner (NPP), had a face-to-face encounter that meets the physician face-to-face encounter requirements. The encounter was in whole, or in part, related to the primary reason for home health. The patient is confined to his/her home and needs intermittent skilled nursing, physical therapy, speech-language pathology, or the continued need for occupational therapy. A plan of care has been established by a physician and is periodically reviewed by a physician.  Date of Face-to-Face Encounter: 2/22/2023    I certify that, based on my findings, the following services are medically necessary home health services: PT OT home health aide RN    My clinical findings support the need for the above skilled services because: PT OT for continued strength and endurance, home health aide to assist with activities of daily living, RN for vital signs medication management, catheter management and heel wound monitoring    This patient is homebound because: He is deconditioned easily fatigued following recent UTI sepsis    The patient is, or has been, under my care and I have initiated the establishment of the plan of care. This patient will be followed by a physician who will periodically review the plan of care.    Schedule follow up visit with primary care provider within 7 days to reestablish care.    Electronically signed by: Rhonda Ware CNP            Sincerely,        Rhonda Ware CNP

## 2023-02-22 NOTE — PROGRESS NOTES
"[facility] GERIATRIC SERVICES  Chief Complaint   Patient presents with     Discharge Summary [facility] Medical Record Number:  [id number]  Place of Service where encounter took place:  [facility] ([facility]) [[id number]]  Code Status:  Full Code     HISTORY:      HPI:  Caleb Hernandez  is 87 year old (1935) undergoing physical and occupational therapy. admitted on 1/17/2023. He has a past medical history of paroxysmal atrial fibrillation on Eliquis, chronic kidney disease, previous recurrent UTIs, previous ascending cholangitis status postcholecystectomy, progressive cognitive issues, DM2, hypertension, coronary artery disease, lymphedema, Excerpted from records  who presented with x1 day of \"feeling unwell\" and family later reported he had nausea with emesis.  Admitted for sepsis, treating UTI, acute renal failure on CKD 3, urinary retention requiring Dietrich catheter placement.  Was treated with broad-spectrum antibiotics, and hypotension but required pressors in ICU for short period of time later on weaned off of it.  Kidney function improved with aggressive IV fluid resuscitation, treatment of sepsis.  Was seen by nephrology, pulm and critical care.  Urine culture ultimately returned negative.  Given his overall presentation is continuing on Augmentin.  Urology recommended outpatient follow-up for void trial.  He has vitamin B12 deficiency with a level less than 150.  He is started on vitamin B12 supplements.  Will need repeat check in 1 to 2 months.   He had a drop in hemoglobin from 12 to around 8.8.  Was seen by GI with concerns of GI bleeding while on Eliquis.  Recommended outpatient follow-up.  Is discharging to TCU in stable condition.     Today he was seen at the bedside to review vital signs, labs, routine visit and a face-to-face appointment.  He will discharge to home on 2/23/2023 with current medications and pain.  He will have home care services PT OT home health aide RN.  He is " currently in the TCU with sepsis related to UTI.  He does have a Dietrich with dark urine, urine culture on 2/20/2023 showed no growth.  He will need urology follow-up.  Labs reviewed last hemoglobin 9.6 up from 8.5.  He denies chest pain shortness of breath cough congestion constipation or diarrhea.     ALLERGIES:Cresol [phenol], Demeclocycline, and Crestor [rosuvastatin]    PAST MEDICAL HISTORY:   Past Medical History:   Diagnosis Date     Abscess of right foot 11/23/2020    Added automatically from request for surgery [id number]     Acute pulmonary embolism with acute cor pulmonale (H) 5/23/2020     Ascending cholangitis 1/27/2022     BPH (benign prostatic hyperplasia)      Cholelithiasis      Closed fracture of rib     Created by Conversion  Replacement Utility updated for latest IMO load     Closed fracture of thoracic vertebra (H)     Created by Conversion  Replacement Utility updated for latest IMO load     Common bile duct (CBD) obstruction 9/4/2017     Diabetes mellitus (H)      Essential hypertension      Gram-negative bacteremia 1/27/2022    Positive blood culture 1/26/2022; likely biliary source     Hyperlipidemia      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    Added automatically from request for surgery [id number]      Pancreatitis      Sepsis (H) 1/27/2022     Sepsis due to pneumonia (H) 9/8/2017     Septic arthritis of right foot (H) 12/2/2020       PAST SURGICAL HISTORY:   has a past surgical history that includes IR Lower Extremity Angiogram Right (11/24/2020); Cholecystectomy (1985); Tonsillectomy (1940); Endoscopic retrograde cholangiopancreatogram; back surgery; Endoscopic retrograde cholangiopancreatogram (N/A, 9/5/2017); Incision And Drainage Of Wound (Right, 11/2/2020); Incision And Drainage Of Wound (Right, 11/16/2020); Amputate toe(s) (Right, 11/16/2020); Ir Extremity Angiogram Right (11/24/2020); Picc (11/30/2020); Incision And Drainage Of Wound (Right, 11/27/2020); and Endoscopic retrograde  cholangiopancreatogram (N/A, 1/27/2022).    FAMILY HISTORY: family history includes Alcoholism in his father; Aortic aneurysm in his mother.    SOCIAL HISTORY:  reports that he quit smoking about 31 years ago. His smoking use included cigarettes. He has never used smokeless tobacco. He reports that he does not drink alcohol and does not use drugs.    ROS:  Constitutional: Negative for activity change, appetite change, fatigue and fever.   HENT: Negative for congestion.    Respiratory: Negative for cough, shortness of breath and wheezing.    Cardiovascular: Negative for chest pain and leg swelling.   Gastrointestinal: Negative for abdominal distention, abdominal pain, constipation, diarrhea and nausea.   Genitourinary: Negative for dysuria.  Dietrich in place with dark huey urine  Musculoskeletal: Negative for arthralgia. Negative for back pain.   Skin: Negative for color change and wound.   Neurological: Negative for dizziness.   Psychiatric/Behavioral: Negative for agitation, behavioral problems and confusion.     Physical Exam:  Constitutional:       Appearance: Patient is well-developed.   HENT:      Head: Normocephalic.   Eyes:      Conjunctiva/sclera: Conjunctivae normal.   Neck:      Musculoskeletal: Normal range of motion.   Cardiovascular:      Rate and Rhythm: Normal rate and regular rhythm.      Heart sounds: Normal heart sounds. No murmur.   Pulmonary:      Effort: No respiratory distress.      Breath sounds: Normal breath sounds. No wheezing or rales.   Abdominal:      General: Bowel sounds are normal. There is no distension.      Palpations: Abdomen is soft.      Tenderness: There is no abdominal tenderness.   Musculoskeletal:       Normal range of motion.     Skin:General:        Skin is warm.   Neurological:         Mental Status: Patient is alert and oriented to person, place, and time.   Psychiatric:         Behavior: Behavior normal.     Vitals:/61   Pulse 69   Temp 97.5  F (36.4  C)   Resp  "20   Ht 1.727 m (5' 8\")   Wt 72.1 kg (159 lb)   SpO2 96%   BMI 24.18 kg/m   and Body mass index is 24.18 kg/m .    Lab/Diagnostic data:   Recent Results (from the past 240 hour(s))   UA with Microscopic reflex to Culture    Collection Time: 02/17/23  9:15 PM    Specimen: Urine, Dietrich Catheter   Result Value Ref Range    Color Urine Yellow Colorless, Straw, Light Yellow, Yellow    Appearance Urine Cloudy (A) Clear    Glucose Urine 100 (A) Negative mg/dL    Bilirubin Urine Negative Negative    Ketones Urine Negative Negative mg/dL    Specific Gravity Urine 1.017 1.003 - 1.035    Blood Urine Large (A) Negative    pH Urine 6.0 5.0 - 7.0    Protein Albumin Urine 70 (A) Negative mg/dL    Urobilinogen Urine Normal Normal, 2.0 mg/dL    Nitrite Urine Negative Negative    Leukocyte Esterase Urine Large (A) Negative    WBC Clumps Urine Present (A) None Seen /HPF    RBC Urine 109 (H) <=2 /HPF    WBC Urine >182 (H) <=5 /HPF   Urine Culture    Collection Time: 02/17/23  9:15 PM    Specimen: Urine, Dietrich Catheter   Result Value Ref Range    Culture No Growth    Basic metabolic panel    Collection Time: 02/20/23  7:02 AM   Result Value Ref Range    Sodium 138 136 - 145 mmol/L    Potassium 4.7 3.4 - 5.3 mmol/L    Chloride 103 98 - 107 mmol/L    Carbon Dioxide (CO2) 25 22 - 29 mmol/L    Anion Gap 10 7 - 15 mmol/L    Urea Nitrogen 24.5 (H) 8.0 - 23.0 mg/dL    Creatinine 1.19 (H) 0.67 - 1.17 mg/dL    Calcium 8.8 8.8 - 10.2 mg/dL    Glucose 89 70 - 99 mg/dL    GFR Estimate 59 (L) >60 mL/min/1.73m2   CBC with platelets    Collection Time: 02/20/23  7:02 AM   Result Value Ref Range    WBC Count 7.2 4.0 - 11.0 10e3/uL    RBC Count 3.27 (L) 4.40 - 5.90 10e6/uL    Hemoglobin 9.6 (L) 13.3 - 17.7 g/dL    Hematocrit 32.0 (L) 40.0 - 53.0 %    MCV 98 78 - 100 fL    MCH 29.4 26.5 - 33.0 pg    MCHC 30.0 (L) 31.5 - 36.5 g/dL    RDW 15.2 (H) 10.0 - 15.0 %    Platelet Count 395 150 - 450 10e3/uL       MEDICATIONS:     Review of your medicines    "       Accurate as of February 22, 2023  2:57 PM. If you have any questions, ask your nurse or doctor.            CONTINUE these medicines which have NOT CHANGED      Dose / Directions   acetaminophen 500 MG tablet  Commonly known as: TYLENOL      Dose: 500 mg  Take 500 mg by mouth daily And every 6 hours prn  Refills: 0     apixaban ANTICOAGULANT 2.5 MG tablet  Commonly known as: ELIQUIS  Indication: Atrial Fibrillation Not Caused By A Heart Valve Problem  Used for: Paroxysmal atrial fibrillation (H)      Dose: 2.5 mg  Take 1 tablet (2.5 mg) by mouth 2 times daily  Quantity: 60 tablet  Refills: 0     aspirin 81 MG EC tablet  Commonly known as: ASA      Dose: 81 mg  [ASPIRIN 81 MG EC TABLET] Take 81 mg by mouth daily.  Refills: 0     CENTRUM SILVER ULTRA MENS PO      Dose: 1 tablet  [MULTIVIT-MIN/FA/LYCOPEN/LUTEIN (CENTRUM SILVER MEN ORAL)] Take 1 tablet by mouth daily.  Refills: 0     insulin aspart 100 UNIT/ML pen  Commonly known as: NovoLOG PEN  Used for: Type 2 diabetes mellitus with diabetic peripheral angiopathy without gangrene, with long-term current use of insulin (H)      Dose: 1-3 Units  Inject 1-3 Units Subcutaneous 3 times daily (before meals) Correction Scale - LOW INSULIN RESISTANCE DOSING     Do Not give Correction Insulin if Pre-Meal BG less than 140.   For Pre-Meal  - 239 give 1 unit.   For Pre-Meal  - 339 give 2 units.   For Pre-Meal BG greater than or equal to 340 give 3 units.   To be given with prandial insulin, and based on pre-meal blood glucose.   Notify provider if glucose greater than or equal to 350 mg/dL after administration of correction dose.  If given at mealtime, administer within 30 minutes of start of meal.  Quantity: 15 mL  Refills: 0     Lantus SoloStar 100 UNIT/ML pen  Used for: Type 2 diabetes mellitus with diabetic peripheral angiopathy without gangrene, with long-term current use of insulin (H)  Generic drug: insulin glargine      Dose: 23 Units  Inject 23 Units  Subcutaneous At Bedtime  Quantity: 15 mL  Refills: 0     mupirocin 2 % external ointment  Commonly known as: BACTROBAN      Apply topically daily as needed Apply to foot wound  Refills: 0     polyethylene glycol 17 g packet  Commonly known as: MIRALAX  Used for: Drug-induced constipation      Dose: 17 g  [POLYETHYLENE GLYCOL (MIRALAX) 17 GRAM PACKET] Take 1 packet (17 g total) by mouth daily as needed.  Quantity: 100 packet  Refills: 3     tamsulosin 0.4 MG capsule  Commonly known as: FLOMAX  Used for: Urinary retention      TAKE 1 CAPSULE BY MOUTH EVERY DAY AFTER SUPPER  Quantity: 90 capsule  Refills: 3     trolamine salicylate 10 % external cream  Commonly known as: ASPERCREME      Apply topically 2 times daily  Refills: 0     vitamin B-12 1000 MCG tablet  Commonly known as: CYANOCOBALAMIN  Used for: Vitamin B12 deficiency (non anemic)      Dose: 1,000 mcg  Take 1 tablet (1,000 mcg) by mouth daily  Quantity: 60 tablet  Refills: 0     Vitamin D3 125 MCG (5000 UT) tablet  Commonly known as: CHOLECALCIFEROL  Used for: Vitamin D deficiency      Dose: 125 mcg  Take 1 tablet (125 mcg) by mouth daily  Refills: 0     vitamin E 400 units (180 mg) capsule  Commonly known as: TOCOPHEROL      Dose: 400 Units  [VITAMIN E 400 UNIT CAPSULE] Take 400 Units by mouth daily.  Refills: 0            ASSESSMENT/PLAN  Encounter Diagnoses   Name Primary?     Urinary retention Yes     Physical deconditioning      Diabetes mellitus.  Diabetic diet, fingersticks as ordered aspart sliding scale with meals, Lantus 23 units at bedtime may need to be increased depending on fingerstick's    Atrial fibrillation on Eliquis    Urinary retention Failed voiding trial, Dietrich in place, follow-up with urology, currently on tamsulosin recent urine culture on 2/17/2023 showed no growth    Pain management on extra strength Tylenol scheduled and as needed    Vitamin D deficiency started on 1000 mics daily in the hospital.    Physical deconditioning PT  OT    MEDICAL EQUIPMENT NEEDS:  Home with a Dietrich catheter    DISCHARGE PLAN/FACE TO FACE:  I certify that services are/were furnished while this patient was under the care of a physician and that a physician or an allowed non-physician practitioner (NPP), had a face-to-face encounter that meets the physician face-to-face encounter requirements. The encounter was in whole, or in part, related to the primary reason for home health. The patient is confined to his/her home and needs intermittent skilled nursing, physical therapy, speech-language pathology, or the continued need for occupational therapy. A plan of care has been established by a physician and is periodically reviewed by a physician.  Date of Face-to-Face Encounter: 2/22/2023    I certify that, based on my findings, the following services are medically necessary home health services: PT OT home health aide RN    My clinical findings support the need for the above skilled services because: PT OT for continued strength and endurance, home health aide to assist with activities of daily living, RN for vital signs medication management, catheter management and heel wound monitoring    This patient is homebound because: He is deconditioned easily fatigued following recent UTI sepsis    The patient is, or has been, under my care and I have initiated the establishment of the plan of care. This patient will be followed by a physician who will periodically review the plan of care.    Schedule follow up visit with primary care provider within 7 days to reestablish care.    Electronically signed by: Rhonda Ware CNP

## 2023-02-24 NOTE — PROGRESS NOTES
Clinic Care Coordination Contact    Clinical Data: Patient was hospitalized at  from 1/17 to 1/23 with diagnosis of Septic shock  Acute pyelonephritis  Urinary retention status post Dietrich catheter placement  Hematuria resolved  ARF on CKD 3  Hyperkalemia  Vitamin B12 deficiency  Hypernatremia resolved  Active Problems:    Essential hypertension    Type 2 diabetes mellitus with diabetic peripheral angiopathy without gangrene, with long-term current use of insulin (H)    Adult failure to thrive    Paroxysmal atrial fibrillation (H)    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 2 diabetes mellitus, limited to breakdown of skin (H)    History of cholecystectomy    Coronary artery disease involving native coronary artery of native heart without angina pectoris    Age-related cognitive decline    Frailty syndrome in geriatric patient    Falls frequently.      Patient discharged from TCU 2/22.  Now discharged to previous living situation as listed below with homecare.  Writer spoke with son and was asked to call back to speak with Frantz.  He did not answer.  Writer left a detailed VM for him.  Instructed for him to call the clinic if any additional needs.  Will remove self from the care team to avoid duplication of care services.  PCP to send a new referral if additional needs identified.     Patient lives with wife Virginia and son Elfego in a private residence. Uses a standard walker; no home care services; doesn t drive. Patient is assisted by wife and son with most I/ADLs.  Elfego manages and administers medications including insulin and provides transportation. Wife stand-by assists with showers for safety, meals, and other ADLs as needed     Plan: RN/SW Care Coordinator will await notification from facility staff informing RN/SW Care Coordinator of patient's discharge plans/needs. RN/SW Care Coordinator will review chart and outreach to facility staff every 4 weeks and as needed.      Per chart review: Patient  lives with wife Virginia and son Elfego in a private residence. Uses a standard walker; no home care services; doesn t drive. Patient is assisted by wife and son with most I/ADLs.  Elfego manages and administers medications including insulin and provides transportation. Wife stand-by assists with showers for safety, meals, and other ADLs as needed

## 2023-02-24 NOTE — TELEPHONE ENCOUNTER
Order/Referral Request    Who is requesting:Mathew RN, [employer] [address]       Orders being requested:  Skilled Nursing    2xs per week for 5 weeks    1x per week for 4 weeks  Plus 3 PRN visits      Wound Care Orders:  phase 2 pressure ulcer on buttocks.   Clean wound and apply foam dressing 2xs per week.    Note: Deep tissue injury on left heel      Reason service is needed/diagnosis:    Wound and catheter care    When are orders needed by:  as soon possible    Has this been discussed with Provider: No    Does patient have a preference on a Group/Provider/Facility? [facility]    Does patient have an appointment scheduled?: Yes: Just discharged from TCU,  Where to send orders: verbal order left on [phone] secure line.

## 2023-02-24 NOTE — TELEPHONE ENCOUNTER
I approve      Skilled Nursing    2xs per week for 5 weeks    1x per week for 4 weeks  Plus 3 PRN visits       Wound Care Orders:  phase 2 pressure ulcer on buttocks.   Clean wound and apply foam dressing 2xs per week.

## 2023-02-27 NOTE — TELEPHONE ENCOUNTER
The Home Care/Assisted Living/Nursing Facility is calling regarding an established patient.  Has the patient seen Home Care in the past or is currently residing in Assisted Living or Nursing Facility? Yes.     Elfego PT calling from [employer] requesting the following orders that are within the Home Care, Assisted Living or Nursing Home Eval and Treatment standing order and can be signed as standing order signature required by RN.    Preferred Call Back Number: [phone]    PT/OT/Speech Therapy:  PT: 1x/week for 1 week, 2x/week for 4 weeks: bilateral leg strengthening, balance, transferring, gait training, and bed mobility.     Any additional Orders:  Are there any orders requested, not stated above, that are outside of the standing order and must be routed to a licensed practitioner for approval?    No    Writer has verified [employer] will send fax to have orders signed.    Anuradha Rocha RN, BSN  [facility]

## 2023-03-08 NOTE — PATIENT INSTRUCTIONS
Follow-up visit after hospitalization and TCU stay  Mr. Hernandez comes to this meeting accompanied by son Elfego    Hospitalized at [facility] January 17-23, 2023, for septic shock attributed to urosepsis, acute pyelonephritis, and urinary retention, after which she had Dietrich catheter placed, acute renal failure with a brief stay in intensive care unit, but kidney function subsequently stabilized.    Then Mr. Hernandez recuperated at the [facility] from the dates of January 23 until February 23, 2023, and after that returned home under the care of his son Elfego.  As of March 8, 2023, Mr. Hernandez is getting home care services consisting of occupational therapy, physical therapy, home health aide, and skilled nursing visits    Mr. Hernandez continues with Dietrich catheter, and has been following up with Dr. Villa at [facility], and the plan is to do a UroLift procedure on April 11, 2023.    Mr. Hernandez Dietrich catheter is colonized at this point, and the urine is cloudy.  However Mr. Hernandez is not reporting any abdominal pain, his temperature is normal, and at the moment I would not attempt to treat with antibiotics.    While in the hospital, his Eliquis dose was reduced down to 2.5 mg twice a day because of renal function and advanced age.  He is on simultaneous baby aspirin.  Cardiac status seems stable.    He did have kidney function markers checked February 20, 2023 showing creatinine 1.19, GFR 59, which suggest kidney function is pretty decent.  His anemia is gradually improving, hemoglobin down as low as 8.5 g, but was up to 9.6 measured February 20, 2023.    Elfego reports that Mr. Hernandez's blood sugars have hovered around the mid 100 range, but sometimes gets low readings in the 60s.  This typically occurs in the morning.  Perhaps Mr. Hernandez needs a little bit more supper.  But if that does not fix the low blood sugars, then the Lantus dose should be reduced    2-  GFR Estimate >60 mL/min/1.73m2 59 Low      Creatinine  0.67 - 1.17 mg/dL 1.19 High      2-  Hemoglobin 13.3 - 17.7 g/dL 9.6 Low       Diabetes under the supervision of diabetes educator nurse Vianca Uribe   The caregiving duties mostly born by Elfego, who lives in the house.  Elfego has a traditional full-time job, so Mr. Hernandez is often in a house by himself for much of the day  His spouse Mrs. Hernandez is in the house, but she has a lot of medical problems herself    Lab Test 01/17/23  1200   A1C 7.5*     As of March 8, 2023  Lantus dose is 23 units at bedtime  Small doses of Premeal NovoLog, typically only 1 to 3 units    Victoza on hold as of March 8, 2023  No metformin  He is supposed to be measuring his blood sugar before each meal and supplementing his insulin using a sliding scale based on the Premeal blood glucose.     Do Not give Correction Insulin if Pre-Meal BG less than 140.   For Pre-Meal  - 189 give 1 unit.   For Pre-Meal  - 239 give 2 units.   For Pre-Meal  - 289 give 3 units.   For Pre-Meal  - 339 give 4 units.   For Pre-Meal - 399 give 5 units.   For Pre-Meal -449 give 6 units   For Pre-Meal BG greater than or equal to 450 give 7 units.   To be given with prandial insulin, and based on pre-meal blood glucose.  Notify provider if glucose greater than or equal to 350 mg/dL after administration of correction dose.  If given at mealtime, administer within 30 minutes of start of meal.    Chronic kidney disease, stage IIIb, stable  2-   GFR Estimate >60 mL/min/1.73m2 59 Low      Creatinine 0.67 - 1.17 mg/dL 1.19 High      History Hospitalized April 23-25, 2022 with severe hyperglycemia due to uncontrolled diabetes, Mr. Hernandez was not giving himself insulin or checking his blood sugars  UTI, treated with ciprofloxacin    Falling, severe deconditioning, general frailty, falling risk because he takes Eliquis anticoagulation    Mr. Hernandez confirms that he is not interested in moving into an assisted living setting.  His  sons Shiv and Elfego shared the duties taking care of him (mostly Elfego)     Recovered from sepsis, cholangitis with klebsiella bacteremia, bacteriuria  Choledocholithiasis, ERCP 1/27/2022  Admission Date: 1/27/2022      Discharge Date: 2/4/2022  When in the hospital he was very ill, with jaundice and sepsis syndrome  The TCU, returned home around Feb 21 2/21/2022 PICC line pulled cefdinir 300mg PO BID for an additional 2 weeks, ended around March 5, 2022    Status post excision of right fifth metatarsal because of osteomyelitis on November 2, 2020, wound cultures with Bacteroides and Enterobacter.  Status post skin grafting to the lateral right foot.    Right foot pain, occasionally takes a tramadol for that.     Peripheral vascular disease.  November 24, 2020 he had right lower extremity angiogram with balloon angioplasty of anterior tibial and peroneal arteries.  However postoperative ankle-brachial index did not improve significantly, although vascular surgery thought that was because of vasospasm.  He needs to follow-up with vascular surgery, and will have a repeat arterial Doppler ultrasound.     7-8-2021 Ultrasound  Right Lower Extremity: Patent vasculature to the distal superficial femoral artery with triphasic flow. Transition to monophasic flow in the popliteal artery. Occluded posterior tibial artery.  Patent anterior tibial and dorsalis pedis arteries.  Left Lower Extremity: Occluded posterior tibial artery.  Patent anterior tibial and dorsalis pedis arteries.     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  I do not think we need to pursue any advanced imaging, because I really would not want to put him through injections, and would not consider him to be a  candidate for any kind of back surgery.  I do want him to keep working with physical therapy to try to develop some core muscle strength, and do what ever we can to make his muscle stronger and improve mobility.     Essential hypertension, stopped the lisinopril  BP Readings from Last 6 Encounters:   03/08/23 136/62   02/22/23 130/61   02/17/23 105/52   02/06/23 126/57   01/26/23 111/64   01/25/23 111/64     Anemia of chronic disease and renal disease  2-  Hemoglobin 13.3 - 17.7 g/dL 9.6 Low      History of acute pulmonary embolism and cor pulmonale, was unprovoked, diagnosed May 2020, has been on anticoagulation ever since with Eliquis which he will continue long-term.     Eliquis dose reduced January 2023 while in hospital because of kidney function concerns  Eliquis 2.5 mg twice a day    He is on concomitant baby aspirin which I told him does increase the risk for bleeding when combined with Eliquis.  But I think the combination is appropriate for him since he has peripheral vascular disease.     Paroxysmal atrial fibrillation, and history of pulmonary embolism, on anticoagulation with Eliquis for those reasons     Hyperlipidemia in the context of diabetes, with history of intolerance to statins, LDL cholesterol was 126 when measured July 22, 2020.       Constipation, component of drug-induced from tramadol, I told him its okay to use MiraLAX 1 capful even daily, dissolved in liquid.     Erectile dysfunction, took the sildenafil off his medication list, because too many medical issues going on, and blood pressures runs low     Moderna bivalent booster October 7, 2022  Already had his seasonal flu shot for autumn 2022    Immunization History   Administered Date(s) Administered    COVID-19 Vaccine 18+ (Moderna) 01/25/2021, 02/22/2021, 10/07/2022    COVID-19 Vaccine Bivalent Booster 18+ (Moderna) 10/07/2022    COVID-19,PF,Moderna Booster 11/24/2021, 05/27/2022    Mantoux Tuberculin Skin Test 12/09/2020,  12/23/2020, 02/04/2022, 02/14/2022    Pneumo Conj 13-V (2010&after) 01/01/2016, 10/22/2020    Pneumococcal 23 valent 09/29/2003

## 2023-03-08 NOTE — PROGRESS NOTES
Office Visit - Follow Up   Caleb Hernandez   87 year old male    Date of Visit: 3/8/2023    Chief Complaint   Patient presents with     Hospital F/U        -------------------------------------------------------------------------------------------------------------------------  Assessment and Plan    Follow-up visit after hospitalization and TCU stay  Mr. Hernandez comes to this meeting accompanied by son Elfego    Hospitalized at [facility] January 17-23, 2023, for septic shock attributed to urosepsis, acute pyelonephritis, and urinary retention, after which she had Dietrich catheter placed, acute renal failure with a brief stay in intensive care unit, but kidney function subsequently stabilized.    Then Mr. Hernandez recuperated at the [facility] from the dates of January 23 until February 23, 2023, and after that returned home under the care of his son Elfego.  As of March 8, 2023, Mr. Hernandez is getting home care services consisting of occupational therapy, physical therapy, home health aide, and skilled nursing visits    Mr. Hernandez continues with Dietrich catheter, and has been following up with Dr. Villa at [facility], and the plan is to do a UroLift procedure on April 11, 2023.    Mr. Hernandez Dietrich catheter is colonized at this point, and the urine is cloudy.  However Mr. Hernandez is not reporting any abdominal pain, his temperature is normal, and at the moment I would not attempt to treat with antibiotics.    While in the hospital, his Eliquis dose was reduced down to 2.5 mg twice a day because of renal function and advanced age.  He is on simultaneous baby aspirin.  Cardiac status seems stable.    He did have kidney function markers checked February 20, 2023 showing creatinine 1.19, GFR 59, which suggest kidney function is pretty decent.  His anemia is gradually improving, hemoglobin down as low as 8.5 g, but was up to 9.6 measured February 20, 2023.    Elfego reports that Mr. Hernandez's blood sugars have hovered around the mid  100 range, but sometimes gets low readings in the 60s.  This typically occurs in the morning.  Perhaps Mr. Hernandez needs a little bit more supper.  But if that does not fix the low blood sugars, then the Lantus dose should be reduced    2-  GFR Estimate >60 mL/min/1.73m2 59 Low      Creatinine 0.67 - 1.17 mg/dL 1.19 High      2-  Hemoglobin 13.3 - 17.7 g/dL 9.6 Low       Coccyx wound (0.3x 0.3 x0.1), stage 2  Home nursing service will initiate wound care    Diabetes under the supervision of diabetes educator nurse Vianca Uribe   The caregiving duties mostly born by Elfego, who lives in the house.  Elfego has a traditional full-time job, so Mr. Hernandez is often in a house by himself for much of the day  His spouse Mrs. Hernandez is in the house, but she has a lot of medical problems herself    Lab Test 01/17/23  1200   A1C 7.5*     As of March 8, 2023  Lantus dose is 23 units at bedtime  Small doses of Premeal NovoLog, typically only 1 to 3 units    Victoza on hold as of March 8, 2023  No metformin  He is supposed to be measuring his blood sugar before each meal and supplementing his insulin using a sliding scale based on the Premeal blood glucose.     Do Not give Correction Insulin if Pre-Meal BG less than 140.   For Pre-Meal  - 189 give 1 unit.   For Pre-Meal  - 239 give 2 units.   For Pre-Meal  - 289 give 3 units.   For Pre-Meal  - 339 give 4 units.   For Pre-Meal - 399 give 5 units.   For Pre-Meal -449 give 6 units   For Pre-Meal BG greater than or equal to 450 give 7 units.   To be given with prandial insulin, and based on pre-meal blood glucose.  Notify provider if glucose greater than or equal to 350 mg/dL after administration of correction dose.  If given at mealtime, administer within 30 minutes of start of meal.    Chronic kidney disease, stage IIIb, stable  2-   GFR Estimate >60 mL/min/1.73m2 59 Low      Creatinine 0.67 - 1.17 mg/dL 1.19 High      History  Hospitalized April 23-25, 2022 with severe hyperglycemia due to uncontrolled diabetes, Mr. Hernandez was not giving himself insulin or checking his blood sugars  UTI, treated with ciprofloxacin    Falling, severe deconditioning, general frailty, falling risk because he takes Eliquis anticoagulation    Mr. Hernandez confirms that he is not interested in moving into an assisted living setting.  His sons Shiv and Elfego shared the duties taking care of him (mostly Elfego)     Recovered from sepsis, cholangitis with klebsiella bacteremia, bacteriuria  Choledocholithiasis, ERCP 1/27/2022  Admission Date: 1/27/2022      Discharge Date: 2/4/2022  When in the hospital he was very ill, with jaundice and sepsis syndrome  The TCU, returned home around Feb 21 2/21/2022 PICC line pulled cefdinir 300mg PO BID for an additional 2 weeks, ended around March 5, 2022    Status post excision of right fifth metatarsal because of osteomyelitis on November 2, 2020, wound cultures with Bacteroides and Enterobacter.  Status post skin grafting to the lateral right foot.    Right foot pain, occasionally takes a tramadol for that.     Peripheral vascular disease.  November 24, 2020 he had right lower extremity angiogram with balloon angioplasty of anterior tibial and peroneal arteries.  However postoperative ankle-brachial index did not improve significantly, although vascular surgery thought that was because of vasospasm.  He needs to follow-up with vascular surgery, and will have a repeat arterial Doppler ultrasound.     7-8-2021 Ultrasound  Right Lower Extremity: Patent vasculature to the distal superficial femoral artery with triphasic flow. Transition to monophasic flow in the popliteal artery. Occluded posterior tibial artery.  Patent anterior tibial and dorsalis pedis arteries.  Left Lower Extremity: Occluded posterior tibial artery.  Patent anterior tibial and dorsalis pedis arteries.     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  I do not think we need to pursue any advanced imaging, because I really would not want to put him through injections, and would not consider him to be a candidate for any kind of back surgery.  I do want him to keep working with physical therapy to try to develop some core muscle strength, and do what ever we can to make his muscle stronger and improve mobility.     Essential hypertension, stopped the lisinopril  BP Readings from Last 6 Encounters:   03/08/23 136/62   02/22/23 130/61   02/17/23 105/52   02/06/23 126/57   01/26/23 111/64   01/25/23 111/64     Anemia of chronic disease and renal disease  2-  Hemoglobin 13.3 - 17.7 g/dL 9.6 Low      History of acute pulmonary embolism and cor pulmonale, was unprovoked, diagnosed May 2020, has been on anticoagulation ever since with Eliquis which he will continue long-term.     Eliquis dose reduced January 2023 while in hospital because of kidney function concerns  Eliquis 2.5 mg twice a day    He is on concomitant baby aspirin which I told him does increase the risk for bleeding when combined with Eliquis.  But I think the combination is appropriate for him since he has peripheral vascular disease.     Paroxysmal atrial fibrillation, and history of pulmonary embolism, on anticoagulation with Eliquis for those reasons     Hyperlipidemia in the context of diabetes, with history of intolerance to statins, LDL cholesterol was 126 when measured July 22, 2020.       Constipation, component of drug-induced from tramadol, I told him its okay to use MiraLAX 1 capful even daily, dissolved in liquid.     Erectile dysfunction, took the sildenafil off his medication list, because too many medical issues going on, and blood pressures runs  low     Moderna bivalent booster October 7, 2022  Already had his seasonal flu shot for autumn 2022    Immunization History   Administered Date(s) Administered     COVID-19 Vaccine 18+ (Moderna) 01/25/2021, 02/22/2021, 10/07/2022     COVID-19 Vaccine Bivalent Booster 18+ (Moderna) 10/07/2022     COVID-19,PF,Moderna Booster 11/24/2021, 05/27/2022     Mantoux Tuberculin Skin Test 12/09/2020, 12/23/2020, 02/04/2022, 02/14/2022     Pneumo Conj 13-V (2010&after) 01/01/2016, 10/22/2020     Pneumococcal 23 valent 09/29/2003       --------------------------------------------------------------------------------------------------------------------------  History of Present Illness  This 87 year old old     Hospital/Nursing Home/IP Rehab Facility: [facility]  Date of Admission: 1/17/23  Date of Discharge: 1/23/23  Reason(s) for Admission: UTI/Sepsis    Wt Readings from Last 3 Encounters:   03/08/23 72 kg (158 lb 12.8 oz)   02/22/23 72.1 kg (159 lb)   02/17/23 72.1 kg (159 lb)     BP Readings from Last 3 Encounters:   03/08/23 136/62   02/22/23 130/61   02/17/23 105/52       Review of Systems: A comprehensive review of systems was negative except as noted.  ---------------------------------------------------------------------------------------------------------------------------    Medications, Allergies, Social, and Problem List   Current Outpatient Medications   Medication Sig Dispense Refill     acetaminophen (TYLENOL) 500 MG tablet Take 500 mg by mouth daily And every 6 hours prn       apixaban ANTICOAGULANT (ELIQUIS) 2.5 MG tablet Take 1 tablet (2.5 mg) by mouth 2 times daily 60 tablet 0     aspirin 81 MG EC tablet [ASPIRIN 81 MG EC TABLET] Take 81 mg by mouth daily.       cyanocobalamin (CYANOCOBALAMIN) 1000 MCG tablet Take 1 tablet (1,000 mcg) by mouth daily 60 tablet 0     insulin aspart (NOVOLOG PEN) 100 UNIT/ML pen Inject 1-3 Units Subcutaneous 3 times daily (before meals)  Correction Scale - LOW INSULIN RESISTANCE DOSING     Do Not give Correction Insulin if Pre-Meal BG less than 140.   For Pre-Meal  - 239 give 1 unit.   For Pre-Meal  - 339 give 2 units.   For Pre-Meal BG greater than or equal to 340 give 3 units.   To be given with prandial insulin, and based on pre-meal blood glucose.   Notify provider if glucose greater than or equal to 350 mg/dL after administration of correction dose.  If given at mealtime, administer within 30 minutes of start of meal. 15 mL 0     insulin glargine (LANTUS SOLOSTAR) 100 UNIT/ML pen Inject 23 Units Subcutaneous At Bedtime 15 mL 0     multivit-min/FA/lycopen/lutein (CENTRUM SILVER MEN ORAL) [MULTIVIT-MIN/FA/LYCOPEN/LUTEIN (CENTRUM SILVER MEN ORAL)] Take 1 tablet by mouth daily.       mupirocin (BACTROBAN) 2 % ointment Apply topically daily as needed Apply to foot wound       polyethylene glycol (MIRALAX) 17 gram packet [POLYETHYLENE GLYCOL (MIRALAX) 17 GRAM PACKET] Take 1 packet (17 g total) by mouth daily as needed. 100 packet 3     tamsulosin (FLOMAX) 0.4 MG capsule TAKE 1 CAPSULE BY MOUTH EVERY DAY AFTER SUPPER 90 capsule 3     traMADol (ULTRAM) 50 MG tablet as needed       trolamine salicylate (ASPERCREME) 10 % external cream Apply topically 2 times daily       vitamin D3 (CHOLECALCIFEROL) 125 MCG (5000 UT) tablet Take 1 tablet (125 mcg) by mouth daily       vitamin E 400 unit capsule [VITAMIN E 400 UNIT CAPSULE] Take 400 Units by mouth daily.       Allergies   Allergen Reactions     Cresol [Phenol] Unknown     Muscle cramps     Demeclocycline Hives and Rash     Crestor [Rosuvastatin] Muscle Pain (Myalgia)     Social History     Tobacco Use     Smoking status: Former     Types: Cigarettes     Quit date: 1991     Years since quittin.3     Smokeless tobacco: Never   Vaping Use     Vaping Use: Never used   Substance Use Topics     Alcohol use: No     Drug use: No     Patient Active Problem List   Diagnosis     Hyperlipidemia  "    Essential hypertension     Statin intolerance     Personal history of pulmonary embolism     PVD (peripheral vascular disease) (H)     Overweight (BMI 25.0-29.9)     Chronic anticoagulation     Type 2 diabetes mellitus with diabetic peripheral angiopathy without gangrene, with long-term current use of insulin (H)     Adult failure to thrive     Normocytic anemia     Paroxysmal atrial fibrillation (H)     ACP (advance care planning)     Hematuria     Amputated toe, right (H)     Equinovarus acquired deformity, right     Drug-induced constipation     Cellulitis and abscess of foot excluding toe     Jaundice     Cholelithiasis     Status post endoscopic retrograde cholangiopancreatography     History of cholecystectomy     Coronary artery disease involving native coronary artery of native heart without angina pectoris     Hyperglycemia due to diabetes mellitus (H)     Lymphedema     Age-related cognitive decline     Dehydration     Hyperglycemia     Frailty syndrome in geriatric patient     Falls frequently     CKD (chronic kidney disease) stage 3, GFR 30-59 ml/min (H)     Sepsis (H)     UTI (urinary tract infection)     Hyperkalemia     Acute kidney injury (H)     DKA (diabetic ketoacidosis) (H)        Reviewed, reconciled and updated       Physical Exam   General Appearance:       /62 (BP Location: Right arm, Patient Position: Sitting, Cuff Size: Adult Regular)   Pulse 70   Temp 97.7  F (36.5  C) (Oral)   Resp 18   Ht 1.727 m (5' 8\")   Wt 72 kg (158 lb 12.8 oz)   BMI 24.15 kg/m      Frantz looks pretty good today, alert, breathing comfortably.  Dietrich catheter in place, with some cloudy urine and sediment, and expected probably is colonized.  His lungs sound clear  Heart regular rate rhythm, does not seem to be in atrial fibrillation today  His abdomen is nontender.  His legs have maybe a trace of ankle edema.  He is sitting in his wheelchair.  Elfego told me that  Mobility is impaired because of back " pain.  + Coccyx wound (0.3x 0.3 x0.1), stage 2       Additional Information   I spent 40 minutes on this encounter, including reviewing interval history since last visit, examining the patient, explaining and counseling the issues enumerated in the Assessment and Plan (patient given a copy), ordering prescriptions       DALE CORTEZ MD, MD

## 2023-03-09 NOTE — TELEPHONE ENCOUNTER
Spouse, Virginia, answered. Stated that Frantz never fell out of bed, that his legs were hanging out of bed but never fell out of bed.     Virginia states that Frantz is resting right now and she has no questions or concerns.     Adina Gloria RN

## 2023-03-09 NOTE — TELEPHONE ENCOUNTER
Cory was found on the floor this morning prior to PT visit, pt was on the floor by his bedside. PT helped assist getting him back onto the bed- without injury. Pt denies hitting his head and has some pain.    Call back number: [phone]

## 2023-03-10 NOTE — TELEPHONE ENCOUNTER
General Call    Contacts       Type Contact Phone/Fax    03/10/2023 03:04 PM CST Phone (Incoming) [facility] [phone]        Reason for Call:  Patient has a left heel deep tissue injury and home care is wondering if it is ok to diagnose the wound as a deep tissue injury.    What are your questions or concerns:      Date of last appointment with provider: 3-8-23    Okay to leave a detailed message?: Yes at Other phone number:  Mathew Xatori    [phone]

## 2023-03-14 NOTE — TELEPHONE ENCOUNTER
Called and informed Josie of change to Dr. Lozano's note to reflect the coccyx wound.     Josie would like updated note from 3/8/23 faxed to [phone] with attn:Josie    3/8/23 note faxed.     No further questions or concerns.     Adina Gloria RN

## 2023-03-14 NOTE — TELEPHONE ENCOUNTER
I made an addendum to the note of March 8, 2023 to include the coccyx wound.  Okay to print and send that to home care agency if they need it

## 2023-03-14 NOTE — TELEPHONE ENCOUNTER
03/14/23    General Call      Reason for Call:  Documentation for face to face notes    What are your questions or concerns:  Josie RN @ [facility] requesting pt's coccyx wound (0.3x 0.3 x0.1) be documented in the face to face notes.    Date of last appointment with provider: 03/08/23    Okay to leave a detailed message?: Yes at [phone] (confidential RN line)

## 2023-03-17 NOTE — TELEPHONE ENCOUNTER
Called Michell back - relayed orders.   No further questions at this time.     Anuradha Rocha RN, BSN  [facility]

## 2023-03-17 NOTE — TELEPHONE ENCOUNTER
The Home Care/Assisted Living/Nursing Facility is calling regarding an established patient.  Has the patient seen Home Care in the past or is currently residing in Assisted Living or Nursing Facility? No.     Michell calling from [employer] requesting the following orders that are NOT within the Home Care, Assisted Living or Nursing Home Eval and Treatment standing order and must be ordered by a Licensed Practitioner.    Preferred Call Back Number: [phone]    Wound care supplies per Wound Care Specialist's recommendation and Silver cell on left heel wound until he can be seen at the wound clinic Left heel wound measures 2x1.5x.1, left ankle wound 1x1.5x.2, and would like to use medi honey on that ankle wound. Requesting a referral to wound clinic with Dr. Middleton at [facility].    Routing to Licensed Practitioner (Provider) to review request and provide approval or recommendation.    Writer has verified Requestor will send fax to have orders signed. Yes         Reason for Disposition    [1] Follow-up call from patient regarding patient's clinical status AND [2] information urgent    Additional Information    Negative: Lab calling with strep throat test results and triager can call in prescription    Negative: Lab calling with urinalysis test results and triager can call in prescription    Negative: Medication questions    Negative: Medication renewal and refill questions    Negative: Pre-operative or pre-procedural questions    Negative: Doctor (or NP/PA) call to PCP    Negative: ED call to PCP (i.e., primary care provider; doctor, NP, or PA)    Negative: Call about patient who is currently hospitalized    Negative: Lab or radiology calling with CRITICAL test results    Protocols used: [facility]CP CALL - [facility]

## 2023-03-17 NOTE — TELEPHONE ENCOUNTER
I approve    Wound care supplies per Wound Care Specialist's recommendation and Silver cell on left heel wound    use medi honey on that ankle wound    I will transmit the request for vascular wound clinic Dr. Garcia

## 2023-03-20 NOTE — TELEPHONE ENCOUNTER
[facility] to schedule ultrasounds and visit with Dr. Garcia.  [phone]  Per Mao, this does not have to be ASAP d/t pt receiving home care services for the wound care.  First available though.

## 2023-03-20 NOTE — PROGRESS NOTES
Vascular Referral Intake    Referred by: Dr. Otis Lozano for diabetic foot ulcer (left heel)    Specialty: Wound Clinic    Specific Provider if Necessary:  MD John Lee-last seen 10/2021    Visit Type: Wound Clinic    Time Frame: ASAP    Testing/Imaging Needed Before Consult: SRIKANTH with [facility] (was to have repeat studies in 6 months per RF as well as right arterial duplex-date seen 7/8/21)    Appt Note: Diabetic foot ulcer left heel. Last seen by Dr. Lee 10/2021. SRIKANTH prior

## 2023-03-22 NOTE — TELEPHONE ENCOUNTER
*Client had fall yesterday 3/21/2023, attempting to get out of bed to  by himself slid to floor had to call EMT to get back into bed. No injury, bruising reported at this time, no complaints of pain at this time.      The Home Care/Assisted Living/Nursing Facility is calling regarding an established patient.  Has the patient seen Home Care in the past or is currently residing in Assisted Living or Nursing Facility? Yes.     Ghassan calling from [employer] requesting the following orders that are within the Home Care, Assisted Living or Nursing Home Eval and Treatment standing order and can be signed as standing order signature required by RN.    Preferred Call Back Number: [phone]    PT/OT/Speech Therapy     2 x 3 weeks PT    Any additional Orders:  Are there any orders requested, not stated above, that are outside of the standing order and must be routed to a licensed practitioner for approval?    No    Writer has verified Requestor will send fax to have orders signed.      GOLDEN Posey  [facility]

## 2023-03-23 NOTE — TELEPHONE ENCOUNTER
"3/23/23 Spoke with son, Elfego, who states he was told by patient's \"healthcare lady\" to hold off on scheduling until he is seen again on Friday. Requests a call back tomorrow afternoon. Elfego is patient's primary phone number/contact: [phone]  "

## 2023-03-27 NOTE — TELEPHONE ENCOUNTER
LVMTCB if they decided to get the consult with Dr. Garcia.  Needs imaging before and schedule with next available opening. [phone]

## 2023-04-03 NOTE — TELEPHONE ENCOUNTER
[facility] if he would like to schedule.  Sending letter today. [phone]

## 2023-04-04 NOTE — PROGRESS NOTES
[facility]  [address] [address]  Phone: [phone]  Fax: [phone]  Primary Provider: Dale Lozano  Pre-op Performing Provider: DALE LOZANO      PREOPERATIVE EVALUATION:  Today's date: 4/4/2023    Caleb Hernandez is a 87 year old male who presents for a preoperative evaluation.      4/4/2023     3:19 PM   Additional Questions   Roomed by Birdie     Surgical Information:  Surgery/Procedure: Urolift  Surgery Location: [facility]  Surgeon: Dr. Villa  Surgery Date: 4/11/23  Time of Surgery:   Where patient plans to recover: At home with family  Fax number for surgical facility: [phone]    Assessment & Plan       ADDENDUM May 8, 2023:  Frantz had an attempt at the UroLift procedure under conscious sedation, but could not tolerate that, and therefore Dr. Villa is going to give Frantz another try under general anesthesia.  OKAY FOR FRANTZ TO HAVE GENERAL ANESTHESIA  Same instructions apply as far as   stopping aspirin 1 week before,   no Eliquis for 2 days preceding the procedure,   taking 11 units of Lantus insulin at bedtime, which is half of his usual dose of 23 units      The proposed surgical procedure is considered INTERMEDIATE risk.    Satisfactory preoperative medical examination in anticipation of UroLift procedure, this scheduled for Tuesday, April 11, 2023, to treat urinary bladder outlet obstructive problems, for which he has required an ongoing indwelling Dietrich catheter.    Frantz told me he is generally feeling okay, not exhibiting any symptoms of invasive urinary infection, even though his Dietrich catheter is draining cloudy pink urine, and almost surely he is colonized with bacteria.    I am anticipating that Dr. Villa is going to administer presurgical antibiotics to Frantz.    We will have Frantz swing by the laboratory this afternoon to check his CBC, and hopefully his hemoglobin will continue to be improving, which was 9.6 when measured February  20, 2023.  We will also get basic metabolic panel.  LATER: Hemoglobin has risen nicely to 10.9, just barely on the anemic side, basic metabolic panel normal except for mildly elevated glucose 105.    Frantz had an EKG done January 17, 2023 where he had converted from atrial fibrillation back to sinus rhythm.    Regarding Frantz's medications,    I told Frantz to STOP ASPIRIN as of tomorrow April 5, 2023    Regarding Eliquis anticoagulation that he takes because of paroxysmal atrial fibrillation, I told Frantz to TAKE NO ELIQUIS ON THE 2 DAYS PRECEDING SURGERY, which means no Eliquis on Sunday, April 9 or Monday, April 10 or on the day of surgery itself on April 11.  Checked with Dr. Villa as far as when to restart the Eliquis as soon as possible after the surgery.    Regarding Frantz's insulin, I told to take only half his usual dose of Lantus at bedtime the night before surgery.  He usually takes 23 units, so therefore I told to take 11 units at at bedtime the night before surgery.    The night before surgery, Frantz usually takes tamsulosin, okay to take that, although hopefully after his surgery he will be on tamsulosin anymore  Okay to use tramadol if needed for pain.    After the surgery, once Frantz is eating normally, then resume previous dose of Lantus insulin.    Continue to check blood sugars frequently and use short acting NovoLog if needed per the sliding scale.    RECOMMENDATION:  APPROVAL GIVEN to proceed with proposed procedure, without further diagnostic evaluation.      Subjective     HPI related to upcoming procedure:     Urinary bladder obstructive problems  Hospitalized at [facility] January 17-23, 2023, for septic shock attributed to urosepsis, acute pyelonephritis, and urinary retention, after which she had Dietrich catheter placed, acute renal failure with a brief stay in intensive care unit, but kidney function subsequently stabilized.     Then Mr. Hernandez recuperated at the [facility] from the dates of January  23 until February 23, 2023, and after that returned home under the care of his son Elfego.  As of April 4, 2023, Mr. Hernandez is getting home care services consisting of occupational therapy, physical therapy, home health aide, and skilled nursing visits     Mr. Hernandez continues with Dietrich catheter, and has been following up with Dr. Villa at [facility]     Mr. Hernandez Dietrich catheter is colonized at this point, and the urine is cloudy.  However Mr. Hernandez is not reporting any abdominal pain, his temperature is normal, and at the moment I would not attempt to treat with antibiotics.     While in the hospital, his Eliquis dose was reduced down to 2.5 mg twice a day because of renal function and advanced age.  He is on simultaneous baby aspirin.  Cardiac status seems stable.     He did have kidney function markers checked February 20, 2023 showing creatinine 1.19, GFR 59, which suggest kidney function is pretty decent.  His anemia is gradually improving, hemoglobin down as low as 8.5 g, but was up to 9.6 measured February 20, 2023.     2-  GFR Estimate >60 mL/min/1.73m2 59 Low       Creatinine 0.67 - 1.17 mg/dL 1.19 High       2-  Hemoglobin 13.3 - 17.7 g/dL 9.6 Low       Coccyx wound (0.3x 0.3 x0.1), stage 2  Home nursing service giving  wound care     Diabetes under the supervision of diabetes educator nurse Vianca Uribe   The caregiving duties mostly born by Elfego, who lives in the house.  Elfego has a traditional full-time job, so Mr. Hernandez is often in a house by himself for much of the day  His spouse Mrs. Hernandez is in the house, but she has a lot of medical problems herself     Lab Test 01/17/23  1200   A1C 7.5*      As of March 8, 2023  Lantus dose is 23 units at bedtime  Small doses of Premeal NovoLog, typically only 1 to 3 units     Victoza on hold as of March 8, 2023  No metformin  He is supposed to be measuring his blood sugar before each meal and supplementing his insulin using a sliding scale based  on the Premeal blood glucose.     Do Not give Correction Insulin if Pre-Meal BG less than 140.   For Pre-Meal  - 189 give 1 unit.   For Pre-Meal  - 239 give 2 units.   For Pre-Meal  - 289 give 3 units.   For Pre-Meal  - 339 give 4 units.   For Pre-Meal - 399 give 5 units.   For Pre-Meal -449 give 6 units   For Pre-Meal BG greater than or equal to 450 give 7 units.   To be given with prandial insulin, and based on pre-meal blood glucose.  Notify provider if glucose greater than or equal to 350 mg/dL after administration of correction dose.  If given at mealtime, administer within 30 minutes of start of meal.     Chronic kidney disease, stage IIIb, stable  2-   GFR Estimate >60 mL/min/1.73m2 59 Low       Creatinine 0.67 - 1.17 mg/dL 1.19 High       History Hospitalized April 23-25, 2022 with severe hyperglycemia due to uncontrolled diabetes, Mr. Hernandez was not giving himself insulin or checking his blood sugars  UTI, treated with ciprofloxacin     Falling, severe deconditioning, general frailty, falling risk because he takes Eliquis anticoagulation    Mr. Hernandez confirms that he is not interested in moving into an assisted living setting.  His sons Shiv and Elfego shared the duties taking care of him (mostly Elfego)     Recovered from sepsis, cholangitis with klebsiella bacteremia, bacteriuria  Choledocholithiasis, ERCP 1/27/2022  Admission Date: 1/27/2022      Discharge Date: 2/4/2022  When in the hospital he was very ill, with jaundice and sepsis syndrome  The TCU, returned home around Feb 21 2/21/2022 PICC line pulled cefdinir 300mg PO BID for an additional 2 weeks, ended around March 5, 2022     Status post excision of right fifth metatarsal because of osteomyelitis on November 2, 2020, wound cultures with Bacteroides and Enterobacter.  Status post skin grafting to the lateral right foot.     Right foot pain, occasionally takes a tramadol for that.     Peripheral vascular  disease.  November 24, 2020 he had right lower extremity angiogram with balloon angioplasty of anterior tibial and peroneal arteries.  However postoperative ankle-brachial index did not improve significantly, although vascular surgery thought that was because of vasospasm.  He needs to follow-up with vascular surgery, and will have a repeat arterial Doppler ultrasound.     7-8-2021 Ultrasound  Right Lower Extremity: Patent vasculature to the distal superficial femoral artery with triphasic flow. Transition to monophasic flow in the popliteal artery. Occluded posterior tibial artery.  Patent anterior tibial and dorsalis pedis arteries.  Left Lower Extremity: Occluded posterior tibial artery.  Patent anterior tibial and dorsalis pedis arteries.     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  I do not think we need to pursue any advanced imaging, because I really would not want to put him through injections, and would not consider him to be a candidate for any kind of back surgery.  I do want him to keep working with physical therapy to try to develop some core muscle strength, and do what ever we can to make his muscle stronger and improve mobility.     Essential hypertension, stopped the lisinopril  BP Readings from Last 6 Encounters:   04/04/23 132/72   03/08/23 136/62   02/22/23 130/61   02/17/23 105/52   02/06/23 126/57   01/26/23 111/64     Anemia of chronic disease and renal disease  2-  Hemoglobin 13.3 - 17.7 g/dL 9.6 Low       History of acute pulmonary embolism and cor pulmonale, was unprovoked, diagnosed May 2020, has been on anticoagulation ever since with Eliquis which he will continue long-term.      Eliquis dose reduced January 2023 while in hospital  because of kidney function concerns  Eliquis 2.5 mg twice a day     He is on concomitant baby aspirin which I told him does increase the risk for bleeding when combined with Eliquis.  But I think the combination is appropriate for him since he has peripheral vascular disease.     Paroxysmal atrial fibrillation, and history of pulmonary embolism, on anticoagulation with Eliquis for those reasons     Hyperlipidemia in the context of diabetes, with history of intolerance to statins, LDL cholesterol was 126 when measured July 22, 2020.       Constipation, component of drug-induced from tramadol, I told him its okay to use MiraLAX 1 capful even daily, dissolved in liquid.     Erectile dysfunction, took the sildenafil off his medication list, because too many medical issues going on, and blood pressures runs low     Moderna bivalent booster October 7, 2022  Already had his seasonal flu shot for autumn 2022 4/4/2023     3:08 PM   Preop Questions   1. Have you ever had a heart attack or stroke? No   2. Have you ever had surgery on your heart or blood vessels, such as a stent placement, a coronary artery bypass, or surgery on an artery in your head, neck, heart, or legs? No   3. Do you have chest pain with activity? No   4. Do you have a history of  heart failure? No   5. Do you currently have a cold, bronchitis or symptoms of other infection? No   6. Do you have a cough, shortness of breath, or wheezing? No   7. Do you or anyone in your family have previous history of blood clots? UNKNOWN -    8. Do you or does anyone in your family have a serious bleeding problem such as prolonged bleeding following surgeries or cuts? No   9. Have you ever had problems with anemia or been told to take iron pills? No   10. Have you had any abnormal blood loss such as black, tarry or bloody stools? No   11. Have you ever had a blood transfusion? No   12. Are you willing to have a blood transfusion if it is medically needed before,  during, or after your surgery? Yes   13. Have you or any of your relatives ever had problems with anesthesia? No   14. Do you have sleep apnea, excessive snoring or daytime drowsiness? No   15. Do you have any artifical heart valves or other implanted medical devices like a pacemaker, defibrillator, or continuous glucose monitor? No   16. Do you have artificial joints? No   17. Are you allergic to latex? No         Review of Systems  CONSTITUTIONAL: NEGATIVE for fever, chills, change in weight  INTEGUMENTARY/SKIN: NEGATIVE for worrisome rashes, moles or lesions  EYES: NEGATIVE for vision changes or irritation  ENT/MOUTH: NEGATIVE for ear, mouth and throat problems  RESP:NEGATIVE for significant cough or SOB and SOB/dyspnea  CV: irregular heart beat  GI: NEGATIVE for nausea, abdominal pain, heartburn, or change in bowel habits   male :negative for dysuria, hematuria, decreased urinary stream, erectile dysfunction and decreased urinary stream  MUSCULOSKELETAL: NEGATIVE for significant arthralgias or myalgia  NEURO: weakness diffuse  ENDOCRINE: diabetes  HEME/ALLERGY/IMMUNE: anemia  PSYCHIATRIC: NEGATIVE for changes in mood or affect    Patient Active Problem List    Diagnosis Date Noted     Sepsis (H) 01/17/2023     Priority: Medium     UTI (urinary tract infection) 01/17/2023     Priority: Medium     Hyperkalemia 01/17/2023     Priority: Medium     Acute kidney injury (H) 01/17/2023     Priority: Medium     DKA (diabetic ketoacidosis) (H) 01/17/2023     Priority: Medium     CKD (chronic kidney disease) stage 3, GFR 30-59 ml/min (H) 07/01/2022     Priority: Medium     Frailty syndrome in geriatric patient 05/06/2022     Priority: Medium     Falls frequently 05/06/2022     Priority: Medium     Hyperglycemia due to diabetes mellitus (H) 04/23/2022     Priority: Medium     Lymphedema 04/23/2022     Priority: Medium     Age-related cognitive decline 04/23/2022     Priority: Medium     Dehydration 04/23/2022      Priority: Medium     Hyperglycemia 04/23/2022     Priority: Medium     History of cholecystectomy 02/23/2022     Priority: Medium     Coronary artery disease involving native coronary artery of native heart without angina pectoris 02/23/2022     Priority: Medium     Jaundice 01/27/2022     Priority: Medium     Status post endoscopic retrograde cholangiopancreatography 01/27/2022     Priority: Medium     1/27/2022- @ [facility] - return to  for inpatient  Care  Impression:            - The upper third of the main bile duct was dilated,                          with a stone causing an obstruction.                          - Choledocholithiasis was found. Complete removal was                          accomplished by balloon extraction.                          - Common bile duct was successfully dilated.                          - The biliary tree was swept.   Recommendation:        - Return patient to referring hospital for ongoing                          care.                          - Zosyn (piperacillin tazobactam) 3.375 gm IV q 6 hr.                          - Clear liquid diet.                                                          Cellulitis and abscess of foot excluding toe 09/21/2021     Priority: Medium     Drug-induced constipation 05/25/2021     Priority: Medium     Equinovarus acquired deformity, right 05/14/2021     Priority: Medium     Amputated toe, right (H) 01/22/2021     Priority: Medium     Hematuria 12/10/2020     Priority: Medium     ACP (advance care planning) 12/07/2020     Priority: Medium     Type 2 diabetes mellitus with diabetic peripheral angiopathy without gangrene, with long-term current use of insulin (H) 12/02/2020     Priority: Medium     Adult failure to thrive 12/02/2020     Priority: Medium     Normocytic anemia 12/02/2020     Priority: Medium     Paroxysmal atrial fibrillation (H) 12/02/2020     Priority: Medium     Essential hypertension      Priority: Medium     Created by  Conversion  Replacement Utility updated for latest IMO load         Statin intolerance 10/22/2020     Priority: Medium     Personal history of pulmonary embolism 10/22/2020     Priority: Medium     PVD (peripheral vascular disease) (H) 10/22/2020     Priority: Medium     Overweight (BMI 25.0-29.9) 10/22/2020     Priority: Medium     Chronic anticoagulation 10/22/2020     Priority: Medium     Hyperlipidemia      Priority: Medium     Created by Conversion         Cholelithiasis 03/29/2011     Priority: Medium     Formatting of this note might be different from the original.  Recurrent - S/P cholecystectomy in 1985        Past Medical History:   Diagnosis Date     Abscess of right foot 11/23/2020    Added automatically from request for surgery [id number]     Acute pulmonary embolism with acute cor pulmonale (H) 5/23/2020     Ascending cholangitis 1/27/2022     BPH (benign prostatic hyperplasia)      Cholelithiasis      Closed fracture of rib     Created by Conversion  Replacement Utility updated for latest IMO load     Closed fracture of thoracic vertebra (H)     Created by Conversion  Replacement Utility updated for latest IMO load     Common bile duct (CBD) obstruction 9/4/2017     Diabetes mellitus (H)      Essential hypertension      Gram-negative bacteremia 1/27/2022    Positive blood culture 1/26/2022; likely biliary source     Hyperlipidemia      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    Added automatically from request for surgery [id number]      Pancreatitis      Sepsis (H) 1/27/2022     Sepsis due to pneumonia (H) 9/8/2017     Septic arthritis of right foot (H) 12/2/2020     Past Surgical History:   Procedure Laterality Date     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    Procedure: with amputation of the fifth ray, peroneal brevis tendon transfer;  Surgeon: John Garcia DPM;  Location: [facility];  Service: Podiatry     BACK SURGERY      1964 removed a cyst     CHOLECYSTECTOMY  1985     ENDOSCOPIC  RETROGRADE CHOLANGIOPANCREATOGRAM       ENDOSCOPIC RETROGRADE CHOLANGIOPANCREATOGRAM N/A 9/5/2017    Procedure: ENDOSCOPIC RETROGRADE CHOLANGIOPANCREATOGRAPHY SPHINCTEROTOMY AND STONE EXTRACTION;  Surgeon: Hansel Gannon MD;  Location: [facility] [place], [place];  Service:      ENDOSCOPIC RETROGRADE CHOLANGIOPANCREATOGRAM N/A 1/27/2022    Procedure: ENDOSCOPIC RETROGRADE CHOLANGIOPANCREATOGRAPHY, BALLOON DILATION AND STONE EXTRACTION;  Surgeon: Sav Osborne MD;  Location: [facility] [place], [place]     INCISION AND DRAINAGE OF WOUND Right 11/2/2020    Procedure: INCISION AND DRAINAGE, right foot with removal of bone 5th metatarsal;  Surgeon: John Garcia DPM;  Location: [facility] OR;  Service: Podiatry     INCISION AND DRAINAGE OF WOUND Right 11/16/2020    Procedure: INCISION AND DRAINAGE, right foot;  Surgeon: John Garcia DPM;  Location: [facility] OR;  Service: Podiatry     INCISION AND DRAINAGE OF WOUND Right 11/27/2020    Procedure: INCISION AND DRAINAGE, LOWER EXTREMITY;  Surgeon: Aleksandr Hdez DPM;  Location: [facility] [place], [place];  Service: Podiatry     IR EXTREMITY ANGIOGRAM RIGHT  11/24/2020     IR LOWER EXTREMITY ANGIOGRAM RIGHT  11/24/2020     PICC  11/30/2020          TONSILLECTOMY  1940     Current Outpatient Medications   Medication Sig Dispense Refill     acetaminophen (TYLENOL) 500 MG tablet Take 500 mg by mouth daily And every 6 hours prn       apixaban ANTICOAGULANT (ELIQUIS) 2.5 MG tablet Take 1 tablet (2.5 mg) by mouth 2 times daily 60 tablet 0     aspirin 81 MG EC tablet [ASPIRIN 81 MG EC TABLET] Take 81 mg by mouth daily.       blood glucose (NO BRAND SPECIFIED) test strip Use to test blood sugar 2 times daily or as directed.has a  One Touch Ultra 2 meter 200 strip 3     cyanocobalamin (CYANOCOBALAMIN) 1000 MCG tablet Take 1 tablet (1,000 mcg) by mouth daily 60 tablet 0     insulin aspart (NOVOLOG PEN) 100 UNIT/ML pen Inject 1-3 Units Subcutaneous 3 times daily (before meals)  Correction Scale - LOW INSULIN RESISTANCE DOSING     Do Not give Correction Insulin if Pre-Meal BG less than 140.   For Pre-Meal  - 239 give 1 unit.   For Pre-Meal  - 339 give 2 units.   For Pre-Meal BG greater than or equal to 340 give 3 units.   To be given with prandial insulin, and based on pre-meal blood glucose.   Notify provider if glucose greater than or equal to 350 mg/dL after administration of correction dose.  If given at mealtime, administer within 30 minutes of start of meal. 15 mL 0     insulin glargine (LANTUS SOLOSTAR) 100 UNIT/ML pen Inject 23 Units Subcutaneous At Bedtime 15 mL 0     multivit-min/FA/lycopen/lutein (CENTRUM SILVER MEN ORAL) [MULTIVIT-MIN/FA/LYCOPEN/LUTEIN (CENTRUM SILVER MEN ORAL)] Take 1 tablet by mouth daily.       polyethylene glycol (MIRALAX) 17 gram packet [POLYETHYLENE GLYCOL (MIRALAX) 17 GRAM PACKET] Take 1 packet (17 g total) by mouth daily as needed. 100 packet 3     tamsulosin (FLOMAX) 0.4 MG capsule TAKE 1 CAPSULE BY MOUTH EVERY DAY AFTER SUPPER 90 capsule 2     traMADol (ULTRAM) 50 MG tablet Take 1 tablet (50 mg) by mouth every 6 hours as needed for moderate pain (4-6) 30 tablet 0     trolamine salicylate (ASPERCREME) 10 % external cream Apply topically 2 times daily       vitamin D3 (CHOLECALCIFEROL) 125 MCG (5000 UT) tablet Take 1 tablet (125 mcg) by mouth daily       vitamin E 400 unit capsule [VITAMIN E 400 UNIT CAPSULE] Take 400 Units by mouth daily.         Allergies   Allergen Reactions     Cresol [Phenol] Unknown     Muscle cramps     Demeclocycline Hives and Rash     Crestor [Rosuvastatin] Muscle Pain (Myalgia)        Social History     Tobacco Use     Smoking status: Former     Types: Cigarettes     Quit date: 1991     Years since quittin.4     Smokeless tobacco: Never   Vaping Use     Vaping status: Never Used   Substance Use Topics     Alcohol use: No       History   Drug Use No         Objective     /72 (BP Location: Right arm,  "Patient Position: Sitting, Cuff Size: Adult Regular)   Pulse 72   Temp 98.3  F (36.8  C)   Resp 16   Ht 1.727 m (5' 8\")   Wt 73 kg (161 lb)   SpO2 99%   BMI 24.48 kg/m      Physical Exam    General: Alert, in no distress  Skin: No significant lesion seen.  Eyes/nose/throat: Eyes without scleral icterus, eye movements normal, pupils equal and reactive, oropharynx clear,   MSK: Neck with good ROM  Pulm: Lungs clear to auscultation bilaterally  Cardiac: Heart with regular rate and rhythm  GI: Abdomen soft, nontender  + Dietrich catheter in place, draining pinkish cloudy fluid, almost surely he is chronically colonized with bacteria  MSK: Extremities no tenderness   + 2+ leg edema, bilateral, longstanding  Neuro: Moves all extremities  + wheelchair bound  Psych: Alert, normal mental status. Normal affect and speech    Recent Labs   Lab Test 02/20/23  0702 01/30/23  0850 01/17/23  1524 01/17/23  1200 04/23/22  1356 04/22/22  1623 01/27/22  0627 01/26/22  2047   HGB 9.6* 8.5*   < > 12.1*   < > 11.8*   < >  --     336   < > 396   < > 356   < >  --    INR  --   --   --   --   --   --   --  1.34*    135*   < > 138   < > 130*   < > 136   POTASSIUM 4.7 5.0   < > 6.3*   < > 5.2*   < > 4.3   CR 1.19* 1.35*   < > 4.83*   < > 2.17*   < > 1.50*   A1C  --   --   --  7.5*  --  13.5*  --   --     < > = values in this interval not displayed.      Diagnostics:  Recent Results (from the past 48 hour(s))   CBC with platelets    Collection Time: 04/04/23  4:08 PM   Result Value Ref Range    WBC Count 6.1 4.0 - 11.0 10e3/uL    RBC Count 3.69 (L) 4.40 - 5.90 10e6/uL    Hemoglobin 10.9 (L) 13.3 - 17.7 g/dL    Hematocrit 34.2 (L) 40.0 - 53.0 %    MCV 93 78 - 100 fL    MCH 29.5 26.5 - 33.0 pg    MCHC 31.9 31.5 - 36.5 g/dL    RDW 12.9 10.0 - 15.0 %    Platelet Count 361 150 - 450 10e3/uL   Basic metabolic panel  (Ca, Cl, CO2, Creat, Gluc, K, Na, BUN)    Collection Time: 04/04/23  4:08 PM   Result Value Ref Range    Sodium 138 " 136 - 145 mmol/L    Potassium 4.4 3.4 - 5.3 mmol/L    Chloride 104 98 - 107 mmol/L    Carbon Dioxide (CO2) 23 22 - 29 mmol/L    Anion Gap 11 7 - 15 mmol/L    Urea Nitrogen 17.6 8.0 - 23.0 mg/dL    Creatinine 1.16 0.67 - 1.17 mg/dL    Calcium 9.2 8.8 - 10.2 mg/dL    Glucose 105 (H) 70 - 99 mg/dL    GFR Estimate 61 >60 mL/min/1.73m2          Revised Cardiac Risk Index (RCRI):  The patient has the following serious cardiovascular risks for perioperative complications:   - Diabetes Mellitus (on Insulin) = 1 point     RCRI Interpretation: 1 point: Class II (low risk - 0.9% complication rate)    Signed Electronically by: DALE CORTEZ MD  Copy of this evaluation report is provided to requesting physician.

## 2023-04-04 NOTE — PATIENT INSTRUCTIONS
Satisfactory preoperative medical examination in anticipation of UroLift procedure, this scheduled for Tuesday, April 11, 2023, to treat urinary bladder outlet obstructive problems, for which he has required an ongoing indwelling Dietrich catheter.    Frantz told me he is generally feeling okay, not exhibiting any symptoms of invasive urinary infection, even though his Dietrich catheter is draining cloudy pink urine, and almost surely he is colonized with bacteria.    I am anticipating that Dr. Villa is going to administer presurgical antibiotics to Frantz.    We will have Frantz swing by the laboratory this afternoon to check his CBC, and hopefully his hemoglobin will continue to be improving, which was 9.6 when measured February 20, 2023.  We will also get basic metabolic panel.    Frantz had an EKG done January 17, 2023 where he had converted from atrial fibrillation back to sinus rhythm.    Regarding Frantz's medications,    I told Frantz to STOP ASPIRIN as of tomorrow April 5, 2023    Regarding Eliquis anticoagulation that he takes because of paroxysmal atrial fibrillation, I told Frantz to TAKE NO ELIQUIS ON THE 2 DAYS PRECEDING SURGERY, which means no Eliquis on Sunday, April 9 or Monday, April 10 or on the day of surgery itself on April 11.  Checked with Dr. Villa as far as when to restart the Eliquis as soon as possible after the surgery.    Regarding Frantz's insulin, I told to take only half his usual dose of Lantus at bedtime the night before surgery.  He usually takes 23 units, so therefore I told to take 11 units at at bedtime the night before surgery.    The night before surgery, Frantz usually takes tamsulosin, okay to take that, although hopefully after his surgery he will be on tamsulosin anymore  Okay to use tramadol if needed for pain.    After the surgery, once Frantz is eating normally, then resume previous dose of Lantus insulin.    Continue to check blood sugars frequently and use short acting NovoLog if needed per the sliding  scale.      RECOMMENDATION:  APPROVAL GIVEN to proceed with proposed procedure, without further diagnostic evaluation.

## 2023-04-04 NOTE — LETTER
April 5, 2023      Frantz MORTON Hernandez  [address]  [place] [address]        Dear ,    Test results look good.  Hemoglobin continues to improve nicely, now up to 10.9 g.    Basic metabolic panel shows kidney function is good, just slightly elevated glucose, otherwise looks fine      Resulted Orders   CBC with platelets   Result Value Ref Range    WBC Count 6.1 4.0 - 11.0 10e3/uL    RBC Count 3.69 (L) 4.40 - 5.90 10e6/uL    Hemoglobin 10.9 (L) 13.3 - 17.7 g/dL    Hematocrit 34.2 (L) 40.0 - 53.0 %    MCV 93 78 - 100 fL    MCH 29.5 26.5 - 33.0 pg    MCHC 31.9 31.5 - 36.5 g/dL    RDW 12.9 10.0 - 15.0 %    Platelet Count 361 150 - 450 10e3/uL   Basic metabolic panel  (Ca, Cl, CO2, Creat, Gluc, K, Na, BUN)   Result Value Ref Range    Sodium 138 136 - 145 mmol/L    Potassium 4.4 3.4 - 5.3 mmol/L    Chloride 104 98 - 107 mmol/L    Carbon Dioxide (CO2) 23 22 - 29 mmol/L    Anion Gap 11 7 - 15 mmol/L    Urea Nitrogen 17.6 8.0 - 23.0 mg/dL    Creatinine 1.16 0.67 - 1.17 mg/dL    Calcium 9.2 8.8 - 10.2 mg/dL    Glucose 105 (H) 70 - 99 mg/dL    GFR Estimate 61 >60 mL/min/1.73m2      Comment:      eGFR calculated using 2021 CKD-EPI equation.       If you have any questions or concerns, please call the clinic at the number listed above.       Sincerely,      Otis Lozano MD

## 2023-04-24 NOTE — TELEPHONE ENCOUNTER
I approve    Skilled Nursing:   - 2x/week for 4 weeks, 1x/week for 3 weeks for wound care and catheter management.

## 2023-04-24 NOTE — TELEPHONE ENCOUNTER
The Home Care/Assisted Living/Nursing Facility is calling regarding an established patient.  Has the patient seen Home Care in the past or is currently residing in Assisted Living or Nursing Facility? No.     GOLDEN Galarza calling from [employer] requesting the following orders that are NOT within the Home Care, Assisted Living or Nursing Home Eval and Treatment standing order and must be ordered by a Licensed Practitioner.    Preferred Call Back Number: [phone]    Skilled Nursing:   - 2x/week for 4 weeks, 1x/week for 3 weeks for wound care and catheter management.    Routing to Licensed Practitioner (Provider) to review request and provide approval or recommendation.    Writer has verified [employer] will send fax to have orders signed.      To PCP to review - if approved please send back to RN team to call with verbal orders.     Thank you,   Anuradha FRANKS

## 2023-05-02 PROBLEM — L89.623 PRESSURE ULCER OF LEFT HEEL, STAGE 3 (H): Status: ACTIVE | Noted: 2023-01-01

## 2023-05-02 PROBLEM — L97.421 DIABETIC ULCER OF LEFT MIDFOOT ASSOCIATED WITH DIABETES MELLITUS DUE TO UNDERLYING CONDITION, LIMITED TO BREAKDOWN OF SKIN (H): Status: ACTIVE | Noted: 2023-01-01

## 2023-05-02 PROBLEM — L97.521 DIABETIC ULCER OF TOE OF LEFT FOOT ASSOCIATED WITH DIABETES MELLITUS DUE TO UNDERLYING CONDITION, LIMITED TO BREAKDOWN OF SKIN (H): Status: ACTIVE | Noted: 2023-01-01

## 2023-05-02 PROBLEM — L03.119 CELLULITIS AND ABSCESS OF FOOT, EXCEPT TOES: Status: ACTIVE | Noted: 2023-01-01

## 2023-05-02 PROBLEM — L02.619 CELLULITIS AND ABSCESS OF FOOT, EXCEPT TOES: Status: ACTIVE | Noted: 2023-01-01

## 2023-05-02 PROBLEM — E08.621 DIABETIC ULCER OF TOE OF LEFT FOOT ASSOCIATED WITH DIABETES MELLITUS DUE TO UNDERLYING CONDITION, LIMITED TO BREAKDOWN OF SKIN (H): Status: ACTIVE | Noted: 2023-01-01

## 2023-05-02 PROBLEM — E08.621 DIABETIC ULCER OF LEFT MIDFOOT ASSOCIATED WITH DIABETES MELLITUS DUE TO UNDERLYING CONDITION, LIMITED TO BREAKDOWN OF SKIN (H): Status: ACTIVE | Noted: 2023-01-01

## 2023-05-02 NOTE — PROGRESS NOTES
FOOT AND ANKLE SURGERY/PODIATRY Progress Note      ASSESSMENT:   Cellulitis left foot  Diabetic ulceration third digit left foot  Diabetic ulceration left rear foot  Stage III pressure ulceration left heel  PAD      A new wound was identified today: yes,  it is located Left foot and heel.    TREATMENT:  -I discussed with the patient and his son today that he appears to have localized infection to the third digit and also lateral aspect of the left foot and heel.    -Wound culture obtained today.  I will start him on Bactrim.    -Due to the long duration of the ulcerations I recommend and have referred him for left foot and ankle MRIs to evaluate for osteomyelitis of the third digit and calcaneus.    -Referred for PRAFO boot.  I recommend he wear this device overnight and when nonambulatory.  Reviewed etiology of pressure sores of the heel.    -Referred for up dated ABIs    -Recommend limited to nonweightbearing in the left foot at all times.  I discussed with the patient's son that he should consider nursing home type care for his father in light of current presentation.    -After discussion of risk factors and consent obtained 2% Lidocaine HCL jelly was applied, under clean conditions, the left foot and heel ulceration(s) were debrided using #15 blade scalpel.  Devitalized and nonviable tissue, along with any fibrin and slough, was removed to improve granulation tissue formation, stimulate wound healing, decrease overall bacteria load, disrupt biofilm formation and decrease edge senescence. Wound drainage was scant No. Total excisional debridement was 3 sq cm into the subcutaneous tissue with a depth of 0.2 cm.   Ulcers were improved afterwards and .  Measures were as noted on the flow sheet. Medi-honey with a gauze dressing was applied. He will continue to apply Medi-honey with a gauze dressing qoday.    -He will follow-up in one week    John Garcia DPM  [facility] [place]      HPI:  Caleb Hernandez was seen again today for sores on the left foot and heel.  Patient presents today with his son and reports that he has been treating sores on the left foot for several months possibly up to 6 months.  He lives at home with his son and wife.  Patient appears to be a poor historian regarding his current medical issues.  Past medical history significant for type 2 diabetes.    Past Medical History:   Diagnosis Date     Abscess of right foot 11/23/2020    Added automatically from request for surgery [id number]     Acute pulmonary embolism with acute cor pulmonale (H) 5/23/2020     Ascending cholangitis 1/27/2022     BPH (benign prostatic hyperplasia)      Cholelithiasis      Closed fracture of rib     Created by Conversion  Replacement Utility updated for latest IMO load     Closed fracture of thoracic vertebra (H)     Created by Conversion  Replacement Utility updated for latest IMO load     Common bile duct (CBD) obstruction 9/4/2017     Diabetes mellitus (H)      Essential hypertension      Gram-negative bacteremia 1/27/2022    Positive blood culture 1/26/2022; likely biliary source     Hyperlipidemia      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    Added automatically from request for surgery [id number]      Pancreatitis      Sepsis (H) 1/27/2022     Sepsis due to pneumonia (H) 9/8/2017     Septic arthritis of right foot (H) 12/2/2020       Past Surgical History:   Procedure Laterality Date     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    Procedure: with amputation of the fifth ray, peroneal brevis tendon transfer;  Surgeon: John Garcia DPM;  Location: [facility];  Service: Podiatry     BACK SURGERY      1964 removed a cyst     CHOLECYSTECTOMY  1985     ENDOSCOPIC RETROGRADE CHOLANGIOPANCREATOGRAM       ENDOSCOPIC RETROGRADE CHOLANGIOPANCREATOGRAM N/A 9/5/2017    Procedure: ENDOSCOPIC RETROGRADE CHOLANGIOPANCREATOGRAPHY SPHINCTEROTOMY AND STONE EXTRACTION;  Surgeon: Hansel Gannon MD;  Location: [facility] OR;  Service:      ENDOSCOPIC RETROGRADE CHOLANGIOPANCREATOGRAM N/A 1/27/2022    Procedure: ENDOSCOPIC RETROGRADE CHOLANGIOPANCREATOGRAPHY, BALLOON DILATION AND STONE EXTRACTION;  Surgeon: Sav Osborne MD;  Location: [facility] OR     INCISION AND DRAINAGE OF WOUND Right 11/2/2020    Procedure: INCISION AND DRAINAGE, right foot with removal of bone 5th metatarsal;  Surgeon: John Garcia DPM;  Location: [facility] Main OR;  Service: Podiatry     INCISION AND DRAINAGE OF WOUND Right 11/16/2020    Procedure: INCISION AND DRAINAGE, right foot;  Surgeon: John Garcia DPM;  Location: [facility] OR;  Service: Podiatry     INCISION AND DRAINAGE OF WOUND Right 11/27/2020    Procedure: INCISION AND DRAINAGE, LOWER EXTREMITY;  Surgeon: Aleksandr Hdez DPM;  Location: [facility] Main OR;  Service: Podiatry     IR EXTREMITY ANGIOGRAM RIGHT  11/24/2020     IR LOWER EXTREMITY ANGIOGRAM RIGHT  11/24/2020     PICC  11/30/2020          TONSILLECTOMY  1940       Allergies   Allergen Reactions     Cresol [Phenol] Unknown     Muscle cramps     Demeclocycline Hives and Rash     Crestor [Rosuvastatin] Muscle Pain (Myalgia)         Current Outpatient Medications:      acetaminophen (TYLENOL) 500 MG tablet, Take 500 mg by mouth daily And every 6 hours prn, Disp: , Rfl:      apixaban ANTICOAGULANT (ELIQUIS ANTICOAGULANT) 2.5 MG tablet, Take 1 tablet (2.5 mg) by mouth 2 times daily, Disp: 60 tablet, Rfl: 11     apixaban ANTICOAGULANT (ELIQUIS) 2.5 MG tablet, Take 1 tablet (2.5 mg) by mouth 2 times daily, Disp: 60 tablet, Rfl: 0     aspirin 81 MG EC tablet, [ASPIRIN 81 MG EC TABLET] Take 81 mg by mouth daily., Disp: , Rfl:      blood glucose (NO BRAND SPECIFIED) test strip, Use to test blood sugar 2 times daily or as directed.has a  One Touch Ultra 2 meter, Disp: 200 strip, Rfl: 3     cyanocobalamin (CYANOCOBALAMIN) 1000 MCG tablet, Take 1 tablet (1,000 mcg) by mouth daily, Disp: 60 tablet, Rfl: 0      insulin aspart (NOVOLOG PEN) 100 UNIT/ML pen, Inject 1-3 Units Subcutaneous 3 times daily (before meals) Correction Scale - LOW INSULIN RESISTANCE DOSING    Do Not give Correction Insulin if Pre-Meal BG less than 140.  For Pre-Meal  - 239 give 1 unit.  For Pre-Meal  - 339 give 2 units.  For Pre-Meal BG greater than or equal to 340 give 3 units.  To be given with prandial insulin, and based on pre-meal blood glucose.  Notify provider if glucose greater than or equal to 350 mg/dL after administration of correction dose. If given at mealtime, administer within 30 minutes of start of meal., Disp: 15 mL, Rfl: 0     insulin glargine (LANTUS SOLOSTAR) 100 UNIT/ML pen, Inject 23 Units Subcutaneous At Bedtime, Disp: 15 mL, Rfl: 0     multivit-min/FA/lycopen/lutein (CENTRUM SILVER MEN ORAL), [MULTIVIT-MIN/FA/LYCOPEN/LUTEIN (CENTRUM SILVER MEN ORAL)] Take 1 tablet by mouth daily., Disp: , Rfl:      polyethylene glycol (MIRALAX) 17 gram packet, [POLYETHYLENE GLYCOL (MIRALAX) 17 GRAM PACKET] Take 1 packet (17 g total) by mouth daily as needed., Disp: 100 packet, Rfl: 3     sulfamethoxazole-trimethoprim (BACTRIM DS) 800-160 MG tablet, Take 1 tablet by mouth 2 times daily, Disp: 20 tablet, Rfl: 0     tamsulosin (FLOMAX) 0.4 MG capsule, TAKE 1 CAPSULE BY MOUTH EVERY DAY AFTER SUPPER, Disp: 90 capsule, Rfl: 2     traMADol (ULTRAM) 50 MG tablet, Take 1 tablet (50 mg) by mouth every 6 hours as needed for moderate pain (4-6), Disp: 30 tablet, Rfl: 0     trolamine salicylate (ASPERCREME) 10 % external cream, Apply topically 2 times daily, Disp: , Rfl:      vitamin D3 (CHOLECALCIFEROL) 125 MCG (5000 UT) tablet, Take 1 tablet (125 mcg) by mouth daily, Disp: , Rfl:      vitamin E 400 unit capsule, [VITAMIN E 400 UNIT CAPSULE] Take 400 Units by mouth daily., Disp: , Rfl:     Review of Systems - 10 point Review of Systems is negative except for left foot and heel ulcers which is noted in HPI.      OBJECTIVE:  Pulse 68   SpO2 95%    General appearance: Patient is alert and fully cooperative with history & exam.  No sign of distress is noted during the visit.    Vascular: Dorsalis pedis non-palpableLeft.  Dermatologic: Ulceration posterior aspect of the left heel measuring 2 x 1 x 0.2 cm with mixed granular and fibrotic base.  Ulceration anterior lateral left rear foot measuring 1 x 0.4 x 0.2 cm.  Ulceration dorsal third digit left foot measuring 0.3 x 0.2 x 0.2 cm with a fibrotic base.  Localized erythema to the third digit and also along the lateral left foot, no ascending cellulitis noted.  Neurologic: Diminished to light touch Left.  Musculoskeletal: Contracted digits noted Left.    Imaging:     No results found.       Picture:

## 2023-05-02 NOTE — LETTER
5/2/2023         RE: Caleb Hernandez  [address]  [place] [address]        Dear Colleague,    Thank you for referring your patient, Caleb Hernandez, to the [facility]. Please see a copy of my visit note below.    FOOT AND ANKLE SURGERY/PODIATRY Progress Note      ASSESSMENT:   Cellulitis left foot  Diabetic ulceration third digit left foot  Diabetic ulceration left rear foot  Stage III pressure ulceration left heel  PAD      A new wound was identified today: yes,  it is located Left foot and heel.    TREATMENT:  -I discussed with the patient and his son today that he appears to have localized infection to the third digit and also lateral aspect of the left foot and heel.    -Wound culture obtained today.  I will start him on Bactrim.    -Due to the long duration of the ulcerations I recommend and have referred him for left foot and ankle MRIs to evaluate for osteomyelitis of the third digit and calcaneus.    -Referred for PRAFO boot.  I recommend he wear this device overnight and when nonambulatory.  Reviewed etiology of pressure sores of the heel.    -Referred for up dated ABIs    -Recommend limited to nonweightbearing in the left foot at all times.  I discussed with the patient's son that he should consider nursing home type care for his father in light of current presentation.    -After discussion of risk factors and consent obtained 2% Lidocaine HCL jelly was applied, under clean conditions, the left foot and heel ulceration(s) were debrided using #15 blade scalpel.  Devitalized and nonviable tissue, along with any fibrin and slough, was removed to improve granulation tissue formation, stimulate wound healing, decrease overall bacteria load, disrupt biofilm formation and decrease edge senescence. Wound drainage was scant No. Total excisional debridement was 3 sq cm into the subcutaneous tissue with a depth of 0.2 cm.   Ulcers were improved afterwards and .  Measures  were as noted on the flow sheet. Medi-honey with a gauze dressing was applied. He will continue to apply Medi-honey with a gauze dressing qoday.    -He will follow-up in one week    John Garcia DPM  [facility] [place]      HPI: Caleb Hernandez was seen again today for sores on the left foot and heel.  Patient presents today with his son and reports that he has been treating sores on the left foot for several months possibly up to 6 months.  He lives at home with his son and wife.  Patient appears to be a poor historian regarding his current medical issues.  Past medical history significant for type 2 diabetes.    Past Medical History:   Diagnosis Date     Abscess of right foot 11/23/2020    Added automatically from request for surgery [id number]     Acute pulmonary embolism with acute cor pulmonale (H) 5/23/2020     Ascending cholangitis 1/27/2022     BPH (benign prostatic hyperplasia)      Cholelithiasis      Closed fracture of rib     Created by Conversion  Replacement Utility updated for latest IMO load     Closed fracture of thoracic vertebra (H)     Created by Conversion  Replacement Utility updated for latest IMO load     Common bile duct (CBD) obstruction 9/4/2017     Diabetes mellitus (H)      Essential hypertension      Gram-negative bacteremia 1/27/2022    Positive blood culture 1/26/2022; likely biliary source     Hyperlipidemia      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    Added automatically from request for surgery [id number]      Pancreatitis      Sepsis (H) 1/27/2022     Sepsis due to pneumonia (H) 9/8/2017     Septic arthritis of right foot (H) 12/2/2020       Past Surgical History:   Procedure Laterality Date     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    Procedure: with amputation of the fifth ray, peroneal brevis tendon transfer;  Surgeon: John Garcia DPM;  Location: [facility] OR;  Service: Podiatry     BACK SURGERY      1964 removed a cyst     CHOLECYSTECTOMY  1985      ENDOSCOPIC RETROGRADE CHOLANGIOPANCREATOGRAM       ENDOSCOPIC RETROGRADE CHOLANGIOPANCREATOGRAM N/A 9/5/2017    Procedure: ENDOSCOPIC RETROGRADE CHOLANGIOPANCREATOGRAPHY SPHINCTEROTOMY AND STONE EXTRACTION;  Surgeon: Hansel Gannon MD;  Location: [facility] OR;  Service:      ENDOSCOPIC RETROGRADE CHOLANGIOPANCREATOGRAM N/A 1/27/2022    Procedure: ENDOSCOPIC RETROGRADE CHOLANGIOPANCREATOGRAPHY, BALLOON DILATION AND STONE EXTRACTION;  Surgeon: Sav Osborne MD;  Location: [facility]     INCISION AND DRAINAGE OF WOUND Right 11/2/2020    Procedure: INCISION AND DRAINAGE, right foot with removal of bone 5th metatarsal;  Surgeon: John Garcia DPM;  Location: [facility] OR;  Service: Podiatry     INCISION AND DRAINAGE OF WOUND Right 11/16/2020    Procedure: INCISION AND DRAINAGE, right foot;  Surgeon: John Garcia DPM;  Location: [facility] OR;  Service: Podiatry     INCISION AND DRAINAGE OF WOUND Right 11/27/2020    Procedure: INCISION AND DRAINAGE, LOWER EXTREMITY;  Surgeon: Aleksandr Hdez DPM;  Location: [facility];  Service: Podiatry     IR EXTREMITY ANGIOGRAM RIGHT  11/24/2020     IR LOWER EXTREMITY ANGIOGRAM RIGHT  11/24/2020     PICC  11/30/2020          TONSILLECTOMY  1940       Allergies   Allergen Reactions     Cresol [Phenol] Unknown     Muscle cramps     Demeclocycline Hives and Rash     Crestor [Rosuvastatin] Muscle Pain (Myalgia)         Current Outpatient Medications:      acetaminophen (TYLENOL) 500 MG tablet, Take 500 mg by mouth daily And every 6 hours prn, Disp: , Rfl:      apixaban ANTICOAGULANT (ELIQUIS ANTICOAGULANT) 2.5 MG tablet, Take 1 tablet (2.5 mg) by mouth 2 times daily, Disp: 60 tablet, Rfl: 11     apixaban ANTICOAGULANT (ELIQUIS) 2.5 MG tablet, Take 1 tablet (2.5 mg) by mouth 2 times daily, Disp: 60 tablet, Rfl: 0     aspirin 81 MG EC tablet, [ASPIRIN 81 MG EC TABLET] Take 81 mg by mouth daily., Disp: , Rfl:      blood glucose (NO BRAND  SPECIFIED) test strip, Use to test blood sugar 2 times daily or as directed.has a  One Touch Ultra 2 meter, Disp: 200 strip, Rfl: 3     cyanocobalamin (CYANOCOBALAMIN) 1000 MCG tablet, Take 1 tablet (1,000 mcg) by mouth daily, Disp: 60 tablet, Rfl: 0     insulin aspart (NOVOLOG PEN) 100 UNIT/ML pen, Inject 1-3 Units Subcutaneous 3 times daily (before meals) Correction Scale - LOW INSULIN RESISTANCE DOSING    Do Not give Correction Insulin if Pre-Meal BG less than 140.  For Pre-Meal  - 239 give 1 unit.  For Pre-Meal  - 339 give 2 units.  For Pre-Meal BG greater than or equal to 340 give 3 units.  To be given with prandial insulin, and based on pre-meal blood glucose.  Notify provider if glucose greater than or equal to 350 mg/dL after administration of correction dose. If given at mealtime, administer within 30 minutes of start of meal., Disp: 15 mL, Rfl: 0     insulin glargine (LANTUS SOLOSTAR) 100 UNIT/ML pen, Inject 23 Units Subcutaneous At Bedtime, Disp: 15 mL, Rfl: 0     multivit-min/FA/lycopen/lutein (CENTRUM SILVER MEN ORAL), [MULTIVIT-MIN/FA/LYCOPEN/LUTEIN (CENTRUM SILVER MEN ORAL)] Take 1 tablet by mouth daily., Disp: , Rfl:      polyethylene glycol (MIRALAX) 17 gram packet, [POLYETHYLENE GLYCOL (MIRALAX) 17 GRAM PACKET] Take 1 packet (17 g total) by mouth daily as needed., Disp: 100 packet, Rfl: 3     sulfamethoxazole-trimethoprim (BACTRIM DS) 800-160 MG tablet, Take 1 tablet by mouth 2 times daily, Disp: 20 tablet, Rfl: 0     tamsulosin (FLOMAX) 0.4 MG capsule, TAKE 1 CAPSULE BY MOUTH EVERY DAY AFTER SUPPER, Disp: 90 capsule, Rfl: 2     traMADol (ULTRAM) 50 MG tablet, Take 1 tablet (50 mg) by mouth every 6 hours as needed for moderate pain (4-6), Disp: 30 tablet, Rfl: 0     trolamine salicylate (ASPERCREME) 10 % external cream, Apply topically 2 times daily, Disp: , Rfl:      vitamin D3 (CHOLECALCIFEROL) 125 MCG (5000 UT) tablet, Take 1 tablet (125 mcg) by mouth daily, Disp: , Rfl:      vitamin  E 400 unit capsule, [VITAMIN E 400 UNIT CAPSULE] Take 400 Units by mouth daily., Disp: , Rfl:     Review of Systems - 10 point Review of Systems is negative except for left foot and heel ulcers which is noted in HPI.      OBJECTIVE:  Pulse 68   SpO2 95%   General appearance: Patient is alert and fully cooperative with history & exam.  No sign of distress is noted during the visit.    Vascular: Dorsalis pedis non-palpableLeft.  Dermatologic: Ulceration posterior aspect of the left heel measuring 2 x 1 x 0.2 cm with mixed granular and fibrotic base.  Ulceration anterior lateral left rear foot measuring 1 x 0.4 x 0.2 cm.  Ulceration dorsal third digit left foot measuring 0.3 x 0.2 x 0.2 cm with a fibrotic base.  Localized erythema to the third digit and also along the lateral left foot, no ascending cellulitis noted.  Neurologic: Diminished to light touch Left.  Musculoskeletal: Contracted digits noted Left.    Imaging:     No results found.       Picture:                 Again, thank you for allowing me to participate in the care of your patient.        Sincerely,        John Garcia DPM

## 2023-05-02 NOTE — PATIENT INSTRUCTIONS
"  Important lnstructions      WEIGHT BEARING STATUS: You are to remain NON WEIGHT BEARING on your left foot. NON WEIGHT BEARING MEANS NO PRESSURE ON YOUR FOOT OR HEEL AT ANY TIME FOR ANY REASON!    2. OFFLOADING DEVICE: Must use a A WHEELCHAIR at all times! (do not use affected foot to push wheelchair)    3. STABILIZATION DEVICE: Use a PRAFO BOOT . You will need to WEAR THIS AT ALL TIMES EVEN WHILE IN BED.     4. ELEVATE: Elevating your leg means laying with your head on a pillow and your foot ABOVE YOUR HEART.     5. DO NOT MOVE YOUR FOOT.  There is a risk of worsening the wound or incision. To give yourself a higher chance of healing, please DO NOT swing foot back and forth and wiggle foot/toes especially when inside a stabilization device. Limited movement is allowable with therapy as recommended by the doctor.     6. TAKE A PROTEIN SHAKE TWICE A DAY.  (For ex: Boost, Ensure, Glucerna)      Dressing Change lnstructions          EVERY OTHER DAY and as needed, Cleanse your left foot woun(s) with Normal saline.    Pat Dry with non-sterile gauze    Primary Dressing: Apply Medihoney or Manuka honey into/onto the wounds    Secondary dressing: Cover with dry gauze    Secure with non-sterile roll gauze (4\" x 75\" roll) and tape (1\" roll tape) as needed; avoid adhesive directly on the skin     It IS NOT ok to get your wound wet in the bath or shower    SEEK MEDICAL CARE IF:  You have an increase in swelling, pain, or redness around the wound.  You have an increase in the amount of pus coming from the wound.  There is a bad smell coming from the wound.  The wound appears to be worsening/enlarging  You have a fever greater than 101.5 F      It is ok to continue current wound care treatment/products for the next 2-3 days until new wound care supplies are ordered and arrive. If longer than this please contact our office at [phone].        We want to hear from you!   In the next few weeks, you should receive a call or " email to complete a survey about your visit at [facility]. Please help us improve your appointment experience by letting us know how we did today. We strive to make your experience good and value any ways in which we could do better.      We value your input and suggestions.    Thank you for choosing the [facility]!

## 2023-05-09 PROBLEM — E08.621 DIABETIC ULCER OF TOE OF LEFT FOOT ASSOCIATED WITH DIABETES MELLITUS DUE TO UNDERLYING CONDITION, WITH NECROSIS OF MUSCLE (H): Status: ACTIVE | Noted: 2023-01-01

## 2023-05-09 PROBLEM — L97.523 DIABETIC ULCER OF TOE OF LEFT FOOT ASSOCIATED WITH DIABETES MELLITUS DUE TO UNDERLYING CONDITION, WITH NECROSIS OF MUSCLE (H): Status: ACTIVE | Noted: 2023-01-01

## 2023-05-09 NOTE — PROGRESS NOTES
FOOT AND ANKLE SURGERY/PODIATRY Progress Note      ASSESSMENT:   Cellulitis left foot  Diabetic ulceration third digit left foot  Diabetic ulceration left rear foot  Stage III pressure ulceration left heel  PAD        TREATMENT:  -I discussed with the patient and his son that overall the erythema on the third digit on the left foot appears to have improved since his previous visit.  I recommend he finish current course of Bactrim and I will extend Bactrim for additional 10 days.    -I will asked my office staff to help the patient and his son reschedule MRI of the left foot.    -Recommend he obtain a PRAFO boot for offloading of the left heel.    -SRIKANTH scheduled on 5/11.    -After discussion of risk factors and consent obtained 2% Lidocaine HCL jelly was applied, under clean conditions, the third digit left foot ulceration(s) were debrided using #15 blade scalpel.  Devitalized and nonviable tissue, along with any fibrin and slough, was removed to improve granulation tissue formation, stimulate wound healing, decrease overall bacteria load, disrupt biofilm formation and decrease edge senescence. Wound drainage was scant No. Total excisional debridement was 0.04 sq cm into the muscle/fascia with a depth of 0.4 cm.   Ulcers were improved afterwards and .  Measures were as noted on the flow sheet. A gauze dressing was applied. He will continue to apply a gauze dressing qday.    -Iodine with gauze dressing applied to the ulcerations and eschar left heel x2, to be reapplied daily.    -He will follow-up in 2 weeks. I will contact the patient with the MRI report when available and we will be guided by the results.     John Garcia DPM  [facility] [place]      HPI: Claeb Hernandez was seen again today for left foot infection and ulcerations.  Since his last visit with me he was unable to obtain an MRI of the left foot because the MRI machine was not working.  Patient and his son have not rescheduled  the MRI.  He is also not obtained a PRAFO boot.  He is taking Bactrim as directed.    Past Medical History:   Diagnosis Date     Abscess of right foot 11/23/2020    Added automatically from request for surgery [id number]     Acute pulmonary embolism with acute cor pulmonale (H) 5/23/2020     Ascending cholangitis 1/27/2022     BPH (benign prostatic hyperplasia)      Cholelithiasis      Closed fracture of rib     Created by Conversion  Replacement Utility updated for latest IMO load     Closed fracture of thoracic vertebra (H)     Created by Conversion  Replacement Utility updated for latest IMO load     Common bile duct (CBD) obstruction 9/4/2017     Diabetes mellitus (H)      Essential hypertension      Gram-negative bacteremia 1/27/2022    Positive blood culture 1/26/2022; likely biliary source     Hyperlipidemia      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    Added automatically from request for surgery [id number]      Pancreatitis      Sepsis (H) 1/27/2022     Sepsis due to pneumonia (H) 9/8/2017     Septic arthritis of right foot (H) 12/2/2020       Past Surgical History:   Procedure Laterality Date     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    Procedure: with amputation of the fifth ray, peroneal brevis tendon transfer;  Surgeon: John Garcia DPM;  Location: [facility];  Service: Podiatry     BACK SURGERY      1964 removed a cyst     CHOLECYSTECTOMY  1985     ENDOSCOPIC RETROGRADE CHOLANGIOPANCREATOGRAM       ENDOSCOPIC RETROGRADE CHOLANGIOPANCREATOGRAM N/A 9/5/2017    Procedure: ENDOSCOPIC RETROGRADE CHOLANGIOPANCREATOGRAPHY SPHINCTEROTOMY AND STONE EXTRACTION;  Surgeon: Hansel Gannon MD;  Location: [facility] OR;  Service:      ENDOSCOPIC RETROGRADE CHOLANGIOPANCREATOGRAM N/A 1/27/2022    Procedure: ENDOSCOPIC RETROGRADE CHOLANGIOPANCREATOGRAPHY, BALLOON DILATION AND STONE EXTRACTION;  Surgeon: Sav Osborne MD;  Location: [facility]     INCISION AND DRAINAGE OF WOUND Right 11/2/2020     Procedure: INCISION AND DRAINAGE, right foot with removal of bone 5th metatarsal;  Surgeon: John Garcia DPM;  Location: [facility] OR;  Service: Podiatry     INCISION AND DRAINAGE OF WOUND Right 11/16/2020    Procedure: INCISION AND DRAINAGE, right foot;  Surgeon: John Garcia DPM;  Location: [facility] OR;  Service: Podiatry     INCISION AND DRAINAGE OF WOUND Right 11/27/2020    Procedure: INCISION AND DRAINAGE, LOWER EXTREMITY;  Surgeon: Aleksandr Hdez DPM;  Location: [facility] OR;  Service: Podiatry     IR EXTREMITY ANGIOGRAM RIGHT  11/24/2020     IR LOWER EXTREMITY ANGIOGRAM RIGHT  11/24/2020     PICC  11/30/2020          TONSILLECTOMY  1940       Allergies   Allergen Reactions     Cresol [Phenol] Unknown     Muscle cramps     Demeclocycline Hives and Rash     Crestor [Rosuvastatin] Muscle Pain (Myalgia)         Current Outpatient Medications:      acetaminophen (TYLENOL) 500 MG tablet, Take 500 mg by mouth daily And every 6 hours prn, Disp: , Rfl:      apixaban ANTICOAGULANT (ELIQUIS ANTICOAGULANT) 2.5 MG tablet, Take 1 tablet (2.5 mg) by mouth 2 times daily, Disp: 60 tablet, Rfl: 11     apixaban ANTICOAGULANT (ELIQUIS) 2.5 MG tablet, Take 1 tablet (2.5 mg) by mouth 2 times daily, Disp: 60 tablet, Rfl: 0     aspirin 81 MG EC tablet, [ASPIRIN 81 MG EC TABLET] Take 81 mg by mouth daily., Disp: , Rfl:      blood glucose (NO BRAND SPECIFIED) test strip, Use to test blood sugar 2 times daily or as directed.has a  One Touch Ultra 2 meter, Disp: 200 strip, Rfl: 3     cyanocobalamin (CYANOCOBALAMIN) 1000 MCG tablet, Take 1 tablet (1,000 mcg) by mouth daily, Disp: 60 tablet, Rfl: 0     insulin aspart (NOVOLOG PEN) 100 UNIT/ML pen, Inject 1-3 Units Subcutaneous 3 times daily (before meals) Correction Scale - LOW INSULIN RESISTANCE DOSING    Do Not give Correction Insulin if Pre-Meal BG less than 140.  For Pre-Meal  - 239 give 1 unit.  For Pre-Meal  - 339 give 2 units.  For  Pre-Meal BG greater than or equal to 340 give 3 units.  To be given with prandial insulin, and based on pre-meal blood glucose.  Notify provider if glucose greater than or equal to 350 mg/dL after administration of correction dose. If given at mealtime, administer within 30 minutes of start of meal., Disp: 15 mL, Rfl: 0     insulin glargine (LANTUS SOLOSTAR) 100 UNIT/ML pen, Inject 23 Units Subcutaneous At Bedtime, Disp: 15 mL, Rfl: 0     multivit-min/FA/lycopen/lutein (CENTRUM SILVER MEN ORAL), [MULTIVIT-MIN/FA/LYCOPEN/LUTEIN (CENTRUM SILVER MEN ORAL)] Take 1 tablet by mouth daily., Disp: , Rfl:      polyethylene glycol (MIRALAX) 17 gram packet, [POLYETHYLENE GLYCOL (MIRALAX) 17 GRAM PACKET] Take 1 packet (17 g total) by mouth daily as needed., Disp: 100 packet, Rfl: 3     sulfamethoxazole-trimethoprim (BACTRIM DS) 800-160 MG tablet, Take 1 tablet by mouth 2 times daily, Disp: 20 tablet, Rfl: 0     tamsulosin (FLOMAX) 0.4 MG capsule, TAKE 1 CAPSULE BY MOUTH EVERY DAY AFTER SUPPER, Disp: 90 capsule, Rfl: 2     traMADol (ULTRAM) 50 MG tablet, Take 1 tablet (50 mg) by mouth every 6 hours as needed for moderate pain (4-6), Disp: 30 tablet, Rfl: 0     trolamine salicylate (ASPERCREME) 10 % external cream, Apply topically 2 times daily, Disp: , Rfl:      vitamin D3 (CHOLECALCIFEROL) 125 MCG (5000 UT) tablet, Take 1 tablet (125 mcg) by mouth daily, Disp: , Rfl:      vitamin E 400 unit capsule, [VITAMIN E 400 UNIT CAPSULE] Take 400 Units by mouth daily., Disp: , Rfl:      cephALEXin (KEFLEX) 500 MG capsule, , Disp: , Rfl:     Review of Systems - 10 point Review of Systems is negative except for left foot infection which is noted in HPI.      OBJECTIVE:  /70   Pulse 62   SpO2 93%   General appearance: Patient is alert and fully cooperative with history & exam.  No sign of distress is noted during the visit.    Vascular: Dorsalis pedis non-palpableLeft.  Dermatologic:    VASC Wound Lef Heel Distal (Active)   Pre Size  Length 0.5 05/09/23 1400   Pre Size Width 0.8 05/09/23 1400   Pre Size Depth 0.1 05/09/23 1400   Pre Total Sq cm 0.4 05/09/23 1400       VASC Wound Left heel proximal (Active)   Pre Size Length 1.2 05/09/23 1400   Pre Size Width 1.8 05/09/23 1400   Pre Size Depth 0.2 05/09/23 1400   Pre Total Sq cm 2.16 05/09/23 1400   Ulceration dorsal third digit left foot has increased depth with localized erythema to the third digit, no ascending cellulitis noted.  Stable eschar x2 posterior left heel.  Ulceration dorsal third digit left foot 0.2 x 0.2 x 0.4 cm.  Neurologic: Diminished to light touch Left.  Musculoskeletal: Contracted digits noted Left.    Imaging:     No results found.       Picture:

## 2023-05-09 NOTE — PATIENT INSTRUCTIONS
"  Important lnstructions  Continue antibiotics    WEIGHT BEARING STATUS: You are to remain NON WEIGHT BEARING on your left foot. NON WEIGHT BEARING MEANS NO PRESSURE ON YOUR FOOT OR HEEL AT ANY TIME FOR ANY REASON!    2. OFFLOADING DEVICE: Must use a A WHEELCHAIR at all times! (do not use affected foot to push wheelchair)    3. STABILIZATION DEVICE: Use a PRAFO BOOT . You will need to WEAR THIS AT ALL TIMES EVEN WHILE IN BED.     4. ELEVATE: Elevating your leg means laying with your head on a pillow and your foot ABOVE YOUR HEART.     5. DO NOT MOVE YOUR FOOT.  There is a risk of worsening the wound or incision. To give yourself a higher chance of healing, please DO NOT swing foot back and forth and wiggle foot/toes especially when inside a stabilization device. Limited movement is allowable with therapy as recommended by the doctor.     6. TAKE A PROTEIN SHAKE TWICE A DAY.  (For ex: Boost, Ensure, Glucerna)      Dressing Change lnstructions:    LEFT HEEL WOUND:    CLEANSE WITH NORMAL SALINE, PAINT WITH IODINE AND COVER WITH GAUZE. CHANGE DAILY.          Change DAILY to your LEFT 3RD TOE AND ANKLE:    Primary Dressing: Apply dry gauze into/onto the wounds    Secure with non-sterile roll gauze (4\" x 75\" roll) and tape (1\" roll tape) as needed; avoid adhesive directly on the skin     It IS NOT ok to get your wound wet in the bath or shower    SEEK MEDICAL CARE IF:  You have an increase in swelling, pain, or redness around the wound.  You have an increase in the amount of pus coming from the wound.  There is a bad smell coming from the wound.  The wound appears to be worsening/enlarging  You have a fever greater than 101.5 F      It is ok to continue current wound care treatment/products for the next 2-3 days until new wound care supplies are ordered and arrive. If longer than this please contact our office at [phone].      [facility] LOCATIONS  [facility]  [address] [facility] [address]  [place] [address]  Phone: [phone]  Fax: [phone] [facility]  [address] [place] [address]  Home Medical Equipment, [address] Phone: [phone]  Orthotics and Prosthetics, [address]  Phone [phone] [facility]  [address]  [place] [address]  Phone: [phone]   Fax: [phone]   [facility]  [address]  [place] [address]  Phone: [phone]  Fax: [phone] [facility]   Home Medical Equipment   [address], [place] [address]  Phone :[phone]  Orthotics and Prosthetics  [address] [facility], [address], [facility] [address]  Phone:[phone]   [facility]  [address]  [place] [address]  Phone: [phone]   Fax: [phone]   [facility]   [address]  [place] [address]  Phone: [phone]  Fax: [phone] [facility]   [address]  [address]  [place] [address]  Phone: [phone] [place]  [address] [place] [address]  Phone :[phone]             WEARING YOUR CUSTOM FOOT ORTHOTICS   Most insurance plans cover one pair of orthotics per year. You must check with your   insurance plan to see what your payment responsibility will be. Please call your   insurance company by calling the number on the back of your insurance card.   Orthotic's are non-refundable and non-returnable.   Orthotics are made of various designs. Some orthotics are covered with material that extends beyond your toes. If your orthotic is of this design, you will likely need to trim the toe end to get a proper fit. The insole from your shoe can be used as a template. Simply overlay the shoe insert on top of the custom orthotic. Align the heel end while tracing the length of the insert onto the custom orthotic. Use a  large scissor to trim the toe end until you get a proper fit in the shoe.   The orthotic needs to be pushed as far back in the shoe as possible. The heel portion should not ride forward so as not to irritate your heel.   Orthotics are designed to work with socks. Excessive perspiration will shorten the life span of the orthotics. Remove the orthotic from the shoe frequently for proper drying.   The break-in period lasts for weeks. People new to orthotics will likely experience new aches and pains. The orthotic is forcing your foot into a new position. Arch, foot and leg muscle aches and fatigue are common during these weeks. Minor discomfort can be considered normal break in phenomenon. Start wearing your orthotic around your home your first day. Limited activity for one to two hours is recommended. You can increase one or two additional hours each day provided the aches and pains are subsiding. The degree of discomfort, fatigue and problems will dictate the speed of break in. You may require multiple weeks to work up to full time use.   Do not continue wearing your orthotics if they are creating problems such as blisters or sores. Do not hesitate to call the clinic to speak with a nurse regarding orthotic   break in, fit, trimming, etc. You may also need to see the doctor if the orthotics are   simply not working out. Adjustments are sometimes made to improve orthotic   function.     Orthotics will only work in certain styles and types of shoes. Orthotics rarely work in dress shoes. Slip-ons, clogs, sandals and heels are particularly troublesome. Specially designed orthotics may be necessary for these types of shoes. Your custom orthotic was designed for activities that require appropriate walking or running shoes. Lace up athletic shoes, walking shoes or work boots should work appropriately. You may need a wider or longer shoe. Shoes with a removable  or insert work best. In general, you want to remove  an insert from the shoe before placing the orthotic into the shoe. Shoes without a removable liner may not work as well.     When purchasing new shoes, bring your orthotics along to get a proper fit. Shop at stores that are familiar with orthotics.   Frequent washing of the orthotic may shorten the life span of the top cover. The top cover can be replaced but will generally last one to five years depending on use and foot perspiration.         We want to hear from you!   In the next few weeks, you should receive a call or email to complete a survey about your visit at [facility]. Please help us improve your appointment experience by letting us know how we did today. We strive to make your experience good and value any ways in which we could do better.      We value your input and suggestions.    Thank you for choosing the [facility]!

## 2023-05-12 NOTE — TELEPHONE ENCOUNTER
Attempted to call the patient to schedule a phone visit with Dr. Garcia to go over the MRI results.  No answer to any phone number listed, and no ability to leave a voicemail.  I sent a [employer] message since this patient appears to be active on [employer].

## 2023-05-17 NOTE — TELEPHONE ENCOUNTER
Attempted to call #3, no answer, no voicemail.  Patient is scheduled to see Dr. Garcia already on 5/23/23.

## 2023-05-17 NOTE — TELEPHONE ENCOUNTER
Procedure: Left  Leg  Angiogram     Procedure Date :  5/23/23    Procedure Time :  10:30 AM    Arrival Time: 9:30 AM    Admission Type: Outpatient    Surgeon:     Procedure Location: [facility]:  [address] [place] [address] (Phone: [phone], Fax: [phone])    Consent Completed:No, Scanned: No    Scheduled with: Janice    Blood Thinners Address: [address] Is addressing.    Co2: No    Pedal Access: No    Side Accessing: Right    Injector: No    Diagnostic: ?    2 week Post Procedure Appointment with   and also ultrasound: TBD    6 weeks appt with  and repeat ultrasound: TBD

## 2023-05-17 NOTE — LETTER
Caleb Hernandez,    Your visit to [facility] for your procedure is coming soon and we look forward to seeing you! This friendly reminder and pre-procedure checklist will help to ensure your procedure goes smoothly and meets your expectations. At [facility], our goal is to provide you with a great patient experience and to deliver genuine, professional care to every patient.     Please complete all the steps in advance of your visit. If you have any questions about the items listed below, please give our office a call. We can be reached at [phone] or visit our website at [email or web address] for more information.     Procedure: Left  Leg  Angiogram     Procedure Date :  5/23/23    Procedure Time :  10:30am    Arrival Time: 9:30am    2 week Post Procedure Appointment with   and also ultrasound: 6/8/23 ultrasounds and 6/13/23 visit with Dr. Rosas      Admission Type: Outpatient    Surgeon:     Procedure Location: [facility]:  [address] [place] [address] (Phone: [phone], Fax: [phone])      If you take blood thinners: SEE SPECIFIC INSTRUCTIONS BELOW    PLEASE DO NOT STOP YOUR ASPIRIN OR PLAVIX UNLESS SPECIFICALLY DIRECTED BY THE VASCULAR SURGEON TO STOP!  In most cases Vascular providers want you to continue these. This is different from most NON vascular surgeries and may not be well known by your Primary Care Provider Eliquis: Hold 3 days before the procedure      Prepare for the peripheral angiography as follows:  Do not eat 8 hours before your procedure. You may have clear liquids up to 1 hour before surgery.  Tell your healthcare provider about all medicines you take and any allergies you may have.  Arrange for a family member or friend to drive you home.  If you are on Metformin, please HOLD for 48 hours after the procedure.  Please be sure to address your diabetic  medications with your Primary Care Physician prior to your angiogram.    Peripheral Angiography    Peripheral angiography is an outpatient procedure that makes a  map  of the vessels (arteries) in your lower body, legs, and arms, using X-ray and dye.This map can show where blood flow may be blocked.    An angiogram is commonly performed under sedation with the use of local anesthesia.    The procedure usually starts with a needle put into the femoral (groin) artery. From one treatment site, areas all over the body can be treated.  After access is established, catheters (thin tubes) and wires are threaded through the arterial system to a specific area of interest or throughout the entire body.  As a contrast agent (iodine dye) is injected, X-ray images are taken to let your provider view the flow of the dye and identify blockages. The surgeon can then choose the best mode of therapy for you - whether during or following the angiogram. This decision depends on your symptoms and the severity and characteristics of the blockages.  Two common therapies that can be provided during the angiogram are balloon angioplasty and stent placement.                   Angioplasty can be used to open arterial blockages. Guided by X-ray, your provider navigates through the blockage with a wire and introduces a special device equipped with an inflatable balloon. After positioning the balloon device across the blocked portion of the artery, the provider inflates the balloon to expand the artery and compress the blockage. The balloon is then deflated and removed while keeping the wire in place across the area that has been treated. Next, contrast dye is injected to assess the result. Treatment is considered a success if blood flow is improved and less than 30% of the blockage remains. If the vessel is still considerably narrowed, placing a stent may be the next step.    Stents are used to prop open an artery at the site of a narrowing.  Stents are generally placed after balloon angioplasty when there is residual narrowing or insufficient blood flow in a treated vessel. Stents are considered a permanent implant and cannot be used if you have a metal allergy. Stents that are used in the leg are constructed of a nickel-titanium alloy (Nitinol), a memory-shaped metal. This alloy has a predetermined size and shape at body temperature and expands to this size and shape after being introduced through a catheter. These stents resist kinking and are flexible so that damage from activities that involve your legs is minimized.  Your procedure may require other techniques to address the problem or plaque.     If surgery is felt to be a better option, your vascular provider will obtain any additional X-ray images needed to plan a surgical bypass of the blocked vessel/s and will then conclude the angiogram.      During the procedure  Here is what to expect:  You may get medicine through an IV (intravenous) line to relax you. You re given an injection to numb the insertion site. Then, a tiny skin cut (incision) is made near an artery in your groin.  Your provider inserts a thin tube (catheter) through the incision. He or she then threads the catheter into an artery while looking at a video monitor.  Contrast  dye  is injected into the catheter to confirm position. You may feel warmth or pressure in your legs and back. You lie still as X-rays are taken. The catheter is then taken out.  After the procedure  You ll be taken to a recovery area. A healthcare provider will apply pressure to the site for about 10 minutes. Your healthcare provider will tell you how long to lie down and keep the insertion site still. Your healthcare provider will discuss the results with you soon after the procedure.      Angiogram Procedure Discharge Instructions:     1. If you received sedation for your procedure: Do not drive or operate heavy machinery for the rest of the day.  2.  Avoid strenuous activity for 72 hours (3 days):                        - Do not lift greater than 10 pounds.                        - Excessive exercise                        - Straining                        - Return to your normal activities as you tolerate after the 3 days restriction  3. Avoid tub baths, Jacuzzis, hot tubs and pools for 72 hours (3 days) or until puncture site is will healed.  4. You may shower beginning tomorrow. Do not scrub puncture site(s) until well healed, pat dry.  5. You can expect to return to work 1-2 days after your procedure - depending on the nature of your profession.  6. It is normal to have some tenderness and minimal swelling at puncture site. A small area of discoloration may be present. Tenderness typically subsides in 24-48 hours. A small knot may also be present at puncture site for 6-8 weeks, this can be a normal part of the healing process.       After the angiogram If you:      1. Experience any bleeding or active swelling from puncture site: Lie down, firmly apply pressure to puncture site and CALL 9-1-1  2. Fever greater than 101 degrees Fahrenheit.  3. Redness, swelling, warmth to touch, or purulent (yellow/green/foul smelling) drainage from the puncture site.  4. Increasing pain, tenderness or swelling at puncture site OR of arm/leg near puncture site.  5. Feeling weak or faint.  6. Change in color, temperature, or sensation of arm/leg where puncture was made.  7. You can t feel your thrill (pulse at your dialysis fistula site) or it feels weak (If you had fistulogram done).     Call us with any other questions or concerns after your procedure: [phone]      All invasive procedures can have complications. While the risk of an angiogram is low it is not zero. The most common complications are related to the arterial access site.       Risks/ Complications   Bruising is Common  You will likely have bruising (ecchymosis) where the artery was entered.    Pain and  "Bleeding  Less commonly, patients experience pain and bleeding that may include blood collecting under the skin (hematoma).    Blockage and Leakage   In rare cases, the access artery can become blocked. Infrequently, patients experience persistent leakage of blood where the artery was entered, which can result in the formation of a pseudoaneurysm--a blood-filled sac--that may require further treatment.  Other complications related to an angiogram include:   Allergic reaction to the iodine contrast dye, which can lead to the development of kidney failure.  Very rarely during balloon angioplasty and/or stent placement, part of the arterial blockage can break off (embolism) and travel to more distant arteries. This can worsen blood flow.    Pre-Procedure checklist:    [] A Pre-op physical within 30 days of the procedure is required. You will need to set up an appointment with your primary care provider.  [] Contact your insurance regarding coverage  If you would like a [place] Estimate for your upcoming procedure at [facility], contact Cost of Care Estimates   Advocates are available Monday through Friday 8am - 5pm   [phone]  You may also submit a request online at [email or web address]  - Complete the secure online form found under \"Services and Procedure Pricing\"   If your procedure is at [facility], please contact the numbers below for Cost of Care Estimates.   - Facility Charge: [phone]    Anesthesiology charge:  [phone]   [] DO NOT BRING FMLA WITH TO SURGERY.  These should be sent to the provider's office by fax to [phone].     [] Day of Surgery  Medications - Take as indicated with sips of water.   Wear comfortable loose-fitting clothes. Wear your glasses-Not contacts. Do not wear jewelry and remove body piercings. Surgery may be cancelled if they are not removed.   You may have 1 family member wait in the lobby during your surgery. Visitor " restrictions are subject to change. Please verify with the surgery nurse when they call.   If same day surgery-Have a someone come with you to surgery that can help you understand the surgeon's instructions, drive you home and stay with you overnight the first night.    [] You will receive a call from a surgery nurse 1-3 days prior to surgery. They will go over more details with you.    Notify our office right away, if you have any changes in your health status, or if you develop a cold, flu, diarrhea, infection, fever or sore throat before your scheduled surgery date. We can be reached at  [phone] Monday-Friday 8 am-4:30 pm if you have any questions.   Thank you for choosing [facility]

## 2023-05-18 NOTE — TELEPHONE ENCOUNTER
Answered son's questions. Offered to have one of the providers call him to discuss, he declined. He will speak to Dr. Rosas the morning of the procedure.

## 2023-05-18 NOTE — TELEPHONE ENCOUNTER
Pt's son had questions about the angiogram that is scheduled next Tuesday 5/23/23 and what will be happening during the procedure.  He would like to speak to a nurse if possible.  He is available 3:30pm-later due to his job.  Preferred tel #: [phone]

## 2023-05-19 NOTE — H&P
Interventional Radiology - CHART REVIEW History and Physical  Outpatient - [facility]  5/23/23    Procedure Requested: LLE angiogram  Requesting Provider: Alvaro Rosas MD    HPI: Caleb Hernandez is a 87 year old male with PMHx hyperlipidemia, HTN, DM, BPH, hx PE, hx septic arthritis. Followed by Podiatry for left foot and heel wound with approximately up to 6 month history of nonhealing wound. Started on Bactrim and MRI ordered which he completed 5/11.     Scheduled for left lower extremity angiogram 2/2 nonhealing wound in which he is presenting for today    Imaging:   EXAM: MR ANKLE LEFT W/O and W CONTRAST  LOCATION: [facility]  DATE/TIME: 5/11/2023 11:19 PM CDT     INDICATION: Osteomyelitis of calcaneus  COMPARISON: None.  TECHNIQUE: Routine. Additional postgadolinium T1 sequences were obtained.  IV CONTRAST: 7ml gadavist     FINDINGS:      TENDONS:   -Peroneal: Peroneus longus and brevis tendons are intact. No tendinopathy or tenosynovitis. No subluxation.  -Medial: Posterior tibialis tendon is intact. No tendinopathy or tenosynovitis. Flexor digitorum longus and flexor hallucis longus tendons are normal. No tenosynovitis.  -Anterior: Anterior tibialis, extensor hallucis longus, and extensor digitorum longus tendons are normal. No tenosynovitis.  -Achilles: No tendinopathy or paratenonitis.     LIGAMENTS:   -Anterior talofibular ligament: Intact.   -Calcaneofibular ligament: Intact.   -Posterior talofibular ligament: Intact.  -Syndesmotic inferior tibiofibular ligaments: Intact.  -Deltoid ligament complex: Intact.  -Spring ligament complex: Intact.     JOINTS AND BONES:   Just inferior lateral to the Achilles insertion site there is a localized area of abnormal edema seen involving the subcutaneous fat and the adjacent calcaneus. The edema seen in the calcaneus overall measures 11.7 x 6.7 x 22.2 mm. This could represent a   localized area of osteomyelitis  with no priyanka destructive changes seen of the bony cortex.  -No fracture, bone contusion or osteochondral lesion. Normal articular cartilage. Small ankle joint effusion.     SOFT TISSUES:  -Plantar fascia: Intact. No acute fasciitis or tear.  -Sinus tarsi and tarsal tunnel: There are slightly increased T2 signal seen in the sinus tarsi which should be inflammatory in nature.  -Muscles: Global atrophy.  There is significant edematous changes seen involving the subcutaneous fat about the ankle greatest medially this may be dependent nature versus changes of cellulitis. No evidence for an abscess.                                                                         IMPRESSION:     1. Just inferior lateral to the Achilles insertion site there is a localized area of abnormal edema seen involving the subcutaneous fat and the adjacent calcaneus. The edema seen in the calcaneus overall measures 11.7 x 6.7 x 22.2 mm. This could   represent a localized area of osteomyelitis with adjacent cellulitis, but there is no priyanka destructive changes seen of the bony cortex. Conservative therapy with follow-up MRI examination may be of benefit.  2. Edematous changes of the soft tissues about the ankle likely dependent nature.  3. Slightly less fat than typically seen with increased T2 signal with sinus tarsi consistent mild sinus tarsi syndrome.       BILATERAL RESTING ANKLE-BRACHIAL INDICES (SRIKANTH'S) (Date: 05/11/23)        Indication: Cellulitis left foot/Diabetic ulceration third digit left foot and left rear foot/stage III pressure ulceration left heel/HX of right angio 11/24/20     Previous: 3/30/21; 05/11/21     History: Previous Smoker, Hypertension, Diabetic, Hyperlipidemia and Angioplasty     Resting SRIKANTH's              Right: mmHg Index       Brachial: 108     Ankle-(PT): 143 1.18   Ankle-(DP): 206 1.70            Digit: 59 0.49                      Left: mmHg Index       Brachial: 121     Ankle-(PT): 65 0.54   Ankle-(DP): 95  0.79            Digit: 23 0.19      Resting ankle-brachial index of 1.18 on the right. Toe Pressures of 59 mmHg and TBI of 0.49     Resting ankle-brachial index of 0.79 on the left. Toe Pressures of 23 mmHg and TBI of 0.19     VPR WAVEFORMS:   The right volume plethysmography waveforms are mildly abnormal at the lower thigh level, mildly abnormal at the upper calf level and mildly abnormal at the ankle.      The left volume plethysmography waveforms are mildly abnormal at the lower thigh level, mildly abnormal at the upper calf level and moderately abnormal at the ankle.     Impression:       1. RIGHT LOWER EXTREMITY: SRIKANTH is Non-diagnostic indicating vessel calcification. Toe Pressures are Mildly abnormal but adequate for wound healing with toe pressures of 59 mmHg.     2. LEFT LOWER EXTREMITY: SRIKANTH is Abnormal with an SRIKANTH of 0.79 indicating single level disease. Toe Pressures are Abnormal and impaired for wound healing with toe pressures of 23 mmHg.     Reference:      Wound classification  Grade SRIKANTH Ankle Systolic Pressure Toe Pressures   0 > 0.80 > 100 mmHg > 60 mmHg   1 0.6 - 0.79 70 - 100 mmHg 40 - 59 mmHg   2 0.4 - 0.59 50-70 mmHg 30 - 39 mmHg   3 < 0.39 < 50 mmHg < 30 mmHg      Digit Pressures  DBI Disease Category   > 0.70 Normal   < 0.70 Abnormal   > 30 mmHg Potential wound healing   < 30 mmHg Impaired wound healing      Ankle Brachial Pressures  SRIKANTH Disease Category   > 1.3  Likely vessel calcification with monophasic waveforms, non-diagnostic   0.95-1.30 Normal with multiphasic waveforms   0.50-0.95 Single level disease   0.30-0.50 Multilevel disease   < 0.30 Critical limb ischema      Volume Plethysmography Recording (VPR) at all levels  Normal Sharp systolic peak, fast upstroke, prominent dicrotic notch in wave   Mild Sharp systolic peak, fast upstroke, absent dicrotic notch in wave   Moderate Flattened systolic peak, slowed upstroke, absent dicrotic notch inwave   Severe amplitude wave with = upslope  and down slope   Occluded Flat Line          NPO Status: to be reviewed by preprocedure RN  Anticoagulation/Antiplatelets/Bleeding tendencies:On Eliquis 2.5mg and ASA 81mg   Antibiotics: none indicated for routine procedure    Review of Systems: A comprehensive 10-point review of systems was performed. All systems were reviewed and negative with exception to those reported in the HPI.    PMH:  Past Medical History:   Diagnosis Date     Abscess of right foot 11/23/2020    Added automatically from request for surgery [id number]     Acute pulmonary embolism with acute cor pulmonale (H) 5/23/2020     Ascending cholangitis 1/27/2022     BPH (benign prostatic hyperplasia)      Cholelithiasis      Closed fracture of rib     Created by Conversion  Replacement Utility updated for latest IMO load     Closed fracture of thoracic vertebra (H)     Created by Conversion  Replacement Utility updated for latest IMO load     Common bile duct (CBD) obstruction 9/4/2017     Diabetes mellitus (H)      Essential hypertension      Gram-negative bacteremia 1/27/2022    Positive blood culture 1/26/2022; likely biliary source     Hyperlipidemia      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    Added automatically from request for surgery [id number]      Pancreatitis      Sepsis (H) 1/27/2022     Sepsis due to pneumonia (H) 9/8/2017     Septic arthritis of right foot (H) 12/2/2020       PSH:  Past Surgical History:   Procedure Laterality Date     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    Procedure: with amputation of the fifth ray, peroneal brevis tendon transfer;  Surgeon: John Garcia DPM;  Location: [facility];  Service: Podiatry     BACK SURGERY      1964 removed a cyst     CHOLECYSTECTOMY  1985     ENDOSCOPIC RETROGRADE CHOLANGIOPANCREATOGRAM       ENDOSCOPIC RETROGRADE CHOLANGIOPANCREATOGRAM N/A 9/5/2017    Procedure: ENDOSCOPIC RETROGRADE CHOLANGIOPANCREATOGRAPHY SPHINCTEROTOMY AND STONE EXTRACTION;  Surgeon: Hansel Gannon MD;   Location: [facility];  Service:      ENDOSCOPIC RETROGRADE CHOLANGIOPANCREATOGRAM N/A 1/27/2022    Procedure: ENDOSCOPIC RETROGRADE CHOLANGIOPANCREATOGRAPHY, BALLOON DILATION AND STONE EXTRACTION;  Surgeon: Sav Osborne MD;  Location: [facility]     INCISION AND DRAINAGE OF WOUND Right 11/2/2020    Procedure: INCISION AND DRAINAGE, right foot with removal of bone 5th metatarsal;  Surgeon: John Garcia DPM;  Location: [facility] OR;  Service: Podiatry     INCISION AND DRAINAGE OF WOUND Right 11/16/2020    Procedure: INCISION AND DRAINAGE, right foot;  Surgeon: John Garcia DPM;  Location: [facility] OR;  Service: Podiatry     INCISION AND DRAINAGE OF WOUND Right 11/27/2020    Procedure: INCISION AND DRAINAGE, LOWER EXTREMITY;  Surgeon: Aleksandr Hdez DPM;  Location: [facility] OR;  Service: Podiatry     IR EXTREMITY ANGIOGRAM RIGHT  11/24/2020     IR LOWER EXTREMITY ANGIOGRAM RIGHT  11/24/2020     PICC  11/30/2020          TONSILLECTOMY  1940       ALLERGIES:  Allergies   Allergen Reactions     Cresol [Phenol] Unknown     Muscle cramps     Demeclocycline Hives and Rash     Crestor [Rosuvastatin] Muscle Pain (Myalgia)       MEDICATIONS:  Current Outpatient Medications   Medication     acetaminophen (TYLENOL) 500 MG tablet     apixaban ANTICOAGULANT (ELIQUIS ANTICOAGULANT) 2.5 MG tablet     apixaban ANTICOAGULANT (ELIQUIS) 2.5 MG tablet     aspirin 81 MG EC tablet     blood glucose (NO BRAND SPECIFIED) test strip     cephALEXin (KEFLEX) 500 MG capsule     cyanocobalamin (CYANOCOBALAMIN) 1000 MCG tablet     insulin aspart (NOVOLOG PEN) 100 UNIT/ML pen     insulin glargine (LANTUS SOLOSTAR) 100 UNIT/ML pen     multivit-min/FA/lycopen/lutein (CENTRUM SILVER MEN ORAL)     polyethylene glycol (MIRALAX) 17 gram packet     sulfamethoxazole-trimethoprim (BACTRIM DS) 800-160 MG tablet     tamsulosin (FLOMAX) 0.4 MG capsule     traMADol (ULTRAM) 50 MG tablet     trolamine  salicylate (ASPERCREME) 10 % external cream     vitamin D3 (CHOLECALCIFEROL) 125 MCG (5000 UT) tablet     vitamin E 400 unit capsule     No current facility-administered medications for this encounter.         LABS:  INR   Date Value Ref Range Status   01/26/2022 1.34 (H) 0.85 - 1.15 Final      Hemoglobin   Date Value Ref Range Status   04/04/2023 10.9 (L) 13.3 - 17.7 g/dL Final     Platelet Count   Date Value Ref Range Status   04/04/2023 361 150 - 450 10e3/uL Final     Creatinine   Date Value Ref Range Status   04/04/2023 1.16 0.67 - 1.17 mg/dL Final     Potassium   Date Value Ref Range Status   04/04/2023 4.4 3.4 - 5.3 mmol/L Final   01/22/2023 4.9 3.5 - 5.0 mmol/L Final       Exam and presedation assessment: to be completed by performing IR Radiologist      ASSESSMENT:  88yo male with non healing left food wound(s).    - Hgb, plt, INR, creatinine ordered pre-procedure  - On Eliquis and ASA 81 anticoagulation   - No abx recommended for routine procedure    PLAN:    Left lower extremity angiogram with potential intervention and with sedation.     Procedure, risks/benefits, details, alternatives, and sedation to be reviewed with patient/family by performing IR Radiologist.    Total time spent on the date of the encounter: 12 minutes.      Note compiled by:  SIDDHARTH Smith CNP  Interventional Radiology  [phone]

## 2023-05-23 NOTE — PROGRESS NOTES
Patient Name: Caleb Hernandez  Medical Record Number: [id number]  Today's Date: 5/23/2023    Procedure: LLE angiogram with moderate sedation  Proceduralist: Dr. Rosas      Sedation medications administered: 2.5 mg midazolam and 125 mcg fentanyl   Sedation time: 130 minutes  Additional medications:  10,000 units heparin and 40 mg protamine   AT 1201.   at 1258.      Report given to: pre/post RN  : none    Other Notes: Pt arrived to IR room 1 from Pre/post bay 3. Consent reviewed. Pt denies any questions or concerns regarding procedure. Pt positioned supine and monitored per protocol. Pt tolerated procedure without any noted complications. VSS on RA. Pt transferred back to Pre/post bay 3.    Angioseal deployed to right femoral artery at 1317.  Hematoma developed and manual pressure held until 1330.  Dr. Rosas aware.      ADDENDUM 1800:  Discharge criteria met. Pulses present with doppler. Groin site unchanged. Discharge instructions given and reviewed with patient and son. No further questions or concerns. Pt d/c'd home with son.

## 2023-05-23 NOTE — PROCEDURES
[facility]    Procedure: Imaging Procedure Note    Date/Time: 5/23/2023 1:33 PM    Performed by: Alvaro Rosas MD  Authorized by: Alvaro Rosas MD      UNIVERSAL PROTOCOL   Site Marked: Yes  Prior Images Obtained and Reviewed:  Yes  Required items: Required blood products, implants, devices and special equipment available    Patient identity confirmed:  Verbally with patient  Patient was reevaluated immediately before administering moderate or deep sedation or anesthesia  Confirmation Checklist:  Patient's identity using two indicators, relevant allergies, procedure was appropriate and matched the consent or emergent situation and correct equipment/implants were available  Time out: Immediately prior to the procedure a time out was called    Universal Protocol: the Joint Commission Universal Protocol was followed    Preparation: Patient was prepped and draped in usual sterile fashion      SEDATION  Patient Sedated: Yes    Vital signs: Vital signs monitored during sedation    See dictated procedure note for full details.    PROCEDURE  Describe Procedure: LLE angiogram  Patient Tolerance:  Patient tolerated the procedure well with no immediate complications  Length of time physician/provider present for 1:1 monitoring during sedation: 135

## 2023-05-23 NOTE — DISCHARGE INSTRUCTIONS
Angiogram Discharge Instructions:  You had an angiogram procedure.  An angiogram is a procedure that uses x-rays to take pictures of your blood vessels. A long, flexible tube or catheter is inserted through the blood stream (through the procedure site) to help deliver contrast (dye) into the arteries so they can be visible on the x-ray. Angiograms are used to evaluate possible blockages in the arterial system. Please follow the below instructions after your angiogram, including monitoring of your procedure site.    Care instructions after angiogram procedure:  -  If you received sedation for your procedure, do not drive or operate heavy machinery for the rest of the day.  -  Do not lift objects greater than 10 pounds for 2 days following angiogram procedure.  -  Avoid excessive exercise and straining for 2 days.   -  Avoid tub baths, pools, hot tubs and Jacuzzis for 3 days or until procedure site is well healed.   -  You may shower beginning tomorrow. Do not scrub procedure site until well healed; pat dry.  -  Return to your normal activities as you tolerate after the 2 day restriction.  -  You can expect to return to work 1-2 days after your procedure - depending on the nature of your profession.  -  It is normal to have some tenderness and minimal swelling at procedure puncture site. A small area of discoloration may be present. Tenderness typically subsides in 1-2 days. A small knot may also be present at puncture site for 6-8 weeks. This can be a normal part of the healing process.     Follow up:  - Follow up with your vascular surgeon or the ordering provider. [facility] may contact you to help arrange for additional follow up.    Please seek medical evaluation for:  - If you develop fevers (greater than 101 F (38.3C)).  - If you develop increasing pain, redness, purulent drainage, tenderness, or swelling at procedure site.   - If you experience any bleeding from procedure/puncture site: lie down, firmly  apply pressure to puncture site and call 911.  - Seek emergent evaluation if you experience any new leg/arm pain, discoloration or numbness.    Call [facility] at [phone]*** with questions/concerns or if you have any of the above symptoms.

## 2023-05-23 NOTE — IP AVS SNAPSHOT
[facility]  [address] [facility] [address]  Phone: [phone]  Fax: [phone]                              After Visit Summary   5/23/2023    Caleb Hernandez   MRN: [id number]           After Visit Summary Signature Page    I have received my discharge instructions, and my questions have been answered. I have discussed any challenges I see with this plan with the nurse or doctor.    ..........................................................................................................................................  Patient/Patient Representative Signature      ..........................................................................................................................................  Patient Representative Print Name and Relationship to Patient    ..................................................               ................................................  Date                                   Time    ..........................................................................................................................................  Reviewed by Signature/Title    ...................................................              ..............................................  Date                                               Time    22EPIC Rev 08/18

## 2023-05-24 NOTE — TELEPHONE ENCOUNTER
Caller: Michell    Provider: MD John Garcia    Detailed reason for call: Michell is calling from  requesting we change the orders from daily to every other    Best phone number to contact: [phone]    Best time to contact: any    Ok to leave a detailed message: Yes    Ok to speak to authorized person if needed: No      (Noted to patient if reason is related to wound or incision, to please send a photo via email or [employer].)

## 2023-05-25 NOTE — TELEPHONE ENCOUNTER
Spoke with home care nurse Michell.  Verbal ok given for every other day dressing changes as patient has run out of daily visit coverage.  Patient does not have a follow up scheduled, per previous notes unable to reach patient.  Michell states she will be speaking with patient's son and will have him call clinic to get scheduled with Dr. Garcia.

## 2023-06-01 NOTE — PATIENT INSTRUCTIONS
"  Important lnstructions  Continue antibiotics    WEIGHT BEARING STATUS: You are to remain NON WEIGHT BEARING on your left foot. NON WEIGHT BEARING MEANS NO PRESSURE ON YOUR FOOT OR HEEL AT ANY TIME FOR ANY REASON!    2. OFFLOADING DEVICE: Must use a A WHEELCHAIR at all times! (do not use affected foot to push wheelchair)    3. STABILIZATION DEVICE: Use a PRAFO BOOT . You will need to WEAR THIS AT ALL TIMES EVEN WHILE IN BED.     4. ELEVATE: Elevating your leg means laying with your head on a pillow and your foot ABOVE YOUR HEART.     5. DO NOT MOVE YOUR FOOT.  There is a risk of worsening the wound or incision. To give yourself a higher chance of healing, please DO NOT swing foot back and forth and wiggle foot/toes especially when inside a stabilization device. Limited movement is allowable with therapy as recommended by the doctor.     6. TAKE A PROTEIN SHAKE TWICE A DAY.  (For ex: Boost, Ensure, Glucerna)      Dressing Change lnstructions:    LEFT HEEL WOUND:    CLEANSE WITH NORMAL SALINE, PAINT WITH IODINE AND COVER WITH GAUZE. CHANGE DAILY.          Change DAILY to your LEFT 3RD TOE AND ANKLE:    Primary Dressing: Apply dry gauze into/onto the wounds    Secure with non-sterile roll gauze (4\" x 75\" roll) and tape (1\" roll tape) as needed; avoid adhesive directly on the skin     It IS NOT ok to get your wound wet in the bath or shower    SEEK MEDICAL CARE IF:  You have an increase in swelling, pain, or redness around the wound.  You have an increase in the amount of pus coming from the wound.  There is a bad smell coming from the wound.  The wound appears to be worsening/enlarging  You have a fever greater than 101.5 F      It is ok to continue current wound care treatment/products for the next 2-3 days until new wound care supplies are ordered and arrive. If longer than this please contact our office at [phone].      [place] [facility] LOCATIONS  [facility]  [address] [facility] [address]  [place] [address]  Phone: [phone]  Fax: [phone] [facility]  [address] [place] [address]  Home Medical Equipment, [address] Phone: [phone]  Orthotics and Prosthetics, [address]  Phone [phone] [facility]  [address]  [place] [address]  Phone: [phone]   Fax: [phone]   [facility]  [address]  [place] [address]  Phone: [phone]  Fax: [phone] [facility]   Home Medical Equipment   [address] [address]  Phone :[phone]  Orthotics and Prosthetics  [address], [facility] [address]  Phone:[phone]   [facility]  [address]  [place] [address]  Phone: [phone]   Fax: [phone]   [facility]   [address]  [place] [address]  Phone: [phone]  Fax: [phone] [facility]   [address]  [address]  [place] [address]  Phone: [phone] [place]  [address] [place] [address]  Phone :[phone]             WEARING YOUR CUSTOM FOOT ORTHOTICS   Most insurance plans cover one pair of orthotics per year. You must check with your   insurance plan to see what your payment responsibility will be. Please call your   insurance company by calling the number on the back of your insurance card.   Orthotic's are non-refundable and non-returnable.   Orthotics are made of various designs. Some orthotics are covered with material that extends beyond your toes. If your orthotic is of this design, you will likely need to trim the toe end to get a proper fit. The insole from your shoe can be used as a template. Simply overlay the shoe insert on top of the custom orthotic. Align the heel end while tracing the length of the insert onto the custom orthotic. Use a  large scissor to trim the toe end until you get a proper fit in the shoe.   The orthotic needs to be pushed as far back in the shoe as possible. The heel portion should not ride forward so as not to irritate your heel.   Orthotics are designed to work with socks. Excessive perspiration will shorten the life span of the orthotics. Remove the orthotic from the shoe frequently for proper drying.   The break-in period lasts for weeks. People new to orthotics will likely experience new aches and pains. The orthotic is forcing your foot into a new position. Arch, foot and leg muscle aches and fatigue are common during these weeks. Minor discomfort can be considered normal break in phenomenon. Start wearing your orthotic around your home your first day. Limited activity for one to two hours is recommended. You can increase one or two additional hours each day provided the aches and pains are subsiding. The degree of discomfort, fatigue and problems will dictate the speed of break in. You may require multiple weeks to work up to full time use.   Do not continue wearing your orthotics if they are creating problems such as blisters or sores. Do not hesitate to call the clinic to speak with a nurse regarding orthotic   break in, fit, trimming, etc. You may also need to see the doctor if the orthotics are   simply not working out. Adjustments are sometimes made to improve orthotic   function.     Orthotics will only work in certain styles and types of shoes. Orthotics rarely work in dress shoes. Slip-ons, clogs, sandals and heels are particularly troublesome. Specially designed orthotics may be necessary for these types of shoes. Your custom orthotic was designed for activities that require appropriate walking or running shoes. Lace up athletic shoes, walking shoes or work boots should work appropriately. You may need a wider or longer shoe. Shoes with a removable  or insert work best. In general, you want to remove  an insert from the shoe before placing the orthotic into the shoe. Shoes without a removable liner may not work as well.     When purchasing new shoes, bring your orthotics along to get a proper fit. Shop at stores that are familiar with orthotics.   Frequent washing of the orthotic may shorten the life span of the top cover. The top cover can be replaced but will generally last one to five years depending on use and foot perspiration.         We want to hear from you!   In the next few weeks, you should receive a call or email to complete a survey about your visit at [facility]. Please help us improve your appointment experience by letting us know how we did today. We strive to make your experience good and value any ways in which we could do better.      We value your input and suggestions.    Thank you for choosing the [facility]!

## 2023-06-01 NOTE — PROGRESS NOTES
FOOT AND ANKLE SURGERY/PODIATRY Progress Note      ASSESSMENT:   Cellulitis left foot  Diabetic ulceration third digit left foot  Diabetic ulceration left rear foot  PAD      TREATMENT:  -I reviewed the patient's left foot and ankle MRIs with him and his son today: No obvious signs for osteomyelitis however there is edema within the calcaneus and adjacent subcutaneous fat.    -Based on the above, I reviewed with the patient that on exam the posterior left heel eschars do not have evidence of fluctuance and at the margin do not probe to deep tissues or to bone.  We will continue to monitor but consider a repeat MRI at a later date if necessary.    -Due to localized erythema I will start him on Bactrim.  Wound culture obtained today.    -After discussion of risk factors, nursing staff removed dressing, cleansed wound and consent obtained 2% Lidocaine HCL jelly was applied, under clean conditions, the third digit left foot ulceration(s) were debrided using #15 blade scalpel.  Devitalized and nonviable tissue, along with any fibrin and slough, was removed to improve granulation tissue formation, stimulate wound healing, decrease overall bacteria load, disrupt biofilm formation and decrease edge senescence. Wound drainage was scant No. Total excisional debridement was 0.12 sq cm into the subcutaneous tissue with a depth of 0.2 cm.   Ulcers were improved afterwards and .  Measures were as noted on the flow sheet. Medi-honey with a gauze dressing was applied. He will continue to apply Medi-honey with a gauze dressing qoday.    -He will follow-up in 10 days    John Garcia DPM  [facility] [place]      HPI: Caleb Hernandez was seen again today for left heel and foot ulcers and to review recent MRI report.  He has tried remain nonweightbearing in the left foot while under his son's care.    Past Medical History:   Diagnosis Date     Abscess of right foot 11/23/2020    Added automatically from request  for surgery [id number]     Acute pulmonary embolism with acute cor pulmonale (H) 5/23/2020     Ascending cholangitis 1/27/2022     BPH (benign prostatic hyperplasia)      Cholelithiasis      Closed fracture of rib     Created by Conversion  Replacement Utility updated for latest IMO load     Closed fracture of thoracic vertebra (H)     Created by Conversion  Replacement Utility updated for latest IMO load     Common bile duct (CBD) obstruction 9/4/2017     Diabetes mellitus (H)      Essential hypertension      Gram-negative bacteremia 1/27/2022    Positive blood culture 1/26/2022; likely biliary source     Hyperlipidemia      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    Added automatically from request for surgery [id number]      Pancreatitis      Sepsis (H) 1/27/2022     Sepsis due to pneumonia (H) 9/8/2017     Septic arthritis of right foot (H) 12/2/2020       Past Surgical History:   Procedure Laterality Date     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    Procedure: with amputation of the fifth ray, peroneal brevis tendon transfer;  Surgeon: John Garcia DPM;  Location: [facility];  Service: Podiatry     BACK SURGERY      1964 removed a cyst     CHOLECYSTECTOMY  1985     ENDOSCOPIC RETROGRADE CHOLANGIOPANCREATOGRAM       ENDOSCOPIC RETROGRADE CHOLANGIOPANCREATOGRAM N/A 9/5/2017    Procedure: ENDOSCOPIC RETROGRADE CHOLANGIOPANCREATOGRAPHY SPHINCTEROTOMY AND STONE EXTRACTION;  Surgeon: Hansel Gannon MD;  Location: [facility] [place];  Service:      ENDOSCOPIC RETROGRADE CHOLANGIOPANCREATOGRAM N/A 1/27/2022    Procedure: ENDOSCOPIC RETROGRADE CHOLANGIOPANCREATOGRAPHY, BALLOON DILATION AND STONE EXTRACTION;  Surgeon: Sav Osborne MD;  Location: [facility]     INCISION AND DRAINAGE OF WOUND Right 11/2/2020    Procedure: INCISION AND DRAINAGE, right foot with removal of bone 5th metatarsal;  Surgeon: John Garcia DPM;  Location: [facility] OR;  Service: Podiatry     INCISION AND DRAINAGE OF WOUND  Right 11/16/2020    Procedure: INCISION AND DRAINAGE, right foot;  Surgeon: John Garcia DPM;  Location: [facility] Main OR;  Service: Podiatry     INCISION AND DRAINAGE OF WOUND Right 11/27/2020    Procedure: INCISION AND DRAINAGE, LOWER EXTREMITY;  Surgeon: Aleksandr Hdez DPM;  Location: [facility] Main OR;  Service: Podiatry     IR EXTREMITY ANGIOGRAM RIGHT  11/24/2020     IR LOWER EXTREMITY ANGIOGRAM LEFT  5/23/2023     IR LOWER EXTREMITY ANGIOGRAM RIGHT  11/24/2020     PICC  11/30/2020          TONSILLECTOMY  1940       Allergies   Allergen Reactions     Cresol [Phenol] Unknown     Muscle cramps     Demeclocycline Hives and Rash     Crestor [Rosuvastatin] Muscle Pain (Myalgia)         Current Outpatient Medications:      acetaminophen (TYLENOL) 500 MG tablet, Take 500 mg by mouth daily And every 6 hours prn, Disp: , Rfl:      apixaban ANTICOAGULANT (ELIQUIS ANTICOAGULANT) 2.5 MG tablet, Take 1 tablet (2.5 mg) by mouth 2 times daily, Disp: 60 tablet, Rfl: 11     apixaban ANTICOAGULANT (ELIQUIS) 2.5 MG tablet, Take 1 tablet (2.5 mg) by mouth 2 times daily, Disp: 60 tablet, Rfl: 0     aspirin 81 MG EC tablet, [ASPIRIN 81 MG EC TABLET] Take 81 mg by mouth daily., Disp: , Rfl:      blood glucose (NO BRAND SPECIFIED) test strip, Use to test blood sugar 2 times daily or as directed.has a  One Touch Ultra 2 meter, Disp: 200 strip, Rfl: 3     cephALEXin (KEFLEX) 500 MG capsule, , Disp: , Rfl:      cyanocobalamin (CYANOCOBALAMIN) 1000 MCG tablet, Take 1 tablet (1,000 mcg) by mouth daily, Disp: 60 tablet, Rfl: 0     insulin aspart (NOVOLOG PEN) 100 UNIT/ML pen, Inject 1-3 Units Subcutaneous 3 times daily (before meals) Correction Scale - LOW INSULIN RESISTANCE DOSING    Do Not give Correction Insulin if Pre-Meal BG less than 140.  For Pre-Meal  - 239 give 1 unit.  For Pre-Meal  - 339 give 2 units.  For Pre-Meal BG greater than or equal to 340 give 3 units.  To be given with prandial insulin, and  "based on pre-meal blood glucose.  Notify provider if glucose greater than or equal to 350 mg/dL after administration of correction dose. If given at mealtime, administer within 30 minutes of start of meal., Disp: 15 mL, Rfl: 0     insulin glargine (LANTUS SOLOSTAR) 100 UNIT/ML pen, Inject 23 Units Subcutaneous At Bedtime, Disp: 15 mL, Rfl: 0     multivit-min/FA/lycopen/lutein (CENTRUM SILVER MEN ORAL), [MULTIVIT-MIN/FA/LYCOPEN/LUTEIN (CENTRUM SILVER MEN ORAL)] Take 1 tablet by mouth daily., Disp: , Rfl:      polyethylene glycol (MIRALAX) 17 gram packet, [POLYETHYLENE GLYCOL (MIRALAX) 17 GRAM PACKET] Take 1 packet (17 g total) by mouth daily as needed., Disp: 100 packet, Rfl: 3     sulfamethoxazole-trimethoprim (BACTRIM DS) 800-160 MG tablet, Take 1 tablet by mouth 2 times daily, Disp: 20 tablet, Rfl: 0     sulfamethoxazole-trimethoprim (BACTRIM DS) 800-160 MG tablet, Take 1 tablet by mouth 2 times daily, Disp: 20 tablet, Rfl: 0     tamsulosin (FLOMAX) 0.4 MG capsule, TAKE 1 CAPSULE BY MOUTH EVERY DAY AFTER SUPPER, Disp: 90 capsule, Rfl: 2     traMADol (ULTRAM) 50 MG tablet, Take 1 tablet (50 mg) by mouth every 6 hours as needed for moderate pain (4-6), Disp: 30 tablet, Rfl: 0     trolamine salicylate (ASPERCREME) 10 % external cream, Apply topically 2 times daily, Disp: , Rfl:      vitamin D3 (CHOLECALCIFEROL) 125 MCG (5000 UT) tablet, Take 1 tablet (125 mcg) by mouth daily, Disp: , Rfl:      vitamin E 400 unit capsule, [VITAMIN E 400 UNIT CAPSULE] Take 400 Units by mouth daily., Disp: , Rfl:     Review of Systems - 10 point Review of Systems is negative except for left foot ulcers which is noted in HPI.      OBJECTIVE:  BP (!) 147/88   Pulse 71   Temp 98.2  F (36.8  C)   Ht 5' 8\" (1.727 m)   Wt 148 lb (67.1 kg)   SpO2 94%   BMI 22.50 kg/m    General appearance: Patient is alert and fully cooperative with history & exam.  No sign of distress is noted during the visit.    Vascular: Dorsalis pedis " non-palpableLeft.  Dermatologic:    VASC Wound Lef Heel Distal (Active)   Pre Size Length 0.5 05/09/23 1400   Pre Size Width 0.8 05/09/23 1400   Pre Size Depth 0.1 05/09/23 1400   Pre Total Sq cm 0.4 05/09/23 1400   Description Callous 06/01/23 1300       VASC Wound Left heel proximal (Active)   Pre Size Length 1.2 05/09/23 1400   Pre Size Width 1.8 05/09/23 1400   Pre Size Depth 0.2 05/09/23 1400   Pre Total Sq cm 2.16 05/09/23 1400   Description Callous 06/01/23 1300       VASC Wound left 3rd toe (Active)       VASC Wound Left 3rd toe (Active)   Post Size Length 0.4 06/01/23 1300   Post Size Width 0.3 06/01/23 1300   Post Size Depth 0.2 06/01/23 1300   Post Total Sq cm 0.12 06/01/23 1300   Ulceration dorsal third digit left foot has mixed granular/fibrotic tissue.  Stable eschar x2 posterior aspect left heel.  There is localized erythema to the posterior aspect of the left heel without ascending cellulitis.  Developing ecchymosis along plantar fifth MPJ on the left foot without fluctuance or open lesion.  Neurologic: Diminished to light touch left.  Musculoskeletal: Contracted digits noted Left.    Imaging:     IR Lower Extremity Angiogram Left    Result Date: 5/23/2023  LOCATION: [facility] DATE: 5/23/2023 PROCEDURE: PELVIC AORTOGRAM, LEFT LOWER EXTREMITY DIAGNOSTIC ARTERIOGRAPHY, SELECTIVE CATHETERIZATION AND ANGIOGRAPHY OF THE LEFT ANTERIOR TIBIAL/DORSALIS PEDIS AND PERONEAL ARTERIES, BALLOON ANGIOPLASTY OF THE LEFT ANTERIOR TIBIAL/DORSALIS PEDIS AND PERONEAL ARTERIES, ULTRASOUND GUIDANCE FOR VASCULAR ACCESS, MODERATE SEDATION INTERVENTIONAL RADIOLOGIST: Alvaro Rosas MD INDICATION: 87-year-old male with nonhealing left third digit wound/critical limb ischemia, plan for left lower extremity angiography with intervention. CONSENT: The risks, benefits and alternatives of the procedure were discussed with the patient  in detail. All questions were answered. Informed consent was given to  proceed with the procedure. MODERATE SEDATION: Versed 2.5 mg IV; Fentanyl 125 mcg IV. During the time out, immediately prior to the administration of medications, the patient was reassessed for adequacy to receive conscious sedation.  Under physician supervision, Versed and fentanyl were administered for moderate sedation. Pulse oximetry, heart rate and blood pressure were continuously monitored by an independent trained observer. The physician spent 130 minutes of face-to-face sedation time with the patient. CONTRAST: 90 mL Visipaque ADDITIONAL MEDICATIONS: Heparin 10,000 units, protamine 40 mg FLUOROSCOPIC TIME: 47 minutes. RADIATION DOSE: Air Kerma: 663 mGy. COMPLICATIONS: No immediate complications. UNIVERSAL PROTOCOL: The operative site was marked and any prior imaging was reviewed. Required items including blood products, implants, devices and special equipment was made available. Patient identity was confirmed either verbally, with demographic information, hospital assigned identification or other identification markers. A timeout was performed immediately prior to the procedure. STERILE BARRIER TECHNIQUE: Maximum sterile barrier technique was used. Cutaneous antisepsis was performed at the operative site with application of 2% chlorhexidine and large sterile drape. Prior to the procedure, the  and assistant performed hand hygiene and wore hat, mask, sterile gown, and sterile gloves during the entire procedure. PROCEDURE:  Access was achieved into the right common femoral artery utilizing real-time ultrasound guidance and a micropuncture access kit. Imaging demonstrates a patent and compressible artery. Permanent images were stored to the patient's medical record. Conversion was made for a 5 Latvian vascular sheath, which was attached to a continuous heparinized saline infusion. A flush catheter was manipulated over a wire into the lower abdomen and oblique digital subtraction pelvic arteriography was  performed. Being demonstrates patent caudal abdominal aorta and bilateral iliac vasculature. No significant stenosis identified. The catheter was repositioned over a wire into the left external iliac artery. From this location, digital subtraction left lower extremity arteriography was obtained. Imaging x-rays patent left common femoral, profunda femoral, superficial femoral and popliteal arteries without significant stenosis. The tibial peroneal trunk is patent. Occlusion of all 3 infrapopliteal vessels. There is reconstituted flow in the mid peroneal and anterior tibial arteries. Occluded dorsalis pedis artery. Systemic heparinization was performed and monitored with activated clotting times. Over-the-wire exchange is made for a 6 Liberian x 90 cm vascular sheath. Selective catheterization of the left superficial femoral and popliteal arteries was performed utilizing a catheter and guidewire. The sheath was carefully advanced under fluoroscopic  guidance to the left popliteal artery. Exchange is made for an 018 crossing catheter and guidewire. Used to selectively catheterize the anterior tibial artery stump. The guidewire and catheter were carefully advanced under fluoroscopic guidance to the ankle and foot. A small volume of contrast was injected, confirming intraluminal positioning within the dorsalis pedis artery. Over-the-wire exchange is made for a 3 mm x 20 cm anoplasty balloon and angioplasty of the entire left dorsalis pedis and anterior tibial arteries was performed.  There is inability to advance the guidewire into the pedal loop and plantar arteries. Post angioplasty angiography demonstrates moderate residual stenosis involving the proximal anterior tibial artery. Over-the-wire exchange is made for a 4 mm x 15 cm InPact drug-eluting balloon which was advanced over the guidewire and positioned in the proximal anterior tibial artery. The balloon was slowly inflated to nominal pressure for 3 minutes. Post  drug coated balloon angioplasty demonstrates no significant residual stenosis. The guidewire was retracted to the tibioperoneal trunk and selective catheterization of the occluded peroneal artery was performed. The guidewire was advanced to the peroneal artery in the lower leg. Over-the-wire exchange is made for a 3 mm x 10 cm angioplasty balloon and angioplasty of the occluded proximal/mid peroneal artery was performed. Post angioplasty angiography demonstrates patent proximal peroneal artery, however, there is chronic occlusion of the distal vessel despite angioplasty. At this point, the procedure was considered complete. Protamine sulfate was administered and hemostasis was achieved with the assistance of an Angio-Seal closure device.     IMPRESSION:  1. Left lower extremity angiography demonstrates patent vasculature to the knee without significant stenosis. There is occlusion of all three infrapopliteal vessels. 2. Successful crossing and angioplasty of the left anterior tibial and dorsalis pedis arteries. Successful crossing and angioplasty of the left peroneal artery. Post intervention angiography demonstrates significant improvement in perfusion of the left foot. PLAN: We will plan for a short interval clinic follow-up to evaluate response. It is hopeful the toe pressure will increase to allow for wound healing. The patient reports significant myalgias related to a statin therapy and thus this was not prescribed.  The patient is on full dose anticoagulation with atelectasis and thus an antiplatelet agent was also not prescribed.    US Low Ext Arterial Dop Seg Pres w/o Exercise    Result Date: 5/12/2023  Table formatting from the original result was not included. BILATERAL RESTING ANKLE-BRACHIAL INDICES (SRIKANTH'S) (Date: 05/11/23) Indication: Cellulitis left foot/Diabetic ulceration third digit left foot and left rear foot/stage III pressure ulceration left heel/HX of right angio 11/24/20   Previous: 3/30/21;  05/11/21   History: Previous Smoker, Hypertension, Diabetic, Hyperlipidemia and Angioplasty  Resting SRIKANTH's          Right: mmHg Index     Brachial: 108  Ankle-(PT): 143 1.18 Ankle-(DP): 206 1.70          Digit: 59 0.49               Left: mmHg Index     Brachial: 121  Ankle-(PT): 65 0.54 Ankle-(DP): 95 0.79          Digit: 23 0.19 Resting ankle-brachial index of 1.18 on the right. Toe Pressures of 59 mmHg and TBI of 0.49 Resting ankle-brachial index of 0.79 on the left. Toe Pressures of 23 mmHg and TBI of 0.19  VPR WAVEFORMS: The right volume plethysmography waveforms are mildly abnormal at the lower thigh level, mildly abnormal at the upper calf level and mildly abnormal at the ankle. The left volume plethysmography waveforms are mildly abnormal at the lower thigh level, mildly abnormal at the upper calf level and moderately abnormal at the ankle. Impression:  1. RIGHT LOWER EXTREMITY: SRIKANTH is Non-diagnostic indicating vessel calcification. Toe Pressures are Mildly abnormal but adequate for wound healing with toe pressures of 59 mmHg. 2. LEFT LOWER EXTREMITY: SRIKANTH is Abnormal with an SRIKANTH of 0.79 indicating single level disease. Toe Pressures are Abnormal and impaired for wound healing with toe pressures of 23 mmHg. Reference: Wound classification Grade SRIKANTH Ankle Systolic Pressure Toe Pressures 0 > 0.80 > 100 mmHg > 60 mmHg 1 0.6 - 0.79 70 - 100 mmHg 40 - 59 mmHg 2 0.4 - 0.59 50-70 mmHg 30 - 39 mmHg 3 < 0.39 < 50 mmHg < 30 mmHg Digit Pressures DBI Disease Category > 0.70 Normal < 0.70 Abnormal > 30 mmHg Potential wound healing < 30 mmHg Impaired wound healing Ankle Brachial Pressures SRIKNATH Disease Category > 1.3  Likely vessel calcification with monophasic waveforms, non-diagnostic 0.95-1.30 Normal with multiphasic waveforms 0.50-0.95 Single level disease 0.30-0.50 Multilevel disease < 0.30 Critical limb ischema Volume Plethysmography Recording (VPR) at all levels Normal Sharp systolic peak, fast upstroke, prominent  dicrotic notch in wave Mild Sharp systolic peak, fast upstroke, absent dicrotic notch in wave Moderate Flattened systolic peak, slowed upstroke, absent dicrotic notch inwave Severe amplitude wave with = upslope and down slope Occluded Flat Line     MR Ankle Left w/o & w Contrast    Result Date: 5/11/2023  EXAM: MR ANKLE LEFT W/O and W CONTRAST LOCATION: [facility] DATE/TIME: 5/11/2023 11:19 PM CDT INDICATION: Osteomyelitis of calcaneus COMPARISON: None. TECHNIQUE: Routine. Additional postgadolinium T1 sequences were obtained. IV CONTRAST: 7ml gadavist FINDINGS: TENDONS: -Peroneal: Peroneus longus and brevis tendons are intact. No tendinopathy or tenosynovitis. No subluxation. -Medial: Posterior tibialis tendon is intact. No tendinopathy or tenosynovitis. Flexor digitorum longus and flexor hallucis longus tendons are normal. No tenosynovitis. -Anterior: Anterior tibialis, extensor hallucis longus, and extensor digitorum longus tendons are normal. No tenosynovitis. -Achilles: No tendinopathy or paratenonitis. LIGAMENTS: -Anterior talofibular ligament: Intact. -Calcaneofibular ligament: Intact. -Posterior talofibular ligament: Intact. -Syndesmotic inferior tibiofibular ligaments: Intact. -Deltoid ligament complex: Intact. -Spring ligament complex: Intact. JOINTS AND BONES: Just inferior lateral to the Achilles insertion site there is a localized area of abnormal edema seen involving the subcutaneous fat and the adjacent calcaneus. The edema seen in the calcaneus overall measures 11.7 x 6.7 x 22.2 mm. This could represent a  localized area of osteomyelitis with no priyanka destructive changes seen of the bony cortex. -No fracture, bone contusion or osteochondral lesion. Normal articular cartilage. Small ankle joint effusion. SOFT TISSUES: -Plantar fascia: Intact. No acute fasciitis or tear. -Sinus tarsi and tarsal tunnel: There are slightly increased T2 signal seen in the sinus tarsi which should  be inflammatory in nature. -Muscles: Global atrophy. There is significant edematous changes seen involving the subcutaneous fat about the ankle greatest medially this may be dependent nature versus changes of cellulitis. No evidence for an abscess.     IMPRESSION: 1. Just inferior lateral to the Achilles insertion site there is a localized area of abnormal edema seen involving the subcutaneous fat and the adjacent calcaneus. The edema seen in the calcaneus overall measures 11.7 x 6.7 x 22.2 mm. This could represent a localized area of osteomyelitis with adjacent cellulitis, but there is no priyanka destructive changes seen of the bony cortex. Conservative therapy with follow-up MRI examination may be of benefit. 2. Edematous changes of the soft tissues about the ankle likely dependent nature. 3. Slightly less fat than typically seen with increased T2 signal with sinus tarsi consistent mild sinus tarsi syndrome.     MR Foot Left w/o & w Contrast    Result Date: 5/11/2023  EXAM: MR FOOT LEFT W/O and W CONTRAST LOCATION: [facility] DATE/TIME: 5/11/2023 11:19 PM CDT INDICATION: Osteomyelitis third digit COMPARISON: None. TECHNIQUE: Routine. Additional postgadolinium T1 sequences were obtained. IV CONTRAST: 7ml gadavist FINDINGS: JOINTS AND BONES: -Special attention was made to the third toe. There are no significant contrast or signal abnormalities seen to suggest changes of osteomyelitis are No fracture or bone contusion. Normal articular cartilage. No effusion. TENDONS: -No tendon tear, tendinopathy, or tenosynovitis. LIGAMENTS: -Lisfranc ligament: Intact. No subluxation. MUSCLES AND SOFT TISSUES: -There is significant edematous changes primarily involving the subcutaneous fat which may represent dependent edema versus cellulitis. There is no evidence for an abscess. There is global muscle atrophy seen.     IMPRESSION: 1.  No findings of osteomyelitis or abscess. 2.  Significant edematous changes  primarily involving the subcutaneous fat which may represent dependent edema versus cellulitis.         Picture:

## 2023-06-09 PROBLEM — L97.529: Status: ACTIVE | Noted: 2023-01-01

## 2023-06-09 NOTE — PROGRESS NOTES
[facility]    2 week f/u LLE angio. US done 6/8/23    Patient is here for a 2 week follow up  to discuss LLE angiogram done on 5/23/23    Sees Dr. Dhillon for left heel on Keflex    Patient is diabetic    Pt is currently taking Aspirin and Eliquis.    /68   Pulse 66   Temp 97.9  F (36.6  C)   SpO2 96%     The provider has been notified that the patient wants results     Questions patient would like addressed today are: see above.    Refills are needed: N/A    Has homecare services and agency name:  Yumiko Burris LPN

## 2023-06-09 NOTE — PATIENT INSTRUCTIONS
Ruben Ellis,    Thank you for entrusting your care with us today. After your visit today with MD Alvaro Rosas this is the plan that was discussed at your appointment.    Get repeat ultrasound of your legs when you go see Dr. Garcia.        I am including additional information on these things and our contact information if you have any questions or concerns.   Please do not hesitate to reach out to us if you felt we did not answer your questions or you are unsure of the treatment plan after your visit today. Our number is [phone].Thank you for trusting us with your care.         Again thank you for your time.     Ankle-Brachial Index (SRIKANTH) or Physiologic Test    Description  An ankle-brachial index test is relatively pain free. Blood pressure cuffs of various sizes are placed on your thigh, calf, foot and toes.  Similar to having your blood pressure checked with an arm cuff, as the technician inflates the cuffs, they progressively tighten and are then quickly released.  You may feel some discomfort, but generally for less than 60 seconds for each measurement. You will be asked to remove your socks and shoes and possibly your pants or shorts. Gowns will be provided. It usually takes about 30-60 minutes.   Depending on the initial readings and patient symptoms, you may be asked to perform a light walk on a treadmill.  The technician will apply ultrasound gel, usually warmed for your comfort, to your ankles and wrists. Through the gel, the technician will use a small hand-held device that emits sound waves.  Risks  There are typically no side effects or complications associated with a physiologic study.  How to Prepare  Eat and take medications as usual.  There is no preparation required for an ankle-brachial index (SRIKANTH) or physiologic exam.  What Can I Expect After the Test?  The technician will send the ultrasound images to your vascular surgeon for evaluation. Typically, a report is available in 2-3 days. If  anything critical is found, it is standard practice to notify the vascular surgeon immediately.  Reference: [email or web address].org/patient-resources/vascular-tests

## 2023-06-09 NOTE — PROGRESS NOTES
FOOT AND ANKLE SURGERY/PODIATRY Progress Note      ASSESSMENT:   Diabetic ulceration left foot  Diabetic ulceration left rear foot  PAD      TREATMENT:  -I reviewed with the patient and his son today that the eschars along the plantar fifth MPJ on the left foot and left heel x2 are stable without signs of infection.    -Reviewed that he would likely develop full-thickness wounds after the eschars fall away which we will treat with local wound care.    -Recommend continued offloading with PRAFO boot and continued application of iodine daily.    -Reviewed SRIKANTH report which indicates decreased perfusion to digits on the left foot.    -I have asked him to follow-up with me in approximate 3 to 4 weeks.    John Garcia DPM  [facility]       HPI: Caleb Hernandez was seen again today for left foot ulcerations.  Since his last visit with me he underwent angiogram with IR and obtained updated ABIs and is scheduled to see vascular surgery.    Past Medical History:   Diagnosis Date     Abscess of right foot 11/23/2020    Added automatically from request for surgery [id number]     Acute pulmonary embolism with acute cor pulmonale (H) 5/23/2020     Ascending cholangitis 1/27/2022     BPH (benign prostatic hyperplasia)      Cholelithiasis      Closed fracture of rib     Created by Conversion  Replacement Utility updated for latest IMO load     Closed fracture of thoracic vertebra (H)     Created by Conversion  Replacement Utility updated for latest IMO load     Common bile duct (CBD) obstruction 9/4/2017     Diabetes mellitus (H)      Essential hypertension      Gram-negative bacteremia 1/27/2022    Positive blood culture 1/26/2022; likely biliary source     Hyperlipidemia      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    Added automatically from request for surgery [id number]      Pancreatitis      Sepsis (H) 1/27/2022     Sepsis due to pneumonia (H) 9/8/2017     Septic arthritis of right foot (H) 12/2/2020        Past Surgical History:   Procedure Laterality Date     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    Procedure: with amputation of the fifth ray, peroneal brevis tendon transfer;  Surgeon: John Garcia DPM;  Location: [facility];  Service: Podiatry     BACK SURGERY      1964 removed a cyst     CHOLECYSTECTOMY  1985     ENDOSCOPIC RETROGRADE CHOLANGIOPANCREATOGRAM       ENDOSCOPIC RETROGRADE CHOLANGIOPANCREATOGRAM N/A 9/5/2017    Procedure: ENDOSCOPIC RETROGRADE CHOLANGIOPANCREATOGRAPHY SPHINCTEROTOMY AND STONE EXTRACTION;  Surgeon: Hansel Gannon MD;  Location: [facility] OR;  Service:      ENDOSCOPIC RETROGRADE CHOLANGIOPANCREATOGRAM N/A 1/27/2022    Procedure: ENDOSCOPIC RETROGRADE CHOLANGIOPANCREATOGRAPHY, BALLOON DILATION AND STONE EXTRACTION;  Surgeon: Sav Osborne MD;  Location: [facility]     INCISION AND DRAINAGE OF WOUND Right 11/2/2020    Procedure: INCISION AND DRAINAGE, right foot with removal of bone 5th metatarsal;  Surgeon: John Garcia DPM;  Location: [facility] OR;  Service: Podiatry     INCISION AND DRAINAGE OF WOUND Right 11/16/2020    Procedure: INCISION AND DRAINAGE, right foot;  Surgeon: John Garcia DPM;  Location: [facility] OR;  Service: Podiatry     INCISION AND DRAINAGE OF WOUND Right 11/27/2020    Procedure: INCISION AND DRAINAGE, LOWER EXTREMITY;  Surgeon: Aleksandr Hdez DPM;  Location: [facility] OR;  Service: Podiatry     IR EXTREMITY ANGIOGRAM RIGHT  11/24/2020     IR LOWER EXTREMITY ANGIOGRAM LEFT  5/23/2023     IR LOWER EXTREMITY ANGIOGRAM RIGHT  11/24/2020     PICC  11/30/2020          TONSILLECTOMY  1940       Allergies   Allergen Reactions     Cresol [Phenol] Unknown     Muscle cramps     Demeclocycline Hives and Rash     Crestor [Rosuvastatin] Muscle Pain (Myalgia)         Current Outpatient Medications:      acetaminophen (TYLENOL) 500 MG tablet, Take 500 mg by mouth daily And every 6 hours prn, Disp: , Rfl:      apixaban  ANTICOAGULANT (ELIQUIS ANTICOAGULANT) 2.5 MG tablet, Take 1 tablet (2.5 mg) by mouth 2 times daily, Disp: 60 tablet, Rfl: 11     apixaban ANTICOAGULANT (ELIQUIS) 2.5 MG tablet, Take 1 tablet (2.5 mg) by mouth 2 times daily, Disp: 60 tablet, Rfl: 0     aspirin 81 MG EC tablet, [ASPIRIN 81 MG EC TABLET] Take 81 mg by mouth daily., Disp: , Rfl:      blood glucose (NO BRAND SPECIFIED) test strip, Use to test blood sugar 2 times daily or as directed.has a  One Touch Ultra 2 meter, Disp: 200 strip, Rfl: 3     cephALEXin (KEFLEX) 500 MG capsule, , Disp: , Rfl:      cyanocobalamin (CYANOCOBALAMIN) 1000 MCG tablet, Take 1 tablet (1,000 mcg) by mouth daily, Disp: 60 tablet, Rfl: 0     insulin aspart (NOVOLOG PEN) 100 UNIT/ML pen, Inject 1-3 Units Subcutaneous 3 times daily (before meals) Correction Scale - LOW INSULIN RESISTANCE DOSING    Do Not give Correction Insulin if Pre-Meal BG less than 140.  For Pre-Meal  - 239 give 1 unit.  For Pre-Meal  - 339 give 2 units.  For Pre-Meal BG greater than or equal to 340 give 3 units.  To be given with prandial insulin, and based on pre-meal blood glucose.  Notify provider if glucose greater than or equal to 350 mg/dL after administration of correction dose. If given at mealtime, administer within 30 minutes of start of meal., Disp: 15 mL, Rfl: 0     insulin glargine (LANTUS SOLOSTAR) 100 UNIT/ML pen, Inject 23 Units Subcutaneous At Bedtime, Disp: 15 mL, Rfl: 0     multivit-min/FA/lycopen/lutein (CENTRUM SILVER MEN ORAL), [MULTIVIT-MIN/FA/LYCOPEN/LUTEIN (CENTRUM SILVER MEN ORAL)] Take 1 tablet by mouth daily., Disp: , Rfl:      polyethylene glycol (MIRALAX) 17 gram packet, [POLYETHYLENE GLYCOL (MIRALAX) 17 GRAM PACKET] Take 1 packet (17 g total) by mouth daily as needed., Disp: 100 packet, Rfl: 3     sulfamethoxazole-trimethoprim (BACTRIM DS) 800-160 MG tablet, Take 1 tablet by mouth 2 times daily, Disp: 20 tablet, Rfl: 0     sulfamethoxazole-trimethoprim (BACTRIM DS)  "800-160 MG tablet, Take 1 tablet by mouth 2 times daily, Disp: 20 tablet, Rfl: 0     tamsulosin (FLOMAX) 0.4 MG capsule, TAKE 1 CAPSULE BY MOUTH EVERY DAY AFTER SUPPER, Disp: 90 capsule, Rfl: 2     traMADol (ULTRAM) 50 MG tablet, Take 1 tablet (50 mg) by mouth every 6 hours as needed for moderate pain (4-6), Disp: 30 tablet, Rfl: 0     trolamine salicylate (ASPERCREME) 10 % external cream, Apply topically 2 times daily, Disp: , Rfl:      vitamin D3 (CHOLECALCIFEROL) 125 MCG (5000 UT) tablet, Take 1 tablet (125 mcg) by mouth daily, Disp: , Rfl:      vitamin E 400 unit capsule, [VITAMIN E 400 UNIT CAPSULE] Take 400 Units by mouth daily., Disp: , Rfl:     Review of Systems - 10 point Review of Systems is negative except for left foot ulcers which is noted in HPI.      OBJECTIVE:  BP (!) 177/81   Pulse 67   Ht 5' 8\" (1.727 m)   Wt 148 lb (67.1 kg)   SpO2 95%   BMI 22.50 kg/m    General appearance: Patient is alert and fully cooperative with history & exam.  No sign of distress is noted during the visit.    Vascular: Dorsalis pedis non-palpableLeft.  Dermatologic:    VASC Wound Lef Heel Distal (Active)   Pre Size Length 0.5 05/09/23 1400   Pre Size Width 0.8 05/09/23 1400   Pre Size Depth 0.1 05/09/23 1400   Pre Total Sq cm 0.4 05/09/23 1400   Description calllous 06/09/23 1300       VASC Wound Left heel proximal (Active)   Pre Size Length 1.2 05/09/23 1400   Pre Size Width 1.8 05/09/23 1400   Pre Size Depth 0.2 05/09/23 1400   Pre Total Sq cm 2.16 05/09/23 1400   Description callous 06/09/23 1300       VASC Wound left 3rd toe (Active)       VASC Wound Left 3rd toe (Active)   Post Size Length 0.4 06/01/23 1300   Post Size Width 0.3 06/01/23 1300   Post Size Depth 0.2 06/01/23 1300   Post Total Sq cm 0.12 06/01/23 1300   Stable eschars plantar fifth MPJ left foot and plantar and lateral left heel.  No erythema left lower extremity.  Neurologic: Diminished to light touch Left.  Musculoskeletal: Contracted digits noted " Left.    Imaging:     IR Lower Extremity Angiogram Left    Result Date: 5/23/2023  LOCATION: [facility] DATE: 5/23/2023 PROCEDURE: PELVIC AORTOGRAM, LEFT LOWER EXTREMITY DIAGNOSTIC ARTERIOGRAPHY, SELECTIVE CATHETERIZATION AND ANGIOGRAPHY OF THE LEFT ANTERIOR TIBIAL/DORSALIS PEDIS AND PERONEAL ARTERIES, BALLOON ANGIOPLASTY OF THE LEFT ANTERIOR TIBIAL/DORSALIS PEDIS AND PERONEAL ARTERIES, ULTRASOUND GUIDANCE FOR VASCULAR ACCESS, MODERATE SEDATION INTERVENTIONAL RADIOLOGIST: Alvaro Rosas MD INDICATION: 87-year-old male with nonhealing left third digit wound/critical limb ischemia, plan for left lower extremity angiography with intervention. CONSENT: The risks, benefits and alternatives of the procedure were discussed with the patient  in detail. All questions were answered. Informed consent was given to proceed with the procedure. MODERATE SEDATION: Versed 2.5 mg IV; Fentanyl 125 mcg IV. During the time out, immediately prior to the administration of medications, the patient was reassessed for adequacy to receive conscious sedation.  Under physician supervision, Versed and fentanyl were administered for moderate sedation. Pulse oximetry, heart rate and blood pressure were continuously monitored by an independent trained observer. The physician spent 130 minutes of face-to-face sedation time with the patient. CONTRAST: 90 mL Visipaque ADDITIONAL MEDICATIONS: Heparin 10,000 units, protamine 40 mg FLUOROSCOPIC TIME: 47 minutes. RADIATION DOSE: Air Kerma: 663 mGy. COMPLICATIONS: No immediate complications. UNIVERSAL PROTOCOL: The operative site was marked and any prior imaging was reviewed. Required items including blood products, implants, devices and special equipment was made available. Patient identity was confirmed either verbally, with demographic information, hospital assigned identification or other identification markers. A timeout was performed immediately prior to the procedure.  STERILE BARRIER TECHNIQUE: Maximum sterile barrier technique was used. Cutaneous antisepsis was performed at the operative site with application of 2% chlorhexidine and large sterile drape. Prior to the procedure, the  and assistant performed hand hygiene and wore hat, mask, sterile gown, and sterile gloves during the entire procedure. PROCEDURE:  Access was achieved into the right common femoral artery utilizing real-time ultrasound guidance and a micropuncture access kit. Imaging demonstrates a patent and compressible artery. Permanent images were stored to the patient's medical record. Conversion was made for a 5 English vascular sheath, which was attached to a continuous heparinized saline infusion. A flush catheter was manipulated over a wire into the lower abdomen and oblique digital subtraction pelvic arteriography was performed. Being demonstrates patent caudal abdominal aorta and bilateral iliac vasculature. No significant stenosis identified. The catheter was repositioned over a wire into the left external iliac artery. From this location, digital subtraction left lower extremity arteriography was obtained. Imaging x-rays patent left common femoral, profunda femoral, superficial femoral and popliteal arteries without significant stenosis. The tibial peroneal trunk is patent. Occlusion of all 3 infrapopliteal vessels. There is reconstituted flow in the mid peroneal and anterior tibial arteries. Occluded dorsalis pedis artery. Systemic heparinization was performed and monitored with activated clotting times. Over-the-wire exchange is made for a 6 English x 90 cm vascular sheath. Selective catheterization of the left superficial femoral and popliteal arteries was performed utilizing a catheter and guidewire. The sheath was carefully advanced under fluoroscopic  guidance to the left popliteal artery. Exchange is made for an 018 crossing catheter and guidewire. Used to selectively catheterize the anterior  tibial artery stump. The guidewire and catheter were carefully advanced under fluoroscopic guidance to the ankle and foot. A small volume of contrast was injected, confirming intraluminal positioning within the dorsalis pedis artery. Over-the-wire exchange is made for a 3 mm x 20 cm anoplasty balloon and angioplasty of the entire left dorsalis pedis and anterior tibial arteries was performed.  There is inability to advance the guidewire into the pedal loop and plantar arteries. Post angioplasty angiography demonstrates moderate residual stenosis involving the proximal anterior tibial artery. Over-the-wire exchange is made for a 4 mm x 15 cm InPact drug-eluting balloon which was advanced over the guidewire and positioned in the proximal anterior tibial artery. The balloon was slowly inflated to nominal pressure for 3 minutes. Post drug coated balloon angioplasty demonstrates no significant residual stenosis. The guidewire was retracted to the tibioperoneal trunk and selective catheterization of the occluded peroneal artery was performed. The guidewire was advanced to the peroneal artery in the lower leg. Over-the-wire exchange is made for a 3 mm x 10 cm angioplasty balloon and angioplasty of the occluded proximal/mid peroneal artery was performed. Post angioplasty angiography demonstrates patent proximal peroneal artery, however, there is chronic occlusion of the distal vessel despite angioplasty. At this point, the procedure was considered complete. Protamine sulfate was administered and hemostasis was achieved with the assistance of an Angio-Seal closure device.     IMPRESSION:  1. Left lower extremity angiography demonstrates patent vasculature to the knee without significant stenosis. There is occlusion of all three infrapopliteal vessels. 2. Successful crossing and angioplasty of the left anterior tibial and dorsalis pedis arteries. Successful crossing and angioplasty of the left peroneal artery. Post  intervention angiography demonstrates significant improvement in perfusion of the left foot. PLAN: We will plan for a short interval clinic follow-up to evaluate response. It is hopeful the toe pressure will increase to allow for wound healing. The patient reports significant myalgias related to a statin therapy and thus this was not prescribed.  The patient is on full dose anticoagulation with atelectasis and thus an antiplatelet agent was also not prescribed.    US Low Ext Arterial Dop Seg Pres w/o Exercise    Result Date: 5/12/2023  Table formatting from the original result was not included. BILATERAL RESTING ANKLE-BRACHIAL INDICES (SRIKANTH'S) (Date: 05/11/23) Indication: Cellulitis left foot/Diabetic ulceration third digit left foot and left rear foot/stage III pressure ulceration left heel/HX of right angio 11/24/20   Previous: 3/30/21; 05/11/21   History: Previous Smoker, Hypertension, Diabetic, Hyperlipidemia and Angioplasty  Resting SRIKANTH's          Right: mmHg Index     Brachial: 108  Ankle-(PT): 143 1.18 Ankle-(DP): 206 1.70          Digit: 59 0.49               Left: mmHg Index     Brachial: 121  Ankle-(PT): 65 0.54 Ankle-(DP): 95 0.79          Digit: 23 0.19 Resting ankle-brachial index of 1.18 on the right. Toe Pressures of 59 mmHg and TBI of 0.49 Resting ankle-brachial index of 0.79 on the left. Toe Pressures of 23 mmHg and TBI of 0.19  VPR WAVEFORMS: The right volume plethysmography waveforms are mildly abnormal at the lower thigh level, mildly abnormal at the upper calf level and mildly abnormal at the ankle. The left volume plethysmography waveforms are mildly abnormal at the lower thigh level, mildly abnormal at the upper calf level and moderately abnormal at the ankle. Impression:  1. RIGHT LOWER EXTREMITY: SRIKANTH is Non-diagnostic indicating vessel calcification. Toe Pressures are Mildly abnormal but adequate for wound healing with toe pressures of 59 mmHg. 2. LEFT LOWER EXTREMITY: SRIKANTH is Abnormal with an  SRIKANTH of 0.79 indicating single level disease. Toe Pressures are Abnormal and impaired for wound healing with toe pressures of 23 mmHg. Reference: Wound classification Grade SRIKANTH Ankle Systolic Pressure Toe Pressures 0 > 0.80 > 100 mmHg > 60 mmHg 1 0.6 - 0.79 70 - 100 mmHg 40 - 59 mmHg 2 0.4 - 0.59 50-70 mmHg 30 - 39 mmHg 3 < 0.39 < 50 mmHg < 30 mmHg Digit Pressures DBI Disease Category > 0.70 Normal < 0.70 Abnormal > 30 mmHg Potential wound healing < 30 mmHg Impaired wound healing Ankle Brachial Pressures SRIKANTH Disease Category > 1.3  Likely vessel calcification with monophasic waveforms, non-diagnostic 0.95-1.30 Normal with multiphasic waveforms 0.50-0.95 Single level disease 0.30-0.50 Multilevel disease < 0.30 Critical limb ischema Volume Plethysmography Recording (VPR) at all levels Normal Sharp systolic peak, fast upstroke, prominent dicrotic notch in wave Mild Sharp systolic peak, fast upstroke, absent dicrotic notch in wave Moderate Flattened systolic peak, slowed upstroke, absent dicrotic notch inwave Severe amplitude wave with = upslope and down slope Occluded Flat Line     MR Ankle Left w/o & w Contrast    Result Date: 5/11/2023  EXAM: MR ANKLE LEFT W/O and W CONTRAST LOCATION: [facility] DATE/TIME: 5/11/2023 11:19 PM CDT INDICATION: Osteomyelitis of calcaneus COMPARISON: None. TECHNIQUE: Routine. Additional postgadolinium T1 sequences were obtained. IV CONTRAST: 7ml gadavist FINDINGS: TENDONS: -Peroneal: Peroneus longus and brevis tendons are intact. No tendinopathy or tenosynovitis. No subluxation. -Medial: Posterior tibialis tendon is intact. No tendinopathy or tenosynovitis. Flexor digitorum longus and flexor hallucis longus tendons are normal. No tenosynovitis. -Anterior: Anterior tibialis, extensor hallucis longus, and extensor digitorum longus tendons are normal. No tenosynovitis. -Achilles: No tendinopathy or paratenonitis. LIGAMENTS: -Anterior talofibular ligament: Intact.  -Calcaneofibular ligament: Intact. -Posterior talofibular ligament: Intact. -Syndesmotic inferior tibiofibular ligaments: Intact. -Deltoid ligament complex: Intact. -Spring ligament complex: Intact. JOINTS AND BONES: Just inferior lateral to the Achilles insertion site there is a localized area of abnormal edema seen involving the subcutaneous fat and the adjacent calcaneus. The edema seen in the calcaneus overall measures 11.7 x 6.7 x 22.2 mm. This could represent a  localized area of osteomyelitis with no priyanka destructive changes seen of the bony cortex. -No fracture, bone contusion or osteochondral lesion. Normal articular cartilage. Small ankle joint effusion. SOFT TISSUES: -Plantar fascia: Intact. No acute fasciitis or tear. -Sinus tarsi and tarsal tunnel: There are slightly increased T2 signal seen in the sinus tarsi which should be inflammatory in nature. -Muscles: Global atrophy. There is significant edematous changes seen involving the subcutaneous fat about the ankle greatest medially this may be dependent nature versus changes of cellulitis. No evidence for an abscess.     IMPRESSION: 1. Just inferior lateral to the Achilles insertion site there is a localized area of abnormal edema seen involving the subcutaneous fat and the adjacent calcaneus. The edema seen in the calcaneus overall measures 11.7 x 6.7 x 22.2 mm. This could represent a localized area of osteomyelitis with adjacent cellulitis, but there is no priyanka destructive changes seen of the bony cortex. Conservative therapy with follow-up MRI examination may be of benefit. 2. Edematous changes of the soft tissues about the ankle likely dependent nature. 3. Slightly less fat than typically seen with increased T2 signal with sinus tarsi consistent mild sinus tarsi syndrome.     MR Foot Left w/o & w Contrast    Result Date: 5/11/2023  EXAM: MR FOOT LEFT W/O and W CONTRAST LOCATION: [facility] DATE/TIME: 5/11/2023 11:19 PM CDT  INDICATION: Osteomyelitis third digit COMPARISON: None. TECHNIQUE: Routine. Additional postgadolinium T1 sequences were obtained. IV CONTRAST: 7ml gadavist FINDINGS: JOINTS AND BONES: -Special attention was made to the third toe. There are no significant contrast or signal abnormalities seen to suggest changes of osteomyelitis are No fracture or bone contusion. Normal articular cartilage. No effusion. TENDONS: -No tendon tear, tendinopathy, or tenosynovitis. LIGAMENTS: -Lisfranc ligament: Intact. No subluxation. MUSCLES AND SOFT TISSUES: -There is significant edematous changes primarily involving the subcutaneous fat which may represent dependent edema versus cellulitis. There is no evidence for an abscess. There is global muscle atrophy seen.     IMPRESSION: 1.  No findings of osteomyelitis or abscess. 2.  Significant edematous changes primarily involving the subcutaneous fat which may represent dependent edema versus cellulitis.         Picture:

## 2023-06-09 NOTE — PATIENT INSTRUCTIONS
Important lnstructions  Continue antibiotics    WEIGHT BEARING STATUS: You are to remain NON WEIGHT BEARING on your left foot. NON WEIGHT BEARING MEANS NO PRESSURE ON YOUR FOOT OR HEEL AT ANY TIME FOR ANY REASON!    2. OFFLOADING DEVICE: Must use a A WHEELCHAIR at all times! (do not use affected foot to push wheelchair)    3. STABILIZATION DEVICE: Use a PRAFO BOOT . You will need to WEAR THIS AT ALL TIMES EVEN WHILE IN BED.     4. ELEVATE: Elevating your leg means laying with your head on a pillow and your foot ABOVE YOUR HEART.     5. DO NOT MOVE YOUR FOOT.  There is a risk of worsening the wound or incision. To give yourself a higher chance of healing, please DO NOT swing foot back and forth and wiggle foot/toes especially when inside a stabilization device. Limited movement is allowable with therapy as recommended by the doctor.     6. TAKE A PROTEIN SHAKE TWICE A DAY.  (For ex: Boost, Ensure, Glucerna)      Dressing Change lnstructions:    All eschars on left foot:    CLEANSE WITH NORMAL SALINE, PAINT   WITH IODINE AND COVER WITH GAUZE.   CHANGE DAILY.            It IS NOT ok to get your wound wet in the bath or shower    SEEK MEDICAL CARE IF:  You have an increase in swelling, pain, or redness around the wound.  You have an increase in the amount of pus coming from the wound.  There is a bad smell coming from the wound.  The wound appears to be worsening/enlarging  You have a fever greater than 101.5 F      It is ok to continue current wound care treatment/products for the next 2-3 days until new wound care supplies are ordered and arrive. If longer than this please contact our office at [phone].

## 2023-06-14 NOTE — PROGRESS NOTES
VASCULAR AND INTERVENTIONAL OUTPATIENT CONSULT OR VISIT  PHYSICIAN: Alvaro Rosas MD  ESTABLISHED PATIENT    LOCATION: [facility]    Caleb Hernandez   Medical Record #:  [id number]  YOB: 1935  Age:  87 year old     Date of Service: 6/13/2023    PRIMARY CARE PROVIDER: Otis Lozano    Reason for visit: Peripheral vascular disease/critical limb ischemia    IMPRESSION: 87-year-old male is seen in follow-up for nonhealing left heel/foot wounds status post left lower extremity angiography on 5/23/23.  Diagnostic angiography demonstrated occlusion of all infrapopliteal vessels with successful crossing and angioplasty of the peroneal and anterior tibial arteries.  The patient's preintervention left toe pressure was 23 mmHg improving to 38 mmHg post intervention.  Overall, there appears to be slow but gradual improvement in his left foot/heel wounds.    RECOMMENDATION:    1.  Guideline directed medical therapy for peripheral arterial disease  -The patient is on full dose anticoagulation with Eliquis.  Given his age there is no indication for adding antiplatelet therapy  -The patient reports severe myalgias with statin therapy    Continue offloading and current wound cares.  The patient is scheduled for follow-up with Dr. Garcia on 6/30/23 and it is hopeful that a repeat left toe pressure/TCO2s can be obtained prior to that visit to evaluate healing potential.  Otherwise we will plan for a clinic follow-up early next month.    HPI:  Caleb Hernandez is a 87 year old male who was evaluated today for nonhealing left heel and digit wounds.  The patient was initially seen while he was admitted to [facility] at the end of last month.  The patient underwent a left lower extremity angiogram on 5/23/23 with successful revascularization to provide two inline vessels to the left foot.  The patient is seen in clinic with his son reports slow but gradual improvement in the appearance of his  No sports/gym/No excercise/No weight bearing/No heavy lifting wounds.  The patient is receiving home nurse wound care and has been offloading as instructed.  No other significant changes in his health since hospital discharge    PHH:    Past Medical History:   Diagnosis Date     Abscess of right foot 11/23/2020    Added automatically from request for surgery [id number]     Acute pulmonary embolism with acute cor pulmonale (H) 5/23/2020     Ascending cholangitis 1/27/2022     BPH (benign prostatic hyperplasia)      Cholelithiasis      Closed fracture of rib     Created by Conversion  Replacement Utility updated for latest IMO load     Closed fracture of thoracic vertebra (H)     Created by Conversion  Replacement Utility updated for latest IMO load     Common bile duct (CBD) obstruction 9/4/2017     Diabetes mellitus (H)      Essential hypertension      Gram-negative bacteremia 1/27/2022    Positive blood culture 1/26/2022; likely biliary source     Hyperlipidemia      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 Main OR;  Service: Podiatry     BACK SURGERY      1964 removed a cyst     CHOLECYSTECTOMY  1985     ENDOSCOPIC RETROGRADE CHOLANGIOPANCREATOGRAM       ENDOSCOPIC RETROGRADE CHOLANGIOPANCREATOGRAM N/A 9/5/2017    Procedure: ENDOSCOPIC RETROGRADE CHOLANGIOPANCREATOGRAPHY SPHINCTEROTOMY AND STONE EXTRACTION;  Surgeon: Hansel Gannon MD;  Location: [facility] Main OR;  Service:      ENDOSCOPIC RETROGRADE CHOLANGIOPANCREATOGRAM N/A 1/27/2022    Procedure: ENDOSCOPIC RETROGRADE CHOLANGIOPANCREATOGRAPHY, BALLOON DILATION AND STONE EXTRACTION;  Surgeon: Sav Osborne,  MD;  Location: [facility]     INCISION AND DRAINAGE OF WOUND Right 11/2/2020    Procedure: INCISION AND DRAINAGE, right foot with removal of bone 5th metatarsal;  Surgeon: John Garcia DPM;  Location: [facility] OR;  Service: Podiatry     INCISION AND DRAINAGE OF WOUND Right 11/16/2020    Procedure: INCISION AND DRAINAGE, right foot;  Surgeon: John Garcia DPM;  Location: [facility] OR;  Service: Podiatry     INCISION AND DRAINAGE OF WOUND Right 11/27/2020    Procedure: INCISION AND DRAINAGE, LOWER EXTREMITY;  Surgeon: Aleksandr Hdez DPM;  Location: [facility] OR;  Service: Podiatry     IR EXTREMITY ANGIOGRAM RIGHT  11/24/2020     IR LOWER EXTREMITY ANGIOGRAM LEFT  5/23/2023     IR LOWER EXTREMITY ANGIOGRAM RIGHT  11/24/2020     PICC  11/30/2020          TONSILLECTOMY  1940       ALLERGIES:  Cresol [phenol], Demeclocycline, and Crestor [rosuvastatin]    MEDS:    Current Outpatient Medications:      acetaminophen (TYLENOL) 500 MG tablet, Take 500 mg by mouth daily And every 6 hours prn, Disp: , Rfl:      apixaban ANTICOAGULANT (ELIQUIS ANTICOAGULANT) 2.5 MG tablet, Take 1 tablet (2.5 mg) by mouth 2 times daily, Disp: 60 tablet, Rfl: 11     aspirin 81 MG EC tablet, [ASPIRIN 81 MG EC TABLET] Take 81 mg by mouth daily., Disp: , Rfl:      blood glucose (NO BRAND SPECIFIED) test strip, Use to test blood sugar 2 times daily or as directed.has a  One Touch Ultra 2 meter, Disp: 200 strip, Rfl: 3     cephALEXin (KEFLEX) 500 MG capsule, , Disp: , Rfl:      cyanocobalamin (CYANOCOBALAMIN) 1000 MCG tablet, Take 1 tablet (1,000 mcg) by mouth daily, Disp: 60 tablet, Rfl: 0     insulin aspart (NOVOLOG PEN) 100 UNIT/ML pen, Inject 1-3 Units Subcutaneous 3 times daily (before meals) Correction Scale - LOW INSULIN RESISTANCE DOSING    Do Not give Correction Insulin if Pre-Meal BG less than 140.  For Pre-Meal  - 239 give 1 unit.  For Pre-Meal  - 339 give 2 units.  For Pre-Meal BG greater  than or equal to 340 give 3 units.  To be given with prandial insulin, and based on pre-meal blood glucose.  Notify provider if glucose greater than or equal to 350 mg/dL after administration of correction dose. If given at mealtime, administer within 30 minutes of start of meal., Disp: 15 mL, Rfl: 0     insulin glargine (LANTUS SOLOSTAR) 100 UNIT/ML pen, Inject 23 Units Subcutaneous At Bedtime, Disp: 15 mL, Rfl: 0     multivit-min/FA/lycopen/lutein (CENTRUM SILVER MEN ORAL), [MULTIVIT-MIN/FA/LYCOPEN/LUTEIN (CENTRUM SILVER MEN ORAL)] Take 1 tablet by mouth daily., Disp: , Rfl:      polyethylene glycol (MIRALAX) 17 gram packet, [POLYETHYLENE GLYCOL (MIRALAX) 17 GRAM PACKET] Take 1 packet (17 g total) by mouth daily as needed., Disp: 100 packet, Rfl: 3     tamsulosin (FLOMAX) 0.4 MG capsule, TAKE 1 CAPSULE BY MOUTH EVERY DAY AFTER SUPPER, Disp: 90 capsule, Rfl: 2     traMADol (ULTRAM) 50 MG tablet, Take 1 tablet (50 mg) by mouth every 6 hours as needed for moderate pain (4-6), Disp: 30 tablet, Rfl: 0     trolamine salicylate (ASPERCREME) 10 % external cream, Apply topically 2 times daily, Disp: , Rfl:      vitamin D3 (CHOLECALCIFEROL) 125 MCG (5000 UT) tablet, Take 1 tablet (125 mcg) by mouth daily, Disp: , Rfl:      vitamin E 400 unit capsule, [VITAMIN E 400 UNIT CAPSULE] Take 400 Units by mouth daily., Disp: , Rfl:      apixaban ANTICOAGULANT (ELIQUIS) 2.5 MG tablet, Take 1 tablet (2.5 mg) by mouth 2 times daily (Patient not taking: Reported on 6/13/2023), Disp: 60 tablet, Rfl: 0     sulfamethoxazole-trimethoprim (BACTRIM DS) 800-160 MG tablet, Take 1 tablet by mouth 2 times daily (Patient not taking: Reported on 6/13/2023), Disp: 20 tablet, Rfl: 0     sulfamethoxazole-trimethoprim (BACTRIM DS) 800-160 MG tablet, Take 1 tablet by mouth 2 times daily (Patient not taking: Reported on 6/13/2023), Disp: 20 tablet, Rfl: 0    SOCIAL HABITS:    History   Smoking Status     Former     Types: Cigarettes     Quit date:  11/11/1991   Smokeless Tobacco     Never     Social History    Substance and Sexual Activity      Alcohol use: No      History   Drug Use No       FAMILY HISTORY:    Family History   Problem Relation Age of Onset     Aortic aneurysm Mother      Alcoholism Father        ADVANCE CARE DIRECTIVES:    Advance care directives reviewed in the chart and no changes made.     PE:  /68   Pulse 66   Temp 97.9  F (36.6  C)   SpO2 96%   Wt Readings from Last 1 Encounters:   06/09/23 148 lb (67.1 kg)     There is no height or weight on file to calculate BMI.    EXAM:  GENERAL: This is a well-developed 87 year old male who appears his stated age  EYES: Grossly normal.  MOUTH: Buccal mucosa normal   MUSCULOSKELETAL: Grossly normal and both lower extremities are intact.  HEME/LYMPH: No lymphedema  NEUROLOGIC: Focally intact, Alert and oriented x 3.   PSYCH: appropriate affect    DIAGNOSTIC STUDIES:     Images:  US Lower Extremity Arterial Duplex Left    Result Date: 6/9/2023  Table formatting from the original result was not included. Arterial Duplex Ultrasound (Date: 06/08/23) Lower Extremity Artery Evaluation Indication: Status post left peroneal artery/BREE/DPA angioplasty (05/23/23)/HX of Cellulitis left foot/Diabetic ulceration third digit left foot and left rear foot/stage III pressure ulceration left heel/HX of right angio 11/24/20  Previous: 11/24/20; 3/30/21; 05/11/21;  History: Previous Smoker, Hypertension, Diabetic, Hyperlipidemia and Angioplasty Technique: Duplex imaging is performed utilizing gray-scale, two-dimensional images, and color-flow imaging. Doppler waveform analysis and spectral Doppler imaging is also performed. LOWER EXTREMITY ARTERIAL DUPLEX EXAM WITH WAVEFORMS Left Leg:(cm/s) Location: Velocities Waveforms EIA:   78  T CFA:   71  T PFA:   74  B SFA Proximal:   88  B SFA Mid:   93  T SFA Distal:   64  T Popliteal Artery:   134  B Tibial Peroneal Trunk: TPT   128 B PTA: 14 M BREE Prox: 139 B BREE  Dist:   75  M DPA:   96  M Waveforms: T=Triphasic, M=Monophasic, B=Biphasic Impression: Left Lower Extremity: Triphasic external iliac and common femoral waveforms just no inflow disease.  Normal monica biphasic waveforms and normal velocities through the SFA and popliteal.  While the proximal tibial vessels including the tibioperoneal trunk and proximal anterotibial artery appear to have normal waveforms there is rapid and degradation of morphology to monophasic particular at the posterior tibial level.  Importantly the anterotibial artery and dorsalis pedis artery, although had a morphology of waveform that is all above the baseline test.  To suggest multiple phases may actually represent triphasic waveform with an overlying venous hum.  Overall improved flow to the level of the ankle through the anterotibial artery Reference: Category Normal 1-19% 20-49% 50-99% Occluded PSV <160 cm/sec without spectral broadening <160 cm/sec with spectral broadening Increased Increased Absent Flow Ratio N/A N/A < 2.0 >2.0 N/A Post-Stenotic Turbulence No No No Yes N/A      US Low Ext Arterial Dop Seg Pres w/o Exercise    Result Date: 6/9/2023  Table formatting from the original result was not included. BILATERAL RESTING ANKLE-BRACHIAL INDICES (SRIKANTH'S) (Date: 06/08/23) Indication: Status post left peroneal artery/BREE/DPA angioplasty (05/23/23)/HX of Cellulitis left foot/Diabetic ulceration third digit left foot and left rear foot/stage III pressure ulceration left heel/HX of right angio 11/24/20  Previous: 3/30/21; 05/11/21; 05/11/23  History: Previous Smoker, Hypertension, Diabetic, Hyperlipidemia and Angioplasty  Resting SRIKANTH's          Right: mmHg Index     Brachial: 129  Ankle-(PT): 89 0.69 Ankle-(DP): 81 0.63          Digit: 63 0.49               Left: mmHg Index     Brachial: 129  Ankle-(PT): 62 0.48 Ankle-(DP): 179 1.39          Digit: 38 0.29 Resting ankle-brachial index of 0.69 on the right. Toe Pressures of 63 mmHg and TBI  of 0.49 Resting ankle-brachial index of 1.39 on the left. Toe Pressures of 38 mmHg and TBI of 0.29  VPR WAVEFORMS: The right volume plethysmography waveforms are mildly abnormal at the lower thigh level, moderately abnormal at the upper calf level and moderately abnormal at the ankle. The left volume plethysmography waveforms are mildly abnormal at the lower thigh level, mildly abnormal at the upper calf level and moderately abnormal at the ankle. Impression:  1. RIGHT LOWER EXTREMITY: SRIKANTH is Abnormal with an SRIKANTH of 0.69 indicating single level disease. Toe Pressures are Mildly abnormal but adequate for wound healing with toe pressures of 63 mmHg. 2. LEFT LOWER EXTREMITY: SRIKANTH is Normal with multiphasic waveforms with an SRIKANTH of 1.39. Toe Pressures are Abnormal and impaired for wound healing with toe pressures of 38 mmHg. Reference: Wound classification Grade SRIKANTH Ankle Systolic Pressure Toe Pressures 0 > 0.80 > 100 mmHg > 60 mmHg 1 0.6 - 0.79 70 - 100 mmHg 40 - 59 mmHg 2 0.4 - 0.59 50-70 mmHg 30 - 39 mmHg 3 < 0.39 < 50 mmHg < 30 mmHg Digit Pressures DBI Disease Category > 0.70 Normal < 0.70 Abnormal > 30 mmHg Potential wound healing < 30 mmHg Impaired wound healing Ankle Brachial Pressures SRIKANTH Disease Category > 1.3  Likely vessel calcification with monophasic waveforms, non-diagnostic 0.95-1.30 Normal with multiphasic waveforms 0.50-0.95 Single level disease 0.30-0.50 Multilevel disease < 0.30 Critical limb ischema Volume Plethysmography Recording (VPR) at all levels Normal Sharp systolic peak, fast upstroke, prominent dicrotic notch in wave Mild Sharp systolic peak, fast upstroke, absent dicrotic notch in wave Moderate Flattened systolic peak, slowed upstroke, absent dicrotic notch inwave Severe amplitude wave with = upslope and down slope Occluded Flat Line      IR Lower Extremity Angiogram Left    Result Date: 5/23/2023  LOCATION: [facility] DATE: 5/23/2023 PROCEDURE: PELVIC AORTOGRAM,  LEFT LOWER EXTREMITY DIAGNOSTIC ARTERIOGRAPHY, SELECTIVE CATHETERIZATION AND ANGIOGRAPHY OF THE LEFT ANTERIOR TIBIAL/DORSALIS PEDIS AND PERONEAL ARTERIES, BALLOON ANGIOPLASTY OF THE LEFT ANTERIOR TIBIAL/DORSALIS PEDIS AND PERONEAL ARTERIES, ULTRASOUND GUIDANCE FOR VASCULAR ACCESS, MODERATE SEDATION INTERVENTIONAL RADIOLOGIST: Alvaro Rosas MD INDICATION: 87-year-old male with nonhealing left third digit wound/critical limb ischemia, plan for left lower extremity angiography with intervention. CONSENT: The risks, benefits and alternatives of the procedure were discussed with the patient  in detail. All questions were answered. Informed consent was given to proceed with the procedure. MODERATE SEDATION: Versed 2.5 mg IV; Fentanyl 125 mcg IV. During the time out, immediately prior to the administration of medications, the patient was reassessed for adequacy to receive conscious sedation.  Under physician supervision, Versed and fentanyl were administered for moderate sedation. Pulse oximetry, heart rate and blood pressure were continuously monitored by an independent trained observer. The physician spent 130 minutes of face-to-face sedation time with the patient. CONTRAST: 90 mL Visipaque ADDITIONAL MEDICATIONS: Heparin 10,000 units, protamine 40 mg FLUOROSCOPIC TIME: 47 minutes. RADIATION DOSE: Air Kerma: 663 mGy. COMPLICATIONS: No immediate complications. UNIVERSAL PROTOCOL: The operative site was marked and any prior imaging was reviewed. Required items including blood products, implants, devices and special equipment was made available. Patient identity was confirmed either verbally, with demographic information, hospital assigned identification or other identification markers. A timeout was performed immediately prior to the procedure. STERILE BARRIER TECHNIQUE: Maximum sterile barrier technique was used. Cutaneous antisepsis was performed at the operative site with application of 2% chlorhexidine and large  sterile drape. Prior to the procedure, the  and assistant performed hand hygiene and wore hat, mask, sterile gown, and sterile gloves during the entire procedure. PROCEDURE:  Access was achieved into the right common femoral artery utilizing real-time ultrasound guidance and a micropuncture access kit. Imaging demonstrates a patent and compressible artery. Permanent images were stored to the patient's medical record. Conversion was made for a 5 Sami vascular sheath, which was attached to a continuous heparinized saline infusion. A flush catheter was manipulated over a wire into the lower abdomen and oblique digital subtraction pelvic arteriography was performed. Being demonstrates patent caudal abdominal aorta and bilateral iliac vasculature. No significant stenosis identified. The catheter was repositioned over a wire into the left external iliac artery. From this location, digital subtraction left lower extremity arteriography was obtained. Imaging x-rays patent left common femoral, profunda femoral, superficial femoral and popliteal arteries without significant stenosis. The tibial peroneal trunk is patent. Occlusion of all 3 infrapopliteal vessels. There is reconstituted flow in the mid peroneal and anterior tibial arteries. Occluded dorsalis pedis artery. Systemic heparinization was performed and monitored with activated clotting times. Over-the-wire exchange is made for a 6 Sami x 90 cm vascular sheath. Selective catheterization of the left superficial femoral and popliteal arteries was performed utilizing a catheter and guidewire. The sheath was carefully advanced under fluoroscopic  guidance to the left popliteal artery. Exchange is made for an 018 crossing catheter and guidewire. Used to selectively catheterize the anterior tibial artery stump. The guidewire and catheter were carefully advanced under fluoroscopic guidance to the ankle and foot. A small volume of contrast was injected, confirming  intraluminal positioning within the dorsalis pedis artery. Over-the-wire exchange is made for a 3 mm x 20 cm anoplasty balloon and angioplasty of the entire left dorsalis pedis and anterior tibial arteries was performed.  There is inability to advance the guidewire into the pedal loop and plantar arteries. Post angioplasty angiography demonstrates moderate residual stenosis involving the proximal anterior tibial artery. Over-the-wire exchange is made for a 4 mm x 15 cm InPact drug-eluting balloon which was advanced over the guidewire and positioned in the proximal anterior tibial artery. The balloon was slowly inflated to nominal pressure for 3 minutes. Post drug coated balloon angioplasty demonstrates no significant residual stenosis. The guidewire was retracted to the tibioperoneal trunk and selective catheterization of the occluded peroneal artery was performed. The guidewire was advanced to the peroneal artery in the lower leg. Over-the-wire exchange is made for a 3 mm x 10 cm angioplasty balloon and angioplasty of the occluded proximal/mid peroneal artery was performed. Post angioplasty angiography demonstrates patent proximal peroneal artery, however, there is chronic occlusion of the distal vessel despite angioplasty. At this point, the procedure was considered complete. Protamine sulfate was administered and hemostasis was achieved with the assistance of an Angio-Seal closure device.     IMPRESSION:  1. Left lower extremity angiography demonstrates patent vasculature to the knee without significant stenosis. There is occlusion of all three infrapopliteal vessels. 2. Successful crossing and angioplasty of the left anterior tibial and dorsalis pedis arteries. Successful crossing and angioplasty of the left peroneal artery. Post intervention angiography demonstrates significant improvement in perfusion of the left foot. PLAN: We will plan for a short interval clinic follow-up to evaluate response. It is hopeful  the toe pressure will increase to allow for wound healing. The patient reports significant myalgias related to a statin therapy and thus this was not prescribed.  The patient is on full dose anticoagulation with atelectasis and thus an antiplatelet agent was also not prescribed.      LABS:      Sodium   Date Value Ref Range Status   04/04/2023 138 136 - 145 mmol/L Final   02/20/2023 138 136 - 145 mmol/L Final   01/30/2023 135 (L) 136 - 145 mmol/L Final     Urea Nitrogen   Date Value Ref Range Status   04/04/2023 17.6 8.0 - 23.0 mg/dL Final   02/20/2023 24.5 (H) 8.0 - 23.0 mg/dL Final   01/30/2023 22.6 8.0 - 23.0 mg/dL Final   01/22/2023 47 (H) 8 - 28 mg/dL Final   01/21/2023 57 (H) 8 - 28 mg/dL Final   01/20/2023 78 (H) 8 - 28 mg/dL Final     Hemoglobin   Date Value Ref Range Status   05/23/2023 10.4 (L) 13.3 - 17.7 g/dL Final   04/04/2023 10.9 (L) 13.3 - 17.7 g/dL Final   02/20/2023 9.6 (L) 13.3 - 17.7 g/dL Final     Platelet Count   Date Value Ref Range Status   05/23/2023 260 150 - 450 10e3/uL Final   04/04/2023 361 150 - 450 10e3/uL Final   02/20/2023 395 150 - 450 10e3/uL Final     BNP   Date Value Ref Range Status   02/01/2018 86 0 - 88 pg/mL Final     INR   Date Value Ref Range Status   05/23/2023 1.04 0.85 - 1.15 Final   01/26/2022 1.34 (H) 0.85 - 1.15 Final   11/27/2020 1.23 (H) 0.90 - 1.10 Final       This was a in person visit in which 30 minutes of  total time was spent (either in face-to-face or non-face-to-face time).    Alvaro Rosas MD, [facility]  VASCULAR AND INTERVENTIONAL PHYSICIAN  VASCULAR MEDICINE  INTERNAL MEDICINE  PAGER: [phone]  CALL SERVICE: [phone]

## 2023-06-15 NOTE — TELEPHONE ENCOUNTER
Home Care is calling regarding an established patient with [facility].     Requesting orders from: Otis Lozano  Provider is following patient: Yes  Is this a 60-day recertification request?  No    Orders Requested    Skilled Nursing  Request for delay in care, service is not able to be provided within same scheduled day. RN calling requesting order to push back Dietrich change to next week. They have not received supplies to do this yet.   Catheter was last changed on May 12th. They plan to change it next week.   Nalini states they have discussed with Pt already.    Provider review needed.    Information was gathered and will be sent to provider for review.  RN will contact Home Care with information after provider review.  Confirmed ok to leave a detailed message with call back.  Contact information confirmed and updated as needed.    Nalini #: [phone]    Anuradha Rocha RN

## 2023-06-30 NOTE — PATIENT INSTRUCTIONS
Important lnstructions      WEIGHT BEARING STATUS: You are to remain NON WEIGHT BEARING on your left foot. NON WEIGHT BEARING MEANS NO PRESSURE ON YOUR FOOT OR HEEL AT ANY TIME FOR ANY REASON!    2. OFFLOADING DEVICE: Must use a A WHEELCHAIR at all times! (do not use affected foot to push wheelchair)    3. STABILIZATION DEVICE: Use a PRAFO BOOT . You will need to WEAR THIS AT ALL TIMES EVEN WHILE IN BED.     4. ELEVATE: Elevating your leg means laying with your head on a pillow and your foot ABOVE YOUR HEART.     5. DO NOT MOVE YOUR FOOT.  There is a risk of worsening the wound or incision. To give yourself a higher chance of healing, please DO NOT swing foot back and forth and wiggle foot/toes especially when inside a stabilization device. Limited movement is allowable with therapy as recommended by the doctor.     6. TAKE A PROTEIN SHAKE TWICE A DAY.  (For ex: Boost, Ensure, Glucerna)      Dressing Change lnstructions:    All eschars on left foot:    CLEANSE WITH NORMAL SALINE, PAINT   WITH IODINE AND COVER WITH GAUZE.   CHANGE DAILY.            It IS NOT ok to get your wound wet in the bath or shower    SEEK MEDICAL CARE IF:  You have an increase in swelling, pain, or redness around the wound.  You have an increase in the amount of pus coming from the wound.  There is a bad smell coming from the wound.  The wound appears to be worsening/enlarging  You have a fever greater than 101.5 F      It is ok to continue current wound care treatment/products for the next 2-3 days until new wound care supplies are ordered and arrive. If longer than this please contact our office at [phone].

## 2023-06-30 NOTE — PROGRESS NOTES
FOOT AND ANKLE SURGERY/PODIATRY Progress Note      ASSESSMENT:   Diabetic ulceration left foot  Stage III Pressure ulcerations left heel x2  PAD      TREATMENT:  -I discussed with the patient and his son today that the left heel ulcerations have improved from the previous visit with only a small area of eschar identified.  Recommend continued application of iodine daily with a gauze dressing.  Continue offloading with a PRAFO boot.    -After discussion of risk factors, nursing staff removed dressing, cleansed wound and consent obtained 2% Lidocaine HCL jelly was applied, under clean conditions, the left heel ulceration(s) were debrided using #15 blade scalpel.  Devitalized and nonviable tissue, along with any fibrin and slough, was removed to improve granulation tissue formation, stimulate wound healing, decrease overall bacteria load, disrupt biofilm formation and decrease edge senescence. Wound drainage was scant No. Total excisional debridement was 2 sq cm into the subcutaneous tissue with a depth of 0.2 cm.   Ulcers were improved afterwards and .  Measures were as noted on the flow sheet.  Iodine with gauze dressing applied today.    -He will follow-up in 3 weeks    John Garcia DPM  [facility] [place]      HPI: Caleb Hernandez was seen again today for left foot ulcerations.  Since his last visit he has offload left heel as directed.  No new concerns today.    Past Medical History:   Diagnosis Date     Abscess of right foot 11/23/2020    Added automatically from request for surgery [id number]     Acute pulmonary embolism with acute cor pulmonale (H) 5/23/2020     Ascending cholangitis 1/27/2022     BPH (benign prostatic hyperplasia)      Cholelithiasis      Closed fracture of rib     Created by Conversion  Replacement Utility updated for latest IMO load     Closed fracture of thoracic vertebra (H)     Created by Conversion  Replacement Utility updated for latest IMO load     Common bile duct  (CBD) obstruction 9/4/2017     Diabetes mellitus (H)      Essential hypertension      Gram-negative bacteremia 1/27/2022    Positive blood culture 1/26/2022; likely biliary source     Hyperlipidemia      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    Added automatically from request for surgery [id number]      Pancreatitis      Sepsis (H) 1/27/2022     Sepsis due to pneumonia (H) 9/8/2017     Septic arthritis of right foot (H) 12/2/2020       Past Surgical History:   Procedure Laterality Date     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    Procedure: with amputation of the fifth ray, peroneal brevis tendon transfer;  Surgeon: John Garcia DPM;  Location: [facility];  Service: Podiatry     BACK SURGERY      1964 removed a cyst     CHOLECYSTECTOMY  1985     ENDOSCOPIC RETROGRADE CHOLANGIOPANCREATOGRAM       ENDOSCOPIC RETROGRADE CHOLANGIOPANCREATOGRAM N/A 9/5/2017    Procedure: ENDOSCOPIC RETROGRADE CHOLANGIOPANCREATOGRAPHY SPHINCTEROTOMY AND STONE EXTRACTION;  Surgeon: Hansel Gannon MD;  Location: [facility] OR;  Service:      ENDOSCOPIC RETROGRADE CHOLANGIOPANCREATOGRAM N/A 1/27/2022    Procedure: ENDOSCOPIC RETROGRADE CHOLANGIOPANCREATOGRAPHY, BALLOON DILATION AND STONE EXTRACTION;  Surgeon: Sav Osborne MD;  [place] [facility] [place]     INCISION AND DRAINAGE OF WOUND Right 11/2/2020    Procedure: INCISION AND DRAINAGE, right foot with removal of bone 5th metatarsal;  Surgeon: John Garcia DPM;  Location: [facility] OR;  Service: Podiatry     INCISION AND DRAINAGE OF WOUND Right 11/16/2020    Procedure: INCISION AND DRAINAGE, right foot;  Surgeon: John Garcia DPM;  Location: [facility] OR;  Service: Podiatry     INCISION AND DRAINAGE OF WOUND Right 11/27/2020    Procedure: INCISION AND DRAINAGE, LOWER EXTREMITY;  Surgeon: Aleksandr Hdez DPM;  Location: [facility] OR;  Service: Podiatry     IR EXTREMITY ANGIOGRAM RIGHT  11/24/2020     IR LOWER EXTREMITY ANGIOGRAM LEFT   5/23/2023     IR LOWER EXTREMITY ANGIOGRAM RIGHT  11/24/2020     PICC  11/30/2020          TONSILLECTOMY  1940       Allergies   Allergen Reactions     Cresol [Phenol] Unknown     Muscle cramps     Demeclocycline Hives and Rash     Crestor [Rosuvastatin] Muscle Pain (Myalgia)         Current Outpatient Medications:      acetaminophen (TYLENOL) 500 MG tablet, Take 500 mg by mouth daily And every 6 hours prn, Disp: , Rfl:      apixaban ANTICOAGULANT (ELIQUIS ANTICOAGULANT) 2.5 MG tablet, Take 1 tablet (2.5 mg) by mouth 2 times daily, Disp: 60 tablet, Rfl: 11     apixaban ANTICOAGULANT (ELIQUIS) 2.5 MG tablet, Take 1 tablet (2.5 mg) by mouth 2 times daily, Disp: 60 tablet, Rfl: 0     aspirin 81 MG EC tablet, [ASPIRIN 81 MG EC TABLET] Take 81 mg by mouth daily., Disp: , Rfl:      blood glucose (NO BRAND SPECIFIED) test strip, Use to test blood sugar 2 times daily or as directed.has a  One Touch Ultra 2 meter, Disp: 200 strip, Rfl: 3     cephALEXin (KEFLEX) 500 MG capsule, , Disp: , Rfl:      cyanocobalamin (CYANOCOBALAMIN) 1000 MCG tablet, Take 1 tablet (1,000 mcg) by mouth daily, Disp: 60 tablet, Rfl: 0     insulin aspart (NOVOLOG PEN) 100 UNIT/ML pen, Inject 1-3 Units Subcutaneous 3 times daily (before meals) Correction Scale - LOW INSULIN RESISTANCE DOSING    Do Not give Correction Insulin if Pre-Meal BG less than 140.  For Pre-Meal  - 239 give 1 unit.  For Pre-Meal  - 339 give 2 units.  For Pre-Meal BG greater than or equal to 340 give 3 units.  To be given with prandial insulin, and based on pre-meal blood glucose.  Notify provider if glucose greater than or equal to 350 mg/dL after administration of correction dose. If given at mealtime, administer within 30 minutes of start of meal., Disp: 15 mL, Rfl: 0     insulin glargine (LANTUS SOLOSTAR) 100 UNIT/ML pen, Inject 23 Units Subcutaneous At Bedtime, Disp: 15 mL, Rfl: 0     multivit-min/FA/lycopen/lutein (CENTRUM SILVER MEN ORAL),  "[MULTIVIT-MIN/FA/LYCOPEN/LUTEIN (CENTRUM SILVER MEN ORAL)] Take 1 tablet by mouth daily., Disp: , Rfl:      polyethylene glycol (MIRALAX) 17 gram packet, [POLYETHYLENE GLYCOL (MIRALAX) 17 GRAM PACKET] Take 1 packet (17 g total) by mouth daily as needed., Disp: 100 packet, Rfl: 3     sulfamethoxazole-trimethoprim (BACTRIM DS) 800-160 MG tablet, Take 1 tablet by mouth 2 times daily, Disp: 20 tablet, Rfl: 0     sulfamethoxazole-trimethoprim (BACTRIM DS) 800-160 MG tablet, Take 1 tablet by mouth 2 times daily, Disp: 20 tablet, Rfl: 0     tamsulosin (FLOMAX) 0.4 MG capsule, TAKE 1 CAPSULE BY MOUTH EVERY DAY AFTER SUPPER, Disp: 90 capsule, Rfl: 2     traMADol (ULTRAM) 50 MG tablet, Take 1 tablet (50 mg) by mouth every 6 hours as needed for moderate pain (4-6), Disp: 30 tablet, Rfl: 0     trolamine salicylate (ASPERCREME) 10 % external cream, Apply topically 2 times daily, Disp: , Rfl:      vitamin D3 (CHOLECALCIFEROL) 125 MCG (5000 UT) tablet, Take 1 tablet (125 mcg) by mouth daily, Disp: , Rfl:      vitamin E 400 unit capsule, [VITAMIN E 400 UNIT CAPSULE] Take 400 Units by mouth daily., Disp: , Rfl:     Review of Systems - 10 point Review of Systems is negative except for left foot ulcers which is noted in HPI.      OBJECTIVE:  /81   Pulse 79   Ht 5' 8\" (1.727 m)   Wt 148 lb (67.1 kg)   SpO2 94%   BMI 22.50 kg/m    General appearance: Patient is alert and fully cooperative with history & exam.  No sign of distress is noted during the visit.    Vascular: Dorsalis pedis non-palpableLeft.  Dermatologic:    VASC Wound Lef Heel Distal (Active)   Pre Size Length 0.5 05/09/23 1400   Pre Size Width 0.8 05/09/23 1400   Pre Size Depth 0.1 05/09/23 1400   Pre Total Sq cm 0.4 05/09/23 1400   Post Size Length 1.2 06/30/23 1300   Post Size Width 0.5 06/30/23 1300   Post Size Depth 0.2 06/30/23 1300   Post Total Sq cm 0.6 06/30/23 1300   Description callous 06/30/23 1300       VASC Wound Left heel proximal (Active)   Pre " Size Length 1.2 05/09/23 1400   Pre Size Width 1.8 05/09/23 1400   Pre Size Depth 0.2 05/09/23 1400   Pre Total Sq cm 2.16 05/09/23 1400   Post Size Length 1 06/30/23 1300   Post Size Width 0.3 06/30/23 1300   Post Size Depth 0.2 06/30/23 1300   Post Total Sq cm 0.3 06/30/23 1300   Description callous 06/30/23 1300       VASC Wound left 3rd toe (Active)       VASC Wound Left 3rd toe (Active)   Post Size Length 0.4 06/01/23 1300   Post Size Width 0.3 06/01/23 1300   Post Size Depth 0.2 06/01/23 1300   Post Total Sq cm 0.12 06/01/23 1300   Tunneling callous 06/30/23 1300   Small areas of stable eschar along the posterior left heel ulcerations with mixed granular/fibrotic tissue at the peripheral wound.  No erythema left foot.  Neurologic: Diminished to light touch Left.  Musculoskeletal: Contracted digits noted Left.    Imaging:     US Lower Extremity Arterial Duplex Left    Result Date: 6/9/2023  Table formatting from the original result was not included. Arterial Duplex Ultrasound (Date: 06/08/23) Lower Extremity Artery Evaluation Indication: Status post left peroneal artery/BREE/DPA angioplasty (05/23/23)/HX of Cellulitis left foot/Diabetic ulceration third digit left foot and left rear foot/stage III pressure ulceration left heel/HX of right angio 11/24/20  Previous: 11/24/20; 3/30/21; 05/11/21;  History: Previous Smoker, Hypertension, Diabetic, Hyperlipidemia and Angioplasty Technique: Duplex imaging is performed utilizing gray-scale, two-dimensional images, and color-flow imaging. Doppler waveform analysis and spectral Doppler imaging is also performed. LOWER EXTREMITY ARTERIAL DUPLEX EXAM WITH WAVEFORMS Left Leg:(cm/s) Location: Velocities Waveforms EIA:   78  T CFA:   71  T PFA:   74  B SFA Proximal:   88  B SFA Mid:   93  T SFA Distal:   64  T Popliteal Artery:   134  B Tibial Peroneal Trunk: TPT   128 B PTA: 14 M BREE Prox: 139 B BREE Dist:   75  M DPA:   96  M Waveforms: T=Triphasic, M=Monophasic, B=Biphasic  Impression: Left Lower Extremity: Triphasic external iliac and common femoral waveforms just no inflow disease.  Normal monica biphasic waveforms and normal velocities through the SFA and popliteal.  While the proximal tibial vessels including the tibioperoneal trunk and proximal anterotibial artery appear to have normal waveforms there is rapid and degradation of morphology to monophasic particular at the posterior tibial level.  Importantly the anterotibial artery and dorsalis pedis artery, although had a morphology of waveform that is all above the baseline test.  To suggest multiple phases may actually represent triphasic waveform with an overlying venous hum.  Overall improved flow to the level of the ankle through the anterotibial artery Reference: Category Normal 1-19% 20-49% 50-99% Occluded PSV <160 cm/sec without spectral broadening <160 cm/sec with spectral broadening Increased Increased Absent Flow Ratio N/A N/A < 2.0 >2.0 N/A Post-Stenotic Turbulence No No No Yes N/A      US Low Ext Arterial Dop Seg Pres w/o Exercise    Result Date: 6/9/2023  Table formatting from the original result was not included. BILATERAL RESTING ANKLE-BRACHIAL INDICES (SRIKANTH'S) (Date: 06/08/23) Indication: Status post left peroneal artery/BREE/DPA angioplasty (05/23/23)/HX of Cellulitis left foot/Diabetic ulceration third digit left foot and left rear foot/stage III pressure ulceration left heel/HX of right angio 11/24/20  Previous: 3/30/21; 05/11/21; 05/11/23  History: Previous Smoker, Hypertension, Diabetic, Hyperlipidemia and Angioplasty  Resting SRIKANTH's          Right: mmHg Index     Brachial: 129  Ankle-(PT): 89 0.69 Ankle-(DP): 81 0.63          Digit: 63 0.49               Left: mmHg Index     Brachial: 129  Ankle-(PT): 62 0.48 Ankle-(DP): 179 1.39          Digit: 38 0.29 Resting ankle-brachial index of 0.69 on the right. Toe Pressures of 63 mmHg and TBI of 0.49 Resting ankle-brachial index of 1.39 on the left. Toe Pressures of 38  mmHg and TBI of 0.29  VPR WAVEFORMS: The right volume plethysmography waveforms are mildly abnormal at the lower thigh level, moderately abnormal at the upper calf level and moderately abnormal at the ankle. The left volume plethysmography waveforms are mildly abnormal at the lower thigh level, mildly abnormal at the upper calf level and moderately abnormal at the ankle. Impression:  1. RIGHT LOWER EXTREMITY: SRIKANTH is Abnormal with an SRIKANTH of 0.69 indicating single level disease. Toe Pressures are Mildly abnormal but adequate for wound healing with toe pressures of 63 mmHg. 2. LEFT LOWER EXTREMITY: SRIKANTH is Normal with multiphasic waveforms with an SRIKANTH of 1.39. Toe Pressures are Abnormal and impaired for wound healing with toe pressures of 38 mmHg. Reference: Wound classification Grade SRIKANTH Ankle Systolic Pressure Toe Pressures 0 > 0.80 > 100 mmHg > 60 mmHg 1 0.6 - 0.79 70 - 100 mmHg 40 - 59 mmHg 2 0.4 - 0.59 50-70 mmHg 30 - 39 mmHg 3 < 0.39 < 50 mmHg < 30 mmHg Digit Pressures DBI Disease Category > 0.70 Normal < 0.70 Abnormal > 30 mmHg Potential wound healing < 30 mmHg Impaired wound healing Ankle Brachial Pressures SRIKANTH Disease Category > 1.3  Likely vessel calcification with monophasic waveforms, non-diagnostic 0.95-1.30 Normal with multiphasic waveforms 0.50-0.95 Single level disease 0.30-0.50 Multilevel disease < 0.30 Critical limb ischema Volume Plethysmography Recording (VPR) at all levels Normal Sharp systolic peak, fast upstroke, prominent dicrotic notch in wave Mild Sharp systolic peak, fast upstroke, absent dicrotic notch in wave Moderate Flattened systolic peak, slowed upstroke, absent dicrotic notch inwave Severe amplitude wave with = upslope and down slope [place]ccluded [place]           Picture:

## 2023-07-11 NOTE — TELEPHONE ENCOUNTER
Home Care is calling regarding an established patient with [facility].       Requesting orders from: Otis Lozano  Provider is following patient: Yes  Is this a 60-day recertification request?  No    Orders Requested    Social Work  Request for initial evaluation and treatment (one time)       Verbal orders given.  Home Care will send orders for provider to sign.  Confirmed ok to leave a detailed message with call back.  Contact information confirmed and updated as needed.    Britany Wyatt RN

## 2023-07-21 PROBLEM — L97.521 ULCER OF LEFT FOOT, LIMITED TO BREAKDOWN OF SKIN (H): Status: ACTIVE | Noted: 2023-01-01

## 2023-07-21 NOTE — PROGRESS NOTES
FOOT AND ANKLE SURGERY/PODIATRY Progress Note      ASSESSMENT:   Diabetic ulceration left foot  Stage III Pressure ulcerations left heel x2  PAD        TREATMENT:  -I discussed with the patient and his son today that the left foot ulcerations have improved in the previous visit.  We will begin use of Medihoney along the posterior left heel and lateral left foot and continue with iodine along the lateral left heel ulceration.    -Recommend to need offloading with PRAFO boot at all times.    -After discussion of risk factors, nursing staff removed dressing, cleansed wound and consent obtained 2% Lidocaine HCL jelly was applied, under clean conditions, the posterior left heel ulceration(s) were debrided using #15 blade scalpel.  Devitalized and nonviable tissue, along with any fibrin and slough, was removed to improve granulation tissue formation, stimulate wound healing, decrease overall bacteria load, disrupt biofilm formation and decrease edge senescence. Wound drainage was scant No. Total excisional debridement was 1.5 sq cm into the subcutaneous tissue with a depth of 0.2 cm.   Ulcers were improved afterwards and .  Measures were as noted on the flow sheet. Medi-honey with a gauze dressing was applied. He will continue to apply Medi-honey with a gauze dressing qoday.    -After discussion of risk factors, nursing staff removed dressing, cleansed wound and consent obtained 2% Lidocaine HCL jelly was applied, under clean conditions, the plantar fifth MPJ left foot ulceration(s) were debrided using #15 blade scalpel.  Devitalized and nonviable tissue, along with any fibrin and slough, was removed to improve granulation tissue formation, stimulate wound healing, decrease overall bacteria load, disrupt biofilm formation and decrease edge senescence. Wound drainage was scant No. Total excisional debridement was 0.8 sq cm from the epidermis/dermis area with a depth of 0.1 cm.   Ulcers were improved afterwards and  .  Measures were as noted on the flow sheet. Medi-honey with a gauze dressing was applied. He will continue to apply Medi-honey with a gauze dressing qoday.    -He will follow-up in 4 weeks    John Garcia DPM  [facility]      HPI: Caleb Hernandez was seen again today for left foot ulcerations.  Patient has been applying iodine as directed.  Patient reports a new sore along the plantar fifth MPJ on the left foot.    Past Medical History:   Diagnosis Date     Abscess of right foot 11/23/2020    Added automatically from request for surgery [id number]     Acute pulmonary embolism with acute cor pulmonale (H) 5/23/2020     Ascending cholangitis 1/27/2022     BPH (benign prostatic hyperplasia)      Cholelithiasis      Closed fracture of rib     Created by Conversion  Replacement Utility updated for latest IMO load     Closed fracture of thoracic vertebra (H)     Created by Conversion  Replacement Utility updated for latest IMO load     Common bile duct (CBD) obstruction 9/4/2017     Diabetes mellitus (H)      Essential hypertension      Gram-negative bacteremia 1/27/2022    Positive blood culture 1/26/2022; likely biliary source     Hyperlipidemia      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    Added automatically from request for surgery [id number]      Pancreatitis      Sepsis (H) 1/27/2022     Sepsis due to pneumonia (H) 9/8/2017     Septic arthritis of right foot (H) 12/2/2020       Past Surgical History:   Procedure Laterality Date     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    Procedure: with amputation of the fifth ray, peroneal brevis tendon transfer;  Surgeon: John Garcia DPM;  Location: [facility] OR;  Service: Podiatry     BACK SURGERY      1964 removed a cyst     CHOLECYSTECTOMY  1985     ENDOSCOPIC RETROGRADE CHOLANGIOPANCREATOGRAM       ENDOSCOPIC RETROGRADE CHOLANGIOPANCREATOGRAM N/A 9/5/2017    Procedure: ENDOSCOPIC RETROGRADE CHOLANGIOPANCREATOGRAPHY SPHINCTEROTOMY AND  STONE EXTRACTION;  Surgeon: Hansel Gannon MD;  Location: [facility] OR;  Service:      ENDOSCOPIC RETROGRADE CHOLANGIOPANCREATOGRAM N/A 1/27/2022    Procedure: ENDOSCOPIC RETROGRADE CHOLANGIOPANCREATOGRAPHY, BALLOON DILATION AND STONE EXTRACTION;  Surgeon: Sav Osborne MD;  Location: [facility]     INCISION AND DRAINAGE OF WOUND Right 11/2/2020    Procedure: INCISION AND DRAINAGE, right foot with removal of bone 5th metatarsal;  Surgeon: John Garcia DPM;  Location: [facility] OR;  Service: Podiatry     INCISION AND DRAINAGE OF WOUND Right 11/16/2020    Procedure: INCISION AND DRAINAGE, right foot;  Surgeon: John Garcia DPM;  Location: [facility] OR;  Service: Podiatry     INCISION AND DRAINAGE OF WOUND Right 11/27/2020    Procedure: INCISION AND DRAINAGE, LOWER EXTREMITY;  Surgeon: Aleksandr Hdez DPM;  Location: [facility] OR;  Service: Podiatry     IR EXTREMITY ANGIOGRAM RIGHT  11/24/2020     IR LOWER EXTREMITY ANGIOGRAM LEFT  5/23/2023     IR LOWER EXTREMITY ANGIOGRAM RIGHT  11/24/2020     PICC  11/30/2020          TONSILLECTOMY  1940       Allergies   Allergen Reactions     Cresol [Phenol] Unknown     Muscle cramps     Demeclocycline Hives and Rash     Crestor [Rosuvastatin] Muscle Pain (Myalgia)         Current Outpatient Medications:      acetaminophen (TYLENOL) 500 MG tablet, Take 500 mg by mouth daily And every 6 hours prn, Disp: , Rfl:      apixaban ANTICOAGULANT (ELIQUIS ANTICOAGULANT) 2.5 MG tablet, Take 1 tablet (2.5 mg) by mouth 2 times daily, Disp: 60 tablet, Rfl: 11     apixaban ANTICOAGULANT (ELIQUIS) 2.5 MG tablet, Take 1 tablet (2.5 mg) by mouth 2 times daily, Disp: 60 tablet, Rfl: 0     aspirin 81 MG EC tablet, [ASPIRIN 81 MG EC TABLET] Take 81 mg by mouth daily., Disp: , Rfl:      blood glucose (NO BRAND SPECIFIED) test strip, Use to test blood sugar 2 times daily or as directed.has a  One Touch Ultra 2 meter, Disp: 200 strip, Rfl: 3     cephALEXin  (KEFLEX) 500 MG capsule, , Disp: , Rfl:      cyanocobalamin (CYANOCOBALAMIN) 1000 MCG tablet, Take 1 tablet (1,000 mcg) by mouth daily, Disp: 60 tablet, Rfl: 0     insulin aspart (NOVOLOG PEN) 100 UNIT/ML pen, Inject 1-3 Units Subcutaneous 3 times daily (before meals) Correction Scale - LOW INSULIN RESISTANCE DOSING    Do Not give Correction Insulin if Pre-Meal BG less than 140.  For Pre-Meal  - 239 give 1 unit.  For Pre-Meal  - 339 give 2 units.  For Pre-Meal BG greater than or equal to 340 give 3 units.  To be given with prandial insulin, and based on pre-meal blood glucose.  Notify provider if glucose greater than or equal to 350 mg/dL after administration of correction dose. If given at mealtime, administer within 30 minutes of start of meal., Disp: 15 mL, Rfl: 0     insulin glargine (LANTUS SOLOSTAR) 100 UNIT/ML pen, Inject 23 Units Subcutaneous At Bedtime, Disp: 15 mL, Rfl: 0     liraglutide (VICTOZA PEN) 18 MG/3ML solution, Inject 1.2 mg Subcutaneous daily, Disp: , Rfl:      multivit-min/FA/lycopen/lutein (CENTRUM SILVER MEN ORAL), [MULTIVIT-MIN/FA/LYCOPEN/LUTEIN (CENTRUM SILVER MEN ORAL)] Take 1 tablet by mouth daily., Disp: , Rfl:      polyethylene glycol (MIRALAX) 17 gram packet, [POLYETHYLENE GLYCOL (MIRALAX) 17 GRAM PACKET] Take 1 packet (17 g total) by mouth daily as needed., Disp: 100 packet, Rfl: 3     sulfamethoxazole-trimethoprim (BACTRIM DS) 800-160 MG tablet, Take 1 tablet by mouth 2 times daily, Disp: 20 tablet, Rfl: 0     sulfamethoxazole-trimethoprim (BACTRIM DS) 800-160 MG tablet, Take 1 tablet by mouth 2 times daily, Disp: 20 tablet, Rfl: 0     tamsulosin (FLOMAX) 0.4 MG capsule, TAKE 1 CAPSULE BY MOUTH EVERY DAY AFTER SUPPER, Disp: 90 capsule, Rfl: 2     traMADol (ULTRAM) 50 MG tablet, Take 1 tablet (50 mg) by mouth every 6 hours as needed for moderate pain (4-6), Disp: 30 tablet, Rfl: 0     trolamine salicylate (ASPERCREME) 10 % external cream, Apply topically 2 times daily,  "Disp: , Rfl:      vitamin D3 (CHOLECALCIFEROL) 125 MCG (5000 UT) tablet, Take 1 tablet (125 mcg) by mouth daily, Disp: , Rfl:      vitamin E 400 unit capsule, [VITAMIN E 400 UNIT CAPSULE] Take 400 Units by mouth daily., Disp: , Rfl:     Review of Systems - 10 point Review of Systems is negative except for left foot ulcers which is noted in HPI.      OBJECTIVE:  BP (!) 144/70   Pulse 71   Resp 16   Ht 5' 8\" (1.727 m)   Wt 148 lb (67.1 kg)   SpO2 96%   BMI 22.50 kg/m    General appearance: Patient is alert and fully cooperative with history & exam.  No sign of distress is noted during the visit.    Vascular: Dorsalis pedis non-palpableLeft.  Dermatologic:    VASC Wound Lef Heel Distal (Active)   Pre Size Length 0.5 05/09/23 1400   Pre Size Width 0.8 05/09/23 1400   Pre Size Depth 0.1 05/09/23 1400   Pre Total Sq cm 0.4 05/09/23 1400   Post Size Length 1.2 06/30/23 1300   Post Size Width 0.5 06/30/23 1300   Post Size Depth 0.2 06/30/23 1300   Post Total Sq cm 0.6 06/30/23 1300   Description eschar 07/21/23 1302       VASC Wound Left heel proximal (Active)   Pre Size Length 1.2 05/09/23 1400   Pre Size Width 1.8 05/09/23 1400   Pre Size Depth 0.2 05/09/23 1400   Pre Total Sq cm 2.16 05/09/23 1400   Post Size Length 1.5 07/21/23 1302   Post Size Width 1.2 07/21/23 1302   Post Size Depth 0.2 07/21/23 1302   Post Total Sq cm 1.8 07/21/23 1302   Description eschar 07/21/23 1302       VASC Wound left 3rd toe (Active)       VASC Wound Left 3rd toe (Active)   Post Size Length 0.4 06/01/23 1300   Post Size Width 0.3 06/01/23 1300   Post Size Depth 0.2 06/01/23 1300   Post Total Sq cm 0.12 06/01/23 1300   Tunneling callous 07/21/23 1302       VASC Wound left 5th MPJ (Active)   Pre Size Length 0.8 07/21/23 1302   Pre Size Width 1 07/21/23 1302   Pre Size Depth 0.1 07/21/23 1302   Pre Total Sq cm 0.8 07/21/23 1302   Description eschar 07/21/23 1302   Mixed granular/fibrotic tissue ulceration plantar fifth MPJ left foot and " posterior aspect of left heel.  Stable eschar lateral left heel.  No erythema left lower extremity.  Neurologic: Diminished to light touch Left.  Musculoskeletal: Contracted digits noted Left.    Imaging:     US TCO2 and SRIKANTH    Result Date: 2023  Table formatting from the original result was not included. BILATERAL RESTING ANKLE-BRACHIAL INDICES (SRIKANTH'S) / Tcp02: (Date: 23) Indication: Surveillance SRIKANTH's/ Tcp02: PAD, Left Foot Cellulitis, Left Foot 3rd Digit Diabetic Ulcer, Left Rear Foot Stage 3 Pressure Ulcer Heel. Hx: IR Left Balloon Angiogram ( Per. A./ BREE/ DPA) done 2023.  Decreased lower extremity pulses, lower extremity pain Previous: 2023 History: Previous Smoker, Hypertension, Diabetic, Hyperlipidemia, PAD, Angioplasty, and Vascular Ulcers  Resting SRIKANTH's          Right: mmHg Index     Brachial: 140           Ankle: >254 NC          Digit: 51 0.35 Tcp02-Upper Le  Tcp02-Lower Le  Tcp02-Ankle: 58  Tcp02-Foot: 62                Left: mmHg Index     Brachial: 145           Ankle: 159 1.10          Digit: 52 0.36 Tcp02-Upper Le  Tcp02-Lower Le  Tcp02-Ankle: 31  Tcp02-Foot: 55  Resting ankle-brachial index is non compressible on the right. Toe Pressures of 51 mmHg and TBI of 0.35. Tcp02 Pressures of 62 mmHg at the level of the foot. Resting ankle-brachial index of 1.10 on the left. Toe Pressures of 52 mmHg and TBI of 0.36. Tcp02 Pressures of 55 mmHg at the level of the foot. VPR WAVEFORMS: The right volume plethysmography waveforms are mildly abnormal at the lower thigh level, mildly abnormal at the upper calf level and mildly abnormal at the ankle. The left volume plethysmography waveforms are mildly abnormal at the lower thigh level, mildly abnormal at the upper calf level and mildly abnormal at the ankle.  Impression:  1. RIGHT LOWER EXTREMITY: SRIKANTH is Non-diagnostic indicating vessel calcification. Toe Pressures are Mildly abnormal but adequate for wound healing with toe  pressures of 51 mmHg. Tcp02 Pressures are Normal and adequate for wound healing with Tcp02 pressures of 62 mmHg. 2. LEFT LOWER EXTREMITY: SRIKANTH is Normal with multiphasic waveforms with an SRIKANTH of 1.1. Toe Pressures are Mildly abnormal but adequate for wound healing with toe pressures of 52 mmHg. Tcp02 Pressures are Normal and adequate for wound healing with Tcp02 pressures of 55 mmHg. Reference: Wound classification Grade SRIKANTH Ankle Systolic Pressure Toe Pressures 0 > 0.80 > 100 mmHg > 60 mmHg 1 0.6 - 0.79 70 - 100 mmHg 40 - 59 mmHg 2 0.4 - 0.59 50-70 mmHg 30 - 39 mmHg 3 < 0.39 < 50 mmHg < 30 mmHg Digit Pressures DBI Disease Category > 0.70 Normal < 0.70 Abnormal > 30 mmHg Potential wound healing < 30 mmHg Impaired wound healing Ankle Brachial Pressures SRIKANTH Disease Category > 1.3  Likely vessel calcification with monophasic waveforms, non-diagnostic 0.95-1.30 Normal with multiphasic waveforms 0.50-0.95 Single level disease 0.30-0.50 Multilevel disease < 0.30 Critical limb ischema Volume Plethysmography Recording (VPR) at all levels Normal Sharp systolic peak, fast upstroke, prominent dicrotic notch in wave Mild Sharp systolic peak, fast upstroke, absent dicrotic notch in wave Moderate Flattened systolic peak, slowed upstroke, absent dicrotic notch inwave Severe amplitude wave with = upslope and down slope Occluded Flat Line TCP02 Criteria Normal Values 50-80 WO/Supplemental Oxygen Impaired Healing <30 WO/Supplemental Oxygen           Picture:

## 2023-07-21 NOTE — PATIENT INSTRUCTIONS
Important lnstructions      WEIGHT BEARING STATUS: You are to remain NON WEIGHT BEARING on your left foot. NON WEIGHT BEARING MEANS NO PRESSURE ON YOUR FOOT OR HEEL AT ANY TIME FOR ANY REASON!    2. OFFLOADING DEVICE: Must use a A WHEELCHAIR at all times! (do not use affected foot to push wheelchair)    3. STABILIZATION DEVICE: Use a PRAFO BOOT . You will need to WEAR THIS AT ALL TIMES EVEN WHILE IN BED.     4. ELEVATE: Elevating your leg means laying with your head on a pillow and your foot ABOVE YOUR HEART.     5. DO NOT MOVE YOUR FOOT.  There is a risk of worsening the wound or incision. To give yourself a higher chance of healing, please DO NOT swing foot back and forth and wiggle foot/toes especially when inside a stabilization device. Limited movement is allowable with therapy as recommended by the doctor.     6. TAKE A PROTEIN SHAKE TWICE A DAY.  (For ex: Boost, Ensure, Glucerna)      Dressing Change lnstructions:    Left Lateral heel:    CLEANSE WITH NORMAL SALINE, PAINT   WITH IODINE AND COVER WITH GAUZE.   CHANGE DAILY.    Left Posterior heel and left lateral foot:  Cleanse wounds with normal saline. Apply   medihoney wounds. Cover with gauze and   secure with roll gauze. Change daily.            It IS NOT ok to get your wound wet in the bath or shower    SEEK MEDICAL CARE IF:  You have an increase in swelling, pain, or redness around the wound.  You have an increase in the amount of pus coming from the wound.  There is a bad smell coming from the wound.  The wound appears to be worsening/enlarging  You have a fever greater than 101.5 F      It is ok to continue current wound care treatment/products for the next 2-3 days until new wound care supplies are ordered and arrive. If longer than this please contact our office at [phone].

## 2023-08-17 NOTE — LETTER
8/17/2023        RE: Caleb Hernandez  [address]  [place] [address]        M [facility] GERIATRICS    PRIMARY CARE PROVIDER AND CLINIC:  DALE CORTEZ MD, [address] / [place] [id number]  Chief Complaint   Patient presents with     Hospital F/U      [facility] Record Number:  [id number]  Place of Service where encounter took place:  [facility] ([facility]) [[id number]]    Caleb Hernandez  is a 87 year old  (1935), admitted to the above facility from  [facility] . Hospital stay 8/8/23 through 8/11/23. Patient with PMH paroxysmal afib, CKD, UTIs, cognitive impairment, DM2, BPH, HTN, CAD, and lymphedema presented to the ED after falling out of bed. He was found to have subacute L1 and L4 compression fractures. He has fallen 4 times in 2 months. His family is looking into LTC facilities.     HPI obtained from patient visit, review of nursing home record, discussion with facility staff, and Epic review.     HPI:    Family is considering FPC at [facility]. So far, therapy feels that he will be appropriate for that setting. Patient says his back hurts with activity. Per therapy notes, he reports it as 7/10.   Fasting sugars controlled, 1 reading was 88. Subsequent sugars 200s frequently.     CODE STATUS/ADVANCE DIRECTIVES DISCUSSION:  Full Code    ALLERGIES:   Allergies   Allergen Reactions     Cresol [Phenol] Unknown     Muscle cramps     Demeclocycline Hives and Rash     Crestor [Rosuvastatin] Muscle Pain (Myalgia)      PAST MEDICAL HISTORY:   Past Medical History:   Diagnosis Date     Abscess of right foot 11/23/2020    Added automatically from request for surgery [id number]     Acute pulmonary embolism with acute cor pulmonale (H) 5/23/2020     Ascending cholangitis 1/27/2022     BPH (benign prostatic hyperplasia)      Cholelithiasis      Closed fracture of rib     Created by Conversion  Replacement Utility updated for latest IMO load     Closed fracture of thoracic vertebra (H)      Created by Conversion  Replacement Utility updated for latest IMO load     Common bile duct (CBD) obstruction 9/4/2017     Diabetes mellitus (H)      Essential hypertension      Gram-negative bacteremia 1/27/2022    Positive blood culture 1/26/2022; likely biliary source     Hyperlipidemia      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    Added automatically from request for surgery [id number]      Pancreatitis      Sepsis (H) 1/27/2022     Sepsis due to pneumonia (H) 9/8/2017     Septic arthritis of right foot (H) 12/2/2020      PAST SURGICAL HISTORY:   has a past surgical history that includes IR Lower Extremity Angiogram Right (11/24/2020); Cholecystectomy (1985); Tonsillectomy (1940); Endoscopic retrograde cholangiopancreatogram; back surgery; Endoscopic retrograde cholangiopancreatogram (N/A, 9/5/2017); Incision And Drainage Of Wound (Right, 11/2/2020); Incision And Drainage Of Wound (Right, 11/16/2020); Amputate toe(s) (Right, 11/16/2020); Ir Extremity Angiogram Right (11/24/2020); Picc (11/30/2020); Incision And Drainage Of Wound (Right, 11/27/2020); Endoscopic retrograde cholangiopancreatogram (N/A, 1/27/2022); and IR Lower Extremity Angiogram Left (5/23/2023).  FAMILY HISTORY: family history includes Alcoholism in his father; Aortic aneurysm in his mother.  SOCIAL HISTORY:   reports that he quit smoking about 31 years ago. His smoking use included cigarettes. He has never used smokeless tobacco. He reports that he does not drink alcohol and does not use drugs.  Patient's living condition: lives with family    Post Discharge Medication Reconciliation Status:   MED REC REQUIRED  Post Medication Reconciliation Status:  Discharge medications reconciled, continue medications without change       Current Outpatient Medications   Medication Sig     acetaminophen (TYLENOL) 500 MG tablet Take 1,000 mg by mouth daily And every 6 hours prn     apixaban ANTICOAGULANT (ELIQUIS) 2.5 MG tablet Take 1 tablet (2.5 mg) by  "mouth 2 times daily     aspirin 81 MG EC tablet [ASPIRIN 81 MG EC TABLET] Take 81 mg by mouth daily.     blood glucose (NO BRAND SPECIFIED) test strip Use to test blood sugar 2 times daily or as directed.has a  One Touch Ultra 2 meter     cyanocobalamin (CYANOCOBALAMIN) 1000 MCG tablet Take 1 tablet (1,000 mcg) by mouth daily     insulin glargine (LANTUS PEN) 100 UNIT/ML pen Inject 15 Units Subcutaneous At Bedtime     insulin lispro (HUMALOG VIAL) 100 UNIT/ML vial Per Sliding Scale;  If Blood Sugar is 150 to 199, give 3 Units.  If Blood Sugar is 200 to 249, give 5 Units.  If Blood Sugar is 250 to 299, give 7 Units.  If Blood Sugar is 300 to 349, give 9 Units.  If Blood Sugar is 350 to 800, give 10 Units.  subcutaneous  Three Times A Day     multivit-min/FA/lycopen/lutein (CENTRUM SILVER MEN ORAL) [MULTIVIT-MIN/FA/LYCOPEN/LUTEIN (CENTRUM SILVER MEN ORAL)] Take 1 tablet by mouth daily.     polyethylene glycol (MIRALAX) 17 gram packet [POLYETHYLENE GLYCOL (MIRALAX) 17 GRAM PACKET] Take 1 packet (17 g total) by mouth daily as needed.     tamsulosin (FLOMAX) 0.4 MG capsule TAKE 1 CAPSULE BY MOUTH EVERY DAY AFTER SUPPER     traMADol (ULTRAM) 50 MG tablet Take 1 tablet (50 mg) by mouth every 6 hours as needed for moderate pain     trolamine salicylate (ASPERCREME) 10 % external cream Apply topically 2 times daily     vitamin E 400 unit capsule [VITAMIN E 400 UNIT CAPSULE] Take 400 Units by mouth daily.     No current facility-administered medications for this visit.       ROS:  4 point ROS including Respiratory, CV, GI and , other than that noted in the HPI,  is negative    Vitals:  /64   Pulse 72   Temp 98  F (36.7  C)   Resp 18   Ht 1.727 m (5' 8\")   Wt 72.3 kg (159 lb 6.4 oz)   SpO2 93%   BMI 24.24 kg/m    Exam:  GENERAL APPEARANCE:  Alert, in no distress  EYES:  EOM normal, conjunctiva and lids normal  RESP:  no respiratory distress  PSYCH:  oriented X 3, memory impaired , affect and mood " normal    Lab/Diagnostic data:  Recent labs in [facility] reviewed by me today.       ASSESSMENT/PLAN:  (S32.010S) Compression fracture of L1 vertebra, sequela  (primary encounter diagnosis)  Comment: Pain control is reasonable. Treating it more aggressively would risk delirium and falls.   Plan: PT/OT eval and treat, discharge planning per their recommendation. Continue tramadol prn for pain, monitor pain severity.      (I48.0) PAF (paroxysmal atrial fibrillation) (H)  Comment: Rate controlled. On DOAC for anticoagulation with no s/sx side effects  Plan: Continue current POC with no changes at this time and adjustments as needed.    (E11.65,  Z79.4) Type 2 diabetes mellitus with hyperglycemia, with long-term current use of insulin (H)  Comment: If patient has more frequent fasting sugars <100 will reduce lantus. HgA1c <8% not recommended in elderly due to risk of hypoglycemia. Risk outweighs benefit of tighter control.  Plan: Continue current POC with no changes at this time and adjustments as needed.    (I10) Essential hypertension  Comment: Chronic, controlled  Plan: Continue current POC with no changes at this time and adjustments as needed.    (N18.30) Stage 3 chronic kidney disease, unspecified whether stage 3a or 3b CKD (H)  Comment: Chronic Kidney Disease due to: Diabetes  and Hypertension.  Plan: Avoid nephrotoxic medications and adjust medications per renal function. Monitor renal function prn. Refer to plan of care for Diabetes  and Hypertension.    (N40.1,  R33.8) Benign prostatic hyperplasia with urinary retention  Comment: Chronic indwelling catheter. No acute concerns  Plan: Continue current POC with no changes at this time and adjustments as needed.        Electronically signed by:  SIDDHARTH Arnold CNP                     Sincerely,        SIDDHARTH Arnold CNP

## 2023-08-17 NOTE — PROGRESS NOTES
[facility] GERIATRICS    PRIMARY CARE PROVIDER AND CLINIC:  DALE CORTEZ MD, [address] [facility]  / [facility] [address]  Chief Complaint   Patient presents with    Hospital F/U      [place] Medical Record Number:  [id number]  Place of Service where encounter took place:  [facility] ([facility]) [[id number]]    Caleb Hernandez  is a 87 year old  (1935), admitted to the above facility from  [facility] . Hospital stay 8/8/23 through 8/11/23. Patient with PMH paroxysmal afib, CKD, UTIs, cognitive impairment, DM2, BPH, HTN, CAD, and lymphedema presented to the ED after falling out of bed. He was found to have subacute L1 and L4 compression fractures. He has fallen 4 times in 2 months. His family is looking into LTC facilities.     HPI obtained from patient visit, review of nursing home record, discussion with facility staff, and Epic review.     HPI:    Family is considering [facility] at [facility][note 3]. So far, therapy feels that he will be appropriate for that setting. Patient says his back hurts with activity. Per therapy notes, he reports it as 7/10.   Fasting sugars controlled, 1 reading was 88. Subsequent sugars 200s frequently.     CODE STATUS/ADVANCE DIRECTIVES DISCUSSION:  Full Code    ALLERGIES:   Allergies   Allergen Reactions    Cresol [Phenol] Unknown     Muscle cramps    Demeclocycline Hives and Rash    Crestor [Rosuvastatin] Muscle Pain (Myalgia)      PAST MEDICAL HISTORY:   Past Medical History:   Diagnosis Date    Abscess of right foot 11/23/2020    Added automatically from request for surgery [id number]    Acute pulmonary embolism with acute cor pulmonale (H) 5/23/2020    Ascending cholangitis 1/27/2022    BPH (benign prostatic hyperplasia)     Cholelithiasis     Closed fracture of rib     Created by Conversion  Replacement Utility updated for latest IMO load    Closed fracture of thoracic vertebra (H)     Created by Conversion  Replacement Utility updated for latest IMO load    Common bile duct  (CBD) obstruction 9/4/2017    Diabetes mellitus (H)     Essential hypertension     Gram-negative bacteremia 1/27/2022    Positive blood culture 1/26/2022; likely biliary source    Hyperlipidemia     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    Added automatically from request for surgery [id number]     Pancreatitis     Sepsis (H) 1/27/2022    Sepsis due to pneumonia (H) 9/8/2017    Septic arthritis of right foot (H) 12/2/2020      PAST SURGICAL HISTORY:   has a past surgical history that includes IR Lower Extremity Angiogram Right (11/24/2020); Cholecystectomy (1985); Tonsillectomy (1940); Endoscopic retrograde cholangiopancreatogram; back surgery; Endoscopic retrograde cholangiopancreatogram (N/A, 9/5/2017); Incision And Drainage Of Wound (Right, 11/2/2020); Incision And Drainage Of Wound (Right, 11/16/2020); Amputate toe(s) (Right, 11/16/2020); Ir Extremity Angiogram Right (11/24/2020); Picc (11/30/2020); Incision And Drainage Of Wound (Right, 11/27/2020); Endoscopic retrograde cholangiopancreatogram (N/A, 1/27/2022); and IR Lower Extremity Angiogram Left (5/23/2023).  FAMILY HISTORY: family history includes Alcoholism in his father; Aortic aneurysm in his mother.  SOCIAL HISTORY:   reports that he quit smoking about 31 years ago. His smoking use included cigarettes. He has never used smokeless tobacco. He reports that he does not drink alcohol and does not use drugs.  Patient's living condition: lives with family    Post Discharge Medication Reconciliation Status:   MED REC REQUIRED  Post Medication Reconciliation Status:  Discharge medications reconciled, continue medications without change       Current Outpatient Medications   Medication Sig    acetaminophen (TYLENOL) 500 MG tablet Take 1,000 mg by mouth daily And every 6 hours prn    apixaban ANTICOAGULANT (ELIQUIS) 2.5 MG tablet Take 1 tablet (2.5 mg) by mouth 2 times daily    aspirin 81 MG EC tablet [ASPIRIN 81 MG EC TABLET] Take 81 mg by mouth daily.    blood  "glucose (NO BRAND SPECIFIED) test strip Use to test blood sugar 2 times daily or as directed.has a  One Touch Ultra 2 meter    cyanocobalamin (CYANOCOBALAMIN) 1000 MCG tablet Take 1 tablet (1,000 mcg) by mouth daily    insulin glargine (LANTUS PEN) 100 UNIT/ML pen Inject 15 Units Subcutaneous At Bedtime    insulin lispro (HUMALOG VIAL) 100 UNIT/ML vial Per Sliding Scale;  If Blood Sugar is 150 to 199, give 3 Units.  If Blood Sugar is 200 to 249, give 5 Units.  If Blood Sugar is 250 to 299, give 7 Units.  If Blood Sugar is 300 to 349, give 9 Units.  If Blood Sugar is 350 to 800, give 10 Units.  subcutaneous  Three Times A Day    multivit-min/FA/lycopen/lutein (CENTRUM SILVER MEN ORAL) [MULTIVIT-MIN/FA/LYCOPEN/LUTEIN (CENTRUM SILVER MEN ORAL)] Take 1 tablet by mouth daily.    polyethylene glycol (MIRALAX) 17 gram packet [POLYETHYLENE GLYCOL (MIRALAX) 17 GRAM PACKET] Take 1 packet (17 g total) by mouth daily as needed.    tamsulosin (FLOMAX) 0.4 MG capsule TAKE 1 CAPSULE BY MOUTH EVERY DAY AFTER SUPPER    traMADol (ULTRAM) 50 MG tablet Take 1 tablet (50 mg) by mouth every 6 hours as needed for moderate pain    trolamine salicylate (ASPERCREME) 10 % external cream Apply topically 2 times daily    vitamin E 400 unit capsule [VITAMIN E 400 UNIT CAPSULE] Take 400 Units by mouth daily.     No current facility-administered medications for this visit.       ROS:  4 point ROS including Respiratory, CV, GI and , other than that noted in the HPI,  is negative    Vitals:  /64   Pulse 72   Temp 98  F (36.7  C)   Resp 18   Ht 1.727 m (5' 8\")   Wt 72.3 kg (159 lb 6.4 oz)   SpO2 93%   BMI 24.24 kg/m    Exam:  GENERAL APPEARANCE:  Alert, in no distress  EYES:  EOM normal, conjunctiva and lids normal  RESP:  no respiratory distress  PSYCH:  oriented X 3, memory impaired , affect and mood normal    Lab/Diagnostic data:  Recent labs in [facility] reviewed by me today.       ASSESSMENT/PLAN:  (S32.010S) Compression fracture of L1 " vertebra, sequela  (primary encounter diagnosis)  Comment: Pain control is reasonable. Treating it more aggressively would risk delirium and falls.   Plan: PT/OT eval and treat, discharge planning per their recommendation. Continue tramadol prn for pain, monitor pain severity.      (I48.0) PAF (paroxysmal atrial fibrillation) (H)  Comment: Rate controlled. On DOAC for anticoagulation with no s/sx side effects  Plan: Continue current POC with no changes at this time and adjustments as needed.    (E11.65,  Z79.4) Type 2 diabetes mellitus with hyperglycemia, with long-term current use of insulin (H)  Comment: If patient has more frequent fasting sugars <100 will reduce lantus. HgA1c <8% not recommended in elderly due to risk of hypoglycemia. Risk outweighs benefit of tighter control.  Plan: Continue current POC with no changes at this time and adjustments as needed.    (I10) Essential hypertension  Comment: Chronic, controlled  Plan: Continue current POC with no changes at this time and adjustments as needed.    (N18.30) Stage 3 chronic kidney disease, unspecified whether stage 3a or 3b CKD (H)  Comment: Chronic Kidney Disease due to: Diabetes  and Hypertension.  Plan: Avoid nephrotoxic medications and adjust medications per renal function. Monitor renal function prn. Refer to plan of care for Diabetes  and Hypertension.    (N40.1,  R33.8) Benign prostatic hyperplasia with urinary retention  Comment: Chronic indwelling catheter. No acute concerns  Plan: Continue current POC with no changes at this time and adjustments as needed.        Electronically signed by:  SIDDHARTH Arnold CNP

## 2023-08-22 NOTE — PROGRESS NOTES
"[facility] GERIATRICS    Chief Complaint   Patient presents with    RECHECK     HPI:  Caleb Hernandez is a 87 year old  (1935), who is being seen today for an episodic care visit at: [facility] ([facility]) [[id number]]. [facility] stay 8/8/23 through 8/11/23. Patient with PMH paroxysmal afib, CKD, UTIs, cognitive impairment, DM2, BPH, HTN, CAD, and lymphedema presented to the ED after falling out of bed. He was found to have subacute L1 and L4 compression fractures. He has fallen 4 times in 2 months. His family is looking into LTC facilities.     Today's concern is:   Staff have noted some increased confusion. He had a fall today. Staff found him on his knees by his wheelchair. He cannot really say what happened. He says that his pain is getting worse in his back, but he cannot say if it is getting progressively worse since admission or worse now after his fall, or any further details. Staff asked about possibly getting his tramadol scheduled. He says his bowels are moving well.     Allergies, and PMH/PSH reviewed in EPIC today.  REVIEW OF SYSTEMS:  Limited secondary to cognitive impairment but today pt reports 4 point ROS including Respiratory, CV, GI and , other than that noted in the HPI,  is negative    Objective:   /59   Pulse 63   Temp 97.2  F (36.2  C)   Resp 18   Ht 1.727 m (5' 8\")   Wt 72.3 kg (159 lb 6.4 oz)   SpO2 94%   BMI 24.24 kg/m    GENERAL APPEARANCE:  Alert, in no distress  ENT:  Mouth and posterior oropharynx normal, moist mucous membranes  EYES:  EOM normal, conjunctiva and lids normal  RESP:  no respiratory distress  CV:  no edema  :    small amount of urine in catheter bag appears pink and cloudy  PSYCH:  insight and judgement impaired, memory impaired     Recent labs in EPIC reviewed by me today.     Assessment/Plan:  (S32.010S) Compression fracture of L1 vertebra, sequela  (primary encounter diagnosis)  Comment: Pain is not controlled, but he is also " having increased confusion, so this could be affecting his perception of pain. It would not be safe to schedule tramadol. Goal is to determine discharge plan when PT/OT goals met.  Plan: PT/OT eval and treat, discharge planning per their recommendation. Continue tramadol prn for pain, monitor pain severity.     (N40.1,  R33.8) Benign prostatic hyperplasia with urinary retention  (R31.0) Gross hematuria  (R41.82) Altered mental status, unspecified altered mental status type  Comment: Urine appearance and AMS are concerning for UTI. He is high risk due to catheter and has a history of infections.   Plan: UA/UC, BMP, CBC    (I48.0) PAF (paroxysmal atrial fibrillation) (H)  Comment: Rate controlled. On DOAC for anticoagulation with no s/sx side effects  Plan: Continue current POC with no changes at this time and adjustments as needed.    (E11.65,  Z79.4) Type 2 diabetes mellitus with hyperglycemia, with long-term current use of insulin (H)  Comment: Fasting sugars controlled, subsequent readings frequently 200s. This could be due to an infection. Will continue with sliding scale insulin for now  Plan: Continue current POC with no changes at this time and adjustments as needed.    (I10) Essential hypertension  Comment: Chronic, controlled  Plan: Continue current POC with no changes at this time and adjustments as needed.    (N18.30) Stage 3 chronic kidney disease, unspecified whether stage 3a or 3b CKD (H)  Comment: Chronic Kidney Disease due to: Diabetes  and Hypertension.  Plan: Avoid nephrotoxic medications and adjust medications per renal function. Monitor renal function prn. Refer to plan of care for Diabetes  and Hypertension.      Orders:  UA/UC  CBC, BMP in a.m.    Electronically signed by: SIDDHARTH Arnold CNP

## 2023-08-22 NOTE — LETTER
"    8/22/2023        RE: Caleb Hernandez  [address]  [place] [address]        [facility] GERIATRICS    Chief Complaint   Patient presents with     RECHECK     HPI:  Caleb Hernandez is a 87 year old  (1935), who is being seen today for an episodic care visit at: [facility] ([facility]) [[id number]]. [facility] stay 8/8/23 through 8/11/23. Patient with PMH paroxysmal afib, CKD, UTIs, cognitive impairment, DM2, BPH, HTN, CAD, and lymphedema presented to the ED after falling out of bed. He was found to have subacute L1 and L4 compression fractures. He has fallen 4 times in 2 months. His family is looking into LTC facilities.     Today's concern is:   Staff have noted some increased confusion. He had a fall today. Staff found him on his knees by his wheelchair. He cannot really say what happened. He says that his pain is getting worse in his back, but he cannot say if it is getting progressively worse since admission or worse now after his fall, or any further details. Staff asked about possibly getting his tramadol scheduled. He says his bowels are moving well.     Allergies, and PMH/PSH reviewed in EPIC today.  REVIEW OF SYSTEMS:  Limited secondary to cognitive impairment but today pt reports 4 point ROS including Respiratory, CV, GI and , other than that noted in the HPI,  is negative    Objective:   /59   Pulse 63   Temp 97.2  F (36.2  C)   Resp 18   Ht 1.727 m (5' 8\")   Wt 72.3 kg (159 lb 6.4 oz)   SpO2 94%   BMI 24.24 kg/m    GENERAL APPEARANCE:  Alert, in no distress  ENT:  Mouth and posterior oropharynx normal, moist mucous membranes  EYES:  EOM normal, conjunctiva and lids normal  RESP:  no respiratory distress  CV:  no edema  :    small amount of urine in catheter bag appears pink and cloudy  PSYCH:  insight and judgement impaired, memory impaired     Recent labs in EPIC reviewed by me today.     Assessment/Plan:  (I22.010S) Compression fracture of L1 vertebra, " sequela  (primary encounter diagnosis)  Comment: Pain is not controlled, but he is also having increased confusion, so this could be affecting his perception of pain. It would not be safe to schedule tramadol. Goal is to determine discharge plan when PT/OT goals met.  Plan: PT/OT eval and treat, discharge planning per their recommendation. Continue tramadol prn for pain, monitor pain severity.     (N40.1,  R33.8) Benign prostatic hyperplasia with urinary retention  (R31.0) Gross hematuria  (R41.82) Altered mental status, unspecified altered mental status type  Comment: Urine appearance and AMS are concerning for UTI. He is high risk due to catheter and has a history of infections.   Plan: UA/UC, BMP, CBC    (I48.0) PAF (paroxysmal atrial fibrillation) (H)  Comment: Rate controlled. On DOAC for anticoagulation with no s/sx side effects  Plan: Continue current POC with no changes at this time and adjustments as needed.    (E11.65,  Z79.4) Type 2 diabetes mellitus with hyperglycemia, with long-term current use of insulin (H)  Comment: Fasting sugars controlled, subsequent readings frequently 200s. This could be due to an infection. Will continue with sliding scale insulin for now  Plan: Continue current POC with no changes at this time and adjustments as needed.    (I10) Essential hypertension  Comment: Chronic, controlled  Plan: Continue current POC with no changes at this time and adjustments as needed.    (N18.30) Stage 3 chronic kidney disease, unspecified whether stage 3a or 3b CKD (H)  Comment: Chronic Kidney Disease due to: Diabetes  and Hypertension.  Plan: Avoid nephrotoxic medications and adjust medications per renal function. Monitor renal function prn. Refer to plan of care for Diabetes  and Hypertension.      Orders:  UA/UC  CBC, BMP in a.m.    Electronically signed by: SIDDHARTH Arnold CNP         Sincerely,        SIDDHARTH Arnold CNP

## 2023-08-23 NOTE — TELEPHONE ENCOUNTER
[facility] Triage Nurse Telephone Encounter    Provider: SIDDHARTH Salgado CNP   Facility: [facility]  Facility Type:  TCU    Caller: Julia  Call Back Number: [phone]    Allergies:    Allergies   Allergen Reactions    Cresol [Phenol] Unknown     Muscle cramps    Demeclocycline Hives and Rash    Crestor [Rosuvastatin] Muscle Pain (Myalgia)        Reason for call: Pt is fatigued and confused, unable to answer questions. Not eating much today. Unable to straight cath to obtain UA/UC as ordered yesterday. Urine still has hematuria. /65 HR 72. Would like to send patient to ER.    Verbal Order/Direction given by Provider:   - Ok to send to ER; update family and make sure they are on board    Provider giving Order:  SIDDHARTH Salagdo CNP     Verbal Order given to: Julia Levine RN

## 2023-08-24 NOTE — PROGRESS NOTES
[facility] GERIATRICS    Chief Complaint   Patient presents with    RECHECK     HPI:  Caleb Hernandez is a 87 year old  (1935), who is being seen today for an episodic care visit at: [facility] ([facility]) [[id number]]. Today's concern is: The primary encounter diagnosis was Compression fracture of L1 vertebra, sequela. Diagnoses of Benign prostatic hyperplasia with urinary retention, Gross hematuria, Altered mental status, unspecified altered mental status type, Urinary tract infection with hematuria, site unspecified, PAF (paroxysmal atrial fibrillation) (H), Type 2 diabetes mellitus with hyperglycemia, with long-term current use of insulin (H), Essential hypertension, and Stage 3 chronic kidney disease, unspecified whether stage 3a or 3b CKD (H) were also pertinent to this visit.    Brief HPI Summary from recent hospitalization:   Caleb Hernandez is a 87 year old male who presented to the ED after a fall on 8/8/23 to [facility]. PMHx significant for paroxysmal atrial fibrillation on Eliquis, chronic kidney disease, previous recurrent UTIs, previous ascending cholangitis status postcholecystectomy, progressive cognitive issues, DM2, BPH, hypertension, coronary artery disease and lymphedema. Presented to ED due to due to a falling out of bed this AM on 8/8 at 4AM. Per patient and his family, he did hit his head. Denies loss of consciousness. Currently, he denies headache or vision changes. States his sin called EMS so the pt could be taken to the hospital. He also reports back pain. States he is not in pain currently. Denies numbness, weakness and tingling. Patient is a poor historian, so it is difficult to verify his history. Reports back pain started x 1 month ago. He noticed the pain while at physical therapy. Upon speaking with his son, the patient has history of multiple falls, 4 falls over the past 2 months. His son states they are looking into long term care for him.     Noted on  "8/23/23: Residents care plan has been updated at this time related to new treatment for Urinary Tract infection and intervention for recent fall on 8/22/23. Staff updated NP due to residents new onset of weakness and confusion and order for UA/UC was obtained however resident continued to get increasingly weak and confused and resident was sent to ED for evaluation per family and staff request. Resident was evaluated at ED and sent back to facility with dx of UTI and started on antibiotic, however I am unable to find the ED visit.     Today-Met with patient who was found lying in bed. Pleasant man. He denies any chest pain, palpitations, shortness of breath, ABDULLAHI, lightheadedness, dizziness, or cough. Denies any abdominal discomfort. Denies N&V. Denies B&B concerns. Denies dysuria or frequency. Urine is clear and yellow today present with mai catheter. Denies loose or constipation. Appetite fair. Sleeping well. Nursing denies any acute concerns.       BP Readings from Last 3 Encounters:   08/24/23 121/63   08/22/23 132/59   08/17/23 137/64     Wt Readings from Last 5 Encounters:   08/24/23 72.3 kg (159 lb 6.4 oz)   08/22/23 72.3 kg (159 lb 6.4 oz)   08/17/23 72.3 kg (159 lb 6.4 oz)   07/21/23 67.1 kg (148 lb)   06/30/23 67.1 kg (148 lb)     Allergies, and PMH/PSH reviewed in EPIC today.  REVIEW OF SYSTEMS:  Unobtainable secondary to cognitive impairment.  and 4 point ROS including Respiratory, CV, GI and , other than that noted in the HPI,  is negative    Objective:   /63   Pulse 71   Temp (!) 96.4  F (35.8  C)   Resp 17   Ht 1.727 m (5' 8\")   Wt 72.3 kg (159 lb 6.4 oz)   SpO2 96%   BMI 24.24 kg/m    GENERAL APPEARANCE:  Alert, in no distress, cooperative  ENT:  Mouth and posterior oropharynx normal, moist mucous membranes, [place]  EYES:  EOM, conjunctivae, lids, pupils and irises normal  NECK:  No adenopathy,masses or thyromegaly  RESP:  respiratory effort and palpation of chest normal, lungs clear to " auscultation , no respiratory distress  CV:  Palpation and auscultation of heart done , regular rate and rhythm, no murmur, rub, or gallop, no edema, +2 pedal pulses  ABDOMEN:  normal bowel sounds, soft, nontender, no hepatosplenomegaly or other masses, no guarding or rebound  M/S:   Ambulates with walker. Staff to assist  SKIN:  Inspection of skin and subcutaneous tissue baseline, Palpation of skin and subcutaneous tissue baseline  NEURO:   Cranial nerves 2-12 are normal tested and grossly at patient's baseline, no purposeful movement in upper and lower extremities  PSYCH:  oriented X 3, memory impaired , affect and mood normal    Most Recent 3 CBC's:  Recent Labs   Lab Test 08/25/23  0814 05/23/23  0943 04/04/23  1608 02/20/23  0702   WBC 6.1  --  6.1 7.2   HGB 10.4* 10.4* 10.9* 9.6*   MCV 94  --  93 98    260 361 395     Most Recent 3 BMP's:  Recent Labs   Lab Test 05/23/23  0943 04/04/23  1608 02/20/23  0702 01/30/23  0850   NA  --  138 138 135*   POTASSIUM  --  4.4 4.7 5.0   CHLORIDE  --  104 103 102   CO2  --  23 25 26   BUN  --  17.6 24.5* 22.6   CR 1.76* 1.16 1.19* 1.35*   ANIONGAP  --  11 10 7   MARTHA  --  9.2 8.8 8.3*   GLC  --  105* 89 94     Most Recent 2 LFT's:  Recent Labs   Lab Test 01/19/23  1431 01/17/23  1524   AST 32 37   ALT 31 24   ALKPHOS 59 64   BILITOTAL 0.4 0.4     Most Recent Hemoglobin A1c:  Recent Labs   Lab Test 01/17/23  1200   A1C 7.5*     Most Recent Urinalysis:  Recent Labs   Lab Test 02/17/23  2115 01/17/23  1417 12/09/22  1611   COLOR Yellow   < > Yellow   APPEARANCE Cloudy*   < > Turbid*   URINEGLC 100*   < > Negative   URINEBILI Negative   < > Negative   URINEKETONE Negative   < > Negative   SG 1.017   < > 1.025   UBLD Large*   < > Large*   URINEPH 6.0   < > 5.5   PROTEIN 70*   < > 100*   UROBILINOGEN  --   --  0.2   NITRITE Negative   < > Negative   LEUKEST Large*   < > Large*   RBCU 109*   < > >100*   WBCU >182*   < > >100*    < > = values in this interval not displayed.      Most Recent Anemia Panel:  Recent Labs   Lab Test 08/25/23  0814 01/21/23  0653 01/20/23  0552 01/19/23  1957 01/19/23  1222 01/19/23  0517   WBC 6.1   < >  --  5.8   < > 4.7   HGB 10.4*   < > 9.1* 9.5*   < > 8.9*   HCT 33.4*   < >  --  29.2*   < > 26.9*   MCV 94   < >  --  90   < > 88      < >  --  187   < > 192   RETICABSCT  --   --   --  0.054   < >  --    RETP  --   --   --  1.7   < >  --    ESTHELA  --   --   --   --   --  497*   B12  --   --   --   --   --  <150*   FOLIC  --   --  11.2  --   --   --     < > = values in this interval not displayed.       Assessment/Plan:  (S32.010S) Compression fracture of L1 vertebra, sequela  (primary encounter diagnosis)  Comment: Acute on chronic. Goal is to determine discharge plan when PT/OT goals met.  Plan:   -PT/OT eval and treat, discharge planning per their recommendation.   -Xray scheduled for 9/5/23  -Follow up with neursurgery as directed. Scheduled for 9/22  -Continue tramadol prn for pain, monitor pain severity. He has used this 3x since admission. Would recommend discontinue on next visit if minimal use.   -Continue coreline brace when out of bed  -No lifting >10#, no excessive bending, twisting, turning of back per neuro.   -Change Tylenol to 1000mg TID vs PRN  -Scheduled topical aspercreme 10% to TID  -BMP and CBC pending today 8/25/23     (N40.1,  R33.8) Benign prostatic hyperplasia with urinary retention  (R31.0) Gross hematuria  (R41.82) Altered mental status, unspecified altered mental status type  (N39.0, R31.9) UTI  Comment: Acute on chronic. He is high risk due to catheter and has a history of infections. Recently was seen in ED and found to have UTI 8/23  Plan:   -Cephalexin 500mg QID as directed until completion 9/2  -Discontinue UC collection as this was done in ED  -Monitor urinary status  -Monitor cognition needs  -BMP and CBC pending today 8/25/23     (I48.0) PAF (paroxysmal atrial fibrillation) (H)  Comment: Rate controlled. On DOAC for  anticoagulation with no s/sx side effects  Plan:   -Continue current POC with no changes at this time and adjustments as needed.  -BMP and CBC pending today 8/25/23     (E11.65,  Z79.4) Type 2 diabetes mellitus with hyperglycemia, with long-term current use of insulin (H)  Comment: Fasting sugars controlled, subsequent readings frequently 200s.   Plan:   -Continue current POC with no changes at this time and adjustments as needed.  -BMP and CBC pending today 8/25/23         (I10) Essential hypertension  Comment: Chronic, controlled  Plan:   -Continue current POC with no changes at this time and adjustments as needed.  -BMP and CBC pending today 8/25/23     (N18.30) Stage 3 chronic kidney disease, unspecified whether stage 3a or 3b CKD (H)  Comment: Chronic Kidney Disease due to: Diabetes  and Hypertension.  Plan:   -Avoid nephrotoxic medications and adjust medications per renal function.   -BMP and CBC pending today 8/25/23      Electronically signed by:   Dr. Ayla Perales DNP, APRN, FNP-C, WCS-C, EDS-C

## 2023-08-25 NOTE — LETTER
8/25/2023        RE: Caleb Hernandez  [address]  [place] [address]        [facility] GERIATRICS    Chief Complaint   Patient presents with     RECHECK     HPI:  Caleb Hernandez is a 87 year old  (1935), who is being seen today for an episodic care visit at: [facility] ([facility]) [[id number]]. Today's concern is: The primary encounter diagnosis was Compression fracture of L1 vertebra, sequela. Diagnoses of Benign prostatic hyperplasia with urinary retention, Gross hematuria, Altered mental status, unspecified altered mental status type, Urinary tract infection with hematuria, site unspecified, PAF (paroxysmal atrial fibrillation) (H), Type 2 diabetes mellitus with hyperglycemia, with long-term current use of insulin (H), Essential hypertension, and Stage 3 chronic kidney disease, unspecified whether stage 3a or 3b CKD (H) were also pertinent to this visit.    Brief HPI Summary from recent hospitalization:   Caleb Hernandez is a 87 year old male who presented to the ED after a fall on 8/8/23 to [facility]. PMHx significant for paroxysmal atrial fibrillation on Eliquis, chronic kidney disease, previous recurrent UTIs, previous ascending cholangitis status postcholecystectomy, progressive cognitive issues, DM2, BPH, hypertension, coronary artery disease and lymphedema. Presented to ED due to due to a falling out of bed this AM on 8/8 at 4AM. Per patient and his family, he did hit his head. Denies loss of consciousness. Currently, he denies headache or vision changes. States his sin called EMS so the pt could be taken to the hospital. He also reports back pain. States he is not in pain currently. Denies numbness, weakness and tingling. Patient is a poor historian, so it is difficult to verify his history. Reports back pain started x 1 month ago. He noticed the pain while at physical therapy. Upon speaking with his son, the patient has history of multiple falls, 4 falls over the past 2  "months. His son states they are looking into long term care for him.     Noted on 8/23/23: Residents care plan has been updated at this time related to new treatment for Urinary Tract infection and intervention for recent fall on 8/22/23. Staff updated NP due to residents new onset of weakness and confusion and order for UA/UC was obtained however resident continued to get increasingly weak and confused and resident was sent to ED for evaluation per family and staff request. Resident was evaluated at ED and sent back to facility with dx of UTI and started on antibiotic, however I am unable to find the ED visit.     Today-Met with patient who was found lying in bed. Pleasant man. He denies any chest pain, palpitations, shortness of breath, ABDULLAHI, lightheadedness, dizziness, or cough. Denies any abdominal discomfort. Denies N&V. Denies B&B concerns. Denies dysuria or frequency. Urine is clear and yellow today present with mai catheter. Denies loose or constipation. Appetite fair. Sleeping well. Nursing denies any acute concerns.       BP Readings from Last 3 Encounters:   08/24/23 121/63   08/22/23 132/59   08/17/23 137/64     Wt Readings from Last 5 Encounters:   08/24/23 72.3 kg (159 lb 6.4 oz)   08/22/23 72.3 kg (159 lb 6.4 oz)   08/17/23 72.3 kg (159 lb 6.4 oz)   07/21/23 67.1 kg (148 lb)   06/30/23 67.1 kg (148 lb)     Allergies, and PMH/PSH reviewed in EPIC today.  REVIEW OF SYSTEMS:  Unobtainable secondary to cognitive impairment.  and 4 point ROS including Respiratory, CV, GI and , other than that noted in the HPI,  is negative    Objective:   /63   Pulse 71   Temp (!) 96.4  F (35.8  C)   Resp 17   Ht 1.727 m (5' 8\")   Wt 72.3 kg (159 lb 6.4 oz)   SpO2 96%   BMI 24.24 kg/m    GENERAL APPEARANCE:  Alert, in no distress, cooperative  ENT:  Mouth and posterior oropharynx normal, moist mucous membranes, [place]  EYES:  EOM, conjunctivae, lids, pupils and irises normal  NECK:  No adenopathy,masses or " thyromegaly  RESP:  respiratory effort and palpation of chest normal, lungs clear to auscultation , no respiratory distress  CV:  Palpation and auscultation of heart done , regular rate and rhythm, no murmur, rub, or gallop, no edema, +2 pedal pulses  ABDOMEN:  normal bowel sounds, soft, nontender, no hepatosplenomegaly or other masses, no guarding or rebound  M/S:   Ambulates with walker. Staff to assist  SKIN:  Inspection of skin and subcutaneous tissue baseline, Palpation of skin and subcutaneous tissue baseline  NEURO:   Cranial nerves 2-12 are normal tested and grossly at patient's baseline, no purposeful movement in upper and lower extremities  PSYCH:  oriented X 3, memory impaired , affect and mood normal    Most Recent 3 CBC's:  Recent Labs   Lab Test 08/25/23  0814 05/23/23  0943 04/04/23  1608 02/20/23  0702   WBC 6.1  --  6.1 7.2   HGB 10.4* 10.4* 10.9* 9.6*   MCV 94  --  93 98    260 361 395     Most Recent 3 BMP's:  Recent Labs   Lab Test 05/23/23  0943 04/04/23  1608 02/20/23  0702 01/30/23  0850   NA  --  138 138 135*   POTASSIUM  --  4.4 4.7 5.0   CHLORIDE  --  104 103 102   CO2  --  23 25 26   BUN  --  17.6 24.5* 22.6   CR 1.76* 1.16 1.19* 1.35*   ANIONGAP  --  11 10 7   MARTHA  --  9.2 8.8 8.3*   GLC  --  105* 89 94     Most Recent 2 LFT's:  Recent Labs   Lab Test 01/19/23  1431 01/17/23  1524   AST 32 37   ALT 31 24   ALKPHOS 59 64   BILITOTAL 0.4 0.4     Most Recent Hemoglobin A1c:  Recent Labs   Lab Test 01/17/23  1200   A1C 7.5*     Most Recent Urinalysis:  Recent Labs   Lab Test 02/17/23  2115 01/17/23  1417 12/09/22  1611   COLOR Yellow   < > Yellow   APPEARANCE Cloudy*   < > Turbid*   URINEGLC 100*   < > Negative   URINEBILI Negative   < > Negative   URINEKETONE Negative   < > Negative   SG 1.017   < > 1.025   UBLD Large*   < > Large*   URINEPH 6.0   < > 5.5   PROTEIN 70*   < > 100*   UROBILINOGEN  --   --  0.2   NITRITE Negative   < > Negative   LEUKEST Large*   < > Large*   RBCU 109*    < > >100*   WBCU >182*   < > >100*    < > = values in this interval not displayed.     Most Recent Anemia Panel:  Recent Labs   Lab Test 08/25/23  0814 01/21/23  0653 01/20/23  0552 01/19/23  1957 01/19/23  1222 01/19/23  0517   WBC 6.1   < >  --  5.8   < > 4.7   HGB 10.4*   < > 9.1* 9.5*   < > 8.9*   HCT 33.4*   < >  --  29.2*   < > 26.9*   MCV 94   < >  --  90   < > 88      < >  --  187   < > 192   RETICABSCT  --   --   --  0.054   < >  --    RETP  --   --   --  1.7   < >  --    ESTHELA  --   --   --   --   --  497*   B12  --   --   --   --   --  <150*   FOLIC  --   --  11.2  --   --   --     < > = values in this interval not displayed.       Assessment/Plan:  (S32.010S) Compression fracture of L1 vertebra, sequela  (primary encounter diagnosis)  Comment: Acute on chronic. Goal is to determine discharge plan when PT/OT goals met.  Plan:   -PT/OT eval and treat, discharge planning per their recommendation.   -Xray scheduled for 9/5/23  -Follow up with neursurgery as directed. Scheduled for 9/22  -Continue tramadol prn for pain, monitor pain severity. He has used this 3x since admission. Would recommend discontinue on next visit if minimal use.   -Continue coreline brace when out of bed  -No lifting >10#, no excessive bending, twisting, turning of back per neuro.   -Change Tylenol to 1000mg TID vs PRN  -Scheduled topical aspercreme 10% to TID  -BMP and CBC pending today 8/25/23     (N40.1,  R33.8) Benign prostatic hyperplasia with urinary retention  (R31.0) Gross hematuria  (R41.82) Altered mental status, unspecified altered mental status type  (N39.0, R31.9) UTI  Comment: Acute on chronic. He is high risk due to catheter and has a history of infections. Recently was seen in ED and found to have UTI 8/23  Plan:   -Cephalexin 500mg QID as directed until completion 9/2  -Discontinue UC collection as this was done in ED  -Monitor urinary status  -Monitor cognition needs  -BMP and CBC pending today 8/25/23      (I48.0) PAF (paroxysmal atrial fibrillation) (H)  Comment: Rate controlled. On DOAC for anticoagulation with no s/sx side effects  Plan:   -Continue current POC with no changes at this time and adjustments as needed.  -BMP and CBC pending today 8/25/23     (E11.65,  Z79.4) Type 2 diabetes mellitus with hyperglycemia, with long-term current use of insulin (H)  Comment: Fasting sugars controlled, subsequent readings frequently 200s.   Plan:   -Continue current POC with no changes at this time and adjustments as needed.  -BMP and CBC pending today 8/25/23         (I10) Essential hypertension  Comment: Chronic, controlled  Plan:   -Continue current POC with no changes at this time and adjustments as needed.  -BMP and CBC pending today 8/25/23     (N18.30) Stage 3 chronic kidney disease, unspecified whether stage 3a or 3b CKD (H)  Comment: Chronic Kidney Disease due to: Diabetes  and Hypertension.  Plan:   -Avoid nephrotoxic medications and adjust medications per renal function.   -BMP and CBC pending today 8/25/23      Electronically signed by:   Dr. Ayla Perales DNP, APRN, FNP-C, WCS-C, EDS-C           Sincerely,        Ayla Perales, SIDDHARTH CNP

## 2023-09-07 NOTE — PROGRESS NOTES
[facility] GERIATRICS DISCHARGE SUMMARY  PATIENT'S NAME: Caleb Hernandez  YOB: 1935  MEDICAL RECORD NUMBER:  [id number]  Place of Service where encounter took place:  [facility] ([facility]) [[id number]]    PRIMARY CARE PROVIDER AND CLINIC RESPONSIBLE AFTER TRANSFER:   DALE CORTEZ MD, [address] [facility]  / [place] [address]    Non-FMG Provider     Transferring providers: SIDDHARTH Arnold CNP, Ashli Malik MD  Recent Hospitalization/ED:  [facility]  stay 8/8/23 to 8/11/23.  Date of SNF Admission: August 11, 2023  Date of SNF (anticipated) Discharge: September 12, 2023  Discharged to: new assisted living for patient [facility]  Physical Function: Ambulating 50 ft with 2WW      CODE STATUS/ADVANCE DIRECTIVES DISCUSSION:  Full Code   ALLERGIES: Cresol [phenol], Demeclocycline, and Crestor [rosuvastatin]    NURSING FACILITY COURSE   Caleb Hernandez  is a 87 year old  (1935), admitted to the above facility from  [facility] . Hospital stay 8/8/23 through 8/11/23. Patient with PMH paroxysmal afib, CKD, UTIs, cognitive impairment, DM2, BPH, HTN, CAD, and lymphedema presented to the ED after falling out of bed. He was found to have subacute L1 and L4 compression fractures. He has fallen 4 times in 2 months. His family is looking into LTC facilities.     Summary of nursing facility stay:   Physical deconditioning  Due to frequent falls at home, family was already planning to find placement for him. He has been accepted at [facility]. He can walk 100 feet with therapy, but typically only tolerates about 50 feet.     Cognitive impairment  Mild per therapy eval, but his insight and judgement seem more moderately impaired.    Benign prostatic hyperplasia with urinary retention  Gross hematuria  Patient with chronic mai. He was treated for a UTI recently, but his urine remains pink and cloudy    PAF (paroxysmal atrial fibrillation)  (H)  HR controlled. Per pharmacy, apixaban dose should be 5mg BID, however, due to ongoing hematuria, the lower dose is more safe    Type 2 diabetes mellitus with hyperglycemia, with long-term current use of insulin (H)  Patient continues to have BG 200s frequently before lunch and dinner. Fasting sugars well controlled, occasionally too low in 90s. HgA1c <8% not recommended in elderly due to risk of hypoglycemia.  Risk outweighs benefit of tighter control.     Essential hypertension  Chronic, controlled    Stage 3 chronic kidney disease, unspecified whether stage 3a or 3b CKD (H)  See labs      Discharge Medications:  Current Outpatient Medications   Medication Sig Dispense Refill    acetaminophen (TYLENOL) 500 MG tablet Take 1,000 mg by mouth 3 times daily And every 6 hours prn      apixaban ANTICOAGULANT (ELIQUIS) 2.5 MG tablet Take 1 tablet (2.5 mg) by mouth 2 times daily 60 tablet 0    aspirin 81 MG EC tablet [ASPIRIN 81 MG EC TABLET] Take 81 mg by mouth daily.      blood glucose (NO BRAND SPECIFIED) test strip Use to test blood sugar 2 times daily or as directed.has a  One Touch Ultra 2 meter 200 strip 3    cyanocobalamin (CYANOCOBALAMIN) 1000 MCG tablet Take 1 tablet (1,000 mcg) by mouth daily 60 tablet 0    insulin glargine (LANTUS PEN) 100 UNIT/ML pen Inject 15 Units Subcutaneous At Bedtime      insulin lispro (HUMALOG VIAL) 100 UNIT/ML vial Per Sliding Scale;  If Blood Sugar is 150 to 199, give 3 Units.  If Blood Sugar is 200 to 249, give 5 Units.  If Blood Sugar is 250 to 299, give 7 Units.  If Blood Sugar is 300 to 349, give 9 Units.  If Blood Sugar is 350 to 800, give 10 Units.  subcutaneous  Three Times A Day      multivit-min/FA/lycopen/lutein (CENTRUM SILVER MEN ORAL) [MULTIVIT-MIN/FA/LYCOPEN/LUTEIN (CENTRUM SILVER MEN ORAL)] Take 1 tablet by mouth daily.      polyethylene glycol (MIRALAX) 17 gram packet [POLYETHYLENE GLYCOL (MIRALAX) 17 GRAM PACKET] Take 1 packet (17 g total) by mouth daily as  "needed. 100 packet 3    tamsulosin (FLOMAX) 0.4 MG capsule TAKE 1 CAPSULE BY MOUTH EVERY DAY AFTER SUPPER 90 capsule 2    traMADol (ULTRAM) 50 MG tablet Take 1 tablet (50 mg) by mouth every 6 hours as needed for moderate pain 30 tablet 0    trolamine salicylate (ASPERCREME) 10 % external cream Apply topically 3 times daily      vitamin E 400 unit capsule [VITAMIN E 400 UNIT CAPSULE] Take 400 Units by mouth daily.          Controlled medications:   not applicable/none     Past Medical History:   Past Medical History:   Diagnosis Date    Abscess of right foot 11/23/2020    Added automatically from request for surgery [id number]    Acute pulmonary embolism with acute cor pulmonale (H) 5/23/2020    Ascending cholangitis 1/27/2022    BPH (benign prostatic hyperplasia)     Cholelithiasis     Closed fracture of rib     Created by Conversion  Replacement Utility updated for latest IMO load    Closed fracture of thoracic vertebra (H)     Created by Conversion  Replacement Utility updated for latest IMO load    Common bile duct (CBD) obstruction 9/4/2017    Diabetes mellitus (H)     Essential hypertension     Gram-negative bacteremia 1/27/2022    Positive blood culture 1/26/2022; likely biliary source    Hyperlipidemia     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    Added automatically from request for surgery [id number]     Pancreatitis     Sepsis (H) 1/27/2022    Sepsis due to pneumonia (H) 9/8/2017    Septic arthritis of right foot (H) 12/2/2020     Physical Exam:   Vitals: /62   Pulse 67   Temp 97.7  F (36.5  C)   Resp 18   Ht 1.727 m (5' 8\")   Wt 72.7 kg (160 lb 4.8 oz)   SpO2 96%   BMI 24.37 kg/m    BMI: Body mass index is 24.37 kg/m .  GENERAL APPEARANCE:  Alert, in no distress  ENT:  Mouth and posterior oropharynx normal, moist mucous membranes  EYES:  EOM normal, conjunctiva and lids normal  RESP:  no respiratory distress  CV:  no edema  :    indwelling catheter with pink, milky urine  PSYCH:  insight and " judgement impaired, memory impaired      SNF labs: Labs done in SNF are in [place] EPIC. Please refer to them using EPIC/Care Everywhere.    DISCHARGE PLAN:  Follow up labs: No labs orders/due  Medical Follow Up:      Follow up with primary care provider in 1-2 weeks  Discharge Services: Home Care:  Occupational Therapy and Physical Therapy      TOTAL DISCHARGE TIME:   Greater than 30 minutes  Electronically signed by:  SIDDHARTH Arnold CNP       Documentation of Face to Face and Certification for Home Health Services    I certify that services are/were furnished while this patient was under the care of a physician and that a physician or an allowed non-physician practitioner (NPP), had a face-to-face encounter that meets the physician face-to-face encounter requirements. The encounter was in whole, or in part, related to the primary reason for home health. The patient is confined to his/her home and needs intermittent skilled nursing, physical therapy, speech-language pathology, or the continued need for occupational therapy. A plan of care has been established by a physician and is periodically reviewed by a physician.  Date of Face-to-Face Encounter: 9/7/2023.    I certify that, based on my findings, the following services are medically necessary home health services: Occupational Therapy and Physical Therapy.    My clinical findings support the need for the above skilled services because: Requires assistance of another person or specialized equipment to access medical services because patient: Is prone to wander/get lost without assistance...    Patient to re-establish plan of care with their PCP within 7-10 days after leaving the facility to reestablish care.  Medicare certified PECOS provider: SIDDHARTH Arnold CNP  Date: September 7, 2023

## 2023-09-07 NOTE — LETTER
9/7/2023        RE: Caleb Hernandez  [address]  [place] [address]        [facility] GERIATRICS DISCHARGE SUMMARY  PATIENT'S NAME: Caleb Hernandez  YOB: 1935  MEDICAL RECORD NUMBER:  [id number]  Place of Service where encounter took place:  [facility] ([facility]) [[id number]]    PRIMARY CARE PROVIDER AND CLINIC RESPONSIBLE AFTER TRANSFER:   DALE CORTEZ MD, [address] [facility] / [place] [address]    Non-FMG Provider     Transferring providers: SIDDHARTH Arnold CNP, Ashli Malik MD  Recent Hospitalization/ED:  [facility]  stay 8/8/23 to 8/11/23.  Date of SNF Admission: August 11, 2023  Date of SNF (anticipated) Discharge: September 12, 2023  Discharged to: new assisted living for patient [facility]  Physical Function: Ambulating 50 ft with 2WW      CODE STATUS/ADVANCE DIRECTIVES DISCUSSION:  Full Code   ALLERGIES: Cresol [phenol], Demeclocycline, and Crestor [rosuvastatin]    NURSING FACILITY COURSE   Caleb Hernandez  is a 87 year old  (1935), admitted to the above facility from  [facility] . Hospital stay 8/8/23 through 8/11/23. Patient with PMH paroxysmal afib, CKD, UTIs, cognitive impairment, DM2, BPH, HTN, CAD, and lymphedema presented to the ED after falling out of bed. He was found to have subacute L1 and L4 compression fractures. He has fallen 4 times in 2 months. His family is looking into LTC facilities.     Summary of nursing facility stay:   Physical deconditioning  Due to frequent falls at home, family was already planning to find placement for him. He has been accepted at [facility]. He can walk 100 feet with therapy, but typically only tolerates about 50 feet.     Cognitive impairment  Mild per therapy eval, but his insight and judgement seem more moderately impaired.    Benign prostatic hyperplasia with urinary retention  Gross hematuria  Patient with chronic mai. He was treated for a UTI recently, but  his urine remains pink and cloudy    PAF (paroxysmal atrial fibrillation) (H)  HR controlled. Per pharmacy, apixaban dose should be 5mg BID, however, due to ongoing hematuria, the lower dose is more safe    Type 2 diabetes mellitus with hyperglycemia, with long-term current use of insulin (H)  Patient continues to have BG 200s frequently before lunch and dinner. Fasting sugars well controlled, occasionally too low in 90s. HgA1c <8% not recommended in elderly due to risk of hypoglycemia.  Risk outweighs benefit of tighter control.     Essential hypertension  Chronic, controlled    Stage 3 chronic kidney disease, unspecified whether stage 3a or 3b CKD (H)  See labs      Discharge Medications:  Current Outpatient Medications   Medication Sig Dispense Refill     acetaminophen (TYLENOL) 500 MG tablet Take 1,000 mg by mouth 3 times daily And every 6 hours prn       apixaban ANTICOAGULANT (ELIQUIS) 2.5 MG tablet Take 1 tablet (2.5 mg) by mouth 2 times daily 60 tablet 0     aspirin 81 MG EC tablet [ASPIRIN 81 MG EC TABLET] Take 81 mg by mouth daily.       blood glucose (NO BRAND SPECIFIED) test strip Use to test blood sugar 2 times daily or as directed.has a  One Touch Ultra 2 meter 200 strip 3     cyanocobalamin (CYANOCOBALAMIN) 1000 MCG tablet Take 1 tablet (1,000 mcg) by mouth daily 60 tablet 0     insulin glargine (LANTUS PEN) 100 UNIT/ML pen Inject 15 Units Subcutaneous At Bedtime       insulin lispro (HUMALOG VIAL) 100 UNIT/ML vial Per Sliding Scale;  If Blood Sugar is 150 to 199, give 3 Units.  If Blood Sugar is 200 to 249, give 5 Units.  If Blood Sugar is 250 to 299, give 7 Units.  If Blood Sugar is 300 to 349, give 9 Units.  If Blood Sugar is 350 to 800, give 10 Units.  subcutaneous  Three Times A Day       multivit-min/FA/lycopen/lutein (CENTRUM SILVER MEN ORAL) [MULTIVIT-MIN/FA/LYCOPEN/LUTEIN (CENTRUM SILVER MEN ORAL)] Take 1 tablet by mouth daily.       polyethylene glycol (MIRALAX) 17 gram packet [POLYETHYLENE  "GLYCOL (MIRALAX) 17 GRAM PACKET] Take 1 packet (17 g total) by mouth daily as needed. 100 packet 3     tamsulosin (FLOMAX) 0.4 MG capsule TAKE 1 CAPSULE BY MOUTH EVERY DAY AFTER SUPPER 90 capsule 2     traMADol (ULTRAM) 50 MG tablet Take 1 tablet (50 mg) by mouth every 6 hours as needed for moderate pain 30 tablet 0     trolamine salicylate (ASPERCREME) 10 % external cream Apply topically 3 times daily       vitamin E 400 unit capsule [VITAMIN E 400 UNIT CAPSULE] Take 400 Units by mouth daily.          Controlled medications:   not applicable/none     Past Medical History:   Past Medical History:   Diagnosis Date     Abscess of right foot 11/23/2020    Added automatically from request for surgery [id number]     Acute pulmonary embolism with acute cor pulmonale (H) 5/23/2020     Ascending cholangitis 1/27/2022     BPH (benign prostatic hyperplasia)      Cholelithiasis      Closed fracture of rib     Created by Conversion  Replacement Utility updated for latest IMO load     Closed fracture of thoracic vertebra (H)     Created by Conversion  Replacement Utility updated for latest IMO load     Common bile duct (CBD) obstruction 9/4/2017     Diabetes mellitus (H)      Essential hypertension      Gram-negative bacteremia 1/27/2022    Positive blood culture 1/26/2022; likely biliary source     Hyperlipidemia      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    Added automatically from request for surgery [id number]      Pancreatitis      Sepsis (H) 1/27/2022     Sepsis due to pneumonia (H) 9/8/2017     Septic arthritis of right foot (H) 12/2/2020     Physical Exam:   Vitals: /62   Pulse 67   Temp 97.7  F (36.5  C)   Resp 18   Ht 1.727 m (5' 8\")   Wt 72.7 kg (160 lb 4.8 oz)   SpO2 96%   BMI 24.37 kg/m    BMI: Body mass index is 24.37 kg/m .  GENERAL APPEARANCE:  Alert, in no distress  ENT:  Mouth and posterior oropharynx normal, moist mucous membranes  EYES:  EOM normal, conjunctiva and lids normal  RESP:  no respiratory " distress  CV:  no edema  :    indwelling catheter with pink, milky urine  PSYCH:  insight and judgement impaired, memory impaired      SNF labs: Labs done in SNF are in [place] EPIC. Please refer to them using EPIC/Care Everywhere.    DISCHARGE PLAN:  Follow up labs: No labs orders/due  Medical Follow Up:      Follow up with primary care provider in 1-2 weeks  Discharge Services: Home Care:  Occupational Therapy and Physical Therapy      TOTAL DISCHARGE TIME:   Greater than 30 minutes  Electronically signed by:  SIDDHARTH Arnold CNP       Documentation of Face to Face and Certification for Home Health Services    I certify that services are/were furnished while this patient was under the care of a physician and that a physician or an allowed non-physician practitioner (NPP), had a face-to-face encounter that meets the physician face-to-face encounter requirements. The encounter was in whole, or in part, related to the primary reason for home health. The patient is confined to his/her home and needs intermittent skilled nursing, physical therapy, speech-language pathology, or the continued need for occupational therapy. A plan of care has been established by a physician and is periodically reviewed by a physician.  Date of Face-to-Face Encounter: 9/7/2023.    I certify that, based on my findings, the following services are medically necessary home health services: Occupational Therapy and Physical Therapy.    My clinical findings support the need for the above skilled services because: Requires assistance of another person or specialized equipment to access medical services because patient: Is prone to wander/get lost without assistance...    Patient to re-establish plan of care with their PCP within 7-10 days after leaving the facility to reestablish care.  Medicare certified PECOS provider: SIDDHARTH Arnold CNP  Date: September 7, 2023                Sincerely,        SIDDHARTH Arnold CNP

## 2024-01-26 NOTE — PROGRESS NOTES
Progress Notes by Vianca Uribe, Diabetes Ed at 3/25/2020  1:30 PM     Author: Vianca Uribe, Diabetes Ed Service: -- Author Type: Diabetes Ed    Filed: 3/25/2020  2:27 PM Encounter Date: 3/25/2020 Status: Attested    : Vianca Uribe, Diabetes Ed (Diabetes Ed) Cosigner: Chaz Machado MD at 3/25/2020  2:28 PM    Attestation signed by Chaz Machado MD at 3/25/2020  2:28 PM    Conversation noted                Convert to telephone visit-30 minutes  Assessment: Spoke with Frantz via telephone today to discuss his blood sugar and diabetes.    He is currently taking Lantus 40 units every night at bedtime.  Stated he has missed this occasionally, this rarely happens.  He is also taking Victoza 1.8 mg daily, stated he takes this in the morning.  Stated he has had no trouble with hypoglycemia, nausea, vomiting or any abdominal discomfort since increasing his Victoza dose.    He is checking his blood sugar 1-2 times daily, fasting every day as well as in the evening several days a week.  Some of his readings are noted below:  Fasting-208/256/227/234/214  Evening-287/313/198/258  Stated even though his readings are quite high he has been feeling quite well.    We reviewed his current meal plan, which is noted below:  Meals-3  B-scrambled eggs, toast and corn flakes  If BG is good in morning will have good waffles  With butter  Sugar-free syrup occasionally  no juice  No coffee  Instant breakfast with milk if a busy morning  L-sandwiches-egg salad, deviled ham  Spaghetti with white sauce  Tacos  D-last night was [place]  Was out of town in [place] last night  Rice, shrimp and fish dinner  spaghetti with white sauce  Roast with potatoes and carrots  Chicken and rice  Biscuits with peanut butter    We discussed activity.  Currently Frantz has an ulcer on his foot, stated this that cracked last fall and they have been trying to get it to heal since.  He is getting special shoes made through vascular or  podiatry.  He has been doing some walking, around the house and when running his errands.  Stated before he had his wound he was walking quite a bit as he was still working.  He has been told to stay off of his foot when possible as well as getting activity and he is having a difficult time doing this.    Frantz stated he sleeps quite well at night, he wakes up once to use the bathroom.  Stated he has had since of polyuria or polydipsia that he is noted.  Stated he has not noticed an increase in his blood sugars since his wound started.    All additional questions and concerns addressed today.  Plan: Asked Frantz to increase his Lantus dose to 44 units starting this evening.  Will follow up with him next week regarding his blood sugars.  Discussed when seeing patients in the clinic again would be interested to have him participate in a continuous glucose study for better monitoring of his blood sugars.  Stated he would consider this at that time.  Follow-up appointment has already been made.    Subjective and Objective:      Caleb Hernandez is referred by Dr. Chaz Machado for Diabetes Education.     Lab Results   Component Value Date    HGBA1C 12.0 (H) 12/27/2019       Follow up:   CDE (certified diabetic educator)      Education:     Monitoring   Meter (per above goals): Assessed and Discussed  Monitoring: Assessed and Discussed  BG goals: Assessed and Discussed    Nutrition Management  Nutrition Management: Assessed and Discussed  Weight: Not addressed  Portions/Balance: Assessed and Discussed  Carb ID/Count: Not addressed  Label Reading: Not addressed  Heart Healthy Fats: Assessed and Discussed  Menu Planning: Assessed and Discussed  Dining Out: Assessed and Discussed  Physical Activity: Assessed and Discussed  Medications: Assessed and Discussed  Orals: Assessed  Injected Medications: Assessed and Discussed   Storage/Exp:Assessed and Discussed   Site Rotation: Assessed and Discussed   Sites Assessed: no    Diabetes  Disease Process: Assessed and Discussed    Acute Complications: Prevent, Detect, Treat:  Hypoglycemia: Assessed and Discussed  Hyperglycemia: Assessed and Discussed  Sick Days: Assessed and Discussed  Driving: Not addressed    Chronic Complications  Foot Care:Assessed and Discussed  Goal Setting and Problem Solving: Assessed and Discussed  Barriers: Assessed and Discussed  Psychosocial Adjustments: Assessed and Discussed      Time spent with the patient: 30 minutes for diabetes education and counseling.   Previous Education: yes  Visit Type:KATHARINA Uribe  3/25/2020                         No

## 2024-04-26 NOTE — PROGRESS NOTES
[facility] Medicine PROGRESS NOTE      Identification/Summary:   Caleb Hernandez is a 86 year old male with  past medical history of recurrent CBD stones, malnutrition, cholecystectomy, type 2 diabetes mellitus on insulin, CKD stage II, PE chronic anticoagulation with Eliquis, hypertension, dyslipidemia who was admitted on 1/27/2022 for abdominal pain and jaundice. Hospital course is notable for finding of choledocholithiasis, bacteremia with Klebsiella oxytoca.   Patient underwent ERCP with sludge and stone removal 1/27 at [facility]. Patient returned to this facility and has been on IV antibiotics since his admission.  Ultrasound right upper quadrant showed ?  Liver phlegmon.  Blood cultures from 1/29 again positive for gram-negative bacilli, final ID pending ID following.     Assessment and Plan:  1/.  Klebsiella bacteremia:   ?  Liver phlegmon vs dilated bile duct  Likely related to choledocholithiasis and ascending cholangitis  Noticed recent blood cultures from 1/29 positive for gram-negative bacilli.  Follow-up on final blood cultures  Repeat blood cultures x2 today  On Zosyn.  GI, ID following.    2/.  Urinary tract infection with Klebsiella: Addressed with Zosyn  3/.  Malnutrition: Significant in nature with profoundly decreased albumin  4/.  Third spacing because of excessive IV fluids that the patient had received for resuscitation: S/p Lasix  4/.  Leukocytosis: Secondary to problem #1 and 2 improving white count continue to trend.  Recheck CBC today.  5/.  Elevated LFTs in the setting of choledocholithiasis, continue to trend there is improvement in AST and ALT however bilirubin and alk phos continue to be elevated  No abdominal pain or tenderness  Advance to diabetic diet.    6/.  Type 2 diabetes mellitus:   Blood sugar on the lower side this morning  Change Lantus back to at bedtime from twice a day but decrease the dose.  Prandial insulin added in the hospital. Will DC bedtime dosing  "of prandial insulin.    insulin sliding scale as needed.    7: History of PE, A.fib   Chronically on Eliquis.    Hypertension  Dyslipidemia    Diet: High Consistent Carb (75 g CHO per Meal) Diet  DVT Prophylaxis: On Eliquis.  Code Status: Full Code    Anticipated possible discharge in few days once resolution of bacteremia milestones are met.    Interval History/Subjective:  Sitting up in the bed eating breakfast.  Denies any shortness of breath chest pain nausea vomiting abdominal pain.  No other urinary complaints.    Physical Exam/Objective:  Vitals I/O   Vital signs:  Temp: 97.3  F (36.3  C) Temp src: Oral BP: 115/56 Pulse: 64   Resp: 18 SpO2: 94 % O2 Device: None (Room air) Oxygen Delivery: 1 LPM   Weight: 81.4 kg (179 lb 6.4 oz)  Estimated body mass index is 27.28 kg/m  as calculated from the following:    Height as of an earlier encounter on 1/27/22: 1.727 m (5' 8\").    Weight as of this encounter: 81.4 kg (179 lb 6.4 oz).       I/O last 3 completed shifts:  In: 1754 [P.O.:960; I.V.:794]  Out: 1800 [Urine:1800]     Body mass index is 27.28 kg/m .    General Appearance:  Alert, cooperative, no distress   Head:  Normocephalic, atraumatic   Eyes:  PERRL    Throat:  mucosa; moist   Neck: No JVD, thyromegaly   Lungs:   Clear to auscultation bilaterally, respirations unlabored   Chest Wall:  No tenderness or deformity   Heart:  Regular rate and rhythm, S1, S2 normal,no murmur   Abdomen:   Soft, non tender, non distended, bowel sounds present, no guarding or rigidity   Extremities: Trace edema, no joint swelling   Skin: Skin color, texture, turgor normal, no rashes or lesions   Neurologic: Alert and oriented X 3, Moves all 4 extremities       Medications:   Personally Reviewed.    apixaban ANTICOAGULANT  5 mg Oral BID     aspirin  81 mg Oral Daily     insulin aspart   Subcutaneous Daily with breakfast     insulin aspart   Subcutaneous Daily with lunch     insulin aspart   Subcutaneous Daily with supper     insulin " aspart  1-7 Units Subcutaneous TID AC     insulin aspart  1-5 Units Subcutaneous At Bedtime     insulin glargine  35 Units Subcutaneous At Bedtime     lisinopril  5 mg Oral Daily     piperacillin-tazobactam  3.375 g Intravenous Q8H     sodium chloride (PF)  3 mL Intracatheter Q8H     sodium chloride (PF)  3 mL Intracatheter Q8H     tamsulosin  0.4 mg Oral Daily with supper     vitamin E  400 Units Oral Daily       Data reviewed today: I personally reviewed all new medications, labs, imaging/diagnostics reports over the past 24 hours. Pertinent findings include    Labs:  Most Recent 3 CBC's:Recent Labs   Lab Test 01/29/22  1602 01/29/22  0850 01/28/22  0619   WBC 13.4* 12.9* 16.9*   HGB 10.6* 10.6* 11.1*   MCV 96 90 94   PLT 66* 64* 68*     Most Recent 3 BMP's:Recent Labs   Lab Test 01/30/22  0733 01/30/22  0538 01/30/22  0301 01/29/22  1136 01/29/22  0850 01/28/22  1017 01/28/22  0619   NA  --  146*  --   --  146*  --  144   POTASSIUM  --  3.8  --   --  4.4  --  4.6   CHLORIDE  --  112*  --   --  117*  --  114*   CO2  --  27  --   --  23  --  22   BUN  --  71*  --   --  80*  --  81*   CR  --  1.26  --   --  1.22  --  1.54*   ANIONGAP  --  7  --   --  6  --  8   MARTHA  --  8.1*  --   --  7.9*  --  8.5   * 128* 148*   < > 189*   < > 278*    < > = values in this interval not displayed.     Most Recent 2 LFT's:Recent Labs   Lab Test 01/30/22  0538 01/29/22  0850   AST 54* 52*   ALT 81* 80*   ALKPHOS 259* 236*   BILITOTAL 5.9* 6.6*     Most Recent 3 BNP's:No lab results found.  Most Recent 6 Bacteria Isolates From Any Culture (See EPIC Reports for Culture Details): Blood cultures from 1/26, 1/27, 1/29+ for Klebsiella oxytoca.  Urine culture growing Klebsiella oxytoca.      Most Recent Hemoglobin A1c:Recent Labs   Lab Test 08/25/21  1523   A1C 9.1*     Most Recent 6 glucoses:Recent Labs   Lab Test 01/30/22  0733 01/30/22  0538 01/30/22  0301 01/29/22  2017 01/29/22  1716 01/29/22  1136   * 128* 148* 213* 152*  150*       Imaging:   No results found for this or any previous visit (from the past 24 hour(s)).      Merry Farrell MD  Hospitalist  [facility]      Yes

## (undated) DEVICE — SUCTION MANIFOLD NEPTUNE 2 SYS 1 PORT 702-025-000

## (undated) DEVICE — ENDO FUSION OMNI-TOME G31903

## (undated) DEVICE — WIRE GUIDE 0.35X270CM STRAIGHT TIP VISIGLIDE G-260-3527S

## (undated) DEVICE — CATH BALLOON DILATING BIL FUSION TITAN 12FRX8MM FS-BDB-8X4

## (undated) DEVICE — BALLOON EXTRACTION 15X1950MM 3.2MM TL B-V243Q-A

## (undated) DEVICE — PLATE GROUNDING ADULT W/CORD 9165L

## (undated) DEVICE — TUBING SUCTION MEDI-VAC 1/4"X20' N620A - HE

## (undated) DEVICE — SOL WATER IRRIG 1000ML BOTTLE 2F7114

## (undated) DEVICE — INFLATION DEVICE BIG 60 ENDO-AN6012

## (undated) RX ORDER — HEPARIN SODIUM 200 [USP'U]/100ML
INJECTION, SOLUTION INTRAVENOUS
Status: DISPENSED
Start: 2023-01-01

## (undated) RX ORDER — HEPARIN SODIUM 1000 [USP'U]/ML
INJECTION, SOLUTION INTRAVENOUS; SUBCUTANEOUS
Status: DISPENSED
Start: 2023-01-01

## (undated) RX ORDER — PROTAMINE SULFATE 10 MG/ML
INJECTION, SOLUTION INTRAVENOUS
Status: DISPENSED
Start: 2023-01-01

## (undated) RX ORDER — FENTANYL CITRATE 50 UG/ML
INJECTION, SOLUTION INTRAMUSCULAR; INTRAVENOUS
Status: DISPENSED
Start: 2023-01-01

## (undated) RX ORDER — LIDOCAINE HYDROCHLORIDE 10 MG/ML
INJECTION, SOLUTION EPIDURAL; INFILTRATION; INTRACAUDAL; PERINEURAL
Status: DISPENSED
Start: 2023-01-01